# Patient Record
Sex: MALE | Race: WHITE | NOT HISPANIC OR LATINO | Employment: OTHER | URBAN - NONMETROPOLITAN AREA
[De-identification: names, ages, dates, MRNs, and addresses within clinical notes are randomized per-mention and may not be internally consistent; named-entity substitution may affect disease eponyms.]

---

## 2017-01-31 ENCOUNTER — TELEPHONE (OUTPATIENT)
Dept: FAMILY MEDICINE CLINIC | Facility: CLINIC | Age: 62
End: 2017-01-31

## 2017-01-31 DIAGNOSIS — E87.6 HYPOKALEMIA: ICD-10-CM

## 2017-01-31 DIAGNOSIS — I10 MALIGNANT HYPERTENSION: Primary | ICD-10-CM

## 2017-01-31 NOTE — TELEPHONE ENCOUNTER
----- Message from Dionne Meneses sent at 1/31/2017  1:02 PM EST -----  Contact: cherry Manriquez called asking to get a standard panel of labs put in that includes magneium and potassium as he has an appt Thursday and wants to come in have these labs drawn nikky.

## 2017-02-01 ENCOUNTER — LAB (OUTPATIENT)
Dept: FAMILY MEDICINE CLINIC | Facility: CLINIC | Age: 62
End: 2017-02-01

## 2017-02-01 DIAGNOSIS — E87.6 HYPOKALEMIA: ICD-10-CM

## 2017-02-01 DIAGNOSIS — I10 MALIGNANT HYPERTENSION: ICD-10-CM

## 2017-02-01 LAB
ANION GAP SERPL CALCULATED.3IONS-SCNC: 10 MMOL/L (ref 3–11)
BUN BLD-MCNC: 11 MG/DL (ref 9–23)
BUN/CREAT SERPL: 8.5 (ref 7–25)
CALCIUM SPEC-SCNC: 10.2 MG/DL (ref 8.7–10.4)
CHLORIDE SERPL-SCNC: 100 MMOL/L (ref 99–109)
CO2 SERPL-SCNC: 31 MMOL/L (ref 20–31)
CREAT BLD-MCNC: 1.3 MG/DL (ref 0.6–1.3)
GFR SERPL CREATININE-BSD FRML MDRD: 56 ML/MIN/1.73
GLUCOSE BLD-MCNC: 122 MG/DL (ref 70–100)
MAGNESIUM SERPL-MCNC: 2 MG/DL (ref 1.3–2.7)
POTASSIUM BLD-SCNC: 3.5 MMOL/L (ref 3.5–5.5)
SODIUM BLD-SCNC: 141 MMOL/L (ref 132–146)

## 2017-02-01 PROCEDURE — 80048 BASIC METABOLIC PNL TOTAL CA: CPT | Performed by: INTERNAL MEDICINE

## 2017-02-01 PROCEDURE — 83735 ASSAY OF MAGNESIUM: CPT | Performed by: INTERNAL MEDICINE

## 2017-02-02 ENCOUNTER — OFFICE VISIT (OUTPATIENT)
Dept: FAMILY MEDICINE CLINIC | Facility: CLINIC | Age: 62
End: 2017-02-02

## 2017-02-02 VITALS
HEART RATE: 66 BPM | HEIGHT: 76 IN | OXYGEN SATURATION: 100 % | DIASTOLIC BLOOD PRESSURE: 83 MMHG | BODY MASS INDEX: 38.36 KG/M2 | RESPIRATION RATE: 16 BRPM | WEIGHT: 315 LBS | SYSTOLIC BLOOD PRESSURE: 163 MMHG | TEMPERATURE: 98 F

## 2017-02-02 DIAGNOSIS — R53.1 WEAKNESS: ICD-10-CM

## 2017-02-02 DIAGNOSIS — M25.562 ARTHRALGIA OF LEFT KNEE: ICD-10-CM

## 2017-02-02 DIAGNOSIS — I10 MALIGNANT HYPERTENSION: ICD-10-CM

## 2017-02-02 DIAGNOSIS — E87.6 HYPOKALEMIA: Primary | ICD-10-CM

## 2017-02-02 PROCEDURE — 99214 OFFICE O/P EST MOD 30 MIN: CPT | Performed by: INTERNAL MEDICINE

## 2017-02-02 RX ORDER — CLOPIDOGREL BISULFATE 75 MG/1
75 TABLET ORAL DAILY
Qty: 90 TABLET | Refills: 3 | Status: SHIPPED | OUTPATIENT
Start: 2017-02-02 | End: 2017-02-16 | Stop reason: SDUPTHER

## 2017-02-02 RX ORDER — HYDRALAZINE HYDROCHLORIDE 100 MG/1
50 TABLET, FILM COATED ORAL 3 TIMES DAILY
Qty: 90 TABLET | Refills: 11 | Status: SHIPPED | OUTPATIENT
Start: 2017-02-02 | End: 2018-07-12

## 2017-02-02 NOTE — PROGRESS NOTES
"Subjective   Patient ID: Andre Agosto is a 62 y.o. male Pt is here for management of multiple medical problems.  History of Present Illness     Patient here for follow-up, patient has questions regarding medications.  Pt is here for management of multiple medical problems.    Whole left side of body seems swollen. Weak on left side of body x2-3 weeks.     BP starting to increase mildly. Seep on right side.   On Bipap.     Seen nephrologist. Going well with labs.    Brilenta causing soa. When not taking soa improves.       Pt only taking hydralazine qd.       The following portions of the patient's history were reviewed and updated as appropriate: allergies, current medications, past family history, past medical history, past social history, past surgical history and problem list.      Review of Systems   Constitutional: Positive for fatigue.   Neurological: Positive for weakness.   All other systems reviewed and are negative.      Objective     Visit Vitals   • /83 (BP Location: Left arm, Patient Position: Sitting, Cuff Size: Large Adult)   • Pulse 66   • Temp 98 °F (36.7 °C) (Oral)   • Resp 16   • Ht 76\" (193 cm)   • Wt (!) 317 lb (144 kg)   • SpO2 100%   • BMI 38.59 kg/m2     Physical Exam  General Appearance:    Alert, cooperative, no distress, appears stated age   Head:    Normocephalic, without obvious abnormality, atraumatic   Eyes:    PERRL, conjunctiva/corneas clear, EOM's intact           Ears:    Normal TM's and external ear canals, both ears   Nose:   Nares normal, septum midline, mucosa normal, no drainage    or sinus tenderness   Throat:   Lips, mucosa, and tongue normal; teeth and gums normal   Neck:   Supple, symmetrical, trachea midline, no adenopathy;        thyroid:  No enlargement/tenderness/nodules; no carotid    bruit or JVD   Back:     Symmetric, no curvature, ROM normal, no CVA tenderness   Lungs:     Clear to auscultation bilaterally, respirations unlabored   Chest wall:    No " tenderness or deformity   Heart:    Regular rate and rhythm, S1 and S2 normal, no murmur, rub   or gallop   Abdomen:     Soft, non-tender, bowel sounds active all four quadrants,     no masses, no organomegaly           Extremities:   Extremities normal, atraumatic, no cyanosis or edema   Pulses:   2+ and symmetric all extremities   Skin:   Skin color, texture, turgor normal, no rashes or lesions   Lymph nodes:   Cervical, supraclavicular, and axillary nodes normal   Neurologic:   CNII-XII intact. Normal strength, sensation and +0 reflexes       Throughout. Loss of vib sensation in both lower extreme.    Neg rhomberg eyes open and closed.        Assessment/Plan     Left sided sensation of edema and weakness.  No clear PE findings.  Consider cva as cause. therapy will be excalated if new cva found.     Increase hydralazine to tid. Decrease dase back to 50 and increase as needed.     benoit brilliant to Plavix due to SE.       Andre was seen today for hypertension, edema and shortness of breath.    Diagnoses and all orders for this visit:    Hypokalemia  -     Magnesium; Future    Malignant hypertension  -     Magnesium; Future    Arthralgia of left knee    Weakness  -     CK Isoenzymes; Future  -     Myoglobin, Serum; Future  -     MRI Brain Without Contrast; Future    Other orders  -     clopidogrel (PLAVIX) 75 MG tablet; Take 1 tablet by mouth Daily.  -     hydrALAZINE (APRESOLINE) 100 MG tablet; Take 0.5 tablets by mouth 3 (Three) Times a Day.      Return in about 2 weeks (around 2/16/2017).                   There are no Patient Instructions on file for this visit.

## 2017-02-02 NOTE — PROGRESS NOTES
Please let them know the kidney function is good this time and potassium is stable. No rtc on file.  Please set one up for then next 6-8 weeks.

## 2017-02-16 ENCOUNTER — OFFICE VISIT (OUTPATIENT)
Dept: FAMILY MEDICINE CLINIC | Facility: CLINIC | Age: 62
End: 2017-02-16

## 2017-02-16 VITALS
DIASTOLIC BLOOD PRESSURE: 81 MMHG | HEIGHT: 76 IN | WEIGHT: 309 LBS | SYSTOLIC BLOOD PRESSURE: 153 MMHG | RESPIRATION RATE: 16 BRPM | BODY MASS INDEX: 37.63 KG/M2 | TEMPERATURE: 98.2 F | HEART RATE: 74 BPM | OXYGEN SATURATION: 100 %

## 2017-02-16 DIAGNOSIS — I10 ESSENTIAL HYPERTENSION: Primary | ICD-10-CM

## 2017-02-16 PROCEDURE — 99214 OFFICE O/P EST MOD 30 MIN: CPT | Performed by: INTERNAL MEDICINE

## 2017-02-16 RX ORDER — NEBIVOLOL HYDROCHLORIDE 20 MG/1
20 TABLET ORAL DAILY
Qty: 90 TABLET | Refills: 3 | Status: SHIPPED | OUTPATIENT
Start: 2017-02-16 | End: 2018-08-14 | Stop reason: DRUGHIGH

## 2017-02-16 RX ORDER — CLOPIDOGREL BISULFATE 75 MG/1
75 TABLET ORAL DAILY
Qty: 90 TABLET | Refills: 3 | Status: SHIPPED | OUTPATIENT
Start: 2017-02-16 | End: 2018-03-19 | Stop reason: SDUPTHER

## 2017-02-16 NOTE — PROGRESS NOTES
"Subjective   Patient ID: Andre Agosto is a 62 y.o. male Pt is here for management of multiple medical problems.  History of Present Illness  Pt is here for management of multiple medical problems.    Pt walking better.     Toe pain getting better but still hurts.    MRI at [facility] and they never sent result. Told neg mri      Having a hard time with mid day hydralazine.      having pain in legs knees down and some pain in thigh.   Not got ck or myoglobin labs done.        The following portions of the patient's history were reviewed and updated as appropriate: allergies, current medications, past family history, past medical history, past social history, past surgical history and problem list.      Review of Systems   Constitutional: Positive for fatigue.   Musculoskeletal: Positive for gait problem and myalgias.       Objective     Visit Vitals   • /81 (BP Location: Left arm, Patient Position: Sitting, Cuff Size: Large Adult)   • Pulse 74   • Temp 98.2 °F (36.8 °C) (Oral)   • Resp 16   • Ht 76\" (193 cm)   • Wt (!) 309 lb (140 kg)   • SpO2 100%   • BMI 37.61 kg/m2     Physical Exam  General Appearance:    Alert, cooperative, no distress, appears stated age   Head:    Normocephalic, without obvious abnormality, atraumatic   Eyes:    PERRL, conjunctiva/corneas clear, EOM's intact           Ears:    Normal TM's and external ear canals, both ears   Nose:   Nares normal, septum midline, mucosa normal, no drainage    or sinus tenderness   Throat:   Lips, mucosa, and tongue normal; teeth and gums normal   Neck:   Supple, symmetrical, trachea midline, no adenopathy;        thyroid:  No enlargement/tenderness/nodules; no carotid    bruit or JVD   Back:     Symmetric, no curvature, ROM normal, no CVA tenderness   Lungs:     Clear to auscultation bilaterally, respirations unlabored   Chest wall:    No tenderness or deformity   Heart:    Regular rate and rhythm, S1 and S2 normal, no murmur, rub   or gallop "   Abdomen:     Soft, non-tender, bowel sounds active all four quadrants,     no masses, no organomegaly           Extremities:   Extremities normal, atraumatic, no cyanosis or edema   Pulses:   2+ and symmetric all extremities   Skin:   Skin color, texture, turgor normal, no rashes or lesions   Lymph nodes:   Cervical, supraclavicular, and axillary nodes normal   Neurologic:   CNII-XII intact. Normal strength, sensation and reflexes       throughout       Assessment/Plan     Increase hydralazine to tid  Trial of one week off statin.   If better then restart q0d.    Get mri of brain images as pt has had abnormal mri in past and was told this one is normal.     Pt will change brillinta to plavix due to soa.      Andre was seen today for hypertension, hypokalemia and pain.    Diagnoses and all orders for this visit:    Essential hypertension  -     Basic metabolic panel; Future    Other orders  -     BYSTOLIC 20 MG tablet; Take 1 tablet by mouth Daily.  -     clopidogrel (PLAVIX) 75 MG tablet; Take 1 tablet by mouth Daily.      No Follow-up on file.                   There are no Patient Instructions on file for this visit.

## 2017-03-01 RX ORDER — ESCITALOPRAM OXALATE 20 MG/1
TABLET ORAL
Qty: 90 TABLET | Refills: 2 | Status: SHIPPED | OUTPATIENT
Start: 2017-03-01 | End: 2017-12-09 | Stop reason: SDUPTHER

## 2017-03-10 ENCOUNTER — OFFICE VISIT (OUTPATIENT)
Dept: FAMILY MEDICINE CLINIC | Facility: CLINIC | Age: 62
End: 2017-03-10

## 2017-03-10 VITALS
TEMPERATURE: 98.1 F | HEART RATE: 58 BPM | BODY MASS INDEX: 38.36 KG/M2 | OXYGEN SATURATION: 98 % | DIASTOLIC BLOOD PRESSURE: 72 MMHG | RESPIRATION RATE: 16 BRPM | HEIGHT: 76 IN | SYSTOLIC BLOOD PRESSURE: 138 MMHG | WEIGHT: 315 LBS

## 2017-03-10 DIAGNOSIS — I10 MALIGNANT HYPERTENSION: Primary | ICD-10-CM

## 2017-03-10 DIAGNOSIS — E87.6 HYPOKALEMIA: ICD-10-CM

## 2017-03-10 DIAGNOSIS — Z99.89 OSA ON CPAP: ICD-10-CM

## 2017-03-10 DIAGNOSIS — G47.33 OSA ON CPAP: ICD-10-CM

## 2017-03-10 DIAGNOSIS — G40.909 SEIZURE DISORDER (HCC): ICD-10-CM

## 2017-03-10 LAB
BUN SERPL-MCNC: 11 MG/DL (ref 7–20)
BUN/CREAT SERPL: 8.5 (ref 6.3–21.9)
CALCIUM SERPL-MCNC: 9.4 MG/DL (ref 8.4–10.2)
CHLORIDE SERPL-SCNC: 105 MMOL/L (ref 98–107)
CK BB CFR SERPL ELPH: 0 %
CK MACRO1 CFR SERPL: 0 %
CK MACRO2 CFR SERPL: 0 %
CK MB CFR SERPL ELPH: 0 % (ref 0–3)
CK MM CFR SERPL ELPH: 100 % (ref 97–100)
CK SERPL-CCNC: 71 U/L (ref 24–204)
CO2 SERPL-SCNC: 27 MMOL/L (ref 26–30)
CREAT SERPL-MCNC: 1.3 MG/DL (ref 0.6–1.3)
GLUCOSE SERPL-MCNC: 133 MG/DL (ref 74–98)
MYOGLOBIN SERPL-MCNC: 63.9 NG/ML (ref 0–101)
POTASSIUM SERPL-SCNC: 3 MMOL/L (ref 3.5–5.1)
SODIUM SERPL-SCNC: 144 MMOL/L (ref 137–145)

## 2017-03-10 PROCEDURE — 99214 OFFICE O/P EST MOD 30 MIN: CPT | Performed by: INTERNAL MEDICINE

## 2017-03-10 NOTE — PROGRESS NOTES
"Subjective   Patient ID: Andre Agosto is a 62 y.o. male Pt is here for management of multiple medical problems.    Chief Complaint   Patient presents with   • Hypertension     follow-up       History of Present Illness    Feels well. Average 150/80.  Hydralazin bid. Hard time remebering tid.  Better on soa on brillenta.     Off cholest med. Leg pain is gone.       On Bipap.       The following portions of the patient's history were reviewed and updated as appropriate: allergies, current medications, past family history, past medical history, past social history, past surgical history and problem list.      Review of Systems   Constitutional: Positive for fatigue.   All other systems reviewed and are negative.      Objective     Visit Vitals   • /72   • Pulse 58   • Temp 98.1 °F (36.7 °C) (Oral)   • Resp 16   • Ht 76\" (193 cm)   • Wt (!) 318 lb (144 kg)   • SpO2 98%   • BMI 38.71 kg/m2     Physical Exam  General Appearance:    Alert, cooperative, no distress, appears stated age   Head:    Normocephalic, without obvious abnormality, atraumatic   Eyes:    PERRL, conjunctiva/corneas clear, EOM's intact           Ears:    Normal TM's and external ear canals, both ears   Nose:   Nares normal, septum midline, mucosa normal, no drainage    or sinus tenderness   Throat:   Lips, mucosa, and tongue normal; teeth and gums normal   Neck:   Supple, symmetrical, trachea midline, no adenopathy;        thyroid:  No enlargement/tenderness/nodules; no carotid    bruit or JVD   Back:     Symmetric, no curvature, ROM normal, no CVA tenderness   Lungs:     Clear to auscultation bilaterally, respirations unlabored   Chest wall:    No tenderness or deformity   Heart:    Regular rate and rhythm, S1 and S2 normal, no murmur, rub   or gallop   Abdomen:     Soft, non-tender, bowel sounds active all four quadrants,     no masses, no organomegaly           Extremities:   Extremities normal, atraumatic, no cyanosis or edema "   Pulses:   2+ and symmetric all extremities   Skin:   Skin color, texture, turgor normal, no rashes or lesions   Lymph nodes:   Cervical, supraclavicular, and axillary nodes normal   Neurologic:   CNII-XII intact. Normal strength, sensation and reflexes       throughout       Assessment/Plan   Labs discusseed.  Get on lovastatin.  Start back kcl.     BP still running high.        Andre was seen today for hypertension.    Diagnoses and all orders for this visit:    Malignant hypertension  -     Magnesium; Future  -     Comprehensive Metabolic Panel; Future  -     T4, Free; Future  -     TSH; Future  -     T3, Free; Future  -     Vitamin B12; Future  -     CBC & Differential; Future  -     Lipid Panel; Future    Seizure disorder  -     Magnesium; Future  -     Comprehensive Metabolic Panel; Future  -     T4, Free; Future  -     TSH; Future  -     T3, Free; Future  -     Vitamin B12; Future  -     CBC & Differential; Future  -     Lipid Panel; Future    Hypokalemia  -     Magnesium; Future  -     Comprehensive Metabolic Panel; Future  -     T4, Free; Future  -     TSH; Future  -     T3, Free; Future  -     Vitamin B12; Future  -     CBC & Differential; Future  -     Lipid Panel; Future    ALEX on CPAP  -     Magnesium; Future  -     Comprehensive Metabolic Panel; Future  -     T4, Free; Future  -     TSH; Future  -     T3, Free; Future  -     Vitamin B12; Future  -     CBC & Differential; Future  -     Lipid Panel; Future      Return in about 3 months (around 6/10/2017), or set up skin lesion in 2-3 weeks..                   There are no Patient Instructions on file for this visit.

## 2017-03-11 ENCOUNTER — RESULTS ENCOUNTER (OUTPATIENT)
Dept: FAMILY MEDICINE CLINIC | Facility: CLINIC | Age: 62
End: 2017-03-11

## 2017-03-11 DIAGNOSIS — G47.33 OSA ON CPAP: ICD-10-CM

## 2017-03-11 DIAGNOSIS — E87.6 HYPOKALEMIA: ICD-10-CM

## 2017-03-11 DIAGNOSIS — I10 MALIGNANT HYPERTENSION: ICD-10-CM

## 2017-03-11 DIAGNOSIS — Z99.89 OSA ON CPAP: ICD-10-CM

## 2017-03-11 DIAGNOSIS — G40.909 SEIZURE DISORDER (HCC): ICD-10-CM

## 2017-03-15 ENCOUNTER — RESULTS ENCOUNTER (OUTPATIENT)
Dept: FAMILY MEDICINE CLINIC | Facility: CLINIC | Age: 62
End: 2017-03-15

## 2017-03-15 DIAGNOSIS — I10 MALIGNANT HYPERTENSION: ICD-10-CM

## 2017-03-15 DIAGNOSIS — G40.909 SEIZURE DISORDER (HCC): ICD-10-CM

## 2017-03-15 DIAGNOSIS — G47.33 OSA ON CPAP: ICD-10-CM

## 2017-03-15 DIAGNOSIS — E87.6 HYPOKALEMIA: ICD-10-CM

## 2017-03-15 DIAGNOSIS — Z99.89 OSA ON CPAP: ICD-10-CM

## 2017-04-10 ENCOUNTER — PROCEDURE VISIT (OUTPATIENT)
Dept: FAMILY MEDICINE CLINIC | Facility: CLINIC | Age: 62
End: 2017-04-10

## 2017-04-10 VITALS
SYSTOLIC BLOOD PRESSURE: 164 MMHG | HEIGHT: 76 IN | WEIGHT: 311 LBS | HEART RATE: 54 BPM | DIASTOLIC BLOOD PRESSURE: 84 MMHG | RESPIRATION RATE: 16 BRPM | BODY MASS INDEX: 37.87 KG/M2 | OXYGEN SATURATION: 99 % | TEMPERATURE: 97.8 F

## 2017-04-10 DIAGNOSIS — L98.9 SKIN LESION OF RIGHT LEG: Primary | ICD-10-CM

## 2017-04-10 PROCEDURE — 11400 EXC TR-EXT B9+MARG 0.5 CM<: CPT | Performed by: INTERNAL MEDICINE

## 2017-04-10 NOTE — PROGRESS NOTES
Skin Lesion Removal.  Indication: uncertain behavior   Pt was consented. Area prepped.  Lidocaine with epi 1% given subepidermally.  Shave of entire lesion done.  0.5 cm.  Electrodesiccation used for hemostasis.  Specimen sent to path.  Verbal wound care instructions given.  Pt tolerated procedure well.  Location right posterior leg upper thigh.

## 2017-05-15 ENCOUNTER — TELEPHONE (OUTPATIENT)
Dept: FAMILY MEDICINE CLINIC | Facility: CLINIC | Age: 62
End: 2017-05-15

## 2017-05-15 DIAGNOSIS — E87.6 HYPOKALEMIA: Primary | ICD-10-CM

## 2017-06-21 ENCOUNTER — LAB (OUTPATIENT)
Dept: FAMILY MEDICINE CLINIC | Facility: CLINIC | Age: 62
End: 2017-06-21

## 2017-06-21 DIAGNOSIS — R53.1 WEAKNESS: ICD-10-CM

## 2017-06-21 DIAGNOSIS — I10 ESSENTIAL HYPERTENSION: ICD-10-CM

## 2017-06-21 LAB — MAGNESIUM SERPL-MCNC: 1.9 MG/DL (ref 1.6–2.3)

## 2017-06-22 LAB
ALBUMIN SERPL-MCNC: 4.3 G/DL (ref 3.5–5)
ALBUMIN/GLOB SERPL: 1.3 G/DL (ref 1–2)
ALP SERPL-CCNC: 77 U/L (ref 38–126)
ALT SERPL-CCNC: 25 U/L (ref 13–69)
AST SERPL-CCNC: 18 U/L (ref 15–46)
BASOPHILS # BLD AUTO: 0.09 10*3/MM3 (ref 0–0.2)
BASOPHILS NFR BLD AUTO: 1 % (ref 0–2.5)
BILIRUB SERPL-MCNC: 0.7 MG/DL (ref 0.2–1.3)
BUN SERPL-MCNC: 12 MG/DL (ref 7–20)
BUN/CREAT SERPL: 8.6 (ref 6.3–21.9)
CALCIUM SERPL-MCNC: 9.6 MG/DL (ref 8.4–10.2)
CHLORIDE SERPL-SCNC: 102 MMOL/L (ref 98–107)
CHOLEST SERPL-MCNC: 247 MG/DL (ref 0–199)
CO2 SERPL-SCNC: 27 MMOL/L (ref 26–30)
CREAT SERPL-MCNC: 1.4 MG/DL (ref 0.6–1.3)
EOSINOPHIL # BLD AUTO: 0.36 10*3/MM3 (ref 0–0.7)
EOSINOPHIL NFR BLD AUTO: 4.2 % (ref 0–7)
ERYTHROCYTE [DISTWIDTH] IN BLOOD BY AUTOMATED COUNT: 14.6 % (ref 11.5–14.5)
GLOBULIN SER CALC-MCNC: 3.4 GM/DL
GLUCOSE SERPL-MCNC: 132 MG/DL (ref 74–98)
HCT VFR BLD AUTO: 47.3 % (ref 42–52)
HDLC SERPL-MCNC: 38 MG/DL (ref 40–60)
HGB BLD-MCNC: 15.8 G/DL (ref 14–18)
IMM GRANULOCYTES # BLD: 0.02 10*3/MM3 (ref 0–0.06)
IMM GRANULOCYTES NFR BLD: 0.2 % (ref 0–0.6)
LDLC SERPL CALC-MCNC: ABNORMAL MG/DL
LYMPHOCYTES # BLD AUTO: 2.16 10*3/MM3 (ref 0.6–3.4)
LYMPHOCYTES NFR BLD AUTO: 25 % (ref 10–50)
MCH RBC QN AUTO: 29.7 PG (ref 27–31)
MCHC RBC AUTO-ENTMCNC: 33.4 G/DL (ref 30–37)
MCV RBC AUTO: 88.9 FL (ref 80–94)
MONOCYTES # BLD AUTO: 0.49 10*3/MM3 (ref 0–0.9)
MONOCYTES NFR BLD AUTO: 5.7 % (ref 0–12)
NEUTROPHILS # BLD AUTO: 5.51 10*3/MM3 (ref 2–6.9)
NEUTROPHILS NFR BLD AUTO: 63.9 % (ref 37–80)
NRBC BLD AUTO-RTO: 0 /100 WBC (ref 0–0)
PLATELET # BLD AUTO: 254 10*3/MM3 (ref 130–400)
POTASSIUM SERPL-SCNC: 3.2 MMOL/L (ref 3.5–5.1)
PROT SERPL-MCNC: 7.7 G/DL (ref 6.3–8.2)
RBC # BLD AUTO: 5.32 10*6/MM3 (ref 4.7–6.1)
SODIUM SERPL-SCNC: 144 MMOL/L (ref 137–145)
T3FREE SERPL-MCNC: 3 PG/ML (ref 2–4.4)
T4 FREE SERPL-MCNC: 0.94 NG/DL (ref 0.78–2.19)
TRIGL SERPL-MCNC: 410 MG/DL
TSH SERPL DL<=0.005 MIU/L-ACNC: 2.24 MIU/ML (ref 0.47–4.68)
VIT B12 SERPL-MCNC: 353 PG/ML (ref 239–931)
VLDLC SERPL CALC-MCNC: ABNORMAL MG/DL
WBC # BLD AUTO: 8.63 10*3/MM3 (ref 4.8–10.8)

## 2017-06-27 RX ORDER — POTASSIUM CHLORIDE 1500 MG/1
20 TABLET, FILM COATED, EXTENDED RELEASE ORAL 2 TIMES DAILY
Qty: 60 TABLET | Refills: 11 | Status: SHIPPED | OUTPATIENT
Start: 2017-06-27 | End: 2018-07-12 | Stop reason: SDUPTHER

## 2017-06-29 ENCOUNTER — OFFICE VISIT (OUTPATIENT)
Dept: FAMILY MEDICINE CLINIC | Facility: CLINIC | Age: 62
End: 2017-06-29

## 2017-06-29 VITALS
RESPIRATION RATE: 16 BRPM | DIASTOLIC BLOOD PRESSURE: 80 MMHG | WEIGHT: 314 LBS | SYSTOLIC BLOOD PRESSURE: 149 MMHG | OXYGEN SATURATION: 98 % | HEART RATE: 54 BPM | BODY MASS INDEX: 38.22 KG/M2 | TEMPERATURE: 98.2 F

## 2017-06-29 DIAGNOSIS — I10 ESSENTIAL HYPERTENSION: ICD-10-CM

## 2017-06-29 DIAGNOSIS — E87.6 HYPOKALEMIA: ICD-10-CM

## 2017-06-29 DIAGNOSIS — H65.04 RECURRENT ACUTE SEROUS OTITIS MEDIA OF RIGHT EAR: Primary | ICD-10-CM

## 2017-06-29 DIAGNOSIS — R79.9 ABNORMAL FINDING OF BLOOD CHEMISTRY: ICD-10-CM

## 2017-06-29 PROCEDURE — 99214 OFFICE O/P EST MOD 30 MIN: CPT | Performed by: INTERNAL MEDICINE

## 2017-06-29 RX ORDER — LEVOFLOXACIN 500 MG/1
500 TABLET, FILM COATED ORAL DAILY
Qty: 7 TABLET | Refills: 0 | Status: SHIPPED | OUTPATIENT
Start: 2017-06-29 | End: 2017-12-05

## 2017-06-29 NOTE — PROGRESS NOTES
Subjective   Patient ID: Andre Agosto is a 62 y.o. male Pt is here for management of multiple medical problems.    Chief Complaint   Patient presents with   • Ear Fullness     Patient complains that his right ear feels like it has fluid in it.   • Eye Problem       History of Present Illness    Ear pain x 1 week in right ear and eye puffines on right.  Had bad infection last year required tube and uk involvement.     Tolerating lovastaitn qod.       The following portions of the patient's history were reviewed and updated as appropriate: allergies, current medications, past family history, past medical history, past social history, past surgical history and problem list.      Review of Systems   Constitutional: Positive for fatigue.   HENT: Positive for congestion, ear pain and facial swelling.    Eyes: Positive for pain, discharge, redness, itching and visual disturbance.   All other systems reviewed and are negative.      Objective     /80  Pulse 54  Temp 98.2 °F (36.8 °C) (Oral)   Resp 16  Wt (!) 314 lb (142 kg)  SpO2 98%  BMI 38.22 kg/m2  Physical Exam  General Appearance:    Alert, cooperative, no distress, appears stated age   Head:    Normocephalic, without obvious abnormality, atraumatic   Eyes:    PERRL, conjunctiva/corneas clear, EOM's intact           Ears:    Normal TM's and external ear canals, both ears   Nose:   Nares normal, septum midline, mucosa normal, no drainage    or sinus tenderness   Throat:   Lips, mucosa, and tongue normal; teeth and gums normal   Neck:   Supple, symmetrical, trachea midline, no adenopathy;        thyroid:  No enlargement/tenderness/nodules; no carotid    bruit or JVD   Back:     Symmetric, no curvature, ROM normal, no CVA tenderness   Lungs:     Clear to auscultation bilaterally, respirations unlabored   Chest wall:    No tenderness or deformity   Heart:    Regular rate and rhythm, S1 and S2 normal, no murmur, rub   or gallop   Abdomen:     Soft,  non-tender, bowel sounds active all four quadrants,     no masses, no organomegaly           Extremities:   Extremities normal, atraumatic, no cyanosis or edema   Pulses:   2+ and symmetric all extremities   Skin:   Skin color, texture, turgor normal, no rashes or lesions   Lymph nodes:   Cervical, supraclavicular, and axillary nodes normal   Neurologic:   CNII-XII intact. Normal strength, sensation and reflexes       throughout       Assessment/Plan     Diet and exercise discussed.        Andre was seen today for ear fullness and eye problem.    Diagnoses and all orders for this visit:    Recurrent acute serous otitis media of right ear  -     levoFLOXacin (LEVAQUIN) 500 MG tablet; Take 1 tablet by mouth Daily.  -     Basic Metabolic Panel  -     Hemoglobin A1c    Essential hypertension  -     Basic Metabolic Panel  -     Hemoglobin A1c    Hypokalemia  -     Basic Metabolic Panel  -     Hemoglobin A1c    Abnormal finding of blood chemistry   -     Hemoglobin A1c      No Follow-up on file.                   There are no Patient Instructions on file for this visit.

## 2017-06-30 LAB
BUN SERPL-MCNC: 12 MG/DL (ref 7–20)
BUN/CREAT SERPL: 8.6 (ref 6.3–21.9)
CALCIUM SERPL-MCNC: 9.9 MG/DL (ref 8.4–10.2)
CHLORIDE SERPL-SCNC: 102 MMOL/L (ref 98–107)
CO2 SERPL-SCNC: 30 MMOL/L (ref 26–30)
CREAT SERPL-MCNC: 1.4 MG/DL (ref 0.6–1.3)
GLUCOSE SERPL-MCNC: 137 MG/DL (ref 74–98)
HBA1C MFR BLD: 6.4 %
POTASSIUM SERPL-SCNC: 3.7 MMOL/L (ref 3.5–5.1)
SODIUM SERPL-SCNC: 145 MMOL/L (ref 137–145)

## 2017-07-20 NOTE — TELEPHONE ENCOUNTER
Please clarify with pt dose and frequency. And if neurologist is managing it might be better for them to rf. If not let me know to [facility] med.

## 2017-07-21 RX ORDER — ZONISAMIDE 100 MG/1
100 CAPSULE ORAL 3 TIMES DAILY
Qty: 270 CAPSULE | Refills: 3 | Status: SHIPPED | OUTPATIENT
Start: 2017-07-21 | End: 2018-05-13 | Stop reason: SDUPTHER

## 2017-07-21 NOTE — TELEPHONE ENCOUNTER
Dr. Bear, I talked to Anrde, he states he was getting the Zonegran from his neurologist but his neurologist moved to [place]. Andre is now in search of a new neurologist but has not been able to get into see anyone yet. Andre states he has been taking Zonisamide 100 mg, 3 times a day, and he is asking if you can refill this for him until he finds a new doctor.

## 2017-10-24 ENCOUNTER — OFFICE VISIT (OUTPATIENT)
Dept: FAMILY MEDICINE CLINIC | Facility: CLINIC | Age: 62
End: 2017-10-24

## 2017-10-24 ENCOUNTER — TELEPHONE (OUTPATIENT)
Dept: FAMILY MEDICINE CLINIC | Facility: CLINIC | Age: 62
End: 2017-10-24

## 2017-10-24 VITALS
HEART RATE: 54 BPM | TEMPERATURE: 97.4 F | BODY MASS INDEX: 37.14 KG/M2 | HEIGHT: 76 IN | WEIGHT: 305 LBS | OXYGEN SATURATION: 96 % | SYSTOLIC BLOOD PRESSURE: 167 MMHG | RESPIRATION RATE: 16 BRPM | DIASTOLIC BLOOD PRESSURE: 95 MMHG

## 2017-10-24 DIAGNOSIS — I10 MALIGNANT HYPERTENSION: ICD-10-CM

## 2017-10-24 DIAGNOSIS — R53.83 FATIGUE, UNSPECIFIED TYPE: ICD-10-CM

## 2017-10-24 DIAGNOSIS — R35.89 POLYURIA: ICD-10-CM

## 2017-10-24 DIAGNOSIS — I10 MALIGNANT HYPERTENSION: Primary | ICD-10-CM

## 2017-10-24 DIAGNOSIS — E11.9 DIABETES MELLITUS WITHOUT COMPLICATION (HCC): ICD-10-CM

## 2017-10-24 DIAGNOSIS — R63.1 POLYDIPSIA: ICD-10-CM

## 2017-10-24 DIAGNOSIS — R73.9 ELEVATED BLOOD SUGAR LEVEL: ICD-10-CM

## 2017-10-24 PROCEDURE — 99214 OFFICE O/P EST MOD 30 MIN: CPT | Performed by: INTERNAL MEDICINE

## 2017-10-24 RX ORDER — GLIPIZIDE 5 MG/1
5 TABLET ORAL
Qty: 60 TABLET | Refills: 3 | Status: SHIPPED | OUTPATIENT
Start: 2017-10-24 | End: 2017-12-05

## 2017-10-24 RX ORDER — BLOOD-GLUCOSE METER
1 KIT MISCELLANEOUS AS NEEDED
Qty: 1 EACH | Refills: 1 | Status: SHIPPED | OUTPATIENT
Start: 2017-10-24 | End: 2017-10-24 | Stop reason: SDUPTHER

## 2017-10-24 RX ORDER — NIFEDIPINE 60 MG/1
120 TABLET, EXTENDED RELEASE ORAL DAILY
COMMUNITY
End: 2018-07-12 | Stop reason: SDUPTHER

## 2017-10-24 RX ORDER — LANCETS 28 GAUGE
1 EACH MISCELLANEOUS AS NEEDED
Qty: 120 EACH | Refills: 12 | Status: ON HOLD | OUTPATIENT
Start: 2017-10-24 | End: 2020-01-02

## 2017-10-24 RX ORDER — BLOOD-GLUCOSE METER
1 KIT MISCELLANEOUS AS NEEDED
Qty: 1 EACH | Refills: 1 | Status: SHIPPED | OUTPATIENT
Start: 2017-10-24 | End: 2017-10-25 | Stop reason: SDUPTHER

## 2017-10-24 RX ORDER — PEN NEEDLE, DIABETIC 31 GX5/16"
1 NEEDLE, DISPOSABLE MISCELLANEOUS 4 TIMES DAILY PRN
Qty: 120 EACH | Refills: 12 | Status: ON HOLD | OUTPATIENT
Start: 2017-10-24 | End: 2020-01-02

## 2017-10-24 RX ORDER — LANCETS 28 GAUGE
1 EACH MISCELLANEOUS AS NEEDED
Qty: 120 EACH | Refills: 12 | Status: SHIPPED | OUTPATIENT
Start: 2017-10-24 | End: 2017-10-24 | Stop reason: SDUPTHER

## 2017-10-24 RX ORDER — PEN NEEDLE, DIABETIC 31 GX5/16"
1 NEEDLE, DISPOSABLE MISCELLANEOUS 4 TIMES DAILY PRN
Qty: 120 EACH | Refills: 12 | Status: SHIPPED | OUTPATIENT
Start: 2017-10-24 | End: 2017-10-24 | Stop reason: SDUPTHER

## 2017-10-24 NOTE — PROGRESS NOTES
Subjective     Patient ID: Andre Agosto is a 62 y.o. male. Patient is here for management of multiple medical problems.     Chief Complaint   Patient presents with   • Hypertension     patient states he has been having an increase in thirst, patient states he is drinking 20 bottles of water a day x 2 weeks , blood pressure has been running 150 to 175 over 72 to 85    • Fatigue     patient states he stays tired all the time     Diabetes   He presents for his initial diabetic visit. He has type 2 diabetes mellitus. No MedicAlert identification noted. His disease course has been fluctuating. Pertinent negatives for hypoglycemia include no confusion, dizziness, headaches or hunger. Associated symptoms include fatigue, polydipsia, polyphagia, polyuria and weakness. Pertinent negatives for diabetes include no blurred vision, no chest pain, no foot paresthesias and no foot ulcerations. Pertinent negatives for hypoglycemia complications include no blackouts, no nocturnal hypoglycemia and no required assistance. Pertinent negatives for diabetic complications include no CVA, heart disease, impotence or nephropathy. Risk factors for coronary artery disease include dyslipidemia, male sex, obesity and hypertension. When asked about current treatments, none were reported. He has not had a previous visit with a dietitian. He participates in exercise intermittently. An ACE inhibitor/angiotensin II receptor blocker is being taken.      Thinks postassium is low.  Wt is down.       Drink a lot of water x 2-3 weeks and can't statisfy thirst. Increase polyuria.     BP running elevated.  bp in the 180's systolic.        The following portions of the patient's history were reviewed and updated as appropriate: allergies, current medications, past family history, past medical history, past social history, past surgical history and problem list.    Review of Systems   Constitutional: Positive for fatigue.   Eyes: Negative for blurred  vision.   Cardiovascular: Negative for chest pain.   Endocrine: Positive for polydipsia, polyphagia and polyuria.   Genitourinary: Negative for impotence.   Neurological: Positive for weakness. Negative for dizziness and headaches.   Psychiatric/Behavioral: Negative for confusion.       Current Outpatient Prescriptions:   •  aspirin 81 MG EC tablet, Take 1 tablet by mouth daily., Disp: 90 tablet, Rfl: 3  •  BYSTOLIC 20 MG tablet, Take 1 tablet by mouth Daily., Disp: 90 tablet, Rfl: 3  •  clopidogrel (PLAVIX) 75 MG tablet, Take 1 tablet by mouth Daily., Disp: 90 tablet, Rfl: 3  •  escitalopram (LEXAPRO) 20 MG tablet, TAKE 1 TABLET DAILY, Disp: 90 tablet, Rfl: 2  •  hydrALAZINE (APRESOLINE) 100 MG tablet, Take 0.5 tablets by mouth 3 (Three) Times a Day. (Patient taking differently: Take 50 mg by mouth 2 (Two) Times a Day.), Disp: 90 tablet, Rfl: 11  •  levoFLOXacin (LEVAQUIN) 500 MG tablet, Take 1 tablet by mouth Daily., Disp: 7 tablet, Rfl: 0  •  lovastatin (MEVACOR) 20 MG tablet, Take 0.5 tablets by mouth every night. (Patient taking differently: Take 20 mg by mouth Every Night.), Disp: 30 tablet, Rfl: 11  •  Magnesium Oxide 400 (240 MG) MG tablet, Take 1 tablet by mouth daily., Disp: 30 tablet, Rfl: 11  •  NIFEdipine XL (PROCARDIA XL) 60 MG 24 hr tablet, Take 120 mg by mouth Daily., Disp: , Rfl:   •  potassium chloride (K-TAB) 20 MEQ tablet controlled-release ER tablet, Take 1 tablet by mouth 2 (Two) Times a Day., Disp: 60 tablet, Rfl: 11  •  valsartan (DIOVAN) 320 MG tablet, Take 320 mg by mouth daily., Disp: , Rfl:   •  zonisamide (ZONEGRAN) 100 MG capsule, Take 1 capsule by mouth 3 (Three) Times a Day., Disp: 270 capsule, Rfl: 3  •  Alcohol Swabs (ALCOHOL PREP) pads, 1 pad 4 (Four) Times a Day As Needed (bs)., Disp: 120 each, Rfl: 12  •  glucose blood test strip, Use as instructed, Disp: 120 each, Rfl: 12  •  glucose monitor monitoring kit, 1 each As Needed (as directed)., Disp: 1 each, Rfl: 1  •  Lancets  "(FREESTYLE) lancets, 1 each by Other route As Needed (bs)., Disp: 120 each, Rfl: 12  •  NIFEdipine XL (ADALAT CC) 90 MG 24 hr tablet, Take 1 tablet by mouth Daily., Disp: 90 tablet, Rfl: 3    Objective      Blood pressure 167/95, pulse 54, temperature 97.4 °F (36.3 °C), temperature source Temporal Artery , resp. rate 16, height 76\" (193 cm), weight (!) 305 lb (138 kg), SpO2 96 %.    Physical Exam     General Appearance:    Alert, cooperative, no distress, appears stated age   Head:    Normocephalic, without obvious abnormality, atraumatic   Eyes:    PERRL, conjunctiva/corneas clear, EOM's intact   Ears:    Normal TM's and external ear canals, both ears   Nose:   Nares normal, septum midline, mucosa normal, no drainage   or sinus tenderness   Throat:   Lips, mucosa, and tongue normal; teeth and gums normal   Neck:   Supple, symmetrical, trachea midline, no adenopathy;        thyroid:  No enlargement/tenderness/nodules; no carotid    bruit or JVD   Back:     Symmetric, no curvature, ROM normal, no CVA tenderness   Lungs:     Clear to auscultation bilaterally, respirations unlabored   Chest wall:    No tenderness or deformity   Heart:    Regular rate and rhythm, S1 and S2 normal, no murmur,        rub or gallop   Abdomen:     Soft, non-tender, bowel sounds active all four quadrants,     no masses, no organomegaly   Extremities:   Extremities normal, atraumatic, no cyanosis or edema   Pulses:   2+ and symmetric all extremities   Skin:   Skin color, texture, turgor normal, no rashes or lesions   Lymph nodes:   Cervical, supraclavicular, and axillary nodes normal   Neurologic:   CNII-XII intact. Normal strength, sensation and reflexes       throughout      Results for orders placed or performed in visit on 06/29/17   Basic Metabolic Panel   Result Value Ref Range    Glucose 137 (H) 74 - 98 mg/dL    BUN 12 7 - 20 mg/dL    Creatinine 1.40 (H) 0.60 - 1.30 mg/dL    eGFR Non African Am 52 (L) >60 mL/min/1.73    eGFR African Am " 62 >60 mL/min/1.73    BUN/Creatinine Ratio 8.6 6.3 - 21.9    Sodium 145 137 - 145 mmol/L    Potassium 3.7 3.5 - 5.1 mmol/L    Chloride 102 98 - 107 mmol/L    Total CO2 30.0 26.0 - 30.0 mmol/L    Calcium 9.9 8.4 - 10.2 mg/dL   Hemoglobin A1c   Result Value Ref Range    Hemoglobin A1C 6.40 %         Assessment/Plan   Diet discussed in detail.      Andre was seen today for hypertension and fatigue.    Diagnoses and all orders for this visit:    Malignant hypertension  -     Hemoglobin A1c  -     Lipid Panel  -     MicroAlbumin, Urine, Random - Urine, Clean Catch  -     TSH  -     T4, Free  -     Comprehensive Metabolic Panel  -     CBC & Differential  -     Vitamin B12  -     glucose monitor monitoring kit; 1 each As Needed (as directed).  -     glucose blood test strip; Use as instructed  -     Alcohol Swabs (ALCOHOL PREP) pads; 1 pad 4 (Four) Times a Day As Needed (bs).  -     Lancets (FREESTYLE) lancets; 1 each by Other route As Needed (bs).    Fatigue, unspecified type  -     Hemoglobin A1c  -     Lipid Panel  -     MicroAlbumin, Urine, Random - Urine, Clean Catch  -     TSH  -     T4, Free  -     Comprehensive Metabolic Panel  -     CBC & Differential  -     Vitamin B12  -     glucose monitor monitoring kit; 1 each As Needed (as directed).  -     glucose blood test strip; Use as instructed  -     Alcohol Swabs (ALCOHOL PREP) pads; 1 pad 4 (Four) Times a Day As Needed (bs).  -     Lancets (FREESTYLE) lancets; 1 each by Other route As Needed (bs).    Polydipsia  -     Hemoglobin A1c  -     Lipid Panel  -     MicroAlbumin, Urine, Random - Urine, Clean Catch  -     TSH  -     T4, Free  -     Comprehensive Metabolic Panel  -     CBC & Differential  -     Vitamin B12  -     glucose monitor monitoring kit; 1 each As Needed (as directed).  -     glucose blood test strip; Use as instructed  -     Alcohol Swabs (ALCOHOL PREP) pads; 1 pad 4 (Four) Times a Day As Needed (bs).  -     Lancets (FREESTYLE) lancets; 1 each by  Other route As Needed (bs).    Polyuria  -     Hemoglobin A1c  -     Lipid Panel  -     MicroAlbumin, Urine, Random - Urine, Clean Catch  -     TSH  -     T4, Free  -     Comprehensive Metabolic Panel  -     CBC & Differential  -     Vitamin B12  -     glucose monitor monitoring kit; 1 each As Needed (as directed).  -     glucose blood test strip; Use as instructed  -     Alcohol Swabs (ALCOHOL PREP) pads; 1 pad 4 (Four) Times a Day As Needed (bs).  -     Lancets (FREESTYLE) lancets; 1 each by Other route As Needed (bs).    Elevated blood sugar level  -     Hemoglobin A1c  -     Lipid Panel  -     MicroAlbumin, Urine, Random - Urine, Clean Catch  -     TSH  -     T4, Free  -     Comprehensive Metabolic Panel  -     CBC & Differential  -     Vitamin B12  -     glucose monitor monitoring kit; 1 each As Needed (as directed).  -     glucose blood test strip; Use as instructed  -     Alcohol Swabs (ALCOHOL PREP) pads; 1 pad 4 (Four) Times a Day As Needed (bs).  -     Lancets (FREESTYLE) lancets; 1 each by Other route As Needed (bs).    Diabetes mellitus without complication  -     glucose monitor monitoring kit; 1 each As Needed (as directed).  -     glucose blood test strip; Use as instructed  -     Alcohol Swabs (ALCOHOL PREP) pads; 1 pad 4 (Four) Times a Day As Needed (bs).  -     Lancets (FREESTYLE) lancets; 1 each by Other route As Needed (bs).      Return in about 1 week (around 10/31/2017).          There are no Patient Instructions on file for this visit.     Keven Bear MD    Assessment/Plan         Andre was seen today for hypertension and fatigue.    Diagnoses and all orders for this visit:    Malignant hypertension  -     Hemoglobin A1c  -     Lipid Panel  -     MicroAlbumin, Urine, Random - Urine, Clean Catch  -     TSH  -     T4, Free  -     Comprehensive Metabolic Panel  -     CBC & Differential  -     Vitamin B12  -     glucose monitor monitoring kit; 1 each As Needed (as directed).  -     glucose  blood test strip; Use as instructed  -     Alcohol Swabs (ALCOHOL PREP) pads; 1 pad 4 (Four) Times a Day As Needed (bs).  -     Lancets (FREESTYLE) lancets; 1 each by Other route As Needed (bs).    Fatigue, unspecified type  -     Hemoglobin A1c  -     Lipid Panel  -     MicroAlbumin, Urine, Random - Urine, Clean Catch  -     TSH  -     T4, Free  -     Comprehensive Metabolic Panel  -     CBC & Differential  -     Vitamin B12  -     glucose monitor monitoring kit; 1 each As Needed (as directed).  -     glucose blood test strip; Use as instructed  -     Alcohol Swabs (ALCOHOL PREP) pads; 1 pad 4 (Four) Times a Day As Needed (bs).  -     Lancets (FREESTYLE) lancets; 1 each by Other route As Needed (bs).    Polydipsia  -     Hemoglobin A1c  -     Lipid Panel  -     MicroAlbumin, Urine, Random - Urine, Clean Catch  -     TSH  -     T4, Free  -     Comprehensive Metabolic Panel  -     CBC & Differential  -     Vitamin B12  -     glucose monitor monitoring kit; 1 each As Needed (as directed).  -     glucose blood test strip; Use as instructed  -     Alcohol Swabs (ALCOHOL PREP) pads; 1 pad 4 (Four) Times a Day As Needed (bs).  -     Lancets (FREESTYLE) lancets; 1 each by Other route As Needed (bs).    Polyuria  -     Hemoglobin A1c  -     Lipid Panel  -     MicroAlbumin, Urine, Random - Urine, Clean Catch  -     TSH  -     T4, Free  -     Comprehensive Metabolic Panel  -     CBC & Differential  -     Vitamin B12  -     glucose monitor monitoring kit; 1 each As Needed (as directed).  -     glucose blood test strip; Use as instructed  -     Alcohol Swabs (ALCOHOL PREP) pads; 1 pad 4 (Four) Times a Day As Needed (bs).  -     Lancets (FREESTYLE) lancets; 1 each by Other route As Needed (bs).    Elevated blood sugar level  -     Hemoglobin A1c  -     Lipid Panel  -     MicroAlbumin, Urine, Random - Urine, Clean Catch  -     TSH  -     T4, Free  -     Comprehensive Metabolic Panel  -     CBC & Differential  -     Vitamin B12  -      glucose monitor monitoring kit; 1 each As Needed (as directed).  -     glucose blood test strip; Use as instructed  -     Alcohol Swabs (ALCOHOL PREP) pads; 1 pad 4 (Four) Times a Day As Needed (bs).  -     Lancets (FREESTYLE) lancets; 1 each by Other route As Needed (bs).    Diabetes mellitus without complication  -     glucose monitor monitoring kit; 1 each As Needed (as directed).  -     glucose blood test strip; Use as instructed  -     Alcohol Swabs (ALCOHOL PREP) pads; 1 pad 4 (Four) Times a Day As Needed (bs).  -     Lancets (FREESTYLE) lancets; 1 each by Other route As Needed (bs).      Return in about 1 week (around 10/31/2017).                   There are no Patient Instructions on file for this visit.

## 2017-10-24 NOTE — TELEPHONE ENCOUNTER
Patient has glucose of 395.  Dr. Bear notified and he said to notify patient to follow healthy diet and to send in Glipizide 5 mg bid and have patient follow up in a couple days to see if blood sugar is coming down. Patient notified and medication sent to the pharmacy.

## 2017-10-25 ENCOUNTER — TELEPHONE (OUTPATIENT)
Dept: FAMILY MEDICINE CLINIC | Facility: CLINIC | Age: 62
End: 2017-10-25

## 2017-10-25 DIAGNOSIS — R35.89 POLYURIA: ICD-10-CM

## 2017-10-25 DIAGNOSIS — I10 MALIGNANT HYPERTENSION: ICD-10-CM

## 2017-10-25 DIAGNOSIS — E11.9 DIABETES MELLITUS WITHOUT COMPLICATION (HCC): ICD-10-CM

## 2017-10-25 DIAGNOSIS — R73.9 ELEVATED BLOOD SUGAR LEVEL: ICD-10-CM

## 2017-10-25 DIAGNOSIS — R63.1 POLYDIPSIA: ICD-10-CM

## 2017-10-25 DIAGNOSIS — R53.83 FATIGUE, UNSPECIFIED TYPE: ICD-10-CM

## 2017-10-25 LAB
ALBUMIN SERPL-MCNC: 4.3 G/DL (ref 3.5–5)
ALBUMIN/GLOB SERPL: 1.2 G/DL (ref 1–2)
ALP SERPL-CCNC: 106 U/L (ref 38–126)
ALT SERPL-CCNC: 46 U/L (ref 13–69)
AST SERPL-CCNC: 29 U/L (ref 15–46)
BASOPHILS # BLD AUTO: 0.07 10*3/MM3 (ref 0–0.2)
BASOPHILS NFR BLD AUTO: 0.8 % (ref 0–2.5)
BILIRUB SERPL-MCNC: 0.7 MG/DL (ref 0.2–1.3)
BUN SERPL-MCNC: 14 MG/DL (ref 7–20)
BUN/CREAT SERPL: 11.7 (ref 6.3–21.9)
CALCIUM SERPL-MCNC: 9.5 MG/DL (ref 8.4–10.2)
CHLORIDE SERPL-SCNC: 96 MMOL/L (ref 98–107)
CHOLEST SERPL-MCNC: 247 MG/DL (ref 0–199)
CO2 SERPL-SCNC: 29 MMOL/L (ref 26–30)
CREAT SERPL-MCNC: 1.2 MG/DL (ref 0.6–1.3)
EOSINOPHIL # BLD AUTO: 0.3 10*3/MM3 (ref 0–0.7)
EOSINOPHIL NFR BLD AUTO: 3.5 % (ref 0–7)
ERYTHROCYTE [DISTWIDTH] IN BLOOD BY AUTOMATED COUNT: 13.6 % (ref 11.5–14.5)
GFR SERPLBLD CREATININE-BSD FMLA CKD-EPI: 62 ML/MIN/1.73
GFR SERPLBLD CREATININE-BSD FMLA CKD-EPI: 75 ML/MIN/1.73
GLOBULIN SER CALC-MCNC: 3.5 GM/DL
GLUCOSE SERPL-MCNC: 395 MG/DL (ref 74–98)
HBA1C MFR BLD: 10.3 %
HCT VFR BLD AUTO: 45.1 % (ref 42–52)
HDLC SERPL-MCNC: 33 MG/DL (ref 40–60)
HGB BLD-MCNC: 15.2 G/DL (ref 14–18)
IMM GRANULOCYTES # BLD: 0.03 10*3/MM3 (ref 0–0.06)
IMM GRANULOCYTES NFR BLD: 0.4 % (ref 0–0.6)
LDLC SERPL CALC-MCNC: ABNORMAL MG/DL
LYMPHOCYTES # BLD AUTO: 2.27 10*3/MM3 (ref 0.6–3.4)
LYMPHOCYTES NFR BLD AUTO: 26.5 % (ref 10–50)
MCH RBC QN AUTO: 29.6 PG (ref 27–31)
MCHC RBC AUTO-ENTMCNC: 33.7 G/DL (ref 30–37)
MCV RBC AUTO: 87.9 FL (ref 80–94)
MICROALBUMIN UR-MCNC: 1195.8 UG/ML
MONOCYTES # BLD AUTO: 0.49 10*3/MM3 (ref 0–0.9)
MONOCYTES NFR BLD AUTO: 5.7 % (ref 0–12)
NEUTROPHILS # BLD AUTO: 5.39 10*3/MM3 (ref 2–6.9)
NEUTROPHILS NFR BLD AUTO: 63.1 % (ref 37–80)
NRBC BLD AUTO-RTO: 0 /100 WBC (ref 0–0)
PLATELET # BLD AUTO: 231 10*3/MM3 (ref 130–400)
POTASSIUM SERPL-SCNC: 3.8 MMOL/L (ref 3.5–5.1)
PROT SERPL-MCNC: 7.8 G/DL (ref 6.3–8.2)
RBC # BLD AUTO: 5.13 10*6/MM3 (ref 4.7–6.1)
SODIUM SERPL-SCNC: 139 MMOL/L (ref 137–145)
T4 FREE SERPL-MCNC: 1.21 NG/DL (ref 0.78–2.19)
TRIGL SERPL-MCNC: 416 MG/DL
TSH SERPL DL<=0.005 MIU/L-ACNC: 1.55 MIU/ML (ref 0.47–4.68)
VIT B12 SERPL-MCNC: 597 PG/ML (ref 239–931)
VLDLC SERPL CALC-MCNC: ABNORMAL MG/DL
WBC # BLD AUTO: 8.55 10*3/MM3 (ref 4.8–10.8)

## 2017-10-25 RX ORDER — BLOOD-GLUCOSE METER
1 KIT MISCELLANEOUS AS NEEDED
Qty: 1 EACH | Refills: 1 | Status: SHIPPED | OUTPATIENT
Start: 2017-10-25 | End: 2018-07-17 | Stop reason: SDUPTHER

## 2017-10-25 RX ORDER — BLOOD-GLUCOSE METER
1 KIT MISCELLANEOUS AS NEEDED
Qty: 1 EACH | Refills: 0 | Status: SHIPPED | OUTPATIENT
Start: 2017-10-25 | End: 2018-12-28 | Stop reason: SDUPTHER

## 2017-10-25 NOTE — TELEPHONE ENCOUNTER
Left message for [facility] to give what ever glucose kit insurance will pay for.

## 2017-11-03 ENCOUNTER — OFFICE VISIT (OUTPATIENT)
Dept: FAMILY MEDICINE CLINIC | Facility: CLINIC | Age: 62
End: 2017-11-03

## 2017-11-03 VITALS
BODY MASS INDEX: 37.26 KG/M2 | RESPIRATION RATE: 16 BRPM | OXYGEN SATURATION: 99 % | TEMPERATURE: 97.9 F | WEIGHT: 306 LBS | HEART RATE: 61 BPM | HEIGHT: 76 IN | SYSTOLIC BLOOD PRESSURE: 158 MMHG | DIASTOLIC BLOOD PRESSURE: 92 MMHG

## 2017-11-03 DIAGNOSIS — Z23 NEED FOR VACCINATION AGAINST STREPTOCOCCUS PNEUMONIAE USING PNEUMOCOCCAL CONJUGATE VACCINE 7: ICD-10-CM

## 2017-11-03 DIAGNOSIS — E11.9 DIABETES MELLITUS WITHOUT COMPLICATION (HCC): ICD-10-CM

## 2017-11-03 DIAGNOSIS — Z23 NEED FOR IMMUNIZATION AGAINST INFLUENZA: ICD-10-CM

## 2017-11-03 DIAGNOSIS — I10 MALIGNANT HYPERTENSION: ICD-10-CM

## 2017-11-03 DIAGNOSIS — Z00.00 ROUTINE GENERAL MEDICAL EXAMINATION AT A HEALTH CARE FACILITY: Primary | ICD-10-CM

## 2017-11-03 PROCEDURE — G0008 ADMIN INFLUENZA VIRUS VAC: HCPCS | Performed by: INTERNAL MEDICINE

## 2017-11-03 PROCEDURE — 90686 IIV4 VACC NO PRSV 0.5 ML IM: CPT | Performed by: INTERNAL MEDICINE

## 2017-11-03 PROCEDURE — 99396 PREV VISIT EST AGE 40-64: CPT | Performed by: INTERNAL MEDICINE

## 2017-11-03 PROCEDURE — G0009 ADMIN PNEUMOCOCCAL VACCINE: HCPCS | Performed by: INTERNAL MEDICINE

## 2017-11-03 PROCEDURE — 99214 OFFICE O/P EST MOD 30 MIN: CPT | Performed by: INTERNAL MEDICINE

## 2017-11-03 PROCEDURE — 90732 PPSV23 VACC 2 YRS+ SUBQ/IM: CPT | Performed by: INTERNAL MEDICINE

## 2017-11-03 PROCEDURE — G0438 PPPS, INITIAL VISIT: HCPCS | Performed by: INTERNAL MEDICINE

## 2017-11-03 NOTE — PROGRESS NOTES
QUICK REFERENCE INFORMATION:  The ABCs of the Annual Wellness Visit    Initial Medicare Wellness Visit    HEALTH RISK ASSESSMENT    1955    Recent Hospitalizations:  No hospitalization(s) within the last year..        Current Medical Providers:  Patient Care Team:  Keven Bear MD as PCP - General (Internal Medicine)        Smoking Status:  History   Smoking Status   • Former Smoker   • Years: 10.00   Smokeless Tobacco   • Never Used       Alcohol Consumption:  History   Alcohol Use No       Depression Screen:   No flowsheet data found.    Health Habits and Functional and Cognitive Screening:  No flowsheet data found.        Does the patient have evidence of cognitive impairment? No    Asiprin use counseling: Start ASA 81 mg daily       Recent Lab Results:    Visual Acuity:  No exam data present    Age-appropriate Screening Schedule:  Refer to the list below for future screening recommendations based on patient's age, sex and/or medical conditions. Orders for these recommended tests are listed in the plan section. The patient has been provided with a written plan.    Health Maintenance   Topic Date Due   • PNEUMOCOCCAL VACCINE (19-64 MEDIUM RISK) (1 of 1 - PPSV23) 01/06/1975   • ZOSTER VACCINE  06/10/2016   • DIABETIC EYE EXAM  10/24/2017   • HEMOGLOBIN A1C  04/24/2018   • LIPID PANEL  10/24/2018   • URINE MICROALBUMIN  10/24/2018   • DIABETIC FOOT EXAM  11/03/2018   • COLONOSCOPY  08/16/2020   • TDAP/TD VACCINES (2 - Td) 08/16/2025   • INFLUENZA VACCINE  Completed        Subjective   History of Present Illness    Andre Agosto is a 62 y.o. male who presents for an Annual Wellness Visit.    The following portions of the patient's history were reviewed and updated as appropriate: allergies, current medications, past family history, past medical history, past social history, past surgical history and problem list.    Outpatient Medications Prior to Visit   Medication Sig Dispense Refill   • Alcohol  Swabs (ALCOHOL PREP) pads 1 pad 4 (Four) Times a Day As Needed (bs). 120 each 12   • aspirin 81 MG EC tablet Take 1 tablet by mouth daily. 90 tablet 3   • BYSTOLIC 20 MG tablet Take 1 tablet by mouth Daily. 90 tablet 3   • clopidogrel (PLAVIX) 75 MG tablet Take 1 tablet by mouth Daily. 90 tablet 3   • escitalopram (LEXAPRO) 20 MG tablet TAKE 1 TABLET DAILY 90 tablet 2   • glipiZIDE (GLUCOTROL) 5 MG tablet Take 1 tablet by mouth 2 (Two) Times a Day Before Meals. 60 tablet 3   • glucose blood test strip Use one to two times daily to check blood sugar 100 each 12   • glucose monitor monitoring kit 1 each As Needed (as directed). Diagnosis Code E11.9 1 each 0   • glucose monitor monitoring kit 1 each As Needed (as directed). Diagnosis Code E11.9 1 each 1   • hydrALAZINE (APRESOLINE) 100 MG tablet Take 0.5 tablets by mouth 3 (Three) Times a Day. (Patient taking differently: Take 50 mg by mouth 2 (Two) Times a Day.) 90 tablet 11   • Lancets (FREESTYLE) lancets 1 each by Other route As Needed (bs). 120 each 12   • levoFLOXacin (LEVAQUIN) 500 MG tablet Take 1 tablet by mouth Daily. 7 tablet 0   • lovastatin (MEVACOR) 20 MG tablet Take 0.5 tablets by mouth every night. (Patient taking differently: Take 20 mg by mouth Every Night.) 30 tablet 11   • Magnesium Oxide 400 (240 MG) MG tablet Take 1 tablet by mouth daily. 30 tablet 11   • NIFEdipine XL (ADALAT CC) 90 MG 24 hr tablet Take 1 tablet by mouth Daily. 90 tablet 3   • NIFEdipine XL (PROCARDIA XL) 60 MG 24 hr tablet Take 120 mg by mouth Daily.     • potassium chloride (K-TAB) 20 MEQ tablet controlled-release ER tablet Take 1 tablet by mouth 2 (Two) Times a Day. 60 tablet 11   • valsartan (DIOVAN) 320 MG tablet Take 320 mg by mouth daily.     • zonisamide (ZONEGRAN) 100 MG capsule Take 1 capsule by mouth 3 (Three) Times a Day. 270 capsule 3     No facility-administered medications prior to visit.        Patient Active Problem List   Diagnosis   • Seizure disorder   • ALEX on  "CPAP   • CVA (cerebral vascular accident)   • Coronary stent occlusion   • Hypokalemia   • Malignant hypertension   • Diabetes mellitus without complication       Advance Care Planning:  has an advance directive - a copy has been provided and is in file    Identification of Risk Factors:  Risk factors include: weight , cardiovascular risk, increased fall risk and polypharmacy.    Review of Systems    Compared to one year ago, the patient feels his physical health is better.  Compared to one year ago, the patient feels his mental health is better.    Objective     Physical Exam    Vitals:    11/03/17 0859   BP: 158/92   BP Location: Left arm   Patient Position: Sitting   Cuff Size: Large Adult   Pulse: 61   Resp: 16   Temp: 97.9 °F (36.6 °C)   SpO2: 99%   Weight: (!) 306 lb (139 kg)   Height: 76\" (193 cm)       Body mass index is 37.25 kg/(m^2).  Discussed the patient's BMI with him. The BMI is in the acceptable range.    Assessment/Plan   Patient Self-Management and Personalized Health Advice  The patient has been provided with information about: diet, exercise, weight management and fall prevention and preventive services including:   · Advance directive, Diabetes screening, see lab orders, Fall Risk assessment done, Fall Risk plan of care done, Influenza vaccine, Pneumococcal vaccine , Zostavax vaccine (Herpes Zoster).    Visit Diagnoses:    ICD-10-CM ICD-9-CM   1. Routine general medical examination at a health care facility Z00.00 V70.0   2. Malignant hypertension I10 401.0   3. Diabetes mellitus without complication E11.9 250.00       Orders Placed This Encounter   Procedures   • Hemoglobin A1c   • Comprehensive Metabolic Panel       Outpatient Encounter Prescriptions as of 11/3/2017   Medication Sig Dispense Refill   • Alcohol Swabs (ALCOHOL PREP) pads 1 pad 4 (Four) Times a Day As Needed (bs). 120 each 12   • aspirin 81 MG EC tablet Take 1 tablet by mouth daily. 90 tablet 3   • BYSTOLIC 20 MG tablet Take 1 " tablet by mouth Daily. 90 tablet 3   • clopidogrel (PLAVIX) 75 MG tablet Take 1 tablet by mouth Daily. 90 tablet 3   • escitalopram (LEXAPRO) 20 MG tablet TAKE 1 TABLET DAILY 90 tablet 2   • glipiZIDE (GLUCOTROL) 5 MG tablet Take 1 tablet by mouth 2 (Two) Times a Day Before Meals. 60 tablet 3   • glucose blood test strip Use one to two times daily to check blood sugar 100 each 12   • glucose monitor monitoring kit 1 each As Needed (as directed). Diagnosis Code E11.9 1 each 0   • glucose monitor monitoring kit 1 each As Needed (as directed). Diagnosis Code E11.9 1 each 1   • hydrALAZINE (APRESOLINE) 100 MG tablet Take 0.5 tablets by mouth 3 (Three) Times a Day. (Patient taking differently: Take 50 mg by mouth 2 (Two) Times a Day.) 90 tablet 11   • Lancets (FREESTYLE) lancets 1 each by Other route As Needed (bs). 120 each 12   • levoFLOXacin (LEVAQUIN) 500 MG tablet Take 1 tablet by mouth Daily. 7 tablet 0   • lovastatin (MEVACOR) 20 MG tablet Take 0.5 tablets by mouth every night. (Patient taking differently: Take 20 mg by mouth Every Night.) 30 tablet 11   • Magnesium Oxide 400 (240 MG) MG tablet Take 1 tablet by mouth daily. 30 tablet 11   • NIFEdipine XL (ADALAT CC) 90 MG 24 hr tablet Take 1 tablet by mouth Daily. 90 tablet 3   • NIFEdipine XL (PROCARDIA XL) 60 MG 24 hr tablet Take 120 mg by mouth Daily.     • potassium chloride (K-TAB) 20 MEQ tablet controlled-release ER tablet Take 1 tablet by mouth 2 (Two) Times a Day. 60 tablet 11   • valsartan (DIOVAN) 320 MG tablet Take 320 mg by mouth daily.     • zonisamide (ZONEGRAN) 100 MG capsule Take 1 capsule by mouth 3 (Three) Times a Day. 270 capsule 3   • zoster vaccine live 26926 UNT/0.65ML reconstituted suspension Inject 19,400 Units under the skin 1 (One) Time for 1 dose. 1 each 0     Facility-Administered Encounter Medications as of 11/3/2017   Medication Dose Route Frequency Provider Last Rate Last Dose   • pneumococcal polysaccharide 23-valent (PNEUMOVAX-23)  vaccine 0.5 mL  0.5 mL Intramuscular Once Keven Bear MD           Reviewed use of high risk medication in the elderly: yes  Reviewed for potential of harmful drug interactions in the elderly: yes    Follow Up:  Return in about 4 weeks (around 12/1/2017).     An After Visit Summary and PPPS with all of these plans were given to the patient.

## 2017-11-03 NOTE — PROGRESS NOTES
Subjective     Patient ID: Andre Agosto is a 62 y.o. male. Patient is here for management of multiple medical problems.     Chief Complaint   Patient presents with   • Malignant Hypertension     follow-up, blood pressures at home around 150/80   • Diabetes Mellitus     follow-up on elevated blood sugars   • Fatigue     follow-up     Diabetes Mellitus   This is a chronic problem. The current episode started more than 1 year ago. The problem has been gradually improving. Associated symptoms include fatigue.   Fatigue   This is a chronic problem. The current episode started more than 1 year ago. The problem has been waxing and waning. Associated symptoms include fatigue.      Stopped tea soda and [employer]. Walking but no vigerous exercise.  The following portions of the patient's history were reviewed and updated as appropriate: allergies, current medications, past family history, past medical history, past social history, past surgical history and problem list.    Review of Systems   Constitutional: Positive for fatigue.       Current Outpatient Prescriptions:   •  Alcohol Swabs (ALCOHOL PREP) pads, 1 pad 4 (Four) Times a Day As Needed (bs)., Disp: 120 each, Rfl: 12  •  aspirin 81 MG EC tablet, Take 1 tablet by mouth daily., Disp: 90 tablet, Rfl: 3  •  BYSTOLIC 20 MG tablet, Take 1 tablet by mouth Daily., Disp: 90 tablet, Rfl: 3  •  clopidogrel (PLAVIX) 75 MG tablet, Take 1 tablet by mouth Daily., Disp: 90 tablet, Rfl: 3  •  escitalopram (LEXAPRO) 20 MG tablet, TAKE 1 TABLET DAILY, Disp: 90 tablet, Rfl: 2  •  glipiZIDE (GLUCOTROL) 5 MG tablet, Take 1 tablet by mouth 2 (Two) Times a Day Before Meals., Disp: 60 tablet, Rfl: 3  •  glucose blood test strip, Use one to two times daily to check blood sugar, Disp: 100 each, Rfl: 12  •  glucose monitor monitoring kit, 1 each As Needed (as directed). Diagnosis Code E11.9, Disp: 1 each, Rfl: 0  •  glucose monitor monitoring kit, 1 each As Needed (as directed).  "Diagnosis Code E11.9, Disp: 1 each, Rfl: 1  •  hydrALAZINE (APRESOLINE) 100 MG tablet, Take 0.5 tablets by mouth 3 (Three) Times a Day. (Patient taking differently: Take 50 mg by mouth 2 (Two) Times a Day.), Disp: 90 tablet, Rfl: 11  •  Lancets (FREESTYLE) lancets, 1 each by Other route As Needed (bs)., Disp: 120 each, Rfl: 12  •  levoFLOXacin (LEVAQUIN) 500 MG tablet, Take 1 tablet by mouth Daily., Disp: 7 tablet, Rfl: 0  •  lovastatin (MEVACOR) 20 MG tablet, Take 0.5 tablets by mouth every night. (Patient taking differently: Take 20 mg by mouth Every Night.), Disp: 30 tablet, Rfl: 11  •  Magnesium Oxide 400 (240 MG) MG tablet, Take 1 tablet by mouth daily., Disp: 30 tablet, Rfl: 11  •  NIFEdipine XL (ADALAT CC) 90 MG 24 hr tablet, Take 1 tablet by mouth Daily., Disp: 90 tablet, Rfl: 3  •  NIFEdipine XL (PROCARDIA XL) 60 MG 24 hr tablet, Take 120 mg by mouth Daily., Disp: , Rfl:   •  potassium chloride (K-TAB) 20 MEQ tablet controlled-release ER tablet, Take 1 tablet by mouth 2 (Two) Times a Day., Disp: 60 tablet, Rfl: 11  •  valsartan (DIOVAN) 320 MG tablet, Take 320 mg by mouth daily., Disp: , Rfl:   •  zonisamide (ZONEGRAN) 100 MG capsule, Take 1 capsule by mouth 3 (Three) Times a Day., Disp: 270 capsule, Rfl: 3    Objective      Blood pressure 158/92, pulse 61, temperature 97.9 °F (36.6 °C), resp. rate 16, height 76\" (193 cm), weight (!) 306 lb (139 kg), SpO2 99 %.    Physical Exam    Andre had a diabetic foot exam performed today.   During the foot exam he had a monofilament test performed.    Neurological Sensory Findings - Unaltered hot/cold right ankle/foot discrimination and unaltered hot/cold left ankle/foot discrimination. Unaltered sharp/dull right ankle/foot discrimination and unaltered sharp/dull left ankle/foot discrimination. Right ankle/foot altered proprioception and left ankle/foot altered proprioception.    Vascular Status -  His exam exhibits right foot vasculature normal. His exam exhibits " no right foot edema. His exam exhibits left foot vasculature normal. His exam exhibits no left foot edema.   Skin Integrity  -  His right foot skin is intact.     Andre 's left foot skin is intact. .       General Appearance:    Alert, cooperative, no distress, appears stated age   Head:    Normocephalic, without obvious abnormality, atraumatic   Eyes:    PERRL, conjunctiva/corneas clear, EOM's intact   Ears:    Normal TM's and external ear canals, both ears   Nose:   Nares normal, septum midline, mucosa normal, no drainage   or sinus tenderness   Throat:   Lips, mucosa, and tongue normal; teeth and gums normal   Neck:   Thick Supple, symmetrical, trachea midline, no adenopathy;        thyroid:  No enlargement/tenderness/nodules; no carotid    bruit or JVD   Back:     Symmetric, no curvature, ROM normal, no CVA tenderness   Lungs:     Clear to auscultation bilaterally, respirations unlabored   Chest wall:    No tenderness or deformity   Heart:    Regular rate and rhythm, S1 and S2 normal, no murmur,        rub or gallop   Abdomen:     Soft, non-tender, bowel sounds active all four quadrants,     no masses, no organomegaly   Extremities:   Extremities normal, atraumatic, no cyanosis or edema   Pulses:   2+ and symmetric all extremities   Skin:   Skin color, texture, turgor normal, no rashes or lesions   Lymph nodes:   Cervical, supraclavicular, and axillary nodes normal   Neurologic:   CNII-XII intact. Normal strength, sensation and reflexes       Throughout. Loss of vib to mid shin in feet.        Results for orders placed or performed in visit on 10/24/17   Hemoglobin A1c   Result Value Ref Range    Hemoglobin A1C 10.30 %   Lipid Panel   Result Value Ref Range    Total Cholesterol 247 (H) 0 - 199 mg/dL    Triglycerides 416 (H) <150 mg/dL    HDL Cholesterol 33 (L) 40 - 60 mg/dL    VLDL Cholesterol CANCELED mg/dL    LDL Cholesterol  CANCELED mg/dL   MicroAlbumin, Urine, Random - Urine, Clean Catch   Result Value  Ref Range    Microalbumin, Urine 1195.8 Not Estab. ug/mL   TSH   Result Value Ref Range    TSH 1.550 0.470 - 4.680 mIU/mL   T4, Free   Result Value Ref Range    Free T4 1.21 0.78 - 2.19 ng/dL   Comprehensive Metabolic Panel   Result Value Ref Range    Glucose 395 (C) 74 - 98 mg/dL    BUN 14 7 - 20 mg/dL    Creatinine 1.20 0.60 - 1.30 mg/dL    eGFR Non African Am 62 >60 mL/min/1.73    eGFR African Am 75 >60 mL/min/1.73    BUN/Creatinine Ratio 11.7 6.3 - 21.9    Sodium 139 137 - 145 mmol/L    Potassium 3.8 3.5 - 5.1 mmol/L    Chloride 96 (L) 98 - 107 mmol/L    Total CO2 29.0 26.0 - 30.0 mmol/L    Calcium 9.5 8.4 - 10.2 mg/dL    Total Protein 7.8 6.3 - 8.2 g/dL    Albumin 4.30 3.50 - 5.00 g/dL    Globulin 3.5 gm/dL    A/G Ratio 1.2 1.0 - 2.0 g/dL    Total Bilirubin 0.7 0.2 - 1.3 mg/dL    Alkaline Phosphatase 106 38 - 126 U/L    AST (SGOT) 29 15 - 46 U/L    ALT (SGPT) 46 13 - 69 U/L   Vitamin B12   Result Value Ref Range    Vitamin B-12 597 239 - 931 pg/mL   CBC & Differential   Result Value Ref Range    WBC 8.55 4.80 - 10.80 10*3/mm3    RBC 5.13 4.70 - 6.10 10*6/mm3    Hemoglobin 15.2 14.0 - 18.0 g/dL    Hematocrit 45.1 42.0 - 52.0 %    MCV 87.9 80.0 - 94.0 fL    MCH 29.6 27.0 - 31.0 pg    MCHC 33.7 30.0 - 37.0 g/dL    RDW 13.6 11.5 - 14.5 %    Platelets 231 130 - 400 10*3/mm3    Neutrophil Rel % 63.1 37.0 - 80.0 %    Lymphocyte Rel % 26.5 10.0 - 50.0 %    Monocyte Rel % 5.7 0.0 - 12.0 %    Eosinophil Rel % 3.5 0.0 - 7.0 %    Basophil Rel % 0.8 0.0 - 2.5 %    Neutrophils Absolute 5.39 2.00 - 6.90 10*3/mm3    Lymphocytes Absolute 2.27 0.60 - 3.40 10*3/mm3    Monocytes Absolute 0.49 0.00 - 0.90 10*3/mm3    Eosinophils Absolute 0.30 0.00 - 0.70 10*3/mm3    Basophils Absolute 0.07 0.00 - 0.20 10*3/mm3    Immature Granulocyte Rel % 0.4 0.0 - 0.6 %    Immature Grans Absolute 0.03 0.00 - 0.06 10*3/mm3    nRBC 0.0 0.0 - 0.0 /100 WBC         Assessment/Plan   bs this am 120's.  Low 200's in afternoon. Diet changes going  will.    bp getting better.        There are no diagnoses linked to this encounter.  No Follow-up on file.          There are no Patient Instructions on file for this visit.     Keven Bear MD    Assessment/Plan           There are no diagnoses linked to this encounter.  No Follow-up on file.                   There are no Patient Instructions on file for this visit.

## 2017-11-07 ENCOUNTER — TELEPHONE (OUTPATIENT)
Dept: FAMILY MEDICINE CLINIC | Facility: CLINIC | Age: 62
End: 2017-11-07

## 2017-11-08 NOTE — TELEPHONE ENCOUNTER
Dr. Bear, Andre Agosto called, he states his blood sugars are running 120 before breakfast and 170 after breakfast but dropping into the 50's between 2:00 and 4:00. Patient is also having issues with blurry vision, he states he needs to get his glasses changed but his eye doctor suggested that he wait until he gets his blood sugars under control. Andre would like to talk to you about this.  
End of Shift Note: Medical     Pain Management: Last Pain Score: No pain associated behaviors  Diet: Pureed diet with Karen thick liquids. Patient tolerating. Patient should NOT be able to have any straws ans have constant supervision.  Bowel Function: normal- colostomy   Activity Bon life  Number of times ambulated: Patient up in chair with meals  Significant Events: Ready for discharge. [facility] does not have a bon sling or ostomy appliances and is unable to take patient at this time.   RN spoke with Nicole from [facility] and they will not have the proper bon sling until Tuesday at the earliest.   LDAs: PICC    Discharge: Anticipated discharge date: 4/18  Disposition: [facility]  
I called pt and left message.    
c/o vaginal bleeding, pt states she is 6 weeks pregnant

## 2017-12-05 ENCOUNTER — OFFICE VISIT (OUTPATIENT)
Dept: FAMILY MEDICINE CLINIC | Facility: CLINIC | Age: 62
End: 2017-12-05

## 2017-12-05 VITALS
DIASTOLIC BLOOD PRESSURE: 70 MMHG | HEART RATE: 57 BPM | RESPIRATION RATE: 16 BRPM | SYSTOLIC BLOOD PRESSURE: 132 MMHG | BODY MASS INDEX: 36.77 KG/M2 | TEMPERATURE: 98.2 F | OXYGEN SATURATION: 99 % | HEIGHT: 76 IN | WEIGHT: 302 LBS

## 2017-12-05 DIAGNOSIS — R35.1 NOCTURIA: ICD-10-CM

## 2017-12-05 DIAGNOSIS — Z12.5 PROSTATE CANCER SCREENING: ICD-10-CM

## 2017-12-05 DIAGNOSIS — E11.9 DIABETES MELLITUS WITHOUT COMPLICATION (HCC): Primary | ICD-10-CM

## 2017-12-05 DIAGNOSIS — L29.0 PRURITUS ANI: ICD-10-CM

## 2017-12-05 DIAGNOSIS — I10 MALIGNANT HYPERTENSION: ICD-10-CM

## 2017-12-05 PROCEDURE — 99214 OFFICE O/P EST MOD 30 MIN: CPT | Performed by: INTERNAL MEDICINE

## 2017-12-05 RX ORDER — GLIPIZIDE 5 MG/1
2.5 TABLET ORAL
Qty: 60 TABLET | Refills: 3 | Status: SHIPPED | OUTPATIENT
Start: 2017-12-05 | End: 2018-03-05 | Stop reason: SDUPTHER

## 2017-12-05 NOTE — PROGRESS NOTES
Subjective     Patient ID: Ander Agosto is a 62 y.o. male. Patient is here for management of multiple medical problems.     Chief Complaint   Patient presents with   • Hypertension     4 week follow-up   • Diabetes Mellitus     4 week follow-up, patient states he stopped taking Glipizide due to dropping blood sugar too low, patient states he is really watching his diet, blood sugars running 126 in the morning before eating   • Rash     patient states he has a rash on his behind x 3 to 4 weeks     Hypertension   This is a chronic problem. The current episode started today. The problem is uncontrolled. Pertinent negatives include no anxiety, blurred vision, chest pain or headaches. Risk factors for coronary artery disease include diabetes mellitus, obesity and male gender. Past treatments include angiotensin blockers and calcium channel blockers.   Diabetes Mellitus   This is a chronic problem. The current episode started more than 1 year ago. The problem has been gradually improving. Associated symptoms include a rash. Pertinent negatives include no chest pain or headaches.   Rash        Low bs on new meds and quit the new med.  Quite [employer] sweat tea and potatoes. Walking more.  Diet going well.            The following portions of the patient's history were reviewed and updated as appropriate: allergies, current medications, past family history, past medical history, past social history, past surgical history and problem list.    Review of Systems   Eyes: Negative for blurred vision.   Cardiovascular: Negative for chest pain.   Skin: Positive for rash.   Neurological: Negative for headaches.       Current Outpatient Prescriptions:   •  Alcohol Swabs (ALCOHOL PREP) pads, 1 pad 4 (Four) Times a Day As Needed (bs)., Disp: 120 each, Rfl: 12  •  aspirin 81 MG EC tablet, Take 1 tablet by mouth daily., Disp: 90 tablet, Rfl: 3  •  BYSTOLIC 20 MG tablet, Take 1 tablet by mouth Daily., Disp: 90 tablet, Rfl: 3  •   "clopidogrel (PLAVIX) 75 MG tablet, Take 1 tablet by mouth Daily., Disp: 90 tablet, Rfl: 3  •  escitalopram (LEXAPRO) 20 MG tablet, TAKE 1 TABLET DAILY, Disp: 90 tablet, Rfl: 2  •  glucose blood test strip, Use one to two times daily to check blood sugar, Disp: 100 each, Rfl: 12  •  glucose monitor monitoring kit, 1 each As Needed (as directed). Diagnosis Code E11.9, Disp: 1 each, Rfl: 0  •  glucose monitor monitoring kit, 1 each As Needed (as directed). Diagnosis Code E11.9, Disp: 1 each, Rfl: 1  •  hydrALAZINE (APRESOLINE) 100 MG tablet, Take 0.5 tablets by mouth 3 (Three) Times a Day. (Patient taking differently: Take 50 mg by mouth 2 (Two) Times a Day.), Disp: 90 tablet, Rfl: 11  •  Lancets (FREESTYLE) lancets, 1 each by Other route As Needed (bs)., Disp: 120 each, Rfl: 12  •  lovastatin (MEVACOR) 20 MG tablet, Take 0.5 tablets by mouth every night. (Patient taking differently: Take 20 mg by mouth Every Night.), Disp: 30 tablet, Rfl: 11  •  Magnesium Oxide 400 (240 MG) MG tablet, Take 1 tablet by mouth daily., Disp: 30 tablet, Rfl: 11  •  NIFEdipine XL (ADALAT CC) 90 MG 24 hr tablet, Take 1 tablet by mouth Daily., Disp: 90 tablet, Rfl: 3  •  NIFEdipine XL (PROCARDIA XL) 60 MG 24 hr tablet, Take 120 mg by mouth Daily., Disp: , Rfl:   •  potassium chloride (K-TAB) 20 MEQ tablet controlled-release ER tablet, Take 1 tablet by mouth 2 (Two) Times a Day., Disp: 60 tablet, Rfl: 11  •  valsartan (DIOVAN) 320 MG tablet, Take 320 mg by mouth daily., Disp: , Rfl:   •  zonisamide (ZONEGRAN) 100 MG capsule, Take 1 capsule by mouth 3 (Three) Times a Day., Disp: 270 capsule, Rfl: 3  •  glipiZIDE (GLUCOTROL) 5 MG tablet, Take 0.5 tablets by mouth 2 (Two) Times a Day Before Meals., Disp: 60 tablet, Rfl: 3    Objective      Blood pressure 132/70, pulse 57, temperature 98.2 °F (36.8 °C), temperature source Oral, resp. rate 16, height 193 cm (76\"), weight (!) 137 kg (302 lb), SpO2 99 %.    Physical Exam     General Appearance:    " Alert, cooperative, no distress, appears stated age   Head:    Normocephalic, without obvious abnormality, atraumatic   Eyes:    PERRL, conjunctiva/corneas clear, EOM's intact   Ears:    Normal TM's and external ear canals, both ears   Nose:   Nares normal, septum midline, mucosa normal, no drainage   or sinus tenderness   Throat:   Lips, mucosa, and tongue normal; teeth and gums normal   Neck:   Supple, symmetrical, trachea midline, no adenopathy;        thyroid:  No enlargement/tenderness/nodules; no carotid    bruit or JVD   Back:     Symmetric, no curvature, ROM normal, no CVA tenderness   Lungs:     Clear to auscultation bilaterally, respirations unlabored   Chest wall:    No tenderness or deformity   Heart:    Regular rate and rhythm, S1 and S2 normal, no murmur,        rub or gallop   Abdomen:     Soft, non-tender, bowel sounds active all four quadrants,     no masses, no organomegaly   Extremities:   Extremities normal, atraumatic, no cyanosis or edema   Pulses:   2+ and symmetric all extremities   Skin:   Skin color, texture, turgor normal, no rashes or lesions   Lymph nodes:   Cervical, supraclavicular, and axillary nodes normal   Neurologic:   CNII-XII intact. Normal strength, sensation and reflexes       throughout    Recital: thrombosed Hemorid.  Tenia perirectal.    .   Results for orders placed or performed in visit on 10/24/17   Hemoglobin A1c   Result Value Ref Range    Hemoglobin A1C 10.30 %   Lipid Panel   Result Value Ref Range    Total Cholesterol 247 (H) 0 - 199 mg/dL    Triglycerides 416 (H) <150 mg/dL    HDL Cholesterol 33 (L) 40 - 60 mg/dL    VLDL Cholesterol CANCELED mg/dL    LDL Cholesterol  CANCELED mg/dL   MicroAlbumin, Urine, Random - Urine, Clean Catch   Result Value Ref Range    Microalbumin, Urine 1195.8 Not Estab. ug/mL   TSH   Result Value Ref Range    TSH 1.550 0.470 - 4.680 mIU/mL   T4, Free   Result Value Ref Range    Free T4 1.21 0.78 - 2.19 ng/dL   Comprehensive Metabolic  Panel   Result Value Ref Range    Glucose 395 (C) 74 - 98 mg/dL    BUN 14 7 - 20 mg/dL    Creatinine 1.20 0.60 - 1.30 mg/dL    eGFR Non African Am 62 >60 mL/min/1.73    eGFR African Am 75 >60 mL/min/1.73    BUN/Creatinine Ratio 11.7 6.3 - 21.9    Sodium 139 137 - 145 mmol/L    Potassium 3.8 3.5 - 5.1 mmol/L    Chloride 96 (L) 98 - 107 mmol/L    Total CO2 29.0 26.0 - 30.0 mmol/L    Calcium 9.5 8.4 - 10.2 mg/dL    Total Protein 7.8 6.3 - 8.2 g/dL    Albumin 4.30 3.50 - 5.00 g/dL    Globulin 3.5 gm/dL    A/G Ratio 1.2 1.0 - 2.0 g/dL    Total Bilirubin 0.7 0.2 - 1.3 mg/dL    Alkaline Phosphatase 106 38 - 126 U/L    AST (SGOT) 29 15 - 46 U/L    ALT (SGPT) 46 13 - 69 U/L   Vitamin B12   Result Value Ref Range    Vitamin B-12 597 239 - 931 pg/mL   CBC & Differential   Result Value Ref Range    WBC 8.55 4.80 - 10.80 10*3/mm3    RBC 5.13 4.70 - 6.10 10*6/mm3    Hemoglobin 15.2 14.0 - 18.0 g/dL    Hematocrit 45.1 42.0 - 52.0 %    MCV 87.9 80.0 - 94.0 fL    MCH 29.6 27.0 - 31.0 pg    MCHC 33.7 30.0 - 37.0 g/dL    RDW 13.6 11.5 - 14.5 %    Platelets 231 130 - 400 10*3/mm3    Neutrophil Rel % 63.1 37.0 - 80.0 %    Lymphocyte Rel % 26.5 10.0 - 50.0 %    Monocyte Rel % 5.7 0.0 - 12.0 %    Eosinophil Rel % 3.5 0.0 - 7.0 %    Basophil Rel % 0.8 0.0 - 2.5 %    Neutrophils Absolute 5.39 2.00 - 6.90 10*3/mm3    Lymphocytes Absolute 2.27 0.60 - 3.40 10*3/mm3    Monocytes Absolute 0.49 0.00 - 0.90 10*3/mm3    Eosinophils Absolute 0.30 0.00 - 0.70 10*3/mm3    Basophils Absolute 0.07 0.00 - 0.20 10*3/mm3    Immature Granulocyte Rel % 0.4 0.0 - 0.6 %    Immature Grans Absolute 0.03 0.00 - 0.06 10*3/mm3    nRBC 0.0 0.0 - 0.0 /100 WBC         Assessment/Plan   Diet changes going well pt wants to repeat labs.    bp better.    Resume 1/2 glipizide a day.    Andre was seen today for hypertension, diabetes mellitus and rash.    Diagnoses and all orders for this visit:    Diabetes mellitus without complication    Malignant  hypertension    Pruritus ani    Prostate cancer screening  -     PSA    Nocturia   -     PSA    Other orders  -     glipiZIDE (GLUCOTROL) 5 MG tablet; Take 0.5 tablets by mouth 2 (Two) Times a Day Before Meals.      Return in about 3 months (around 3/5/2018).          Patient Instructions   Zinc oxide- nystatin for pain in rear.         Keven Bear MD    Assessment/Plan

## 2017-12-11 RX ORDER — ESCITALOPRAM OXALATE 20 MG/1
TABLET ORAL
Qty: 90 TABLET | Refills: 2 | Status: SHIPPED | OUTPATIENT
Start: 2017-12-11 | End: 2018-08-13 | Stop reason: SDUPTHER

## 2018-03-05 ENCOUNTER — OFFICE VISIT (OUTPATIENT)
Dept: INTERNAL MEDICINE | Facility: CLINIC | Age: 63
End: 2018-03-05

## 2018-03-05 VITALS
HEART RATE: 54 BPM | OXYGEN SATURATION: 99 % | BODY MASS INDEX: 38.36 KG/M2 | DIASTOLIC BLOOD PRESSURE: 83 MMHG | RESPIRATION RATE: 16 BRPM | HEIGHT: 76 IN | SYSTOLIC BLOOD PRESSURE: 162 MMHG | WEIGHT: 315 LBS | TEMPERATURE: 98.8 F

## 2018-03-05 DIAGNOSIS — I10 MALIGNANT HYPERTENSION: Primary | ICD-10-CM

## 2018-03-05 DIAGNOSIS — Z98.84 H/O LAPAROSCOPIC ADJUSTABLE GASTRIC BANDING: ICD-10-CM

## 2018-03-05 DIAGNOSIS — E11.9 DIABETES MELLITUS WITHOUT COMPLICATION (HCC): ICD-10-CM

## 2018-03-05 DIAGNOSIS — Z12.5 ENCOUNTER FOR SCREENING FOR MALIGNANT NEOPLASM OF PROSTATE: ICD-10-CM

## 2018-03-05 DIAGNOSIS — Z99.89 BIPAP (BIPHASIC POSITIVE AIRWAY PRESSURE) DEPENDENCE: ICD-10-CM

## 2018-03-05 DIAGNOSIS — C61 PROSTATE CANCER (HCC): ICD-10-CM

## 2018-03-05 PROCEDURE — 99213 OFFICE O/P EST LOW 20 MIN: CPT | Performed by: INTERNAL MEDICINE

## 2018-03-05 RX ORDER — GLIPIZIDE 5 MG/1
5 TABLET ORAL
Qty: 180 TABLET | Refills: 3 | Status: SHIPPED | OUTPATIENT
Start: 2018-03-05 | End: 2018-10-17 | Stop reason: SDUPTHER

## 2018-03-05 NOTE — PROGRESS NOTES
Subjective     Patient ID: Andre Agosto is a 63 y.o. male. Patient is here for management of multiple medical problems.     Chief Complaint   Patient presents with   • Diabetes Mellitus     follow-up   • Hypertension     follow-up     Diabetes Mellitus   This is a chronic problem. The problem occurs constantly. The problem has been unchanged. Pertinent negatives include no abdominal pain, change in bowel habit or chills. Nothing aggravates the symptoms. The treatment provided moderate relief.   Hypertension   This is a chronic problem. The current episode started more than 1 year ago. The problem has been gradually improving since onset. The problem is uncontrolled. There are no associated agents to hypertension. Hypertensive end-organ damage includes CAD/MI. There is no history of angina or kidney disease.       Pt only taking 2 of the 3 bp meds.  Will start the 3 med back.    Sleeping less during the day. Wt is up.  The following portions of the patient's history were reviewed and updated as appropriate: allergies, current medications, past family history, past medical history, past social history, past surgical history and problem list.    Review of Systems   Constitutional: Negative for chills.   Gastrointestinal: Negative for abdominal pain and change in bowel habit.       Current Outpatient Prescriptions:   •  Alcohol Swabs (ALCOHOL PREP) pads, 1 pad 4 (Four) Times a Day As Needed (bs)., Disp: 120 each, Rfl: 12  •  aspirin 81 MG EC tablet, Take 1 tablet by mouth daily., Disp: 90 tablet, Rfl: 3  •  BYSTOLIC 20 MG tablet, Take 1 tablet by mouth Daily., Disp: 90 tablet, Rfl: 3  •  clopidogrel (PLAVIX) 75 MG tablet, Take 1 tablet by mouth Daily., Disp: 90 tablet, Rfl: 3  •  escitalopram (LEXAPRO) 20 MG tablet, TAKE 1 TABLET DAILY, Disp: 90 tablet, Rfl: 2  •  glipiZIDE (GLUCOTROL) 5 MG tablet, Take 1 tablet by mouth 2 (Two) Times a Day Before Meals., Disp: 180 tablet, Rfl: 3  •  glucose blood test strip,  "Use one to two times daily to check blood sugar, Disp: 100 each, Rfl: 12  •  glucose monitor monitoring kit, 1 each As Needed (as directed). Diagnosis Code E11.9, Disp: 1 each, Rfl: 0  •  glucose monitor monitoring kit, 1 each As Needed (as directed). Diagnosis Code E11.9, Disp: 1 each, Rfl: 1  •  hydrALAZINE (APRESOLINE) 100 MG tablet, Take 0.5 tablets by mouth 3 (Three) Times a Day. (Patient taking differently: Take 50 mg by mouth 2 (Two) Times a Day.), Disp: 90 tablet, Rfl: 11  •  Lancets (FREESTYLE) lancets, 1 each by Other route As Needed (bs)., Disp: 120 each, Rfl: 12  •  lovastatin (MEVACOR) 20 MG tablet, Take 0.5 tablets by mouth every night. (Patient taking differently: Take 20 mg by mouth Every Night.), Disp: 30 tablet, Rfl: 11  •  Magnesium Oxide 400 (240 MG) MG tablet, Take 1 tablet by mouth daily., Disp: 30 tablet, Rfl: 11  •  NIFEdipine XL (ADALAT CC) 90 MG 24 hr tablet, Take 1 tablet by mouth Daily., Disp: 90 tablet, Rfl: 3  •  NIFEdipine XL (PROCARDIA XL) 60 MG 24 hr tablet, Take 120 mg by mouth Daily., Disp: , Rfl:   •  potassium chloride (K-TAB) 20 MEQ tablet controlled-release ER tablet, Take 1 tablet by mouth 2 (Two) Times a Day., Disp: 60 tablet, Rfl: 11  •  valsartan (DIOVAN) 320 MG tablet, Take 320 mg by mouth daily., Disp: , Rfl:   •  zonisamide (ZONEGRAN) 100 MG capsule, Take 1 capsule by mouth 3 (Three) Times a Day., Disp: 270 capsule, Rfl: 3  •  Zoster Vac Recomb Adjuvanted 50 MCG reconstituted suspension, Inject 50 mcg into the shoulder, thigh, or buttocks 1 (One) Time for 1 dose., Disp: 1 each, Rfl: 0    Objective      Blood pressure 162/83, pulse 54, temperature 98.8 °F (37.1 °C), temperature source Oral, resp. rate 16, height 193 cm (76\"), weight (!) 143 kg (315 lb), SpO2 99 %.    Physical Exam     General Appearance:    Alert, cooperative, no distress, appears stated age   Head:    Normocephalic, without obvious abnormality, atraumatic   Eyes:    PERRL, conjunctiva/corneas clear, " EOM's intact   Ears:    Normal TM's and external ear canals, both ears   Nose:   Nares normal, septum midline, mucosa normal, no drainage   or sinus tenderness   Throat:   Lips, mucosa, and tongue normal; teeth and gums normal   Neck:   Supple, symmetrical, trachea midline, no adenopathy;        thyroid:  No enlargement/tenderness/nodules; no carotid    bruit or JVD   Back:     Symmetric, no curvature, ROM normal, no CVA tenderness   Lungs:     Clear to auscultation bilaterally, respirations unlabored   Chest wall:    No tenderness or deformity   Heart:    Regular rate and rhythm, S1 and S2 normal, no murmur,        rub or gallop   Abdomen:     Soft, non-tender, bowel sounds active all four quadrants,     no masses, no organomegaly   Extremities:   Extremities normal, atraumatic, no cyanosis or edema   Pulses:   2+ and symmetric all extremities   Skin:   Skin color, texture, turgor normal, no rashes or lesions   Lymph nodes:   Cervical, supraclavicular, and axillary nodes normal   Neurologic:   CNII-XII intact. Normal strength, sensation and reflexes       throughout      Results for orders placed or performed in visit on 10/24/17   Hemoglobin A1c   Result Value Ref Range    Hemoglobin A1C 10.30 %   Lipid Panel   Result Value Ref Range    Total Cholesterol 247 (H) 0 - 199 mg/dL    Triglycerides 416 (H) <150 mg/dL    HDL Cholesterol 33 (L) 40 - 60 mg/dL    VLDL Cholesterol CANCELED mg/dL    LDL Cholesterol  CANCELED mg/dL   MicroAlbumin, Urine, Random - Urine, Clean Catch   Result Value Ref Range    Microalbumin, Urine 1195.8 Not Estab. ug/mL   TSH   Result Value Ref Range    TSH 1.550 0.470 - 4.680 mIU/mL   T4, Free   Result Value Ref Range    Free T4 1.21 0.78 - 2.19 ng/dL   Comprehensive Metabolic Panel   Result Value Ref Range    Glucose 395 (C) 74 - 98 mg/dL    BUN 14 7 - 20 mg/dL    Creatinine 1.20 0.60 - 1.30 mg/dL    eGFR Non African Am 62 >60 mL/min/1.73    eGFR African Am 75 >60 mL/min/1.73     BUN/Creatinine Ratio 11.7 6.3 - 21.9    Sodium 139 137 - 145 mmol/L    Potassium 3.8 3.5 - 5.1 mmol/L    Chloride 96 (L) 98 - 107 mmol/L    Total CO2 29.0 26.0 - 30.0 mmol/L    Calcium 9.5 8.4 - 10.2 mg/dL    Total Protein 7.8 6.3 - 8.2 g/dL    Albumin 4.30 3.50 - 5.00 g/dL    Globulin 3.5 gm/dL    A/G Ratio 1.2 1.0 - 2.0 g/dL    Total Bilirubin 0.7 0.2 - 1.3 mg/dL    Alkaline Phosphatase 106 38 - 126 U/L    AST (SGOT) 29 15 - 46 U/L    ALT (SGPT) 46 13 - 69 U/L   Vitamin B12   Result Value Ref Range    Vitamin B-12 597 239 - 931 pg/mL   CBC & Differential   Result Value Ref Range    WBC 8.55 4.80 - 10.80 10*3/mm3    RBC 5.13 4.70 - 6.10 10*6/mm3    Hemoglobin 15.2 14.0 - 18.0 g/dL    Hematocrit 45.1 42.0 - 52.0 %    MCV 87.9 80.0 - 94.0 fL    MCH 29.6 27.0 - 31.0 pg    MCHC 33.7 30.0 - 37.0 g/dL    RDW 13.6 11.5 - 14.5 %    Platelets 231 130 - 400 10*3/mm3    Neutrophil Rel % 63.1 37.0 - 80.0 %    Lymphocyte Rel % 26.5 10.0 - 50.0 %    Monocyte Rel % 5.7 0.0 - 12.0 %    Eosinophil Rel % 3.5 0.0 - 7.0 %    Basophil Rel % 0.8 0.0 - 2.5 %    Neutrophils Absolute 5.39 2.00 - 6.90 10*3/mm3    Lymphocytes Absolute 2.27 0.60 - 3.40 10*3/mm3    Monocytes Absolute 0.49 0.00 - 0.90 10*3/mm3    Eosinophils Absolute 0.30 0.00 - 0.70 10*3/mm3    Basophils Absolute 0.07 0.00 - 0.20 10*3/mm3    Immature Granulocyte Rel % 0.4 0.0 - 0.6 %    Immature Grans Absolute 0.03 0.00 - 0.06 10*3/mm3    nRBC 0.0 0.0 - 0.0 /100 WBC         Assessment/Plan   Pt only taking 2 of the 3 dosed of hydralzine.  Will start the 3 med back.    Sleeping less during the day. Wt is up.      Andre was seen today for diabetes mellitus and hypertension.    Diagnoses and all orders for this visit:    Malignant hypertension  -     glipiZIDE (GLUCOTROL) 5 MG tablet; Take 1 tablet by mouth 2 (Two) Times a Day Before Meals.  -     Hemoglobin A1c  -     MicroAlbumin, Urine, Random - Urine, Clean Catch  -     TSH  -     T4, Free  -     Vitamin B12  -     Lipid  Panel  -     Comprehensive Metabolic Panel  -     CBC & Differential  -     Zoster Vac Recomb Adjuvanted 50 MCG reconstituted suspension; Inject 50 mcg into the shoulder, thigh, or buttocks 1 (One) Time for 1 dose.    Diabetes mellitus without complication  -     glipiZIDE (GLUCOTROL) 5 MG tablet; Take 1 tablet by mouth 2 (Two) Times a Day Before Meals.  -     Hemoglobin A1c  -     MicroAlbumin, Urine, Random - Urine, Clean Catch  -     TSH  -     T4, Free  -     Vitamin B12  -     Lipid Panel  -     Comprehensive Metabolic Panel  -     CBC & Differential  -     Zoster Vac Recomb Adjuvanted 50 MCG reconstituted suspension; Inject 50 mcg into the shoulder, thigh, or buttocks 1 (One) Time for 1 dose.    BiPAP (biphasic positive airway pressure) dependence  -     glipiZIDE (GLUCOTROL) 5 MG tablet; Take 1 tablet by mouth 2 (Two) Times a Day Before Meals.  -     Hemoglobin A1c  -     MicroAlbumin, Urine, Random - Urine, Clean Catch  -     TSH  -     T4, Free  -     Vitamin B12  -     Lipid Panel  -     Comprehensive Metabolic Panel  -     CBC & Differential  -     Zoster Vac Recomb Adjuvanted 50 MCG reconstituted suspension; Inject 50 mcg into the shoulder, thigh, or buttocks 1 (One) Time for 1 dose.    Prostate cancer  -     PSA Screen    Encounter for screening for malignant neoplasm of prostate   -     PSA Screen    H/O laparoscopic adjustable gastric banding  -     Ambulatory Referral to Bariatric Surgery      Return in about 4 weeks (around 4/2/2018).          There are no Patient Instructions on file for this visit.     Keven Bear MD    Assessment/Plan

## 2018-03-06 LAB
ALBUMIN SERPL-MCNC: 4.4 G/DL (ref 3.5–5)
ALBUMIN/GLOB SERPL: 1.2 G/DL (ref 1–2)
ALP SERPL-CCNC: 73 U/L (ref 38–126)
ALT SERPL-CCNC: 38 U/L (ref 13–69)
AST SERPL-CCNC: 21 U/L (ref 15–46)
BASOPHILS # BLD AUTO: 0.08 10*3/MM3 (ref 0–0.2)
BASOPHILS NFR BLD AUTO: 0.8 % (ref 0–2.5)
BILIRUB SERPL-MCNC: 0.7 MG/DL (ref 0.2–1.3)
BUN SERPL-MCNC: 10 MG/DL (ref 7–20)
BUN/CREAT SERPL: 8.3 (ref 6.3–21.9)
CALCIUM SERPL-MCNC: 9.6 MG/DL (ref 8.4–10.2)
CHLORIDE SERPL-SCNC: 104 MMOL/L (ref 98–107)
CHOLEST SERPL-MCNC: 236 MG/DL (ref 0–199)
CO2 SERPL-SCNC: 29 MMOL/L (ref 26–30)
CREAT SERPL-MCNC: 1.2 MG/DL (ref 0.6–1.3)
EOSINOPHIL # BLD AUTO: 0.36 10*3/MM3 (ref 0–0.7)
EOSINOPHIL NFR BLD AUTO: 3.8 % (ref 0–7)
ERYTHROCYTE [DISTWIDTH] IN BLOOD BY AUTOMATED COUNT: 14.8 % (ref 11.5–14.5)
GFR SERPLBLD CREATININE-BSD FMLA CKD-EPI: 61 ML/MIN/1.73
GFR SERPLBLD CREATININE-BSD FMLA CKD-EPI: 74 ML/MIN/1.73
GLOBULIN SER CALC-MCNC: 3.7 GM/DL
GLUCOSE SERPL-MCNC: 111 MG/DL (ref 74–98)
HBA1C MFR BLD: 6.3 %
HCT VFR BLD AUTO: 46.7 % (ref 42–52)
HDLC SERPL-MCNC: 36 MG/DL (ref 40–60)
HGB BLD-MCNC: 15.5 G/DL (ref 14–18)
IMM GRANULOCYTES # BLD: 0.03 10*3/MM3 (ref 0–0.06)
IMM GRANULOCYTES NFR BLD: 0.3 % (ref 0–0.6)
LDLC SERPL CALC-MCNC: 138 MG/DL (ref 0–99)
LYMPHOCYTES # BLD AUTO: 2.17 10*3/MM3 (ref 0.6–3.4)
LYMPHOCYTES NFR BLD AUTO: 22.8 % (ref 10–50)
MCH RBC QN AUTO: 29.6 PG (ref 27–31)
MCHC RBC AUTO-ENTMCNC: 33.2 G/DL (ref 30–37)
MCV RBC AUTO: 89.3 FL (ref 80–94)
MICROALBUMIN UR-MCNC: 824.1 UG/ML
MONOCYTES # BLD AUTO: 0.47 10*3/MM3 (ref 0–0.9)
MONOCYTES NFR BLD AUTO: 4.9 % (ref 0–12)
NEUTROPHILS # BLD AUTO: 6.39 10*3/MM3 (ref 2–6.9)
NEUTROPHILS NFR BLD AUTO: 67.4 % (ref 37–80)
NRBC BLD AUTO-RTO: 0 /100 WBC (ref 0–0)
PLATELET # BLD AUTO: 249 10*3/MM3 (ref 130–400)
POTASSIUM SERPL-SCNC: 3.4 MMOL/L (ref 3.5–5.1)
PROT SERPL-MCNC: 8.1 G/DL (ref 6.3–8.2)
PSA SERPL-MCNC: 2.5 NG/ML (ref 0.06–4)
RBC # BLD AUTO: 5.23 10*6/MM3 (ref 4.7–6.1)
SODIUM SERPL-SCNC: 149 MMOL/L (ref 137–145)
T4 FREE SERPL-MCNC: 1.01 NG/DL (ref 0.78–2.19)
TRIGL SERPL-MCNC: 308 MG/DL
TSH SERPL DL<=0.005 MIU/L-ACNC: 1.69 MIU/ML (ref 0.47–4.68)
VIT B12 SERPL-MCNC: 366 PG/ML (ref 239–931)
VLDLC SERPL CALC-MCNC: 61.6 MG/DL
WBC # BLD AUTO: 9.5 10*3/MM3 (ref 4.8–10.8)

## 2018-03-06 NOTE — PROGRESS NOTES
Please let them know the ha1c is down from 10 to 6.3.  Potassium was low 3.4.  Take one extrea potasium a day. He he needs some let med know.

## 2018-03-19 RX ORDER — CLOPIDOGREL BISULFATE 75 MG/1
TABLET ORAL
Qty: 90 TABLET | Refills: 3 | Status: SHIPPED | OUTPATIENT
Start: 2018-03-19 | End: 2018-10-12 | Stop reason: HOSPADM

## 2018-04-11 ENCOUNTER — OFFICE VISIT (OUTPATIENT)
Dept: INTERNAL MEDICINE | Facility: CLINIC | Age: 63
End: 2018-04-11

## 2018-04-11 VITALS
OXYGEN SATURATION: 98 % | WEIGHT: 315 LBS | HEIGHT: 76 IN | TEMPERATURE: 98.3 F | HEART RATE: 57 BPM | BODY MASS INDEX: 38.36 KG/M2 | DIASTOLIC BLOOD PRESSURE: 80 MMHG | SYSTOLIC BLOOD PRESSURE: 130 MMHG

## 2018-04-11 DIAGNOSIS — E53.8 VITAMIN B12 DEFICIENCY: ICD-10-CM

## 2018-04-11 DIAGNOSIS — G40.909 SEIZURE DISORDER (HCC): Primary | ICD-10-CM

## 2018-04-11 DIAGNOSIS — Z99.89 BIPAP (BIPHASIC POSITIVE AIRWAY PRESSURE) DEPENDENCE: ICD-10-CM

## 2018-04-11 DIAGNOSIS — E11.9 DIABETES MELLITUS WITHOUT COMPLICATION (HCC): ICD-10-CM

## 2018-04-11 DIAGNOSIS — R53.83 FATIGUE, UNSPECIFIED TYPE: ICD-10-CM

## 2018-04-11 PROCEDURE — 99214 OFFICE O/P EST MOD 30 MIN: CPT | Performed by: INTERNAL MEDICINE

## 2018-04-11 RX ORDER — CLOBAZAM 10 MG/1
TABLET ORAL
COMMUNITY
Start: 2018-03-22 | End: 2018-05-22

## 2018-04-11 RX ORDER — GLIPIZIDE 2.5 MG/1
TABLET, EXTENDED RELEASE ORAL
COMMUNITY
Start: 2018-03-01 | End: 2018-04-11 | Stop reason: DRUGHIGH

## 2018-04-11 NOTE — PROGRESS NOTES
Subjective     Patient ID: Andre Agosto is a 63 y.o. male. Patient is here for management of multiple medical problems.     Chief Complaint   Patient presents with   • Hypertension     1mo f/u     History of Present Illness     Pt was seening Dr Merchant neurology. Recent change in meds. Pt has not yet started this med.   Dr Merchant has moved to [place].  He needs a new neurologist.  Pt was     The following portions of the patient's history were reviewed and updated as appropriate: allergies, current medications, past family history, past medical history, past social history, past surgical history and problem list.    Review of Systems   Constitutional: Positive for fatigue.   Psychiatric/Behavioral: Negative for sleep disturbance.   All other systems reviewed and are negative.      Current Outpatient Prescriptions:   •  Alcohol Swabs (ALCOHOL PREP) pads, 1 pad 4 (Four) Times a Day As Needed (bs)., Disp: 120 each, Rfl: 12  •  aspirin 81 MG EC tablet, Take 1 tablet by mouth daily., Disp: 90 tablet, Rfl: 3  •  BYSTOLIC 20 MG tablet, Take 1 tablet by mouth Daily., Disp: 90 tablet, Rfl: 3  •  clopidogrel (PLAVIX) 75 MG tablet, TAKE 1 TABLET DAILY, Disp: 90 tablet, Rfl: 3  •  escitalopram (LEXAPRO) 20 MG tablet, TAKE 1 TABLET DAILY, Disp: 90 tablet, Rfl: 2  •  glipiZIDE (GLUCOTROL) 5 MG tablet, Take 1 tablet by mouth 2 (Two) Times a Day Before Meals., Disp: 180 tablet, Rfl: 3  •  glucose blood test strip, Use one to two times daily to check blood sugar, Disp: 100 each, Rfl: 12  •  glucose monitor monitoring kit, 1 each As Needed (as directed). Diagnosis Code E11.9, Disp: 1 each, Rfl: 0  •  glucose monitor monitoring kit, 1 each As Needed (as directed). Diagnosis Code E11.9, Disp: 1 each, Rfl: 1  •  hydrALAZINE (APRESOLINE) 100 MG tablet, Take 0.5 tablets by mouth 3 (Three) Times a Day. (Patient taking differently: Take 50 mg by mouth 2 (Two) Times a Day.), Disp: 90 tablet, Rfl: 11  •  Lancets (FREESTYLE) lancets, 1  "each by Other route As Needed (bs)., Disp: 120 each, Rfl: 12  •  lovastatin (MEVACOR) 20 MG tablet, Take 0.5 tablets by mouth every night. (Patient taking differently: Take 20 mg by mouth Every Night.), Disp: 30 tablet, Rfl: 11  •  Magnesium Oxide 400 (240 MG) MG tablet, Take 1 tablet by mouth daily., Disp: 30 tablet, Rfl: 11  •  NIFEdipine XL (PROCARDIA XL) 60 MG 24 hr tablet, Take 120 mg by mouth Daily., Disp: , Rfl:   •  ONFI 10 MG tablet, , Disp: , Rfl:   •  potassium chloride (K-TAB) 20 MEQ tablet controlled-release ER tablet, Take 1 tablet by mouth 2 (Two) Times a Day., Disp: 60 tablet, Rfl: 11  •  valsartan (DIOVAN) 320 MG tablet, Take 320 mg by mouth daily., Disp: , Rfl:   •  zonisamide (ZONEGRAN) 100 MG capsule, Take 1 capsule by mouth 3 (Three) Times a Day., Disp: 270 capsule, Rfl: 3    Objective      Blood pressure 130/80, pulse 57, temperature 98.3 °F (36.8 °C), height 193 cm (76\"), weight (!) 146 kg (321 lb 8 oz), SpO2 98 %.    Physical Exam     General Appearance:    Alert, cooperative, no distress, appears stated age   Head:    Normocephalic, without obvious abnormality, atraumatic   Eyes:    PERRL, conjunctiva/corneas clear, EOM's intact   Ears:    Normal TM's and external ear canals, both ears   Nose:   Nares normal, septum midline, mucosa normal, no drainage   or sinus tenderness   Throat:   Lips, mucosa, and tongue normal; teeth and gums normal   Neck:   Supple, symmetrical, trachea midline, no adenopathy;        thyroid:  No enlargement/tenderness/nodules; no carotid    bruit or JVD   Back:     Symmetric, no curvature, ROM normal, no CVA tenderness   Lungs:     Clear to auscultation bilaterally, respirations unlabored   Chest wall:    No tenderness or deformity   Heart:    Regular rate and rhythm, S1 and S2 normal, no murmur,        rub or gallop   Abdomen:     Soft, non-tender, bowel sounds active all four quadrants,     no masses, no organomegaly   Extremities:   Extremities normal, atraumatic, " no cyanosis or edema   Pulses:   2+ and symmetric all extremities   Skin:   Skin color, texture, turgor normal, no rashes or lesions   Lymph nodes:   Cervical, supraclavicular, and axillary nodes normal   Neurologic:   CNII-XII intact. Normal strength, sensation and reflexes       throughout      Results for orders placed or performed in visit on 03/05/18   Hemoglobin A1c   Result Value Ref Range    Hemoglobin A1C 6.30 %   MicroAlbumin, Urine, Random - Urine, Clean Catch   Result Value Ref Range    Microalbumin, Urine 824.1 Not Estab. ug/mL   TSH   Result Value Ref Range    TSH 1.690 0.470 - 4.680 mIU/mL   T4, Free   Result Value Ref Range    Free T4 1.01 0.78 - 2.19 ng/dL   Vitamin B12   Result Value Ref Range    Vitamin B-12 366 239 - 931 pg/mL   Lipid Panel   Result Value Ref Range    Total Cholesterol 236 (H) 0 - 199 mg/dL    Triglycerides 308 (H) <150 mg/dL    HDL Cholesterol 36 (L) 40 - 60 mg/dL    VLDL Cholesterol 61.6 mg/dL    LDL Cholesterol  138 (H) 0 - 99 mg/dL   Comprehensive Metabolic Panel   Result Value Ref Range    Glucose 111 (H) 74 - 98 mg/dL    BUN 10 7 - 20 mg/dL    Creatinine 1.20 0.60 - 1.30 mg/dL    eGFR Non African Am 61 >60 mL/min/1.73    eGFR African Am 74 >60 mL/min/1.73    BUN/Creatinine Ratio 8.3 6.3 - 21.9    Sodium 149 (H) 137 - 145 mmol/L    Potassium 3.4 (L) 3.5 - 5.1 mmol/L    Chloride 104 98 - 107 mmol/L    Total CO2 29.0 26.0 - 30.0 mmol/L    Calcium 9.6 8.4 - 10.2 mg/dL    Total Protein 8.1 6.3 - 8.2 g/dL    Albumin 4.40 3.50 - 5.00 g/dL    Globulin 3.7 gm/dL    A/G Ratio 1.2 1.0 - 2.0 g/dL    Total Bilirubin 0.7 0.2 - 1.3 mg/dL    Alkaline Phosphatase 73 38 - 126 U/L    AST (SGOT) 21 15 - 46 U/L    ALT (SGPT) 38 13 - 69 U/L   PSA Screen   Result Value Ref Range    PSA 2.500 0.060 - 4.000 ng/mL   CBC & Differential   Result Value Ref Range    WBC 9.50 4.80 - 10.80 10*3/mm3    RBC 5.23 4.70 - 6.10 10*6/mm3    Hemoglobin 15.5 14.0 - 18.0 g/dL    Hematocrit 46.7 42.0 - 52.0 %    MCV  89.3 80.0 - 94.0 fL    MCH 29.6 27.0 - 31.0 pg    MCHC 33.2 30.0 - 37.0 g/dL    RDW 14.8 (H) 11.5 - 14.5 %    Platelets 249 130 - 400 10*3/mm3    Neutrophil Rel % 67.4 37.0 - 80.0 %    Lymphocyte Rel % 22.8 10.0 - 50.0 %    Monocyte Rel % 4.9 0.0 - 12.0 %    Eosinophil Rel % 3.8 0.0 - 7.0 %    Basophil Rel % 0.8 0.0 - 2.5 %    Neutrophils Absolute 6.39 2.00 - 6.90 10*3/mm3    Lymphocytes Absolute 2.17 0.60 - 3.40 10*3/mm3    Monocytes Absolute 0.47 0.00 - 0.90 10*3/mm3    Eosinophils Absolute 0.36 0.00 - 0.70 10*3/mm3    Basophils Absolute 0.08 0.00 - 0.20 10*3/mm3    Immature Granulocyte Rel % 0.3 0.0 - 0.6 %    Immature Grans Absolute 0.03 0.00 - 0.06 10*3/mm3    nRBC 0.0 0.0 - 0.0 /100 WBC         Assessment/Plan    occ sz last sz 3 weeks ago. Pt had bipap on.  Started after meningitis/encephalitis.    Labs ok. Diet and exerize for high trig.        Andre was seen today for hypertension.    Diagnoses and all orders for this visit:    Seizure disorder  -     Ambulatory Referral to Neurology    Diabetes mellitus without complication  -     Lipid Panel  -     Comprehensive Metabolic Panel  -     CBC & Differential  -     Hemoglobin A1c  -     MicroAlbumin, Urine, Random - Urine, Clean Catch    BiPAP (biphasic positive airway pressure) dependence  -     Ambulatory Referral to Neurology    Vitamin B12 deficiency    Fatigue, unspecified type  -     TSH  -     T4, Free  -     Vitamin B12      Return in about 4 months (around 8/11/2018).          There are no Patient Instructions on file for this visit.     Keven Bear MD    Assessment/Plan

## 2018-05-14 RX ORDER — ZONISAMIDE 100 MG/1
CAPSULE ORAL
Qty: 270 CAPSULE | Refills: 3 | Status: SHIPPED | OUTPATIENT
Start: 2018-05-14 | End: 2018-10-11

## 2018-05-22 ENCOUNTER — OFFICE VISIT (OUTPATIENT)
Dept: BARIATRICS/WEIGHT MGMT | Facility: CLINIC | Age: 63
End: 2018-05-22

## 2018-05-22 VITALS
HEART RATE: 78 BPM | BODY MASS INDEX: 38.36 KG/M2 | HEIGHT: 76 IN | TEMPERATURE: 97.8 F | WEIGHT: 315 LBS | OXYGEN SATURATION: 99 % | RESPIRATION RATE: 18 BRPM | SYSTOLIC BLOOD PRESSURE: 150 MMHG | DIASTOLIC BLOOD PRESSURE: 100 MMHG

## 2018-05-22 DIAGNOSIS — R10.13 DYSPEPSIA: Primary | ICD-10-CM

## 2018-05-22 PROCEDURE — 99214 OFFICE O/P EST MOD 30 MIN: CPT | Performed by: PHYSICIAN ASSISTANT

## 2018-05-22 NOTE — PROGRESS NOTES
[facility]ariatric Surgery  [address]  [facility] [address]  [phone]        Patient Name:  Andre Agosto.  :  1955      Date of Visit: 2018      Reason for Visit:  AGB followup    HPI:  Anrde Agosto is a 63 y.o. male s/p LAGB Realize 2008 by [facility].    LOV 2015.  Aware that Dr. Velasquez is now in [place].  Prefers to stay w/ [facility].    Hx pouch enlargement 2015 w/ 4.5cc band volume.  Current band vol 1.3cc.  Feels that the band is no longer working for him and wishes to discuss possible revision.      Presurgery weight:  350 lbs.  Lowest weight achieved:  260 lbs.  Has maintained b/w 298 - 305 lbs, but needs to lose additional weight d/t his obesity related comorbidities.    He reports chronic episodic dysphagia/vomiting, depending on what he eats, or if he eats too fast.  Denies reflux/ nausea/abd.pain/port pain.     Past Medical History:   Diagnosis Date   • Dyspepsia    • Epilepsy    • Heart attack 06/15/2016   • Hyperlipidemia    • Hypertension    • Obesity    • Seizures    • Sleep apnea     BiPAP compliant   • Stroke      Past Surgical History:   Procedure Laterality Date   • CORONARY STENT PLACEMENT     • LAPAROSCOPIC GASTRIC BANDING      s/p LAGB Realize 2008 by [facility].   • UMBILICAL HERNIA REPAIR      incarcerated UHR w/ mesh by Dr. Velasquez       No Known Allergies    Outpatient Prescriptions Marked as Taking for the 18 encounter (Office Visit) with JANETH Hawk   Medication Sig Dispense Refill   • Alcohol Swabs (ALCOHOL PREP) pads 1 pad 4 (Four) Times a Day As Needed (bs). 120 each 12   • aspirin 81 MG EC tablet Take 1 tablet by mouth daily. 90 tablet 3   • BYSTOLIC 20 MG tablet Take 1 tablet by mouth Daily. 90 tablet 3   • clopidogrel (PLAVIX) 75 MG tablet TAKE 1 TABLET DAILY 90 tablet 3   • escitalopram (LEXAPRO) 20 MG tablet TAKE 1 TABLET DAILY 90 tablet 2   • glipiZIDE (GLUCOTROL) 5 MG tablet Take  "1 tablet by mouth 2 (Two) Times a Day Before Meals. 180 tablet 3   • glucose blood test strip Use one to two times daily to check blood sugar 100 each 12   • glucose monitor monitoring kit 1 each As Needed (as directed). Diagnosis Code E11.9 1 each 0   • glucose monitor monitoring kit 1 each As Needed (as directed). Diagnosis Code E11.9 1 each 1   • hydrALAZINE (APRESOLINE) 100 MG tablet Take 0.5 tablets by mouth 3 (Three) Times a Day. (Patient taking differently: Take 50 mg by mouth 2 (Two) Times a Day.) 90 tablet 11   • Lancets (FREESTYLE) lancets 1 each by Other route As Needed (bs). 120 each 12   • lovastatin (MEVACOR) 20 MG tablet Take 0.5 tablets by mouth every night. (Patient taking differently: Take 20 mg by mouth Every Night.) 30 tablet 11   • Magnesium Oxide 400 (240 MG) MG tablet Take 1 tablet by mouth daily. 30 tablet 11   • NIFEdipine XL (PROCARDIA XL) 60 MG 24 hr tablet Take 120 mg by mouth Daily.     • potassium chloride (K-TAB) 20 MEQ tablet controlled-release ER tablet Take 1 tablet by mouth 2 (Two) Times a Day. 60 tablet 11   • valsartan (DIOVAN) 320 MG tablet Take 320 mg by mouth daily.     • zonisamide (ZONEGRAN) 100 MG capsule TAKE 1 CAPSULE THREE TIMES A  capsule 3       Social History     Social History   • Marital status:      Spouse name: N/A   • Number of children: N/A   • Years of education: N/A     Occupational History   • Not on file.     Social History Main Topics   • Smoking status: Former Smoker     Years: 10.00   • Smokeless tobacco: Never Used   • Alcohol use No   • Drug use: No   • Sexual activity: Not on file     Other Topics Concern   • Not on file     Social History Narrative   • No narrative on file         /100 (BP Location: Left arm, Patient Position: Sitting, Cuff Size: Large Adult)   Pulse 78   Temp 97.8 °F (36.6 °C) (Temporal Artery )   Resp 18   Ht 193 cm (76\")   Wt (!) 144 kg (318 lb)   SpO2 99%   BMI 38.71 kg/m²     Physical Exam "   Constitutional: He appears well-developed and well-nourished.   Cardiovascular: Normal rate and regular rhythm.    Pulmonary/Chest: Effort normal.   Abdominal: Soft. There is no tenderness. No hernia.   LLQ port - site unremarkable   Musculoskeletal: Normal range of motion.   Neurological: He is alert.   Skin: Skin is warm and dry.   Psychiatric: He has a normal mood and affect.       Band Adjustment Info   Band Type:   Realize   Port Location:   LLQ   Procedure Position:   upright   Needle Type:   short    Local Anesthesia: with 1% lidocaine and bicarb       Amount Expected (cc):    1.3cc   Amount Added (cc):    Amount Removed (cc):     Total Amount (cc):           Able to jane water after ADJ? Yes   Other Notes:        Assessment: s/p LAGB Realize 6/2008 by [facility] w/ chronic dysphagia/vomiting.    Plan:  UGI ordered to further evaluate.  Further input pending results.          JANETH Hawk

## 2018-05-30 ENCOUNTER — HOSPITAL ENCOUNTER (OUTPATIENT)
Dept: GENERAL RADIOLOGY | Facility: HOSPITAL | Age: 63
Discharge: HOME | End: 2018-05-30
Admitting: PHYSICIAN ASSISTANT

## 2018-05-30 DIAGNOSIS — R10.13 DYSPEPSIA: ICD-10-CM

## 2018-05-30 PROCEDURE — 74241: CPT

## 2018-06-15 ENCOUNTER — OFFICE VISIT (OUTPATIENT)
Dept: BARIATRICS/WEIGHT MGMT | Facility: CLINIC | Age: 63
End: 2018-06-15

## 2018-06-15 VITALS
DIASTOLIC BLOOD PRESSURE: 79 MMHG | SYSTOLIC BLOOD PRESSURE: 138 MMHG | TEMPERATURE: 97.1 F | BODY MASS INDEX: 39.17 KG/M2 | OXYGEN SATURATION: 99 % | HEIGHT: 75 IN | WEIGHT: 315 LBS | HEART RATE: 60 BPM | RESPIRATION RATE: 18 BRPM

## 2018-06-15 DIAGNOSIS — Z46.51 FITTING AND ADJUSTMENT OF GASTRIC LAP BAND: ICD-10-CM

## 2018-06-15 DIAGNOSIS — R13.10 DYSPHAGIA, UNSPECIFIED TYPE: Primary | ICD-10-CM

## 2018-06-15 PROCEDURE — 43999 UNLISTED PROCEDURE STOMACH: CPT | Performed by: PHYSICIAN ASSISTANT

## 2018-06-15 PROCEDURE — 99213 OFFICE O/P EST LOW 20 MIN: CPT | Performed by: PHYSICIAN ASSISTANT

## 2018-06-15 NOTE — PROGRESS NOTES
[facility] Bariatric Surgery  [address]  [facility] [id number] [phone]        Patient Name:  Andre Agosto.  :  1955      Date of Visit: 6/15/2018      Reason for Visit:  AGB followup    HPI:  Andre Agosto is a 63 y.o. male s/p LAGB Realize 2008 by MASONO.    Presented last month after an extended absence - LOV prior 2015.  Aware that Dr. Velasquez is now in [place].  Prefers to stay w/ [place] [facility].  Having issues w/ his lapband and wishes to discuss band removal w/ possible revision.      Presurgery weight:  350 lbs.  Lowest weight achieved:  260 lbs.  Has maintained b/w 298 - 305 lbs, but needs to lose additional weight d/t his obesity related comorbidities.    c/o chronic episodic dysphagia/vomiting.  Expected band vol 1.3cc.  [facility]I 18 @[facility] reveals esophageal dysmotility and dilatation w/ pouch enlargement.  Presents today for unfill -sx persist.    Past Medical History:   Diagnosis Date   • Dyspepsia    • Epilepsy    • Heart attack 06/15/2016   • History of Helicobacter pylori infection     in , treated w/ PrevPak   • Hyperlipidemia    • Hypertension    • Obesity    • Seizures    • Sleep apnea     BiPAP compliant   • Stroke      Past Surgical History:   Procedure Laterality Date   • CORONARY STENT PLACEMENT     • LAPAROSCOPIC GASTRIC BANDING      s/p LAGB Realize 2008 by HOMERO.   • UMBILICAL HERNIA REPAIR      incarcerated UHR w/ mesh by Dr. Velasquez       No Known Allergies    Outpatient Prescriptions Marked as Taking for the 6/15/18 encounter (Office Visit) with JANETH Hawk   Medication Sig Dispense Refill   • Alcohol Swabs (ALCOHOL PREP) pads 1 pad 4 (Four) Times a Day As Needed (bs). 120 each 12   • aspirin 81 MG EC tablet Take 1 tablet by mouth daily. 90 tablet 3   • BYSTOLIC 20 MG tablet Take 1 tablet by mouth Daily. 90 tablet 3   • clopidogrel (PLAVIX) 75 MG tablet TAKE 1 TABLET DAILY 90 tablet 3   •  escitalopram (LEXAPRO) 20 MG tablet TAKE 1 TABLET DAILY 90 tablet 2   • glipiZIDE (GLUCOTROL) 5 MG tablet Take 1 tablet by mouth 2 (Two) Times a Day Before Meals. 180 tablet 3   • glucose blood test strip Use one to two times daily to check blood sugar 100 each 12   • glucose monitor monitoring kit 1 each As Needed (as directed). Diagnosis Code E11.9 1 each 0   • glucose monitor monitoring kit 1 each As Needed (as directed). Diagnosis Code E11.9 1 each 1   • hydrALAZINE (APRESOLINE) 100 MG tablet Take 0.5 tablets by mouth 3 (Three) Times a Day. (Patient taking differently: Take 50 mg by mouth 2 (Two) Times a Day.) 90 tablet 11   • Lancets (FREESTYLE) lancets 1 each by Other route As Needed (bs). 120 each 12   • lovastatin (MEVACOR) 20 MG tablet Take 0.5 tablets by mouth every night. (Patient taking differently: Take 20 mg by mouth Every Night.) 30 tablet 11   • Magnesium Oxide 400 (240 MG) MG tablet Take 1 tablet by mouth daily. 30 tablet 11   • NIFEdipine XL (PROCARDIA XL) 60 MG 24 hr tablet Take 120 mg by mouth Daily.     • potassium chloride (K-TAB) 20 MEQ tablet controlled-release ER tablet Take 1 tablet by mouth 2 (Two) Times a Day. 60 tablet 11   • valsartan (DIOVAN) 320 MG tablet Take 320 mg by mouth daily.     • zonisamide (ZONEGRAN) 100 MG capsule TAKE 1 CAPSULE THREE TIMES A  capsule 3       Social History     Social History   • Marital status:      Spouse name: N/A   • Number of children: N/A   • Years of education: N/A     Occupational History   • Not on file.     Social History Main Topics   • Smoking status: Former Smoker     Years: 10.00   • Smokeless tobacco: Never Used   • Alcohol use No   • Drug use: No   • Sexual activity: Not on file     Other Topics Concern   • Not on file     Social History Narrative   • No narrative on file         /79 (BP Location: Left arm, Patient Position: Sitting, Cuff Size: Large Adult)   Pulse 60   Temp 97.1 °F (36.2 °C) (Temporal Artery )   Resp  "18   Ht 190.5 cm (75\")   Wt (!) 143 kg (315 lb 8.2 oz)   SpO2 99%   BMI 39.44 kg/m²     Physical Exam   Constitutional: He appears well-developed and well-nourished.   Cardiovascular: Normal rate and regular rhythm.    Pulmonary/Chest: Effort normal.   Abdominal: Soft. There is no tenderness. No hernia.   LLQ port - site unremarkable   Musculoskeletal: Normal range of motion.   Neurological: He is alert.   Skin: Skin is warm and dry.   Psychiatric: He has a normal mood and affect.       Band Adjustment Info   Band Type:   Realize   Port Location:   LLQ   Procedure Position:   upright   Needle Type:   short    Local Anesthesia: with 1% lidocaine and bicarb       Amount Expected (cc):    1.3cc   Amount Added (cc):    Amount Removed (cc):   -1.5cc clear fluid   Total Amount (cc):    0.0       Assessment: s/p LAGB Realize 6/2008 by HOMERO w/ chronic dysphagia/vomiting, pursuing revision.    Plan:  Lapband unfill today w/out difficulty.  Advised warm liquids x 24 hrs followed by gradual diet progression.  Needs EGD prior to lapband removal.  Of note:  Patient is actually scheduled for a screening colonoscopy on Monday w/ Dr. Jacobs.  He will ask Dr. Jacobs to perform his EGD at the same time.  Patient will then send us the color images from his EGD for Dr. uGzman to review.  Further input to follow.  Call w/ any other issues/concerns.      JANETH Hawk  "

## 2018-06-29 ENCOUNTER — TELEPHONE (OUTPATIENT)
Dept: INTERNAL MEDICINE | Facility: CLINIC | Age: 63
End: 2018-06-29

## 2018-07-02 ENCOUNTER — PREP FOR SURGERY (OUTPATIENT)
Dept: OTHER | Facility: HOSPITAL | Age: 63
End: 2018-07-02

## 2018-07-02 DIAGNOSIS — R10.13 DYSPEPSIA: Primary | ICD-10-CM

## 2018-07-05 LAB
ALBUMIN SERPL-MCNC: 4.2 G/DL (ref 3.5–5)
ALBUMIN/GLOB SERPL: 1.2 G/DL (ref 1–2)
ALP SERPL-CCNC: 65 U/L (ref 38–126)
ALT SERPL-CCNC: 29 U/L (ref 13–69)
AST SERPL-CCNC: 22 U/L (ref 15–46)
BASOPHILS # BLD AUTO: 0.07 10*3/MM3 (ref 0–0.2)
BASOPHILS NFR BLD AUTO: 0.8 % (ref 0–2.5)
BILIRUB SERPL-MCNC: 0.5 MG/DL (ref 0.2–1.3)
BUN SERPL-MCNC: 20 MG/DL (ref 7–20)
BUN/CREAT SERPL: 12.5 (ref 6.3–21.9)
CALCIUM SERPL-MCNC: 9.6 MG/DL (ref 8.4–10.2)
CHLORIDE SERPL-SCNC: 96 MMOL/L (ref 98–107)
CHOLEST SERPL-MCNC: 272 MG/DL (ref 0–199)
CO2 SERPL-SCNC: 34 MMOL/L (ref 26–30)
CREAT SERPL-MCNC: 1.6 MG/DL (ref 0.6–1.3)
EOSINOPHIL # BLD AUTO: 0.33 10*3/MM3 (ref 0–0.7)
EOSINOPHIL NFR BLD AUTO: 3.7 % (ref 0–7)
ERYTHROCYTE [DISTWIDTH] IN BLOOD BY AUTOMATED COUNT: 14 % (ref 11.5–14.5)
GLOBULIN SER CALC-MCNC: 3.4 GM/DL
GLUCOSE SERPL-MCNC: 115 MG/DL (ref 74–98)
HBA1C MFR BLD: 6.4 %
HCT VFR BLD AUTO: 42.1 % (ref 42–52)
HDLC SERPL-MCNC: 33 MG/DL (ref 40–60)
HGB BLD-MCNC: 14.3 G/DL (ref 14–18)
IMM GRANULOCYTES # BLD: 0.02 10*3/MM3 (ref 0–0.06)
IMM GRANULOCYTES NFR BLD: 0.2 % (ref 0–0.6)
LDLC SERPL CALC-MCNC: ABNORMAL MG/DL
LYMPHOCYTES # BLD AUTO: 2.44 10*3/MM3 (ref 0.6–3.4)
LYMPHOCYTES NFR BLD AUTO: 27.2 % (ref 10–50)
MCH RBC QN AUTO: 29.9 PG (ref 27–31)
MCHC RBC AUTO-ENTMCNC: 34 G/DL (ref 30–37)
MCV RBC AUTO: 88.1 FL (ref 80–94)
MICROALBUMIN UR-MCNC: 603.3 UG/ML
MONOCYTES # BLD AUTO: 0.54 10*3/MM3 (ref 0–0.9)
MONOCYTES NFR BLD AUTO: 6 % (ref 0–12)
NEUTROPHILS # BLD AUTO: 5.58 10*3/MM3 (ref 2–6.9)
NEUTROPHILS NFR BLD AUTO: 62.1 % (ref 37–80)
NRBC BLD AUTO-RTO: 0 /100 WBC (ref 0–0)
PLATELET # BLD AUTO: 227 10*3/MM3 (ref 130–400)
POTASSIUM SERPL-SCNC: 3.1 MMOL/L (ref 3.5–5.1)
PROT SERPL-MCNC: 7.6 G/DL (ref 6.3–8.2)
RBC # BLD AUTO: 4.78 10*6/MM3 (ref 4.7–6.1)
SODIUM SERPL-SCNC: 142 MMOL/L (ref 137–145)
T4 FREE SERPL-MCNC: 0.8 NG/DL (ref 0.78–2.19)
TRIGL SERPL-MCNC: 416 MG/DL
TSH SERPL DL<=0.005 MIU/L-ACNC: 3.03 MIU/ML (ref 0.47–4.68)
VIT B12 SERPL-MCNC: 406 PG/ML (ref 239–931)
VLDLC SERPL CALC-MCNC: ABNORMAL MG/DL
WBC # BLD AUTO: 8.98 10*3/MM3 (ref 4.8–10.8)

## 2018-07-06 ENCOUNTER — TELEPHONE (OUTPATIENT)
Dept: INTERNAL MEDICINE | Facility: CLINIC | Age: 63
End: 2018-07-06

## 2018-07-12 ENCOUNTER — OFFICE VISIT (OUTPATIENT)
Dept: INTERNAL MEDICINE | Facility: CLINIC | Age: 63
End: 2018-07-12

## 2018-07-12 ENCOUNTER — APPOINTMENT (OUTPATIENT)
Dept: LAB | Facility: HOSPITAL | Age: 63
End: 2018-07-12
Attending: INTERNAL MEDICINE

## 2018-07-12 VITALS
TEMPERATURE: 98.2 F | SYSTOLIC BLOOD PRESSURE: 168 MMHG | BODY MASS INDEX: 38.87 KG/M2 | DIASTOLIC BLOOD PRESSURE: 92 MMHG | RESPIRATION RATE: 18 BRPM | HEART RATE: 85 BPM | WEIGHT: 311 LBS | OXYGEN SATURATION: 100 %

## 2018-07-12 DIAGNOSIS — E87.6 HYPOKALEMIA: ICD-10-CM

## 2018-07-12 DIAGNOSIS — E11.9 DIABETES MELLITUS WITHOUT COMPLICATION (HCC): ICD-10-CM

## 2018-07-12 DIAGNOSIS — I15.0 RENOVASCULAR HYPERTENSION: ICD-10-CM

## 2018-07-12 DIAGNOSIS — E87.6 GITELMAN SYNDROME: Primary | ICD-10-CM

## 2018-07-12 DIAGNOSIS — E83.42 GITELMAN SYNDROME: Primary | ICD-10-CM

## 2018-07-12 DIAGNOSIS — H49.21 SIXTH NERVE PALSY OF RIGHT EYE: ICD-10-CM

## 2018-07-12 DIAGNOSIS — H53.2 DOUBLE VISION: ICD-10-CM

## 2018-07-12 PROBLEM — N15.8 GITELMAN SYNDROME: Status: ACTIVE | Noted: 2018-07-12

## 2018-07-12 LAB
ALBUMIN SERPL-MCNC: 4.8 G/DL (ref 3.5–5)
ALBUMIN/GLOB SERPL: 1.2 G/DL (ref 1–2)
ALP SERPL-CCNC: 63 U/L (ref 38–126)
ALT SERPL W P-5'-P-CCNC: 33 U/L (ref 13–69)
ANION GAP SERPL CALCULATED.3IONS-SCNC: 16.5 MMOL/L (ref 10–20)
AST SERPL-CCNC: 28 U/L (ref 15–46)
BILIRUB SERPL-MCNC: 0.5 MG/DL (ref 0.2–1.3)
BUN BLD-MCNC: 19 MG/DL (ref 7–20)
BUN/CREAT SERPL: 11.9 (ref 6.3–21.9)
CALCIUM SPEC-SCNC: 10 MG/DL (ref 8.4–10.2)
CHLORIDE SERPL-SCNC: 104 MMOL/L (ref 98–107)
CO2 SERPL-SCNC: 27 MMOL/L (ref 26–30)
CREAT BLD-MCNC: 1.6 MG/DL (ref 0.6–1.3)
GFR SERPL CREATININE-BSD FRML MDRD: 44 ML/MIN/1.73
GLOBULIN UR ELPH-MCNC: 4.1 GM/DL
GLUCOSE BLD-MCNC: 91 MG/DL (ref 74–98)
HBA1C MFR BLD: 6.3 % (ref 3–6)
POTASSIUM BLD-SCNC: 4.5 MMOL/L (ref 3.5–5.1)
PROT SERPL-MCNC: 8.9 G/DL (ref 6.3–8.2)
PSA SERPL-MCNC: 2.78 NG/ML (ref 0.06–4)
SODIUM BLD-SCNC: 143 MMOL/L (ref 137–145)

## 2018-07-12 PROCEDURE — 80053 COMPREHEN METABOLIC PANEL: CPT | Performed by: INTERNAL MEDICINE

## 2018-07-12 PROCEDURE — 99214 OFFICE O/P EST MOD 30 MIN: CPT | Performed by: INTERNAL MEDICINE

## 2018-07-12 PROCEDURE — 84153 ASSAY OF PSA TOTAL: CPT | Performed by: INTERNAL MEDICINE

## 2018-07-12 PROCEDURE — 83036 HEMOGLOBIN GLYCOSYLATED A1C: CPT | Performed by: INTERNAL MEDICINE

## 2018-07-12 PROCEDURE — 36415 COLL VENOUS BLD VENIPUNCTURE: CPT | Performed by: INTERNAL MEDICINE

## 2018-07-12 RX ORDER — POTASSIUM CHLORIDE 1500 MG/1
20 TABLET, FILM COATED, EXTENDED RELEASE ORAL 3 TIMES DAILY
Qty: 90 TABLET | Refills: 11 | Status: SHIPPED | OUTPATIENT
Start: 2018-07-12 | End: 2018-07-19

## 2018-07-12 RX ORDER — NIFEDIPINE 60 MG/1
60 TABLET, EXTENDED RELEASE ORAL 2 TIMES DAILY
Qty: 60 TABLET | Refills: 5 | Status: SHIPPED | OUTPATIENT
Start: 2018-07-12 | End: 2018-08-14 | Stop reason: SDUPTHER

## 2018-07-12 RX ORDER — SPIRONOLACTONE 50 MG/1
50 TABLET, FILM COATED ORAL DAILY
Qty: 30 TABLET | Refills: 5 | Status: SHIPPED | OUTPATIENT
Start: 2018-07-12 | End: 2018-08-14 | Stop reason: SDUPTHER

## 2018-07-12 RX ORDER — VALSARTAN 320 MG/1
160 TABLET ORAL DAILY
Qty: 30 TABLET | Refills: 30 | Status: SHIPPED | OUTPATIENT
Start: 2018-07-12 | End: 2018-07-19

## 2018-07-12 NOTE — PROGRESS NOTES
Subjective     Patient ID: Andre Agosto is a 63 y.o. male. Patient is here for management of multiple medical problems.     Chief Complaint   Patient presents with   • Hypertension   • Blurred Vision     History of Present Illness     Potassium continued to drop after my lab. Went to [place] End[place] in [place] hospital.   [place]   CR 1.5.  Was 1.7.     Left hospital bp 140-80.  Just restarted procardia. This am.   On spironolactone.  Ct scan and renal neg.      aption given for sz. edarbi hctz 25 mg.     GI study pending. Pt was given colon prep.      etiology on hypok from hctz.          The following portions of the patient's history were reviewed and updated as appropriate: allergies, current medications, past family history, past medical history, past social history, past surgical history and problem list.    Review of Systems   Constitutional: Positive for fatigue.   Musculoskeletal: Positive for myalgias.   Psychiatric/Behavioral: Positive for sleep disturbance.   All other systems reviewed and are negative.      Current Outpatient Prescriptions:   •  Alcohol Swabs (ALCOHOL PREP) pads, 1 pad 4 (Four) Times a Day As Needed (bs)., Disp: 120 each, Rfl: 12  •  aspirin 81 MG EC tablet, Take 1 tablet by mouth daily., Disp: 90 tablet, Rfl: 3  •  BYSTOLIC 20 MG tablet, Take 1 tablet by mouth Daily., Disp: 90 tablet, Rfl: 3  •  clopidogrel (PLAVIX) 75 MG tablet, TAKE 1 TABLET DAILY, Disp: 90 tablet, Rfl: 3  •  escitalopram (LEXAPRO) 20 MG tablet, TAKE 1 TABLET DAILY, Disp: 90 tablet, Rfl: 2  •  glipiZIDE (GLUCOTROL) 5 MG tablet, Take 1 tablet by mouth 2 (Two) Times a Day Before Meals., Disp: 180 tablet, Rfl: 3  •  glucose blood test strip, Use one to two times daily to check blood sugar, Disp: 100 each, Rfl: 12  •  glucose monitor monitoring kit, 1 each As Needed (as directed). Diagnosis Code E11.9, Disp: 1 each, Rfl: 0  •  glucose monitor monitoring kit, 1 each As Needed (as directed). Diagnosis Code E11.9,  Disp: 1 each, Rfl: 1  •  Lancets (FREESTYLE) lancets, 1 each by Other route As Needed (bs)., Disp: 120 each, Rfl: 12  •  lovastatin (MEVACOR) 20 MG tablet, Take 0.5 tablets by mouth every night. (Patient taking differently: Take 20 mg by mouth Every Night.), Disp: 30 tablet, Rfl: 11  •  Magnesium Oxide 400 (240 MG) MG tablet, Take 1 tablet by mouth daily., Disp: 30 tablet, Rfl: 11  •  NIFEdipine XL (PROCARDIA XL) 60 MG 24 hr tablet, Take 1 tablet by mouth 2 (Two) Times a Day., Disp: 60 tablet, Rfl: 5  •  potassium chloride ER (K-TAB) 20 MEQ tablet controlled-release ER tablet, Take 1 tablet by mouth 3 (Three) Times a Day., Disp: 90 tablet, Rfl: 11  •  spironolactone (ALDACTONE) 50 MG tablet, Take 1 tablet by mouth Daily., Disp: 30 tablet, Rfl: 5  •  valsartan (DIOVAN) 320 MG tablet, Take 0.5 tablets by mouth Daily., Disp: 30 tablet, Rfl: 30  •  zonisamide (ZONEGRAN) 100 MG capsule, TAKE 1 CAPSULE THREE TIMES A DAY, Disp: 270 capsule, Rfl: 3    Objective      Blood pressure 168/92, pulse 85, temperature 98.2 °F (36.8 °C), temperature source Oral, resp. rate 18, weight (!) 141 kg (311 lb), SpO2 100 %.    Physical Exam     General Appearance:    Alert, cooperative, no distress, appears stated age   Head:    Normocephalic, without obvious abnormality, atraumatic   Eyes:    PERRL, conjunctiva/corneas clear, EOM's intact   Ears:    Normal TM's and external ear canals, both ears   Nose:   Nares normal, septum midline, mucosa normal, no drainage   or sinus tenderness   Throat:   Lips, mucosa, and tongue normal; teeth and gums normal   Neck:   Supple, symmetrical, trachea midline, no adenopathy;        thyroid:  No enlargement/tenderness/nodules; no carotid    bruit or JVD   Back:     Symmetric, no curvature, ROM normal, no CVA tenderness   Lungs:     Clear to auscultation bilaterally, respirations unlabored   Chest wall:    No tenderness or deformity   Heart:    Regular rate and rhythm, S1 and S2 normal, no murmur,         rub or gallop   Abdomen:     Soft, non-tender, bowel sounds active all four quadrants,     no masses, no organomegaly   Extremities:   Extremities normal, atraumatic, no cyanosis or edema   Pulses:   2+ and symmetric all extremities   Skin:   Skin color, texture, turgor normal, no rashes or lesions   Lymph nodes:   Cervical, supraclavicular, and axillary nodes normal   Neurologic:   CN 6 impaired in right eye intact. Normal strength, sensation and reflexes       throughout      Results for orders placed or performed in visit on 04/11/18   TSH   Result Value Ref Range    TSH 3.030 0.470 - 4.680 mIU/mL   T4, Free   Result Value Ref Range    Free T4 0.80 0.78 - 2.19 ng/dL   Vitamin B12   Result Value Ref Range    Vitamin B-12 406 239 - 931 pg/mL   Lipid Panel   Result Value Ref Range    Total Cholesterol 272 (H) 0 - 199 mg/dL    Triglycerides 416 (H) <150 mg/dL    HDL Cholesterol 33 (L) 40 - 60 mg/dL    VLDL Cholesterol CANCELED mg/dL    LDL Cholesterol  CANCELED mg/dL   Comprehensive Metabolic Panel   Result Value Ref Range    Glucose 115 (H) 74 - 98 mg/dL    BUN 20 7 - 20 mg/dL    Creatinine 1.60 (H) 0.60 - 1.30 mg/dL    eGFR Non African Am 44 (L) >60 mL/min/1.73    eGFR African Am 53 (L) >60 mL/min/1.73    BUN/Creatinine Ratio 12.5 6.3 - 21.9    Sodium 142 137 - 145 mmol/L    Potassium 3.1 (L) 3.5 - 5.1 mmol/L    Chloride 96 (L) 98 - 107 mmol/L    Total CO2 34.0 (H) 26.0 - 30.0 mmol/L    Calcium 9.6 8.4 - 10.2 mg/dL    Total Protein 7.6 6.3 - 8.2 g/dL    Albumin 4.20 3.50 - 5.00 g/dL    Globulin 3.4 gm/dL    A/G Ratio 1.2 1.0 - 2.0 g/dL    Total Bilirubin 0.5 0.2 - 1.3 mg/dL    Alkaline Phosphatase 65 38 - 126 U/L    AST (SGOT) 22 15 - 46 U/L    ALT (SGPT) 29 13 - 69 U/L   Hemoglobin A1c   Result Value Ref Range    Hemoglobin A1C 6.40 %   MicroAlbumin, Urine, Random - Urine, Clean Catch   Result Value Ref Range    Microalbumin, Urine 603.3 Not Estab. ug/mL   CBC & Differential   Result Value Ref Range    WBC 8.98  4.80 - 10.80 10*3/mm3    RBC 4.78 4.70 - 6.10 10*6/mm3    Hemoglobin 14.3 14.0 - 18.0 g/dL    Hematocrit 42.1 42.0 - 52.0 %    MCV 88.1 80.0 - 94.0 fL    MCH 29.9 27.0 - 31.0 pg    MCHC 34.0 30.0 - 37.0 g/dL    RDW 14.0 11.5 - 14.5 %    Platelets 227 130 - 400 10*3/mm3    Neutrophil Rel % 62.1 37.0 - 80.0 %    Lymphocyte Rel % 27.2 10.0 - 50.0 %    Monocyte Rel % 6.0 0.0 - 12.0 %    Eosinophil Rel % 3.7 0.0 - 7.0 %    Basophil Rel % 0.8 0.0 - 2.5 %    Neutrophils Absolute 5.58 2.00 - 6.90 10*3/mm3    Lymphocytes Absolute 2.44 0.60 - 3.40 10*3/mm3    Monocytes Absolute 0.54 0.00 - 0.90 10*3/mm3    Eosinophils Absolute 0.33 0.00 - 0.70 10*3/mm3    Basophils Absolute 0.07 0.00 - 0.20 10*3/mm3    Immature Granulocyte Rel % 0.2 0.0 - 0.6 %    Immature Grans Absolute 0.02 0.00 - 0.06 10*3/mm3    nRBC 0.0 0.0 - 0.0 /100 WBC         Assessment/Plan   Given the hx of profound hypokalemia on hctz with going hypokalemia and on arb 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    mri neg following double vision in [place]. Pt has apt with DR Darrin harris.  He has MRI on cd. I will defer to him.             Andre was seen today for hypertension and blurred vision.    Diagnoses and all orders for this visit:    Gitelman syndrome  -     spironolactone (ALDACTONE) 50 MG tablet; Take 1 tablet by mouth Daily.  -     potassium chloride ER (K-TAB) 20 MEQ tablet controlled-release ER tablet; Take 1 tablet by mouth 3 (Three) Times a Day.  -     valsartan (DIOVAN) 320 MG tablet; Take 0.5 tablets by mouth Daily.    Diabetes mellitus without complication (CMS/HCC)  -     valsartan (DIOVAN) 320 MG tablet; Take 0.5 tablets by mouth Daily.    Hypokalemia  -     spironolactone (ALDACTONE) 50 MG tablet; Take 1 tablet by mouth Daily.  -     potassium chloride ER (K-TAB) 20 MEQ tablet controlled-release ER tablet; Take 1 tablet by mouth 3 (Three) Times a Day.  -     valsartan (DIOVAN) 320 MG tablet; Take 0.5 tablets by mouth Daily.  -     Basic Metabolic  Panel    Double vision    Sixth nerve palsy of right eye    Renovascular hypertension  -     spironolactone (ALDACTONE) 50 MG tablet; Take 1 tablet by mouth Daily.  -     potassium chloride ER (K-TAB) 20 MEQ tablet controlled-release ER tablet; Take 1 tablet by mouth 3 (Three) Times a Day.  -     valsartan (DIOVAN) 320 MG tablet; Take 0.5 tablets by mouth Daily.  -     NIFEdipine XL (PROCARDIA XL) 60 MG 24 hr tablet; Take 1 tablet by mouth 2 (Two) Times a Day.      Return in about 1 week (around 7/19/2018).          There are no Patient Instructions on file for this visit.     Keven Bear MD    Assessment/Plan

## 2018-07-17 ENCOUNTER — OFFICE VISIT (OUTPATIENT)
Dept: NEUROLOGY | Facility: CLINIC | Age: 63
End: 2018-07-17

## 2018-07-17 VITALS
HEART RATE: 55 BPM | WEIGHT: 311 LBS | OXYGEN SATURATION: 99 % | HEIGHT: 75 IN | BODY MASS INDEX: 38.67 KG/M2 | DIASTOLIC BLOOD PRESSURE: 86 MMHG | SYSTOLIC BLOOD PRESSURE: 152 MMHG

## 2018-07-17 DIAGNOSIS — I69.30 SEQUELAE, POST-STROKE: ICD-10-CM

## 2018-07-17 DIAGNOSIS — G40.109 LOCALIZATION-RELATED SYMPTOMATIC EPILEPSY AND EPILEPTIC SYNDROMES WITH SIMPLE PARTIAL SEIZURES, NOT INTRACTABLE, WITHOUT STATUS EPILEPTICUS (HCC): ICD-10-CM

## 2018-07-17 DIAGNOSIS — G47.33 OBSTRUCTIVE SLEEP APNEA: Primary | ICD-10-CM

## 2018-07-17 DIAGNOSIS — H53.2 DIPLOPIA: ICD-10-CM

## 2018-07-17 DIAGNOSIS — H49.21 ABDUCENS NERVE PALSY, RIGHT: ICD-10-CM

## 2018-07-17 PROCEDURE — 99204 OFFICE O/P NEW MOD 45 MIN: CPT | Performed by: PSYCHIATRY & NEUROLOGY

## 2018-07-17 RX ORDER — DIVALPROEX SODIUM 250 MG/1
250 TABLET, DELAYED RELEASE ORAL 3 TIMES DAILY
Qty: 90 TABLET | Refills: 2 | Status: SHIPPED | OUTPATIENT
Start: 2018-07-17 | End: 2018-10-15 | Stop reason: SDUPTHER

## 2018-07-17 RX ORDER — PANTOPRAZOLE SODIUM 40 MG/1
40 TABLET, DELAYED RELEASE ORAL DAILY
COMMUNITY
End: 2018-09-12

## 2018-07-17 NOTE — PROGRESS NOTES
[facility] [place] CONSULTATION   History of Present Illness     Date: 7/17/2018    Patient Identification  Andre Agosto is a 63 y.o. male.    Patient information was obtained from patient and relative(s).  History/Exam limitations: none.    CONSULTATION requested by: Keven Bear MD      Chief Complaint   Seizures (NP, in office today for consult. Pt c/o seizures starting in 2006); Diplopia (Pt c/o double vision, ended up in [place] hospital last week, MRI was negative. Pt concerned this was possible stroke ); and Sleep Apnea      History of Present Illness   Patient is a delightful 63-year-old white male with recent past medical history of possible ischemic stroke and epilepsy who presents as a new patient to clinic.      Stroke:  Patient describes experiencing symptoms suggestive of stroke approximately 2 week prior vacationing with family at [facility].  Specifically, patient describes waking up at approximately 8:00 AM with diplopia.  Patient did not seek medical care at this time, continue with family to [address] [place] [address].  At around approximately 10:00 AM patient is family describes noticing his following symptoms: left-sided facial droop, slurred speech, and difficulty with balancing.  Patient and family traveled to outside hospital for emergent medical treatment.  Patient spent 5 days hospitalized during workup for possible ischemic stroke.  On discharge, patient had regained all her cognitive function, with the exception of persistent diplopia.  She denies experiencing any numbness, tingling, difficulty with balance, or any other neurological sequela following this event.    Epilepsy:  Patient describes initial onset of epilepsy in 2006, during which time he suffered what appeared to be 2 generalized tonic-clonic seizures.  Medially prior to these episodes, he described hospitalization secondary to bacterial meningitis with possible encephalitis.  He has described no additional generalized  "tonic-clonic seizures except for the 2 suffered 2006.  Since then, he has described right arm partial focal seizures, of which he suffers \"seemingly every other day.\"  Drives the seizure onset began me with a prodrome of numbness and tingling following the ulnar nerve distribution, initializing in the fingertip of the fourth and fifth phalanges on the right side, and gradually drilling with a numbness and pins and tingling sensation proximally.  He describes his seizures lasting several minutes, but rarely longer than 5, during which time his arm shakes small degree, and his speech is \"garbled.\"  After the seizures, he describes no confusion, but a great deal of exhaustion which persists sometimes for several hours.      Apnea:  Patient describes initial diagnosis of obstructive sleep apnea several years prior, at which time he began CPAP therapy.  Patient is uncertain as to whether she is currently undergoing CPAP or BiPAP therapy.  However, patient does describe current therapy is unsatisfactory in terms of alleviating daytime sleepiness.        PMH:   Past Medical History:   Diagnosis Date   • Anxiety and depression    • Diabetes mellitus (CMS/HCC)    • Dyspepsia    • Epilepsy (CMS/HCC)    • Heart attack 06/15/2016   • History of Helicobacter pylori infection     in 2008, treated w/ PrevPak   • Hyperlipidemia    • Hypertension    • Obesity    • Seizures (CMS/HCC)    • Sleep apnea     BiPAP compliant   • Stroke (CMS/HCC)        Past Surgical History:   Past Surgical History:   Procedure Laterality Date   • CORONARY STENT PLACEMENT     • LAPAROSCOPIC GASTRIC BANDING  2008    s/p LAGB Realize 6/2008 by HOMERO.   • UMBILICAL HERNIA REPAIR  2008    incarcerated UHR w/ mesh by Dr. Velasquez       Family Hisotry:   Family History   Problem Relation Age of Onset   • Stroke Mother    • Hypertension Mother    • COPD Father    • Hypertension Father        Social History:   Social History     Social History   • Marital status: "      Spouse name: N/A   • Number of children: N/A   • Years of education: N/A     Occupational History   • Not on file.     Social History Main Topics   • Smoking status: Former Smoker     Years: 10.00   • Smokeless tobacco: Never Used   • Alcohol use No   • Drug use: No   • Sexual activity: Not on file     Other Topics Concern   • Not on file     Social History Narrative   • No narrative on file       Medications:   Current Outpatient Prescriptions   Medication Sig Dispense Refill   • Alcohol Swabs (ALCOHOL PREP) pads 1 pad 4 (Four) Times a Day As Needed (bs). 120 each 12   • aspirin 81 MG EC tablet Take 1 tablet by mouth daily. 90 tablet 3   • BYSTOLIC 20 MG tablet Take 1 tablet by mouth Daily. 90 tablet 3   • clopidogrel (PLAVIX) 75 MG tablet TAKE 1 TABLET DAILY 90 tablet 3   • escitalopram (LEXAPRO) 20 MG tablet TAKE 1 TABLET DAILY 90 tablet 2   • glipiZIDE (GLUCOTROL) 5 MG tablet Take 1 tablet by mouth 2 (Two) Times a Day Before Meals. 180 tablet 3   • glucose blood test strip Use one to two times daily to check blood sugar 100 each 12   • glucose monitor monitoring kit 1 each As Needed (as directed). Diagnosis Code E11.9 1 each 0   • Lancets (FREESTYLE) lancets 1 each by Other route As Needed (bs). 120 each 12   • lovastatin (MEVACOR) 20 MG tablet Take 0.5 tablets by mouth every night. (Patient taking differently: Take 20 mg by mouth Every Night.) 30 tablet 11   • Magnesium Oxide 400 (240 MG) MG tablet Take 1 tablet by mouth daily. 30 tablet 11   • NIFEdipine XL (PROCARDIA XL) 60 MG 24 hr tablet Take 1 tablet by mouth 2 (Two) Times a Day. 60 tablet 5   • pantoprazole (PROTONIX) 40 MG EC tablet Take 40 mg by mouth Daily.     • potassium chloride ER (K-TAB) 20 MEQ tablet controlled-release ER tablet Take 1 tablet by mouth 3 (Three) Times a Day. 90 tablet 11   • spironolactone (ALDACTONE) 50 MG tablet Take 1 tablet by mouth Daily. 30 tablet 5   • valsartan (DIOVAN) 320 MG tablet Take 0.5 tablets by mouth  "Daily. 30 tablet 30   • zonisamide (ZONEGRAN) 100 MG capsule TAKE 1 CAPSULE THREE TIMES A  capsule 3   • divalproex (DEPAKOTE) 250 MG DR tablet Take 1 tablet by mouth 3 (Three) Times a Day. 90 tablet 2     No current facility-administered medications for this visit.        Allergy:   Allergies   Allergen Reactions   • Amlodipine Swelling       Review of Systems:  Review of Systems   Constitutional: Positive for fatigue. Negative for chills and fever.   HENT: Negative for congestion, ear pain, hearing loss, rhinorrhea and sore throat.    Eyes: Positive for visual disturbance. Negative for pain, discharge and redness.        Diplopia   Respiratory: Positive for apnea. Negative for cough, shortness of breath, wheezing and stridor.    Cardiovascular: Negative for chest pain, palpitations and leg swelling.   Gastrointestinal: Negative for abdominal pain, constipation, nausea and vomiting.   Endocrine: Positive for polydipsia and polyuria. Negative for cold intolerance, heat intolerance and polyphagia.   Genitourinary: Negative for dysuria, flank pain, frequency and urgency.   Musculoskeletal: Positive for gait problem. Negative for joint swelling, myalgias, neck pain and neck stiffness.   Skin: Negative for pallor, rash and wound.   Allergic/Immunologic: Negative for environmental allergies.   Neurological: Positive for dizziness and seizures. Negative for tremors, syncope, facial asymmetry, speech difficulty, weakness, light-headedness, numbness and headaches.   Hematological: Negative for adenopathy.   Psychiatric/Behavioral: Positive for sleep disturbance. Negative for confusion and hallucinations. The patient is not nervous/anxious.        Physical Exam     Vitals:    07/17/18 1405   BP: 152/86   Pulse: 55   SpO2: 99%   Weight: (!) 141 kg (311 lb)   Height: 190.5 cm (75\")     GENERAL: Patient is pleasant, cooperative, appears to be stated age.  Body habitus is endomorphic.  SKIN AND EXTREMITIES:  No skin " rashes or lesions are noted.  No cyanosis, clubbing or edema of the extremities.    HEAD:  Head is normocephalic and atraumatic.    NECK: Neck are non-tender without thyromegaly or adenopathy.  Carotic upstrokes are 1+/4.  No cranial or cervical bruits.  The neck is supple with a full range of motion.   ENT: palate elevate symmetrically, no evidence of high arch palate, tongue midline erythema in posterior pharynx, Mallampati Classification Class IV.  Neck diameter 19 inches.  CARDIOVASCULAR:  Regular rate and rhythm with normal S1 and S2 without rub or gallop.  RESPIRATORY:  Clear to auscultation without wheezes or crackle   ABDOMEN:  Soft and non-tender, positive bowel sound without hepatosplenomegaly  BACK:  Back is straight without midline defect.    PSYCH:  Higher cortical function/mental status:  The patient is alert.  She is oriented x3 to time, place and person.  Recent and the remote memory appear normal.  The patient has a good fund of knowledge.  There is no visual or auditory hallucination or suicidal or homicidal ideation.  SPEECH:There is no gross evidence of aphasia, dysarthria or agnosia.      CRANIAL NERVES:  Pupils are 4mm, equal round reactive to light, reacting briskly to 2mm without afferent pupillary defect.  Visual fields are intact to confrontation testing.  Fundoscopic examination reveals sharp disk margins with normal vasculature.  No papilledema, hemorrhages or exudates.  Extraocular movements are full and smooth with normal pursuits and saccades.  However, significant right eye abducens nerve palsy noted.  No beating nystagmus noted in left eye during inability to abduct right eye.  Left eye visual acuity 20/25, right eye visual acuity 20/30 significant diplopia noted with both eyes open.  Patient to have in left ear, secondary to childhood infection.  No nystagmus noted.  The face is symmetric. palate elevate symmetrically, Tongue midline, positive gag reflex. The remainder of the  cranial nerves are intact and symmetrical.    MOTOR: Strength is 5/5 throughout with normal tone and bulk with the following exceptions, 4/5 intrinsic muscles of the hands and feet.  No involuntary movements noted.    Deep Tendon Reflexes: are 1/4 and symmetrical in the upper extremities, 1/4 and symmetrical at the knees and 1/4 and symmetrical at the Achilles tendon.  Plantar responses were down-going bilaterally.    SENSATION: Decreased vibratory sensation over the left and right lower extremity up to ankle.  Decreased temperature sensation of the right lower extremity up to ankle.  Coordination:  The patient normally performs finger-nose-finger, heel-to-knee-to-shin and rapid alternating movements in symmetrical fashion.    COORDINATION AND GAIT:  The patient walks with a narrow-based gait.  Patient is able to heel-toe and tandem walk forward and backwards without difficulty.  Romberg and monopedal  Romberg are negative.    MUSCULOSKELETAL: Range of motion normal, no clubbing, cyanosis, or edema.  No joint swelling.            Studies: I have personally reviewed the following and discussed with the patient.  Results for orders placed or performed in visit on 04/11/18   TSH   Result Value Ref Range    TSH 3.030 0.470 - 4.680 mIU/mL   T4, Free   Result Value Ref Range    Free T4 0.80 0.78 - 2.19 ng/dL   Vitamin B12   Result Value Ref Range    Vitamin B-12 406 239 - 931 pg/mL   Lipid Panel   Result Value Ref Range    Total Cholesterol 272 (H) 0 - 199 mg/dL    Triglycerides 416 (H) <150 mg/dL    HDL Cholesterol 33 (L) 40 - 60 mg/dL    VLDL Cholesterol CANCELED mg/dL    LDL Cholesterol  CANCELED mg/dL   Comprehensive Metabolic Panel   Result Value Ref Range    Glucose 115 (H) 74 - 98 mg/dL    BUN 20 7 - 20 mg/dL    Creatinine 1.60 (H) 0.60 - 1.30 mg/dL    eGFR Non African Am 44 (L) >60 mL/min/1.73    eGFR African Am 53 (L) >60 mL/min/1.73    BUN/Creatinine Ratio 12.5 6.3 - 21.9    Sodium 142 137 - 145 mmol/L     Potassium 3.1 (L) 3.5 - 5.1 mmol/L    Chloride 96 (L) 98 - 107 mmol/L    Total CO2 34.0 (H) 26.0 - 30.0 mmol/L    Calcium 9.6 8.4 - 10.2 mg/dL    Total Protein 7.6 6.3 - 8.2 g/dL    Albumin 4.20 3.50 - 5.00 g/dL    Globulin 3.4 gm/dL    A/G Ratio 1.2 1.0 - 2.0 g/dL    Total Bilirubin 0.5 0.2 - 1.3 mg/dL    Alkaline Phosphatase 65 38 - 126 U/L    AST (SGOT) 22 15 - 46 U/L    ALT (SGPT) 29 13 - 69 U/L   Hemoglobin A1c   Result Value Ref Range    Hemoglobin A1C 6.40 %   MicroAlbumin, Urine, Random - Urine, Clean Catch   Result Value Ref Range    Microalbumin, Urine 603.3 Not Estab. ug/mL   CBC & Differential   Result Value Ref Range    WBC 8.98 4.80 - 10.80 10*3/mm3    RBC 4.78 4.70 - 6.10 10*6/mm3    Hemoglobin 14.3 14.0 - 18.0 g/dL    Hematocrit 42.1 42.0 - 52.0 %    MCV 88.1 80.0 - 94.0 fL    MCH 29.9 27.0 - 31.0 pg    MCHC 34.0 30.0 - 37.0 g/dL    RDW 14.0 11.5 - 14.5 %    Platelets 227 130 - 400 10*3/mm3    Neutrophil Rel % 62.1 37.0 - 80.0 %    Lymphocyte Rel % 27.2 10.0 - 50.0 %    Monocyte Rel % 6.0 0.0 - 12.0 %    Eosinophil Rel % 3.7 0.0 - 7.0 %    Basophil Rel % 0.8 0.0 - 2.5 %    Neutrophils Absolute 5.58 2.00 - 6.90 10*3/mm3    Lymphocytes Absolute 2.44 0.60 - 3.40 10*3/mm3    Monocytes Absolute 0.54 0.00 - 0.90 10*3/mm3    Eosinophils Absolute 0.33 0.00 - 0.70 10*3/mm3    Basophils Absolute 0.07 0.00 - 0.20 10*3/mm3    Immature Granulocyte Rel % 0.2 0.0 - 0.6 %    Immature Grans Absolute 0.02 0.00 - 0.06 10*3/mm3    nRBC 0.0 0.0 - 0.0 /100 WBC       Review of Imaging: I have personally reviewed the following images and discussed with the patient.  Review MRI of the brain that was performed in [place] show a chronic right pontine ischemic lesion    Records Reviewed: I have personally reviewed his previous medical record.    Andre was seen today for seizures, diplopia and sleep apnea.    Diagnoses and all orders for this visit:    Obstructive sleep apnea    Abducens nerve palsy,  right    Diplopia    Localization-related symptomatic epilepsy and epileptic syndromes with simple partial seizures, not intractable, without status epilepticus (CMS/HCC)    Sequelae, post-stroke    Other orders  -     divalproex (DEPAKOTE) 250 MG DR tablet; Take 1 tablet by mouth 3 (Three) Times a Day.        Treatments:  1.  Strokelike symptom-considering patient's presentation of diplopia upon wakening, left-sided facial droop, slurred speech, and inability to balance, it is suggested the patient experienced possible stroke.  This clinical suspicion is supported by postevent brain MRI, which suggests attenuation deficit and right pontine region, corresponding to right abducens nerve palsy witnessed on exam.  The most common cause of stroke is an embolus that occludes an artery in the brain. This usually arises from the carotid arteries or from the vertebral-basilar arteries. Another common cause is from a thrombus that has originated from the left atrium of the heart due to atrial fibrillation. Other reasons include excessive narrowing of large vessels resulting from an atherosclerotic plaque and increased blood viscosity caused by hypercoagulable state. Stroke is related to other medical conditions such as hypertension, heart disease (especially atrial fibrillation , migraine, hypercholesterolemia, and diabetes mellitus .  Risk factors for Stroke include  · Family history of stroke substantially increases risk.  · People 55 years or older are at higher risk.  · Males have a slightly higher risk of stroke than females but females are more likely to die from a stroke.  · High blood pressure  · Diabetes Mellitus  ·  Americans generally tend to have a high risk of dying from a stroke, chiefly due to high blood pressure and uncontrolled diabetes.  · Cigarette smoking  The mainstay of treatment following acute recovery from a stroke depends on the underlying cause. It is not always immediately possible to tell  the difference between a CVA and a TIA. A TIA can be considered as the last warning. The reason for the condition should be immediately examined by imaging of the brain.   · To reduce risk of recurrence, patients are recommended to undergo lifestyle changes including quitting smoking, losing weight, eating more fruits and vegetables and exercising regularly  · The use of anti-coagulant  medications, heparin  and warfarin, or anti-platelet medications such as aspirin may be warranted. The initial treatment is aspirin, second line is clopidogrel (PLAVIX), third line is ticlopidine. If stroke is recurrent after aspirin treatment, the combination of aspirin and dipyridamole is needed (Aggrenox).  2.  Obstructive sleep apnea-we are suspicious that patient is currently not adequately controlled on current obstructive sleep apnea treatment regimen.  We believe physiologic changes during past 2 years since diagnosis and initial treatment may mandate readjustment of therapy.  We recommend additional sleep study to determine present level obstructive sleep apnea, and subsequent titration to desired effect.  We're hopeful that better management of his obstructive sleep apnea may result in reduction of risk factors such as cerebrovascular accident and cardiovascular disease.  I have counseled patient extensively on Sleep Hygiene including regular sleep wake schedule and stimulus control therapy.  I have also discussed the importance of weight reduction because 10% reduction in body weight can reduce sleep apnea by 50 %. We have also discussed abstaining from smoking and drinking.  I have explained to patient that obstructive apnea episode is defined as the absence of airflow for at least 10 seconds.  Sleep apnea is usually accompanied by snoring, disturbed sleep, and daytime sleepiness. Patients with micrognathia, retrognathia, enlarged tonsils, tongue enlargement, and acromegaly are especially predisposed to obstructive sleep  apnea. Abnormalities or weakness in the muscles can also contribute to obstructive sleep apnea. Obesity can also contribute to sleep apnea.     Sleep apnea can lead to a number of complications, ranging from daytime sleepiness to possible increased risk of cardiovascular risks.   Daytime sleepiness is the most serious.  Daytime sleepiness can also increase the risk for accident-related injuries. Several studies have suggested that people with sleep apnea have two to three times as many car accidents, and five to seven times the risk for multiple accidents.   A number of cardiovascular diseases -- including high blood pressure, heart failure, stroke, and heart arrhythmias -- have an association with obstructive sleep apnea.   Up to a third of patients with heart failure also have sleep apnea. Both central and obstructive sleep apnea are linked with heart failure. Obstructive sleep apnea is also noted to be associated with type 2 diabetes according to Dr. Ahumada at [facility].  The best treatment for symptomatic obstructive sleep apnea is continuous positive airflow pressure (CPAP). Bilevel positive airway pressure (BPAP) systems may be particularly helpful for patients with coexisting lung disease and those with excessive levels of carbon dioxide.  Other treatment options including UPPP surgery, LAUP surgery, radiofrequency somnoplasty and dental appliances such Boling or Clearway.    3.  Epilepsy-believe the patient is currently control of epilepsy is not adequate, and readjustment of medication to achieve your epileptic events should be pursued.  Specifically, history of present illness consistent with partial focal seizure with corresponding prodrome and postictal state occurring seemingly every other day warrants treatment modification.  Suggest adding Depakote to treatment regimen and effort to better control symptoms.  Complex partial seizures are often preceded by an aura.  The seizure aura is a simple  partial seizure. The aura may manifest itself as a feeling of déjà vu, jamais vu, fear, euphora or depersonalization. The seizure aura might also occur as a visual disturbance, such as tunnel vision or macropsia or micropsia.  Once consciousness is impaired, the person may display automatisms such as lip smacking, chewing or swallowing. There may also be loss of memory amnesia of the seizure event.  The person may still be able to perform routine tasks such as walking, although such movements are not purposeful or planned. Witnesses may not recognize that anything is wrong.  Complex partial seizures might arise from any lobe of the brain. Complex partial seizures most commonly arise from the mesial temporal lobe, particularly the amygdala, hippocampus and neocortical regions. A common associated brain abnormality is mesial temporal sclerosis which can be evaluated by MRI of brain.  The abnormal electrical activity might spread to the rest of the brain and cause a secondary generalized tonic-clonic seizure.        This Document is signed by Rony Clifford MD, FAAN, FAASM July 17, 20188:34 PM

## 2018-07-19 ENCOUNTER — TELEPHONE (OUTPATIENT)
Dept: NEUROLOGY | Facility: CLINIC | Age: 63
End: 2018-07-19

## 2018-07-19 ENCOUNTER — OFFICE VISIT (OUTPATIENT)
Dept: INTERNAL MEDICINE | Facility: CLINIC | Age: 63
End: 2018-07-19

## 2018-07-19 ENCOUNTER — APPOINTMENT (OUTPATIENT)
Dept: LAB | Facility: HOSPITAL | Age: 63
End: 2018-07-19
Attending: INTERNAL MEDICINE

## 2018-07-19 VITALS
DIASTOLIC BLOOD PRESSURE: 93 MMHG | OXYGEN SATURATION: 99 % | HEART RATE: 56 BPM | TEMPERATURE: 98 F | SYSTOLIC BLOOD PRESSURE: 148 MMHG | WEIGHT: 309 LBS | BODY MASS INDEX: 37.63 KG/M2 | RESPIRATION RATE: 16 BRPM | HEIGHT: 76 IN

## 2018-07-19 DIAGNOSIS — I15.0 RENOVASCULAR HYPERTENSION: Primary | ICD-10-CM

## 2018-07-19 DIAGNOSIS — E87.5 HYPERKALEMIA: Primary | ICD-10-CM

## 2018-07-19 DIAGNOSIS — E87.6 HYPOKALEMIA: ICD-10-CM

## 2018-07-19 DIAGNOSIS — I10 MALIGNANT HYPERTENSION: ICD-10-CM

## 2018-07-19 DIAGNOSIS — G47.33 OSA ON CPAP: ICD-10-CM

## 2018-07-19 DIAGNOSIS — Z99.89 OSA ON CPAP: ICD-10-CM

## 2018-07-19 DIAGNOSIS — G47.33 OSA (OBSTRUCTIVE SLEEP APNEA): Primary | ICD-10-CM

## 2018-07-19 LAB
ANION GAP SERPL CALCULATED.3IONS-SCNC: 15.9 MMOL/L (ref 10–20)
BUN BLD-MCNC: 21 MG/DL (ref 7–20)
BUN/CREAT SERPL: 12.4 (ref 6.3–21.9)
CALCIUM SPEC-SCNC: 10 MG/DL (ref 8.4–10.2)
CHLORIDE SERPL-SCNC: 103 MMOL/L (ref 98–107)
CO2 SERPL-SCNC: 28 MMOL/L (ref 26–30)
CREAT BLD-MCNC: 1.7 MG/DL (ref 0.6–1.3)
GFR SERPL CREATININE-BSD FRML MDRD: 41 ML/MIN/1.73
GLUCOSE BLD-MCNC: 66 MG/DL (ref 74–98)
MAGNESIUM SERPL-MCNC: 2.1 MG/DL (ref 1.6–2.3)
POTASSIUM BLD-SCNC: 4.9 MMOL/L (ref 3.5–5.1)
SODIUM BLD-SCNC: 142 MMOL/L (ref 137–145)

## 2018-07-19 PROCEDURE — 83735 ASSAY OF MAGNESIUM: CPT | Performed by: INTERNAL MEDICINE

## 2018-07-19 PROCEDURE — 99214 OFFICE O/P EST MOD 30 MIN: CPT | Performed by: INTERNAL MEDICINE

## 2018-07-19 PROCEDURE — 36415 COLL VENOUS BLD VENIPUNCTURE: CPT | Performed by: INTERNAL MEDICINE

## 2018-07-19 PROCEDURE — 80048 BASIC METABOLIC PNL TOTAL CA: CPT | Performed by: INTERNAL MEDICINE

## 2018-07-19 RX ORDER — LOSARTAN POTASSIUM 100 MG/1
100 TABLET ORAL DAILY
Qty: 30 TABLET | Refills: 11 | Status: SHIPPED | OUTPATIENT
Start: 2018-07-19 | End: 2019-03-11 | Stop reason: SDUPTHER

## 2018-07-19 NOTE — TELEPHONE ENCOUNTER
Pt called concerning sleep study per the note one was to be ordered but there is not a referral .  Please have Dr. Clifford enter this referral.  If he feels it is necessary, patient would like to schedule ASAP.

## 2018-07-19 NOTE — PROGRESS NOTES
Subjective     Patient ID: Andre Agosto is a 63 y.o. male. Patient is here for management of multiple medical problems.     Chief Complaint   Patient presents with   • Hypokalemia     follow-up   • Blurred Vision     follow-up, patient states the blurred vision is not any better, the right side of his face is throbbing x 2 days   • Sleep Apnea     patient to have another sleep study done per Dr. Clifford   • Hypertension     patient states he has been having elevated blood pressures this past week     Sleep Apnea   Associated symptoms include fatigue.   Hypertension   Identifiable causes of hypertension include sleep apnea.        Pt seen Dr Clifford.   Needs a titration study.  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    Still with double vision.    Pt is off potasium supplement.      The following portions of the patient's history were reviewed and updated as appropriate: allergies, current medications, past family history, past medical history, past social history, past surgical history and problem list.    Review of Systems   Constitutional: Positive for fatigue.   Eyes: Positive for visual disturbance. Negative for pain, discharge and itching.   Psychiatric/Behavioral: Positive for sleep disturbance.   All other systems reviewed and are negative.      Current Outpatient Prescriptions:   •  Alcohol Swabs (ALCOHOL PREP) pads, 1 pad 4 (Four) Times a Day As Needed (bs)., Disp: 120 each, Rfl: 12  •  aspirin 81 MG EC tablet, Take 1 tablet by mouth daily., Disp: 90 tablet, Rfl: 3  •  BYSTOLIC 20 MG tablet, Take 1 tablet by mouth Daily., Disp: 90 tablet, Rfl: 3  •  clopidogrel (PLAVIX) 75 MG tablet, TAKE 1 TABLET DAILY, Disp: 90 tablet, Rfl: 3  •  divalproex (DEPAKOTE) 250 MG DR tablet, Take 1 tablet by mouth 3 (Three) Times a Day., Disp: 90 tablet, Rfl: 2  •  escitalopram (LEXAPRO) 20 MG tablet, TAKE 1 TABLET DAILY, Disp: 90 tablet, Rfl: 2  •  glipiZIDE (GLUCOTROL) 5 MG tablet, Take 1 tablet by mouth 2 (Two)  "Times a Day Before Meals., Disp: 180 tablet, Rfl: 3  •  glucose blood test strip, Use one to two times daily to check blood sugar, Disp: 100 each, Rfl: 12  •  glucose monitor monitoring kit, 1 each As Needed (as directed). Diagnosis Code E11.9, Disp: 1 each, Rfl: 0  •  Lancets (FREESTYLE) lancets, 1 each by Other route As Needed (bs)., Disp: 120 each, Rfl: 12  •  lovastatin (MEVACOR) 20 MG tablet, Take 0.5 tablets by mouth every night. (Patient taking differently: Take 20 mg by mouth Every Night.), Disp: 30 tablet, Rfl: 11  •  Magnesium Oxide 400 (240 MG) MG tablet, Take 1 tablet by mouth daily., Disp: 30 tablet, Rfl: 11  •  NIFEdipine XL (PROCARDIA XL) 60 MG 24 hr tablet, Take 1 tablet by mouth 2 (Two) Times a Day., Disp: 60 tablet, Rfl: 5  •  pantoprazole (PROTONIX) 40 MG EC tablet, Take 40 mg by mouth Daily., Disp: , Rfl:   •  potassium chloride ER (K-TAB) 20 MEQ tablet controlled-release ER tablet, Take 1 tablet by mouth 3 (Three) Times a Day., Disp: 90 tablet, Rfl: 11  •  spironolactone (ALDACTONE) 50 MG tablet, Take 1 tablet by mouth Daily., Disp: 30 tablet, Rfl: 5  •  zonisamide (ZONEGRAN) 100 MG capsule, TAKE 1 CAPSULE THREE TIMES A DAY, Disp: 270 capsule, Rfl: 3  •  losartan (COZAAR) 100 MG tablet, Take 1 tablet by mouth Daily., Disp: 30 tablet, Rfl: 11    Objective      Blood pressure 148/93, pulse 56, temperature 98 °F (36.7 °C), temperature source Oral, resp. rate 16, height 193 cm (76\"), weight (!) 140 kg (309 lb), SpO2 99 %.    Physical Exam     General Appearance:    Alert, cooperative, no distress, appears stated age   Head:    Normocephalic, without obvious abnormality, atraumatic   Eyes:    lr still impaired.     Ears:    Normal TM's and external ear canals, both ears   Nose:   Nares normal, septum midline, mucosa normal, no drainage   or sinus tenderness   Throat:   Lips, mucosa, and tongue normal; teeth and gums normal   Neck:   Supple, symmetrical, trachea midline, no adenopathy;        thyroid: "  No enlargement/tenderness/nodules; no carotid    bruit or JVD   Back:     Symmetric, no curvature, ROM normal, no CVA tenderness   Lungs:     Clear to auscultation bilaterally, respirations unlabored   Chest wall:    No tenderness or deformity   Heart:    Regular rate and rhythm, S1 and S2 normal, no murmur,        rub or gallop   Abdomen:     Soft, non-tender, bowel sounds active all four quadrants,     no masses, no organomegaly   Extremities:   Extremities normal, atraumatic, no cyanosis or edema   Pulses:   2+ and symmetric all extremities   Skin:   Skin color, texture, turgor normal, no rashes or lesions   Lymph nodes:   Cervical, supraclavicular, and axillary nodes normal   Neurologic:   CNII-XII intact. Normal strength, sensation and reflexes       throughout      Results for orders placed or performed in visit on 04/11/18   TSH   Result Value Ref Range    TSH 3.030 0.470 - 4.680 mIU/mL   T4, Free   Result Value Ref Range    Free T4 0.80 0.78 - 2.19 ng/dL   Vitamin B12   Result Value Ref Range    Vitamin B-12 406 239 - 931 pg/mL   Lipid Panel   Result Value Ref Range    Total Cholesterol 272 (H) 0 - 199 mg/dL    Triglycerides 416 (H) <150 mg/dL    HDL Cholesterol 33 (L) 40 - 60 mg/dL    VLDL Cholesterol CANCELED mg/dL    LDL Cholesterol  CANCELED mg/dL   Comprehensive Metabolic Panel   Result Value Ref Range    Glucose 115 (H) 74 - 98 mg/dL    BUN 20 7 - 20 mg/dL    Creatinine 1.60 (H) 0.60 - 1.30 mg/dL    eGFR Non African Am 44 (L) >60 mL/min/1.73    eGFR African Am 53 (L) >60 mL/min/1.73    BUN/Creatinine Ratio 12.5 6.3 - 21.9    Sodium 142 137 - 145 mmol/L    Potassium 3.1 (L) 3.5 - 5.1 mmol/L    Chloride 96 (L) 98 - 107 mmol/L    Total CO2 34.0 (H) 26.0 - 30.0 mmol/L    Calcium 9.6 8.4 - 10.2 mg/dL    Total Protein 7.6 6.3 - 8.2 g/dL    Albumin 4.20 3.50 - 5.00 g/dL    Globulin 3.4 gm/dL    A/G Ratio 1.2 1.0 - 2.0 g/dL    Total Bilirubin 0.5 0.2 - 1.3 mg/dL    Alkaline Phosphatase 65 38 - 126 U/L    AST  (SGOT) 22 15 - 46 U/L    ALT (SGPT) 29 13 - 69 U/L   Hemoglobin A1c   Result Value Ref Range    Hemoglobin A1C 6.40 %   MicroAlbumin, Urine, Random - Urine, Clean Catch   Result Value Ref Range    Microalbumin, Urine 603.3 Not Estab. ug/mL   CBC & Differential   Result Value Ref Range    WBC 8.98 4.80 - 10.80 10*3/mm3    RBC 4.78 4.70 - 6.10 10*6/mm3    Hemoglobin 14.3 14.0 - 18.0 g/dL    Hematocrit 42.1 42.0 - 52.0 %    MCV 88.1 80.0 - 94.0 fL    MCH 29.9 27.0 - 31.0 pg    MCHC 34.0 30.0 - 37.0 g/dL    RDW 14.0 11.5 - 14.5 %    Platelets 227 130 - 400 10*3/mm3    Neutrophil Rel % 62.1 37.0 - 80.0 %    Lymphocyte Rel % 27.2 10.0 - 50.0 %    Monocyte Rel % 6.0 0.0 - 12.0 %    Eosinophil Rel % 3.7 0.0 - 7.0 %    Basophil Rel % 0.8 0.0 - 2.5 %    Neutrophils Absolute 5.58 2.00 - 6.90 10*3/mm3    Lymphocytes Absolute 2.44 0.60 - 3.40 10*3/mm3    Monocytes Absolute 0.54 0.00 - 0.90 10*3/mm3    Eosinophils Absolute 0.33 0.00 - 0.70 10*3/mm3    Basophils Absolute 0.07 0.00 - 0.20 10*3/mm3    Immature Granulocyte Rel % 0.2 0.0 - 0.6 %    Immature Grans Absolute 0.02 0.00 - 0.06 10*3/mm3    nRBC 0.0 0.0 - 0.0 /100 WBC         Assessment/Plan       Andre was seen today for hypokalemia, blurred vision, sleep apnea and hypertension.    Diagnoses and all orders for this visit:    Renovascular hypertension  -     Ambulatory Referral to Nephrology  -     Cortisol, Urine, Free 24Hr - Urine, Clean Catch  -     Metanephrines, Urine, 24 Hour - Urine, Clean Catch  -     Catecholamine+VMA, 24-Hr Urine - Urine, Clean Catch  -     Duplex Renal Artery - Bilateral Complete CAR; Future  -     Basic Metabolic Panel    Malignant hypertension  -     Ambulatory Referral to Nephrology  -     Cortisol, Urine, Free 24Hr - Urine, Clean Catch  -     Metanephrines, Urine, 24 Hour - Urine, Clean Catch  -     Catecholamine+VMA, 24-Hr Urine - Urine, Clean Catch  -     Duplex Renal Artery - Bilateral Complete CAR; Future  -     Basic Metabolic Panel  -      Magnesium  -     Ambulatory Referral to Cardiology    ALEX on CPAP    Hypokalemia  -     Ambulatory Referral to Nephrology  -     Basic Metabolic Panel  -     Magnesium    Other orders  -     losartan (COZAAR) 100 MG tablet; Take 1 tablet by mouth Daily.      Return in about 1 week (around 7/26/2018).          There are no Patient Instructions on file for this visit.     Keven Bear MD    Assessment/Plan

## 2018-07-23 ENCOUNTER — APPOINTMENT (OUTPATIENT)
Dept: LAB | Facility: HOSPITAL | Age: 63
End: 2018-07-23
Attending: INTERNAL MEDICINE

## 2018-07-23 LAB
ANION GAP SERPL CALCULATED.3IONS-SCNC: 14.2 MMOL/L (ref 10–20)
BUN BLD-MCNC: 16 MG/DL (ref 7–20)
BUN/CREAT SERPL: 10 (ref 6.3–21.9)
CALCIUM SPEC-SCNC: 9.8 MG/DL (ref 8.4–10.2)
CHLORIDE SERPL-SCNC: 102 MMOL/L (ref 98–107)
CO2 SERPL-SCNC: 31 MMOL/L (ref 26–30)
CREAT BLD-MCNC: 1.6 MG/DL (ref 0.6–1.3)
GFR SERPL CREATININE-BSD FRML MDRD: 44 ML/MIN/1.73
GLUCOSE BLD-MCNC: 88 MG/DL (ref 74–98)
POTASSIUM BLD-SCNC: 4.2 MMOL/L (ref 3.5–5.1)
SODIUM BLD-SCNC: 143 MMOL/L (ref 137–145)

## 2018-07-23 PROCEDURE — 84585 ASSAY OF URINE VMA: CPT | Performed by: INTERNAL MEDICINE

## 2018-07-23 PROCEDURE — 81050 URINALYSIS VOLUME MEASURE: CPT | Performed by: INTERNAL MEDICINE

## 2018-07-23 PROCEDURE — 80048 BASIC METABOLIC PNL TOTAL CA: CPT | Performed by: INTERNAL MEDICINE

## 2018-07-23 PROCEDURE — 36415 COLL VENOUS BLD VENIPUNCTURE: CPT | Performed by: INTERNAL MEDICINE

## 2018-07-23 PROCEDURE — 82530 CORTISOL FREE: CPT | Performed by: INTERNAL MEDICINE

## 2018-07-23 PROCEDURE — 83835 ASSAY OF METANEPHRINES: CPT | Performed by: INTERNAL MEDICINE

## 2018-07-23 PROCEDURE — 82384 ASSAY THREE CATECHOLAMINES: CPT | Performed by: INTERNAL MEDICINE

## 2018-07-23 NOTE — TELEPHONE ENCOUNTER
PT STOPPED BY OFFICE TO CHECK ON SLEEP STUDY TO SEE IF AN ORDER HAD BEEN PUT INTO SYSTEM, SO HE COULD SCHEDULE THIS STUDY.

## 2018-07-27 ENCOUNTER — HOSPITAL ENCOUNTER (OUTPATIENT)
Dept: CARDIOLOGY | Facility: HOSPITAL | Age: 63
Discharge: HOME | End: 2018-07-27
Admitting: INTERNAL MEDICINE

## 2018-07-27 DIAGNOSIS — I15.0 RENOVASCULAR HYPERTENSION: ICD-10-CM

## 2018-07-27 DIAGNOSIS — I10 MALIGNANT HYPERTENSION: ICD-10-CM

## 2018-07-27 LAB
BH CV ECHO MEAS - DIST REN A EDV LEFT: 13.6 CM/SEC
BH CV ECHO MEAS - DIST REN A PSV LEFT: 45.6 CM/SEC
BH CV ECHO MEAS - DIST REN A RI LEFT: 0.7
BH CV ECHO MEAS - MID REN A EDV LEFT: 13.2 CM/SEC
BH CV ECHO MEAS - MID REN A PSV LEFT: 46.5 CM/SEC
BH CV ECHO MEAS - MID REN A RI LEFT: 0.72
BH CV ECHO MEAS - PROX REN A EDV LEFT: 17.1 CM/SEC
BH CV ECHO MEAS - PROX REN A PSV LEFT: 64.2 CM/SEC
BH CV ECHO MEAS - PROX REN A RI LEFT: 0.73
BH CV VAS BP RIGHT ARM: NORMAL MMHG
BH CV VAS RENAL AORTIC MID PSV: 117 CM/S
BH CV VAS RENAL HILUM LEFT PSV: 102 CM/S
BH CV VAS RENAL HILUM RIGHT PSV: 60 CM/S
BH CV XCLRA SUP ARC RI LEFT: 0.67
BH CV XLRA MEAS - KID L LEFT: 14.4 CM
BH CV XLRA MEAS - RENAL A ORG RI LEFT: 0.68
BH CV XLRA MEAS - SUP ARC PSV LEFT: 20.7 CM/SEC
BH CV XLRA MEAS - SUP REN AO PSV: 118.3 CM/SEC
BH CV XLRA MEAS - SUP SEG EDV LEFT: 13.6 CM/SEC
BH CV XLRA MEAS - SUP SEG PSV LEFT: 51.8 CM/SEC
BH CV XLRA MEAS - SUP SEG RI LEFT: 0.74
BH CV XLRA MEAS DIST REN A EDV RIGHT: 14.2 CM/SEC
BH CV XLRA MEAS DIST REN A PSV RIGHT: 40.1 CM/SEC
BH CV XLRA MEAS DIST REN A RI RIGHT: 0.65
BH CV XLRA MEAS INF ARC EDV LEFT: 12.2 CM/SEC
BH CV XLRA MEAS INF ARC EDV RIGHT: 7.9 CM/SEC
BH CV XLRA MEAS INF ARC PSV LEFT: 23.2 CM/SEC
BH CV XLRA MEAS INF ARC PSV RIGHT: 18 CM/SEC
BH CV XLRA MEAS INF ARC RI LEFT: 0.47
BH CV XLRA MEAS INF ARC RI RIGHT: 0.56
BH CV XLRA MEAS INF SEG EDV LEFT: 15.3 CM/SEC
BH CV XLRA MEAS INF SEG EDV RIGHT: 10.1 CM/SEC
BH CV XLRA MEAS INF SEG PSV LEFT: 36.1 CM/SEC
BH CV XLRA MEAS INF SEG PSV RIGHT: 29 CM/SEC
BH CV XLRA MEAS INF SEG RI LEFT: 0.58
BH CV XLRA MEAS INF SEG RI RIGHT: 0.65
BH CV XLRA MEAS KID L RIGHT: 14.2 CM
BH CV XLRA MEAS MID REN A EDV RIGHT: 15 CM/SEC
BH CV XLRA MEAS MID REN A PSV RIGHT: 31.8 CM/SEC
BH CV XLRA MEAS MID REN A RI RIGHT: 0.53
BH CV XLRA MEAS PROX REN A EDV RIGHT: 24.3 CM/SEC
BH CV XLRA MEAS PROX REN A PSV RIGHT: 98.7 CM/SEC
BH CV XLRA MEAS PROX REN A RI RIGHT: 0.75
BH CV XLRA MEAS RAR LEFT: 0.54
BH CV XLRA MEAS RAR RIGHT: 1.02
BH CV XLRA MEAS RENAL A ORG EDV LEFT: 9.7 CM/SEC
BH CV XLRA MEAS RENAL A ORG EDV RIGHT: 30.4 CM/SEC
BH CV XLRA MEAS RENAL A ORG PSV LEFT: 30.3 CM/SEC
BH CV XLRA MEAS RENAL A ORG PSV RIGHT: 120.9 CM/SEC
BH CV XLRA MEAS RENAL A ORG RI RIGHT: 0.75
BH CV XLRA MEAS SUP ARC EDV RIGHT: 3.6 CM/SEC
BH CV XLRA MEAS SUP ARC PSV RIGHT: 11.4 CM/SEC
BH CV XLRA MEAS SUP ARC RI RIGHT: 0.69
BH CV XLRA MEAS SUP SEG EDV RIGHT: 14.8 CM/SEC
BH CV XLRA MEAS SUP SEG PSV RIGHT: 42 CM/SEC
BH CV XLRA MEAS SUP SEG RI RIGHT: 0.65
BH CV XLRA SUP ARC EDV LEFT: 6.8 CM/SEC
CORTIS F 24H UR-MRATE: 27 UG/24 HR (ref 0–50)
CORTIS F UR-MCNC: 10 UG/L
LEFT RENAL UPPER PARENCHYMA MAX: 29.9 CM/S
LEFT RENAL UPPER PARENCHYMA MIN: 13.4 CM/S
LEFT RENAL UPPER PARENCHYMA RI: 0.55
RIGHT RENAL UPPER PARENCHYMA MAX: 29 CM/S
RIGHT RENAL UPPER PARENCHYMA MIN: 10 CM/S
RIGHT RENAL UPPER PARENCHYMA RI: 0.66

## 2018-07-27 PROCEDURE — 93975 VASCULAR STUDY: CPT

## 2018-07-28 LAB
DOPAMINE 24H UR-MRATE: 335 UG/24 HR (ref 0–510)
DOPAMINE UR-MCNC: 126 UG/L
EPINEPH 24H UR-MRATE: 3 UG/24 HR (ref 0–20)
EPINEPH UR-MCNC: 1 UG/L
NOREPINEPH 24H UR-MRATE: 40 UG/24 HR (ref 0–135)
NOREPINEPH UR-MCNC: 15 UG/L
VMA 24H UR-MRATE: 3.2 MG/24 HR (ref 0–7.5)
VMA UR-MCNC: 1.2 MG/L

## 2018-07-30 ENCOUNTER — OFFICE VISIT (OUTPATIENT)
Dept: INTERNAL MEDICINE | Facility: CLINIC | Age: 63
End: 2018-07-30

## 2018-07-30 VITALS
TEMPERATURE: 98.3 F | HEIGHT: 75 IN | BODY MASS INDEX: 38.92 KG/M2 | RESPIRATION RATE: 16 BRPM | DIASTOLIC BLOOD PRESSURE: 70 MMHG | OXYGEN SATURATION: 98 % | SYSTOLIC BLOOD PRESSURE: 140 MMHG | HEART RATE: 58 BPM | WEIGHT: 313 LBS

## 2018-07-30 DIAGNOSIS — Z99.89 BIPAP (BIPHASIC POSITIVE AIRWAY PRESSURE) DEPENDENCE: ICD-10-CM

## 2018-07-30 DIAGNOSIS — I10 MALIGNANT HYPERTENSION: ICD-10-CM

## 2018-07-30 DIAGNOSIS — I15.0 RENOVASCULAR HYPERTENSION: Primary | ICD-10-CM

## 2018-07-30 PROCEDURE — 99214 OFFICE O/P EST MOD 30 MIN: CPT | Performed by: INTERNAL MEDICINE

## 2018-07-30 NOTE — PROGRESS NOTES
Subjective     Patient ID: Andre Agosto is a 63 y.o. male. Patient is here for management of multiple medical problems.     Chief Complaint   Patient presents with   • Renovascular Hypertension     10 day  follow-up   • Hypertension     patient having elevated blood pressures   • CPAP     patient would like to have CPAP machine checked today, patient has been having issues.     Hypertension   This is a chronic problem. The problem is uncontrolled. Pertinent negatives include no anxiety, blurred vision, chest pain, headaches, palpitations or shortness of breath. Past treatments include angiotensin blockers. Current antihypertension treatment includes beta blockers, angiotensin blockers and calcium channel blockers. There are no compliance problems.  There is no history of angina, kidney disease or CAD/MI.        Pt back on spironolactone 2 pills a day.  bp Is elevated.  Procardia. 60 mg a day.  Eating a lot of tomatoes.            The following portions of the patient's history were reviewed and updated as appropriate: allergies, current medications, past family history, past medical history, past social history, past surgical history and problem list.    Review of Systems   Constitutional: Positive for fatigue.   Eyes: Negative for blurred vision.   Respiratory: Negative for shortness of breath and wheezing.    Cardiovascular: Negative for chest pain and palpitations.   Neurological: Negative for headaches.   Psychiatric/Behavioral: Negative for sleep disturbance.       Current Outpatient Prescriptions:   •  Alcohol Swabs (ALCOHOL PREP) pads, 1 pad 4 (Four) Times a Day As Needed (bs)., Disp: 120 each, Rfl: 12  •  aspirin 81 MG EC tablet, Take 1 tablet by mouth daily., Disp: 90 tablet, Rfl: 3  •  BYSTOLIC 20 MG tablet, Take 1 tablet by mouth Daily., Disp: 90 tablet, Rfl: 3  •  clopidogrel (PLAVIX) 75 MG tablet, TAKE 1 TABLET DAILY, Disp: 90 tablet, Rfl: 3  •  divalproex (DEPAKOTE) 250 MG DR tablet, Take 1  "tablet by mouth 3 (Three) Times a Day., Disp: 90 tablet, Rfl: 2  •  escitalopram (LEXAPRO) 20 MG tablet, TAKE 1 TABLET DAILY, Disp: 90 tablet, Rfl: 2  •  glipiZIDE (GLUCOTROL) 5 MG tablet, Take 1 tablet by mouth 2 (Two) Times a Day Before Meals., Disp: 180 tablet, Rfl: 3  •  glucose blood test strip, Use one to two times daily to check blood sugar, Disp: 100 each, Rfl: 12  •  glucose monitor monitoring kit, 1 each As Needed (as directed). Diagnosis Code E11.9, Disp: 1 each, Rfl: 0  •  Lancets (FREESTYLE) lancets, 1 each by Other route As Needed (bs)., Disp: 120 each, Rfl: 12  •  losartan (COZAAR) 100 MG tablet, Take 1 tablet by mouth Daily., Disp: 30 tablet, Rfl: 11  •  lovastatin (MEVACOR) 20 MG tablet, Take 0.5 tablets by mouth every night. (Patient taking differently: Take 20 mg by mouth Every Night.), Disp: 30 tablet, Rfl: 11  •  Magnesium Oxide 400 (240 MG) MG tablet, Take 1 tablet by mouth daily., Disp: 30 tablet, Rfl: 11  •  NIFEdipine XL (PROCARDIA XL) 60 MG 24 hr tablet, Take 1 tablet by mouth 2 (Two) Times a Day. (Patient taking differently: Take 60 mg by mouth Daily.), Disp: 60 tablet, Rfl: 5  •  pantoprazole (PROTONIX) 40 MG EC tablet, Take 40 mg by mouth Daily., Disp: , Rfl:   •  spironolactone (ALDACTONE) 50 MG tablet, Take 1 tablet by mouth Daily. (Patient taking differently: Take 50 mg by mouth 2 (Two) Times a Day.), Disp: 30 tablet, Rfl: 5  •  zonisamide (ZONEGRAN) 100 MG capsule, TAKE 1 CAPSULE THREE TIMES A DAY, Disp: 270 capsule, Rfl: 3    Objective      Blood pressure 140/70, pulse 58, temperature 98.3 °F (36.8 °C), temperature source Oral, resp. rate 16, height 190.5 cm (75\"), weight (!) 142 kg (313 lb), SpO2 98 %.    Physical Exam     General Appearance:    Alert, cooperative, no distress, appears stated age   Head:    Normocephalic, without obvious abnormality, atraumatic   Eyes:    PERRL, conjunctiva/corneas clear, EOM's intact   Ears:    Normal TM's and external ear canals, both ears "   Nose:   Nares normal, septum midline, mucosa normal, no drainage   or sinus tenderness   Throat:   Lips, mucosa, and tongue normal; teeth and gums normal   Neck:   Supple, symmetrical, trachea midline, no adenopathy;        thyroid:  No enlargement/tenderness/nodules; no carotid    bruit or JVD   Back:     Symmetric, no curvature, ROM normal, no CVA tenderness   Lungs:     Clear to auscultation bilaterally, respirations unlabored   Chest wall:    No tenderness or deformity   Heart:    Regular rate and rhythm, S1 and S2 normal, no murmur,        rub or gallop   Abdomen:     Soft, non-tender, bowel sounds active all four quadrants,     no masses, no organomegaly   Extremities:   Extremities normal, atraumatic, no cyanosis or edema   Pulses:   2+ and symmetric all extremities   Skin:   Skin color, texture, turgor normal, no rashes or lesions   Lymph nodes:   Cervical, supraclavicular, and axillary nodes normal   Neurologic:   CNII-XII intact. Normal strength, sensation and reflexes       throughout      Results for orders placed or performed during the hospital encounter of 07/27/18   Duplex Renal Artery - Bilateral Complete CAR   Result Value Ref Range    BH CV XLRA EARL - SUP YANA AO .3 cm/sec    RENAL A ORG PSV LEFT 30.3 cm/sec    RENAL A ORG PSV RIGHT 120.9 cm/sec    RENAL A ORG EDV LEFT 9.7 cm/sec    RENAL A ORG EDV RIGHT 30.4 cm/sec    RENAL A ORG RI LEFT 0.68     RENAL A ORG RI RIGHT 0.75     PROX YANA A PSV LEFT 64.2 cm/sec    PROX YANA A PSV RIGHT 98.7 cm/sec    PROX YANA A EDV LEFT 17.1 cm/sec    PROX YANA A EDV RIGHT 24.3 cm/sec    PROX YANA A RI LEFT 0.73     PROX YANA A RI RIGHT 0.75     MID YANA A PSV LEFT 46.5 cm/sec    MID YANA A PSV RIGHT 31.8 cm/sec    MID YANA A EDV LEFT 13.2 cm/sec    MID YANA A EDV RIGHT 15.0 cm/sec    MID YANA A RI LEFT 0.72     MID YANA A RI RIGHT 0.53     DIST YANA A PSV LEFT 45.6 cm/sec    DIST YANA A PSV RIGHT 40.1 cm/sec    DIST YANA A EDV LEFT 13.6 cm/sec    DIST YANA A EDV RIGHT  14.2 cm/sec    DIST YANA A RI LEFT 0.7     DIST YANA A RI RIGHT 0.65     SUP SEG PSV LEFT 51.8 cm/sec    SUP SEG PSV RIGHT 42.0 cm/sec    SUP SEG EDV LEFT 13.6 cm/sec    SUP SEG EDV RIGHT 14.8 cm/sec    BH CV XLRA EARL - SUP SEG RI LEFT 0.74     SUP SEG RI RIGHT 0.65     BH CV XLRA EARL - SUP ARC PSV LEFT 20.7 cm/sec    SUP ARC PSV RIGHT 11.4 cm/sec    SUP ARC EDV LEFT 6.8 cm/sec    SUP ARC EDV RIGHT 3.6 cm/sec    SUP ARC RI LEFT 0.67     SUP ARC RI RIGHT 0.69     INF SEG PSV LEFT 36.1 cm/sec    INF SEG PSV RIGHT 29.0 cm/sec    INF SEG EDV LEFT 15.3 cm/sec    INF SEG EDV RIGHT 10.1 cm/sec    INF SEG RI LEFT 0.58     INF SEG RI RIGHT 0.65     INF ARC PSV LEFT 23.2 cm/sec    INF ARC PSV RIGHT 18.0 cm/sec    INF ARC EDV LEFT 12.2 cm/sec    INF ARC EDV RIGHT 7.9 cm/sec    BH CV XLRA EARL INF ARC RI LEFT 0.47     INF ARC RI RIGHT 0.56     Kid L 14.4 cm    BH CV VAS BP RIGHT /96 mmHg    KID L RIGHT 14.2 cm    RAR RIGHT 1.02     Right renal upper parenchyma max 29 cm/s    Right renal upper parenchyma min 10 cm/s    Left renal upper parenchyma max 29.9 cm/s    Left renal upper parenchyma min 13.4 cm/s    Aortic Mid  cm/s    Hilum Right PSV 60 cm/s    Hilum Left  cm/s    RAR LEFT 0.54     Right renal upper parenchyma RI 0.66     Left renal upper parenchyma RI 0.55          Assessment/Plan   Pt on cpap. Brought cpap in.   Getting repeat [facility] eval next week.    Tolerating bp meds well.    Pt at the lowerest tolerated bp.         Andre was seen today for renovascular hypertension, hypertension and cpap.    Diagnoses and all orders for this visit:    Renovascular hypertension  -     Comprehensive Metabolic Panel  -     CBC & Differential    BiPAP (biphasic positive airway pressure) dependence  -     Comprehensive Metabolic Panel  -     CBC & Differential  -     Lipid Panel    Malignant hypertension  -     T4, Free  -     TSH  -     Comprehensive Metabolic Panel  -     CBC & Differential  -     Lipid  Panel      Return in about 3 months (around 10/30/2018).          There are no Patient Instructions on file for this visit.     Keven Bear MD    Assessment/Plan

## 2018-08-13 RX ORDER — ESCITALOPRAM OXALATE 20 MG/1
TABLET ORAL
Qty: 90 TABLET | Refills: 2 | Status: SHIPPED | OUTPATIENT
Start: 2018-08-13 | End: 2019-08-09 | Stop reason: SDUPTHER

## 2018-08-14 ENCOUNTER — HOSPITAL ENCOUNTER (OUTPATIENT)
Dept: SLEEP MEDICINE | Facility: HOSPITAL | Age: 63
Setting detail: THERAPIES SERIES
End: 2018-08-14
Attending: PSYCHIATRY & NEUROLOGY

## 2018-08-14 ENCOUNTER — OFFICE VISIT (OUTPATIENT)
Dept: INTERNAL MEDICINE | Facility: CLINIC | Age: 63
End: 2018-08-14

## 2018-08-14 ENCOUNTER — APPOINTMENT (OUTPATIENT)
Dept: LAB | Facility: HOSPITAL | Age: 63
End: 2018-08-14
Attending: INTERNAL MEDICINE

## 2018-08-14 VITALS
BODY MASS INDEX: 39.17 KG/M2 | DIASTOLIC BLOOD PRESSURE: 81 MMHG | WEIGHT: 315 LBS | HEART RATE: 56 BPM | RESPIRATION RATE: 16 BRPM | HEIGHT: 75 IN | OXYGEN SATURATION: 98 % | SYSTOLIC BLOOD PRESSURE: 157 MMHG | TEMPERATURE: 98.3 F

## 2018-08-14 DIAGNOSIS — G47.30 SLEEP APNEA, UNSPECIFIED TYPE: Primary | ICD-10-CM

## 2018-08-14 DIAGNOSIS — I15.0 RENOVASCULAR HYPERTENSION: ICD-10-CM

## 2018-08-14 DIAGNOSIS — G47.33 OSA (OBSTRUCTIVE SLEEP APNEA): ICD-10-CM

## 2018-08-14 DIAGNOSIS — E87.6 HYPOKALEMIA: ICD-10-CM

## 2018-08-14 PROBLEM — N15.8 GITELMAN SYNDROME: Status: RESOLVED | Noted: 2018-07-12 | Resolved: 2018-08-14

## 2018-08-14 PROBLEM — E83.42 GITELMAN SYNDROME: Status: RESOLVED | Noted: 2018-07-12 | Resolved: 2018-08-14

## 2018-08-14 LAB
ALBUMIN SERPL-MCNC: 4.5 G/DL (ref 3.5–5)
ALBUMIN/GLOB SERPL: 0.9 G/DL (ref 1–2)
ALP SERPL-CCNC: 54 U/L (ref 38–126)
ALT SERPL W P-5'-P-CCNC: 32 U/L (ref 13–69)
ANION GAP SERPL CALCULATED.3IONS-SCNC: 13.6 MMOL/L (ref 10–20)
AST SERPL-CCNC: 28 U/L (ref 15–46)
BASOPHILS # BLD AUTO: 0.1 10*3/MM3 (ref 0–0.2)
BASOPHILS NFR BLD AUTO: 0.9 % (ref 0–2.5)
BILIRUB SERPL-MCNC: 0.9 MG/DL (ref 0.2–1.3)
BUN BLD-MCNC: 24 MG/DL (ref 7–20)
BUN/CREAT SERPL: 15 (ref 6.3–21.9)
CALCIUM SPEC-SCNC: 9.7 MG/DL (ref 8.4–10.2)
CHLORIDE SERPL-SCNC: 103 MMOL/L (ref 98–107)
CHOLEST SERPL-MCNC: 178 MG/DL (ref 0–199)
CO2 SERPL-SCNC: 30 MMOL/L (ref 26–30)
CREAT BLD-MCNC: 1.6 MG/DL (ref 0.6–1.3)
DEPRECATED RDW RBC AUTO: 46.6 FL (ref 37–54)
EOSINOPHIL # BLD AUTO: 0.36 10*3/MM3 (ref 0–0.7)
EOSINOPHIL NFR BLD AUTO: 3.4 % (ref 0–7)
ERYTHROCYTE [DISTWIDTH] IN BLOOD BY AUTOMATED COUNT: 14.2 % (ref 11.5–14.5)
GFR SERPL CREATININE-BSD FRML MDRD: 44 ML/MIN/1.73
GLOBULIN UR ELPH-MCNC: 4.8 GM/DL
GLUCOSE BLD-MCNC: 75 MG/DL (ref 74–98)
HCT VFR BLD AUTO: 45.1 % (ref 42–52)
HDLC SERPL-MCNC: 31 MG/DL (ref 40–60)
HGB BLD-MCNC: 15.1 G/DL (ref 14–18)
IMM GRANULOCYTES # BLD: 0.05 10*3/MM3 (ref 0–0.06)
IMM GRANULOCYTES NFR BLD: 0.5 % (ref 0–0.6)
LDLC SERPL CALC-MCNC: 85 MG/DL (ref 0–99)
LDLC/HDLC SERPL: 2.75 {RATIO}
LYMPHOCYTES # BLD AUTO: 2.37 10*3/MM3 (ref 0.6–3.4)
LYMPHOCYTES NFR BLD AUTO: 22.5 % (ref 10–50)
MCH RBC QN AUTO: 30.2 PG (ref 27–31)
MCHC RBC AUTO-ENTMCNC: 33.5 G/DL (ref 30–37)
MCV RBC AUTO: 90.2 FL (ref 80–94)
MONOCYTES # BLD AUTO: 0.61 10*3/MM3 (ref 0–0.9)
MONOCYTES NFR BLD AUTO: 5.8 % (ref 0–12)
NEUTROPHILS # BLD AUTO: 7.04 10*3/MM3 (ref 2–6.9)
NEUTROPHILS NFR BLD AUTO: 66.9 % (ref 37–80)
NRBC BLD MANUAL-RTO: 0 /100 WBC (ref 0–0)
PLATELET # BLD AUTO: 230 10*3/MM3 (ref 130–400)
PMV BLD AUTO: 11.1 FL (ref 6–12)
POTASSIUM BLD-SCNC: 4.6 MMOL/L (ref 3.5–5.1)
PROT SERPL-MCNC: 9.3 G/DL (ref 6.3–8.2)
RBC # BLD AUTO: 5 10*6/MM3 (ref 4.7–6.1)
SODIUM BLD-SCNC: 142 MMOL/L (ref 137–145)
T4 FREE SERPL-MCNC: 0.83 NG/DL (ref 0.78–2.19)
TRIGL SERPL-MCNC: 308 MG/DL
TSH SERPL DL<=0.05 MIU/L-ACNC: 2 MIU/ML (ref 0.47–4.68)
VLDLC SERPL-MCNC: 61.6 MG/DL
WBC NRBC COR # BLD: 10.53 10*3/MM3 (ref 4.8–10.8)

## 2018-08-14 PROCEDURE — 80053 COMPREHEN METABOLIC PANEL: CPT | Performed by: INTERNAL MEDICINE

## 2018-08-14 PROCEDURE — 85025 COMPLETE CBC W/AUTO DIFF WBC: CPT | Performed by: INTERNAL MEDICINE

## 2018-08-14 PROCEDURE — 95811 POLYSOM 6/>YRS CPAP 4/> PARM: CPT | Performed by: PSYCHIATRY & NEUROLOGY

## 2018-08-14 PROCEDURE — 95811 POLYSOM 6/>YRS CPAP 4/> PARM: CPT

## 2018-08-14 PROCEDURE — 80061 LIPID PANEL: CPT | Performed by: INTERNAL MEDICINE

## 2018-08-14 PROCEDURE — 99214 OFFICE O/P EST MOD 30 MIN: CPT | Performed by: INTERNAL MEDICINE

## 2018-08-14 PROCEDURE — 84443 ASSAY THYROID STIM HORMONE: CPT | Performed by: INTERNAL MEDICINE

## 2018-08-14 PROCEDURE — 36415 COLL VENOUS BLD VENIPUNCTURE: CPT | Performed by: INTERNAL MEDICINE

## 2018-08-14 PROCEDURE — 84439 ASSAY OF FREE THYROXINE: CPT | Performed by: INTERNAL MEDICINE

## 2018-08-14 RX ORDER — SPIRONOLACTONE 50 MG/1
50 TABLET, FILM COATED ORAL 2 TIMES DAILY
Qty: 60 TABLET | Refills: 5 | Status: SHIPPED | OUTPATIENT
Start: 2018-08-14 | End: 2018-10-29 | Stop reason: SDUPTHER

## 2018-08-14 RX ORDER — NIFEDIPINE 60 MG/1
60 TABLET, FILM COATED, EXTENDED RELEASE ORAL DAILY
Qty: 90 TABLET | Refills: 3
Start: 2018-08-14 | End: 2018-10-12 | Stop reason: HOSPADM

## 2018-08-14 RX ORDER — NEBIVOLOL 5 MG/1
5 TABLET ORAL DAILY
Qty: 90 TABLET | Refills: 3 | Status: SHIPPED | OUTPATIENT
Start: 2018-08-14 | End: 2018-10-12 | Stop reason: HOSPADM

## 2018-08-14 RX ORDER — NEBIVOLOL 5 MG/1
5 TABLET ORAL DAILY
COMMUNITY
End: 2018-08-14 | Stop reason: SDUPTHER

## 2018-08-14 NOTE — PROGRESS NOTES
Subjective     Patient ID: Andre Agosto is a 63 y.o. male. Patient is here for management of multiple medical problems.     Chief Complaint   Patient presents with   • Renovascular Hypertension     2 week follow-up, patient needs refills on Spironolactone, he is unsure if he is to stay on same dosage.   • Seizures     patient states he had 2 seisure episodes yesterday   • Rash     patient has a rash on lower right arm x 1 week. patient states the rash burns and itches     History of Present Illness     Rash on arm x 1 week. No burn.   May have had posyn ivy exposure.    Last dose of spironolactone yesterday am.    Sz in right arm only occurred yesterday. Tingling.        The following portions of the patient's history were reviewed and updated as appropriate: allergies, current medications, past family history, past medical history, past social history, past surgical history and problem list.    Review of Systems   Constitutional: Positive for fatigue.   HENT: Negative for congestion.    Psychiatric/Behavioral: Positive for sleep disturbance.   All other systems reviewed and are negative.      Current Outpatient Prescriptions:   •  Alcohol Swabs (ALCOHOL PREP) pads, 1 pad 4 (Four) Times a Day As Needed (bs)., Disp: 120 each, Rfl: 12  •  clopidogrel (PLAVIX) 75 MG tablet, TAKE 1 TABLET DAILY, Disp: 90 tablet, Rfl: 3  •  divalproex (DEPAKOTE) 250 MG DR tablet, Take 1 tablet by mouth 3 (Three) Times a Day., Disp: 90 tablet, Rfl: 2  •  escitalopram (LEXAPRO) 20 MG tablet, TAKE 1 TABLET DAILY, Disp: 90 tablet, Rfl: 2  •  glipiZIDE (GLUCOTROL) 5 MG tablet, Take 1 tablet by mouth 2 (Two) Times a Day Before Meals., Disp: 180 tablet, Rfl: 3  •  glucose blood test strip, Use one to two times daily to check blood sugar, Disp: 100 each, Rfl: 12  •  glucose monitor monitoring kit, 1 each As Needed (as directed). Diagnosis Code E11.9, Disp: 1 each, Rfl: 0  •  Lancets (FREESTYLE) lancets, 1 each by Other route As Needed  "(bs)., Disp: 120 each, Rfl: 12  •  losartan (COZAAR) 100 MG tablet, Take 1 tablet by mouth Daily., Disp: 30 tablet, Rfl: 11  •  lovastatin (MEVACOR) 20 MG tablet, Take 0.5 tablets by mouth every night. (Patient taking differently: Take 20 mg by mouth Every Night. Patient taking 1 tablet daily.), Disp: 30 tablet, Rfl: 11  •  Magnesium Oxide 400 (240 MG) MG tablet, Take 1 tablet by mouth daily., Disp: 30 tablet, Rfl: 11  •  NIFEdipine XL (ADALAT CC) 60 MG 24 hr tablet, Take 1 tablet by mouth Daily., Disp: 90 tablet, Rfl: 3  •  pantoprazole (PROTONIX) 40 MG EC tablet, Take 40 mg by mouth Daily., Disp: , Rfl:   •  spironolactone (ALDACTONE) 50 MG tablet, Take 1 tablet by mouth 2 (Two) Times a Day., Disp: 60 tablet, Rfl: 5  •  zonisamide (ZONEGRAN) 100 MG capsule, TAKE 1 CAPSULE THREE TIMES A DAY, Disp: 270 capsule, Rfl: 3  •  nebivolol (BYSTOLIC) 5 MG tablet, Take 1 tablet by mouth Daily., Disp: 90 tablet, Rfl: 3    Objective      Blood pressure 157/81, pulse 56, temperature 98.3 °F (36.8 °C), temperature source Oral, resp. rate 16, height 190.5 cm (75\"), weight (!) 143 kg (316 lb), SpO2 98 %.    Physical Exam     General Appearance:    Alert, cooperative, no distress, appears stated age   Head:    Normocephalic, without obvious abnormality, atraumatic   Eyes:    PERRL, conjunctiva/corneas clear, EOM's intact   Ears:    Normal TM's and external ear canals, both ears   Nose:   Nares normal, septum midline, mucosa normal, no drainage   or sinus tenderness   Throat:   Lips, mucosa, and tongue normal; teeth and gums normal   Neck:   Supple, symmetrical, trachea midline, no adenopathy;        thyroid:  No enlargement/tenderness/nodules; no carotid    bruit or JVD   Back:     Symmetric, no curvature, ROM normal, no CVA tenderness   Lungs:     Clear to auscultation bilaterally, respirations unlabored   Chest wall:    No tenderness or deformity   Heart:    Regular rate and rhythm, S1 and S2 normal, no murmur,        rub or " gallop   Abdomen:     Soft, non-tender, bowel sounds active all four quadrants,     no masses, no organomegaly   Extremities:   Extremities normal, atraumatic, no cyanosis or edema   Pulses:   2+ and symmetric all extremities   Skin:   Skin color, texture, turgor normal, no rashes or lesions   Lymph nodes:   Cervical, supraclavicular, and axillary nodes normal   Neurologic:   CNII-XII intact. Normal strength, sensation and reflexes       throughout      Results for orders placed or performed during the hospital encounter of 07/27/18   Duplex Renal Artery - Bilateral Complete CAR   Result Value Ref Range    BH CV XLRA EARL - SUP YANA AO .3 cm/sec    RENAL A ORG PSV LEFT 30.3 cm/sec    RENAL A ORG PSV RIGHT 120.9 cm/sec    RENAL A ORG EDV LEFT 9.7 cm/sec    RENAL A ORG EDV RIGHT 30.4 cm/sec    RENAL A ORG RI LEFT 0.68     RENAL A ORG RI RIGHT 0.75     PROX YANA A PSV LEFT 64.2 cm/sec    PROX YANA A PSV RIGHT 98.7 cm/sec    PROX YANA A EDV LEFT 17.1 cm/sec    PROX YANA A EDV RIGHT 24.3 cm/sec    PROX YANA A RI LEFT 0.73     PROX YANA A RI RIGHT 0.75     MID YANA A PSV LEFT 46.5 cm/sec    MID YANA A PSV RIGHT 31.8 cm/sec    MID YANA A EDV LEFT 13.2 cm/sec    MID YANA A EDV RIGHT 15.0 cm/sec    MID YANA A RI LEFT 0.72     MID YANA A RI RIGHT 0.53     DIST YANA A PSV LEFT 45.6 cm/sec    DIST YANA A PSV RIGHT 40.1 cm/sec    DIST YANA A EDV LEFT 13.6 cm/sec    DIST YANA A EDV RIGHT 14.2 cm/sec    DIST YANA A RI LEFT 0.7     DIST YANA A RI RIGHT 0.65     SUP SEG PSV LEFT 51.8 cm/sec    SUP SEG PSV RIGHT 42.0 cm/sec    SUP SEG EDV LEFT 13.6 cm/sec    SUP SEG EDV RIGHT 14.8 cm/sec    BH CV XLRA EARL - SUP SEG RI LEFT 0.74     SUP SEG RI RIGHT 0.65     BH CV XLRA EARL - SUP ARC PSV LEFT 20.7 cm/sec    SUP ARC PSV RIGHT 11.4 cm/sec    SUP ARC EDV LEFT 6.8 cm/sec    SUP ARC EDV RIGHT 3.6 cm/sec    SUP ARC RI LEFT 0.67     SUP ARC RI RIGHT 0.69     INF SEG PSV LEFT 36.1 cm/sec    INF SEG PSV RIGHT 29.0 cm/sec    INF SEG EDV LEFT 15.3 cm/sec     INF SEG EDV RIGHT 10.1 cm/sec    INF SEG RI LEFT 0.58     INF SEG RI RIGHT 0.65     INF ARC PSV LEFT 23.2 cm/sec    INF ARC PSV RIGHT 18.0 cm/sec    INF ARC EDV LEFT 12.2 cm/sec    INF ARC EDV RIGHT 7.9 cm/sec    BH CV XLRA EARL INF ARC RI LEFT 0.47     INF ARC RI RIGHT 0.56     Kid L 14.4 cm    BH CV VAS BP RIGHT /96 mmHg    KID L RIGHT 14.2 cm    RAR RIGHT 1.02     Right renal upper parenchyma max 29 cm/s    Right renal upper parenchyma min 10 cm/s    Left renal upper parenchyma max 29.9 cm/s    Left renal upper parenchyma min 13.4 cm/s    Aortic Mid  cm/s    Hilum Right PSV 60 cm/s    Hilum Left  cm/s    RAR LEFT 0.54     Right renal upper parenchyma RI 0.66     Left renal upper parenchyma RI 0.55          Assessment/Plan   bp still elevated and pt out of spironolactone.    beverly study tonight.    Pt feeling better no edema. Energy better.  Hr better and pt feels better.  beverly study tonight.        Andre was seen today for renovascular hypertension, seizures and rash.    Diagnoses and all orders for this visit:    Sleep apnea, unspecified type    Hypokalemia  -     spironolactone (ALDACTONE) 50 MG tablet; Take 1 tablet by mouth 2 (Two) Times a Day.    Renovascular hypertension  -     spironolactone (ALDACTONE) 50 MG tablet; Take 1 tablet by mouth 2 (Two) Times a Day.  -     NIFEdipine XL (ADALAT CC) 60 MG 24 hr tablet; Take 1 tablet by mouth Daily.      Return in about 4 weeks (around 9/11/2018).          There are no Patient Instructions on file for this visit.     Keven Bear MD    Assessment/Plan

## 2018-08-16 DIAGNOSIS — N18.30 CHRONIC RENAL IMPAIRMENT, STAGE 3 (MODERATE) (HCC): Primary | ICD-10-CM

## 2018-09-11 ENCOUNTER — OFFICE VISIT (OUTPATIENT)
Dept: NEUROLOGY | Facility: CLINIC | Age: 63
End: 2018-09-11

## 2018-09-11 ENCOUNTER — APPOINTMENT (OUTPATIENT)
Dept: LAB | Facility: HOSPITAL | Age: 63
End: 2018-09-11
Attending: INTERNAL MEDICINE

## 2018-09-11 VITALS
OXYGEN SATURATION: 99 % | BODY MASS INDEX: 39.17 KG/M2 | HEIGHT: 75 IN | SYSTOLIC BLOOD PRESSURE: 134 MMHG | WEIGHT: 315 LBS | HEART RATE: 62 BPM | DIASTOLIC BLOOD PRESSURE: 80 MMHG

## 2018-09-11 DIAGNOSIS — G47.34 NOCTURNAL OXYGEN DESATURATION: ICD-10-CM

## 2018-09-11 DIAGNOSIS — G47.33 OBSTRUCTIVE SLEEP APNEA: ICD-10-CM

## 2018-09-11 DIAGNOSIS — G40.109 SEIZURE, TEMPORAL LOBE (HCC): Primary | ICD-10-CM

## 2018-09-11 DIAGNOSIS — G47.19 EXCESSIVE DAYTIME SLEEPINESS: ICD-10-CM

## 2018-09-11 LAB
ANION GAP SERPL CALCULATED.3IONS-SCNC: 15.4 MMOL/L (ref 10–20)
BUN BLD-MCNC: 20 MG/DL (ref 7–20)
BUN/CREAT SERPL: 12.5 (ref 6.3–21.9)
CALCIUM SPEC-SCNC: 9.3 MG/DL (ref 8.4–10.2)
CHLORIDE SERPL-SCNC: 102 MMOL/L (ref 98–107)
CO2 SERPL-SCNC: 29 MMOL/L (ref 26–30)
CREAT BLD-MCNC: 1.6 MG/DL (ref 0.6–1.3)
GFR SERPL CREATININE-BSD FRML MDRD: 44 ML/MIN/1.73
GLUCOSE BLD-MCNC: 117 MG/DL (ref 74–98)
POTASSIUM BLD-SCNC: 4.4 MMOL/L (ref 3.5–5.1)
SODIUM BLD-SCNC: 142 MMOL/L (ref 137–145)
VALPROATE SERPL-MCNC: 22 MCG/ML (ref 50–100)

## 2018-09-11 PROCEDURE — 99214 OFFICE O/P EST MOD 30 MIN: CPT | Performed by: PSYCHIATRY & NEUROLOGY

## 2018-09-11 PROCEDURE — 80164 ASSAY DIPROPYLACETIC ACD TOT: CPT | Performed by: PSYCHIATRY & NEUROLOGY

## 2018-09-11 PROCEDURE — 80048 BASIC METABOLIC PNL TOTAL CA: CPT | Performed by: INTERNAL MEDICINE

## 2018-09-11 PROCEDURE — 36415 COLL VENOUS BLD VENIPUNCTURE: CPT | Performed by: INTERNAL MEDICINE

## 2018-09-11 NOTE — PROGRESS NOTES
Encounter Date:09/12/2018    Location: Keven [facility] MD    Patient ID: Andre Agosto is a 63 y.o. male    1955  Subjective:      Chief Complaint/Reason for visit:    Chief Complaint   Patient presents with   • Hypertension       Problem List:  1. Coronary artery disease  1. Stress echo, Alexandra, 1/28/14:  Borderline abnormal, but low probability for significant focal obstructive CAD  2. NSTEMI 12/2015 (Reji) with PCI of OM branch and circ (3.5 x 20 Synchrony), EF 50%.    3. LHC (emergent re-look) 12/2015:  Patent stent to circ; LAD with 50-60% prox-mid stenosis        2.   Echo 12/2015:  EF 55-60%, no significant valve disease        3.   Cardiolite 3/3/16:  No evidence of ischemia  2. Hypertension  1. Renal artery duplex 1/2014:  No evidence of renal artery stenosis  2. Renal artery duplex 7/27/18:  No evidence of renal artery stenosis  3. Hyperlipidemia  4. Hx CVA 6/2016  1. Carotid duplex 12/28/15, <50% stenosis bilaterally  2. 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  ALEX, 2010        1.   Sleep eval 8/14/18:  Normal (?); wears bi-pap  6.  Seizures; partial focal seizure   -2005: meningitis and encephalitis  7.  Chronic hypokalemia; neph?      HPI:  Patient is a pleasant 63-year-old  male with the above-noted medical history who presents today to establish care for his known coronary disease, hypertension and hyperlipidemia.  He had his non-STEMI in December 2015 and received a stent to his circumflex with Dr. Mendoza.  His primary symptoms at that time were extreme diaphoresis as well as nausea and vomiting.  He had his last Cardiolite stress test in March 2016 which revealed no evidence of ischemia.  In June 2016 he had a CVA with some residual right sided vision deficits causing chronic blurry vision.  He also suffers from seizures since 2005 (partial focal seizures of the right arm) when he was diagnosed as having meningitis and encephalitis.  Due to  those diagnoses, he also notes he has a chronic spot on his brainstem.  He remains active throughout his daily life but does not follow any kind of routine exercise regimen.  He continues to take Plavix 75 mg daily.  Of concern, his blood pressure seems to be difficult to manage.  He states he typically stays in the 140-160 systolic range and feels best when it is in the 140s.  He had some recent medication adjustments to his medications after being admitted in [place], [place] for what they thought was a recurrent stroke.  Come to find out, he was having slurred speech and confusion due to issues with his blood pressure medication and bradycardia.  He would like to be on as few medications as possible.  He had his lipids checked last month indicated an LDL of 85 with an HDL of 31.  For this reason we would like to increase his lovastatin to 40 mg daily at bedtime for optimization of medical therapy to obtain a goal of an LDL less than 70 mg/dL.  He currently denies having any chest pain, shortness of breath, dyspnea on exertion, edema, palpitations, dizziness and syncope.  He does admit to intermittent fatigue and will occasionally take a nap in the late afternoons.  This is not limiting to his daily lifestyle.           Cardiac ROS:  Positive for fatigue and snoring/ ALEX (compliant).  Negative for chest pain, shortness of breath, dyspnea on exertion, orthopnea, PND, fatigue, edema, palpitations, dizziness, pre-syncope/ syncope    Cardiac Risk Factors: advanced age (older than 55 for men, 65 for women), dyslipidemia, hypertension, male gender, obesity (BMI >= 30 kg/m2) and sedentary lifestyle, former smoker  Social History     Social History   • Marital status:      Spouse name: N/A   • Number of children: N/A   • Years of education: N/A     Occupational History   • Not on file.     Social History Main Topics   • Smoking status: Former Smoker     Packs/day: 0.50     Years: 10.00     Start date: 1/1/1975      Quit date: 12/31/1985   • Smokeless tobacco: Never Used   • Alcohol use No   • Drug use: No   • Sexual activity: Not Currently     Other Topics Concern   • Not on file     Social History Narrative   • No narrative on file       family history includes COPD in his father; Hypertension in his father and mother; Stroke in his mother.     has a past medical history of Anxiety and depression; Coronary artery disease involving native coronary artery of native heart without angina pectoris (9/12/2018); Diabetes mellitus (CMS/[facility]); Dyspepsia; Epilepsy (CMS/[facility]); Heart attack (06/15/2016); History of Helicobacter pylori infection; HL (hearing loss); Hyperlipidemia; Hypertension; Memory loss (2005); Obesity; Seizures (CMS/[facility]); Sleep apnea; Stroke (CMS/[facility]); and Vision loss (7/2018).    No Known Allergies      Current Outpatient Prescriptions:   •  Alcohol Swabs (ALCOHOL PREP) pads, 1 pad 4 (Four) Times a Day As Needed (bs)., Disp: 120 each, Rfl: 12  •  clopidogrel (PLAVIX) 75 MG tablet, TAKE 1 TABLET DAILY, Disp: 90 tablet, Rfl: 3  •  divalproex (DEPAKOTE) 250 MG DR tablet, Take 1 tablet by mouth 3 (Three) Times a Day., Disp: 90 tablet, Rfl: 2  •  escitalopram (LEXAPRO) 20 MG tablet, TAKE 1 TABLET DAILY, Disp: 90 tablet, Rfl: 2  •  glipiZIDE (GLUCOTROL) 5 MG tablet, Take 1 tablet by mouth 2 (Two) Times a Day Before Meals., Disp: 180 tablet, Rfl: 3  •  glucose blood test strip, Use one to two times daily to check blood sugar, Disp: 100 each, Rfl: 12  •  glucose monitor monitoring kit, 1 each As Needed (as directed). Diagnosis Code E11.9, Disp: 1 each, Rfl: 0  •  Lancets (FREESTYLE) lancets, 1 each by Other route As Needed (bs)., Disp: 120 each, Rfl: 12  •  losartan (COZAAR) 100 MG tablet, Take 1 tablet by mouth Daily., Disp: 30 tablet, Rfl: 11  •  lovastatin (MEVACOR) 20 MG tablet, Take 0.5 tablets by mouth every night. (Patient taking differently: Take 20 mg by mouth Every Night. Patient taking 1 tablet daily.), Disp: 30  tablet, Rfl: 11  •  Magnesium Oxide 400 (240 MG) MG tablet, Take 1 tablet by mouth daily. (Patient taking differently: Take 1 tablet by mouth Daily As Needed.), Disp: 30 tablet, Rfl: 11  •  nebivolol (BYSTOLIC) 5 MG tablet, Take 1 tablet by mouth Daily., Disp: 90 tablet, Rfl: 3  •  NIFEdipine XL (ADALAT CC) 60 MG 24 hr tablet, Take 1 tablet by mouth Daily., Disp: 90 tablet, Rfl: 3  •  spironolactone (ALDACTONE) 50 MG tablet, Take 1 tablet by mouth 2 (Two) Times a Day., Disp: 60 tablet, Rfl: 5  •  zonisamide (ZONEGRAN) 100 MG capsule, TAKE 1 CAPSULE THREE TIMES A DAY, Disp: 270 capsule, Rfl: 3    Review of Systems   Constitution: Positive for malaise/fatigue.   HENT: Positive for hearing loss (left ear).    Eyes: Positive for blurred vision.   Cardiovascular: Negative.    Respiratory: Negative.    Endocrine: Negative.    Hematologic/Lymphatic: Negative.    Skin: Negative.    Musculoskeletal: Negative.    Gastrointestinal: Negative.  Negative for nausea and vomiting.   Genitourinary: Negative.  Negative for bladder incontinence.   Neurological: Negative.  Negative for dizziness, headaches, light-headedness and vertigo.   Psychiatric/Behavioral: Negative.    Allergic/Immunologic: Negative.        Vitals:    09/12/18 1125   BP: (!) 192/106   Pulse: 57          Objective:       Physical Exam   Constitutional: He is oriented to person, place, and time. He appears well-developed and well-nourished.   HENT:   Head: Normocephalic and atraumatic.   Deaf in left ear   Eyes: Pupils are equal, round, and reactive to light. Right eye exhibits no discharge. Left eye exhibits no discharge.   Chronic double vision right eye   Neck: Normal range of motion. Neck supple. No JVD present.   Cardiovascular: Normal rate, regular rhythm, normal heart sounds and intact distal pulses.  Exam reveals no gallop and no friction rub.    No murmur heard.  Pulmonary/Chest: Effort normal and breath sounds normal. He has no wheezes.   Abdominal: Soft.  Bowel sounds are normal. There is no tenderness.   Musculoskeletal: Normal range of motion. He exhibits no edema.   Neurological: He is alert and oriented to person, place, and time.   Skin: Skin is warm and dry.   Psychiatric: He has a normal mood and affect. His behavior is normal.       Data Review:     ECG 12 Lead  Date/Time: 9/12/2018 12:05 PM  Performed by: BOBBY JESUS  Authorized by: BOBBY JESUS   Comparison: compared with previous ECG   Similar to previous ECG  Rate: bradycardic  BPM: 57  Clinical impression: abnormal ECG  Comments: 1st degree AV block  Septal infarct; age undetermined  Possible inferior infarct, age undetermined  T wave abnormality, consider lateral ischemia            Lab Results   Component Value Date    CHOL 178 08/14/2018    CHLPL 272 (H) 07/03/2018    TRIG 308 (H) 08/14/2018    HDL 31 (L) 08/14/2018    LDL 85 08/14/2018     Lab Results   Component Value Date    GLUCOSE 117 (H) 09/11/2018    BUN 20 09/11/2018    CREATININE 1.60 (H) 09/11/2018    EGFRIFNONA 44 (L) 09/11/2018    EGFRIFAFRI 53 (L) 07/03/2018    BCR 12.5 09/11/2018    K 4.4 09/11/2018    CO2 29.0 09/11/2018    CALCIUM 9.3 09/11/2018    PROTENTOTREF 7.6 07/03/2018    ALBUMIN 4.50 08/14/2018    LABIL2 1.2 07/03/2018    AST 28 08/14/2018    ALT 32 08/14/2018         Assessment:      ICD-10-CM ICD-9-CM   1. Coronary artery disease involving native coronary artery of native heart without angina pectoris I25.10 414.01   2. Mixed hyperlipidemia E78.2 272.2   3. Renovascular hypertension I15.0 405.91       Plan:  Increase lovastatin to 40mg qhs; recheck in labs in 3 months  GXT Cardiolite  Encouraged routine exercise and dietary modifications  Continue all other medications as directed  F/up in 6 months or sooner if needed.        Scribed for Yoselin Johnson MD by YESY Cao. 9/12/2018  1:09 PM     I Yoselin Johnson MD personally performed the services described in this documentation as scribed by the above  individual in my presence, and it is both accurate and complete.    Yoselin Johnson MD, FACC

## 2018-09-12 ENCOUNTER — OFFICE VISIT (OUTPATIENT)
Dept: CARDIOLOGY | Facility: CLINIC | Age: 63
End: 2018-09-12

## 2018-09-12 VITALS
SYSTOLIC BLOOD PRESSURE: 192 MMHG | HEART RATE: 57 BPM | BODY MASS INDEX: 39.17 KG/M2 | WEIGHT: 315 LBS | HEIGHT: 75 IN | DIASTOLIC BLOOD PRESSURE: 106 MMHG

## 2018-09-12 DIAGNOSIS — E78.2 MIXED HYPERLIPIDEMIA: ICD-10-CM

## 2018-09-12 DIAGNOSIS — I25.10 CORONARY ARTERY DISEASE INVOLVING NATIVE CORONARY ARTERY OF NATIVE HEART WITHOUT ANGINA PECTORIS: Primary | ICD-10-CM

## 2018-09-12 DIAGNOSIS — I15.0 RENOVASCULAR HYPERTENSION: ICD-10-CM

## 2018-09-12 PROCEDURE — 93000 ELECTROCARDIOGRAM COMPLETE: CPT | Performed by: INTERNAL MEDICINE

## 2018-09-12 PROCEDURE — 99204 OFFICE O/P NEW MOD 45 MIN: CPT | Performed by: INTERNAL MEDICINE

## 2018-09-12 NOTE — PROGRESS NOTES
[facility] NEUROLOGY [place] PROGRESS NOTE  History of Present Illness     Date: 9/11/2018    Patient Identification  Andre Agosto is a 63 y.o. male.    Patient information was obtained from patient.  History/Exam limitations: none.    Original consultation requested by: Keven Mo MD      Chief Complaint   Sleep Apnea (Pt in office today to review results of sleep study. )      History of Present Illness   Patient is a pleasant 63-year-old referred to [facility] for eval show obstructive sleep apnea interval history since last visit patient underwent nocturnal polysomnography study and patient was found to have an apnea poppy index of 17.4 events per hour and desaturation was also noted patient subsequently placed on nasal CPAP initially of the failing nasal CPAP patient was placed on nasal BiPAP and the final pressure was 9/5 cm water pressure patient still continued to have hypoxemia and patient still complaining of feeling shortness of breath at 10/5 cm of water pressure.    Patient has no further seizure on Depakote of current regimen 250 mg 1 pill 3 times a day.  Patient tolerating medication well without any apparent side effects.      PMH:   Past Medical History:   Diagnosis Date   • Anxiety and depression    • Diabetes mellitus (CMS/[facility])    • Dyspepsia    • Epilepsy (CMS/[facility])    • Heart attack 06/15/2016   • History of Helicobacter pylori infection     in 2008, treated w/ PrevPak   • HL (hearing loss)     Left ear child luevano measles   • Hyperlipidemia    • Hypertension    • Memory loss 2005    Meneigitis   • Obesity    • Seizures (CMS/[facility])    • Sleep apnea     BiPAP compliant   • Stroke (CMS/[facility])    • Vision loss 7/2018    Double vision       Past Surgical History:   Past Surgical History:   Procedure Laterality Date   • CAROTID ENDARTERECTOMY     • CAROTID STENT  12/2025   • CORONARY STENT PLACEMENT     • LAPAROSCOPIC GASTRIC BANDING  2008    s/p LAGB Realize 6/2008  by HOMERO.   • UMBILICAL HERNIA REPAIR  2008    incarcerated UHR w/ mesh by Dr. Velasquez       Family Hisotry:   Family History   Problem Relation Age of Onset   • Stroke Mother    • Hypertension Mother    • COPD Father    • Hypertension Father        Social History:   Social History     Social History   • Marital status:      Spouse name: N/A   • Number of children: N/A   • Years of education: N/A     Occupational History   • Not on file.     Social History Main Topics   • Smoking status: Former Smoker     Packs/day: 0.50     Years: 10.00     Start date: 1/1/1975     Quit date: 12/31/1985   • Smokeless tobacco: Never Used   • Alcohol use No   • Drug use: No   • Sexual activity: Not Currently     Other Topics Concern   • Not on file     Social History Narrative   • No narrative on file       Medications:   Current Outpatient Prescriptions   Medication Sig Dispense Refill   • Alcohol Swabs (ALCOHOL PREP) pads 1 pad 4 (Four) Times a Day As Needed (bs). 120 each 12   • clopidogrel (PLAVIX) 75 MG tablet TAKE 1 TABLET DAILY 90 tablet 3   • divalproex (DEPAKOTE) 250 MG DR tablet Take 1 tablet by mouth 3 (Three) Times a Day. 90 tablet 2   • escitalopram (LEXAPRO) 20 MG tablet TAKE 1 TABLET DAILY 90 tablet 2   • glipiZIDE (GLUCOTROL) 5 MG tablet Take 1 tablet by mouth 2 (Two) Times a Day Before Meals. 180 tablet 3   • glucose blood test strip Use one to two times daily to check blood sugar 100 each 12   • glucose monitor monitoring kit 1 each As Needed (as directed). Diagnosis Code E11.9 1 each 0   • Lancets (FREESTYLE) lancets 1 each by Other route As Needed (bs). 120 each 12   • losartan (COZAAR) 100 MG tablet Take 1 tablet by mouth Daily. 30 tablet 11   • lovastatin (MEVACOR) 20 MG tablet Take 0.5 tablets by mouth every night. (Patient taking differently: Take 20 mg by mouth Every Night. Patient taking 1 tablet daily.) 30 tablet 11   • Magnesium Oxide 400 (240 MG) MG tablet Take 1 tablet by mouth daily. 30 tablet 11  "  • nebivolol (BYSTOLIC) 5 MG tablet Take 1 tablet by mouth Daily. 90 tablet 3   • NIFEdipine XL (ADALAT CC) 60 MG 24 hr tablet Take 1 tablet by mouth Daily. 90 tablet 3   • pantoprazole (PROTONIX) 40 MG EC tablet Take 40 mg by mouth Daily.     • spironolactone (ALDACTONE) 50 MG tablet Take 1 tablet by mouth 2 (Two) Times a Day. 60 tablet 5   • zonisamide (ZONEGRAN) 100 MG capsule TAKE 1 CAPSULE THREE TIMES A  capsule 3     No current facility-administered medications for this visit.        Allergy:   Allergies   Allergen Reactions   • Amlodipine Swelling       Review of Systems:  Review of Systems   Constitutional: Positive for fatigue. Negative for chills and fever.   HENT: Negative for congestion, ear pain, hearing loss, rhinorrhea and sore throat.    Eyes: Negative for pain, discharge and redness.   Respiratory: Positive for apnea. Negative for cough, shortness of breath, wheezing and stridor.    Cardiovascular: Negative for chest pain, palpitations and leg swelling.   Gastrointestinal: Negative for abdominal pain, constipation, nausea and vomiting.   Endocrine: Negative for cold intolerance, heat intolerance and polyphagia.   Genitourinary: Negative for dysuria, flank pain, frequency and urgency.   Musculoskeletal: Negative for joint swelling, myalgias, neck pain and neck stiffness.   Skin: Negative for pallor, rash and wound.   Allergic/Immunologic: Negative for environmental allergies.   Neurological: Negative for dizziness, tremors, seizures, syncope, facial asymmetry, speech difficulty, weakness, light-headedness, numbness and headaches.   Hematological: Negative for adenopathy.   Psychiatric/Behavioral: Negative for confusion and hallucinations. The patient is not nervous/anxious.        Physical Exam     Vitals:    09/11/18 1145   BP: 134/80   Pulse: 62   SpO2: 99%   Weight: (!) 143 kg (316 lb)   Height: 190.5 cm (75\")     GENERAL: Patient is pleasant, cooperative, appears to be stated age.  Body " habitus is endomorphic.  SKIN AND EXTREMITIES:  No skin rashes or lesions are noted.  No cyanosis, clubbing or edema of the extremities.    HEAD:  Head is normocephalic and atraumatic.    NECK: Neck are non-tender without thyromegaly or adenopathy.  Carotic upstrokes are 1+/4.  No cranial or cervical bruits.  The neck is supple with a full range of motion.   ENT: palate elevate symmetrically, no evidence of high arch palate, tongue midline erythema in posterior pharynx, Mallampati Classification Class III   CARDIOVASCULAR:  Regular rate and rhythm with normal S1 and S2 without rub or gallop.  RESPIRATORY:  Clear to auscultation without wheezes or crackle   ABDOMEN:  Soft and non-tender, positive bowel sound without hepatosplenomegaly  BACK:  Back is straight without midline defect.    PSYCH:  Higher cortical function/mental status:  The patient is alert.  She is oriented x3 to time, place and person.  Recent and the remote memory appear normal.  The patient has a good fund of knowledge.  There is no visual or auditory hallucination or suicidal or homicidal ideation.  SPEECH:There is no gross evidence of aphasia, dysarthria or agnosia.      CRANIAL NERVES:  Pupils are 4mm, equal round reactive to light, reacting briskly to 2mm without afferent pupillary defect.  Visual fields are intact to confrontation testing.  Fundoscopic examination reveals sharp disk margins with normal vasculature.  No papilledema, hemorrhages or exudates.  Extraocular movements are full and smooth with normal pursuits and saccades.  No nystagmus noted.  The face is symmetric. palate elevate symmetrically, Tongue midline, positive gag reflex. The remainder of the cranial nerves are intact and symmetrical.    MOTOR: Strength is 5/5 throughout with normal tone and bulk with the following exceptions, 4/5 intrinsic muscles of the hands and feet.  No involuntary movements noted.    Deep Tendon Reflexes: are 2/4 and symmetrical in the upper  extremities, 2/4 and symmetrical at the knees and 1/4 and symmetrical at the Achilles tendon.  Plantar responses were down-going bilaterally.    SENSATION:  Intact to pinprick, light touch, vibration and proprioception.  Coordination:  The patient normally performs finger-nose-finger, heel-to-knee-to-shin and rapid alternating movements in symmetrical fashion.    COORDINATION AND GAIT:  The patient walks with a narrow-based gait.  Patient is able to heel-toe and tandem walk forward and backwards without difficulty.  Romberg and monopedal  Romberg are negative.    MUSCULOSKELETAL: Range of motion normal, no clubbing, cyanosis, or edema.  No joint swelling.            Studies: I have personally reviewed the following and discussed with the patient.  Results for orders placed or performed in visit on 09/11/18   Valproic Acid Level, Total   Result Value Ref Range    Valproic Acid 22.0 (L) 50.0 - 100.0 mcg/mL       Records Reviewed: I have personally reviewed his previous medical record.    Andre was seen today for sleep apnea.    Diagnoses and all orders for this visit:    Seizure, temporal lobe (CMS/HCC)  -     Valproic Acid Level, Total; Future  -     Comprehensive Metabolic Panel  -     Valproic Acid Level, Total    Obstructive sleep apnea    Excessive daytime sleepiness    Nocturnal oxygen desaturation        Treatments:  1.  Will start patient on nasal BiPAP at 11/5 cm water pressure  2.  Will check a Depakote level since patient has been seizure-free  3.  Encourage patient to be continue compliance with anticonvulsants   Discussion:  I have counseled patient extensively on Sleep Hygiene including regular sleep wake schedule and stimulus control therapy.  I have also discussed the importance of weight reduction because 10% reduction in body weight can reduce sleep apnea by 50 %. We have also discussed abstaining from smoking and drinking.  I have explained to patient that obstructive apnea episode is defined as  the absence of airflow for at least 10 seconds.  Sleep apnea is usually accompanied by snoring, disturbed sleep, and daytime sleepiness. Patients with micrognathia, retrognathia, enlarged tonsils, tongue enlargement, and acromegaly are especially predisposed to obstructive sleep apnea. Abnormalities or weakness in the muscles can also contribute to obstructive sleep apnea. Obesity can also contribute to sleep apnea.     Sleep apnea can lead to a number of complications, ranging from daytime sleepiness to possible increased risk of cardiovascular risks.   Daytime sleepiness is the most serious.  Daytime sleepiness can also increase the risk for accident-related injuries. Several studies have suggested that people with sleep apnea have two to three times as many car accidents, and five to seven times the risk for multiple accidents.   A number of cardiovascular diseases -- including high blood pressure, heart failure, stroke, and heart arrhythmias -- have an association with obstructive sleep apnea.   Up to a third of patients with heart failure also have sleep apnea. Both central and obstructive sleep apnea are linked with heart failure. Obstructive sleep apnea is also noted to be associated with type 2 diabetes according to Dr. Ahumada at [facility].  The best treatment for symptomatic obstructive sleep apnea is continuous positive airflow pressure (CPAP). Bilevel positive airway pressure (BPAP) systems may be particularly helpful for patients with coexisting lung disease and those with excessive levels of carbon dioxide.  Other treatment options including UPPP surgery, LAUP surgery, radiofrequency somnoplasty and dental appliances such [place] or [place].    This Document is signed by Rony Clifford MD, FAAN, FAASM   September 11, 20189:19 PM

## 2018-09-13 ENCOUNTER — OFFICE VISIT (OUTPATIENT)
Dept: INTERNAL MEDICINE | Facility: CLINIC | Age: 63
End: 2018-09-13

## 2018-09-13 VITALS
WEIGHT: 315 LBS | HEIGHT: 75 IN | SYSTOLIC BLOOD PRESSURE: 175 MMHG | BODY MASS INDEX: 39.17 KG/M2 | OXYGEN SATURATION: 98 % | DIASTOLIC BLOOD PRESSURE: 92 MMHG | RESPIRATION RATE: 16 BRPM | HEART RATE: 66 BPM | TEMPERATURE: 98.2 F

## 2018-09-13 DIAGNOSIS — Z23 NEED FOR VACCINATION: Primary | ICD-10-CM

## 2018-09-13 DIAGNOSIS — I10 ESSENTIAL HYPERTENSION: ICD-10-CM

## 2018-09-13 DIAGNOSIS — E87.6 HYPOKALEMIA: ICD-10-CM

## 2018-09-13 PROCEDURE — 90662 IIV NO PRSV INCREASED AG IM: CPT | Performed by: INTERNAL MEDICINE

## 2018-09-13 PROCEDURE — 99214 OFFICE O/P EST MOD 30 MIN: CPT | Performed by: INTERNAL MEDICINE

## 2018-09-13 PROCEDURE — G0008 ADMIN INFLUENZA VIRUS VAC: HCPCS | Performed by: INTERNAL MEDICINE

## 2018-09-13 RX ORDER — HYDROCHLOROTHIAZIDE 12.5 MG/1
12.5 CAPSULE, GELATIN COATED ORAL DAILY PRN
Qty: 30 CAPSULE | Refills: 3 | Status: SHIPPED | OUTPATIENT
Start: 2018-09-13 | End: 2018-10-11

## 2018-09-13 NOTE — PROGRESS NOTES
Subjective     Patient ID: Andre Agosto is a 63 y.o. male. Patient is here for management of multiple medical problems.     Chief Complaint   Patient presents with   • Renovascular Hypertension     follow-up   • Medication refills     patient needs a new RX for Lovastatin, 40mg patient states Dr. Johnson told him to start increasing it to 40 m, patient needs RX sent to [facility]     History of Present Illness     Seen neurology Bipap adjusted.    Seen enmanuel.   Increase stating recently.    Noticed edema in right lower face.   Was at [facility].             The following portions of the patient's history were reviewed and updated as appropriate: allergies, current medications, past family history, past medical history, past social history, past surgical history and problem list.    Review of Systems   Constitutional: Positive for fatigue.   Respiratory: Negative for shortness of breath.    Psychiatric/Behavioral: Negative for sleep disturbance.   All other systems reviewed and are negative.      Current Outpatient Prescriptions:   •  Alcohol Swabs (ALCOHOL PREP) pads, 1 pad 4 (Four) Times a Day As Needed (bs)., Disp: 120 each, Rfl: 12  •  clopidogrel (PLAVIX) 75 MG tablet, TAKE 1 TABLET DAILY, Disp: 90 tablet, Rfl: 3  •  divalproex (DEPAKOTE) 250 MG DR tablet, Take 1 tablet by mouth 3 (Three) Times a Day., Disp: 90 tablet, Rfl: 2  •  escitalopram (LEXAPRO) 20 MG tablet, TAKE 1 TABLET DAILY, Disp: 90 tablet, Rfl: 2  •  glipiZIDE (GLUCOTROL) 5 MG tablet, Take 1 tablet by mouth 2 (Two) Times a Day Before Meals., Disp: 180 tablet, Rfl: 3  •  glucose blood test strip, Use one to two times daily to check blood sugar, Disp: 100 each, Rfl: 12  •  glucose monitor monitoring kit, 1 each As Needed (as directed). Diagnosis Code E11.9, Disp: 1 each, Rfl: 0  •  Lancets (FREESTYLE) lancets, 1 each by Other route As Needed (bs)., Disp: 120 each, Rfl: 12  •  losartan (COZAAR) 100 MG tablet, Take 1 tablet  "by mouth Daily., Disp: 30 tablet, Rfl: 11  •  lovastatin (MEVACOR) 20 MG tablet, Take 0.5 tablets by mouth every night. (Patient taking differently: Take 40 mg by mouth Every Night. Patient taking 2 tablets daily.), Disp: 30 tablet, Rfl: 11  •  Magnesium Oxide 400 (240 MG) MG tablet, Take 1 tablet by mouth daily. (Patient taking differently: Take 1 tablet by mouth Daily As Needed.), Disp: 30 tablet, Rfl: 11  •  nebivolol (BYSTOLIC) 5 MG tablet, Take 1 tablet by mouth Daily., Disp: 90 tablet, Rfl: 3  •  NIFEdipine XL (ADALAT CC) 60 MG 24 hr tablet, Take 1 tablet by mouth Daily., Disp: 90 tablet, Rfl: 3  •  spironolactone (ALDACTONE) 50 MG tablet, Take 1 tablet by mouth 2 (Two) Times a Day., Disp: 60 tablet, Rfl: 5  •  zonisamide (ZONEGRAN) 100 MG capsule, TAKE 1 CAPSULE THREE TIMES A DAY, Disp: 270 capsule, Rfl: 3  •  hydrochlorothiazide (MICROZIDE) 12.5 MG capsule, Take 1 capsule by mouth Daily As Needed (elevated bp.)., Disp: 30 capsule, Rfl: 3    Objective      Blood pressure 175/92, pulse 66, temperature 98.2 °F (36.8 °C), temperature source Oral, resp. rate 16, height 190.5 cm (75\"), weight (!) 147 kg (325 lb), SpO2 98 %.    Physical Exam     General Appearance:    Alert, cooperative, no distress, appears stated age   Head:    Normocephalic, without obvious abnormality, atraumatic   Eyes:    PERRL, conjunctiva/corneas clear, EOM's intact   Ears:    Normal TM's and external ear canals, both ears   Nose:   Nares normal, septum midline, mucosa normal, no drainage   or sinus tenderness   Throat:   Lips, mucosa, and tongue normal; teeth and gums normal   Neck:   Supple, symmetrical, trachea midline, no adenopathy;        thyroid:  No enlargement/tenderness/nodules; no carotid    bruit or JVD   Back:     Symmetric, no curvature, ROM normal, no CVA tenderness   Lungs:     Clear to auscultation bilaterally, respirations unlabored   Chest wall:    No tenderness or deformity   Heart:    Regular rate and rhythm, S1 and S2 " normal, no murmur,        rub or gallop   Abdomen:     Soft, non-tender, bowel sounds active all four quadrants,     no masses, no organomegaly   Extremities:   Extremities normal, atraumatic, no cyanosis or edema   Pulses:   2+ and symmetric all extremities   Skin:   Skin color, texture, turgor normal, no rashes or lesions   Lymph nodes:   Cervical, supraclavicular, and axillary nodes normal   Neurologic:   CNII-XII intact. Normal strength, sensation and reflexes       throughout      Results for orders placed or performed in visit on 09/11/18   Valproic Acid Level, Total   Result Value Ref Range    Valproic Acid 22.0 (L) 50.0 - 100.0 mcg/mL         Assessment/Plan   bp running well till past few day with eating out.  Will restart hctz and monitor K+ well.      Andre was seen today for renovascular hypertension and medication refills.    Diagnoses and all orders for this visit:    Need for vaccination  -     Flu Vaccine High Dose PF 65YR+ ([id number])    Essential hypertension  -     Basic Metabolic Panel    Hypokalemia  -     Basic Metabolic Panel    Other orders  -     hydrochlorothiazide (MICROZIDE) 12.5 MG capsule; Take 1 capsule by mouth Daily As Needed (elevated bp.).      Return in about 4 weeks (around 10/11/2018).          There are no Patient Instructions on file for this visit.     Keven Bear MD    Assessment/Plan

## 2018-09-26 ENCOUNTER — HOSPITAL ENCOUNTER (OUTPATIENT)
Dept: CARDIOLOGY | Facility: HOSPITAL | Age: 63
Discharge: HOME | End: 2018-09-26

## 2018-09-26 VITALS
HEIGHT: 75 IN | WEIGHT: 315 LBS | BODY MASS INDEX: 39.17 KG/M2 | SYSTOLIC BLOOD PRESSURE: 162 MMHG | HEART RATE: 55 BPM | DIASTOLIC BLOOD PRESSURE: 102 MMHG

## 2018-09-26 DIAGNOSIS — I25.10 CORONARY ARTERY DISEASE INVOLVING NATIVE CORONARY ARTERY OF NATIVE HEART WITHOUT ANGINA PECTORIS: ICD-10-CM

## 2018-09-26 LAB
BH CV STRESS BP STAGE 1: NORMAL
BH CV STRESS BP STAGE 2: NORMAL
BH CV STRESS DURATION MIN STAGE 1: 3
BH CV STRESS DURATION MIN STAGE 2: 3
BH CV STRESS DURATION MIN STAGE 3: 2
BH CV STRESS DURATION SEC STAGE 1: 0
BH CV STRESS DURATION SEC STAGE 2: 0
BH CV STRESS DURATION SEC STAGE 3: 30
BH CV STRESS GRADE STAGE 1: 10
BH CV STRESS GRADE STAGE 2: 12
BH CV STRESS GRADE STAGE 3: 14
BH CV STRESS HR STAGE 1: 92
BH CV STRESS HR STAGE 2: 114
BH CV STRESS HR STAGE 3: 139
BH CV STRESS METS STAGE 1: 5
BH CV STRESS METS STAGE 2: 7.5
BH CV STRESS METS STAGE 3: 10
BH CV STRESS O2 STAGE 1: 97
BH CV STRESS O2 STAGE 2: 98
BH CV STRESS PROTOCOL 1: NORMAL
BH CV STRESS RECOVERY BP: NORMAL MMHG
BH CV STRESS RECOVERY HR: 78 BPM
BH CV STRESS SPEED STAGE 1: 1.7
BH CV STRESS SPEED STAGE 2: 2.5
BH CV STRESS SPEED STAGE 3: 3.4
BH CV STRESS STAGE 1: 1
BH CV STRESS STAGE 2: 2
BH CV STRESS STAGE 3: 3
LV EF NUC BP: 40 %
MAXIMAL PREDICTED HEART RATE: 158 BPM
PERCENT MAX PREDICTED HR: 87.97 %
STRESS BASELINE BP: NORMAL MMHG
STRESS BASELINE HR: 55 BPM
STRESS O2 SAT REST: 97 %
STRESS PERCENT HR: 103 %
STRESS POST ESTIMATED WORKLOAD: 10.1 METS
STRESS POST EXERCISE DUR MIN: 8 MIN
STRESS POST EXERCISE DUR SEC: 30 SEC
STRESS POST O2 SAT PEAK: 98 %
STRESS POST PEAK BP: NORMAL MMHG
STRESS POST PEAK HR: 139 BPM
STRESS TARGET HR: 134 BPM

## 2018-09-26 PROCEDURE — 78452 HT MUSCLE IMAGE SPECT MULT: CPT | Performed by: INTERNAL MEDICINE

## 2018-09-26 PROCEDURE — 93018 CV STRESS TEST I&R ONLY: CPT | Performed by: INTERNAL MEDICINE

## 2018-09-26 PROCEDURE — A9500 TC99M SESTAMIBI: HCPCS | Performed by: INTERNAL MEDICINE

## 2018-09-26 PROCEDURE — 0 TECHNETIUM SESTAMIBI: Performed by: INTERNAL MEDICINE

## 2018-09-26 PROCEDURE — 78452 HT MUSCLE IMAGE SPECT MULT: CPT

## 2018-09-26 PROCEDURE — 93017 CV STRESS TEST TRACING ONLY: CPT

## 2018-09-26 RX ADMIN — TECHNETIUM TC 99M SESTAMIBI 1 DOSE: 1 INJECTION INTRAVENOUS at 07:55

## 2018-09-26 RX ADMIN — TECHNETIUM TC 99M SESTAMIBI 1 DOSE: 1 INJECTION INTRAVENOUS at 09:45

## 2018-09-27 ENCOUNTER — TELEPHONE (OUTPATIENT)
Dept: CARDIOLOGY | Facility: CLINIC | Age: 63
End: 2018-09-27

## 2018-09-27 NOTE — TELEPHONE ENCOUNTER
Discussed recent stress test with PT- I waiting for further instruction from [facility] regarding decreased EF- PT is aware and he knows that I will call him Monday with a plan

## 2018-10-01 ENCOUNTER — TELEPHONE (OUTPATIENT)
Dept: CARDIOLOGY | Facility: CLINIC | Age: 63
End: 2018-10-01

## 2018-10-01 DIAGNOSIS — R94.39 ABNORMAL STRESS TEST: Primary | ICD-10-CM

## 2018-10-01 NOTE — TELEPHONE ENCOUNTER
Spoke with pt regarding the need for LHC- he agrees to proceed and knows that scheduling will be calling to set up-

## 2018-10-02 PROBLEM — R94.39 ABNORMAL STRESS TEST: Status: ACTIVE | Noted: 2018-10-02

## 2018-10-04 ENCOUNTER — PREP FOR SURGERY (OUTPATIENT)
Dept: OTHER | Facility: HOSPITAL | Age: 63
End: 2018-10-04

## 2018-10-04 DIAGNOSIS — R94.39 ABNORMAL MYOCARDIAL PERFUSION STUDY: Primary | ICD-10-CM

## 2018-10-04 DIAGNOSIS — E78.2 MIXED HYPERLIPIDEMIA: ICD-10-CM

## 2018-10-04 DIAGNOSIS — R73.03 PRE-DIABETES: ICD-10-CM

## 2018-10-04 RX ORDER — ONDANSETRON 2 MG/ML
4 INJECTION INTRAMUSCULAR; INTRAVENOUS EVERY 6 HOURS PRN
Status: CANCELLED | OUTPATIENT
Start: 2018-10-04

## 2018-10-04 RX ORDER — NITROGLYCERIN 0.4 MG/1
0.4 TABLET SUBLINGUAL
Status: CANCELLED | OUTPATIENT
Start: 2018-10-04

## 2018-10-04 RX ORDER — ASPIRIN 325 MG
325 TABLET, DELAYED RELEASE (ENTERIC COATED) ORAL DAILY
Status: CANCELLED | OUTPATIENT
Start: 2018-10-05

## 2018-10-04 RX ORDER — ACETAMINOPHEN 325 MG/1
650 TABLET ORAL EVERY 4 HOURS PRN
Status: CANCELLED | OUTPATIENT
Start: 2018-10-04

## 2018-10-04 RX ORDER — SODIUM CHLORIDE 0.9 % (FLUSH) 0.9 %
1-10 SYRINGE (ML) INJECTION AS NEEDED
Status: CANCELLED | OUTPATIENT
Start: 2018-10-04

## 2018-10-04 RX ORDER — SODIUM CHLORIDE 0.9 % (FLUSH) 0.9 %
3 SYRINGE (ML) INJECTION EVERY 12 HOURS SCHEDULED
Status: CANCELLED | OUTPATIENT
Start: 2018-10-04

## 2018-10-04 RX ORDER — ASPIRIN 325 MG
325 TABLET ORAL ONCE
Status: CANCELLED | OUTPATIENT
Start: 2018-10-04 | End: 2018-10-04

## 2018-10-04 RX ORDER — SODIUM CHLORIDE 9 MG/ML
1-3 INJECTION, SOLUTION INTRAVENOUS CONTINUOUS
Status: CANCELLED | OUTPATIENT
Start: 2018-10-04

## 2018-10-11 ENCOUNTER — HOSPITAL ENCOUNTER (OUTPATIENT)
Dept: GENERAL RADIOLOGY | Facility: HOSPITAL | Age: 63
Discharge: HOME | End: 2018-10-11
Admitting: NURSE PRACTITIONER

## 2018-10-11 ENCOUNTER — APPOINTMENT (OUTPATIENT)
Dept: PREADMISSION TESTING | Facility: HOSPITAL | Age: 63
End: 2018-10-11

## 2018-10-11 DIAGNOSIS — R94.39 ABNORMAL MYOCARDIAL PERFUSION STUDY: ICD-10-CM

## 2018-10-11 DIAGNOSIS — R73.03 PRE-DIABETES: ICD-10-CM

## 2018-10-11 LAB
DEPRECATED RDW RBC AUTO: 49.5 FL (ref 37–54)
ERYTHROCYTE [DISTWIDTH] IN BLOOD BY AUTOMATED COUNT: 14.5 % (ref 11.3–14.5)
HBA1C MFR BLD: 6.4 % (ref 4.8–5.6)
HCT VFR BLD AUTO: 47.2 % (ref 38.9–50.9)
HGB BLD-MCNC: 15.8 G/DL (ref 13.1–17.5)
MCH RBC QN AUTO: 31.5 PG (ref 27–31)
MCHC RBC AUTO-ENTMCNC: 33.5 G/DL (ref 32–36)
MCV RBC AUTO: 94 FL (ref 80–99)
PLATELET # BLD AUTO: 232 10*3/MM3 (ref 150–450)
PMV BLD AUTO: 11.1 FL (ref 6–12)
RBC # BLD AUTO: 5.02 10*6/MM3 (ref 4.2–5.76)
WBC NRBC COR # BLD: 12.01 10*3/MM3 (ref 3.5–10.8)

## 2018-10-11 PROCEDURE — 36415 COLL VENOUS BLD VENIPUNCTURE: CPT

## 2018-10-11 PROCEDURE — 85027 COMPLETE CBC AUTOMATED: CPT | Performed by: NURSE PRACTITIONER

## 2018-10-11 PROCEDURE — 83036 HEMOGLOBIN GLYCOSYLATED A1C: CPT | Performed by: NURSE PRACTITIONER

## 2018-10-11 PROCEDURE — 71046 X-RAY EXAM CHEST 2 VIEWS: CPT

## 2018-10-11 NOTE — DISCHARGE INSTRUCTIONS

## 2018-10-12 ENCOUNTER — APPOINTMENT (OUTPATIENT)
Dept: CARDIOLOGY | Facility: HOSPITAL | Age: 63
End: 2018-10-12
Attending: INTERNAL MEDICINE

## 2018-10-12 ENCOUNTER — HOSPITAL ENCOUNTER (OUTPATIENT)
Facility: HOSPITAL | Age: 63
Setting detail: HOSPITAL OUTPATIENT SURGERY
Discharge: HOME | End: 2018-10-12
Attending: INTERNAL MEDICINE | Admitting: INTERNAL MEDICINE

## 2018-10-12 ENCOUNTER — OFFICE VISIT (OUTPATIENT)
Dept: INTERNAL MEDICINE | Facility: CLINIC | Age: 63
End: 2018-10-12

## 2018-10-12 VITALS
HEIGHT: 75 IN | SYSTOLIC BLOOD PRESSURE: 149 MMHG | BODY MASS INDEX: 39.17 KG/M2 | WEIGHT: 315 LBS | OXYGEN SATURATION: 99 % | DIASTOLIC BLOOD PRESSURE: 93 MMHG | HEART RATE: 58 BPM | TEMPERATURE: 98.2 F | RESPIRATION RATE: 16 BRPM

## 2018-10-12 VITALS
HEART RATE: 58 BPM | SYSTOLIC BLOOD PRESSURE: 150 MMHG | TEMPERATURE: 97.7 F | OXYGEN SATURATION: 96 % | BODY MASS INDEX: 38.36 KG/M2 | HEIGHT: 76 IN | WEIGHT: 315 LBS | RESPIRATION RATE: 16 BRPM | DIASTOLIC BLOOD PRESSURE: 102 MMHG

## 2018-10-12 DIAGNOSIS — R94.39 ABNORMAL STRESS TEST: ICD-10-CM

## 2018-10-12 DIAGNOSIS — I10 MALIGNANT HYPERTENSION: Primary | ICD-10-CM

## 2018-10-12 DIAGNOSIS — H66.90 ACUTE OTITIS MEDIA, UNSPECIFIED OTITIS MEDIA TYPE: ICD-10-CM

## 2018-10-12 DIAGNOSIS — R94.39 ABNORMAL MYOCARDIAL PERFUSION STUDY: ICD-10-CM

## 2018-10-12 DIAGNOSIS — I25.10 CORONARY ARTERY DISEASE INVOLVING NATIVE CORONARY ARTERY OF NATIVE HEART WITHOUT ANGINA PECTORIS: ICD-10-CM

## 2018-10-12 DIAGNOSIS — Z78.9 STATIN INTOLERANCE: ICD-10-CM

## 2018-10-12 LAB
ANION GAP SERPL CALCULATED.3IONS-SCNC: 8 MMOL/L (ref 3–11)
ARTICHOKE IGE QN: 104 MG/DL (ref 0–130)
BH CV ECHO MEAS - AI DEC SLOPE: 121.4 CM/SEC^2
BH CV ECHO MEAS - AI MAX PG: 47.2 MMHG
BH CV ECHO MEAS - AI MAX VEL: 343.4 CM/SEC
BH CV ECHO MEAS - AI P1/2T: 828.4 MSEC
BH CV ECHO MEAS - AO MAX PG (FULL): 12.9 MMHG
BH CV ECHO MEAS - AO MAX PG: 18 MMHG
BH CV ECHO MEAS - AO MEAN PG (FULL): 7.2 MMHG
BH CV ECHO MEAS - AO MEAN PG: 10 MMHG
BH CV ECHO MEAS - AO ROOT AREA (BSA CORRECTED): 1.7
BH CV ECHO MEAS - AO ROOT AREA: 17.6 CM^2
BH CV ECHO MEAS - AO ROOT DIAM: 4.7 CM
BH CV ECHO MEAS - AO V2 MAX: 210 CM/SEC
BH CV ECHO MEAS - AO V2 MEAN: 140.6 CM/SEC
BH CV ECHO MEAS - AO V2 VTI: 51.1 CM
BH CV ECHO MEAS - AVA(I,A): 2.6 CM^2
BH CV ECHO MEAS - AVA(I,D): 2.6 CM^2
BH CV ECHO MEAS - AVA(V,A): 2.4 CM^2
BH CV ECHO MEAS - AVA(V,D): 2.4 CM^2
BH CV ECHO MEAS - BSA(HAYCOCK): 2.9 M^2
BH CV ECHO MEAS - BSA: 2.7 M^2
BH CV ECHO MEAS - BZI_BMI: 39.9 KILOGRAMS/M^2
BH CV ECHO MEAS - BZI_METRIC_HEIGHT: 193 CM
BH CV ECHO MEAS - BZI_METRIC_WEIGHT: 148.8 KG
BH CV ECHO MEAS - EDV(CUBED): 179.2 ML
BH CV ECHO MEAS - EDV(MOD-SP2): 112 ML
BH CV ECHO MEAS - EDV(MOD-SP4): 193 ML
BH CV ECHO MEAS - EDV(TEICH): 156 ML
BH CV ECHO MEAS - EF(CUBED): 64.4 %
BH CV ECHO MEAS - EF(MOD-SP2): 53.6 %
BH CV ECHO MEAS - EF(MOD-SP4): 61.1 %
BH CV ECHO MEAS - EF(TEICH): 55.2 %
BH CV ECHO MEAS - ESV(CUBED): 63.8 ML
BH CV ECHO MEAS - ESV(MOD-SP2): 52 ML
BH CV ECHO MEAS - ESV(MOD-SP4): 75 ML
BH CV ECHO MEAS - ESV(TEICH): 69.9 ML
BH CV ECHO MEAS - FS: 29.1 %
BH CV ECHO MEAS - IVS/LVPW: 0.97
BH CV ECHO MEAS - IVSD: 1.4 CM
BH CV ECHO MEAS - LA DIMENSION: 4.5 CM
BH CV ECHO MEAS - LA/AO: 0.95
BH CV ECHO MEAS - LAD MAJOR: 7.3 CM
BH CV ECHO MEAS - LAT PEAK E' VEL: 3 CM/SEC
BH CV ECHO MEAS - LATERAL E/E' RATIO: 22.5
BH CV ECHO MEAS - LV DIASTOLIC VOL/BSA (35-75): 70.6 ML/M^2
BH CV ECHO MEAS - LV MASS(C)D: 399.7 GRAMS
BH CV ECHO MEAS - LV MASS(C)DI: 146.2 GRAMS/M^2
BH CV ECHO MEAS - LV MAX PG: 4.1 MMHG
BH CV ECHO MEAS - LV MEAN PG: 2.2 MMHG
BH CV ECHO MEAS - LV SYSTOLIC VOL/BSA (12-30): 27.4 ML/M^2
BH CV ECHO MEAS - LV V1 MAX: 101.3 CM/SEC
BH CV ECHO MEAS - LV V1 MEAN: 68.7 CM/SEC
BH CV ECHO MEAS - LV V1 VTI: 26.8 CM
BH CV ECHO MEAS - LVIDD: 5.6 CM
BH CV ECHO MEAS - LVIDS: 4 CM
BH CV ECHO MEAS - LVLD AP2: 8.3 CM
BH CV ECHO MEAS - LVLD AP4: 8.7 CM
BH CV ECHO MEAS - LVLS AP2: 6.6 CM
BH CV ECHO MEAS - LVLS AP4: 7.1 CM
BH CV ECHO MEAS - LVOT AREA (M): 4.9 CM^2
BH CV ECHO MEAS - LVOT AREA: 5 CM^2
BH CV ECHO MEAS - LVOT DIAM: 2.5 CM
BH CV ECHO MEAS - LVPWD: 1.5 CM
BH CV ECHO MEAS - MED PEAK E' VEL: 3.2 CM/SEC
BH CV ECHO MEAS - MEDIAL E/E' RATIO: 21.4
BH CV ECHO MEAS - MV A MAX VEL: 92.2 CM/SEC
BH CV ECHO MEAS - MV DEC TIME: 0.25 SEC
BH CV ECHO MEAS - MV E MAX VEL: 70.2 CM/SEC
BH CV ECHO MEAS - MV E/A: 0.76
BH CV ECHO MEAS - PA ACC SLOPE: 486.7 CM/SEC^2
BH CV ECHO MEAS - PA ACC TIME: 0.13 SEC
BH CV ECHO MEAS - PA PR(ACCEL): 18.4 MMHG
BH CV ECHO MEAS - PULM DIAS VEL: 35.5 CM/SEC
BH CV ECHO MEAS - PULM S/D: 1.4
BH CV ECHO MEAS - PULM SYS VEL: 49.1 CM/SEC
BH CV ECHO MEAS - RAP SYSTOLE: 3 MMHG
BH CV ECHO MEAS - RVDD: 3.4 CM
BH CV ECHO MEAS - RVSP: 24 MMHG
BH CV ECHO MEAS - SI(AO): 328.1 ML/M^2
BH CV ECHO MEAS - SI(CUBED): 42.2 ML/M^2
BH CV ECHO MEAS - SI(LVOT): 48.7 ML/M^2
BH CV ECHO MEAS - SI(MOD-SP2): 21.9 ML/M^2
BH CV ECHO MEAS - SI(MOD-SP4): 43.2 ML/M^2
BH CV ECHO MEAS - SI(TEICH): 31.5 ML/M^2
BH CV ECHO MEAS - SV(AO): 897.1 ML
BH CV ECHO MEAS - SV(CUBED): 115.3 ML
BH CV ECHO MEAS - SV(LVOT): 133 ML
BH CV ECHO MEAS - SV(MOD-SP2): 60 ML
BH CV ECHO MEAS - SV(MOD-SP4): 118 ML
BH CV ECHO MEAS - SV(TEICH): 86.2 ML
BH CV ECHO MEAS - TAPSE (>1.6): 2.4 CM2
BH CV ECHO MEAS - TR MAX PG: 21 MMHG
BH CV ECHO MEAS - TR MAX VEL: 228 CM/SEC
BH CV ECHO MEASUREMENTS AVERAGE E/E' RATIO: 22.65
BH CV VAS BP LEFT ARM: NORMAL MMHG
BH CV XLRA - RV BASE: 3.5 CM
BH CV XLRA - RV LENGTH: 7.2 CM
BH CV XLRA - RV MID: 3 CM
BH CV XLRA - TDI S': 15.1 CM/SEC
BUN BLD-MCNC: 22 MG/DL (ref 9–23)
BUN/CREAT SERPL: 14.1 (ref 7–25)
CALCIUM SPEC-SCNC: 9.4 MG/DL (ref 8.7–10.4)
CHLORIDE SERPL-SCNC: 101 MMOL/L (ref 99–109)
CHOLEST SERPL-MCNC: 169 MG/DL (ref 0–200)
CO2 SERPL-SCNC: 28 MMOL/L (ref 20–31)
CREAT BLD-MCNC: 1.56 MG/DL (ref 0.6–1.3)
GFR SERPL CREATININE-BSD FRML MDRD: 45 ML/MIN/1.73
GLUCOSE BLD-MCNC: 66 MG/DL (ref 70–100)
GLUCOSE BLDC GLUCOMTR-MCNC: 71 MG/DL (ref 70–130)
HDLC SERPL-MCNC: 38 MG/DL (ref 40–60)
LEFT ATRIUM VOLUME INDEX: 38.8 ML/M^2
LV EF 2D ECHO EST: 55 %
POTASSIUM BLD-SCNC: 4 MMOL/L (ref 3.5–5.5)
SODIUM BLD-SCNC: 137 MMOL/L (ref 132–146)
TRIGL SERPL-MCNC: 209 MG/DL (ref 0–150)

## 2018-10-12 PROCEDURE — 93306 TTE W/DOPPLER COMPLETE: CPT | Performed by: INTERNAL MEDICINE

## 2018-10-12 PROCEDURE — 25010000002 MIDAZOLAM PER 1 MG: Performed by: INTERNAL MEDICINE

## 2018-10-12 PROCEDURE — 25010000002 HEPARIN (PORCINE) PER 1000 UNITS: Performed by: INTERNAL MEDICINE

## 2018-10-12 PROCEDURE — 99214 OFFICE O/P EST MOD 30 MIN: CPT | Performed by: INTERNAL MEDICINE

## 2018-10-12 PROCEDURE — 25010000002 FENTANYL CITRATE (PF) 100 MCG/2ML SOLUTION: Performed by: INTERNAL MEDICINE

## 2018-10-12 PROCEDURE — 80048 BASIC METABOLIC PNL TOTAL CA: CPT | Performed by: INTERNAL MEDICINE

## 2018-10-12 PROCEDURE — 82962 GLUCOSE BLOOD TEST: CPT

## 2018-10-12 PROCEDURE — C1894 INTRO/SHEATH, NON-LASER: HCPCS | Performed by: INTERNAL MEDICINE

## 2018-10-12 PROCEDURE — 0 IOPAMIDOL PER 1 ML: Performed by: INTERNAL MEDICINE

## 2018-10-12 PROCEDURE — 80061 LIPID PANEL: CPT | Performed by: INTERNAL MEDICINE

## 2018-10-12 PROCEDURE — 93454 CORONARY ARTERY ANGIO S&I: CPT | Performed by: INTERNAL MEDICINE

## 2018-10-12 PROCEDURE — 36415 COLL VENOUS BLD VENIPUNCTURE: CPT

## 2018-10-12 PROCEDURE — 93306 TTE W/DOPPLER COMPLETE: CPT

## 2018-10-12 RX ORDER — SODIUM CHLORIDE 0.9 % (FLUSH) 0.9 %
1-10 SYRINGE (ML) INJECTION AS NEEDED
Status: DISCONTINUED | OUTPATIENT
Start: 2018-10-12 | End: 2018-10-12 | Stop reason: HOSPADM

## 2018-10-12 RX ORDER — HYDROCODONE BITARTRATE AND ACETAMINOPHEN 7.5; 325 MG/1; MG/1
1 TABLET ORAL EVERY 4 HOURS PRN
Status: DISCONTINUED | OUTPATIENT
Start: 2018-10-12 | End: 2018-10-12 | Stop reason: HOSPADM

## 2018-10-12 RX ORDER — LOVASTATIN 20 MG/1
20 TABLET ORAL NIGHTLY
Qty: 30 TABLET | Refills: 11 | Status: SHIPPED | OUTPATIENT
Start: 2018-10-12 | End: 2018-11-28 | Stop reason: DRUGHIGH

## 2018-10-12 RX ORDER — SODIUM CHLORIDE 0.9 % (FLUSH) 0.9 %
3 SYRINGE (ML) INJECTION EVERY 12 HOURS SCHEDULED
Status: DISCONTINUED | OUTPATIENT
Start: 2018-10-12 | End: 2018-10-12 | Stop reason: HOSPADM

## 2018-10-12 RX ORDER — SODIUM CHLORIDE 9 MG/ML
100 INJECTION, SOLUTION INTRAVENOUS CONTINUOUS
Status: ACTIVE | OUTPATIENT
Start: 2018-10-12 | End: 2018-10-12

## 2018-10-12 RX ORDER — FENTANYL CITRATE 50 UG/ML
INJECTION, SOLUTION INTRAMUSCULAR; INTRAVENOUS AS NEEDED
Status: DISCONTINUED | OUTPATIENT
Start: 2018-10-12 | End: 2018-10-12 | Stop reason: HOSPADM

## 2018-10-12 RX ORDER — ACETAMINOPHEN 325 MG/1
650 TABLET ORAL EVERY 4 HOURS PRN
Status: DISCONTINUED | OUTPATIENT
Start: 2018-10-12 | End: 2018-10-12 | Stop reason: HOSPADM

## 2018-10-12 RX ORDER — SODIUM CHLORIDE 9 MG/ML
1-3 INJECTION, SOLUTION INTRAVENOUS CONTINUOUS
Status: DISCONTINUED | OUTPATIENT
Start: 2018-10-12 | End: 2018-10-12 | Stop reason: HOSPADM

## 2018-10-12 RX ORDER — MORPHINE SULFATE 2 MG/ML
1 INJECTION, SOLUTION INTRAMUSCULAR; INTRAVENOUS EVERY 4 HOURS PRN
Status: DISCONTINUED | OUTPATIENT
Start: 2018-10-12 | End: 2018-10-12 | Stop reason: HOSPADM

## 2018-10-12 RX ORDER — LIDOCAINE HYDROCHLORIDE 10 MG/ML
INJECTION, SOLUTION EPIDURAL; INFILTRATION; INTRACAUDAL; PERINEURAL AS NEEDED
Status: DISCONTINUED | OUTPATIENT
Start: 2018-10-12 | End: 2018-10-12 | Stop reason: HOSPADM

## 2018-10-12 RX ORDER — ASPIRIN 325 MG
325 TABLET, DELAYED RELEASE (ENTERIC COATED) ORAL DAILY
Status: DISCONTINUED | OUTPATIENT
Start: 2018-10-13 | End: 2018-10-12 | Stop reason: HOSPADM

## 2018-10-12 RX ORDER — ALPRAZOLAM 0.25 MG/1
0.25 TABLET ORAL 3 TIMES DAILY PRN
Status: DISCONTINUED | OUTPATIENT
Start: 2018-10-12 | End: 2018-10-12 | Stop reason: HOSPADM

## 2018-10-12 RX ORDER — NALOXONE HCL 0.4 MG/ML
0.4 VIAL (ML) INJECTION
Status: DISCONTINUED | OUTPATIENT
Start: 2018-10-12 | End: 2018-10-12 | Stop reason: HOSPADM

## 2018-10-12 RX ORDER — ASPIRIN 325 MG
325 TABLET ORAL ONCE
Status: COMPLETED | OUTPATIENT
Start: 2018-10-12 | End: 2018-10-12

## 2018-10-12 RX ORDER — NITROGLYCERIN 0.4 MG/1
0.4 TABLET SUBLINGUAL
Status: DISCONTINUED | OUTPATIENT
Start: 2018-10-12 | End: 2018-10-12 | Stop reason: HOSPADM

## 2018-10-12 RX ORDER — BLOOD-GLUCOSE METER
1 KIT MISCELLANEOUS AS NEEDED
Qty: 1 EACH | Refills: 1 | Status: ON HOLD | OUTPATIENT
Start: 2018-10-12 | End: 2020-01-02

## 2018-10-12 RX ORDER — TERAZOSIN 5 MG/1
5 CAPSULE ORAL NIGHTLY
Qty: 30 CAPSULE | Refills: 11 | Status: SHIPPED | OUTPATIENT
Start: 2018-10-12 | End: 2018-10-29

## 2018-10-12 RX ORDER — ONDANSETRON 2 MG/ML
4 INJECTION INTRAMUSCULAR; INTRAVENOUS EVERY 6 HOURS PRN
Status: DISCONTINUED | OUTPATIENT
Start: 2018-10-12 | End: 2018-10-12 | Stop reason: HOSPADM

## 2018-10-12 RX ORDER — MIDAZOLAM HYDROCHLORIDE 1 MG/ML
INJECTION INTRAMUSCULAR; INTRAVENOUS AS NEEDED
Status: DISCONTINUED | OUTPATIENT
Start: 2018-10-12 | End: 2018-10-12 | Stop reason: HOSPADM

## 2018-10-12 RX ORDER — AMOXICILLIN AND CLAVULANATE POTASSIUM 875; 125 MG/1; MG/1
1 TABLET, FILM COATED ORAL EVERY 12 HOURS SCHEDULED
Qty: 20 TABLET | Refills: 0 | Status: SHIPPED | OUTPATIENT
Start: 2018-10-12 | End: 2018-10-29

## 2018-10-12 RX ADMIN — SODIUM CHLORIDE 3 ML/KG/HR: 9 INJECTION, SOLUTION INTRAVENOUS at 07:38

## 2018-10-12 RX ADMIN — ASPIRIN 325 MG ORAL TABLET 325 MG: 325 PILL ORAL at 07:38

## 2018-10-12 NOTE — INTERVAL H&P NOTE
H&P updated. The patient was examined and the following changes are noted:  ·Occasional PVCs and aberrancy  No ischemia.  LVEF 40%.    Cardiac catheterization therefore recommended.  Right radial access.  He would like to have slight band removed in the future but he could wait a year for that.    Yoselin Johnson M.D. [facility]

## 2018-10-12 NOTE — PROGRESS NOTES
Subjective     Patient ID: Andre Agosto is a 63 y.o. male. Patient is here for management of multiple medical problems.     Chief Complaint   Patient presents with   • Hypertension     follow-up     Hypertension   This is a chronic problem. The current episode started more than 1 year ago. The problem is uncontrolled. Associated symptoms include anxiety, blurred vision, chest pain and headaches. There are no associated agents to hypertension. Current antihypertension treatment includes beta blockers, ACE inhibitors, diuretics and calcium channel blockers.        Pt with poor diet control.     Ear pain in right ear.   On cpap.  Wbc elevated.            The following portions of the patient's history were reviewed and updated as appropriate: allergies, current medications, past family history, past medical history, past social history, past surgical history and problem list.    Review of Systems   Eyes: Positive for blurred vision.   Cardiovascular: Positive for chest pain.   Neurological: Positive for headaches.       Current Outpatient Prescriptions:   •  Alcohol Swabs (ALCOHOL PREP) pads, 1 pad 4 (Four) Times a Day As Needed (bs)., Disp: 120 each, Rfl: 12  •  aspirin 81 MG tablet, Take 1 tablet by mouth Daily., Disp: 30 tablet, Rfl: 11  •  divalproex (DEPAKOTE) 250 MG DR tablet, Take 1 tablet by mouth 3 (Three) Times a Day., Disp: 90 tablet, Rfl: 2  •  escitalopram (LEXAPRO) 20 MG tablet, TAKE 1 TABLET DAILY, Disp: 90 tablet, Rfl: 2  •  glipiZIDE (GLUCOTROL) 5 MG tablet, Take 1 tablet by mouth 2 (Two) Times a Day Before Meals., Disp: 180 tablet, Rfl: 3  •  glucose blood test strip, Use one to two times daily to check blood sugar, Disp: 100 each, Rfl: 12  •  glucose monitor monitoring kit, 1 each As Needed (as directed). Diagnosis Code E11.9, Disp: 1 each, Rfl: 0  •  Lancets (FREESTYLE) lancets, 1 each by Other route As Needed (bs)., Disp: 120 each, Rfl: 12  •  losartan (COZAAR) 100 MG tablet, Take 1 tablet by  "mouth Daily., Disp: 30 tablet, Rfl: 11  •  lovastatin (MEVACOR) 20 MG tablet, Take 1 tablet by mouth Every Night., Disp: 30 tablet, Rfl: 11  •  Magnesium Oxide 400 (240 MG) MG tablet, Take 1 tablet by mouth daily. (Patient taking differently: Take 1 tablet by mouth Daily As Needed.), Disp: 30 tablet, Rfl: 11  •  spironolactone (ALDACTONE) 50 MG tablet, Take 1 tablet by mouth 2 (Two) Times a Day., Disp: 60 tablet, Rfl: 5  •  terazosin (HYTRIN) 5 MG capsule, Take 1 capsule by mouth Every Night., Disp: 30 capsule, Rfl: 11  •  amoxicillin-clavulanate (AUGMENTIN) 875-125 MG per tablet, Take 1 tablet by mouth Every 12 (Twelve) Hours., Disp: 20 tablet, Rfl: 0  •  glucose monitor monitoring kit, 1 each As Needed (bs)., Disp: 1 each, Rfl: 1  No current facility-administered medications for this visit.     Objective      Blood pressure 149/93, pulse 58, temperature 98.2 °F (36.8 °C), temperature source Oral, resp. rate 16, height 190.5 cm (75\"), weight (!) 148 kg (327 lb), SpO2 99 %.    Physical Exam     General Appearance:    Alert, cooperative, no distress, appears stated age   Head:    Normocephalic, without obvious abnormality, atraumatic   Eyes:    PERRL, conjunctiva/corneas clear, EOM's intact   Ears:    Normal TM's and external ear canals, both ears   Nose:   Nares normal, septum midline, mucosa normal, no drainage   or sinus tenderness   Throat:   Lips, mucosa, and tongue normal; teeth and gums normal   Neck:   Supple, symmetrical, trachea midline, no adenopathy;        thyroid:  No enlargement/tenderness/nodules; no carotid    bruit or JVD   Back:     Symmetric, no curvature, ROM normal, no CVA tenderness   Lungs:     Clear to auscultation bilaterally, respirations unlabored   Chest wall:    No tenderness or deformity   Heart:    Regular rate and rhythm, S1 and S2 normal, no murmur,        rub or gallop   Abdomen:     Soft, non-tender, bowel sounds active all four quadrants,     no masses, no organomegaly "   Extremities:   Extremities normal, atraumatic, no cyanosis or edema   Pulses:   2+ and symmetric all extremities   Skin:   Skin color, texture, turgor normal, no rashes or lesions   Lymph nodes:   Cervical, supraclavicular, and axillary nodes normal   Neurologic:   CNII-XII intact. Normal strength, sensation and reflexes       throughout      Results for orders placed or performed during the hospital encounter of 10/12/18   Lipid Panel   Result Value Ref Range    Total Cholesterol 169 0 - 200 mg/dL    Triglycerides 209 (H) 0 - 150 mg/dL    HDL Cholesterol 38 (L) 40 - 60 mg/dL    LDL Cholesterol  104 0 - 130 mg/dL   Basic Metabolic Panel   Result Value Ref Range    Glucose 66 (L) 70 - 100 mg/dL    BUN 22 9 - 23 mg/dL    Creatinine 1.56 (H) 0.60 - 1.30 mg/dL    Sodium 137 132 - 146 mmol/L    Potassium 4.0 3.5 - 5.5 mmol/L    Chloride 101 99 - 109 mmol/L    CO2 28.0 20.0 - 31.0 mmol/L    Calcium 9.4 8.7 - 10.4 mg/dL    eGFR Non African Amer 45 (L) >60 mL/min/1.73    BUN/Creatinine Ratio 14.1 7.0 - 25.0    Anion Gap 8.0 3.0 - 11.0 mmol/L   POC Glucose Once   Result Value Ref Range    Glucose 71 70 - 130 mg/dL   Adult Transthoracic Echo Complete W/ Cont if Necessary Per Protocol   Result Value Ref Range    BSA 2.7 m^2     CV ECHO EARL - BZI_BMI 39.9 kilograms/m^2     CV ECHO EARL - BSA([facility]) 2.9 m^2     CV ECHO EARL - BZI_METRIC_WEIGHT 148.8 kg     CV ECHO EARL - BZI_METRIC_HEIGHT 193.0 cm     CV ECHO EARL - RVDD 3.4 cm    IVSd 1.4 cm    LVIDd 5.6 cm    LVIDs 4.0 cm    LVPWd 1.5 cm    IVS/LVPW 0.97     FS 29.1 %    EDV(Teich) 156.0 ml    ESV(Teich) 69.9 ml    EF(Teich) 55.2 %    EDV(cubed) 179.2 ml    ESV(cubed) 63.8 ml    EF(cubed) 64.4 %    LV mass(C)d 399.7 grams    LV mass(C)dI 146.2 grams/m^2    SV(Teich) 86.2 ml    SI(Teich) 31.5 ml/m^2    SV(cubed) 115.3 ml    SI(cubed) 42.2 ml/m^2    Ao root diam 4.7 cm    Ao root area 17.6 cm^2    LA dimension 4.5 cm    LA/Ao 0.95     LVOT diam 2.5 cm    LVOT area  5.0 cm^2    LVOT area(traced) 4.9 cm^2    LAd major 7.3 cm    LVLd ap4 8.7 cm    EDV(MOD-sp4) 193.0 ml    LVLs ap4 7.1 cm    ESV(MOD-sp4) 75.0 ml    EF(MOD-sp4) 61.1 %    LVLd ap2 8.3 cm    EDV(MOD-sp2) 112.0 ml    LVLs ap2 6.6 cm    ESV(MOD-sp2) 52.0 ml    EF(MOD-sp2) 53.6 %    SV(MOD-sp4) 118.0 ml    SI(MOD-sp4) 43.2 ml/m^2    SV(MOD-sp2) 60.0 ml    SI(MOD-sp2) 21.9 ml/m^2    Ao root area (BSA corrected) 1.7     LV Stone Vol (BSA corrected) 70.6 ml/m^2    LV Sys Vol (BSA corrected) 27.4 ml/m^2    LA Volume Index 38.8 ml/m^2    MV E max irvin 70.2 cm/sec    MV A max irvin 92.2 cm/sec    MV E/A 0.76     MV dec time 0.25 sec    Ao pk irvin 210.0 cm/sec    Ao max PG 18.0 mmHg    Ao max PG (full) 12.9 mmHg    Ao V2 mean 140.6 cm/sec    Ao mean PG 10.0 mmHg    Ao mean PG (full) 7.2 mmHg    Ao V2 VTI 51.1 cm    DAYA(I,A) 2.6 cm^2    DAYA(I,D) 2.6 cm^2    DAYA(V,A) 2.4 cm^2    DAYA(V,D) 2.4 cm^2    AI max irvin 343.4 cm/sec    AI max PG 47.2 mmHg    AI dec slope 121.4 cm/sec^2    AI P1/2t 828.4 msec    LV V1 max PG 4.1 mmHg    LV V1 mean PG 2.2 mmHg    LV V1 max 101.3 cm/sec    LV V1 mean 68.7 cm/sec    LV V1 VTI 26.8 cm    SV(Ao) 897.1 ml    SI(Ao) 328.1 ml/m^2    SV(LVOT) 133.0 ml    SI(LVOT) 48.7 ml/m^2    PA acc slope 486.7 cm/sec^2    PA acc time 0.13 sec    TR max irvin 228.0 cm/sec    BH CV ECHO EARL - TR MAX PG 21.0 mmHg    PA pr(Accel) 18.4 mmHg    Pulm Sys Irvin 49.1 cm/sec    Pulm Stone Irvin 35.5 cm/sec    Pulm S/D 1.4     Lat E/e'  22.5     Med E/e' 21.4     Lat Peak E' Irvin 3.0 cm/sec    Med Peak E' Irvin 3.2 cm/sec    Avg E/e' ratio 22.65     BH CV VAS BP LEFT /90 mmHg    TDI S' 15.10 cm/sec    RV Base 3.50 cm    RV Length 7.20 cm    RV Mid 3.00 cm    RAP systole 3 mmHg    RVSP(TR) 24 mmHg    TAPSE (>1.6) 2.40 cm2    Echo EF Estimated 55 %         Assessment/Plan   Pt not on new meds and getting started soon.      Ear pain.  Likely inner ear infection.    Andre was seen today for hypertension.    Diagnoses and all orders  for this visit:    Malignant hypertension  -     Comprehensive Metabolic Panel  -     Ambulatory Referral to Cardiac Rehab    Coronary artery disease involving native coronary artery of native heart without angina pectoris  -     Comprehensive Metabolic Panel  -     CBC & Differential  -     Ambulatory Referral to Cardiac Rehab    Acute otitis media, unspecified otitis media type    Other orders  -     amoxicillin-clavulanate (AUGMENTIN) 875-125 MG per tablet; Take 1 tablet by mouth Every 12 (Twelve) Hours.  -     glucose monitor monitoring kit; 1 each As Needed (bs).      Return in about 5 days (around 10/17/2018).          There are no Patient Instructions on file for this visit.     Keven Bear MD    Assessment/Plan

## 2018-10-12 NOTE — H&P (VIEW-ONLY)
Encounter Date:09/12/2018    Location: [facility]    Patient ID: Andre Agosto is a 63 y.o. male    1955  Subjective:      Chief Complaint/Reason for visit:    Chief Complaint   Patient presents with   • Hypertension       Problem List:  1. Coronary artery disease  1. Stress echo, Alexandra, 1/28/14:  Borderline abnormal, but low probability for significant focal obstructive CAD  2. NSTEMI 12/2015 (Reji) with PCI of OM branch and circ (3.5 x 20 Synchrony), EF 50%.    3. LHC (emergent re-look) 12/2015:  Patent stent to circ; LAD with 50-60% prox-mid stenosis        2.   Echo 12/2015:  EF 55-60%, no significant valve disease        3.   Cardiolite 3/3/16:  No evidence of ischemia  2. Hypertension  1. Renal artery duplex 1/2014:  No evidence of renal artery stenosis  2. Renal artery duplex 7/27/18:  No evidence of renal artery stenosis  3. Hyperlipidemia  4. Hx CVA 6/2016  1. Carotid duplex 12/28/15, <50% stenosis bilaterally  2. 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  ALEX, 2010        1.   Sleep eval 8/14/18:  Normal (?); wears bi-pap  6.  Seizures; partial focal seizure   -2005: meningitis and encephalitis  7.  Chronic hypokalemia; neph?      HPI:  Patient is a pleasant 63-year-old  male with the above-noted medical history who presents today to establish care for his known coronary disease, hypertension and hyperlipidemia.  He had his non-STEMI in December 2015 and received a stent to his circumflex with Dr. Mendoza.  His primary symptoms at that time were extreme diaphoresis as well as nausea and vomiting.  He had his last Cardiolite stress test in March 2016 which revealed no evidence of ischemia.  In June 2016 he had a CVA with some residual right sided vision deficits causing chronic blurry vision.  He also suffers from seizures since 2005 (partial focal seizures of the right arm) when he was diagnosed as having meningitis and encephalitis.  Due to  those diagnoses, he also notes he has a chronic spot on his brainstem.  He remains active throughout his daily life but does not follow any kind of routine exercise regimen.  He continues to take Plavix 75 mg daily.  Of concern, his blood pressure seems to be difficult to manage.  He states he typically stays in the 140-160 systolic range and feels best when it is in the 140s.  He had some recent medication adjustments to his medications after being admitted in [place], [place] for what they thought was a recurrent stroke.  Come to find out, he was having slurred speech and confusion due to issues with his blood pressure medication and bradycardia.  He would like to be on as few medications as possible.  He had his lipids checked last month indicated an LDL of 85 with an HDL of 31.  For this reason we would like to increase his lovastatin to 40 mg daily at bedtime for optimization of medical therapy to obtain a goal of an LDL less than 70 mg/dL.  He currently denies having any chest pain, shortness of breath, dyspnea on exertion, edema, palpitations, dizziness and syncope.  He does admit to intermittent fatigue and will occasionally take a nap in the late afternoons.  This is not limiting to his daily lifestyle.           Cardiac ROS:  Positive for fatigue and snoring/ ALEX (compliant).  Negative for chest pain, shortness of breath, dyspnea on exertion, orthopnea, PND, fatigue, edema, palpitations, dizziness, pre-syncope/ syncope    Cardiac Risk Factors: advanced age (older than 55 for men, 65 for women), dyslipidemia, hypertension, male gender, obesity (BMI >= 30 kg/m2) and sedentary lifestyle, former smoker  Social History     Social History   • Marital status:      Spouse name: N/A   • Number of children: N/A   • Years of education: N/A     Occupational History   • Not on file.     Social History Main Topics   • Smoking status: Former Smoker     Packs/day: 0.50     Years: 10.00     Start date: 1/1/1975      Quit date: 12/31/1985   • Smokeless tobacco: Never Used   • Alcohol use No   • Drug use: No   • Sexual activity: Not Currently     Other Topics Concern   • Not on file     Social History Narrative   • No narrative on file       family history includes COPD in his father; Hypertension in his father and mother; Stroke in his mother.     has a past medical history of Anxiety and depression; Coronary artery disease involving native coronary artery of native heart without angina pectoris (9/12/2018); Diabetes mellitus (CMS/[facility]); Dyspepsia; Epilepsy (CMS/[facility]); Heart attack (06/15/2016); History of Helicobacter pylori infection; HL (hearing loss); Hyperlipidemia; Hypertension; Memory loss (2005); Obesity; Seizures (CMS/[facility]); Sleep apnea; Stroke (CMS/[facility]); and Vision loss (7/2018).    No Known Allergies      Current Outpatient Prescriptions:   •  Alcohol Swabs (ALCOHOL PREP) pads, 1 pad 4 (Four) Times a Day As Needed (bs)., Disp: 120 each, Rfl: 12  •  clopidogrel (PLAVIX) 75 MG tablet, TAKE 1 TABLET DAILY, Disp: 90 tablet, Rfl: 3  •  divalproex (DEPAKOTE) 250 MG DR tablet, Take 1 tablet by mouth 3 (Three) Times a Day., Disp: 90 tablet, Rfl: 2  •  escitalopram (LEXAPRO) 20 MG tablet, TAKE 1 TABLET DAILY, Disp: 90 tablet, Rfl: 2  •  glipiZIDE (GLUCOTROL) 5 MG tablet, Take 1 tablet by mouth 2 (Two) Times a Day Before Meals., Disp: 180 tablet, Rfl: 3  •  glucose blood test strip, Use one to two times daily to check blood sugar, Disp: 100 each, Rfl: 12  •  glucose monitor monitoring kit, 1 each As Needed (as directed). Diagnosis Code E11.9, Disp: 1 each, Rfl: 0  •  Lancets (FREESTYLE) lancets, 1 each by Other route As Needed (bs)., Disp: 120 each, Rfl: 12  •  losartan (COZAAR) 100 MG tablet, Take 1 tablet by mouth Daily., Disp: 30 tablet, Rfl: 11  •  lovastatin (MEVACOR) 20 MG tablet, Take 0.5 tablets by mouth every night. (Patient taking differently: Take 20 mg by mouth Every Night. Patient taking 1 tablet daily.), Disp: 30  tablet, Rfl: 11  •  Magnesium Oxide 400 (240 MG) MG tablet, Take 1 tablet by mouth daily. (Patient taking differently: Take 1 tablet by mouth Daily As Needed.), Disp: 30 tablet, Rfl: 11  •  nebivolol (BYSTOLIC) 5 MG tablet, Take 1 tablet by mouth Daily., Disp: 90 tablet, Rfl: 3  •  NIFEdipine XL (ADALAT CC) 60 MG 24 hr tablet, Take 1 tablet by mouth Daily., Disp: 90 tablet, Rfl: 3  •  spironolactone (ALDACTONE) 50 MG tablet, Take 1 tablet by mouth 2 (Two) Times a Day., Disp: 60 tablet, Rfl: 5  •  zonisamide (ZONEGRAN) 100 MG capsule, TAKE 1 CAPSULE THREE TIMES A DAY, Disp: 270 capsule, Rfl: 3    Review of Systems   Constitution: Positive for malaise/fatigue.   HENT: Positive for hearing loss (left ear).    Eyes: Positive for blurred vision.   Cardiovascular: Negative.    Respiratory: Negative.    Endocrine: Negative.    Hematologic/Lymphatic: Negative.    Skin: Negative.    Musculoskeletal: Negative.    Gastrointestinal: Negative.  Negative for nausea and vomiting.   Genitourinary: Negative.  Negative for bladder incontinence.   Neurological: Negative.  Negative for dizziness, headaches, light-headedness and vertigo.   Psychiatric/Behavioral: Negative.    Allergic/Immunologic: Negative.        Vitals:    09/12/18 1125   BP: (!) 192/106   Pulse: 57          Objective:       Physical Exam   Constitutional: He is oriented to person, place, and time. He appears well-developed and well-nourished.   HENT:   Head: Normocephalic and atraumatic.   Deaf in left ear   Eyes: Pupils are equal, round, and reactive to light. Right eye exhibits no discharge. Left eye exhibits no discharge.   Chronic double vision right eye   Neck: Normal range of motion. Neck supple. No JVD present.   Cardiovascular: Normal rate, regular rhythm, normal heart sounds and intact distal pulses.  Exam reveals no gallop and no friction rub.    No murmur heard.  Pulmonary/Chest: Effort normal and breath sounds normal. He has no wheezes.   Abdominal: Soft.  Bowel sounds are normal. There is no tenderness.   Musculoskeletal: Normal range of motion. He exhibits no edema.   Neurological: He is alert and oriented to person, place, and time.   Skin: Skin is warm and dry.   Psychiatric: He has a normal mood and affect. His behavior is normal.       Data Review:     ECG 12 Lead  Date/Time: 9/12/2018 12:05 PM  Performed by: BOBBY JESUS  Authorized by: BOBBY JESUS   Comparison: compared with previous ECG   Similar to previous ECG  Rate: bradycardic  BPM: 57  Clinical impression: abnormal ECG  Comments: 1st degree AV block  Septal infarct; age undetermined  Possible inferior infarct, age undetermined  T wave abnormality, consider lateral ischemia            Lab Results   Component Value Date    CHOL 178 08/14/2018    CHLPL 272 (H) 07/03/2018    TRIG 308 (H) 08/14/2018    HDL 31 (L) 08/14/2018    LDL 85 08/14/2018     Lab Results   Component Value Date    GLUCOSE 117 (H) 09/11/2018    BUN 20 09/11/2018    CREATININE 1.60 (H) 09/11/2018    EGFRIFNONA 44 (L) 09/11/2018    EGFRIFAFRI 53 (L) 07/03/2018    BCR 12.5 09/11/2018    K 4.4 09/11/2018    CO2 29.0 09/11/2018    CALCIUM 9.3 09/11/2018    PROTENTOTREF 7.6 07/03/2018    ALBUMIN 4.50 08/14/2018    LABIL2 1.2 07/03/2018    AST 28 08/14/2018    ALT 32 08/14/2018         Assessment:      ICD-10-CM ICD-9-CM   1. Coronary artery disease involving native coronary artery of native heart without angina pectoris I25.10 414.01   2. Mixed hyperlipidemia E78.2 272.2   3. Renovascular hypertension I15.0 405.91       Plan:  Increase lovastatin to 40mg qhs; recheck in labs in 3 months  GXT Cardiolite  Encouraged routine exercise and dietary modifications  Continue all other medications as directed  F/up in 6 months or sooner if needed.        Scribed for Yoselin Johnson MD by YESY Cao. 9/12/2018  1:09 PM     I Yoselin Johnson MD personally performed the services described in this documentation as scribed by the above  individual in my presence, and it is both accurate and complete.    Yoselin Johnson MD, FACC

## 2018-10-15 RX ORDER — DIVALPROEX SODIUM 250 MG/1
TABLET, DELAYED RELEASE ORAL
Qty: 90 TABLET | Refills: 1 | Status: SHIPPED | OUTPATIENT
Start: 2018-10-15 | End: 2018-10-29 | Stop reason: SDUPTHER

## 2018-10-16 ENCOUNTER — APPOINTMENT (OUTPATIENT)
Dept: LAB | Facility: HOSPITAL | Age: 63
End: 2018-10-16
Attending: INTERNAL MEDICINE

## 2018-10-16 LAB
ALBUMIN SERPL-MCNC: 4.4 G/DL (ref 3.5–5)
ALBUMIN/GLOB SERPL: 1.1 G/DL (ref 1–2)
ALP SERPL-CCNC: 53 U/L (ref 38–126)
ALT SERPL W P-5'-P-CCNC: 40 U/L (ref 13–69)
ANION GAP SERPL CALCULATED.3IONS-SCNC: 12.4 MMOL/L (ref 10–20)
AST SERPL-CCNC: 33 U/L (ref 15–46)
BASOPHILS # BLD AUTO: 0.08 10*3/MM3 (ref 0–0.2)
BASOPHILS NFR BLD AUTO: 0.9 % (ref 0–2.5)
BILIRUB SERPL-MCNC: 0.5 MG/DL (ref 0.2–1.3)
BUN BLD-MCNC: 28 MG/DL (ref 7–20)
BUN/CREAT SERPL: 15.6 (ref 6.3–21.9)
CALCIUM SPEC-SCNC: 9.7 MG/DL (ref 8.4–10.2)
CHLORIDE SERPL-SCNC: 104 MMOL/L (ref 98–107)
CO2 SERPL-SCNC: 27 MMOL/L (ref 26–30)
CREAT BLD-MCNC: 1.8 MG/DL (ref 0.6–1.3)
DEPRECATED RDW RBC AUTO: 46.9 FL (ref 37–54)
EOSINOPHIL # BLD AUTO: 0.34 10*3/MM3 (ref 0–0.7)
EOSINOPHIL NFR BLD AUTO: 3.7 % (ref 0–7)
ERYTHROCYTE [DISTWIDTH] IN BLOOD BY AUTOMATED COUNT: 14 % (ref 11.5–14.5)
GFR SERPL CREATININE-BSD FRML MDRD: 38 ML/MIN/1.73
GLOBULIN UR ELPH-MCNC: 4.1 GM/DL
GLUCOSE BLD-MCNC: 78 MG/DL (ref 74–98)
HCT VFR BLD AUTO: 43.9 % (ref 42–52)
HGB BLD-MCNC: 14.7 G/DL (ref 14–18)
IMM GRANULOCYTES # BLD: 0.04 10*3/MM3 (ref 0–0.06)
IMM GRANULOCYTES NFR BLD: 0.4 % (ref 0–0.6)
LYMPHOCYTES # BLD AUTO: 2.23 10*3/MM3 (ref 0.6–3.4)
LYMPHOCYTES NFR BLD AUTO: 24.3 % (ref 10–50)
MCH RBC QN AUTO: 30.5 PG (ref 27–31)
MCHC RBC AUTO-ENTMCNC: 33.5 G/DL (ref 30–37)
MCV RBC AUTO: 91.1 FL (ref 80–94)
MONOCYTES # BLD AUTO: 0.56 10*3/MM3 (ref 0–0.9)
MONOCYTES NFR BLD AUTO: 6.1 % (ref 0–12)
NEUTROPHILS # BLD AUTO: 5.92 10*3/MM3 (ref 2–6.9)
NEUTROPHILS NFR BLD AUTO: 64.6 % (ref 37–80)
NRBC BLD MANUAL-RTO: 0 /100 WBC (ref 0–0)
PLATELET # BLD AUTO: 199 10*3/MM3 (ref 130–400)
PMV BLD AUTO: 10.9 FL (ref 6–12)
POTASSIUM BLD-SCNC: 4.4 MMOL/L (ref 3.5–5.1)
PROT SERPL-MCNC: 8.5 G/DL (ref 6.3–8.2)
RBC # BLD AUTO: 4.82 10*6/MM3 (ref 4.7–6.1)
SODIUM BLD-SCNC: 139 MMOL/L (ref 137–145)
WBC NRBC COR # BLD: 9.17 10*3/MM3 (ref 4.8–10.8)

## 2018-10-16 PROCEDURE — 36415 COLL VENOUS BLD VENIPUNCTURE: CPT | Performed by: INTERNAL MEDICINE

## 2018-10-16 PROCEDURE — 80053 COMPREHEN METABOLIC PANEL: CPT | Performed by: INTERNAL MEDICINE

## 2018-10-16 PROCEDURE — 85025 COMPLETE CBC W/AUTO DIFF WBC: CPT | Performed by: INTERNAL MEDICINE

## 2018-10-17 ENCOUNTER — OFFICE VISIT (OUTPATIENT)
Dept: INTERNAL MEDICINE | Facility: CLINIC | Age: 63
End: 2018-10-17

## 2018-10-17 VITALS
OXYGEN SATURATION: 98 % | SYSTOLIC BLOOD PRESSURE: 182 MMHG | RESPIRATION RATE: 16 BRPM | TEMPERATURE: 98.2 F | WEIGHT: 315 LBS | BODY MASS INDEX: 39.17 KG/M2 | DIASTOLIC BLOOD PRESSURE: 95 MMHG | HEART RATE: 64 BPM | HEIGHT: 75 IN

## 2018-10-17 DIAGNOSIS — Z99.89 BIPAP (BIPHASIC POSITIVE AIRWAY PRESSURE) DEPENDENCE: ICD-10-CM

## 2018-10-17 DIAGNOSIS — E11.9 DIABETES MELLITUS WITHOUT COMPLICATION (HCC): ICD-10-CM

## 2018-10-17 DIAGNOSIS — N17.9 AKI (ACUTE KIDNEY INJURY) (HCC): Primary | ICD-10-CM

## 2018-10-17 DIAGNOSIS — I10 MALIGNANT HYPERTENSION: ICD-10-CM

## 2018-10-17 DIAGNOSIS — R56.9 SEIZURE (HCC): ICD-10-CM

## 2018-10-17 DIAGNOSIS — E87.6 HYPOKALEMIA: ICD-10-CM

## 2018-10-17 PROCEDURE — 99214 OFFICE O/P EST MOD 30 MIN: CPT | Performed by: INTERNAL MEDICINE

## 2018-10-17 RX ORDER — TRIAMTERENE AND HYDROCHLOROTHIAZIDE 37.5; 25 MG/1; MG/1
0.5 TABLET ORAL DAILY
Qty: 30 TABLET | Refills: 11 | Status: SHIPPED | OUTPATIENT
Start: 2018-10-17 | End: 2018-11-06

## 2018-10-17 RX ORDER — GLIPIZIDE 5 MG/1
5 TABLET ORAL
Qty: 90 TABLET | Refills: 3
Start: 2018-10-17 | End: 2018-10-24

## 2018-10-17 NOTE — PROGRESS NOTES
Subjective     Patient ID: Andre Agosto is a 63 y.o. male. Patient is here for management of multiple medical problems.     Chief Complaint   Patient presents with   • Hypertension     5 day follow-up   • Labs Only     patient would like to have Depakote levels checked   • Otitis Media     patient has not been taking antibiotic   • Night Sweats     patient woke up sweating during the night, he ate a snack and felt better     History of Present Illness       BP med changes recently not going well.  BP sig elevated. CR elevated. Pt feels bad.  Pt on a 1800 barrie diet since last week.  BS running lower.     Pt has not yet heard from cardiac Rehab.  Has multivessel dz.    Finished last round of cardio rehab at [facility].      The following portions of the patient's history were reviewed and updated as appropriate: allergies, current medications, past family history, past medical history, past social history, past surgical history and problem list.    Review of Systems   Constitutional: Positive for fatigue.   Musculoskeletal: Negative for arthralgias, back pain and joint swelling.   Psychiatric/Behavioral: Positive for sleep disturbance.   All other systems reviewed and are negative.      Current Outpatient Prescriptions:   •  Alcohol Swabs (ALCOHOL PREP) pads, 1 pad 4 (Four) Times a Day As Needed (bs)., Disp: 120 each, Rfl: 12  •  amoxicillin-clavulanate (AUGMENTIN) 875-125 MG per tablet, Take 1 tablet by mouth Every 12 (Twelve) Hours., Disp: 20 tablet, Rfl: 0  •  aspirin 81 MG tablet, Take 1 tablet by mouth Daily., Disp: 30 tablet, Rfl: 11  •  divalproex (DEPAKOTE) 250 MG DR tablet, TAKE ONE TABLET BY MOUTH THREE TIMES A DAY, Disp: 90 tablet, Rfl: 1  •  escitalopram (LEXAPRO) 20 MG tablet, TAKE 1 TABLET DAILY, Disp: 90 tablet, Rfl: 2  •  glipiZIDE (GLUCOTROL) 5 MG tablet, Take 1 tablet by mouth 2 (Two) Times a Day Before Meals., Disp: 90 tablet, Rfl: 3  •  glucose blood test strip, Use one to two times daily to check  "blood sugar, Disp: 100 each, Rfl: 12  •  glucose monitor monitoring kit, 1 each As Needed (as directed). Diagnosis Code E11.9, Disp: 1 each, Rfl: 0  •  glucose monitor monitoring kit, 1 each As Needed (bs)., Disp: 1 each, Rfl: 1  •  Lancets (FREESTYLE) lancets, 1 each by Other route As Needed (bs)., Disp: 120 each, Rfl: 12  •  losartan (COZAAR) 100 MG tablet, Take 1 tablet by mouth Daily., Disp: 30 tablet, Rfl: 11  •  lovastatin (MEVACOR) 20 MG tablet, Take 1 tablet by mouth Every Night., Disp: 30 tablet, Rfl: 11  •  Magnesium Oxide 400 (240 MG) MG tablet, Take 1 tablet by mouth daily. (Patient taking differently: Take 1 tablet by mouth Daily As Needed.), Disp: 30 tablet, Rfl: 11  •  spironolactone (ALDACTONE) 50 MG tablet, Take 1 tablet by mouth 2 (Two) Times a Day., Disp: 60 tablet, Rfl: 5  •  terazosin (HYTRIN) 5 MG capsule, Take 1 capsule by mouth Every Night., Disp: 30 capsule, Rfl: 11  •  triamterene-hydrochlorothiazide (MAXZIDE-25) 37.5-25 MG per tablet, Take 0.5 tablets by mouth Daily., Disp: 30 tablet, Rfl: 11    Objective      Blood pressure (!) 182/95, pulse 64, temperature 98.2 °F (36.8 °C), temperature source Oral, resp. rate 16, height 190.5 cm (75\"), weight (!) 149 kg (328 lb), SpO2 98 %.    Physical Exam     General Appearance:    Alert, cooperative, no distress, appears stated age   Head:    Normocephalic, without obvious abnormality, atraumatic   Eyes:    PERRL, conjunctiva/corneas clear, EOM's intact   Ears:    Normal TM's and external ear canals, both ears   Nose:   Nares normal, septum midline, mucosa normal, no drainage   or sinus tenderness   Throat:   Lips, mucosa, and tongue normal; teeth and gums normal   Neck:   Supple, symmetrical, trachea midline, no adenopathy;        thyroid:  No enlargement/tenderness/nodules; no carotid    bruit or JVD   Back:     Symmetric, no curvature, ROM normal, no CVA tenderness   Lungs:     Clear to auscultation bilaterally, respirations unlabored   Chest " wall:    No tenderness or deformity   Heart:    Regular rate and rhythm, S1 and S2 normal, no murmur,        rub or gallop   Abdomen:     Soft, non-tender, bowel sounds active all four quadrants,     no masses, no organomegaly   Extremities:   Extremities normal, atraumatic, no cyanosis or edema   Pulses:   2+ and symmetric all extremities   Skin:   Skin color, texture, turgor normal, no rashes or lesions   Lymph nodes:   Cervical, supraclavicular, and axillary nodes normal   Neurologic:   CNII-XII intact. Normal strength, sensation and reflexes       throughout      Results for orders placed or performed in visit on 10/12/18   Comprehensive Metabolic Panel   Result Value Ref Range    Glucose 78 74 - 98 mg/dL    BUN 28 (H) 7 - 20 mg/dL    Creatinine 1.80 (H) 0.60 - 1.30 mg/dL    Sodium 139 137 - 145 mmol/L    Potassium 4.4 3.5 - 5.1 mmol/L    Chloride 104 98 - 107 mmol/L    CO2 27.0 26.0 - 30.0 mmol/L    Calcium 9.7 8.4 - 10.2 mg/dL    Total Protein 8.5 (H) 6.3 - 8.2 g/dL    Albumin 4.40 3.50 - 5.00 g/dL    ALT (SGPT) 40 13 - 69 U/L    AST (SGOT) 33 15 - 46 U/L    Alkaline Phosphatase 53 38 - 126 U/L    Total Bilirubin 0.5 0.2 - 1.3 mg/dL    eGFR Non African Amer 38 (L) >60 mL/min/1.73    Globulin 4.1 gm/dL    A/G Ratio 1.1 1.0 - 2.0 g/dL    BUN/Creatinine Ratio 15.6 6.3 - 21.9    Anion Gap 12.4 10.0 - 20.0 mmol/L   CBC Auto Differential   Result Value Ref Range    WBC 9.17 4.80 - 10.80 10*3/mm3    RBC 4.82 4.70 - 6.10 10*6/mm3    Hemoglobin 14.7 14.0 - 18.0 g/dL    Hematocrit 43.9 42.0 - 52.0 %    MCV 91.1 80.0 - 94.0 fL    MCH 30.5 27.0 - 31.0 pg    MCHC 33.5 30.0 - 37.0 g/dL    RDW 14.0 11.5 - 14.5 %    RDW-SD 46.9 37.0 - 54.0 fl    MPV 10.9 6.0 - 12.0 fL    Platelets 199 130 - 400 10*3/mm3    Neutrophil % 64.6 37.0 - 80.0 %    Lymphocyte % 24.3 10.0 - 50.0 %    Monocyte % 6.1 0.0 - 12.0 %    Eosinophil % 3.7 0.0 - 7.0 %    Basophil % 0.9 0.0 - 2.5 %    Immature Grans % 0.4 0.0 - 0.6 %    Neutrophils, Absolute  5.92 2.00 - 6.90 10*3/mm3    Lymphocytes, Absolute 2.23 0.60 - 3.40 10*3/mm3    Monocytes, Absolute 0.56 0.00 - 0.90 10*3/mm3    Eosinophils, Absolute 0.34 0.00 - 0.70 10*3/mm3    Basophils, Absolute 0.08 0.00 - 0.20 10*3/mm3    Immature Grans, Absolute 0.04 0.00 - 0.06 10*3/mm3    nRBC 0.0 0.0 - 0.0 /100 WBC         Assessment/Plan   Pt working on diet. No soda since diet started.  Eating out a lot. Pt is to decrease salt load. Stopped honey buns an ho ho until last week.      Will try to get back into cardiac rehab.    Pt trying to do medical management of cad.     Pt having more sz. Needs new levels.  Pt had med changes for Sz and is folowed by Dr Clifford.  Pt under more stress.  Pt working destress.    Increase po h2o    Start low dose triam/Hctz. Monitor for burak and hypokalemia.      Andre was seen today for hypertension, labs only, otitis media and night sweats.    Diagnoses and all orders for this visit:    BURAK (acute kidney injury) (CMS/HCC)  -     Basic Metabolic Panel    Malignant hypertension  -     glipiZIDE (GLUCOTROL) 5 MG tablet; Take 1 tablet by mouth 2 (Two) Times a Day Before Meals.    Diabetes mellitus without complication (CMS/HCC)  -     glipiZIDE (GLUCOTROL) 5 MG tablet; Take 1 tablet by mouth 2 (Two) Times a Day Before Meals.  -     Basic Metabolic Panel    BiPAP (biphasic positive airway pressure) dependence  -     glipiZIDE (GLUCOTROL) 5 MG tablet; Take 1 tablet by mouth 2 (Two) Times a Day Before Meals.    Hypokalemia  -     Basic Metabolic Panel    Seizure (CMS/HCC)  -     Valproic Acid Level, Total  -     Valproic Acid Level, Free    Other orders  -     triamterene-hydrochlorothiazide (MAXZIDE-25) 37.5-25 MG per tablet; Take 0.5 tablets by mouth Daily.      Return in about 6 days (around 10/23/2018).          There are no Patient Instructions on file for this visit.     Keven Bear MD    Assessment/Plan

## 2018-10-18 ENCOUNTER — TELEPHONE (OUTPATIENT)
Dept: INTERNAL MEDICINE | Facility: CLINIC | Age: 63
End: 2018-10-18

## 2018-10-18 ENCOUNTER — APPOINTMENT (OUTPATIENT)
Dept: LAB | Facility: HOSPITAL | Age: 63
End: 2018-10-18
Attending: INTERNAL MEDICINE

## 2018-10-18 DIAGNOSIS — N18.3 ACUTE RENAL FAILURE SUPERIMPOSED ON STAGE 3 CHRONIC KIDNEY DISEASE, UNSPECIFIED ACUTE RENAL FAILURE TYPE: Primary | ICD-10-CM

## 2018-10-18 DIAGNOSIS — N17.9 ACUTE RENAL FAILURE SUPERIMPOSED ON STAGE 3 CHRONIC KIDNEY DISEASE, UNSPECIFIED ACUTE RENAL FAILURE TYPE: Primary | ICD-10-CM

## 2018-10-18 LAB
ANION GAP SERPL CALCULATED.3IONS-SCNC: 13.6 MMOL/L (ref 10–20)
BUN BLD-MCNC: 29 MG/DL (ref 7–20)
BUN/CREAT SERPL: 14.5 (ref 6.3–21.9)
CALCIUM SPEC-SCNC: 10 MG/DL (ref 8.4–10.2)
CHLORIDE SERPL-SCNC: 102 MMOL/L (ref 98–107)
CO2 SERPL-SCNC: 27 MMOL/L (ref 26–30)
CREAT BLD-MCNC: 2 MG/DL (ref 0.6–1.3)
GFR SERPL CREATININE-BSD FRML MDRD: 34 ML/MIN/1.73
GLUCOSE BLD-MCNC: 118 MG/DL (ref 74–98)
POTASSIUM BLD-SCNC: 4.6 MMOL/L (ref 3.5–5.1)
SODIUM BLD-SCNC: 138 MMOL/L (ref 137–145)
VALPROATE SERPL-MCNC: 24.8 MCG/ML (ref 50–100)

## 2018-10-18 PROCEDURE — 80048 BASIC METABOLIC PNL TOTAL CA: CPT | Performed by: INTERNAL MEDICINE

## 2018-10-18 PROCEDURE — 80164 ASSAY DIPROPYLACETIC ACD TOT: CPT | Performed by: INTERNAL MEDICINE

## 2018-10-18 PROCEDURE — 80165 DIPROPYLACETIC ACID FREE: CPT | Performed by: INTERNAL MEDICINE

## 2018-10-18 PROCEDURE — 36415 COLL VENOUS BLD VENIPUNCTURE: CPT | Performed by: INTERNAL MEDICINE

## 2018-10-19 DIAGNOSIS — N17.9 AKI (ACUTE KIDNEY INJURY) (HCC): Primary | ICD-10-CM

## 2018-10-22 LAB — VALPROATE FREE SERPL-MCNC: 8.3 UG/ML (ref 6–22)

## 2018-10-23 ENCOUNTER — APPOINTMENT (OUTPATIENT)
Dept: CT IMAGING | Facility: HOSPITAL | Age: 63
End: 2018-10-23

## 2018-10-23 ENCOUNTER — HOSPITAL ENCOUNTER (EMERGENCY)
Facility: HOSPITAL | Age: 63
Discharge: HOME | End: 2018-10-23
Attending: EMERGENCY MEDICINE | Admitting: EMERGENCY MEDICINE

## 2018-10-23 ENCOUNTER — TELEPHONE (OUTPATIENT)
Dept: INTERNAL MEDICINE | Facility: CLINIC | Age: 63
End: 2018-10-23

## 2018-10-23 ENCOUNTER — APPOINTMENT (OUTPATIENT)
Dept: ULTRASOUND IMAGING | Facility: HOSPITAL | Age: 63
End: 2018-10-23

## 2018-10-23 ENCOUNTER — APPOINTMENT (OUTPATIENT)
Dept: GENERAL RADIOLOGY | Facility: HOSPITAL | Age: 63
End: 2018-10-23

## 2018-10-23 VITALS
HEART RATE: 71 BPM | TEMPERATURE: 97.8 F | BODY MASS INDEX: 38.36 KG/M2 | OXYGEN SATURATION: 97 % | HEIGHT: 76 IN | RESPIRATION RATE: 16 BRPM | DIASTOLIC BLOOD PRESSURE: 99 MMHG | WEIGHT: 315 LBS | SYSTOLIC BLOOD PRESSURE: 176 MMHG

## 2018-10-23 VITALS
DIASTOLIC BLOOD PRESSURE: 94 MMHG | SYSTOLIC BLOOD PRESSURE: 157 MMHG | HEART RATE: 54 BPM | OXYGEN SATURATION: 95 % | TEMPERATURE: 97.7 F | BODY MASS INDEX: 38.36 KG/M2 | RESPIRATION RATE: 18 BRPM | WEIGHT: 315 LBS | HEIGHT: 76 IN

## 2018-10-23 DIAGNOSIS — N28.9 RENAL INSUFFICIENCY: ICD-10-CM

## 2018-10-23 DIAGNOSIS — K80.20 GALLSTONES: ICD-10-CM

## 2018-10-23 DIAGNOSIS — R10.11 RUQ PAIN: Primary | ICD-10-CM

## 2018-10-23 DIAGNOSIS — K80.20 CHOLELITHIASIS WITHOUT CHOLECYSTITIS: Primary | ICD-10-CM

## 2018-10-23 DIAGNOSIS — R10.13 EPIGASTRIC PAIN: Primary | ICD-10-CM

## 2018-10-23 LAB
ALBUMIN SERPL-MCNC: 4.3 G/DL (ref 3.5–5)
ALBUMIN SERPL-MCNC: 4.41 G/DL (ref 3.2–4.8)
ALBUMIN/GLOB SERPL: 1 G/DL (ref 1–2)
ALBUMIN/GLOB SERPL: 1.3 G/DL (ref 1.5–2.5)
ALP SERPL-CCNC: 48 U/L (ref 38–126)
ALP SERPL-CCNC: 49 U/L (ref 25–100)
ALT SERPL W P-5'-P-CCNC: 27 U/L (ref 7–40)
ALT SERPL W P-5'-P-CCNC: 40 U/L (ref 13–69)
ANION GAP SERPL CALCULATED.3IONS-SCNC: 10 MMOL/L (ref 3–11)
ANION GAP SERPL CALCULATED.3IONS-SCNC: 12.5 MMOL/L (ref 10–20)
AST SERPL-CCNC: 23 U/L (ref 15–46)
AST SERPL-CCNC: 27 U/L (ref 0–33)
BACTERIA UR QL AUTO: ABNORMAL /HPF
BASOPHILS # BLD AUTO: 0.06 10*3/MM3 (ref 0–0.2)
BASOPHILS # BLD AUTO: 0.1 10*3/MM3 (ref 0–0.2)
BASOPHILS NFR BLD AUTO: 0.6 % (ref 0–1)
BASOPHILS NFR BLD AUTO: 1 % (ref 0–2.5)
BILIRUB SERPL-MCNC: 0.4 MG/DL (ref 0.2–1.3)
BILIRUB SERPL-MCNC: 0.5 MG/DL (ref 0.3–1.2)
BILIRUB UR QL STRIP: NEGATIVE
BUN BLD-MCNC: 38 MG/DL (ref 9–23)
BUN BLD-MCNC: 40 MG/DL (ref 7–20)
BUN/CREAT SERPL: 17.6 (ref 7–25)
BUN/CREAT SERPL: 18.2 (ref 6.3–21.9)
CALCIUM SPEC-SCNC: 9.5 MG/DL (ref 8.7–10.4)
CALCIUM SPEC-SCNC: 9.6 MG/DL (ref 8.4–10.2)
CHLORIDE SERPL-SCNC: 102 MMOL/L (ref 98–107)
CHLORIDE SERPL-SCNC: 99 MMOL/L (ref 99–109)
CLARITY UR: CLEAR
CO2 SERPL-SCNC: 25 MMOL/L (ref 26–30)
CO2 SERPL-SCNC: 26 MMOL/L (ref 20–31)
COLOR UR: YELLOW
CREAT BLD-MCNC: 2.16 MG/DL (ref 0.6–1.3)
CREAT BLD-MCNC: 2.2 MG/DL (ref 0.6–1.3)
DEPRECATED RDW RBC AUTO: 48.5 FL (ref 37–54)
DEPRECATED RDW RBC AUTO: 49.5 FL (ref 37–54)
EOSINOPHIL # BLD AUTO: 0.36 10*3/MM3 (ref 0–0.3)
EOSINOPHIL # BLD AUTO: 0.42 10*3/MM3 (ref 0–0.7)
EOSINOPHIL NFR BLD AUTO: 3.6 % (ref 0–3)
EOSINOPHIL NFR BLD AUTO: 4.2 % (ref 0–7)
ERYTHROCYTE [DISTWIDTH] IN BLOOD BY AUTOMATED COUNT: 14 % (ref 11.5–14.5)
ERYTHROCYTE [DISTWIDTH] IN BLOOD BY AUTOMATED COUNT: 14.5 % (ref 11.3–14.5)
GFR SERPL CREATININE-BSD FRML MDRD: 30 ML/MIN/1.73
GFR SERPL CREATININE-BSD FRML MDRD: 31 ML/MIN/1.73
GLOBULIN UR ELPH-MCNC: 3.5 GM/DL
GLOBULIN UR ELPH-MCNC: 4.1 GM/DL
GLUCOSE BLD-MCNC: 108 MG/DL (ref 74–98)
GLUCOSE BLD-MCNC: 131 MG/DL (ref 70–100)
GLUCOSE UR STRIP-MCNC: NEGATIVE MG/DL
HCT VFR BLD AUTO: 44.1 % (ref 42–52)
HCT VFR BLD AUTO: 45.3 % (ref 38.9–50.9)
HGB BLD-MCNC: 14.4 G/DL (ref 14–18)
HGB BLD-MCNC: 14.7 G/DL (ref 13.1–17.5)
HGB UR QL STRIP.AUTO: NEGATIVE
HOLD SPECIMEN: NORMAL
HOLD SPECIMEN: NORMAL
HYALINE CASTS UR QL AUTO: ABNORMAL /LPF
IMM GRANULOCYTES # BLD: 0.03 10*3/MM3 (ref 0–0.03)
IMM GRANULOCYTES # BLD: 0.03 10*3/MM3 (ref 0–0.06)
IMM GRANULOCYTES NFR BLD: 0.3 % (ref 0–0.6)
IMM GRANULOCYTES NFR BLD: 0.3 % (ref 0–0.6)
KETONES UR QL STRIP: NEGATIVE
LEUKOCYTE ESTERASE UR QL STRIP.AUTO: ABNORMAL
LIPASE SERPL-CCNC: 399 U/L (ref 23–300)
LIPASE SERPL-CCNC: 72 U/L (ref 6–51)
LYMPHOCYTES # BLD AUTO: 2.35 10*3/MM3 (ref 0.6–4.8)
LYMPHOCYTES # BLD AUTO: 3.05 10*3/MM3 (ref 0.6–3.4)
LYMPHOCYTES NFR BLD AUTO: 23.5 % (ref 24–44)
LYMPHOCYTES NFR BLD AUTO: 30.4 % (ref 10–50)
MCH RBC QN AUTO: 30.3 PG (ref 27–31)
MCH RBC QN AUTO: 30.7 PG (ref 27–31)
MCHC RBC AUTO-ENTMCNC: 32.5 G/DL (ref 32–36)
MCHC RBC AUTO-ENTMCNC: 32.7 G/DL (ref 30–37)
MCV RBC AUTO: 93.4 FL (ref 80–99)
MCV RBC AUTO: 94 FL (ref 80–94)
MONOCYTES # BLD AUTO: 0.52 10*3/MM3 (ref 0–1)
MONOCYTES # BLD AUTO: 0.71 10*3/MM3 (ref 0–0.9)
MONOCYTES NFR BLD AUTO: 5.2 % (ref 0–12)
MONOCYTES NFR BLD AUTO: 7.1 % (ref 0–12)
NEUTROPHILS # BLD AUTO: 5.73 10*3/MM3 (ref 2–6.9)
NEUTROPHILS # BLD AUTO: 6.72 10*3/MM3 (ref 1.5–8.3)
NEUTROPHILS NFR BLD AUTO: 57 % (ref 37–80)
NEUTROPHILS NFR BLD AUTO: 67.1 % (ref 41–71)
NITRITE UR QL STRIP: NEGATIVE
NRBC BLD MANUAL-RTO: 0 /100 WBC (ref 0–0)
NRBC BLD MANUAL-RTO: 0 /100 WBC (ref 0–0)
PH UR STRIP.AUTO: 5.5 [PH] (ref 5–8)
PLATELET # BLD AUTO: 208 10*3/MM3 (ref 130–400)
PLATELET # BLD AUTO: 227 10*3/MM3 (ref 150–450)
PMV BLD AUTO: 10.6 FL (ref 6–12)
PMV BLD AUTO: 11.6 FL (ref 6–12)
POTASSIUM BLD-SCNC: 4.5 MMOL/L (ref 3.5–5.1)
POTASSIUM BLD-SCNC: 5 MMOL/L (ref 3.5–5.5)
PROT SERPL-MCNC: 7.9 G/DL (ref 5.7–8.2)
PROT SERPL-MCNC: 8.4 G/DL (ref 6.3–8.2)
PROT UR QL STRIP: NEGATIVE
RBC # BLD AUTO: 4.69 10*6/MM3 (ref 4.7–6.1)
RBC # BLD AUTO: 4.85 10*6/MM3 (ref 4.2–5.76)
RBC # UR: ABNORMAL /HPF
REF LAB TEST METHOD: ABNORMAL
SODIUM BLD-SCNC: 135 MMOL/L (ref 132–146)
SODIUM BLD-SCNC: 135 MMOL/L (ref 137–145)
SP GR UR STRIP: 1.01 (ref 1–1.03)
SQUAMOUS #/AREA URNS HPF: ABNORMAL /HPF
TROPONIN I SERPL-MCNC: 0.01 NG/ML (ref 0–0.05)
TROPONIN I SERPL-MCNC: <0.012 NG/ML (ref 0–0.03)
UROBILINOGEN UR QL STRIP: ABNORMAL
WBC NRBC COR # BLD: 10.01 10*3/MM3 (ref 3.5–10.8)
WBC NRBC COR # BLD: 10.04 10*3/MM3 (ref 4.8–10.8)
WBC UR QL AUTO: ABNORMAL /HPF
WHOLE BLOOD HOLD SPECIMEN: NORMAL
WHOLE BLOOD HOLD SPECIMEN: NORMAL

## 2018-10-23 PROCEDURE — 80053 COMPREHEN METABOLIC PANEL: CPT | Performed by: EMERGENCY MEDICINE

## 2018-10-23 PROCEDURE — 96374 THER/PROPH/DIAG INJ IV PUSH: CPT

## 2018-10-23 PROCEDURE — 96375 TX/PRO/DX INJ NEW DRUG ADDON: CPT

## 2018-10-23 PROCEDURE — 76705 ECHO EXAM OF ABDOMEN: CPT

## 2018-10-23 PROCEDURE — 74176 CT ABD & PELVIS W/O CONTRAST: CPT

## 2018-10-23 PROCEDURE — 99283 EMERGENCY DEPT VISIT LOW MDM: CPT

## 2018-10-23 PROCEDURE — 83690 ASSAY OF LIPASE: CPT | Performed by: EMERGENCY MEDICINE

## 2018-10-23 PROCEDURE — 36415 COLL VENOUS BLD VENIPUNCTURE: CPT

## 2018-10-23 PROCEDURE — 81001 URINALYSIS AUTO W/SCOPE: CPT | Performed by: EMERGENCY MEDICINE

## 2018-10-23 PROCEDURE — 85025 COMPLETE CBC W/AUTO DIFF WBC: CPT | Performed by: EMERGENCY MEDICINE

## 2018-10-23 PROCEDURE — 71045 X-RAY EXAM CHEST 1 VIEW: CPT

## 2018-10-23 PROCEDURE — 25010000002 MORPHINE PER 10 MG: Performed by: EMERGENCY MEDICINE

## 2018-10-23 PROCEDURE — 84484 ASSAY OF TROPONIN QUANT: CPT | Performed by: EMERGENCY MEDICINE

## 2018-10-23 PROCEDURE — 99284 EMERGENCY DEPT VISIT MOD MDM: CPT

## 2018-10-23 PROCEDURE — 93005 ELECTROCARDIOGRAM TRACING: CPT | Performed by: EMERGENCY MEDICINE

## 2018-10-23 PROCEDURE — 25010000002 ONDANSETRON PER 1 MG: Performed by: EMERGENCY MEDICINE

## 2018-10-23 RX ORDER — SODIUM CHLORIDE 0.9 % (FLUSH) 0.9 %
10 SYRINGE (ML) INJECTION AS NEEDED
Status: DISCONTINUED | OUTPATIENT
Start: 2018-10-23 | End: 2018-10-23 | Stop reason: HOSPADM

## 2018-10-23 RX ORDER — OXYCODONE HYDROCHLORIDE AND ACETAMINOPHEN 5; 325 MG/1; MG/1
1 TABLET ORAL EVERY 6 HOURS PRN
Qty: 10 TABLET | Refills: 0 | Status: SHIPPED | OUTPATIENT
Start: 2018-10-23 | End: 2018-10-29

## 2018-10-23 RX ORDER — ONDANSETRON 2 MG/ML
4 INJECTION INTRAMUSCULAR; INTRAVENOUS ONCE
Status: COMPLETED | OUTPATIENT
Start: 2018-10-23 | End: 2018-10-23

## 2018-10-23 RX ORDER — MORPHINE SULFATE 4 MG/ML
4 INJECTION, SOLUTION INTRAMUSCULAR; INTRAVENOUS ONCE
Status: COMPLETED | OUTPATIENT
Start: 2018-10-23 | End: 2018-10-23

## 2018-10-23 RX ORDER — ONDANSETRON 4 MG/1
4 TABLET, ORALLY DISINTEGRATING ORAL EVERY 6 HOURS PRN
Qty: 10 TABLET | Refills: 0 | Status: SHIPPED | OUTPATIENT
Start: 2018-10-23 | End: 2018-10-29

## 2018-10-23 RX ADMIN — ONDANSETRON 4 MG: 2 INJECTION INTRAMUSCULAR; INTRAVENOUS at 03:42

## 2018-10-23 RX ADMIN — SODIUM CHLORIDE 1000 ML: 9 INJECTION, SOLUTION INTRAVENOUS at 17:56

## 2018-10-23 RX ADMIN — MORPHINE SULFATE 4 MG: 4 INJECTION, SOLUTION INTRAMUSCULAR; INTRAVENOUS at 03:43

## 2018-10-23 NOTE — ED PROVIDER NOTES
Subjective   63-year-old male presenting with abdominal pain.  He states that for the last few days he has had epigastric abdominal pain, feels like he is swelling, worse at night, improved throughout the day.  He states that he recently had a heart catheter and was told he had several blockages, had several medication changes and attributes this to his discomfort. He denies any nausea, vomiting, chest pain, shortness of breath, diarrhea, constipation, fevers, chills.  He does have a history of LAP-BAND surgery.              Review of Systems   Constitutional: Negative.    HENT: Negative.    Eyes: Negative.    Respiratory: Negative.    Cardiovascular: Negative.    Gastrointestinal: Positive for abdominal distention and abdominal pain. Negative for constipation, diarrhea, nausea and vomiting.   Genitourinary: Negative.    Musculoskeletal: Negative.    Skin: Negative.    Neurological: Negative.    Psychiatric/Behavioral: Negative.        Past Medical History:   Diagnosis Date   • 6th nerve palsy, right     right eye    • Anesthesia complication     ilius- after lap band surgery    • Anxiety and depression    • Coronary artery disease involving native coronary artery of native heart without angina pectoris 9/12/2018   • Diabetes mellitus (CMS/[facility])     doesnt check sugar    • Double vision    • Dyspepsia    • Epilepsy (CMS/[facility])    • Focal seizure (CMS/[facility])     of right arm    • Heart attack (CMS/HCC) 06/15/2016   • History of Helicobacter pylori infection     in 2008, treated w/ PrevPak   • HL (hearing loss)     Left ear child luevano measles   • Hyperlipidemia    • Hypertension    • Kidney stone     x2 imbedded   • Memory loss 2005    Meneigitis   • Meningitis     unsure of bacterial or viral    • Obesity    • Seizures (CMS/[facility])    • Sleep apnea     BiPAP compliant   • Sleep apnea treated with nocturnal BiPAP     compliant with  machine    • Stroke (CMS/[facility])    • Vision loss 7/2018    Double vision   • Wears glasses         No Known Allergies    Past Surgical History:   Procedure Laterality Date   • CARDIAC CATHETERIZATION N/A 10/12/2018    Procedure: Left Heart Cath;  Surgeon: Yoselin Johnson MD;  Location: [facility] CATH INVASIVE LOCATION;  Service: Cardiology   • COLONOSCOPY     • CORONARY STENT PLACEMENT      x1   • ENDOSCOPY     • LAPAROSCOPIC GASTRIC BANDING  2008    s/p LAGB Realize 6/2008 by MASONO.   • UMBILICAL HERNIA REPAIR  2008    incarcerated UHR w/ mesh by Dr. Velasquez   • WISDOM TOOTH EXTRACTION      all 4       Family History   Problem Relation Age of Onset   • Stroke Mother    • Hypertension Mother    • COPD Father    • Hypertension Father        Social History     Social History   • Marital status:      Social History Main Topics   • Smoking status: Former Smoker     Packs/day: 0.50     Years: 10.00     Types: Cigarettes     Start date: 1/1/1975     Quit date: 12/31/1985   • Smokeless tobacco: Never Used   • Alcohol use No   • Drug use: No   • Sexual activity: Defer     Other Topics Concern   • Not on file           Objective   Physical Exam   Constitutional: He is oriented to person, place, and time. He appears well-developed and well-nourished. No distress.   HENT:   Head: Normocephalic and atraumatic.   Right Ear: External ear normal.   Left Ear: External ear normal.   Nose: Nose normal.   Mouth/Throat: Oropharynx is clear and moist.   Eyes: Pupils are equal, round, and reactive to light. Conjunctivae and EOM are normal.   Neck: Normal range of motion. Neck supple.   Cardiovascular: Normal rate, regular rhythm, normal heart sounds and intact distal pulses.    Pulmonary/Chest: Effort normal and breath sounds normal. No respiratory distress.   Abdominal: Soft. Bowel sounds are normal. He exhibits no distension. There is no rebound and no guarding.   Mild epigastric tenderness   Musculoskeletal: Normal range of motion. He exhibits no edema, tenderness or deformity.   Neurological: He is alert and  oriented to person, place, and time.   Skin: Skin is warm and dry. No rash noted.   Psychiatric: He has a normal mood and affect. His behavior is normal.   Nursing note and vitals reviewed.      Procedures           ED Course                  MDM  Number of Diagnoses or Management Options  Epigastric pain:   Gallstones:   Diagnosis management comments: 63-year-old male with abdominal pain.  Well-developed, well-nourished obese man in no distress with normal vital signs and exam as above.  We'll check labs, EKG and CT scan.  We'll treat symptomatically.  Disposition pending workup.    DDX: Gastritis, pancreatitis, biliary disease, obstruction, ACS    EKG interpreted by me: Sinus bradycardia, first-degree AVB, right axis, no acute ST changes, nonspecific T-wave changes, this is an abnormal EKG, this appears similar to previous    Labwork notable for very mildly elevated lipase.  No other acute or significant abnormalities.  CT scan shows gallstones, no other acute abnormality.  He feels significantly better after treatment.  At this time I feel he is safe for discharge home.  Encouraged him to follow-up closely with his primary doctor and a surgeon.  He is comfortable with and understanding of the plan.       Amount and/or Complexity of Data Reviewed  Clinical lab tests: reviewed  Tests in the radiology section of CPT®: reviewed  Decide to obtain previous medical records or to obtain history from someone other than the patient: yes          Final diagnoses:   Epigastric pain   Gallstones            Keven Guthrie MD  10/23/18 [id number]

## 2018-10-23 NOTE — TELEPHONE ENCOUNTER
Patient called and states he went to emergency room early this morning at [facility] for gallbladder issues. He missed his appointment this morning due to being at hospital but wants to know if he can get worked in today as he states dr hopson wanted to see him today. He is aware he is only in this morning and would like a call asap from nurse.

## 2018-10-23 NOTE — DISCHARGE INSTRUCTIONS
Follow up with Dr. Bo, Nephrologist, within a week for reevaluation.    Follow up with Dr. Nicole, General Surgeon, within a week for reevaluation.    Immediately return to the Emergency Department if symptoms worsen or with any other acute complaints.    Please review the medications you are supposed to be taking according to prior physician recommendations. I have not changed your home medications during this visit. If your discharge instructions indicate that I have changed your home medications, this is not the case, and you should disregard. If you have any questions about the medication you should be taking at home, please call your physician.

## 2018-10-23 NOTE — TELEPHONE ENCOUNTER
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  I added him to the schedule at 1:00 pm.    He asked me to add to this note and ask you to review the labs from his ED visit.  He is worried about having to have gallbladder surgery.    Thank you.

## 2018-10-23 NOTE — ED PROVIDER NOTES
"Subjective   63-year-old male sent from his primary care physician for evaluation of \"abnormal labs.\"  The patient states that he has been experiencing intermittent right-sided abdominal pain now for several weeks.  He went to the emergency department at [facility] earlier today where he had a CT scan that revealed gallstones and an elevated creatinine compared to baseline.  He was ultimately discharged home and advised by his primary care physician to come to the emergency department given his dyspnea renal function compared to baseline.  Currently, the patient endorses mild right-sided abdominal pain.  The pain is worse with palpation.  He is tolerating by mouth.  No vomiting.  No diarrhea.        History provided by:  Patient  Illness   Quality:  Abnormal lab: elevated creatinine  Severity:  Moderate  Onset quality:  Gradual  Timing:  Constant  Progression:  Unchanged  Chronicity:  Chronic  Context:  Diuretics  Relieved by:  None tried  Worsened by:  Nothing  Ineffective treatments:  None tried  Associated symptoms: abdominal pain (right-sided)        Review of Systems   Gastrointestinal: Positive for abdominal distention and abdominal pain (right-sided).   Genitourinary: Positive for decreased urine volume.   All other systems reviewed and are negative.      Past Medical History:   Diagnosis Date   • 6th nerve palsy, right     right eye    • Anesthesia complication     ilius- after lap band surgery    • Anxiety and depression    • Coronary artery disease involving native coronary artery of native heart without angina pectoris 9/12/2018   • Diabetes mellitus (CMS/[facility])     doesnt check sugar    • Double vision    • Dyspepsia    • Epilepsy (CMS/HCC)    • Focal seizure (CMS/HCC)     of right arm    • Heart attack (CMS/HCC) 06/15/2016   • History of Helicobacter pylori infection     in 2008, treated w/ PrevPak   • HL (hearing loss)     Left ear child luevano measles   • Hyperlipidemia    • Hypertension    • " Kidney stone     x2 imbedded   • Memory loss 2005    Meneigitis   • Meningitis     unsure of bacterial or viral    • Obesity    • Seizures (CMS/HCC)    • Sleep apnea     BiPAP compliant   • Sleep apnea treated with nocturnal BiPAP     compliant with  machine    • Stroke (CMS/HCC)    • Vision loss 7/2018    Double vision   • Wears glasses        No Known Allergies    Past Surgical History:   Procedure Laterality Date   • CARDIAC CATHETERIZATION N/A 10/12/2018    Procedure: Left Heart Cath;  Surgeon: Yoselin Johnson MD;  Location: On license of [facility] CATH INVASIVE LOCATION;  Service: Cardiology   • COLONOSCOPY     • CORONARY STENT PLACEMENT      x1   • ENDOSCOPY     • LAPAROSCOPIC GASTRIC BANDING  2008    s/p LAGB Realize 6/2008 by MASONO.   • UMBILICAL HERNIA REPAIR  2008    incarcerated UHR w/ mesh by Dr. Velasquez   • WISDOM TOOTH EXTRACTION      all 4       Family History   Problem Relation Age of Onset   • Stroke Mother    • Hypertension Mother    • COPD Father    • Hypertension Father        Social History     Social History   • Marital status:      Social History Main Topics   • Smoking status: Former Smoker     Packs/day: 0.50     Years: 10.00     Types: Cigarettes     Start date: 1/1/1975     Quit date: 12/31/1985   • Smokeless tobacco: Never Used   • Alcohol use No   • Drug use: No   • Sexual activity: Defer     Other Topics Concern   • Not on file         Objective   Physical Exam   Constitutional: He is oriented to person, place, and time. He appears well-developed and well-nourished. No distress.   Well-appearing obese male in no acute distress   HENT:   Head: Normocephalic and atraumatic.   Mouth/Throat: Oropharynx is clear and moist.   Cardiovascular: Normal rate, regular rhythm and normal heart sounds.  Exam reveals no gallop and no friction rub.    No murmur heard.  Pulmonary/Chest: Effort normal and breath sounds normal. No respiratory distress. He has no wheezes. He has no rales.   Abdominal: Soft.  Bowel sounds are normal. He exhibits no distension and no mass. There is tenderness. There is no guarding.   Mild tenderness noted to right upper quadrant, no peritoneal signs, negative Pascual's sign   Genitourinary:   Genitourinary Comments: No CVA tenderness noted   Musculoskeletal: Normal range of motion.   Neurological: He is alert and oriented to person, place, and time.   Normal gait, no saddle anesthesia   Skin: Skin is warm and dry. No rash noted. He is not diaphoretic. No erythema. No pallor.   Psychiatric: He has a normal mood and affect. Judgment and thought content normal.   Nursing note and vitals reviewed.      Procedures         ED Course  ED Course as of Oct 23 1831   Tue Oct 23, 2018   1757 63-year-old male presents for evaluation of right-sided abdominal pain and worsening kidney function.  Of note, the patient was seen in the emergency department at [facility] in [place] earlier today for the same.  He had a CT scan that revealed gallstones and lab studies revealed a creatinine of 2.2 which is elevated compared to the patient's baseline of 1.8.  He was ultimately discharged home and advised by his primary care physician today to come to the emergency department for further evaluation and possible nephrology consult.  On arrival to the ED, patient nontoxic appearing.  Nonsurgical abdomen.  Negative Pascual's sign.  Formal gallbladder ultrasound revealed multiple gallstones and mild diffuse gallbladder wall thickening but no pericholecystic fluid and a negative sonographic Pascual's sign.  We will recheck labs and we will reassess following IV fluids and initial interventions.  [DD]   1818 Labs remarkable for minimally elevated lipase.  Creatinine is 2.1 which is actually improved from yesterday.  Upon reevaluation, patient asymptomatic at this time.  I do not feel that he warrants admission at this time but definitely feel that he needs prompt nephrology follow-up.  The patient was referred to  Dr. Bo of nephrology and will follow-up within the next week.  Additionally, he was referred to Dr. Nicole of general surgery for potential outpatient cholecystectomy.  I do not feel that he has cholecystitis at this time based on his clinical presentation.  He is currently asymptomatic.  Agreeable with plan and given appropriate return precautions.  [DD]   1830 Additionally, patient counseled regarding dietary modifications as I feel that this will help him in the interim until he sees Dr. Nicole for definitive surgical treatment of his biliary colic.  [DD]      ED Course User Index  [DD] Luis Grissom MD      Recent Results (from the past 24 hour(s))   Comprehensive Metabolic Panel    Collection Time: 10/23/18  3:12 AM   Result Value Ref Range    Glucose 108 (H) 74 - 98 mg/dL    BUN 40 (H) 7 - 20 mg/dL    Creatinine 2.20 (H) 0.60 - 1.30 mg/dL    Sodium 135 (L) 137 - 145 mmol/L    Potassium 4.5 3.5 - 5.1 mmol/L    Chloride 102 98 - 107 mmol/L    CO2 25.0 (L) 26.0 - 30.0 mmol/L    Calcium 9.6 8.4 - 10.2 mg/dL    Total Protein 8.4 (H) 6.3 - 8.2 g/dL    Albumin 4.30 3.50 - 5.00 g/dL    ALT (SGPT) 40 13 - 69 U/L    AST (SGOT) 23 15 - 46 U/L    Alkaline Phosphatase 48 38 - 126 U/L    Total Bilirubin 0.4 0.2 - 1.3 mg/dL    eGFR Non African Amer 30 (L) >60 mL/min/1.73    Globulin 4.1 gm/dL    A/G Ratio 1.0 1.0 - 2.0 g/dL    BUN/Creatinine Ratio 18.2 6.3 - 21.9    Anion Gap 12.5 10.0 - 20.0 mmol/L   Troponin I-Stat    Collection Time: 10/23/18  3:12 AM   Result Value Ref Range    Troponin I 0.010 0.000 - 0.050 ng/mL   Troponin    Collection Time: 10/23/18  3:12 AM   Result Value Ref Range    Troponin I <0.012 0.000 - 0.034 ng/mL   Light Blue Top    Collection Time: 10/23/18  3:12 AM   Result Value Ref Range    Extra Tube hold for add-on    Lavender Top    Collection Time: 10/23/18  3:12 AM   Result Value Ref Range    Extra Tube hold for add-on    Gold Top - SST    Collection Time: 10/23/18  3:12 AM   Result  Value Ref Range    Extra Tube Hold for add-ons.    Green Top (No Gel)    Collection Time: 10/23/18  3:12 AM   Result Value Ref Range    Extra Tube Hold for add-ons.    CBC Auto Differential    Collection Time: 10/23/18  3:12 AM   Result Value Ref Range    WBC 10.04 4.80 - 10.80 10*3/mm3    RBC 4.69 (L) 4.70 - 6.10 10*6/mm3    Hemoglobin 14.4 14.0 - 18.0 g/dL    Hematocrit 44.1 42.0 - 52.0 %    MCV 94.0 80.0 - 94.0 fL    MCH 30.7 27.0 - 31.0 pg    MCHC 32.7 30.0 - 37.0 g/dL    RDW 14.0 11.5 - 14.5 %    RDW-SD 48.5 37.0 - 54.0 fl    MPV 10.6 6.0 - 12.0 fL    Platelets 208 130 - 400 10*3/mm3    Neutrophil % 57.0 37.0 - 80.0 %    Lymphocyte % 30.4 10.0 - 50.0 %    Monocyte % 7.1 0.0 - 12.0 %    Eosinophil % 4.2 0.0 - 7.0 %    Basophil % 1.0 0.0 - 2.5 %    Immature Grans % 0.3 0.0 - 0.6 %    Neutrophils, Absolute 5.73 2.00 - 6.90 10*3/mm3    Lymphocytes, Absolute 3.05 0.60 - 3.40 10*3/mm3    Monocytes, Absolute 0.71 0.00 - 0.90 10*3/mm3    Eosinophils, Absolute 0.42 0.00 - 0.70 10*3/mm3    Basophils, Absolute 0.10 0.00 - 0.20 10*3/mm3    Immature Grans, Absolute 0.03 0.00 - 0.06 10*3/mm3    nRBC 0.0 0.0 - 0.0 /100 WBC   Lipase    Collection Time: 10/23/18  3:12 AM   Result Value Ref Range    Lipase 399 (H) 23 - 300 U/L   Comprehensive Metabolic Panel    Collection Time: 10/23/18  5:15 PM   Result Value Ref Range    Glucose 131 (H) 70 - 100 mg/dL    BUN 38 (H) 9 - 23 mg/dL    Creatinine 2.16 (H) 0.60 - 1.30 mg/dL    Sodium 135 132 - 146 mmol/L    Potassium 5.0 3.5 - 5.5 mmol/L    Chloride 99 99 - 109 mmol/L    CO2 26.0 20.0 - 31.0 mmol/L    Calcium 9.5 8.7 - 10.4 mg/dL    Total Protein 7.9 5.7 - 8.2 g/dL    Albumin 4.41 3.20 - 4.80 g/dL    ALT (SGPT) 27 7 - 40 U/L    AST (SGOT) 27 0 - 33 U/L    Alkaline Phosphatase 49 25 - 100 U/L    Total Bilirubin 0.5 0.3 - 1.2 mg/dL    eGFR Non African Amer 31 (L) >60 mL/min/1.73    Globulin 3.5 gm/dL    A/G Ratio 1.3 (L) 1.5 - 2.5 g/dL    BUN/Creatinine Ratio 17.6 7.0 - 25.0    Anion  Gap 10.0 3.0 - 11.0 mmol/L   Lipase    Collection Time: 10/23/18  5:15 PM   Result Value Ref Range    Lipase 72 (H) 6 - 51 U/L   CBC Auto Differential    Collection Time: 10/23/18  5:15 PM   Result Value Ref Range    WBC 10.01 3.50 - 10.80 10*3/mm3    RBC 4.85 4.20 - 5.76 10*6/mm3    Hemoglobin 14.7 13.1 - 17.5 g/dL    Hematocrit 45.3 38.9 - 50.9 %    MCV 93.4 80.0 - 99.0 fL    MCH 30.3 27.0 - 31.0 pg    MCHC 32.5 32.0 - 36.0 g/dL    RDW 14.5 11.3 - 14.5 %    RDW-SD 49.5 37.0 - 54.0 fl    MPV 11.6 6.0 - 12.0 fL    Platelets 227 150 - 450 10*3/mm3    Neutrophil % 67.1 41.0 - 71.0 %    Lymphocyte % 23.5 (L) 24.0 - 44.0 %    Monocyte % 5.2 0.0 - 12.0 %    Eosinophil % 3.6 (H) 0.0 - 3.0 %    Basophil % 0.6 0.0 - 1.0 %    Immature Grans % 0.3 0.0 - 0.6 %    Neutrophils, Absolute 6.72 1.50 - 8.30 10*3/mm3    Lymphocytes, Absolute 2.35 0.60 - 4.80 10*3/mm3    Monocytes, Absolute 0.52 0.00 - 1.00 10*3/mm3    Eosinophils, Absolute 0.36 (H) 0.00 - 0.30 10*3/mm3    Basophils, Absolute 0.06 0.00 - 0.20 10*3/mm3    Immature Grans, Absolute 0.03 0.00 - 0.03 10*3/mm3    nRBC 0.0 0.0 - 0.0 /100 WBC   Urinalysis With Microscopic If Indicated (No Culture) - Urine, Clean Catch    Collection Time: 10/23/18  5:56 PM   Result Value Ref Range    Color, UA Yellow Yellow, Straw    Appearance, UA Clear Clear    pH, UA 5.5 5.0 - 8.0    Specific Gravity, UA 1.015 1.001 - 1.030    Glucose, UA Negative Negative    Ketones, UA Negative Negative    Bilirubin, UA Negative Negative    Blood, UA Negative Negative    Protein, UA Negative Negative    Leuk Esterase, UA Trace (A) Negative    Nitrite, UA Negative Negative    Urobilinogen, UA 0.2 E.U./dL 0.2 - 1.0 E.U./dL   Urinalysis, Microscopic Only - Urine, Clean Catch    Collection Time: 10/23/18  5:56 PM   Result Value Ref Range    RBC, UA 3-6 (A) None Seen, 0-2 /HPF    WBC, UA 3-5 (A) None Seen, 0-2 /HPF    Bacteria, UA None Seen None Seen, Trace /HPF    Squamous Epithelial Cells, UA 3-6 (A) None  "Seen, 0-2 /HPF    Hyaline Casts, UA 0-6 0 - 6 /LPF    Methodology Automated Microscopy      Note: In addition to lab results from this visit, the labs listed above may include labs taken at another facility or during a different encounter within the last 24 hours. Please correlate lab times with ED admission and discharge times for further clarification of the services performed during this visit.    US Gallbladder    (Results Pending)     Vitals:    10/23/18 1354 10/23/18 1600 10/23/18 1758   BP: 161/83 154/79 176/99   BP Location: Left arm     Patient Position: Sitting Sitting    Pulse: 65 68 71   Resp: 16 16 16   Temp: 97.8 °F (36.6 °C)     TempSrc: Oral     SpO2: 94% 95% 97%   Weight: (!) 145 kg (319 lb)     Height: 193 cm (76\")       Medications   sodium chloride 0.9 % bolus 1,000 mL (1,000 mL Intravenous New Bag 10/23/18 1756)     ECG/EMG Results (last 24 hours)     ** No results found for the last 24 hours. **                    Recent Results (from the past 24 hour(s))   Comprehensive Metabolic Panel    Collection Time: 10/23/18  3:12 AM   Result Value Ref Range    Glucose 108 (H) 74 - 98 mg/dL    BUN 40 (H) 7 - 20 mg/dL    Creatinine 2.20 (H) 0.60 - 1.30 mg/dL    Sodium 135 (L) 137 - 145 mmol/L    Potassium 4.5 3.5 - 5.1 mmol/L    Chloride 102 98 - 107 mmol/L    CO2 25.0 (L) 26.0 - 30.0 mmol/L    Calcium 9.6 8.4 - 10.2 mg/dL    Total Protein 8.4 (H) 6.3 - 8.2 g/dL    Albumin 4.30 3.50 - 5.00 g/dL    ALT (SGPT) 40 13 - 69 U/L    AST (SGOT) 23 15 - 46 U/L    Alkaline Phosphatase 48 38 - 126 U/L    Total Bilirubin 0.4 0.2 - 1.3 mg/dL    eGFR Non African Amer 30 (L) >60 mL/min/1.73    Globulin 4.1 gm/dL    A/G Ratio 1.0 1.0 - 2.0 g/dL    BUN/Creatinine Ratio 18.2 6.3 - 21.9    Anion Gap 12.5 10.0 - 20.0 mmol/L   Troponin I-Stat    Collection Time: 10/23/18  3:12 AM   Result Value Ref Range    Troponin I 0.010 0.000 - 0.050 ng/mL   Troponin    Collection Time: 10/23/18  3:12 AM   Result Value Ref Range    " Troponin I <0.012 0.000 - 0.034 ng/mL   Light Blue Top    Collection Time: 10/23/18  3:12 AM   Result Value Ref Range    Extra Tube hold for add-on    Lavender Top    Collection Time: 10/23/18  3:12 AM   Result Value Ref Range    Extra Tube hold for add-on    Gold Top - SST    Collection Time: 10/23/18  3:12 AM   Result Value Ref Range    Extra Tube Hold for add-ons.    Green Top (No Gel)    Collection Time: 10/23/18  3:12 AM   Result Value Ref Range    Extra Tube Hold for add-ons.    CBC Auto Differential    Collection Time: 10/23/18  3:12 AM   Result Value Ref Range    WBC 10.04 4.80 - 10.80 10*3/mm3    RBC 4.69 (L) 4.70 - 6.10 10*6/mm3    Hemoglobin 14.4 14.0 - 18.0 g/dL    Hematocrit 44.1 42.0 - 52.0 %    MCV 94.0 80.0 - 94.0 fL    MCH 30.7 27.0 - 31.0 pg    MCHC 32.7 30.0 - 37.0 g/dL    RDW 14.0 11.5 - 14.5 %    RDW-SD 48.5 37.0 - 54.0 fl    MPV 10.6 6.0 - 12.0 fL    Platelets 208 130 - 400 10*3/mm3    Neutrophil % 57.0 37.0 - 80.0 %    Lymphocyte % 30.4 10.0 - 50.0 %    Monocyte % 7.1 0.0 - 12.0 %    Eosinophil % 4.2 0.0 - 7.0 %    Basophil % 1.0 0.0 - 2.5 %    Immature Grans % 0.3 0.0 - 0.6 %    Neutrophils, Absolute 5.73 2.00 - 6.90 10*3/mm3    Lymphocytes, Absolute 3.05 0.60 - 3.40 10*3/mm3    Monocytes, Absolute 0.71 0.00 - 0.90 10*3/mm3    Eosinophils, Absolute 0.42 0.00 - 0.70 10*3/mm3    Basophils, Absolute 0.10 0.00 - 0.20 10*3/mm3    Immature Grans, Absolute 0.03 0.00 - 0.06 10*3/mm3    nRBC 0.0 0.0 - 0.0 /100 WBC   Lipase    Collection Time: 10/23/18  3:12 AM   Result Value Ref Range    Lipase 399 (H) 23 - 300 U/L   Comprehensive Metabolic Panel    Collection Time: 10/23/18  5:15 PM   Result Value Ref Range    Glucose 131 (H) 70 - 100 mg/dL    BUN 38 (H) 9 - 23 mg/dL    Creatinine 2.16 (H) 0.60 - 1.30 mg/dL    Sodium 135 132 - 146 mmol/L    Potassium 5.0 3.5 - 5.5 mmol/L    Chloride 99 99 - 109 mmol/L    CO2 26.0 20.0 - 31.0 mmol/L    Calcium 9.5 8.7 - 10.4 mg/dL    Total Protein 7.9 5.7 - 8.2 g/dL     Albumin 4.41 3.20 - 4.80 g/dL    ALT (SGPT) 27 7 - 40 U/L    AST (SGOT) 27 0 - 33 U/L    Alkaline Phosphatase 49 25 - 100 U/L    Total Bilirubin 0.5 0.3 - 1.2 mg/dL    eGFR Non African Amer 31 (L) >60 mL/min/1.73    Globulin 3.5 gm/dL    A/G Ratio 1.3 (L) 1.5 - 2.5 g/dL    BUN/Creatinine Ratio 17.6 7.0 - 25.0    Anion Gap 10.0 3.0 - 11.0 mmol/L   Lipase    Collection Time: 10/23/18  5:15 PM   Result Value Ref Range    Lipase 72 (H) 6 - 51 U/L   CBC Auto Differential    Collection Time: 10/23/18  5:15 PM   Result Value Ref Range    WBC 10.01 3.50 - 10.80 10*3/mm3    RBC 4.85 4.20 - 5.76 10*6/mm3    Hemoglobin 14.7 13.1 - 17.5 g/dL    Hematocrit 45.3 38.9 - 50.9 %    MCV 93.4 80.0 - 99.0 fL    MCH 30.3 27.0 - 31.0 pg    MCHC 32.5 32.0 - 36.0 g/dL    RDW 14.5 11.3 - 14.5 %    RDW-SD 49.5 37.0 - 54.0 fl    MPV 11.6 6.0 - 12.0 fL    Platelets 227 150 - 450 10*3/mm3    Neutrophil % 67.1 41.0 - 71.0 %    Lymphocyte % 23.5 (L) 24.0 - 44.0 %    Monocyte % 5.2 0.0 - 12.0 %    Eosinophil % 3.6 (H) 0.0 - 3.0 %    Basophil % 0.6 0.0 - 1.0 %    Immature Grans % 0.3 0.0 - 0.6 %    Neutrophils, Absolute 6.72 1.50 - 8.30 10*3/mm3    Lymphocytes, Absolute 2.35 0.60 - 4.80 10*3/mm3    Monocytes, Absolute 0.52 0.00 - 1.00 10*3/mm3    Eosinophils, Absolute 0.36 (H) 0.00 - 0.30 10*3/mm3    Basophils, Absolute 0.06 0.00 - 0.20 10*3/mm3    Immature Grans, Absolute 0.03 0.00 - 0.03 10*3/mm3    nRBC 0.0 0.0 - 0.0 /100 WBC   Urinalysis With Microscopic If Indicated (No Culture) - Urine, Clean Catch    Collection Time: 10/23/18  5:56 PM   Result Value Ref Range    Color, UA Yellow Yellow, Straw    Appearance, UA Clear Clear    pH, UA 5.5 5.0 - 8.0    Specific Gravity, UA 1.015 1.001 - 1.030    Glucose, UA Negative Negative    Ketones, UA Negative Negative    Bilirubin, UA Negative Negative    Blood, UA Negative Negative    Protein, UA Negative Negative    Leuk Esterase, UA Trace (A) Negative    Nitrite, UA Negative Negative    Urobilinogen,  "UA 0.2 E.U./dL 0.2 - 1.0 E.U./dL   Urinalysis, Microscopic Only - Urine, Clean Catch    Collection Time: 10/23/18  5:56 PM   Result Value Ref Range    RBC, UA 3-6 (A) None Seen, 0-2 /HPF    WBC, UA 3-5 (A) None Seen, 0-2 /HPF    Bacteria, UA None Seen None Seen, Trace /HPF    Squamous Epithelial Cells, UA 3-6 (A) None Seen, 0-2 /HPF    Hyaline Casts, UA 0-6 0 - 6 /LPF    Methodology Automated Microscopy      Note: In addition to lab results from this visit, the labs listed above may include labs taken at another facility or during a different encounter within the last 24 hours. Please correlate lab times with ED admission and discharge times for further clarification of the services performed during this visit.    US Gallbladder    (Results Pending)     Vitals:    10/23/18 1354 10/23/18 1600 10/23/18 1758   BP: 161/83 154/79 176/99   BP Location: Left arm     Patient Position: Sitting Sitting    Pulse: 65 68 71   Resp: 16 16 16   Temp: 97.8 °F (36.6 °C)     TempSrc: Oral     SpO2: 94% 95% 97%   Weight: (!) 145 kg (319 lb)     Height: 193 cm (76\")       Medications   sodium chloride 0.9 % bolus 1,000 mL (1,000 mL Intravenous New Bag 10/23/18 1756)     ECG/EMG Results (last 24 hours)     ** No results found for the last 24 hours. **              [facility]    Final diagnoses:   Cholelithiasis without cholecystitis   Renal insufficiency       Documentation assistance provided by nubia Ibrahim.  Information recorded by the nubia was done at my direction and has been verified and validated by me.       Rupert Ibrahim  10/23/18 5770       Rupert Ibrahim  10/23/18 0466       Luis Grissom MD  10/23/18 1831    "

## 2018-10-24 ENCOUNTER — OFFICE VISIT (OUTPATIENT)
Dept: INTERNAL MEDICINE | Facility: CLINIC | Age: 63
End: 2018-10-24

## 2018-10-24 VITALS
HEIGHT: 75 IN | OXYGEN SATURATION: 97 % | HEART RATE: 69 BPM | RESPIRATION RATE: 16 BRPM | TEMPERATURE: 98.3 F | SYSTOLIC BLOOD PRESSURE: 161 MMHG | DIASTOLIC BLOOD PRESSURE: 101 MMHG | WEIGHT: 315 LBS | BODY MASS INDEX: 39.17 KG/M2

## 2018-10-24 DIAGNOSIS — E78.00 HYPERCHOLESTEREMIA: ICD-10-CM

## 2018-10-24 DIAGNOSIS — I15.0 RENOVASCULAR HYPERTENSION: Primary | ICD-10-CM

## 2018-10-24 DIAGNOSIS — N17.9 AKI (ACUTE KIDNEY INJURY) (HCC): ICD-10-CM

## 2018-10-24 DIAGNOSIS — E78.2 MIXED HYPERLIPIDEMIA: ICD-10-CM

## 2018-10-24 PROCEDURE — 99214 OFFICE O/P EST MOD 30 MIN: CPT | Performed by: INTERNAL MEDICINE

## 2018-10-24 RX ORDER — AMLODIPINE BESYLATE 5 MG/1
5 TABLET ORAL DAILY
Qty: 30 TABLET | Refills: 5 | Status: SHIPPED | OUTPATIENT
Start: 2018-10-24 | End: 2018-11-12 | Stop reason: SDUPTHER

## 2018-10-24 NOTE — PROGRESS NOTES
Subjective     Patient ID: Andre Agosto is a 63 y.o. male. Patient is here for management of multiple medical problems.     Chief Complaint   Patient presents with   • ER follow-up     patient states he is feeling a little better, patient states he went to a Cardiologist and Endocrinologist while in [place], patient was told he needs to have medications changed, patient has questions regarding medications     History of Present Illness       U/S GB.    Diet changes making pain worse.     UOP improved with iv fluid yesterday and off diuretics.    BURAK in er recently. Got IVF    In [facility]. Given recommendation to start metfromin, praulent and tulicity.            The following portions of the patient's history were reviewed and updated as appropriate: allergies, current medications, past family history, past medical history, past social history, past surgical history and problem list.    Review of Systems    Current Outpatient Prescriptions:   •  Alcohol Swabs (ALCOHOL PREP) pads, 1 pad 4 (Four) Times a Day As Needed (bs)., Disp: 120 each, Rfl: 12  •  aspirin 81 MG tablet, Take 1 tablet by mouth Daily., Disp: 30 tablet, Rfl: 11  •  divalproex (DEPAKOTE) 250 MG DR tablet, TAKE ONE TABLET BY MOUTH THREE TIMES A DAY, Disp: 90 tablet, Rfl: 1  •  escitalopram (LEXAPRO) 20 MG tablet, TAKE 1 TABLET DAILY, Disp: 90 tablet, Rfl: 2  •  glucose blood test strip, Use one to two times daily to check blood sugar, Disp: 100 each, Rfl: 12  •  glucose monitor monitoring kit, 1 each As Needed (as directed). Diagnosis Code E11.9, Disp: 1 each, Rfl: 0  •  glucose monitor monitoring kit, 1 each As Needed (bs)., Disp: 1 each, Rfl: 1  •  Lancets (FREESTYLE) lancets, 1 each by Other route As Needed (bs)., Disp: 120 each, Rfl: 12  •  losartan (COZAAR) 100 MG tablet, Take 1 tablet by mouth Daily., Disp: 30 tablet, Rfl: 11  •  lovastatin (MEVACOR) 20 MG tablet, Take 1 tablet by mouth Every Night., Disp: 30 tablet, Rfl: 11  •  Magnesium  "Oxide 400 (240 MG) MG tablet, Take 1 tablet by mouth daily. (Patient taking differently: Take 1 tablet by mouth Daily As Needed.), Disp: 30 tablet, Rfl: 11  •  ondansetron ODT (ZOFRAN-ODT) 4 MG disintegrating tablet, Take 1 tablet by mouth Every 6 (Six) Hours As Needed for Nausea or Vomiting., Disp: 10 tablet, Rfl: 0  •  oxyCODONE-acetaminophen (PERCOCET) 5-325 MG per tablet, Take 1 tablet by mouth Every 6 (Six) Hours As Needed for Moderate Pain  or Severe Pain ., Disp: 10 tablet, Rfl: 0  •  spironolactone (ALDACTONE) 50 MG tablet, Take 1 tablet by mouth 2 (Two) Times a Day., Disp: 60 tablet, Rfl: 5  •  terazosin (HYTRIN) 5 MG capsule, Take 1 capsule by mouth Every Night., Disp: 30 capsule, Rfl: 11  •  triamterene-hydrochlorothiazide (MAXZIDE-25) 37.5-25 MG per tablet, Take 0.5 tablets by mouth Daily., Disp: 30 tablet, Rfl: 11  •  Alirocumab (PRALUENT) 150 MG/ML solution pen-injector, Inject 150 mg under the skin into the appropriate area as directed Every 14 (Fourteen) Days., Disp: 6 pen, Rfl: 3  •  amLODIPine (NORVASC) 5 MG tablet, Take 1 tablet by mouth Daily., Disp: 30 tablet, Rfl: 5  •  amoxicillin-clavulanate (AUGMENTIN) 875-125 MG per tablet, Take 1 tablet by mouth Every 12 (Twelve) Hours., Disp: 20 tablet, Rfl: 0  •  Semaglutide (OZEMPIC) 0.25 or 0.5 MG/DOSE solution pen-injector, Inject 0.25 mg under the skin into the appropriate area as directed Every 7 (Seven) Days., Disp: 1.5 mL, Rfl: 3  No current facility-administered medications for this visit.     Objective      Blood pressure (!) 161/101, pulse 69, temperature 98.3 °F (36.8 °C), temperature source Oral, resp. rate 16, height 190.5 cm (75\"), weight (!) 147 kg (324 lb), SpO2 97 %.    Physical Exam     General Appearance:    Alert, cooperative, no distress, appears stated age   Head:    Normocephalic, without obvious abnormality, atraumatic   Eyes:    PERRL, conjunctiva/corneas clear, EOM's intact   Ears:    Normal TM's and external ear canals, both " ears   Nose:   Nares normal, septum midline, mucosa normal, no drainage   or sinus tenderness   Throat:   Lips, mucosa, and tongue normal; teeth and gums normal   Neck:   Supple, symmetrical, trachea midline, no adenopathy;        thyroid:  No enlargement/tenderness/nodules; no carotid    bruit or JVD   Back:     Symmetric, no curvature, ROM normal, no CVA tenderness   Lungs:     Clear to auscultation bilaterally, respirations unlabored   Chest wall:    No tenderness or deformity   Heart:    Regular rate and rhythm, S1 and S2 normal, no murmur,        rub or gallop   Abdomen:     Soft, non-tender, bowel sounds active all four quadrants,     no masses, no organomegaly   Extremities:   Extremities normal, atraumatic, no cyanosis or edema   Pulses:   2+ and symmetric all extremities   Skin:   Skin color, texture, turgor normal, no rashes or lesions   Lymph nodes:   Cervical, supraclavicular, and axillary nodes normal   Neurologic:   CNII-XII intact. Normal strength, sensation and reflexes       throughout      Results for orders placed or performed during the hospital encounter of 10/23/18   Comprehensive Metabolic Panel   Result Value Ref Range    Glucose 131 (H) 70 - 100 mg/dL    BUN 38 (H) 9 - 23 mg/dL    Creatinine 2.16 (H) 0.60 - 1.30 mg/dL    Sodium 135 132 - 146 mmol/L    Potassium 5.0 3.5 - 5.5 mmol/L    Chloride 99 99 - 109 mmol/L    CO2 26.0 20.0 - 31.0 mmol/L    Calcium 9.5 8.7 - 10.4 mg/dL    Total Protein 7.9 5.7 - 8.2 g/dL    Albumin 4.41 3.20 - 4.80 g/dL    ALT (SGPT) 27 7 - 40 U/L    AST (SGOT) 27 0 - 33 U/L    Alkaline Phosphatase 49 25 - 100 U/L    Total Bilirubin 0.5 0.3 - 1.2 mg/dL    eGFR Non African Amer 31 (L) >60 mL/min/1.73    Globulin 3.5 gm/dL    A/G Ratio 1.3 (L) 1.5 - 2.5 g/dL    BUN/Creatinine Ratio 17.6 7.0 - 25.0    Anion Gap 10.0 3.0 - 11.0 mmol/L   Lipase   Result Value Ref Range    Lipase 72 (H) 6 - 51 U/L   Urinalysis With Microscopic If Indicated (No Culture) - Urine, Clean Catch    Result Value Ref Range    Color, UA Yellow Yellow, Straw    Appearance, UA Clear Clear    pH, UA 5.5 5.0 - 8.0    Specific Gravity, UA 1.015 1.001 - 1.030    Glucose, UA Negative Negative    Ketones, UA Negative Negative    Bilirubin, UA Negative Negative    Blood, UA Negative Negative    Protein, UA Negative Negative    Leuk Esterase, UA Trace (A) Negative    Nitrite, UA Negative Negative    Urobilinogen, UA 0.2 E.U./dL 0.2 - 1.0 E.U./dL   CBC Auto Differential   Result Value Ref Range    WBC 10.01 3.50 - 10.80 10*3/mm3    RBC 4.85 4.20 - 5.76 10*6/mm3    Hemoglobin 14.7 13.1 - 17.5 g/dL    Hematocrit 45.3 38.9 - 50.9 %    MCV 93.4 80.0 - 99.0 fL    MCH 30.3 27.0 - 31.0 pg    MCHC 32.5 32.0 - 36.0 g/dL    RDW 14.5 11.3 - 14.5 %    RDW-SD 49.5 37.0 - 54.0 fl    MPV 11.6 6.0 - 12.0 fL    Platelets 227 150 - 450 10*3/mm3    Neutrophil % 67.1 41.0 - 71.0 %    Lymphocyte % 23.5 (L) 24.0 - 44.0 %    Monocyte % 5.2 0.0 - 12.0 %    Eosinophil % 3.6 (H) 0.0 - 3.0 %    Basophil % 0.6 0.0 - 1.0 %    Immature Grans % 0.3 0.0 - 0.6 %    Neutrophils, Absolute 6.72 1.50 - 8.30 10*3/mm3    Lymphocytes, Absolute 2.35 0.60 - 4.80 10*3/mm3    Monocytes, Absolute 0.52 0.00 - 1.00 10*3/mm3    Eosinophils, Absolute 0.36 (H) 0.00 - 0.30 10*3/mm3    Basophils, Absolute 0.06 0.00 - 0.20 10*3/mm3    Immature Grans, Absolute 0.03 0.00 - 0.03 10*3/mm3    nRBC 0.0 0.0 - 0.0 /100 WBC   Urinalysis, Microscopic Only - Urine, Clean Catch   Result Value Ref Range    RBC, UA 3-6 (A) None Seen, 0-2 /HPF    WBC, UA 3-5 (A) None Seen, 0-2 /HPF    Bacteria, UA None Seen None Seen, Trace /HPF    Squamous Epithelial Cells, UA 3-6 (A) None Seen, 0-2 /HPF    Hyaline Casts, UA 0-6 0 - 6 /LPF    Methodology Automated Microscopy          Assessment/Plan   Aggressive ldl management for multivessel cad.  D/c metform recommendation due to tasneem.  Start ozempic and purulent.       Andre was seen today for er follow-up.    Diagnoses and all orders for this  visit:    Renovascular hypertension  -     Discontinue: Alirocumab solution pen-injector 150 mg; Inject 150 mg under the skin into the appropriate area as directed Once per day on Mon Thu.  -     Basic Metabolic Panel    Hypercholesteremia    Mixed hyperlipidemia    BURAK (acute kidney injury) (CMS/HCC)  -     Basic Metabolic Panel    Other orders  -     Alirocumab (PRALUENT) 150 MG/ML solution pen-injector; Inject 150 mg under the skin into the appropriate area as directed Every 14 (Fourteen) Days.  -     Semaglutide (OZEMPIC) 0.25 or 0.5 MG/DOSE solution pen-injector; Inject 0.25 mg under the skin into the appropriate area as directed Every 7 (Seven) Days.  -     amLODIPine (NORVASC) 5 MG tablet; Take 1 tablet by mouth Daily.      Return in about 5 days (around 10/29/2018).          There are no Patient Instructions on file for this visit.     Keven Bear MD    Assessment/Plan

## 2018-10-29 ENCOUNTER — APPOINTMENT (OUTPATIENT)
Dept: ULTRASOUND IMAGING | Facility: HOSPITAL | Age: 63
End: 2018-10-29
Attending: INTERNAL MEDICINE

## 2018-10-29 ENCOUNTER — APPOINTMENT (OUTPATIENT)
Dept: LAB | Facility: HOSPITAL | Age: 63
End: 2018-10-29
Attending: INTERNAL MEDICINE

## 2018-10-29 ENCOUNTER — OFFICE VISIT (OUTPATIENT)
Dept: INTERNAL MEDICINE | Facility: CLINIC | Age: 63
End: 2018-10-29

## 2018-10-29 VITALS
BODY MASS INDEX: 39.17 KG/M2 | HEART RATE: 84 BPM | RESPIRATION RATE: 16 BRPM | WEIGHT: 315 LBS | DIASTOLIC BLOOD PRESSURE: 99 MMHG | TEMPERATURE: 98.3 F | HEIGHT: 75 IN | SYSTOLIC BLOOD PRESSURE: 165 MMHG | OXYGEN SATURATION: 99 %

## 2018-10-29 DIAGNOSIS — N18.30 ACUTE RENAL FAILURE WITH OTHER SPECIFIED PATHOLOGICAL KIDNEY LESION SUPERIMPOSED ON STAGE 3 CHRONIC KIDNEY DISEASE: ICD-10-CM

## 2018-10-29 DIAGNOSIS — N17.8 ACUTE RENAL FAILURE WITH OTHER SPECIFIED PATHOLOGICAL KIDNEY LESION SUPERIMPOSED ON STAGE 3 CHRONIC KIDNEY DISEASE: ICD-10-CM

## 2018-10-29 DIAGNOSIS — E78.2 MIXED HYPERLIPIDEMIA: Primary | ICD-10-CM

## 2018-10-29 DIAGNOSIS — I15.0 RENOVASCULAR HYPERTENSION: ICD-10-CM

## 2018-10-29 DIAGNOSIS — R56.9 SEIZURE (HCC): Primary | ICD-10-CM

## 2018-10-29 DIAGNOSIS — E87.6 HYPOKALEMIA: ICD-10-CM

## 2018-10-29 LAB
ANION GAP SERPL CALCULATED.3IONS-SCNC: 15.5 MMOL/L (ref 10–20)
BUN BLD-MCNC: 40 MG/DL (ref 7–20)
BUN/CREAT SERPL: 17.4 (ref 6.3–21.9)
CALCIUM SPEC-SCNC: 10.4 MG/DL (ref 8.4–10.2)
CHLORIDE SERPL-SCNC: 100 MMOL/L (ref 98–107)
CO2 SERPL-SCNC: 28 MMOL/L (ref 26–30)
CREAT BLD-MCNC: 2.3 MG/DL (ref 0.6–1.3)
GFR SERPL CREATININE-BSD FRML MDRD: 29 ML/MIN/1.73
GLUCOSE BLD-MCNC: 98 MG/DL (ref 74–98)
POTASSIUM BLD-SCNC: 5.5 MMOL/L (ref 3.5–5.1)
SODIUM BLD-SCNC: 138 MMOL/L (ref 137–145)

## 2018-10-29 PROCEDURE — 80048 BASIC METABOLIC PNL TOTAL CA: CPT | Performed by: INTERNAL MEDICINE

## 2018-10-29 PROCEDURE — 90471 IMMUNIZATION ADMIN: CPT | Performed by: INTERNAL MEDICINE

## 2018-10-29 PROCEDURE — 99214 OFFICE O/P EST MOD 30 MIN: CPT | Performed by: INTERNAL MEDICINE

## 2018-10-29 PROCEDURE — 36415 COLL VENOUS BLD VENIPUNCTURE: CPT | Performed by: INTERNAL MEDICINE

## 2018-10-29 PROCEDURE — 90632 HEPA VACCINE ADULT IM: CPT | Performed by: INTERNAL MEDICINE

## 2018-10-29 RX ORDER — DIVALPROEX SODIUM 250 MG/1
250 TABLET, DELAYED RELEASE ORAL 3 TIMES DAILY
Qty: 150 TABLET | Refills: 3 | Status: SHIPPED | OUTPATIENT
Start: 2018-10-29 | End: 2019-03-08 | Stop reason: SDUPTHER

## 2018-10-29 RX ORDER — HYDRALAZINE HYDROCHLORIDE 25 MG/1
25 TABLET, FILM COATED ORAL 3 TIMES DAILY
Qty: 5 TABLET | Refills: 11 | Status: SHIPPED | OUTPATIENT
Start: 2018-10-29 | End: 2018-10-29 | Stop reason: SDUPTHER

## 2018-10-29 RX ORDER — HYDRALAZINE HYDROCHLORIDE 25 MG/1
25 TABLET, FILM COATED ORAL 3 TIMES DAILY
Qty: 90 TABLET | Refills: 11 | Status: SHIPPED | OUTPATIENT
Start: 2018-10-29 | End: 2018-11-06

## 2018-10-29 RX ORDER — SPIRONOLACTONE 25 MG/1
25 TABLET ORAL 2 TIMES DAILY
Qty: 60 TABLET | Refills: 11 | Status: SHIPPED | OUTPATIENT
Start: 2018-10-29 | End: 2019-03-08

## 2018-10-29 NOTE — PROGRESS NOTES
Subjective     Patient ID: Andre Agosto is a 63 y.o. male. Patient is here for management of multiple medical problems.     Chief Complaint   Patient presents with   • Renovascular Hypertension     follow-up     History of Present Illness       Recent skin  Infection. LN underneath erythema. Topical abx helping.    Pt on Maxzide full pill due to elevated BP..      Pt bp remains elevated.  Pt off hytrin for pt request.    Tolerating ozempic. Not having low bs. Started Saturday 3 days ago.      The following portions of the patient's history were reviewed and updated as appropriate: allergies, current medications, past family history, past medical history, past social history, past surgical history and problem list.    Review of Systems   Constitutional: Positive for fatigue.   HENT: Negative for congestion, mouth sores, nosebleeds and postnasal drip.    Respiratory: Negative for shortness of breath and wheezing.    Cardiovascular: Negative for chest pain and palpitations.   Psychiatric/Behavioral: Positive for sleep disturbance.       Current Outpatient Prescriptions:   •  Alcohol Swabs (ALCOHOL PREP) pads, 1 pad 4 (Four) Times a Day As Needed (bs)., Disp: 120 each, Rfl: 12  •  amLODIPine (NORVASC) 5 MG tablet, Take 1 tablet by mouth Daily., Disp: 30 tablet, Rfl: 5  •  aspirin 81 MG tablet, Take 1 tablet by mouth Daily., Disp: 30 tablet, Rfl: 11  •  divalproex (DEPAKOTE) 250 MG DR tablet, Take 1 tablet by mouth 3 (Three) Times a Day. Take 2 in the AM 1 at noon and 2 in the pm. Per DR Clifford., Disp: 150 tablet, Rfl: 3  •  escitalopram (LEXAPRO) 20 MG tablet, TAKE 1 TABLET DAILY, Disp: 90 tablet, Rfl: 2  •  glucose blood test strip, Use one to two times daily to check blood sugar, Disp: 100 each, Rfl: 12  •  glucose monitor monitoring kit, 1 each As Needed (as directed). Diagnosis Code E11.9, Disp: 1 each, Rfl: 0  •  glucose monitor monitoring kit, 1 each As Needed (bs)., Disp: 1 each, Rfl: 1  •  Lancets (FREESTYLE)  "lancets, 1 each by Other route As Needed (bs)., Disp: 120 each, Rfl: 12  •  losartan (COZAAR) 100 MG tablet, Take 1 tablet by mouth Daily., Disp: 30 tablet, Rfl: 11  •  lovastatin (MEVACOR) 20 MG tablet, Take 1 tablet by mouth Every Night., Disp: 30 tablet, Rfl: 11  •  Magnesium Oxide 400 (240 MG) MG tablet, Take 1 tablet by mouth daily. (Patient taking differently: Take 1 tablet by mouth Daily As Needed.), Disp: 30 tablet, Rfl: 11  •  Semaglutide (OZEMPIC) 0.25 or 0.5 MG/DOSE solution pen-injector, Inject 0.25 mg under the skin into the appropriate area as directed Every 7 (Seven) Days., Disp: 1.5 mL, Rfl: 3  •  spironolactone (ALDACTONE) 25 MG tablet, Take 1 tablet by mouth 2 (Two) Times a Day., Disp: 60 tablet, Rfl: 11  •  triamterene-hydrochlorothiazide (MAXZIDE-25) 37.5-25 MG per tablet, Take 0.5 tablets by mouth Daily., Disp: 30 tablet, Rfl: 11  •  Alirocumab (PRALUENT) 150 MG/ML solution pen-injector, Inject 150 mg under the skin into the appropriate area as directed Every 14 (Fourteen) Days., Disp: 6 pen, Rfl: 3  •  hydrALAZINE (APRESOLINE) 25 MG tablet, Take 1 tablet by mouth 3 (Three) Times a Day., Disp: 5 tablet, Rfl: 11    Objective      Blood pressure 165/99, pulse 84, temperature 98.3 °F (36.8 °C), temperature source Oral, resp. rate 16, height 190.5 cm (75\"), weight (!) 147 kg (323 lb), SpO2 99 %.    Physical Exam     General Appearance:    Alert, cooperative, no distress, appears stated age   Head:    Normocephalic, without obvious abnormality, atraumatic   Eyes:    PERRL, conjunctiva/corneas clear, EOM's intact   Ears:    Normal TM's and external ear canals, both ears   Nose:   Nares normal, septum midline, mucosa normal, no drainage   or sinus tenderness   Throat:   Lips, mucosa, and tongue normal; teeth and gums normal   Neck:   Supple, symmetrical, trachea midline, no adenopathy;        thyroid:  No enlargement/tenderness/nodules; no carotid    bruit or JVD   Back:     Symmetric, no curvature, " ROM normal, no CVA tenderness   Lungs:     Clear to auscultation bilaterally, respirations unlabored   Chest wall:    No tenderness or deformity   Heart:    Regular rate and rhythm, S1 and S2 normal, no murmur,        rub or gallop   Abdomen:     Soft, non-tender, bowel sounds active all four quadrants,     no masses, no organomegaly   Extremities:   Extremities normal, atraumatic, no cyanosis or edema   Pulses:   2+ and symmetric all extremities   Skin:   Skin color, texture, turgor normal, no rashes or lesions   Lymph nodes:   Cervical, supraclavicular, and axillary nodes normal   Neurologic:   CNII-XII intact. Normal strength, sensation and reflexes       throughout      Results for orders placed or performed in visit on 10/24/18   Basic Metabolic Panel   Result Value Ref Range    Glucose 98 74 - 98 mg/dL    BUN 40 (H) 7 - 20 mg/dL    Creatinine 2.30 (H) 0.60 - 1.30 mg/dL    Sodium 138 137 - 145 mmol/L    Potassium 5.5 (H) 3.5 - 5.1 mmol/L    Chloride 100 98 - 107 mmol/L    CO2 28.0 26.0 - 30.0 mmol/L    Calcium 10.4 (H) 8.4 - 10.2 mg/dL    eGFR Non African Amer 29 (L) >60 mL/min/1.73    BUN/Creatinine Ratio 17.4 6.3 - 21.9    Anion Gap 15.5 10.0 - 20.0 mmol/L         Assessment/Plan       Andre was seen today for renovascular hypertension.    Diagnoses and all orders for this visit:    Mixed hyperlipidemia    Hypokalemia  -     spironolactone (ALDACTONE) 25 MG tablet; Take 1 tablet by mouth 2 (Two) Times a Day.    Renovascular hypertension  -     spironolactone (ALDACTONE) 25 MG tablet; Take 1 tablet by mouth 2 (Two) Times a Day.  -     Basic Metabolic Panel    Acute renal failure with other specified pathological kidney lesion superimposed on stage 3 chronic kidney disease (CMS/HCC)    Other orders  -     hydrALAZINE (APRESOLINE) 25 MG tablet; Take 1 tablet by mouth 3 (Three) Times a Day.  -     Hepatitis A Vaccine Adult IM      Return in about 1 week (around 11/5/2018).          There are no Patient  Instructions on file for this visit.     Keven Bear MD    Assessment/Plan

## 2018-10-31 ENCOUNTER — TELEPHONE (OUTPATIENT)
Dept: INTERNAL MEDICINE | Facility: CLINIC | Age: 63
End: 2018-10-31

## 2018-11-01 ENCOUNTER — TRANSCRIBE ORDERS (OUTPATIENT)
Dept: LAB | Facility: HOSPITAL | Age: 63
End: 2018-11-01

## 2018-11-01 ENCOUNTER — TRANSCRIBE ORDERS (OUTPATIENT)
Dept: ADMINISTRATIVE | Facility: HOSPITAL | Age: 63
End: 2018-11-01

## 2018-11-01 ENCOUNTER — APPOINTMENT (OUTPATIENT)
Dept: LAB | Facility: HOSPITAL | Age: 63
End: 2018-11-01
Attending: INTERNAL MEDICINE

## 2018-11-01 ENCOUNTER — LAB (OUTPATIENT)
Dept: LAB | Facility: HOSPITAL | Age: 63
End: 2018-11-01

## 2018-11-01 DIAGNOSIS — E87.6 HYPOKALEMIA: ICD-10-CM

## 2018-11-01 DIAGNOSIS — N18.30 CHRONIC KIDNEY DISEASE, STAGE III (MODERATE) (HCC): ICD-10-CM

## 2018-11-01 DIAGNOSIS — N18.30 CHRONIC KIDNEY DISEASE, STAGE III (MODERATE) (HCC): Primary | ICD-10-CM

## 2018-11-01 DIAGNOSIS — E87.6 HYPOKALEMIA: Primary | ICD-10-CM

## 2018-11-01 LAB
ALBUMIN SERPL-MCNC: 4.9 G/DL (ref 3.5–5)
ANION GAP SERPL CALCULATED.3IONS-SCNC: 14.5 MMOL/L (ref 10–20)
BACTERIA UR QL AUTO: ABNORMAL /HPF
BASOPHILS # BLD AUTO: 0.07 10*3/MM3 (ref 0–0.2)
BASOPHILS NFR BLD AUTO: 0.6 % (ref 0–2.5)
BILIRUB UR QL STRIP: NEGATIVE
BUN BLD-MCNC: 54 MG/DL (ref 7–20)
BUN/CREAT SERPL: 24.5 (ref 6.3–21.9)
CALCIUM SPEC-SCNC: 9.8 MG/DL (ref 8.4–10.2)
CHLORIDE SERPL-SCNC: 100 MMOL/L (ref 98–107)
CLARITY UR: CLEAR
CO2 SERPL-SCNC: 28 MMOL/L (ref 26–30)
COLOR UR: YELLOW
CREAT BLD-MCNC: 2.2 MG/DL (ref 0.6–1.3)
CREAT UR-MCNC: 134.1 MG/DL
DEPRECATED RDW RBC AUTO: 46.2 FL (ref 37–54)
EOSINOPHIL # BLD AUTO: 0.48 10*3/MM3 (ref 0–0.7)
EOSINOPHIL NFR BLD AUTO: 4.4 % (ref 0–7)
ERYTHROCYTE [DISTWIDTH] IN BLOOD BY AUTOMATED COUNT: 13.7 % (ref 11.5–14.5)
GFR SERPL CREATININE-BSD FRML MDRD: 30 ML/MIN/1.73
GLUCOSE BLD-MCNC: 88 MG/DL (ref 74–98)
GLUCOSE UR STRIP-MCNC: NEGATIVE MG/DL
HCT VFR BLD AUTO: 46.2 % (ref 42–52)
HGB BLD-MCNC: 15.6 G/DL (ref 14–18)
HGB UR QL STRIP.AUTO: ABNORMAL
HYALINE CASTS UR QL AUTO: ABNORMAL /LPF
IMM GRANULOCYTES # BLD: 0.03 10*3/MM3 (ref 0–0.06)
IMM GRANULOCYTES NFR BLD: 0.3 % (ref 0–0.6)
KETONES UR QL STRIP: NEGATIVE
LEUKOCYTE ESTERASE UR QL STRIP.AUTO: NEGATIVE
LYMPHOCYTES # BLD AUTO: 2.98 10*3/MM3 (ref 0.6–3.4)
LYMPHOCYTES NFR BLD AUTO: 27.1 % (ref 10–50)
MAGNESIUM SERPL-MCNC: 2.2 MG/DL (ref 1.6–2.3)
MCH RBC QN AUTO: 30.9 PG (ref 27–31)
MCHC RBC AUTO-ENTMCNC: 33.8 G/DL (ref 30–37)
MCV RBC AUTO: 91.5 FL (ref 80–94)
MONOCYTES # BLD AUTO: 0.66 10*3/MM3 (ref 0–0.9)
MONOCYTES NFR BLD AUTO: 6 % (ref 0–12)
NEUTROPHILS # BLD AUTO: 6.76 10*3/MM3 (ref 2–6.9)
NEUTROPHILS NFR BLD AUTO: 61.6 % (ref 37–80)
NITRITE UR QL STRIP: NEGATIVE
NRBC BLD MANUAL-RTO: 0 /100 WBC (ref 0–0)
PH UR STRIP.AUTO: 5.5 [PH] (ref 5–8)
PHOSPHATE SERPL-MCNC: 5.3 MG/DL (ref 2.5–4.5)
PLATELET # BLD AUTO: 239 10*3/MM3 (ref 130–400)
PMV BLD AUTO: 11 FL (ref 6–12)
POTASSIUM BLD-SCNC: 5.5 MMOL/L (ref 3.5–5.1)
PROT UR QL STRIP: ABNORMAL
PROT UR-MCNC: 22 MG/DL (ref 0–11.9)
RBC # BLD AUTO: 5.05 10*6/MM3 (ref 4.7–6.1)
RBC # UR: ABNORMAL /HPF
REF LAB TEST METHOD: ABNORMAL
SODIUM BLD-SCNC: 137 MMOL/L (ref 137–145)
SP GR UR STRIP: 1.02 (ref 1–1.03)
SQUAMOUS #/AREA URNS HPF: ABNORMAL /HPF
TRANS CELLS #/AREA URNS HPF: ABNORMAL /HPF
UROBILINOGEN UR QL STRIP: ABNORMAL
WBC NRBC COR # BLD: 10.98 10*3/MM3 (ref 4.8–10.8)
WBC UR QL AUTO: ABNORMAL /HPF

## 2018-11-01 PROCEDURE — 36415 COLL VENOUS BLD VENIPUNCTURE: CPT

## 2018-11-01 PROCEDURE — 82570 ASSAY OF URINE CREATININE: CPT

## 2018-11-01 PROCEDURE — 80069 RENAL FUNCTION PANEL: CPT

## 2018-11-01 PROCEDURE — 81001 URINALYSIS AUTO W/SCOPE: CPT

## 2018-11-01 PROCEDURE — 85025 COMPLETE CBC W/AUTO DIFF WBC: CPT

## 2018-11-01 PROCEDURE — 84156 ASSAY OF PROTEIN URINE: CPT

## 2018-11-01 PROCEDURE — 83735 ASSAY OF MAGNESIUM: CPT

## 2018-11-02 ENCOUNTER — TELEPHONE (OUTPATIENT)
Dept: INTERNAL MEDICINE | Facility: CLINIC | Age: 63
End: 2018-11-02

## 2018-11-02 NOTE — TELEPHONE ENCOUNTER
Dionne from prescription benefit department left a VM stating that pts Prior Auth for Praluent was denied as of 11/1/18. They are waiting to hear from our office to know if Dr. Bear wants to do an appeal or change the RX to something different. Please advise.

## 2018-11-06 ENCOUNTER — OFFICE VISIT (OUTPATIENT)
Dept: INTERNAL MEDICINE | Facility: CLINIC | Age: 63
End: 2018-11-06

## 2018-11-06 VITALS
HEART RATE: 65 BPM | SYSTOLIC BLOOD PRESSURE: 158 MMHG | HEIGHT: 76 IN | BODY MASS INDEX: 38.36 KG/M2 | DIASTOLIC BLOOD PRESSURE: 98 MMHG | TEMPERATURE: 98.2 F | WEIGHT: 315 LBS | OXYGEN SATURATION: 98 % | RESPIRATION RATE: 16 BRPM

## 2018-11-06 DIAGNOSIS — I10 MALIGNANT HYPERTENSION: ICD-10-CM

## 2018-11-06 DIAGNOSIS — N18.30 STAGE 3 CHRONIC KIDNEY DISEASE (HCC): Primary | ICD-10-CM

## 2018-11-06 DIAGNOSIS — B35.4 TINEA CORPORIS: ICD-10-CM

## 2018-11-06 LAB
BUN SERPL-MCNC: 28 MG/DL (ref 7–20)
BUN/CREAT SERPL: 17.5 (ref 6.3–21.9)
CALCIUM SERPL-MCNC: 10.1 MG/DL (ref 8.4–10.2)
CHLORIDE SERPL-SCNC: 101 MMOL/L (ref 98–107)
CO2 SERPL-SCNC: 29 MMOL/L (ref 26–30)
CREAT SERPL-MCNC: 1.6 MG/DL (ref 0.6–1.3)
GLUCOSE SERPL-MCNC: 91 MG/DL (ref 74–98)
POTASSIUM SERPL-SCNC: 4.8 MMOL/L (ref 3.5–5.1)
SODIUM SERPL-SCNC: 140 MMOL/L (ref 137–145)

## 2018-11-06 PROCEDURE — 99214 OFFICE O/P EST MOD 30 MIN: CPT | Performed by: INTERNAL MEDICINE

## 2018-11-06 RX ORDER — KETOCONAZOLE 20 MG/ML
SHAMPOO TOPICAL 2 TIMES WEEKLY
Qty: 12 ML | Refills: 0 | Status: SHIPPED | OUTPATIENT
Start: 2018-11-08 | End: 2020-05-11

## 2018-11-06 RX ORDER — HYDRALAZINE HYDROCHLORIDE 10 MG/1
10 TABLET, FILM COATED ORAL 3 TIMES DAILY
Qty: 90 TABLET | Refills: 3 | Status: SHIPPED | OUTPATIENT
Start: 2018-11-06 | End: 2018-11-16 | Stop reason: SDUPTHER

## 2018-11-06 NOTE — PROGRESS NOTES
Subjective     Patient ID: Andre Agosto is a 63 y.o. male. Patient is here for management of multiple medical problems.     Chief Complaint   Patient presents with   • Hyperlipidemia     follow-up   • Renovascular Hypertension     follow-up     History of Present Illness     Pt seen Dr Salazar.  Pt had seen local Nephrologist prior by a few days.  brought in Pharmacist.  Dr Salazar want to do adrenal gland work up.    [facility] wanting control of BP.        The following portions of the patient's history were reviewed and updated as appropriate: allergies, current medications, past family history, past medical history, past social history, past surgical history and problem list.    Review of Systems   Constitutional: Positive for fatigue.   Respiratory: Negative for shortness of breath.    Musculoskeletal: Negative for gait problem and joint swelling.   Psychiatric/Behavioral: Negative for decreased concentration and sleep disturbance. The patient is not nervous/anxious.    All other systems reviewed and are negative.      Current Outpatient Prescriptions:   •  Alcohol Swabs (ALCOHOL PREP) pads, 1 pad 4 (Four) Times a Day As Needed (bs)., Disp: 120 each, Rfl: 12  •  amLODIPine (NORVASC) 5 MG tablet, Take 1 tablet by mouth Daily. (Patient taking differently: Take 5 mg by mouth 2 (Two) Times a Day.), Disp: 30 tablet, Rfl: 5  •  aspirin 81 MG tablet, Take 1 tablet by mouth Daily., Disp: 30 tablet, Rfl: 11  •  divalproex (DEPAKOTE) 250 MG DR tablet, Take 1 tablet by mouth 3 (Three) Times a Day. Take 2 in the AM 1 at noon and 2 in the pm. Per DR Clifford., Disp: 150 tablet, Rfl: 3  •  escitalopram (LEXAPRO) 20 MG tablet, TAKE 1 TABLET DAILY, Disp: 90 tablet, Rfl: 2  •  glucose blood test strip, Use one to two times daily to check blood sugar, Disp: 100 each, Rfl: 12  •  glucose monitor monitoring kit, 1 each As Needed (as directed). Diagnosis Code E11.9, Disp: 1 each, Rfl: 0  •  glucose monitor monitoring kit, 1 each As Needed  "(bs)., Disp: 1 each, Rfl: 1  •  Lancets (FREESTYLE) lancets, 1 each by Other route As Needed (bs)., Disp: 120 each, Rfl: 12  •  losartan (COZAAR) 100 MG tablet, Take 1 tablet by mouth Daily., Disp: 30 tablet, Rfl: 11  •  lovastatin (MEVACOR) 20 MG tablet, Take 1 tablet by mouth Every Night., Disp: 30 tablet, Rfl: 11  •  Magnesium Oxide 400 (240 MG) MG tablet, Take 1 tablet by mouth daily. (Patient taking differently: Take 1 tablet by mouth Daily As Needed.), Disp: 30 tablet, Rfl: 11  •  Semaglutide (OZEMPIC) 0.25 or 0.5 MG/DOSE solution pen-injector, Inject 0.25 mg under the skin into the appropriate area as directed Every 7 (Seven) Days., Disp: 1.5 mL, Rfl: 3  •  spironolactone (ALDACTONE) 25 MG tablet, Take 1 tablet by mouth 2 (Two) Times a Day., Disp: 60 tablet, Rfl: 11  •  Alirocumab (PRALUENT) 75 MG/ML solution prefilled syringe, Inject 75 mg under the skin into the appropriate area as directed Every 14 (Fourteen) Days., Disp: 6 mL, Rfl: 3  •  hydrALAZINE (APRESOLINE) 10 MG tablet, Take 1 tablet by mouth 3 (Three) Times a Day., Disp: 90 tablet, Rfl: 3  •  [START ON 11/8/2018] ketoconazole (NIZORAL) 2 % shampoo, Apply  topically to the appropriate area as directed 2 (Two) Times a Week., Disp: 12 mL, Rfl: 0    Objective      Blood pressure 158/98, pulse 65, temperature 98.2 °F (36.8 °C), temperature source Oral, resp. rate 16, height 193 cm (76\"), weight (!) 144 kg (317 lb), SpO2 98 %.    Physical Exam     General Appearance:    Alert, cooperative, no distress, appears stated age   Head:    Normocephalic, without obvious abnormality, atraumatic   Eyes:    PERRL, conjunctiva/corneas clear, EOM's intact   Ears:    Normal TM's and external ear canals, both ears   Nose:   Nares normal, septum midline, mucosa normal, no drainage   or sinus tenderness   Throat:   Lips, mucosa, and tongue normal; teeth and gums normal   Neck:   Supple, symmetrical, trachea midline, no adenopathy;        thyroid:  No " enlargement/tenderness/nodules; no carotid    bruit or JVD   Back:     Symmetric, no curvature, ROM normal, no CVA tenderness   Lungs:     Clear to auscultation bilaterally, respirations unlabored   Chest wall:    No tenderness or deformity   Heart:    Regular rate and rhythm, S1 and S2 normal, no murmur,        rub or gallop   Abdomen:     Soft, non-tender, bowel sounds active all four quadrants,     no masses, no organomegaly   Extremities:   Extremities normal, atraumatic, no cyanosis or edema   Pulses:   2+ and symmetric all extremities   Skin:   Erythema right axilla.     Lymph nodes:   Cervical, supraclavicular, and axillary nodes normal   Neurologic:   CNII-XII intact. Normal strength, sensation and reflexes       throughout      Results for orders placed or performed in visit on 11/01/18   Magnesium   Result Value Ref Range    Magnesium 2.2 1.6 - 2.3 mg/dL   Creatinine, Urine, Random - Urine, Clean Catch   Result Value Ref Range    Creatinine, Urine 134.1 mg/dL   Protein, Urine, Random - Urine, Clean Catch   Result Value Ref Range    Total Protein, Urine 22.0 (H) 0.0 - 11.9 mg/dL   Renal Function Panel   Result Value Ref Range    Glucose 88 74 - 98 mg/dL    BUN 54 (H) 7 - 20 mg/dL    Creatinine 2.20 (H) 0.60 - 1.30 mg/dL    Sodium 137 137 - 145 mmol/L    Potassium 5.5 (H) 3.5 - 5.1 mmol/L    Chloride 100 98 - 107 mmol/L    CO2 28.0 26.0 - 30.0 mmol/L    Calcium 9.8 8.4 - 10.2 mg/dL    Albumin 4.90 3.50 - 5.00 g/dL    Phosphorus 5.3 (H) 2.5 - 4.5 mg/dL    Anion Gap 14.5 10.0 - 20.0 mmol/L    BUN/Creatinine Ratio 24.5 (H) 6.3 - 21.9    eGFR Non African Amer 30 (L) >60 mL/min/1.73   Urinalysis without microscopic (no culture) - Urine, Clean Catch   Result Value Ref Range    Color, UA Yellow Yellow, Straw    Appearance, UA Clear Clear    pH, UA 5.5 5.0 - 8.0    Specific Gravity, UA 1.020 1.005 - 1.030    Glucose, UA Negative Negative    Ketones, UA Negative Negative    Bilirubin, UA Negative Negative    Blood,  UA Trace (A) Negative    Protein, UA Trace (A) Negative    Leuk Esterase, UA Negative Negative    Nitrite, UA Negative Negative    Urobilinogen, UA 0.2 E.U./dL 0.2 - 1.0 E.U./dL   Urinalysis, Microscopic Only - Urine, Clean Catch   Result Value Ref Range    RBC, UA 3-5 (A) None Seen /HPF    WBC, UA 3-5 (A) None Seen /HPF    Bacteria, UA None Seen None Seen /HPF    Squamous Epithelial Cells, UA 3-6 (A) None Seen, 0-2 /HPF    Transitional Epithelial Cells, UA 0-2 0 - 2 /HPF    Hyaline Casts, UA 0-2 None Seen /LPF    Methodology Manual Light Microscopy    CBC Auto Differential   Result Value Ref Range    WBC 10.98 (H) 4.80 - 10.80 10*3/mm3    RBC 5.05 4.70 - 6.10 10*6/mm3    Hemoglobin 15.6 14.0 - 18.0 g/dL    Hematocrit 46.2 42.0 - 52.0 %    MCV 91.5 80.0 - 94.0 fL    MCH 30.9 27.0 - 31.0 pg    MCHC 33.8 30.0 - 37.0 g/dL    RDW 13.7 11.5 - 14.5 %    RDW-SD 46.2 37.0 - 54.0 fl    MPV 11.0 6.0 - 12.0 fL    Platelets 239 130 - 400 10*3/mm3    Neutrophil % 61.6 37.0 - 80.0 %    Lymphocyte % 27.1 10.0 - 50.0 %    Monocyte % 6.0 0.0 - 12.0 %    Eosinophil % 4.4 0.0 - 7.0 %    Basophil % 0.6 0.0 - 2.5 %    Immature Grans % 0.3 0.0 - 0.6 %    Neutrophils, Absolute 6.76 2.00 - 6.90 10*3/mm3    Lymphocytes, Absolute 2.98 0.60 - 3.40 10*3/mm3    Monocytes, Absolute 0.66 0.00 - 0.90 10*3/mm3    Eosinophils, Absolute 0.48 0.00 - 0.70 10*3/mm3    Basophils, Absolute 0.07 0.00 - 0.20 10*3/mm3    Immature Grans, Absolute 0.03 0.00 - 0.06 10*3/mm3    nRBC 0.0 0.0 - 0.0 /100 WBC         Assessment/Plan   Pt with wt loss going well.    Pt off hydralazine.      Lower dose of spironolactone.      Andre was seen today for hyperlipidemia and renovascular hypertension.    Diagnoses and all orders for this visit:    Stage 3 chronic kidney disease (CMS/HCC)  -     Basic Metabolic Panel    Tinea corporis  -     ketoconazole (NIZORAL) 2 % shampoo; Apply  topically to the appropriate area as directed 2 (Two) Times a Week.    Malignant  hypertension  -     hydrALAZINE (APRESOLINE) 10 MG tablet; Take 1 tablet by mouth 3 (Three) Times a Day.      Return in about 1 week (around 11/13/2018).          There are no Patient Instructions on file for this visit.     Keven Bear MD    Assessment/Plan

## 2018-11-08 ENCOUNTER — APPOINTMENT (OUTPATIENT)
Dept: CARDIAC REHAB | Facility: HOSPITAL | Age: 63
End: 2018-11-08
Attending: PSYCHIATRY & NEUROLOGY

## 2018-11-12 ENCOUNTER — TELEPHONE (OUTPATIENT)
Dept: INTERNAL MEDICINE | Facility: CLINIC | Age: 63
End: 2018-11-12

## 2018-11-12 RX ORDER — AMLODIPINE BESYLATE 5 MG/1
5 TABLET ORAL 2 TIMES DAILY
Qty: 180 TABLET | Refills: 3 | Status: SHIPPED | OUTPATIENT
Start: 2018-11-12 | End: 2019-03-11 | Stop reason: SDUPTHER

## 2018-11-12 NOTE — TELEPHONE ENCOUNTER
Pt left  11/12/18 at 2:03PM requesting a refill. I was unsure of what medication he was needing. Contacted pt to clarify.   Pt states that it is his amlodipine, he is taking 5mg BID. But the rx is for 1 daily, he needs this re-written and sent in with the directions as BID. Please advise?

## 2018-11-16 ENCOUNTER — OFFICE VISIT (OUTPATIENT)
Dept: INTERNAL MEDICINE | Facility: CLINIC | Age: 63
End: 2018-11-16

## 2018-11-16 VITALS
OXYGEN SATURATION: 99 % | WEIGHT: 315 LBS | TEMPERATURE: 98.5 F | SYSTOLIC BLOOD PRESSURE: 149 MMHG | BODY MASS INDEX: 38.36 KG/M2 | HEART RATE: 68 BPM | RESPIRATION RATE: 16 BRPM | DIASTOLIC BLOOD PRESSURE: 89 MMHG | HEIGHT: 76 IN

## 2018-11-16 DIAGNOSIS — G47.33 OSA ON CPAP: ICD-10-CM

## 2018-11-16 DIAGNOSIS — I10 MALIGNANT HYPERTENSION: ICD-10-CM

## 2018-11-16 DIAGNOSIS — I25.10 CORONARY ARTERY DISEASE INVOLVING NATIVE CORONARY ARTERY OF NATIVE HEART WITHOUT ANGINA PECTORIS: ICD-10-CM

## 2018-11-16 DIAGNOSIS — I15.0 RENOVASCULAR HYPERTENSION: Primary | ICD-10-CM

## 2018-11-16 DIAGNOSIS — Z99.89 OSA ON CPAP: ICD-10-CM

## 2018-11-16 LAB
BUN SERPL-MCNC: 23 MG/DL (ref 7–20)
BUN/CREAT SERPL: 16.4 (ref 6.3–21.9)
CALCIUM SERPL-MCNC: 9.7 MG/DL (ref 8.4–10.2)
CHLORIDE SERPL-SCNC: 102 MMOL/L (ref 98–107)
CO2 SERPL-SCNC: 27 MMOL/L (ref 26–30)
CREAT SERPL-MCNC: 1.4 MG/DL (ref 0.6–1.3)
GLUCOSE SERPL-MCNC: 100 MG/DL (ref 74–98)
POTASSIUM SERPL-SCNC: 3.9 MMOL/L (ref 3.5–5.1)
SODIUM SERPL-SCNC: 140 MMOL/L (ref 137–145)

## 2018-11-16 PROCEDURE — 99214 OFFICE O/P EST MOD 30 MIN: CPT | Performed by: INTERNAL MEDICINE

## 2018-11-16 RX ORDER — HYDRALAZINE HYDROCHLORIDE 25 MG/1
25 TABLET, FILM COATED ORAL 3 TIMES DAILY
Qty: 90 TABLET | Refills: 3 | Status: SHIPPED | OUTPATIENT
Start: 2018-11-16 | End: 2018-12-28 | Stop reason: SDUPTHER

## 2018-11-16 NOTE — PROGRESS NOTES
Subjective     Patient ID: Andre Agosto is a 63 y.o. male. Patient is here for management of multiple medical problems.     Chief Complaint   Patient presents with   • Stage 3 chronic kidney disease     follow-up   • Hyperlipidemia     follow-up     History of Present Illness     Pt not yet heard from Nephrology/Pharmacy.          The following portions of the patient's history were reviewed and updated as appropriate: allergies, current medications, past family history, past medical history, past social history, past surgical history and problem list.    Review of Systems   Constitutional: Negative for diaphoresis and fatigue.   HENT: Negative for congestion, dental problem, drooling and ear discharge.    Eyes: Negative for photophobia.   Cardiovascular: Negative for chest pain, palpitations and leg swelling.   All other systems reviewed and are negative.      Current Outpatient Medications:   •  Alcohol Swabs (ALCOHOL PREP) pads, 1 pad 4 (Four) Times a Day As Needed (bs)., Disp: 120 each, Rfl: 12  •  amLODIPine (NORVASC) 5 MG tablet, Take 1 tablet by mouth 2 (Two) Times a Day., Disp: 180 tablet, Rfl: 3  •  aspirin 81 MG tablet, Take 1 tablet by mouth Daily., Disp: 30 tablet, Rfl: 11  •  divalproex (DEPAKOTE) 250 MG DR tablet, Take 1 tablet by mouth 3 (Three) Times a Day. Take 2 in the AM 1 at noon and 2 in the pm. Per DR Clifford., Disp: 150 tablet, Rfl: 3  •  escitalopram (LEXAPRO) 20 MG tablet, TAKE 1 TABLET DAILY, Disp: 90 tablet, Rfl: 2  •  glucose blood test strip, Use one to two times daily to check blood sugar, Disp: 100 each, Rfl: 12  •  glucose monitor monitoring kit, 1 each As Needed (as directed). Diagnosis Code E11.9, Disp: 1 each, Rfl: 0  •  glucose monitor monitoring kit, 1 each As Needed (bs)., Disp: 1 each, Rfl: 1  •  hydrALAZINE (APRESOLINE) 25 MG tablet, Take 1 tablet by mouth 3 (Three) Times a Day., Disp: 90 tablet, Rfl: 3  •  ketoconazole (NIZORAL) 2 % shampoo, Apply  topically to the appropriate  "area as directed 2 (Two) Times a Week., Disp: 12 mL, Rfl: 0  •  Lancets (FREESTYLE) lancets, 1 each by Other route As Needed (bs)., Disp: 120 each, Rfl: 12  •  losartan (COZAAR) 100 MG tablet, Take 1 tablet by mouth Daily., Disp: 30 tablet, Rfl: 11  •  lovastatin (MEVACOR) 20 MG tablet, Take 1 tablet by mouth Every Night., Disp: 30 tablet, Rfl: 11  •  Magnesium Oxide 400 (240 MG) MG tablet, Take 1 tablet by mouth daily. (Patient taking differently: Take 1 tablet by mouth Daily As Needed.), Disp: 30 tablet, Rfl: 11  •  Semaglutide (OZEMPIC) 0.25 or 0.5 MG/DOSE solution pen-injector, Inject 0.25 mg under the skin into the appropriate area as directed Every 7 (Seven) Days., Disp: 1.5 mL, Rfl: 3  •  spironolactone (ALDACTONE) 25 MG tablet, Take 1 tablet by mouth 2 (Two) Times a Day., Disp: 60 tablet, Rfl: 11  •  Evolocumab 140 MG/ML solution auto-injector, Inject 1 mL under the skin into the appropriate area as directed Every 14 (Fourteen) Days., Disp: 6 pen, Rfl: 11    Objective      Blood pressure 149/89, pulse 68, temperature 98.5 °F (36.9 °C), temperature source Oral, resp. rate 16, height 193 cm (76\"), weight (!) 143 kg (315 lb), SpO2 99 %.    Physical Exam     General Appearance:    Alert, cooperative, no distress, appears stated age   Head:    Normocephalic, without obvious abnormality, atraumatic   Eyes:    PERRL, conjunctiva/corneas clear, EOM's intact   Ears:    Normal TM's and external ear canals, both ears   Nose:   Nares normal, septum midline, mucosa normal, no drainage   or sinus tenderness   Throat:   Lips, mucosa, and tongue normal; teeth and gums normal   Neck:   Supple, symmetrical, trachea midline, no adenopathy;        thyroid:  No enlargement/tenderness/nodules; no carotid    bruit or JVD   Back:     Symmetric, no curvature, ROM normal, no CVA tenderness   Lungs:     Clear to auscultation bilaterally, respirations unlabored   Chest wall:    No tenderness or deformity   Heart:    Regular rate and " rhythm, S1 and S2 normal, no murmur,        rub or gallop   Abdomen:     Soft, non-tender, bowel sounds active all four quadrants,     no masses, no organomegaly   Extremities:   Extremities normal, atraumatic, no cyanosis or edema   Pulses:   2+ and symmetric all extremities   Skin:   Skin color, texture, turgor normal, no rashes or lesions   Lymph nodes:   Cervical, supraclavicular, and axillary nodes normal   Neurologic:   CNII-XII intact. Normal strength, sensation and reflexes       throughout      Results for orders placed or performed in visit on 11/06/18   Basic Metabolic Panel   Result Value Ref Range    Glucose 100 (H) 74 - 98 mg/dL    BUN 23 (H) 7 - 20 mg/dL    Creatinine 1.40 (H) 0.60 - 1.30 mg/dL    eGFR Non African Am 51 (L) >60 mL/min/1.73    eGFR African Am 62 >60 mL/min/1.73    BUN/Creatinine Ratio 16.4 6.3 - 21.9    Sodium 140 137 - 145 mmol/L    Potassium 3.9 3.5 - 5.1 mmol/L    Chloride 102 98 - 107 mmol/L    Total CO2 27.0 26.0 - 30.0 mmol/L    Calcium 9.7 8.4 - 10.2 mg/dL         Assessment/Plan   Ins refusing to give purulent.  My request was denied. Cardiology will have to get approval and Ins won't listen to me.    Will benoit to repatha for now as I have samples.   Getting LDL under control for medical management is urgent.        Andre was seen today for stage 3 chronic kidney disease and hyperlipidemia.    Diagnoses and all orders for this visit:    Renovascular hypertension    Malignant hypertension  -     hydrALAZINE (APRESOLINE) 25 MG tablet; Take 1 tablet by mouth 3 (Three) Times a Day.    Coronary artery disease involving native coronary artery of native heart without angina pectoris    ALEX on CPAP    Other orders  -     Evolocumab 140 MG/ML solution auto-injector; Inject 1 mL under the skin into the appropriate area as directed Every 14 (Fourteen) Days.      Return in about 2 weeks (around 11/30/2018).          There are no Patient Instructions on file for this visit.     Keven  JOYCE Bear MD    Assessment/Plan

## 2018-11-21 ENCOUNTER — TELEPHONE (OUTPATIENT)
Dept: INTERNAL MEDICINE | Facility: CLINIC | Age: 63
End: 2018-11-21

## 2018-11-21 NOTE — TELEPHONE ENCOUNTER
[facility] left  11/21/18 at 11:56AM stating that insurance is requesting a PA on Evolocumab 140mg. Please advise

## 2018-11-21 NOTE — TELEPHONE ENCOUNTER
I know. Trying to fill out paper work. Don't worry they will jordyn it.  Have him get Dr Johnson to rx and do the PA.

## 2018-11-26 NOTE — TELEPHONE ENCOUNTER
Patient notified, he is scheduled to see Dr. Johnson on 11/28/18 and will discuss with the doctor then.

## 2018-11-28 ENCOUNTER — OFFICE VISIT (OUTPATIENT)
Dept: CARDIOLOGY | Facility: CLINIC | Age: 63
End: 2018-11-28

## 2018-11-28 VITALS
WEIGHT: 313 LBS | HEIGHT: 76 IN | BODY MASS INDEX: 38.11 KG/M2 | SYSTOLIC BLOOD PRESSURE: 142 MMHG | DIASTOLIC BLOOD PRESSURE: 72 MMHG | HEART RATE: 67 BPM

## 2018-11-28 DIAGNOSIS — I25.10 CORONARY ARTERY DISEASE INVOLVING NATIVE CORONARY ARTERY OF NATIVE HEART WITHOUT ANGINA PECTORIS: Primary | ICD-10-CM

## 2018-11-28 DIAGNOSIS — E78.2 MIXED HYPERLIPIDEMIA: ICD-10-CM

## 2018-11-28 DIAGNOSIS — I10 ESSENTIAL HYPERTENSION: ICD-10-CM

## 2018-11-28 PROCEDURE — 99214 OFFICE O/P EST MOD 30 MIN: CPT | Performed by: INTERNAL MEDICINE

## 2018-11-28 RX ORDER — LOVASTATIN 40 MG/1
40 TABLET ORAL
Qty: 90 TABLET | Refills: 2 | Status: SHIPPED | OUTPATIENT
Start: 2018-11-28 | End: 2019-03-11 | Stop reason: SDUPTHER

## 2018-11-28 NOTE — PROGRESS NOTES
Andre Agosto  1955  63 y.o.  [phone]      11/28/2018    Keven Bear MD    Chief Complaint   Patient presents with   • Coronary Artery Disease       Problem List:  1. Coronary artery disease:  a. Stress echo, Alexandra, 01/28/2014: Borderline abnormal, but low probability for significant focal obstructive CAD.  b. NSTEMI, 12/2015 (Reji): PCI of OM branch and circumflex (3.5 x 20 Synchrony). EF 50%.    c. LHC (emergent re-look), 12/2015: Patent stent to circumflex; LAD with 50-60% prox-mid stenosis.  d. Echo, 12/2015: EF 55-60%, no significant valve disease.  e. Cardiolite, 03/03/2016: No evidence of ischemia.  f. Nuclear stress, 09/26/2018: EF 40%. Exercise duration of 8:30, as expected. Stopped due to fatigue and SOA. THR of 134 achieved at 7:35. Hypertensive response to exercise. Occasional PVC's and aberrancy. No stress induced perfusion defect. Mild fixed mid and distal anteroapical hypoperfusion worse in the rest images.   g. LHC, 10/12/2018: Two-vessel CAD with multiple borderline lesions within the LAD and an occluded mid RCA.  h. Echo, 10/12/2018: EF 55%. Moderate dilation of the aortic root. Moderate dilation of the sinuses of Valsalva. Mild AS and AI. Aortic valve mean pressure gradient 10 mmHg.  2. Hypertension:  a. Renal artery duplex 01/28/2014: No evidence of renal artery stenosis.  b. Renal artery duplex 07/27/2018: No evidence of renal artery stenosis.  3. Hyperlipidemia.  4. CVA 6/2016:  a. Carotid duplex 12/28/15: <50% stenosis bilaterally.  b. 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  5. ALEX, 2010:  a. Sleep study, 08/14/2018: Normal (?); wears bi-pap.  6. Seizures; partial focal seizure:  a. 2005: meningitis and encephalitis.  7. Chronic hypokalemia; neph?     Allergies   Allergen Reactions   • Statins Myalgia       Current Medications:      Current Outpatient Medications:   •  Alcohol Swabs (ALCOHOL PREP) pads, 1 pad 4 (Four) Times a Day As Needed (bs)., Disp:  120 each, Rfl: 12  •  amLODIPine (NORVASC) 5 MG tablet, Take 1 tablet by mouth 2 (Two) Times a Day., Disp: 180 tablet, Rfl: 3  •  aspirin 81 MG tablet, Take 1 tablet by mouth Daily., Disp: 30 tablet, Rfl: 11  •  divalproex (DEPAKOTE) 250 MG DR tablet, Take 1 tablet by mouth 3 (Three) Times a Day. Take 2 in the AM 1 at noon and 2 in the pm. Per DR Clifford., Disp: 150 tablet, Rfl: 3  •  escitalopram (LEXAPRO) 20 MG tablet, TAKE 1 TABLET DAILY, Disp: 90 tablet, Rfl: 2  •  Evolocumab 140 MG/ML solution auto-injector, Inject 1 mL under the skin into the appropriate area as directed Every 14 (Fourteen) Days., Disp: 6 pen, Rfl: 11  •  glucose blood test strip, Use one to two times daily to check blood sugar, Disp: 100 each, Rfl: 12  •  glucose monitor monitoring kit, 1 each As Needed (as directed). Diagnosis Code E11.9, Disp: 1 each, Rfl: 0  •  glucose monitor monitoring kit, 1 each As Needed (bs)., Disp: 1 each, Rfl: 1  •  hydrALAZINE (APRESOLINE) 25 MG tablet, Take 1 tablet by mouth 3 (Three) Times a Day. (Patient taking differently: Take 10 mg by mouth 3 (Three) Times a Day.), Disp: 90 tablet, Rfl: 3  •  ketoconazole (NIZORAL) 2 % shampoo, Apply  topically to the appropriate area as directed 2 (Two) Times a Week., Disp: 12 mL, Rfl: 0  •  Lancets (FREESTYLE) lancets, 1 each by Other route As Needed (bs)., Disp: 120 each, Rfl: 12  •  losartan (COZAAR) 100 MG tablet, Take 1 tablet by mouth Daily., Disp: 30 tablet, Rfl: 11  •  lovastatin (MEVACOR) 20 MG tablet, Take 1 tablet by mouth Every Night., Disp: 30 tablet, Rfl: 11  •  Magnesium Oxide 400 (240 MG) MG tablet, Take 1 tablet by mouth daily. (Patient taking differently: Take 1 tablet by mouth Daily As Needed.), Disp: 30 tablet, Rfl: 11  •  Semaglutide (OZEMPIC) 0.25 or 0.5 MG/DOSE solution pen-injector, Inject 0.25 mg under the skin into the appropriate area as directed Every 7 (Seven) Days., Disp: 1.5 mL, Rfl: 3  •  spironolactone (ALDACTONE) 25 MG tablet, Take 1 tablet by  "mouth 2 (Two) Times a Day., Disp: 60 tablet, Rfl: 11    HPI    Adnre Agosto is a 63 y.o. male who presents today for 6 week hospital follow up s/p catheterization. He has a history of coronary artery disease, CVA, hypertension, and hyperlipidemia. Since last visit, he has been doing well overall from a cardiovascular standpoint. He monitors his BP at home, with readings typically between 145/70 and 160/80. He reports his BP plans on increasing his hydralazine to better manage this. He feels that spironolactone is more effective than hydralazine for him. Patient denies chest pain, palpitations, shortness of breath, edema, dizziness, and syncope. He admits he does not have a regular exercise regimen, but does monitor his caloric and sugar intake, and has lost 20 lbs overall. He does state he has been wanting to go to cardiac rehab, but has a fungus under his right arm which has been preventing from going back.    The following portions of the patient's history were reviewed and updated as appropriate: allergies, current medications and problem list.    Pertinent positives as listed in the HPI.  All other systems reviewed are negative.    Vitals:    11/28/18 1516   BP: 142/72   BP Location: Right arm   Patient Position: Sitting   Pulse: 67   Weight: (!) 142 kg (313 lb)   Height: 193 cm (76\")       Physical Exam:    General: Alert and oriented  Neck: Jugular venous pressure is within normal limits. Carotids have normal upstrokes without bruits.   Cardiovascular: Heart has a nondisplaced focal PMI. Regular rate and rhythm without murmur, gallop or rub.  Lungs: Clear without rales or wheezes. Equal expansion is noted.   Extremities: Show no edema.  Skin: warm and dry.  Neurologic: nonfocal    Diagnostic Data:  Lab Results   Component Value Date    GLUCOSE 88 11/01/2018    BUN 23 (H) 11/16/2018    CREATININE 1.40 (H) 11/16/2018    EGFRIFNONA 51 (L) 11/16/2018    EGFRIFAFRI 62 11/16/2018    BCR 16.4 11/16/2018    K 3.9 " 11/16/2018    CO2 27.0 11/16/2018    CALCIUM 9.7 11/16/2018    PROTENTOTREF 7.6 07/03/2018    ALBUMIN 4.90 11/01/2018    LABIL2 1.2 07/03/2018    AST 27 10/23/2018    ALT 27 10/23/2018     Lab Results   Component Value Date    CHOL 169 10/12/2018    CHLPL 272 (H) 07/03/2018    TRIG 209 (H) 10/12/2018    HDL 38 (L) 10/12/2018     10/12/2018     Lab Results   Component Value Date    WBC 10.98 (H) 11/01/2018    HGB 15.6 11/01/2018    HCT 46.2 11/01/2018    MCV 91.5 11/01/2018     11/01/2018     Lab Results   Component Value Date    TSH 2.000 08/14/2018       Procedures    Assessment:      ICD-10-CM ICD-9-CM   1. Coronary artery disease involving native coronary artery of native heart without angina pectoris I25.10 414.01   2. Renovascular hypertension I15.0 405.91   3. Mixed hyperlipidemia E78.2 272.2       Plan:    1. Begin regular aerobic exercise, 30 minutes 4-5 days a week.  2. Continue aspirin 81 mg for daily anticoagulation s/p stent placement.  3. Continue hydralazine, spironolactone, losartan, and amlodipine for management of hypertension.  4. Increase lovastatin 20 mg to 40 mg for hyperlipidemia. FLP in 2 months.  5. Continue all other current medications.  6. F/up in 4 months or sooner if needed.    Scribed for Yoselin Johnson MD by Holly Nicole. 11/28/2018  3:49 PM     I Yoselin Johnson MD personally performed the services described in this documentation as scribed by the above individual in my presence, and it is both accurate and complete.    Yoselin Johnson MD, FACC

## 2018-11-30 ENCOUNTER — TRANSCRIBE ORDERS (OUTPATIENT)
Dept: ADMINISTRATIVE | Facility: HOSPITAL | Age: 63
End: 2018-11-30

## 2018-11-30 ENCOUNTER — APPOINTMENT (OUTPATIENT)
Dept: LAB | Facility: HOSPITAL | Age: 63
End: 2018-11-30

## 2018-11-30 DIAGNOSIS — E87.6 HYPOKALEMIA: Primary | ICD-10-CM

## 2018-11-30 DIAGNOSIS — N18.9 CHRONIC KIDNEY DISEASE, UNSPECIFIED CKD STAGE: ICD-10-CM

## 2018-12-03 LAB
COLLECT DURATION TIME UR: 24 HRS
CREAT UR-MCNC: 74.8 MG/DL
CREATINE 24H UR-MRATE: 1.33 G/24 HR (ref 0.8–2)
POTASSIUM 24H UR-SRATE: 52.9 MMOL/24HRS (ref 25–125)
POTASSIUM UR-SCNC: 29.7 MMOL/L
PROT 24H UR-MRATE: 907.8 MG/24HOURS (ref 42–225)
PROT UR-MCNC: 51 MG/DL (ref 0–11.9)
SODIUM 24H UR-SRATE: 119 MMOL/24HRS (ref 40–220)
SODIUM UR-SCNC: 67 MMOL/L (ref 30–90)
SPECIMEN VOL 24H UR: 1780 ML

## 2018-12-03 PROCEDURE — 84133 ASSAY OF URINE POTASSIUM: CPT | Performed by: INTERNAL MEDICINE

## 2018-12-03 PROCEDURE — 84300 ASSAY OF URINE SODIUM: CPT | Performed by: INTERNAL MEDICINE

## 2018-12-03 PROCEDURE — 82570 ASSAY OF URINE CREATININE: CPT | Performed by: INTERNAL MEDICINE

## 2018-12-03 PROCEDURE — 84156 ASSAY OF PROTEIN URINE: CPT | Performed by: INTERNAL MEDICINE

## 2018-12-03 PROCEDURE — 81050 URINALYSIS VOLUME MEASURE: CPT | Performed by: INTERNAL MEDICINE

## 2018-12-05 ENCOUNTER — OFFICE VISIT (OUTPATIENT)
Dept: INTERNAL MEDICINE | Facility: CLINIC | Age: 63
End: 2018-12-05

## 2018-12-05 VITALS
BODY MASS INDEX: 38.67 KG/M2 | WEIGHT: 311 LBS | HEART RATE: 65 BPM | TEMPERATURE: 98.6 F | SYSTOLIC BLOOD PRESSURE: 160 MMHG | HEIGHT: 75 IN | DIASTOLIC BLOOD PRESSURE: 90 MMHG | OXYGEN SATURATION: 99 % | RESPIRATION RATE: 16 BRPM

## 2018-12-05 DIAGNOSIS — E87.6 HYPOKALEMIA: ICD-10-CM

## 2018-12-05 DIAGNOSIS — I10 ESSENTIAL HYPERTENSION: ICD-10-CM

## 2018-12-05 DIAGNOSIS — Z99.89 BIPAP (BIPHASIC POSITIVE AIRWAY PRESSURE) DEPENDENCE: ICD-10-CM

## 2018-12-05 DIAGNOSIS — E78.2 MIXED HYPERLIPIDEMIA: ICD-10-CM

## 2018-12-05 DIAGNOSIS — E11.9 DIABETES MELLITUS WITHOUT COMPLICATION (HCC): Primary | ICD-10-CM

## 2018-12-05 DIAGNOSIS — T82.897A CORONARY STENT OCCLUSION, INITIAL ENCOUNTER: ICD-10-CM

## 2018-12-05 LAB
BUN SERPL-MCNC: 18 MG/DL (ref 7–20)
BUN/CREAT SERPL: 12 (ref 6.3–21.9)
CALCIUM SERPL-MCNC: 9.7 MG/DL (ref 8.4–10.2)
CHLORIDE SERPL-SCNC: 100 MMOL/L (ref 98–107)
CO2 SERPL-SCNC: 31 MMOL/L (ref 26–30)
CREAT SERPL-MCNC: 1.5 MG/DL (ref 0.6–1.3)
GLUCOSE SERPL-MCNC: 113 MG/DL (ref 74–98)
POTASSIUM SERPL-SCNC: 4.1 MMOL/L (ref 3.5–5.1)
PSA SERPL-MCNC: 2.83 NG/ML (ref 0.06–4)
SODIUM SERPL-SCNC: 142 MMOL/L (ref 137–145)

## 2018-12-05 PROCEDURE — 99214 OFFICE O/P EST MOD 30 MIN: CPT | Performed by: INTERNAL MEDICINE

## 2018-12-05 NOTE — PROGRESS NOTES
Subjective     Patient ID: Andre Agosto is a 63 y.o. male. Patient is here for management of multiple medical problems.     Chief Complaint   Patient presents with   • Renovascular Hypertension     follow-up, patient unsure of the dosage for the Hydralazine and Spironolactone      History of Present Illness   \  Pt seen Dr Johnson.    Now on lovastatin.   No pain.     Still with sleep disturbance.    No cp. soa with active no change and some improved. Feeling well. Wt done.         The following portions of the patient's history were reviewed and updated as appropriate: allergies, current medications, past family history, past medical history, past social history, past surgical history and problem list.    Review of Systems   Constitutional: Positive for fatigue.   Respiratory: Negative for shortness of breath.    Cardiovascular: Negative for chest pain, palpitations and leg swelling.   Psychiatric/Behavioral: Positive for sleep disturbance. Negative for dysphoric mood and hallucinations.   All other systems reviewed and are negative.      Current Outpatient Medications:   •  Alcohol Swabs (ALCOHOL PREP) pads, 1 pad 4 (Four) Times a Day As Needed (bs)., Disp: 120 each, Rfl: 12  •  amLODIPine (NORVASC) 5 MG tablet, Take 1 tablet by mouth 2 (Two) Times a Day., Disp: 180 tablet, Rfl: 3  •  aspirin 81 MG tablet, Take 1 tablet by mouth Daily., Disp: 30 tablet, Rfl: 11  •  divalproex (DEPAKOTE) 250 MG DR tablet, Take 1 tablet by mouth 3 (Three) Times a Day. Take 2 in the AM 1 at noon and 2 in the pm. Per DR Clifford., Disp: 150 tablet, Rfl: 3  •  escitalopram (LEXAPRO) 20 MG tablet, TAKE 1 TABLET DAILY, Disp: 90 tablet, Rfl: 2  •  Evolocumab 140 MG/ML solution auto-injector, Inject 1 mL under the skin into the appropriate area as directed Every 14 (Fourteen) Days., Disp: 6 pen, Rfl: 11  •  glucose blood test strip, Use one to two times daily to check blood sugar, Disp: 100 each, Rfl: 12  •  glucose monitor monitoring  "kit, 1 each As Needed (as directed). Diagnosis Code E11.9, Disp: 1 each, Rfl: 0  •  glucose monitor monitoring kit, 1 each As Needed (bs)., Disp: 1 each, Rfl: 1  •  hydrALAZINE (APRESOLINE) 25 MG tablet, Take 1 tablet by mouth 3 (Three) Times a Day. (Patient taking differently: Take 10 mg by mouth 3 (Three) Times a Day.), Disp: 90 tablet, Rfl: 3  •  ketoconazole (NIZORAL) 2 % shampoo, Apply  topically to the appropriate area as directed 2 (Two) Times a Week., Disp: 12 mL, Rfl: 0  •  Lancets (FREESTYLE) lancets, 1 each by Other route As Needed (bs)., Disp: 120 each, Rfl: 12  •  losartan (COZAAR) 100 MG tablet, Take 1 tablet by mouth Daily., Disp: 30 tablet, Rfl: 11  •  lovastatin (MEVACOR) 40 MG tablet, Take 1 tablet by mouth Daily With Dinner., Disp: 90 tablet, Rfl: 2  •  Magnesium Oxide 400 (240 MG) MG tablet, Take 1 tablet by mouth daily. (Patient taking differently: Take 1 tablet by mouth Daily As Needed.), Disp: 30 tablet, Rfl: 11  •  Semaglutide (OZEMPIC) 0.25 or 0.5 MG/DOSE solution pen-injector, Inject 0.25 mg under the skin into the appropriate area as directed Every 7 (Seven) Days., Disp: 1.5 mL, Rfl: 3  •  spironolactone (ALDACTONE) 25 MG tablet, Take 1 tablet by mouth 2 (Two) Times a Day., Disp: 60 tablet, Rfl: 11    Objective      Blood pressure 160/90, pulse 65, temperature 98.6 °F (37 °C), temperature source Oral, resp. rate 16, height 190.5 cm (75\"), weight (!) 141 kg (311 lb), SpO2 99 %.    Physical Exam    Andre had a diabetic foot exam performed today.   During the foot exam he had a monofilament test performed.    Neurological Sensory Findings - Unaltered hot/cold right ankle/foot discrimination and unaltered hot/cold left ankle/foot discrimination. Unaltered sharp/dull right ankle/foot discrimination and unaltered sharp/dull left ankle/foot discrimination. No right ankle/foot altered proprioception and no left ankle/foot altered proprioception  Vascular Status -  His right foot exhibits normal " foot vasculature  and no edema. His left foot exhibits normal foot vasculature  and no edema.  Skin Integrity  -  His right foot skin is intact.His left foot skin is intact..       General Appearance:    Alert, cooperative, no distress, appears stated age   Head:    Normocephalic, without obvious abnormality, atraumatic   Eyes:    PERRL, conjunctiva/corneas clear, EOM's intact   Ears:    Normal TM's and external ear canals, both ears   Nose:   Nares normal, septum midline, mucosa normal, no drainage   or sinus tenderness   Throat:   Lips, mucosa, and tongue normal; teeth and gums normal   Neck:   Supple, symmetrical, trachea midline, no adenopathy;        thyroid:  No enlargement/tenderness/nodules; no carotid    bruit or JVD   Back:     Symmetric, no curvature, ROM normal, no CVA tenderness   Lungs:     Clear to auscultation bilaterally, respirations unlabored   Chest wall:    No tenderness or deformity   Heart:    Regular rate and rhythm, S1 and S2 normal, no murmur,        rub or gallop   Abdomen:     Soft, non-tender, bowel sounds active all four quadrants,     no masses, no organomegaly   Extremities:   Extremities normal, atraumatic, no cyanosis, +2 edema   Pulses:   2+ and symmetric all extremities   Skin:   Skin color, texture, turgor normal, no rashes or lesions   Lymph nodes:   Cervical, supraclavicular, and axillary nodes normal   Neurologic:   CNII-XII intact. Normal strength, sensation and reflexes       throughout      Results for orders placed or performed in visit on 11/30/18   Potassium, Urine, 24 Hour - Urine, Clean Catch   Result Value Ref Range    Potassium, 24H Ur 52.9 25.0 - 125.0 mmol/24hrs    Potassium, Urine 29.7 mmol/L    24H Urine Volume 1,780 mL    Time (Hours) 24 hrs   Protein, Urine, 24 Hour - Urine, Clean Catch   Result Value Ref Range    Protein, 24H Urine 907.8 (H) 42.0 - 225.0 mg/24hours    Total Protein, Urine 51.0 (H) 0.0 - 11.9 mg/dL    24H Urine Volume 1,780 mL    Time (Hours)  24 hrs   Sodium, Urine, 24 Hour - Urine, Clean Catch   Result Value Ref Range    Sodium, 24H Ur 119 40 - 220 mmol/24hrs    Sodium, Urine 67 30 - 90 mmol/L    24H Urine Volume 1,780 mL    Time (Hours) 24 hrs   Creatinine, Urine, 24 Hour - Urine, Clean Catch   Result Value Ref Range    Creatinine, 24H 1.33 0.80 - 2.00 g/24 hr    Creatinine, Urine 74.8 mg/dL    24H Urine Volume 1,780 mL    Time (Hours) 24 hrs         Assessment/Plan     Not yet started higher dose hydralazine 25 mg.    Wt loss going well.    Pt on spironolactone 50 mg x 3 days.      Pt on ozempic.    Wt down significantly.      Pt will need bipap adjusted with his wt loss and current issues.      Andre was seen today for renovascular hypertension.    Diagnoses and all orders for this visit:    Diabetes mellitus without complication (CMS/HCC)  -     Basic Metabolic Panel    Essential hypertension  -     Basic Metabolic Panel    Coronary stent occlusion, initial encounter  -     Basic Metabolic Panel    Mixed hyperlipidemia  -     Basic Metabolic Panel    BiPAP (biphasic positive airway pressure) dependence  -     Basic Metabolic Panel    Hypokalemia  -     Basic Metabolic Panel    Other orders  -     Evolocumab 140 MG/ML solution auto-injector; Inject 1 mL under the skin into the appropriate area as directed Every 14 (Fourteen) Days.      Return in about 2 weeks (around 12/19/2018).          Patient Instructions   Call [facility] and have them eval Bipap.         Keven Bear MD    Assessment/Plan

## 2018-12-12 ENCOUNTER — TELEPHONE (OUTPATIENT)
Dept: OTHER | Facility: OTHER | Age: 63
End: 2018-12-12

## 2018-12-13 ENCOUNTER — TELEPHONE (OUTPATIENT)
Dept: NEUROLOGY | Facility: CLINIC | Age: 63
End: 2018-12-13

## 2018-12-13 ENCOUNTER — OFFICE VISIT (OUTPATIENT)
Dept: INTERNAL MEDICINE | Facility: CLINIC | Age: 63
End: 2018-12-13

## 2018-12-13 ENCOUNTER — TELEPHONE (OUTPATIENT)
Dept: INTERNAL MEDICINE | Facility: CLINIC | Age: 63
End: 2018-12-13

## 2018-12-13 VITALS
DIASTOLIC BLOOD PRESSURE: 86 MMHG | HEIGHT: 76 IN | WEIGHT: 307 LBS | SYSTOLIC BLOOD PRESSURE: 140 MMHG | HEART RATE: 66 BPM | BODY MASS INDEX: 37.38 KG/M2 | OXYGEN SATURATION: 97 % | RESPIRATION RATE: 16 BRPM | TEMPERATURE: 97.9 F

## 2018-12-13 DIAGNOSIS — H61.22 CERUMEN DEBRIS ON TYMPANIC MEMBRANE OF LEFT EAR: ICD-10-CM

## 2018-12-13 DIAGNOSIS — R04.0 NASAL BLEEDING: Primary | ICD-10-CM

## 2018-12-13 DIAGNOSIS — J33.8 NASAL SINUS POLYP: ICD-10-CM

## 2018-12-13 DIAGNOSIS — H66.004 RECURRENT ACUTE SUPPURATIVE OTITIS MEDIA OF RIGHT EAR WITHOUT SPONTANEOUS RUPTURE OF TYMPANIC MEMBRANE: ICD-10-CM

## 2018-12-13 PROCEDURE — 99214 OFFICE O/P EST MOD 30 MIN: CPT | Performed by: INTERNAL MEDICINE

## 2018-12-13 NOTE — TELEPHONE ENCOUNTER
Pt would like to know if his machine pressure is too high. Pt states he has lost approx 20 lbs and his machine is waking him in the night, pressure feels too powerful. Pressure is currently 9/5    Please advise

## 2018-12-13 NOTE — TELEPHONE ENCOUNTER
Patient called stating that he was wanting to be seen by Dr. Bear today. Advised that there are no appointments today but he did have an opening tomorrow. Patient does not want to see a PA. Patient would like a call back from nurse to see if there is any way that the doctor could work him in this afternoon. Please advise. Confirmed call back number as [phone]

## 2018-12-13 NOTE — PROGRESS NOTES
Subjective     Patient ID: Andre Agosto is a 63 y.o. male. Patient is here for management of multiple medical problems.     Chief Complaint   Patient presents with   • Nose bleeds     patient continuing to have nose bleeds x 2 days, patient also having headaches     History of Present Illness     Increased nose bleeds.  Nasal congestion.  Mild sinus pressure and pain. No rhinitis.    No nasal dc of green or yellow just bloody nasal discharge.    Fullness in ears.    Tampon of nasal bleed.    Called ENT and ent nurse directed to Er.       The following portions of the patient's history were reviewed and updated as appropriate: allergies, current medications, past family history, past medical history, past social history, past surgical history and problem list.    Review of Systems   HENT: Positive for congestion, nosebleeds, rhinorrhea, sinus pressure and sinus pain. Negative for sneezing and sore throat.        Current Outpatient Medications:   •  Alcohol Swabs (ALCOHOL PREP) pads, 1 pad 4 (Four) Times a Day As Needed (bs)., Disp: 120 each, Rfl: 12  •  amLODIPine (NORVASC) 5 MG tablet, Take 1 tablet by mouth 2 (Two) Times a Day., Disp: 180 tablet, Rfl: 3  •  aspirin 81 MG tablet, Take 1 tablet by mouth Daily., Disp: 30 tablet, Rfl: 11  •  divalproex (DEPAKOTE) 250 MG DR tablet, Take 1 tablet by mouth 3 (Three) Times a Day. Take 2 in the AM 1 at noon and 2 in the pm. Per DR Clifford., Disp: 150 tablet, Rfl: 3  •  escitalopram (LEXAPRO) 20 MG tablet, TAKE 1 TABLET DAILY, Disp: 90 tablet, Rfl: 2  •  Evolocumab 140 MG/ML solution auto-injector, Inject 1 mL under the skin into the appropriate area as directed Every 14 (Fourteen) Days., Disp: 6 pen, Rfl: 11  •  glucose blood test strip, Use one to two times daily to check blood sugar, Disp: 100 each, Rfl: 12  •  glucose monitor monitoring kit, 1 each As Needed (as directed). Diagnosis Code E11.9, Disp: 1 each, Rfl: 0  •  glucose monitor monitoring kit, 1 each As Needed  "(bs)., Disp: 1 each, Rfl: 1  •  hydrALAZINE (APRESOLINE) 25 MG tablet, Take 1 tablet by mouth 3 (Three) Times a Day. (Patient taking differently: Take 10 mg by mouth 3 (Three) Times a Day.), Disp: 90 tablet, Rfl: 3  •  ketoconazole (NIZORAL) 2 % shampoo, Apply  topically to the appropriate area as directed 2 (Two) Times a Week., Disp: 12 mL, Rfl: 0  •  Lancets (FREESTYLE) lancets, 1 each by Other route As Needed (bs)., Disp: 120 each, Rfl: 12  •  losartan (COZAAR) 100 MG tablet, Take 1 tablet by mouth Daily., Disp: 30 tablet, Rfl: 11  •  lovastatin (MEVACOR) 40 MG tablet, Take 1 tablet by mouth Daily With Dinner., Disp: 90 tablet, Rfl: 2  •  Magnesium Oxide 400 (240 MG) MG tablet, Take 1 tablet by mouth daily. (Patient taking differently: Take 1 tablet by mouth Daily As Needed.), Disp: 30 tablet, Rfl: 11  •  Semaglutide (OZEMPIC) 0.25 or 0.5 MG/DOSE solution pen-injector, Inject 0.25 mg under the skin into the appropriate area as directed Every 7 (Seven) Days., Disp: 1.5 mL, Rfl: 3  •  spironolactone (ALDACTONE) 25 MG tablet, Take 1 tablet by mouth 2 (Two) Times a Day., Disp: 60 tablet, Rfl: 11    Objective      Blood pressure 140/86, pulse 66, temperature 97.9 °F (36.6 °C), temperature source Oral, resp. rate 16, height 193 cm (76\"), weight (!) 139 kg (307 lb), SpO2 97 %.    Physical Exam     General Appearance:    Alert, cooperative, no distress, appears stated age   Head:    Normocephalic, without obvious abnormality, atraumatic   Eyes:    PERRL, conjunctiva/corneas clear, EOM's intact   Ears:    abNormal TM right with  and external ear canals, both ears   Nose:   Nares normal, septum midline, mucosa normal, no drainage   or sinus tenderness   Throat:   Lips, mucosa, and tongue normal; teeth and gums normal   Neck:   Supple, symmetrical, trachea midline, no adenopathy;        thyroid:  No enlargement/tenderness/nodules; no carotid    bruit or JVD   Back:     Symmetric, no curvature, ROM normal, no CVA tenderness "   Lungs:     Clear to auscultation bilaterally, respirations unlabored   Chest wall:    No tenderness or deformity   Heart:    Regular rate and rhythm, S1 and S2 normal, no murmur,        rub or gallop   Abdomen:     Soft, non-tender, bowel sounds active all four quadrants,     no masses, no organomegaly   Extremities:   Extremities normal, atraumatic, no cyanosis or edema   Pulses:   2+ and symmetric all extremities   Skin:   Skin color, texture, turgor normal, no rashes or lesions   Lymph nodes:   Cervical, supraclavicular, and axillary nodes normal   Neurologic:   CNII-XII intact. Normal strength, sensation and reflexes       throughout      Results for orders placed or performed in visit on 12/05/18   Basic Metabolic Panel   Result Value Ref Range    Glucose 113 (H) 74 - 98 mg/dL    BUN 18 7 - 20 mg/dL    Creatinine 1.50 (H) 0.60 - 1.30 mg/dL    eGFR Non African Am 47 (L) >60 mL/min/1.73    eGFR African Am 57 (L) >60 mL/min/1.73    BUN/Creatinine Ratio 12.0 6.3 - 21.9    Sodium 142 137 - 145 mmol/L    Potassium 4.1 3.5 - 5.1 mmol/L    Chloride 100 98 - 107 mmol/L    Total CO2 31.0 (H) 26.0 - 30.0 mmol/L    Calcium 9.7 8.4 - 10.2 mg/dL         Assessment/Plan     Polyp in right nostril.     Puss behind right tm.         Andre was seen today for nose bleeds.    Diagnoses and all orders for this visit:    Nasal bleeding  -     Ambulatory Referral to ENT (Otolaryngology)    Recurrent acute suppurative otitis media of right ear without spontaneous rupture of tympanic membrane  -     Ambulatory Referral to ENT (Otolaryngology)    Nasal sinus polyp  -     Ambulatory Referral to ENT (Otolaryngology)    Cerumen debris on tympanic membrane of left ear      Return in about 3 months (around 3/13/2019).          There are no Patient Instructions on file for this visit.     Keven Bear MD    Assessment/Plan

## 2018-12-16 NOTE — TELEPHONE ENCOUNTER
congratulation to his weight loss after using his BIPAP.  Can decrease pressure to 7 cm H2O pressure

## 2018-12-28 ENCOUNTER — APPOINTMENT (OUTPATIENT)
Dept: LAB | Facility: HOSPITAL | Age: 63
End: 2018-12-28
Attending: INTERNAL MEDICINE

## 2018-12-28 ENCOUNTER — OFFICE VISIT (OUTPATIENT)
Dept: INTERNAL MEDICINE | Facility: CLINIC | Age: 63
End: 2018-12-28

## 2018-12-28 VITALS
OXYGEN SATURATION: 98 % | DIASTOLIC BLOOD PRESSURE: 88 MMHG | HEIGHT: 76 IN | TEMPERATURE: 97.1 F | HEART RATE: 73 BPM | BODY MASS INDEX: 38.11 KG/M2 | WEIGHT: 313 LBS | SYSTOLIC BLOOD PRESSURE: 151 MMHG

## 2018-12-28 DIAGNOSIS — I10 MALIGNANT HYPERTENSION: ICD-10-CM

## 2018-12-28 DIAGNOSIS — E87.6 HYPOKALEMIA: ICD-10-CM

## 2018-12-28 DIAGNOSIS — Z99.89 BIPAP (BIPHASIC POSITIVE AIRWAY PRESSURE) DEPENDENCE: Primary | ICD-10-CM

## 2018-12-28 DIAGNOSIS — N18.2 CHRONIC RENAL IMPAIRMENT, STAGE 2 (MILD): ICD-10-CM

## 2018-12-28 DIAGNOSIS — E11.9 DIABETES MELLITUS WITHOUT COMPLICATION (HCC): ICD-10-CM

## 2018-12-28 LAB
ANION GAP SERPL CALCULATED.3IONS-SCNC: 14.8 MMOL/L (ref 10–20)
BUN BLD-MCNC: 24 MG/DL (ref 7–20)
BUN/CREAT SERPL: 16 (ref 6.3–21.9)
CALCIUM SPEC-SCNC: 9.2 MG/DL (ref 8.4–10.2)
CHLORIDE SERPL-SCNC: 101 MMOL/L (ref 98–107)
CO2 SERPL-SCNC: 29 MMOL/L (ref 26–30)
CREAT BLD-MCNC: 1.5 MG/DL (ref 0.6–1.3)
GFR SERPL CREATININE-BSD FRML MDRD: 47 ML/MIN/1.73
GLUCOSE BLD-MCNC: 106 MG/DL (ref 74–98)
POTASSIUM BLD-SCNC: 4.8 MMOL/L (ref 3.5–5.1)
SODIUM BLD-SCNC: 140 MMOL/L (ref 137–145)

## 2018-12-28 PROCEDURE — 36415 COLL VENOUS BLD VENIPUNCTURE: CPT | Performed by: INTERNAL MEDICINE

## 2018-12-28 PROCEDURE — 99214 OFFICE O/P EST MOD 30 MIN: CPT | Performed by: INTERNAL MEDICINE

## 2018-12-28 PROCEDURE — 80048 BASIC METABOLIC PNL TOTAL CA: CPT | Performed by: INTERNAL MEDICINE

## 2018-12-28 RX ORDER — HYDRALAZINE HYDROCHLORIDE 50 MG/1
50 TABLET, FILM COATED ORAL 3 TIMES DAILY
Qty: 90 TABLET | Refills: 11 | Status: SHIPPED | OUTPATIENT
Start: 2018-12-28 | End: 2019-02-08 | Stop reason: SDUPTHER

## 2018-12-28 NOTE — PROGRESS NOTES
Subjective     Patient ID: Andre Agosto is a 63 y.o. male. Patient is here for management of multiple medical problems.     Chief Complaint   Patient presents with   • Follow-up     follow up on lab work             Hypertension   This is a chronic problem. The current episode started more than 1 month ago. The problem is uncontrolled. Pertinent negatives include no anxiety, blurred vision, chest pain or headaches. Risk factors for coronary artery disease include diabetes mellitus, male gender, obesity, dyslipidemia, stress and sedentary lifestyle. Current antihypertension treatment includes ACE inhibitors, central alpha agonists, diuretics and beta blockers. The current treatment provides moderate improvement. There are no compliance problems.         Seen MD in [place].     Still on on repatha.    No chest pain.  No soa;    Wt trending down. Back up 5 lbs over the holidays.      CAD stable.    Fungus on skin no worse.        Pt turned bipap down and now sleeping better.         The following portions of the patient's history were reviewed and updated as appropriate: allergies, current medications, past family history, past medical history, past social history, past surgical history and problem list.    Review of Systems   Constitutional: Positive for fatigue.   Eyes: Negative for blurred vision.   Cardiovascular: Negative for chest pain.   Neurological: Negative for headaches.       Current Outpatient Medications:   •  Alcohol Swabs (ALCOHOL PREP) pads, 1 pad 4 (Four) Times a Day As Needed (bs)., Disp: 120 each, Rfl: 12  •  amLODIPine (NORVASC) 5 MG tablet, Take 1 tablet by mouth 2 (Two) Times a Day., Disp: 180 tablet, Rfl: 3  •  aspirin 81 MG tablet, Take 1 tablet by mouth Daily., Disp: 30 tablet, Rfl: 11  •  divalproex (DEPAKOTE) 250 MG DR tablet, Take 1 tablet by mouth 3 (Three) Times a Day. Take 2 in the AM 1 at noon and 2 in the pm. Per DR Clifford., Disp: 150 tablet, Rfl: 3  •  escitalopram (LEXAPRO) 20 MG  "tablet, TAKE 1 TABLET DAILY, Disp: 90 tablet, Rfl: 2  •  Evolocumab 140 MG/ML solution auto-injector, Inject 1 mL under the skin into the appropriate area as directed Every 14 (Fourteen) Days., Disp: 6 pen, Rfl: 11  •  glucose blood test strip, Use one to two times daily to check blood sugar, Disp: 100 each, Rfl: 12  •  glucose monitor monitoring kit, 1 each As Needed (bs)., Disp: 1 each, Rfl: 1  •  hydrALAZINE (APRESOLINE) 50 MG tablet, Take 1 tablet by mouth 3 (Three) Times a Day., Disp: 90 tablet, Rfl: 11  •  ketoconazole (NIZORAL) 2 % shampoo, Apply  topically to the appropriate area as directed 2 (Two) Times a Week., Disp: 12 mL, Rfl: 0  •  Lancets (FREESTYLE) lancets, 1 each by Other route As Needed (bs)., Disp: 120 each, Rfl: 12  •  losartan (COZAAR) 100 MG tablet, Take 1 tablet by mouth Daily., Disp: 30 tablet, Rfl: 11  •  lovastatin (MEVACOR) 40 MG tablet, Take 1 tablet by mouth Daily With Dinner., Disp: 90 tablet, Rfl: 2  •  Magnesium Oxide 400 (240 MG) MG tablet, Take 1 tablet by mouth daily. (Patient taking differently: Take 1 tablet by mouth Daily As Needed.), Disp: 30 tablet, Rfl: 11  •  Semaglutide (OZEMPIC) 0.25 or 0.5 MG/DOSE solution pen-injector, Inject 0.25 mg under the skin into the appropriate area as directed Every 7 (Seven) Days., Disp: 1.5 mL, Rfl: 3  •  spironolactone (ALDACTONE) 25 MG tablet, Take 1 tablet by mouth 2 (Two) Times a Day., Disp: 60 tablet, Rfl: 11    Objective      Blood pressure 151/88, pulse 73, temperature 97.1 °F (36.2 °C), height 193 cm (75.98\"), weight (!) 142 kg (313 lb), SpO2 98 %.    Physical Exam     General Appearance:    Alert, cooperative, no distress, appears stated age   Head:    Normocephalic, without obvious abnormality, atraumatic   Eyes:    PERRL, conjunctiva/corneas clear, EOM's intact   Ears:    Normal TM's and external ear canals, both ears   Nose:   Nares normal, septum midline, mucosa normal, no drainage   or sinus tenderness   Throat:   Lips, mucosa, " and tongue normal; teeth and gums normal   Neck:   Supple, symmetrical, trachea midline, no adenopathy;        thyroid:  No enlargement/tenderness/nodules; no carotid    bruit or JVD   Back:     Symmetric, no curvature, ROM normal, no CVA tenderness   Lungs:     Clear to auscultation bilaterally, respirations unlabored   Chest wall:    No tenderness or deformity   Heart:    Regular rate and rhythm, S1 and S2 normal, no murmur,        rub or gallop   Abdomen:     Soft, non-tender, bowel sounds active all four quadrants,     no masses, no organomegaly   Extremities:   Extremities normal, atraumatic, no cyanosis or edema   Pulses:   2+ and symmetric all extremities   Skin:   Skin color, texture, turgor normal, no rashes or lesions   Lymph nodes:   Cervical, supraclavicular, and axillary nodes normal   Neurologic:   CNII-XII intact. Normal strength, sensation and reflexes       throughout      Results for orders placed or performed in visit on 12/05/18   Basic Metabolic Panel   Result Value Ref Range    Glucose 113 (H) 74 - 98 mg/dL    BUN 18 7 - 20 mg/dL    Creatinine 1.50 (H) 0.60 - 1.30 mg/dL    eGFR Non African Am 47 (L) >60 mL/min/1.73    eGFR African Am 57 (L) >60 mL/min/1.73    BUN/Creatinine Ratio 12.0 6.3 - 21.9    Sodium 142 137 - 145 mmol/L    Potassium 4.1 3.5 - 5.1 mmol/L    Chloride 100 98 - 107 mmol/L    Total CO2 31.0 (H) 26.0 - 30.0 mmol/L    Calcium 9.7 8.4 - 10.2 mg/dL         Assessment/Plan   Increase hydralazine.      Andre was seen today for follow-up.    Diagnoses and all orders for this visit:    BiPAP (biphasic positive airway pressure) dependence  -     Lipid Panel  -     CBC & Differential  -     Vitamin B12  -     Comprehensive Metabolic Panel  -     TSH  -     T4, Free  -     Hemoglobin A1c    Malignant hypertension  -     hydrALAZINE (APRESOLINE) 50 MG tablet; Take 1 tablet by mouth 3 (Three) Times a Day.  -     Lipid Panel  -     CBC & Differential  -     Vitamin B12  -      Comprehensive Metabolic Panel  -     TSH  -     T4, Free  -     Hemoglobin A1c    Diabetes mellitus without complication (CMS/HCC)  -     Lipid Panel  -     CBC & Differential  -     Vitamin B12  -     Comprehensive Metabolic Panel  -     TSH  -     T4, Free  -     Hemoglobin A1c    Hypokalemia  -     Lipid Panel  -     CBC & Differential  -     Vitamin B12  -     Comprehensive Metabolic Panel  -     TSH  -     T4, Free  -     Hemoglobin A1c    Chronic renal impairment, stage 2 (mild)  -     Lipid Panel  -     CBC & Differential  -     Vitamin B12  -     Comprehensive Metabolic Panel  -     TSH  -     T4, Free  -     Hemoglobin A1c      Return in about 6 weeks (around 2/8/2019).          There are no Patient Instructions on file for this visit.     Keven eBar MD    Assessment/Plan

## 2019-01-11 DIAGNOSIS — E78.00 HYPERCHOLESTEREMIA: Primary | ICD-10-CM

## 2019-02-04 ENCOUNTER — APPOINTMENT (OUTPATIENT)
Dept: LAB | Facility: HOSPITAL | Age: 64
End: 2019-02-04

## 2019-02-04 ENCOUNTER — TRANSCRIBE ORDERS (OUTPATIENT)
Dept: ADMINISTRATIVE | Facility: HOSPITAL | Age: 64
End: 2019-02-04

## 2019-02-04 DIAGNOSIS — I10 ESSENTIAL HYPERTENSION, MALIGNANT: Primary | ICD-10-CM

## 2019-02-04 LAB
ALBUMIN SERPL-MCNC: 4.3 G/DL (ref 3.5–5)
ANION GAP SERPL CALCULATED.3IONS-SCNC: 14.2 MMOL/L (ref 10–20)
BUN BLD-MCNC: 24 MG/DL (ref 7–20)
BUN/CREAT SERPL: 14.1 (ref 6.3–21.9)
CALCIUM SPEC-SCNC: 9.2 MG/DL (ref 8.4–10.2)
CHLORIDE SERPL-SCNC: 104 MMOL/L (ref 98–107)
CO2 SERPL-SCNC: 26 MMOL/L (ref 26–30)
CREAT BLD-MCNC: 1.7 MG/DL (ref 0.6–1.3)
GFR SERPL CREATININE-BSD FRML MDRD: 41 ML/MIN/1.73
GLUCOSE BLD-MCNC: 100 MG/DL (ref 74–98)
PHOSPHATE SERPL-MCNC: 4 MG/DL (ref 2.5–4.5)
POTASSIUM BLD-SCNC: 5.2 MMOL/L (ref 3.5–5.1)
SODIUM BLD-SCNC: 139 MMOL/L (ref 137–145)

## 2019-02-04 PROCEDURE — 80069 RENAL FUNCTION PANEL: CPT | Performed by: INTERNAL MEDICINE

## 2019-02-04 PROCEDURE — 36415 COLL VENOUS BLD VENIPUNCTURE: CPT | Performed by: INTERNAL MEDICINE

## 2019-02-08 ENCOUNTER — OFFICE VISIT (OUTPATIENT)
Dept: INTERNAL MEDICINE | Facility: CLINIC | Age: 64
End: 2019-02-08

## 2019-02-08 VITALS
WEIGHT: 314 LBS | HEIGHT: 76 IN | HEART RATE: 65 BPM | BODY MASS INDEX: 38.24 KG/M2 | TEMPERATURE: 98.1 F | DIASTOLIC BLOOD PRESSURE: 86 MMHG | RESPIRATION RATE: 16 BRPM | SYSTOLIC BLOOD PRESSURE: 154 MMHG | OXYGEN SATURATION: 100 %

## 2019-02-08 DIAGNOSIS — E11.9 DIABETES MELLITUS WITHOUT COMPLICATION (HCC): Primary | ICD-10-CM

## 2019-02-08 DIAGNOSIS — I10 MALIGNANT HYPERTENSION: ICD-10-CM

## 2019-02-08 DIAGNOSIS — E87.6 HYPOKALEMIA: ICD-10-CM

## 2019-02-08 PROCEDURE — 99214 OFFICE O/P EST MOD 30 MIN: CPT | Performed by: INTERNAL MEDICINE

## 2019-02-08 RX ORDER — HYDRALAZINE HYDROCHLORIDE 50 MG/1
50 TABLET, FILM COATED ORAL 3 TIMES DAILY
Qty: 90 TABLET | Refills: 11 | Status: SHIPPED | OUTPATIENT
Start: 2019-02-08 | End: 2019-03-08 | Stop reason: SDUPTHER

## 2019-02-08 NOTE — PROGRESS NOTES
Subjective     Patient ID: Andre Agosto is a 64 y.o. male. Patient is here for management of multiple medical problems.     Chief Complaint   Patient presents with   • Hypertension     follow-up     Hypertension   This is a chronic problem. The problem is unchanged. The problem is controlled. Pertinent negatives include no anxiety, blurred vision or chest pain. Current antihypertension treatment includes alpha 1 blockers, central alpha agonists, diuretics, calcium channel blockers and beta blockers. The current treatment provides moderate improvement. There are no compliance problems.  Hypertensive end-organ damage includes angina, kidney disease and CAD/MI. There is no history of chronic renal disease, coarctation of the aorta, hyperaldosteronism, hypercortisolism or hyperparathyroidism.      Breast tenderness.        The following portions of the patient's history were reviewed and updated as appropriate: allergies, current medications, past family history, past medical history, past social history, past surgical history and problem list.    Review of Systems   Eyes: Negative for blurred vision.   Cardiovascular: Negative for chest pain.       Current Outpatient Medications:   •  Alcohol Swabs (ALCOHOL PREP) pads, 1 pad 4 (Four) Times a Day As Needed (bs)., Disp: 120 each, Rfl: 12  •  amLODIPine (NORVASC) 5 MG tablet, Take 1 tablet by mouth 2 (Two) Times a Day., Disp: 180 tablet, Rfl: 3  •  aspirin 81 MG tablet, Take 1 tablet by mouth Daily., Disp: 30 tablet, Rfl: 11  •  divalproex (DEPAKOTE) 250 MG DR tablet, Take 1 tablet by mouth 3 (Three) Times a Day. Take 2 in the AM 1 at noon and 2 in the pm. Per DR Clifford., Disp: 150 tablet, Rfl: 3  •  escitalopram (LEXAPRO) 20 MG tablet, TAKE 1 TABLET DAILY, Disp: 90 tablet, Rfl: 2  •  Evolocumab 140 MG/ML solution auto-injector, Inject 1 mL under the skin into the appropriate area as directed Every 14 (Fourteen) Days., Disp: 6 pen, Rfl: 11  •  glucose blood test strip, Use  "one to two times daily to check blood sugar, Disp: 100 each, Rfl: 12  •  glucose monitor monitoring kit, 1 each As Needed (bs)., Disp: 1 each, Rfl: 1  •  hydrALAZINE (APRESOLINE) 50 MG tablet, Take 1 tablet by mouth 3 (Three) Times a Day., Disp: 90 tablet, Rfl: 11  •  ketoconazole (NIZORAL) 2 % shampoo, Apply  topically to the appropriate area as directed 2 (Two) Times a Week., Disp: 12 mL, Rfl: 0  •  Lancets (FREESTYLE) lancets, 1 each by Other route As Needed (bs)., Disp: 120 each, Rfl: 12  •  losartan (COZAAR) 100 MG tablet, Take 1 tablet by mouth Daily., Disp: 30 tablet, Rfl: 11  •  lovastatin (MEVACOR) 40 MG tablet, Take 1 tablet by mouth Daily With Dinner., Disp: 90 tablet, Rfl: 2  •  Magnesium Oxide 400 (240 MG) MG tablet, Take 1 tablet by mouth daily. (Patient taking differently: Take 1 tablet by mouth Daily As Needed.), Disp: 30 tablet, Rfl: 11  •  Semaglutide (OZEMPIC) 0.25 or 0.5 MG/DOSE solution pen-injector, Inject 0.25 mg under the skin into the appropriate area as directed Every 7 (Seven) Days., Disp: 1.5 mL, Rfl: 3  •  spironolactone (ALDACTONE) 25 MG tablet, Take 1 tablet by mouth 2 (Two) Times a Day., Disp: 60 tablet, Rfl: 11    Objective      Blood pressure 154/86, pulse 65, temperature 98.1 °F (36.7 °C), temperature source Oral, resp. rate 16, height 193 cm (76\"), weight (!) 142 kg (314 lb), SpO2 100 %.    Physical Exam     General Appearance:    Alert, cooperative, no distress, appears stated age   Head:    Normocephalic, without obvious abnormality, atraumatic   Eyes:    PERRL, conjunctiva/corneas clear, EOM's intact   Ears:    Normal TM's and external ear canals, both ears   Nose:   Nares normal, septum midline, mucosa normal, no drainage   or sinus tenderness   Throat:   Lips, mucosa, and tongue normal; teeth and gums normal   Neck:   Supple, symmetrical, trachea midline, no adenopathy;        thyroid:  No enlargement/tenderness/nodules; no carotid    bruit or JVD   Back:     Symmetric, no " curvature, ROM normal, no CVA tenderness   Lungs:     Clear to auscultation bilaterally, respirations unlabored   Chest wall:    No tenderness or deformity   Heart:    Regular rate and rhythm, S1 and S2 normal, no murmur,        rub or gallop   Abdomen:     Soft, non-tender, bowel sounds active all four quadrants,     no masses, no organomegaly   Extremities:   Extremities normal, atraumatic, no cyanosis or edema   Pulses:   2+ and symmetric all extremities   Skin:   Skin color, texture, turgor normal, no rashes or lesions   Lymph nodes:   Cervical, supraclavicular, and axillary nodes normal   Neurologic:   CNII-XII intact. Normal strength, sensation and reflexes       throughout      Results for orders placed or performed in visit on 02/04/19   Renal Function Panel   Result Value Ref Range    Glucose 100 (H) 74 - 98 mg/dL    BUN 24 (H) 7 - 20 mg/dL    Creatinine 1.70 (H) 0.60 - 1.30 mg/dL    Sodium 139 137 - 145 mmol/L    Potassium 5.2 (H) 3.5 - 5.1 mmol/L    Chloride 104 98 - 107 mmol/L    CO2 26.0 26.0 - 30.0 mmol/L    Calcium 9.2 8.4 - 10.2 mg/dL    Albumin 4.30 3.50 - 5.00 g/dL    Phosphorus 4.0 2.5 - 4.5 mg/dL    Anion Gap 14.2 10.0 - 20.0 mmol/L    BUN/Creatinine Ratio 14.1 6.3 - 21.9    eGFR Non African Amer 41 (L) >60 mL/min/1.73         Assessment/Plan   Breast tenderness. Likely from spironolactone.    Cri up slightly.  Pt will need to use a pill box to ensure dose usage of med on times.      Andre was seen today for hypertension.    Diagnoses and all orders for this visit:    Diabetes mellitus without complication (CMS/HCC)  -     Comprehensive Metabolic Panel  -     T4, Free  -     TSH  -     CBC & Differential  -     Vitamin B12  -     Hemoglobin A1c    Malignant hypertension  -     hydrALAZINE (APRESOLINE) 50 MG tablet; Take 1 tablet by mouth 3 (Three) Times a Day.  -     Comprehensive Metabolic Panel  -     T4, Free  -     TSH  -     CBC & Differential  -     Vitamin B12  -     Hemoglobin  A1c    Hypokalemia  -     Comprehensive Metabolic Panel  -     T4, Free  -     TSH  -     CBC & Differential  -     Vitamin B12  -     Hemoglobin A1c      Return in about 4 weeks (around 3/8/2019).          There are no Patient Instructions on file for this visit.     Keven Bear MD    Assessment/Plan

## 2019-02-16 DIAGNOSIS — E87.6 HYPOKALEMIA: ICD-10-CM

## 2019-02-16 DIAGNOSIS — I15.0 RENOVASCULAR HYPERTENSION: ICD-10-CM

## 2019-02-16 RX ORDER — SPIRONOLACTONE 50 MG/1
TABLET, FILM COATED ORAL
Qty: 60 TABLET | Refills: 4 | Status: SHIPPED | OUTPATIENT
Start: 2019-02-16 | End: 2019-03-08

## 2019-03-08 ENCOUNTER — APPOINTMENT (OUTPATIENT)
Dept: LAB | Facility: HOSPITAL | Age: 64
End: 2019-03-08

## 2019-03-08 ENCOUNTER — OFFICE VISIT (OUTPATIENT)
Dept: INTERNAL MEDICINE | Facility: CLINIC | Age: 64
End: 2019-03-08

## 2019-03-08 VITALS
SYSTOLIC BLOOD PRESSURE: 155 MMHG | TEMPERATURE: 97.9 F | HEIGHT: 75 IN | BODY MASS INDEX: 39.17 KG/M2 | RESPIRATION RATE: 16 BRPM | OXYGEN SATURATION: 98 % | DIASTOLIC BLOOD PRESSURE: 88 MMHG | WEIGHT: 315 LBS | HEART RATE: 65 BPM

## 2019-03-08 DIAGNOSIS — I10 MALIGNANT HYPERTENSION: ICD-10-CM

## 2019-03-08 DIAGNOSIS — E11.9 DIABETES MELLITUS WITHOUT COMPLICATION (HCC): ICD-10-CM

## 2019-03-08 DIAGNOSIS — I10 MALIGNANT HYPERTENSION: Primary | ICD-10-CM

## 2019-03-08 DIAGNOSIS — E78.2 MIXED HYPERLIPIDEMIA: ICD-10-CM

## 2019-03-08 LAB
ALBUMIN SERPL-MCNC: 4.5 G/DL (ref 3.5–5)
ALBUMIN/GLOB SERPL: 1.2 G/DL (ref 1–2)
ALP SERPL-CCNC: 51 U/L (ref 38–126)
ALT SERPL W P-5'-P-CCNC: 19 U/L (ref 13–69)
ANION GAP SERPL CALCULATED.3IONS-SCNC: 11.2 MMOL/L (ref 10–20)
AST SERPL-CCNC: 18 U/L (ref 15–46)
BASOPHILS # BLD AUTO: 0.07 10*3/MM3 (ref 0–0.2)
BASOPHILS NFR BLD AUTO: 0.7 % (ref 0–2.5)
BILIRUB SERPL-MCNC: 0.6 MG/DL (ref 0.2–1.3)
BUN BLD-MCNC: 16 MG/DL (ref 7–20)
BUN/CREAT SERPL: 11.4 (ref 6.3–21.9)
CALCIUM SPEC-SCNC: 9.8 MG/DL (ref 8.4–10.2)
CHLORIDE SERPL-SCNC: 102 MMOL/L (ref 98–107)
CHOLEST SERPL-MCNC: 137 MG/DL (ref 0–199)
CO2 SERPL-SCNC: 31 MMOL/L (ref 26–30)
CREAT BLD-MCNC: 1.4 MG/DL (ref 0.6–1.3)
DEPRECATED RDW RBC AUTO: 46.9 FL (ref 37–54)
EOSINOPHIL # BLD AUTO: 0.33 10*3/MM3 (ref 0–0.7)
EOSINOPHIL NFR BLD AUTO: 3.3 % (ref 0–7)
ERYTHROCYTE [DISTWIDTH] IN BLOOD BY AUTOMATED COUNT: 13.7 % (ref 11.5–14.5)
GFR SERPL CREATININE-BSD FRML MDRD: 51 ML/MIN/1.73
GLOBULIN UR ELPH-MCNC: 3.8 GM/DL
GLUCOSE BLD-MCNC: 99 MG/DL (ref 74–98)
HBA1C MFR BLD: 5.8 % (ref 3–6)
HCT VFR BLD AUTO: 47 % (ref 42–52)
HDLC SERPL-MCNC: 40 MG/DL (ref 40–60)
HGB BLD-MCNC: 15.2 G/DL (ref 14–18)
IMM GRANULOCYTES # BLD AUTO: 0.02 10*3/MM3 (ref 0–0.06)
IMM GRANULOCYTES NFR BLD AUTO: 0.2 % (ref 0–0.6)
LDLC SERPL CALC-MCNC: 61 MG/DL (ref 0–99)
LDLC/HDLC SERPL: 1.53 {RATIO}
LYMPHOCYTES # BLD AUTO: 2.26 10*3/MM3 (ref 0.6–3.4)
LYMPHOCYTES NFR BLD AUTO: 22.9 % (ref 10–50)
MCH RBC QN AUTO: 30 PG (ref 27–31)
MCHC RBC AUTO-ENTMCNC: 32.3 G/DL (ref 30–37)
MCV RBC AUTO: 92.7 FL (ref 80–94)
MONOCYTES # BLD AUTO: 0.58 10*3/MM3 (ref 0–0.9)
MONOCYTES NFR BLD AUTO: 5.9 % (ref 0–12)
NEUTROPHILS # BLD AUTO: 6.6 10*3/MM3 (ref 2–6.9)
NEUTROPHILS NFR BLD AUTO: 67 % (ref 37–80)
NRBC BLD AUTO-RTO: 0 /100 WBC (ref 0–0)
PLATELET # BLD AUTO: 207 10*3/MM3 (ref 130–400)
PMV BLD AUTO: 10.4 FL (ref 6–12)
POTASSIUM BLD-SCNC: 4.2 MMOL/L (ref 3.5–5.1)
PROT SERPL-MCNC: 8.3 G/DL (ref 6.3–8.2)
RBC # BLD AUTO: 5.07 10*6/MM3 (ref 4.7–6.1)
SODIUM BLD-SCNC: 140 MMOL/L (ref 137–145)
T4 FREE SERPL-MCNC: 0.95 NG/DL (ref 0.78–2.19)
TRIGL SERPL-MCNC: 179 MG/DL
TSH SERPL DL<=0.05 MIU/L-ACNC: 2.62 MIU/ML (ref 0.47–4.68)
VIT B12 BLD-MCNC: 681 PG/ML (ref 239–931)
VLDLC SERPL-MCNC: 35.8 MG/DL
WBC NRBC COR # BLD: 9.86 10*3/MM3 (ref 4.8–10.8)

## 2019-03-08 PROCEDURE — 84439 ASSAY OF FREE THYROXINE: CPT | Performed by: INTERNAL MEDICINE

## 2019-03-08 PROCEDURE — 84443 ASSAY THYROID STIM HORMONE: CPT | Performed by: INTERNAL MEDICINE

## 2019-03-08 PROCEDURE — 99214 OFFICE O/P EST MOD 30 MIN: CPT | Performed by: INTERNAL MEDICINE

## 2019-03-08 PROCEDURE — 80061 LIPID PANEL: CPT | Performed by: INTERNAL MEDICINE

## 2019-03-08 PROCEDURE — 36415 COLL VENOUS BLD VENIPUNCTURE: CPT | Performed by: INTERNAL MEDICINE

## 2019-03-08 PROCEDURE — 80053 COMPREHEN METABOLIC PANEL: CPT | Performed by: INTERNAL MEDICINE

## 2019-03-08 PROCEDURE — 82607 VITAMIN B-12: CPT | Performed by: INTERNAL MEDICINE

## 2019-03-08 PROCEDURE — 85025 COMPLETE CBC W/AUTO DIFF WBC: CPT | Performed by: INTERNAL MEDICINE

## 2019-03-08 PROCEDURE — 83036 HEMOGLOBIN GLYCOSYLATED A1C: CPT | Performed by: INTERNAL MEDICINE

## 2019-03-08 RX ORDER — DIVALPROEX SODIUM 250 MG/1
250 TABLET, DELAYED RELEASE ORAL 3 TIMES DAILY
Qty: 150 TABLET | Refills: 3 | Status: SHIPPED | OUTPATIENT
Start: 2019-03-08 | End: 2020-03-31 | Stop reason: SDUPTHER

## 2019-03-08 RX ORDER — CLONIDINE 0.1 MG/24H
1 PATCH, EXTENDED RELEASE TRANSDERMAL WEEKLY
Qty: 1 EACH | Refills: 11 | Status: SHIPPED | OUTPATIENT
Start: 2019-03-08 | End: 2019-03-08 | Stop reason: SDUPTHER

## 2019-03-08 RX ORDER — CLONIDINE 0.1 MG/24H
1 PATCH, EXTENDED RELEASE TRANSDERMAL WEEKLY
Qty: 3 EACH | Refills: 3 | Status: SHIPPED | OUTPATIENT
Start: 2019-03-08 | End: 2019-05-22

## 2019-03-08 RX ORDER — HYDRALAZINE HYDROCHLORIDE 50 MG/1
50 TABLET, FILM COATED ORAL 3 TIMES DAILY
Qty: 90 TABLET | Refills: 11 | Status: SHIPPED | OUTPATIENT
Start: 2019-03-08 | End: 2019-07-15 | Stop reason: SDUPTHER

## 2019-03-08 NOTE — PROGRESS NOTES
Subjective     Patient ID: Andre Agosto is a 64 y.o. male. Patient is here for management of multiple medical problems.     Chief Complaint   Patient presents with   • Diabetes     follow-up   • Hypertension     folow-up     Diabetes   He presents for his follow-up diabetic visit. He has type 2 diabetes mellitus. His disease course has been stable. Pertinent negatives for hypoglycemia include no confusion, dizziness, headaches or hunger. Pertinent negatives for diabetes include no blurred vision, no chest pain, no fatigue and no foot paresthesias. Pertinent negatives for hypoglycemia complications include no blackouts and no hospitalization. Symptoms are stable. Pertinent negatives for diabetic complications include no autonomic neuropathy, CVA or heart disease. Risk factors for coronary artery disease include diabetes mellitus, dyslipidemia, male sex, hypertension and sedentary lifestyle. He is compliant with treatment all of the time. His weight is stable. He is following a diabetic diet. He has had a previous visit with a dietitian. He participates in exercise daily. An ACE inhibitor/angiotensin II receptor blocker is being taken.   Hypertension   Pertinent negatives include no blurred vision, chest pain or headaches. There is no history of CVA.        Off sprinolactone x 1 month. Potassium stable.    Feels well.  Less breast pain.   Pt on purulent.      The following portions of the patient's history were reviewed and updated as appropriate: allergies, current medications, past family history, past medical history, past social history, past surgical history and problem list.    Review of Systems   Constitutional: Negative for fatigue.   Eyes: Negative for blurred vision.   Cardiovascular: Negative for chest pain.   Neurological: Negative for dizziness and headaches.   Psychiatric/Behavioral: Negative for confusion.       Current Outpatient Medications:   •  Alcohol Swabs (ALCOHOL PREP) pads, 1 pad 4 (Four)  Times a Day As Needed (bs)., Disp: 120 each, Rfl: 12  •  amLODIPine (NORVASC) 5 MG tablet, Take 1 tablet by mouth 2 (Two) Times a Day., Disp: 180 tablet, Rfl: 3  •  aspirin 81 MG tablet, Take 1 tablet by mouth Daily., Disp: 30 tablet, Rfl: 11  •  escitalopram (LEXAPRO) 20 MG tablet, TAKE 1 TABLET DAILY, Disp: 90 tablet, Rfl: 2  •  Evolocumab 140 MG/ML solution auto-injector, Inject 1 mL under the skin into the appropriate area as directed Every 14 (Fourteen) Days., Disp: 6 pen, Rfl: 11  •  glucose blood test strip, Use one to two times daily to check blood sugar, Disp: 100 each, Rfl: 12  •  glucose monitor monitoring kit, 1 each As Needed (bs)., Disp: 1 each, Rfl: 1  •  ketoconazole (NIZORAL) 2 % shampoo, Apply  topically to the appropriate area as directed 2 (Two) Times a Week., Disp: 12 mL, Rfl: 0  •  Lancets (FREESTYLE) lancets, 1 each by Other route As Needed (bs)., Disp: 120 each, Rfl: 12  •  losartan (COZAAR) 100 MG tablet, Take 1 tablet by mouth Daily., Disp: 30 tablet, Rfl: 11  •  lovastatin (MEVACOR) 40 MG tablet, Take 1 tablet by mouth Daily With Dinner., Disp: 90 tablet, Rfl: 2  •  Magnesium Oxide 400 (240 MG) MG tablet, Take 1 tablet by mouth daily. (Patient taking differently: Take 1 tablet by mouth Daily As Needed.), Disp: 30 tablet, Rfl: 11  •  Semaglutide (OZEMPIC) 0.25 or 0.5 MG/DOSE solution pen-injector, Inject 0.5 mg under the skin into the appropriate area as directed Every 7 (Seven) Days., Disp: 3 pen, Rfl: 3  •  cloNIDine (CATAPRES-TTS-1) 0.1 MG/24HR patch, Place 1 patch on the skin as directed by provider 1 (One) Time Per Week., Disp: 1 each, Rfl: 11  •  divalproex (DEPAKOTE) 250 MG DR tablet, Take 1 tablet by mouth 3 (Three) Times a Day. Take 2 in the AM 1 at noon and 2 in the pm. Per DR Clifford., Disp: 150 tablet, Rfl: 3  •  hydrALAZINE (APRESOLINE) 50 MG tablet, Take 1 tablet by mouth 3 (Three) Times a Day., Disp: 90 tablet, Rfl: 11    Objective      Blood pressure 155/88, pulse 65,  "temperature 97.9 °F (36.6 °C), temperature source Oral, resp. rate 16, height 190.5 cm (75\"), weight (!) 144 kg (316 lb 6.4 oz), SpO2 98 %.    Physical Exam     General Appearance:    Alert, cooperative, no distress, appears stated age   Head:    Normocephalic, without obvious abnormality, atraumatic   Eyes:    PERRL, conjunctiva/corneas clear, EOM's intact   Ears:    Normal TM's and external ear canals, both ears   Nose:   Nares normal, septum midline, mucosa normal, no drainage   or sinus tenderness   Throat:   Lips, mucosa, and tongue normal; teeth and gums normal   Neck:   Supple, symmetrical, trachea midline, no adenopathy;        thyroid:  No enlargement/tenderness/nodules; no carotid    bruit or JVD   Back:     Symmetric, no curvature, ROM normal, no CVA tenderness   Lungs:     Clear to auscultation bilaterally, respirations unlabored   Chest wall:    No tenderness or deformity   Heart:    Regular rate and rhythm, S1 and S2 normal, no murmur,        rub or gallop   Abdomen:     Soft, non-tender, bowel sounds active all four quadrants,     no masses, no organomegaly   Extremities:   Extremities normal, atraumatic, no cyanosis or edema   Pulses:   2+ and symmetric all extremities   Skin:   Skin color, texture, turgor normal, no rashes or lesions   Lymph nodes:   Cervical, supraclavicular, and axillary nodes normal   Neurologic:   CNII-XII intact. Normal strength, sensation and reflexes       throughout      Results for orders placed or performed in visit on 02/08/19   Comprehensive Metabolic Panel   Result Value Ref Range    Glucose 99 (H) 74 - 98 mg/dL    BUN 16 7 - 20 mg/dL    Creatinine 1.40 (H) 0.60 - 1.30 mg/dL    Sodium 140 137 - 145 mmol/L    Potassium 4.2 3.5 - 5.1 mmol/L    Chloride 102 98 - 107 mmol/L    CO2 31.0 (H) 26.0 - 30.0 mmol/L    Calcium 9.8 8.4 - 10.2 mg/dL    Total Protein 8.3 (H) 6.3 - 8.2 g/dL    Albumin 4.50 3.50 - 5.00 g/dL    ALT (SGPT) 19 13 - 69 U/L    AST (SGOT) 18 15 - 46 U/L    " Alkaline Phosphatase 51 38 - 126 U/L    Total Bilirubin 0.6 0.2 - 1.3 mg/dL    eGFR Non African Amer 51 (L) >60 mL/min/1.73    Globulin 3.8 gm/dL    A/G Ratio 1.2 1.0 - 2.0 g/dL    BUN/Creatinine Ratio 11.4 6.3 - 21.9    Anion Gap 11.2 10.0 - 20.0 mmol/L   T4, Free   Result Value Ref Range    Free T4 0.95 0.78 - 2.19 ng/dL   TSH   Result Value Ref Range    TSH 2.620 0.470 - 4.680 mIU/mL   Vitamin B12   Result Value Ref Range    Vitamin B-12 681 239 - 931 pg/mL   CBC Auto Differential   Result Value Ref Range    WBC 9.86 4.80 - 10.80 10*3/mm3    RBC 5.07 4.70 - 6.10 10*6/mm3    Hemoglobin 15.2 14.0 - 18.0 g/dL    Hematocrit 47.0 42.0 - 52.0 %    MCV 92.7 80.0 - 94.0 fL    MCH 30.0 27.0 - 31.0 pg    MCHC 32.3 30.0 - 37.0 g/dL    RDW 13.7 11.5 - 14.5 %    RDW-SD 46.9 37.0 - 54.0 fl    MPV 10.4 6.0 - 12.0 fL    Platelets 207 130 - 400 10*3/mm3    Neutrophil % 67.0 37.0 - 80.0 %    Lymphocyte % 22.9 10.0 - 50.0 %    Monocyte % 5.9 0.0 - 12.0 %    Eosinophil % 3.3 0.0 - 7.0 %    Basophil % 0.7 0.0 - 2.5 %    Immature Grans % 0.2 0.0 - 0.6 %    Neutrophils, Absolute 6.60 2.00 - 6.90 10*3/mm3    Lymphocytes, Absolute 2.26 0.60 - 3.40 10*3/mm3    Monocytes, Absolute 0.58 0.00 - 0.90 10*3/mm3    Eosinophils, Absolute 0.33 0.00 - 0.70 10*3/mm3    Basophils, Absolute 0.07 0.00 - 0.20 10*3/mm3    Immature Grans, Absolute 0.02 0.00 - 0.06 10*3/mm3    nRBC 0.0 0.0 - 0.0 /100 WBC         Assessment/Plan       Andre was seen today for diabetes and hypertension.    Diagnoses and all orders for this visit:    Malignant hypertension  -     cloNIDine (CATAPRES-TTS-1) 0.1 MG/24HR patch; Place 1 patch on the skin as directed by provider 1 (One) Time Per Week.    Diabetes mellitus without complication (CMS/HCC)  -     Discontinue: Semaglutide (OZEMPIC) 0.25 or 0.5 MG/DOSE solution pen-injector; Inject 0.5 mg under the skin into the appropriate area as directed Every 7 (Seven) Days.  -     Semaglutide (OZEMPIC) 0.25 or 0.5 MG/DOSE  solution pen-injector; Inject 0.5 mg under the skin into the appropriate area as directed Every 7 (Seven) Days.    Mixed hyperlipidemia    praulent has brought him to goal.        Return in about 6 weeks (around 4/19/2019).          There are no Patient Instructions on file for this visit.     Keven Bear MD    Assessment/Plan

## 2019-03-11 RX ORDER — LOSARTAN POTASSIUM 100 MG/1
100 TABLET ORAL DAILY
Qty: 90 TABLET | Refills: 3 | Status: SHIPPED | OUTPATIENT
Start: 2019-03-11 | End: 2020-03-31 | Stop reason: SDUPTHER

## 2019-03-11 RX ORDER — AMLODIPINE BESYLATE 5 MG/1
5 TABLET ORAL 2 TIMES DAILY
Qty: 180 TABLET | Refills: 3 | Status: SHIPPED | OUTPATIENT
Start: 2019-03-11 | End: 2020-07-20 | Stop reason: HOSPADM

## 2019-03-11 RX ORDER — LOVASTATIN 40 MG/1
40 TABLET ORAL
Qty: 90 TABLET | Refills: 3 | Status: SHIPPED | OUTPATIENT
Start: 2019-03-11 | End: 2019-10-21 | Stop reason: SDUPTHER

## 2019-04-09 ENCOUNTER — APPOINTMENT (OUTPATIENT)
Dept: LAB | Facility: HOSPITAL | Age: 64
End: 2019-04-09

## 2019-04-09 LAB
ALBUMIN SERPL-MCNC: 4.2 G/DL (ref 3.5–5)
ALBUMIN/GLOB SERPL: 1.1 G/DL (ref 1–2)
ALP SERPL-CCNC: 46 U/L (ref 38–126)
ALT SERPL W P-5'-P-CCNC: 36 U/L (ref 13–69)
ANION GAP SERPL CALCULATED.3IONS-SCNC: 15.8 MMOL/L (ref 10–20)
AST SERPL-CCNC: 26 U/L (ref 15–46)
BASOPHILS # BLD AUTO: 0.09 10*3/MM3 (ref 0–0.2)
BASOPHILS NFR BLD AUTO: 0.9 % (ref 0–1.5)
BILIRUB SERPL-MCNC: 0.4 MG/DL (ref 0.2–1.3)
BUN BLD-MCNC: 27 MG/DL (ref 7–20)
BUN/CREAT SERPL: 19.3 (ref 6.3–21.9)
CALCIUM SPEC-SCNC: 9.5 MG/DL (ref 8.4–10.2)
CHLORIDE SERPL-SCNC: 102 MMOL/L (ref 98–107)
CHOLEST SERPL-MCNC: 135 MG/DL (ref 0–199)
CO2 SERPL-SCNC: 28 MMOL/L (ref 26–30)
CREAT BLD-MCNC: 1.4 MG/DL (ref 0.6–1.3)
DEPRECATED RDW RBC AUTO: 46.8 FL (ref 37–54)
EOSINOPHIL # BLD AUTO: 0.44 10*3/MM3 (ref 0–0.4)
EOSINOPHIL NFR BLD AUTO: 4.5 % (ref 0.3–6.2)
ERYTHROCYTE [DISTWIDTH] IN BLOOD BY AUTOMATED COUNT: 13.7 % (ref 12.3–15.4)
GFR SERPL CREATININE-BSD FRML MDRD: 51 ML/MIN/1.73
GLOBULIN UR ELPH-MCNC: 3.8 GM/DL
GLUCOSE BLD-MCNC: 98 MG/DL (ref 74–98)
HBA1C MFR BLD: 5.7 % (ref 3–6)
HCT VFR BLD AUTO: 45.3 % (ref 37.5–51)
HDLC SERPL-MCNC: 39 MG/DL (ref 40–60)
HGB BLD-MCNC: 14.8 G/DL (ref 13–17.7)
IMM GRANULOCYTES # BLD AUTO: 0.03 10*3/MM3 (ref 0–0.05)
IMM GRANULOCYTES NFR BLD AUTO: 0.3 % (ref 0–0.5)
LDLC SERPL CALC-MCNC: 65 MG/DL (ref 0–99)
LDLC/HDLC SERPL: 1.67 {RATIO}
LYMPHOCYTES # BLD AUTO: 3.01 10*3/MM3 (ref 0.7–3.1)
LYMPHOCYTES NFR BLD AUTO: 30.9 % (ref 19.6–45.3)
MCH RBC QN AUTO: 30.2 PG (ref 26.6–33)
MCHC RBC AUTO-ENTMCNC: 32.7 G/DL (ref 31.5–35.7)
MCV RBC AUTO: 92.4 FL (ref 79–97)
MONOCYTES # BLD AUTO: 0.7 10*3/MM3 (ref 0.1–0.9)
MONOCYTES NFR BLD AUTO: 7.2 % (ref 5–12)
NEUTROPHILS # BLD AUTO: 5.47 10*3/MM3 (ref 1.4–7)
NEUTROPHILS NFR BLD AUTO: 56.2 % (ref 42.7–76)
NRBC BLD AUTO-RTO: 0 /100 WBC (ref 0–0)
PLATELET # BLD AUTO: 197 10*3/MM3 (ref 140–450)
PMV BLD AUTO: 11.6 FL (ref 6–12)
POTASSIUM BLD-SCNC: 4.8 MMOL/L (ref 3.5–5.1)
PROT SERPL-MCNC: 8 G/DL (ref 6.3–8.2)
RBC # BLD AUTO: 4.9 10*6/MM3 (ref 4.14–5.8)
SODIUM BLD-SCNC: 141 MMOL/L (ref 137–145)
T4 FREE SERPL-MCNC: 1.05 NG/DL (ref 0.78–2.19)
TRIGL SERPL-MCNC: 154 MG/DL
TSH SERPL DL<=0.05 MIU/L-ACNC: 3.41 MIU/ML (ref 0.47–4.68)
VALPROATE SERPL-MCNC: 104 MCG/ML (ref 50–100)
VIT B12 BLD-MCNC: 740 PG/ML (ref 239–931)
VLDLC SERPL-MCNC: 30.8 MG/DL
WBC NRBC COR # BLD: 9.74 10*3/MM3 (ref 3.4–10.8)

## 2019-04-09 PROCEDURE — 84443 ASSAY THYROID STIM HORMONE: CPT | Performed by: INTERNAL MEDICINE

## 2019-04-09 PROCEDURE — 80164 ASSAY DIPROPYLACETIC ACD TOT: CPT | Performed by: INTERNAL MEDICINE

## 2019-04-09 PROCEDURE — 80053 COMPREHEN METABOLIC PANEL: CPT | Performed by: INTERNAL MEDICINE

## 2019-04-09 PROCEDURE — 82607 VITAMIN B-12: CPT | Performed by: INTERNAL MEDICINE

## 2019-04-09 PROCEDURE — 83036 HEMOGLOBIN GLYCOSYLATED A1C: CPT | Performed by: INTERNAL MEDICINE

## 2019-04-09 PROCEDURE — 85025 COMPLETE CBC W/AUTO DIFF WBC: CPT | Performed by: INTERNAL MEDICINE

## 2019-04-09 PROCEDURE — 84439 ASSAY OF FREE THYROXINE: CPT | Performed by: INTERNAL MEDICINE

## 2019-04-09 PROCEDURE — 36415 COLL VENOUS BLD VENIPUNCTURE: CPT | Performed by: INTERNAL MEDICINE

## 2019-04-09 PROCEDURE — 80061 LIPID PANEL: CPT | Performed by: INTERNAL MEDICINE

## 2019-04-10 ENCOUNTER — TELEPHONE (OUTPATIENT)
Dept: INTERNAL MEDICINE | Facility: CLINIC | Age: 64
End: 2019-04-10

## 2019-05-15 ENCOUNTER — OFFICE VISIT (OUTPATIENT)
Dept: NEUROLOGY | Facility: CLINIC | Age: 64
End: 2019-05-15

## 2019-05-15 VITALS
BODY MASS INDEX: 39.55 KG/M2 | HEIGHT: 75 IN | HEART RATE: 68 BPM | SYSTOLIC BLOOD PRESSURE: 158 MMHG | DIASTOLIC BLOOD PRESSURE: 88 MMHG | OXYGEN SATURATION: 98 %

## 2019-05-15 DIAGNOSIS — H49.23 CN VI PALSY, BILATERAL: ICD-10-CM

## 2019-05-15 DIAGNOSIS — G47.34 NOCTURNAL OXYGEN DESATURATION: ICD-10-CM

## 2019-05-15 DIAGNOSIS — I69.30 SEQUELAE, POST-STROKE: Primary | ICD-10-CM

## 2019-05-15 DIAGNOSIS — H53.2 DIPLOPIA: ICD-10-CM

## 2019-05-15 DIAGNOSIS — G47.33 OBSTRUCTIVE SLEEP APNEA: ICD-10-CM

## 2019-05-15 DIAGNOSIS — G47.19 EXCESSIVE DAYTIME SLEEPINESS: ICD-10-CM

## 2019-05-15 PROCEDURE — 99214 OFFICE O/P EST MOD 30 MIN: CPT | Performed by: PSYCHIATRY & NEUROLOGY

## 2019-05-15 NOTE — PROGRESS NOTES
"[facility] NEUROLOGY [place] CONSULTATION   History of Present Illness     Date: 5/15/2019    Patient Identification  Andre Agosto is a 64 y.o. male.    Patient information was obtained from patient.  History/Exam limitations: none.    CONSULTATION requested by: Keven Bear MD    Chief Complaint   Sleep Apnea (Pt in office today for follow up ); Seizures (Pt c/o having sz \"about every 2 days\"); and Decreased Visual Acuity (Pt c/o vision shifting to the Left x3 days ago, lasted 35-40 seconds )      History of Present Illness   Patient is a 64 year-old male presenting to [facility] for follow-up for seizure, ALEX, and visual complaints. Patient has a 14 year history of simple partial seizure that is well controlled on Depakote. His seizures began following a bout of bacterial meningitis. He has approximately 2-3 seizures per month. He says that when he has a seizure, he can feel tingling pain in the medial aspect of his right hand, that migrates up his arm to his neck. These episodes last approximately 2-3 minutes. He has no loss of consciousness, and these do not interfere with daily living. The patient is pleased with his seizure control, as they do not bother him much.     ALEX: The patient has a 4-5 year Hx of ALEX. Today, his BiPAP usage report showed that he uses the machine 100% of the time and uses it 96.7% of the time for at least 4 hours a night. The patient recently decreased his pressures on his machine due to inability to tolerate high pressures. His EPAP and IPAP were 4.5 and 9.0cm H2O respectively. We had a discussion about how the machines work and that an EPAP of at least 5.0cm H2O would be a more ideal setting. The patient was having an AHI of 12.9 on these settings, therefore his ALEX is not adequately treated.     Visual disturbance: Over the past two years, the patient has been following with [facility] for diplopia on lateral gaze. This has drastically improved over the " "past 2 years. The patient did have one episode 4 days ago in which everything in his visual field \"shifted to the left\" for approximately 30-60 seconds and then resolved. He has not had another episode like this. This frightened the patient. He has no associated headache, nausea, vomiting, lightheadedness, or dizziness with this fleeting episode.     PMH:   Past Medical History:   Diagnosis Date   • 6th nerve palsy, right     right eye    • Anesthesia complication     ilius- after lap band surgery    • Anxiety and depression    • Coronary artery disease involving native coronary artery of native heart without angina pectoris 9/12/2018   • Diabetes mellitus (CMS/[facility])     doesnt check sugar    • Double vision    • Dyspepsia    • Epilepsy (CMS/HCC)    • Focal seizure (CMS/HCC)     of right arm    • Heart attack (CMS/HCC) 06/15/2016   • History of Helicobacter pylori infection     in 2008, treated w/ PrevPak   • HL (hearing loss)     Left ear child luevano measles   • Hyperlipidemia    • Hypertension    • Kidney stone     x2 imbedded   • Memory loss 2005    Meneigitis   • Meningitis     unsure of bacterial or viral    • Obesity    • Seizures (CMS/HCC)    • Sleep apnea     BiPAP compliant   • Sleep apnea treated with nocturnal BiPAP     compliant with  machine    • Stroke (CMS/[facility])    • Vision loss 7/2018    Double vision   • Wears glasses        Past Surgical History:   Past Surgical History:   Procedure Laterality Date   • CARDIAC CATHETERIZATION N/A 10/12/2018    Procedure: Left Heart Cath;  Surgeon: Yoselin Johnson MD;  Location: [facility] CATH INVASIVE LOCATION;  Service: Cardiology   • COLONOSCOPY     • CORONARY STENT PLACEMENT      x1   • ENDOSCOPY     • LAPAROSCOPIC GASTRIC BANDING  2008    s/p LAGB Realize 6/2008 by HOMERO.   • UMBILICAL HERNIA REPAIR  2008    incarcerated UHR w/ mesh by Dr. Velasquez   • WISDOM TOOTH EXTRACTION      all 4       Family Hisotry:   Family History   Problem Relation Age of Onset   • " Stroke Mother    • Hypertension Mother    • COPD Father    • Hypertension Father        Social History:   Social History     Socioeconomic History   • Marital status:      Spouse name: Not on file   • Number of children: Not on file   • Years of education: Not on file   • Highest education level: Not on file   Tobacco Use   • Smoking status: Former Smoker     Packs/day: 0.50     Years: 10.00     Pack years: 5.00     Types: Cigarettes     Start date: 1975     Last attempt to quit: 1985     Years since quittin.3   • Smokeless tobacco: Never Used   Substance and Sexual Activity   • Alcohol use: No   • Drug use: No   • Sexual activity: Defer       Medications:   Current Outpatient Medications   Medication Sig Dispense Refill   • Alcohol Swabs (ALCOHOL PREP) pads 1 pad 4 (Four) Times a Day As Needed (bs). 120 each 12   • amLODIPine (NORVASC) 5 MG tablet Take 1 tablet by mouth 2 (Two) Times a Day. 180 tablet 3   • aspirin 81 MG tablet Take 1 tablet by mouth Daily. 30 tablet 11   • cloNIDine (CATAPRES-TTS-1) 0.1 MG/24HR patch Place 1 patch on the skin as directed by provider 1 (One) Time Per Week. 3 each 3   • divalproex (DEPAKOTE) 250 MG DR tablet Take 1 tablet by mouth 3 (Three) Times a Day. Take 2 in the AM 1 at noon and 2 in the pm. Per DR Clifford. 150 tablet 3   • escitalopram (LEXAPRO) 20 MG tablet TAKE 1 TABLET DAILY 90 tablet 2   • Evolocumab 140 MG/ML solution auto-injector Inject 1 mL under the skin into the appropriate area as directed Every 14 (Fourteen) Days. 6 pen 11   • glucose blood test strip Use one to two times daily to check blood sugar 100 each 12   • glucose monitor monitoring kit 1 each As Needed (bs). 1 each 1   • hydrALAZINE (APRESOLINE) 50 MG tablet Take 1 tablet by mouth 3 (Three) Times a Day. 90 tablet 11   • ketoconazole (NIZORAL) 2 % shampoo Apply  topically to the appropriate area as directed 2 (Two) Times a Week. 12 mL 0   • Lancets (FREESTYLE) lancets 1 each by Other route  As Needed (bs). 120 each 12   • losartan (COZAAR) 100 MG tablet Take 1 tablet by mouth Daily. 90 tablet 3   • lovastatin (MEVACOR) 40 MG tablet Take 1 tablet by mouth Daily With Dinner. 90 tablet 3   • Magnesium Oxide 400 (240 MG) MG tablet Take 1 tablet by mouth daily. (Patient taking differently: Take 1 tablet by mouth Daily As Needed.) 30 tablet 11   • Semaglutide (OZEMPIC) 0.25 or 0.5 MG/DOSE solution pen-injector Inject 0.5 mg under the skin into the appropriate area as directed Every 7 (Seven) Days. 3 pen 3     No current facility-administered medications for this visit.        Allergy:   Allergies   Allergen Reactions   • Statins Myalgia       Review of Systems:  Review of Systems   Constitutional: Positive for fatigue. Negative for chills and fever.   HENT: Negative for congestion, ear pain, hearing loss, rhinorrhea and sore throat.    Eyes: Positive for visual disturbance. Negative for pain, discharge and redness.   Respiratory: Positive for apnea. Negative for cough, shortness of breath, wheezing and stridor.    Cardiovascular: Negative for chest pain, palpitations and leg swelling.   Gastrointestinal: Negative for abdominal pain, constipation, nausea and vomiting.   Endocrine: Negative for cold intolerance, heat intolerance and polyphagia.   Genitourinary: Negative for dysuria, flank pain, frequency and urgency.   Musculoskeletal: Negative for joint swelling, myalgias, neck pain and neck stiffness.   Skin: Negative for pallor, rash and wound.   Allergic/Immunologic: Negative for environmental allergies.   Neurological: Negative for dizziness, tremors, seizures, syncope, facial asymmetry, speech difficulty, weakness, light-headedness, numbness and headaches.   Hematological: Negative for adenopathy.   Psychiatric/Behavioral: Positive for sleep disturbance. Negative for confusion and hallucinations. The patient is not nervous/anxious.        Physical Exam     Vitals:    05/15/19 1426   BP: 158/88   Pulse:  "68   SpO2: 98%   Height: 190.5 cm (75\")     GENERAL: Patient is pleasant, cooperative, appears to be stated age.  Body habitus is endomorphic.  SKIN AND EXTREMITIES:  No skin rashes or lesions are noted.  No cyanosis, clubbing or edema of the extremities.    HEAD:  Head is normocephalic and atraumatic.    NECK: Nontender without thyromegaly or adenopathy.  Carotid upstrokes are 1+/4.  No cranial or cervical bruits.  The neck is supple with a full range of motion.   ENT: Palate elevates symmetrically.  No evidence of high arch palate.  Tongue midline.  No erythema in posterior pharynx.  Mallampati Classification Class III   CARDIOVASCULAR:  Regular rate and rhythm with normal S1 and S2 without rub or gallop.  RESPIRATORY:  Clear to auscultation without wheezes or crackle   ABDOMEN:  Soft and nontender, positive bowel sound without hepatosplenomegaly  BACK:  Back is straight without midline defect.    PSYCH:  Higher cortical function/mental status:  The patient is alert.  The patient is oriented x3 to time, place and person.  Recent and the remote memory appear normal.  The patient has a good fund of knowledge.  There is no visual or auditory hallucination or suicidal or homicidal ideation.  SPEECH:There is no gross evidence of aphasia, dysarthria or agnosia.      CRANIAL NERVES:  Pupils are 4mm, equal round reactive to light, reacting briskly to 2mm without afferent pupillary defect.  Visual fields are intact to confrontation testing.  Funduscopic examination reveals sharp disc margins with normal vasculature.  No papilledema, hemorrhages or exudates.  Extraocular movements reveal mild bilateral cranial nerve 6 palsy.  No nystagmus noted.  The face is symmetric. Palate elevates symmetrically, Tongue midline, positive gag reflex. The remainder of the cranial nerves are intact and symmetrical.    MOTOR: Strength is 5/5 throughout with normal tone and bulk with the following exceptions, 4/5 intrinsic muscles of the " hands and feet.  No involuntary movements noted.    Deep Tendon Reflexes: are 2/4 and symmetrical in the upper extremities, 2/4 and symmetrical at the knees and 1/4 and symmetrical at the Achilles tendon.  Plantar responses were down-going bilaterally.    SENSATION:  Intact to pinprick, light touch, vibration and proprioception.  Coordination:  The patient normally performs finger-nose-finger, heel-to-knee-to-shin and rapid alternating movements in symmetrical fashion.    COORDINATION AND GAIT:  The patient walks with a narrow-based gait.  Patient is able to heel-toe and tandem walk forward and backwards without difficulty.  Romberg and monopedal  Romberg are negative.    MUSCULOSKELETAL: Range of motion normal, no clubbing, cyanosis, or edema.  No joint swelling.            Records Reviewed: I have personally reviewed his previous medical record.    Andre was seen today for sleep apnea, seizures and decreased visual acuity.    Diagnoses and all orders for this visit:    Sequelae, post-stroke  -     MRI Brain Without Contrast; Future    Obstructive sleep apnea    Excessive daytime sleepiness    Nocturnal oxygen desaturation    Diplopia    CN VI palsy, bilateral      Discussion:  In summary, 64 year-old male presenting to [facility] for follow-up for seizure, ALEX, and visual complaints. Patient has a 14 year history of simple partial seizure that is well controlled on Depakote. His seizures began following a bout of bacterial meningitis. He has approximately 2-3 seizures per month. He says that when he has a seizure, he can feel tingling pain in the medial aspect of his right hand, that migrates up his arm to his neck. These episodes last approximately 2-3 minutes. He has no loss of consciousness, and these do not interfere with daily living. The patient is pleased with his seizure control, as they do not bother him much.     ALEX: The patient has a 4-5 year Hx of ALEX. Today, his BiPAP usage report  "showed that he uses the machine 100% of the time and uses it 96.7% of the time for at least 4 hours a night. The patient recently decreased his pressures on his machine due to inability to tolerate high pressures. His EPAP and IPAP were 4.5 and 9.0cm H2O respectively. We had a discussion about how the machines work and that an EPAP of at least 5.0cm H2O would be a more ideal setting. The patient was having an AHI of 12.9 on these settings, therefore his ALEX is not adequately treated.     Visual disturbance: Over the past two years, the patient has been following with  ophthalmology for diplopia on lateral gaze. This has drastically improved over the past 2 years. The patient did have one episode 4 days ago in which everything in his visual field \"shifted to the left\" for approximately 30-60 seconds and then resolved. He has not had another episode like this. This frightened the patient. He has no associated headache, nausea, vomiting, lightheadedness, or dizziness with this fleeting episode.       This Document is signed by Rony Clifford MD, FAAN, FAASM May 15, [id number] PM      "

## 2019-05-22 ENCOUNTER — OFFICE VISIT (OUTPATIENT)
Dept: INTERNAL MEDICINE | Facility: CLINIC | Age: 64
End: 2019-05-22

## 2019-05-22 VITALS
DIASTOLIC BLOOD PRESSURE: 93 MMHG | WEIGHT: 313 LBS | OXYGEN SATURATION: 97 % | SYSTOLIC BLOOD PRESSURE: 150 MMHG | TEMPERATURE: 98 F | HEIGHT: 75 IN | HEART RATE: 73 BPM | BODY MASS INDEX: 38.92 KG/M2 | RESPIRATION RATE: 16 BRPM

## 2019-05-22 DIAGNOSIS — E03.8 SUBCLINICAL HYPOTHYROIDISM: ICD-10-CM

## 2019-05-22 DIAGNOSIS — I10 ESSENTIAL HYPERTENSION: Primary | ICD-10-CM

## 2019-05-22 DIAGNOSIS — I10 MALIGNANT HYPERTENSION: ICD-10-CM

## 2019-05-22 DIAGNOSIS — I25.10 CORONARY ARTERY DISEASE INVOLVING NATIVE CORONARY ARTERY OF NATIVE HEART WITHOUT ANGINA PECTORIS: ICD-10-CM

## 2019-05-22 DIAGNOSIS — E11.9 DIABETES MELLITUS WITHOUT COMPLICATION (HCC): ICD-10-CM

## 2019-05-22 PROCEDURE — 99214 OFFICE O/P EST MOD 30 MIN: CPT | Performed by: INTERNAL MEDICINE

## 2019-05-22 RX ORDER — LEVOTHYROXINE SODIUM 0.05 MG/1
50 TABLET ORAL DAILY
Qty: 30 TABLET | Refills: 11 | Status: SHIPPED | OUTPATIENT
Start: 2019-05-22 | End: 2019-09-11

## 2019-05-22 NOTE — PROGRESS NOTES
Subjective     Patient ID: Andre Agosto is a 64 y.o. male. Patient is here for management of multiple medical problems.     Chief Complaint   Patient presents with   • Malignant Hypertension     follow-up     History of Present Illness       Clonidine patch caused brake out on skin,  Had to stop.  Seen cardiology in [facility]. Echo normal.  Recent vission abnormality seen Dr Clifford and going for mri soon.  BP still elevated but down some.     Hard to lose wt.    The following portions of the patient's history were reviewed and updated as appropriate: allergies, current medications, past family history, past medical history, past social history, past surgical history and problem list.    Review of Systems   Constitutional: Positive for fatigue. Negative for unexpected weight change.   HENT: Negative for congestion and dental problem.    Cardiovascular: Negative for chest pain and leg swelling.   Skin: Positive for rash. Negative for color change, pallor and wound.   Psychiatric/Behavioral: Positive for sleep disturbance.   All other systems reviewed and are negative.      Current Outpatient Medications:   •  Alcohol Swabs (ALCOHOL PREP) pads, 1 pad 4 (Four) Times a Day As Needed (bs)., Disp: 120 each, Rfl: 12  •  amLODIPine (NORVASC) 5 MG tablet, Take 1 tablet by mouth 2 (Two) Times a Day., Disp: 180 tablet, Rfl: 3  •  aspirin 81 MG tablet, Take 1 tablet by mouth Daily., Disp: 30 tablet, Rfl: 11  •  divalproex (DEPAKOTE) 250 MG DR tablet, Take 1 tablet by mouth 3 (Three) Times a Day. Take 2 in the AM 1 at noon and 2 in the pm. Per DR Clifford., Disp: 150 tablet, Rfl: 3  •  escitalopram (LEXAPRO) 20 MG tablet, TAKE 1 TABLET DAILY, Disp: 90 tablet, Rfl: 2  •  glucose blood test strip, Use one to two times daily to check blood sugar, Disp: 100 each, Rfl: 12  •  glucose monitor monitoring kit, 1 each As Needed (bs)., Disp: 1 each, Rfl: 1  •  hydrALAZINE (APRESOLINE) 50 MG tablet, Take 1 tablet by mouth 3 (Three) Times a Day.,  "Disp: 90 tablet, Rfl: 11  •  ketoconazole (NIZORAL) 2 % shampoo, Apply  topically to the appropriate area as directed 2 (Two) Times a Week., Disp: 12 mL, Rfl: 0  •  Lancets (FREESTYLE) lancets, 1 each by Other route As Needed (bs)., Disp: 120 each, Rfl: 12  •  losartan (COZAAR) 100 MG tablet, Take 1 tablet by mouth Daily., Disp: 90 tablet, Rfl: 3  •  lovastatin (MEVACOR) 40 MG tablet, Take 1 tablet by mouth Daily With Dinner., Disp: 90 tablet, Rfl: 3  •  Magnesium Oxide 400 (240 MG) MG tablet, Take 1 tablet by mouth daily. (Patient taking differently: Take 1 tablet by mouth Daily As Needed.), Disp: 30 tablet, Rfl: 11  •  Semaglutide (OZEMPIC) 0.25 or 0.5 MG/DOSE solution pen-injector, Inject 0.5 mg under the skin into the appropriate area as directed Every 7 (Seven) Days., Disp: 3 pen, Rfl: 3  •  levothyroxine (SYNTHROID) 50 MCG tablet, Take 1 tablet by mouth Daily., Disp: 30 tablet, Rfl: 11    Objective      Blood pressure 150/93, pulse 73, temperature 98 °F (36.7 °C), temperature source Oral, resp. rate 16, height 190.5 cm (75\"), weight (!) 142 kg (313 lb), SpO2 97 %.    Physical Exam     General Appearance:    Alert, cooperative, no distress, appears stated age   Head:    Normocephalic, without obvious abnormality, atraumatic   Eyes:    PERRL, conjunctiva/corneas clear, EOM's intact   Ears:    Normal TM's and external ear canals, both ears   Nose:   Nares normal, septum midline, mucosa normal, no drainage   or sinus tenderness   Throat:   Lips, mucosa, and tongue normal; teeth and gums normal   Neck:   Supple, symmetrical, trachea midline, no adenopathy;        thyroid:  No enlargement/tenderness/nodules; no carotid    bruit or JVD   Back:     Symmetric, no curvature, ROM normal, no CVA tenderness   Lungs:     Clear to auscultation bilaterally, respirations unlabored   Chest wall:    No tenderness or deformity   Heart:    Regular rate and rhythm, S1 and S2 normal, no murmur,        rub or gallop   Abdomen:     " Soft, non-tender, bowel sounds active all four quadrants,     no masses, no organomegaly   Extremities:   Extremities normal, atraumatic, no cyanosis or edema   Pulses:   2+ and symmetric all extremities   Skin:   Skin color, texture, turgor normal, no rashes or lesions   Lymph nodes:   Cervical, supraclavicular, and axillary nodes normal   Neurologic:   CNII-XII intact. Normal strength, sensation and reflexes       throughout      Results for orders placed or performed in visit on 02/08/19   Comprehensive Metabolic Panel   Result Value Ref Range    Glucose 99 (H) 74 - 98 mg/dL    BUN 16 7 - 20 mg/dL    Creatinine 1.40 (H) 0.60 - 1.30 mg/dL    Sodium 140 137 - 145 mmol/L    Potassium 4.2 3.5 - 5.1 mmol/L    Chloride 102 98 - 107 mmol/L    CO2 31.0 (H) 26.0 - 30.0 mmol/L    Calcium 9.8 8.4 - 10.2 mg/dL    Total Protein 8.3 (H) 6.3 - 8.2 g/dL    Albumin 4.50 3.50 - 5.00 g/dL    ALT (SGPT) 19 13 - 69 U/L    AST (SGOT) 18 15 - 46 U/L    Alkaline Phosphatase 51 38 - 126 U/L    Total Bilirubin 0.6 0.2 - 1.3 mg/dL    eGFR Non African Amer 51 (L) >60 mL/min/1.73    Globulin 3.8 gm/dL    A/G Ratio 1.2 1.0 - 2.0 g/dL    BUN/Creatinine Ratio 11.4 6.3 - 21.9    Anion Gap 11.2 10.0 - 20.0 mmol/L   T4, Free   Result Value Ref Range    Free T4 0.95 0.78 - 2.19 ng/dL   TSH   Result Value Ref Range    TSH 2.620 0.470 - 4.680 mIU/mL   Vitamin B12   Result Value Ref Range    Vitamin B-12 681 239 - 931 pg/mL   Hemoglobin A1c   Result Value Ref Range    Hemoglobin A1C 5.8 3 - 6 %   CBC Auto Differential   Result Value Ref Range    WBC 9.86 4.80 - 10.80 10*3/mm3    RBC 5.07 4.70 - 6.10 10*6/mm3    Hemoglobin 15.2 14.0 - 18.0 g/dL    Hematocrit 47.0 42.0 - 52.0 %    MCV 92.7 80.0 - 94.0 fL    MCH 30.0 27.0 - 31.0 pg    MCHC 32.3 30.0 - 37.0 g/dL    RDW 13.7 11.5 - 14.5 %    RDW-SD 46.9 37.0 - 54.0 fl    MPV 10.4 6.0 - 12.0 fL    Platelets 207 130 - 400 10*3/mm3    Neutrophil % 67.0 37.0 - 80.0 %    Lymphocyte % 22.9 10.0 - 50.0 %     Monocyte % 5.9 0.0 - 12.0 %    Eosinophil % 3.3 0.0 - 7.0 %    Basophil % 0.7 0.0 - 2.5 %    Immature Grans % 0.2 0.0 - 0.6 %    Neutrophils, Absolute 6.60 2.00 - 6.90 10*3/mm3    Lymphocytes, Absolute 2.26 0.60 - 3.40 10*3/mm3    Monocytes, Absolute 0.58 0.00 - 0.90 10*3/mm3    Eosinophils, Absolute 0.33 0.00 - 0.70 10*3/mm3    Basophils, Absolute 0.07 0.00 - 0.20 10*3/mm3    Immature Grans, Absolute 0.02 0.00 - 0.06 10*3/mm3    nRBC 0.0 0.0 - 0.0 /100 WBC         Assessment/Plan       Andre was seen today for malignant hypertension.    Diagnoses and all orders for this visit:    Essential hypertension  -     Comprehensive Metabolic Panel    Malignant hypertension  -     Comprehensive Metabolic Panel  -     Ambulatory Referral to Cardiac Rehab    Coronary artery disease involving native coronary artery of native heart without angina pectoris  -     Comprehensive Metabolic Panel  -     Ambulatory Referral to Cardiac Rehab    Diabetes mellitus without complication (CMS/HCC)  -     Comprehensive Metabolic Panel    Subclinical hypothyroidism  -     TSH  -     T4, Free  -     Comprehensive Metabolic Panel    Other orders  -     levothyroxine (SYNTHROID) 50 MCG tablet; Take 1 tablet by mouth Daily.      Return in about 6 weeks (around 7/3/2019).          There are no Patient Instructions on file for this visit.     Keven Bear MD    Assessment/Plan

## 2019-06-21 ENCOUNTER — OFFICE VISIT (OUTPATIENT)
Dept: INTERNAL MEDICINE | Facility: CLINIC | Age: 64
End: 2019-06-21

## 2019-06-21 VITALS
HEIGHT: 76 IN | WEIGHT: 309.12 LBS | SYSTOLIC BLOOD PRESSURE: 168 MMHG | DIASTOLIC BLOOD PRESSURE: 92 MMHG | RESPIRATION RATE: 16 BRPM | OXYGEN SATURATION: 97 % | HEART RATE: 70 BPM | BODY MASS INDEX: 37.64 KG/M2 | TEMPERATURE: 98.7 F

## 2019-06-21 DIAGNOSIS — I10 ESSENTIAL HYPERTENSION: ICD-10-CM

## 2019-06-21 DIAGNOSIS — R90.82 WHITE MATTER ABNORMALITY ON MRI OF BRAIN: Primary | ICD-10-CM

## 2019-06-21 PROCEDURE — 99214 OFFICE O/P EST MOD 30 MIN: CPT | Performed by: INTERNAL MEDICINE

## 2019-06-21 NOTE — PROGRESS NOTES
Subjective     Patient ID: Andre Agosto is a 64 y.o. male. Patient is here for management of multiple medical problems.     Chief Complaint   Patient presents with   • Hypertension     follow-up   • MRI     patient here to discuss results from MRI      History of Present Illness     Pt with recent mri.  Worsening white matter lesions.  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  worsening neruo symptoms with worseing MRI findings.  Doesn't have f/u with Dr Clifford till July.          The following portions of the patient's history were reviewed and updated as appropriate: allergies, current medications, past family history, past medical history, past social history, past surgical history and problem list.    Review of Systems   Constitutional: Positive for fatigue.   Eyes: Positive for visual disturbance.   Neurological: Positive for weakness and headaches. Negative for dizziness.   Psychiatric/Behavioral: Negative for agitation, behavioral problems, confusion, decreased concentration and hallucinations.   All other systems reviewed and are negative.      Current Outpatient Medications:   •  Alcohol Swabs (ALCOHOL PREP) pads, 1 pad 4 (Four) Times a Day As Needed (bs)., Disp: 120 each, Rfl: 12  •  amLODIPine (NORVASC) 5 MG tablet, Take 1 tablet by mouth 2 (Two) Times a Day., Disp: 180 tablet, Rfl: 3  •  aspirin 81 MG tablet, Take 1 tablet by mouth Daily., Disp: 30 tablet, Rfl: 11  •  divalproex (DEPAKOTE) 250 MG DR tablet, Take 1 tablet by mouth 3 (Three) Times a Day. Take 2 in the AM 1 at noon and 2 in the pm. Per DR Clifford., Disp: 150 tablet, Rfl: 3  •  escitalopram (LEXAPRO) 20 MG tablet, TAKE 1 TABLET DAILY, Disp: 90 tablet, Rfl: 2  •  glucose blood test strip, Use one to two times daily to check blood sugar, Disp: 100 each, Rfl: 12  •  glucose monitor monitoring kit, 1 each As Needed (bs)., Disp: 1 each, Rfl: 1  •  hydrALAZINE (APRESOLINE) 50 MG tablet, Take 1 tablet by mouth 3 (Three) Times a Day., Disp: 90  "tablet, Rfl: 11  •  ketoconazole (NIZORAL) 2 % shampoo, Apply  topically to the appropriate area as directed 2 (Two) Times a Week., Disp: 12 mL, Rfl: 0  •  Lancets (FREESTYLE) lancets, 1 each by Other route As Needed (bs)., Disp: 120 each, Rfl: 12  •  levothyroxine (SYNTHROID) 50 MCG tablet, Take 1 tablet by mouth Daily., Disp: 30 tablet, Rfl: 11  •  losartan (COZAAR) 100 MG tablet, Take 1 tablet by mouth Daily., Disp: 90 tablet, Rfl: 3  •  lovastatin (MEVACOR) 40 MG tablet, Take 1 tablet by mouth Daily With Dinner., Disp: 90 tablet, Rfl: 3  •  Magnesium Oxide 400 (240 MG) MG tablet, Take 1 tablet by mouth daily. (Patient taking differently: Take 1 tablet by mouth Daily As Needed.), Disp: 30 tablet, Rfl: 11  •  Semaglutide (OZEMPIC) 0.25 or 0.5 MG/DOSE solution pen-injector, Inject 0.5 mg under the skin into the appropriate area as directed Every 7 (Seven) Days., Disp: 3 pen, Rfl: 3    Objective      Blood pressure 168/92, pulse 70, temperature 98.7 °F (37.1 °C), temperature source Oral, resp. rate 16, height 193 cm (76\"), weight (!) 140 kg (309 lb 1.9 oz), SpO2 97 %.    Physical Exam     General Appearance:    Alert, cooperative, no distress, appears stated age   Head:    Normocephalic, without obvious abnormality, atraumatic   Eyes:    dicojugate gaze. PERRL, conjunctiva/corneas clear, EOM's intact   Ears:    Normal TM's and external ear canals, both ears   Nose:   Nares normal, septum midline, mucosa normal, no drainage   or sinus tenderness   Throat:   Lips, mucosa, and tongue normal; teeth and gums normal   Neck:   Supple, symmetrical, trachea midline, no adenopathy;        thyroid:  No enlargement/tenderness/nodules; no carotid    bruit or JVD   Back:     Symmetric, no curvature, ROM normal, no CVA tenderness   Lungs:     Clear to auscultation bilaterally, respirations unlabored   Chest wall:    No tenderness or deformity   Heart:    Regular rate and rhythm, S1 and S2 normal, no murmur,        rub or gallop "   Abdomen:     Soft, non-tender, bowel sounds active all four quadrants,     no masses, no organomegaly   Extremities:   Extremities normal, atraumatic, no cyanosis or edema   Pulses:   2+ and symmetric all extremities   Skin:   Skin color, texture, turgor normal, no rashes or lesions   Lymph nodes:   Cervical, supraclavicular, and axillary nodes normal   Neurologic:   CNII-XII intact. Normal strength, sensation and reflexes       Throughout. occ slurred speech.        Results for orders placed or performed in visit on 02/08/19   Comprehensive Metabolic Panel   Result Value Ref Range    Glucose 99 (H) 74 - 98 mg/dL    BUN 16 7 - 20 mg/dL    Creatinine 1.40 (H) 0.60 - 1.30 mg/dL    Sodium 140 137 - 145 mmol/L    Potassium 4.2 3.5 - 5.1 mmol/L    Chloride 102 98 - 107 mmol/L    CO2 31.0 (H) 26.0 - 30.0 mmol/L    Calcium 9.8 8.4 - 10.2 mg/dL    Total Protein 8.3 (H) 6.3 - 8.2 g/dL    Albumin 4.50 3.50 - 5.00 g/dL    ALT (SGPT) 19 13 - 69 U/L    AST (SGOT) 18 15 - 46 U/L    Alkaline Phosphatase 51 38 - 126 U/L    Total Bilirubin 0.6 0.2 - 1.3 mg/dL    eGFR Non African Amer 51 (L) >60 mL/min/1.73    Globulin 3.8 gm/dL    A/G Ratio 1.2 1.0 - 2.0 g/dL    BUN/Creatinine Ratio 11.4 6.3 - 21.9    Anion Gap 11.2 10.0 - 20.0 mmol/L   T4, Free   Result Value Ref Range    Free T4 0.95 0.78 - 2.19 ng/dL   TSH   Result Value Ref Range    TSH 2.620 0.470 - 4.680 mIU/mL   Vitamin B12   Result Value Ref Range    Vitamin B-12 681 239 - 931 pg/mL   Hemoglobin A1c   Result Value Ref Range    Hemoglobin A1C 5.8 3 - 6 %   CBC Auto Differential   Result Value Ref Range    WBC 9.86 4.80 - 10.80 10*3/mm3    RBC 5.07 4.70 - 6.10 10*6/mm3    Hemoglobin 15.2 14.0 - 18.0 g/dL    Hematocrit 47.0 42.0 - 52.0 %    MCV 92.7 80.0 - 94.0 fL    MCH 30.0 27.0 - 31.0 pg    MCHC 32.3 30.0 - 37.0 g/dL    RDW 13.7 11.5 - 14.5 %    RDW-SD 46.9 37.0 - 54.0 fl    MPV 10.4 6.0 - 12.0 fL    Platelets 207 130 - 400 10*3/mm3    Neutrophil % 67.0 37.0 - 80.0 %     Lymphocyte % 22.9 10.0 - 50.0 %    Monocyte % 5.9 0.0 - 12.0 %    Eosinophil % 3.3 0.0 - 7.0 %    Basophil % 0.7 0.0 - 2.5 %    Immature Grans % 0.2 0.0 - 0.6 %    Neutrophils, Absolute 6.60 2.00 - 6.90 10*3/mm3    Lymphocytes, Absolute 2.26 0.60 - 3.40 10*3/mm3    Monocytes, Absolute 0.58 0.00 - 0.90 10*3/mm3    Eosinophils, Absolute 0.33 0.00 - 0.70 10*3/mm3    Basophils, Absolute 0.07 0.00 - 0.20 10*3/mm3    Immature Grans, Absolute 0.02 0.00 - 0.06 10*3/mm3    nRBC 0.0 0.0 - 0.0 /100 WBC         Assessment/Plan     New white matter lesions. Will try to get in with DR Clifford sooner as pt with neurosymptoms. I will eval of other ID issues endemic to the area that may contribute.    Andre was seen today for hypertension and mri.    Diagnoses and all orders for this visit:    White matter abnormality on MRI of brain  -     [place] Spotted Fever, IgM  -     [place] Spotted Fever, IgG  -     Lyme Disease, PCR  -     Ehrlichia Antibody Panel  -     Sedimentation Rate  -     Rheumatoid Factor  -     Cyclic Citrul Peptide Antibody, IgG / IgA  -     C-reactive Protein  -     Uric Acid  -     RPR  -     VDRL, CSF - Cerebrospinal Fluid, Lumbar Puncture  -     Ambulatory Referral to Neurology    Essential hypertension      Return in about 6 weeks (around 8/2/2019).          There are no Patient Instructions on file for this visit.     Keven Bear MD    Assessment/Plan

## 2019-06-24 ENCOUNTER — APPOINTMENT (OUTPATIENT)
Dept: LAB | Facility: HOSPITAL | Age: 64
End: 2019-06-24

## 2019-06-24 LAB
ALBUMIN SERPL-MCNC: 4.3 G/DL (ref 3.5–5)
ALBUMIN/GLOB SERPL: 1.2 G/DL (ref 1–2)
ALP SERPL-CCNC: 57 U/L (ref 38–126)
ALT SERPL W P-5'-P-CCNC: 28 U/L (ref 13–69)
ANION GAP SERPL CALCULATED.3IONS-SCNC: 15.7 MMOL/L (ref 10–20)
AST SERPL-CCNC: 19 U/L (ref 15–46)
BILIRUB SERPL-MCNC: 0.8 MG/DL (ref 0.2–1.3)
BUN BLD-MCNC: 15 MG/DL (ref 7–20)
BUN/CREAT SERPL: 12.5 (ref 6.3–21.9)
CALCIUM SPEC-SCNC: 9.3 MG/DL (ref 8.4–10.2)
CHLORIDE SERPL-SCNC: 99 MMOL/L (ref 98–107)
CO2 SERPL-SCNC: 30 MMOL/L (ref 26–30)
CREAT BLD-MCNC: 1.2 MG/DL (ref 0.6–1.3)
CRP SERPL-MCNC: 1.9 MG/DL (ref 0–1)
ERYTHROCYTE [SEDIMENTATION RATE] IN BLOOD: 9 MM/HR (ref 0–15)
GFR SERPL CREATININE-BSD FRML MDRD: 61 ML/MIN/1.73
GLOBULIN UR ELPH-MCNC: 3.5 GM/DL
GLUCOSE BLD-MCNC: 94 MG/DL (ref 74–98)
POTASSIUM BLD-SCNC: 3.7 MMOL/L (ref 3.5–5.1)
PROT SERPL-MCNC: 7.8 G/DL (ref 6.3–8.2)
RHEUMATOID FACT SERPL-ACNC: NEGATIVE [IU]/ML
RPR SER QL: NORMAL
SODIUM BLD-SCNC: 141 MMOL/L (ref 137–145)
T4 FREE SERPL-MCNC: 1.24 NG/DL (ref 0.78–2.19)
TSH SERPL DL<=0.05 MIU/L-ACNC: 1.99 MIU/ML (ref 0.47–4.68)
URATE SERPL-MCNC: 7.6 MG/DL (ref 2.5–8.5)

## 2019-06-24 PROCEDURE — 86140 C-REACTIVE PROTEIN: CPT | Performed by: INTERNAL MEDICINE

## 2019-06-24 PROCEDURE — 86666 EHRLICHIA ANTIBODY: CPT | Performed by: INTERNAL MEDICINE

## 2019-06-24 PROCEDURE — 84550 ASSAY OF BLOOD/URIC ACID: CPT | Performed by: INTERNAL MEDICINE

## 2019-06-24 PROCEDURE — 86200 CCP ANTIBODY: CPT | Performed by: INTERNAL MEDICINE

## 2019-06-24 PROCEDURE — 36415 COLL VENOUS BLD VENIPUNCTURE: CPT | Performed by: INTERNAL MEDICINE

## 2019-06-24 PROCEDURE — 87476 LYME DIS DNA AMP PROBE: CPT | Performed by: INTERNAL MEDICINE

## 2019-06-24 PROCEDURE — 86430 RHEUMATOID FACTOR TEST QUAL: CPT | Performed by: INTERNAL MEDICINE

## 2019-06-24 PROCEDURE — 80053 COMPREHEN METABOLIC PANEL: CPT | Performed by: INTERNAL MEDICINE

## 2019-06-24 PROCEDURE — 85651 RBC SED RATE NONAUTOMATED: CPT | Performed by: INTERNAL MEDICINE

## 2019-06-24 PROCEDURE — 86592 SYPHILIS TEST NON-TREP QUAL: CPT | Performed by: INTERNAL MEDICINE

## 2019-06-24 PROCEDURE — 86757 RICKETTSIA ANTIBODY: CPT | Performed by: INTERNAL MEDICINE

## 2019-06-24 PROCEDURE — 84439 ASSAY OF FREE THYROXINE: CPT | Performed by: INTERNAL MEDICINE

## 2019-06-24 PROCEDURE — 84443 ASSAY THYROID STIM HORMONE: CPT | Performed by: INTERNAL MEDICINE

## 2019-06-25 ENCOUNTER — OFFICE VISIT (OUTPATIENT)
Dept: NEUROLOGY | Facility: CLINIC | Age: 64
End: 2019-06-25

## 2019-06-25 VITALS
BODY MASS INDEX: 37.73 KG/M2 | HEART RATE: 67 BPM | SYSTOLIC BLOOD PRESSURE: 130 MMHG | OXYGEN SATURATION: 95 % | WEIGHT: 310 LBS | TEMPERATURE: 98.5 F | DIASTOLIC BLOOD PRESSURE: 80 MMHG

## 2019-06-25 DIAGNOSIS — I69.30 SEQUELAE, POST-STROKE: Primary | ICD-10-CM

## 2019-06-25 DIAGNOSIS — G47.34 NOCTURNAL OXYGEN DESATURATION: ICD-10-CM

## 2019-06-25 DIAGNOSIS — G47.19 EXCESSIVE DAYTIME SLEEPINESS: ICD-10-CM

## 2019-06-25 DIAGNOSIS — G47.33 OBSTRUCTIVE SLEEP APNEA: ICD-10-CM

## 2019-06-25 LAB — CCP IGA+IGG SERPL IA-ACNC: 14 UNITS (ref 0–19)

## 2019-06-25 PROCEDURE — 99214 OFFICE O/P EST MOD 30 MIN: CPT | Performed by: PSYCHIATRY & NEUROLOGY

## 2019-06-25 NOTE — PROGRESS NOTES
[facility] NEUROLOGY [place] CONSULTATION   History of Present Illness     Date: 6/28/2019    Patient Identification  Andre Agosto is a 64 y.o. male.    Patient information was obtained from patient.  History/Exam limitations: none.    CONSULTATION requested by: Keven Bear MD    Chief Complaint   Follow-up (Pt states he's here for a follow up on MRI results and Sleep Study. )      History of Present Illness   Patient is a pleasant 64-year-old referred to [facility] neurolog[facility] [place] for evaluation for obstructive sleep apnea.  Patient underwent nocturnal polysomnography study in August 14, 2018 and patient was found to have an apnea hypotony index of 17.4 events per hour associated with desaturation.  Patient failed nasal CPAP patient was subsequently placed on nasal BiPAP at 10/5 cm water pressure.      PMH:   Past Medical History:   Diagnosis Date   • 6th nerve palsy, right     right eye    • Anesthesia complication     ilius- after lap band surgery    • Anxiety and depression    • Coronary artery disease involving native coronary artery of native heart without angina pectoris 9/12/2018   • Diabetes mellitus (CMS/[facility])     doesnt check sugar    • Double vision    • Dyspepsia    • Epilepsy (CMS/HCC)    • Focal seizure (CMS/HCC)     of right arm    • Heart attack (CMS/HCC) 06/15/2016   • History of Helicobacter pylori infection     in 2008, treated w/ PrevPak   • HL (hearing loss)     Left ear child luevano measles   • Hyperlipidemia    • Hypertension    • Kidney stone     x2 imbedded   • Memory loss 2005    Meneigitis   • Meningitis     unsure of bacterial or viral    • Obesity    • Seizures (CMS/HCC)    • Sleep apnea     BiPAP compliant   • Sleep apnea treated with nocturnal BiPAP     compliant with  machine    • Stroke (CMS/HCC)    • Vision loss 7/2018    Double vision   • Wears glasses        Past Surgical History:   Past Surgical History:   Procedure Laterality Date   • CARDIAC CATHETERIZATION N/A  10/12/2018    Procedure: Left Heart Cath;  Surgeon: Yoselin Johnson MD;  Location:  [facility];  Service: Cardiology   • COLONOSCOPY     • CORONARY STENT PLACEMENT      x1   • ENDOSCOPY     • LAPAROSCOPIC GASTRIC BANDING      s/p LAGB Realize 2008 by [facility]ERO.   • UMBILICAL HERNIA REPAIR      incarcerated UHR w/ mesh by Dr. Velasquez   • WISDOM TOOTH EXTRACTION      all 4       Family Hisotry:   Family History   Problem Relation Age of Onset   • Stroke Mother    • Hypertension Mother    • COPD Father    • Hypertension Father        Social History:   Social History     Socioeconomic History   • Marital status:      Spouse name: Not on file   • Number of children: Not on file   • Years of education: Not on file   • Highest education level: Not on file   Tobacco Use   • Smoking status: Former Smoker     Packs/day: 0.50     Years: 10.00     Pack years: 5.00     Types: Cigarettes     Start date: 1975     Last attempt to quit: 1985     Years since quittin.5   • Smokeless tobacco: Never Used   Substance and Sexual Activity   • Alcohol use: No   • Drug use: No   • Sexual activity: Defer       Medications:   Current Outpatient Medications   Medication Sig Dispense Refill   • Alcohol Swabs (ALCOHOL PREP) pads 1 pad 4 (Four) Times a Day As Needed (bs). 120 each 12   • amLODIPine (NORVASC) 5 MG tablet Take 1 tablet by mouth 2 (Two) Times a Day. 180 tablet 3   • aspirin 81 MG tablet Take 1 tablet by mouth Daily. 30 tablet 11   • divalproex (DEPAKOTE) 250 MG DR tablet Take 1 tablet by mouth 3 (Three) Times a Day. Take 2 in the AM 1 at noon and 2 in the pm. Per DR Clifford. 150 tablet 3   • escitalopram (LEXAPRO) 20 MG tablet TAKE 1 TABLET DAILY 90 tablet 2   • glucose blood test strip Use one to two times daily to check blood sugar 100 each 12   • glucose monitor monitoring kit 1 each As Needed (bs). 1 each 1   • hydrALAZINE (APRESOLINE) 50 MG tablet Take 1 tablet by mouth 3 (Three)  Times a Day. 90 tablet 11   • ketoconazole (NIZORAL) 2 % shampoo Apply  topically to the appropriate area as directed 2 (Two) Times a Week. 12 mL 0   • Lancets (FREESTYLE) lancets 1 each by Other route As Needed (bs). 120 each 12   • levothyroxine (SYNTHROID) 50 MCG tablet Take 1 tablet by mouth Daily. 30 tablet 11   • losartan (COZAAR) 100 MG tablet Take 1 tablet by mouth Daily. 90 tablet 3   • lovastatin (MEVACOR) 40 MG tablet Take 1 tablet by mouth Daily With Dinner. 90 tablet 3   • Magnesium Oxide 400 (240 MG) MG tablet Take 1 tablet by mouth daily. (Patient taking differently: Take 1 tablet by mouth Daily As Needed.) 30 tablet 11   • Semaglutide (OZEMPIC) 0.25 or 0.5 MG/DOSE solution pen-injector Inject 0.5 mg under the skin into the appropriate area as directed Every 7 (Seven) Days. 3 pen 3     No current facility-administered medications for this visit.        Allergy:   Allergies   Allergen Reactions   • Statins Myalgia       Review of Systems:  Review of Systems   Constitutional: Positive for fatigue. Negative for chills and fever.   HENT: Negative for congestion, ear pain, hearing loss, rhinorrhea and sore throat.    Eyes: Negative for pain, discharge and redness.   Respiratory: Positive for apnea. Negative for cough, shortness of breath, wheezing and stridor.    Cardiovascular: Negative for chest pain, palpitations and leg swelling.   Gastrointestinal: Negative for abdominal pain, constipation, nausea and vomiting.   Endocrine: Negative for cold intolerance, heat intolerance and polyphagia.   Genitourinary: Negative for dysuria, flank pain, frequency and urgency.   Musculoskeletal: Negative for joint swelling, myalgias, neck pain and neck stiffness.   Skin: Negative for pallor, rash and wound.   Allergic/Immunologic: Negative for environmental allergies.   Neurological: Negative for dizziness, tremors, seizures, syncope, facial asymmetry, speech difficulty, weakness, light-headedness, numbness and  headaches.   Hematological: Negative for adenopathy.   Psychiatric/Behavioral: Positive for sleep disturbance. Negative for confusion and hallucinations. The patient is not nervous/anxious.        Physical Exam     Vitals:    06/25/19 1450   BP: 130/80   Pulse: 67   Temp: 98.5 °F (36.9 °C)   SpO2: 95%   Weight: (!) 141 kg (310 lb)     GENERAL: Patient is pleasant, cooperative, appears to be stated age.  Body habitus is endomorphic.  SKIN AND EXTREMITIES:  No skin rashes or lesions are noted.  No cyanosis, clubbing or edema of the extremities.    HEAD:  Head is normocephalic and atraumatic.    NECK: Nontender without thyromegaly or adenopathy.  Carotid upstrokes are 1+/4.  No cranial or cervical bruits.  The neck is supple with a full range of motion.   ENT: Palate elevates symmetrically.  No evidence of high arch palate.  Tongue midline.  No erythema in posterior pharynx.  Mallampati Classification Class III   CARDIOVASCULAR:  Regular rate and rhythm with normal S1 and S2 without rub or gallop.  RESPIRATORY:  Clear to auscultation without wheezes or crackle   ABDOMEN:  Soft and nontender, positive bowel sound without hepatosplenomegaly  BACK:  Back is straight without midline defect.    PSYCH:  Higher cortical function/mental status:  The patient is alert.  The patient is oriented x3 to time, place and person.  Recent and the remote memory appear normal.  The patient has a good fund of knowledge.  There is no visual or auditory hallucination or suicidal or homicidal ideation.  SPEECH:There is no gross evidence of aphasia, dysarthria or agnosia.      CRANIAL NERVES:  Pupils are 4mm, equal round reactive to light, reacting briskly to 2mm without afferent pupillary defect.  Visual fields are intact to confrontation testing.  Funduscopic examination reveals sharp disc margins with normal vasculature.  No papilledema, hemorrhages or exudates.  Extraocular movements are full and smooth with normal pursuits and saccades.   No nystagmus noted.  The face is symmetric. Palate elevates symmetrically, Tongue midline, positive gag reflex. The remainder of the cranial nerves are intact and symmetrical.    MOTOR: Strength is 5/5 throughout with normal tone and bulk with the following exceptions, 4/5 intrinsic muscles of the hands and feet.  No involuntary movements noted.    Deep Tendon Reflexes: are 2/4 and symmetrical in the upper extremities, 2/4 and symmetrical at the knees and 1/4 and symmetrical at the Achilles tendon.  Plantar responses were down-going bilaterally.    SENSATION:  Intact to pinprick, light touch, vibration and proprioception.  Coordination:  The patient normally performs finger-nose-finger, heel-to-knee-to-shin and rapid alternating movements in symmetrical fashion.    COORDINATION AND GAIT:  The patient walks with a narrow-based gait.  Patient is able to heel-toe and tandem walk forward and backwards without difficulty.  Romberg and monopedal  Romberg are negative.    MUSCULOSKELETAL: Range of motion normal, no clubbing, cyanosis, or edema.  No joint swelling.            Records Reviewed: I have personally reviewed his previous medical record.    Andre was seen today for follow-up.    Diagnoses and all orders for this visit:    Sequelae, post-stroke  -     MRI Angiogram Head Without Contrast; Future    Obstructive sleep apnea    Excessive daytime sleepiness    Nocturnal oxygen desaturation      Discussion:  In summary,  64-year-old referred to [facility] neurology [place] for evaluation for obstructive sleep apnea.  Patient underwent nocturnal polysomnography study in August 14, 2018 and patient was found to have an apnea hypotony index of 17.4 events per hour associated with desaturation.  Patient failed nasal CPAP patient was subsequently placed on nasal BiPAP at 10/5 cm water pressure.    I have counseled patient extensively on Sleep Hygiene including regular sleep wake schedule and stimulus control therapy.   I have also discussed the importance of weight reduction because 10% reduction in body weight can reduce sleep apnea by 50 %. We have also discussed abstaining from smoking and drinking.  I have explained to patient that obstructive apnea episode is defined as the absence of airflow for at least 10 seconds.  Sleep apnea is usually accompanied by snoring, disturbed sleep, and daytime sleepiness. Patients with micrognathia, retrognathia, enlarged tonsils, tongue enlargement, and acromegaly are especially predisposed to obstructive sleep apnea. Abnormalities or weakness in the muscles can also contribute to obstructive sleep apnea. Obesity can also contribute to sleep apnea.     Sleep apnea can lead to a number of complications, ranging from daytime sleepiness to possible increased risk of cardiovascular risks.   Daytime sleepiness is the most serious.  Daytime sleepiness can also increase the risk for accident-related injuries. Several studies have suggested that people with sleep apnea have two to three times as many car accidents, and five to seven times the risk for multiple accidents.   A number of cardiovascular diseases -- including high blood pressure, heart failure, stroke, and heart arrhythmias -- have an association with obstructive sleep apnea.   Up to a third of patients with heart failure also have sleep apnea. Both central and obstructive sleep apnea are linked with heart failure. Obstructive sleep apnea is also noted to be associated with type 2 diabetes according to Dr. Ahumada at [facility].  The best treatment for symptomatic obstructive sleep apnea is continuous positive airflow pressure (CPAP). Bilevel positive airway pressure (BPAP) systems may be particularly helpful for patients with coexisting lung disease and those with excessive levels of carbon dioxide.  Other treatment options including UPPP surgery, LAUP surgery, radiofrequency somnoplasty and dental appliances such [place] or  [place].    This Document is signed by Rony Clifford MD, FAAN, FAASM June 28, 201912:28 AM

## 2019-06-26 LAB — R RICKETTSI IGG SER QL IA: NEGATIVE

## 2019-06-27 LAB
A PHAGOCYTOPH IGM TITR SER IF: NEGATIVE {TITER}
B BURGDOR DNA SPEC QL NAA+PROBE: NEGATIVE
CONV HGE IGG TITER: NEGATIVE
E CHAFFEENSIS IGG TITR SER IF: NEGATIVE {TITER}
E. CHAFFEENSIS (HME) IGM TITER: NEGATIVE
R RICKETTSI IGM TITR SER: 0.16 INDEX (ref 0–0.89)

## 2019-07-08 ENCOUNTER — HOSPITAL ENCOUNTER (EMERGENCY)
Facility: HOSPITAL | Age: 64
Discharge: HOME | End: 2019-07-08
Attending: EMERGENCY MEDICINE | Admitting: EMERGENCY MEDICINE

## 2019-07-08 ENCOUNTER — APPOINTMENT (OUTPATIENT)
Dept: CT IMAGING | Facility: HOSPITAL | Age: 64
End: 2019-07-08

## 2019-07-08 VITALS
TEMPERATURE: 98.2 F | WEIGHT: 311.6 LBS | OXYGEN SATURATION: 97 % | SYSTOLIC BLOOD PRESSURE: 160 MMHG | DIASTOLIC BLOOD PRESSURE: 109 MMHG | HEIGHT: 76 IN | BODY MASS INDEX: 37.94 KG/M2 | HEART RATE: 61 BPM | RESPIRATION RATE: 17 BRPM

## 2019-07-08 DIAGNOSIS — I10 HYPERTENSION, UNSPECIFIED TYPE: ICD-10-CM

## 2019-07-08 DIAGNOSIS — H49.21 6TH NERVE PALSY, RIGHT: ICD-10-CM

## 2019-07-08 DIAGNOSIS — R42 DIZZINESS: Primary | ICD-10-CM

## 2019-07-08 DIAGNOSIS — M10.9 ACUTE GOUTY ARTHRITIS: ICD-10-CM

## 2019-07-08 DIAGNOSIS — H53.9 VISUAL CHANGES: ICD-10-CM

## 2019-07-08 LAB
ALBUMIN SERPL-MCNC: 4.1 G/DL (ref 3.5–5)
ALBUMIN/GLOB SERPL: 1.1 G/DL (ref 1–2)
ALP SERPL-CCNC: 61 U/L (ref 38–126)
ALT SERPL W P-5'-P-CCNC: 24 U/L (ref 13–69)
ANION GAP SERPL CALCULATED.3IONS-SCNC: 11.5 MMOL/L (ref 10–20)
AST SERPL-CCNC: 26 U/L (ref 15–46)
BACTERIA UR QL AUTO: ABNORMAL /HPF
BASOPHILS # BLD AUTO: 0.07 10*3/MM3 (ref 0–0.2)
BASOPHILS NFR BLD AUTO: 0.8 % (ref 0–1.5)
BILIRUB SERPL-MCNC: 0.5 MG/DL (ref 0.2–1.3)
BILIRUB UR QL STRIP: NEGATIVE
BUN BLD-MCNC: 15 MG/DL (ref 7–20)
BUN/CREAT SERPL: 12.5 (ref 6.3–21.9)
CALCIUM SPEC-SCNC: 9 MG/DL (ref 8.4–10.2)
CHLORIDE SERPL-SCNC: 101 MMOL/L (ref 98–107)
CLARITY UR: CLEAR
CO2 SERPL-SCNC: 32 MMOL/L (ref 26–30)
COLOR UR: YELLOW
CREAT BLD-MCNC: 1.2 MG/DL (ref 0.6–1.3)
DEPRECATED RDW RBC AUTO: 44.5 FL (ref 37–54)
EOSINOPHIL # BLD AUTO: 0.32 10*3/MM3 (ref 0–0.4)
EOSINOPHIL NFR BLD AUTO: 3.8 % (ref 0.3–6.2)
ERYTHROCYTE [DISTWIDTH] IN BLOOD BY AUTOMATED COUNT: 13.6 % (ref 12.3–15.4)
GFR SERPL CREATININE-BSD FRML MDRD: 61 ML/MIN/1.73
GLOBULIN UR ELPH-MCNC: 3.9 GM/DL
GLUCOSE BLD-MCNC: 148 MG/DL (ref 74–98)
GLUCOSE BLDC GLUCOMTR-MCNC: 142 MG/DL (ref 70–130)
GLUCOSE UR STRIP-MCNC: NEGATIVE MG/DL
HCT VFR BLD AUTO: 47.8 % (ref 37.5–51)
HGB BLD-MCNC: 15.9 G/DL (ref 13–17.7)
HGB UR QL STRIP.AUTO: NEGATIVE
HOLD SPECIMEN: NORMAL
HYALINE CASTS UR QL AUTO: ABNORMAL /LPF
IMM GRANULOCYTES # BLD AUTO: 0.03 10*3/MM3 (ref 0–0.05)
IMM GRANULOCYTES NFR BLD AUTO: 0.4 % (ref 0–0.5)
KETONES UR QL STRIP: NEGATIVE
LEUKOCYTE ESTERASE UR QL STRIP.AUTO: NEGATIVE
LYMPHOCYTES # BLD AUTO: 2.37 10*3/MM3 (ref 0.7–3.1)
LYMPHOCYTES NFR BLD AUTO: 28 % (ref 19.6–45.3)
MAGNESIUM SERPL-MCNC: 1.8 MG/DL (ref 1.6–2.3)
MCH RBC QN AUTO: 29.7 PG (ref 26.6–33)
MCHC RBC AUTO-ENTMCNC: 33.3 G/DL (ref 31.5–35.7)
MCV RBC AUTO: 89.2 FL (ref 79–97)
MONOCYTES # BLD AUTO: 0.64 10*3/MM3 (ref 0.1–0.9)
MONOCYTES NFR BLD AUTO: 7.6 % (ref 5–12)
NEUTROPHILS # BLD AUTO: 5.02 10*3/MM3 (ref 1.7–7)
NEUTROPHILS NFR BLD AUTO: 59.4 % (ref 42.7–76)
NITRITE UR QL STRIP: NEGATIVE
NRBC BLD AUTO-RTO: 0 /100 WBC (ref 0–0.2)
PH UR STRIP.AUTO: 7.5 [PH] (ref 5–8)
PLATELET # BLD AUTO: 214 10*3/MM3 (ref 140–450)
PMV BLD AUTO: 10.6 FL (ref 6–12)
POTASSIUM BLD-SCNC: 3.5 MMOL/L (ref 3.5–5.1)
PROT SERPL-MCNC: 8 G/DL (ref 6.3–8.2)
PROT UR QL STRIP: ABNORMAL
RBC # BLD AUTO: 5.36 10*6/MM3 (ref 4.14–5.8)
RBC # UR: ABNORMAL /HPF
REF LAB TEST METHOD: ABNORMAL
SODIUM BLD-SCNC: 141 MMOL/L (ref 137–145)
SP GR UR STRIP: 1.01 (ref 1–1.03)
SQUAMOUS #/AREA URNS HPF: ABNORMAL /HPF
TROPONIN I SERPL-MCNC: <0.012 NG/ML (ref 0–0.03)
URATE SERPL-MCNC: 7 MG/DL (ref 2.5–8.5)
UROBILINOGEN UR QL STRIP: ABNORMAL
VALPROATE SERPL-MCNC: 44.6 MCG/ML (ref 50–100)
WBC NRBC COR # BLD: 8.45 10*3/MM3 (ref 3.4–10.8)
WBC UR QL AUTO: ABNORMAL /HPF
WHOLE BLOOD HOLD SPECIMEN: NORMAL
WHOLE BLOOD HOLD SPECIMEN: NORMAL

## 2019-07-08 PROCEDURE — 84550 ASSAY OF BLOOD/URIC ACID: CPT | Performed by: PHYSICIAN ASSISTANT

## 2019-07-08 PROCEDURE — 85025 COMPLETE CBC W/AUTO DIFF WBC: CPT | Performed by: EMERGENCY MEDICINE

## 2019-07-08 PROCEDURE — 81001 URINALYSIS AUTO W/SCOPE: CPT | Performed by: EMERGENCY MEDICINE

## 2019-07-08 PROCEDURE — 84484 ASSAY OF TROPONIN QUANT: CPT | Performed by: EMERGENCY MEDICINE

## 2019-07-08 PROCEDURE — 70450 CT HEAD/BRAIN W/O DYE: CPT

## 2019-07-08 PROCEDURE — 80053 COMPREHEN METABOLIC PANEL: CPT | Performed by: EMERGENCY MEDICINE

## 2019-07-08 PROCEDURE — 93005 ELECTROCARDIOGRAM TRACING: CPT | Performed by: EMERGENCY MEDICINE

## 2019-07-08 PROCEDURE — 82962 GLUCOSE BLOOD TEST: CPT

## 2019-07-08 PROCEDURE — 83735 ASSAY OF MAGNESIUM: CPT | Performed by: EMERGENCY MEDICINE

## 2019-07-08 PROCEDURE — 80164 ASSAY DIPROPYLACETIC ACD TOT: CPT | Performed by: PHYSICIAN ASSISTANT

## 2019-07-08 PROCEDURE — 99284 EMERGENCY DEPT VISIT MOD MDM: CPT

## 2019-07-08 RX ORDER — SODIUM CHLORIDE 0.9 % (FLUSH) 0.9 %
10 SYRINGE (ML) INJECTION AS NEEDED
Status: DISCONTINUED | OUTPATIENT
Start: 2019-07-08 | End: 2019-07-08 | Stop reason: HOSPADM

## 2019-07-08 RX ORDER — HYDRALAZINE HYDROCHLORIDE 25 MG/1
50 TABLET, FILM COATED ORAL ONCE
Status: COMPLETED | OUTPATIENT
Start: 2019-07-08 | End: 2019-07-08

## 2019-07-08 RX ORDER — ACETAMINOPHEN 500 MG
1000 TABLET ORAL EVERY 6 HOURS PRN
Status: DISCONTINUED | OUTPATIENT
Start: 2019-07-08 | End: 2019-07-08 | Stop reason: HOSPADM

## 2019-07-08 RX ORDER — HYDRALAZINE HYDROCHLORIDE 25 MG/1
50 TABLET, FILM COATED ORAL EVERY 8 HOURS SCHEDULED
Status: DISCONTINUED | OUTPATIENT
Start: 2019-07-08 | End: 2019-07-08

## 2019-07-08 RX ORDER — INDOMETHACIN 50 MG/1
50 CAPSULE ORAL
Qty: 15 CAPSULE | Refills: 0 | Status: SHIPPED | OUTPATIENT
Start: 2019-07-08 | End: 2019-07-13

## 2019-07-08 RX ADMIN — ACETAMINOPHEN 1000 MG: 500 TABLET, FILM COATED ORAL at 12:46

## 2019-07-08 RX ADMIN — HYDRALAZINE HYDROCHLORIDE 50 MG: 25 TABLET ORAL at 12:48

## 2019-07-08 NOTE — ED PROVIDER NOTES
Subjective   64-year-old male patient presents the emergency room with complaints of feeling dizzy in his car, headache, changes in the right eye which he states he has changes but these are different.  He has what he thinks are TIAs because he has had changes in his vision over the last several weeks.  He is scheduled to have an MRI angiography of the head without contrast per Dr. Clifford.  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  He has diabetes but does not check his sugar.  He has a chronic double vision.  He has a history with sleep apnea, he has had vision loss since has double vision in his right eye.  He has focal seizures in his right arm.  He takes Depakote.  A history of epilepsy, dyspepsia, coronary artery disease, anxiety and depression.  He also complains of some swelling in his right MCP of his index finger.  His family would like his uric acid level checked.  He has no symptoms at this time, denies dizziness.  He is vision is normal at this time.  He is convinced this is a TIA at this time.  Dr. Clifford is out of the country.  He is scheduled to follow-up.  He denies any upper respiratory symptoms such as ear pain, ear fullness, sore throat, sinus pain or congestion.  He denies fevers or chills.  He denies nausea vomiting diarrhea.  He has been eating and drinking normally.  He denies having difficulty ambulating.  Blood pressures a little elevated he states this is not too abnormal for him.  He does not take any blood thinners at this time.            Review of Systems   Constitutional: Positive for activity change. Negative for appetite change, chills, diaphoresis, fatigue and fever.   HENT: Negative for congestion, ear pain, facial swelling, sinus pressure, sinus pain, sore throat and trouble swallowing.    Eyes: Positive for visual disturbance (chronic, but possible difference today. Not well defined).   Respiratory: Negative for cough, chest tightness,  shortness of breath and wheezing.    Cardiovascular: Negative for chest pain, palpitations and leg swelling.   Gastrointestinal: Negative for abdominal pain, diarrhea, nausea and vomiting.   Genitourinary: Negative for decreased urine volume, difficulty urinating, frequency and urgency.   Musculoskeletal: Negative for arthralgias, gait problem and myalgias.   Skin: Negative for color change, rash and wound.   Neurological: Positive for dizziness, light-headedness and headaches. Negative for seizures, syncope, speech difficulty and weakness.   Hematological: Does not bruise/bleed easily.       Past Medical History:   Diagnosis Date   • 6th nerve palsy, right     right eye    • Anesthesia complication     ilius- after lap band surgery    • Anxiety and depression    • Coronary artery disease involving native coronary artery of native heart without angina pectoris 9/12/2018   • Diabetes mellitus (CMS/[facility])     doesnt check sugar    • Double vision    • Dyspepsia    • Epilepsy (CMS/[facility])    • Focal seizure (CMS/[facility])     of right arm    • Heart attack (CMS/HCC) 06/15/2016   • History of Helicobacter pylori infection     in 2008, treated w/ PrevPak   • HL (hearing loss)     Left ear child luevano measles   • Hyperlipidemia    • Hypertension    • Kidney stone     x2 imbedded   • Memory loss 2005    Meneigitis   • Meningitis     unsure of bacterial or viral    • Obesity    • Seizures (CMS/[facility])    • Sleep apnea     BiPAP compliant   • Sleep apnea treated with nocturnal BiPAP     compliant with  machine    • Stroke (CMS/[facility])    • Vision loss 7/2018    Double vision   • Wears glasses        Allergies   Allergen Reactions   • Statins Myalgia       Past Surgical History:   Procedure Laterality Date   • CARDIAC CATHETERIZATION N/A 10/12/2018    Procedure: Left Heart Cath;  Surgeon: Yoselin Johnson MD;  Location: [facility] INVASIVE LOCATION;  Service: Cardiology   • COLONOSCOPY     • CORONARY STENT PLACEMENT      x1   •  ENDOSCOPY     • LAPAROSCOPIC GASTRIC BANDING      s/p LAGB Realize 2008 by [facility]ERO.   • UMBILICAL HERNIA REPAIR  2008    incarcerated UHR w/ mesh by Dr. Velasquez   • WISDOM TOOTH EXTRACTION      all 4       Family History   Problem Relation Age of Onset   • Stroke Mother    • Hypertension Mother    • COPD Father    • Hypertension Father        Social History     Socioeconomic History   • Marital status:      Spouse name: Not on file   • Number of children: Not on file   • Years of education: Not on file   • Highest education level: Not on file   Tobacco Use   • Smoking status: Former Smoker     Packs/day: 0.50     Years: 10.00     Pack years: 5.00     Types: Cigarettes     Start date: 1975     Last attempt to quit: 1985     Years since quittin.5   • Smokeless tobacco: Never Used   Substance and Sexual Activity   • Alcohol use: No   • Drug use: No   • Sexual activity: Defer           Objective   Physical Exam   Constitutional: He is oriented to person, place, and time. He appears well-developed and well-nourished. He is cooperative.  Non-toxic appearance. He does not have a sickly appearance. He does not appear ill. No distress.   HENT:   Head: Normocephalic and atraumatic.   Right Ear: External ear normal.   Left Ear: External ear normal.   Nose: Nose normal.   Mouth/Throat: Uvula is midline, oropharynx is clear and moist and mucous membranes are normal.   Eyes: EOM and lids are normal. Pupils are equal, round, and reactive to light. Right eye exhibits normal extraocular motion. Left eye exhibits normal extraocular motion. Right pupil is round and reactive. Left pupil is round and reactive.   Neck: Normal range of motion. Neck supple. No spinous process tenderness present. Normal range of motion present.   Cardiovascular: Normal rate, regular rhythm and intact distal pulses.   Pulmonary/Chest: Effort normal and breath sounds normal. No respiratory distress.   Abdominal: Soft.   Musculoskeletal:  Normal range of motion. He exhibits no edema or deformity.        Right hand: He exhibits tenderness, bony tenderness and swelling.        Hands:  Neurological: He is alert and oriented to person, place, and time. No sensory deficit. He exhibits normal muscle tone. Coordination normal.   No focal neuro deficits.   Skin: Skin is warm and dry. Capillary refill takes less than 2 seconds. No rash noted. No pallor.   Psychiatric: He has a normal mood and affect. His behavior is normal. Judgment and thought content normal.   Nursing note and vitals reviewed.      Procedures           ED Course  ED Course as of Jul 08 1304   Mon Jul 08, 2019   1204 EKG interpreted by me.  Sinus rhythm.  Rate of 63.  Nonspecific Q wave.  Nonspecific ST segment changes.  Nonspecific depression.  Abnormal EKG.  [CG]      ED Course User Index  [CG] Gabriel Elam,       Lab Results (last 24 hours)     Procedure Component Value Units Date/Time    POC Glucose Once [[id number]]  (Abnormal) Collected:  07/08/19 1146    Specimen:  Blood Updated:  07/08/19 1200     Glucose 142 mg/dL      Comment: Serial Number: [id number]:  [id number]       Valproic Acid Level, Total [[id number]]  (Abnormal) Collected:  07/08/19 1149    Specimen:  Blood Updated:  07/08/19 1229     Valproic Acid 44.6 mcg/mL     CBC & Differential [[id number]] Collected:  07/08/19 1149    Specimen:  Blood Updated:  07/08/19 1155    Narrative:       The following orders were created for panel order CBC & Differential.  Procedure                               Abnormality         Status                     ---------                               -----------         ------                     CBC Auto Differential[[id number]]        Normal              Final result                 Please view results for these tests on the individual orders.    Comprehensive Metabolic Panel [[id number]]  (Abnormal) Collected:  07/08/19 1149    Specimen:  Blood Updated:  07/08/19 1220     Glucose 148  mg/dL      BUN 15 mg/dL      Creatinine 1.20 mg/dL      Sodium 141 mmol/L      Potassium 3.5 mmol/L      Chloride 101 mmol/L      CO2 32.0 mmol/L      Calcium 9.0 mg/dL      Total Protein 8.0 g/dL      Albumin 4.10 g/dL      ALT (SGPT) 24 U/L      AST (SGOT) 26 U/L      Alkaline Phosphatase 61 U/L      Total Bilirubin 0.5 mg/dL      eGFR Non African Amer 61 mL/min/1.73      Globulin 3.9 gm/dL      A/G Ratio 1.1 g/dL      BUN/Creatinine Ratio 12.5     Anion Gap 11.5 mmol/L     Narrative:       GFR Normal >60  Chronic Kidney Disease <60  Kidney Failure <15    Troponin [[id number]]  (Normal) Collected:  07/08/19 1149    Specimen:  Blood Updated:  07/08/19 1220     Troponin I <0.012 ng/mL     Narrative:       Normal Patient Upper Reference Limit (URL) (99th Percentile)=0.03 ng/mL   Non-AMI Illness Reference Limit=0.03-0.11 ng/mL   AMI Confirmation=0.12 ng/mL and above    Magnesium [[id number]]  (Normal) Collected:  07/08/19 1149    Specimen:  Blood Updated:  07/08/19 1220     Magnesium 1.8 mg/dL     CBC Auto Differential [[id number]]  (Normal) Collected:  07/08/19 1149    Specimen:  Blood Updated:  07/08/19 1155     WBC 8.45 10*3/mm3      RBC 5.36 10*6/mm3      Hemoglobin 15.9 g/dL      Hematocrit 47.8 %      MCV 89.2 fL      MCH 29.7 pg      MCHC 33.3 g/dL      RDW 13.6 %      RDW-SD 44.5 fl      MPV 10.6 fL      Platelets 214 10*3/mm3      Neutrophil % 59.4 %      Lymphocyte % 28.0 %      Monocyte % 7.6 %      Eosinophil % 3.8 %      Basophil % 0.8 %      Immature Grans % 0.4 %      Neutrophils, Absolute 5.02 10*3/mm3      Lymphocytes, Absolute 2.37 10*3/mm3      Monocytes, Absolute 0.64 10*3/mm3      Eosinophils, Absolute 0.32 10*3/mm3      Basophils, Absolute 0.07 10*3/mm3      Immature Grans, Absolute 0.03 10*3/mm3      nRBC 0.0 /100 WBC     Uric Acid [[id number]]  (Normal) Collected:  07/08/19 1149    Specimen:  Blood Updated:  07/08/19 1228     Uric Acid 7.0 mg/dL     Urinalysis With Microscopic If Indicated (No  "Culture) - Urine, Clean Catch [[id number]]  (Abnormal) Collected:  07/08/19 1158    Specimen:  Urine, Clean Catch Updated:  07/08/19 1209     Color, UA Yellow     Appearance, UA Clear     pH, UA 7.5     Specific Gravity, UA 1.014     Glucose, UA Negative     Ketones, UA Negative     Bilirubin, UA Negative     Blood, UA Negative     Protein,  mg/dL (2+)     Leuk Esterase, UA Negative     Nitrite, UA Negative     Urobilinogen, UA 1.0 E.U./dL    Urinalysis, Microscopic Only - Urine, Clean Catch [[id number]]  (Abnormal) Collected:  07/08/19 1158    Specimen:  Urine, Clean Catch Updated:  07/08/19 1217     RBC, UA None Seen /HPF      WBC, UA 0-2 /HPF      Bacteria, UA Trace /HPF      Squamous Epithelial Cells, UA 0-2 /HPF      Hyaline Casts, UA None Seen /LPF      Methodology Manual Light Microscopy        7/8/2019 12:17 PM - Interface, Rad Results [place] [facility]     Narrative     PROCEDURE: CT HEAD WO CONTRAST-     HISTORY: dizziness, headache, \"right eye stroke\"     COMPARISON:  None .     TECHNIQUE: Multiple axial CT images were performed from the foramen  magnum to the vertex without enhancement.      FINDINGS: There is generalized cerebral  Loss. There are patchy hypodensities in the periventricular white matter  of both cerebral hemispheres consistent with chronic small vessel  ischemic change There is no CT evidence of hemorrhage. There is no mass,  mass effect or midline shift.  There is no hydrocephalus. A retention  cyst is seen in the left maxillary sinus. Bone windows reveal no acute  osseous abnormalities.      Impression     No acute intracranial process.           1095.89 mGy.cm        This study was performed with techniques to keep radiation doses as low  as reasonably achievable (ALARA). Individualized dose reduction  techniques using automated exposure control or adjustment of mA and/or  kV according to the patient size were employed.      This report was finalized on 7/8/2019 12:15 PM by Leelee" RYLEY Ramos.     12:44 PM-patient does have elevated blood pressure today.  He states is normally a little high.  I will give him his noon dose of hydralazine.  I talked discussed with him that his Depakote level was low.  He states he forgot to take his Depakote last night.  He did take his dose this morning.  Family asks if he should have it today I advised him he can take his evening dose as normal tonight.  And rest follow-up with Dr. Clifford he get his MRI angio w/o contrast scheduled soon.  Strict return precautions were advised to the patient and his family.    His labs were really pretty good today.  Uric acid is normal. He has not history of gout.   I will suggest Indomethacin for his painful joint.   Temperature normal on recheck    I have reviewed all labs, and all imaging that has been ordered for this patient.  I have discussed the results of this testing with the patient.  Together the patient, their family and I discussed the plan for treatment and disposition.      Red flags, indicating immediate need to return to the emergency room, were discussed with the patient and/or the patient's family and they verbalized understanding.  The patient and/or the family were encouraged to return to the emergency room for any worsening or concerning symptoms.          MDM  Number of Diagnoses or Management Options  6th nerve palsy, right: established and improving  Acute gouty arthritis: new and requires workup  Dizziness: new and requires workup  Visual changes: new and requires workup     Amount and/or Complexity of Data Reviewed  Clinical lab tests: reviewed and ordered  Tests in the radiology section of CPT®: reviewed and ordered  Discuss the patient with other providers: yes (Dr. Gabriel Elam)    Risk of Complications, Morbidity, and/or Mortality  Presenting problems: moderate  Diagnostic procedures: moderate  Management options: moderate    Patient Progress  Patient progress: stable        Final diagnoses:    Dizziness   Visual changes   6th nerve palsy, right   Acute gouty arthritis   Hypertension, unspecified type            Lor Ang PA-C  07/08/19 0998

## 2019-07-09 DIAGNOSIS — N18.30 CHRONIC RENAL IMPAIRMENT, STAGE 3 (MODERATE) (HCC): ICD-10-CM

## 2019-07-09 DIAGNOSIS — Z99.89 BIPAP (BIPHASIC POSITIVE AIRWAY PRESSURE) DEPENDENCE: ICD-10-CM

## 2019-07-09 DIAGNOSIS — M19.90 ARTHRITIS: Primary | ICD-10-CM

## 2019-07-09 DIAGNOSIS — E11.9 DIABETES MELLITUS WITHOUT COMPLICATION (HCC): ICD-10-CM

## 2019-07-09 DIAGNOSIS — I10 ESSENTIAL HYPERTENSION: ICD-10-CM

## 2019-07-09 DIAGNOSIS — I10 MALIGNANT HYPERTENSION: ICD-10-CM

## 2019-07-09 RX ORDER — GLIPIZIDE 5 MG/1
TABLET ORAL
Qty: 180 TABLET | Refills: 3 | Status: SHIPPED | OUTPATIENT
Start: 2019-07-09 | End: 2019-09-03 | Stop reason: SDUPTHER

## 2019-07-11 ENCOUNTER — TELEPHONE (OUTPATIENT)
Dept: NEUROLOGY | Facility: CLINIC | Age: 64
End: 2019-07-11

## 2019-07-11 DIAGNOSIS — H53.2 DIPLOPIA: Primary | ICD-10-CM

## 2019-07-12 ENCOUNTER — OFFICE VISIT (OUTPATIENT)
Dept: INTERNAL MEDICINE | Facility: CLINIC | Age: 64
End: 2019-07-12

## 2019-07-12 VITALS
DIASTOLIC BLOOD PRESSURE: 83 MMHG | TEMPERATURE: 97.9 F | SYSTOLIC BLOOD PRESSURE: 148 MMHG | HEART RATE: 66 BPM | BODY MASS INDEX: 37.51 KG/M2 | RESPIRATION RATE: 16 BRPM | HEIGHT: 76 IN | OXYGEN SATURATION: 98 % | WEIGHT: 308 LBS

## 2019-07-12 DIAGNOSIS — I25.10 CORONARY ARTERY DISEASE INVOLVING NATIVE CORONARY ARTERY OF NATIVE HEART WITHOUT ANGINA PECTORIS: ICD-10-CM

## 2019-07-12 DIAGNOSIS — G45.9 TIA (TRANSIENT ISCHEMIC ATTACK): ICD-10-CM

## 2019-07-12 DIAGNOSIS — E11.9 DIABETES MELLITUS WITHOUT COMPLICATION (HCC): ICD-10-CM

## 2019-07-12 DIAGNOSIS — Z86.73 HISTORY OF ISCHEMIC RIGHT MCA STROKE: Primary | ICD-10-CM

## 2019-07-12 PROCEDURE — 99214 OFFICE O/P EST MOD 30 MIN: CPT | Performed by: INTERNAL MEDICINE

## 2019-07-12 NOTE — PROGRESS NOTES
Subjective     Patient ID: Andre Agosto is a 64 y.o. male. Patient is here for management of multiple medical problems.     Chief Complaint   Patient presents with   • Hypertension     follow-up, patient here for follow-up on MRI results   • Diabetes     patient states he is running out of Ozempic, taking 0.5 every 7 days   • ER follow-up     patient was in the ER on 7/8/19 for possible TIA      Hypertension   This is a chronic problem. The current episode started more than 1 month ago. The problem is unchanged. The problem is controlled. Pertinent negatives include no anxiety, blurred vision, chest pain or headaches. There are no associated agents to hypertension. Current antihypertension treatment includes ACE inhibitors.   Diabetes   Pertinent negatives for hypoglycemia include no headaches. Pertinent negatives for diabetes include no blurred vision, no chest pain and no fatigue.        Symptoms of abnormal eye movement.    intercranial stenosis on mri.           The following portions of the patient's history were reviewed and updated as appropriate: allergies, current medications, past family history, past medical history, past social history, past surgical history and problem list.    Review of Systems   Constitutional: Negative for fatigue and fever.   Eyes: Negative for blurred vision.   Cardiovascular: Negative for chest pain.   Neurological: Negative for headaches.   All other systems reviewed and are negative.      Current Outpatient Medications:   •  Alcohol Swabs (ALCOHOL PREP) pads, 1 pad 4 (Four) Times a Day As Needed (bs)., Disp: 120 each, Rfl: 12  •  amLODIPine (NORVASC) 5 MG tablet, Take 1 tablet by mouth 2 (Two) Times a Day., Disp: 180 tablet, Rfl: 3  •  aspirin 81 MG tablet, Take 1 tablet by mouth Daily., Disp: 30 tablet, Rfl: 11  •  divalproex (DEPAKOTE) 250 MG DR tablet, Take 1 tablet by mouth 3 (Three) Times a Day. Take 2 in the AM 1 at noon and 2 in the pm. Per DR Clifford., Disp: 150 tablet,  "Rfl: 3  •  escitalopram (LEXAPRO) 20 MG tablet, TAKE 1 TABLET DAILY, Disp: 90 tablet, Rfl: 2  •  glipiZIDE (GLUCOTROL) 5 MG tablet, TAKE 1 TABLET TWICE A DAY BEFORE MEALS, Disp: 180 tablet, Rfl: 3  •  glucose blood test strip, Use one to two times daily to check blood sugar, Disp: 100 each, Rfl: 12  •  glucose monitor monitoring kit, 1 each As Needed (bs)., Disp: 1 each, Rfl: 1  •  hydrALAZINE (APRESOLINE) 50 MG tablet, Take 1 tablet by mouth 3 (Three) Times a Day., Disp: 90 tablet, Rfl: 11  •  indomethacin (INDOCIN) 50 MG capsule, Take 1 capsule by mouth 3 (Three) Times a Day With Meals for 5 days., Disp: 15 capsule, Rfl: 0  •  ketoconazole (NIZORAL) 2 % shampoo, Apply  topically to the appropriate area as directed 2 (Two) Times a Week., Disp: 12 mL, Rfl: 0  •  Lancets (FREESTYLE) lancets, 1 each by Other route As Needed (bs)., Disp: 120 each, Rfl: 12  •  levothyroxine (SYNTHROID) 50 MCG tablet, Take 1 tablet by mouth Daily., Disp: 30 tablet, Rfl: 11  •  losartan (COZAAR) 100 MG tablet, Take 1 tablet by mouth Daily., Disp: 90 tablet, Rfl: 3  •  lovastatin (MEVACOR) 40 MG tablet, Take 1 tablet by mouth Daily With Dinner., Disp: 90 tablet, Rfl: 3  •  Magnesium Oxide 400 (240 MG) MG tablet, Take 1 tablet by mouth daily. (Patient taking differently: Take 1 tablet by mouth Daily As Needed.), Disp: 30 tablet, Rfl: 11  •  Evolocumab 140 MG/ML solution auto-injector, Inject 1 mL under the skin into the appropriate area as directed Every 14 (Fourteen) Days., Disp: 3 pen, Rfl: 3  •  Semaglutide (OZEMPIC) 1 MG/DOSE solution pen-injector, Inject 0.5 mg under the skin into the appropriate area as directed 1 (One) Time Per Week., Disp: 3 pen, Rfl: 3    Objective      Blood pressure 148/83, pulse 66, temperature 97.9 °F (36.6 °C), temperature source Oral, resp. rate 16, height 193 cm (76\"), weight (!) 140 kg (308 lb), SpO2 98 %.    Physical Exam     General Appearance:    Alert, cooperative, no distress, appears stated age "   Head:    Normocephalic, without obvious abnormality, atraumatic   Eyes:    PERRL, conjunctiva/corneas clear, EOM's intact   Ears:    Normal TM's and external ear canals, both ears   Nose:   Nares normal, septum midline, mucosa normal, no drainage   or sinus tenderness   Throat:   Lips, mucosa, and tongue normal; teeth and gums normal   Neck:   Supple, symmetrical, trachea midline, no adenopathy;        thyroid:  No enlargement/tenderness/nodules; no carotid    bruit or JVD   Back:     Symmetric, no curvature, ROM normal, no CVA tenderness   Lungs:     Clear to auscultation bilaterally, respirations unlabored   Chest wall:    No tenderness or deformity   Heart:    Regular rate and rhythm, S1 and S2 normal, no murmur,        rub or gallop   Abdomen:     Soft, non-tender, bowel sounds active all four quadrants,     no masses, no organomegaly   Extremities:   Extremities normal, atraumatic, no cyanosis or edema   Pulses:   2+ and symmetric all extremities   Skin:   Skin color, texture, turgor normal, no rashes or lesions   Lymph nodes:   Cervical, supraclavicular, and axillary nodes normal   Neurologic:   CNII-XII intact. Normal strength, sensation and reflexes       throughout      Results for orders placed or performed during the hospital encounter of 07/08/19   Valproic Acid Level, Total   Result Value Ref Range    Valproic Acid 44.6 (L) 50.0 - 100.0 mcg/mL   Comprehensive Metabolic Panel   Result Value Ref Range    Glucose 148 (H) 74 - 98 mg/dL    BUN 15 7 - 20 mg/dL    Creatinine 1.20 0.60 - 1.30 mg/dL    Sodium 141 137 - 145 mmol/L    Potassium 3.5 3.5 - 5.1 mmol/L    Chloride 101 98 - 107 mmol/L    CO2 32.0 (H) 26.0 - 30.0 mmol/L    Calcium 9.0 8.4 - 10.2 mg/dL    Total Protein 8.0 6.3 - 8.2 g/dL    Albumin 4.10 3.50 - 5.00 g/dL    ALT (SGPT) 24 13 - 69 U/L    AST (SGOT) 26 15 - 46 U/L    Alkaline Phosphatase 61 38 - 126 U/L    Total Bilirubin 0.5 0.2 - 1.3 mg/dL    eGFR Non African Amer 61 >60 mL/min/1.73     Globulin 3.9 gm/dL    A/G Ratio 1.1 1.0 - 2.0 g/dL    BUN/Creatinine Ratio 12.5 6.3 - 21.9    Anion Gap 11.5 10.0 - 20.0 mmol/L   Troponin   Result Value Ref Range    Troponin I <0.012 0.000 - 0.034 ng/mL   Magnesium   Result Value Ref Range    Magnesium 1.8 1.6 - 2.3 mg/dL   Urinalysis With Microscopic If Indicated (No Culture) - Urine, Clean Catch   Result Value Ref Range    Color, UA Yellow Yellow, Straw    Appearance, UA Clear Clear    pH, UA 7.5 5.0 - 8.0    Specific Gravity, UA 1.014 1.005 - 1.030    Glucose, UA Negative Negative    Ketones, UA Negative Negative    Bilirubin, UA Negative Negative    Blood, UA Negative Negative    Protein,  mg/dL (2+) (A) Negative    Leuk Esterase, UA Negative Negative    Nitrite, UA Negative Negative    Urobilinogen, UA 1.0 E.U./dL 0.2 - 1.0 E.U./dL   CBC Auto Differential   Result Value Ref Range    WBC 8.45 3.40 - 10.80 10*3/mm3    RBC 5.36 4.14 - 5.80 10*6/mm3    Hemoglobin 15.9 13.0 - 17.7 g/dL    Hematocrit 47.8 37.5 - 51.0 %    MCV 89.2 79.0 - 97.0 fL    MCH 29.7 26.6 - 33.0 pg    MCHC 33.3 31.5 - 35.7 g/dL    RDW 13.6 12.3 - 15.4 %    RDW-SD 44.5 37.0 - 54.0 fl    MPV 10.6 6.0 - 12.0 fL    Platelets 214 140 - 450 10*3/mm3    Neutrophil % 59.4 42.7 - 76.0 %    Lymphocyte % 28.0 19.6 - 45.3 %    Monocyte % 7.6 5.0 - 12.0 %    Eosinophil % 3.8 0.3 - 6.2 %    Basophil % 0.8 0.0 - 1.5 %    Immature Grans % 0.4 0.0 - 0.5 %    Neutrophils, Absolute 5.02 1.70 - 7.00 10*3/mm3    Lymphocytes, Absolute 2.37 0.70 - 3.10 10*3/mm3    Monocytes, Absolute 0.64 0.10 - 0.90 10*3/mm3    Eosinophils, Absolute 0.32 0.00 - 0.40 10*3/mm3    Basophils, Absolute 0.07 0.00 - 0.20 10*3/mm3    Immature Grans, Absolute 0.03 0.00 - 0.05 10*3/mm3    nRBC 0.0 0.0 - 0.2 /100 WBC   Uric Acid   Result Value Ref Range    Uric Acid 7.0 2.5 - 8.5 mg/dL   Urinalysis, Microscopic Only - Urine, Clean Catch   Result Value Ref Range    RBC, UA None Seen None Seen /HPF    WBC, UA 0-2 (A) None Seen /HPF     Bacteria, UA Trace (A) None Seen /HPF    Squamous Epithelial Cells, UA 0-2 None Seen, 0-2 /HPF    Hyaline Casts, UA None Seen None Seen /LPF    Methodology Manual Light Microscopy    POC Glucose Once   Result Value Ref Range    Glucose 142 (H) 70 - 130 mg/dL   Light Blue Top   Result Value Ref Range    Extra Tube hold for add-on    Green Top (Gel)   Result Value Ref Range    Extra Tube Hold for add-ons.    Lavender Top   Result Value Ref Range    Extra Tube hold for add-on    Gold Top - SST   Result Value Ref Range    Extra Tube Hold for add-ons.    Green Top (No Gel)   Result Value Ref Range    Extra Tube Hold for add-ons.          Assessment/Plan     Pt with TIA symptoms.   Will increase asa 81 mg qd to bid.  mpv mild on higher side.  10.9.    Goal ldl under 70.   Intolerant to plavix.  Problem on brilliant.       Andre was seen today for hypertension, diabetes and er follow-up.    Diagnoses and all orders for this visit:    History of ischemic right MCA stroke  -     Evolocumab 140 MG/ML solution auto-injector; Inject 1 mL under the skin into the appropriate area as directed Every 14 (Fourteen) Days.  -     Lipid Panel  -     CBC & Differential  -     Vitamin B12  -     Comprehensive Metabolic Panel  -     TSH  -     T4, Free  -     Hemoglobin A1c  -     MicroAlbumin, Urine, Random - Urine, Clean Catch  -     Sedimentation Rate  -     C-reactive Protein    Diabetes mellitus without complication (CMS/HCC)  -     Semaglutide (OZEMPIC) 1 MG/DOSE solution pen-injector; Inject 0.5 mg under the skin into the appropriate area as directed 1 (One) Time Per Week.  -     Lipid Panel  -     CBC & Differential  -     Vitamin B12  -     Comprehensive Metabolic Panel  -     TSH  -     T4, Free  -     Hemoglobin A1c  -     MicroAlbumin, Urine, Random - Urine, Clean Catch  -     Sedimentation Rate  -     C-reactive Protein    Coronary artery disease involving native coronary artery of native heart without angina pectoris  -      Evolocumab 140 MG/ML solution auto-injector; Inject 1 mL under the skin into the appropriate area as directed Every 14 (Fourteen) Days.  -     Lipid Panel  -     CBC & Differential  -     Vitamin B12  -     Comprehensive Metabolic Panel  -     TSH  -     T4, Free  -     Hemoglobin A1c  -     MicroAlbumin, Urine, Random - Urine, Clean Catch  -     Sedimentation Rate  -     C-reactive Protein    TIA (transient ischemic attack)  -     Evolocumab 140 MG/ML solution auto-injector; Inject 1 mL under the skin into the appropriate area as directed Every 14 (Fourteen) Days.  -     Lipid Panel  -     CBC & Differential  -     Vitamin B12  -     Comprehensive Metabolic Panel  -     TSH  -     T4, Free  -     Hemoglobin A1c  -     MicroAlbumin, Urine, Random - Urine, Clean Catch  -     Sedimentation Rate  -     C-reactive Protein    Other orders  -     Cancel: Semaglutide (OZEMPIC) 0.25 or 0.5 MG/DOSE solution pen-injector; Inject 0.5 mg under the skin into the appropriate area as directed Every 7 (Seven) Days.  -     Discontinue: Evolocumab 140 MG/ML solution auto-injector; Inject 1 mL under the skin into the appropriate area as directed Every 14 (Fourteen) Days.      Return in about 3 months (around 10/12/2019).          There are no Patient Instructions on file for this visit.     Keven Bear MD    Assessment/Plan

## 2019-07-15 DIAGNOSIS — I10 MALIGNANT HYPERTENSION: ICD-10-CM

## 2019-07-15 RX ORDER — HYDRALAZINE HYDROCHLORIDE 50 MG/1
50 TABLET, FILM COATED ORAL 3 TIMES DAILY
Qty: 90 TABLET | Refills: 11 | Status: SHIPPED | OUTPATIENT
Start: 2019-07-15 | End: 2020-05-08 | Stop reason: SDUPTHER

## 2019-07-16 ENCOUNTER — OFFICE VISIT (OUTPATIENT)
Dept: NEUROLOGY | Facility: CLINIC | Age: 64
End: 2019-07-16

## 2019-07-16 VITALS
WEIGHT: 314 LBS | HEART RATE: 69 BPM | DIASTOLIC BLOOD PRESSURE: 80 MMHG | TEMPERATURE: 98.4 F | OXYGEN SATURATION: 92 % | BODY MASS INDEX: 38.22 KG/M2 | SYSTOLIC BLOOD PRESSURE: 140 MMHG

## 2019-07-16 DIAGNOSIS — H53.2 DIPLOPIA: Primary | ICD-10-CM

## 2019-07-16 DIAGNOSIS — R56.9 GENERALIZED CONVULSIVE SEIZURES (HCC): ICD-10-CM

## 2019-07-16 DIAGNOSIS — I69.30 SEQUELAE, POST-STROKE: ICD-10-CM

## 2019-07-16 PROCEDURE — 99214 OFFICE O/P EST MOD 30 MIN: CPT | Performed by: PSYCHIATRY & NEUROLOGY

## 2019-07-17 ENCOUNTER — TELEPHONE (OUTPATIENT)
Dept: NEUROLOGY | Facility: CLINIC | Age: 64
End: 2019-07-17

## 2019-07-17 DIAGNOSIS — E11.9 DIABETES MELLITUS WITHOUT COMPLICATION (HCC): ICD-10-CM

## 2019-07-17 NOTE — PROGRESS NOTES
[facility] NEUROLOGY [place] CONSULTATION   History of Present Illness     Date: 7/17/2019    Patient Identification  Andre Agosto is a 64 y.o. male.    Patient information was obtained from patient.  History/Exam limitations: none.    CONSULTATION requested by: Keven Bear MD      Chief Complaint   Follow-up (Pt states he's here for MRA results. ) and Eye Problem (Pt states hes having vision episoides where his eyes feel like they are spinning, Mouth dropping, Slurred speach . Pt states these episodies happen and last about 20seconds. )      History of Present Illness   Patient is a pleasant 64-year-old .  patient reports to have diplopia on right lateral gaze over the last 2 years has been followed by  ophthalmology department.  And 4 days ago patient was noted to have worsening of diplopia and on further questioning patient apparently was noted to have hyperglycemia and was noncompliance with Depakote.  His Depakote was subtherapeutic.  We have counseled patient extensively the importance of compliance with medication to avoid any recurrence of seizure patient expressed understanding.    We have review MRI of his brain and MR angiogram with patient and his family and I have answered all of their questions to the satisfaction.    MRI of the brain show an old demyelinating lesion in the right basal pontine region  MR angiogram showed no clinical significant stenosis      PMH:   Past Medical History:   Diagnosis Date   • 6th nerve palsy, right     right eye    • Anesthesia complication     ilius- after lap band surgery    • Anxiety and depression    • Coronary artery disease involving native coronary artery of native heart without angina pectoris 9/12/2018   • Diabetes mellitus (CMS/HCC)     doesnt check sugar    • Double vision    • Dyspepsia    • Epilepsy (CMS/HCC)    • Focal seizure (CMS/HCC)     of right arm    • Heart attack (CMS/HCC) 06/15/2016   • History of Helicobacter pylori infection     in  , treated w/ PrevPak   • HL (hearing loss)     Left ear child luevano measles   • Hyperlipidemia    • Hypertension    • Kidney stone     x2 imbedded   • Memory loss     Meneigitis   • Meningitis     unsure of bacterial or viral    • Obesity    • Seizures (CMS/HCC)    • Sleep apnea     BiPAP compliant   • Sleep apnea treated with nocturnal BiPAP     compliant with  machine    • Stroke (CMS/HCC)    • Vision loss 2018    Double vision   • Wears glasses        Past Surgical History:   Past Surgical History:   Procedure Laterality Date   • CARDIAC CATHETERIZATION N/A 10/12/2018    Procedure: Left Heart Cath;  Surgeon: Yoselin Johnson MD;  Location:  [facility];  Service: Cardiology   • COLONOSCOPY     • CORONARY STENT PLACEMENT      x1   • ENDOSCOPY     • LAPAROSCOPIC GASTRIC BANDING      s/p LAGB Realize 2008 by HOMERO.   • UMBILICAL HERNIA REPAIR      incarcerated UHR w/ mesh by Dr. Velasquez   • WISDOM TOOTH EXTRACTION      all 4       Family Hisotry:   Family History   Problem Relation Age of Onset   • Stroke Mother    • Hypertension Mother    • COPD Father    • Hypertension Father        Social History:   Social History     Socioeconomic History   • Marital status:      Spouse name: Not on file   • Number of children: Not on file   • Years of education: Not on file   • Highest education level: Not on file   Tobacco Use   • Smoking status: Former Smoker     Packs/day: 0.50     Years: 10.00     Pack years: 5.00     Types: Cigarettes     Start date: 1975     Last attempt to quit: 1985     Years since quittin.5   • Smokeless tobacco: Never Used   Substance and Sexual Activity   • Alcohol use: No   • Drug use: No   • Sexual activity: Defer       Medications:   Current Outpatient Medications   Medication Sig Dispense Refill   • Alcohol Swabs (ALCOHOL PREP) pads 1 pad 4 (Four) Times a Day As Needed (bs). 120 each 12   • amLODIPine (NORVASC) 5 MG tablet Take 1  tablet by mouth 2 (Two) Times a Day. 180 tablet 3   • aspirin 81 MG tablet Take 1 tablet by mouth Daily. 30 tablet 11   • divalproex (DEPAKOTE) 250 MG DR tablet Take 1 tablet by mouth 3 (Three) Times a Day. Take 2 in the AM 1 at noon and 2 in the pm. Per DR Clifford. 150 tablet 3   • escitalopram (LEXAPRO) 20 MG tablet TAKE 1 TABLET DAILY 90 tablet 2   • Evolocumab 140 MG/ML solution auto-injector Inject 1 mL under the skin into the appropriate area as directed Every 14 (Fourteen) Days. 3 pen 3   • glipiZIDE (GLUCOTROL) 5 MG tablet TAKE 1 TABLET TWICE A DAY BEFORE MEALS 180 tablet 3   • glucose blood test strip Use one to two times daily to check blood sugar 100 each 12   • glucose monitor monitoring kit 1 each As Needed (bs). 1 each 1   • hydrALAZINE (APRESOLINE) 50 MG tablet Take 1 tablet by mouth 3 (Three) Times a Day. 90 tablet 11   • ketoconazole (NIZORAL) 2 % shampoo Apply  topically to the appropriate area as directed 2 (Two) Times a Week. 12 mL 0   • Lancets (FREESTYLE) lancets 1 each by Other route As Needed (bs). 120 each 12   • levothyroxine (SYNTHROID) 50 MCG tablet Take 1 tablet by mouth Daily. 30 tablet 11   • losartan (COZAAR) 100 MG tablet Take 1 tablet by mouth Daily. 90 tablet 3   • lovastatin (MEVACOR) 40 MG tablet Take 1 tablet by mouth Daily With Dinner. 90 tablet 3   • Magnesium Oxide 400 (240 MG) MG tablet Take 1 tablet by mouth daily. (Patient taking differently: Take 1 tablet by mouth Daily As Needed.) 30 tablet 11   • Semaglutide (OZEMPIC) 1 MG/DOSE solution pen-injector Inject 0.5 mg under the skin into the appropriate area as directed 1 (One) Time Per Week. 3 pen 3     No current facility-administered medications for this visit.        Allergy:   Allergies   Allergen Reactions   • Statins Myalgia       Review of Systems:  Review of Systems   Constitutional: Negative for chills and fever.   HENT: Negative for congestion, ear pain, hearing loss, rhinorrhea and sore throat.    Eyes: Positive for  visual disturbance. Negative for pain, discharge and redness.   Respiratory: Negative for cough, shortness of breath, wheezing and stridor.    Cardiovascular: Negative for chest pain, palpitations and leg swelling.   Gastrointestinal: Negative for abdominal pain, constipation, nausea and vomiting.   Endocrine: Negative for cold intolerance, heat intolerance and polyphagia.   Genitourinary: Negative for dysuria, flank pain, frequency and urgency.   Musculoskeletal: Positive for gait problem. Negative for joint swelling, myalgias, neck pain and neck stiffness.   Skin: Negative for pallor, rash and wound.   Allergic/Immunologic: Negative for environmental allergies.   Neurological: Positive for weakness. Negative for dizziness, tremors, seizures, syncope, facial asymmetry, speech difficulty, light-headedness, numbness and headaches.   Hematological: Negative for adenopathy.   Psychiatric/Behavioral: Negative for confusion and hallucinations. The patient is not nervous/anxious.        Physical Exam     Vitals:    07/16/19 1554   BP: 140/80   Pulse: 69   Temp: 98.4 °F (36.9 °C)   SpO2: 92%   Weight: (!) 142 kg (314 lb)     GENERAL: Patient is pleasant, cooperative, appears to be stated age.  Body habitus is endomorphic.  SKIN AND EXTREMITIES:  No skin rashes or lesions are noted.  No cyanosis, clubbing or edema of the extremities.    HEAD:  Head is normocephalic and atraumatic.    NECK: Nontender without thyromegaly or adenopathy.  Carotid upstrokes are 1+/4.  No cranial or cervical bruits.  The neck is supple with a full range of motion.   ENT: Palate elevates symmetrically.  No evidence of high arch palate.  Tongue midline.  No erythema in posterior pharynx.  Mallampati Classification Class III   CARDIOVASCULAR:  Regular rate and rhythm with normal S1 and S2 without rub or gallop.  RESPIRATORY:  Clear to auscultation without wheezes or crackle   ABDOMEN:  Soft and nontender, positive bowel sound without  hepatosplenomegaly  BACK:  Back is straight without midline defect.    PSYCH:  Higher cortical function/mental status:  The patient is alert.  The patient is oriented x3 to time, place and person.  Recent and the remote memory appear normal.  The patient has a good fund of knowledge.  There is no visual or auditory hallucination or suicidal or homicidal ideation.  SPEECH:There is no gross evidence of aphasia, dysarthria or agnosia.      CRANIAL NERVES:  Pupils are 4mm, equal round reactive to light, reacting briskly to 2mm without afferent pupillary defect.  Visual fields are intact to confrontation testing.  Funduscopic examination reveals sharp disc margins with normal vasculature.  No papilledema, hemorrhages or exudates.  Extraocular movements are full and smooth with normal pursuits and saccades.  No nystagmus noted.  The face is symmetric. Palate elevates symmetrically, Tongue midline, positive gag reflex. The remainder of the cranial nerves are intact and symmetrical.    MOTOR: Strength is 5/5 throughout with normal tone and bulk with the following exceptions, 4/5 intrinsic muscles of the hands and feet.  No involuntary movements noted.    Deep Tendon Reflexes: are 2/4 and symmetrical in the upper extremities, 2/4 and symmetrical at the knees and 1/4 and symmetrical at the Achilles tendon.  Plantar responses were down-going bilaterally.    SENSATION:  Intact to pinprick, light touch, vibration and proprioception.  Coordination:  The patient normally performs finger-nose-finger, heel-to-knee-to-shin and rapid alternating movements in symmetrical fashion.    COORDINATION AND GAIT:  The patient walks with a narrow-based gait.  Patient is able to heel-toe and tandem walk forward and backwards without difficulty.  Romberg and monopedal  Romberg are negative.    MUSCULOSKELETAL: Range of motion normal, no clubbing, cyanosis, or edema.  No joint swelling.            Studies: I have personally reviewed the following  and discussed with the patient.  Results for orders placed or performed during the hospital encounter of 07/08/19   Valproic Acid Level, Total   Result Value Ref Range    Valproic Acid 44.6 (L) 50.0 - 100.0 mcg/mL   Comprehensive Metabolic Panel   Result Value Ref Range    Glucose 148 (H) 74 - 98 mg/dL    BUN 15 7 - 20 mg/dL    Creatinine 1.20 0.60 - 1.30 mg/dL    Sodium 141 137 - 145 mmol/L    Potassium 3.5 3.5 - 5.1 mmol/L    Chloride 101 98 - 107 mmol/L    CO2 32.0 (H) 26.0 - 30.0 mmol/L    Calcium 9.0 8.4 - 10.2 mg/dL    Total Protein 8.0 6.3 - 8.2 g/dL    Albumin 4.10 3.50 - 5.00 g/dL    ALT (SGPT) 24 13 - 69 U/L    AST (SGOT) 26 15 - 46 U/L    Alkaline Phosphatase 61 38 - 126 U/L    Total Bilirubin 0.5 0.2 - 1.3 mg/dL    eGFR Non African Amer 61 >60 mL/min/1.73    Globulin 3.9 gm/dL    A/G Ratio 1.1 1.0 - 2.0 g/dL    BUN/Creatinine Ratio 12.5 6.3 - 21.9    Anion Gap 11.5 10.0 - 20.0 mmol/L   Troponin   Result Value Ref Range    Troponin I <0.012 0.000 - 0.034 ng/mL   Magnesium   Result Value Ref Range    Magnesium 1.8 1.6 - 2.3 mg/dL   Urinalysis With Microscopic If Indicated (No Culture) - Urine, Clean Catch   Result Value Ref Range    Color, UA Yellow Yellow, Straw    Appearance, UA Clear Clear    pH, UA 7.5 5.0 - 8.0    Specific Gravity, UA 1.014 1.005 - 1.030    Glucose, UA Negative Negative    Ketones, UA Negative Negative    Bilirubin, UA Negative Negative    Blood, UA Negative Negative    Protein,  mg/dL (2+) (A) Negative    Leuk Esterase, UA Negative Negative    Nitrite, UA Negative Negative    Urobilinogen, UA 1.0 E.U./dL 0.2 - 1.0 E.U./dL   CBC Auto Differential   Result Value Ref Range    WBC 8.45 3.40 - 10.80 10*3/mm3    RBC 5.36 4.14 - 5.80 10*6/mm3    Hemoglobin 15.9 13.0 - 17.7 g/dL    Hematocrit 47.8 37.5 - 51.0 %    MCV 89.2 79.0 - 97.0 fL    MCH 29.7 26.6 - 33.0 pg    MCHC 33.3 31.5 - 35.7 g/dL    RDW 13.6 12.3 - 15.4 %    RDW-SD 44.5 37.0 - 54.0 fl    MPV 10.6 6.0 - 12.0 fL     Platelets 214 140 - 450 10*3/mm3    Neutrophil % 59.4 42.7 - 76.0 %    Lymphocyte % 28.0 19.6 - 45.3 %    Monocyte % 7.6 5.0 - 12.0 %    Eosinophil % 3.8 0.3 - 6.2 %    Basophil % 0.8 0.0 - 1.5 %    Immature Grans % 0.4 0.0 - 0.5 %    Neutrophils, Absolute 5.02 1.70 - 7.00 10*3/mm3    Lymphocytes, Absolute 2.37 0.70 - 3.10 10*3/mm3    Monocytes, Absolute 0.64 0.10 - 0.90 10*3/mm3    Eosinophils, Absolute 0.32 0.00 - 0.40 10*3/mm3    Basophils, Absolute 0.07 0.00 - 0.20 10*3/mm3    Immature Grans, Absolute 0.03 0.00 - 0.05 10*3/mm3    nRBC 0.0 0.0 - 0.2 /100 WBC   Uric Acid   Result Value Ref Range    Uric Acid 7.0 2.5 - 8.5 mg/dL   Urinalysis, Microscopic Only - Urine, Clean Catch   Result Value Ref Range    RBC, UA None Seen None Seen /HPF    WBC, UA 0-2 (A) None Seen /HPF    Bacteria, UA Trace (A) None Seen /HPF    Squamous Epithelial Cells, UA 0-2 None Seen, 0-2 /HPF    Hyaline Casts, UA None Seen None Seen /LPF    Methodology Manual Light Microscopy    POC Glucose Once   Result Value Ref Range    Glucose 142 (H) 70 - 130 mg/dL   Light Blue Top   Result Value Ref Range    Extra Tube hold for add-on    Green Top (Gel)   Result Value Ref Range    Extra Tube Hold for add-ons.    Lavender Top   Result Value Ref Range    Extra Tube hold for add-on    Gold Top - SST   Result Value Ref Range    Extra Tube Hold for add-ons.    Green Top (No Gel)   Result Value Ref Range    Extra Tube Hold for add-ons.        Review of Imaging: I have personally reviewed the following images and discussed with the patient.  MRI of the brain show an old demyelinating lesion in the right basal pontine region  MR angiogram showed no clinical significant stenosis    Records Reviewed: I have personally reviewed his previous medical record.    Andre was seen today for follow-up and eye problem.    Diagnoses and all orders for this visit:    Diplopia    Generalized convulsive seizures (CMS/HCC)    Sequelae, post-stroke      Discussion:  In  summary,  64-year-old .  patient reports to have diplopia on right lateral gaze over the last 2 years has been followed by  ophthalmology department.  And 4 days ago patient was noted to have worsening of diplopia and on further questioning patient apparently was noted to have hyperglycemia and was noncompliance with Depakote.  His Depakote was subtherapeutic.  We have counseled patient extensively the importance of compliance with medication to avoid any recurrence of seizure patient expressed understanding.    We have review MRI of his brain and MR angiogram with patient and his family and I have answered all of their questions to the satisfaction.    MRI of the brain show an old demyelinating lesion in the right basal pontine region  MR angiogram showed no clinical significant stenosis      This Document is signed by Rony Clifford MD, FAAN, FAASM July 17, 201911:13 PM

## 2019-07-18 DIAGNOSIS — E11.9 DIABETES MELLITUS WITHOUT COMPLICATION (HCC): ICD-10-CM

## 2019-07-22 RX ORDER — NEBIVOLOL HYDROCHLORIDE 5 MG/1
TABLET ORAL
Qty: 90 TABLET | Refills: 3 | OUTPATIENT
Start: 2019-07-22

## 2019-07-29 ENCOUNTER — PRIOR AUTHORIZATION (OUTPATIENT)
Dept: INTERNAL MEDICINE | Facility: CLINIC | Age: 64
End: 2019-07-29

## 2019-07-29 NOTE — TELEPHONE ENCOUNTER
We have seen patient in the office and have shown him the result of MRI and MRA.  MRA was unremarkable

## 2019-08-09 ENCOUNTER — APPOINTMENT (OUTPATIENT)
Dept: LAB | Facility: HOSPITAL | Age: 64
End: 2019-08-09

## 2019-08-09 ENCOUNTER — OFFICE VISIT (OUTPATIENT)
Dept: INTERNAL MEDICINE | Facility: CLINIC | Age: 64
End: 2019-08-09

## 2019-08-09 VITALS
DIASTOLIC BLOOD PRESSURE: 78 MMHG | RESPIRATION RATE: 16 BRPM | BODY MASS INDEX: 37.63 KG/M2 | WEIGHT: 309 LBS | HEART RATE: 69 BPM | HEIGHT: 76 IN | SYSTOLIC BLOOD PRESSURE: 150 MMHG | TEMPERATURE: 98.2 F | OXYGEN SATURATION: 98 %

## 2019-08-09 DIAGNOSIS — I25.10 CORONARY ARTERY DISEASE INVOLVING NATIVE CORONARY ARTERY OF NATIVE HEART WITHOUT ANGINA PECTORIS: ICD-10-CM

## 2019-08-09 DIAGNOSIS — Z86.73 HISTORY OF ISCHEMIC RIGHT MCA STROKE: ICD-10-CM

## 2019-08-09 DIAGNOSIS — G45.9 TIA (TRANSIENT ISCHEMIC ATTACK): ICD-10-CM

## 2019-08-09 LAB
ALBUMIN SERPL-MCNC: 4.3 G/DL (ref 3.5–5.2)
ALBUMIN UR-MCNC: 60.2 MG/DL
ALBUMIN/GLOB SERPL: 1.2 G/DL
ALP SERPL-CCNC: 54 U/L (ref 39–117)
ALT SERPL W P-5'-P-CCNC: 19 U/L (ref 1–41)
ANION GAP SERPL CALCULATED.3IONS-SCNC: 14.2 MMOL/L (ref 5–15)
AST SERPL-CCNC: 20 U/L (ref 1–40)
BASOPHILS # BLD AUTO: 0.06 10*3/MM3 (ref 0–0.2)
BASOPHILS NFR BLD AUTO: 0.6 % (ref 0–1.5)
BILIRUB SERPL-MCNC: 0.6 MG/DL (ref 0.2–1.2)
BUN BLD-MCNC: 14 MG/DL (ref 8–23)
BUN/CREAT SERPL: 10.1 (ref 7–25)
CALCIUM SPEC-SCNC: 9.5 MG/DL (ref 8.6–10.5)
CHLORIDE SERPL-SCNC: 99 MMOL/L (ref 98–107)
CHOLEST SERPL-MCNC: 152 MG/DL (ref 0–200)
CO2 SERPL-SCNC: 29.8 MMOL/L (ref 22–29)
CREAT BLD-MCNC: 1.38 MG/DL (ref 0.76–1.27)
CRP SERPL-MCNC: 1.24 MG/DL (ref 0–0.5)
DEPRECATED RDW RBC AUTO: 48.2 FL (ref 37–54)
EOSINOPHIL # BLD AUTO: 0.3 10*3/MM3 (ref 0–0.4)
EOSINOPHIL NFR BLD AUTO: 3.2 % (ref 0.3–6.2)
ERYTHROCYTE [DISTWIDTH] IN BLOOD BY AUTOMATED COUNT: 14.2 % (ref 12.3–15.4)
ERYTHROCYTE [SEDIMENTATION RATE] IN BLOOD: 5 MM/HR (ref 0–20)
GFR SERPL CREATININE-BSD FRML MDRD: 52 ML/MIN/1.73
GLOBULIN UR ELPH-MCNC: 3.6 GM/DL
GLUCOSE BLD-MCNC: 98 MG/DL (ref 65–99)
HBA1C MFR BLD: 5.7 % (ref 4.8–5.6)
HCT VFR BLD AUTO: 50.8 % (ref 37.5–51)
HDLC SERPL-MCNC: 37 MG/DL (ref 40–60)
HGB BLD-MCNC: 16.2 G/DL (ref 13–17.7)
IMM GRANULOCYTES # BLD AUTO: 0.04 10*3/MM3 (ref 0–0.05)
IMM GRANULOCYTES NFR BLD AUTO: 0.4 % (ref 0–0.5)
LDLC SERPL CALC-MCNC: 69 MG/DL (ref 0–100)
LDLC/HDLC SERPL: 1.86 {RATIO}
LYMPHOCYTES # BLD AUTO: 2.51 10*3/MM3 (ref 0.7–3.1)
LYMPHOCYTES NFR BLD AUTO: 26.7 % (ref 19.6–45.3)
MCH RBC QN AUTO: 29.7 PG (ref 26.6–33)
MCHC RBC AUTO-ENTMCNC: 31.9 G/DL (ref 31.5–35.7)
MCV RBC AUTO: 93 FL (ref 79–97)
MONOCYTES # BLD AUTO: 0.58 10*3/MM3 (ref 0.1–0.9)
MONOCYTES NFR BLD AUTO: 6.2 % (ref 5–12)
NEUTROPHILS # BLD AUTO: 5.91 10*3/MM3 (ref 1.7–7)
NEUTROPHILS NFR BLD AUTO: 62.9 % (ref 42.7–76)
NRBC BLD AUTO-RTO: 0 /100 WBC (ref 0–0.2)
PLATELET # BLD AUTO: 232 10*3/MM3 (ref 140–450)
PMV BLD AUTO: 11.4 FL (ref 6–12)
POTASSIUM BLD-SCNC: 3.8 MMOL/L (ref 3.5–5.2)
PROT SERPL-MCNC: 7.9 G/DL (ref 6–8.5)
RBC # BLD AUTO: 5.46 10*6/MM3 (ref 4.14–5.8)
SODIUM BLD-SCNC: 143 MMOL/L (ref 136–145)
T4 FREE SERPL-MCNC: 1.11 NG/DL (ref 0.93–1.7)
TRIGL SERPL-MCNC: 231 MG/DL (ref 0–150)
TSH SERPL DL<=0.05 MIU/L-ACNC: 1.71 MIU/ML (ref 0.27–4.2)
URATE SERPL-MCNC: 7.6 MG/DL (ref 3.4–7)
VIT B12 BLD-MCNC: 635 PG/ML (ref 211–946)
VLDLC SERPL-MCNC: 46.2 MG/DL (ref 5–40)
WBC NRBC COR # BLD: 9.4 10*3/MM3 (ref 3.4–10.8)

## 2019-08-09 PROCEDURE — 84443 ASSAY THYROID STIM HORMONE: CPT | Performed by: INTERNAL MEDICINE

## 2019-08-09 PROCEDURE — 85652 RBC SED RATE AUTOMATED: CPT | Performed by: INTERNAL MEDICINE

## 2019-08-09 PROCEDURE — 84439 ASSAY OF FREE THYROXINE: CPT | Performed by: INTERNAL MEDICINE

## 2019-08-09 PROCEDURE — 85025 COMPLETE CBC W/AUTO DIFF WBC: CPT | Performed by: INTERNAL MEDICINE

## 2019-08-09 PROCEDURE — 36415 COLL VENOUS BLD VENIPUNCTURE: CPT | Performed by: INTERNAL MEDICINE

## 2019-08-09 PROCEDURE — 80061 LIPID PANEL: CPT | Performed by: INTERNAL MEDICINE

## 2019-08-09 PROCEDURE — 80053 COMPREHEN METABOLIC PANEL: CPT | Performed by: INTERNAL MEDICINE

## 2019-08-09 PROCEDURE — 86140 C-REACTIVE PROTEIN: CPT | Performed by: INTERNAL MEDICINE

## 2019-08-09 PROCEDURE — 82607 VITAMIN B-12: CPT | Performed by: INTERNAL MEDICINE

## 2019-08-09 PROCEDURE — 83036 HEMOGLOBIN GLYCOSYLATED A1C: CPT | Performed by: INTERNAL MEDICINE

## 2019-08-09 PROCEDURE — 84550 ASSAY OF BLOOD/URIC ACID: CPT | Performed by: INTERNAL MEDICINE

## 2019-08-09 PROCEDURE — 82043 UR ALBUMIN QUANTITATIVE: CPT | Performed by: INTERNAL MEDICINE

## 2019-08-09 PROCEDURE — 99214 OFFICE O/P EST MOD 30 MIN: CPT | Performed by: INTERNAL MEDICINE

## 2019-08-09 RX ORDER — ESCITALOPRAM OXALATE 20 MG/1
20 TABLET ORAL DAILY
Qty: 90 TABLET | Refills: 3 | Status: SHIPPED | OUTPATIENT
Start: 2019-08-09 | End: 2020-03-31 | Stop reason: SDUPTHER

## 2019-08-09 NOTE — PROGRESS NOTES
Subjective     Patient ID: Andre Agosto is a 64 y.o. male. Patient is here for management of multiple medical problems.     Chief Complaint   Patient presents with   • Hypertension     follow-up   • Hyperlipidemia     follow-up   • Diabetes     patient states he is running out of medication Ozempic needs larger pen dosage       Hypertension   This is a chronic problem. The current episode started more than 1 year ago. The problem has been gradually worsening since onset. The problem is uncontrolled. Pertinent negatives include no anxiety, blurred vision, chest pain or headaches. There are no associated agents to hypertension. Past treatments include calcium channel blockers, angiotensin blockers and diuretics. Current antihypertension treatment includes calcium channel blockers, beta blockers and ACE inhibitors. The current treatment provides moderate improvement. There are no compliance problems.  Hypertensive end-organ damage includes kidney disease, CAD/MI, CVA and left ventricular hypertrophy. There is no history of angina. Identifiable causes of hypertension include chronic renal disease. There is no history of hyperaldosteronism.   Hyperlipidemia   This is a chronic problem. Exacerbating diseases include chronic renal disease. Pertinent negatives include no chest pain. Current antihyperlipidemic treatment includes statins. The current treatment provides moderate improvement of lipids.   Diabetes   He presents for his follow-up diabetic visit. He has type 2 diabetes mellitus. His disease course has been stable. Pertinent negatives for hypoglycemia include no headaches or nervousness/anxiousness. Associated symptoms include fatigue. Pertinent negatives for diabetes include no blurred vision and no chest pain. Symptoms are improving. Diabetic complications include a CVA. Current diabetic treatment includes oral agent (monotherapy). He is compliant with treatment all of the time.    on ozempic 0.5mg    bp at  home 150/80        The following portions of the patient's history were reviewed and updated as appropriate: allergies, current medications, past family history, past medical history, past social history, past surgical history and problem list.    Review of Systems   Constitutional: Positive for fatigue.   Eyes: Positive for visual disturbance. Negative for blurred vision.   Cardiovascular: Negative for chest pain.   Neurological: Negative for headaches.   Psychiatric/Behavioral: Negative for self-injury and sleep disturbance. The patient is not nervous/anxious and is not hyperactive.    All other systems reviewed and are negative.      Current Outpatient Medications:   •  Alcohol Swabs (ALCOHOL PREP) pads, 1 pad 4 (Four) Times a Day As Needed (bs)., Disp: 120 each, Rfl: 12  •  amLODIPine (NORVASC) 5 MG tablet, Take 1 tablet by mouth 2 (Two) Times a Day., Disp: 180 tablet, Rfl: 3  •  aspirin 81 MG tablet, Take 1 tablet by mouth Daily., Disp: 30 tablet, Rfl: 11  •  divalproex (DEPAKOTE) 250 MG DR tablet, Take 1 tablet by mouth 3 (Three) Times a Day. Take 2 in the AM 1 at noon and 2 in the pm. Per DR Clifford., Disp: 150 tablet, Rfl: 3  •  escitalopram (LEXAPRO) 20 MG tablet, Take 1 tablet by mouth Daily., Disp: 90 tablet, Rfl: 3  •  Evolocumab 140 MG/ML solution auto-injector, Inject 1 mL under the skin into the appropriate area as directed Every 14 (Fourteen) Days., Disp: 3 pen, Rfl: 3  •  glipiZIDE (GLUCOTROL) 5 MG tablet, TAKE 1 TABLET TWICE A DAY BEFORE MEALS, Disp: 180 tablet, Rfl: 3  •  glucose blood test strip, Use one to two times daily to check blood sugar, Disp: 100 each, Rfl: 12  •  glucose monitor monitoring kit, 1 each As Needed (bs)., Disp: 1 each, Rfl: 1  •  hydrALAZINE (APRESOLINE) 50 MG tablet, Take 1 tablet by mouth 3 (Three) Times a Day., Disp: 90 tablet, Rfl: 11  •  ketoconazole (NIZORAL) 2 % shampoo, Apply  topically to the appropriate area as directed 2 (Two) Times a Week., Disp: 12 mL, Rfl: 0  •   "Lancets (FREESTYLE) lancets, 1 each by Other route As Needed (bs)., Disp: 120 each, Rfl: 12  •  levothyroxine (SYNTHROID) 50 MCG tablet, Take 1 tablet by mouth Daily., Disp: 30 tablet, Rfl: 11  •  losartan (COZAAR) 100 MG tablet, Take 1 tablet by mouth Daily., Disp: 90 tablet, Rfl: 3  •  lovastatin (MEVACOR) 40 MG tablet, Take 1 tablet by mouth Daily With Dinner., Disp: 90 tablet, Rfl: 3  •  Magnesium Oxide 400 (240 MG) MG tablet, Take 1 tablet by mouth daily. (Patient taking differently: Take 1 tablet by mouth Daily As Needed.), Disp: 30 tablet, Rfl: 11  •  Semaglutide (OZEMPIC) 0.25 or 0.5 MG/DOSE solution pen-injector, Inject 0.5 mg under the skin into the appropriate area as directed 1 (One) Time Per Week., Disp: 4 pen, Rfl: 3    Objective      Blood pressure 150/78, pulse 69, temperature 98.2 °F (36.8 °C), temperature source Oral, resp. rate 16, height 193 cm (76\"), weight (!) 140 kg (309 lb), SpO2 98 %.    Physical Exam     General Appearance:    Alert, cooperative, no distress, appears stated age   Head:    Normocephalic, without obvious abnormality, atraumatic   Eyes:    PERRL, conjunctiva/corneas clear, EOM's intact   Ears:    Normal TM's and external ear canals, both ears   Nose:   Nares normal, septum midline, mucosa normal, no drainage   or sinus tenderness   Throat:   Lips, mucosa, and tongue normal; teeth and gums normal   Neck:   Supple, symmetrical, trachea midline, no adenopathy;        thyroid:  No enlargement/tenderness/nodules; no carotid    bruit or JVD   Back:     Symmetric, no curvature, ROM normal, no CVA tenderness   Lungs:     Clear to auscultation bilaterally, respirations unlabored   Chest wall:    No tenderness or deformity   Heart:    Regular rate and rhythm, S1 and S2 normal, no murmur,        rub or gallop   Abdomen:     Soft, non-tender, bowel sounds active all four quadrants,     no masses, no organomegaly   Extremities:   Extremities normal, atraumatic, no cyanosis or edema "   Pulses:   2+ and symmetric all extremities   Skin:   Skin color, texture, turgor normal, no rashes or lesions   Lymph nodes:   Cervical, supraclavicular, and axillary nodes normal   Neurologic:   CNII-XII intact. Normal strength, sensation and reflexes       throughout      Results for orders placed or performed during the hospital encounter of 07/08/19   Valproic Acid Level, Total   Result Value Ref Range    Valproic Acid 44.6 (L) 50.0 - 100.0 mcg/mL   Comprehensive Metabolic Panel   Result Value Ref Range    Glucose 148 (H) 74 - 98 mg/dL    BUN 15 7 - 20 mg/dL    Creatinine 1.20 0.60 - 1.30 mg/dL    Sodium 141 137 - 145 mmol/L    Potassium 3.5 3.5 - 5.1 mmol/L    Chloride 101 98 - 107 mmol/L    CO2 32.0 (H) 26.0 - 30.0 mmol/L    Calcium 9.0 8.4 - 10.2 mg/dL    Total Protein 8.0 6.3 - 8.2 g/dL    Albumin 4.10 3.50 - 5.00 g/dL    ALT (SGPT) 24 13 - 69 U/L    AST (SGOT) 26 15 - 46 U/L    Alkaline Phosphatase 61 38 - 126 U/L    Total Bilirubin 0.5 0.2 - 1.3 mg/dL    eGFR Non African Amer 61 >60 mL/min/1.73    Globulin 3.9 gm/dL    A/G Ratio 1.1 1.0 - 2.0 g/dL    BUN/Creatinine Ratio 12.5 6.3 - 21.9    Anion Gap 11.5 10.0 - 20.0 mmol/L   Troponin   Result Value Ref Range    Troponin I <0.012 0.000 - 0.034 ng/mL   Magnesium   Result Value Ref Range    Magnesium 1.8 1.6 - 2.3 mg/dL   Urinalysis With Microscopic If Indicated (No Culture) - Urine, Clean Catch   Result Value Ref Range    Color, UA Yellow Yellow, Straw    Appearance, UA Clear Clear    pH, UA 7.5 5.0 - 8.0    Specific Gravity, UA 1.014 1.005 - 1.030    Glucose, UA Negative Negative    Ketones, UA Negative Negative    Bilirubin, UA Negative Negative    Blood, UA Negative Negative    Protein,  mg/dL (2+) (A) Negative    Leuk Esterase, UA Negative Negative    Nitrite, UA Negative Negative    Urobilinogen, UA 1.0 E.U./dL 0.2 - 1.0 E.U./dL   CBC Auto Differential   Result Value Ref Range    WBC 8.45 3.40 - 10.80 10*3/mm3    RBC 5.36 4.14 - 5.80 10*6/mm3     Hemoglobin 15.9 13.0 - 17.7 g/dL    Hematocrit 47.8 37.5 - 51.0 %    MCV 89.2 79.0 - 97.0 fL    MCH 29.7 26.6 - 33.0 pg    MCHC 33.3 31.5 - 35.7 g/dL    RDW 13.6 12.3 - 15.4 %    RDW-SD 44.5 37.0 - 54.0 fl    MPV 10.6 6.0 - 12.0 fL    Platelets 214 140 - 450 10*3/mm3    Neutrophil % 59.4 42.7 - 76.0 %    Lymphocyte % 28.0 19.6 - 45.3 %    Monocyte % 7.6 5.0 - 12.0 %    Eosinophil % 3.8 0.3 - 6.2 %    Basophil % 0.8 0.0 - 1.5 %    Immature Grans % 0.4 0.0 - 0.5 %    Neutrophils, Absolute 5.02 1.70 - 7.00 10*3/mm3    Lymphocytes, Absolute 2.37 0.70 - 3.10 10*3/mm3    Monocytes, Absolute 0.64 0.10 - 0.90 10*3/mm3    Eosinophils, Absolute 0.32 0.00 - 0.40 10*3/mm3    Basophils, Absolute 0.07 0.00 - 0.20 10*3/mm3    Immature Grans, Absolute 0.03 0.00 - 0.05 10*3/mm3    nRBC 0.0 0.0 - 0.2 /100 WBC   Uric Acid   Result Value Ref Range    Uric Acid 7.0 2.5 - 8.5 mg/dL   Urinalysis, Microscopic Only - Urine, Clean Catch   Result Value Ref Range    RBC, UA None Seen None Seen /HPF    WBC, UA 0-2 (A) None Seen /HPF    Bacteria, UA Trace (A) None Seen /HPF    Squamous Epithelial Cells, UA 0-2 None Seen, 0-2 /HPF    Hyaline Casts, UA None Seen None Seen /LPF    Methodology Manual Light Microscopy    POC Glucose Once   Result Value Ref Range    Glucose 142 (H) 70 - 130 mg/dL   Light Blue Top   Result Value Ref Range    Extra Tube hold for add-on    Green Top (Gel)   Result Value Ref Range    Extra Tube Hold for add-ons.    Lavender Top   Result Value Ref Range    Extra Tube hold for add-on    Gold Top - SST   Result Value Ref Range    Extra Tube Hold for add-ons.    Green Top (No Gel)   Result Value Ref Range    Extra Tube Hold for add-ons.          Assessment/Plan   Pt will get labs done now.        Andre was seen today for hypertension, hyperlipidemia and diabetes.    Diagnoses and all orders for this visit:    History of ischemic right MCA stroke  -     Evolocumab 140 MG/ML solution auto-injector; Inject 1 mL under the skin  into the appropriate area as directed Every 14 (Fourteen) Days.    Coronary artery disease involving native coronary artery of native heart without angina pectoris  -     Evolocumab 140 MG/ML solution auto-injector; Inject 1 mL under the skin into the appropriate area as directed Every 14 (Fourteen) Days.    TIA (transient ischemic attack)  -     Evolocumab 140 MG/ML solution auto-injector; Inject 1 mL under the skin into the appropriate area as directed Every 14 (Fourteen) Days.    Other orders  -     escitalopram (LEXAPRO) 20 MG tablet; Take 1 tablet by mouth Daily.      Return in about 3 months (around 11/9/2019).          There are no Patient Instructions on file for this visit.     Keven Bear MD    Assessment/Plan

## 2019-08-12 ENCOUNTER — TELEPHONE (OUTPATIENT)
Dept: INTERNAL MEDICINE | Facility: CLINIC | Age: 64
End: 2019-08-12

## 2019-08-12 NOTE — TELEPHONE ENCOUNTER
[facility] called to inform you of the following:    REPATHA 140 MG SURE CLICK   QTY OF 2 FOR 28 DAY SUPPLY  USES EVERY TWO WEEKS  [id number]    The patient has  [facility] but [facility] no longer stock this. It will need to be sent to [facility]

## 2019-08-13 NOTE — TELEPHONE ENCOUNTER
I called patient yesterday but I had to leave a message on his phone asking him to call our office back to clarify where he wants his medications sent. I called patient back again today, but no one answered, I have called his wife and explained to her about the medications, she stated she would talk to Andre and have him call us back.

## 2019-08-13 NOTE — TELEPHONE ENCOUNTER
Letitia called to check on this message. She called [facility] to see if they received the request for them and there was not one. Can you resend these prescriptions to [facility] please?

## 2019-08-14 DIAGNOSIS — Z86.73 HISTORY OF ISCHEMIC RIGHT MCA STROKE: ICD-10-CM

## 2019-08-14 DIAGNOSIS — G45.9 TIA (TRANSIENT ISCHEMIC ATTACK): ICD-10-CM

## 2019-08-14 DIAGNOSIS — I25.10 CORONARY ARTERY DISEASE INVOLVING NATIVE CORONARY ARTERY OF NATIVE HEART WITHOUT ANGINA PECTORIS: ICD-10-CM

## 2019-08-14 NOTE — TELEPHONE ENCOUNTER
I spoke with patient, he is okay with the changes in the pharmacies, RXs have been resent per patient request.

## 2019-08-23 ENCOUNTER — TELEPHONE (OUTPATIENT)
Dept: NEUROLOGY | Facility: CLINIC | Age: 64
End: 2019-08-23

## 2019-08-23 NOTE — TELEPHONE ENCOUNTER
PT NEEDS A ORDER FOR CPAP SUPPLIES.  HE THINKS HE USES [facility] BUT NOT SURE.  IF THIS IS NOT HIS COMPANY CAN NEW ORDERS BE SENT TO [facility].

## 2019-09-03 DIAGNOSIS — Z99.89 BIPAP (BIPHASIC POSITIVE AIRWAY PRESSURE) DEPENDENCE: ICD-10-CM

## 2019-09-03 DIAGNOSIS — I10 MALIGNANT HYPERTENSION: ICD-10-CM

## 2019-09-03 DIAGNOSIS — E11.9 DIABETES MELLITUS WITHOUT COMPLICATION (HCC): ICD-10-CM

## 2019-09-03 RX ORDER — GLIPIZIDE 5 MG/1
5 TABLET ORAL
Qty: 180 TABLET | Refills: 3 | Status: SHIPPED | OUTPATIENT
Start: 2019-09-03 | End: 2019-09-11

## 2019-09-11 ENCOUNTER — OFFICE VISIT (OUTPATIENT)
Dept: INTERNAL MEDICINE | Facility: CLINIC | Age: 64
End: 2019-09-11

## 2019-09-11 VITALS
SYSTOLIC BLOOD PRESSURE: 149 MMHG | TEMPERATURE: 98.6 F | WEIGHT: 315 LBS | HEIGHT: 76 IN | RESPIRATION RATE: 16 BRPM | DIASTOLIC BLOOD PRESSURE: 88 MMHG | BODY MASS INDEX: 38.36 KG/M2 | HEART RATE: 73 BPM | OXYGEN SATURATION: 99 %

## 2019-09-11 DIAGNOSIS — E03.9 ACQUIRED HYPOTHYROIDISM: ICD-10-CM

## 2019-09-11 DIAGNOSIS — N18.2 CRI (CHRONIC RENAL INSUFFICIENCY), STAGE 2 (MILD): ICD-10-CM

## 2019-09-11 DIAGNOSIS — E78.2 MIXED HYPERLIPIDEMIA: Primary | ICD-10-CM

## 2019-09-11 DIAGNOSIS — I10 ESSENTIAL HYPERTENSION: ICD-10-CM

## 2019-09-11 PROCEDURE — 90674 CCIIV4 VAC NO PRSV 0.5 ML IM: CPT | Performed by: INTERNAL MEDICINE

## 2019-09-11 PROCEDURE — 99214 OFFICE O/P EST MOD 30 MIN: CPT | Performed by: INTERNAL MEDICINE

## 2019-09-11 PROCEDURE — G0008 ADMIN INFLUENZA VIRUS VAC: HCPCS | Performed by: INTERNAL MEDICINE

## 2019-09-11 RX ORDER — LEVOTHYROXINE SODIUM 0.07 MG/1
75 TABLET ORAL DAILY
Qty: 30 TABLET | Refills: 11 | Status: SHIPPED | OUTPATIENT
Start: 2019-09-11 | End: 2020-04-30 | Stop reason: SDUPTHER

## 2019-09-11 RX ORDER — NEBIVOLOL 5 MG/1
5 TABLET ORAL DAILY
Qty: 90 TABLET | Refills: 3 | Status: SHIPPED | OUTPATIENT
Start: 2019-09-11 | End: 2019-09-13 | Stop reason: SDUPTHER

## 2019-09-11 NOTE — PROGRESS NOTES
Subjective     Patient ID: Andre Agosto is a 64 y.o. male. Patient is here for management of multiple medical problems.     Chief Complaint   Patient presents with   • Hypertension     paitent having elevated blood pressures, some shortness of breath   • Lab orders     patient would like to get some new blood work done   • Medication questions     patient has questions regarding Bystolic 5 mg     History of Present Illness   Pt not feeling well. Not sure if depression or something else.  Pain in left upper chest.   Pt under stress with building a house.  Sleeps all the time.  On Cpap.   Was doing well on last visit and now starting to doing worse.    Bystolic x 2 weeks. And feeling bad x weeks.            The following portions of the patient's history were reviewed and updated as appropriate: allergies, current medications, past family history, past medical history, past social history, past surgical history and problem list.    Review of Systems   Constitutional: Positive for fatigue. Negative for fever.   Cardiovascular: Positive for chest pain. Negative for palpitations and leg swelling.   All other systems reviewed and are negative.      Current Outpatient Medications:   •  Alcohol Swabs (ALCOHOL PREP) pads, 1 pad 4 (Four) Times a Day As Needed (bs)., Disp: 120 each, Rfl: 12  •  amLODIPine (NORVASC) 5 MG tablet, Take 1 tablet by mouth 2 (Two) Times a Day., Disp: 180 tablet, Rfl: 3  •  aspirin 81 MG tablet, Take 1 tablet by mouth Daily., Disp: 30 tablet, Rfl: 11  •  divalproex (DEPAKOTE) 250 MG DR tablet, Take 1 tablet by mouth 3 (Three) Times a Day. Take 2 in the AM 1 at noon and 2 in the pm. Per DR Clifford., Disp: 150 tablet, Rfl: 3  •  escitalopram (LEXAPRO) 20 MG tablet, Take 1 tablet by mouth Daily., Disp: 90 tablet, Rfl: 3  •  Evolocumab 140 MG/ML solution auto-injector, Inject 1 mL under the skin into the appropriate area as directed Every 14 (Fourteen) Days., Disp: 3 pen, Rfl: 3  •  glucose blood test  "strip, Use one to two times daily to check blood sugar, Disp: 100 each, Rfl: 12  •  glucose monitor monitoring kit, 1 each As Needed (bs)., Disp: 1 each, Rfl: 1  •  hydrALAZINE (APRESOLINE) 50 MG tablet, Take 1 tablet by mouth 3 (Three) Times a Day., Disp: 90 tablet, Rfl: 11  •  ketoconazole (NIZORAL) 2 % shampoo, Apply  topically to the appropriate area as directed 2 (Two) Times a Week., Disp: 12 mL, Rfl: 0  •  Lancets (FREESTYLE) lancets, 1 each by Other route As Needed (bs)., Disp: 120 each, Rfl: 12  •  losartan (COZAAR) 100 MG tablet, Take 1 tablet by mouth Daily., Disp: 90 tablet, Rfl: 3  •  lovastatin (MEVACOR) 40 MG tablet, Take 1 tablet by mouth Daily With Dinner., Disp: 90 tablet, Rfl: 3  •  Semaglutide (OZEMPIC) 0.25 or 0.5 MG/DOSE solution pen-injector, Inject 0.5 mg under the skin into the appropriate area as directed 1 (One) Time Per Week., Disp: 4 pen, Rfl: 3  •  levothyroxine (SYNTHROID) 75 MCG tablet, Take 1 tablet by mouth Daily., Disp: 30 tablet, Rfl: 11  •  nebivolol (BYSTOLIC) 5 MG tablet, Take 1 tablet by mouth Daily., Disp: 90 tablet, Rfl: 3    Objective      Blood pressure 149/88, pulse 73, temperature 98.6 °F (37 °C), temperature source Oral, resp. rate 16, height 193 cm (76\"), weight (!) 143 kg (315 lb 1.9 oz), SpO2 99 %.    Physical Exam     General Appearance:    Alert, cooperative, no distress, appears stated age   Head:    Normocephalic, without obvious abnormality, atraumatic   Eyes:    PERRL, conjunctiva/corneas clear, EOM's intact   Ears:    Normal TM's and external ear canals, both ears   Nose:   Nares normal, septum midline, mucosa normal, no drainage   or sinus tenderness   Throat:   Lips, mucosa, and tongue normal; teeth and gums normal   Neck:   Supple, symmetrical, trachea midline, no adenopathy;        thyroid:  No enlargement/tenderness/nodules; no carotid    bruit or JVD   Back:     Symmetric, no curvature, ROM normal, no CVA tenderness   Lungs:     Clear to auscultation " bilaterally, respirations unlabored   Chest wall:    No tenderness or deformity   Heart:    Regular rate and rhythm, S1 and S2 normal, no murmur,        rub or gallop   Abdomen:     Soft, non-tender, bowel sounds active all four quadrants,     no masses, no organomegaly   Extremities:   Extremities normal, atraumatic, no cyanosis or edema   Pulses:   2+ and symmetric all extremities   Skin:   Skin color, texture, turgor normal, no rashes or lesions   Lymph nodes:   Cervical, supraclavicular, and axillary nodes normal   Neurologic:   CNII-XII intact. Normal strength, sensation and reflexes       throughout      Results for orders placed or performed in visit on 07/12/19   Lipid Panel   Result Value Ref Range    Total Cholesterol 152 0 - 200 mg/dL    Triglycerides 231 (H) 0 - 150 mg/dL    HDL Cholesterol 37 (L) 40 - 60 mg/dL    LDL Cholesterol  69 0 - 100 mg/dL    VLDL Cholesterol 46.2 (H) 5 - 40 mg/dL    LDL/HDL Ratio 1.86    Vitamin B12   Result Value Ref Range    Vitamin B-12 635 211 - 946 pg/mL   Comprehensive Metabolic Panel   Result Value Ref Range    Glucose 98 65 - 99 mg/dL    BUN 14 8 - 23 mg/dL    Creatinine 1.38 (H) 0.76 - 1.27 mg/dL    Sodium 143 136 - 145 mmol/L    Potassium 3.8 3.5 - 5.2 mmol/L    Chloride 99 98 - 107 mmol/L    CO2 29.8 (H) 22.0 - 29.0 mmol/L    Calcium 9.5 8.6 - 10.5 mg/dL    Total Protein 7.9 6.0 - 8.5 g/dL    Albumin 4.30 3.50 - 5.20 g/dL    ALT (SGPT) 19 1 - 41 U/L    AST (SGOT) 20 1 - 40 U/L    Alkaline Phosphatase 54 39 - 117 U/L    Total Bilirubin 0.6 0.2 - 1.2 mg/dL    eGFR Non African Amer 52 (L) >60 mL/min/1.73    Globulin 3.6 gm/dL    A/G Ratio 1.2 g/dL    BUN/Creatinine Ratio 10.1 7.0 - 25.0    Anion Gap 14.2 5.0 - 15.0 mmol/L   TSH   Result Value Ref Range    TSH 1.710 0.270 - 4.200 mIU/mL   T4, Free   Result Value Ref Range    Free T4 1.11 0.93 - 1.70 ng/dL   Hemoglobin A1c   Result Value Ref Range    Hemoglobin A1C 5.70 (H) 4.80 - 5.60 %   MicroAlbumin, Urine, Random -  Urine, Clean Catch   Result Value Ref Range    Microalbumin, Urine 60.2 mg/dL   C-reactive Protein   Result Value Ref Range    C-Reactive Protein 1.24 (H) 0.00 - 0.50 mg/dL   CBC Auto Differential   Result Value Ref Range    WBC 9.40 3.40 - 10.80 10*3/mm3    RBC 5.46 4.14 - 5.80 10*6/mm3    Hemoglobin 16.2 13.0 - 17.7 g/dL    Hematocrit 50.8 37.5 - 51.0 %    MCV 93.0 79.0 - 97.0 fL    MCH 29.7 26.6 - 33.0 pg    MCHC 31.9 31.5 - 35.7 g/dL    RDW 14.2 12.3 - 15.4 %    RDW-SD 48.2 37.0 - 54.0 fl    MPV 11.4 6.0 - 12.0 fL    Platelets 232 140 - 450 10*3/mm3    Neutrophil % 62.9 42.7 - 76.0 %    Lymphocyte % 26.7 19.6 - 45.3 %    Monocyte % 6.2 5.0 - 12.0 %    Eosinophil % 3.2 0.3 - 6.2 %    Basophil % 0.6 0.0 - 1.5 %    Immature Grans % 0.4 0.0 - 0.5 %    Neutrophils, Absolute 5.91 1.70 - 7.00 10*3/mm3    Lymphocytes, Absolute 2.51 0.70 - 3.10 10*3/mm3    Monocytes, Absolute 0.58 0.10 - 0.90 10*3/mm3    Eosinophils, Absolute 0.30 0.00 - 0.40 10*3/mm3    Basophils, Absolute 0.06 0.00 - 0.20 10*3/mm3    Immature Grans, Absolute 0.04 0.00 - 0.05 10*3/mm3    nRBC 0.0 0.0 - 0.2 /100 WBC         Assessment/Plan       Andre was seen today for hypertension, lab orders and medication questions.    Diagnoses and all orders for this visit:    Mixed hyperlipidemia  -     TSH  -     T4, Free  -     Comprehensive Metabolic Panel  -     Vitamin B12  -     CBC & Differential  -     Lipid Panel    Essential hypertension  -     nebivolol (BYSTOLIC) 5 MG tablet; Take 1 tablet by mouth Daily.  -     Basic Metabolic Panel  -     TSH  -     T4, Free  -     Comprehensive Metabolic Panel  -     Vitamin B12  -     CBC & Differential  -     Lipid Panel    CRI (chronic renal insufficiency), stage 2 (mild)  -     TSH  -     T4, Free  -     Comprehensive Metabolic Panel  -     Vitamin B12  -     CBC & Differential  -     Lipid Panel    Acquired hypothyroidism  -     levothyroxine (SYNTHROID) 75 MCG tablet; Take 1 tablet by mouth Daily.    Other  orders  -     Flucelvax Quad=>4Years (PFS)      Return in about 2 weeks (around 9/25/2019).          There are no Patient Instructions on file for this visit.     Keven Bear MD    Assessment/Plan

## 2019-09-13 DIAGNOSIS — I10 ESSENTIAL HYPERTENSION: ICD-10-CM

## 2019-09-13 RX ORDER — NEBIVOLOL 5 MG/1
5 TABLET ORAL DAILY
Qty: 90 TABLET | Refills: 3 | Status: SHIPPED | OUTPATIENT
Start: 2019-09-13 | End: 2020-03-12

## 2019-10-10 ENCOUNTER — APPOINTMENT (OUTPATIENT)
Dept: LAB | Facility: HOSPITAL | Age: 64
End: 2019-10-10

## 2019-10-10 LAB
ALBUMIN SERPL-MCNC: 4.3 G/DL (ref 3.5–5.2)
ALBUMIN/GLOB SERPL: 1.5 G/DL
ALP SERPL-CCNC: 51 U/L (ref 39–117)
ALT SERPL W P-5'-P-CCNC: 21 U/L (ref 1–41)
ANION GAP SERPL CALCULATED.3IONS-SCNC: 9.9 MMOL/L (ref 5–15)
AST SERPL-CCNC: 19 U/L (ref 1–40)
BASOPHILS # BLD AUTO: 0.06 10*3/MM3 (ref 0–0.2)
BASOPHILS NFR BLD AUTO: 0.7 % (ref 0–1.5)
BILIRUB SERPL-MCNC: 0.5 MG/DL (ref 0.2–1.2)
BUN BLD-MCNC: 13 MG/DL (ref 8–23)
BUN/CREAT SERPL: 10.7 (ref 7–25)
CALCIUM SPEC-SCNC: 9.1 MG/DL (ref 8.6–10.5)
CHLORIDE SERPL-SCNC: 98 MMOL/L (ref 98–107)
CHOLEST SERPL-MCNC: 126 MG/DL (ref 0–200)
CO2 SERPL-SCNC: 33.1 MMOL/L (ref 22–29)
CREAT BLD-MCNC: 1.22 MG/DL (ref 0.76–1.27)
DEPRECATED RDW RBC AUTO: 44.7 FL (ref 37–54)
EOSINOPHIL # BLD AUTO: 0.26 10*3/MM3 (ref 0–0.4)
EOSINOPHIL NFR BLD AUTO: 3.2 % (ref 0.3–6.2)
ERYTHROCYTE [DISTWIDTH] IN BLOOD BY AUTOMATED COUNT: 14 % (ref 12.3–15.4)
GFR SERPL CREATININE-BSD FRML MDRD: 60 ML/MIN/1.73
GLOBULIN UR ELPH-MCNC: 2.9 GM/DL
GLUCOSE BLD-MCNC: 113 MG/DL (ref 65–99)
HCT VFR BLD AUTO: 47.7 % (ref 37.5–51)
HDLC SERPL-MCNC: 41 MG/DL (ref 40–60)
HGB BLD-MCNC: 16 G/DL (ref 13–17.7)
IMM GRANULOCYTES # BLD AUTO: 0.04 10*3/MM3 (ref 0–0.05)
IMM GRANULOCYTES NFR BLD AUTO: 0.5 % (ref 0–0.5)
LDLC SERPL CALC-MCNC: 39 MG/DL (ref 0–100)
LDLC/HDLC SERPL: 0.95 {RATIO}
LYMPHOCYTES # BLD AUTO: 2.43 10*3/MM3 (ref 0.7–3.1)
LYMPHOCYTES NFR BLD AUTO: 30.1 % (ref 19.6–45.3)
MCH RBC QN AUTO: 29.5 PG (ref 26.6–33)
MCHC RBC AUTO-ENTMCNC: 33.5 G/DL (ref 31.5–35.7)
MCV RBC AUTO: 87.8 FL (ref 79–97)
MONOCYTES # BLD AUTO: 0.56 10*3/MM3 (ref 0.1–0.9)
MONOCYTES NFR BLD AUTO: 6.9 % (ref 5–12)
NEUTROPHILS # BLD AUTO: 4.72 10*3/MM3 (ref 1.7–7)
NEUTROPHILS NFR BLD AUTO: 58.6 % (ref 42.7–76)
NRBC BLD AUTO-RTO: 0 /100 WBC (ref 0–0.2)
PLATELET # BLD AUTO: 224 10*3/MM3 (ref 140–450)
PMV BLD AUTO: 11.4 FL (ref 6–12)
POTASSIUM BLD-SCNC: 3.6 MMOL/L (ref 3.5–5.2)
PROT SERPL-MCNC: 7.2 G/DL (ref 6–8.5)
RBC # BLD AUTO: 5.43 10*6/MM3 (ref 4.14–5.8)
SODIUM BLD-SCNC: 141 MMOL/L (ref 136–145)
T4 FREE SERPL-MCNC: 1.11 NG/DL (ref 0.93–1.7)
TRIGL SERPL-MCNC: 230 MG/DL (ref 0–150)
TSH SERPL DL<=0.05 MIU/L-ACNC: 2.7 UIU/ML (ref 0.27–4.2)
VIT B12 BLD-MCNC: 709 PG/ML (ref 211–946)
VLDLC SERPL-MCNC: 46 MG/DL (ref 5–40)
WBC NRBC COR # BLD: 8.07 10*3/MM3 (ref 3.4–10.8)

## 2019-10-10 PROCEDURE — 84439 ASSAY OF FREE THYROXINE: CPT | Performed by: INTERNAL MEDICINE

## 2019-10-10 PROCEDURE — 82607 VITAMIN B-12: CPT | Performed by: INTERNAL MEDICINE

## 2019-10-10 PROCEDURE — 85025 COMPLETE CBC W/AUTO DIFF WBC: CPT | Performed by: INTERNAL MEDICINE

## 2019-10-10 PROCEDURE — 36415 COLL VENOUS BLD VENIPUNCTURE: CPT | Performed by: INTERNAL MEDICINE

## 2019-10-10 PROCEDURE — 80053 COMPREHEN METABOLIC PANEL: CPT | Performed by: INTERNAL MEDICINE

## 2019-10-10 PROCEDURE — 84443 ASSAY THYROID STIM HORMONE: CPT | Performed by: INTERNAL MEDICINE

## 2019-10-10 PROCEDURE — 80061 LIPID PANEL: CPT | Performed by: INTERNAL MEDICINE

## 2019-10-11 ENCOUNTER — OFFICE VISIT (OUTPATIENT)
Dept: INTERNAL MEDICINE | Facility: CLINIC | Age: 64
End: 2019-10-11

## 2019-10-11 VITALS
TEMPERATURE: 98.7 F | DIASTOLIC BLOOD PRESSURE: 87 MMHG | OXYGEN SATURATION: 95 % | WEIGHT: 315 LBS | SYSTOLIC BLOOD PRESSURE: 161 MMHG | HEIGHT: 76 IN | BODY MASS INDEX: 38.36 KG/M2 | HEART RATE: 65 BPM | RESPIRATION RATE: 16 BRPM

## 2019-10-11 DIAGNOSIS — E03.9 ACQUIRED HYPOTHYROIDISM: ICD-10-CM

## 2019-10-11 DIAGNOSIS — E78.2 MIXED HYPERLIPIDEMIA: ICD-10-CM

## 2019-10-11 DIAGNOSIS — Z12.5 ENCOUNTER FOR SCREENING FOR MALIGNANT NEOPLASM OF PROSTATE: ICD-10-CM

## 2019-10-11 DIAGNOSIS — I10 ESSENTIAL HYPERTENSION: Primary | ICD-10-CM

## 2019-10-11 PROCEDURE — 99213 OFFICE O/P EST LOW 20 MIN: CPT | Performed by: INTERNAL MEDICINE

## 2019-10-11 NOTE — PROGRESS NOTES
Subjective     Patient ID: Andre Agosto is a 64 y.o. male. Patient is here for management of multiple medical problems.     Chief Complaint   Patient presents with   • Hyperlipidemia     follow-up   • Hypertension     patient states he has stopped taking the Losartan     Hyperlipidemia   This is a chronic problem. The problem is controlled. Recent lipid tests were reviewed and are normal. Exacerbating diseases include chronic renal disease and diabetes. There are no known factors aggravating his hyperlipidemia. The current treatment provides moderate improvement of lipids. There are no compliance problems.    Hypertension   This is a chronic problem. The problem is unchanged. The problem is controlled. Associated symptoms include anxiety. There are no associated agents to hypertension. Risk factors for coronary artery disease include dyslipidemia, male gender, obesity and sedentary lifestyle. Current antihypertension treatment includes ACE inhibitors. The current treatment provides moderate improvement. Identifiable causes of hypertension include chronic renal disease.        The following portions of the patient's history were reviewed and updated as appropriate: allergies, current medications, past family history, past medical history, past social history, past surgical history and problem list.    Review of Systems   Constitutional: Positive for fatigue.   HENT: Positive for congestion.    Gastrointestinal: Negative for abdominal distention, abdominal pain and anal bleeding.   Psychiatric/Behavioral: Negative for self-injury and sleep disturbance. The patient is not nervous/anxious.    All other systems reviewed and are negative.      Current Outpatient Medications:   •  Alcohol Swabs (ALCOHOL PREP) pads, 1 pad 4 (Four) Times a Day As Needed (bs)., Disp: 120 each, Rfl: 12  •  amLODIPine (NORVASC) 5 MG tablet, Take 1 tablet by mouth 2 (Two) Times a Day., Disp: 180 tablet, Rfl: 3  •  aspirin 81 MG tablet, Take 1  "tablet by mouth Daily., Disp: 30 tablet, Rfl: 11  •  divalproex (DEPAKOTE) 250 MG DR tablet, Take 1 tablet by mouth 3 (Three) Times a Day. Take 2 in the AM 1 at noon and 2 in the pm. Per DR Clifford., Disp: 150 tablet, Rfl: 3  •  escitalopram (LEXAPRO) 20 MG tablet, Take 1 tablet by mouth Daily., Disp: 90 tablet, Rfl: 3  •  Evolocumab 140 MG/ML solution auto-injector, Inject 1 mL under the skin into the appropriate area as directed Every 14 (Fourteen) Days., Disp: 3 pen, Rfl: 3  •  glucose blood test strip, Use one to two times daily to check blood sugar, Disp: 100 each, Rfl: 12  •  glucose monitor monitoring kit, 1 each As Needed (bs)., Disp: 1 each, Rfl: 1  •  hydrALAZINE (APRESOLINE) 50 MG tablet, Take 1 tablet by mouth 3 (Three) Times a Day., Disp: 90 tablet, Rfl: 11  •  ketoconazole (NIZORAL) 2 % shampoo, Apply  topically to the appropriate area as directed 2 (Two) Times a Week., Disp: 12 mL, Rfl: 0  •  Lancets (FREESTYLE) lancets, 1 each by Other route As Needed (bs)., Disp: 120 each, Rfl: 12  •  levothyroxine (SYNTHROID) 75 MCG tablet, Take 1 tablet by mouth Daily., Disp: 30 tablet, Rfl: 11  •  losartan (COZAAR) 100 MG tablet, Take 1 tablet by mouth Daily., Disp: 90 tablet, Rfl: 3  •  lovastatin (MEVACOR) 40 MG tablet, Take 1 tablet by mouth Daily With Dinner., Disp: 90 tablet, Rfl: 3  •  nebivolol (BYSTOLIC) 5 MG tablet, Take 1 tablet by mouth Daily., Disp: 90 tablet, Rfl: 3  •  Semaglutide (OZEMPIC) 0.25 or 0.5 MG/DOSE solution pen-injector, Inject 0.5 mg under the skin into the appropriate area as directed 1 (One) Time Per Week., Disp: 4 pen, Rfl: 3    Objective      Blood pressure 161/87, pulse 65, temperature 98.7 °F (37.1 °C), temperature source Oral, resp. rate 16, height 193 cm (76\"), weight (!) 144 kg (316 lb 12.8 oz), SpO2 95 %.    Physical Exam     General Appearance:    Alert, cooperative, no distress, appears stated age   Head:    Normocephalic, without obvious abnormality, atraumatic   Eyes:    " PERRL, conjunctiva/corneas clear, EOM's intact   Ears:    Normal TM's and external ear canals, both ears   Nose:   Nares normal, septum midline, mucosa normal, no drainage   or sinus tenderness   Throat:   Lips, mucosa, and tongue normal; teeth and gums normal   Neck:   Supple, symmetrical, trachea midline, no adenopathy;        thyroid:  No enlargement/tenderness/nodules; no carotid    bruit or JVD   Back:     Symmetric, no curvature, ROM normal, no CVA tenderness   Lungs:     Clear to auscultation bilaterally, respirations unlabored   Chest wall:    No tenderness or deformity   Heart:    Regular rate and rhythm, S1 and S2 normal, no murmur,        rub or gallop   Abdomen:     Soft, non-tender, bowel sounds active all four quadrants,     no masses, no organomegaly   Extremities:   Extremities normal, atraumatic, no cyanosis or edema   Pulses:   2+ and symmetric all extremities   Skin:   Skin color, texture, turgor normal, no rashes or lesions   Lymph nodes:   Cervical, supraclavicular, and axillary nodes normal   Neurologic:   CNII-XII intact. Normal strength, sensation and reflexes       throughout      Results for orders placed or performed in visit on 09/11/19   TSH   Result Value Ref Range    TSH 2.700 0.270 - 4.200 uIU/mL   T4, Free   Result Value Ref Range    Free T4 1.11 0.93 - 1.70 ng/dL   Comprehensive Metabolic Panel   Result Value Ref Range    Glucose 113 (H) 65 - 99 mg/dL    BUN 13 8 - 23 mg/dL    Creatinine 1.22 0.76 - 1.27 mg/dL    Sodium 141 136 - 145 mmol/L    Potassium 3.6 3.5 - 5.2 mmol/L    Chloride 98 98 - 107 mmol/L    CO2 33.1 (H) 22.0 - 29.0 mmol/L    Calcium 9.1 8.6 - 10.5 mg/dL    Total Protein 7.2 6.0 - 8.5 g/dL    Albumin 4.30 3.50 - 5.20 g/dL    ALT (SGPT) 21 1 - 41 U/L    AST (SGOT) 19 1 - 40 U/L    Alkaline Phosphatase 51 39 - 117 U/L    Total Bilirubin 0.5 0.2 - 1.2 mg/dL    eGFR Non African Amer 60 (L) >60 mL/min/1.73    Globulin 2.9 gm/dL    A/G Ratio 1.5 g/dL    BUN/Creatinine  Ratio 10.7 7.0 - 25.0    Anion Gap 9.9 5.0 - 15.0 mmol/L   Vitamin B12   Result Value Ref Range    Vitamin B-12 709 211 - 946 pg/mL   Lipid Panel   Result Value Ref Range    Total Cholesterol 126 0 - 200 mg/dL    Triglycerides 230 (H) 0 - 150 mg/dL    HDL Cholesterol 41 40 - 60 mg/dL    LDL Cholesterol  39 0 - 100 mg/dL    VLDL Cholesterol 46 (H) 5 - 40 mg/dL    LDL/HDL Ratio 0.95    CBC Auto Differential   Result Value Ref Range    WBC 8.07 3.40 - 10.80 10*3/mm3    RBC 5.43 4.14 - 5.80 10*6/mm3    Hemoglobin 16.0 13.0 - 17.7 g/dL    Hematocrit 47.7 37.5 - 51.0 %    MCV 87.8 79.0 - 97.0 fL    MCH 29.5 26.6 - 33.0 pg    MCHC 33.5 31.5 - 35.7 g/dL    RDW 14.0 12.3 - 15.4 %    RDW-SD 44.7 37.0 - 54.0 fl    MPV 11.4 6.0 - 12.0 fL    Platelets 224 140 - 450 10*3/mm3    Neutrophil % 58.6 42.7 - 76.0 %    Lymphocyte % 30.1 19.6 - 45.3 %    Monocyte % 6.9 5.0 - 12.0 %    Eosinophil % 3.2 0.3 - 6.2 %    Basophil % 0.7 0.0 - 1.5 %    Immature Grans % 0.5 0.0 - 0.5 %    Neutrophils, Absolute 4.72 1.70 - 7.00 10*3/mm3    Lymphocytes, Absolute 2.43 0.70 - 3.10 10*3/mm3    Monocytes, Absolute 0.56 0.10 - 0.90 10*3/mm3    Eosinophils, Absolute 0.26 0.00 - 0.40 10*3/mm3    Basophils, Absolute 0.06 0.00 - 0.20 10*3/mm3    Immature Grans, Absolute 0.04 0.00 - 0.05 10*3/mm3    nRBC 0.0 0.0 - 0.2 /100 WBC         Assessment/Plan     Off Levothroid for a few weeks prior to labs.  Other labs look great.        Andre was seen today for hyperlipidemia and hypertension.    Diagnoses and all orders for this visit:    Essential hypertension  -     Lipid Panel  -     CBC & Differential  -     Vitamin B12  -     Comprehensive Metabolic Panel  -     TSH  -     T4, Free  -     PSA Screen    Mixed hyperlipidemia  -     Lipid Panel  -     CBC & Differential  -     Vitamin B12  -     Comprehensive Metabolic Panel  -     TSH  -     T4, Free  -     PSA Screen    Acquired hypothyroidism  -     Lipid Panel  -     CBC & Differential  -     Vitamin  B12  -     Comprehensive Metabolic Panel  -     TSH  -     T4, Free  -     PSA Screen    Encounter for screening for malignant neoplasm of prostate   -     PSA Screen      Return in about 3 months (around 1/11/2020).          There are no Patient Instructions on file for this visit.     Keven Bear MD    Assessment/Plan

## 2019-10-21 RX ORDER — LOVASTATIN 40 MG/1
40 TABLET ORAL
Qty: 90 TABLET | Refills: 3 | Status: SHIPPED | OUTPATIENT
Start: 2019-10-21 | End: 2019-10-30 | Stop reason: SDUPTHER

## 2019-10-30 RX ORDER — LOVASTATIN 40 MG/1
40 TABLET ORAL
Qty: 90 TABLET | Refills: 3 | Status: SHIPPED | OUTPATIENT
Start: 2019-10-30 | End: 2020-03-31 | Stop reason: SDUPTHER

## 2019-12-05 DIAGNOSIS — G47.33 OSA ON CPAP: Primary | ICD-10-CM

## 2019-12-05 DIAGNOSIS — Z99.89 OSA ON CPAP: Primary | ICD-10-CM

## 2019-12-13 ENCOUNTER — OFFICE VISIT (OUTPATIENT)
Dept: INTERNAL MEDICINE | Facility: CLINIC | Age: 64
End: 2019-12-13

## 2019-12-13 VITALS
RESPIRATION RATE: 16 BRPM | OXYGEN SATURATION: 98 % | WEIGHT: 315 LBS | HEART RATE: 70 BPM | BODY MASS INDEX: 38.36 KG/M2 | SYSTOLIC BLOOD PRESSURE: 164 MMHG | HEIGHT: 76 IN | DIASTOLIC BLOOD PRESSURE: 97 MMHG | TEMPERATURE: 98.9 F

## 2019-12-13 DIAGNOSIS — E03.9 ACQUIRED HYPOTHYROIDISM: ICD-10-CM

## 2019-12-13 DIAGNOSIS — G47.33 OSA (OBSTRUCTIVE SLEEP APNEA): Primary | ICD-10-CM

## 2019-12-13 DIAGNOSIS — B35.4 TINEA CORPORIS: ICD-10-CM

## 2019-12-13 DIAGNOSIS — I10 ESSENTIAL HYPERTENSION: ICD-10-CM

## 2019-12-13 DIAGNOSIS — B35.6 TINEA CRURIS: ICD-10-CM

## 2019-12-13 PROCEDURE — 99214 OFFICE O/P EST MOD 30 MIN: CPT | Performed by: INTERNAL MEDICINE

## 2019-12-13 RX ORDER — TRIAMCINOLONE ACETONIDE 1 MG/G
1 CREAM TOPICAL 2 TIMES DAILY
COMMUNITY
End: 2019-12-13 | Stop reason: SDUPTHER

## 2019-12-13 RX ORDER — TRIAMCINOLONE ACETONIDE 1 MG/G
1 CREAM TOPICAL 2 TIMES DAILY
Qty: 80 G | Refills: 2 | Status: SHIPPED | OUTPATIENT
Start: 2019-12-13 | End: 2020-05-11

## 2019-12-13 NOTE — PROGRESS NOTES
Subjective     Patient ID: Andre Agosto is a 64 y.o. male. Patient is here for management of multiple medical problems.     Chief Complaint   Patient presents with   • Hypertension     follow-up, patient states he got good reports from the cardiologist and his double vision has gotten better.   • lab order questions     patient states 2 sets of lab orders put in for October, patient on one set done asking if he needs to have the second set done.     History of Present Illness     Groin with itching. And erythema in groin.      recent stress test. Ef 45%  Done in [facility].    Stopped honey buns.   Trigs up.     Still on cpap.          The following portions of the patient's history were reviewed and updated as appropriate: allergies, current medications, past family history, past medical history, past social history, past surgical history and problem list.    Review of Systems   Constitutional: Positive for fatigue.   Eyes: Negative for photophobia.   Respiratory: Negative for choking and chest tightness.    Genitourinary: Negative for penile pain.   Skin: Positive for color change and rash. Negative for wound.   Psychiatric/Behavioral: Negative for self-injury and sleep disturbance. The patient is not nervous/anxious and is not hyperactive.    All other systems reviewed and are negative.      Current Outpatient Medications:   •  Alcohol Swabs (ALCOHOL PREP) pads, 1 pad 4 (Four) Times a Day As Needed (bs)., Disp: 120 each, Rfl: 12  •  amLODIPine (NORVASC) 5 MG tablet, Take 1 tablet by mouth 2 (Two) Times a Day., Disp: 180 tablet, Rfl: 3  •  divalproex (DEPAKOTE) 250 MG DR tablet, Take 1 tablet by mouth 3 (Three) Times a Day. Take 2 in the AM 1 at noon and 2 in the pm. Per DR Clifford., Disp: 150 tablet, Rfl: 3  •  escitalopram (LEXAPRO) 20 MG tablet, Take 1 tablet by mouth Daily., Disp: 90 tablet, Rfl: 3  •  Evolocumab 140 MG/ML solution auto-injector, Inject 1 mL under the skin into the appropriate area as directed  "Every 14 (Fourteen) Days., Disp: 3 pen, Rfl: 3  •  glucose blood test strip, Use one to two times daily to check blood sugar, Disp: 100 each, Rfl: 12  •  glucose monitor monitoring kit, 1 each As Needed (bs)., Disp: 1 each, Rfl: 1  •  hydrALAZINE (APRESOLINE) 50 MG tablet, Take 1 tablet by mouth 3 (Three) Times a Day., Disp: 90 tablet, Rfl: 11  •  ketoconazole (NIZORAL) 2 % shampoo, Apply  topically to the appropriate area as directed 2 (Two) Times a Week., Disp: 12 mL, Rfl: 0  •  Lancets (FREESTYLE) lancets, 1 each by Other route As Needed (bs)., Disp: 120 each, Rfl: 12  •  levothyroxine (SYNTHROID) 75 MCG tablet, Take 1 tablet by mouth Daily., Disp: 30 tablet, Rfl: 11  •  losartan (COZAAR) 100 MG tablet, Take 1 tablet by mouth Daily., Disp: 90 tablet, Rfl: 3  •  lovastatin (MEVACOR) 40 MG tablet, Take 1 tablet by mouth Daily With Dinner., Disp: 90 tablet, Rfl: 3  •  nebivolol (BYSTOLIC) 5 MG tablet, Take 1 tablet by mouth Daily., Disp: 90 tablet, Rfl: 3  •  Semaglutide (OZEMPIC) 0.25 or 0.5 MG/DOSE solution pen-injector, Inject 0.5 mg under the skin into the appropriate area as directed 1 (One) Time Per Week., Disp: 4 pen, Rfl: 3  •  triamcinolone (KENALOG) 0.1 % cream, Apply 1 application topically to the appropriate area as directed 2 (Two) Times a Day., Disp: 80 g, Rfl: 2  •  aspirin 81 MG tablet, Take 1 tablet by mouth Daily., Disp: 30 tablet, Rfl: 11    Objective      Blood pressure 164/97, pulse 70, temperature 98.9 °F (37.2 °C), temperature source Oral, resp. rate 16, height 193 cm (76\"), weight (!) 144 kg (317 lb 6.4 oz), SpO2 98 %.    Physical Exam     General Appearance:    Alert, cooperative, no distress, appears stated age   Head:    Normocephalic, without obvious abnormality, atraumatic   Eyes:    PERRL, conjunctiva/corneas clear, EOM's intact   Ears:    Normal TM's and external ear canals, both ears   Nose:   Nares normal, septum midline, mucosa normal, no drainage   or sinus tenderness   Throat:   " Lips, mucosa, and tongue normal; teeth and gums normal   Neck:   Supple, symmetrical, trachea midline, no adenopathy;        thyroid:  No enlargement/tenderness/nodules; no carotid    bruit or JVD   Back:     Symmetric, no curvature, ROM normal, no CVA tenderness   Lungs:     Clear to auscultation bilaterally, respirations unlabored   Chest wall:    No tenderness or deformity   Heart:    Regular rate and rhythm, S1 and S2 normal, no murmur,        rub or gallop   Abdomen:     Soft, non-tender, bowel sounds active all four quadrants,     no masses, no organomegaly   Extremities:   Extremities normal, atraumatic, no cyanosis or edema   Pulses:   2+ and symmetric all extremities   Skin:   Skin color, texture, turgor normal, no rashes or lesions   Lymph nodes:   Cervical, supraclavicular, and axillary nodes normal   Neurologic:   CNII-XII intact. Normal strength, sensation and reflexes       throughout      Results for orders placed or performed in visit on 09/11/19   TSH   Result Value Ref Range    TSH 2.700 0.270 - 4.200 uIU/mL   T4, Free   Result Value Ref Range    Free T4 1.11 0.93 - 1.70 ng/dL   Comprehensive Metabolic Panel   Result Value Ref Range    Glucose 113 (H) 65 - 99 mg/dL    BUN 13 8 - 23 mg/dL    Creatinine 1.22 0.76 - 1.27 mg/dL    Sodium 141 136 - 145 mmol/L    Potassium 3.6 3.5 - 5.2 mmol/L    Chloride 98 98 - 107 mmol/L    CO2 33.1 (H) 22.0 - 29.0 mmol/L    Calcium 9.1 8.6 - 10.5 mg/dL    Total Protein 7.2 6.0 - 8.5 g/dL    Albumin 4.30 3.50 - 5.20 g/dL    ALT (SGPT) 21 1 - 41 U/L    AST (SGOT) 19 1 - 40 U/L    Alkaline Phosphatase 51 39 - 117 U/L    Total Bilirubin 0.5 0.2 - 1.2 mg/dL    eGFR Non African Amer 60 (L) >60 mL/min/1.73    Globulin 2.9 gm/dL    A/G Ratio 1.5 g/dL    BUN/Creatinine Ratio 10.7 7.0 - 25.0    Anion Gap 9.9 5.0 - 15.0 mmol/L   Vitamin B12   Result Value Ref Range    Vitamin B-12 709 211 - 946 pg/mL   Lipid Panel   Result Value Ref Range    Total Cholesterol 126 0 - 200 mg/dL     Triglycerides 230 (H) 0 - 150 mg/dL    HDL Cholesterol 41 40 - 60 mg/dL    LDL Cholesterol  39 0 - 100 mg/dL    VLDL Cholesterol 46 (H) 5 - 40 mg/dL    LDL/HDL Ratio 0.95    CBC Auto Differential   Result Value Ref Range    WBC 8.07 3.40 - 10.80 10*3/mm3    RBC 5.43 4.14 - 5.80 10*6/mm3    Hemoglobin 16.0 13.0 - 17.7 g/dL    Hematocrit 47.7 37.5 - 51.0 %    MCV 87.8 79.0 - 97.0 fL    MCH 29.5 26.6 - 33.0 pg    MCHC 33.5 31.5 - 35.7 g/dL    RDW 14.0 12.3 - 15.4 %    RDW-SD 44.7 37.0 - 54.0 fl    MPV 11.4 6.0 - 12.0 fL    Platelets 224 140 - 450 10*3/mm3    Neutrophil % 58.6 42.7 - 76.0 %    Lymphocyte % 30.1 19.6 - 45.3 %    Monocyte % 6.9 5.0 - 12.0 %    Eosinophil % 3.2 0.3 - 6.2 %    Basophil % 0.7 0.0 - 1.5 %    Immature Grans % 0.5 0.0 - 0.5 %    Neutrophils, Absolute 4.72 1.70 - 7.00 10*3/mm3    Lymphocytes, Absolute 2.43 0.70 - 3.10 10*3/mm3    Monocytes, Absolute 0.56 0.10 - 0.90 10*3/mm3    Eosinophils, Absolute 0.26 0.00 - 0.40 10*3/mm3    Basophils, Absolute 0.06 0.00 - 0.20 10*3/mm3    Immature Grans, Absolute 0.04 0.00 - 0.05 10*3/mm3    nRBC 0.0 0.0 - 0.2 /100 WBC         Assessment/Plan     Labs need done.      Andre was seen today for hypertension and lab order questions.    Diagnoses and all orders for this visit:    ALEX (obstructive sleep apnea)  -     Ambulatory Referral to Neurology    Tinea cruris  Start znic oxide.    Tinea corporis  Start zinc oxide.  Essential hypertension    Acquired hypothyroidism  Get labs.    Other orders  -     triamcinolone (KENALOG) 0.1 % cream; Apply 1 application topically to the appropriate area as directed 2 (Two) Times a Day.      Return in about 3 months (around 3/13/2020).          There are no Patient Instructions on file for this visit.     Keven Bear MD    Assessment/Plan

## 2020-01-01 ENCOUNTER — APPOINTMENT (OUTPATIENT)
Dept: GENERAL RADIOLOGY | Facility: HOSPITAL | Age: 65
End: 2020-01-01

## 2020-01-01 ENCOUNTER — HOSPITAL ENCOUNTER (INPATIENT)
Facility: HOSPITAL | Age: 65
LOS: 1 days | Discharge: HOME | End: 2020-01-04
Attending: EMERGENCY MEDICINE | Admitting: HOSPITALIST

## 2020-01-01 DIAGNOSIS — J18.9 PNEUMONIA OF RIGHT LOWER LOBE DUE TO INFECTIOUS ORGANISM: Primary | ICD-10-CM

## 2020-01-01 DIAGNOSIS — R09.02 HYPOXIA: ICD-10-CM

## 2020-01-01 PROBLEM — G47.33 OSA (OBSTRUCTIVE SLEEP APNEA): Status: ACTIVE | Noted: 2020-01-01

## 2020-01-01 PROBLEM — Z86.73 HISTORY OF CVA (CEREBROVASCULAR ACCIDENT): Status: ACTIVE | Noted: 2020-01-01

## 2020-01-01 LAB
ALBUMIN SERPL-MCNC: 4.2 G/DL (ref 3.5–5.2)
ALBUMIN/GLOB SERPL: 0.9 G/DL
ALP SERPL-CCNC: 58 U/L (ref 39–117)
ALT SERPL W P-5'-P-CCNC: 17 U/L (ref 1–41)
ANION GAP SERPL CALCULATED.3IONS-SCNC: 14 MMOL/L (ref 5–15)
AST SERPL-CCNC: 20 U/L (ref 1–40)
BASOPHILS # BLD AUTO: 0.06 10*3/MM3 (ref 0–0.2)
BASOPHILS NFR BLD AUTO: 0.8 % (ref 0–1.5)
BILIRUB SERPL-MCNC: 0.5 MG/DL (ref 0.2–1.2)
BUN BLD-MCNC: 12 MG/DL (ref 8–23)
BUN/CREAT SERPL: 10.1 (ref 7–25)
CALCIUM SPEC-SCNC: 9.5 MG/DL (ref 8.6–10.5)
CHLORIDE SERPL-SCNC: 97 MMOL/L (ref 98–107)
CO2 SERPL-SCNC: 28 MMOL/L (ref 22–29)
CREAT BLD-MCNC: 1.19 MG/DL (ref 0.76–1.27)
D-LACTATE SERPL-SCNC: 1.8 MMOL/L (ref 0.5–2)
DEPRECATED RDW RBC AUTO: 47 FL (ref 37–54)
EOSINOPHIL # BLD AUTO: 0.26 10*3/MM3 (ref 0–0.4)
EOSINOPHIL NFR BLD AUTO: 3.6 % (ref 0.3–6.2)
ERYTHROCYTE [DISTWIDTH] IN BLOOD BY AUTOMATED COUNT: 14.2 % (ref 12.3–15.4)
FLUAV AG NPH QL: NEGATIVE
FLUBV AG NPH QL IA: NEGATIVE
GFR SERPL CREATININE-BSD FRML MDRD: 62 ML/MIN/1.73
GLOBULIN UR ELPH-MCNC: 4.8 GM/DL
GLUCOSE BLD-MCNC: 97 MG/DL (ref 65–99)
HCT VFR BLD AUTO: 49.4 % (ref 37.5–51)
HGB BLD-MCNC: 16.2 G/DL (ref 13–17.7)
IMM GRANULOCYTES # BLD AUTO: 0.02 10*3/MM3 (ref 0–0.05)
IMM GRANULOCYTES NFR BLD AUTO: 0.3 % (ref 0–0.5)
LYMPHOCYTES # BLD AUTO: 1.55 10*3/MM3 (ref 0.7–3.1)
LYMPHOCYTES NFR BLD AUTO: 21.3 % (ref 19.6–45.3)
MCH RBC QN AUTO: 29.7 PG (ref 26.6–33)
MCHC RBC AUTO-ENTMCNC: 32.8 G/DL (ref 31.5–35.7)
MCV RBC AUTO: 90.6 FL (ref 79–97)
MONOCYTES # BLD AUTO: 0.72 10*3/MM3 (ref 0.1–0.9)
MONOCYTES NFR BLD AUTO: 9.9 % (ref 5–12)
NEUTROPHILS # BLD AUTO: 4.68 10*3/MM3 (ref 1.7–7)
NEUTROPHILS NFR BLD AUTO: 64.1 % (ref 42.7–76)
NRBC BLD AUTO-RTO: 0 /100 WBC (ref 0–0.2)
NT-PROBNP SERPL-MCNC: 383.1 PG/ML (ref 5–900)
PLATELET # BLD AUTO: 189 10*3/MM3 (ref 140–450)
PMV BLD AUTO: 11 FL (ref 6–12)
POTASSIUM BLD-SCNC: 3.4 MMOL/L (ref 3.5–5.2)
PROT SERPL-MCNC: 9 G/DL (ref 6–8.5)
RBC # BLD AUTO: 5.45 10*6/MM3 (ref 4.14–5.8)
SODIUM BLD-SCNC: 139 MMOL/L (ref 136–145)
TROPONIN T SERPL-MCNC: <0.01 NG/ML (ref 0–0.03)
WBC NRBC COR # BLD: 7.29 10*3/MM3 (ref 3.4–10.8)

## 2020-01-01 PROCEDURE — 25010000002 CEFTRIAXONE PER 250 MG: Performed by: EMERGENCY MEDICINE

## 2020-01-01 PROCEDURE — 84484 ASSAY OF TROPONIN QUANT: CPT | Performed by: EMERGENCY MEDICINE

## 2020-01-01 PROCEDURE — 87804 INFLUENZA ASSAY W/OPTIC: CPT | Performed by: EMERGENCY MEDICINE

## 2020-01-01 PROCEDURE — 87076 CULTURE ANAEROBE IDENT EACH: CPT | Performed by: EMERGENCY MEDICINE

## 2020-01-01 PROCEDURE — 80053 COMPREHEN METABOLIC PANEL: CPT | Performed by: EMERGENCY MEDICINE

## 2020-01-01 PROCEDURE — 94799 UNLISTED PULMONARY SVC/PX: CPT

## 2020-01-01 PROCEDURE — G0378 HOSPITAL OBSERVATION PER HR: HCPCS

## 2020-01-01 PROCEDURE — 83880 ASSAY OF NATRIURETIC PEPTIDE: CPT | Performed by: EMERGENCY MEDICINE

## 2020-01-01 PROCEDURE — 87040 BLOOD CULTURE FOR BACTERIA: CPT | Performed by: EMERGENCY MEDICINE

## 2020-01-01 PROCEDURE — 87185 SC STD ENZYME DETCJ PER NZM: CPT | Performed by: EMERGENCY MEDICINE

## 2020-01-01 PROCEDURE — 85025 COMPLETE CBC W/AUTO DIFF WBC: CPT | Performed by: EMERGENCY MEDICINE

## 2020-01-01 PROCEDURE — 71045 X-RAY EXAM CHEST 1 VIEW: CPT

## 2020-01-01 PROCEDURE — 93005 ELECTROCARDIOGRAM TRACING: CPT | Performed by: EMERGENCY MEDICINE

## 2020-01-01 PROCEDURE — 99284 EMERGENCY DEPT VISIT MOD MDM: CPT

## 2020-01-01 PROCEDURE — 83605 ASSAY OF LACTIC ACID: CPT | Performed by: EMERGENCY MEDICINE

## 2020-01-01 RX ORDER — POTASSIUM CHLORIDE 1.5 G/1.77G
40 POWDER, FOR SOLUTION ORAL AS NEEDED
Status: DISCONTINUED | OUTPATIENT
Start: 2020-01-01 | End: 2020-01-04 | Stop reason: HOSPADM

## 2020-01-01 RX ORDER — NEBIVOLOL 5 MG/1
5 TABLET ORAL DAILY
Status: DISCONTINUED | OUTPATIENT
Start: 2020-01-02 | End: 2020-01-04 | Stop reason: HOSPADM

## 2020-01-01 RX ORDER — ATORVASTATIN CALCIUM 10 MG/1
10 TABLET, FILM COATED ORAL NIGHTLY
Status: DISCONTINUED | OUTPATIENT
Start: 2020-01-01 | End: 2020-01-04 | Stop reason: HOSPADM

## 2020-01-01 RX ORDER — SODIUM CHLORIDE 0.9 % (FLUSH) 0.9 %
10 SYRINGE (ML) INJECTION AS NEEDED
Status: DISCONTINUED | OUTPATIENT
Start: 2020-01-01 | End: 2020-01-04 | Stop reason: HOSPADM

## 2020-01-01 RX ORDER — NICOTINE POLACRILEX 4 MG
15 LOZENGE BUCCAL
Status: DISCONTINUED | OUTPATIENT
Start: 2020-01-01 | End: 2020-01-04 | Stop reason: HOSPADM

## 2020-01-01 RX ORDER — HYDRALAZINE HYDROCHLORIDE 50 MG/1
50 TABLET, FILM COATED ORAL NIGHTLY
Status: DISCONTINUED | OUTPATIENT
Start: 2020-01-02 | End: 2020-01-02

## 2020-01-01 RX ORDER — HYDRALAZINE HYDROCHLORIDE 50 MG/1
100 TABLET, FILM COATED ORAL EVERY MORNING
Status: DISCONTINUED | OUTPATIENT
Start: 2020-01-02 | End: 2020-01-04 | Stop reason: HOSPADM

## 2020-01-01 RX ORDER — POTASSIUM CHLORIDE 7.45 MG/ML
10 INJECTION INTRAVENOUS
Status: DISCONTINUED | OUTPATIENT
Start: 2020-01-01 | End: 2020-01-04 | Stop reason: HOSPADM

## 2020-01-01 RX ORDER — METHYLPREDNISOLONE SODIUM SUCCINATE 40 MG/ML
40 INJECTION, POWDER, LYOPHILIZED, FOR SOLUTION INTRAMUSCULAR; INTRAVENOUS ONCE
Status: COMPLETED | OUTPATIENT
Start: 2020-01-02 | End: 2020-01-02

## 2020-01-01 RX ORDER — ACETAMINOPHEN 650 MG/1
650 SUPPOSITORY RECTAL EVERY 4 HOURS PRN
Status: DISCONTINUED | OUTPATIENT
Start: 2020-01-01 | End: 2020-01-04 | Stop reason: HOSPADM

## 2020-01-01 RX ORDER — LEVOTHYROXINE SODIUM 0.07 MG/1
75 TABLET ORAL DAILY
Status: DISCONTINUED | OUTPATIENT
Start: 2020-01-02 | End: 2020-01-04 | Stop reason: HOSPADM

## 2020-01-01 RX ORDER — ACETAMINOPHEN 325 MG/1
650 TABLET ORAL ONCE
Status: COMPLETED | OUTPATIENT
Start: 2020-01-02 | End: 2020-01-02

## 2020-01-01 RX ORDER — ACETAMINOPHEN 160 MG/5ML
650 SOLUTION ORAL EVERY 4 HOURS PRN
Status: DISCONTINUED | OUTPATIENT
Start: 2020-01-01 | End: 2020-01-04 | Stop reason: HOSPADM

## 2020-01-01 RX ORDER — DIVALPROEX SODIUM 500 MG/1
500 TABLET, DELAYED RELEASE ORAL EVERY 12 HOURS SCHEDULED
Status: DISCONTINUED | OUTPATIENT
Start: 2020-01-02 | End: 2020-01-02

## 2020-01-01 RX ORDER — DIVALPROEX SODIUM 250 MG/1
250 TABLET, DELAYED RELEASE ORAL DAILY
Status: DISCONTINUED | OUTPATIENT
Start: 2020-01-02 | End: 2020-01-04 | Stop reason: HOSPADM

## 2020-01-01 RX ORDER — TRIAMCINOLONE ACETONIDE 1 MG/G
1 CREAM TOPICAL 2 TIMES DAILY
Status: DISCONTINUED | OUTPATIENT
Start: 2020-01-02 | End: 2020-01-04 | Stop reason: HOSPADM

## 2020-01-01 RX ORDER — DEXTROSE MONOHYDRATE 25 G/50ML
25 INJECTION, SOLUTION INTRAVENOUS
Status: DISCONTINUED | OUTPATIENT
Start: 2020-01-01 | End: 2020-01-04 | Stop reason: HOSPADM

## 2020-01-01 RX ORDER — ASPIRIN 81 MG/1
81 TABLET ORAL DAILY
Status: DISCONTINUED | OUTPATIENT
Start: 2020-01-02 | End: 2020-01-04 | Stop reason: HOSPADM

## 2020-01-01 RX ORDER — IPRATROPIUM BROMIDE AND ALBUTEROL SULFATE 2.5; .5 MG/3ML; MG/3ML
3 SOLUTION RESPIRATORY (INHALATION) ONCE
Status: COMPLETED | OUTPATIENT
Start: 2020-01-01 | End: 2020-01-01

## 2020-01-01 RX ORDER — LOSARTAN POTASSIUM 50 MG/1
100 TABLET ORAL DAILY
Status: DISCONTINUED | OUTPATIENT
Start: 2020-01-02 | End: 2020-01-03

## 2020-01-01 RX ORDER — AMLODIPINE BESYLATE 10 MG/1
5 TABLET ORAL 2 TIMES DAILY
Status: DISCONTINUED | OUTPATIENT
Start: 2020-01-02 | End: 2020-01-04 | Stop reason: HOSPADM

## 2020-01-01 RX ORDER — HYDRALAZINE HYDROCHLORIDE 50 MG/1
50 TABLET, FILM COATED ORAL 3 TIMES DAILY
Status: DISCONTINUED | OUTPATIENT
Start: 2020-01-02 | End: 2020-01-01

## 2020-01-01 RX ORDER — POTASSIUM CHLORIDE 750 MG/1
40 CAPSULE, EXTENDED RELEASE ORAL AS NEEDED
Status: DISCONTINUED | OUTPATIENT
Start: 2020-01-01 | End: 2020-01-04 | Stop reason: HOSPADM

## 2020-01-01 RX ORDER — ACETAMINOPHEN 325 MG/1
650 TABLET ORAL EVERY 4 HOURS PRN
Status: DISCONTINUED | OUTPATIENT
Start: 2020-01-01 | End: 2020-01-04 | Stop reason: HOSPADM

## 2020-01-01 RX ORDER — ESCITALOPRAM OXALATE 10 MG/1
20 TABLET ORAL DAILY
Status: DISCONTINUED | OUTPATIENT
Start: 2020-01-02 | End: 2020-01-04 | Stop reason: HOSPADM

## 2020-01-01 RX ORDER — DIVALPROEX SODIUM 250 MG/1
250 TABLET, DELAYED RELEASE ORAL 3 TIMES DAILY
Status: DISCONTINUED | OUTPATIENT
Start: 2020-01-02 | End: 2020-01-01

## 2020-01-01 RX ORDER — HEPARIN SODIUM 5000 [USP'U]/ML
5000 INJECTION, SOLUTION INTRAVENOUS; SUBCUTANEOUS EVERY 12 HOURS SCHEDULED
Status: DISCONTINUED | OUTPATIENT
Start: 2020-01-01 | End: 2020-01-04 | Stop reason: HOSPADM

## 2020-01-01 RX ORDER — IPRATROPIUM BROMIDE AND ALBUTEROL SULFATE 2.5; .5 MG/3ML; MG/3ML
3 SOLUTION RESPIRATORY (INHALATION)
Status: DISCONTINUED | OUTPATIENT
Start: 2020-01-01 | End: 2020-01-03

## 2020-01-01 RX ORDER — SODIUM CHLORIDE 0.9 % (FLUSH) 0.9 %
10 SYRINGE (ML) INJECTION EVERY 12 HOURS SCHEDULED
Status: DISCONTINUED | OUTPATIENT
Start: 2020-01-01 | End: 2020-01-04 | Stop reason: HOSPADM

## 2020-01-01 RX ADMIN — CEFTRIAXONE 1 G: 1 INJECTION, POWDER, FOR SOLUTION INTRAMUSCULAR; INTRAVENOUS at 18:29

## 2020-01-01 RX ADMIN — IPRATROPIUM BROMIDE AND ALBUTEROL SULFATE 3 ML: 2.5; .5 SOLUTION RESPIRATORY (INHALATION) at 21:27

## 2020-01-01 NOTE — ED PROVIDER NOTES
EMERGENCY DEPARTMENT ENCOUNTER      Pt Name: Andre Agosto  MRN: [id number]  YOB: 1955  Date of evaluation: 1/1/2020  Provider: Tonio Bennett DO    CHIEF COMPLAINT       Chief Complaint   Patient presents with   • Pneumonia         HISTORY OF PRESENT ILLNESS  (Location/Symptom, Timing/Onset, Context/Setting, Quality, Duration, Modifying Factors, Severity.)   Andre Agosto is a 65 y.o. male who presents to the emergency department with c/o pneumonia and influenza A. The patient has had shortness of breath, fever, and a cough for the past 3 days so he presented to an urgent care in [place], [place] today. After some testing and images he was diagnosed with pneumonia and influenza A and advised to present to the ED for further evaluation. The patient reports that he received his pneumonia shot 2 years ago and received his flu shot in the later part of 2019. He denies nausea, vomiting, and diarrhea. The patient has not had any recent travelling or sick contacts. He is allergic to statin medication. The patient has a past medical history of myocardial infarction, stroke, hypertension, 6th nerve palsy, meningitis, CAD, focal seizure, sleep apnea, diabetes mellitus, and hyperlipidemia. He has a surgical history including coronary stent placement, laparoscopic gastric banding, umbilical hernia repair, and cardiac catheterization. There are no other acute complaints at this time.    Nursing notes were reviewed.    REVIEW OF SYSTEMS    (2-9 systems for level 4, 10 or more for level 5)   ROS:  General:  + fevers, no chills, no weakness  Cardiovascular:  No chest pain, no palpitations  Respiratory: + shortness of breath, + cough, no wheezing  Gastrointestinal:  No pain, no nausea, no vomiting, no diarrhea  Musculoskeletal:  No muscle pain, no joint pain  Skin:  No rash, no easy bruising  Neurologic:  No speech problems, no headache, no extremity numbness, no extremity tingling, no extremity  weakness  Psychiatric:  No anxiety  Genitourinary:  No dysuria, no hematuria    Except as noted above the remainder of the review of systems was reviewed and negative.       PAST MEDICAL HISTORY     Past Medical History:   Diagnosis Date   • 6th nerve palsy, right     right eye    • Anesthesia complication     ilius- after lap band surgery    • Anxiety and depression    • Coronary artery disease involving native coronary artery of native heart without angina pectoris 9/12/2018   • Diabetes mellitus (CMS/[facility])     doesnt check sugar    • Double vision    • Dyspepsia    • Epilepsy (CMS/[facility])    • Focal seizure (CMS/HCC)     of right arm    • Heart attack (CMS/HCC) 06/15/2016   • History of Helicobacter pylori infection     in 2008, treated w/ PrevPak   • HL (hearing loss)     Left ear child luevano measles   • Hyperlipidemia    • Hypertension    • Kidney stone     x2 imbedded   • Memory loss 2005    Meneigitis   • Meningitis     unsure of bacterial or viral    • Obesity    • Seizures (CMS/[facility])    • Sleep apnea     BiPAP compliant   • Sleep apnea treated with nocturnal BiPAP     compliant with  machine    • Stroke (CMS/[facility])    • Vision loss 7/2018    Double vision   • Wears glasses          SURGICAL HISTORY       Past Surgical History:   Procedure Laterality Date   • CARDIAC CATHETERIZATION N/A 10/12/2018    Procedure: Left Heart Cath;  Surgeon: Yoselin Johnson MD;  Location: [facility] CATH INVASIVE LOCATION;  Service: Cardiology   • COLONOSCOPY     • CORONARY STENT PLACEMENT      x1   • ENDOSCOPY     • LAPAROSCOPIC GASTRIC BANDING  2008    s/p LAGB Realize 6/2008 by HOMERO.   • UMBILICAL HERNIA REPAIR  2008    incarcerated UHR w/ mesh by Dr. Velasquez   • WISDOM TOOTH EXTRACTION      all 4         CURRENT MEDICATIONS     No current facility-administered medications for this encounter.     Current Outpatient Medications:   •  amLODIPine (NORVASC) 5 MG tablet, Take 1 tablet by mouth 2 (Two) Times a Day., Disp: 180 tablet,  Rfl: 3  •  aspirin 81 MG tablet, Take 81 mg by mouth 2 (Two) Times a Day., Disp: 30 tablet, Rfl: 11  •  cefdinir (OMNICEF) 300 MG capsule, Take 1 capsule by mouth 2 (Two) Times a Day., Disp: 14 capsule, Rfl: 0  •  divalproex (DEPAKOTE) 250 MG DR tablet, Take 1 tablet by mouth 3 (Three) Times a Day. Take 2 in the AM 1 at noon and 2 in the pm. Per DR Clifford. (Patient taking differently: Take 250 mg by mouth 5 (Five) Times a Day. Take 3 in the AM  2 in the pm. Per DR Clifford.), Disp: 150 tablet, Rfl: 3  •  doxycycline (DORYX) 100 MG enteric coated tablet, Take 1 tablet by mouth 2 (Two) Times a Day., Disp: 14 tablet, Rfl: 0  •  escitalopram (LEXAPRO) 20 MG tablet, Take 1 tablet by mouth Daily., Disp: 90 tablet, Rfl: 3  •  Evolocumab 140 MG/ML solution auto-injector, Inject 1 mL under the skin into the appropriate area as directed Every 14 (Fourteen) Days., Disp: 3 pen, Rfl: 3  •  hydrALAZINE (APRESOLINE) 50 MG tablet, Take 1 tablet by mouth 3 (Three) Times a Day. (Patient taking differently: Take 50 mg by mouth 3 (Three) Times a Day. Takes 2 in the morning  Takes 1 at night), Disp: 90 tablet, Rfl: 11  •  ipratropium-albuterol (DUO-NEB) 0.5-2.5 mg/3 ml nebulizer, Take 3 mL by nebulization Every 4 (Four) Hours As Needed for Wheezing., Disp: 120 vial, Rfl: 5  •  ketoconazole (NIZORAL) 2 % shampoo, Apply  topically to the appropriate area as directed 2 (Two) Times a Week., Disp: 12 mL, Rfl: 0  •  levothyroxine (SYNTHROID) 75 MCG tablet, Take 1 tablet by mouth Daily., Disp: 30 tablet, Rfl: 11  •  losartan (COZAAR) 100 MG tablet, Take 1 tablet by mouth Daily., Disp: 90 tablet, Rfl: 3  •  lovastatin (MEVACOR) 40 MG tablet, Take 1 tablet by mouth Daily With Dinner., Disp: 90 tablet, Rfl: 3  •  nebivolol (BYSTOLIC) 5 MG tablet, Take 1 tablet by mouth Daily., Disp: 90 tablet, Rfl: 3  •  saccharomyces boulardii (FLORASTOR) 250 MG capsule, Take 1 capsule by mouth 2 (Two) Times a Day., Disp: 30 capsule, Rfl: 0  •  Semaglutide (OZEMPIC)  0.25 or 0.5 MG/DOSE solution pen-injector, Inject 0.5 mg under the skin into the appropriate area as directed 1 (One) Time Per Week. (Patient taking differently: Inject 0.5 mg under the skin into the appropriate area as directed 1 (One) Time Per Week. On Sundays), Disp: 4 pen, Rfl: 3  •  triamcinolone (KENALOG) 0.1 % cream, Apply 1 application topically to the appropriate area as directed 2 (Two) Times a Day., Disp: 80 g, Rfl: 2    ALLERGIES     Statins    FAMILY HISTORY       Family History   Problem Relation Age of Onset   • Stroke Mother    • Hypertension Mother    • COPD Father    • Hypertension Father           SOCIAL HISTORY       Social History     Socioeconomic History   • Marital status:      Spouse name: Not on file   • Number of children: Not on file   • Years of education: Not on file   • Highest education level: Not on file   Tobacco Use   • Smoking status: Former Smoker     Packs/day: 0.50     Years: 10.00     Pack years: 5.00     Types: Cigarettes     Start date: 1975     Last attempt to quit: 1985     Years since quittin.0   • Smokeless tobacco: Never Used   Substance and Sexual Activity   • Alcohol use: No   • Drug use: No   • Sexual activity: Defer         PHYSICAL EXAM    (up to 7 for level 4, 8 or more for level 5)     Vitals:    20 0506 20 0736 20 0848 20 1129   BP: 139/80 155/94  148/88   BP Location: Left arm Left arm  Left arm   Patient Position: Lying Lying  Lying   Pulse: 57 67 69 65   Resp: 20 18 16    Temp: 97.1 °F (36.2 °C) 97 °F (36.1 °C)     TempSrc: Oral Oral     SpO2: 94% 93% 99%    Weight:       Height:           Physical Exam  General :Patient is awake, alert, oriented, in no acute distress, nontoxic appearing  HEENT: Pupils are equally round and reactive to light, EOMI, conjunctivae clear, sclerae white, there is no injection no icterus.  Oral mucosa is moist, no exudate. Uvula is midline  Neck: Neck is supple, full range of motion,  trachea midline. No meningeal signs.  Cardiac: Heart regular rate, rhythm, no murmurs, rubs, or gallops  Lungs: Lungs are clear to auscultation, there is no wheezing or rales. There is no use of accessory muscles. Scattered rhonchi in the bilateral lower lungs  Chest wall: There is no tenderness to palpation over the chest wall or over ribs  Abdomen: Abdomen is soft, nontender, nondistended. There is no firm or pulsatile masses, no rebound rigidity or guarding.   Musculoskeletal: 5 out of 5 strength in all 4 extremities.  No focal muscle deficits are appreciated. No peripheral edema.  Neuro: Motor intact, sensory intact, level of consciousness is normal  Dermatology: Skin is warm and dry  Psych: Mentation is grossly normal, cognition is grossly normal. Affect is appropriate.      DIAGNOSTIC RESULTS     EKG: All EKG's are interpreted by the Emergency Department Physician who either signs or Co-signs this chart in the absence of a cardiologist.    ECG 12 Lead   Final Result   Test Reason : cough, PNA   Blood Pressure : **/** mmHG   Vent. Rate : 078 BPM     Atrial Rate : 078 BPM      P-R Int : 188 ms          QRS Dur : 102 ms       QT Int : 380 ms       P-R-T Axes : 005 034 153 degrees      QTc Int : 433 ms      Normal sinus rhythm   T inversion I, avl, flattening II, V5, V6, avF   Q waves ant/sept leads, III   Abnormal ECG   No previous ECGs available   Confirmed by LEEANNA RICHARD MD ([id number]) on 1/1/2020 6:18:33 PM      Referred By:  edmd           Confirmed By:LEEANNA RICHARD MD          RADIOLOGY:   Non-plain film images such as CT, Ultrasound and MRI are read by the radiologist. Plain radiographic images are visualized and preliminarily interpreted by the emergency physician with the below findings:    X-Ray interpreted/read/independently visualized by me: findings of right lower lobe infiltrate, no pneumothorax, no pleural effusions noted.    [] Radiologist's Report Reviewed:  XR Chest 1 View   Final Result   1.  Cardiomegaly and mild pulmonary vascular congestion.   2. Mild nonspecific patchy disease in the right lung base.        DICTATED:   01/01/2020   EDITED/ls :   01/01/2020        This report was finalized on 1/1/2020 11:20 PM by Dr. Edmundo Hou MD.                ED BEDSIDE ULTRASOUND:   Performed by ED Physician - none    LABS:    I have reviewed and interpreted all of the currently available lab results from this visit (if applicable):  Results for orders placed or performed during the hospital encounter of 01/01/20   Influenza Antigen, Rapid - Swab, Nasopharynx   Result Value Ref Range    Influenza A Ag, EIA Negative Negative    Influenza B Ag, EIA Negative Negative   Blood Culture - Blood, Arm, Left   Result Value Ref Range    Gram Stain Anaerobic Bottle Gram positive bacilli    Blood Culture - Blood, Arm, Right   Result Value Ref Range    Blood Culture No growth at 5 days    Respiratory Culture - Sputum, Cough   Result Value Ref Range    Respiratory Culture Moderate growth (3+) Normal Respiratory Guerline     Gram Stain Rare (1+) WBCs per low power field     Gram Stain Rare (1+) Epithelial cells per low power field     Gram Stain       Few (2+) Mixed bacterial morphotypes seen on Gram Stain   Legionella Antigen, Urine - Urine, Urine, Clean Catch   Result Value Ref Range    LEGIONELLA ANTIGEN, URINE Negative Negative   S. Pneumo Ag Urine or CSF - Urine, Urine, Clean Catch   Result Value Ref Range    Strep Pneumo Ag Negative Negative   Respiratory Panel, PCR - Swab, Nasopharynx   Result Value Ref Range    ADENOVIRUS, PCR Not Detected Not Detected    Coronavirus 229E Not Detected Not Detected    Coronavirus HKU1 Not Detected Not Detected    Coronavirus NL63 Not Detected Not Detected    Coronavirus OC43 Not Detected Not Detected    Human Metapneumovirus Detected (A) Not Detected    Human Rhinovirus/Enterovirus Not Detected Not Detected    Influenza B PCR Not Detected Not Detected    Parainfluenza Virus 1 Not Detected  Not Detected    Parainfluenza Virus 2 Not Detected Not Detected    Parainfluenza Virus 3 Not Detected Not Detected    Parainfluenza Virus 4 Not Detected Not Detected    Bordetella pertussis pcr Not Detected Not Detected    Influenza A H1 2009 PCR Not Detected Not Detected    Chlamydophila pneumoniae PCR Not Detected Not Detected    Mycoplasma pneumo by PCR Not Detected Not Detected    Influenza A PCR Not Detected Not Detected    Influenza A H3 Not Detected Not Detected    Influenza A H1 Not Detected Not Detected    RSV, PCR Not Detected Not Detected    Bordetella parapertussis PCR Not Detected Not Detected   Comprehensive Metabolic Panel   Result Value Ref Range    Glucose 97 65 - 99 mg/dL    BUN 12 8 - 23 mg/dL    Creatinine 1.19 0.76 - 1.27 mg/dL    Sodium 139 136 - 145 mmol/L    Potassium 3.4 (L) 3.5 - 5.2 mmol/L    Chloride 97 (L) 98 - 107 mmol/L    CO2 28.0 22.0 - 29.0 mmol/L    Calcium 9.5 8.6 - 10.5 mg/dL    Total Protein 9.0 (H) 6.0 - 8.5 g/dL    Albumin 4.20 3.50 - 5.20 g/dL    ALT (SGPT) 17 1 - 41 U/L    AST (SGOT) 20 1 - 40 U/L    Alkaline Phosphatase 58 39 - 117 U/L    Total Bilirubin 0.5 0.2 - 1.2 mg/dL    eGFR Non African Amer 62 >60 mL/min/1.73    Globulin 4.8 gm/dL    A/G Ratio 0.9 g/dL    BUN/Creatinine Ratio 10.1 7.0 - 25.0    Anion Gap 14.0 5.0 - 15.0 mmol/L   Lactic Acid, Plasma   Result Value Ref Range    Lactate 1.8 0.5 - 2.0 mmol/L   Troponin   Result Value Ref Range    Troponin T <0.010 0.000 - 0.030 ng/mL   CBC Auto Differential   Result Value Ref Range    WBC 7.29 3.40 - 10.80 10*3/mm3    RBC 5.45 4.14 - 5.80 10*6/mm3    Hemoglobin 16.2 13.0 - 17.7 g/dL    Hematocrit 49.4 37.5 - 51.0 %    MCV 90.6 79.0 - 97.0 fL    MCH 29.7 26.6 - 33.0 pg    MCHC 32.8 31.5 - 35.7 g/dL    RDW 14.2 12.3 - 15.4 %    RDW-SD 47.0 37.0 - 54.0 fl    MPV 11.0 6.0 - 12.0 fL    Platelets 189 140 - 450 10*3/mm3    Neutrophil % 64.1 42.7 - 76.0 %    Lymphocyte % 21.3 19.6 - 45.3 %    Monocyte % 9.9 5.0 - 12.0 %     Eosinophil % 3.6 0.3 - 6.2 %    Basophil % 0.8 0.0 - 1.5 %    Immature Grans % 0.3 0.0 - 0.5 %    Neutrophils, Absolute 4.68 1.70 - 7.00 10*3/mm3    Lymphocytes, Absolute 1.55 0.70 - 3.10 10*3/mm3    Monocytes, Absolute 0.72 0.10 - 0.90 10*3/mm3    Eosinophils, Absolute 0.26 0.00 - 0.40 10*3/mm3    Basophils, Absolute 0.06 0.00 - 0.20 10*3/mm3    Immature Grans, Absolute 0.02 0.00 - 0.05 10*3/mm3    nRBC 0.0 0.0 - 0.2 /100 WBC   BNP   Result Value Ref Range    proBNP 383.1 5.0 - 900.0 pg/mL   CBC Auto Differential   Result Value Ref Range    WBC 6.05 3.40 - 10.80 10*3/mm3    RBC 5.24 4.14 - 5.80 10*6/mm3    Hemoglobin 15.9 13.0 - 17.7 g/dL    Hematocrit 49.6 37.5 - 51.0 %    MCV 94.7 79.0 - 97.0 fL    MCH 30.3 26.6 - 33.0 pg    MCHC 32.1 31.5 - 35.7 g/dL    RDW 14.3 12.3 - 15.4 %    RDW-SD 50.0 37.0 - 54.0 fl    MPV 10.8 6.0 - 12.0 fL    Platelets 174 140 - 450 10*3/mm3    Neutrophil % 77.4 (H) 42.7 - 76.0 %    Lymphocyte % 19.5 (L) 19.6 - 45.3 %    Monocyte % 2.1 (L) 5.0 - 12.0 %    Eosinophil % 0.2 (L) 0.3 - 6.2 %    Basophil % 0.5 0.0 - 1.5 %    Immature Grans % 0.3 0.0 - 0.5 %    Neutrophils, Absolute 4.68 1.70 - 7.00 10*3/mm3    Lymphocytes, Absolute 1.18 0.70 - 3.10 10*3/mm3    Monocytes, Absolute 0.13 0.10 - 0.90 10*3/mm3    Eosinophils, Absolute 0.01 0.00 - 0.40 10*3/mm3    Basophils, Absolute 0.03 0.00 - 0.20 10*3/mm3    Immature Grans, Absolute 0.02 0.00 - 0.05 10*3/mm3    nRBC 0.0 0.0 - 0.2 /100 WBC   Basic Metabolic Panel   Result Value Ref Range    Glucose 168 (H) 65 - 99 mg/dL    BUN 14 8 - 23 mg/dL    Creatinine 1.06 0.76 - 1.27 mg/dL    Sodium 138 136 - 145 mmol/L    Potassium 3.6 3.5 - 5.2 mmol/L    Chloride 99 98 - 107 mmol/L    CO2 25.0 22.0 - 29.0 mmol/L    Calcium 8.9 8.6 - 10.5 mg/dL    eGFR Non African Amer 71 >60 mL/min/1.73    BUN/Creatinine Ratio 13.2 7.0 - 25.0    Anion Gap 14.0 5.0 - 15.0 mmol/L   Hemoglobin A1c   Result Value Ref Range    Hemoglobin A1C 6.50 (H) 4.80 - 5.60 %   Potassium    Result Value Ref Range    Potassium 4.0 3.5 - 5.2 mmol/L   Basic Metabolic Panel   Result Value Ref Range    Glucose 114 (H) 65 - 99 mg/dL    BUN 18 8 - 23 mg/dL    Creatinine 0.98 0.76 - 1.27 mg/dL    Sodium 140 136 - 145 mmol/L    Potassium 3.5 3.5 - 5.2 mmol/L    Chloride 101 98 - 107 mmol/L    CO2 26.0 22.0 - 29.0 mmol/L    Calcium 8.7 8.6 - 10.5 mg/dL    eGFR Non African Amer 77 >60 mL/min/1.73    BUN/Creatinine Ratio 18.4 7.0 - 25.0    Anion Gap 13.0 5.0 - 15.0 mmol/L   POC Glucose Once   Result Value Ref Range    Glucose 155 (H) 70 - 130 mg/dL   POC Glucose Once   Result Value Ref Range    Glucose 202 (H) 70 - 130 mg/dL   POC Glucose Once   Result Value Ref Range    Glucose 122 70 - 130 mg/dL   POC Glucose Once   Result Value Ref Range    Glucose 180 (H) 70 - 130 mg/dL   POC Glucose Once   Result Value Ref Range    Glucose 122 70 - 130 mg/dL   POC Glucose Once   Result Value Ref Range    Glucose 102 70 - 130 mg/dL   POC Glucose Once   Result Value Ref Range    Glucose 101 70 - 130 mg/dL   POC Glucose Once   Result Value Ref Range    Glucose 130 70 - 130 mg/dL   POC Glucose Once   Result Value Ref Range    Glucose 110 70 - 130 mg/dL   POC Glucose Once   Result Value Ref Range    Glucose 108 70 - 130 mg/dL        All other labs were within normal range or not returned as of this dictation.      EMERGENCY DEPARTMENT COURSE and DIFFERENTIAL DIAGNOSIS/MDM:   Vitals:    Vitals:    01/04/20 0506 01/04/20 0736 01/04/20 0848 01/04/20 1129   BP: 139/80 155/94  148/88   BP Location: Left arm Left arm  Left arm   Patient Position: Lying Lying  Lying   Pulse: 57 67 69 65   Resp: 20 18 16    Temp: 97.1 °F (36.2 °C) 97 °F (36.1 °C)     TempSrc: Oral Oral     SpO2: 94% 93% 99%    Weight:       Height:           ED Course as of Jan 09 0932   Wed Jan 01, 2020   2109 Dr. Bennett has paged the hospitalist.    [BS]   Tue Jan 07, 2020   1708 I have a paper record of gram positive blood culture on 1/1, left arm. Patient had been  d/c from the hospital 1/4. I am unable to reach patient. This notification is not in the results for the ED ANEL. I spoke with Dr. Winter as this patient was under the hospitalist service, she will notify Dr. WHITEHEAD.     [WT]      ED Course User Index  [BS] Solis Pringle  [WT] Elisha Riggins, CARLY   !    Patient with cough congestion, intermittent fever/chills for last few days.  Was diagnosed with influenza, pneumonia from outside clinic sent here for further evaluation.  Does not appear acutely ill or toxic, no meningeal signs on examination, no hypoxia, is not requiring supplemental oxygenation.  We did obtain IV, labs, imaging with results reviewed as above.  Chest x-ray does reveal right lower lobe pneumonia.  On reevaluation at rest patient's oxygen saturation is 89 to 92%.  Feels he is progressively worsening.  Given the hypoxia with underlying pneumonia we did start the patient on 2 L nasal cannula, will plan for admission.        MEDICATIONS ADMINISTERED IN ED:  Medications   cefTRIAXone (ROCEPHIN) 1 g/100 mL 0.9% NS (MBP) (0 g Intravenous Stopped 1/1/20 2107)   ipratropium-albuterol (DUO-NEB) nebulizer solution 3 mL (3 mL Nebulization Given 1/1/20 2127)   acetaminophen (TYLENOL) tablet 650 mg (650 mg Oral Given 1/2/20 0009)   methylPREDNISolone sodium succinate (SOLU-Medrol) injection 40 mg (40 mg Intravenous Given 1/2/20 0006)       PROCEDURES:  Procedures    CRITICAL CARE TIME    Total Critical Care time was 0 minutes, excluding separately reportable procedures.   There was a high probability of clinically significant/life threatening deterioration in the patient's condition which required my urgent intervention.      FINAL IMPRESSION      1. Pneumonia of right lower lobe due to infectious organism (CMS/[facility])    2. Hypoxia          DISPOSITION/PLAN     ED Disposition     ED Disposition Condition Comment    Decision to Admit  Level of Care: Telemetry [5]   Diagnosis: Pneumonia of right lower lobe due to  infectious organism (CMS/[facility]) [[id number]]   Admitting Physician: FRANTZ ESTEBAN [[id number]]   Attending Physician: FRANTZ ESTEBAN [[id number]]            PATIENT REFERRED TO:  Keven Bear MD  [facility]. [address] [place] [address] [id number] [phone]      Patient to call for appnt to be seen in 1-2 wks, or sooner if needed      DISCHARGE MEDICATIONS:     Medication List      START taking these medications    cefdinir 300 MG capsule  Commonly known as:  OMNICEF  Take 1 capsule by mouth 2 (Two) Times a Day.     doxycycline 100 MG enteric coated tablet  Commonly known as:  DORYX  Take 1 tablet by mouth 2 (Two) Times a Day.     saccharomyces boulardii 250 MG capsule  Commonly known as:  FLORASTOR  Take 1 capsule by mouth 2 (Two) Times a Day.        CHANGE how you take these medications    divalproex 250 MG DR tablet  Commonly known as:  DEPAKOTE  Take 1 tablet by mouth 3 (Three) Times a Day. Take 2 in the AM 1 at noon   and 2 in the pm. Per DR Clifford.  What changed:    when to take this  additional instructions     hydrALAZINE 50 MG tablet  Commonly known as:  APRESOLINE  Take 1 tablet by mouth 3 (Three) Times a Day.  What changed:  additional instructions     Semaglutide(0.25 or 0.5MG/DOS) 2 MG/1.5ML solution pen-injector  Commonly known as:  OZEMPIC (0.25 OR 0.5 MG/DOSE)  Inject 0.5 mg under the skin into the appropriate area as directed 1 (One)   Time Per Week.  What changed:  additional instructions        CONTINUE taking these medications    amLODIPine 5 MG tablet  Commonly known as:  NORVASC  Take 1 tablet by mouth 2 (Two) Times a Day.     aspirin 81 MG tablet     escitalopram 20 MG tablet  Commonly known as:  LEXAPRO  Take 1 tablet by mouth Daily.     Evolocumab 140 MG/ML solution auto-injector  Inject 1 mL under the skin into the appropriate area as directed Every 14   (Fourteen) Days.     ketoconazole 2 % shampoo  Commonly known as:  NIZORAL  Apply  topically to the appropriate area as directed 2 (Two) Times a  Week.     levothyroxine 75 MCG tablet  Commonly known as:  SYNTHROID  Take 1 tablet by mouth Daily.     losartan 100 MG tablet  Commonly known as:  COZAAR  Take 1 tablet by mouth Daily.     lovastatin 40 MG tablet  Commonly known as:  MEVACOR  Take 1 tablet by mouth Daily With Dinner.     nebivolol 5 MG tablet  Commonly known as:  BYSTOLIC  Take 1 tablet by mouth Daily.     triamcinolone 0.1 % cream  Commonly known as:  KENALOG  Apply 1 application topically to the appropriate area as directed 2 (Two)   Times a Day.            Documentation assistance provided by Solis Pringle acting as scribe for Dr. Tonio Bennett.     The scribe's documentation has been prepared under my direction and personally reviewed by me in its entirety.  I confirm that the note above accurately reflects all work, treatment, procedures, and medical decision making performed by me.      Comment: Please note this report has been produced using speech recognition software.      Tonio Bennett DO  Attending Emergency Physician                 Solis Pringle  01/01/20 3289       Tonio Bennett DO  01/01/20 0529       Tonio Bennett DO  01/09/20 [id number]

## 2020-01-02 LAB
ANION GAP SERPL CALCULATED.3IONS-SCNC: 14 MMOL/L (ref 5–15)
B PARAPERT DNA SPEC QL NAA+PROBE: NOT DETECTED
B PERT DNA SPEC QL NAA+PROBE: NOT DETECTED
BASOPHILS # BLD AUTO: 0.03 10*3/MM3 (ref 0–0.2)
BASOPHILS NFR BLD AUTO: 0.5 % (ref 0–1.5)
BUN BLD-MCNC: 14 MG/DL (ref 8–23)
BUN/CREAT SERPL: 13.2 (ref 7–25)
C PNEUM DNA NPH QL NAA+NON-PROBE: NOT DETECTED
CALCIUM SPEC-SCNC: 8.9 MG/DL (ref 8.6–10.5)
CHLORIDE SERPL-SCNC: 99 MMOL/L (ref 98–107)
CO2 SERPL-SCNC: 25 MMOL/L (ref 22–29)
CREAT BLD-MCNC: 1.06 MG/DL (ref 0.76–1.27)
DEPRECATED RDW RBC AUTO: 50 FL (ref 37–54)
EOSINOPHIL # BLD AUTO: 0.01 10*3/MM3 (ref 0–0.4)
EOSINOPHIL NFR BLD AUTO: 0.2 % (ref 0.3–6.2)
ERYTHROCYTE [DISTWIDTH] IN BLOOD BY AUTOMATED COUNT: 14.3 % (ref 12.3–15.4)
FLUAV H1 2009 PAND RNA NPH QL NAA+PROBE: NOT DETECTED
FLUAV H1 HA GENE NPH QL NAA+PROBE: NOT DETECTED
FLUAV H3 RNA NPH QL NAA+PROBE: NOT DETECTED
FLUAV SUBTYP SPEC NAA+PROBE: NOT DETECTED
FLUBV RNA ISLT QL NAA+PROBE: NOT DETECTED
GFR SERPL CREATININE-BSD FRML MDRD: 71 ML/MIN/1.73
GLUCOSE BLD-MCNC: 168 MG/DL (ref 65–99)
GLUCOSE BLDC GLUCOMTR-MCNC: 122 MG/DL (ref 70–130)
GLUCOSE BLDC GLUCOMTR-MCNC: 155 MG/DL (ref 70–130)
GLUCOSE BLDC GLUCOMTR-MCNC: 180 MG/DL (ref 70–130)
GLUCOSE BLDC GLUCOMTR-MCNC: 202 MG/DL (ref 70–130)
HADV DNA SPEC NAA+PROBE: NOT DETECTED
HBA1C MFR BLD: 6.5 % (ref 4.8–5.6)
HCOV 229E RNA SPEC QL NAA+PROBE: NOT DETECTED
HCOV HKU1 RNA SPEC QL NAA+PROBE: NOT DETECTED
HCOV NL63 RNA SPEC QL NAA+PROBE: NOT DETECTED
HCOV OC43 RNA SPEC QL NAA+PROBE: NOT DETECTED
HCT VFR BLD AUTO: 49.6 % (ref 37.5–51)
HGB BLD-MCNC: 15.9 G/DL (ref 13–17.7)
HMPV RNA NPH QL NAA+NON-PROBE: DETECTED
HPIV1 RNA SPEC QL NAA+PROBE: NOT DETECTED
HPIV2 RNA SPEC QL NAA+PROBE: NOT DETECTED
HPIV3 RNA NPH QL NAA+PROBE: NOT DETECTED
HPIV4 P GENE NPH QL NAA+PROBE: NOT DETECTED
IMM GRANULOCYTES # BLD AUTO: 0.02 10*3/MM3 (ref 0–0.05)
IMM GRANULOCYTES NFR BLD AUTO: 0.3 % (ref 0–0.5)
L PNEUMO1 AG UR QL IA: NEGATIVE
LYMPHOCYTES # BLD AUTO: 1.18 10*3/MM3 (ref 0.7–3.1)
LYMPHOCYTES NFR BLD AUTO: 19.5 % (ref 19.6–45.3)
M PNEUMO IGG SER IA-ACNC: NOT DETECTED
MCH RBC QN AUTO: 30.3 PG (ref 26.6–33)
MCHC RBC AUTO-ENTMCNC: 32.1 G/DL (ref 31.5–35.7)
MCV RBC AUTO: 94.7 FL (ref 79–97)
MONOCYTES # BLD AUTO: 0.13 10*3/MM3 (ref 0.1–0.9)
MONOCYTES NFR BLD AUTO: 2.1 % (ref 5–12)
NEUTROPHILS # BLD AUTO: 4.68 10*3/MM3 (ref 1.7–7)
NEUTROPHILS NFR BLD AUTO: 77.4 % (ref 42.7–76)
NRBC BLD AUTO-RTO: 0 /100 WBC (ref 0–0.2)
PLATELET # BLD AUTO: 174 10*3/MM3 (ref 140–450)
PMV BLD AUTO: 10.8 FL (ref 6–12)
POTASSIUM BLD-SCNC: 3.6 MMOL/L (ref 3.5–5.2)
POTASSIUM BLD-SCNC: 4 MMOL/L (ref 3.5–5.2)
RBC # BLD AUTO: 5.24 10*6/MM3 (ref 4.14–5.8)
RHINOVIRUS RNA SPEC NAA+PROBE: NOT DETECTED
RSV RNA NPH QL NAA+NON-PROBE: NOT DETECTED
S PNEUM AG SPEC QL LA: NEGATIVE
SODIUM BLD-SCNC: 138 MMOL/L (ref 136–145)
WBC NRBC COR # BLD: 6.05 10*3/MM3 (ref 3.4–10.8)

## 2020-01-02 PROCEDURE — 80048 BASIC METABOLIC PNL TOTAL CA: CPT | Performed by: NURSE PRACTITIONER

## 2020-01-02 PROCEDURE — 87205 SMEAR GRAM STAIN: CPT | Performed by: NURSE PRACTITIONER

## 2020-01-02 PROCEDURE — 25010000002 CEFTRIAXONE PER 250 MG: Performed by: NURSE PRACTITIONER

## 2020-01-02 PROCEDURE — 82962 GLUCOSE BLOOD TEST: CPT

## 2020-01-02 PROCEDURE — 87899 AGENT NOS ASSAY W/OPTIC: CPT | Performed by: NURSE PRACTITIONER

## 2020-01-02 PROCEDURE — 94799 UNLISTED PULMONARY SVC/PX: CPT

## 2020-01-02 PROCEDURE — 0100U HC BIOFIRE FILMARRAY RESP PANEL 2: CPT | Performed by: NURSE PRACTITIONER

## 2020-01-02 PROCEDURE — 83036 HEMOGLOBIN GLYCOSYLATED A1C: CPT | Performed by: NURSE PRACTITIONER

## 2020-01-02 PROCEDURE — 85025 COMPLETE CBC W/AUTO DIFF WBC: CPT | Performed by: NURSE PRACTITIONER

## 2020-01-02 PROCEDURE — 25010000002 HEPARIN (PORCINE) PER 1000 UNITS: Performed by: NURSE PRACTITIONER

## 2020-01-02 PROCEDURE — 25010000002 METHYLPREDNISOLONE PER 40 MG: Performed by: INTERNAL MEDICINE

## 2020-01-02 PROCEDURE — G0378 HOSPITAL OBSERVATION PER HR: HCPCS

## 2020-01-02 PROCEDURE — 84132 ASSAY OF SERUM POTASSIUM: CPT | Performed by: INTERNAL MEDICINE

## 2020-01-02 PROCEDURE — 87070 CULTURE OTHR SPECIMN AEROBIC: CPT | Performed by: NURSE PRACTITIONER

## 2020-01-02 PROCEDURE — 63710000001 INSULIN LISPRO (HUMAN) PER 5 UNITS: Performed by: NURSE PRACTITIONER

## 2020-01-02 RX ORDER — DIVALPROEX SODIUM 500 MG/1
500 TABLET, DELAYED RELEASE ORAL EVERY 12 HOURS SCHEDULED
Status: DISCONTINUED | OUTPATIENT
Start: 2020-01-02 | End: 2020-01-04 | Stop reason: HOSPADM

## 2020-01-02 RX ORDER — HYDRALAZINE HYDROCHLORIDE 50 MG/1
50 TABLET, FILM COATED ORAL NIGHTLY
Status: DISCONTINUED | OUTPATIENT
Start: 2020-01-02 | End: 2020-01-04 | Stop reason: HOSPADM

## 2020-01-02 RX ADMIN — HYDRALAZINE HYDROCHLORIDE 100 MG: 50 TABLET, FILM COATED ORAL at 09:00

## 2020-01-02 RX ADMIN — SODIUM CHLORIDE, PRESERVATIVE FREE 10 ML: 5 INJECTION INTRAVENOUS at 21:37

## 2020-01-02 RX ADMIN — ATORVASTATIN CALCIUM 10 MG: 10 TABLET, FILM COATED ORAL at 21:36

## 2020-01-02 RX ADMIN — HEPARIN SODIUM 5000 UNITS: 5000 INJECTION INTRAVENOUS; SUBCUTANEOUS at 21:36

## 2020-01-02 RX ADMIN — INSULIN LISPRO 3 UNITS: 100 INJECTION, SOLUTION INTRAVENOUS; SUBCUTANEOUS at 12:51

## 2020-01-02 RX ADMIN — LOSARTAN POTASSIUM 100 MG: 50 TABLET, FILM COATED ORAL at 00:06

## 2020-01-02 RX ADMIN — INSULIN LISPRO 2 UNITS: 100 INJECTION, SOLUTION INTRAVENOUS; SUBCUTANEOUS at 21:34

## 2020-01-02 RX ADMIN — DOXYCYCLINE 100 MG: 100 INJECTION, POWDER, LYOPHILIZED, FOR SOLUTION INTRAVENOUS at 21:28

## 2020-01-02 RX ADMIN — TRIAMCINOLONE ACETONIDE 1 APPLICATION: 1 CREAM TOPICAL at 09:00

## 2020-01-02 RX ADMIN — INSULIN LISPRO 2 UNITS: 100 INJECTION, SOLUTION INTRAVENOUS; SUBCUTANEOUS at 08:59

## 2020-01-02 RX ADMIN — POTASSIUM CHLORIDE 40 MEQ: 750 CAPSULE, EXTENDED RELEASE ORAL at 00:08

## 2020-01-02 RX ADMIN — HYDRALAZINE HYDROCHLORIDE 50 MG: 50 TABLET, FILM COATED ORAL at 21:36

## 2020-01-02 RX ADMIN — DOXYCYCLINE 100 MG: 100 INJECTION, POWDER, LYOPHILIZED, FOR SOLUTION INTRAVENOUS at 08:57

## 2020-01-02 RX ADMIN — ESCITALOPRAM OXALATE 20 MG: 10 TABLET ORAL at 08:59

## 2020-01-02 RX ADMIN — HEPARIN SODIUM 5000 UNITS: 5000 INJECTION INTRAVENOUS; SUBCUTANEOUS at 00:07

## 2020-01-02 RX ADMIN — HYDRALAZINE HYDROCHLORIDE 50 MG: 50 TABLET, FILM COATED ORAL at 00:08

## 2020-01-02 RX ADMIN — POTASSIUM CHLORIDE 40 MEQ: 750 CAPSULE, EXTENDED RELEASE ORAL at 05:12

## 2020-01-02 RX ADMIN — IPRATROPIUM BROMIDE AND ALBUTEROL SULFATE 3 ML: 2.5; .5 SOLUTION RESPIRATORY (INHALATION) at 10:31

## 2020-01-02 RX ADMIN — LEVOTHYROXINE SODIUM 75 MCG: 75 TABLET ORAL at 05:12

## 2020-01-02 RX ADMIN — ATORVASTATIN CALCIUM 10 MG: 10 TABLET, FILM COATED ORAL at 00:07

## 2020-01-02 RX ADMIN — ASPIRIN 81 MG: 81 TABLET, COATED ORAL at 08:58

## 2020-01-02 RX ADMIN — DIVALPROEX SODIUM 250 MG: 250 TABLET, DELAYED RELEASE ORAL at 11:16

## 2020-01-02 RX ADMIN — IPRATROPIUM BROMIDE AND ALBUTEROL SULFATE 3 ML: 2.5; .5 SOLUTION RESPIRATORY (INHALATION) at 16:30

## 2020-01-02 RX ADMIN — NEBIVOLOL HYDROCHLORIDE 5 MG: 5 TABLET ORAL at 09:00

## 2020-01-02 RX ADMIN — IPRATROPIUM BROMIDE AND ALBUTEROL SULFATE 3 ML: 2.5; .5 SOLUTION RESPIRATORY (INHALATION) at 19:49

## 2020-01-02 RX ADMIN — AMLODIPINE BESYLATE 5 MG: 10 TABLET ORAL at 21:36

## 2020-01-02 RX ADMIN — IPRATROPIUM BROMIDE AND ALBUTEROL SULFATE 3 ML: 2.5; .5 SOLUTION RESPIRATORY (INHALATION) at 02:33

## 2020-01-02 RX ADMIN — AMLODIPINE BESYLATE 5 MG: 10 TABLET ORAL at 00:07

## 2020-01-02 RX ADMIN — HEPARIN SODIUM 5000 UNITS: 5000 INJECTION INTRAVENOUS; SUBCUTANEOUS at 08:58

## 2020-01-02 RX ADMIN — AMLODIPINE BESYLATE 5 MG: 10 TABLET ORAL at 11:14

## 2020-01-02 RX ADMIN — METHYLPREDNISOLONE SODIUM SUCCINATE 40 MG: 40 INJECTION, POWDER, FOR SOLUTION INTRAMUSCULAR; INTRAVENOUS at 00:06

## 2020-01-02 RX ADMIN — IPRATROPIUM BROMIDE AND ALBUTEROL SULFATE 3 ML: 2.5; .5 SOLUTION RESPIRATORY (INHALATION) at 07:00

## 2020-01-02 RX ADMIN — DIVALPROEX SODIUM 500 MG: 250 TABLET, DELAYED RELEASE ORAL at 08:58

## 2020-01-02 RX ADMIN — CEFTRIAXONE 1 G: 1 INJECTION, POWDER, FOR SOLUTION INTRAMUSCULAR; INTRAVENOUS at 12:20

## 2020-01-02 RX ADMIN — DIVALPROEX SODIUM 500 MG: 250 TABLET, DELAYED RELEASE ORAL at 00:08

## 2020-01-02 RX ADMIN — DIVALPROEX SODIUM 500 MG: 250 TABLET, DELAYED RELEASE ORAL at 21:36

## 2020-01-02 RX ADMIN — ACETAMINOPHEN 650 MG: 325 TABLET ORAL at 00:09

## 2020-01-02 RX ADMIN — DOXYCYCLINE 100 MG: 100 INJECTION, POWDER, LYOPHILIZED, FOR SOLUTION INTRAVENOUS at 00:06

## 2020-01-02 NOTE — PLAN OF CARE
"  Problem: Patient Care Overview  Goal: Plan of Care Review  Outcome: Ongoing (interventions implemented as appropriate)  Flowsheets (Taken 1/2/2020 7864)  Plan of Care Reviewed With: patient  Note:   Pt VSS. Denies SOB. States he is feeling \"better\" today. IV ABX. In Contact & Droplet precautions. Exp. Wheezing in RUL & RLL this evening. Afebril. BLE edema.      Problem: Pneumonia (Adult)  Goal: Signs and Symptoms of Listed Potential Problems Will be Absent, Minimized or Managed (Pneumonia)  Outcome: Ongoing (interventions implemented as appropriate)     "

## 2020-01-02 NOTE — PROGRESS NOTES
Discharge Planning Assessment  [facility]     Patient Name: Andre Agosto  MRN: [id number]  Today's Date: 1/2/2020    Admit Date: 1/1/2020    Discharge Needs Assessment     Row Name 01/02/20 1329       Living Environment    Lives With  spouse;child(staci), adult    Name(s) of Who Lives With Patient  Maritza Barba    Current Living Arrangements  home/apartment/condo    Primary Care Provided by  self    Provides Primary Care For  no one    Family Caregiver if Needed  spouse    Family Caregiver Names  Spouse, Maritza Agosto    Quality of Family Relationships  supportive;involved;helpful    Able to Return to Prior Arrangements  yes    Living Arrangement Comments  Lives with spouse in house with no steps       Resource/Environmental Concerns    Resource/Environmental Concerns  none    Transportation Concerns  car, none       Transition Planning    Patient/Family Anticipates Transition to  home with family    Patient/Family Anticipated Services at Transition  none    Transportation Anticipated  family or friend will provide       Discharge Needs Assessment    Readmission Within the Last 30 Days  no previous admission in last 30 days    Concerns to be Addressed  denies needs/concerns at this time    Equipment Currently Used at Home  bipap/cpap;glucometer;other (see comments) BP cuff    Anticipated Changes Related to Illness  none    Equipment Needed After Discharge  none        Discharge Plan     Row Name 01/02/20 0473       Plan    Plan  Plan is home    Patient/Family in Agreement with Plan  yes    Plan Comments  Met with patient in the room for initial discharge planning. Lives in [place] with spouse in house with no steps.  He uses a BIPAP and has a glucometer and BP cuff.  Independent.  PCP is Keevn Bear.  Plan is home.  Following for discharge needs.     Final Discharge Disposition Code  01 - home or self-care        Destination      Coordination has not been started for this encounter.      Durable Medical  Equipment      Coordination has not been started for this encounter.      Dialysis/Infusion      Coordination has not been started for this encounter.      Home Medical Care      Coordination has not been started for this encounter.      Therapy      Coordination has not been started for this encounter.      Community Resources      Coordination has not been started for this encounter.          Demographic Summary     Row Name 01/02/20 1328       General Information    Admission Type  observation    Arrived From  emergency department    Referral Source  admission list    Reason for Consult  discharge planning    Preferred Language  English        Functional Status     Row Name 01/02/20 1328       Functional Status    Usual Activity Tolerance  excellent    Current Activity Tolerance  excellent       Functional Status, IADL    Medications  independent    Meal Preparation  independent    Housekeeping  independent    Laundry  independent    Shopping  independent        Psychosocial    No documentation.       Abuse/Neglect    No documentation.       Legal    No documentation.       Substance Abuse    No documentation.       Patient Forms    No documentation.           Angela Villa RN

## 2020-01-02 NOTE — PROGRESS NOTES
[facility] Medicine Services  PROGRESS NOTE    Patient Name: Andre Agosto  : 1955  MRN: [id number]    Date of Admission: 2020  Primary Care Physician: Keven eBar MD    Subjective   Subjective     CC:  Follow-up respiratory viral illness/pneumonia    HPI:  Feeling much better, denies any shortness of air while at rest, no other complaints.  Patient has been afebrile here.    Review of Systems  All systems reviewed and are negative except those mentioned in the above HPI.    Objective   Objective     Vital Signs:   Temp:  [97.7 °F (36.5 °C)-99.6 °F (37.6 °C)] 97.7 °F (36.5 °C)  Heart Rate:  [] 89  Resp:  [18-22] 18  BP: (139-217)/() 148/89        Physical Exam:  General Assessment: No acute cardiopulmonary distress. Well developed and well nourished.    HEENT: NCAT, PERRL, MM moist    Neck: Supple, no carotid bruit bilat    CVS: RRR, S1S2 normal, no murmurs    Resp: Good air movement bilat, mild rales bilat, resp non-labored    Abd: soft, NT, ND, normal BS, no guarding or peritoneal signs    Ext: No edema, both calves are symmetric and NTTP    Neuro: Nonfocal    Skin: W/D/I. No rash.    Psych: Affect is appropriate      Results Reviewed:    Results from last 7 days   Lab Units 20  0537 20  1747   WBC 10*3/mm3 6.05 7.29   HEMOGLOBIN g/dL 15.9 16.2   HEMATOCRIT % 49.6 49.4   PLATELETS 10*3/mm3 174 189     Results from last 7 days   Lab Units 20  0951 20  0537 20  1747   SODIUM mmol/L  --  138 139   POTASSIUM mmol/L 4.0 3.6 3.4*   CHLORIDE mmol/L  --  99 97*   CO2 mmol/L  --  25.0 28.0   BUN mg/dL  --  14 12   CREATININE mg/dL  --  1.06 1.19   GLUCOSE mg/dL  --  168* 97   CALCIUM mg/dL  --  8.9 9.5   ALT (SGPT) U/L  --   --  17   AST (SGOT) U/L  --   --  20   TROPONIN T ng/mL  --   --  <0.010   PROBNP pg/mL  --   --  383.1     Estimated Creatinine Clearance: 109 mL/min (by C-G formula based on SCr of 1.06 mg/dL).    Microbiology  Results Abnormal     Procedure Component Value - Date/Time    S. Pneumo Ag Urine or CSF - Urine, Urine, Clean Catch [[id number]]  (Normal) Collected:  01/02/20 0654    Lab Status:  Final result Specimen:  Urine, Clean Catch Updated:  01/02/20 0918     Strep Pneumo Ag Negative    Legionella Antigen, Urine - Urine, Urine, Clean Catch [[id number]]  (Normal) Collected:  01/02/20 0654    Lab Status:  Final result Specimen:  Urine, Clean Catch Updated:  01/02/20 0918     LEGIONELLA ANTIGEN, URINE Negative    Respiratory Panel, PCR - Swab, Nasopharynx [[id number]]  (Abnormal) Collected:  01/02/20 0040    Lab Status:  Final result Specimen:  Swab from Nasopharynx Updated:  01/02/20 0809     ADENOVIRUS, PCR Not Detected     Coronavirus 229E Not Detected     Coronavirus HKU1 Not Detected     Coronavirus NL63 Not Detected     Coronavirus OC43 Not Detected     Human Metapneumovirus Detected     Human Rhinovirus/Enterovirus Not Detected     Influenza B PCR Not Detected     Parainfluenza Virus 1 Not Detected     Parainfluenza Virus 2 Not Detected     Parainfluenza Virus 3 Not Detected     Parainfluenza Virus 4 Not Detected     Bordetella pertussis pcr Not Detected     Influenza A H1 2009 PCR Not Detected     Chlamydophila pneumoniae PCR Not Detected     Mycoplasma pneumo by PCR Not Detected     Influenza A PCR Not Detected     Influenza A H3 Not Detected     Influenza A H1 Not Detected     RSV, PCR Not Detected     Bordetella parapertussis PCR Not Detected    Influenza Antigen, Rapid - Swab, Nasopharynx [[id number]]  (Normal) Collected:  01/01/20 1747    Lab Status:  Final result Specimen:  Swab from Nasopharynx Updated:  01/01/20 1830     Influenza A Ag, EIA Negative     Influenza B Ag, EIA Negative          Imaging Results (Last 24 Hours)     Procedure Component Value Units Date/Time    XR Chest 1 View [[id number]] Collected:  01/01/20 1750     Updated:  01/01/20 2320    Narrative:       EXAMINATION: XR CHEST 1 VW - 01/01/2020      INDICATION: Cough. Pneumonia.     COMPARISON: 10/11/2018     FINDINGS: Heart is enlarged. The vasculature is cephalized. There is  minimal if any interstitial edema. Small amount of patchy disease is  noted in the right lung base. No focal lung disease is seen elsewhere.  No effusion or pneumothorax is seen.       Impression:       1. Cardiomegaly and mild pulmonary vascular congestion.  2. Mild nonspecific patchy disease in the right lung base.      DICTATED:   01/01/2020  EDITED/ls :   01/01/2020      This report was finalized on 1/1/2020 11:20 PM by Dr. Edmundo Hou MD.             Results for orders placed during the hospital encounter of 10/12/18   Adult Transthoracic Echo Complete W/ Cont if Necessary Per Protocol    Narrative · Left ventricular systolic function is normal. Estimated EF = 55%.  · Moderate dilation of the aortic root is present. Moderate dilation of   the sinuses of Valsalva is present.  · Mild aortic valve stenosis is present.  · Mild aortic valve regurgitation is present.  · Aortic valve mean pressure gradient is 10 mmHg.          I have reviewed the medications:  Scheduled Meds:  amLODIPine 5 mg Oral BID   aspirin 81 mg Oral Daily   atorvastatin 10 mg Oral Nightly   cefTRIAXone 1 g Intravenous Q24H   And      doxycycline 100 mg Intravenous Q12H   divalproex 250 mg Oral Daily   divalproex 500 mg Oral Q12H   escitalopram 20 mg Oral Daily   heparin (porcine) 5,000 Units Subcutaneous Q12H   hydrALAZINE 100 mg Oral QAM   hydrALAZINE 50 mg Oral Nightly   insulin lispro 0-7 Units Subcutaneous 4x Daily With Meals & Nightly   ipratropium-albuterol 3 mL Nebulization Q4H - RT   levothyroxine 75 mcg Oral Daily   losartan 100 mg Oral Daily   nebivolol 5 mg Oral Daily   sodium chloride 10 mL Intravenous Q12H   triamcinolone 1 application Topical BID     Continuous Infusions:   PRN Meds:.•  acetaminophen **OR** acetaminophen **OR** acetaminophen  •  dextrose  •  dextrose  •  glucagon (human recombinant)  •   potassium chloride **OR** potassium chloride **OR** potassium chloride  •  [COMPLETED] Insert peripheral IV **AND** sodium chloride  •  sodium chloride      Assessment/Plan   Assessment & Plan     Active Hospital Problems    Diagnosis  POA   • **Pneumonia of right lower lobe due to infectious organism (CMS/HCC) [J18.1]  Yes   • History of CVA (cerebrovascular accident) [Z86.73]  Not Applicable   • ALEX (obstructive sleep apnea) [G47.33]  Yes   • Coronary artery disease involving native coronary artery of native heart without angina pectoris [I25.10]  Yes   • Epilepsy (CMS/HCC) [G40.909]  Yes   • Hyperlipidemia [E78.5]  Yes   • Hypertension [I10]  Yes   • Diabetes mellitus without complication (CMS/HCC) [E11.9]  Yes   • Hypokalemia [E87.6]  Yes      Resolved Hospital Problems   No resolved problems to display.        Brief Hospital Course to date:  Andre Agosto is a 64 y.o. male 64 year old male presents to the ED with worsening shortness of breath, productive cough, myalgias, chills and fever that has been ongoing since Sunday.  Patient was evaluated at [facility] in [place] today and was diagnosed with influenza A and bilateral lower lobe pneumonia.       1) RLL CAP, likely viral, however bacterial superimposed is not ruled out  -CXR as mentioned above  -patient reports script for levaquin but has not filled  -check respiratory panel + Human metapneumovirus  -influenza negative here  -Cont rocephin and doxycycline   -blood cultures neg so far  -sputum culture pending  -Cont oxygen PRN to keep o2 sat >90%  -check urine strep pneumoniae antigens in progress     2) Hypokalemia  -mild, resolved after replace per protocol     3) epilepsy  -stable on depakote     4) Diabetes mellitus type 2  -well controlled, A1C 6.5%  -Cont low dose si   -fingersticks achs     5) hypertensive urgency  -'s on admission, improved and adequately controlled now after restarted home meds     6) ALEX  -bipap at HS     7)  hypothyroidism  -continue synthroid      DVT Prophylaxis: hep sc    Disposition:Poss home tomorrow if cont to improve    CODE STATUS:   Code Status and Medical Interventions:   Ordered at: 01/01/20 8312     Level Of Support Discussed With:    Patient     Code Status:    CPR     Medical Interventions (Level of Support Prior to Arrest):    Full         Electronically signed by Adria Coffman MD, 01/02/20, 12:57 PM.

## 2020-01-02 NOTE — H&P
[facility] Medicine Services  HISTORY AND PHYSICAL    Patient Name: Andre Agosto  : 1955  MRN: [id number]  Primary Care Physician: Keven Bear MD    Subjective   Subjective     Chief Complaint:  Shortness of air, cough    HPI:  Andre Agosto is a 64 y.o. male with a past medical history significant for 6th nerve palsy, anxiety, depression, CVA, epilepsy, meningitis, essential hypertension, hyperlipidemia, CAD and ALEX presents to the ED with complaints of shortness of air and cough. Patient reports his symptoms began on .  He presented to [facility] in [place] in which a rapid influenza was positive for flu A.  Additionally patient had a one view CXR obtained that showed bilateral lower lobe opacifications.  Patient was prescribed levaquin but reports he decided to come to the ED for further evaluation.  Patient also complaints of fever, chills and productive cough with green sputum.  He denies any chest pain, diarrhea, abdominal pain, nausea or vomiting.  Tonight repeat CXR reveals RLL nonspecific patchy disease.  Patient was noted to have an oxygen saturation tonight while ambulating of 88-89%.  Patient will be admitted to [facility] under the care of the Hospitalist for further evaluation and treatment.     Review of Systems   Constitutional: Positive for activity change, chills and fever. Negative for appetite change, diaphoresis, fatigue and unexpected weight change.   HENT: Negative.    Eyes: Negative for photophobia and visual disturbance.   Respiratory: Positive for cough and shortness of breath.    Cardiovascular: Negative for chest pain, palpitations and leg swelling.   Gastrointestinal: Negative for abdominal distention, abdominal pain, constipation, diarrhea, nausea and vomiting.   Genitourinary: Negative for difficulty urinating, dysuria, flank pain, frequency and hematuria.   Musculoskeletal: Negative.    Skin: Negative.    Neurological: Negative.    Psychiatric/Behavioral:  Negative.         Otherwise 10-system ROS reviewed and is negative except as mentioned in the HPI.    Personal History     Past Medical History:   Diagnosis Date   • 6th nerve palsy, right     right eye    • Anesthesia complication     ilius- after lap band surgery    • Anxiety and depression    • Coronary artery disease involving native coronary artery of native heart without angina pectoris 9/12/2018   • Diabetes mellitus (CMS/HCC)     doesnt check sugar    • Double vision    • Dyspepsia    • Epilepsy (CMS/HCC)    • Focal seizure (CMS/HCC)     of right arm    • Heart attack (CMS/HCC) 06/15/2016   • History of Helicobacter pylori infection     in 2008, treated w/ PrevPak   • HL (hearing loss)     Left ear child luevano measles   • Hyperlipidemia    • Hypertension    • Kidney stone     x2 imbedded   • Memory loss 2005    Meneigitis   • Meningitis     unsure of bacterial or viral    • Obesity    • Seizures (CMS/HCC)    • Sleep apnea     BiPAP compliant   • Sleep apnea treated with nocturnal BiPAP     compliant with  machine    • Stroke (CMS/[facility])    • Vision loss 7/2018    Double vision   • Wears glasses        Past Surgical History:   Procedure Laterality Date   • CARDIAC CATHETERIZATION N/A 10/12/2018    Procedure: Left Heart Cath;  Surgeon: Yoselin Johnson MD;  Location: [facility] CATH INVASIVE LOCATION;  Service: Cardiology   • COLONOSCOPY     • CORONARY STENT PLACEMENT      x1   • ENDOSCOPY     • LAPAROSCOPIC GASTRIC BANDING  2008    s/p LAGB Realize 6/2008 by HOMERO.   • UMBILICAL HERNIA REPAIR  2008    incarcerated UHR w/ mesh by Dr. Velasquez   • WISDOM TOOTH EXTRACTION      all 4       Family History: family history includes COPD in his father; Hypertension in his father and mother; Stroke in his mother.     Social History:  reports that he quit smoking about 34 years ago. His smoking use included cigarettes. He started smoking about 45 years ago. He has a 5.00 pack-year smoking history. He has never used  smokeless tobacco. He reports that he does not drink alcohol or use drugs.    Medications:    (Not in a hospital admission)    Allergies   Allergen Reactions   • Statins Myalgia       Objective   Objective     Vital Signs:   Temp:  [99.6 °F (37.6 °C)] 99.6 °F (37.6 °C)  Heart Rate:  [] 81  Resp:  [18-22] 20  BP: (168-217)/() 168/88        Physical Exam   Constitutional: He is oriented to person, place, and time. He appears well-developed and well-nourished. No distress.   Alert and oriented x4   HENT:   Head: Normocephalic and atraumatic.   Eyes: Pupils are equal, round, and reactive to light. Conjunctivae and EOM are normal. Right eye exhibits no discharge. Left eye exhibits no discharge. No scleral icterus.   Neck: Normal range of motion. Neck supple. No JVD present. No tracheal deviation present. No thyromegaly present.   Cardiovascular: Normal rate, regular rhythm, normal heart sounds and intact distal pulses. Exam reveals no gallop and no friction rub.   No murmur heard.  Pulmonary/Chest: Effort normal. No stridor. No respiratory distress. He has no wheezes. He has no rales. He exhibits no tenderness.   Rhonchi to RLL    Abdominal: Soft. Bowel sounds are normal. He exhibits no distension and no mass. There is no tenderness. There is no rebound and no guarding. No hernia.   Musculoskeletal: Normal range of motion. He exhibits no edema, tenderness or deformity.   Lymphadenopathy:     He has no cervical adenopathy.   Neurological: He is alert and oriented to person, place, and time.   Skin: Skin is warm and dry. No rash noted. He is not diaphoretic. No erythema. No pallor.   Psychiatric: He has a normal mood and affect. His behavior is normal. Judgment and thought content normal.   Nursing note and vitals reviewed.       Results Reviewed:  I have personally reviewed current lab, radiology, and data and agree.    Results from last 7 days   Lab Units 01/01/20  1747   WBC 10*3/mm3 7.29   HEMOGLOBIN g/dL  16.2   HEMATOCRIT % 49.4   PLATELETS 10*3/mm3 189     Results from last 7 days   Lab Units 01/01/20  1747   SODIUM mmol/L 139   POTASSIUM mmol/L 3.4*   CHLORIDE mmol/L 97*   CO2 mmol/L 28.0   BUN mg/dL 12   CREATININE mg/dL 1.19   GLUCOSE mg/dL 97   CALCIUM mg/dL 9.5   ALT (SGPT) U/L 17   AST (SGOT) U/L 20     No results found for: BNP  No results found for: PHART, PCO2  Imaging Results (Last 24 Hours)     Procedure Component Value Units Date/Time    XR Chest 1 View [[id number]] Collected:  01/01/20 1750     Updated:  01/01/20 1919    Narrative:       EXAMINATION: XR CHEST 1 VW - 01/01/2020     INDICATION: Cough. Pneumonia.     COMPARISON: 10/11/2018     FINDINGS: Heart is enlarged. The vasculature is cephalized. There is  minimal if any interstitial edema. Small amount of patchy disease is  noted in the right lung base. No focal lung disease is seen elsewhere.  No effusion or pneumothorax is seen.       Impression:       1. Cardiomegaly and mild pulmonary vascular congestion.  2. Mild nonspecific patchy disease in the right lung base.      DICTATED:   01/01/2020  EDITED/ls :   01/01/2020            Results for orders placed during the hospital encounter of 10/12/18   Adult Transthoracic Echo Complete W/ Cont if Necessary Per Protocol    Narrative · Left ventricular systolic function is normal. Estimated EF = 55%.  · Moderate dilation of the aortic root is present. Moderate dilation of   the sinuses of Valsalva is present.  · Mild aortic valve stenosis is present.  · Mild aortic valve regurgitation is present.  · Aortic valve mean pressure gradient is 10 mmHg.          Assessment/Plan   Assessment / Plan     Active Hospital Problems    Diagnosis   • **Pneumonia of right lower lobe due to infectious organism (CMS/HCC)   • History of CVA (cerebrovascular accident)   • ALEX (obstructive sleep apnea)   • Coronary artery disease involving native coronary artery of native heart without angina pectoris   • Epilepsy (CMS/HCC)    • Hyperlipidemia   • Hypertension   • Diabetes mellitus without complication (CMS/[facility])   • Hypokalemia         Assessment & Plan:  64 year old male presents to the ED with worsening shortness of breath, productive cough, myalgias, chills and fever that has been ongoing since Sunday.  Patient was evaluated at [facility] in [place] today and was diagnosed with influenza A and bilateral lower lobe pneumonia.      1) Pneumonia or RLL, CAP  -CXR as mentioned above  -patient reports script for levaquin but has not filled  -check respiratory panel  -influenza negative tonight  -start rocephin and doxycycline   -blood cultures pending  -sputum culture  -continuous pulse ox   -keep o2 >90%  -check urine antigens    2) Hypokalemia  -replace per protocol  -check mag  -repeat labs in am    3) epilepsy  -on depakote    4) Diabetes mellitus type 2  -check hgb a1c  -start ldssi   -fingersticks achs    5) hypertensive urgency  -SBP while at bedside in 200's  -will restart home meds with dose tonight  -q1 BP checks, if no improvement consider IV drip  -reports baseline SBP in 160's    6) ALEX  -bipap at HS    7) hypothyroidism  -continue synthroid     DVT prophylaxis:scds, heparin     CODE STATUS:  Full code   There are no questions and answers to display.       Admission Status:  I believe this patient meets OBSERVATION status, however if further evaluation or treatment plans warrant, status may change.  Based upon current information, I predict patient's care encounter to be less than or equal to 2 midnights.      YESY Reynoso   01/01/20   9:55 PM        Attending   Admission Attestation       I have seen and examined the patient, performing an independent face-to-face diagnostic evaluation with plan of care reviewed and developed with the advanced practice clinician (APC).      Brief Summary Statement:   Andre Agosto is a 64 y.o. male with past medical history significant for CVA, epilepsy, history of meningitis, hypertension,  hyperlipidemia, coronary artery disease, obstructive sleep apnea for which he wears BiPAP, depression, 6th cranial nerve palsy who presents to the emergency department complaining of cough and shortness of breath.    Patient reports that his symptoms began on Sunday afternoon.  He presented to an urgent treatment center in [place] where reportedly he had tested positive for flu A.  He was given a prescription for Levaquin and told that his chest x-ray showed bilateral lower lobe pneumonia.    Patient states he has yet to fill his prescription and presented to the ER tonight because he began feeling worse with fever, chills, productive cough with green sputum.  Patient was noted to have oxygen desaturation with any form of exertion to 88 to 89%.  Chest x-ray shows right lower lobe patchy infiltrate consistent with pneumonia.    Remainder of detailed HPI is as noted by APC and has been reviewed and/or edited by me for completeness.    Attending Physical Exam:  Constitutional: Awake, alert, appears acutely ill  Eyes: PERRLA, sclerae anicteric, no conjunctival injection  HENT: NCAT, mucous membranes dry  Neck: Supple, no thyromegaly, no lymphadenopathy, trachea midline  Respiratory: Expiratory wheezing, mild rhonchi throughout lower lobe labored respirations with exertion  Cardiovascular: RRR, no murmurs, rubs, or gallops, palpable pedal pulses bilaterally  Gastrointestinal: Positive bowel sounds, soft, nontender, nondistended  Musculoskeletal: No bilateral ankle edema, no clubbing or cyanosis to extremities  Psychiatric: Appropriate affect, cooperative  Neurologic: Oriented x 3, strength symmetric in all extremities, Cranial Nerves grossly intact to confrontation, speech clear  Skin: No rashes      Brief Assessment/Plan :  See detailed assessment and plan developed with APC which I have reviewed and/or edited for completeness.    Electronically signed by Brandin Beasley DO, 01/01/20, 11:09 PM.

## 2020-01-02 NOTE — PLAN OF CARE
Problem: Patient Care Overview  Goal: Plan of Care Review  Outcome: Ongoing (interventions implemented as appropriate)  Flowsheets (Taken 1/2/2020 3958)  Plan of Care Reviewed With: patient  Outcome Summary: Patient is very pleasant, alert and oriented, stable vital signs, brought his own BiPAP, room air, shortness of breath during activity, ambulatory, IV abx, IV steroids. Will continue to monitor.

## 2020-01-03 LAB
GLUCOSE BLDC GLUCOMTR-MCNC: 101 MG/DL (ref 70–130)
GLUCOSE BLDC GLUCOMTR-MCNC: 102 MG/DL (ref 70–130)
GLUCOSE BLDC GLUCOMTR-MCNC: 122 MG/DL (ref 70–130)
GLUCOSE BLDC GLUCOMTR-MCNC: 130 MG/DL (ref 70–130)

## 2020-01-03 PROCEDURE — 94799 UNLISTED PULMONARY SVC/PX: CPT

## 2020-01-03 PROCEDURE — 25010000002 HEPARIN (PORCINE) PER 1000 UNITS: Performed by: NURSE PRACTITIONER

## 2020-01-03 PROCEDURE — 82962 GLUCOSE BLOOD TEST: CPT

## 2020-01-03 PROCEDURE — 25010000002 CEFTRIAXONE PER 250 MG: Performed by: NURSE PRACTITIONER

## 2020-01-03 PROCEDURE — 99232 SBSQ HOSP IP/OBS MODERATE 35: CPT | Performed by: HOSPITALIST

## 2020-01-03 RX ORDER — IPRATROPIUM BROMIDE AND ALBUTEROL SULFATE 2.5; .5 MG/3ML; MG/3ML
3 SOLUTION RESPIRATORY (INHALATION)
Status: DISCONTINUED | OUTPATIENT
Start: 2020-01-03 | End: 2020-01-04 | Stop reason: HOSPADM

## 2020-01-03 RX ORDER — ACETYLCYSTEINE 200 MG/ML
3 SOLUTION ORAL; RESPIRATORY (INHALATION)
Status: DISCONTINUED | OUTPATIENT
Start: 2020-01-03 | End: 2020-01-04 | Stop reason: HOSPADM

## 2020-01-03 RX ADMIN — ACETYLCYSTEINE 3 ML: 200 SOLUTION ORAL; RESPIRATORY (INHALATION) at 19:40

## 2020-01-03 RX ADMIN — SODIUM CHLORIDE, PRESERVATIVE FREE 10 ML: 5 INJECTION INTRAVENOUS at 20:43

## 2020-01-03 RX ADMIN — IPRATROPIUM BROMIDE AND ALBUTEROL SULFATE 3 ML: 2.5; .5 SOLUTION RESPIRATORY (INHALATION) at 07:25

## 2020-01-03 RX ADMIN — HYDRALAZINE HYDROCHLORIDE 100 MG: 50 TABLET, FILM COATED ORAL at 09:06

## 2020-01-03 RX ADMIN — CEFTRIAXONE 1 G: 1 INJECTION, POWDER, FOR SOLUTION INTRAMUSCULAR; INTRAVENOUS at 09:04

## 2020-01-03 RX ADMIN — DOXYCYCLINE 100 MG: 100 INJECTION, POWDER, LYOPHILIZED, FOR SOLUTION INTRAVENOUS at 20:42

## 2020-01-03 RX ADMIN — HYDRALAZINE HYDROCHLORIDE 50 MG: 50 TABLET, FILM COATED ORAL at 20:42

## 2020-01-03 RX ADMIN — LEVOTHYROXINE SODIUM 75 MCG: 75 TABLET ORAL at 05:53

## 2020-01-03 RX ADMIN — HEPARIN SODIUM 5000 UNITS: 5000 INJECTION INTRAVENOUS; SUBCUTANEOUS at 20:41

## 2020-01-03 RX ADMIN — HEPARIN SODIUM 5000 UNITS: 5000 INJECTION INTRAVENOUS; SUBCUTANEOUS at 09:04

## 2020-01-03 RX ADMIN — AMLODIPINE BESYLATE 5 MG: 10 TABLET ORAL at 20:41

## 2020-01-03 RX ADMIN — AMLODIPINE BESYLATE 5 MG: 10 TABLET ORAL at 09:05

## 2020-01-03 RX ADMIN — INSULIN LISPRO 0 UNITS: 100 INJECTION, SOLUTION INTRAVENOUS; SUBCUTANEOUS at 09:05

## 2020-01-03 RX ADMIN — ESCITALOPRAM OXALATE 20 MG: 10 TABLET ORAL at 09:05

## 2020-01-03 RX ADMIN — TRIAMCINOLONE ACETONIDE 1 APPLICATION: 1 CREAM TOPICAL at 20:42

## 2020-01-03 RX ADMIN — DOXYCYCLINE 100 MG: 100 INJECTION, POWDER, LYOPHILIZED, FOR SOLUTION INTRAVENOUS at 12:41

## 2020-01-03 RX ADMIN — DIVALPROEX SODIUM 500 MG: 250 TABLET, DELAYED RELEASE ORAL at 20:42

## 2020-01-03 RX ADMIN — NEBIVOLOL HYDROCHLORIDE 5 MG: 5 TABLET ORAL at 09:07

## 2020-01-03 RX ADMIN — IPRATROPIUM BROMIDE AND ALBUTEROL SULFATE 3 ML: 2.5; .5 SOLUTION RESPIRATORY (INHALATION) at 11:54

## 2020-01-03 RX ADMIN — ATORVASTATIN CALCIUM 10 MG: 10 TABLET, FILM COATED ORAL at 20:42

## 2020-01-03 RX ADMIN — ASPIRIN 81 MG: 81 TABLET, COATED ORAL at 09:03

## 2020-01-03 RX ADMIN — IPRATROPIUM BROMIDE AND ALBUTEROL SULFATE 3 ML: 2.5; .5 SOLUTION RESPIRATORY (INHALATION) at 19:40

## 2020-01-03 RX ADMIN — DIVALPROEX SODIUM 250 MG: 250 TABLET, DELAYED RELEASE ORAL at 12:41

## 2020-01-03 RX ADMIN — LOSARTAN POTASSIUM 100 MG: 50 TABLET, FILM COATED ORAL at 09:04

## 2020-01-03 RX ADMIN — DIVALPROEX SODIUM 500 MG: 250 TABLET, DELAYED RELEASE ORAL at 09:08

## 2020-01-03 NOTE — PROGRESS NOTES
Continued Stay Note  [facility]     Patient Name: Andre Agosto  MRN: [id number]  Today's Date: 1/3/2020    Admit Date: 1/1/2020    Discharge Plan     Row Name 01/03/20 1230       Plan    Plan  Plan is home    Patient/Family in Agreement with Plan  yes    Plan Comments  Patient reports feeling worse today.  CM continues to follow for discharge needs.     Final Discharge Disposition Code  01 - home or self-care        Discharge Codes    No documentation.             Angela Villa RN     .

## 2020-01-03 NOTE — PLAN OF CARE
Problem: Patient Care Overview  Goal: Plan of Care Review  Outcome: Ongoing (interventions implemented as appropriate)  Flowsheets  Taken 1/3/2020 0226  Progress: improving  Outcome Summary: Patient has had a decent shift, sleeping for most of the evening. VSS, and patient has been alert and oriented times four. No complaints of pain tonight. Nursing staff will continue to monitor and assess the patient.  Taken 1/2/2020 2000  Plan of Care Reviewed With: patient  Goal: Individualization and Mutuality  Outcome: Ongoing (interventions implemented as appropriate)  Flowsheets (Taken 1/3/2020 0226)  Patient Specific Goals (Include Timeframe): Monitor and assess for pain q2hrs and prn     Problem: Pneumonia (Adult)  Goal: Signs and Symptoms of Listed Potential Problems Will be Absent, Minimized or Managed (Pneumonia)  Outcome: Ongoing (interventions implemented as appropriate)  Flowsheets  Taken 1/2/2020 0448 by Misty Guillen, RN  Problems Assessed (Pneumonia): all  Taken 1/3/2020 0226 by Fitz Delgado, RN  Problems Present (Pneumonia): respiratory compromise;infection progression

## 2020-01-03 NOTE — PROGRESS NOTES
[facility] Medicine Services  PROGRESS NOTE    Patient Name: Andre Agosto  : 1955  MRN: [id number]    Date of Admission: 2020  Primary Care Physician: Keven Bear MD    Subjective   Subjective     CC:  Follow-up respiratory viral illness/pneumonia    HPI:  No acute events overnight, however had some difficulties with breathing this morning.  Patient reported that he feels better, however he appears to be more toxic today.  Despite feeling slightly worse than yesterday, patient would like to go home, however his wife has some concerns and would like him to stay 1 more day to see if he improves a little bit more.  He is afebrile, but still coughing up some thick yellowish/greenish sputum.  No chest pain.  No other complaints.  Still requiring oxygen to maintain adequate saturation.    Review of Systems  All systems reviewed and are negative except those mentioned in the above HPI.    Objective   Objective     Vital Signs:   Temp:  [97.8 °F (36.6 °C)-98.7 °F (37.1 °C)] 97.8 °F (36.6 °C)  Heart Rate:  [68-82] 73  Resp:  [16-20] 20  BP: (132-175)/(84-97) 155/90        Physical Exam:   General Assessment: No acute cardiopulmonary distress. Well developed and well nourished.  Slightly more toxic appearing than yesterday.    HEENT: NCAT, PERRL, MM moist    Neck: Supple, no carotid bruit bilat    CVS: RRR, S1S2 normal, no murmurs    Resp: Good air movement bilat, mild rales bilat, no wheezing, resp non-labored    Abd: soft, NT, ND, normal BS, no guarding or peritoneal signs    Ext: No edema, both calves are symmetric and NTTP    Neuro: Nonfocal    Skin: W/D/I. No rash.    Psych: Affect is appropriate      Results Reviewed:    Results from last 7 days   Lab Units 20  0537 20  1747   WBC 10*3/mm3 6.05 7.29   HEMOGLOBIN g/dL 15.9 16.2   HEMATOCRIT % 49.6 49.4   PLATELETS 10*3/mm3 174 189     Results from last 7 days   Lab Units 20  0951 2037  01/01/20  1747   SODIUM mmol/L  --  138 139   POTASSIUM mmol/L 4.0 3.6 3.4*   CHLORIDE mmol/L  --  99 97*   CO2 mmol/L  --  25.0 28.0   BUN mg/dL  --  14 12   CREATININE mg/dL  --  1.06 1.19   GLUCOSE mg/dL  --  168* 97   CALCIUM mg/dL  --  8.9 9.5   ALT (SGPT) U/L  --   --  17   AST (SGOT) U/L  --   --  20   TROPONIN T ng/mL  --   --  <0.010   PROBNP pg/mL  --   --  383.1     Estimated Creatinine Clearance: 109 mL/min (by C-G formula based on SCr of 1.06 mg/dL).    Microbiology Results Abnormal     Procedure Component Value - Date/Time    Respiratory Culture - Sputum, Cough [[id number]] Collected:  01/02/20 2347    Lab Status:  Preliminary result Specimen:  Sputum from Cough Updated:  01/03/20 0659     Gram Stain Rare (1+) WBCs per low power field      Rare (1+) Epithelial cells per low power field      Few (2+) Mixed bacterial morphotypes seen on Gram Stain    Blood Culture - Blood, Arm, Left [[id number]] Collected:  01/01/20 1745    Lab Status:  Preliminary result Specimen:  Blood from Arm, Left Updated:  01/02/20 1945     Blood Culture No growth at 24 hours    Blood Culture - Blood, Arm, Right [[id number]] Collected:  01/01/20 1730    Lab Status:  Preliminary result Specimen:  Blood from Arm, Right Updated:  01/02/20 1945     Blood Culture No growth at 24 hours    S. Pneumo Ag Urine or CSF - Urine, Urine, Clean Catch [[id number]]  (Normal) Collected:  01/02/20 0654    Lab Status:  Final result Specimen:  Urine, Clean Catch Updated:  01/02/20 0918     Strep Pneumo Ag Negative    Legionella Antigen, Urine - Urine, Urine, Clean Catch [[id number]]  (Normal) Collected:  01/02/20 0654    Lab Status:  Final result Specimen:  Urine, Clean Catch Updated:  01/02/20 0918     LEGIONELLA ANTIGEN, URINE Negative    Respiratory Panel, PCR - Swab, Nasopharynx [[id number]]  (Abnormal) Collected:  01/02/20 0040    Lab Status:  Final result Specimen:  Swab from Nasopharynx Updated:  01/02/20 0809     ADENOVIRUS, PCR Not Detected      Coronavirus 229E Not Detected     Coronavirus HKU1 Not Detected     Coronavirus NL63 Not Detected     Coronavirus OC43 Not Detected     Human Metapneumovirus Detected     Human Rhinovirus/Enterovirus Not Detected     Influenza B PCR Not Detected     Parainfluenza Virus 1 Not Detected     Parainfluenza Virus 2 Not Detected     Parainfluenza Virus 3 Not Detected     Parainfluenza Virus 4 Not Detected     Bordetella pertussis pcr Not Detected     Influenza A H1 2009 PCR Not Detected     Chlamydophila pneumoniae PCR Not Detected     Mycoplasma pneumo by PCR Not Detected     Influenza A PCR Not Detected     Influenza A H3 Not Detected     Influenza A H1 Not Detected     RSV, PCR Not Detected     Bordetella parapertussis PCR Not Detected    Influenza Antigen, Rapid - Swab, Nasopharynx [[id number]]  (Normal) Collected:  01/01/20 1747    Lab Status:  Final result Specimen:  Swab from Nasopharynx Updated:  01/01/20 1830     Influenza A Ag, EIA Negative     Influenza B Ag, EIA Negative          Imaging Results (Last 24 Hours)     ** No results found for the last 24 hours. **          Results for orders placed during the hospital encounter of 10/12/18   Adult Transthoracic Echo Complete W/ Cont if Necessary Per Protocol    Narrative · Left ventricular systolic function is normal. Estimated EF = 55%.  · Moderate dilation of the aortic root is present. Moderate dilation of   the sinuses of Valsalva is present.  · Mild aortic valve stenosis is present.  · Mild aortic valve regurgitation is present.  · Aortic valve mean pressure gradient is 10 mmHg.          I have reviewed the medications:  Scheduled Meds:    amLODIPine 5 mg Oral BID   aspirin 81 mg Oral Daily   atorvastatin 10 mg Oral Nightly   cefTRIAXone 1 g Intravenous Q24H   And      doxycycline 100 mg Intravenous Q12H   divalproex 250 mg Oral Daily   divalproex 500 mg Oral Q12H   escitalopram 20 mg Oral Daily   heparin (porcine) 5,000 Units Subcutaneous Q12H   hydrALAZINE  100 mg Oral QAM   hydrALAZINE 50 mg Oral Nightly   insulin lispro 0-7 Units Subcutaneous 4x Daily With Meals & Nightly   ipratropium-albuterol 3 mL Nebulization Q4H - RT   levothyroxine 75 mcg Oral Daily   [START ON 1/4/2020] losartan 125 mg Oral Daily   nebivolol 5 mg Oral Daily   sodium chloride 10 mL Intravenous Q12H   triamcinolone 1 application Topical BID     Continuous Infusions:   PRN Meds:.•  acetaminophen **OR** acetaminophen **OR** acetaminophen  •  dextrose  •  dextrose  •  glucagon (human recombinant)  •  potassium chloride **OR** potassium chloride **OR** potassium chloride  •  [COMPLETED] Insert peripheral IV **AND** sodium chloride  •  sodium chloride      Assessment/Plan   Assessment & Plan     Active Hospital Problems    Diagnosis  POA   • **Pneumonia of right lower lobe due to infectious organism (CMS/[facility]) [J18.1]  Yes   • History of CVA (cerebrovascular accident) [Z86.73]  Not Applicable   • ALEX (obstructive sleep apnea) [G47.33]  Yes   • Coronary artery disease involving native coronary artery of native heart without angina pectoris [I25.10]  Yes   • Epilepsy (CMS/[facility]) [G40.909]  Yes   • Hyperlipidemia [E78.5]  Yes   • Hypertension [I10]  Yes   • Diabetes mellitus without complication (CMS/[facility]) [E11.9]  Yes   • Hypokalemia [E87.6]  Yes      Resolved Hospital Problems   No resolved problems to display.        Brief Hospital Course to date:  Andre Agosto is a 64 y.o. male 64 year old male presents to the ED with worsening shortness of breath, productive cough, myalgias, chills and fever that has been ongoing since Sunday.  Patient was evaluated at [facility] in [place] today and was diagnosed with influenza A and bilateral lower lobe pneumonia.       1) RLL CAP, likely viral, however bacterial superimposed is not ruled out  -CXR as mentioned above  -patient reports script for levaquin but has not filled  -check respiratory panel + Human metapneumovirus  -influenza negative here  -Cont rocephin and  doxycycline   -blood cultures neg so far  -sputum culture showed mixed angie  -Cont oxygen PRN to keep o2 sat >90%  -check urine strep pneumoniae antigens neg     2) Hypokalemia  -mild, resolved after replace per protocol     3) epilepsy  -stable on depakote     4) Diabetes mellitus type 2  -well controlled, A1C 6.5%  -Cont low dose si   -fingersticks achs     5) hypertensive urgency  -'s on admission, improved and adequately controlled now after restarted home meds     6) ALEX  -bipap at HS     7) hypothyroidism  -continue synthroid    ---------------------------------  -a little more toxic appearing than yesterday and still requiring supplemental O2  -cont with IV abx as above and supportive care  -will add mucolytic  -BP not well controlled, poss related to acute resp issues, will increased home losartan and monitor. BMP in am to watch renal function.    DVT Prophylaxis: hep sc    Disposition:Poss home tomorrow if cont to improve    CODE STATUS:   Code Status and Medical Interventions:   Ordered at: 01/01/20 1983     Level Of Support Discussed With:    Patient     Code Status:    CPR     Medical Interventions (Level of Support Prior to Arrest):    Full         Electronically signed by Adria Coffman MD, 01/03/20, 2:08 PM.

## 2020-01-04 VITALS
TEMPERATURE: 97 F | OXYGEN SATURATION: 99 % | BODY MASS INDEX: 37.51 KG/M2 | WEIGHT: 308 LBS | HEART RATE: 65 BPM | SYSTOLIC BLOOD PRESSURE: 148 MMHG | HEIGHT: 76 IN | DIASTOLIC BLOOD PRESSURE: 88 MMHG | RESPIRATION RATE: 16 BRPM

## 2020-01-04 LAB
ANION GAP SERPL CALCULATED.3IONS-SCNC: 13 MMOL/L (ref 5–15)
BUN BLD-MCNC: 18 MG/DL (ref 8–23)
BUN/CREAT SERPL: 18.4 (ref 7–25)
CALCIUM SPEC-SCNC: 8.7 MG/DL (ref 8.6–10.5)
CHLORIDE SERPL-SCNC: 101 MMOL/L (ref 98–107)
CO2 SERPL-SCNC: 26 MMOL/L (ref 22–29)
CREAT BLD-MCNC: 0.98 MG/DL (ref 0.76–1.27)
GFR SERPL CREATININE-BSD FRML MDRD: 77 ML/MIN/1.73
GLUCOSE BLD-MCNC: 114 MG/DL (ref 65–99)
GLUCOSE BLDC GLUCOMTR-MCNC: 108 MG/DL (ref 70–130)
GLUCOSE BLDC GLUCOMTR-MCNC: 110 MG/DL (ref 70–130)
POTASSIUM BLD-SCNC: 3.5 MMOL/L (ref 3.5–5.2)
SODIUM BLD-SCNC: 140 MMOL/L (ref 136–145)

## 2020-01-04 PROCEDURE — 94799 UNLISTED PULMONARY SVC/PX: CPT

## 2020-01-04 PROCEDURE — 25010000002 HEPARIN (PORCINE) PER 1000 UNITS: Performed by: NURSE PRACTITIONER

## 2020-01-04 PROCEDURE — 80048 BASIC METABOLIC PNL TOTAL CA: CPT | Performed by: HOSPITALIST

## 2020-01-04 PROCEDURE — 82962 GLUCOSE BLOOD TEST: CPT

## 2020-01-04 PROCEDURE — 25010000002 CEFTRIAXONE PER 250 MG: Performed by: NURSE PRACTITIONER

## 2020-01-04 PROCEDURE — 99239 HOSP IP/OBS DSCHRG MGMT >30: CPT | Performed by: HOSPITALIST

## 2020-01-04 RX ORDER — DOXYCYCLINE HYCLATE 100 MG/1
100 TABLET, DELAYED RELEASE ORAL 2 TIMES DAILY
Qty: 14 TABLET | Refills: 0 | Status: SHIPPED | OUTPATIENT
Start: 2020-01-04 | End: 2020-01-17

## 2020-01-04 RX ORDER — SACCHAROMYCES BOULARDII 250 MG
250 CAPSULE ORAL 2 TIMES DAILY
Qty: 30 CAPSULE | Refills: 0 | Status: SHIPPED | OUTPATIENT
Start: 2020-01-04 | End: 2020-03-31 | Stop reason: SDUPTHER

## 2020-01-04 RX ORDER — CEFDINIR 300 MG/1
300 CAPSULE ORAL 2 TIMES DAILY
Qty: 14 CAPSULE | Refills: 0 | Status: SHIPPED | OUTPATIENT
Start: 2020-01-04 | End: 2020-01-17

## 2020-01-04 RX ADMIN — POTASSIUM CHLORIDE 40 MEQ: 750 CAPSULE, EXTENDED RELEASE ORAL at 11:01

## 2020-01-04 RX ADMIN — DIVALPROEX SODIUM 500 MG: 250 TABLET, DELAYED RELEASE ORAL at 08:53

## 2020-01-04 RX ADMIN — ACETYLCYSTEINE 3 ML: 200 SOLUTION ORAL; RESPIRATORY (INHALATION) at 00:06

## 2020-01-04 RX ADMIN — SODIUM CHLORIDE, PRESERVATIVE FREE 10 ML: 5 INJECTION INTRAVENOUS at 08:54

## 2020-01-04 RX ADMIN — AMLODIPINE BESYLATE 5 MG: 10 TABLET ORAL at 08:53

## 2020-01-04 RX ADMIN — ASPIRIN 81 MG: 81 TABLET, COATED ORAL at 08:52

## 2020-01-04 RX ADMIN — DIVALPROEX SODIUM 250 MG: 250 TABLET, DELAYED RELEASE ORAL at 11:02

## 2020-01-04 RX ADMIN — DOXYCYCLINE 100 MG: 100 INJECTION, POWDER, LYOPHILIZED, FOR SOLUTION INTRAVENOUS at 08:54

## 2020-01-04 RX ADMIN — IPRATROPIUM BROMIDE AND ALBUTEROL SULFATE 3 ML: 2.5; .5 SOLUTION RESPIRATORY (INHALATION) at 00:06

## 2020-01-04 RX ADMIN — ACETYLCYSTEINE 3 ML: 200 SOLUTION ORAL; RESPIRATORY (INHALATION) at 08:48

## 2020-01-04 RX ADMIN — HEPARIN SODIUM 5000 UNITS: 5000 INJECTION INTRAVENOUS; SUBCUTANEOUS at 09:07

## 2020-01-04 RX ADMIN — ESCITALOPRAM OXALATE 20 MG: 10 TABLET ORAL at 11:01

## 2020-01-04 RX ADMIN — NEBIVOLOL HYDROCHLORIDE 5 MG: 5 TABLET ORAL at 08:53

## 2020-01-04 RX ADMIN — TRIAMCINOLONE ACETONIDE 1 APPLICATION: 1 CREAM TOPICAL at 08:55

## 2020-01-04 RX ADMIN — CEFTRIAXONE 1 G: 1 INJECTION, POWDER, FOR SOLUTION INTRAMUSCULAR; INTRAVENOUS at 08:54

## 2020-01-04 RX ADMIN — LEVOTHYROXINE SODIUM 75 MCG: 75 TABLET ORAL at 05:28

## 2020-01-04 RX ADMIN — LOSARTAN POTASSIUM 125 MG: 25 TABLET, FILM COATED ORAL at 09:07

## 2020-01-04 RX ADMIN — IPRATROPIUM BROMIDE AND ALBUTEROL SULFATE 3 ML: 2.5; .5 SOLUTION RESPIRATORY (INHALATION) at 08:48

## 2020-01-04 RX ADMIN — HYDRALAZINE HYDROCHLORIDE 100 MG: 50 TABLET, FILM COATED ORAL at 08:54

## 2020-01-04 NOTE — DISCHARGE SUMMARY
[facility] Medicine Services  DISCHARGE SUMMARY    Patient Name: Andre Agosto  : 1955  MRN: [id number]    Date of Admission: 2020  4:39 PM  Date of Discharge:  2020  Primary Care Physician: Keven Bear MD    Consults     No orders found from 12/3/2019 to 2020.          Hospital Course     Presenting Problem:   Pneumonia of right lower lobe due to infectious organism (CMS/HCC) [J18.1]  Pneumonia of right lower lobe due to infectious organism (CMS/HCC) [J18.1]    Active Hospital Problems    Diagnosis  POA   • **Pneumonia of right lower lobe due to infectious organism (CMS/HCC) [J18.1]  Yes   • History of CVA (cerebrovascular accident) [Z86.73]  Not Applicable   • ALEX (obstructive sleep apnea) [G47.33]  Yes   • Coronary artery disease involving native coronary artery of native heart without angina pectoris [I25.10]  Yes   • Epilepsy (CMS/HCC) [G40.909]  Yes   • Hyperlipidemia [E78.5]  Yes   • Hypertension [I10]  Yes   • Diabetes mellitus without complication (CMS/HCC) [E11.9]  Yes      Resolved Hospital Problems    Diagnosis Date Resolved POA   • Hypokalemia [E87.6] 2020 Yes          Hospital Course:  Andre Agosto is a 64 y.o. male 64 year old male presents to the ED with worsening shortness of breath, productive cough, myalgias, chills and fever, evaluated at [facility] in [place] on 2020 and was diagnosed with influenza A and bilateral lower lobe pneumonia. He was prescribed Levaquin, but did not fill prescription yet. He presented to our facility due to worsening symptoms. CXR confirmed RLL CAP. Respiratory viral panel + Human metapneumovirus, but negative for Influenza. He was treated with rocephin and doxycycline. Blood culture was negative. Sputum culture + mixed angie. Urine strep pneumoniae antigens was negative. Patient responded well to antibiotics and supportive measures. He was discharged on Doxyclycline and Omnicef to complete out a 10 days course of  antibiotics. He was advised to F/U with his PCP in 1-2 weeks or sooner PRN.    Discharge Follow Up Recommendations for labs/diagnostics:       Day of Discharge     HPI:   No acute events O/N. Feeling much better this morning and wants to go home. Denies any SOA/chest pain. No fever or chills. Wife at bedside.    Review of Systems    Otherwise ROS is negative except as mentioned in the HPI.    Vital Signs:   Temp:  [97 °F (36.1 °C)-98.4 °F (36.9 °C)] 97 °F (36.1 °C)  Heart Rate:  [57-78] 69  Resp:  [16-20] 16  BP: (139-159)/(80-95) 155/94     Physical Exam:    General Assessment: No acute cardiopulmonary distress. Well developed and well nourished.  Slightly more toxic appearing than yesterday.     HEENT: NCAT, PERRL, MM moist     Neck: Supple, no carotid bruit bilat     CVS: RRR, S1S2 normal, no murmurs     Resp: CTAB, breathing non-labored.     Abd: soft, NT, ND, normal BS, no guarding or peritoneal signs     Ext: No edema, both calves are symmetric and NTTP     Neuro: Nonfocal     Skin: W/D/I. No rash.     Psych: Affect is appropriate      Pertinent  and/or Most Recent Results     Results from last 7 days   Lab Units 01/04/20  0551 01/02/20  0951 01/02/20  0537 01/01/20  1747   WBC 10*3/mm3  --   --  6.05 7.29   HEMOGLOBIN g/dL  --   --  15.9 16.2   HEMATOCRIT %  --   --  49.6 49.4   PLATELETS 10*3/mm3  --   --  174 189   SODIUM mmol/L 140  --  138 139   POTASSIUM mmol/L 3.5 4.0 3.6 3.4*   CHLORIDE mmol/L 101  --  99 97*   CO2 mmol/L 26.0  --  25.0 28.0   BUN mg/dL 18  --  14 12   CREATININE mg/dL 0.98  --  1.06 1.19   GLUCOSE mg/dL 114*  --  168* 97   CALCIUM mg/dL 8.7  --  8.9 9.5     Results from last 7 days   Lab Units 01/01/20  1747   BILIRUBIN mg/dL 0.5   ALK PHOS U/L 58   ALT (SGPT) U/L 17   AST (SGOT) U/L 20           Invalid input(s): TG, LDLCALC, LDLREALC  Results from last 7 days   Lab Units 01/02/20  0537 01/01/20  1747   HEMOGLOBIN A1C % 6.50*  --    PROBNP pg/mL  --  383.1   TROPONIN T ng/mL  --   <0.010   LACTATE mmol/L  --  1.8       Brief Urine Lab Results  (Last result in the past 365 days)      Color   Clarity   Blood   Leuk Est   Nitrite   Protein   CREAT   Urine HCG        07/08/19 1158 Yellow Clear Negative Negative Negative 100 mg/dL (2+)               Microbiology Results Abnormal     Procedure Component Value - Date/Time    Blood Culture - Blood, Arm, Left [[id number]] Collected:  01/01/20 1745    Lab Status:  Preliminary result Specimen:  Blood from Arm, Left Updated:  01/03/20 1945     Blood Culture No growth at 2 days    Blood Culture - Blood, Arm, Right [[id number]] Collected:  01/01/20 1730    Lab Status:  Preliminary result Specimen:  Blood from Arm, Right Updated:  01/03/20 1945     Blood Culture No growth at 2 days    Respiratory Culture - Sputum, Cough [[id number]] Collected:  01/02/20 2347    Lab Status:  Preliminary result Specimen:  Sputum from Cough Updated:  01/03/20 0659     Gram Stain Rare (1+) WBCs per low power field      Rare (1+) Epithelial cells per low power field      Few (2+) Mixed bacterial morphotypes seen on Gram Stain    S. Pneumo Ag Urine or CSF - Urine, Urine, Clean Catch [[id number]]  (Normal) Collected:  01/02/20 0654    Lab Status:  Final result Specimen:  Urine, Clean Catch Updated:  01/02/20 0918     Strep Pneumo Ag Negative    Legionella Antigen, Urine - Urine, Urine, Clean Catch [[id number]]  (Normal) Collected:  01/02/20 0654    Lab Status:  Final result Specimen:  Urine, Clean Catch Updated:  01/02/20 0918     LEGIONELLA ANTIGEN, URINE Negative    Respiratory Panel, PCR - Swab, Nasopharynx [[id number]]  (Abnormal) Collected:  01/02/20 0040    Lab Status:  Final result Specimen:  Swab from Nasopharynx Updated:  01/02/20 0809     ADENOVIRUS, PCR Not Detected     Coronavirus 229E Not Detected     Coronavirus HKU1 Not Detected     Coronavirus NL63 Not Detected     Coronavirus OC43 Not Detected     Human Metapneumovirus Detected     Human Rhinovirus/Enterovirus Not  Detected     Influenza B PCR Not Detected     Parainfluenza Virus 1 Not Detected     Parainfluenza Virus 2 Not Detected     Parainfluenza Virus 3 Not Detected     Parainfluenza Virus 4 Not Detected     Bordetella pertussis pcr Not Detected     Influenza A H1 2009 PCR Not Detected     Chlamydophila pneumoniae PCR Not Detected     Mycoplasma pneumo by PCR Not Detected     Influenza A PCR Not Detected     Influenza A H3 Not Detected     Influenza A H1 Not Detected     RSV, PCR Not Detected     Bordetella parapertussis PCR Not Detected    Influenza Antigen, Rapid - Swab, Nasopharynx [[id number]]  (Normal) Collected:  01/01/20 1747    Lab Status:  Final result Specimen:  Swab from Nasopharynx Updated:  01/01/20 1830     Influenza A Ag, EIA Negative     Influenza B Ag, EIA Negative          Imaging Results (All)     Procedure Component Value Units Date/Time    XR Chest 1 View [[id number]] Collected:  01/01/20 1750     Updated:  01/01/20 2323    Narrative:       EXAMINATION: XR CHEST 1 VW - 01/01/2020     INDICATION: Cough. Pneumonia.     COMPARISON: 10/11/2018     FINDINGS: Heart is enlarged. The vasculature is cephalized. There is  minimal if any interstitial edema. Small amount of patchy disease is  noted in the right lung base. No focal lung disease is seen elsewhere.  No effusion or pneumothorax is seen.       Impression:       1. Cardiomegaly and mild pulmonary vascular congestion.  2. Mild nonspecific patchy disease in the right lung base.      DICTATED:   01/01/2020  EDITED/ls :   01/01/2020      This report was finalized on 1/1/2020 11:20 PM by Dr. Edmundo Hou MD.             Results for orders placed during the hospital encounter of 07/27/18   Duplex Renal Artery - Bilateral Complete CAR    Narrative EXAMINATION: DUPLEX RENAL ARTERY BILATERAL COMPLETE CAR-      INDICATION: Hypertension malignant.     TECHNIQUE: Defects interrogation was performed of the kidneys in both  the sagittal and transverse planes.         COMPARISON: None.     FINDINGS: The right kidney measures in length from pole to pole 14.2 cm.  The left kidney measures in length from pole to pole 14.4 cm. The  resistive indices are within normal limits in both kidneys on the right  0.60 and on the left 0.68. The renal veins are patent bilaterally. The  peak systolic velocity of the abdominal aorta is 117 cm/s. The right  renal artery to aorta ratio is 1.0 and left renal artery to aorta ratio  is 0.54.       Impression No evidence of abnormal parenchymal blood flow pattern.  There is no evidence of renal artery stenosis.     D:  07/27/2018  E:  07/27/2018     This report was finalized on 7/27/2018 2:59 PM by Dr. Kristen Dave MD.          Results for orders placed during the hospital encounter of 07/27/18   Duplex Renal Artery - Bilateral Complete CAR    Narrative EXAMINATION: DUPLEX RENAL ARTERY BILATERAL COMPLETE CAR-      INDICATION: Hypertension malignant.     TECHNIQUE: Defects interrogation was performed of the kidneys in both  the sagittal and transverse planes.        COMPARISON: None.     FINDINGS: The right kidney measures in length from pole to pole 14.2 cm.  The left kidney measures in length from pole to pole 14.4 cm. The  resistive indices are within normal limits in both kidneys on the right  0.60 and on the left 0.68. The renal veins are patent bilaterally. The  peak systolic velocity of the abdominal aorta is 117 cm/s. The right  renal artery to aorta ratio is 1.0 and left renal artery to aorta ratio  is 0.54.       Impression No evidence of abnormal parenchymal blood flow pattern.  There is no evidence of renal artery stenosis.     D:  07/27/2018  E:  07/27/2018     This report was finalized on 7/27/2018 2:59 PM by Dr. Kristen Dave MD.          Results for orders placed during the hospital encounter of 10/12/18   Adult Transthoracic Echo Complete W/ Cont if Necessary Per Protocol    Narrative · Left ventricular systolic function  is normal. Estimated EF = 55%.  · Moderate dilation of the aortic root is present. Moderate dilation of   the sinuses of Valsalva is present.  · Mild aortic valve stenosis is present.  · Mild aortic valve regurgitation is present.  · Aortic valve mean pressure gradient is 10 mmHg.           Order Current Status    Blood Culture - Blood, Arm, Left Preliminary result    Blood Culture - Blood, Arm, Right Preliminary result    Respiratory Culture - Sputum, Cough Preliminary result        Discharge Details        Discharge Medications      New Medications      Instructions Start Date   cefdinir 300 MG capsule  Commonly known as:  OMNICEF   300 mg, Oral, 2 Times Daily      doxycycline 100 MG enteric coated tablet  Commonly known as:  DORYX   100 mg, Oral, 2 Times Daily      saccharomyces boulardii 250 MG capsule  Commonly known as:  FLORASTOR   250 mg, Oral, 2 Times Daily         Changes to Medications      Instructions Start Date   divalproex 250 MG DR tablet  Commonly known as:  DEPAKOTE  What changed:    · when to take this  · additional instructions   250 mg, Oral, 3 Times Daily, Take 2 in the AM 1 at noon and 2 in the pm. Per DR Clifford.      hydrALAZINE 50 MG tablet  Commonly known as:  APRESOLINE  What changed:  additional instructions   50 mg, Oral, 3 Times Daily      Semaglutide(0.25 or 0.5MG/DOS) 2 MG/1.5ML solution pen-injector  Commonly known as:  OZEMPIC (0.25 OR 0.5 MG/DOSE)  What changed:  additional instructions   0.5 mg, Subcutaneous, Weekly         Continue These Medications      Instructions Start Date   amLODIPine 5 MG tablet  Commonly known as:  NORVASC   5 mg, Oral, 2 Times Daily      aspirin 81 MG tablet   81 mg, Oral, 2 Times Daily      escitalopram 20 MG tablet  Commonly known as:  LEXAPRO   20 mg, Oral, Daily      Evolocumab 140 MG/ML solution auto-injector   140 mg, Subcutaneous, Every 14 Days      ketoconazole 2 % shampoo  Commonly known as:  NIZORAL   Topical, 2 Times Weekly      levothyroxine 75  MCG tablet  Commonly known as:  SYNTHROID   75 mcg, Oral, Daily      losartan 100 MG tablet  Commonly known as:  COZAAR   100 mg, Oral, Daily      lovastatin 40 MG tablet  Commonly known as:  MEVACOR   40 mg, Oral, Daily With Dinner      nebivolol 5 MG tablet  Commonly known as:  BYSTOLIC   5 mg, Oral, Daily      triamcinolone 0.1 % cream  Commonly known as:  KENALOG   1 application, Topical, 2 Times Daily             Allergies   Allergen Reactions   • Statins Myalgia         Discharge Disposition:  Home or Self Care    Diet:  Hospital:  Diet Order   Procedures   • Diet Regular; Cardiac, Consistent Carbohydrate       Activity:  as tolerated    Restrictions or Other Recommendations:  None       CODE STATUS:    Code Status and Medical Interventions:   Ordered at: 01/01/20 2223     Level Of Support Discussed With:    Patient     Code Status:    CPR     Medical Interventions (Level of Support Prior to Arrest):    Full       Future Appointments   Date Time Provider Department Center   1/15/2020  2:00 PM Scott Rubio MD [facility] None       Additional Instructions for the Follow-ups that You Need to Schedule     Discharge Follow-up with PCP   As directed       Currently Documented PCP:    Keven Bear MD    PCP Phone Number:    [phone]     Follow Up Details:  Patient to call for appnt to be seen in 1-2 wks, or sooner if needed               Time Spent on Discharge:  40 minutes    Electronically signed by Adria Coffman MD, 01/04/20, 11:08 AM.

## 2020-01-04 NOTE — PLAN OF CARE
Problem: Patient Care Overview  Goal: Plan of Care Review  Flowsheets  Taken 1/3/2020 0226 by Fitz Delgado, RN  Progress: improving  Taken 1/3/2020 2214 by Becca Reyna, RN  Plan of Care Reviewed With: patient  Taken 1/4/2020 9989 by Becca Reyna, RN  Outcome Summary: pt rested well throughout night, no c/o pain, no c/o n/v/d, vss, hopeful to go home today, will continue to monitor.

## 2020-01-05 ENCOUNTER — READMISSION MANAGEMENT (OUTPATIENT)
Dept: CALL CENTER | Facility: HOSPITAL | Age: 65
End: 2020-01-05

## 2020-01-05 LAB
BACTERIA SPEC RESP CULT: NORMAL
GRAM STN SPEC: NORMAL

## 2020-01-05 NOTE — OUTREACH NOTE
Prep Survey      Responses   Facility patient discharged from?  [place]   Is patient eligible?  Yes   Discharge diagnosis  Pneumonia of right lower lobe due to infectious organism   Does the patient have one of the following disease processes/diagnoses(primary or secondary)?  COPD/Pneumonia   Does the patient have Home health ordered?  No   Is there a DME ordered?  No   Prep survey completed?  Yes          Jocy Salomon RN

## 2020-01-06 ENCOUNTER — TELEPHONE (OUTPATIENT)
Dept: INTERNAL MEDICINE | Facility: CLINIC | Age: 65
End: 2020-01-06

## 2020-01-06 ENCOUNTER — TRANSITIONAL CARE MANAGEMENT TELEPHONE ENCOUNTER (OUTPATIENT)
Dept: INTERNAL MEDICINE | Facility: CLINIC | Age: 65
End: 2020-01-06

## 2020-01-06 LAB — BACTERIA SPEC AEROBE CULT: NORMAL

## 2020-01-07 DIAGNOSIS — J18.9 COMMUNITY ACQUIRED PNEUMONIA, UNSPECIFIED LATERALITY: Primary | ICD-10-CM

## 2020-01-07 RX ORDER — IPRATROPIUM BROMIDE AND ALBUTEROL SULFATE 2.5; .5 MG/3ML; MG/3ML
3 SOLUTION RESPIRATORY (INHALATION) EVERY 4 HOURS PRN
Qty: 120 VIAL | Refills: 5 | Status: SHIPPED | OUTPATIENT
Start: 2020-01-07 | End: 2020-05-11

## 2020-01-07 NOTE — OUTREACH NOTE
"OF NOTE to PCP: Pt is requesting sooner TCM hosp fwp than sched appt on 01/13/20. Pt is concerned about being sent home with no nebulizers also. Pt states breathing is better, but still very wheezy. He is taking Doxycycline and Omnicef and also using Spirometer. Pt states his is a very complicated medical case, and feels Dr Bear would want to see him this week. Pt states he did have p/c with a nurse in Dr Bear office yesterday but was told \"Monday appt is soon enough\". I do not see sooner availabilty that I may access but please have Dr Bear review.Thankyou. Otherwise, pt does state appetite is ok, he is staying hydrated. Confirms receipt and understanding of d/c orders and medications.   "

## 2020-01-08 ENCOUNTER — READMISSION MANAGEMENT (OUTPATIENT)
Dept: CALL CENTER | Facility: HOSPITAL | Age: 65
End: 2020-01-08

## 2020-01-08 NOTE — OUTREACH NOTE
COPD/PN Week 1 Survey      Responses   Facility patient discharged from?  [facility]   Does the patient have one of the following disease processes/diagnoses(primary or secondary)?  COPD/Pneumonia   Is there a successful TCM telephone encounter documented?  Yes   Was the primary reason for admission:  Pneumonia   Discharge diagnosis  Pneumonia of right lower lobe due to infectious organism          Luis Hameed RN

## 2020-01-09 LAB
BACTERIA SPEC AEROBE CULT: ABNORMAL
GRAM STN SPEC: ABNORMAL
ISOLATED FROM: ABNORMAL

## 2020-01-13 ENCOUNTER — READMISSION MANAGEMENT (OUTPATIENT)
Dept: CALL CENTER | Facility: HOSPITAL | Age: 65
End: 2020-01-13

## 2020-01-13 NOTE — OUTREACH NOTE
COPD/PN Week 2 Survey      Responses   Facility patient discharged from?  [facility]   Does the patient have one of the following disease processes/diagnoses(primary or secondary)?  COPD/Pneumonia   Was the primary reason for admission:  Pneumonia   Week 2 attempt successful?  Yes   Call start time  1044   Call end time  1045   Discharge diagnosis  Pneumonia of right lower lobe due to infectious organism   Meds reviewed with patient/caregiver?  Yes   Is the patient having any side effects they believe may be caused by any medication additions or changes?  No   Does the patient have all medications ordered at discharge?  Yes   Is the patient taking all medications as directed (includes completed medication regime)?  Yes   Does the patient have a primary care provider?   Yes   Does the patient have an appointment with their PCP or pulmonologist within 7 days of discharge?  Yes   Has the patient kept scheduled appointments due by today?  Yes   Comments  friday   Psychosocial issues?  No   Did the patient receive a copy of their discharge instructions?  Yes   Nursing interventions  Reviewed instructions with patient   What is the patient's perception of their health status since discharge?  Improving   Nursing Interventions  Nurse provided patient education   Are the patient's immunizations up to date?   Yes   Nursing interventions  Educated on importance of maintaining up to date immunizations as advised by provider   Is the patient/caregiver able to teach back the hierarchy of who to call/visit for symptoms/problems? PCP, Specialist, Home health nurse, Urgent Care, ED, 911  Yes   Is the patient/caregiver able to teach back signs and symptoms of worsening condition:  Fever/chills, Shortness of breath, Chest pain   Week 2 call completed?  Yes   Wrap up additional comments  pt stated he is doing well. no concerns complaints today          Jo Dan RN

## 2020-01-16 ENCOUNTER — APPOINTMENT (OUTPATIENT)
Dept: LAB | Facility: HOSPITAL | Age: 65
End: 2020-01-16

## 2020-01-16 DIAGNOSIS — G45.9 TIA (TRANSIENT ISCHEMIC ATTACK): ICD-10-CM

## 2020-01-16 DIAGNOSIS — I25.10 CORONARY ARTERY DISEASE INVOLVING NATIVE CORONARY ARTERY OF NATIVE HEART WITHOUT ANGINA PECTORIS: ICD-10-CM

## 2020-01-16 DIAGNOSIS — Z86.73 HISTORY OF ISCHEMIC RIGHT MCA STROKE: ICD-10-CM

## 2020-01-16 LAB
ALBUMIN SERPL-MCNC: 4 G/DL (ref 3.5–5.2)
ALBUMIN/GLOB SERPL: 1 G/DL
ALP SERPL-CCNC: 51 U/L (ref 39–117)
ALT SERPL W P-5'-P-CCNC: 20 U/L (ref 1–41)
ANION GAP SERPL CALCULATED.3IONS-SCNC: 13.5 MMOL/L (ref 5–15)
AST SERPL-CCNC: 14 U/L (ref 1–40)
BASOPHILS # BLD AUTO: 0.07 10*3/MM3 (ref 0–0.2)
BASOPHILS NFR BLD AUTO: 0.8 % (ref 0–1.5)
BILIRUB SERPL-MCNC: 0.6 MG/DL (ref 0.2–1.2)
BUN BLD-MCNC: 12 MG/DL (ref 8–23)
BUN/CREAT SERPL: 9.8 (ref 7–25)
CALCIUM SPEC-SCNC: 9.2 MG/DL (ref 8.6–10.5)
CHLORIDE SERPL-SCNC: 96 MMOL/L (ref 98–107)
CHOLEST SERPL-MCNC: 103 MG/DL (ref 0–200)
CO2 SERPL-SCNC: 30.5 MMOL/L (ref 22–29)
CREAT BLD-MCNC: 1.22 MG/DL (ref 0.76–1.27)
CRP SERPL-MCNC: 0.81 MG/DL (ref 0–0.5)
DEPRECATED RDW RBC AUTO: 44.1 FL (ref 37–54)
EOSINOPHIL # BLD AUTO: 0.19 10*3/MM3 (ref 0–0.4)
EOSINOPHIL NFR BLD AUTO: 2.1 % (ref 0.3–6.2)
ERYTHROCYTE [DISTWIDTH] IN BLOOD BY AUTOMATED COUNT: 14.2 % (ref 12.3–15.4)
GFR SERPL CREATININE-BSD FRML MDRD: 60 ML/MIN/1.73
GLOBULIN UR ELPH-MCNC: 3.9 GM/DL
GLUCOSE BLD-MCNC: 114 MG/DL (ref 65–99)
HCT VFR BLD AUTO: 45.3 % (ref 37.5–51)
HDLC SERPL-MCNC: 39 MG/DL (ref 40–60)
HGB BLD-MCNC: 15.5 G/DL (ref 13–17.7)
IMM GRANULOCYTES # BLD AUTO: 0.03 10*3/MM3 (ref 0–0.05)
IMM GRANULOCYTES NFR BLD AUTO: 0.3 % (ref 0–0.5)
LDLC SERPL CALC-MCNC: 19 MG/DL (ref 0–100)
LDLC/HDLC SERPL: 0.5 {RATIO}
LYMPHOCYTES # BLD AUTO: 2.54 10*3/MM3 (ref 0.7–3.1)
LYMPHOCYTES NFR BLD AUTO: 27.8 % (ref 19.6–45.3)
MCH RBC QN AUTO: 29.4 PG (ref 26.6–33)
MCHC RBC AUTO-ENTMCNC: 34.2 G/DL (ref 31.5–35.7)
MCV RBC AUTO: 86 FL (ref 79–97)
MONOCYTES # BLD AUTO: 0.49 10*3/MM3 (ref 0.1–0.9)
MONOCYTES NFR BLD AUTO: 5.4 % (ref 5–12)
NEUTROPHILS # BLD AUTO: 5.82 10*3/MM3 (ref 1.7–7)
NEUTROPHILS NFR BLD AUTO: 63.6 % (ref 42.7–76)
NRBC BLD AUTO-RTO: 0 /100 WBC (ref 0–0.2)
PLATELET # BLD AUTO: 283 10*3/MM3 (ref 140–450)
PMV BLD AUTO: 11.1 FL (ref 6–12)
POTASSIUM BLD-SCNC: 3.8 MMOL/L (ref 3.5–5.2)
PROT SERPL-MCNC: 7.9 G/DL (ref 6–8.5)
PSA SERPL-MCNC: 3.67 NG/ML (ref 0–4)
RBC # BLD AUTO: 5.27 10*6/MM3 (ref 4.14–5.8)
SODIUM BLD-SCNC: 140 MMOL/L (ref 136–145)
T4 FREE SERPL-MCNC: 1.16 NG/DL (ref 0.93–1.7)
TRIGL SERPL-MCNC: 223 MG/DL (ref 0–150)
TSH SERPL DL<=0.05 MIU/L-ACNC: 1.57 UIU/ML (ref 0.27–4.2)
VIT B12 BLD-MCNC: 796 PG/ML (ref 211–946)
VLDLC SERPL-MCNC: 44.6 MG/DL (ref 5–40)
WBC NRBC COR # BLD: 9.14 10*3/MM3 (ref 3.4–10.8)

## 2020-01-16 PROCEDURE — G0103 PSA SCREENING: HCPCS | Performed by: INTERNAL MEDICINE

## 2020-01-16 PROCEDURE — 82607 VITAMIN B-12: CPT | Performed by: INTERNAL MEDICINE

## 2020-01-16 PROCEDURE — 80053 COMPREHEN METABOLIC PANEL: CPT | Performed by: INTERNAL MEDICINE

## 2020-01-16 PROCEDURE — 86140 C-REACTIVE PROTEIN: CPT | Performed by: INTERNAL MEDICINE

## 2020-01-16 PROCEDURE — 80061 LIPID PANEL: CPT | Performed by: INTERNAL MEDICINE

## 2020-01-16 PROCEDURE — 36415 COLL VENOUS BLD VENIPUNCTURE: CPT | Performed by: INTERNAL MEDICINE

## 2020-01-16 PROCEDURE — 84443 ASSAY THYROID STIM HORMONE: CPT | Performed by: INTERNAL MEDICINE

## 2020-01-16 PROCEDURE — 85025 COMPLETE CBC W/AUTO DIFF WBC: CPT | Performed by: INTERNAL MEDICINE

## 2020-01-16 PROCEDURE — 84439 ASSAY OF FREE THYROXINE: CPT | Performed by: INTERNAL MEDICINE

## 2020-01-17 ENCOUNTER — OFFICE VISIT (OUTPATIENT)
Dept: INTERNAL MEDICINE | Facility: CLINIC | Age: 65
End: 2020-01-17

## 2020-01-17 VITALS
DIASTOLIC BLOOD PRESSURE: 91 MMHG | HEART RATE: 66 BPM | TEMPERATURE: 98.4 F | HEIGHT: 76 IN | RESPIRATION RATE: 16 BRPM | SYSTOLIC BLOOD PRESSURE: 157 MMHG | OXYGEN SATURATION: 99 % | WEIGHT: 312 LBS | BODY MASS INDEX: 37.99 KG/M2

## 2020-01-17 DIAGNOSIS — Z09 HOSPITAL DISCHARGE FOLLOW-UP: Primary | ICD-10-CM

## 2020-01-17 DIAGNOSIS — E78.1 HYPERTRIGLYCERIDEMIA: ICD-10-CM

## 2020-01-17 DIAGNOSIS — R56.9 SEIZURE (HCC): ICD-10-CM

## 2020-01-17 DIAGNOSIS — Z12.5 ENCOUNTER FOR SCREENING FOR MALIGNANT NEOPLASM OF PROSTATE: ICD-10-CM

## 2020-01-17 DIAGNOSIS — I10 ESSENTIAL HYPERTENSION: ICD-10-CM

## 2020-01-17 PROCEDURE — 99495 TRANSJ CARE MGMT MOD F2F 14D: CPT | Performed by: INTERNAL MEDICINE

## 2020-01-17 NOTE — PROGRESS NOTES
Subjective     Patient ID: Andre Agosto is a 65 y.o. male. Patient is here for management of multiple medical problems.     Chief Complaint   Patient presents with   • Transition of care     patient here for hospital follow-up, patient admitted on 01/01/2020 for Pneumonia   • Neurology referra;     patient needs refills to Dr. Clifford for Neurology not sleep apnea     History of Present Illness       Recent hospitalization for CAP.    Viral pneumonia.  Pt recovering well.            The following portions of the patient's history were reviewed and updated as appropriate: allergies, current medications, past family history, past medical history, past social history, past surgical history and problem list.    Review of Systems   Constitutional: Positive for fatigue. Negative for chills and diaphoresis.   Respiratory: Negative for cough, choking, chest tightness and shortness of breath.    Musculoskeletal: Negative for back pain, gait problem and joint swelling.   Psychiatric/Behavioral: Negative for hallucinations and self-injury. The patient is not nervous/anxious and is not hyperactive.    All other systems reviewed and are negative.      Current Outpatient Medications:   •  amLODIPine (NORVASC) 5 MG tablet, Take 1 tablet by mouth 2 (Two) Times a Day., Disp: 180 tablet, Rfl: 3  •  aspirin 81 MG tablet, Take 81 mg by mouth 2 (Two) Times a Day., Disp: 30 tablet, Rfl: 11  •  divalproex (DEPAKOTE) 250 MG DR tablet, Take 1 tablet by mouth 3 (Three) Times a Day. Take 2 in the AM 1 at noon and 2 in the pm. Per DR Clifford. (Patient taking differently: Take 250 mg by mouth 5 (Five) Times a Day. Take 3 in the AM  2 in the pm. Per DR Clifford.), Disp: 150 tablet, Rfl: 3  •  escitalopram (LEXAPRO) 20 MG tablet, Take 1 tablet by mouth Daily., Disp: 90 tablet, Rfl: 3  •  Evolocumab 140 MG/ML solution auto-injector, INJECT 1ML UNDER THE SKIN AS DIRECTED EVERY 14 DAYS, Disp: 2 pen, Rfl: 2  •  hydrALAZINE (APRESOLINE) 50 MG tablet, Take 1  "tablet by mouth 3 (Three) Times a Day. (Patient taking differently: Take 50 mg by mouth 3 (Three) Times a Day. Takes 2 in the morning  Takes 1 at night), Disp: 90 tablet, Rfl: 11  •  ipratropium-albuterol (DUO-NEB) 0.5-2.5 mg/3 ml nebulizer, Take 3 mL by nebulization Every 4 (Four) Hours As Needed for Wheezing., Disp: 120 vial, Rfl: 5  •  ketoconazole (NIZORAL) 2 % shampoo, Apply  topically to the appropriate area as directed 2 (Two) Times a Week., Disp: 12 mL, Rfl: 0  •  levothyroxine (SYNTHROID) 75 MCG tablet, Take 1 tablet by mouth Daily., Disp: 30 tablet, Rfl: 11  •  losartan (COZAAR) 100 MG tablet, Take 1 tablet by mouth Daily., Disp: 90 tablet, Rfl: 3  •  lovastatin (MEVACOR) 40 MG tablet, Take 1 tablet by mouth Daily With Dinner., Disp: 90 tablet, Rfl: 3  •  nebivolol (BYSTOLIC) 5 MG tablet, Take 1 tablet by mouth Daily., Disp: 90 tablet, Rfl: 3  •  saccharomyces boulardii (FLORASTOR) 250 MG capsule, Take 1 capsule by mouth 2 (Two) Times a Day., Disp: 30 capsule, Rfl: 0  •  Semaglutide (OZEMPIC) 0.25 or 0.5 MG/DOSE solution pen-injector, Inject 0.5 mg under the skin into the appropriate area as directed 1 (One) Time Per Week. (Patient taking differently: Inject 0.5 mg under the skin into the appropriate area as directed 1 (One) Time Per Week. On Sundays), Disp: 4 pen, Rfl: 3  •  triamcinolone (KENALOG) 0.1 % cream, Apply 1 application topically to the appropriate area as directed 2 (Two) Times a Day., Disp: 80 g, Rfl: 2    Objective      Blood pressure 157/91, pulse 66, temperature 98.4 °F (36.9 °C), temperature source Oral, resp. rate 16, height 193 cm (76\"), weight (!) 142 kg (312 lb), SpO2 99 %.    Physical Exam     General Appearance:    Alert, cooperative, no distress, appears stated age   Head:    Normocephalic, without obvious abnormality, atraumatic   Eyes:    PERRL, conjunctiva/corneas clear, EOM's intact   Ears:    Normal TM's and external ear canals, both ears   Nose:   Nares normal, septum " midline, mucosa normal, no drainage   or sinus tenderness   Throat:   Lips, mucosa, and tongue normal; teeth and gums normal   Neck:   Supple, symmetrical, trachea midline, no adenopathy;        thyroid:  No enlargement/tenderness/nodules; no carotid    bruit or JVD   Back:     Symmetric, no curvature, ROM normal, no CVA tenderness   Lungs:     Clear to auscultation bilaterally, respirations unlabored   Chest wall:    No tenderness or deformity   Heart:    Regular rate and rhythm, S1 and S2 normal, no murmur,        rub or gallop   Abdomen:     Soft, non-tender, bowel sounds active all four quadrants,     no masses, no organomegaly   Extremities:   Extremities normal, atraumatic, no cyanosis or edema   Pulses:   2+ and symmetric all extremities   Skin:   Skin color, texture, turgor normal, no rashes or lesions   Lymph nodes:   Cervical, supraclavicular, and axillary nodes normal   Neurologic:   CNII-XII intact. Normal strength, sensation and reflexes       throughout      Results for orders placed or performed during the hospital encounter of 01/01/20   Influenza Antigen, Rapid - Swab, Nasopharynx   Result Value Ref Range    Influenza A Ag, EIA Negative Negative    Influenza B Ag, EIA Negative Negative   Blood Culture - Blood, Arm, Left   Result Value Ref Range    Blood Culture Cutibacterium acnes (A)     Isolated from Anaerobic Bottle     Gram Stain Anaerobic Bottle Gram positive bacilli    Blood Culture - Blood, Arm, Right   Result Value Ref Range    Blood Culture No growth at 5 days    Respiratory Culture - Sputum, Cough   Result Value Ref Range    Respiratory Culture Moderate growth (3+) Normal Respiratory Guerline     Gram Stain Rare (1+) WBCs per low power field     Gram Stain Rare (1+) Epithelial cells per low power field     Gram Stain       Few (2+) Mixed bacterial morphotypes seen on Gram Stain   Legionella Antigen, Urine - Urine, Urine, Clean Catch   Result Value Ref Range    LEGIONELLA ANTIGEN, URINE  Negative Negative   S. Pneumo Ag Urine or CSF - Urine, Urine, Clean Catch   Result Value Ref Range    Strep Pneumo Ag Negative Negative   Respiratory Panel, PCR - Swab, Nasopharynx   Result Value Ref Range    ADENOVIRUS, PCR Not Detected Not Detected    Coronavirus 229E Not Detected Not Detected    Coronavirus HKU1 Not Detected Not Detected    Coronavirus NL63 Not Detected Not Detected    Coronavirus OC43 Not Detected Not Detected    Human Metapneumovirus Detected (A) Not Detected    Human Rhinovirus/Enterovirus Not Detected Not Detected    Influenza B PCR Not Detected Not Detected    Parainfluenza Virus 1 Not Detected Not Detected    Parainfluenza Virus 2 Not Detected Not Detected    Parainfluenza Virus 3 Not Detected Not Detected    Parainfluenza Virus 4 Not Detected Not Detected    Bordetella pertussis pcr Not Detected Not Detected    Influenza A H1 2009 PCR Not Detected Not Detected    Chlamydophila pneumoniae PCR Not Detected Not Detected    Mycoplasma pneumo by PCR Not Detected Not Detected    Influenza A PCR Not Detected Not Detected    Influenza A H3 Not Detected Not Detected    Influenza A H1 Not Detected Not Detected    RSV, PCR Not Detected Not Detected    Bordetella parapertussis PCR Not Detected Not Detected   Comprehensive Metabolic Panel   Result Value Ref Range    Glucose 97 65 - 99 mg/dL    BUN 12 8 - 23 mg/dL    Creatinine 1.19 0.76 - 1.27 mg/dL    Sodium 139 136 - 145 mmol/L    Potassium 3.4 (L) 3.5 - 5.2 mmol/L    Chloride 97 (L) 98 - 107 mmol/L    CO2 28.0 22.0 - 29.0 mmol/L    Calcium 9.5 8.6 - 10.5 mg/dL    Total Protein 9.0 (H) 6.0 - 8.5 g/dL    Albumin 4.20 3.50 - 5.20 g/dL    ALT (SGPT) 17 1 - 41 U/L    AST (SGOT) 20 1 - 40 U/L    Alkaline Phosphatase 58 39 - 117 U/L    Total Bilirubin 0.5 0.2 - 1.2 mg/dL    eGFR Non African Amer 62 >60 mL/min/1.73    Globulin 4.8 gm/dL    A/G Ratio 0.9 g/dL    BUN/Creatinine Ratio 10.1 7.0 - 25.0    Anion Gap 14.0 5.0 - 15.0 mmol/L   Lactic Acid, Plasma    Result Value Ref Range    Lactate 1.8 0.5 - 2.0 mmol/L   Troponin   Result Value Ref Range    Troponin T <0.010 0.000 - 0.030 ng/mL   CBC Auto Differential   Result Value Ref Range    WBC 7.29 3.40 - 10.80 10*3/mm3    RBC 5.45 4.14 - 5.80 10*6/mm3    Hemoglobin 16.2 13.0 - 17.7 g/dL    Hematocrit 49.4 37.5 - 51.0 %    MCV 90.6 79.0 - 97.0 fL    MCH 29.7 26.6 - 33.0 pg    MCHC 32.8 31.5 - 35.7 g/dL    RDW 14.2 12.3 - 15.4 %    RDW-SD 47.0 37.0 - 54.0 fl    MPV 11.0 6.0 - 12.0 fL    Platelets 189 140 - 450 10*3/mm3    Neutrophil % 64.1 42.7 - 76.0 %    Lymphocyte % 21.3 19.6 - 45.3 %    Monocyte % 9.9 5.0 - 12.0 %    Eosinophil % 3.6 0.3 - 6.2 %    Basophil % 0.8 0.0 - 1.5 %    Immature Grans % 0.3 0.0 - 0.5 %    Neutrophils, Absolute 4.68 1.70 - 7.00 10*3/mm3    Lymphocytes, Absolute 1.55 0.70 - 3.10 10*3/mm3    Monocytes, Absolute 0.72 0.10 - 0.90 10*3/mm3    Eosinophils, Absolute 0.26 0.00 - 0.40 10*3/mm3    Basophils, Absolute 0.06 0.00 - 0.20 10*3/mm3    Immature Grans, Absolute 0.02 0.00 - 0.05 10*3/mm3    nRBC 0.0 0.0 - 0.2 /100 WBC   BNP   Result Value Ref Range    proBNP 383.1 5.0 - 900.0 pg/mL   CBC Auto Differential   Result Value Ref Range    WBC 6.05 3.40 - 10.80 10*3/mm3    RBC 5.24 4.14 - 5.80 10*6/mm3    Hemoglobin 15.9 13.0 - 17.7 g/dL    Hematocrit 49.6 37.5 - 51.0 %    MCV 94.7 79.0 - 97.0 fL    MCH 30.3 26.6 - 33.0 pg    MCHC 32.1 31.5 - 35.7 g/dL    RDW 14.3 12.3 - 15.4 %    RDW-SD 50.0 37.0 - 54.0 fl    MPV 10.8 6.0 - 12.0 fL    Platelets 174 140 - 450 10*3/mm3    Neutrophil % 77.4 (H) 42.7 - 76.0 %    Lymphocyte % 19.5 (L) 19.6 - 45.3 %    Monocyte % 2.1 (L) 5.0 - 12.0 %    Eosinophil % 0.2 (L) 0.3 - 6.2 %    Basophil % 0.5 0.0 - 1.5 %    Immature Grans % 0.3 0.0 - 0.5 %    Neutrophils, Absolute 4.68 1.70 - 7.00 10*3/mm3    Lymphocytes, Absolute 1.18 0.70 - 3.10 10*3/mm3    Monocytes, Absolute 0.13 0.10 - 0.90 10*3/mm3    Eosinophils, Absolute 0.01 0.00 - 0.40 10*3/mm3    Basophils, Absolute  0.03 0.00 - 0.20 10*3/mm3    Immature Grans, Absolute 0.02 0.00 - 0.05 10*3/mm3    nRBC 0.0 0.0 - 0.2 /100 WBC   Basic Metabolic Panel   Result Value Ref Range    Glucose 168 (H) 65 - 99 mg/dL    BUN 14 8 - 23 mg/dL    Creatinine 1.06 0.76 - 1.27 mg/dL    Sodium 138 136 - 145 mmol/L    Potassium 3.6 3.5 - 5.2 mmol/L    Chloride 99 98 - 107 mmol/L    CO2 25.0 22.0 - 29.0 mmol/L    Calcium 8.9 8.6 - 10.5 mg/dL    eGFR Non African Amer 71 >60 mL/min/1.73    BUN/Creatinine Ratio 13.2 7.0 - 25.0    Anion Gap 14.0 5.0 - 15.0 mmol/L   Hemoglobin A1c   Result Value Ref Range    Hemoglobin A1C 6.50 (H) 4.80 - 5.60 %   Potassium   Result Value Ref Range    Potassium 4.0 3.5 - 5.2 mmol/L   Basic Metabolic Panel   Result Value Ref Range    Glucose 114 (H) 65 - 99 mg/dL    BUN 18 8 - 23 mg/dL    Creatinine 0.98 0.76 - 1.27 mg/dL    Sodium 140 136 - 145 mmol/L    Potassium 3.5 3.5 - 5.2 mmol/L    Chloride 101 98 - 107 mmol/L    CO2 26.0 22.0 - 29.0 mmol/L    Calcium 8.7 8.6 - 10.5 mg/dL    eGFR Non African Amer 77 >60 mL/min/1.73    BUN/Creatinine Ratio 18.4 7.0 - 25.0    Anion Gap 13.0 5.0 - 15.0 mmol/L   POC Glucose Once   Result Value Ref Range    Glucose 155 (H) 70 - 130 mg/dL   POC Glucose Once   Result Value Ref Range    Glucose 202 (H) 70 - 130 mg/dL   POC Glucose Once   Result Value Ref Range    Glucose 122 70 - 130 mg/dL   POC Glucose Once   Result Value Ref Range    Glucose 180 (H) 70 - 130 mg/dL   POC Glucose Once   Result Value Ref Range    Glucose 122 70 - 130 mg/dL   POC Glucose Once   Result Value Ref Range    Glucose 102 70 - 130 mg/dL   POC Glucose Once   Result Value Ref Range    Glucose 101 70 - 130 mg/dL   POC Glucose Once   Result Value Ref Range    Glucose 130 70 - 130 mg/dL   POC Glucose Once   Result Value Ref Range    Glucose 110 70 - 130 mg/dL   POC Glucose Once   Result Value Ref Range    Glucose 108 70 - 130 mg/dL         Assessment/Plan     Recovering well.  Needs help swati Powell was seen  today for transition of care and neurology referra;.    Diagnoses and all orders for this visit:    Hospital discharge follow-up  -     CBC & Differential  -     Vitamin B12  -     Comprehensive Metabolic Panel  -     TSH  -     T4, Free  -     PSA Screen  -     Lipid Panel    Seizure (CMS/HCC)  -     Ambulatory Referral to Neurology  -     CBC & Differential  -     Vitamin B12  -     Comprehensive Metabolic Panel  -     TSH  -     T4, Free  -     PSA Screen  -     Lipid Panel    Essential hypertension  -     CBC & Differential  -     Vitamin B12  -     Comprehensive Metabolic Panel  -     TSH  -     T4, Free  -     PSA Screen  -     Lipid Panel    Hypertriglyceridemia  -     CBC & Differential  -     Vitamin B12  -     Comprehensive Metabolic Panel  -     TSH  -     T4, Free  -     PSA Screen  -     Lipid Panel    Encounter for screening for malignant neoplasm of prostate   -     PSA Screen      Return in about 3 months (around 4/17/2020).          There are no Patient Instructions on file for this visit.     Keevn Bear MD    Assessment/Plan

## 2020-01-22 ENCOUNTER — READMISSION MANAGEMENT (OUTPATIENT)
Dept: CALL CENTER | Facility: HOSPITAL | Age: 65
End: 2020-01-22

## 2020-01-22 NOTE — OUTREACH NOTE
COPD/PN Week 3 Survey      Responses   Facility patient discharged from?  [facility]   Does the patient have one of the following disease processes/diagnoses(primary or secondary)?  COPD/Pneumonia   Was the primary reason for admission:  Pneumonia   Week 3 attempt successful?  Yes   Call start time  1017   Rescheduled  Rescheduled-pt requested [Lost connection while speaking to wife. ]   Call end time  1017          Becca Rockwell RN

## 2020-01-23 ENCOUNTER — READMISSION MANAGEMENT (OUTPATIENT)
Dept: CALL CENTER | Facility: HOSPITAL | Age: 65
End: 2020-01-23

## 2020-01-23 NOTE — OUTREACH NOTE
COPD/PN Week 3 Survey      Responses   Facility patient discharged from?  [facility]   Does the patient have one of the following disease processes/diagnoses(primary or secondary)?  COPD/Pneumonia   Was the primary reason for admission:  Pneumonia   Week 3 attempt successful?  Yes   Call start time  1258   Call end time  1300   Discharge diagnosis  Pneumonia of right lower lobe due to infectious organism   Meds reviewed with patient/caregiver?  Yes   Is the patient having any side effects they believe may be caused by any medication additions or changes?  No   Does the patient have all medications ordered at discharge?  Yes   Is the patient taking all medications as directed (includes completed medication regime)?  Yes   Does the patient have a primary care provider?   Yes   Does the patient have an appointment with their PCP or pulmonologist within 7 days of discharge?  Yes   Has the patient kept scheduled appointments due by today?  Yes   Psychosocial issues?  No   Did the patient receive a copy of their discharge instructions?  Yes   Nursing interventions  Reviewed instructions with patient   What is the patient's perception of their health status since discharge?  Returned to baseline/stable   Nursing Interventions  Nurse provided patient education   Are the patient's immunizations up to date?   Yes   Nursing interventions  Educated on importance of maintaining up to date immunizations as advised by provider   Is the patient/caregiver able to teach back the hierarchy of who to call/visit for symptoms/problems? PCP, Specialist, Home health nurse, Urgent Care, ED, 911  Yes   Is the patient/caregiver able to teach back signs and symptoms of worsening condition:  Fever/chills, Shortness of breath, Chest pain   Is the patient/caregiver able to teach back importance of completing antibiotic course of treatment?  Yes   Week 3 call completed?  Yes   Revoked  No further contact(revokes)-requires comment    Graduated/Revoked comments  State he is doing well, everything is back to normal and he will call if he needs assistnance. Graduated.           Delfina Ramirez RN

## 2020-03-10 ENCOUNTER — TELEPHONE (OUTPATIENT)
Dept: INTERNAL MEDICINE | Facility: CLINIC | Age: 65
End: 2020-03-10

## 2020-03-10 NOTE — TELEPHONE ENCOUNTER
If not stable on Discharge go to [facility] er.  If he is on the stroke service they will not be able to manage his medical problems.\    He can come in at 8 am if out

## 2020-03-10 NOTE — TELEPHONE ENCOUNTER
Dr. Bear, I talked to Becca, she states Andre was admitted to  on Sunday afternoon after he developed symptoms of a stroke. Becca states while there he was taken off of all blood pressure medications, this morning his blood pressure was extremely high, he is having issues with his kidneys and he is still having some of the symptoms that he had when she took him to the hospital on Sunday. Becca's concern is that she was told Andre would be discharged from the hospital today. Becca states if they do discharge him she is going to try to talk to the previous doctor that treated him for his previous stroke at [facility], Becca stated she wanted to let you know Andre's condition and that she may contact you to get your opinion.

## 2020-03-11 ENCOUNTER — TELEPHONE (OUTPATIENT)
Dept: INTERNAL MEDICINE | Facility: CLINIC | Age: 65
End: 2020-03-11

## 2020-03-11 ENCOUNTER — READMISSION MANAGEMENT (OUTPATIENT)
Dept: CALL CENTER | Facility: HOSPITAL | Age: 65
End: 2020-03-11

## 2020-03-11 ENCOUNTER — TRANSITIONAL CARE MANAGEMENT TELEPHONE ENCOUNTER (OUTPATIENT)
Dept: CALL CENTER | Facility: HOSPITAL | Age: 65
End: 2020-03-11

## 2020-03-11 NOTE — OUTREACH NOTE
Call Center TCM Note      Responses   [facility] patient discharged from?  Non-   Does the patient have one of the following disease processes/diagnoses(primary or secondary)?  Non- Discharge   TCM attempt successful?  Yes   Call start time  1235   Call end time  1243   Discharge diagnosis  possible eye stroke   Is patient permission given to speak with other caregiver?  Yes   List who call center can speak with  Paula, spouse   Does the patient have all medications ordered at discharge?  Yes   Is the patient taking all medications as directed (includes completed medication regime)?  Yes   Medication comments  Patient will bring medication list from  discharge with him to [facility] in the am. Patient denies any questions regarding meds today,  he will address meds at [facility] tomorrow.    Does the patient have a primary care provider?   Yes   Does the patient have an appointment with their PCP within 7 days of discharge?  Yes   Comments regarding PCP  Keven Bear MD PCP,  Hospital follow up appt 3/12/20 8am   Has the patient kept scheduled appointments due by today?  N/A   Has home health visited the patient within 72 hours of discharge?  N/A   Psychosocial issues?  No   Did the patient receive a copy of their discharge instructions?  Yes   What is the patient's perception of their health status since discharge?  Improving [Patient reports that the double vision has resolved. ]   Is the patient/caregiver able to teach back signs and symptoms related to disease process for when to call PCP?  Yes   Is the patient/caregiver able to teach back signs and symptoms related to disease process for when to call 911?  Yes   Is the patient/caregiver able to teach back the hierarchy of who to call/visit for symptoms/problems? PCP, Specialist, Home health nurse, Urgent Care, ED, 911  Yes   Additional teach back comments  Reviewed stroke S/S with patient,  Facial numbness/drooping, arm/leg weakness,numbness, speech  difficulty, vision changes, increased confusion. Patient will seek emergency care with symptoms.    TCM call completed?  Yes          Becca Rockwell RN    3/11/2020, 12:44

## 2020-03-11 NOTE — OUTREACH NOTE
Prep Survey      Responses   [facility] patient discharged from?  [facility]   Is LACE score < 7 ?  Non-BH Discharge   Eligibility  [facility]   Date of Discharge  03/10/20   Discharge Disposition  Home or Self Care   Discharge diagnosis  possible eye stroke   Does the patient have one of the following disease processes/diagnoses(primary or secondary)?  Non-BH Discharge   Prep survey completed?  Yes          Nabila Archibald RN

## 2020-03-12 ENCOUNTER — OFFICE VISIT (OUTPATIENT)
Dept: INTERNAL MEDICINE | Facility: CLINIC | Age: 65
End: 2020-03-12

## 2020-03-12 VITALS
RESPIRATION RATE: 16 BRPM | WEIGHT: 309 LBS | SYSTOLIC BLOOD PRESSURE: 145 MMHG | HEIGHT: 76 IN | OXYGEN SATURATION: 98 % | BODY MASS INDEX: 37.63 KG/M2 | HEART RATE: 67 BPM | TEMPERATURE: 98.3 F | DIASTOLIC BLOOD PRESSURE: 91 MMHG

## 2020-03-12 DIAGNOSIS — I10 MALIGNANT HYPERTENSION: Primary | ICD-10-CM

## 2020-03-12 DIAGNOSIS — Z99.89 BIPAP (BIPHASIC POSITIVE AIRWAY PRESSURE) DEPENDENCE: ICD-10-CM

## 2020-03-12 DIAGNOSIS — E11.9 DIABETES MELLITUS WITHOUT COMPLICATION (HCC): ICD-10-CM

## 2020-03-12 PROCEDURE — 99214 OFFICE O/P EST MOD 30 MIN: CPT | Performed by: INTERNAL MEDICINE

## 2020-03-12 RX ORDER — CARVEDILOL 6.25 MG/1
6.25 TABLET ORAL 2 TIMES DAILY WITH MEALS
Qty: 60 TABLET | Refills: 11 | Status: SHIPPED | OUTPATIENT
Start: 2020-03-12 | End: 2020-04-30

## 2020-03-12 NOTE — PROGRESS NOTES
Subjective     Patient ID: Andre Agosto is a 65 y.o. male. Patient is here for management of multiple medical problems.     Chief Complaint   Patient presents with   • Hospital follow-up     patient here for hospital follow-up, went to the ER for vision issues, balance issues, and nausea, patient, discharged on 3/10/2020     History of Present Illness     dizziness and cold sweats. Went to ER .  BP very elevated. labetalol iv caused      Central Sleep apnea on cpap at .    Pt will follow up with Dr Clifford       Pt going back to [facility] for cardiac F/U.          The following portions of the patient's history were reviewed and updated as appropriate: allergies, current medications, past family history, past medical history, past social history, past surgical history and problem list.    Review of Systems   Constitutional: Positive for fatigue.   Respiratory: Negative for cough, choking and chest tightness.    Psychiatric/Behavioral: Negative for sleep disturbance. The patient is not nervous/anxious and is not hyperactive.    All other systems reviewed and are negative.      Current Outpatient Medications:   •  amLODIPine (NORVASC) 5 MG tablet, Take 1 tablet by mouth 2 (Two) Times a Day., Disp: 180 tablet, Rfl: 3  •  aspirin 81 MG tablet, Take 81 mg by mouth 2 (Two) Times a Day., Disp: 30 tablet, Rfl: 11  •  divalproex (DEPAKOTE) 250 MG DR tablet, Take 1 tablet by mouth 3 (Three) Times a Day. Take 2 in the AM 1 at noon and 2 in the pm. Per DR Clifford. (Patient taking differently: Take 250 mg by mouth 5 (Five) Times a Day. Take 3 in the AM  2 in the pm. Per DR Clifford.), Disp: 150 tablet, Rfl: 3  •  escitalopram (LEXAPRO) 20 MG tablet, Take 1 tablet by mouth Daily., Disp: 90 tablet, Rfl: 3  •  Evolocumab 140 MG/ML solution auto-injector, INJECT 1ML UNDER THE SKIN AS DIRECTED EVERY 14 DAYS, Disp: 2 pen, Rfl: 2  •  hydrALAZINE (APRESOLINE) 50 MG tablet, Take 1 tablet by mouth 3 (Three) Times a Day. (Patient taking  "differently: Take 50 mg by mouth 3 (Three) Times a Day. Takes 2 in the morning  Takes 1 at night), Disp: 90 tablet, Rfl: 11  •  ipratropium-albuterol (DUO-NEB) 0.5-2.5 mg/3 ml nebulizer, Take 3 mL by nebulization Every 4 (Four) Hours As Needed for Wheezing., Disp: 120 vial, Rfl: 5  •  ketoconazole (NIZORAL) 2 % shampoo, Apply  topically to the appropriate area as directed 2 (Two) Times a Week., Disp: 12 mL, Rfl: 0  •  levothyroxine (SYNTHROID) 75 MCG tablet, Take 1 tablet by mouth Daily., Disp: 30 tablet, Rfl: 11  •  losartan (COZAAR) 100 MG tablet, Take 1 tablet by mouth Daily., Disp: 90 tablet, Rfl: 3  •  lovastatin (MEVACOR) 40 MG tablet, Take 1 tablet by mouth Daily With Dinner., Disp: 90 tablet, Rfl: 3  •  saccharomyces boulardii (FLORASTOR) 250 MG capsule, Take 1 capsule by mouth 2 (Two) Times a Day., Disp: 30 capsule, Rfl: 0  •  Semaglutide (OZEMPIC) 0.25 or 0.5 MG/DOSE solution pen-injector, Inject 0.5 mg under the skin into the appropriate area as directed 1 (One) Time Per Week. (Patient taking differently: Inject 0.5 mg under the skin into the appropriate area as directed 1 (One) Time Per Week. On Sundays), Disp: 4 pen, Rfl: 3  •  triamcinolone (KENALOG) 0.1 % cream, Apply 1 application topically to the appropriate area as directed 2 (Two) Times a Day., Disp: 80 g, Rfl: 2  •  carvedilol (COREG) 6.25 MG tablet, Take 1 tablet by mouth 2 (Two) Times a Day With Meals., Disp: 60 tablet, Rfl: 11    Objective      Blood pressure 145/91, pulse 67, temperature 98.3 °F (36.8 °C), temperature source Oral, resp. rate 16, height 193 cm (76\"), weight (!) 140 kg (309 lb), SpO2 98 %.    Physical Exam     General Appearance:    Alert, cooperative, no distress, appears stated age   Head:    Normocephalic, without obvious abnormality, atraumatic   Eyes:    PERRL, conjunctiva/corneas clear, EOM's intact   Ears:    Normal TM's and external ear canals, both ears   Nose:   Nares normal, septum midline, mucosa normal, no drainage "   or sinus tenderness   Throat:   Lips, mucosa, and tongue normal; teeth and gums normal   Neck:   Supple, symmetrical, trachea midline, no adenopathy;        thyroid:  No enlargement/tenderness/nodules; no carotid    bruit or JVD   Back:     Symmetric, no curvature, ROM normal, no CVA tenderness   Lungs:     Clear to auscultation bilaterally, respirations unlabored   Chest wall:    No tenderness or deformity   Heart:    Regular rate and rhythm, S1 and S2 normal, no murmur,        rub or gallop   Abdomen:     Soft, non-tender, bowel sounds active all four quadrants,     no masses, no organomegaly   Extremities:   Extremities normal, atraumatic, no cyanosis or edema   Pulses:   2+ and symmetric all extremities   Skin:   Skin color, texture, turgor normal, no rashes or lesions   Lymph nodes:   Cervical, supraclavicular, and axillary nodes normal   Neurologic:   CNII-XII intact. Normal strength, sensation and reflexes       throughout      Results for orders placed or performed during the hospital encounter of 01/01/20   Influenza Antigen, Rapid - Swab, Nasopharynx   Result Value Ref Range    Influenza A Ag, EIA Negative Negative    Influenza B Ag, EIA Negative Negative   Blood Culture - Blood, Arm, Left   Result Value Ref Range    Blood Culture Cutibacterium acnes (A)     Isolated from Anaerobic Bottle     Gram Stain Anaerobic Bottle Gram positive bacilli    Blood Culture - Blood, Arm, Right   Result Value Ref Range    Blood Culture No growth at 5 days    Respiratory Culture - Sputum, Cough   Result Value Ref Range    Respiratory Culture Moderate growth (3+) Normal Respiratory Guerline     Gram Stain Rare (1+) WBCs per low power field     Gram Stain Rare (1+) Epithelial cells per low power field     Gram Stain       Few (2+) Mixed bacterial morphotypes seen on Gram Stain   Legionella Antigen, Urine - Urine, Urine, Clean Catch   Result Value Ref Range    LEGIONELLA ANTIGEN, URINE Negative Negative   S. Pneumo Ag Urine or  CSF - Urine, Urine, Clean Catch   Result Value Ref Range    Strep Pneumo Ag Negative Negative   Respiratory Panel, PCR - Swab, Nasopharynx   Result Value Ref Range    ADENOVIRUS, PCR Not Detected Not Detected    Coronavirus 229E Not Detected Not Detected    Coronavirus HKU1 Not Detected Not Detected    Coronavirus NL63 Not Detected Not Detected    Coronavirus OC43 Not Detected Not Detected    Human Metapneumovirus Detected (A) Not Detected    Human Rhinovirus/Enterovirus Not Detected Not Detected    Influenza B PCR Not Detected Not Detected    Parainfluenza Virus 1 Not Detected Not Detected    Parainfluenza Virus 2 Not Detected Not Detected    Parainfluenza Virus 3 Not Detected Not Detected    Parainfluenza Virus 4 Not Detected Not Detected    Bordetella pertussis pcr Not Detected Not Detected    Influenza A H1 2009 PCR Not Detected Not Detected    Chlamydophila pneumoniae PCR Not Detected Not Detected    Mycoplasma pneumo by PCR Not Detected Not Detected    Influenza A PCR Not Detected Not Detected    Influenza A H3 Not Detected Not Detected    Influenza A H1 Not Detected Not Detected    RSV, PCR Not Detected Not Detected    Bordetella parapertussis PCR Not Detected Not Detected   Comprehensive Metabolic Panel   Result Value Ref Range    Glucose 97 65 - 99 mg/dL    BUN 12 8 - 23 mg/dL    Creatinine 1.19 0.76 - 1.27 mg/dL    Sodium 139 136 - 145 mmol/L    Potassium 3.4 (L) 3.5 - 5.2 mmol/L    Chloride 97 (L) 98 - 107 mmol/L    CO2 28.0 22.0 - 29.0 mmol/L    Calcium 9.5 8.6 - 10.5 mg/dL    Total Protein 9.0 (H) 6.0 - 8.5 g/dL    Albumin 4.20 3.50 - 5.20 g/dL    ALT (SGPT) 17 1 - 41 U/L    AST (SGOT) 20 1 - 40 U/L    Alkaline Phosphatase 58 39 - 117 U/L    Total Bilirubin 0.5 0.2 - 1.2 mg/dL    eGFR Non African Amer 62 >60 mL/min/1.73    Globulin 4.8 gm/dL    A/G Ratio 0.9 g/dL    BUN/Creatinine Ratio 10.1 7.0 - 25.0    Anion Gap 14.0 5.0 - 15.0 mmol/L   Lactic Acid, Plasma   Result Value Ref Range    Lactate 1.8 0.5  - 2.0 mmol/L   Troponin   Result Value Ref Range    Troponin T <0.010 0.000 - 0.030 ng/mL   CBC Auto Differential   Result Value Ref Range    WBC 7.29 3.40 - 10.80 10*3/mm3    RBC 5.45 4.14 - 5.80 10*6/mm3    Hemoglobin 16.2 13.0 - 17.7 g/dL    Hematocrit 49.4 37.5 - 51.0 %    MCV 90.6 79.0 - 97.0 fL    MCH 29.7 26.6 - 33.0 pg    MCHC 32.8 31.5 - 35.7 g/dL    RDW 14.2 12.3 - 15.4 %    RDW-SD 47.0 37.0 - 54.0 fl    MPV 11.0 6.0 - 12.0 fL    Platelets 189 140 - 450 10*3/mm3    Neutrophil % 64.1 42.7 - 76.0 %    Lymphocyte % 21.3 19.6 - 45.3 %    Monocyte % 9.9 5.0 - 12.0 %    Eosinophil % 3.6 0.3 - 6.2 %    Basophil % 0.8 0.0 - 1.5 %    Immature Grans % 0.3 0.0 - 0.5 %    Neutrophils, Absolute 4.68 1.70 - 7.00 10*3/mm3    Lymphocytes, Absolute 1.55 0.70 - 3.10 10*3/mm3    Monocytes, Absolute 0.72 0.10 - 0.90 10*3/mm3    Eosinophils, Absolute 0.26 0.00 - 0.40 10*3/mm3    Basophils, Absolute 0.06 0.00 - 0.20 10*3/mm3    Immature Grans, Absolute 0.02 0.00 - 0.05 10*3/mm3    nRBC 0.0 0.0 - 0.2 /100 WBC   BNP   Result Value Ref Range    proBNP 383.1 5.0 - 900.0 pg/mL   CBC Auto Differential   Result Value Ref Range    WBC 6.05 3.40 - 10.80 10*3/mm3    RBC 5.24 4.14 - 5.80 10*6/mm3    Hemoglobin 15.9 13.0 - 17.7 g/dL    Hematocrit 49.6 37.5 - 51.0 %    MCV 94.7 79.0 - 97.0 fL    MCH 30.3 26.6 - 33.0 pg    MCHC 32.1 31.5 - 35.7 g/dL    RDW 14.3 12.3 - 15.4 %    RDW-SD 50.0 37.0 - 54.0 fl    MPV 10.8 6.0 - 12.0 fL    Platelets 174 140 - 450 10*3/mm3    Neutrophil % 77.4 (H) 42.7 - 76.0 %    Lymphocyte % 19.5 (L) 19.6 - 45.3 %    Monocyte % 2.1 (L) 5.0 - 12.0 %    Eosinophil % 0.2 (L) 0.3 - 6.2 %    Basophil % 0.5 0.0 - 1.5 %    Immature Grans % 0.3 0.0 - 0.5 %    Neutrophils, Absolute 4.68 1.70 - 7.00 10*3/mm3    Lymphocytes, Absolute 1.18 0.70 - 3.10 10*3/mm3    Monocytes, Absolute 0.13 0.10 - 0.90 10*3/mm3    Eosinophils, Absolute 0.01 0.00 - 0.40 10*3/mm3    Basophils, Absolute 0.03 0.00 - 0.20 10*3/mm3    Immature Grans,  Absolute 0.02 0.00 - 0.05 10*3/mm3    nRBC 0.0 0.0 - 0.2 /100 WBC   Basic Metabolic Panel   Result Value Ref Range    Glucose 168 (H) 65 - 99 mg/dL    BUN 14 8 - 23 mg/dL    Creatinine 1.06 0.76 - 1.27 mg/dL    Sodium 138 136 - 145 mmol/L    Potassium 3.6 3.5 - 5.2 mmol/L    Chloride 99 98 - 107 mmol/L    CO2 25.0 22.0 - 29.0 mmol/L    Calcium 8.9 8.6 - 10.5 mg/dL    eGFR Non African Amer 71 >60 mL/min/1.73    BUN/Creatinine Ratio 13.2 7.0 - 25.0    Anion Gap 14.0 5.0 - 15.0 mmol/L   Hemoglobin A1c   Result Value Ref Range    Hemoglobin A1C 6.50 (H) 4.80 - 5.60 %   Potassium   Result Value Ref Range    Potassium 4.0 3.5 - 5.2 mmol/L   Basic Metabolic Panel   Result Value Ref Range    Glucose 114 (H) 65 - 99 mg/dL    BUN 18 8 - 23 mg/dL    Creatinine 0.98 0.76 - 1.27 mg/dL    Sodium 140 136 - 145 mmol/L    Potassium 3.5 3.5 - 5.2 mmol/L    Chloride 101 98 - 107 mmol/L    CO2 26.0 22.0 - 29.0 mmol/L    Calcium 8.7 8.6 - 10.5 mg/dL    eGFR Non African Amer 77 >60 mL/min/1.73    BUN/Creatinine Ratio 18.4 7.0 - 25.0    Anion Gap 13.0 5.0 - 15.0 mmol/L   POC Glucose Once   Result Value Ref Range    Glucose 155 (H) 70 - 130 mg/dL   POC Glucose Once   Result Value Ref Range    Glucose 202 (H) 70 - 130 mg/dL   POC Glucose Once   Result Value Ref Range    Glucose 122 70 - 130 mg/dL   POC Glucose Once   Result Value Ref Range    Glucose 180 (H) 70 - 130 mg/dL   POC Glucose Once   Result Value Ref Range    Glucose 122 70 - 130 mg/dL   POC Glucose Once   Result Value Ref Range    Glucose 102 70 - 130 mg/dL   POC Glucose Once   Result Value Ref Range    Glucose 101 70 - 130 mg/dL   POC Glucose Once   Result Value Ref Range    Glucose 130 70 - 130 mg/dL   POC Glucose Once   Result Value Ref Range    Glucose 110 70 - 130 mg/dL   POC Glucose Once   Result Value Ref Range    Glucose 108 70 - 130 mg/dL         Assessment/Plan     Dr Ramirez Neurology let pt know this is an exacerbation of old stroke.      Pt with hypoglycemic event by  hx. On Beta blockade likely blunting feelings of hypoglycemia making the issue worse.  Pt taking in gallon of grape juice in 2 days.   Drinking 3 cokes a day.  Beta blockers necessary givn the issues. Pt will need to tighten up the diet.    Change beta blocker coreg and bystolic         Andre was seen today for hospital follow-up.    Diagnoses and all orders for this visit:    Malignant hypertension  -     Vitamin B12  -     Comprehensive Metabolic Panel  -     TSH  -     T4, Free  -     CBC & Differential  -     Hemoglobin A1c    BiPAP (biphasic positive airway pressure) dependence  -     Vitamin B12  -     Comprehensive Metabolic Panel  -     TSH  -     T4, Free  -     CBC & Differential  -     Hemoglobin A1c    Diabetes mellitus without complication (CMS/HCC)  -     Vitamin B12  -     Comprehensive Metabolic Panel  -     TSH  -     T4, Free  -     CBC & Differential  -     Hemoglobin A1c    Other orders  -     carvedilol (COREG) 6.25 MG tablet; Take 1 tablet by mouth 2 (Two) Times a Day With Meals.      Return in about 2 weeks (around 3/26/2020).          There are no Patient Instructions on file for this visit.     Keven Bear MD    Assessment/Plan

## 2020-03-31 ENCOUNTER — TELEPHONE (OUTPATIENT)
Dept: INTERNAL MEDICINE | Facility: CLINIC | Age: 65
End: 2020-03-31

## 2020-04-01 DIAGNOSIS — G45.9 TIA (TRANSIENT ISCHEMIC ATTACK): ICD-10-CM

## 2020-04-01 DIAGNOSIS — Z86.73 HISTORY OF ISCHEMIC RIGHT MCA STROKE: ICD-10-CM

## 2020-04-01 DIAGNOSIS — I25.10 CORONARY ARTERY DISEASE INVOLVING NATIVE CORONARY ARTERY OF NATIVE HEART WITHOUT ANGINA PECTORIS: ICD-10-CM

## 2020-04-01 RX ORDER — LOSARTAN POTASSIUM 100 MG/1
100 TABLET ORAL DAILY
Qty: 90 TABLET | Refills: 0 | Status: SHIPPED | OUTPATIENT
Start: 2020-04-01 | End: 2020-08-13 | Stop reason: HOSPADM

## 2020-04-01 RX ORDER — DIVALPROEX SODIUM 250 MG/1
250 TABLET, DELAYED RELEASE ORAL 3 TIMES DAILY
Qty: 150 TABLET | Refills: 5 | Status: SHIPPED | OUTPATIENT
Start: 2020-04-01 | End: 2020-09-10 | Stop reason: SDUPTHER

## 2020-04-01 RX ORDER — ESCITALOPRAM OXALATE 20 MG/1
20 TABLET ORAL DAILY
Qty: 90 TABLET | Refills: 1 | Status: SHIPPED | OUTPATIENT
Start: 2020-04-01 | End: 2020-09-28

## 2020-04-01 RX ORDER — DIVALPROEX SODIUM 250 MG/1
250 TABLET, DELAYED RELEASE ORAL 3 TIMES DAILY
Qty: 150 TABLET | Refills: 1 | Status: SHIPPED | OUTPATIENT
Start: 2020-04-01 | End: 2020-04-01 | Stop reason: SDUPTHER

## 2020-04-01 RX ORDER — SACCHAROMYCES BOULARDII 250 MG
250 CAPSULE ORAL 2 TIMES DAILY
Qty: 90 CAPSULE | Refills: 1 | Status: SHIPPED | OUTPATIENT
Start: 2020-04-01 | End: 2020-05-11

## 2020-04-01 RX ORDER — LOSARTAN POTASSIUM 100 MG/1
100 TABLET ORAL DAILY
Qty: 90 TABLET | Refills: 1 | Status: SHIPPED | OUTPATIENT
Start: 2020-04-01 | End: 2020-04-01 | Stop reason: SDUPTHER

## 2020-04-01 RX ORDER — DIVALPROEX SODIUM 250 MG/1
250 TABLET, DELAYED RELEASE ORAL 3 TIMES DAILY
Qty: 150 TABLET | Refills: 5 | Status: SHIPPED | OUTPATIENT
Start: 2020-04-01 | End: 2020-04-01 | Stop reason: SDUPTHER

## 2020-04-01 RX ORDER — LOVASTATIN 40 MG/1
40 TABLET ORAL
Qty: 90 TABLET | Refills: 1 | Status: SHIPPED | OUTPATIENT
Start: 2020-04-01 | End: 2020-11-16 | Stop reason: SDUPTHER

## 2020-04-15 ENCOUNTER — APPOINTMENT (OUTPATIENT)
Dept: LAB | Facility: HOSPITAL | Age: 65
End: 2020-04-15

## 2020-04-15 LAB
ALBUMIN SERPL-MCNC: 3.9 G/DL (ref 3.5–5.2)
ALBUMIN/GLOB SERPL: 1 G/DL
ALP SERPL-CCNC: 58 U/L (ref 39–117)
ALT SERPL W P-5'-P-CCNC: 16 U/L (ref 1–41)
ANION GAP SERPL CALCULATED.3IONS-SCNC: 15 MMOL/L (ref 5–15)
AST SERPL-CCNC: 20 U/L (ref 1–40)
BASOPHILS # BLD AUTO: 0.06 10*3/MM3 (ref 0–0.2)
BASOPHILS NFR BLD AUTO: 0.8 % (ref 0–1.5)
BILIRUB SERPL-MCNC: 0.7 MG/DL (ref 0.2–1.2)
BUN BLD-MCNC: 11 MG/DL (ref 8–23)
BUN/CREAT SERPL: 9.9 (ref 7–25)
CALCIUM SPEC-SCNC: 9.1 MG/DL (ref 8.6–10.5)
CHLORIDE SERPL-SCNC: 99 MMOL/L (ref 98–107)
CO2 SERPL-SCNC: 27 MMOL/L (ref 22–29)
CREAT BLD-MCNC: 1.11 MG/DL (ref 0.76–1.27)
DEPRECATED RDW RBC AUTO: 44.7 FL (ref 37–54)
EOSINOPHIL # BLD AUTO: 0.2 10*3/MM3 (ref 0–0.4)
EOSINOPHIL NFR BLD AUTO: 2.6 % (ref 0.3–6.2)
ERYTHROCYTE [DISTWIDTH] IN BLOOD BY AUTOMATED COUNT: 14.1 % (ref 12.3–15.4)
GFR SERPL CREATININE-BSD FRML MDRD: 67 ML/MIN/1.73
GLOBULIN UR ELPH-MCNC: 4.1 GM/DL
GLUCOSE BLD-MCNC: 119 MG/DL (ref 65–99)
HBA1C MFR BLD: 6.2 % (ref 4.8–5.6)
HCT VFR BLD AUTO: 46.7 % (ref 37.5–51)
HGB BLD-MCNC: 16.2 G/DL (ref 13–17.7)
IMM GRANULOCYTES # BLD AUTO: 0.04 10*3/MM3 (ref 0–0.05)
IMM GRANULOCYTES NFR BLD AUTO: 0.5 % (ref 0–0.5)
LYMPHOCYTES # BLD AUTO: 2.06 10*3/MM3 (ref 0.7–3.1)
LYMPHOCYTES NFR BLD AUTO: 27.1 % (ref 19.6–45.3)
MCH RBC QN AUTO: 30.7 PG (ref 26.6–33)
MCHC RBC AUTO-ENTMCNC: 34.7 G/DL (ref 31.5–35.7)
MCV RBC AUTO: 88.6 FL (ref 79–97)
MONOCYTES # BLD AUTO: 0.49 10*3/MM3 (ref 0.1–0.9)
MONOCYTES NFR BLD AUTO: 6.5 % (ref 5–12)
NEUTROPHILS # BLD AUTO: 4.74 10*3/MM3 (ref 1.7–7)
NEUTROPHILS NFR BLD AUTO: 62.5 % (ref 42.7–76)
NRBC BLD AUTO-RTO: 0 /100 WBC (ref 0–0.2)
PLATELET # BLD AUTO: 239 10*3/MM3 (ref 140–450)
PMV BLD AUTO: 11.3 FL (ref 6–12)
POTASSIUM BLD-SCNC: 3.6 MMOL/L (ref 3.5–5.2)
PROT SERPL-MCNC: 8 G/DL (ref 6–8.5)
RBC # BLD AUTO: 5.27 10*6/MM3 (ref 4.14–5.8)
SODIUM BLD-SCNC: 141 MMOL/L (ref 136–145)
T4 FREE SERPL-MCNC: 1.09 NG/DL (ref 0.93–1.7)
TSH SERPL DL<=0.05 MIU/L-ACNC: 2.57 UIU/ML (ref 0.27–4.2)
VIT B12 BLD-MCNC: 590 PG/ML (ref 211–946)
WBC NRBC COR # BLD: 7.59 10*3/MM3 (ref 3.4–10.8)

## 2020-04-15 PROCEDURE — 36415 COLL VENOUS BLD VENIPUNCTURE: CPT | Performed by: INTERNAL MEDICINE

## 2020-04-15 PROCEDURE — 83036 HEMOGLOBIN GLYCOSYLATED A1C: CPT | Performed by: INTERNAL MEDICINE

## 2020-04-15 PROCEDURE — 82607 VITAMIN B-12: CPT | Performed by: INTERNAL MEDICINE

## 2020-04-15 PROCEDURE — 80053 COMPREHEN METABOLIC PANEL: CPT | Performed by: INTERNAL MEDICINE

## 2020-04-15 PROCEDURE — 85025 COMPLETE CBC W/AUTO DIFF WBC: CPT | Performed by: INTERNAL MEDICINE

## 2020-04-15 PROCEDURE — 84439 ASSAY OF FREE THYROXINE: CPT | Performed by: INTERNAL MEDICINE

## 2020-04-15 PROCEDURE — 84443 ASSAY THYROID STIM HORMONE: CPT | Performed by: INTERNAL MEDICINE

## 2020-04-28 RX ORDER — NEBIVOLOL 5 MG/1
TABLET ORAL
COMMUNITY
End: 2020-04-30

## 2020-04-28 RX ORDER — POTASSIUM CHLORIDE 20 MEQ/1
TABLET, EXTENDED RELEASE ORAL
COMMUNITY
End: 2020-05-11

## 2020-04-28 RX ORDER — FLUTICASONE PROPIONATE 50 MCG
SPRAY, SUSPENSION (ML) NASAL
COMMUNITY
End: 2020-05-11

## 2020-04-30 ENCOUNTER — TELEMEDICINE (OUTPATIENT)
Dept: INTERNAL MEDICINE | Facility: CLINIC | Age: 65
End: 2020-04-30

## 2020-04-30 DIAGNOSIS — I10 ESSENTIAL HYPERTENSION: Primary | ICD-10-CM

## 2020-04-30 DIAGNOSIS — E03.9 ACQUIRED HYPOTHYROIDISM: ICD-10-CM

## 2020-04-30 PROCEDURE — 99214 OFFICE O/P EST MOD 30 MIN: CPT | Performed by: INTERNAL MEDICINE

## 2020-04-30 RX ORDER — HYDROCHLOROTHIAZIDE 12.5 MG/1
12.5 TABLET ORAL DAILY
Qty: 90 TABLET | Refills: 3 | Status: SHIPPED | OUTPATIENT
Start: 2020-04-30 | End: 2020-05-11

## 2020-04-30 RX ORDER — CARVEDILOL 12.5 MG/1
12.5 TABLET ORAL 2 TIMES DAILY WITH MEALS
Qty: 180 TABLET | Refills: 3 | Status: SHIPPED | OUTPATIENT
Start: 2020-04-30 | End: 2020-08-13 | Stop reason: HOSPADM

## 2020-04-30 RX ORDER — CARVEDILOL 12.5 MG/1
12.5 TABLET ORAL 2 TIMES DAILY WITH MEALS
Qty: 180 TABLET | Refills: 3 | Status: SHIPPED | OUTPATIENT
Start: 2020-04-30 | End: 2020-04-30 | Stop reason: SDUPTHER

## 2020-04-30 RX ORDER — LEVOTHYROXINE SODIUM 88 UG/1
88 TABLET ORAL DAILY
Qty: 90 TABLET | Refills: 3 | Status: SHIPPED | OUTPATIENT
Start: 2020-04-30 | End: 2020-04-30 | Stop reason: SDUPTHER

## 2020-04-30 RX ORDER — LEVOTHYROXINE SODIUM 88 UG/1
88 TABLET ORAL DAILY
Qty: 90 TABLET | Refills: 3 | Status: SHIPPED | OUTPATIENT
Start: 2020-04-30 | End: 2020-12-18 | Stop reason: SDUPTHER

## 2020-05-01 DIAGNOSIS — I10 ESSENTIAL HYPERTENSION: Primary | ICD-10-CM

## 2020-05-01 RX ORDER — POTASSIUM CHLORIDE 1500 MG/1
20 TABLET, FILM COATED, EXTENDED RELEASE ORAL 2 TIMES DAILY
Qty: 60 TABLET | Refills: 11 | Status: SHIPPED | OUTPATIENT
Start: 2020-05-01 | End: 2020-07-20 | Stop reason: HOSPADM

## 2020-05-08 DIAGNOSIS — I10 MALIGNANT HYPERTENSION: ICD-10-CM

## 2020-05-08 RX ORDER — HYDRALAZINE HYDROCHLORIDE 100 MG/1
100 TABLET, FILM COATED ORAL 3 TIMES DAILY
Qty: 90 TABLET | Refills: 11 | Status: ON HOLD | OUTPATIENT
Start: 2020-05-08 | End: 2020-07-20 | Stop reason: SDUPTHER

## 2020-05-11 ENCOUNTER — APPOINTMENT (OUTPATIENT)
Dept: LAB | Facility: HOSPITAL | Age: 65
End: 2020-05-11

## 2020-05-11 ENCOUNTER — OFFICE VISIT (OUTPATIENT)
Dept: INTERNAL MEDICINE | Facility: CLINIC | Age: 65
End: 2020-05-11

## 2020-05-11 VITALS
OXYGEN SATURATION: 94 % | TEMPERATURE: 97.8 F | DIASTOLIC BLOOD PRESSURE: 91 MMHG | HEIGHT: 76 IN | HEART RATE: 67 BPM | SYSTOLIC BLOOD PRESSURE: 152 MMHG | WEIGHT: 313 LBS | BODY MASS INDEX: 38.11 KG/M2

## 2020-05-11 DIAGNOSIS — R60.9 EDEMA, UNSPECIFIED TYPE: ICD-10-CM

## 2020-05-11 DIAGNOSIS — I10 ESSENTIAL HYPERTENSION: Primary | ICD-10-CM

## 2020-05-11 DIAGNOSIS — E87.6 HYPOKALEMIA: ICD-10-CM

## 2020-05-11 LAB
ALBUMIN SERPL-MCNC: 4 G/DL (ref 3.5–5.2)
ALBUMIN/GLOB SERPL: 1.1 G/DL
ALP SERPL-CCNC: 52 U/L (ref 39–117)
ALT SERPL W P-5'-P-CCNC: 14 U/L (ref 1–41)
ANION GAP SERPL CALCULATED.3IONS-SCNC: 12.1 MMOL/L (ref 5–15)
AST SERPL-CCNC: 16 U/L (ref 1–40)
BILIRUB SERPL-MCNC: 0.6 MG/DL (ref 0.2–1.2)
BUN BLD-MCNC: 18 MG/DL (ref 8–23)
BUN/CREAT SERPL: 14.4 (ref 7–25)
CALCIUM SPEC-SCNC: 9.2 MG/DL (ref 8.6–10.5)
CHLORIDE SERPL-SCNC: 96 MMOL/L (ref 98–107)
CO2 SERPL-SCNC: 30.9 MMOL/L (ref 22–29)
CREAT BLD-MCNC: 1.25 MG/DL (ref 0.76–1.27)
GFR SERPL CREATININE-BSD FRML MDRD: 58 ML/MIN/1.73
GLOBULIN UR ELPH-MCNC: 3.5 GM/DL
GLUCOSE BLD-MCNC: 124 MG/DL (ref 65–99)
POTASSIUM BLD-SCNC: 3.7 MMOL/L (ref 3.5–5.2)
PROT SERPL-MCNC: 7.5 G/DL (ref 6–8.5)
SODIUM BLD-SCNC: 139 MMOL/L (ref 136–145)

## 2020-05-11 PROCEDURE — 80053 COMPREHEN METABOLIC PANEL: CPT | Performed by: INTERNAL MEDICINE

## 2020-05-11 PROCEDURE — 36415 COLL VENOUS BLD VENIPUNCTURE: CPT | Performed by: INTERNAL MEDICINE

## 2020-05-11 PROCEDURE — 99214 OFFICE O/P EST MOD 30 MIN: CPT | Performed by: INTERNAL MEDICINE

## 2020-05-11 RX ORDER — TRIAMTERENE AND HYDROCHLOROTHIAZIDE 37.5; 25 MG/1; MG/1
1 CAPSULE ORAL EVERY MORNING
Qty: 90 CAPSULE | Refills: 3 | Status: SHIPPED | OUTPATIENT
Start: 2020-05-11 | End: 2020-08-13 | Stop reason: HOSPADM

## 2020-05-11 NOTE — PROGRESS NOTES
Subjective     Patient ID: Andre Agosto is a 65 y.o. male. Patient is here for management of multiple medical problems.     Chief Complaint   Patient presents with   • Hypertension     History of Present Illness       Wt stable. BP still runnign high. Eating at home.   No soda.  OJ some times.    No frozen dinner.   Having pottato chips.          The following portions of the patient's history were reviewed and updated as appropriate: allergies, current medications, past family history, past medical history, past social history, past surgical history and problem list.    Review of Systems   Constitutional: Negative for fatigue.   Respiratory: Negative for cough, choking, chest tightness and shortness of breath.    Genitourinary: Negative for scrotal swelling and testicular pain.   Psychiatric/Behavioral: Negative for self-injury.   All other systems reviewed and are negative.      Current Outpatient Medications:   •  amLODIPine (NORVASC) 5 MG tablet, Take 1 tablet by mouth 2 (Two) Times a Day., Disp: 180 tablet, Rfl: 3  •  carvedilol (Coreg) 12.5 MG tablet, Take 1 tablet by mouth 2 (Two) Times a Day With Meals., Disp: 180 tablet, Rfl: 3  •  divalproex (DEPAKOTE) 250 MG DR tablet, Take 1 tablet by mouth 3 (Three) Times a Day. Take 2 in the AM 1 at noon and 2 in the pm. Per DR Clifford., Disp: 150 tablet, Rfl: 5  •  escitalopram (LEXAPRO) 20 MG tablet, Take 1 tablet by mouth Daily., Disp: 90 tablet, Rfl: 1  •  hydrALAZINE (APRESOLINE) 100 MG tablet, Take 1 tablet by mouth 3 (Three) Times a Day., Disp: 90 tablet, Rfl: 11  •  levothyroxine (SYNTHROID, LEVOTHROID) 88 MCG tablet, Take 1 tablet by mouth Daily., Disp: 90 tablet, Rfl: 3  •  losartan (Cozaar) 100 MG tablet, Take 1 tablet by mouth Daily., Disp: 90 tablet, Rfl: 0  •  lovastatin (MEVACOR) 40 MG tablet, Take 1 tablet by mouth Daily With Dinner., Disp: 90 tablet, Rfl: 1  •  potassium chloride (K-TAB) 20 MEQ tablet controlled-release ER tablet, Take 1 tablet by mouth  "2 (Two) Times a Day., Disp: 60 tablet, Rfl: 11  •  Semaglutide,0.25 or 0.5MG/DOS, (Ozempic, 0.25 or 0.5 MG/DOSE,) 2 MG/1.5ML solution pen-injector, Inject 0.5 mg under the skin into the appropriate area as directed 1 (One) Time Per Week., Disp: 4 pen, Rfl: 3  •  aspirin 81 MG tablet, Take 81 mg by mouth 2 (Two) Times a Day., Disp: 30 tablet, Rfl: 11  •  Evolocumab 140 MG/ML solution auto-injector, INJECT 1ML UNDER THE SKIN AS DIRECTED EVERY 14 DAYS (Patient taking differently: Every 21 (Twenty-One) Days.), Disp: 2 pen, Rfl: 2  •  triamterene-hydrochlorothiazide (Dyazide) 37.5-25 MG per capsule, Take 1 capsule by mouth Every Morning., Disp: 90 capsule, Rfl: 3    Objective      Blood pressure 152/91, pulse 67, temperature 97.8 °F (36.6 °C), temperature source Temporal, height 193 cm (76\"), weight (!) 142 kg (313 lb), SpO2 94 %.    Physical Exam     General Appearance:    Alert, cooperative, no distress, appears stated age   Head:    Normocephalic, without obvious abnormality, atraumatic   Eyes:    PERRL, conjunctiva/corneas clear, EOM's intact   Ears:    Normal TM's and external ear canals, both ears   Nose:   Nares normal, septum midline, mucosa normal, no drainage   or sinus tenderness   Throat:   Lips, mucosa, and tongue normal; teeth and gums normal   Neck:   Supple, symmetrical, trachea midline, no adenopathy;        thyroid:  No enlargement/tenderness/nodules; no carotid    bruit or JVD   Back:     Symmetric, no curvature, ROM normal, no CVA tenderness   Lungs:     Clear to auscultation bilaterally, respirations unlabored   Chest wall:    No tenderness or deformity   Heart:    Regular rate and rhythm, S1 and S2 normal, no murmur,        rub or gallop   Abdomen:     Soft, non-tender, bowel sounds active all four quadrants,     no masses, no organomegaly   Extremities:   Extremities normal, atraumatic, no cyanosis or edema   Pulses:   2+ and symmetric all extremities   Skin:   Skin color, texture, turgor normal, " no rashes or lesions   Lymph nodes:   Cervical, supraclavicular, and axillary nodes normal   Neurologic:   CNII-XII intact. Normal strength, sensation and reflexes       throughout      Results for orders placed or performed in visit on 03/12/20   Vitamin B12   Result Value Ref Range    Vitamin B-12 590 211 - 946 pg/mL   Comprehensive Metabolic Panel   Result Value Ref Range    Glucose 119 (H) 65 - 99 mg/dL    BUN 11 8 - 23 mg/dL    Creatinine 1.11 0.76 - 1.27 mg/dL    Sodium 141 136 - 145 mmol/L    Potassium 3.6 3.5 - 5.2 mmol/L    Chloride 99 98 - 107 mmol/L    CO2 27.0 22.0 - 29.0 mmol/L    Calcium 9.1 8.6 - 10.5 mg/dL    Total Protein 8.0 6.0 - 8.5 g/dL    Albumin 3.90 3.50 - 5.20 g/dL    ALT (SGPT) 16 1 - 41 U/L    AST (SGOT) 20 1 - 40 U/L    Alkaline Phosphatase 58 39 - 117 U/L    Total Bilirubin 0.7 0.2 - 1.2 mg/dL    eGFR Non African Amer 67 >60 mL/min/1.73    Globulin 4.1 gm/dL    A/G Ratio 1.0 g/dL    BUN/Creatinine Ratio 9.9 7.0 - 25.0    Anion Gap 15.0 5.0 - 15.0 mmol/L   TSH   Result Value Ref Range    TSH 2.570 0.270 - 4.200 uIU/mL   T4, Free   Result Value Ref Range    Free T4 1.09 0.93 - 1.70 ng/dL   Hemoglobin A1c   Result Value Ref Range    Hemoglobin A1C 6.20 (H) 4.80 - 5.60 %   CBC Auto Differential   Result Value Ref Range    WBC 7.59 3.40 - 10.80 10*3/mm3    RBC 5.27 4.14 - 5.80 10*6/mm3    Hemoglobin 16.2 13.0 - 17.7 g/dL    Hematocrit 46.7 37.5 - 51.0 %    MCV 88.6 79.0 - 97.0 fL    MCH 30.7 26.6 - 33.0 pg    MCHC 34.7 31.5 - 35.7 g/dL    RDW 14.1 12.3 - 15.4 %    RDW-SD 44.7 37.0 - 54.0 fl    MPV 11.3 6.0 - 12.0 fL    Platelets 239 140 - 450 10*3/mm3    Neutrophil % 62.5 42.7 - 76.0 %    Lymphocyte % 27.1 19.6 - 45.3 %    Monocyte % 6.5 5.0 - 12.0 %    Eosinophil % 2.6 0.3 - 6.2 %    Basophil % 0.8 0.0 - 1.5 %    Immature Grans % 0.5 0.0 - 0.5 %    Neutrophils, Absolute 4.74 1.70 - 7.00 10*3/mm3    Lymphocytes, Absolute 2.06 0.70 - 3.10 10*3/mm3    Monocytes, Absolute 0.49 0.10 - 0.90  10*3/mm3    Eosinophils, Absolute 0.20 0.00 - 0.40 10*3/mm3    Basophils, Absolute 0.06 0.00 - 0.20 10*3/mm3    Immature Grans, Absolute 0.04 0.00 - 0.05 10*3/mm3    nRBC 0.0 0.0 - 0.2 /100 WBC         Assessment/Plan      Started on kcl and hctz. Labs at 9 am not done.  Still soa with walking up stairs with mask on.   WT is up.  Pt with edema.  Sodium was german.      Pt will change hctz to triamteren hctz /25.        Andre was seen today for hypertension.    Diagnoses and all orders for this visit:    Essential hypertension  -     Basic Metabolic Panel    Hypokalemia  -     Basic Metabolic Panel    Edema, unspecified type  -     Basic Metabolic Panel    Other orders  -     Discontinue: triamterene-hydrochlorothiazide (DYAZIDE) 50-25 MG per capsule; Take 1 capsule by mouth Every Morning.  -     triamterene-hydrochlorothiazide (Dyazide) 37.5-25 MG per capsule; Take 1 capsule by mouth Every Morning.      Return in about 1 week (around 5/18/2020).          There are no Patient Instructions on file for this visit.     Keven Bear MD    Assessment/Plan

## 2020-05-12 DIAGNOSIS — E03.9 ACQUIRED HYPOTHYROIDISM: Primary | ICD-10-CM

## 2020-05-18 ENCOUNTER — OFFICE VISIT (OUTPATIENT)
Dept: INTERNAL MEDICINE | Facility: CLINIC | Age: 65
End: 2020-05-18

## 2020-05-18 VITALS
HEART RATE: 69 BPM | WEIGHT: 313 LBS | BODY MASS INDEX: 38.11 KG/M2 | SYSTOLIC BLOOD PRESSURE: 158 MMHG | RESPIRATION RATE: 16 BRPM | HEIGHT: 76 IN | TEMPERATURE: 98.2 F | DIASTOLIC BLOOD PRESSURE: 95 MMHG | OXYGEN SATURATION: 98 %

## 2020-05-18 DIAGNOSIS — I10 MALIGNANT HYPERTENSION: Primary | ICD-10-CM

## 2020-05-18 PROCEDURE — 99214 OFFICE O/P EST MOD 30 MIN: CPT | Performed by: INTERNAL MEDICINE

## 2020-05-18 NOTE — PROGRESS NOTES
Subjective     Patient ID: Andre Agosto is a 65 y.o. male. Patient is here for management of multiple medical problems.     Chief Complaint   Patient presents with   • Hypertension     follow-up     History of Present Illness     Htn.  Pt started maxide 4 days ago.       Tired a lot.  Less edema        The following portions of the patient's history were reviewed and updated as appropriate: allergies, current medications, past family history, past medical history, past social history, past surgical history and problem list.    Review of Systems   Constitutional: Positive for fatigue.   Psychiatric/Behavioral: Negative for self-injury and sleep disturbance. The patient is not nervous/anxious and is not hyperactive.    All other systems reviewed and are negative.      Current Outpatient Medications:   •  amLODIPine (NORVASC) 5 MG tablet, Take 1 tablet by mouth 2 (Two) Times a Day., Disp: 180 tablet, Rfl: 3  •  aspirin 81 MG tablet, Take 81 mg by mouth 2 (Two) Times a Day., Disp: 30 tablet, Rfl: 11  •  carvedilol (Coreg) 12.5 MG tablet, Take 1 tablet by mouth 2 (Two) Times a Day With Meals., Disp: 180 tablet, Rfl: 3  •  divalproex (DEPAKOTE) 250 MG DR tablet, Take 1 tablet by mouth 3 (Three) Times a Day. Take 2 in the AM 1 at noon and 2 in the pm. Per DR Clifford., Disp: 150 tablet, Rfl: 5  •  escitalopram (LEXAPRO) 20 MG tablet, Take 1 tablet by mouth Daily., Disp: 90 tablet, Rfl: 1  •  Evolocumab 140 MG/ML solution auto-injector, INJECT 1ML UNDER THE SKIN AS DIRECTED EVERY 14 DAYS (Patient taking differently: Every 21 (Twenty-One) Days.), Disp: 2 pen, Rfl: 2  •  hydrALAZINE (APRESOLINE) 100 MG tablet, Take 1 tablet by mouth 3 (Three) Times a Day., Disp: 90 tablet, Rfl: 11  •  levothyroxine (SYNTHROID, LEVOTHROID) 88 MCG tablet, Take 1 tablet by mouth Daily., Disp: 90 tablet, Rfl: 3  •  losartan (Cozaar) 100 MG tablet, Take 1 tablet by mouth Daily., Disp: 90 tablet, Rfl: 0  •  lovastatin (MEVACOR) 40 MG tablet, Take 1  "tablet by mouth Daily With Dinner., Disp: 90 tablet, Rfl: 1  •  potassium chloride (K-TAB) 20 MEQ tablet controlled-release ER tablet, Take 1 tablet by mouth 2 (Two) Times a Day., Disp: 60 tablet, Rfl: 11  •  Semaglutide,0.25 or 0.5MG/DOS, (Ozempic, 0.25 or 0.5 MG/DOSE,) 2 MG/1.5ML solution pen-injector, Inject 0.5 mg under the skin into the appropriate area as directed 1 (One) Time Per Week., Disp: 4 pen, Rfl: 3  •  triamterene-hydrochlorothiazide (Dyazide) 37.5-25 MG per capsule, Take 1 capsule by mouth Every Morning., Disp: 90 capsule, Rfl: 3    Objective      Blood pressure 158/95, pulse 69, temperature 98.2 °F (36.8 °C), temperature source Temporal, resp. rate 16, height 193 cm (76\"), weight (!) 142 kg (313 lb), SpO2 98 %.    Physical Exam     General Appearance:    Alert, cooperative, no distress, appears stated age   Head:    Normocephalic, without obvious abnormality, atraumatic   Eyes:    PERRL, conjunctiva/corneas clear, EOM's intact   Ears:    Normal TM's and external ear canals, both ears   Nose:   Nares normal, septum midline, mucosa normal, no drainage   or sinus tenderness   Throat:   Lips, mucosa, and tongue normal; teeth and gums normal   Neck:   Supple, symmetrical, trachea midline, no adenopathy;        thyroid:  No enlargement/tenderness/nodules; no carotid    bruit or JVD   Back:     Symmetric, no curvature, ROM normal, no CVA tenderness   Lungs:     Clear to auscultation bilaterally, respirations unlabored   Chest wall:    No tenderness or deformity   Heart:    Regular rate and rhythm, S1 and S2 normal, no murmur,        rub or gallop   Abdomen:     Soft, non-tender, bowel sounds active all four quadrants,     no masses, no organomegaly   Extremities:   Extremities normal, atraumatic, no cyanosis or edema   Pulses:   2+ and symmetric all extremities   Skin:   Skin color, texture, turgor normal, no rashes or lesions   Lymph nodes:   Cervical, supraclavicular, and axillary nodes normal "   Neurologic:   CNII-XII intact. Normal strength, sensation and reflexes       throughout      Results for orders placed or performed in visit on 03/12/20   Vitamin B12   Result Value Ref Range    Vitamin B-12 590 211 - 946 pg/mL   Comprehensive Metabolic Panel   Result Value Ref Range    Glucose 119 (H) 65 - 99 mg/dL    BUN 11 8 - 23 mg/dL    Creatinine 1.11 0.76 - 1.27 mg/dL    Sodium 141 136 - 145 mmol/L    Potassium 3.6 3.5 - 5.2 mmol/L    Chloride 99 98 - 107 mmol/L    CO2 27.0 22.0 - 29.0 mmol/L    Calcium 9.1 8.6 - 10.5 mg/dL    Total Protein 8.0 6.0 - 8.5 g/dL    Albumin 3.90 3.50 - 5.20 g/dL    ALT (SGPT) 16 1 - 41 U/L    AST (SGOT) 20 1 - 40 U/L    Alkaline Phosphatase 58 39 - 117 U/L    Total Bilirubin 0.7 0.2 - 1.2 mg/dL    eGFR Non African Amer 67 >60 mL/min/1.73    Globulin 4.1 gm/dL    A/G Ratio 1.0 g/dL    BUN/Creatinine Ratio 9.9 7.0 - 25.0    Anion Gap 15.0 5.0 - 15.0 mmol/L   TSH   Result Value Ref Range    TSH 2.570 0.270 - 4.200 uIU/mL   T4, Free   Result Value Ref Range    Free T4 1.09 0.93 - 1.70 ng/dL   Hemoglobin A1c   Result Value Ref Range    Hemoglobin A1C 6.20 (H) 4.80 - 5.60 %   CBC Auto Differential   Result Value Ref Range    WBC 7.59 3.40 - 10.80 10*3/mm3    RBC 5.27 4.14 - 5.80 10*6/mm3    Hemoglobin 16.2 13.0 - 17.7 g/dL    Hematocrit 46.7 37.5 - 51.0 %    MCV 88.6 79.0 - 97.0 fL    MCH 30.7 26.6 - 33.0 pg    MCHC 34.7 31.5 - 35.7 g/dL    RDW 14.1 12.3 - 15.4 %    RDW-SD 44.7 37.0 - 54.0 fl    MPV 11.3 6.0 - 12.0 fL    Platelets 239 140 - 450 10*3/mm3    Neutrophil % 62.5 42.7 - 76.0 %    Lymphocyte % 27.1 19.6 - 45.3 %    Monocyte % 6.5 5.0 - 12.0 %    Eosinophil % 2.6 0.3 - 6.2 %    Basophil % 0.8 0.0 - 1.5 %    Immature Grans % 0.5 0.0 - 0.5 %    Neutrophils, Absolute 4.74 1.70 - 7.00 10*3/mm3    Lymphocytes, Absolute 2.06 0.70 - 3.10 10*3/mm3    Monocytes, Absolute 0.49 0.10 - 0.90 10*3/mm3    Eosinophils, Absolute 0.20 0.00 - 0.40 10*3/mm3    Basophils, Absolute 0.06 0.00 -  0.20 10*3/mm3    Immature Grans, Absolute 0.04 0.00 - 0.05 10*3/mm3    nRBC 0.0 0.0 - 0.2 /100 WBC         Assessment/Plan     Pt feels the lates bp med.  Feeling no HA.  Mental cloudiness at times.        Andre was seen today for hypertension.    Diagnoses and all orders for this visit:    Malignant hypertension      Return in about 2 weeks (around 6/1/2020), or if symptoms worsen or fail to improve.          There are no Patient Instructions on file for this visit.     Keven Bear MD    Assessment/Plan

## 2020-05-27 ENCOUNTER — TELEPHONE (OUTPATIENT)
Dept: INTERNAL MEDICINE | Facility: CLINIC | Age: 65
End: 2020-05-27

## 2020-05-27 NOTE — TELEPHONE ENCOUNTER
PT CALLED STATED THAT HE NEEDED TO SPEAK WITH SOMEONE IN THE OFFICE THAT DOES CODING FOR BILLING.  DONNA STEVENS    PLEASE ADVISE.  CALL BACK:[phone]

## 2020-05-28 NOTE — TELEPHONE ENCOUNTER
Spoke with patient about DOS 09/13/18 & 10/29/18. Patient states that he spoke with insurance and they would pay for his flu vaccine as well as Hep A.    Called billing to see if it was even possible to resubmit. Spoke with Rosy who advised that she needed a reference number and she would send to the coders for resubmission.    Called patient insurance, REF # [id number], and they stated that they would pay for the flu vaccination on DOS 09/13/18 however they would not pay for the Hep A on DOS 10/29/18.    Called billing, spoke with Becky, and gave her the reference number. Becky stated that she would put the claim to review to see what's happens but stated that she did not think we would be able to resubmit because it is so old but put into review anyway.    Pt notified via vm.

## 2020-06-01 ENCOUNTER — LAB (OUTPATIENT)
Dept: LAB | Facility: HOSPITAL | Age: 65
End: 2020-06-01

## 2020-06-01 ENCOUNTER — OFFICE VISIT (OUTPATIENT)
Dept: INTERNAL MEDICINE | Facility: CLINIC | Age: 65
End: 2020-06-01

## 2020-06-01 VITALS
OXYGEN SATURATION: 97 % | BODY MASS INDEX: 40.4 KG/M2 | RESPIRATION RATE: 16 BRPM | SYSTOLIC BLOOD PRESSURE: 155 MMHG | DIASTOLIC BLOOD PRESSURE: 85 MMHG | HEART RATE: 66 BPM | HEIGHT: 74 IN | TEMPERATURE: 97.9 F | WEIGHT: 314.8 LBS

## 2020-06-01 DIAGNOSIS — I10 MALIGNANT HYPERTENSION: Primary | ICD-10-CM

## 2020-06-01 DIAGNOSIS — E87.6 HYPOKALEMIA: Primary | ICD-10-CM

## 2020-06-01 DIAGNOSIS — N18.30 CHRONIC KIDNEY DISEASE, STAGE III (MODERATE) (HCC): ICD-10-CM

## 2020-06-01 LAB
ANION GAP SERPL CALCULATED.3IONS-SCNC: 14.1 MMOL/L (ref 5–15)
BASOPHILS # BLD AUTO: 0.05 10*3/MM3 (ref 0–0.2)
BASOPHILS NFR BLD AUTO: 0.7 % (ref 0–1.5)
BUN BLD-MCNC: 20 MG/DL (ref 8–23)
BUN/CREAT SERPL: 13 (ref 7–25)
CALCIUM SPEC-SCNC: 9.1 MG/DL (ref 8.6–10.5)
CHLORIDE SERPL-SCNC: 96 MMOL/L (ref 98–107)
CO2 SERPL-SCNC: 29.9 MMOL/L (ref 22–29)
CREAT BLD-MCNC: 1.54 MG/DL (ref 0.76–1.27)
DEPRECATED RDW RBC AUTO: 45.3 FL (ref 37–54)
EOSINOPHIL # BLD AUTO: 0.2 10*3/MM3 (ref 0–0.4)
EOSINOPHIL NFR BLD AUTO: 2.7 % (ref 0.3–6.2)
ERYTHROCYTE [DISTWIDTH] IN BLOOD BY AUTOMATED COUNT: 14 % (ref 12.3–15.4)
GFR SERPL CREATININE-BSD FRML MDRD: 46 ML/MIN/1.73
GLUCOSE BLD-MCNC: 129 MG/DL (ref 65–99)
HCT VFR BLD AUTO: 45.7 % (ref 37.5–51)
HGB BLD-MCNC: 15.8 G/DL (ref 13–17.7)
IMM GRANULOCYTES # BLD AUTO: 0.02 10*3/MM3 (ref 0–0.05)
IMM GRANULOCYTES NFR BLD AUTO: 0.3 % (ref 0–0.5)
LYMPHOCYTES # BLD AUTO: 2.33 10*3/MM3 (ref 0.7–3.1)
LYMPHOCYTES NFR BLD AUTO: 31.3 % (ref 19.6–45.3)
MCH RBC QN AUTO: 30.9 PG (ref 26.6–33)
MCHC RBC AUTO-ENTMCNC: 34.6 G/DL (ref 31.5–35.7)
MCV RBC AUTO: 89.3 FL (ref 79–97)
MONOCYTES # BLD AUTO: 0.46 10*3/MM3 (ref 0.1–0.9)
MONOCYTES NFR BLD AUTO: 6.2 % (ref 5–12)
NEUTROPHILS # BLD AUTO: 4.39 10*3/MM3 (ref 1.7–7)
NEUTROPHILS NFR BLD AUTO: 58.8 % (ref 42.7–76)
NRBC BLD AUTO-RTO: 0 /100 WBC (ref 0–0.2)
PLATELET # BLD AUTO: 201 10*3/MM3 (ref 140–450)
PMV BLD AUTO: 10.9 FL (ref 6–12)
POTASSIUM BLD-SCNC: 3.4 MMOL/L (ref 3.5–5.2)
RBC # BLD AUTO: 5.12 10*6/MM3 (ref 4.14–5.8)
SODIUM BLD-SCNC: 140 MMOL/L (ref 136–145)
WBC NRBC COR # BLD: 7.45 10*3/MM3 (ref 3.4–10.8)

## 2020-06-01 PROCEDURE — 80048 BASIC METABOLIC PNL TOTAL CA: CPT

## 2020-06-01 PROCEDURE — 99214 OFFICE O/P EST MOD 30 MIN: CPT | Performed by: INTERNAL MEDICINE

## 2020-06-01 PROCEDURE — 85025 COMPLETE CBC W/AUTO DIFF WBC: CPT

## 2020-06-01 NOTE — PROGRESS NOTES
Subjective     Patient ID: Andre Agosto is a 65 y.o. male. Patient is here for management of multiple medical problems.     Chief Complaint   Patient presents with   • Malignant Hypertension     follow-up, patient states he stopped taking Losartan and Amlodipine x 3 days due to making him feel alyssa   • Foot issues     patient states the toes on his right foot hurts to bend them,  2nd  toe hurts worse.      History of Present Illness       Swelling in latteral toes in right foot.  ttp in medial shin on right leg.    bp to low in afternoon and feels bad. Stooped. Losartan and norvasc and feels better.    Decreased hydralazine to tid.        The following portions of the patient's history were reviewed and updated as appropriate: allergies, current medications, past family history, past medical history, past social history, past surgical history and problem list.    Review of Systems   Constitutional: Positive for fatigue.       Current Outpatient Medications:   •  amLODIPine (NORVASC) 5 MG tablet, Take 1 tablet by mouth 2 (Two) Times a Day., Disp: 180 tablet, Rfl: 3  •  aspirin 81 MG tablet, Take 81 mg by mouth 2 (Two) Times a Day., Disp: 30 tablet, Rfl: 11  •  carvedilol (Coreg) 12.5 MG tablet, Take 1 tablet by mouth 2 (Two) Times a Day With Meals., Disp: 180 tablet, Rfl: 3  •  divalproex (DEPAKOTE) 250 MG DR tablet, Take 1 tablet by mouth 3 (Three) Times a Day. Take 2 in the AM 1 at noon and 2 in the pm. Per DR Clifford., Disp: 150 tablet, Rfl: 5  •  escitalopram (LEXAPRO) 20 MG tablet, Take 1 tablet by mouth Daily., Disp: 90 tablet, Rfl: 1  •  Evolocumab 140 MG/ML solution auto-injector, INJECT 1ML UNDER THE SKIN AS DIRECTED EVERY 14 DAYS (Patient taking differently: Every 21 (Twenty-One) Days.), Disp: 2 pen, Rfl: 2  •  hydrALAZINE (APRESOLINE) 100 MG tablet, Take 1 tablet by mouth 3 (Three) Times a Day., Disp: 90 tablet, Rfl: 11  •  levothyroxine (SYNTHROID, LEVOTHROID) 88 MCG tablet, Take 1 tablet by mouth Daily.,  "Disp: 90 tablet, Rfl: 3  •  losartan (Cozaar) 100 MG tablet, Take 1 tablet by mouth Daily., Disp: 90 tablet, Rfl: 0  •  lovastatin (MEVACOR) 40 MG tablet, Take 1 tablet by mouth Daily With Dinner., Disp: 90 tablet, Rfl: 1  •  potassium chloride (K-TAB) 20 MEQ tablet controlled-release ER tablet, Take 1 tablet by mouth 2 (Two) Times a Day., Disp: 60 tablet, Rfl: 11  •  Semaglutide,0.25 or 0.5MG/DOS, (Ozempic, 0.25 or 0.5 MG/DOSE,) 2 MG/1.5ML solution pen-injector, Inject 0.5 mg under the skin into the appropriate area as directed 1 (One) Time Per Week., Disp: 4 pen, Rfl: 3  •  triamterene-hydrochlorothiazide (Dyazide) 37.5-25 MG per capsule, Take 1 capsule by mouth Every Morning., Disp: 90 capsule, Rfl: 3    Objective      Blood pressure 155/85, pulse 66, temperature 97.9 °F (36.6 °C), temperature source Temporal, resp. rate 16, height 188 cm (74\"), weight (!) 143 kg (314 lb 12.8 oz), SpO2 97 %.    Physical Exam     General Appearance:    Alert, cooperative, no distress, appears stated age   Head:    Normocephalic, without obvious abnormality, atraumatic   Eyes:    PERRL, conjunctiva/corneas clear, EOM's intact   Ears:    Normal TM's and external ear canals, both ears   Nose:   Nares normal, septum midline, mucosa normal, no drainage   or sinus tenderness   Throat:   Lips, mucosa, and tongue normal; teeth and gums normal   Neck:   Supple, symmetrical, trachea midline, no adenopathy;        thyroid:  No enlargement/tenderness/nodules; no carotid    bruit or JVD   Back:     Symmetric, no curvature, ROM normal, no CVA tenderness   Lungs:     Clear to auscultation bilaterally, respirations unlabored   Chest wall:    No tenderness or deformity   Heart:    Regular rate and rhythm, S1 and S2 normal, no murmur,        rub or gallop   Abdomen:     Soft, non-tender, bowel sounds active all four quadrants,     no masses, no organomegaly   Extremities:   Extremities normal, atraumatic, no cyanosis or edema   Pulses:   2+ and " symmetric all extremities   Skin:   Skin color, texture, turgor normal, no rashes or lesions   Lymph nodes:   Cervical, supraclavicular, and axillary nodes normal   Neurologic:   CNII-XII intact. Normal strength, sensation and reflexes       throughout      Results for orders placed or performed in visit on 06/01/20   Basic Metabolic Panel   Result Value Ref Range    Glucose 129 (H) 65 - 99 mg/dL    BUN 20 8 - 23 mg/dL    Creatinine 1.54 (H) 0.76 - 1.27 mg/dL    Sodium 140 136 - 145 mmol/L    Potassium 3.4 (L) 3.5 - 5.2 mmol/L    Chloride 96 (L) 98 - 107 mmol/L    CO2 29.9 (H) 22.0 - 29.0 mmol/L    Calcium 9.1 8.6 - 10.5 mg/dL    eGFR Non African Amer 46 (L) >60 mL/min/1.73    BUN/Creatinine Ratio 13.0 7.0 - 25.0    Anion Gap 14.1 5.0 - 15.0 mmol/L   CBC Auto Differential   Result Value Ref Range    WBC 7.45 3.40 - 10.80 10*3/mm3    RBC 5.12 4.14 - 5.80 10*6/mm3    Hemoglobin 15.8 13.0 - 17.7 g/dL    Hematocrit 45.7 37.5 - 51.0 %    MCV 89.3 79.0 - 97.0 fL    MCH 30.9 26.6 - 33.0 pg    MCHC 34.6 31.5 - 35.7 g/dL    RDW 14.0 12.3 - 15.4 %    RDW-SD 45.3 37.0 - 54.0 fl    MPV 10.9 6.0 - 12.0 fL    Platelets 201 140 - 450 10*3/mm3    Neutrophil % 58.8 42.7 - 76.0 %    Lymphocyte % 31.3 19.6 - 45.3 %    Monocyte % 6.2 5.0 - 12.0 %    Eosinophil % 2.7 0.3 - 6.2 %    Basophil % 0.7 0.0 - 1.5 %    Immature Grans % 0.3 0.0 - 0.5 %    Neutrophils, Absolute 4.39 1.70 - 7.00 10*3/mm3    Lymphocytes, Absolute 2.33 0.70 - 3.10 10*3/mm3    Monocytes, Absolute 0.46 0.10 - 0.90 10*3/mm3    Eosinophils, Absolute 0.20 0.00 - 0.40 10*3/mm3    Basophils, Absolute 0.05 0.00 - 0.20 10*3/mm3    Immature Grans, Absolute 0.02 0.00 - 0.05 10*3/mm3    nRBC 0.0 0.0 - 0.2 /100 WBC         Assessment/Plan   increase hydralazine back to tid. Stay off losartan and Norvasc.      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 Consider gout. Check labs.        Andre was seen today for malignant hypertension and foot issues.    Diagnoses and all orders for this  visit:    Malignant hypertension  -     Uric Acid  -     Basic Metabolic Panel      Return in about 3 weeks (around 6/22/2020).          There are no Patient Instructions on file for this visit.     Keven Bear MD    Assessment/Plan

## 2020-06-23 ENCOUNTER — TELEPHONE (OUTPATIENT)
Dept: INTERNAL MEDICINE | Facility: CLINIC | Age: 65
End: 2020-06-23

## 2020-06-23 NOTE — TELEPHONE ENCOUNTER
PT CALLED CAN CANCELLED HIS 6/24 APPT.  PT WOULD LIKE TO BE WORKED IN SOONER THAN WHAT WAS COMING UP ON DR MUNROE SCHEDULE.    PLEASE ADVISE PATIENT OF NEXT AVAILABLE APPT -[phone]

## 2020-07-13 ENCOUNTER — EPISODE CHANGES (OUTPATIENT)
Dept: CASE MANAGEMENT | Facility: OTHER | Age: 65
End: 2020-07-13

## 2020-07-13 ENCOUNTER — APPOINTMENT (OUTPATIENT)
Dept: CT IMAGING | Facility: HOSPITAL | Age: 65
End: 2020-07-13

## 2020-07-13 ENCOUNTER — HOSPITAL ENCOUNTER (INPATIENT)
Facility: HOSPITAL | Age: 65
LOS: 4 days | Discharge: HOME | End: 2020-07-20
Attending: EMERGENCY MEDICINE | Admitting: INTERNAL MEDICINE

## 2020-07-13 ENCOUNTER — APPOINTMENT (OUTPATIENT)
Dept: GENERAL RADIOLOGY | Facility: HOSPITAL | Age: 65
End: 2020-07-13

## 2020-07-13 ENCOUNTER — TELEPHONE (OUTPATIENT)
Dept: INTERNAL MEDICINE | Facility: CLINIC | Age: 65
End: 2020-07-13

## 2020-07-13 DIAGNOSIS — Z86.79 HISTORY OF CORONARY ARTERY DISEASE: ICD-10-CM

## 2020-07-13 DIAGNOSIS — R06.02 SOB (SHORTNESS OF BREATH) ON EXERTION: Primary | ICD-10-CM

## 2020-07-13 DIAGNOSIS — E78.00 HYPERCHOLESTEREMIA: ICD-10-CM

## 2020-07-13 DIAGNOSIS — R53.83 FATIGUE, UNSPECIFIED TYPE: ICD-10-CM

## 2020-07-13 DIAGNOSIS — I16.0 HYPERTENSIVE URGENCY: ICD-10-CM

## 2020-07-13 DIAGNOSIS — I10 MALIGNANT HYPERTENSION: ICD-10-CM

## 2020-07-13 DIAGNOSIS — R06.02 SHORT OF BREATH ON EXERTION: Primary | ICD-10-CM

## 2020-07-13 LAB
ALBUMIN SERPL-MCNC: 4 G/DL (ref 3.5–5.2)
ALBUMIN/GLOB SERPL: 1.1 G/DL
ALP SERPL-CCNC: 48 U/L (ref 39–117)
ALT SERPL W P-5'-P-CCNC: 13 U/L (ref 1–41)
ANION GAP SERPL CALCULATED.3IONS-SCNC: 12 MMOL/L (ref 5–15)
AST SERPL-CCNC: 14 U/L (ref 1–40)
BACTERIA UR QL AUTO: ABNORMAL /HPF
BASOPHILS # BLD AUTO: 0.06 10*3/MM3 (ref 0–0.2)
BASOPHILS NFR BLD AUTO: 0.7 % (ref 0–1.5)
BILIRUB SERPL-MCNC: 0.5 MG/DL (ref 0–1.2)
BILIRUB UR QL STRIP: NEGATIVE
BUN SERPL-MCNC: 18 MG/DL (ref 8–23)
BUN/CREAT SERPL: 14.2 (ref 7–25)
CALCIUM SPEC-SCNC: 9.3 MG/DL (ref 8.6–10.5)
CHLORIDE SERPL-SCNC: 96 MMOL/L (ref 98–107)
CLARITY UR: CLEAR
CO2 SERPL-SCNC: 28 MMOL/L (ref 22–29)
COLOR UR: YELLOW
CREAT SERPL-MCNC: 1.27 MG/DL (ref 0.76–1.27)
DEPRECATED RDW RBC AUTO: 46.1 FL (ref 37–54)
EOSINOPHIL # BLD AUTO: 0.22 10*3/MM3 (ref 0–0.4)
EOSINOPHIL NFR BLD AUTO: 2.4 % (ref 0.3–6.2)
ERYTHROCYTE [DISTWIDTH] IN BLOOD BY AUTOMATED COUNT: 13.7 % (ref 12.3–15.4)
GFR SERPL CREATININE-BSD FRML MDRD: 57 ML/MIN/1.73
GLOBULIN UR ELPH-MCNC: 3.6 GM/DL
GLUCOSE SERPL-MCNC: 128 MG/DL (ref 65–99)
GLUCOSE UR STRIP-MCNC: NEGATIVE MG/DL
HCT VFR BLD AUTO: 47.5 % (ref 37.5–51)
HGB BLD-MCNC: 15.9 G/DL (ref 13–17.7)
HGB UR QL STRIP.AUTO: NEGATIVE
HOLD SPECIMEN: NORMAL
HOLD SPECIMEN: NORMAL
HYALINE CASTS UR QL AUTO: ABNORMAL /LPF
IMM GRANULOCYTES # BLD AUTO: 0.03 10*3/MM3 (ref 0–0.05)
IMM GRANULOCYTES NFR BLD AUTO: 0.3 % (ref 0–0.5)
KETONES UR QL STRIP: NEGATIVE
LEUKOCYTE ESTERASE UR QL STRIP.AUTO: ABNORMAL
LYMPHOCYTES # BLD AUTO: 2.47 10*3/MM3 (ref 0.7–3.1)
LYMPHOCYTES NFR BLD AUTO: 27.2 % (ref 19.6–45.3)
MCH RBC QN AUTO: 30.5 PG (ref 26.6–33)
MCHC RBC AUTO-ENTMCNC: 33.5 G/DL (ref 31.5–35.7)
MCV RBC AUTO: 91.2 FL (ref 79–97)
MONOCYTES # BLD AUTO: 0.62 10*3/MM3 (ref 0.1–0.9)
MONOCYTES NFR BLD AUTO: 6.8 % (ref 5–12)
NEUTROPHILS NFR BLD AUTO: 5.68 10*3/MM3 (ref 1.7–7)
NEUTROPHILS NFR BLD AUTO: 62.6 % (ref 42.7–76)
NITRITE UR QL STRIP: NEGATIVE
NRBC BLD AUTO-RTO: 0 /100 WBC (ref 0–0.2)
NT-PROBNP SERPL-MCNC: 698.5 PG/ML (ref 0–900)
PH UR STRIP.AUTO: 8.5 [PH] (ref 5–8)
PLATELET # BLD AUTO: 218 10*3/MM3 (ref 140–450)
PMV BLD AUTO: 11.1 FL (ref 6–12)
POTASSIUM SERPL-SCNC: 3.4 MMOL/L (ref 3.5–5.2)
PROT SERPL-MCNC: 7.6 G/DL (ref 6–8.5)
PROT UR QL STRIP: ABNORMAL
RBC # BLD AUTO: 5.21 10*6/MM3 (ref 4.14–5.8)
RBC # UR: ABNORMAL /HPF
REF LAB TEST METHOD: ABNORMAL
SODIUM SERPL-SCNC: 136 MMOL/L (ref 136–145)
SP GR UR STRIP: 1.01 (ref 1–1.03)
SQUAMOUS #/AREA URNS HPF: ABNORMAL /HPF
TROPONIN T SERPL-MCNC: <0.01 NG/ML (ref 0–0.03)
TROPONIN T SERPL-MCNC: <0.01 NG/ML (ref 0–0.03)
UROBILINOGEN UR QL STRIP: ABNORMAL
WBC # BLD AUTO: 9.08 10*3/MM3 (ref 3.4–10.8)
WBC UR QL AUTO: ABNORMAL /HPF
WHOLE BLOOD HOLD SPECIMEN: NORMAL
WHOLE BLOOD HOLD SPECIMEN: NORMAL

## 2020-07-13 PROCEDURE — 71250 CT THORAX DX C-: CPT

## 2020-07-13 PROCEDURE — G0378 HOSPITAL OBSERVATION PER HR: HCPCS

## 2020-07-13 PROCEDURE — 93005 ELECTROCARDIOGRAM TRACING: CPT | Performed by: EMERGENCY MEDICINE

## 2020-07-13 PROCEDURE — 99222 1ST HOSP IP/OBS MODERATE 55: CPT | Performed by: INTERNAL MEDICINE

## 2020-07-13 PROCEDURE — 99285 EMERGENCY DEPT VISIT HI MDM: CPT

## 2020-07-13 PROCEDURE — 72131 CT LUMBAR SPINE W/O DYE: CPT

## 2020-07-13 PROCEDURE — 84484 ASSAY OF TROPONIN QUANT: CPT | Performed by: EMERGENCY MEDICINE

## 2020-07-13 PROCEDURE — 81001 URINALYSIS AUTO W/SCOPE: CPT | Performed by: PHYSICIAN ASSISTANT

## 2020-07-13 PROCEDURE — 71045 X-RAY EXAM CHEST 1 VIEW: CPT

## 2020-07-13 PROCEDURE — 83880 ASSAY OF NATRIURETIC PEPTIDE: CPT | Performed by: EMERGENCY MEDICINE

## 2020-07-13 PROCEDURE — 85025 COMPLETE CBC W/AUTO DIFF WBC: CPT

## 2020-07-13 PROCEDURE — 80053 COMPREHEN METABOLIC PANEL: CPT | Performed by: EMERGENCY MEDICINE

## 2020-07-13 PROCEDURE — 93005 ELECTROCARDIOGRAM TRACING: CPT

## 2020-07-13 PROCEDURE — 87086 URINE CULTURE/COLONY COUNT: CPT | Performed by: PHYSICIAN ASSISTANT

## 2020-07-13 PROCEDURE — 25010000002 HYDRALAZINE PER 20 MG: Performed by: PHYSICIAN ASSISTANT

## 2020-07-13 RX ORDER — HYDRALAZINE HYDROCHLORIDE 20 MG/ML
20 INJECTION INTRAMUSCULAR; INTRAVENOUS ONCE
Status: COMPLETED | OUTPATIENT
Start: 2020-07-13 | End: 2020-07-13

## 2020-07-13 RX ORDER — POTASSIUM CHLORIDE 1.5 G/1.77G
20 POWDER, FOR SOLUTION ORAL 2 TIMES DAILY
Status: DISCONTINUED | OUTPATIENT
Start: 2020-07-13 | End: 2020-07-13

## 2020-07-13 RX ORDER — DIVALPROEX SODIUM 250 MG/1
250 TABLET, DELAYED RELEASE ORAL
Status: DISCONTINUED | OUTPATIENT
Start: 2020-07-14 | End: 2020-07-20 | Stop reason: HOSPADM

## 2020-07-13 RX ORDER — KETOROLAC TROMETHAMINE 15 MG/ML
15 INJECTION, SOLUTION INTRAMUSCULAR; INTRAVENOUS ONCE
Status: COMPLETED | OUTPATIENT
Start: 2020-07-14 | End: 2020-07-13

## 2020-07-13 RX ORDER — ASPIRIN 81 MG/1
81 TABLET ORAL DAILY
Status: DISCONTINUED | OUTPATIENT
Start: 2020-07-13 | End: 2020-07-20 | Stop reason: HOSPADM

## 2020-07-13 RX ORDER — POTASSIUM CHLORIDE 750 MG/1
20 CAPSULE, EXTENDED RELEASE ORAL 2 TIMES DAILY WITH MEALS
Status: DISCONTINUED | OUTPATIENT
Start: 2020-07-14 | End: 2020-07-13

## 2020-07-13 RX ORDER — DIVALPROEX SODIUM 500 MG/1
500 TABLET, DELAYED RELEASE ORAL EVERY 12 HOURS SCHEDULED
Status: DISCONTINUED | OUTPATIENT
Start: 2020-07-13 | End: 2020-07-20 | Stop reason: HOSPADM

## 2020-07-13 RX ORDER — HYDRALAZINE HYDROCHLORIDE 50 MG/1
100 TABLET, FILM COATED ORAL 3 TIMES DAILY
Status: DISCONTINUED | OUTPATIENT
Start: 2020-07-13 | End: 2020-07-20 | Stop reason: HOSPADM

## 2020-07-13 RX ORDER — SODIUM CHLORIDE 0.9 % (FLUSH) 0.9 %
10 SYRINGE (ML) INJECTION AS NEEDED
Status: DISCONTINUED | OUTPATIENT
Start: 2020-07-13 | End: 2020-07-20 | Stop reason: HOSPADM

## 2020-07-13 RX ORDER — NITROGLYCERIN 20 MG/100ML
5-200 INJECTION INTRAVENOUS
Status: DISCONTINUED | OUTPATIENT
Start: 2020-07-13 | End: 2020-07-20 | Stop reason: HOSPADM

## 2020-07-13 RX ORDER — DIVALPROEX SODIUM 250 MG/1
250 TABLET, DELAYED RELEASE ORAL 3 TIMES DAILY
Status: DISCONTINUED | OUTPATIENT
Start: 2020-07-13 | End: 2020-07-13

## 2020-07-13 RX ORDER — TERAZOSIN 5 MG/1
5 CAPSULE ORAL EVERY 12 HOURS SCHEDULED
Status: DISCONTINUED | OUTPATIENT
Start: 2020-07-13 | End: 2020-07-14

## 2020-07-13 RX ORDER — LEVOTHYROXINE SODIUM 88 UG/1
88 TABLET ORAL
Status: DISCONTINUED | OUTPATIENT
Start: 2020-07-14 | End: 2020-07-20 | Stop reason: HOSPADM

## 2020-07-13 RX ORDER — SODIUM CHLORIDE 0.9 % (FLUSH) 0.9 %
10 SYRINGE (ML) INJECTION EVERY 12 HOURS SCHEDULED
Status: DISCONTINUED | OUTPATIENT
Start: 2020-07-13 | End: 2020-07-20 | Stop reason: HOSPADM

## 2020-07-13 RX ORDER — ACETAMINOPHEN 325 MG/1
650 TABLET ORAL EVERY 4 HOURS PRN
Status: DISCONTINUED | OUTPATIENT
Start: 2020-07-13 | End: 2020-07-20 | Stop reason: HOSPADM

## 2020-07-13 RX ORDER — ACETAMINOPHEN 500 MG
1000 TABLET ORAL ONCE
Status: COMPLETED | OUTPATIENT
Start: 2020-07-13 | End: 2020-07-13

## 2020-07-13 RX ORDER — ZOLPIDEM TARTRATE 5 MG/1
5 TABLET ORAL NIGHTLY PRN
Status: DISCONTINUED | OUTPATIENT
Start: 2020-07-13 | End: 2020-07-20 | Stop reason: HOSPADM

## 2020-07-13 RX ORDER — ESCITALOPRAM OXALATE 20 MG/1
20 TABLET ORAL DAILY
Status: DISCONTINUED | OUTPATIENT
Start: 2020-07-14 | End: 2020-07-20 | Stop reason: HOSPADM

## 2020-07-13 RX ORDER — POTASSIUM CHLORIDE 750 MG/1
20 CAPSULE, EXTENDED RELEASE ORAL 2 TIMES DAILY WITH MEALS
Status: DISCONTINUED | OUTPATIENT
Start: 2020-07-13 | End: 2020-07-20 | Stop reason: HOSPADM

## 2020-07-13 RX ORDER — HYDROCODONE BITARTRATE AND ACETAMINOPHEN 5; 325 MG/1; MG/1
1 TABLET ORAL EVERY 4 HOURS PRN
Status: DISCONTINUED | OUTPATIENT
Start: 2020-07-13 | End: 2020-07-20 | Stop reason: HOSPADM

## 2020-07-13 RX ADMIN — NITROGLYCERIN 5 MCG/MIN: 20 INJECTION INTRAVENOUS at 17:02

## 2020-07-13 RX ADMIN — ASPIRIN 81 MG: 81 TABLET, COATED ORAL at 19:57

## 2020-07-13 RX ADMIN — HYDRALAZINE HYDROCHLORIDE 20 MG: 20 INJECTION INTRAMUSCULAR; INTRAVENOUS at 15:32

## 2020-07-13 RX ADMIN — POTASSIUM CHLORIDE 20 MEQ: 10 CAPSULE, COATED, EXTENDED RELEASE ORAL at 23:41

## 2020-07-13 RX ADMIN — TERAZOSIN HYDROCHLORIDE 5 MG: 5 CAPSULE ORAL at 22:56

## 2020-07-13 RX ADMIN — HYDROCODONE BITARTRATE AND ACETAMINOPHEN 1 TABLET: 5; 325 TABLET ORAL at 21:26

## 2020-07-13 RX ADMIN — ACETAMINOPHEN 1000 MG: 500 TABLET, FILM COATED ORAL at 17:42

## 2020-07-13 RX ADMIN — DIVALPROEX SODIUM 500 MG: 500 TABLET, DELAYED RELEASE ORAL at 22:56

## 2020-07-13 RX ADMIN — HYDRALAZINE HYDROCHLORIDE 100 MG: 50 TABLET, FILM COATED ORAL at 19:56

## 2020-07-13 RX ADMIN — KETOROLAC TROMETHAMINE 15 MG: 15 INJECTION, SOLUTION INTRAMUSCULAR; INTRAVENOUS at 23:22

## 2020-07-13 NOTE — TELEPHONE ENCOUNTER
Patient called today with elevated blood pressure of 186/103 and shortness of breath. Dr. Bear spoke with patient on the phone, labs were ordered.

## 2020-07-14 ENCOUNTER — EPISODE CHANGES (OUTPATIENT)
Dept: CASE MANAGEMENT | Facility: OTHER | Age: 65
End: 2020-07-14

## 2020-07-14 LAB
ANION GAP SERPL CALCULATED.3IONS-SCNC: 12 MMOL/L (ref 5–15)
BACTERIA SPEC AEROBE CULT: NO GROWTH
BUN SERPL-MCNC: 28 MG/DL (ref 8–23)
BUN/CREAT SERPL: 13.7 (ref 7–25)
CALCIUM SPEC-SCNC: 9.1 MG/DL (ref 8.6–10.5)
CHLORIDE SERPL-SCNC: 96 MMOL/L (ref 98–107)
CO2 SERPL-SCNC: 29 MMOL/L (ref 22–29)
CREAT SERPL-MCNC: 2.05 MG/DL (ref 0.76–1.27)
GFR SERPL CREATININE-BSD FRML MDRD: 33 ML/MIN/1.73
GLUCOSE SERPL-MCNC: 140 MG/DL (ref 65–99)
POTASSIUM SERPL-SCNC: 3.7 MMOL/L (ref 3.5–5.2)
SODIUM SERPL-SCNC: 137 MMOL/L (ref 136–145)

## 2020-07-14 PROCEDURE — 80048 BASIC METABOLIC PNL TOTAL CA: CPT | Performed by: INTERNAL MEDICINE

## 2020-07-14 PROCEDURE — 25010000002 KETOROLAC TROMETHAMINE PER 15 MG: Performed by: PHYSICIAN ASSISTANT

## 2020-07-14 PROCEDURE — 25010000002 ENOXAPARIN PER 10 MG: Performed by: INTERNAL MEDICINE

## 2020-07-14 PROCEDURE — 99232 SBSQ HOSP IP/OBS MODERATE 35: CPT | Performed by: INTERNAL MEDICINE

## 2020-07-14 PROCEDURE — G0378 HOSPITAL OBSERVATION PER HR: HCPCS

## 2020-07-14 RX ORDER — TERAZOSIN 5 MG/1
10 CAPSULE ORAL EVERY 12 HOURS SCHEDULED
Status: DISCONTINUED | OUTPATIENT
Start: 2020-07-14 | End: 2020-07-20 | Stop reason: HOSPADM

## 2020-07-14 RX ORDER — TERAZOSIN 5 MG/1
5 CAPSULE ORAL ONCE
Status: COMPLETED | OUTPATIENT
Start: 2020-07-14 | End: 2020-07-14

## 2020-07-14 RX ADMIN — TERAZOSIN HYDROCHLORIDE 5 MG: 5 CAPSULE ORAL at 17:45

## 2020-07-14 RX ADMIN — TERAZOSIN HYDROCHLORIDE 5 MG: 5 CAPSULE ORAL at 09:05

## 2020-07-14 RX ADMIN — ASPIRIN 81 MG: 81 TABLET, COATED ORAL at 09:05

## 2020-07-14 RX ADMIN — HYDRALAZINE HYDROCHLORIDE 100 MG: 50 TABLET, FILM COATED ORAL at 16:59

## 2020-07-14 RX ADMIN — ENOXAPARIN SODIUM 40 MG: 40 INJECTION SUBCUTANEOUS at 05:27

## 2020-07-14 RX ADMIN — SODIUM CHLORIDE, PRESERVATIVE FREE 10 ML: 5 INJECTION INTRAVENOUS at 09:06

## 2020-07-14 RX ADMIN — DIVALPROEX SODIUM 500 MG: 500 TABLET, DELAYED RELEASE ORAL at 09:05

## 2020-07-14 RX ADMIN — ESCITALOPRAM OXALATE 20 MG: 20 TABLET ORAL at 09:05

## 2020-07-14 RX ADMIN — POTASSIUM CHLORIDE 20 MEQ: 10 CAPSULE, COATED, EXTENDED RELEASE ORAL at 17:45

## 2020-07-14 RX ADMIN — LEVOTHYROXINE SODIUM 88 MCG: 88 TABLET ORAL at 05:27

## 2020-07-14 RX ADMIN — TERAZOSIN HYDROCHLORIDE 10 MG: 5 CAPSULE ORAL at 20:51

## 2020-07-14 RX ADMIN — DIVALPROEX SODIUM 500 MG: 500 TABLET, DELAYED RELEASE ORAL at 20:51

## 2020-07-14 RX ADMIN — HYDRALAZINE HYDROCHLORIDE 100 MG: 50 TABLET, FILM COATED ORAL at 20:52

## 2020-07-14 RX ADMIN — HYDRALAZINE HYDROCHLORIDE 100 MG: 50 TABLET, FILM COATED ORAL at 09:05

## 2020-07-14 RX ADMIN — DIVALPROEX SODIUM 250 MG: 250 TABLET, DELAYED RELEASE ORAL at 12:40

## 2020-07-14 RX ADMIN — ACETAMINOPHEN 650 MG: 325 TABLET, FILM COATED ORAL at 16:35

## 2020-07-14 RX ADMIN — POTASSIUM CHLORIDE 20 MEQ: 10 CAPSULE, COATED, EXTENDED RELEASE ORAL at 08:59

## 2020-07-15 LAB
ANION GAP SERPL CALCULATED.3IONS-SCNC: 12 MMOL/L (ref 5–15)
BUN SERPL-MCNC: 34 MG/DL (ref 8–23)
BUN/CREAT SERPL: 21.5 (ref 7–25)
CALCIUM SPEC-SCNC: 8.9 MG/DL (ref 8.6–10.5)
CHLORIDE SERPL-SCNC: 95 MMOL/L (ref 98–107)
CO2 SERPL-SCNC: 28 MMOL/L (ref 22–29)
CREAT SERPL-MCNC: 1.58 MG/DL (ref 0.76–1.27)
GFR SERPL CREATININE-BSD FRML MDRD: 44 ML/MIN/1.73
GLUCOSE SERPL-MCNC: 125 MG/DL (ref 65–99)
POTASSIUM SERPL-SCNC: 3.2 MMOL/L (ref 3.5–5.2)
SODIUM SERPL-SCNC: 135 MMOL/L (ref 136–145)

## 2020-07-15 PROCEDURE — G0378 HOSPITAL OBSERVATION PER HR: HCPCS

## 2020-07-15 PROCEDURE — 99232 SBSQ HOSP IP/OBS MODERATE 35: CPT | Performed by: INTERNAL MEDICINE

## 2020-07-15 PROCEDURE — 25010000002 ENOXAPARIN PER 10 MG: Performed by: INTERNAL MEDICINE

## 2020-07-15 PROCEDURE — 80048 BASIC METABOLIC PNL TOTAL CA: CPT | Performed by: INTERNAL MEDICINE

## 2020-07-15 RX ORDER — AMLODIPINE BESYLATE 5 MG/1
5 TABLET ORAL
Status: DISCONTINUED | OUTPATIENT
Start: 2020-07-15 | End: 2020-07-16

## 2020-07-15 RX ADMIN — ACETAMINOPHEN 650 MG: 325 TABLET, FILM COATED ORAL at 12:32

## 2020-07-15 RX ADMIN — LEVOTHYROXINE SODIUM 88 MCG: 88 TABLET ORAL at 05:45

## 2020-07-15 RX ADMIN — DIVALPROEX SODIUM 500 MG: 500 TABLET, DELAYED RELEASE ORAL at 08:07

## 2020-07-15 RX ADMIN — ESCITALOPRAM OXALATE 20 MG: 20 TABLET ORAL at 08:07

## 2020-07-15 RX ADMIN — ENOXAPARIN SODIUM 40 MG: 40 INJECTION SUBCUTANEOUS at 05:45

## 2020-07-15 RX ADMIN — TERAZOSIN HYDROCHLORIDE 10 MG: 5 CAPSULE ORAL at 08:08

## 2020-07-15 RX ADMIN — HYDRALAZINE HYDROCHLORIDE 100 MG: 50 TABLET, FILM COATED ORAL at 16:10

## 2020-07-15 RX ADMIN — HYDRALAZINE HYDROCHLORIDE 100 MG: 50 TABLET, FILM COATED ORAL at 08:07

## 2020-07-15 RX ADMIN — DIVALPROEX SODIUM 500 MG: 500 TABLET, DELAYED RELEASE ORAL at 21:13

## 2020-07-15 RX ADMIN — POTASSIUM CHLORIDE 20 MEQ: 10 CAPSULE, COATED, EXTENDED RELEASE ORAL at 08:07

## 2020-07-15 RX ADMIN — POTASSIUM CHLORIDE 20 MEQ: 10 CAPSULE, COATED, EXTENDED RELEASE ORAL at 18:02

## 2020-07-15 RX ADMIN — HYDRALAZINE HYDROCHLORIDE 100 MG: 50 TABLET, FILM COATED ORAL at 21:13

## 2020-07-15 RX ADMIN — TERAZOSIN HYDROCHLORIDE 10 MG: 5 CAPSULE ORAL at 21:13

## 2020-07-15 RX ADMIN — NITROGLYCERIN 14 MCG/MIN: 20 INJECTION INTRAVENOUS at 19:23

## 2020-07-15 RX ADMIN — SODIUM CHLORIDE, PRESERVATIVE FREE 10 ML: 5 INJECTION INTRAVENOUS at 08:06

## 2020-07-15 RX ADMIN — ACETAMINOPHEN 650 MG: 325 TABLET, FILM COATED ORAL at 06:46

## 2020-07-15 RX ADMIN — AMLODIPINE BESYLATE 5 MG: 5 TABLET ORAL at 08:07

## 2020-07-15 RX ADMIN — DIVALPROEX SODIUM 250 MG: 250 TABLET, DELAYED RELEASE ORAL at 12:21

## 2020-07-15 RX ADMIN — SODIUM CHLORIDE, PRESERVATIVE FREE 10 ML: 5 INJECTION INTRAVENOUS at 21:14

## 2020-07-15 RX ADMIN — ASPIRIN 81 MG: 81 TABLET, COATED ORAL at 08:07

## 2020-07-16 PROBLEM — R06.02 SOB (SHORTNESS OF BREATH) ON EXERTION: Status: ACTIVE | Noted: 2020-07-16

## 2020-07-16 LAB
ANION GAP SERPL CALCULATED.3IONS-SCNC: 14 MMOL/L (ref 5–15)
BUN SERPL-MCNC: 37 MG/DL (ref 8–23)
BUN/CREAT SERPL: 23 (ref 7–25)
CALCIUM SPEC-SCNC: 8.8 MG/DL (ref 8.6–10.5)
CHLORIDE SERPL-SCNC: 98 MMOL/L (ref 98–107)
CO2 SERPL-SCNC: 25 MMOL/L (ref 22–29)
CREAT SERPL-MCNC: 1.61 MG/DL (ref 0.76–1.27)
GFR SERPL CREATININE-BSD FRML MDRD: 43 ML/MIN/1.73
GLUCOSE SERPL-MCNC: 118 MG/DL (ref 65–99)
POTASSIUM SERPL-SCNC: 3.5 MMOL/L (ref 3.5–5.2)
SODIUM SERPL-SCNC: 137 MMOL/L (ref 136–145)

## 2020-07-16 PROCEDURE — 25010000002 ENOXAPARIN PER 10 MG: Performed by: INTERNAL MEDICINE

## 2020-07-16 PROCEDURE — 80048 BASIC METABOLIC PNL TOTAL CA: CPT | Performed by: INTERNAL MEDICINE

## 2020-07-16 PROCEDURE — 99232 SBSQ HOSP IP/OBS MODERATE 35: CPT | Performed by: INTERNAL MEDICINE

## 2020-07-16 RX ORDER — CLONIDINE HYDROCHLORIDE 0.1 MG/1
0.1 TABLET ORAL
Status: DISCONTINUED | OUTPATIENT
Start: 2020-07-16 | End: 2020-07-20 | Stop reason: HOSPADM

## 2020-07-16 RX ORDER — CARVEDILOL 6.25 MG/1
6.25 TABLET ORAL 2 TIMES DAILY WITH MEALS
Status: DISCONTINUED | OUTPATIENT
Start: 2020-07-16 | End: 2020-07-18

## 2020-07-16 RX ORDER — SODIUM CHLORIDE 450 MG/100ML
100 INJECTION, SOLUTION INTRAVENOUS CONTINUOUS
Status: DISCONTINUED | OUTPATIENT
Start: 2020-07-16 | End: 2020-07-18

## 2020-07-16 RX ORDER — AMLODIPINE BESYLATE 10 MG/1
10 TABLET ORAL
Status: DISCONTINUED | OUTPATIENT
Start: 2020-07-17 | End: 2020-07-20 | Stop reason: HOSPADM

## 2020-07-16 RX ADMIN — TERAZOSIN HYDROCHLORIDE 10 MG: 5 CAPSULE ORAL at 08:30

## 2020-07-16 RX ADMIN — SODIUM CHLORIDE 100 ML/HR: 4.5 INJECTION, SOLUTION INTRAVENOUS at 10:21

## 2020-07-16 RX ADMIN — ACETAMINOPHEN 650 MG: 325 TABLET, FILM COATED ORAL at 16:28

## 2020-07-16 RX ADMIN — POTASSIUM CHLORIDE 20 MEQ: 10 CAPSULE, COATED, EXTENDED RELEASE ORAL at 18:21

## 2020-07-16 RX ADMIN — HYDRALAZINE HYDROCHLORIDE 100 MG: 50 TABLET, FILM COATED ORAL at 08:28

## 2020-07-16 RX ADMIN — DIVALPROEX SODIUM 500 MG: 500 TABLET, DELAYED RELEASE ORAL at 08:28

## 2020-07-16 RX ADMIN — ENOXAPARIN SODIUM 40 MG: 40 INJECTION SUBCUTANEOUS at 05:39

## 2020-07-16 RX ADMIN — AMLODIPINE BESYLATE 5 MG: 5 TABLET ORAL at 08:29

## 2020-07-16 RX ADMIN — HYDRALAZINE HYDROCHLORIDE 100 MG: 50 TABLET, FILM COATED ORAL at 16:09

## 2020-07-16 RX ADMIN — DIVALPROEX SODIUM 250 MG: 250 TABLET, DELAYED RELEASE ORAL at 13:03

## 2020-07-16 RX ADMIN — TERAZOSIN HYDROCHLORIDE 10 MG: 5 CAPSULE ORAL at 20:34

## 2020-07-16 RX ADMIN — ACETAMINOPHEN 650 MG: 325 TABLET, FILM COATED ORAL at 10:28

## 2020-07-16 RX ADMIN — CARVEDILOL 6.25 MG: 6.25 TABLET, FILM COATED ORAL at 18:21

## 2020-07-16 RX ADMIN — ASPIRIN 81 MG: 81 TABLET, COATED ORAL at 08:31

## 2020-07-16 RX ADMIN — POTASSIUM CHLORIDE 20 MEQ: 10 CAPSULE, COATED, EXTENDED RELEASE ORAL at 08:29

## 2020-07-16 RX ADMIN — LEVOTHYROXINE SODIUM 88 MCG: 88 TABLET ORAL at 05:39

## 2020-07-16 RX ADMIN — CLONIDINE HYDROCHLORIDE 0.1 MG: 0.1 TABLET ORAL at 16:28

## 2020-07-16 RX ADMIN — HYDRALAZINE HYDROCHLORIDE 100 MG: 50 TABLET, FILM COATED ORAL at 21:27

## 2020-07-16 RX ADMIN — ESCITALOPRAM OXALATE 20 MG: 20 TABLET ORAL at 08:31

## 2020-07-16 RX ADMIN — DIVALPROEX SODIUM 500 MG: 500 TABLET, DELAYED RELEASE ORAL at 20:33

## 2020-07-16 RX ADMIN — CARVEDILOL 6.25 MG: 6.25 TABLET, FILM COATED ORAL at 10:21

## 2020-07-16 RX ADMIN — SODIUM CHLORIDE 100 ML/HR: 4.5 INJECTION, SOLUTION INTRAVENOUS at 21:28

## 2020-07-17 LAB
ANION GAP SERPL CALCULATED.3IONS-SCNC: 10 MMOL/L (ref 5–15)
BUN SERPL-MCNC: 26 MG/DL (ref 8–23)
BUN/CREAT SERPL: 17.8 (ref 7–25)
CALCIUM SPEC-SCNC: 9.1 MG/DL (ref 8.6–10.5)
CHLORIDE SERPL-SCNC: 102 MMOL/L (ref 98–107)
CO2 SERPL-SCNC: 29 MMOL/L (ref 22–29)
CREAT SERPL-MCNC: 1.46 MG/DL (ref 0.76–1.27)
GFR SERPL CREATININE-BSD FRML MDRD: 49 ML/MIN/1.73
GLUCOSE SERPL-MCNC: 118 MG/DL (ref 65–99)
POTASSIUM SERPL-SCNC: 3.6 MMOL/L (ref 3.5–5.2)
SODIUM SERPL-SCNC: 141 MMOL/L (ref 136–145)

## 2020-07-17 PROCEDURE — 80048 BASIC METABOLIC PNL TOTAL CA: CPT | Performed by: INTERNAL MEDICINE

## 2020-07-17 PROCEDURE — 25010000002 ENOXAPARIN PER 10 MG: Performed by: INTERNAL MEDICINE

## 2020-07-17 PROCEDURE — 99232 SBSQ HOSP IP/OBS MODERATE 35: CPT | Performed by: INTERNAL MEDICINE

## 2020-07-17 PROCEDURE — 25010000002 FUROSEMIDE PER 20 MG: Performed by: INTERNAL MEDICINE

## 2020-07-17 PROCEDURE — 93005 ELECTROCARDIOGRAM TRACING: CPT | Performed by: INTERNAL MEDICINE

## 2020-07-17 PROCEDURE — 93010 ELECTROCARDIOGRAM REPORT: CPT | Performed by: INTERNAL MEDICINE

## 2020-07-17 RX ORDER — METHYLDOPA 250 MG/1
250 TABLET, FILM COATED ORAL EVERY 12 HOURS SCHEDULED
Status: DISCONTINUED | OUTPATIENT
Start: 2020-07-17 | End: 2020-07-20

## 2020-07-17 RX ORDER — FUROSEMIDE 10 MG/ML
40 INJECTION INTRAMUSCULAR; INTRAVENOUS ONCE
Status: COMPLETED | OUTPATIENT
Start: 2020-07-17 | End: 2020-07-17

## 2020-07-17 RX ADMIN — CLONIDINE HYDROCHLORIDE 0.1 MG: 0.1 TABLET ORAL at 09:09

## 2020-07-17 RX ADMIN — CARVEDILOL 6.25 MG: 6.25 TABLET, FILM COATED ORAL at 07:35

## 2020-07-17 RX ADMIN — ENOXAPARIN SODIUM 40 MG: 40 INJECTION SUBCUTANEOUS at 05:51

## 2020-07-17 RX ADMIN — POTASSIUM CHLORIDE 20 MEQ: 10 CAPSULE, COATED, EXTENDED RELEASE ORAL at 07:35

## 2020-07-17 RX ADMIN — CLONIDINE HYDROCHLORIDE 0.1 MG: 0.1 TABLET ORAL at 21:32

## 2020-07-17 RX ADMIN — ACETAMINOPHEN 650 MG: 325 TABLET, FILM COATED ORAL at 18:13

## 2020-07-17 RX ADMIN — LEVOTHYROXINE SODIUM 88 MCG: 88 TABLET ORAL at 05:51

## 2020-07-17 RX ADMIN — METHYLDOPA 250 MG: 250 TABLET, FILM COATED ORAL at 20:24

## 2020-07-17 RX ADMIN — DIVALPROEX SODIUM 500 MG: 500 TABLET, DELAYED RELEASE ORAL at 07:36

## 2020-07-17 RX ADMIN — HYDRALAZINE HYDROCHLORIDE 100 MG: 50 TABLET, FILM COATED ORAL at 20:24

## 2020-07-17 RX ADMIN — CLONIDINE HYDROCHLORIDE 0.1 MG: 0.1 TABLET ORAL at 22:46

## 2020-07-17 RX ADMIN — METHYLDOPA 250 MG: 250 TABLET, FILM COATED ORAL at 07:51

## 2020-07-17 RX ADMIN — AMLODIPINE BESYLATE 10 MG: 10 TABLET ORAL at 07:34

## 2020-07-17 RX ADMIN — TERAZOSIN HYDROCHLORIDE 10 MG: 5 CAPSULE ORAL at 20:23

## 2020-07-17 RX ADMIN — ASPIRIN 81 MG: 81 TABLET, COATED ORAL at 07:35

## 2020-07-17 RX ADMIN — SODIUM CHLORIDE 100 ML/HR: 4.5 INJECTION, SOLUTION INTRAVENOUS at 22:46

## 2020-07-17 RX ADMIN — HYDRALAZINE HYDROCHLORIDE 100 MG: 50 TABLET, FILM COATED ORAL at 15:30

## 2020-07-17 RX ADMIN — ACETAMINOPHEN 650 MG: 325 TABLET, FILM COATED ORAL at 09:09

## 2020-07-17 RX ADMIN — FUROSEMIDE 40 MG: 10 INJECTION, SOLUTION INTRAMUSCULAR; INTRAVENOUS at 19:43

## 2020-07-17 RX ADMIN — HYDRALAZINE HYDROCHLORIDE 100 MG: 50 TABLET, FILM COATED ORAL at 07:36

## 2020-07-17 RX ADMIN — CARVEDILOL 6.25 MG: 6.25 TABLET, FILM COATED ORAL at 17:04

## 2020-07-17 RX ADMIN — ESCITALOPRAM OXALATE 20 MG: 20 TABLET ORAL at 07:37

## 2020-07-17 RX ADMIN — DIVALPROEX SODIUM 500 MG: 500 TABLET, DELAYED RELEASE ORAL at 20:23

## 2020-07-17 RX ADMIN — POTASSIUM CHLORIDE 20 MEQ: 10 CAPSULE, COATED, EXTENDED RELEASE ORAL at 17:04

## 2020-07-17 RX ADMIN — SODIUM CHLORIDE 100 ML/HR: 4.5 INJECTION, SOLUTION INTRAVENOUS at 06:00

## 2020-07-17 RX ADMIN — DIVALPROEX SODIUM 250 MG: 250 TABLET, DELAYED RELEASE ORAL at 11:27

## 2020-07-17 RX ADMIN — TERAZOSIN HYDROCHLORIDE 10 MG: 5 CAPSULE ORAL at 07:36

## 2020-07-18 LAB
ANION GAP SERPL CALCULATED.3IONS-SCNC: 11 MMOL/L (ref 5–15)
BUN SERPL-MCNC: 22 MG/DL (ref 8–23)
BUN/CREAT SERPL: 16.3 (ref 7–25)
CALCIUM SPEC-SCNC: 8.6 MG/DL (ref 8.6–10.5)
CHLORIDE SERPL-SCNC: 101 MMOL/L (ref 98–107)
CO2 SERPL-SCNC: 27 MMOL/L (ref 22–29)
CREAT SERPL-MCNC: 1.35 MG/DL (ref 0.76–1.27)
GFR SERPL CREATININE-BSD FRML MDRD: 53 ML/MIN/1.73
GLUCOSE SERPL-MCNC: 106 MG/DL (ref 65–99)
POTASSIUM SERPL-SCNC: 3.3 MMOL/L (ref 3.5–5.2)
POTASSIUM SERPL-SCNC: 3.4 MMOL/L (ref 3.5–5.2)
SODIUM SERPL-SCNC: 139 MMOL/L (ref 136–145)

## 2020-07-18 PROCEDURE — 84132 ASSAY OF SERUM POTASSIUM: CPT | Performed by: NURSE PRACTITIONER

## 2020-07-18 PROCEDURE — 80048 BASIC METABOLIC PNL TOTAL CA: CPT | Performed by: INTERNAL MEDICINE

## 2020-07-18 PROCEDURE — 99232 SBSQ HOSP IP/OBS MODERATE 35: CPT | Performed by: INTERNAL MEDICINE

## 2020-07-18 PROCEDURE — 25010000002 ENOXAPARIN PER 10 MG: Performed by: INTERNAL MEDICINE

## 2020-07-18 RX ORDER — CARVEDILOL 12.5 MG/1
12.5 TABLET ORAL 2 TIMES DAILY WITH MEALS
Status: DISCONTINUED | OUTPATIENT
Start: 2020-07-18 | End: 2020-07-20 | Stop reason: HOSPADM

## 2020-07-18 RX ADMIN — AMLODIPINE BESYLATE 10 MG: 10 TABLET ORAL at 07:42

## 2020-07-18 RX ADMIN — LEVOTHYROXINE SODIUM 88 MCG: 88 TABLET ORAL at 05:41

## 2020-07-18 RX ADMIN — SODIUM CHLORIDE 100 ML/HR: 4.5 INJECTION, SOLUTION INTRAVENOUS at 08:09

## 2020-07-18 RX ADMIN — METHYLDOPA 250 MG: 250 TABLET, FILM COATED ORAL at 20:43

## 2020-07-18 RX ADMIN — ESCITALOPRAM OXALATE 20 MG: 20 TABLET ORAL at 07:42

## 2020-07-18 RX ADMIN — POTASSIUM CHLORIDE 20 MEQ: 10 CAPSULE, COATED, EXTENDED RELEASE ORAL at 07:41

## 2020-07-18 RX ADMIN — ACETAMINOPHEN 650 MG: 325 TABLET, FILM COATED ORAL at 17:09

## 2020-07-18 RX ADMIN — CLONIDINE HYDROCHLORIDE 0.1 MG: 0.1 TABLET ORAL at 16:09

## 2020-07-18 RX ADMIN — CARVEDILOL 12.5 MG: 12.5 TABLET, FILM COATED ORAL at 10:10

## 2020-07-18 RX ADMIN — DIVALPROEX SODIUM 250 MG: 250 TABLET, DELAYED RELEASE ORAL at 11:33

## 2020-07-18 RX ADMIN — SODIUM CHLORIDE, PRESERVATIVE FREE 10 ML: 5 INJECTION INTRAVENOUS at 20:45

## 2020-07-18 RX ADMIN — DIVALPROEX SODIUM 500 MG: 500 TABLET, DELAYED RELEASE ORAL at 20:44

## 2020-07-18 RX ADMIN — TERAZOSIN HYDROCHLORIDE 10 MG: 5 CAPSULE ORAL at 20:43

## 2020-07-18 RX ADMIN — TERAZOSIN HYDROCHLORIDE 10 MG: 5 CAPSULE ORAL at 07:43

## 2020-07-18 RX ADMIN — HYDRALAZINE HYDROCHLORIDE 100 MG: 50 TABLET, FILM COATED ORAL at 16:08

## 2020-07-18 RX ADMIN — METHYLDOPA 250 MG: 250 TABLET, FILM COATED ORAL at 07:41

## 2020-07-18 RX ADMIN — POTASSIUM CHLORIDE 20 MEQ: 10 CAPSULE, COATED, EXTENDED RELEASE ORAL at 17:09

## 2020-07-18 RX ADMIN — LOSARTAN POTASSIUM: 50 TABLET ORAL at 10:09

## 2020-07-18 RX ADMIN — HYDRALAZINE HYDROCHLORIDE 100 MG: 50 TABLET, FILM COATED ORAL at 20:44

## 2020-07-18 RX ADMIN — HYDRALAZINE HYDROCHLORIDE 100 MG: 50 TABLET, FILM COATED ORAL at 07:42

## 2020-07-18 RX ADMIN — DIVALPROEX SODIUM 500 MG: 500 TABLET, DELAYED RELEASE ORAL at 07:42

## 2020-07-18 RX ADMIN — ASPIRIN 81 MG: 81 TABLET, COATED ORAL at 07:43

## 2020-07-18 RX ADMIN — ENOXAPARIN SODIUM 40 MG: 40 INJECTION SUBCUTANEOUS at 05:41

## 2020-07-18 RX ADMIN — ACETAMINOPHEN 650 MG: 325 TABLET, FILM COATED ORAL at 08:09

## 2020-07-19 LAB
ALBUMIN SERPL-MCNC: 4.3 G/DL (ref 3.5–5.2)
ANION GAP SERPL CALCULATED.3IONS-SCNC: 12 MMOL/L (ref 5–15)
BUN SERPL-MCNC: 22 MG/DL (ref 8–23)
BUN/CREAT SERPL: 18.2 (ref 7–25)
CALCIUM SPEC-SCNC: 9.5 MG/DL (ref 8.6–10.5)
CHLORIDE SERPL-SCNC: 101 MMOL/L (ref 98–107)
CO2 SERPL-SCNC: 26 MMOL/L (ref 22–29)
CREAT SERPL-MCNC: 1.21 MG/DL (ref 0.76–1.27)
GFR SERPL CREATININE-BSD FRML MDRD: 60 ML/MIN/1.73
GLUCOSE SERPL-MCNC: 95 MG/DL (ref 65–99)
MAGNESIUM SERPL-MCNC: 2.2 MG/DL (ref 1.6–2.4)
PHOSPHATE SERPL-MCNC: 4.3 MG/DL (ref 2.5–4.5)
POTASSIUM SERPL-SCNC: 3.9 MMOL/L (ref 3.5–5.2)
SODIUM SERPL-SCNC: 139 MMOL/L (ref 136–145)

## 2020-07-19 PROCEDURE — 83735 ASSAY OF MAGNESIUM: CPT | Performed by: INTERNAL MEDICINE

## 2020-07-19 PROCEDURE — 99232 SBSQ HOSP IP/OBS MODERATE 35: CPT | Performed by: INTERNAL MEDICINE

## 2020-07-19 PROCEDURE — 25010000002 ENOXAPARIN PER 10 MG: Performed by: INTERNAL MEDICINE

## 2020-07-19 PROCEDURE — 80069 RENAL FUNCTION PANEL: CPT | Performed by: INTERNAL MEDICINE

## 2020-07-19 RX ADMIN — ESCITALOPRAM OXALATE 20 MG: 20 TABLET ORAL at 09:59

## 2020-07-19 RX ADMIN — POTASSIUM CHLORIDE 20 MEQ: 10 CAPSULE, COATED, EXTENDED RELEASE ORAL at 09:57

## 2020-07-19 RX ADMIN — METHYLDOPA 250 MG: 250 TABLET, FILM COATED ORAL at 09:59

## 2020-07-19 RX ADMIN — DIVALPROEX SODIUM 500 MG: 500 TABLET, DELAYED RELEASE ORAL at 20:51

## 2020-07-19 RX ADMIN — TERAZOSIN HYDROCHLORIDE 10 MG: 5 CAPSULE ORAL at 10:11

## 2020-07-19 RX ADMIN — CARVEDILOL 12.5 MG: 12.5 TABLET, FILM COATED ORAL at 09:58

## 2020-07-19 RX ADMIN — DIVALPROEX SODIUM 250 MG: 250 TABLET, DELAYED RELEASE ORAL at 12:25

## 2020-07-19 RX ADMIN — CARVEDILOL 12.5 MG: 12.5 TABLET, FILM COATED ORAL at 17:48

## 2020-07-19 RX ADMIN — HYDRALAZINE HYDROCHLORIDE 100 MG: 50 TABLET, FILM COATED ORAL at 20:50

## 2020-07-19 RX ADMIN — POTASSIUM CHLORIDE 20 MEQ: 10 CAPSULE, COATED, EXTENDED RELEASE ORAL at 17:53

## 2020-07-19 RX ADMIN — AMLODIPINE BESYLATE 10 MG: 10 TABLET ORAL at 09:57

## 2020-07-19 RX ADMIN — DIVALPROEX SODIUM 500 MG: 500 TABLET, DELAYED RELEASE ORAL at 10:44

## 2020-07-19 RX ADMIN — LOSARTAN POTASSIUM: 50 TABLET, FILM COATED ORAL at 09:58

## 2020-07-19 RX ADMIN — ACETAMINOPHEN 650 MG: 325 TABLET, FILM COATED ORAL at 13:29

## 2020-07-19 RX ADMIN — SODIUM CHLORIDE, PRESERVATIVE FREE 10 ML: 5 INJECTION INTRAVENOUS at 20:50

## 2020-07-19 RX ADMIN — TERAZOSIN HYDROCHLORIDE 10 MG: 5 CAPSULE ORAL at 20:50

## 2020-07-19 RX ADMIN — METHYLDOPA 250 MG: 250 TABLET, FILM COATED ORAL at 20:50

## 2020-07-19 RX ADMIN — HYDRALAZINE HYDROCHLORIDE 100 MG: 50 TABLET, FILM COATED ORAL at 17:49

## 2020-07-19 RX ADMIN — ENOXAPARIN SODIUM 40 MG: 40 INJECTION SUBCUTANEOUS at 06:38

## 2020-07-19 RX ADMIN — ASPIRIN 81 MG: 81 TABLET, COATED ORAL at 09:57

## 2020-07-19 RX ADMIN — LEVOTHYROXINE SODIUM 88 MCG: 88 TABLET ORAL at 06:39

## 2020-07-19 RX ADMIN — HYDRALAZINE HYDROCHLORIDE 100 MG: 50 TABLET, FILM COATED ORAL at 10:44

## 2020-07-20 ENCOUNTER — READMISSION MANAGEMENT (OUTPATIENT)
Dept: CALL CENTER | Facility: HOSPITAL | Age: 65
End: 2020-07-20

## 2020-07-20 VITALS
DIASTOLIC BLOOD PRESSURE: 94 MMHG | BODY MASS INDEX: 37.94 KG/M2 | HEART RATE: 64 BPM | HEIGHT: 76 IN | SYSTOLIC BLOOD PRESSURE: 157 MMHG | OXYGEN SATURATION: 96 % | TEMPERATURE: 97.9 F | WEIGHT: 311.6 LBS | RESPIRATION RATE: 16 BRPM

## 2020-07-20 LAB
ALBUMIN SERPL-MCNC: 3.7 G/DL (ref 3.5–5.2)
ANION GAP SERPL CALCULATED.3IONS-SCNC: 12 MMOL/L (ref 5–15)
BUN SERPL-MCNC: 26 MG/DL (ref 8–23)
BUN/CREAT SERPL: 17.8 (ref 7–25)
CALCIUM SPEC-SCNC: 9.1 MG/DL (ref 8.6–10.5)
CHLORIDE SERPL-SCNC: 100 MMOL/L (ref 98–107)
CO2 SERPL-SCNC: 25 MMOL/L (ref 22–29)
CREAT SERPL-MCNC: 1.46 MG/DL (ref 0.76–1.27)
GFR SERPL CREATININE-BSD FRML MDRD: 49 ML/MIN/1.73
GLUCOSE SERPL-MCNC: 109 MG/DL (ref 65–99)
PHOSPHATE SERPL-MCNC: 3.6 MG/DL (ref 2.5–4.5)
POTASSIUM SERPL-SCNC: 3.3 MMOL/L (ref 3.5–5.2)
SODIUM SERPL-SCNC: 137 MMOL/L (ref 136–145)

## 2020-07-20 PROCEDURE — 99238 HOSP IP/OBS DSCHRG MGMT 30/<: CPT | Performed by: INTERNAL MEDICINE

## 2020-07-20 PROCEDURE — 80069 RENAL FUNCTION PANEL: CPT | Performed by: NURSE PRACTITIONER

## 2020-07-20 PROCEDURE — 25010000002 ENOXAPARIN PER 10 MG: Performed by: INTERNAL MEDICINE

## 2020-07-20 RX ORDER — AMLODIPINE BESYLATE 10 MG/1
10 TABLET ORAL
Qty: 30 TABLET | Refills: 11 | Status: SHIPPED | OUTPATIENT
Start: 2020-07-21 | End: 2020-07-21

## 2020-07-20 RX ORDER — METHYLDOPA 250 MG/1
250 TABLET, FILM COATED ORAL ONCE
Status: COMPLETED | OUTPATIENT
Start: 2020-07-20 | End: 2020-07-20

## 2020-07-20 RX ORDER — METHYLDOPA 500 MG/1
500 TABLET, FILM COATED ORAL EVERY 12 HOURS SCHEDULED
Qty: 60 TABLET | Refills: 11 | Status: SHIPPED | OUTPATIENT
Start: 2020-07-20 | End: 2020-07-21

## 2020-07-20 RX ORDER — HYDRALAZINE HYDROCHLORIDE 100 MG/1
100 TABLET, FILM COATED ORAL 2 TIMES DAILY
Qty: 90 TABLET | Refills: 11 | Status: SHIPPED | OUTPATIENT
Start: 2020-07-20 | End: 2020-07-21 | Stop reason: SDUPTHER

## 2020-07-20 RX ORDER — TERAZOSIN 10 MG/1
10 CAPSULE ORAL EVERY 12 HOURS SCHEDULED
Qty: 60 CAPSULE | Refills: 11 | Status: SHIPPED | OUTPATIENT
Start: 2020-07-20 | End: 2020-07-21

## 2020-07-20 RX ORDER — METHYLDOPA 250 MG/1
500 TABLET, FILM COATED ORAL EVERY 12 HOURS SCHEDULED
Status: DISCONTINUED | OUTPATIENT
Start: 2020-07-20 | End: 2020-07-20 | Stop reason: HOSPADM

## 2020-07-20 RX ADMIN — CARVEDILOL 12.5 MG: 12.5 TABLET, FILM COATED ORAL at 08:17

## 2020-07-20 RX ADMIN — ESCITALOPRAM OXALATE 20 MG: 20 TABLET ORAL at 08:19

## 2020-07-20 RX ADMIN — METHYLDOPA 250 MG: 250 TABLET, FILM COATED ORAL at 09:03

## 2020-07-20 RX ADMIN — ENOXAPARIN SODIUM 40 MG: 40 INJECTION SUBCUTANEOUS at 05:17

## 2020-07-20 RX ADMIN — DIVALPROEX SODIUM 500 MG: 500 TABLET, DELAYED RELEASE ORAL at 08:18

## 2020-07-20 RX ADMIN — LOSARTAN POTASSIUM: 50 TABLET, FILM COATED ORAL at 08:20

## 2020-07-20 RX ADMIN — ASPIRIN 81 MG: 81 TABLET, COATED ORAL at 08:20

## 2020-07-20 RX ADMIN — POTASSIUM CHLORIDE 20 MEQ: 10 CAPSULE, COATED, EXTENDED RELEASE ORAL at 08:20

## 2020-07-20 RX ADMIN — TERAZOSIN HYDROCHLORIDE 10 MG: 5 CAPSULE ORAL at 08:19

## 2020-07-20 RX ADMIN — METHYLDOPA 250 MG: 250 TABLET, FILM COATED ORAL at 08:18

## 2020-07-20 RX ADMIN — LEVOTHYROXINE SODIUM 88 MCG: 88 TABLET ORAL at 05:17

## 2020-07-20 RX ADMIN — AMLODIPINE BESYLATE 10 MG: 10 TABLET ORAL at 08:18

## 2020-07-20 RX ADMIN — HYDRALAZINE HYDROCHLORIDE 100 MG: 50 TABLET, FILM COATED ORAL at 08:20

## 2020-07-20 NOTE — OUTREACH NOTE
Prep Survey      Responses   [facility] facility patient discharged from?  [facility]   Is LACE score < 7 ?  No   Eligibility  [facility]   Date of Admission  07/13/20   Date of Discharge  07/20/20   Discharge Disposition  Home or Self Care   Discharge diagnosis  Accelerated HTN, CAD   COVID-19 Test Status  Not tested   Does the patient have one of the following disease processes/diagnoses(primary or secondary)?  Other   Does the patient have Home health ordered?  No   Is there a DME ordered?  No   Prep survey completed?  Yes          Nidia Alston RN

## 2020-07-21 ENCOUNTER — TRANSITIONAL CARE MANAGEMENT TELEPHONE ENCOUNTER (OUTPATIENT)
Dept: CALL CENTER | Facility: HOSPITAL | Age: 65
End: 2020-07-21

## 2020-07-21 RX ORDER — HYDRALAZINE HYDROCHLORIDE 100 MG/1
100 TABLET, FILM COATED ORAL 2 TIMES DAILY
Qty: 90 TABLET | Refills: 11 | Status: SHIPPED | OUTPATIENT
Start: 2020-07-21 | End: 2020-08-13 | Stop reason: HOSPADM

## 2020-07-21 RX ORDER — METHYLDOPA 500 MG/1
500 TABLET, FILM COATED ORAL EVERY 12 HOURS SCHEDULED
Qty: 60 TABLET | Refills: 11 | Status: SHIPPED | OUTPATIENT
Start: 2020-07-21 | End: 2020-10-06 | Stop reason: SDUPTHER

## 2020-07-21 RX ORDER — TERAZOSIN 10 MG/1
10 CAPSULE ORAL EVERY 12 HOURS SCHEDULED
Qty: 60 CAPSULE | Refills: 11 | Status: SHIPPED | OUTPATIENT
Start: 2020-07-21 | End: 2020-12-18 | Stop reason: SDUPTHER

## 2020-07-21 RX ORDER — AMLODIPINE BESYLATE 10 MG/1
10 TABLET ORAL
Qty: 30 TABLET | Refills: 11 | Status: SHIPPED | OUTPATIENT
Start: 2020-07-21 | End: 2020-08-13 | Stop reason: HOSPADM

## 2020-07-21 NOTE — OUTREACH NOTE
Call Center TCM Note      Responses   [facility] patient discharged from?  [facility]   COVID-19 Test Status  Not tested   Does the patient have one of the following disease processes/diagnoses(primary or secondary)?  Other   TCM attempt successful?  Yes   Call start time  1647   Call end time  1650   Discharge diagnosis  Accelerated HTN, CAD   Meds reviewed with patient/caregiver?  Yes   Is the patient having any side effects they believe may be caused by any medication additions or changes?  No   Does the patient have all medications ordered at discharge?  Yes   Is the patient taking all medications as directed (includes completed medication regime)?  Yes   Does the patient have a primary care provider?   Yes   Does the patient have an appointment with their PCP within 7 days of discharge?  Yes   Comments regarding PCP  Keven Bear MD PCP,  Hospital follow up appt has followup on 7/22/2020   Has the patient kept scheduled appointments due by today?  N/A   Has home health visited the patient within 72 hours of discharge?  N/A   Psychosocial issues?  No   Did the patient receive a copy of their discharge instructions?  Yes   Nursing interventions  Reviewed instructions with patient   What is the patient's perception of their health status since discharge?  Improving   Is the patient/caregiver able to teach back signs and symptoms related to disease process for when to call PCP?  Yes   Is the patient/caregiver able to teach back signs and symptoms related to disease process for when to call 911?  Yes   Is the patient/caregiver able to teach back the hierarchy of who to call/visit for symptoms/problems? PCP, Specialist, Home health nurse, Urgent Care, ED, 911  Yes   TCM call completed?  Yes          Jak Bah RN    7/21/2020, 16:50

## 2020-07-22 ENCOUNTER — LAB (OUTPATIENT)
Dept: LAB | Facility: HOSPITAL | Age: 65
End: 2020-07-22

## 2020-07-22 ENCOUNTER — OFFICE VISIT (OUTPATIENT)
Dept: INTERNAL MEDICINE | Facility: CLINIC | Age: 65
End: 2020-07-22

## 2020-07-22 VITALS
OXYGEN SATURATION: 98 % | WEIGHT: 314 LBS | BODY MASS INDEX: 38.24 KG/M2 | RESPIRATION RATE: 16 BRPM | HEIGHT: 76 IN | DIASTOLIC BLOOD PRESSURE: 79 MMHG | SYSTOLIC BLOOD PRESSURE: 138 MMHG | HEART RATE: 65 BPM | TEMPERATURE: 97.5 F

## 2020-07-22 DIAGNOSIS — G72.0 STATIN MYOPATHY: ICD-10-CM

## 2020-07-22 DIAGNOSIS — I10 MALIGNANT HYPERTENSION: Primary | ICD-10-CM

## 2020-07-22 DIAGNOSIS — T46.6X5A STATIN MYOPATHY: ICD-10-CM

## 2020-07-22 LAB
ALBUMIN SERPL-MCNC: 3.9 G/DL (ref 3.5–5.2)
ALBUMIN/GLOB SERPL: 1.2 G/DL
ALP SERPL-CCNC: 39 U/L (ref 39–117)
ALT SERPL W P-5'-P-CCNC: 25 U/L (ref 1–41)
ANION GAP SERPL CALCULATED.3IONS-SCNC: 13 MMOL/L (ref 5–15)
AST SERPL-CCNC: 23 U/L (ref 1–40)
BASOPHILS # BLD AUTO: 0.07 10*3/MM3 (ref 0–0.2)
BASOPHILS NFR BLD AUTO: 1 % (ref 0–1.5)
BILIRUB SERPL-MCNC: 0.4 MG/DL (ref 0–1.2)
BUN SERPL-MCNC: 25 MG/DL (ref 8–23)
BUN/CREAT SERPL: 18.1 (ref 7–25)
CALCIUM SPEC-SCNC: 9.5 MG/DL (ref 8.6–10.5)
CHLORIDE SERPL-SCNC: 99 MMOL/L (ref 98–107)
CHOLEST SERPL-MCNC: 127 MG/DL (ref 0–200)
CO2 SERPL-SCNC: 26 MMOL/L (ref 22–29)
CREAT SERPL-MCNC: 1.38 MG/DL (ref 0.76–1.27)
DEPRECATED RDW RBC AUTO: 43.8 FL (ref 37–54)
EOSINOPHIL # BLD AUTO: 0.22 10*3/MM3 (ref 0–0.4)
EOSINOPHIL NFR BLD AUTO: 3.2 % (ref 0.3–6.2)
ERYTHROCYTE [DISTWIDTH] IN BLOOD BY AUTOMATED COUNT: 13.9 % (ref 12.3–15.4)
GFR SERPL CREATININE-BSD FRML MDRD: 52 ML/MIN/1.73
GLOBULIN UR ELPH-MCNC: 3.3 GM/DL
GLUCOSE SERPL-MCNC: 118 MG/DL (ref 65–99)
HCT VFR BLD AUTO: 43.4 % (ref 37.5–51)
HDLC SERPL-MCNC: 36 MG/DL (ref 40–60)
HGB BLD-MCNC: 15.2 G/DL (ref 13–17.7)
IMM GRANULOCYTES # BLD AUTO: 0.03 10*3/MM3 (ref 0–0.05)
IMM GRANULOCYTES NFR BLD AUTO: 0.4 % (ref 0–0.5)
LDLC SERPL CALC-MCNC: 45 MG/DL (ref 0–100)
LDLC/HDLC SERPL: 1.25 {RATIO}
LYMPHOCYTES # BLD AUTO: 2.19 10*3/MM3 (ref 0.7–3.1)
LYMPHOCYTES NFR BLD AUTO: 31.5 % (ref 19.6–45.3)
MCH RBC QN AUTO: 30.5 PG (ref 26.6–33)
MCHC RBC AUTO-ENTMCNC: 35 G/DL (ref 31.5–35.7)
MCV RBC AUTO: 87.1 FL (ref 79–97)
MONOCYTES # BLD AUTO: 0.55 10*3/MM3 (ref 0.1–0.9)
MONOCYTES NFR BLD AUTO: 7.9 % (ref 5–12)
NEUTROPHILS NFR BLD AUTO: 3.9 10*3/MM3 (ref 1.7–7)
NEUTROPHILS NFR BLD AUTO: 56 % (ref 42.7–76)
NRBC BLD AUTO-RTO: 0 /100 WBC (ref 0–0.2)
PLATELET # BLD AUTO: 190 10*3/MM3 (ref 140–450)
PMV BLD AUTO: 11.4 FL (ref 6–12)
POTASSIUM SERPL-SCNC: 3.6 MMOL/L (ref 3.5–5.2)
PROT SERPL-MCNC: 7.2 G/DL (ref 6–8.5)
RBC # BLD AUTO: 4.98 10*6/MM3 (ref 4.14–5.8)
SODIUM SERPL-SCNC: 138 MMOL/L (ref 136–145)
T4 FREE SERPL-MCNC: 1.52 NG/DL (ref 0.93–1.7)
TRIGL SERPL-MCNC: 230 MG/DL (ref 0–150)
TSH SERPL DL<=0.05 MIU/L-ACNC: 1.16 UIU/ML (ref 0.27–4.2)
URATE SERPL-MCNC: 10 MG/DL (ref 3.4–7)
VIT B12 BLD-MCNC: 537 PG/ML (ref 211–946)
VLDLC SERPL-MCNC: 46 MG/DL (ref 5–40)
WBC # BLD AUTO: 6.96 10*3/MM3 (ref 3.4–10.8)

## 2020-07-22 PROCEDURE — 36415 COLL VENOUS BLD VENIPUNCTURE: CPT | Performed by: INTERNAL MEDICINE

## 2020-07-22 PROCEDURE — 99214 OFFICE O/P EST MOD 30 MIN: CPT | Performed by: INTERNAL MEDICINE

## 2020-07-22 PROCEDURE — 82607 VITAMIN B-12: CPT | Performed by: INTERNAL MEDICINE

## 2020-07-22 PROCEDURE — 84550 ASSAY OF BLOOD/URIC ACID: CPT | Performed by: INTERNAL MEDICINE

## 2020-07-22 PROCEDURE — 85025 COMPLETE CBC W/AUTO DIFF WBC: CPT | Performed by: INTERNAL MEDICINE

## 2020-07-22 PROCEDURE — 84443 ASSAY THYROID STIM HORMONE: CPT | Performed by: INTERNAL MEDICINE

## 2020-07-22 PROCEDURE — 80053 COMPREHEN METABOLIC PANEL: CPT | Performed by: INTERNAL MEDICINE

## 2020-07-22 PROCEDURE — 84439 ASSAY OF FREE THYROXINE: CPT | Performed by: INTERNAL MEDICINE

## 2020-07-22 PROCEDURE — 80061 LIPID PANEL: CPT | Performed by: INTERNAL MEDICINE

## 2020-07-22 NOTE — PROGRESS NOTES
Subjective     Patient ID: Andre Agosto is a 65 y.o. male. Patient is here for management of multiple medical problems.     Chief Complaint   Patient presents with   • Transition of Care     patient here for hospital follow-up, admitted on 7/13/202 for Shortness of Breath, discharged on 7/20/2020     History of Present Illness     The following portions of the patient's history were reviewed and updated as appropriate: allergies, current medications, past family history, past medical history, past social history, past surgical history and problem list.    Review of Systems    Current Outpatient Medications:   •  amLODIPine (NORVASC) 10 MG tablet, Take 1 tablet by mouth Daily., Disp: 30 tablet, Rfl: 11  •  aspirin 81 MG tablet, Take 81 mg by mouth 2 (Two) Times a Day., Disp: 30 tablet, Rfl: 11  •  carvedilol (Coreg) 12.5 MG tablet, Take 1 tablet by mouth 2 (Two) Times a Day With Meals., Disp: 180 tablet, Rfl: 3  •  divalproex (DEPAKOTE) 250 MG DR tablet, Take 1 tablet by mouth 3 (Three) Times a Day. Take 2 in the AM 1 at noon and 2 in the pm. Per DR Clifford., Disp: 150 tablet, Rfl: 5  •  escitalopram (LEXAPRO) 20 MG tablet, Take 1 tablet by mouth Daily., Disp: 90 tablet, Rfl: 1  •  Evolocumab 140 MG/ML solution auto-injector, INJECT 1ML UNDER THE SKIN AS DIRECTED EVERY 14 DAYS (Patient taking differently: Every 21 (Twenty-One) Days.), Disp: 2 pen, Rfl: 2  •  hydrALAZINE (APRESOLINE) 100 MG tablet, Take 1 tablet by mouth 2 (Two) Times a Day., Disp: 90 tablet, Rfl: 11  •  levothyroxine (SYNTHROID, LEVOTHROID) 88 MCG tablet, Take 1 tablet by mouth Daily., Disp: 90 tablet, Rfl: 3  •  losartan (Cozaar) 100 MG tablet, Take 1 tablet by mouth Daily., Disp: 90 tablet, Rfl: 0  •  lovastatin (MEVACOR) 40 MG tablet, Take 1 tablet by mouth Daily With Dinner., Disp: 90 tablet, Rfl: 1  •  methyldopa (ALDOMET) 500 MG tablet, Take 1 tablet by mouth Every 12 (Twelve) Hours., Disp: 60 tablet, Rfl: 11  •  Semaglutide,0.25 or 0.5MG/DOS,  "(Ozempic, 0.25 or 0.5 MG/DOSE,) 2 MG/1.5ML solution pen-injector, Inject 0.5 mg under the skin into the appropriate area as directed 1 (One) Time Per Week., Disp: 4 pen, Rfl: 3  •  terazosin (HYTRIN) 10 MG capsule, Take 1 capsule by mouth Every 12 (Twelve) Hours., Disp: 60 capsule, Rfl: 11  •  triamterene-hydrochlorothiazide (Dyazide) 37.5-25 MG per capsule, Take 1 capsule by mouth Every Morning., Disp: 90 capsule, Rfl: 3    Objective      Blood pressure 138/79, pulse 65, temperature 97.5 °F (36.4 °C), temperature source Temporal, resp. rate 16, height 193 cm (76\"), weight (!) 142 kg (314 lb), SpO2 98 %.    Physical Exam     General Appearance:    Alert, cooperative, no distress, appears stated age   Head:    Normocephalic, without obvious abnormality, atraumatic   Eyes:    PERRL, conjunctiva/corneas clear, EOM's intact   Ears:    Normal TM's and external ear canals, both ears   Nose:   Nares normal, septum midline, mucosa normal, no drainage   or sinus tenderness   Throat:   Lips, mucosa, and tongue normal; teeth and gums normal   Neck:   Supple, symmetrical, trachea midline, no adenopathy;        thyroid:  No enlargement/tenderness/nodules; no carotid    bruit or JVD   Back:     Symmetric, no curvature, ROM normal, no CVA tenderness   Lungs:     Clear to auscultation bilaterally, respirations unlabored   Chest wall:    No tenderness or deformity   Heart:    Regular rate and rhythm, S1 and S2 normal, no murmur,        rub or gallop   Abdomen:     Soft, non-tender, bowel sounds active all four quadrants,     no masses, no organomegaly   Extremities:   Extremities normal, atraumatic, no cyanosis or edema   Pulses:   2+ and symmetric all extremities   Skin:   Skin color, texture, turgor normal, no rashes or lesions   Lymph nodes:   Cervical, supraclavicular, and axillary nodes normal   Neurologic:   CNII-XII intact. Normal strength, sensation and reflexes       throughout      Results for orders placed or performed " during the hospital encounter of 07/13/20   Urine Culture - Urine, Urine, Clean Catch   Result Value Ref Range    Urine Culture No growth    Comprehensive Metabolic Panel   Result Value Ref Range    Glucose 128 (H) 65 - 99 mg/dL    BUN 18 8 - 23 mg/dL    Creatinine 1.27 0.76 - 1.27 mg/dL    Sodium 136 136 - 145 mmol/L    Potassium 3.4 (L) 3.5 - 5.2 mmol/L    Chloride 96 (L) 98 - 107 mmol/L    CO2 28.0 22.0 - 29.0 mmol/L    Calcium 9.3 8.6 - 10.5 mg/dL    Total Protein 7.6 6.0 - 8.5 g/dL    Albumin 4.00 3.50 - 5.20 g/dL    ALT (SGPT) 13 1 - 41 U/L    AST (SGOT) 14 1 - 40 U/L    Alkaline Phosphatase 48 39 - 117 U/L    Total Bilirubin 0.5 0.0 - 1.2 mg/dL    eGFR Non African Amer 57 (L) >60 mL/min/1.73    Globulin 3.6 gm/dL    A/G Ratio 1.1 g/dL    BUN/Creatinine Ratio 14.2 7.0 - 25.0    Anion Gap 12.0 5.0 - 15.0 mmol/L   BNP   Result Value Ref Range    proBNP 698.5 0.0 - 900.0 pg/mL   Troponin   Result Value Ref Range    Troponin T <0.010 0.000 - 0.030 ng/mL   CBC Auto Differential   Result Value Ref Range    WBC 9.08 3.40 - 10.80 10*3/mm3    RBC 5.21 4.14 - 5.80 10*6/mm3    Hemoglobin 15.9 13.0 - 17.7 g/dL    Hematocrit 47.5 37.5 - 51.0 %    MCV 91.2 79.0 - 97.0 fL    MCH 30.5 26.6 - 33.0 pg    MCHC 33.5 31.5 - 35.7 g/dL    RDW 13.7 12.3 - 15.4 %    RDW-SD 46.1 37.0 - 54.0 fl    MPV 11.1 6.0 - 12.0 fL    Platelets 218 140 - 450 10*3/mm3    Neutrophil % 62.6 42.7 - 76.0 %    Lymphocyte % 27.2 19.6 - 45.3 %    Monocyte % 6.8 5.0 - 12.0 %    Eosinophil % 2.4 0.3 - 6.2 %    Basophil % 0.7 0.0 - 1.5 %    Immature Grans % 0.3 0.0 - 0.5 %    Neutrophils, Absolute 5.68 1.70 - 7.00 10*3/mm3    Lymphocytes, Absolute 2.47 0.70 - 3.10 10*3/mm3    Monocytes, Absolute 0.62 0.10 - 0.90 10*3/mm3    Eosinophils, Absolute 0.22 0.00 - 0.40 10*3/mm3    Basophils, Absolute 0.06 0.00 - 0.20 10*3/mm3    Immature Grans, Absolute 0.03 0.00 - 0.05 10*3/mm3    nRBC 0.0 0.0 - 0.2 /100 WBC   Urinalysis With Culture If Indicated - Urine, Clean  Catch   Result Value Ref Range    Color, UA Yellow Yellow, Straw    Appearance, UA Clear Clear    pH, UA 8.5 (H) 5.0 - 8.0    Specific Gravity, UA 1.010 1.001 - 1.030    Glucose, UA Negative Negative    Ketones, UA Negative Negative    Bilirubin, UA Negative Negative    Blood, UA Negative Negative    Protein,  mg/dL (2+) (A) Negative    Leuk Esterase, UA Trace (A) Negative    Nitrite, UA Negative Negative    Urobilinogen, UA 0.2 E.U./dL 0.2 - 1.0 E.U./dL   Troponin   Result Value Ref Range    Troponin T <0.010 0.000 - 0.030 ng/mL   Urinalysis, Microscopic Only - Urine, Clean Catch   Result Value Ref Range    RBC, UA 0-2 None Seen, 0-2 /HPF    WBC, UA 6-12 (A) None Seen, 0-2 /HPF    Bacteria, UA None Seen None Seen, Trace /HPF    Squamous Epithelial Cells, UA 0-2 None Seen, 0-2 /HPF    Hyaline Casts, UA 0-6 0 - 6 /LPF    Methodology Automated Microscopy    Basic Metabolic Panel   Result Value Ref Range    Glucose 140 (H) 65 - 99 mg/dL    BUN 28 (H) 8 - 23 mg/dL    Creatinine 2.05 (H) 0.76 - 1.27 mg/dL    Sodium 137 136 - 145 mmol/L    Potassium 3.7 3.5 - 5.2 mmol/L    Chloride 96 (L) 98 - 107 mmol/L    CO2 29.0 22.0 - 29.0 mmol/L    Calcium 9.1 8.6 - 10.5 mg/dL    eGFR Non African Amer 33 (L) >60 mL/min/1.73    BUN/Creatinine Ratio 13.7 7.0 - 25.0    Anion Gap 12.0 5.0 - 15.0 mmol/L   Basic Metabolic Panel   Result Value Ref Range    Glucose 125 (H) 65 - 99 mg/dL    BUN 34 (H) 8 - 23 mg/dL    Creatinine 1.58 (H) 0.76 - 1.27 mg/dL    Sodium 135 (L) 136 - 145 mmol/L    Potassium 3.2 (L) 3.5 - 5.2 mmol/L    Chloride 95 (L) 98 - 107 mmol/L    CO2 28.0 22.0 - 29.0 mmol/L    Calcium 8.9 8.6 - 10.5 mg/dL    eGFR Non African Amer 44 (L) >60 mL/min/1.73    BUN/Creatinine Ratio 21.5 7.0 - 25.0    Anion Gap 12.0 5.0 - 15.0 mmol/L   Basic Metabolic Panel   Result Value Ref Range    Glucose 118 (H) 65 - 99 mg/dL    BUN 37 (H) 8 - 23 mg/dL    Creatinine 1.61 (H) 0.76 - 1.27 mg/dL    Sodium 137 136 - 145 mmol/L    Potassium  3.5 3.5 - 5.2 mmol/L    Chloride 98 98 - 107 mmol/L    CO2 25.0 22.0 - 29.0 mmol/L    Calcium 8.8 8.6 - 10.5 mg/dL    eGFR Non African Amer 43 (L) >60 mL/min/1.73    BUN/Creatinine Ratio 23.0 7.0 - 25.0    Anion Gap 14.0 5.0 - 15.0 mmol/L   Basic Metabolic Panel   Result Value Ref Range    Glucose 118 (H) 65 - 99 mg/dL    BUN 26 (H) 8 - 23 mg/dL    Creatinine 1.46 (H) 0.76 - 1.27 mg/dL    Sodium 141 136 - 145 mmol/L    Potassium 3.6 3.5 - 5.2 mmol/L    Chloride 102 98 - 107 mmol/L    CO2 29.0 22.0 - 29.0 mmol/L    Calcium 9.1 8.6 - 10.5 mg/dL    eGFR Non African Amer 49 (L) >60 mL/min/1.73    BUN/Creatinine Ratio 17.8 7.0 - 25.0    Anion Gap 10.0 5.0 - 15.0 mmol/L   Basic Metabolic Panel   Result Value Ref Range    Glucose 106 (H) 65 - 99 mg/dL    BUN 22 8 - 23 mg/dL    Creatinine 1.35 (H) 0.76 - 1.27 mg/dL    Sodium 139 136 - 145 mmol/L    Potassium 3.4 (L) 3.5 - 5.2 mmol/L    Chloride 101 98 - 107 mmol/L    CO2 27.0 22.0 - 29.0 mmol/L    Calcium 8.6 8.6 - 10.5 mg/dL    eGFR Non African Amer 53 (L) >60 mL/min/1.73    BUN/Creatinine Ratio 16.3 7.0 - 25.0    Anion Gap 11.0 5.0 - 15.0 mmol/L   Potassium   Result Value Ref Range    Potassium 3.3 (L) 3.5 - 5.2 mmol/L   Renal Function Panel   Result Value Ref Range    Glucose 95 65 - 99 mg/dL    BUN 22 8 - 23 mg/dL    Creatinine 1.21 0.76 - 1.27 mg/dL    Sodium 139 136 - 145 mmol/L    Potassium 3.9 3.5 - 5.2 mmol/L    Chloride 101 98 - 107 mmol/L    CO2 26.0 22.0 - 29.0 mmol/L    Calcium 9.5 8.6 - 10.5 mg/dL    Albumin 4.30 3.50 - 5.20 g/dL    Phosphorus 4.3 2.5 - 4.5 mg/dL    Anion Gap 12.0 5.0 - 15.0 mmol/L    BUN/Creatinine Ratio 18.2 7.0 - 25.0    eGFR Non African Amer 60 (L) >60 mL/min/1.73   Magnesium   Result Value Ref Range    Magnesium 2.2 1.6 - 2.4 mg/dL   Renal Function Panel   Result Value Ref Range    Glucose 109 (H) 65 - 99 mg/dL    BUN 26 (H) 8 - 23 mg/dL    Creatinine 1.46 (H) 0.76 - 1.27 mg/dL    Sodium 137 136 - 145 mmol/L    Potassium 3.3 (L) 3.5 - 5.2  mmol/L    Chloride 100 98 - 107 mmol/L    CO2 25.0 22.0 - 29.0 mmol/L    Calcium 9.1 8.6 - 10.5 mg/dL    Albumin 3.70 3.50 - 5.20 g/dL    Phosphorus 3.6 2.5 - 4.5 mg/dL    Anion Gap 12.0 5.0 - 15.0 mmol/L    BUN/Creatinine Ratio 17.8 7.0 - 25.0    eGFR Non African Amer 49 (L) >60 mL/min/1.73   Light Blue Top   Result Value Ref Range    Extra Tube hold for add-on    Green Top (Gel)   Result Value Ref Range    Extra Tube Hold for add-ons.    Lavender Top   Result Value Ref Range    Extra Tube hold for add-on    Gold Top - SST   Result Value Ref Range    Extra Tube Hold for add-ons.          Assessment/Plan   Labs just done. Had renal insuff.     Pt will see Dr castillo on 7/29/20      bp mid day elevatd.  On bid hydralazine, methyldopa.  Consider tid if bp continues to spike.    myalgia and cri. Concern for statin myopathy. Check labs.    Andre was seen today for transition of care.    Diagnoses and all orders for this visit:    Malignant hypertension  -     CK  -     Myoglobin, Serum  -     Basic Metabolic Panel  -     Lipid Panel    Statin myopathy  -     CK  -     Myoglobin, Serum  -     Basic Metabolic Panel  -     Lipid Panel      Return in about 3 months (around 10/22/2020).          There are no Patient Instructions on file for this visit.     Keven Bear MD    Assessment/Plan

## 2020-07-28 ENCOUNTER — READMISSION MANAGEMENT (OUTPATIENT)
Dept: CALL CENTER | Facility: HOSPITAL | Age: 65
End: 2020-07-28

## 2020-07-28 NOTE — OUTREACH NOTE
"Medical Week 2 Survey      Responses   [facility] patient discharged from?  [place]   Does the patient have one of the following disease processes/diagnoses(primary or secondary)?  Other   Week 2 attempt successful?  Yes   Call start time  1542   Call end time  1544   Medication alerts for this patient  no medication changes done by pcp   Meds reviewed with patient/caregiver?  Yes   Is the patient taking all medications as directed (includes completed medication regime)?  Yes   Comments regarding appointments  seen pcp and heart doctor tomorrw   Has the patient kept scheduled appointments due by today?  Yes   Has home health visited the patient within 72 hours of discharge?  N/A   Pulse Ox monitoring  None   What is the patient's perception of their health status since discharge?  Improving   Week 2 Call Completed?  Yes   Wrap up additional comments  call was brief patient stated \" i am good\" having no problems          Ellie Darden RN  "

## 2020-07-29 ENCOUNTER — OFFICE VISIT (OUTPATIENT)
Dept: CARDIOLOGY | Facility: CLINIC | Age: 65
End: 2020-07-29

## 2020-07-29 VITALS
OXYGEN SATURATION: 96 % | SYSTOLIC BLOOD PRESSURE: 148 MMHG | TEMPERATURE: 97.7 F | WEIGHT: 315 LBS | DIASTOLIC BLOOD PRESSURE: 90 MMHG | HEART RATE: 68 BPM | HEIGHT: 76 IN | BODY MASS INDEX: 38.36 KG/M2

## 2020-07-29 DIAGNOSIS — I10 ESSENTIAL HYPERTENSION: ICD-10-CM

## 2020-07-29 DIAGNOSIS — E78.2 MIXED HYPERLIPIDEMIA: ICD-10-CM

## 2020-07-29 DIAGNOSIS — I25.10 CORONARY ARTERY DISEASE INVOLVING NATIVE CORONARY ARTERY OF NATIVE HEART WITHOUT ANGINA PECTORIS: Primary | ICD-10-CM

## 2020-07-29 PROCEDURE — 99213 OFFICE O/P EST LOW 20 MIN: CPT | Performed by: INTERNAL MEDICINE

## 2020-07-29 NOTE — PROGRESS NOTES
[facility] Cardiology    Patient ID: Andre Agosto is a 65 y.o. male.  : 1955   Contact: [phone]    Encounter date: 2020    PCP: Keven Bear MD      Chief complaint:   Chief Complaint   Patient presents with   • Coronary Artery Disease     f/u       Problem List:  1. Coronary artery disease:  a. Stress echo, [facility], 2014: Borderline abnormal, but low probability for significant focal obstructive CAD.  b. NSTEMI, 2015 ([facility]): PCI of OM branch and circumflex (3.5 x 20 Synchrony). EF 50%.    c. LHC (emergent re-look), 2015: Patent stent to circumflex; LAD with 50-60% prox-mid stenosis.  d. Echo, 2015: EF 55-60%, no significant valve disease.  e. Cardiolite, 2016: No evidence of ischemia.  f. Nuclear stress, 2018: EF 40%. Exercise duration of 8:30, as expected. Stopped due to fatigue and SOA. THR of 134 achieved at 7:35. Hypertensive response to exercise. Occasional PVC's and aberrancy. No stress induced perfusion defect. Mild fixed mid and distal anteroapical hypoperfusion worse in the rest images.   g. LHC, 10/12/2018: Two-vessel CAD with multiple borderline lesions within the LAD and an occluded mid RCA.  h. Echo, 10/12/2018: EF 55%. Moderate dilation of the aortic root. Moderate dilation of the sinuses of Valsalva. Mild AS and AI. Aortic valve mean pressure gradient 10 mmHg.  2. Hypertension:  a. Renal artery duplex 2014: No evidence of renal artery stenosis.  b. Renal artery duplex 2018: No evidence of renal artery stenosis.  3. Hyperlipidemia.  4. CVA 2016:  a. Carotid duplex 12/28/15: <50% stenosis bilaterally.  b. Spot on brainstem since having meningitis/ encephalitis .  5. ALEX, 2010:  a. Sleep study, 2018: Normal (?); wears bi-pap.  6. Seizures; partial focal seizure:  a. : meningitis and encephalitis.    Allergies   Allergen Reactions   • Statins Myalgia       Current Medications:    Current  Outpatient Medications:   •  amLODIPine (NORVASC) 10 MG tablet, Take 1 tablet by mouth Daily., Disp: 30 tablet, Rfl: 11  •  aspirin 81 MG tablet, Take 81 mg by mouth 2 (Two) Times a Day., Disp: 30 tablet, Rfl: 11  •  carvedilol (Coreg) 12.5 MG tablet, Take 1 tablet by mouth 2 (Two) Times a Day With Meals., Disp: 180 tablet, Rfl: 3  •  divalproex (DEPAKOTE) 250 MG DR tablet, Take 1 tablet by mouth 3 (Three) Times a Day. Take 2 in the AM 1 at noon and 2 in the pm. Per DR Clifford., Disp: 150 tablet, Rfl: 5  •  escitalopram (LEXAPRO) 20 MG tablet, Take 1 tablet by mouth Daily., Disp: 90 tablet, Rfl: 1  •  Evolocumab 140 MG/ML solution auto-injector, INJECT 1ML UNDER THE SKIN AS DIRECTED EVERY 14 DAYS (Patient taking differently: Every 21 (Twenty-One) Days.), Disp: 2 pen, Rfl: 2  •  hydrALAZINE (APRESOLINE) 100 MG tablet, Take 1 tablet by mouth 2 (Two) Times a Day., Disp: 90 tablet, Rfl: 11  •  levothyroxine (SYNTHROID, LEVOTHROID) 88 MCG tablet, Take 1 tablet by mouth Daily., Disp: 90 tablet, Rfl: 3  •  losartan (Cozaar) 100 MG tablet, Take 1 tablet by mouth Daily., Disp: 90 tablet, Rfl: 0  •  lovastatin (MEVACOR) 40 MG tablet, Take 1 tablet by mouth Daily With Dinner., Disp: 90 tablet, Rfl: 1  •  methyldopa (ALDOMET) 500 MG tablet, Take 1 tablet by mouth Every 12 (Twelve) Hours., Disp: 60 tablet, Rfl: 11  •  Semaglutide,0.25 or 0.5MG/DOS, (Ozempic, 0.25 or 0.5 MG/DOSE,) 2 MG/1.5ML solution pen-injector, Inject 0.5 mg under the skin into the appropriate area as directed 1 (One) Time Per Week., Disp: 4 pen, Rfl: 3  •  terazosin (HYTRIN) 10 MG capsule, Take 1 capsule by mouth Every 12 (Twelve) Hours., Disp: 60 capsule, Rfl: 11  •  triamterene-hydrochlorothiazide (Dyazide) 37.5-25 MG per capsule, Take 1 capsule by mouth Every Morning., Disp: 90 capsule, Rfl: 3    HPI    Andre Agosto is a 65 y.o. male who presents today for a follow up of coronary artery disease and cardiac risk factors. Since last visit, he has been doing  "well overall from a cardiovascular standpoint. The patient states that his systolic blood pressure ranges from 112 to 170 and averages 140. He is able to determine when his blood pressure lowers. He notes that he occasionally experiences dizziness, which he is experiencing today in the office. Patient otherwise denies chest pain, shortness of breath, edema, palpitations, or syncope at this time.    The following portions of the patient's history were reviewed and updated as appropriate: allergies, current medications and problem list.    Pertinent positives as listed in the HPI.  All other systems reviewed are negative.         Vitals:    07/29/20 1558   BP: 148/90   BP Location: Right arm   Patient Position: Sitting   Pulse: 68   Temp: 97.7 °F (36.5 °C)   SpO2: 96%   Weight: (!) 144 kg (317 lb)   Height: 193 cm (76\")       Physical Exam:  General: Alert and oriented.  Neck: Jugular venous pressure is within normal limits. Carotids have normal upstrokes without bruits.   Cardiovascular: Heart has a nondisplaced focal PMI. Regular rate and rhythm. No gallop or rub. 1/6 JORGE RUSB.  Lungs: Clear, no rales or wheezes. Equal expansion is noted.   Extremities: Show no edema.  Skin: Warm and dry.  Neurologic: Nonfocal.     Diagnostic Data (reviewed with patient):  Lab Results   Component Value Date    GLUCOSE 118 (H) 07/22/2020    BUN 25 (H) 07/22/2020    CREATININE 1.38 (H) 07/22/2020    EGFRIFNONA 52 (L) 07/22/2020    BCR 18.1 07/22/2020     07/22/2020    K 3.6 07/22/2020    CL 99 07/22/2020    CO2 26.0 07/22/2020    CALCIUM 9.5 07/22/2020    ALBUMIN 3.90 07/22/2020    ALKPHOS 39 07/22/2020    AST 23 07/22/2020    ALT 25 07/22/2020     Lab Results   Component Value Date    CHOL 127 07/22/2020    TRIG 230 (H) 07/22/2020    HDL 36 (L) 07/22/2020    LDL 45 07/22/2020      Lab Results   Component Value Date    WBC 6.96 07/22/2020    RBC 4.98 07/22/2020    HGB 15.2 07/22/2020    HCT 43.4 07/22/2020    MCV 87.1 07/22/2020 "     07/22/2020      Lab Results   Component Value Date    TSH 1.160 07/22/2020      Lab Results   Component Value Date    PROBNP 698.5 07/13/2020    TROPONINT <0.010 07/13/2020     Lab Results   Component Value Date    MG 2.2 07/19/2020         Procedures      Assessment:    ICD-10-CM ICD-9-CM   1. Coronary artery disease involving native coronary artery of native heart without angina pectoris I25.10 414.01   2. Mixed hyperlipidemia E78.2 272.2   3. Essential hypertension I10 401.9         Plan:  1. Patient informed that he will gradually get used to his lower blood pressures.  That it is not unusual to feel somewhat dizzy on standing, a little confused or with difficulty concentrating at times and mild visual changes.  This should all resolve within 3 months.  Goal BP less than 140/90.  2. Continue aspirin for CAD.  3. Continue amlodipine, losartan, terazosin, carvedilol, and hydralazine for hypertension.  4. Continue Dyazide for fluid retention.  5. Continue lovastatin and Repatha for hyperlipidemia.  6. Continue all other current medications.  7. F/up in 4 months, sooner if needed.      Scribed for Yoselin Johnson MD by Marisol Goff. 7/29/2020  16:21     I Yoselin Johnson MD personally performed the services described in this documentation as scribed by the above individual in my presence, and it is both accurate and complete.    Yoselin Johnson MD, [facility]

## 2020-08-01 ENCOUNTER — APPOINTMENT (OUTPATIENT)
Dept: GENERAL RADIOLOGY | Facility: HOSPITAL | Age: 65
End: 2020-08-01

## 2020-08-01 ENCOUNTER — HOSPITAL ENCOUNTER (EMERGENCY)
Facility: HOSPITAL | Age: 65
Discharge: HOME | End: 2020-08-02
Attending: EMERGENCY MEDICINE | Admitting: EMERGENCY MEDICINE

## 2020-08-01 DIAGNOSIS — R06.00 DYSPNEA AND RESPIRATORY ABNORMALITIES: Primary | ICD-10-CM

## 2020-08-01 DIAGNOSIS — I10 HYPERTENSION, UNSPECIFIED TYPE: ICD-10-CM

## 2020-08-01 DIAGNOSIS — N28.9 RENAL INSUFFICIENCY: ICD-10-CM

## 2020-08-01 DIAGNOSIS — R06.89 DYSPNEA AND RESPIRATORY ABNORMALITIES: Primary | ICD-10-CM

## 2020-08-01 DIAGNOSIS — R91.1 PULMONARY NODULE: ICD-10-CM

## 2020-08-01 PROCEDURE — 93005 ELECTROCARDIOGRAM TRACING: CPT | Performed by: EMERGENCY MEDICINE

## 2020-08-01 PROCEDURE — 71045 X-RAY EXAM CHEST 1 VIEW: CPT

## 2020-08-01 PROCEDURE — 93005 ELECTROCARDIOGRAM TRACING: CPT

## 2020-08-01 PROCEDURE — 99285 EMERGENCY DEPT VISIT HI MDM: CPT

## 2020-08-01 RX ORDER — SODIUM CHLORIDE 0.9 % (FLUSH) 0.9 %
10 SYRINGE (ML) INJECTION AS NEEDED
Status: DISCONTINUED | OUTPATIENT
Start: 2020-08-01 | End: 2020-08-02 | Stop reason: HOSPADM

## 2020-08-02 ENCOUNTER — APPOINTMENT (OUTPATIENT)
Dept: CT IMAGING | Facility: HOSPITAL | Age: 65
End: 2020-08-02

## 2020-08-02 VITALS
BODY MASS INDEX: 37.63 KG/M2 | OXYGEN SATURATION: 93 % | HEART RATE: 65 BPM | TEMPERATURE: 99.1 F | WEIGHT: 309 LBS | HEIGHT: 76 IN | RESPIRATION RATE: 20 BRPM | DIASTOLIC BLOOD PRESSURE: 76 MMHG | SYSTOLIC BLOOD PRESSURE: 147 MMHG

## 2020-08-02 LAB
ALBUMIN SERPL-MCNC: 4.1 G/DL (ref 3.5–5.2)
ALBUMIN/GLOB SERPL: 1.1 G/DL
ALP SERPL-CCNC: 50 U/L (ref 39–117)
ALT SERPL W P-5'-P-CCNC: 27 U/L (ref 1–41)
ANION GAP SERPL CALCULATED.3IONS-SCNC: 14 MMOL/L (ref 5–15)
AST SERPL-CCNC: 24 U/L (ref 1–40)
BASOPHILS # BLD AUTO: 0.05 10*3/MM3 (ref 0–0.2)
BASOPHILS NFR BLD AUTO: 0.6 % (ref 0–1.5)
BILIRUB SERPL-MCNC: 0.4 MG/DL (ref 0–1.2)
BUN SERPL-MCNC: 21 MG/DL (ref 8–23)
BUN/CREAT SERPL: 11.7 (ref 7–25)
CALCIUM SPEC-SCNC: 9.2 MG/DL (ref 8.6–10.5)
CHLORIDE SERPL-SCNC: 97 MMOL/L (ref 98–107)
CO2 SERPL-SCNC: 28 MMOL/L (ref 22–29)
CREAT SERPL-MCNC: 1.8 MG/DL (ref 0.76–1.27)
DEPRECATED RDW RBC AUTO: 46.9 FL (ref 37–54)
EOSINOPHIL # BLD AUTO: 0.3 10*3/MM3 (ref 0–0.4)
EOSINOPHIL NFR BLD AUTO: 3.5 % (ref 0.3–6.2)
ERYTHROCYTE [DISTWIDTH] IN BLOOD BY AUTOMATED COUNT: 14 % (ref 12.3–15.4)
GFR SERPL CREATININE-BSD FRML MDRD: 38 ML/MIN/1.73
GLOBULIN UR ELPH-MCNC: 3.6 GM/DL
GLUCOSE SERPL-MCNC: 189 MG/DL (ref 65–99)
HCT VFR BLD AUTO: 45.1 % (ref 37.5–51)
HGB BLD-MCNC: 14.7 G/DL (ref 13–17.7)
HOLD SPECIMEN: NORMAL
HOLD SPECIMEN: NORMAL
IMM GRANULOCYTES # BLD AUTO: 0.03 10*3/MM3 (ref 0–0.05)
IMM GRANULOCYTES NFR BLD AUTO: 0.3 % (ref 0–0.5)
LYMPHOCYTES # BLD AUTO: 1.45 10*3/MM3 (ref 0.7–3.1)
LYMPHOCYTES NFR BLD AUTO: 16.9 % (ref 19.6–45.3)
MCH RBC QN AUTO: 29.6 PG (ref 26.6–33)
MCHC RBC AUTO-ENTMCNC: 32.6 G/DL (ref 31.5–35.7)
MCV RBC AUTO: 90.9 FL (ref 79–97)
MONOCYTES # BLD AUTO: 0.69 10*3/MM3 (ref 0.1–0.9)
MONOCYTES NFR BLD AUTO: 8 % (ref 5–12)
NEUTROPHILS NFR BLD AUTO: 6.08 10*3/MM3 (ref 1.7–7)
NEUTROPHILS NFR BLD AUTO: 70.7 % (ref 42.7–76)
NRBC BLD AUTO-RTO: 0 /100 WBC (ref 0–0.2)
NT-PROBNP SERPL-MCNC: 454.5 PG/ML (ref 0–900)
PLATELET # BLD AUTO: 196 10*3/MM3 (ref 140–450)
PMV BLD AUTO: 10.9 FL (ref 6–12)
POTASSIUM SERPL-SCNC: 4 MMOL/L (ref 3.5–5.2)
PROT SERPL-MCNC: 7.7 G/DL (ref 6–8.5)
RBC # BLD AUTO: 4.96 10*6/MM3 (ref 4.14–5.8)
SODIUM SERPL-SCNC: 139 MMOL/L (ref 136–145)
TROPONIN T SERPL-MCNC: <0.01 NG/ML (ref 0–0.03)
TROPONIN T SERPL-MCNC: <0.01 NG/ML (ref 0–0.03)
WBC # BLD AUTO: 8.6 10*3/MM3 (ref 3.4–10.8)
WHOLE BLOOD HOLD SPECIMEN: NORMAL
WHOLE BLOOD HOLD SPECIMEN: NORMAL

## 2020-08-02 PROCEDURE — 71250 CT THORAX DX C-: CPT

## 2020-08-02 PROCEDURE — 84484 ASSAY OF TROPONIN QUANT: CPT | Performed by: EMERGENCY MEDICINE

## 2020-08-02 PROCEDURE — 85025 COMPLETE CBC W/AUTO DIFF WBC: CPT | Performed by: EMERGENCY MEDICINE

## 2020-08-02 PROCEDURE — 83880 ASSAY OF NATRIURETIC PEPTIDE: CPT | Performed by: EMERGENCY MEDICINE

## 2020-08-02 PROCEDURE — 80053 COMPREHEN METABOLIC PANEL: CPT | Performed by: EMERGENCY MEDICINE

## 2020-08-02 NOTE — DISCHARGE INSTRUCTIONS
Continue taking your blood pressure medications previously prescribed and adjusted with your cardiologist.  Call them in the morning, Monday, for further work-up and evaluation.  If you have any worsening symptoms, any chest pain, any other concerns in the meantime, return back to the emergency department for reevaluation.

## 2020-08-02 NOTE — ED PROVIDER NOTES
EMERGENCY DEPARTMENT ENCOUNTER      Pt Name: Andre Agosto  MRN: [id number]  YOB: 1955  Date of evaluation: 8/1/2020  Provider: Tonio Bennett DO    CHIEF COMPLAINT       Chief Complaint   Patient presents with   • Shortness of Breath         HISTORY OF PRESENT ILLNESS  (Location/Symptom, Timing/Onset, Context/Setting, Quality, Duration, Modifying Factors, Severity.)   Andre Agosto is a 65 y.o. male who presents to the emergency department via EMS from [facility] for evaluation of shortness of breath, hypertension onset this evening.  The patient notes that he got home, got out of his car, felt some chills and shivering, went to sit down in his recliner he started feeling some upper chest tightness, shortness of breath, states his blood pressure was significantly elevated, patient notes at that point he called 911.  On arrival patient notes he still felt short of breath, his blood pressure was starting to improve.  On arrival during my examination he notes his symptoms continue to improve lasting approximately 10 to 20 minutes, currently denies any active chest pain or pressure, no shortness of breath, states he feels a blood pressure has improved.  He follows with cardiologist, Dr. Johnson, is compliant with his medications, states he has had issues with very labile blood pressures in the past with similar presenting symptoms.  He denies any recent illness, the last few days he denies any recent travel, no known sick exposures, no cough or congestion, no chest pains or difficulty breathing.  Patient currently has no other acute systemic complaints at this time.  No peripheral edema.      Nursing notes were reviewed.    REVIEW OF SYSTEMS    (2-9 systems for level 4, 10 or more for level 5)   ROS:  General:  No fevers, no chills, no weakness  Cardiovascular:  + chest pain, no palpitations  Respiratory:  + shortness of breath, no cough, no wheezing  Gastrointestinal:  No pain, no  nausea, no vomiting, no diarrhea  Musculoskeletal:  No muscle pain, no joint pain  Skin:  No rash, no easy bruising  Neurologic:  No speech problems, no headache, no extremity numbness, no extremity tingling, no extremity weakness  Psychiatric:  No anxiety  Genitourinary:  No dysuria, no hematuria    Except as noted above the remainder of the review of systems was reviewed and negative.       PAST MEDICAL HISTORY     Past Medical History:   Diagnosis Date   • 6th nerve palsy, right     right eye    • Anesthesia complication     ilius- after lap band surgery    • Anxiety and depression    • Coronary artery disease involving native coronary artery of native heart without angina pectoris 9/12/2018   • Diabetes mellitus (CMS/[facility])     doesnt check sugar, type 2    • Double vision    • Dyspepsia    • Epilepsy (CMS/[facility])    • Focal seizure (CMS/[facility])     of right arm    • Heart attack (CMS/[facility]) 06/15/2016   • History of Helicobacter pylori infection     in 2008, treated w/ PrevPak   • HL (hearing loss)     Left ear child luevano measles   • Hyperlipidemia    • Hypertension    • Kidney stone     x2 imbedded   • Memory loss 2005    Meneigitis   • Meningitis     unsure of bacterial or viral    • Obesity    • Seizures (CMS/[facility])    • Sleep apnea     BiPAP compliant   • Sleep apnea treated with nocturnal BiPAP     compliant with  machine    • Stroke (CMS/[facility])     2017/2018   • Vision loss 7/2018    Double vision   • Wears glasses          SURGICAL HISTORY       Past Surgical History:   Procedure Laterality Date   • CARDIAC CATHETERIZATION N/A 10/12/2018    Procedure: Left Heart Cath;  Surgeon: Yoselin Johnson MD;  Location: [facility] INVASIVE LOCATION;  Service: Cardiology   • COLONOSCOPY     • CORONARY STENT PLACEMENT      x1   • ENDOSCOPY     • LAPAROSCOPIC GASTRIC BANDING  2008    s/p LAGB Realize 6/2008 by MASONO.   • UMBILICAL HERNIA REPAIR  2008    incarcerated UHR w/ mesh by Dr. Velasquez   • WISDOM TOOTH EXTRACTION       all 4         CURRENT MEDICATIONS       Current Facility-Administered Medications:   •  sodium chloride 0.9 % flush 10 mL, 10 mL, Intravenous, PRN, Tonio Bennett,     Current Outpatient Medications:   •  amLODIPine (NORVASC) 10 MG tablet, Take 1 tablet by mouth Daily., Disp: 30 tablet, Rfl: 11  •  aspirin 81 MG tablet, Take 81 mg by mouth 2 (Two) Times a Day., Disp: 30 tablet, Rfl: 11  •  carvedilol (Coreg) 12.5 MG tablet, Take 1 tablet by mouth 2 (Two) Times a Day With Meals., Disp: 180 tablet, Rfl: 3  •  divalproex (DEPAKOTE) 250 MG DR tablet, Take 1 tablet by mouth 3 (Three) Times a Day. Take 2 in the AM 1 at noon and 2 in the pm. Per DR Clifford., Disp: 150 tablet, Rfl: 5  •  escitalopram (LEXAPRO) 20 MG tablet, Take 1 tablet by mouth Daily., Disp: 90 tablet, Rfl: 1  •  Evolocumab 140 MG/ML solution auto-injector, INJECT 1ML UNDER THE SKIN AS DIRECTED EVERY 14 DAYS (Patient taking differently: Every 21 (Twenty-One) Days.), Disp: 2 pen, Rfl: 2  •  hydrALAZINE (APRESOLINE) 100 MG tablet, Take 1 tablet by mouth 2 (Two) Times a Day., Disp: 90 tablet, Rfl: 11  •  levothyroxine (SYNTHROID, LEVOTHROID) 88 MCG tablet, Take 1 tablet by mouth Daily., Disp: 90 tablet, Rfl: 3  •  losartan (Cozaar) 100 MG tablet, Take 1 tablet by mouth Daily., Disp: 90 tablet, Rfl: 0  •  lovastatin (MEVACOR) 40 MG tablet, Take 1 tablet by mouth Daily With Dinner., Disp: 90 tablet, Rfl: 1  •  methyldopa (ALDOMET) 500 MG tablet, Take 1 tablet by mouth Every 12 (Twelve) Hours., Disp: 60 tablet, Rfl: 11  •  Semaglutide,0.25 or 0.5MG/DOS, (Ozempic, 0.25 or 0.5 MG/DOSE,) 2 MG/1.5ML solution pen-injector, Inject 0.5 mg under the skin into the appropriate area as directed 1 (One) Time Per Week., Disp: 4 pen, Rfl: 3  •  terazosin (HYTRIN) 10 MG capsule, Take 1 capsule by mouth Every 12 (Twelve) Hours., Disp: 60 capsule, Rfl: 11  •  triamterene-hydrochlorothiazide (Dyazide) 37.5-25 MG per capsule, Take 1 capsule by mouth Every Morning., Disp: 90  capsule, Rfl: 3    ALLERGIES     Statins    FAMILY HISTORY       Family History   Problem Relation Age of Onset   • Stroke Mother    • Hypertension Mother    • COPD Father    • Hypertension Father           SOCIAL HISTORY       Social History     Socioeconomic History   • Marital status:      Spouse name: Not on file   • Number of children: Not on file   • Years of education: Not on file   • Highest education level: Not on file   Tobacco Use   • Smoking status: Former Smoker     Packs/day: 0.50     Years: 10.00     Pack years: 5.00     Types: Cigarettes     Start date: 1975     Last attempt to quit: 1985     Years since quittin.6   • Smokeless tobacco: Never Used   Substance and Sexual Activity   • Alcohol use: No   • Drug use: No   • Sexual activity: Defer         PHYSICAL EXAM    (up to 7 for level 4, 8 or more for level 5)     Vitals:    20 0615 20 0630 20 0645 20 0700   BP: 138/72 138/75 138/74 142/75   Pulse: 64 64 64 69   Resp:       Temp:       TempSrc:       SpO2: 93% 93% 93% 92%   Weight:       Height:           Physical Exam  General :Patient is awake, alert, oriented, in no acute distress, nontoxic appearing  HEENT: Pupils are equally round and reactive to light, EOMI, conjunctivae clear, sclerae white, there is no injection no icterus.  Oral mucosa is moist, no exudate. Uvula is midline  Neck: Neck is supple, full range of motion, trachea midline  Cardiac: Heart regular rate, rhythm, no murmurs, rubs, or gallops  Lungs: Lungs are clear to auscultation, there is no wheezing, rhonchi, or rales. There is no use of accessory muscles.  Chest wall: There is no tenderness to palpation over the chest wall or over ribs  Abdomen: Abdomen is soft, nontender, nondistended. There is no firm or pulsatile masses, no rebound rigidity or guarding.   Musculoskeletal: 5 out of 5 strength in all 4 extremities.  No focal muscle deficits are appreciated, no peripheral  edema  Neuro: Motor intact, sensory intact, level of consciousness is normal, GCS 15  Dermatology: Skin is warm and dry  Psych: Mentation is grossly normal, cognition is grossly normal. Affect is appropriate.      DIAGNOSTIC RESULTS     EKG: All EKG's are interpreted by the Emergency Department Physician who either signs or Co-signs this chart in the absence of a cardiologist.    ECG 12 Lead   Final Result   Test Reason : SOA Protocol   Blood Pressure : **/** mmHG   Vent. Rate : 085 BPM     Atrial Rate : 085 BPM      P-R Int : 198 ms          QRS Dur : 106 ms       QT Int : 370 ms       P-R-T Axes : 004 065 043 degrees      QTc Int : 440 ms      Normal sinus rhythm   Possible Left atrial enlargement   Anteroseptal infarct (cited on or before 13-JUL-2020)   Abnormal ECG   When compared with ECG of 17-JUL-2020 18:21,   premature ventricular complexes are no longer present   Serial changes of Anteroseptal infarct present   Confirmed by LEEANNA RICHARD MD ([id number]) on 8/1/2020 11:32:12 PM      Referred By:  sushila           Confirmed By:LEEANNA RICHARD MD          RADIOLOGY:   Non-plain film images such as CT, Ultrasound and MRI are read by the radiologist. Plain radiographic images are visualized and preliminarily interpreted by the emergency physician with the below findings:      [] Radiologist's Report Reviewed:  CT Chest Without Contrast   Final Result      1. Trace to small right-sided pleural effusion. Probable pleural thickening/atelectasis in the left lower lobe. No new evidence of pneumonia.   2. Noncalcified 8 mm nodule in the right middle lobe.   3. Uncomplicated cholelithiasis.      Management recommendation: Current published guidelines recommend initial follow-up CT in 6-12 months, and again in 18-24 months for uncomplicated pulmonary nodules measuring 6 to 8 mm in high-risk patients (Fleischner Society guidelines, 2017).      Signer Name: Clemente Todd MD    Signed: 8/2/2020 12:44 AM    Workstation Name:  [facility]     [facility]      XR Chest 1 View   Final Result      1. Cardiomegaly and chronic appearing lung changes. No definite superimposed active disease.      Signer Name: Clemente Todd MD    Signed: 8/2/2020 12:17 AM    Workstation Name: [email or web address]     [facility]            ED BEDSIDE ULTRASOUND:   Performed by ED Physician - none    LABS:    I have reviewed and interpreted all of the currently available lab results from this visit (if applicable):  Results for orders placed or performed during the hospital encounter of 08/01/20   Comprehensive Metabolic Panel   Result Value Ref Range    Glucose 189 (H) 65 - 99 mg/dL    BUN 21 8 - 23 mg/dL    Creatinine 1.80 (H) 0.76 - 1.27 mg/dL    Sodium 139 136 - 145 mmol/L    Potassium 4.0 3.5 - 5.2 mmol/L    Chloride 97 (L) 98 - 107 mmol/L    CO2 28.0 22.0 - 29.0 mmol/L    Calcium 9.2 8.6 - 10.5 mg/dL    Total Protein 7.7 6.0 - 8.5 g/dL    Albumin 4.10 3.50 - 5.20 g/dL    ALT (SGPT) 27 1 - 41 U/L    AST (SGOT) 24 1 - 40 U/L    Alkaline Phosphatase 50 39 - 117 U/L    Total Bilirubin 0.4 0.0 - 1.2 mg/dL    eGFR Non African Amer 38 (L) >60 mL/min/1.73    Globulin 3.6 gm/dL    A/G Ratio 1.1 g/dL    BUN/Creatinine Ratio 11.7 7.0 - 25.0    Anion Gap 14.0 5.0 - 15.0 mmol/L   BNP   Result Value Ref Range    proBNP 454.5 0.0 - 900.0 pg/mL   Troponin   Result Value Ref Range    Troponin T <0.010 0.000 - 0.030 ng/mL   CBC Auto Differential   Result Value Ref Range    WBC 8.60 3.40 - 10.80 10*3/mm3    RBC 4.96 4.14 - 5.80 10*6/mm3    Hemoglobin 14.7 13.0 - 17.7 g/dL    Hematocrit 45.1 37.5 - 51.0 %    MCV 90.9 79.0 - 97.0 fL    MCH 29.6 26.6 - 33.0 pg    MCHC 32.6 31.5 - 35.7 g/dL    RDW 14.0 12.3 - 15.4 %    RDW-SD 46.9 37.0 - 54.0 fl    MPV 10.9 6.0 - 12.0 fL    Platelets 196 140 - 450 10*3/mm3    Neutrophil % 70.7 42.7 - 76.0 %    Lymphocyte % 16.9 (L) 19.6 - 45.3 %    Monocyte % 8.0 5.0 - 12.0 %    Eosinophil % 3.5 0.3 - 6.2  %    Basophil % 0.6 0.0 - 1.5 %    Immature Grans % 0.3 0.0 - 0.5 %    Neutrophils, Absolute 6.08 1.70 - 7.00 10*3/mm3    Lymphocytes, Absolute 1.45 0.70 - 3.10 10*3/mm3    Monocytes, Absolute 0.69 0.10 - 0.90 10*3/mm3    Eosinophils, Absolute 0.30 0.00 - 0.40 10*3/mm3    Basophils, Absolute 0.05 0.00 - 0.20 10*3/mm3    Immature Grans, Absolute 0.03 0.00 - 0.05 10*3/mm3    nRBC 0.0 0.0 - 0.2 /100 WBC   Troponin   Result Value Ref Range    Troponin T <0.010 0.000 - 0.030 ng/mL   Light Blue Top   Result Value Ref Range    Extra Tube hold for add-on    Green Top (Gel)   Result Value Ref Range    Extra Tube Hold for add-ons.    Lavender Top   Result Value Ref Range    Extra Tube hold for add-on    Gold Top - SST   Result Value Ref Range    Extra Tube Hold for add-ons.         All other labs were within normal range or not returned as of this dictation.      EMERGENCY DEPARTMENT COURSE and DIFFERENTIAL DIAGNOSIS/MDM:   Vitals:    Vitals:    08/02/20 0615 08/02/20 0630 08/02/20 0645 08/02/20 0700   BP: 138/72 138/75 138/74 142/75   Pulse: 64 64 64 69   Resp:       Temp:       TempSrc:       SpO2: 93% 93% 93% 92%   Weight:       Height:                Patient had brief episode of shortness of breath, hypertension which resolved without any significant intervention in route with EMS.  The patient does not appear acutely ill or toxic, his vital signs are stable, is not requiring supplemental oxygenation.  We discussed obtaining IV, labs, imaging and EKG for further work-up.  Results reviewed as above.  Compared to his baseline at 1.80, no other significant abnormalities in the patient's blood work.  Repeat troponin obtained which is also negative.  Patient's vital signs remained stable, no acute arrhythmia on his cardiac monitor.  Reevaluation patient is feeling much better, we discussed the close need for follow-up with his specialist and cardiologist Dr. Johnson, which he has been following closely with his blood  pressure medication adjustments recently.  We discussed observation cardiac monitor in the emergency department or admission/observation for similar.  Patient would prefer to stay in the emergency department for a few hours for further monitoring.  On reevaluation patient is resting comfortably, vital signs are stable, no arrhythmia.  At this point the patient is requesting discharge home, will continue with his blood pressure medication, plan for him to call his cardiologist in the morning establish a follow-up appointment, possible Holter monitor placement.  If he has any chest pain, any worsening symptoms or further concerns in the meantime he will return back to the emergency department for reevaluation.  Patient was updated on the unchanged 8 mm pulmonary nodule which they were unaware from prior evaluations.  We will continue to follow with repeat CT scans every 6 to 12 months.  I had a discussion with the patient/family regarding diagnosis, diagnostic results, treatment plan, and medications.  The patient/family indicated understanding of these instructions.  I spent adequate time at the bedside proceeding discharge necessary to personally discuss the aftercare instructions, giving patient education, providing explanations of the results of our evaluations/findings, and my decision making to assure that the patient/family understand the plan of care.  Time was allotted to answer questions at that time and throughout the ED course.  Emphasis was placed on timely follow-up after discharge.  I also discussed the potential for the development of an acute emergent condition requiring further evaluation, admission, or even surgical intervention. I discussed that we found nothing during the visit today indicating the need for further workup, admission, or the presence of an unstable medical condition.  I encouraged the patient to return to the emergency department immediately for ANY concerns, worsening, new  complaints, or if symptoms persist and unable to seek follow-up in a timely fashion.  The patient/family expressed understanding and agreement with this plan.  The patient will follow-up with their PCP in 1-2 days for reevaluation.       MEDICATIONS ADMINISTERED IN ED:  Medications   sodium chloride 0.9 % flush 10 mL (has no administration in time range)       PROCEDURES:  Procedures    CRITICAL CARE TIME    Total Critical Care time was 0 minutes, excluding separately reportable procedures.   There was a high probability of clinically significant/life threatening deterioration in the patient's condition which required my urgent intervention.      FINAL IMPRESSION      1. Dyspnea and respiratory abnormalities    2. Pulmonary nodule    3. Hypertension, unspecified type    4. Renal insufficiency          DISPOSITION/PLAN     ED Disposition     ED Disposition Condition Comment    Discharge Stable           PATIENT REFERRED TO:  Yoselin Johnson MD  [address] [facility] [address]  [place] [address]  [phone]    Schedule an appointment as soon as possible for a visit   Cardiologist    Keven Bear MD  [address] [facility] [address]  [place] [id number] [phone]    In 2 days      [facility] Emergency Department  [address] [phone]    If symptoms worsen      DISCHARGE MEDICATIONS:     Medication List      CHANGE how you take these medications    Evolocumab 140 MG/ML solution auto-injector  INJECT 1ML UNDER THE SKIN AS DIRECTED EVERY 14 DAYS  What changed:  See the new instructions.        CONTINUE taking these medications    amLODIPine 10 MG tablet  Commonly known as:  NORVASC  Take 1 tablet by mouth Daily.     aspirin 81 MG tablet     carvedilol 12.5 MG tablet  Commonly known as:  Coreg  Take 1 tablet by mouth 2 (Two) Times a Day With Meals.     divalproex 250 MG DR tablet  Commonly known as:  DEPAKOTE  Take 1 tablet by mouth 3 (Three)  Times a Day. Take 2 in the AM 1 at noon   and 2 in the pm. Per DR Clifford.     escitalopram 20 MG tablet  Commonly known as:  LEXAPRO  Take 1 tablet by mouth Daily.     hydrALAZINE 100 MG tablet  Commonly known as:  APRESOLINE  Take 1 tablet by mouth 2 (Two) Times a Day.     levothyroxine 88 MCG tablet  Commonly known as:  SYNTHROID, LEVOTHROID  Take 1 tablet by mouth Daily.     losartan 100 MG tablet  Commonly known as:  Cozaar  Take 1 tablet by mouth Daily.     lovastatin 40 MG tablet  Commonly known as:  MEVACOR  Take 1 tablet by mouth Daily With Dinner.     methyldopa 500 MG tablet  Commonly known as:  ALDOMET  Take 1 tablet by mouth Every 12 (Twelve) Hours.     Semaglutide(0.25 or 0.5MG/DOS) 2 MG/1.5ML solution pen-injector  Commonly known as:  Ozempic (0.25 or 0.5 MG/DOSE)  Inject 0.5 mg under the skin into the appropriate area as directed 1 (One)   Time Per Week.     terazosin 10 MG capsule  Commonly known as:  HYTRIN  Take 1 capsule by mouth Every 12 (Twelve) Hours.     triamterene-hydrochlorothiazide 37.5-25 MG per capsule  Commonly known as:  Dyazide  Take 1 capsule by mouth Every Morning.              Comment: Please note this report has been produced using speech recognition software.      Tonio Bennett DO  Attending Emergency Physician               Tonio Bennett DO  08/02/20 0711

## 2020-08-03 ENCOUNTER — EPISODE CHANGES (OUTPATIENT)
Dept: CASE MANAGEMENT | Facility: OTHER | Age: 65
End: 2020-08-03

## 2020-08-03 ENCOUNTER — TELEPHONE (OUTPATIENT)
Dept: INTERNAL MEDICINE | Facility: CLINIC | Age: 65
End: 2020-08-03

## 2020-08-03 PROBLEM — Z20.822 SUSPECTED COVID-19 VIRUS INFECTION: Status: ACTIVE | Noted: 2020-08-03

## 2020-08-03 PROCEDURE — U0004 COV-19 TEST NON-CDC HGH THRU: HCPCS | Performed by: PERSONAL EMERGENCY RESPONSE ATTENDANT

## 2020-08-03 PROCEDURE — 99284 EMERGENCY DEPT VISIT MOD MDM: CPT

## 2020-08-03 PROCEDURE — U0002 COVID-19 LAB TEST NON-CDC: HCPCS | Performed by: PERSONAL EMERGENCY RESPONSE ATTENDANT

## 2020-08-03 NOTE — TELEPHONE ENCOUNTER
Pt said he went to ER on Saturday for a fever and shortness of breath.  He said he is running a temperature of 101 and it has spiked to 102.5.  Pt said he has been unable to get rid of the temperature and occasionally still has some shortness of breath.  Pt requesting call back to discuss further.

## 2020-08-03 NOTE — TELEPHONE ENCOUNTER
Contacted patient's daughter and advised her to take patient to [facility] for COVID testing; patient had CT and X-ray at ER on 08/01/2020 but daughter states symptoms have worsened.     I advised patient to follow CDC recommendations and have everyone self-quarantine until negative COVID test and to take patient to ER if patient's fever gets about 103, oxygen drops below 90%, or if patient develops chest pain again.

## 2020-08-03 NOTE — TELEPHONE ENCOUNTER
PATIENT'S DAUGHTER, DARELL,  CALLED AND STATED HER DAD IS STILL EXPERIENCING SHORTNESS OF BREATH AND RUNNING A FEVER, CHILLS.  DARELL WANTS TO KNOW WHAT THEIR NEXT STEPS ARE SINCE HE DOES HAVE OTHER HEALTH CONDITIONS.    DARELL'S CALLBACK: [phone]  CAN ALSO CALL BACK PATIENT'S WIFE, RYLIE -[phone].

## 2020-08-04 ENCOUNTER — APPOINTMENT (OUTPATIENT)
Dept: CARDIOLOGY | Facility: HOSPITAL | Age: 65
End: 2020-08-04

## 2020-08-04 ENCOUNTER — HOSPITAL ENCOUNTER (INPATIENT)
Facility: HOSPITAL | Age: 65
LOS: 7 days | Discharge: HOME | End: 2020-08-13
Attending: EMERGENCY MEDICINE | Admitting: INTERNAL MEDICINE

## 2020-08-04 ENCOUNTER — READMISSION MANAGEMENT (OUTPATIENT)
Dept: CALL CENTER | Facility: HOSPITAL | Age: 65
End: 2020-08-04

## 2020-08-04 ENCOUNTER — APPOINTMENT (OUTPATIENT)
Dept: CT IMAGING | Facility: HOSPITAL | Age: 65
End: 2020-08-04

## 2020-08-04 ENCOUNTER — EPISODE CHANGES (OUTPATIENT)
Dept: CASE MANAGEMENT | Facility: OTHER | Age: 65
End: 2020-08-04

## 2020-08-04 DIAGNOSIS — Z86.69 HISTORY OF SEIZURE DISORDER: ICD-10-CM

## 2020-08-04 DIAGNOSIS — R50.9 FEVER AND CHILLS: Primary | ICD-10-CM

## 2020-08-04 DIAGNOSIS — M79.606 ACUTE PAIN OF LOWER EXTREMITY, UNSPECIFIED LATERALITY: ICD-10-CM

## 2020-08-04 DIAGNOSIS — R06.02 SHORTNESS OF BREATH: ICD-10-CM

## 2020-08-04 DIAGNOSIS — Z86.73 HISTORY OF CVA (CEREBROVASCULAR ACCIDENT): ICD-10-CM

## 2020-08-04 DIAGNOSIS — G40.909 SEIZURE DISORDER (HCC): ICD-10-CM

## 2020-08-04 DIAGNOSIS — J18.9 PNEUMONIA OF RIGHT LOWER LOBE DUE TO INFECTIOUS ORGANISM: ICD-10-CM

## 2020-08-04 DIAGNOSIS — G47.33 OSA (OBSTRUCTIVE SLEEP APNEA): ICD-10-CM

## 2020-08-04 DIAGNOSIS — Z86.79 HISTORY OF HYPERTENSION: ICD-10-CM

## 2020-08-04 DIAGNOSIS — Z86.79 HISTORY OF CORONARY ARTERY DISEASE: ICD-10-CM

## 2020-08-04 DIAGNOSIS — N18.9 ACUTE RENAL FAILURE SUPERIMPOSED ON CHRONIC KIDNEY DISEASE, UNSPECIFIED CKD STAGE, UNSPECIFIED ACUTE RENAL FAILURE TYPE (HCC): ICD-10-CM

## 2020-08-04 DIAGNOSIS — Z03.818 ENCNTR FOR OBS FOR SUSP EXPSR TO OTH BIOLG AGENTS RULED OUT: ICD-10-CM

## 2020-08-04 DIAGNOSIS — N17.9 ACUTE RENAL FAILURE SUPERIMPOSED ON CHRONIC KIDNEY DISEASE, UNSPECIFIED CKD STAGE, UNSPECIFIED ACUTE RENAL FAILURE TYPE (HCC): ICD-10-CM

## 2020-08-04 PROBLEM — R06.00 DYSPNEA: Status: ACTIVE | Noted: 2020-08-04

## 2020-08-04 LAB
ALBUMIN SERPL-MCNC: 3.6 G/DL (ref 3.5–5.2)
ALBUMIN/GLOB SERPL: 1.1 G/DL
ALP SERPL-CCNC: 43 U/L (ref 39–117)
ALT SERPL W P-5'-P-CCNC: 28 U/L (ref 1–41)
ANION GAP SERPL CALCULATED.3IONS-SCNC: 10 MMOL/L (ref 5–15)
AST SERPL-CCNC: 27 U/L (ref 1–40)
B PARAPERT DNA SPEC QL NAA+PROBE: NOT DETECTED
B PARAPERT DNA SPEC QL NAA+PROBE: NOT DETECTED
B PERT DNA SPEC QL NAA+PROBE: NOT DETECTED
B PERT DNA SPEC QL NAA+PROBE: NOT DETECTED
BACTERIA UR QL AUTO: ABNORMAL /HPF
BASOPHILS # BLD AUTO: 0.04 10*3/MM3 (ref 0–0.2)
BASOPHILS NFR BLD AUTO: 0.7 % (ref 0–1.5)
BH CV ECHO MEAS - AO MAX PG (FULL): 19.1 MMHG
BH CV ECHO MEAS - AO MAX PG: 23 MMHG
BH CV ECHO MEAS - AO MEAN PG (FULL): 9.5 MMHG
BH CV ECHO MEAS - AO MEAN PG: 11.5 MMHG
BH CV ECHO MEAS - AO ROOT AREA (BSA CORRECTED): 1.8
BH CV ECHO MEAS - AO ROOT AREA: 18.9 CM^2
BH CV ECHO MEAS - AO ROOT DIAM: 4.9 CM
BH CV ECHO MEAS - AO V2 MAX: 240.3 CM/SEC
BH CV ECHO MEAS - AO V2 MEAN: 156.5 CM/SEC
BH CV ECHO MEAS - AO V2 VTI: 50.8 CM
BH CV ECHO MEAS - AVA(I,A): 1.5 CM^2
BH CV ECHO MEAS - AVA(I,D): 1.5 CM^2
BH CV ECHO MEAS - AVA(V,A): 1.3 CM^2
BH CV ECHO MEAS - AVA(V,D): 1.3 CM^2
BH CV ECHO MEAS - BSA(HAYCOCK): 2.8 M^2
BH CV ECHO MEAS - BSA: 2.7 M^2
BH CV ECHO MEAS - BZI_BMI: 37.6 KILOGRAMS/M^2
BH CV ECHO MEAS - BZI_METRIC_HEIGHT: 193 CM
BH CV ECHO MEAS - BZI_METRIC_WEIGHT: 140.2 KG
BH CV ECHO MEAS - EDV(CUBED): 216 ML
BH CV ECHO MEAS - EDV(MOD-SP2): 189 ML
BH CV ECHO MEAS - EDV(MOD-SP4): 185 ML
BH CV ECHO MEAS - EDV(TEICH): 180 ML
BH CV ECHO MEAS - EF(CUBED): 65.7 %
BH CV ECHO MEAS - EF(MOD-BP): 51.2 %
BH CV ECHO MEAS - EF(MOD-SP2): 54.1 %
BH CV ECHO MEAS - EF(MOD-SP4): 51 %
BH CV ECHO MEAS - EF(TEICH): 56.3 %
BH CV ECHO MEAS - ESV(CUBED): 74.1 ML
BH CV ECHO MEAS - ESV(MOD-SP2): 86.7 ML
BH CV ECHO MEAS - ESV(MOD-SP4): 90.6 ML
BH CV ECHO MEAS - ESV(TEICH): 78.6 ML
BH CV ECHO MEAS - FS: 30 %
BH CV ECHO MEAS - IVS/LVPW: 1
BH CV ECHO MEAS - IVSD: 1.3 CM
BH CV ECHO MEAS - LA DIMENSION: 3.6 CM
BH CV ECHO MEAS - LA/AO: 0.73
BH CV ECHO MEAS - LV DIASTOLIC VOL/BSA (35-75): 69.4 ML/M^2
BH CV ECHO MEAS - LV MASS(C)D: 350.1 GRAMS
BH CV ECHO MEAS - LV MASS(C)DI: 131.4 GRAMS/M^2
BH CV ECHO MEAS - LV MAX PG: 3.9 MMHG
BH CV ECHO MEAS - LV MEAN PG: 2 MMHG
BH CV ECHO MEAS - LV SYSTOLIC VOL/BSA (12-30): 34 ML/M^2
BH CV ECHO MEAS - LV V1 MAX: 98.6 CM/SEC
BH CV ECHO MEAS - LV V1 MEAN: 55.8 CM/SEC
BH CV ECHO MEAS - LV V1 VTI: 23.6 CM
BH CV ECHO MEAS - LVIDD: 6 CM
BH CV ECHO MEAS - LVIDS: 4.2 CM
BH CV ECHO MEAS - LVLD AP2: 9.1 CM
BH CV ECHO MEAS - LVLD AP4: 8.7 CM
BH CV ECHO MEAS - LVLS AP2: 7.5 CM
BH CV ECHO MEAS - LVLS AP4: 8.4 CM
BH CV ECHO MEAS - LVOT AREA (M): 3.1 CM^2
BH CV ECHO MEAS - LVOT AREA: 3.1 CM^2
BH CV ECHO MEAS - LVOT DIAM: 2 CM
BH CV ECHO MEAS - LVPWD: 1.3 CM
BH CV ECHO MEAS - MV A MAX VEL: 105 CM/SEC
BH CV ECHO MEAS - MV DEC SLOPE: 231 CM/SEC^2
BH CV ECHO MEAS - MV DEC TIME: 0.3 SEC
BH CV ECHO MEAS - MV E MAX VEL: 68.1 CM/SEC
BH CV ECHO MEAS - MV E/A: 0.65
BH CV ECHO MEAS - PA ACC TIME: 0.16 SEC
BH CV ECHO MEAS - PA PR(ACCEL): 5.2 MMHG
BH CV ECHO MEAS - SI(AO): 359.6 ML/M^2
BH CV ECHO MEAS - SI(CUBED): 53.2 ML/M^2
BH CV ECHO MEAS - SI(LVOT): 27.8 ML/M^2
BH CV ECHO MEAS - SI(MOD-SP2): 38.4 ML/M^2
BH CV ECHO MEAS - SI(MOD-SP4): 35.4 ML/M^2
BH CV ECHO MEAS - SI(TEICH): 38.1 ML/M^2
BH CV ECHO MEAS - SV(AO): 958.4 ML
BH CV ECHO MEAS - SV(CUBED): 141.9 ML
BH CV ECHO MEAS - SV(LVOT): 74.1 ML
BH CV ECHO MEAS - SV(MOD-SP2): 102.3 ML
BH CV ECHO MEAS - SV(MOD-SP4): 94.4 ML
BH CV ECHO MEAS - SV(TEICH): 101.4 ML
BH CV VAS BP LEFT ARM: NORMAL MMHG
BILIRUB SERPL-MCNC: 0.8 MG/DL (ref 0–1.2)
BILIRUB UR QL STRIP: NEGATIVE
BUN SERPL-MCNC: 26 MG/DL (ref 8–23)
BUN/CREAT SERPL: 11.8 (ref 7–25)
C PNEUM DNA NPH QL NAA+NON-PROBE: NOT DETECTED
C PNEUM DNA NPH QL NAA+NON-PROBE: NOT DETECTED
CALCIUM SPEC-SCNC: 8.8 MG/DL (ref 8.6–10.5)
CHLORIDE SERPL-SCNC: 95 MMOL/L (ref 98–107)
CLARITY UR: CLEAR
CO2 SERPL-SCNC: 28 MMOL/L (ref 22–29)
COLOR UR: YELLOW
CREAT SERPL-MCNC: 2.21 MG/DL (ref 0.76–1.27)
CRP SERPL-MCNC: 12.6 MG/DL (ref 0–0.5)
D-LACTATE SERPL-SCNC: 1.1 MMOL/L (ref 0.5–2)
DEPRECATED RDW RBC AUTO: 48.1 FL (ref 37–54)
EOSINOPHIL # BLD AUTO: 0.28 10*3/MM3 (ref 0–0.4)
EOSINOPHIL NFR BLD AUTO: 4.7 % (ref 0.3–6.2)
ERYTHROCYTE [DISTWIDTH] IN BLOOD BY AUTOMATED COUNT: 14.3 % (ref 12.3–15.4)
FLUAV H1 2009 PAND RNA NPH QL NAA+PROBE: NOT DETECTED
FLUAV H1 2009 PAND RNA NPH QL NAA+PROBE: NOT DETECTED
FLUAV H1 HA GENE NPH QL NAA+PROBE: NOT DETECTED
FLUAV H1 HA GENE NPH QL NAA+PROBE: NOT DETECTED
FLUAV H3 RNA NPH QL NAA+PROBE: NOT DETECTED
FLUAV H3 RNA NPH QL NAA+PROBE: NOT DETECTED
FLUAV SUBTYP SPEC NAA+PROBE: NOT DETECTED
FLUAV SUBTYP SPEC NAA+PROBE: NOT DETECTED
FLUBV RNA ISLT QL NAA+PROBE: NOT DETECTED
FLUBV RNA ISLT QL NAA+PROBE: NOT DETECTED
GFR SERPL CREATININE-BSD FRML MDRD: 30 ML/MIN/1.73
GLOBULIN UR ELPH-MCNC: 3.4 GM/DL
GLUCOSE BLDC GLUCOMTR-MCNC: 108 MG/DL (ref 70–130)
GLUCOSE BLDC GLUCOMTR-MCNC: 113 MG/DL (ref 70–130)
GLUCOSE BLDC GLUCOMTR-MCNC: 141 MG/DL (ref 70–130)
GLUCOSE SERPL-MCNC: 122 MG/DL (ref 65–99)
GLUCOSE UR STRIP-MCNC: NEGATIVE MG/DL
HADV DNA SPEC NAA+PROBE: NOT DETECTED
HADV DNA SPEC NAA+PROBE: NOT DETECTED
HCOV 229E RNA SPEC QL NAA+PROBE: NOT DETECTED
HCOV 229E RNA SPEC QL NAA+PROBE: NOT DETECTED
HCOV HKU1 RNA SPEC QL NAA+PROBE: NOT DETECTED
HCOV HKU1 RNA SPEC QL NAA+PROBE: NOT DETECTED
HCOV NL63 RNA SPEC QL NAA+PROBE: NOT DETECTED
HCOV NL63 RNA SPEC QL NAA+PROBE: NOT DETECTED
HCOV OC43 RNA SPEC QL NAA+PROBE: NOT DETECTED
HCOV OC43 RNA SPEC QL NAA+PROBE: NOT DETECTED
HCT VFR BLD AUTO: 41.8 % (ref 37.5–51)
HGB BLD-MCNC: 13.9 G/DL (ref 13–17.7)
HGB UR QL STRIP.AUTO: NEGATIVE
HMPV RNA NPH QL NAA+NON-PROBE: NOT DETECTED
HMPV RNA NPH QL NAA+NON-PROBE: NOT DETECTED
HPIV1 RNA SPEC QL NAA+PROBE: NOT DETECTED
HPIV1 RNA SPEC QL NAA+PROBE: NOT DETECTED
HPIV2 RNA SPEC QL NAA+PROBE: NOT DETECTED
HPIV2 RNA SPEC QL NAA+PROBE: NOT DETECTED
HPIV3 RNA NPH QL NAA+PROBE: NOT DETECTED
HPIV3 RNA NPH QL NAA+PROBE: NOT DETECTED
HPIV4 P GENE NPH QL NAA+PROBE: NOT DETECTED
HPIV4 P GENE NPH QL NAA+PROBE: NOT DETECTED
HYALINE CASTS UR QL AUTO: ABNORMAL /LPF
IMM GRANULOCYTES # BLD AUTO: 0.03 10*3/MM3 (ref 0–0.05)
IMM GRANULOCYTES NFR BLD AUTO: 0.5 % (ref 0–0.5)
KETONES UR QL STRIP: ABNORMAL
LDH SERPL-CCNC: 196 U/L (ref 135–225)
LEUKOCYTE ESTERASE UR QL STRIP.AUTO: ABNORMAL
LV EF 2D ECHO EST: 55 %
LYMPHOCYTES # BLD AUTO: 1.06 10*3/MM3 (ref 0.7–3.1)
LYMPHOCYTES NFR BLD AUTO: 17.8 % (ref 19.6–45.3)
M PNEUMO IGG SER IA-ACNC: NOT DETECTED
M PNEUMO IGG SER IA-ACNC: NOT DETECTED
MAXIMAL PREDICTED HEART RATE: 156 BPM
MCH RBC QN AUTO: 30.4 PG (ref 26.6–33)
MCHC RBC AUTO-ENTMCNC: 33.3 G/DL (ref 31.5–35.7)
MCV RBC AUTO: 91.5 FL (ref 79–97)
MONOCYTES # BLD AUTO: 0.43 10*3/MM3 (ref 0.1–0.9)
MONOCYTES NFR BLD AUTO: 7.2 % (ref 5–12)
NEUTROPHILS NFR BLD AUTO: 4.12 10*3/MM3 (ref 1.7–7)
NEUTROPHILS NFR BLD AUTO: 69.1 % (ref 42.7–76)
NITRITE UR QL STRIP: NEGATIVE
NRBC BLD AUTO-RTO: 0 /100 WBC (ref 0–0.2)
NT-PROBNP SERPL-MCNC: 1026 PG/ML (ref 0–900)
PH UR STRIP.AUTO: <=5 [PH] (ref 5–8)
PLATELET # BLD AUTO: 160 10*3/MM3 (ref 140–450)
PMV BLD AUTO: 10.7 FL (ref 6–12)
POTASSIUM SERPL-SCNC: 3.7 MMOL/L (ref 3.5–5.2)
PROCALCITONIN SERPL-MCNC: 0.64 NG/ML (ref 0–0.25)
PROT SERPL-MCNC: 7 G/DL (ref 6–8.5)
PROT UR QL STRIP: ABNORMAL
RBC # BLD AUTO: 4.57 10*6/MM3 (ref 4.14–5.8)
RBC # UR: ABNORMAL /HPF
REF LAB TEST METHOD: ABNORMAL
RHINOVIRUS RNA SPEC NAA+PROBE: NOT DETECTED
RHINOVIRUS RNA SPEC NAA+PROBE: NOT DETECTED
RSV RNA NPH QL NAA+NON-PROBE: NOT DETECTED
RSV RNA NPH QL NAA+NON-PROBE: NOT DETECTED
SARS-COV-2 RNA NPH QL NAA+NON-PROBE: NOT DETECTED
SODIUM SERPL-SCNC: 133 MMOL/L (ref 136–145)
SP GR UR STRIP: 1.02 (ref 1–1.03)
SQUAMOUS #/AREA URNS HPF: ABNORMAL /HPF
STRESS TARGET HR: 133 BPM
TROPONIN T SERPL-MCNC: <0.01 NG/ML (ref 0–0.03)
UROBILINOGEN UR QL STRIP: ABNORMAL
WBC # BLD AUTO: 5.96 10*3/MM3 (ref 3.4–10.8)
WBC UR QL AUTO: ABNORMAL /HPF

## 2020-08-04 PROCEDURE — 83880 ASSAY OF NATRIURETIC PEPTIDE: CPT | Performed by: EMERGENCY MEDICINE

## 2020-08-04 PROCEDURE — 25010000002 AZITHROMYCIN PER 500 MG: Performed by: PHYSICIAN ASSISTANT

## 2020-08-04 PROCEDURE — 80053 COMPREHEN METABOLIC PANEL: CPT | Performed by: PHYSICIAN ASSISTANT

## 2020-08-04 PROCEDURE — 71250 CT THORAX DX C-: CPT

## 2020-08-04 PROCEDURE — 83605 ASSAY OF LACTIC ACID: CPT | Performed by: PHYSICIAN ASSISTANT

## 2020-08-04 PROCEDURE — 99222 1ST HOSP IP/OBS MODERATE 55: CPT | Performed by: INTERNAL MEDICINE

## 2020-08-04 PROCEDURE — 84145 PROCALCITONIN (PCT): CPT | Performed by: PHYSICIAN ASSISTANT

## 2020-08-04 PROCEDURE — 93308 TTE F-UP OR LMTD: CPT | Performed by: INTERNAL MEDICINE

## 2020-08-04 PROCEDURE — G0378 HOSPITAL OBSERVATION PER HR: HCPCS

## 2020-08-04 PROCEDURE — 86140 C-REACTIVE PROTEIN: CPT | Performed by: PHYSICIAN ASSISTANT

## 2020-08-04 PROCEDURE — 93321 DOPPLER ECHO F-UP/LMTD STD: CPT

## 2020-08-04 PROCEDURE — 99220 PR INITIAL OBSERVATION CARE/DAY 70 MINUTES: CPT | Performed by: HOSPITALIST

## 2020-08-04 PROCEDURE — 0202U NFCT DS 22 TRGT SARS-COV-2: CPT | Performed by: HOSPITALIST

## 2020-08-04 PROCEDURE — 81001 URINALYSIS AUTO W/SCOPE: CPT | Performed by: PHYSICIAN ASSISTANT

## 2020-08-04 PROCEDURE — 84484 ASSAY OF TROPONIN QUANT: CPT | Performed by: EMERGENCY MEDICINE

## 2020-08-04 PROCEDURE — 83615 LACTATE (LD) (LDH) ENZYME: CPT | Performed by: PHYSICIAN ASSISTANT

## 2020-08-04 PROCEDURE — 93321 DOPPLER ECHO F-UP/LMTD STD: CPT | Performed by: INTERNAL MEDICINE

## 2020-08-04 PROCEDURE — 93308 TTE F-UP OR LMTD: CPT

## 2020-08-04 PROCEDURE — 93325 DOPPLER ECHO COLOR FLOW MAPG: CPT

## 2020-08-04 PROCEDURE — 25010000002 CEFTRIAXONE PER 250 MG: Performed by: PHYSICIAN ASSISTANT

## 2020-08-04 PROCEDURE — 87040 BLOOD CULTURE FOR BACTERIA: CPT | Performed by: PHYSICIAN ASSISTANT

## 2020-08-04 PROCEDURE — 0100U HC BIOFIRE FILMARRAY RESP PANEL 2: CPT | Performed by: PHYSICIAN ASSISTANT

## 2020-08-04 PROCEDURE — 25010000002 HEPARIN (PORCINE) PER 1000 UNITS: Performed by: PHYSICIAN ASSISTANT

## 2020-08-04 PROCEDURE — 93325 DOPPLER ECHO COLOR FLOW MAPG: CPT | Performed by: INTERNAL MEDICINE

## 2020-08-04 PROCEDURE — 85025 COMPLETE CBC W/AUTO DIFF WBC: CPT | Performed by: PHYSICIAN ASSISTANT

## 2020-08-04 PROCEDURE — 93005 ELECTROCARDIOGRAM TRACING: CPT | Performed by: PHYSICIAN ASSISTANT

## 2020-08-04 PROCEDURE — 82962 GLUCOSE BLOOD TEST: CPT

## 2020-08-04 RX ORDER — DEXTROSE MONOHYDRATE 25 G/50ML
25 INJECTION, SOLUTION INTRAVENOUS
Status: DISCONTINUED | OUTPATIENT
Start: 2020-08-04 | End: 2020-08-13 | Stop reason: HOSPADM

## 2020-08-04 RX ORDER — METHYLDOPA 250 MG/1
500 TABLET, FILM COATED ORAL EVERY 12 HOURS SCHEDULED
Status: DISCONTINUED | OUTPATIENT
Start: 2020-08-04 | End: 2020-08-13 | Stop reason: HOSPADM

## 2020-08-04 RX ORDER — LEVOTHYROXINE SODIUM 88 UG/1
88 TABLET ORAL
Status: DISCONTINUED | OUTPATIENT
Start: 2020-08-04 | End: 2020-08-13 | Stop reason: HOSPADM

## 2020-08-04 RX ORDER — AMLODIPINE BESYLATE 10 MG/1
10 TABLET ORAL
Status: DISCONTINUED | OUTPATIENT
Start: 2020-08-04 | End: 2020-08-10

## 2020-08-04 RX ORDER — ACETAMINOPHEN 325 MG/1
650 TABLET ORAL EVERY 4 HOURS PRN
Status: DISCONTINUED | OUTPATIENT
Start: 2020-08-04 | End: 2020-08-13 | Stop reason: HOSPADM

## 2020-08-04 RX ORDER — SODIUM CHLORIDE 0.9 % (FLUSH) 0.9 %
10 SYRINGE (ML) INJECTION AS NEEDED
Status: DISCONTINUED | OUTPATIENT
Start: 2020-08-04 | End: 2020-08-13 | Stop reason: HOSPADM

## 2020-08-04 RX ORDER — NICOTINE POLACRILEX 4 MG
15 LOZENGE BUCCAL
Status: DISCONTINUED | OUTPATIENT
Start: 2020-08-04 | End: 2020-08-13 | Stop reason: HOSPADM

## 2020-08-04 RX ORDER — TERAZOSIN 5 MG/1
10 CAPSULE ORAL EVERY 12 HOURS SCHEDULED
Status: DISCONTINUED | OUTPATIENT
Start: 2020-08-04 | End: 2020-08-13 | Stop reason: HOSPADM

## 2020-08-04 RX ORDER — ESCITALOPRAM OXALATE 20 MG/1
20 TABLET ORAL DAILY
Status: DISCONTINUED | OUTPATIENT
Start: 2020-08-04 | End: 2020-08-13 | Stop reason: HOSPADM

## 2020-08-04 RX ORDER — CLONIDINE 0.1 MG/24H
1 PATCH, EXTENDED RELEASE TRANSDERMAL WEEKLY
Status: ON HOLD | COMMUNITY
End: 2020-08-05

## 2020-08-04 RX ORDER — DIVALPROEX SODIUM 500 MG/1
500 TABLET, DELAYED RELEASE ORAL
Status: DISCONTINUED | OUTPATIENT
Start: 2020-08-04 | End: 2020-08-13 | Stop reason: HOSPADM

## 2020-08-04 RX ORDER — DIVALPROEX SODIUM 250 MG/1
250 TABLET, DELAYED RELEASE ORAL DAILY
Status: DISCONTINUED | OUTPATIENT
Start: 2020-08-04 | End: 2020-08-13 | Stop reason: HOSPADM

## 2020-08-04 RX ORDER — GLIPIZIDE 5 MG/1
5 TABLET ORAL
Status: ON HOLD | COMMUNITY
End: 2020-08-05

## 2020-08-04 RX ORDER — ACETAMINOPHEN 325 MG/1
650 TABLET ORAL ONCE
Status: COMPLETED | OUTPATIENT
Start: 2020-08-04 | End: 2020-08-04

## 2020-08-04 RX ORDER — DIVALPROEX SODIUM 250 MG/1
250 TABLET, DELAYED RELEASE ORAL 3 TIMES DAILY
Status: DISCONTINUED | OUTPATIENT
Start: 2020-08-04 | End: 2020-08-04

## 2020-08-04 RX ORDER — CARVEDILOL 12.5 MG/1
12.5 TABLET ORAL 2 TIMES DAILY WITH MEALS
Status: DISCONTINUED | OUTPATIENT
Start: 2020-08-04 | End: 2020-08-07

## 2020-08-04 RX ORDER — L.ACID,PARA/B.BIFIDUM/S.THERM 8B CELL
1 CAPSULE ORAL DAILY
Status: DISCONTINUED | OUTPATIENT
Start: 2020-08-04 | End: 2020-08-13 | Stop reason: HOSPADM

## 2020-08-04 RX ORDER — DIVALPROEX SODIUM 500 MG/1
500 TABLET, DELAYED RELEASE ORAL NIGHTLY
Status: DISCONTINUED | OUTPATIENT
Start: 2020-08-04 | End: 2020-08-13 | Stop reason: HOSPADM

## 2020-08-04 RX ORDER — CHOLECALCIFEROL (VITAMIN D3) 125 MCG
5 CAPSULE ORAL NIGHTLY PRN
Status: DISCONTINUED | OUTPATIENT
Start: 2020-08-04 | End: 2020-08-13 | Stop reason: HOSPADM

## 2020-08-04 RX ORDER — ASPIRIN 81 MG/1
81 TABLET ORAL DAILY
Status: DISCONTINUED | OUTPATIENT
Start: 2020-08-04 | End: 2020-08-10

## 2020-08-04 RX ORDER — HEPARIN SODIUM 5000 [USP'U]/ML
5000 INJECTION, SOLUTION INTRAVENOUS; SUBCUTANEOUS EVERY 12 HOURS SCHEDULED
Status: DISCONTINUED | OUTPATIENT
Start: 2020-08-04 | End: 2020-08-09

## 2020-08-04 RX ORDER — SPIRONOLACTONE 50 MG/1
50 TABLET, FILM COATED ORAL DAILY
Status: ON HOLD | COMMUNITY
End: 2020-08-05

## 2020-08-04 RX ORDER — ATORVASTATIN CALCIUM 10 MG/1
10 TABLET, FILM COATED ORAL DAILY
Status: DISCONTINUED | OUTPATIENT
Start: 2020-08-04 | End: 2020-08-06

## 2020-08-04 RX ORDER — SODIUM CHLORIDE 0.9 % (FLUSH) 0.9 %
10 SYRINGE (ML) INJECTION EVERY 12 HOURS SCHEDULED
Status: DISCONTINUED | OUTPATIENT
Start: 2020-08-04 | End: 2020-08-13 | Stop reason: HOSPADM

## 2020-08-04 RX ADMIN — Medication 1 CAPSULE: at 11:29

## 2020-08-04 RX ADMIN — SODIUM CHLORIDE, PRESERVATIVE FREE 10 ML: 5 INJECTION INTRAVENOUS at 11:45

## 2020-08-04 RX ADMIN — HEPARIN SODIUM 5000 UNITS: 5000 INJECTION INTRAVENOUS; SUBCUTANEOUS at 22:30

## 2020-08-04 RX ADMIN — ESCITALOPRAM OXALATE 20 MG: 20 TABLET ORAL at 11:43

## 2020-08-04 RX ADMIN — METHYLDOPA 500 MG: 250 TABLET, FILM COATED ORAL at 11:31

## 2020-08-04 RX ADMIN — ACETAMINOPHEN 650 MG: 325 TABLET, FILM COATED ORAL at 22:35

## 2020-08-04 RX ADMIN — ACETAMINOPHEN 650 MG: 325 TABLET, FILM COATED ORAL at 03:29

## 2020-08-04 RX ADMIN — HEPARIN SODIUM 5000 UNITS: 5000 INJECTION INTRAVENOUS; SUBCUTANEOUS at 11:28

## 2020-08-04 RX ADMIN — ACETAMINOPHEN 650 MG: 325 TABLET, FILM COATED ORAL at 17:26

## 2020-08-04 RX ADMIN — TERAZOSIN HYDROCHLORIDE 10 MG: 5 CAPSULE ORAL at 11:28

## 2020-08-04 RX ADMIN — SODIUM CHLORIDE 1000 ML: 9 INJECTION, SOLUTION INTRAVENOUS at 06:17

## 2020-08-04 RX ADMIN — LEVOTHYROXINE SODIUM 88 MCG: 88 TABLET ORAL at 11:29

## 2020-08-04 RX ADMIN — DIVALPROEX SODIUM 500 MG: 500 TABLET, DELAYED RELEASE ORAL at 22:29

## 2020-08-04 RX ADMIN — AZITHROMYCIN 500 MG: 500 INJECTION, POWDER, LYOPHILIZED, FOR SOLUTION INTRAVENOUS at 08:00

## 2020-08-04 RX ADMIN — CARVEDILOL 12.5 MG: 12.5 TABLET, FILM COATED ORAL at 17:25

## 2020-08-04 RX ADMIN — CEFTRIAXONE SODIUM 1 G: 1 INJECTION, POWDER, FOR SOLUTION INTRAMUSCULAR; INTRAVENOUS at 06:43

## 2020-08-04 RX ADMIN — ATORVASTATIN CALCIUM 10 MG: 10 TABLET, FILM COATED ORAL at 11:42

## 2020-08-04 RX ADMIN — HYDRALAZINE HYDROCHLORIDE 75 MG: 50 TABLET, FILM COATED ORAL at 22:30

## 2020-08-04 RX ADMIN — HYDRALAZINE HYDROCHLORIDE 75 MG: 50 TABLET, FILM COATED ORAL at 11:30

## 2020-08-04 RX ADMIN — AMLODIPINE BESYLATE 10 MG: 10 TABLET ORAL at 11:29

## 2020-08-04 RX ADMIN — DIVALPROEX SODIUM 500 MG: 500 TABLET, DELAYED RELEASE ORAL at 11:44

## 2020-08-04 RX ADMIN — DIVALPROEX SODIUM 250 MG: 500 TABLET, DELAYED RELEASE ORAL at 18:29

## 2020-08-04 RX ADMIN — CARVEDILOL 12.5 MG: 12.5 TABLET, FILM COATED ORAL at 11:41

## 2020-08-04 RX ADMIN — METHYLDOPA 500 MG: 250 TABLET, FILM COATED ORAL at 22:26

## 2020-08-04 RX ADMIN — ASPIRIN 81 MG: 81 TABLET, COATED ORAL at 11:30

## 2020-08-04 RX ADMIN — SODIUM CHLORIDE, PRESERVATIVE FREE 10 ML: 5 INJECTION INTRAVENOUS at 14:57

## 2020-08-04 RX ADMIN — TERAZOSIN HYDROCHLORIDE 10 MG: 5 CAPSULE ORAL at 22:30

## 2020-08-04 RX ADMIN — SODIUM CHLORIDE, PRESERVATIVE FREE 10 ML: 5 INJECTION INTRAVENOUS at 22:30

## 2020-08-04 NOTE — OUTREACH NOTE
Medical Week 3 Survey      Responses   [facility] patient discharged from?  [facility]   COVID-19 Test Status  Not tested   Does the patient have one of the following disease processes/diagnoses(primary or secondary)?  Other   Week 3 attempt successful?  No   Revoke  Readmitted          Luiza Mercedes RN

## 2020-08-05 LAB
ANION GAP SERPL CALCULATED.3IONS-SCNC: 15 MMOL/L (ref 5–15)
BUN SERPL-MCNC: 28 MG/DL (ref 8–23)
BUN/CREAT SERPL: 17.7 (ref 7–25)
CALCIUM SPEC-SCNC: 8.8 MG/DL (ref 8.6–10.5)
CHLORIDE SERPL-SCNC: 97 MMOL/L (ref 98–107)
CO2 SERPL-SCNC: 20 MMOL/L (ref 22–29)
CREAT SERPL-MCNC: 1.58 MG/DL (ref 0.76–1.27)
CRP SERPL-MCNC: 14.51 MG/DL (ref 0–0.5)
DEPRECATED RDW RBC AUTO: 47.8 FL (ref 37–54)
ERYTHROCYTE [DISTWIDTH] IN BLOOD BY AUTOMATED COUNT: 14.4 % (ref 12.3–15.4)
ERYTHROCYTE [SEDIMENTATION RATE] IN BLOOD: 19 MM/HR (ref 0–20)
GFR SERPL CREATININE-BSD FRML MDRD: 44 ML/MIN/1.73
GLUCOSE BLDC GLUCOMTR-MCNC: 113 MG/DL (ref 70–130)
GLUCOSE BLDC GLUCOMTR-MCNC: 127 MG/DL (ref 70–130)
GLUCOSE BLDC GLUCOMTR-MCNC: 135 MG/DL (ref 70–130)
GLUCOSE BLDC GLUCOMTR-MCNC: 139 MG/DL (ref 70–130)
GLUCOSE SERPL-MCNC: 113 MG/DL (ref 65–99)
HCT VFR BLD AUTO: 39.2 % (ref 37.5–51)
HGB BLD-MCNC: 13.1 G/DL (ref 13–17.7)
MCH RBC QN AUTO: 30.3 PG (ref 26.6–33)
MCHC RBC AUTO-ENTMCNC: 33.4 G/DL (ref 31.5–35.7)
MCV RBC AUTO: 90.5 FL (ref 79–97)
PLATELET # BLD AUTO: 138 10*3/MM3 (ref 140–450)
PMV BLD AUTO: 11.1 FL (ref 6–12)
POTASSIUM SERPL-SCNC: 4.1 MMOL/L (ref 3.5–5.2)
PROCALCITONIN SERPL-MCNC: 0.57 NG/ML (ref 0–0.25)
RBC # BLD AUTO: 4.33 10*6/MM3 (ref 4.14–5.8)
SODIUM SERPL-SCNC: 132 MMOL/L (ref 136–145)
WBC # BLD AUTO: 7.32 10*3/MM3 (ref 3.4–10.8)

## 2020-08-05 PROCEDURE — 85652 RBC SED RATE AUTOMATED: CPT | Performed by: HOSPITALIST

## 2020-08-05 PROCEDURE — 84145 PROCALCITONIN (PCT): CPT | Performed by: HOSPITALIST

## 2020-08-05 PROCEDURE — 25010000002 AZITHROMYCIN PER 500 MG: Performed by: PHYSICIAN ASSISTANT

## 2020-08-05 PROCEDURE — 86140 C-REACTIVE PROTEIN: CPT | Performed by: HOSPITALIST

## 2020-08-05 PROCEDURE — 94640 AIRWAY INHALATION TREATMENT: CPT

## 2020-08-05 PROCEDURE — 94799 UNLISTED PULMONARY SVC/PX: CPT

## 2020-08-05 PROCEDURE — 99225 PR SBSQ OBSERVATION CARE/DAY 25 MINUTES: CPT | Performed by: FAMILY MEDICINE

## 2020-08-05 PROCEDURE — 82962 GLUCOSE BLOOD TEST: CPT

## 2020-08-05 PROCEDURE — 25010000002 HEPARIN (PORCINE) PER 1000 UNITS: Performed by: PHYSICIAN ASSISTANT

## 2020-08-05 PROCEDURE — G0378 HOSPITAL OBSERVATION PER HR: HCPCS

## 2020-08-05 PROCEDURE — 80048 BASIC METABOLIC PNL TOTAL CA: CPT | Performed by: HOSPITALIST

## 2020-08-05 PROCEDURE — 99232 SBSQ HOSP IP/OBS MODERATE 35: CPT | Performed by: INTERNAL MEDICINE

## 2020-08-05 PROCEDURE — 85027 COMPLETE CBC AUTOMATED: CPT | Performed by: HOSPITALIST

## 2020-08-05 PROCEDURE — 25010000002 CEFTRIAXONE PER 250 MG: Performed by: PHYSICIAN ASSISTANT

## 2020-08-05 RX ORDER — IPRATROPIUM BROMIDE AND ALBUTEROL SULFATE 2.5; .5 MG/3ML; MG/3ML
3 SOLUTION RESPIRATORY (INHALATION) EVERY 4 HOURS PRN
Status: DISCONTINUED | OUTPATIENT
Start: 2020-08-05 | End: 2020-08-13 | Stop reason: HOSPADM

## 2020-08-05 RX ORDER — ALBUTEROL SULFATE 2.5 MG/3ML
2.5 SOLUTION RESPIRATORY (INHALATION) EVERY 6 HOURS PRN
Status: DISCONTINUED | OUTPATIENT
Start: 2020-08-05 | End: 2020-08-13 | Stop reason: HOSPADM

## 2020-08-05 RX ORDER — HYDRALAZINE HYDROCHLORIDE 50 MG/1
100 TABLET, FILM COATED ORAL EVERY 8 HOURS SCHEDULED
Status: DISCONTINUED | OUTPATIENT
Start: 2020-08-05 | End: 2020-08-07

## 2020-08-05 RX ADMIN — DIVALPROEX SODIUM 250 MG: 500 TABLET, DELAYED RELEASE ORAL at 12:04

## 2020-08-05 RX ADMIN — SODIUM CHLORIDE, PRESERVATIVE FREE 10 ML: 5 INJECTION INTRAVENOUS at 08:29

## 2020-08-05 RX ADMIN — HEPARIN SODIUM 5000 UNITS: 5000 INJECTION INTRAVENOUS; SUBCUTANEOUS at 08:27

## 2020-08-05 RX ADMIN — ASPIRIN 81 MG: 81 TABLET, COATED ORAL at 08:27

## 2020-08-05 RX ADMIN — ACETAMINOPHEN 650 MG: 325 TABLET, FILM COATED ORAL at 07:31

## 2020-08-05 RX ADMIN — ESCITALOPRAM OXALATE 20 MG: 20 TABLET ORAL at 08:28

## 2020-08-05 RX ADMIN — METHYLDOPA 500 MG: 250 TABLET, FILM COATED ORAL at 21:12

## 2020-08-05 RX ADMIN — METHYLDOPA 500 MG: 250 TABLET, FILM COATED ORAL at 08:26

## 2020-08-05 RX ADMIN — LEVOTHYROXINE SODIUM 88 MCG: 88 TABLET ORAL at 06:25

## 2020-08-05 RX ADMIN — ACETAMINOPHEN 650 MG: 325 TABLET, FILM COATED ORAL at 12:43

## 2020-08-05 RX ADMIN — AZITHROMYCIN 500 MG: 500 INJECTION, POWDER, LYOPHILIZED, FOR SOLUTION INTRAVENOUS at 08:27

## 2020-08-05 RX ADMIN — Medication 1 CAPSULE: at 08:27

## 2020-08-05 RX ADMIN — TERAZOSIN HYDROCHLORIDE 10 MG: 5 CAPSULE ORAL at 21:12

## 2020-08-05 RX ADMIN — DIVALPROEX SODIUM 500 MG: 500 TABLET, DELAYED RELEASE ORAL at 06:25

## 2020-08-05 RX ADMIN — SODIUM CHLORIDE, PRESERVATIVE FREE 10 ML: 5 INJECTION INTRAVENOUS at 21:12

## 2020-08-05 RX ADMIN — DIVALPROEX SODIUM 500 MG: 500 TABLET, DELAYED RELEASE ORAL at 21:13

## 2020-08-05 RX ADMIN — IPRATROPIUM BROMIDE AND ALBUTEROL SULFATE 3 ML: 2.5; .5 SOLUTION RESPIRATORY (INHALATION) at 07:51

## 2020-08-05 RX ADMIN — ATORVASTATIN CALCIUM 10 MG: 10 TABLET, FILM COATED ORAL at 08:28

## 2020-08-05 RX ADMIN — CEFTRIAXONE SODIUM 1 G: 1 INJECTION, POWDER, FOR SOLUTION INTRAMUSCULAR; INTRAVENOUS at 06:25

## 2020-08-05 RX ADMIN — CARVEDILOL 12.5 MG: 12.5 TABLET, FILM COATED ORAL at 08:37

## 2020-08-05 RX ADMIN — HYDRALAZINE HYDROCHLORIDE 100 MG: 50 TABLET, FILM COATED ORAL at 21:12

## 2020-08-05 RX ADMIN — TERAZOSIN HYDROCHLORIDE 10 MG: 5 CAPSULE ORAL at 08:28

## 2020-08-05 RX ADMIN — ACETAMINOPHEN 650 MG: 325 TABLET, FILM COATED ORAL at 21:25

## 2020-08-05 RX ADMIN — HEPARIN SODIUM 5000 UNITS: 5000 INJECTION INTRAVENOUS; SUBCUTANEOUS at 21:13

## 2020-08-05 RX ADMIN — ACETAMINOPHEN 650 MG: 325 TABLET, FILM COATED ORAL at 16:52

## 2020-08-05 RX ADMIN — AMLODIPINE BESYLATE 10 MG: 10 TABLET ORAL at 08:28

## 2020-08-06 ENCOUNTER — APPOINTMENT (OUTPATIENT)
Dept: CT IMAGING | Facility: HOSPITAL | Age: 65
End: 2020-08-06

## 2020-08-06 PROBLEM — R50.9 FEVER AND CHILLS: Status: ACTIVE | Noted: 2020-08-06

## 2020-08-06 LAB
ALBUMIN SERPL-MCNC: 3.4 G/DL (ref 3.5–5.2)
ALBUMIN/GLOB SERPL: 0.9 G/DL
ALP SERPL-CCNC: 44 U/L (ref 39–117)
ALT SERPL W P-5'-P-CCNC: 28 U/L (ref 1–41)
ANION GAP SERPL CALCULATED.3IONS-SCNC: 15 MMOL/L (ref 5–15)
AST SERPL-CCNC: 27 U/L (ref 1–40)
BASOPHILS # BLD AUTO: 0.02 10*3/MM3 (ref 0–0.2)
BASOPHILS NFR BLD AUTO: 0.3 % (ref 0–1.5)
BILIRUB SERPL-MCNC: 0.6 MG/DL (ref 0–1.2)
BUN SERPL-MCNC: 39 MG/DL (ref 8–23)
BUN/CREAT SERPL: 20.9 (ref 7–25)
CALCIUM SPEC-SCNC: 8.4 MG/DL (ref 8.6–10.5)
CHLORIDE SERPL-SCNC: 95 MMOL/L (ref 98–107)
CO2 SERPL-SCNC: 24 MMOL/L (ref 22–29)
CREAT SERPL-MCNC: 1.87 MG/DL (ref 0.76–1.27)
CRP SERPL-MCNC: 20.3 MG/DL (ref 0–0.5)
DEPRECATED RDW RBC AUTO: 47.8 FL (ref 37–54)
EOSINOPHIL # BLD AUTO: 0.22 10*3/MM3 (ref 0–0.4)
EOSINOPHIL NFR BLD AUTO: 3.7 % (ref 0.3–6.2)
ERYTHROCYTE [DISTWIDTH] IN BLOOD BY AUTOMATED COUNT: 14.5 % (ref 12.3–15.4)
GFR SERPL CREATININE-BSD FRML MDRD: 37 ML/MIN/1.73
GLOBULIN UR ELPH-MCNC: 3.6 GM/DL
GLUCOSE BLDC GLUCOMTR-MCNC: 106 MG/DL (ref 70–130)
GLUCOSE BLDC GLUCOMTR-MCNC: 116 MG/DL (ref 70–130)
GLUCOSE BLDC GLUCOMTR-MCNC: 118 MG/DL (ref 70–130)
GLUCOSE SERPL-MCNC: 100 MG/DL (ref 65–99)
HCT VFR BLD AUTO: 39.5 % (ref 37.5–51)
HGB BLD-MCNC: 13.2 G/DL (ref 13–17.7)
IMM GRANULOCYTES # BLD AUTO: 0.02 10*3/MM3 (ref 0–0.05)
IMM GRANULOCYTES NFR BLD AUTO: 0.3 % (ref 0–0.5)
LYMPHOCYTES # BLD AUTO: 1 10*3/MM3 (ref 0.7–3.1)
LYMPHOCYTES NFR BLD AUTO: 16.8 % (ref 19.6–45.3)
MCH RBC QN AUTO: 30.1 PG (ref 26.6–33)
MCHC RBC AUTO-ENTMCNC: 33.4 G/DL (ref 31.5–35.7)
MCV RBC AUTO: 90 FL (ref 79–97)
MONOCYTES # BLD AUTO: 0.38 10*3/MM3 (ref 0.1–0.9)
MONOCYTES NFR BLD AUTO: 6.4 % (ref 5–12)
NEUTROPHILS NFR BLD AUTO: 4.3 10*3/MM3 (ref 1.7–7)
NEUTROPHILS NFR BLD AUTO: 72.5 % (ref 42.7–76)
NRBC BLD AUTO-RTO: 0 /100 WBC (ref 0–0.2)
PLATELET # BLD AUTO: 150 10*3/MM3 (ref 140–450)
PMV BLD AUTO: 11.3 FL (ref 6–12)
POTASSIUM SERPL-SCNC: 3.6 MMOL/L (ref 3.5–5.2)
PROCALCITONIN SERPL-MCNC: 1.12 NG/ML (ref 0–0.25)
PROT SERPL-MCNC: 7 G/DL (ref 6–8.5)
RBC # BLD AUTO: 4.39 10*6/MM3 (ref 4.14–5.8)
SODIUM SERPL-SCNC: 134 MMOL/L (ref 136–145)
WBC # BLD AUTO: 5.94 10*3/MM3 (ref 3.4–10.8)

## 2020-08-06 PROCEDURE — 94799 UNLISTED PULMONARY SVC/PX: CPT

## 2020-08-06 PROCEDURE — 85025 COMPLETE CBC W/AUTO DIFF WBC: CPT | Performed by: FAMILY MEDICINE

## 2020-08-06 PROCEDURE — 84145 PROCALCITONIN (PCT): CPT | Performed by: FAMILY MEDICINE

## 2020-08-06 PROCEDURE — 25010000002 HEPARIN (PORCINE) PER 1000 UNITS: Performed by: PHYSICIAN ASSISTANT

## 2020-08-06 PROCEDURE — 82962 GLUCOSE BLOOD TEST: CPT

## 2020-08-06 PROCEDURE — 86140 C-REACTIVE PROTEIN: CPT | Performed by: FAMILY MEDICINE

## 2020-08-06 PROCEDURE — 74176 CT ABD & PELVIS W/O CONTRAST: CPT

## 2020-08-06 PROCEDURE — 25010000002 CEFTRIAXONE PER 250 MG: Performed by: PHYSICIAN ASSISTANT

## 2020-08-06 PROCEDURE — 25010000002 PIPERACILLIN SOD-TAZOBACTAM PER 1 G: Performed by: INTERNAL MEDICINE

## 2020-08-06 PROCEDURE — 80053 COMPREHEN METABOLIC PANEL: CPT | Performed by: FAMILY MEDICINE

## 2020-08-06 PROCEDURE — 99232 SBSQ HOSP IP/OBS MODERATE 35: CPT | Performed by: FAMILY MEDICINE

## 2020-08-06 PROCEDURE — 25010000002 AZITHROMYCIN PER 500 MG: Performed by: PHYSICIAN ASSISTANT

## 2020-08-06 PROCEDURE — 99232 SBSQ HOSP IP/OBS MODERATE 35: CPT | Performed by: INTERNAL MEDICINE

## 2020-08-06 RX ORDER — LOVASTATIN 40 MG/1
40 TABLET ORAL NIGHTLY
Status: DISCONTINUED | OUTPATIENT
Start: 2020-08-07 | End: 2020-08-13 | Stop reason: HOSPADM

## 2020-08-06 RX ORDER — SODIUM CHLORIDE 9 MG/ML
75 INJECTION, SOLUTION INTRAVENOUS CONTINUOUS
Status: ACTIVE | OUTPATIENT
Start: 2020-08-06 | End: 2020-08-06

## 2020-08-06 RX ADMIN — ASPIRIN 81 MG: 81 TABLET, COATED ORAL at 08:20

## 2020-08-06 RX ADMIN — METHYLDOPA 500 MG: 250 TABLET, FILM COATED ORAL at 08:20

## 2020-08-06 RX ADMIN — HYDRALAZINE HYDROCHLORIDE 100 MG: 50 TABLET, FILM COATED ORAL at 04:47

## 2020-08-06 RX ADMIN — AZITHROMYCIN 500 MG: 500 INJECTION, POWDER, LYOPHILIZED, FOR SOLUTION INTRAVENOUS at 08:21

## 2020-08-06 RX ADMIN — AMLODIPINE BESYLATE 10 MG: 10 TABLET ORAL at 08:20

## 2020-08-06 RX ADMIN — HEPARIN SODIUM 5000 UNITS: 5000 INJECTION INTRAVENOUS; SUBCUTANEOUS at 20:02

## 2020-08-06 RX ADMIN — ALBUTEROL SULFATE 2.5 MG: 2.5 SOLUTION RESPIRATORY (INHALATION) at 11:49

## 2020-08-06 RX ADMIN — CARVEDILOL 12.5 MG: 12.5 TABLET, FILM COATED ORAL at 08:20

## 2020-08-06 RX ADMIN — METHYLDOPA 500 MG: 250 TABLET, FILM COATED ORAL at 20:06

## 2020-08-06 RX ADMIN — LEVOTHYROXINE SODIUM 88 MCG: 88 TABLET ORAL at 04:47

## 2020-08-06 RX ADMIN — ATORVASTATIN CALCIUM 10 MG: 10 TABLET, FILM COATED ORAL at 08:20

## 2020-08-06 RX ADMIN — TAZOBACTAM SODIUM AND PIPERACILLIN SODIUM 3.38 G: 375; 3 INJECTION, SOLUTION INTRAVENOUS at 20:40

## 2020-08-06 RX ADMIN — DIVALPROEX SODIUM 250 MG: 500 TABLET, DELAYED RELEASE ORAL at 11:18

## 2020-08-06 RX ADMIN — DIVALPROEX SODIUM 500 MG: 500 TABLET, DELAYED RELEASE ORAL at 20:06

## 2020-08-06 RX ADMIN — ACETAMINOPHEN 650 MG: 325 TABLET, FILM COATED ORAL at 04:48

## 2020-08-06 RX ADMIN — TERAZOSIN HYDROCHLORIDE 10 MG: 5 CAPSULE ORAL at 20:06

## 2020-08-06 RX ADMIN — HEPARIN SODIUM 5000 UNITS: 5000 INJECTION INTRAVENOUS; SUBCUTANEOUS at 08:21

## 2020-08-06 RX ADMIN — ACETAMINOPHEN 650 MG: 325 TABLET, FILM COATED ORAL at 20:06

## 2020-08-06 RX ADMIN — CEFTRIAXONE SODIUM 1 G: 1 INJECTION, POWDER, FOR SOLUTION INTRAMUSCULAR; INTRAVENOUS at 04:46

## 2020-08-06 RX ADMIN — Medication 1 CAPSULE: at 08:20

## 2020-08-06 RX ADMIN — SODIUM CHLORIDE, PRESERVATIVE FREE 10 ML: 5 INJECTION INTRAVENOUS at 20:07

## 2020-08-06 RX ADMIN — DIVALPROEX SODIUM 500 MG: 500 TABLET, DELAYED RELEASE ORAL at 04:48

## 2020-08-06 RX ADMIN — TERAZOSIN HYDROCHLORIDE 10 MG: 5 CAPSULE ORAL at 08:20

## 2020-08-06 RX ADMIN — ACETAMINOPHEN 650 MG: 325 TABLET, FILM COATED ORAL at 11:18

## 2020-08-06 RX ADMIN — SODIUM CHLORIDE 75 ML/HR: 9 INJECTION, SOLUTION INTRAVENOUS at 11:20

## 2020-08-06 RX ADMIN — ESCITALOPRAM OXALATE 20 MG: 20 TABLET ORAL at 08:20

## 2020-08-06 NOTE — DISCHARGE SUMMARY
Physician Discharge Summary     Patient ID:  Andre Agosto  [id number]  65 y.o.  1955    Admit date: 7/13/2020    Discharge date and time: 7/20/2020 10:35 AM     Admitting Physician: Yoselin Johnson MD     Primary Physician: Keven Bear MD    Discharge Physician: Yoselin Johnson MD    Admission Diagnoses: Accelerated hypertension [I10]  SOB (shortness of breath) on exertion [R06.02]    Discharge Diagnoses:   Patient Active Problem List    Diagnosis   • Fever [R50.9]   • Dyspnea [R06.00]   • Suspected COVID-19 virus infection [Z20.828]   • SOB (shortness of breath) on exertion [R06.02]   • Pneumonia of right lower lobe due to infectious organism [J18.9]   • History of CVA (cerebrovascular accident) [Z86.73]   • ALEX (obstructive sleep apnea) [G47.33]   • TIA (transient ischemic attack) [G45.9]   • Abnormal stress test [R94.39]   • Coronary artery disease involving native coronary artery of native heart without angina pectoris [I25.10]   • Hypertension [I10]   • Hyperlipidemia [E78.5]   • Epilepsy (CMS/[facility]) [G40.909]   • Obesity [E66.9]   • Dyspepsia [R10.13]   • BiPAP (biphasic positive airway pressure) dependence [Z99.89]   • Diabetes mellitus without complication (CMS/[facility]) [E11.9]   • Seizure disorder (CMS/[facility]) [G40.909]   • CVA (cerebral vascular accident) (CMS/[facility]) [I63.9]       Cardiology Procedures this admission:  None      Hospital Course:     Admitted with accelerated HTN and dyspnea.  Following control of BP dyspnea improved.  Given toradol for HA.  Creatinine raul to 2.  Losartan held.  Following multiple medication adjustments the BP was mostly 140-160 systolic.  Patient was felt ready for discharge to home.      Discharge Exam:    Vitals:    07/20/20 1000   BP:    Pulse: 64   Resp:    Temp:    SpO2:       General: Alert and oriented.   Cardiovascular: Heart has a nondisplaced focal PMI. Regular rate and rhythm without murmur, gallop or rub.  Lungs: Clear without rales or wheezes.  Equal expansion is noted.   Abdomen: Soft, nontender.  Extremities: Show no edema.   Skin: warm and dry.    Disposition: Patient will be discharged home    Patient discharge medications:      Your medication list      START taking these medications      Instructions Last Dose Given Next Dose Due   methyldopa 500 MG tablet  Commonly known as:  ALDOMET      Take 1 tablet by mouth Every 12 (Twelve) Hours.       terazosin 10 MG capsule  Commonly known as:  HYTRIN      Take 1 capsule by mouth Every 12 (Twelve) Hours.          CHANGE how you take these medications      Instructions Last Dose Given Next Dose Due   amLODIPine 10 MG tablet  Commonly known as:  NORVASC  Start taking on:  July 21, 2020  What changed:    · medication strength  · how much to take  · when to take this      Take 1 tablet by mouth Daily.       Evolocumab 140 MG/ML solution auto-injector  What changed:  See the new instructions.      INJECT 1ML UNDER THE SKIN AS DIRECTED EVERY 14 DAYS       hydrALAZINE 100 MG tablet  Commonly known as:  APRESOLINE  What changed:  when to take this      Take 1 tablet by mouth 2 (Two) Times a Day.          CONTINUE taking these medications      Instructions Last Dose Given Next Dose Due   aspirin 81 MG tablet      Take 81 mg by mouth 2 (Two) Times a Day.       carvedilol 12.5 MG tablet  Commonly known as:  Coreg      Take 1 tablet by mouth 2 (Two) Times a Day With Meals.       divalproex 250 MG DR tablet  Commonly known as:  DEPAKOTE      Take 1 tablet by mouth 3 (Three) Times a Day. Take 2 in the AM 1 at noon and 2 in the pm. Per DR Clifford.       escitalopram 20 MG tablet  Commonly known as:  LEXAPRO      Take 1 tablet by mouth Daily.       levothyroxine 88 MCG tablet  Commonly known as:  SYNTHROID, LEVOTHROID      Take 1 tablet by mouth Daily.       losartan 100 MG tablet  Commonly known as:  Cozaar      Take 1 tablet by mouth Daily.       lovastatin 40 MG tablet  Commonly known as:  MEVACOR      Take 1 tablet by mouth  Daily With Dinner.       Semaglutide(0.25 or 0.5MG/DOS) 2 MG/1.5ML solution pen-injector  Commonly known as:  Ozempic (0.25 or 0.5 MG/DOSE)      Inject 0.5 mg under the skin into the appropriate area as directed 1 (One) Time Per Week.       triamterene-hydrochlorothiazide 37.5-25 MG per capsule  Commonly known as:  Dyazide      Take 1 capsule by mouth Every Morning.          STOP taking these medications    potassium chloride ER 20 MEQ tablet controlled-release ER tablet  Commonly known as:  K-TAB              Where to Get Your Medications      These medications were sent to [facility] [place] [address]    Hours:  7:00 AM-5:30 PM M-F, 8:00 AM-4:30 PM Sat-Sun Phone:  [phone]   · amLODIPine 10 MG tablet  · hydrALAZINE 100 MG tablet  · methyldopa 500 MG tablet  · terazosin 10 MG capsule         Referenced discharge instructions provided by nursing for diet and activity.    Follow Up: 1 week w BMP    Signed:  Yoselin Johnson MD  8/6/2020  09:50

## 2020-08-07 LAB
ALBUMIN SERPL-MCNC: 3.3 G/DL (ref 3.5–5.2)
ALBUMIN/GLOB SERPL: 1 G/DL
ALP SERPL-CCNC: 45 U/L (ref 39–117)
ALT SERPL W P-5'-P-CCNC: 27 U/L (ref 1–41)
ANION GAP SERPL CALCULATED.3IONS-SCNC: 15 MMOL/L (ref 5–15)
AST SERPL-CCNC: 30 U/L (ref 1–40)
BASOPHILS # BLD AUTO: 0.03 10*3/MM3 (ref 0–0.2)
BASOPHILS NFR BLD AUTO: 0.5 % (ref 0–1.5)
BILIRUB SERPL-MCNC: 0.4 MG/DL (ref 0–1.2)
BUN SERPL-MCNC: 52 MG/DL (ref 8–23)
BUN/CREAT SERPL: 21 (ref 7–25)
CALCIUM SPEC-SCNC: 8.2 MG/DL (ref 8.6–10.5)
CHLORIDE SERPL-SCNC: 94 MMOL/L (ref 98–107)
CO2 SERPL-SCNC: 22 MMOL/L (ref 22–29)
CREAT SERPL-MCNC: 2.48 MG/DL (ref 0.76–1.27)
CRP SERPL-MCNC: 20.98 MG/DL (ref 0–0.5)
DEPRECATED RDW RBC AUTO: 49.3 FL (ref 37–54)
EOSINOPHIL # BLD AUTO: 0.45 10*3/MM3 (ref 0–0.4)
EOSINOPHIL NFR BLD AUTO: 8.1 % (ref 0.3–6.2)
ERYTHROCYTE [DISTWIDTH] IN BLOOD BY AUTOMATED COUNT: 14.7 % (ref 12.3–15.4)
GFR SERPL CREATININE-BSD FRML MDRD: 26 ML/MIN/1.73
GLOBULIN UR ELPH-MCNC: 3.3 GM/DL
GLUCOSE BLDC GLUCOMTR-MCNC: 102 MG/DL (ref 70–130)
GLUCOSE BLDC GLUCOMTR-MCNC: 105 MG/DL (ref 70–130)
GLUCOSE SERPL-MCNC: 99 MG/DL (ref 65–99)
HCT VFR BLD AUTO: 37.9 % (ref 37.5–51)
HGB BLD-MCNC: 12.5 G/DL (ref 13–17.7)
IMM GRANULOCYTES # BLD AUTO: 0.03 10*3/MM3 (ref 0–0.05)
IMM GRANULOCYTES NFR BLD AUTO: 0.5 % (ref 0–0.5)
LYMPHOCYTES # BLD AUTO: 0.98 10*3/MM3 (ref 0.7–3.1)
LYMPHOCYTES NFR BLD AUTO: 17.7 % (ref 19.6–45.3)
MCH RBC QN AUTO: 30 PG (ref 26.6–33)
MCHC RBC AUTO-ENTMCNC: 33 G/DL (ref 31.5–35.7)
MCV RBC AUTO: 91.1 FL (ref 79–97)
MONOCYTES # BLD AUTO: 0.33 10*3/MM3 (ref 0.1–0.9)
MONOCYTES NFR BLD AUTO: 5.9 % (ref 5–12)
MRSA DNA SPEC QL NAA+PROBE: NEGATIVE
NEUTROPHILS NFR BLD AUTO: 3.73 10*3/MM3 (ref 1.7–7)
NEUTROPHILS NFR BLD AUTO: 67.3 % (ref 42.7–76)
NRBC BLD AUTO-RTO: 0 /100 WBC (ref 0–0.2)
PLATELET # BLD AUTO: 149 10*3/MM3 (ref 140–450)
PMV BLD AUTO: 11.6 FL (ref 6–12)
POTASSIUM SERPL-SCNC: 3.4 MMOL/L (ref 3.5–5.2)
POTASSIUM SERPL-SCNC: 3.4 MMOL/L (ref 3.5–5.2)
PROT SERPL-MCNC: 6.6 G/DL (ref 6–8.5)
RBC # BLD AUTO: 4.16 10*6/MM3 (ref 4.14–5.8)
SODIUM SERPL-SCNC: 131 MMOL/L (ref 136–145)
WBC # BLD AUTO: 5.55 10*3/MM3 (ref 3.4–10.8)

## 2020-08-07 PROCEDURE — 84132 ASSAY OF SERUM POTASSIUM: CPT | Performed by: NURSE PRACTITIONER

## 2020-08-07 PROCEDURE — 25010000002 HEPARIN (PORCINE) PER 1000 UNITS: Performed by: PHYSICIAN ASSISTANT

## 2020-08-07 PROCEDURE — 99232 SBSQ HOSP IP/OBS MODERATE 35: CPT | Performed by: FAMILY MEDICINE

## 2020-08-07 PROCEDURE — 82962 GLUCOSE BLOOD TEST: CPT

## 2020-08-07 PROCEDURE — 87641 MR-STAPH DNA AMP PROBE: CPT | Performed by: INTERNAL MEDICINE

## 2020-08-07 PROCEDURE — 80053 COMPREHEN METABOLIC PANEL: CPT | Performed by: FAMILY MEDICINE

## 2020-08-07 PROCEDURE — 86140 C-REACTIVE PROTEIN: CPT | Performed by: INTERNAL MEDICINE

## 2020-08-07 PROCEDURE — 25010000002 PIPERACILLIN SOD-TAZOBACTAM PER 1 G

## 2020-08-07 PROCEDURE — 85025 COMPLETE CBC W/AUTO DIFF WBC: CPT | Performed by: FAMILY MEDICINE

## 2020-08-07 PROCEDURE — 99232 SBSQ HOSP IP/OBS MODERATE 35: CPT | Performed by: INTERNAL MEDICINE

## 2020-08-07 RX ORDER — CARVEDILOL 6.25 MG/1
6.25 TABLET ORAL 2 TIMES DAILY WITH MEALS
Status: DISCONTINUED | OUTPATIENT
Start: 2020-08-07 | End: 2020-08-13 | Stop reason: HOSPADM

## 2020-08-07 RX ORDER — POTASSIUM CHLORIDE 750 MG/1
20 CAPSULE, EXTENDED RELEASE ORAL ONCE
Status: COMPLETED | OUTPATIENT
Start: 2020-08-07 | End: 2020-08-07

## 2020-08-07 RX ORDER — HYDRALAZINE HYDROCHLORIDE 50 MG/1
100 TABLET, FILM COATED ORAL EVERY 12 HOURS SCHEDULED
Status: DISCONTINUED | OUTPATIENT
Start: 2020-08-07 | End: 2020-08-10

## 2020-08-07 RX ADMIN — TAZOBACTAM SODIUM AND PIPERACILLIN SODIUM 3.38 G: 375; 3 INJECTION, SOLUTION INTRAVENOUS at 04:13

## 2020-08-07 RX ADMIN — HYDRALAZINE HYDROCHLORIDE 100 MG: 50 TABLET, FILM COATED ORAL at 05:25

## 2020-08-07 RX ADMIN — HEPARIN SODIUM 5000 UNITS: 5000 INJECTION INTRAVENOUS; SUBCUTANEOUS at 08:24

## 2020-08-07 RX ADMIN — DIVALPROEX SODIUM 250 MG: 500 TABLET, DELAYED RELEASE ORAL at 12:40

## 2020-08-07 RX ADMIN — Medication 1 CAPSULE: at 08:20

## 2020-08-07 RX ADMIN — TERAZOSIN HYDROCHLORIDE 10 MG: 5 CAPSULE ORAL at 20:17

## 2020-08-07 RX ADMIN — ASPIRIN 81 MG: 81 TABLET, COATED ORAL at 08:21

## 2020-08-07 RX ADMIN — TERAZOSIN HYDROCHLORIDE 10 MG: 5 CAPSULE ORAL at 08:20

## 2020-08-07 RX ADMIN — AMLODIPINE BESYLATE 10 MG: 10 TABLET ORAL at 10:54

## 2020-08-07 RX ADMIN — POTASSIUM CHLORIDE 20 MEQ: 10 CAPSULE, COATED, EXTENDED RELEASE ORAL at 05:44

## 2020-08-07 RX ADMIN — DIVALPROEX SODIUM 500 MG: 500 TABLET, DELAYED RELEASE ORAL at 20:16

## 2020-08-07 RX ADMIN — ACETAMINOPHEN 650 MG: 325 TABLET, FILM COATED ORAL at 20:18

## 2020-08-07 RX ADMIN — TAZOBACTAM SODIUM AND PIPERACILLIN SODIUM 3.38 G: 375; 3 INJECTION, SOLUTION INTRAVENOUS at 12:40

## 2020-08-07 RX ADMIN — HYDRALAZINE HYDROCHLORIDE 100 MG: 50 TABLET, FILM COATED ORAL at 20:18

## 2020-08-07 RX ADMIN — METHYLDOPA 500 MG: 250 TABLET, FILM COATED ORAL at 08:22

## 2020-08-07 RX ADMIN — DIVALPROEX SODIUM 500 MG: 500 TABLET, DELAYED RELEASE ORAL at 05:29

## 2020-08-07 RX ADMIN — SODIUM CHLORIDE, PRESERVATIVE FREE 10 ML: 5 INJECTION INTRAVENOUS at 20:20

## 2020-08-07 RX ADMIN — CARVEDILOL 12.5 MG: 12.5 TABLET, FILM COATED ORAL at 08:21

## 2020-08-07 RX ADMIN — ACETAMINOPHEN 650 MG: 325 TABLET, FILM COATED ORAL at 12:50

## 2020-08-07 RX ADMIN — TAZOBACTAM SODIUM AND PIPERACILLIN SODIUM 3.38 G: 375; 3 INJECTION, SOLUTION INTRAVENOUS at 20:14

## 2020-08-07 RX ADMIN — LEVOTHYROXINE SODIUM 88 MCG: 88 TABLET ORAL at 05:23

## 2020-08-07 RX ADMIN — ACETAMINOPHEN 650 MG: 325 TABLET, FILM COATED ORAL at 02:12

## 2020-08-07 RX ADMIN — ESCITALOPRAM OXALATE 20 MG: 20 TABLET ORAL at 08:20

## 2020-08-07 RX ADMIN — LOVASTATIN 40 MG: 40 TABLET ORAL at 20:29

## 2020-08-07 RX ADMIN — HEPARIN SODIUM 5000 UNITS: 5000 INJECTION INTRAVENOUS; SUBCUTANEOUS at 20:19

## 2020-08-07 RX ADMIN — METHYLDOPA 500 MG: 250 TABLET, FILM COATED ORAL at 20:16

## 2020-08-08 LAB
ANION GAP SERPL CALCULATED.3IONS-SCNC: 13 MMOL/L (ref 5–15)
BASOPHILS # BLD AUTO: 0.03 10*3/MM3 (ref 0–0.2)
BASOPHILS NFR BLD AUTO: 0.6 % (ref 0–1.5)
BUN SERPL-MCNC: 62 MG/DL (ref 8–23)
BUN/CREAT SERPL: 26.3 (ref 7–25)
CALCIUM SPEC-SCNC: 8.5 MG/DL (ref 8.6–10.5)
CHLORIDE SERPL-SCNC: 95 MMOL/L (ref 98–107)
CO2 SERPL-SCNC: 22 MMOL/L (ref 22–29)
CREAT SERPL-MCNC: 2.36 MG/DL (ref 0.76–1.27)
CREAT UR-MCNC: 185.7 MG/DL
DEPRECATED RDW RBC AUTO: 50.1 FL (ref 37–54)
EOSINOPHIL # BLD AUTO: 0.37 10*3/MM3 (ref 0–0.4)
EOSINOPHIL NFR BLD AUTO: 7.8 % (ref 0.3–6.2)
ERYTHROCYTE [DISTWIDTH] IN BLOOD BY AUTOMATED COUNT: 14.8 % (ref 12.3–15.4)
GFR SERPL CREATININE-BSD FRML MDRD: 28 ML/MIN/1.73
GLUCOSE BLDC GLUCOMTR-MCNC: 169 MG/DL (ref 70–130)
GLUCOSE SERPL-MCNC: 100 MG/DL (ref 65–99)
HCT VFR BLD AUTO: 39.2 % (ref 37.5–51)
HGB BLD-MCNC: 12.8 G/DL (ref 13–17.7)
IMM GRANULOCYTES # BLD AUTO: 0.02 10*3/MM3 (ref 0–0.05)
IMM GRANULOCYTES NFR BLD AUTO: 0.4 % (ref 0–0.5)
LYMPHOCYTES # BLD AUTO: 0.89 10*3/MM3 (ref 0.7–3.1)
LYMPHOCYTES NFR BLD AUTO: 18.8 % (ref 19.6–45.3)
MCH RBC QN AUTO: 30 PG (ref 26.6–33)
MCHC RBC AUTO-ENTMCNC: 32.7 G/DL (ref 31.5–35.7)
MCV RBC AUTO: 92 FL (ref 79–97)
MONOCYTES # BLD AUTO: 0.32 10*3/MM3 (ref 0.1–0.9)
MONOCYTES NFR BLD AUTO: 6.8 % (ref 5–12)
NEUTROPHILS NFR BLD AUTO: 3.1 10*3/MM3 (ref 1.7–7)
NEUTROPHILS NFR BLD AUTO: 65.6 % (ref 42.7–76)
NRBC BLD AUTO-RTO: 0 /100 WBC (ref 0–0.2)
PLATELET # BLD AUTO: 179 10*3/MM3 (ref 140–450)
PMV BLD AUTO: 11.5 FL (ref 6–12)
POTASSIUM SERPL-SCNC: 3.4 MMOL/L (ref 3.5–5.2)
PROT UR-MCNC: 34.6 MG/DL
RBC # BLD AUTO: 4.26 10*6/MM3 (ref 4.14–5.8)
SODIUM SERPL-SCNC: 130 MMOL/L (ref 136–145)
SODIUM UR-SCNC: <20 MMOL/L
WBC # BLD AUTO: 4.73 10*3/MM3 (ref 3.4–10.8)

## 2020-08-08 PROCEDURE — 25010000002 HEPARIN (PORCINE) PER 1000 UNITS: Performed by: PHYSICIAN ASSISTANT

## 2020-08-08 PROCEDURE — 84300 ASSAY OF URINE SODIUM: CPT | Performed by: INTERNAL MEDICINE

## 2020-08-08 PROCEDURE — 97162 PT EVAL MOD COMPLEX 30 MIN: CPT | Performed by: PHYSICAL THERAPIST

## 2020-08-08 PROCEDURE — 80048 BASIC METABOLIC PNL TOTAL CA: CPT | Performed by: FAMILY MEDICINE

## 2020-08-08 PROCEDURE — 99233 SBSQ HOSP IP/OBS HIGH 50: CPT | Performed by: INTERNAL MEDICINE

## 2020-08-08 PROCEDURE — 82570 ASSAY OF URINE CREATININE: CPT | Performed by: INTERNAL MEDICINE

## 2020-08-08 PROCEDURE — 84156 ASSAY OF PROTEIN URINE: CPT | Performed by: INTERNAL MEDICINE

## 2020-08-08 PROCEDURE — 85025 COMPLETE CBC W/AUTO DIFF WBC: CPT | Performed by: FAMILY MEDICINE

## 2020-08-08 PROCEDURE — 63710000001 INSULIN LISPRO (HUMAN) PER 5 UNITS: Performed by: PHYSICIAN ASSISTANT

## 2020-08-08 PROCEDURE — 25010000002 PIPERACILLIN SOD-TAZOBACTAM PER 1 G

## 2020-08-08 PROCEDURE — 99231 SBSQ HOSP IP/OBS SF/LOW 25: CPT | Performed by: NURSE PRACTITIONER

## 2020-08-08 PROCEDURE — 82962 GLUCOSE BLOOD TEST: CPT

## 2020-08-08 RX ORDER — SODIUM CHLORIDE 9 MG/ML
50 INJECTION, SOLUTION INTRAVENOUS CONTINUOUS
Status: DISCONTINUED | OUTPATIENT
Start: 2020-08-08 | End: 2020-08-09

## 2020-08-08 RX ADMIN — DIVALPROEX SODIUM 250 MG: 500 TABLET, DELAYED RELEASE ORAL at 13:10

## 2020-08-08 RX ADMIN — DIVALPROEX SODIUM 500 MG: 500 TABLET, DELAYED RELEASE ORAL at 06:00

## 2020-08-08 RX ADMIN — HYDRALAZINE HYDROCHLORIDE 100 MG: 50 TABLET, FILM COATED ORAL at 20:01

## 2020-08-08 RX ADMIN — ASPIRIN 81 MG: 81 TABLET, COATED ORAL at 08:56

## 2020-08-08 RX ADMIN — CARVEDILOL 6.25 MG: 6.25 TABLET, FILM COATED ORAL at 08:55

## 2020-08-08 RX ADMIN — HEPARIN SODIUM 5000 UNITS: 5000 INJECTION INTRAVENOUS; SUBCUTANEOUS at 20:03

## 2020-08-08 RX ADMIN — TAZOBACTAM SODIUM AND PIPERACILLIN SODIUM 3.38 G: 375; 3 INJECTION, SOLUTION INTRAVENOUS at 13:09

## 2020-08-08 RX ADMIN — SODIUM CHLORIDE, PRESERVATIVE FREE 10 ML: 5 INJECTION INTRAVENOUS at 20:03

## 2020-08-08 RX ADMIN — INSULIN LISPRO 2 UNITS: 100 INJECTION, SOLUTION INTRAVENOUS; SUBCUTANEOUS at 08:54

## 2020-08-08 RX ADMIN — TAZOBACTAM SODIUM AND PIPERACILLIN SODIUM 3.38 G: 375; 3 INJECTION, SOLUTION INTRAVENOUS at 03:35

## 2020-08-08 RX ADMIN — CARVEDILOL 6.25 MG: 6.25 TABLET, FILM COATED ORAL at 17:38

## 2020-08-08 RX ADMIN — ESCITALOPRAM OXALATE 20 MG: 20 TABLET ORAL at 08:55

## 2020-08-08 RX ADMIN — SODIUM CHLORIDE, PRESERVATIVE FREE 10 ML: 5 INJECTION INTRAVENOUS at 08:56

## 2020-08-08 RX ADMIN — SODIUM CHLORIDE 50 ML/HR: 9 INJECTION, SOLUTION INTRAVENOUS at 19:16

## 2020-08-08 RX ADMIN — LOVASTATIN 40 MG: 40 TABLET ORAL at 20:02

## 2020-08-08 RX ADMIN — METHYLDOPA 500 MG: 250 TABLET, FILM COATED ORAL at 08:56

## 2020-08-08 RX ADMIN — HYDRALAZINE HYDROCHLORIDE 100 MG: 50 TABLET, FILM COATED ORAL at 08:55

## 2020-08-08 RX ADMIN — TERAZOSIN HYDROCHLORIDE 10 MG: 5 CAPSULE ORAL at 20:01

## 2020-08-08 RX ADMIN — HEPARIN SODIUM 5000 UNITS: 5000 INJECTION INTRAVENOUS; SUBCUTANEOUS at 08:55

## 2020-08-08 RX ADMIN — AMLODIPINE BESYLATE 10 MG: 10 TABLET ORAL at 08:55

## 2020-08-08 RX ADMIN — TAZOBACTAM SODIUM AND PIPERACILLIN SODIUM 3.38 G: 375; 3 INJECTION, SOLUTION INTRAVENOUS at 19:58

## 2020-08-08 RX ADMIN — TERAZOSIN HYDROCHLORIDE 10 MG: 5 CAPSULE ORAL at 08:55

## 2020-08-08 RX ADMIN — Medication 1 CAPSULE: at 08:55

## 2020-08-08 RX ADMIN — DIVALPROEX SODIUM 500 MG: 500 TABLET, DELAYED RELEASE ORAL at 20:00

## 2020-08-08 RX ADMIN — LEVOTHYROXINE SODIUM 88 MCG: 88 TABLET ORAL at 05:59

## 2020-08-08 RX ADMIN — METHYLDOPA 500 MG: 250 TABLET, FILM COATED ORAL at 20:00

## 2020-08-09 LAB
25(OH)D3 SERPL-MCNC: 22.1 NG/ML (ref 30–100)
ALBUMIN SERPL-MCNC: 2.8 G/DL (ref 3.5–5.2)
ANION GAP SERPL CALCULATED.3IONS-SCNC: 14 MMOL/L (ref 5–15)
BACTERIA SPEC AEROBE CULT: NORMAL
BACTERIA SPEC AEROBE CULT: NORMAL
BUN SERPL-MCNC: 62 MG/DL (ref 8–23)
BUN/CREAT SERPL: 23.1 (ref 7–25)
CALCIUM SPEC-SCNC: 8.3 MG/DL (ref 8.6–10.5)
CALCIUM SPEC-SCNC: 8.4 MG/DL (ref 8.6–10.5)
CHLORIDE SERPL-SCNC: 96 MMOL/L (ref 98–107)
CO2 SERPL-SCNC: 22 MMOL/L (ref 22–29)
CREAT SERPL-MCNC: 2.68 MG/DL (ref 0.76–1.27)
DEPRECATED RDW RBC AUTO: 49.4 FL (ref 37–54)
ERYTHROCYTE [DISTWIDTH] IN BLOOD BY AUTOMATED COUNT: 14.7 % (ref 12.3–15.4)
GFR SERPL CREATININE-BSD FRML MDRD: 24 ML/MIN/1.73
GLUCOSE BLDC GLUCOMTR-MCNC: 107 MG/DL (ref 70–130)
GLUCOSE BLDC GLUCOMTR-MCNC: 115 MG/DL (ref 70–130)
GLUCOSE BLDC GLUCOMTR-MCNC: 117 MG/DL (ref 70–130)
GLUCOSE BLDC GLUCOMTR-MCNC: 121 MG/DL (ref 70–130)
GLUCOSE BLDC GLUCOMTR-MCNC: 135 MG/DL (ref 70–130)
GLUCOSE BLDC GLUCOMTR-MCNC: 154 MG/DL (ref 70–130)
GLUCOSE BLDC GLUCOMTR-MCNC: 157 MG/DL (ref 70–130)
GLUCOSE BLDC GLUCOMTR-MCNC: 172 MG/DL (ref 70–130)
GLUCOSE SERPL-MCNC: 129 MG/DL (ref 65–99)
HCT VFR BLD AUTO: 36.3 % (ref 37.5–51)
HGB BLD-MCNC: 11.9 G/DL (ref 13–17.7)
IRON 24H UR-MRATE: 55 MCG/DL (ref 59–158)
IRON SATN MFR SERPL: 23 % (ref 20–50)
MAGNESIUM SERPL-MCNC: 2.6 MG/DL (ref 1.6–2.4)
MCH RBC QN AUTO: 29.8 PG (ref 26.6–33)
MCHC RBC AUTO-ENTMCNC: 32.8 G/DL (ref 31.5–35.7)
MCV RBC AUTO: 90.8 FL (ref 79–97)
PHOSPHATE SERPL-MCNC: 4.8 MG/DL (ref 2.5–4.5)
PLATELET # BLD AUTO: 204 10*3/MM3 (ref 140–450)
PMV BLD AUTO: 11.4 FL (ref 6–12)
POTASSIUM SERPL-SCNC: 3 MMOL/L (ref 3.5–5.2)
PTH-INTACT SERPL-MCNC: 30 PG/ML (ref 15–65)
RBC # BLD AUTO: 4 10*6/MM3 (ref 4.14–5.8)
SODIUM SERPL-SCNC: 132 MMOL/L (ref 136–145)
TIBC SERPL-MCNC: 240 MCG/DL (ref 298–536)
TRANSFERRIN SERPL-MCNC: 161 MG/DL (ref 200–360)
WBC # BLD AUTO: 5.97 10*3/MM3 (ref 3.4–10.8)

## 2020-08-09 PROCEDURE — 83735 ASSAY OF MAGNESIUM: CPT | Performed by: INTERNAL MEDICINE

## 2020-08-09 PROCEDURE — 25010000002 PIPERACILLIN SOD-TAZOBACTAM PER 1 G

## 2020-08-09 PROCEDURE — 82962 GLUCOSE BLOOD TEST: CPT

## 2020-08-09 PROCEDURE — 84466 ASSAY OF TRANSFERRIN: CPT | Performed by: INTERNAL MEDICINE

## 2020-08-09 PROCEDURE — 83970 ASSAY OF PARATHORMONE: CPT | Performed by: INTERNAL MEDICINE

## 2020-08-09 PROCEDURE — 83540 ASSAY OF IRON: CPT | Performed by: INTERNAL MEDICINE

## 2020-08-09 PROCEDURE — 25010000002 CYANOCOBALAMIN PER 1000 MCG: Performed by: INTERNAL MEDICINE

## 2020-08-09 PROCEDURE — 63710000001 INSULIN LISPRO (HUMAN) PER 5 UNITS: Performed by: PHYSICIAN ASSISTANT

## 2020-08-09 PROCEDURE — 85027 COMPLETE CBC AUTOMATED: CPT | Performed by: INTERNAL MEDICINE

## 2020-08-09 PROCEDURE — 82310 ASSAY OF CALCIUM: CPT | Performed by: INTERNAL MEDICINE

## 2020-08-09 PROCEDURE — 25010000002 HEPARIN (PORCINE) PER 1000 UNITS: Performed by: PHYSICIAN ASSISTANT

## 2020-08-09 PROCEDURE — 99233 SBSQ HOSP IP/OBS HIGH 50: CPT | Performed by: INTERNAL MEDICINE

## 2020-08-09 PROCEDURE — 82306 VITAMIN D 25 HYDROXY: CPT | Performed by: INTERNAL MEDICINE

## 2020-08-09 PROCEDURE — 80069 RENAL FUNCTION PANEL: CPT | Performed by: INTERNAL MEDICINE

## 2020-08-09 RX ORDER — POTASSIUM CHLORIDE 750 MG/1
30 CAPSULE, EXTENDED RELEASE ORAL ONCE
Status: COMPLETED | OUTPATIENT
Start: 2020-08-09 | End: 2020-08-09

## 2020-08-09 RX ORDER — HYDROCODONE BITARTRATE AND ACETAMINOPHEN 5; 325 MG/1; MG/1
1 TABLET ORAL ONCE
Status: COMPLETED | OUTPATIENT
Start: 2020-08-09 | End: 2020-08-09

## 2020-08-09 RX ORDER — CYANOCOBALAMIN 1000 UG/ML
1000 INJECTION, SOLUTION INTRAMUSCULAR; SUBCUTANEOUS
Status: COMPLETED | OUTPATIENT
Start: 2020-08-09 | End: 2020-08-09

## 2020-08-09 RX ORDER — HEPARIN SODIUM 5000 [USP'U]/ML
5000 INJECTION, SOLUTION INTRAVENOUS; SUBCUTANEOUS EVERY 12 HOURS SCHEDULED
Status: DISCONTINUED | OUTPATIENT
Start: 2020-08-11 | End: 2020-08-13 | Stop reason: HOSPADM

## 2020-08-09 RX ORDER — HYDROCODONE BITARTRATE AND ACETAMINOPHEN 5; 325 MG/1; MG/1
1 TABLET ORAL EVERY 6 HOURS PRN
Status: DISCONTINUED | OUTPATIENT
Start: 2020-08-09 | End: 2020-08-13 | Stop reason: HOSPADM

## 2020-08-09 RX ORDER — FERROUS SULFATE 325(65) MG
325 TABLET ORAL EVERY OTHER DAY
Status: DISCONTINUED | OUTPATIENT
Start: 2020-08-09 | End: 2020-08-13 | Stop reason: HOSPADM

## 2020-08-09 RX ADMIN — SODIUM CHLORIDE, PRESERVATIVE FREE 10 ML: 5 INJECTION INTRAVENOUS at 20:19

## 2020-08-09 RX ADMIN — CYANOCOBALAMIN 1000 MCG: 1000 INJECTION, SOLUTION INTRAMUSCULAR; SUBCUTANEOUS at 16:14

## 2020-08-09 RX ADMIN — LEVOTHYROXINE SODIUM 88 MCG: 88 TABLET ORAL at 05:58

## 2020-08-09 RX ADMIN — INSULIN LISPRO 2 UNITS: 100 INJECTION, SOLUTION INTRAVENOUS; SUBCUTANEOUS at 12:48

## 2020-08-09 RX ADMIN — TAZOBACTAM SODIUM AND PIPERACILLIN SODIUM 3.38 G: 375; 3 INJECTION, SOLUTION INTRAVENOUS at 04:11

## 2020-08-09 RX ADMIN — METHYLDOPA 500 MG: 250 TABLET, FILM COATED ORAL at 20:18

## 2020-08-09 RX ADMIN — CARVEDILOL 6.25 MG: 6.25 TABLET, FILM COATED ORAL at 09:50

## 2020-08-09 RX ADMIN — DIVALPROEX SODIUM 500 MG: 500 TABLET, DELAYED RELEASE ORAL at 05:59

## 2020-08-09 RX ADMIN — DIVALPROEX SODIUM 250 MG: 500 TABLET, DELAYED RELEASE ORAL at 12:48

## 2020-08-09 RX ADMIN — ESCITALOPRAM OXALATE 20 MG: 20 TABLET ORAL at 09:50

## 2020-08-09 RX ADMIN — ACETAMINOPHEN 650 MG: 325 TABLET, FILM COATED ORAL at 09:50

## 2020-08-09 RX ADMIN — POTASSIUM CHLORIDE 30 MEQ: 10 CAPSULE, COATED, EXTENDED RELEASE ORAL at 09:49

## 2020-08-09 RX ADMIN — TAZOBACTAM SODIUM AND PIPERACILLIN SODIUM 3.38 G: 375; 3 INJECTION, SOLUTION INTRAVENOUS at 12:47

## 2020-08-09 RX ADMIN — FERROUS SULFATE TAB 325 MG (65 MG ELEMENTAL FE) 325 MG: 325 (65 FE) TAB at 16:15

## 2020-08-09 RX ADMIN — TERAZOSIN HYDROCHLORIDE 10 MG: 5 CAPSULE ORAL at 20:18

## 2020-08-09 RX ADMIN — DIVALPROEX SODIUM 500 MG: 500 TABLET, DELAYED RELEASE ORAL at 20:18

## 2020-08-09 RX ADMIN — HYDRALAZINE HYDROCHLORIDE 100 MG: 50 TABLET, FILM COATED ORAL at 09:49

## 2020-08-09 RX ADMIN — ASPIRIN 81 MG: 81 TABLET, COATED ORAL at 09:50

## 2020-08-09 RX ADMIN — HYDROCODONE BITARTRATE AND ACETAMINOPHEN 1 TABLET: 5; 325 TABLET ORAL at 12:48

## 2020-08-09 RX ADMIN — HYDRALAZINE HYDROCHLORIDE 100 MG: 50 TABLET, FILM COATED ORAL at 20:18

## 2020-08-09 RX ADMIN — HEPARIN SODIUM 5000 UNITS: 5000 INJECTION INTRAVENOUS; SUBCUTANEOUS at 09:50

## 2020-08-09 RX ADMIN — TERAZOSIN HYDROCHLORIDE 10 MG: 5 CAPSULE ORAL at 09:49

## 2020-08-09 RX ADMIN — METHYLDOPA 500 MG: 250 TABLET, FILM COATED ORAL at 09:49

## 2020-08-09 RX ADMIN — AMLODIPINE BESYLATE 10 MG: 10 TABLET ORAL at 09:49

## 2020-08-09 RX ADMIN — CARVEDILOL 6.25 MG: 6.25 TABLET, FILM COATED ORAL at 17:22

## 2020-08-09 RX ADMIN — TAZOBACTAM SODIUM AND PIPERACILLIN SODIUM 3.38 G: 375; 3 INJECTION, SOLUTION INTRAVENOUS at 20:17

## 2020-08-09 RX ADMIN — Medication 1 CAPSULE: at 09:50

## 2020-08-10 LAB
ALBUMIN SERPL-MCNC: 3.1 G/DL (ref 3.5–5.2)
ANION GAP SERPL CALCULATED.3IONS-SCNC: 15 MMOL/L (ref 5–15)
APTT PPP: 38.3 SECONDS (ref 50–95)
BUN SERPL-MCNC: 64 MG/DL (ref 8–23)
BUN/CREAT SERPL: 25.1 (ref 7–25)
CALCIUM SPEC-SCNC: 8.4 MG/DL (ref 8.6–10.5)
CHLORIDE SERPL-SCNC: 97 MMOL/L (ref 98–107)
CO2 SERPL-SCNC: 21 MMOL/L (ref 22–29)
CREAT SERPL-MCNC: 2.55 MG/DL (ref 0.76–1.27)
GFR SERPL CREATININE-BSD FRML MDRD: 26 ML/MIN/1.73
GLUCOSE BLDC GLUCOMTR-MCNC: 120 MG/DL (ref 70–130)
GLUCOSE BLDC GLUCOMTR-MCNC: 123 MG/DL (ref 70–130)
GLUCOSE BLDC GLUCOMTR-MCNC: 98 MG/DL (ref 70–130)
GLUCOSE SERPL-MCNC: 122 MG/DL (ref 65–99)
INR PPP: 1.26 (ref 0.85–1.16)
PHOSPHATE SERPL-MCNC: 4.6 MG/DL (ref 2.5–4.5)
PLATELET # BLD AUTO: 235 10*3/MM3 (ref 140–450)
POTASSIUM SERPL-SCNC: 3 MMOL/L (ref 3.5–5.2)
PROTHROMBIN TIME: 15.5 SECONDS (ref 11.5–14)
SODIUM SERPL-SCNC: 133 MMOL/L (ref 136–145)

## 2020-08-10 PROCEDURE — 85049 AUTOMATED PLATELET COUNT: CPT | Performed by: RADIOLOGY

## 2020-08-10 PROCEDURE — 97116 GAIT TRAINING THERAPY: CPT

## 2020-08-10 PROCEDURE — 99232 SBSQ HOSP IP/OBS MODERATE 35: CPT | Performed by: INTERNAL MEDICINE

## 2020-08-10 PROCEDURE — 85730 THROMBOPLASTIN TIME PARTIAL: CPT | Performed by: RADIOLOGY

## 2020-08-10 PROCEDURE — 82962 GLUCOSE BLOOD TEST: CPT

## 2020-08-10 PROCEDURE — 99233 SBSQ HOSP IP/OBS HIGH 50: CPT | Performed by: INTERNAL MEDICINE

## 2020-08-10 PROCEDURE — 97110 THERAPEUTIC EXERCISES: CPT

## 2020-08-10 PROCEDURE — 80069 RENAL FUNCTION PANEL: CPT | Performed by: INTERNAL MEDICINE

## 2020-08-10 PROCEDURE — 25010000002 PIPERACILLIN SOD-TAZOBACTAM PER 1 G

## 2020-08-10 PROCEDURE — 25010000002 FUROSEMIDE PER 20 MG: Performed by: INTERNAL MEDICINE

## 2020-08-10 PROCEDURE — 85610 PROTHROMBIN TIME: CPT | Performed by: RADIOLOGY

## 2020-08-10 RX ORDER — FUROSEMIDE 10 MG/ML
60 INJECTION INTRAMUSCULAR; INTRAVENOUS ONCE
Status: COMPLETED | OUTPATIENT
Start: 2020-08-10 | End: 2020-08-10

## 2020-08-10 RX ORDER — AMLODIPINE BESYLATE 5 MG/1
5 TABLET ORAL
Status: DISCONTINUED | OUTPATIENT
Start: 2020-08-11 | End: 2020-08-13 | Stop reason: HOSPADM

## 2020-08-10 RX ORDER — COLCHICINE 0.6 MG/1
0.6 TABLET ORAL EVERY 12 HOURS SCHEDULED
Status: DISCONTINUED | OUTPATIENT
Start: 2020-08-10 | End: 2020-08-12

## 2020-08-10 RX ORDER — HYDRALAZINE HYDROCHLORIDE 50 MG/1
100 TABLET, FILM COATED ORAL EVERY 8 HOURS SCHEDULED
Status: DISCONTINUED | OUTPATIENT
Start: 2020-08-10 | End: 2020-08-13 | Stop reason: HOSPADM

## 2020-08-10 RX ADMIN — TAZOBACTAM SODIUM AND PIPERACILLIN SODIUM 3.38 G: 375; 3 INJECTION, SOLUTION INTRAVENOUS at 20:24

## 2020-08-10 RX ADMIN — TERAZOSIN HYDROCHLORIDE 10 MG: 5 CAPSULE ORAL at 22:16

## 2020-08-10 RX ADMIN — METHYLDOPA 500 MG: 250 TABLET, FILM COATED ORAL at 22:15

## 2020-08-10 RX ADMIN — ACETAMINOPHEN 650 MG: 325 TABLET, FILM COATED ORAL at 04:06

## 2020-08-10 RX ADMIN — ESCITALOPRAM OXALATE 20 MG: 20 TABLET ORAL at 08:31

## 2020-08-10 RX ADMIN — FUROSEMIDE 60 MG: 10 INJECTION, SOLUTION INTRAMUSCULAR; INTRAVENOUS at 12:57

## 2020-08-10 RX ADMIN — DIVALPROEX SODIUM 500 MG: 500 TABLET, DELAYED RELEASE ORAL at 06:07

## 2020-08-10 RX ADMIN — DIVALPROEX SODIUM 250 MG: 500 TABLET, DELAYED RELEASE ORAL at 12:51

## 2020-08-10 RX ADMIN — TAZOBACTAM SODIUM AND PIPERACILLIN SODIUM 3.38 G: 375; 3 INJECTION, SOLUTION INTRAVENOUS at 03:48

## 2020-08-10 RX ADMIN — DIVALPROEX SODIUM 500 MG: 500 TABLET, DELAYED RELEASE ORAL at 22:16

## 2020-08-10 RX ADMIN — HYDROCODONE BITARTRATE AND ACETAMINOPHEN 1 TABLET: 5; 325 TABLET ORAL at 06:20

## 2020-08-10 RX ADMIN — HYDRALAZINE HYDROCHLORIDE 100 MG: 50 TABLET, FILM COATED ORAL at 22:15

## 2020-08-10 RX ADMIN — AMLODIPINE BESYLATE 10 MG: 10 TABLET ORAL at 08:30

## 2020-08-10 RX ADMIN — COLCHICINE 0.6 MG: 0.6 TABLET, FILM COATED ORAL at 22:16

## 2020-08-10 RX ADMIN — COLCHICINE 0.6 MG: 0.6 TABLET, FILM COATED ORAL at 12:50

## 2020-08-10 RX ADMIN — LEVOTHYROXINE SODIUM 88 MCG: 88 TABLET ORAL at 06:07

## 2020-08-10 RX ADMIN — Medication 1 CAPSULE: at 08:30

## 2020-08-10 RX ADMIN — TAZOBACTAM SODIUM AND PIPERACILLIN SODIUM 3.38 G: 375; 3 INJECTION, SOLUTION INTRAVENOUS at 12:50

## 2020-08-10 RX ADMIN — HYDROCODONE BITARTRATE AND ACETAMINOPHEN 1 TABLET: 5; 325 TABLET ORAL at 17:49

## 2020-08-10 RX ADMIN — SODIUM CHLORIDE, PRESERVATIVE FREE 10 ML: 5 INJECTION INTRAVENOUS at 08:33

## 2020-08-10 RX ADMIN — TERAZOSIN HYDROCHLORIDE 10 MG: 5 CAPSULE ORAL at 08:30

## 2020-08-10 RX ADMIN — CARVEDILOL 6.25 MG: 6.25 TABLET, FILM COATED ORAL at 17:49

## 2020-08-10 RX ADMIN — HYDRALAZINE HYDROCHLORIDE 100 MG: 50 TABLET, FILM COATED ORAL at 08:30

## 2020-08-10 RX ADMIN — METHYLDOPA 500 MG: 250 TABLET, FILM COATED ORAL at 08:39

## 2020-08-10 RX ADMIN — CARVEDILOL 6.25 MG: 6.25 TABLET, FILM COATED ORAL at 08:31

## 2020-08-11 LAB
ALBUMIN SERPL-MCNC: 3.2 G/DL (ref 3.5–5.2)
ANION GAP SERPL CALCULATED.3IONS-SCNC: 12 MMOL/L (ref 5–15)
BUN SERPL-MCNC: 60 MG/DL (ref 8–23)
BUN/CREAT SERPL: 23.3 (ref 7–25)
CALCIUM SPEC-SCNC: 8.5 MG/DL (ref 8.6–10.5)
CHLORIDE SERPL-SCNC: 99 MMOL/L (ref 98–107)
CO2 SERPL-SCNC: 25 MMOL/L (ref 22–29)
CREAT SERPL-MCNC: 2.57 MG/DL (ref 0.76–1.27)
GFR SERPL CREATININE-BSD FRML MDRD: 25 ML/MIN/1.73
GLUCOSE BLDC GLUCOMTR-MCNC: 105 MG/DL (ref 70–130)
GLUCOSE BLDC GLUCOMTR-MCNC: 105 MG/DL (ref 70–130)
GLUCOSE BLDC GLUCOMTR-MCNC: 119 MG/DL (ref 70–130)
GLUCOSE BLDC GLUCOMTR-MCNC: 147 MG/DL (ref 70–130)
GLUCOSE BLDC GLUCOMTR-MCNC: 180 MG/DL (ref 70–130)
GLUCOSE SERPL-MCNC: 107 MG/DL (ref 65–99)
PHOSPHATE SERPL-MCNC: 4.8 MG/DL (ref 2.5–4.5)
POTASSIUM SERPL-SCNC: 3.1 MMOL/L (ref 3.5–5.2)
SODIUM SERPL-SCNC: 136 MMOL/L (ref 136–145)

## 2020-08-11 PROCEDURE — 99232 SBSQ HOSP IP/OBS MODERATE 35: CPT | Performed by: INTERNAL MEDICINE

## 2020-08-11 PROCEDURE — 82088 ASSAY OF ALDOSTERONE: CPT | Performed by: INTERNAL MEDICINE

## 2020-08-11 PROCEDURE — 25010000002 FUROSEMIDE PER 20 MG: Performed by: INTERNAL MEDICINE

## 2020-08-11 PROCEDURE — 99233 SBSQ HOSP IP/OBS HIGH 50: CPT | Performed by: INTERNAL MEDICINE

## 2020-08-11 PROCEDURE — 25010000002 PIPERACILLIN SOD-TAZOBACTAM PER 1 G: Performed by: INTERNAL MEDICINE

## 2020-08-11 PROCEDURE — 80069 RENAL FUNCTION PANEL: CPT | Performed by: INTERNAL MEDICINE

## 2020-08-11 PROCEDURE — 25010000002 PIPERACILLIN SOD-TAZOBACTAM PER 1 G

## 2020-08-11 PROCEDURE — 84244 ASSAY OF RENIN: CPT | Performed by: INTERNAL MEDICINE

## 2020-08-11 PROCEDURE — 82962 GLUCOSE BLOOD TEST: CPT

## 2020-08-11 PROCEDURE — 25010000002 HEPARIN (PORCINE) PER 1000 UNITS: Performed by: INTERNAL MEDICINE

## 2020-08-11 RX ORDER — FUROSEMIDE 10 MG/ML
80 INJECTION INTRAMUSCULAR; INTRAVENOUS ONCE
Status: COMPLETED | OUTPATIENT
Start: 2020-08-11 | End: 2020-08-11

## 2020-08-11 RX ADMIN — HEPARIN SODIUM 5000 UNITS: 5000 INJECTION INTRAVENOUS; SUBCUTANEOUS at 21:20

## 2020-08-11 RX ADMIN — DIVALPROEX SODIUM 500 MG: 500 TABLET, DELAYED RELEASE ORAL at 21:20

## 2020-08-11 RX ADMIN — METHYLDOPA 500 MG: 250 TABLET, FILM COATED ORAL at 21:19

## 2020-08-11 RX ADMIN — TERAZOSIN HYDROCHLORIDE 10 MG: 5 CAPSULE ORAL at 08:53

## 2020-08-11 RX ADMIN — HYDROCODONE BITARTRATE AND ACETAMINOPHEN 1 TABLET: 5; 325 TABLET ORAL at 05:03

## 2020-08-11 RX ADMIN — METHYLDOPA 500 MG: 250 TABLET, FILM COATED ORAL at 08:53

## 2020-08-11 RX ADMIN — SODIUM CHLORIDE, PRESERVATIVE FREE 10 ML: 5 INJECTION INTRAVENOUS at 21:21

## 2020-08-11 RX ADMIN — TAZOBACTAM SODIUM AND PIPERACILLIN SODIUM 3.38 G: 375; 3 INJECTION, SOLUTION INTRAVENOUS at 20:16

## 2020-08-11 RX ADMIN — Medication 1 CAPSULE: at 08:53

## 2020-08-11 RX ADMIN — HYDRALAZINE HYDROCHLORIDE 100 MG: 50 TABLET, FILM COATED ORAL at 21:20

## 2020-08-11 RX ADMIN — ESCITALOPRAM OXALATE 20 MG: 20 TABLET ORAL at 08:54

## 2020-08-11 RX ADMIN — TERAZOSIN HYDROCHLORIDE 10 MG: 5 CAPSULE ORAL at 21:20

## 2020-08-11 RX ADMIN — FERROUS SULFATE TAB 325 MG (65 MG ELEMENTAL FE) 325 MG: 325 (65 FE) TAB at 08:54

## 2020-08-11 RX ADMIN — LEVOTHYROXINE SODIUM 88 MCG: 88 TABLET ORAL at 05:02

## 2020-08-11 RX ADMIN — HYDRALAZINE HYDROCHLORIDE 100 MG: 50 TABLET, FILM COATED ORAL at 05:03

## 2020-08-11 RX ADMIN — COLCHICINE 0.6 MG: 0.6 TABLET, FILM COATED ORAL at 08:54

## 2020-08-11 RX ADMIN — CARVEDILOL 6.25 MG: 6.25 TABLET, FILM COATED ORAL at 08:53

## 2020-08-11 RX ADMIN — FUROSEMIDE 80 MG: 10 INJECTION, SOLUTION INTRAMUSCULAR; INTRAVENOUS at 08:52

## 2020-08-11 RX ADMIN — TAZOBACTAM SODIUM AND PIPERACILLIN SODIUM 3.38 G: 375; 3 INJECTION, SOLUTION INTRAVENOUS at 04:18

## 2020-08-11 RX ADMIN — TAZOBACTAM SODIUM AND PIPERACILLIN SODIUM 3.38 G: 375; 3 INJECTION, SOLUTION INTRAVENOUS at 12:22

## 2020-08-11 RX ADMIN — HYDROCODONE BITARTRATE AND ACETAMINOPHEN 1 TABLET: 5; 325 TABLET ORAL at 23:36

## 2020-08-11 RX ADMIN — FUROSEMIDE 80 MG: 10 INJECTION, SOLUTION INTRAMUSCULAR; INTRAVENOUS at 18:20

## 2020-08-11 RX ADMIN — COLCHICINE 0.6 MG: 0.6 TABLET, FILM COATED ORAL at 21:20

## 2020-08-11 RX ADMIN — SODIUM CHLORIDE, PRESERVATIVE FREE 10 ML: 5 INJECTION INTRAVENOUS at 08:54

## 2020-08-11 RX ADMIN — DIVALPROEX SODIUM 250 MG: 500 TABLET, DELAYED RELEASE ORAL at 12:22

## 2020-08-11 RX ADMIN — HEPARIN SODIUM 5000 UNITS: 5000 INJECTION INTRAVENOUS; SUBCUTANEOUS at 08:54

## 2020-08-11 RX ADMIN — CARVEDILOL 6.25 MG: 6.25 TABLET, FILM COATED ORAL at 18:20

## 2020-08-11 RX ADMIN — DIVALPROEX SODIUM 500 MG: 500 TABLET, DELAYED RELEASE ORAL at 05:03

## 2020-08-11 RX ADMIN — AMLODIPINE BESYLATE 5 MG: 5 TABLET ORAL at 08:53

## 2020-08-12 ENCOUNTER — APPOINTMENT (OUTPATIENT)
Dept: CARDIOLOGY | Facility: HOSPITAL | Age: 65
End: 2020-08-12

## 2020-08-12 LAB
ALBUMIN SERPL-MCNC: 3.1 G/DL (ref 3.5–5.2)
ANION GAP SERPL CALCULATED.3IONS-SCNC: 13 MMOL/L (ref 5–15)
BH CV ECHO MEAS - BSA(HAYCOCK): 2.8 M^2
BH CV ECHO MEAS - BSA: 2.7 M^2
BH CV ECHO MEAS - BZI_BMI: 38.5 KILOGRAMS/M^2
BH CV ECHO MEAS - BZI_METRIC_HEIGHT: 193 CM
BH CV ECHO MEAS - BZI_METRIC_WEIGHT: 143.3 KG
BH CV LOWER VASCULAR LEFT COMMON FEMORAL AUGMENT: NORMAL
BH CV LOWER VASCULAR LEFT COMMON FEMORAL COMPRESS: NORMAL
BH CV LOWER VASCULAR LEFT COMMON FEMORAL PHASIC: NORMAL
BH CV LOWER VASCULAR LEFT COMMON FEMORAL SPONT: NORMAL
BH CV LOWER VASCULAR RIGHT COMMON FEMORAL AUGMENT: NORMAL
BH CV LOWER VASCULAR RIGHT COMMON FEMORAL COMPRESS: NORMAL
BH CV LOWER VASCULAR RIGHT COMMON FEMORAL PHASIC: NORMAL
BH CV LOWER VASCULAR RIGHT COMMON FEMORAL SPONT: NORMAL
BH CV LOWER VASCULAR RIGHT DISTAL FEMORAL AUGMENT: NORMAL
BH CV LOWER VASCULAR RIGHT DISTAL FEMORAL COMPRESS: NORMAL
BH CV LOWER VASCULAR RIGHT DISTAL FEMORAL PHASIC: NORMAL
BH CV LOWER VASCULAR RIGHT DISTAL FEMORAL SPONT: NORMAL
BH CV LOWER VASCULAR RIGHT GASTRONEMIUS COMPRESS: NORMAL
BH CV LOWER VASCULAR RIGHT GREATER SAPH AK COMPRESS: NORMAL
BH CV LOWER VASCULAR RIGHT GREATER SAPH BK COMPRESS: NORMAL
BH CV LOWER VASCULAR RIGHT LESSER SAPH COMPRESS: NORMAL
BH CV LOWER VASCULAR RIGHT MID FEMORAL AUGMENT: NORMAL
BH CV LOWER VASCULAR RIGHT MID FEMORAL COMPRESS: NORMAL
BH CV LOWER VASCULAR RIGHT MID FEMORAL PHASIC: NORMAL
BH CV LOWER VASCULAR RIGHT MID FEMORAL SPONT: NORMAL
BH CV LOWER VASCULAR RIGHT PERONEAL COMPRESS: NORMAL
BH CV LOWER VASCULAR RIGHT POPLITEAL AUGMENT: NORMAL
BH CV LOWER VASCULAR RIGHT POPLITEAL COMPRESS: NORMAL
BH CV LOWER VASCULAR RIGHT POPLITEAL PHASIC: NORMAL
BH CV LOWER VASCULAR RIGHT POPLITEAL SPONT: NORMAL
BH CV LOWER VASCULAR RIGHT POSTERIOR TIBIAL COMPRESS: NORMAL
BH CV LOWER VASCULAR RIGHT PROFUNDA FEMORAL AUGMENT: NORMAL
BH CV LOWER VASCULAR RIGHT PROFUNDA FEMORAL COMPRESS: NORMAL
BH CV LOWER VASCULAR RIGHT PROFUNDA FEMORAL PHASIC: NORMAL
BH CV LOWER VASCULAR RIGHT PROFUNDA FEMORAL SPONT: NORMAL
BH CV LOWER VASCULAR RIGHT PROXIMAL FEMORAL AUGMENT: NORMAL
BH CV LOWER VASCULAR RIGHT PROXIMAL FEMORAL COMPRESS: NORMAL
BH CV LOWER VASCULAR RIGHT PROXIMAL FEMORAL PHASIC: NORMAL
BH CV LOWER VASCULAR RIGHT PROXIMAL FEMORAL SPONT: NORMAL
BH CV LOWER VASCULAR RIGHT SAPHENOFEMORAL JUNCTION AUGMENT: NORMAL
BH CV LOWER VASCULAR RIGHT SAPHENOFEMORAL JUNCTION COMPRESS: NORMAL
BH CV LOWER VASCULAR RIGHT SAPHENOFEMORAL JUNCTION PHASIC: NORMAL
BH CV LOWER VASCULAR RIGHT SAPHENOFEMORAL JUNCTION SPONT: NORMAL
BH CV LOWER VASCULAR RIGHT SOLEAL COMPRESS: NORMAL
BUN SERPL-MCNC: 59 MG/DL (ref 8–23)
BUN/CREAT SERPL: 23.6 (ref 7–25)
CALCIUM SPEC-SCNC: 8.6 MG/DL (ref 8.6–10.5)
CHLORIDE SERPL-SCNC: 98 MMOL/L (ref 98–107)
CO2 SERPL-SCNC: 24 MMOL/L (ref 22–29)
CREAT SERPL-MCNC: 2.5 MG/DL (ref 0.76–1.27)
DEPRECATED RDW RBC AUTO: 50.8 FL (ref 37–54)
ERYTHROCYTE [DISTWIDTH] IN BLOOD BY AUTOMATED COUNT: 14.8 % (ref 12.3–15.4)
GFR SERPL CREATININE-BSD FRML MDRD: 26 ML/MIN/1.73
GLUCOSE BLDC GLUCOMTR-MCNC: 113 MG/DL (ref 70–130)
GLUCOSE BLDC GLUCOMTR-MCNC: 124 MG/DL (ref 70–130)
GLUCOSE BLDC GLUCOMTR-MCNC: 145 MG/DL (ref 70–130)
GLUCOSE BLDC GLUCOMTR-MCNC: 159 MG/DL (ref 70–130)
GLUCOSE SERPL-MCNC: 106 MG/DL (ref 65–99)
HCT VFR BLD AUTO: 34.8 % (ref 37.5–51)
HGB BLD-MCNC: 11.3 G/DL (ref 13–17.7)
MCH RBC QN AUTO: 30.1 PG (ref 26.6–33)
MCHC RBC AUTO-ENTMCNC: 32.5 G/DL (ref 31.5–35.7)
MCV RBC AUTO: 92.6 FL (ref 79–97)
PHOSPHATE SERPL-MCNC: 4.6 MG/DL (ref 2.5–4.5)
PLATELET # BLD AUTO: 284 10*3/MM3 (ref 140–450)
PMV BLD AUTO: 10.9 FL (ref 6–12)
POTASSIUM SERPL-SCNC: 3.4 MMOL/L (ref 3.5–5.2)
RBC # BLD AUTO: 3.76 10*6/MM3 (ref 4.14–5.8)
SODIUM SERPL-SCNC: 135 MMOL/L (ref 136–145)
WBC # BLD AUTO: 6 10*3/MM3 (ref 3.4–10.8)

## 2020-08-12 PROCEDURE — 99232 SBSQ HOSP IP/OBS MODERATE 35: CPT | Performed by: INTERNAL MEDICINE

## 2020-08-12 PROCEDURE — 93971 EXTREMITY STUDY: CPT

## 2020-08-12 PROCEDURE — 93971 EXTREMITY STUDY: CPT | Performed by: INTERNAL MEDICINE

## 2020-08-12 PROCEDURE — 80069 RENAL FUNCTION PANEL: CPT | Performed by: INTERNAL MEDICINE

## 2020-08-12 PROCEDURE — 97530 THERAPEUTIC ACTIVITIES: CPT

## 2020-08-12 PROCEDURE — 85027 COMPLETE CBC AUTOMATED: CPT | Performed by: INTERNAL MEDICINE

## 2020-08-12 PROCEDURE — 25010000002 HEPARIN (PORCINE) PER 1000 UNITS: Performed by: INTERNAL MEDICINE

## 2020-08-12 PROCEDURE — 97110 THERAPEUTIC EXERCISES: CPT

## 2020-08-12 PROCEDURE — 25010000002 FUROSEMIDE PER 20 MG: Performed by: INTERNAL MEDICINE

## 2020-08-12 PROCEDURE — 82962 GLUCOSE BLOOD TEST: CPT

## 2020-08-12 RX ORDER — FUROSEMIDE 10 MG/ML
100 INJECTION INTRAMUSCULAR; INTRAVENOUS ONCE
Status: COMPLETED | OUTPATIENT
Start: 2020-08-12 | End: 2020-08-12

## 2020-08-12 RX ADMIN — Medication 1 CAPSULE: at 08:35

## 2020-08-12 RX ADMIN — DIVALPROEX SODIUM 500 MG: 500 TABLET, DELAYED RELEASE ORAL at 06:11

## 2020-08-12 RX ADMIN — AMLODIPINE BESYLATE 5 MG: 5 TABLET ORAL at 08:35

## 2020-08-12 RX ADMIN — TERAZOSIN HYDROCHLORIDE 10 MG: 5 CAPSULE ORAL at 21:06

## 2020-08-12 RX ADMIN — HEPARIN SODIUM 5000 UNITS: 5000 INJECTION INTRAVENOUS; SUBCUTANEOUS at 08:35

## 2020-08-12 RX ADMIN — SODIUM CHLORIDE, PRESERVATIVE FREE 10 ML: 5 INJECTION INTRAVENOUS at 21:06

## 2020-08-12 RX ADMIN — FUROSEMIDE 100 MG: 10 INJECTION, SOLUTION INTRAMUSCULAR; INTRAVENOUS at 12:54

## 2020-08-12 RX ADMIN — DIVALPROEX SODIUM 250 MG: 500 TABLET, DELAYED RELEASE ORAL at 12:54

## 2020-08-12 RX ADMIN — LEVOTHYROXINE SODIUM 88 MCG: 88 TABLET ORAL at 06:11

## 2020-08-12 RX ADMIN — ESCITALOPRAM OXALATE 20 MG: 20 TABLET ORAL at 08:35

## 2020-08-12 RX ADMIN — HYDRALAZINE HYDROCHLORIDE 100 MG: 50 TABLET, FILM COATED ORAL at 06:11

## 2020-08-12 RX ADMIN — HYDRALAZINE HYDROCHLORIDE 100 MG: 50 TABLET, FILM COATED ORAL at 14:58

## 2020-08-12 RX ADMIN — SODIUM CHLORIDE, PRESERVATIVE FREE 10 ML: 5 INJECTION INTRAVENOUS at 08:35

## 2020-08-12 RX ADMIN — METHYLDOPA 500 MG: 250 TABLET, FILM COATED ORAL at 08:35

## 2020-08-12 RX ADMIN — METHYLDOPA 500 MG: 250 TABLET, FILM COATED ORAL at 22:42

## 2020-08-12 RX ADMIN — HYDRALAZINE HYDROCHLORIDE 100 MG: 50 TABLET, FILM COATED ORAL at 21:06

## 2020-08-12 RX ADMIN — LOVASTATIN 40 MG: 40 TABLET ORAL at 22:37

## 2020-08-12 RX ADMIN — HEPARIN SODIUM 5000 UNITS: 5000 INJECTION INTRAVENOUS; SUBCUTANEOUS at 21:06

## 2020-08-12 RX ADMIN — DIVALPROEX SODIUM 500 MG: 500 TABLET, DELAYED RELEASE ORAL at 22:42

## 2020-08-12 RX ADMIN — CARVEDILOL 6.25 MG: 6.25 TABLET, FILM COATED ORAL at 08:35

## 2020-08-12 RX ADMIN — CARVEDILOL 6.25 MG: 6.25 TABLET, FILM COATED ORAL at 17:54

## 2020-08-12 RX ADMIN — TERAZOSIN HYDROCHLORIDE 10 MG: 5 CAPSULE ORAL at 08:34

## 2020-08-13 ENCOUNTER — TELEPHONE (OUTPATIENT)
Dept: INTERNAL MEDICINE | Facility: CLINIC | Age: 65
End: 2020-08-13

## 2020-08-13 ENCOUNTER — READMISSION MANAGEMENT (OUTPATIENT)
Dept: CALL CENTER | Facility: HOSPITAL | Age: 65
End: 2020-08-13

## 2020-08-13 VITALS
RESPIRATION RATE: 16 BRPM | HEIGHT: 76 IN | TEMPERATURE: 97.9 F | BODY MASS INDEX: 38.36 KG/M2 | OXYGEN SATURATION: 94 % | HEART RATE: 60 BPM | DIASTOLIC BLOOD PRESSURE: 77 MMHG | WEIGHT: 315 LBS | SYSTOLIC BLOOD PRESSURE: 143 MMHG

## 2020-08-13 DIAGNOSIS — I25.10 CORONARY ARTERY DISEASE INVOLVING NATIVE CORONARY ARTERY OF NATIVE HEART WITHOUT ANGINA PECTORIS: ICD-10-CM

## 2020-08-13 DIAGNOSIS — Z86.73 HISTORY OF ISCHEMIC RIGHT MCA STROKE: ICD-10-CM

## 2020-08-13 DIAGNOSIS — G45.9 TIA (TRANSIENT ISCHEMIC ATTACK): ICD-10-CM

## 2020-08-13 LAB
ANION GAP SERPL CALCULATED.3IONS-SCNC: 12 MMOL/L (ref 5–15)
BUN SERPL-MCNC: 47 MG/DL (ref 8–23)
BUN/CREAT SERPL: 26 (ref 7–25)
CALCIUM SPEC-SCNC: 8.8 MG/DL (ref 8.6–10.5)
CHLORIDE SERPL-SCNC: 100 MMOL/L (ref 98–107)
CO2 SERPL-SCNC: 26 MMOL/L (ref 22–29)
CREAT SERPL-MCNC: 1.81 MG/DL (ref 0.76–1.27)
GFR SERPL CREATININE-BSD FRML MDRD: 38 ML/MIN/1.73
GLUCOSE BLDC GLUCOMTR-MCNC: 108 MG/DL (ref 70–130)
GLUCOSE SERPL-MCNC: 134 MG/DL (ref 65–99)
POTASSIUM SERPL-SCNC: 3.1 MMOL/L (ref 3.5–5.2)
SODIUM SERPL-SCNC: 138 MMOL/L (ref 136–145)

## 2020-08-13 PROCEDURE — 99232 SBSQ HOSP IP/OBS MODERATE 35: CPT | Performed by: INTERNAL MEDICINE

## 2020-08-13 PROCEDURE — 99239 HOSP IP/OBS DSCHRG MGMT >30: CPT | Performed by: INTERNAL MEDICINE

## 2020-08-13 PROCEDURE — 25010000002 HEPARIN (PORCINE) PER 1000 UNITS: Performed by: INTERNAL MEDICINE

## 2020-08-13 PROCEDURE — 80048 BASIC METABOLIC PNL TOTAL CA: CPT | Performed by: INTERNAL MEDICINE

## 2020-08-13 PROCEDURE — 82962 GLUCOSE BLOOD TEST: CPT

## 2020-08-13 RX ORDER — HYDRALAZINE HYDROCHLORIDE 100 MG/1
100 TABLET, FILM COATED ORAL EVERY 8 HOURS SCHEDULED
Qty: 90 TABLET | Refills: 0 | Status: SHIPPED | OUTPATIENT
Start: 2020-08-13 | End: 2020-08-27 | Stop reason: SDUPTHER

## 2020-08-13 RX ORDER — FERROUS SULFATE 325(65) MG
325 TABLET ORAL EVERY OTHER DAY
Qty: 30 TABLET | Refills: 0 | Status: SHIPPED | OUTPATIENT
Start: 2020-08-15 | End: 2020-10-26

## 2020-08-13 RX ORDER — POTASSIUM CHLORIDE 750 MG/1
40 CAPSULE, EXTENDED RELEASE ORAL ONCE
Status: COMPLETED | OUTPATIENT
Start: 2020-08-13 | End: 2020-08-13

## 2020-08-13 RX ORDER — FUROSEMIDE 40 MG/1
40 TABLET ORAL DAILY PRN
Qty: 15 TABLET | Refills: 0 | Status: SHIPPED | OUTPATIENT
Start: 2020-08-13 | End: 2020-09-15 | Stop reason: SDUPTHER

## 2020-08-13 RX ORDER — CARVEDILOL 6.25 MG/1
6.25 TABLET ORAL 2 TIMES DAILY WITH MEALS
Qty: 60 TABLET | Refills: 0 | Status: SHIPPED | OUTPATIENT
Start: 2020-08-13 | End: 2020-12-18 | Stop reason: SDUPTHER

## 2020-08-13 RX ORDER — AMLODIPINE BESYLATE 5 MG/1
5 TABLET ORAL
Qty: 30 TABLET | Refills: 0 | Status: SHIPPED | OUTPATIENT
Start: 2020-08-14 | End: 2020-09-15 | Stop reason: SDUPTHER

## 2020-08-13 RX ORDER — EVOLOCUMAB 140 MG/ML
INJECTION, SOLUTION SUBCUTANEOUS
Qty: 1 PEN | Refills: 11 | Status: SHIPPED | OUTPATIENT
Start: 2020-08-13 | End: 2020-12-18 | Stop reason: SDUPTHER

## 2020-08-13 RX ORDER — HYDROCODONE BITARTRATE AND ACETAMINOPHEN 5; 325 MG/1; MG/1
1 TABLET ORAL EVERY 6 HOURS PRN
Qty: 12 TABLET | Refills: 0 | Status: SHIPPED | OUTPATIENT
Start: 2020-08-13 | End: 2020-08-19

## 2020-08-13 RX ADMIN — POTASSIUM CHLORIDE 40 MEQ: 10 CAPSULE, COATED, EXTENDED RELEASE ORAL at 09:59

## 2020-08-13 RX ADMIN — HYDROCODONE BITARTRATE AND ACETAMINOPHEN 1 TABLET: 5; 325 TABLET ORAL at 00:46

## 2020-08-13 RX ADMIN — CARVEDILOL 6.25 MG: 6.25 TABLET, FILM COATED ORAL at 09:00

## 2020-08-13 RX ADMIN — Medication 1 CAPSULE: at 09:00

## 2020-08-13 RX ADMIN — LEVOTHYROXINE SODIUM 88 MCG: 88 TABLET ORAL at 05:27

## 2020-08-13 RX ADMIN — SODIUM CHLORIDE, PRESERVATIVE FREE 10 ML: 5 INJECTION INTRAVENOUS at 09:02

## 2020-08-13 RX ADMIN — TERAZOSIN HYDROCHLORIDE 10 MG: 5 CAPSULE ORAL at 08:59

## 2020-08-13 RX ADMIN — FERROUS SULFATE TAB 325 MG (65 MG ELEMENTAL FE) 325 MG: 325 (65 FE) TAB at 08:59

## 2020-08-13 RX ADMIN — DIVALPROEX SODIUM 500 MG: 500 TABLET, DELAYED RELEASE ORAL at 05:27

## 2020-08-13 RX ADMIN — HYDRALAZINE HYDROCHLORIDE 100 MG: 50 TABLET, FILM COATED ORAL at 05:27

## 2020-08-13 RX ADMIN — ESCITALOPRAM OXALATE 20 MG: 20 TABLET ORAL at 08:59

## 2020-08-13 RX ADMIN — METHYLDOPA 500 MG: 250 TABLET, FILM COATED ORAL at 08:59

## 2020-08-13 RX ADMIN — AMLODIPINE BESYLATE 5 MG: 5 TABLET ORAL at 09:00

## 2020-08-13 RX ADMIN — HEPARIN SODIUM 5000 UNITS: 5000 INJECTION INTRAVENOUS; SUBCUTANEOUS at 08:59

## 2020-08-13 NOTE — OUTREACH NOTE
Prep Survey      Responses   [facility] facility patient discharged from?  [place]   Is LACE score < 7 ?  No   Eligibility  [facility]   Date of Admission  08/04/20   Date of Discharge  08/13/20   Discharge Disposition  Home or Self Care   Discharge diagnosis  Acute nonoliguric kidney injury on CKD stage III   COVID-19 Test Status  Negative   Does the patient have one of the following disease processes/diagnoses(primary or secondary)?  Other   Does the patient have Home health ordered?  No   Is there a DME ordered?  Yes   What DME was ordered?  [facility]   Prep survey completed?  Yes          Becca Stacy RN

## 2020-08-13 NOTE — TELEPHONE ENCOUNTER
Dr. Bear, not sure if okay to refill this medication at this time, Andre has been in the hospital and a lot of medications have been removed from his list.

## 2020-08-13 NOTE — TELEPHONE ENCOUNTER
PATIENT JUST GOT OUT OF THE HOSPITAL WHERE HE WAS IN THERE FOR A WEEK OR MORE.  HE NEEDS TO SCHEDULE A FOLLOWUP WITH DR. ELLIS.     PLEASE CONTACT PATIENT ASAP TO SCHEDULE.    CALLBACK: [phone]

## 2020-08-14 ENCOUNTER — TRANSITIONAL CARE MANAGEMENT TELEPHONE ENCOUNTER (OUTPATIENT)
Dept: CALL CENTER | Facility: HOSPITAL | Age: 65
End: 2020-08-14

## 2020-08-14 NOTE — OUTREACH NOTE
Call Center TCM Note      Responses   [facility] patient discharged from?  [place]   COVID-19 Test Status  Negative   Does the patient have one of the following disease processes/diagnoses(primary or secondary)?  Other   TCM attempt successful?  Yes   Call start time  0953   Call end time  1002   Discharge diagnosis  Acute nonoliguric kidney injury on CKD stage III   Is patient permission given to speak with other caregiver?  Yes   List who call center can speak with  Lor Angel-daughter    Person spoke with today (if not patient) and relationship  Patient and daughter Lor (on verbal release)   Does the patient have a primary care provider?   Yes   Does the patient have an appointment with their PCP within 7 days of discharge?  Yes   Comments regarding PCP  8/17/20 at 2:15 pm   Has the patient kept scheduled appointments due by today?  N/A   What DME was ordered?  [facility]   Has all DME been delivered?  Yes   Pulse Ox monitoring  Intermittent   Pulse Ox device source  Patient   O2 Sat comments  93-95% on RA   Psychosocial issues?  No   Did the patient receive a copy of their discharge instructions?  Yes   Nursing interventions  Reviewed instructions with patient   What is the patient's perception of their health status since discharge?  Same [Lor reports patient has the same amount of swelling in his legs and feet and same amount of pain as when he was in the hospital]   Is the patient/caregiver able to teach back signs and symptoms related to disease process for when to call PCP?  Yes   Is the patient/caregiver able to teach back signs and symptoms related to disease process for when to call 911?  Yes   Is the patient/caregiver able to teach back the hierarchy of who to call/visit for symptoms/problems? PCP, Specialist, Home health nurse, Urgent Care, ED, 911  Yes   If the patient is a current smoker, are they able to teach back resources for cessation?  --  "[Nonsmoker]   TCM call completed?  Yes   Wrap up additional comments  Family was thankful for the \"help\" today          Gabrielle Rajan RN    8/14/2020, 10:03      "

## 2020-08-14 NOTE — OUTREACH NOTE
Case Management Call Center Follow-up      Responses   [facility] Call Center Tracking Reason?  No Home Health   Has the Call Center Case Management Follow-up issue been resolved?  Yes   Follow-up Comments  Social work sent a message to the  that had the patient yesterday to check on an order for home health and to set up home health care for the patient.          GERARDO Hill    8/14/2020, 10:23

## 2020-08-15 ENCOUNTER — APPOINTMENT (OUTPATIENT)
Dept: GENERAL RADIOLOGY | Facility: HOSPITAL | Age: 65
End: 2020-08-15

## 2020-08-15 ENCOUNTER — HOSPITAL ENCOUNTER (EMERGENCY)
Facility: HOSPITAL | Age: 65
Discharge: HOME | End: 2020-08-15
Attending: EMERGENCY MEDICINE | Admitting: EMERGENCY MEDICINE

## 2020-08-15 VITALS
OXYGEN SATURATION: 93 % | TEMPERATURE: 98.2 F | WEIGHT: 314 LBS | RESPIRATION RATE: 20 BRPM | DIASTOLIC BLOOD PRESSURE: 83 MMHG | HEIGHT: 76 IN | SYSTOLIC BLOOD PRESSURE: 154 MMHG | BODY MASS INDEX: 38.24 KG/M2 | HEART RATE: 64 BPM

## 2020-08-15 DIAGNOSIS — I50.9 ACUTE ON CHRONIC CONGESTIVE HEART FAILURE, UNSPECIFIED HEART FAILURE TYPE (HCC): Primary | ICD-10-CM

## 2020-08-15 LAB
ALBUMIN SERPL-MCNC: 3.7 G/DL (ref 3.5–5.2)
ALBUMIN/GLOB SERPL: 0.9 G/DL
ALP SERPL-CCNC: 43 U/L (ref 39–117)
ALT SERPL W P-5'-P-CCNC: 14 U/L (ref 1–41)
ANION GAP SERPL CALCULATED.3IONS-SCNC: 11 MMOL/L (ref 5–15)
AST SERPL-CCNC: 15 U/L (ref 1–40)
BASOPHILS # BLD AUTO: 0.06 10*3/MM3 (ref 0–0.2)
BASOPHILS NFR BLD AUTO: 0.8 % (ref 0–1.5)
BILIRUB SERPL-MCNC: 0.5 MG/DL (ref 0–1.2)
BUN SERPL-MCNC: 20 MG/DL (ref 8–23)
BUN/CREAT SERPL: 13.8 (ref 7–25)
CALCIUM SPEC-SCNC: 9.2 MG/DL (ref 8.6–10.5)
CHLORIDE SERPL-SCNC: 101 MMOL/L (ref 98–107)
CO2 SERPL-SCNC: 29 MMOL/L (ref 22–29)
CREAT SERPL-MCNC: 1.45 MG/DL (ref 0.76–1.27)
DEPRECATED RDW RBC AUTO: 49.4 FL (ref 37–54)
EOSINOPHIL # BLD AUTO: 0.26 10*3/MM3 (ref 0–0.4)
EOSINOPHIL NFR BLD AUTO: 3.6 % (ref 0.3–6.2)
ERYTHROCYTE [DISTWIDTH] IN BLOOD BY AUTOMATED COUNT: 14.5 % (ref 12.3–15.4)
GFR SERPL CREATININE-BSD FRML MDRD: 49 ML/MIN/1.73
GLOBULIN UR ELPH-MCNC: 4.1 GM/DL
GLUCOSE SERPL-MCNC: 107 MG/DL (ref 65–99)
HCT VFR BLD AUTO: 36.3 % (ref 37.5–51)
HGB BLD-MCNC: 11.9 G/DL (ref 13–17.7)
HOLD SPECIMEN: NORMAL
HOLD SPECIMEN: NORMAL
IMM GRANULOCYTES # BLD AUTO: 0.08 10*3/MM3 (ref 0–0.05)
IMM GRANULOCYTES NFR BLD AUTO: 1.1 % (ref 0–0.5)
LYMPHOCYTES # BLD AUTO: 1.53 10*3/MM3 (ref 0.7–3.1)
LYMPHOCYTES NFR BLD AUTO: 21.4 % (ref 19.6–45.3)
MCH RBC QN AUTO: 30.5 PG (ref 26.6–33)
MCHC RBC AUTO-ENTMCNC: 32.8 G/DL (ref 31.5–35.7)
MCV RBC AUTO: 93.1 FL (ref 79–97)
MONOCYTES # BLD AUTO: 0.88 10*3/MM3 (ref 0.1–0.9)
MONOCYTES NFR BLD AUTO: 12.3 % (ref 5–12)
NEUTROPHILS NFR BLD AUTO: 4.34 10*3/MM3 (ref 1.7–7)
NEUTROPHILS NFR BLD AUTO: 60.8 % (ref 42.7–76)
NRBC BLD AUTO-RTO: 0 /100 WBC (ref 0–0.2)
NT-PROBNP SERPL-MCNC: 4503 PG/ML (ref 0–900)
PLATELET # BLD AUTO: 321 10*3/MM3 (ref 140–450)
PMV BLD AUTO: 10.1 FL (ref 6–12)
POTASSIUM SERPL-SCNC: 4.1 MMOL/L (ref 3.5–5.2)
PROT SERPL-MCNC: 7.8 G/DL (ref 6–8.5)
RBC # BLD AUTO: 3.9 10*6/MM3 (ref 4.14–5.8)
SODIUM SERPL-SCNC: 141 MMOL/L (ref 136–145)
TROPONIN T SERPL-MCNC: <0.01 NG/ML (ref 0–0.03)
WBC # BLD AUTO: 7.15 10*3/MM3 (ref 3.4–10.8)
WHOLE BLOOD HOLD SPECIMEN: NORMAL
WHOLE BLOOD HOLD SPECIMEN: NORMAL

## 2020-08-15 PROCEDURE — 80053 COMPREHEN METABOLIC PANEL: CPT | Performed by: EMERGENCY MEDICINE

## 2020-08-15 PROCEDURE — 85025 COMPLETE CBC W/AUTO DIFF WBC: CPT | Performed by: EMERGENCY MEDICINE

## 2020-08-15 PROCEDURE — 96374 THER/PROPH/DIAG INJ IV PUSH: CPT

## 2020-08-15 PROCEDURE — 99284 EMERGENCY DEPT VISIT MOD MDM: CPT

## 2020-08-15 PROCEDURE — 93005 ELECTROCARDIOGRAM TRACING: CPT | Performed by: EMERGENCY MEDICINE

## 2020-08-15 PROCEDURE — 71045 X-RAY EXAM CHEST 1 VIEW: CPT

## 2020-08-15 PROCEDURE — 83880 ASSAY OF NATRIURETIC PEPTIDE: CPT | Performed by: EMERGENCY MEDICINE

## 2020-08-15 PROCEDURE — 84484 ASSAY OF TROPONIN QUANT: CPT | Performed by: EMERGENCY MEDICINE

## 2020-08-15 PROCEDURE — 25010000002 FUROSEMIDE PER 20 MG: Performed by: EMERGENCY MEDICINE

## 2020-08-15 RX ORDER — FUROSEMIDE 10 MG/ML
60 INJECTION INTRAMUSCULAR; INTRAVENOUS ONCE
Status: COMPLETED | OUTPATIENT
Start: 2020-08-15 | End: 2020-08-15

## 2020-08-15 RX ORDER — SODIUM CHLORIDE 0.9 % (FLUSH) 0.9 %
10 SYRINGE (ML) INJECTION AS NEEDED
Status: DISCONTINUED | OUTPATIENT
Start: 2020-08-15 | End: 2020-08-15 | Stop reason: HOSPADM

## 2020-08-15 RX ADMIN — FUROSEMIDE 60 MG: 10 INJECTION, SOLUTION INTRAMUSCULAR; INTRAVENOUS at 18:56

## 2020-08-15 NOTE — ED PROVIDER NOTES
EMERGENCY DEPARTMENT ENCOUNTER    Room Number:  16/16  Date of encounter:  8/16/2020  PCP: Keven Bear MD  Historian: Patient and wife      HPI:  Chief Complaint: Dyspnea    Context: Andre Agosto is a 65 y.o. male who presents to the ED c/o dyspnea which is been gradually increasing over the last 2 days.  Patient had actually been hospitalized from 8/4/2020 through 8/13/2020.  He was treated for a left lower lobe pneumonia.  His COVID was negative.  He has been taking his medications including Lasix as prescribed.  He was felt to have worsening renal function and therefore he reports his Lasix was backed down somewhat.  He now notes that his weight is up about 10 pounds over his baseline even though his dietary intake is gone down significantly.  He feels this is water weight.  He feels it in his legs and his lungs.  His dyspnea is worst in the semi-shields's and supine position.  He reports no significant increase in dyspnea with ambulation.  He has not noticed unilateral leg swelling.  He denies chest pain and hemoptysis.  He has had no fevers or chills.  No cough.  No COVID exposures, myalgias, fevers, loss of taste or smell.      PAST MEDICAL HISTORY  Active Ambulatory Problems     Diagnosis Date Noted   • Seizure disorder (CMS/[facility]) 07/20/2016   • CVA (cerebral vascular accident) (CMS/[facility]) 07/20/2016   • Diabetes mellitus without complication (CMS/[facility]) 10/24/2017   • BiPAP (biphasic positive airway pressure) dependence 03/05/2018   • Hypertension    • Hyperlipidemia    • Epilepsy (CMS/[facility])    • Obesity    • Dyspepsia    • Coronary artery disease involving native coronary artery of native heart without angina pectoris 09/12/2018   • Abnormal stress test 10/02/2018   • TIA (transient ischemic attack) 07/12/2019   • Pneumonia of right lower lobe due to infectious organism 01/01/2020   • History of CVA (cerebrovascular accident) 01/01/2020   • ALEX (obstructive sleep apnea) 01/01/2020   • SOB (shortness of  breath) on exertion 07/16/2020   • Suspected COVID-19 virus infection 08/03/2020   • Fever 08/04/2020   • Dyspnea 08/04/2020   • Fever and chills 08/06/2020     Resolved Ambulatory Problems     Diagnosis Date Noted   • Benign hypertension 06/10/2016   • ALEX on CPAP 07/20/2016   • Hypokalemia 07/20/2016   • Elevated plasma metanephrines 09/29/2016   • Sleep apnea    • Gitelman syndrome 07/12/2018     Past Medical History:   Diagnosis Date   • 6th nerve palsy, right    • Anesthesia complication    • Anxiety and depression    • Diabetes mellitus (CMS/HCC)    • Double vision    • Focal seizure (CMS/HCC)    • Heart attack (CMS/HCC) 06/15/2016   • History of Helicobacter pylori infection    • HL (hearing loss)    • Kidney stone    • Memory loss 2005   • Meningitis    • Seizures (CMS/HCC)    • Sleep apnea treated with nocturnal BiPAP    • Stroke (CMS/HCC)    • Vision loss 7/2018   • Wears glasses          PAST SURGICAL HISTORY  Past Surgical History:   Procedure Laterality Date   • CARDIAC CATHETERIZATION N/A 10/12/2018    Procedure: Left Heart Cath;  Surgeon: Yoselin Johnson MD;  Location: [facility] CATH INVASIVE LOCATION;  Service: Cardiology   • COLONOSCOPY     • CORONARY STENT PLACEMENT      x1   • ENDOSCOPY     • LAPAROSCOPIC GASTRIC BANDING  2008    s/p LAGB Realize 6/2008 by HOMERO.   • UMBILICAL HERNIA REPAIR  2008    incarcerated UHR w/ mesh by Dr. Velasquez   • WISDOM TOOTH EXTRACTION      all 4         FAMILY HISTORY  Family History   Problem Relation Age of Onset   • Stroke Mother    • Hypertension Mother    • COPD Father    • Hypertension Father          SOCIAL HISTORY  Social History     Socioeconomic History   • Marital status:      Spouse name: Not on file   • Number of children: Not on file   • Years of education: Not on file   • Highest education level: Not on file   Tobacco Use   • Smoking status: Former Smoker     Packs/day: 0.50     Years: 10.00     Pack years: 5.00     Types: Cigarettes      Start date: 1975     Last attempt to quit: 1985     Years since quittin.6   • Smokeless tobacco: Never Used   Substance and Sexual Activity   • Alcohol use: No   • Drug use: No   • Sexual activity: Defer         ALLERGIES  Statins        REVIEW OF SYSTEMS  Review of Systems     All systems reviewed and negative except for those discussed in HPI.       PHYSICAL EXAM    I have reviewed the triage vital signs and nursing notes.    ED Triage Vitals   Temp Heart Rate Resp BP SpO2   08/15/20 1507 08/15/20 1514 08/15/20 1514 08/15/20 1514 08/15/20 1514   98.2 °F (36.8 °C) 63 20 137/84 94 %      Temp src Heart Rate Source Patient Position BP Location FiO2 (%)   08/15/20 1507 -- -- -- --   Infrared           Physical Exam  GENERAL: Very obese.  Well developed.  Appears in no acute distress.  HENT: Nares patent  EYES: No scleral icterus  CV: Regular rhythm, regular rate  RESPIRATORY: Normal effort.  No audible wheezes, rales or rhonchi.  Distant breath sounds.  ABDOMEN: Soft, nontender.  Protuberant with adipose.  MUSCULOSKELETAL: No deformities.   NEURO: Alert, moves all extremities, follows commands  SKIN: Warm, dry, no rash visualized.  No lower extremity abnormalities.        LAB RESULTS  Recent Results (from the past 24 hour(s))   Comprehensive Metabolic Panel    Collection Time: 08/15/20  5:18 PM   Result Value Ref Range    Glucose 107 (H) 65 - 99 mg/dL    BUN 20 8 - 23 mg/dL    Creatinine 1.45 (H) 0.76 - 1.27 mg/dL    Sodium 141 136 - 145 mmol/L    Potassium 4.1 3.5 - 5.2 mmol/L    Chloride 101 98 - 107 mmol/L    CO2 29.0 22.0 - 29.0 mmol/L    Calcium 9.2 8.6 - 10.5 mg/dL    Total Protein 7.8 6.0 - 8.5 g/dL    Albumin 3.70 3.50 - 5.20 g/dL    ALT (SGPT) 14 1 - 41 U/L    AST (SGOT) 15 1 - 40 U/L    Alkaline Phosphatase 43 39 - 117 U/L    Total Bilirubin 0.5 0.0 - 1.2 mg/dL    eGFR Non African Amer 49 (L) >60 mL/min/1.73    Globulin 4.1 gm/dL    A/G Ratio 0.9 g/dL    BUN/Creatinine Ratio 13.8 7.0 - 25.0     Anion Gap 11.0 5.0 - 15.0 mmol/L   BNP    Collection Time: 08/15/20  5:18 PM   Result Value Ref Range    proBNP 4,503.0 (H) 0.0 - 900.0 pg/mL   Troponin    Collection Time: 08/15/20  5:18 PM   Result Value Ref Range    Troponin T <0.010 0.000 - 0.030 ng/mL   Light Blue Top    Collection Time: 08/15/20  5:18 PM   Result Value Ref Range    Extra Tube hold for add-on    Green Top (Gel)    Collection Time: 08/15/20  5:18 PM   Result Value Ref Range    Extra Tube Hold for add-ons.    Lavender Top    Collection Time: 08/15/20  5:18 PM   Result Value Ref Range    Extra Tube hold for add-on    Gold Top - SST    Collection Time: 08/15/20  5:18 PM   Result Value Ref Range    Extra Tube Hold for add-ons.    CBC Auto Differential    Collection Time: 08/15/20  5:18 PM   Result Value Ref Range    WBC 7.15 3.40 - 10.80 10*3/mm3    RBC 3.90 (L) 4.14 - 5.80 10*6/mm3    Hemoglobin 11.9 (L) 13.0 - 17.7 g/dL    Hematocrit 36.3 (L) 37.5 - 51.0 %    MCV 93.1 79.0 - 97.0 fL    MCH 30.5 26.6 - 33.0 pg    MCHC 32.8 31.5 - 35.7 g/dL    RDW 14.5 12.3 - 15.4 %    RDW-SD 49.4 37.0 - 54.0 fl    MPV 10.1 6.0 - 12.0 fL    Platelets 321 140 - 450 10*3/mm3    Neutrophil % 60.8 42.7 - 76.0 %    Lymphocyte % 21.4 19.6 - 45.3 %    Monocyte % 12.3 (H) 5.0 - 12.0 %    Eosinophil % 3.6 0.3 - 6.2 %    Basophil % 0.8 0.0 - 1.5 %    Immature Grans % 1.1 (H) 0.0 - 0.5 %    Neutrophils, Absolute 4.34 1.70 - 7.00 10*3/mm3    Lymphocytes, Absolute 1.53 0.70 - 3.10 10*3/mm3    Monocytes, Absolute 0.88 0.10 - 0.90 10*3/mm3    Eosinophils, Absolute 0.26 0.00 - 0.40 10*3/mm3    Basophils, Absolute 0.06 0.00 - 0.20 10*3/mm3    Immature Grans, Absolute 0.08 (H) 0.00 - 0.05 10*3/mm3    nRBC 0.0 0.0 - 0.2 /100 WBC       Ordered the above labs and independently reviewed the results.        RADIOLOGY  Xr Chest 1 View    Result Date: 8/15/2020  EXAMINATION: XR CHEST SINGLE VIEW - 08/15/2020  INDICATION: Shortness of air.  COMPARISON: 08/01/2020  FINDINGS: Heart is  enlarged. Vasculature cephalized. There is a mild diffuse interstitial disease pattern resembling interstitial edema. There is probably trace pleural effusion as well.      Appearance of mild CHF.  DICTATED:   08/15/2020 EDITED/ls :   08/15/2020    This report was finalized on 8/15/2020 9:34 PM by Dr. Edmundo Hou MD.        I ordered and reviewed the above noted radiographic studies.    I viewed images of chest x-ray which showed vascular congestion per my independent interpretation.  See radiologist's dictation for official interpretation.        PROCEDURES    Procedures      MEDICATIONS GIVEN IN ER    Medications   furosemide (LASIX) injection 60 mg (60 mg Intravenous Given 8/15/20 1856)         PROGRESS, DATA ANALYSIS, CONSULTS, AND MEDICAL DECISION MAKING    All labs have been independently reviewed by me.  All radiology studies have been reviewed by me and the radiologist dictating the report.   EKG's have been independently viewed and interpreted by me.      Differential diagnoses: Acute CHF versus acute coronary syndrome versus worsening pneumonia, etc.      ED Course as of Aug 16 1152   Sat Aug 15, 2020   2015 We have administered Lasix 60 mg IV.  The patient has diuresed 600 mL's and is feeling as if you may produce more within the next half hour.  We will observe him a little longer.  He currently has no dyspnea.  He feels far better than upon arrival.  He is very comfortable with the plan for outpatient care with increase of his Lasix and close follow-up with cardiology.  His wife is comfortable as well.    [MS]   2037 Patient is produced another 400 mL's of urine.  He is feeling well and ready for discharge.    [MS]      ED Course User Index  [MS] Diogo Ugalde MD             AS OF 11:52 VITALS:    BP - 154/83  HR - 64  TEMP - 98.2 °F (36.8 °C) (Infrared)  O2 SATS - 93%        DIAGNOSIS  Final diagnoses:   Acute on chronic congestive heart failure, unspecified heart failure type (CMS/HCC)          DISPOSITION  DISCHARGE    Patient discharged in stable condition.    Reviewed implications of results, diagnosis, meds, responsibility to follow up, warning signs and symptoms of possible worsening, potential complications and reasons to return to ER.    Patient/Family voiced understanding of above instructions.    Discussed plan for discharge, as there is no emergent indication for admission.  Pt/family is agreeable and understands need for follow up and possible repeat testing.  Pt/family is aware that discharge does not mean that nothing is wrong but that it indicates no emergency is currently present that requires admission and they must continue care with follow-up as given below or with a physician of their choice.     FOLLOW-UP  Yoselin Johnson MD  [address]  [place] [address]  [phone]      NEXT AVAILABLE APPOINTMENT - RECHECK OF CONDITION    Keven Bear MD  [address]  [address]  [facility] [id number] [phone]      NEXT AVAILABLE APPOINTMENT - RECHECK OF CONDITION    [facility]  [address] [place] [id number] [phone]    IF YOU HAVE ANY CONCERN OF WORSENING CONDITION         Medication List      No changes were made to your prescriptions during this visit.                  Diogo Ugalde MD  08/16/20 1693

## 2020-08-16 NOTE — DISCHARGE INSTRUCTIONS
Increase your dose of Lasix, taking 40 mg in the morning and 20 mg in the afternoon.  Start this tomorrow morning.  Continue with this until instructed otherwise by either your cardiologist or primary care provider.    I recommend a recheck at least with a potassium check within the next week.    Please weigh yourself every day and bring weight chart to follow-up appointments.    Other than noted above with Lasix change, Please review the medications you are supposed to be taking according to prior physician recommendations. I have not changed your home medications during this visit. If your discharge instructions indicate that I have changed your home medications, this is not the case, and you should disregard. If you have any questions about the medication you should be taking at home, please call your physician.

## 2020-08-17 ENCOUNTER — OFFICE VISIT (OUTPATIENT)
Dept: INTERNAL MEDICINE | Facility: CLINIC | Age: 65
End: 2020-08-17

## 2020-08-17 ENCOUNTER — LAB (OUTPATIENT)
Dept: LAB | Facility: HOSPITAL | Age: 65
End: 2020-08-17

## 2020-08-17 ENCOUNTER — NURSE TRIAGE (OUTPATIENT)
Dept: CALL CENTER | Facility: HOSPITAL | Age: 65
End: 2020-08-17

## 2020-08-17 VITALS
DIASTOLIC BLOOD PRESSURE: 77 MMHG | WEIGHT: 311 LBS | BODY MASS INDEX: 37.87 KG/M2 | OXYGEN SATURATION: 93 % | HEIGHT: 76 IN | SYSTOLIC BLOOD PRESSURE: 146 MMHG

## 2020-08-17 DIAGNOSIS — N18.9 CHRONIC KIDNEY DISEASE, UNSPECIFIED CKD STAGE: ICD-10-CM

## 2020-08-17 DIAGNOSIS — G72.0 STATIN MYOPATHY: ICD-10-CM

## 2020-08-17 DIAGNOSIS — T46.6X5A STATIN MYOPATHY: ICD-10-CM

## 2020-08-17 DIAGNOSIS — I10 ESSENTIAL HYPERTENSION: Primary | ICD-10-CM

## 2020-08-17 DIAGNOSIS — I50.31 ACUTE DIASTOLIC CHF (CONGESTIVE HEART FAILURE) (HCC): ICD-10-CM

## 2020-08-17 PROBLEM — R60.0 LOCALIZED EDEMA: Status: ACTIVE | Noted: 2020-08-17

## 2020-08-17 LAB
ANION GAP SERPL CALCULATED.3IONS-SCNC: 12.4 MMOL/L (ref 5–15)
BUN SERPL-MCNC: 18 MG/DL (ref 8–23)
BUN/CREAT SERPL: 12.2 (ref 7–25)
CALCIUM SPEC-SCNC: 9.5 MG/DL (ref 8.6–10.5)
CHLORIDE SERPL-SCNC: 98 MMOL/L (ref 98–107)
CK SERPL-CCNC: 36 U/L (ref 20–200)
CO2 SERPL-SCNC: 26.6 MMOL/L (ref 22–29)
CREAT SERPL-MCNC: 1.48 MG/DL (ref 0.76–1.27)
GFR SERPL CREATININE-BSD FRML MDRD: 48 ML/MIN/1.73
GLUCOSE SERPL-MCNC: 85 MG/DL (ref 65–99)
POTASSIUM SERPL-SCNC: 3.9 MMOL/L (ref 3.5–5.2)
SODIUM SERPL-SCNC: 137 MMOL/L (ref 136–145)

## 2020-08-17 PROCEDURE — 36415 COLL VENOUS BLD VENIPUNCTURE: CPT | Performed by: INTERNAL MEDICINE

## 2020-08-17 PROCEDURE — 83874 ASSAY OF MYOGLOBIN: CPT | Performed by: INTERNAL MEDICINE

## 2020-08-17 PROCEDURE — 82550 ASSAY OF CK (CPK): CPT | Performed by: INTERNAL MEDICINE

## 2020-08-17 PROCEDURE — 99214 OFFICE O/P EST MOD 30 MIN: CPT | Performed by: INTERNAL MEDICINE

## 2020-08-17 PROCEDURE — 80048 BASIC METABOLIC PNL TOTAL CA: CPT | Performed by: INTERNAL MEDICINE

## 2020-08-17 NOTE — TELEPHONE ENCOUNTER
"Caller states patient's appt was cancelled this morning with PCP due to fever in past couple of days. States feels that patient really needs follow up evaluation. Warm transfer to Dr Bear's office, [place].    Reason for Disposition  • Requesting regular office appointment    Additional Information  • Negative: [1] Caller is not with the adult (patient) AND [2] reporting urgent symptoms  • Negative: Lab result questions  • Negative: Medication questions  • Negative: Caller can't be reached by phone  • Negative: Caller has already spoken to PCP or another triager  • Negative: RN needs further essential information from caller in order to complete triage    Answer Assessment - Initial Assessment Questions  1. REASON FOR CALL or QUESTION: \"What is your reason for calling today?\" or \"How can I best help you?\" or \"What question do you have that I can help answer?\"      Caller asking who patient should see for follow up appt since was turned away at PCP appt this morning due to fever in past few days.    Protocols used: INFORMATION ONLY CALL - NO TRIAGE-ADULT-      "

## 2020-08-17 NOTE — PROGRESS NOTES
.You have chosen to receive care through a telehealth visit.  Do you consent to use a video/audio connection for your medical care today? YES  Those participating in this video visit are listed below:  Patient - Andre Agosto  Daughter - Lois Agosto  Doctor - Keven Chahal

## 2020-08-17 NOTE — PROGRESS NOTES
Subjective     Patient ID: Andre Agosto is a 65 y.o. male. Patient is here for management of multiple medical problems.   F/u hospitalization.    Pt denied access today   Chief Complaint   Patient presents with   • Shortness of Breath     patient having shortness of breath, patient states he is suppose to be taking an extra Lasix 20 in the evening    • Edema     patient having increased swelling and pain in legs and feet.     History of Present Illness   Recent acute on chronic renal failure.  Hosptialized. Cr 2.68 now 1.45. Since hospitalization.  Pt was in er recently. Iv Lasix and repeat er visit with lasix 60mg daily.      Has fever today    Had a fever in the last 14 days.    Feels bad    Still with soa.      Tested multiple times for covid.    o2 sat 90's low 90's.      The following portions of the patient's history were reviewed and updated as appropriate: allergies, current medications, past family history, past medical history, past social history, past surgical history and problem list.    Review of Systems   Constitutional: Negative for fatigue.   HENT: Negative for congestion, dental problem and ear discharge.    Respiratory: Negative for cough and shortness of breath.    Cardiovascular: Negative for palpitations and leg swelling.   Musculoskeletal: Negative for back pain and gait problem.   Psychiatric/Behavioral: Negative for self-injury and sleep disturbance. The patient is not nervous/anxious.    All other systems reviewed and are negative.      Current Outpatient Medications:   •  amLODIPine (NORVASC) 5 MG tablet, Take 1 tablet by mouth Daily., Disp: 30 tablet, Rfl: 0  •  aspirin 81 MG tablet, Take 81 mg by mouth 2 (Two) Times a Day., Disp: 30 tablet, Rfl: 11  •  carvedilol (COREG) 6.25 MG tablet, Take 1 tablet by mouth 2 (Two) Times a Day With Meals., Disp: 60 tablet, Rfl: 0  •  divalproex (DEPAKOTE) 250 MG DR tablet, Take 1 tablet by mouth 3 (Three) Times a Day. Take 2 in the AM 1 at noon and 2  "in the pm. Per DR Clifford., Disp: 150 tablet, Rfl: 5  •  escitalopram (LEXAPRO) 20 MG tablet, Take 1 tablet by mouth Daily., Disp: 90 tablet, Rfl: 1  •  ferrous sulfate 325 (65 FE) MG tablet, Take 1 tablet by mouth Every Other Day., Disp: 30 tablet, Rfl: 0  •  furosemide (Lasix) 40 MG tablet, Take 1 tablet by mouth Daily As Needed (Weight gain of 3lbs in 1 day or 5lbs in 3 days)., Disp: 15 tablet, Rfl: 0  •  hydrALAZINE (APRESOLINE) 100 MG tablet, Take 1 tablet by mouth Every 8 (Eight) Hours., Disp: 90 tablet, Rfl: 0  •  HYDROcodone-acetaminophen (NORCO) 5-325 MG per tablet, Take 1 tablet by mouth Every 6 (Six) Hours As Needed for Moderate Pain  or Severe Pain  for up to 6 days., Disp: 12 tablet, Rfl: 0  •  levothyroxine (SYNTHROID, LEVOTHROID) 88 MCG tablet, Take 1 tablet by mouth Daily., Disp: 90 tablet, Rfl: 3  •  lovastatin (MEVACOR) 40 MG tablet, Take 1 tablet by mouth Daily With Dinner., Disp: 90 tablet, Rfl: 1  •  methyldopa (ALDOMET) 500 MG tablet, Take 1 tablet by mouth Every 12 (Twelve) Hours., Disp: 60 tablet, Rfl: 11  •  REPATHA SURECLICK 140 MG/ML solution auto-injector, INJECT 1ML UNDER THE SKIN AS DIRECTED EVERY 14 DAYS, Disp: 1 pen, Rfl: 11  •  Semaglutide,0.25 or 0.5MG/DOS, (Ozempic, 0.25 or 0.5 MG/DOSE,) 2 MG/1.5ML solution pen-injector, Inject 0.5 mg under the skin into the appropriate area as directed 1 (One) Time Per Week., Disp: 4 pen, Rfl: 3  •  terazosin (HYTRIN) 10 MG capsule, Take 1 capsule by mouth Every 12 (Twelve) Hours., Disp: 60 capsule, Rfl: 11    Objective      Blood pressure 146/77, height 193 cm (76\"), weight (!) 141 kg (311 lb), SpO2 93 %.    Physical Exam     General Appearance:    Alert, cooperative, no distress, appears stated age   Head:    Normocephalic, without obvious abnormality, atraumatic   Eyes:    PERRL, conjunctiva/corneas clear, EOM's intact   Ears:    Normal TM's and external ear canals, both ears   Nose:   Nares normal, septum midline, mucosa normal, no drainage   or " sinus tenderness   Throat:   Lips, mucosa, and tongue normal; teeth and gums normal   Neck:   Supple, symmetrical, trachea midline, no adenopathy;        thyroid:  No enlargement/tenderness/nodules; no carotid    bruit or JVD   Back:     Symmetric, no curvature, ROM normal, no CVA tenderness   Lungs:     Clear to auscultation bilaterally, respirations unlabored   Chest wall:    No tenderness or deformity   Heart:    Regular rate and rhythm, S1 and S2 normal, no murmur,        rub or gallop   Abdomen:     Soft, non-tender, bowel sounds active all four quadrants,     no masses, no organomegaly   Extremities:   Extremities normal, atraumatic, no cyanosis or edema   Pulses:   2+ and symmetric all extremities   Skin:   Skin color, texture, turgor normal, no rashes or lesions   Lymph nodes:   Cervical, supraclavicular, and axillary nodes normal   Neurologic:   CNII-XII intact. Normal strength, sensation and reflexes       throughout      Results for orders placed or performed during the hospital encounter of 08/15/20   Comprehensive Metabolic Panel   Result Value Ref Range    Glucose 107 (H) 65 - 99 mg/dL    BUN 20 8 - 23 mg/dL    Creatinine 1.45 (H) 0.76 - 1.27 mg/dL    Sodium 141 136 - 145 mmol/L    Potassium 4.1 3.5 - 5.2 mmol/L    Chloride 101 98 - 107 mmol/L    CO2 29.0 22.0 - 29.0 mmol/L    Calcium 9.2 8.6 - 10.5 mg/dL    Total Protein 7.8 6.0 - 8.5 g/dL    Albumin 3.70 3.50 - 5.20 g/dL    ALT (SGPT) 14 1 - 41 U/L    AST (SGOT) 15 1 - 40 U/L    Alkaline Phosphatase 43 39 - 117 U/L    Total Bilirubin 0.5 0.0 - 1.2 mg/dL    eGFR Non African Amer 49 (L) >60 mL/min/1.73    Globulin 4.1 gm/dL    A/G Ratio 0.9 g/dL    BUN/Creatinine Ratio 13.8 7.0 - 25.0    Anion Gap 11.0 5.0 - 15.0 mmol/L   BNP   Result Value Ref Range    proBNP 4,503.0 (H) 0.0 - 900.0 pg/mL   Troponin   Result Value Ref Range    Troponin T <0.010 0.000 - 0.030 ng/mL   CBC Auto Differential   Result Value Ref Range    WBC 7.15 3.40 - 10.80 10*3/mm3     RBC 3.90 (L) 4.14 - 5.80 10*6/mm3    Hemoglobin 11.9 (L) 13.0 - 17.7 g/dL    Hematocrit 36.3 (L) 37.5 - 51.0 %    MCV 93.1 79.0 - 97.0 fL    MCH 30.5 26.6 - 33.0 pg    MCHC 32.8 31.5 - 35.7 g/dL    RDW 14.5 12.3 - 15.4 %    RDW-SD 49.4 37.0 - 54.0 fl    MPV 10.1 6.0 - 12.0 fL    Platelets 321 140 - 450 10*3/mm3    Neutrophil % 60.8 42.7 - 76.0 %    Lymphocyte % 21.4 19.6 - 45.3 %    Monocyte % 12.3 (H) 5.0 - 12.0 %    Eosinophil % 3.6 0.3 - 6.2 %    Basophil % 0.8 0.0 - 1.5 %    Immature Grans % 1.1 (H) 0.0 - 0.5 %    Neutrophils, Absolute 4.34 1.70 - 7.00 10*3/mm3    Lymphocytes, Absolute 1.53 0.70 - 3.10 10*3/mm3    Monocytes, Absolute 0.88 0.10 - 0.90 10*3/mm3    Eosinophils, Absolute 0.26 0.00 - 0.40 10*3/mm3    Basophils, Absolute 0.06 0.00 - 0.20 10*3/mm3    Immature Grans, Absolute 0.08 (H) 0.00 - 0.05 10*3/mm3    nRBC 0.0 0.0 - 0.2 /100 WBC   Light Blue Top   Result Value Ref Range    Extra Tube hold for add-on    Green Top (Gel)   Result Value Ref Range    Extra Tube Hold for add-ons.    Lavender Top   Result Value Ref Range    Extra Tube hold for add-on    Gold Top - SST   Result Value Ref Range    Extra Tube Hold for add-ons.          Assessment/Plan       Andre was seen today for shortness of breath and edema.    Diagnoses and all orders for this visit:    Essential hypertension  -     CK  -     Myoglobin, Serum  -     Basic Metabolic Panel    Chronic kidney disease, unspecified CKD stage  -     CK  -     Myoglobin, Serum  -     Basic Metabolic Panel    Acute diastolic CHF (congestive heart failure) (CMS/HCC)  -     CK  -     Myoglobin, Serum  -     Basic Metabolic Panel    Statin myopathy  -     CK  -     Myoglobin, Serum  -     Basic Metabolic Panel      Return in about 1 day (around 8/18/2020).          There are no Patient Instructions on file for this visit.     Keven Bear MD    Assessment/Plan

## 2020-08-18 ENCOUNTER — TELEPHONE (OUTPATIENT)
Dept: INTERNAL MEDICINE | Facility: CLINIC | Age: 65
End: 2020-08-18

## 2020-08-18 LAB
ALDOST SERPL-MCNC: 2.5 NG/DL (ref 0–30)
MYOGLOBIN SERPL-MCNC: 57.6 NG/ML (ref 28–72)

## 2020-08-18 NOTE — TELEPHONE ENCOUNTER
Patient does not want to do a phone visit, he wants to know why he can't come into the office and they tested him 3 times in the hospital.  I explained [facility] policy, but he was still insistent on coming in.  Please advise.  Phone number verified.

## 2020-08-20 ENCOUNTER — LAB (OUTPATIENT)
Dept: LAB | Facility: HOSPITAL | Age: 65
End: 2020-08-20

## 2020-08-20 ENCOUNTER — OFFICE VISIT (OUTPATIENT)
Dept: INTERNAL MEDICINE | Facility: CLINIC | Age: 65
End: 2020-08-20

## 2020-08-20 VITALS — DIASTOLIC BLOOD PRESSURE: 90 MMHG | SYSTOLIC BLOOD PRESSURE: 140 MMHG

## 2020-08-20 DIAGNOSIS — M25.561 ACUTE PAIN OF BOTH KNEES: ICD-10-CM

## 2020-08-20 DIAGNOSIS — I10 ESSENTIAL HYPERTENSION: Primary | ICD-10-CM

## 2020-08-20 DIAGNOSIS — M25.562 ACUTE PAIN OF BOTH KNEES: ICD-10-CM

## 2020-08-20 DIAGNOSIS — E78.2 MIXED HYPERLIPIDEMIA: ICD-10-CM

## 2020-08-20 DIAGNOSIS — R60.0 BILATERAL LEG EDEMA: ICD-10-CM

## 2020-08-20 DIAGNOSIS — E11.9 DIABETES MELLITUS WITHOUT COMPLICATION (HCC): ICD-10-CM

## 2020-08-20 LAB
ANION GAP SERPL CALCULATED.3IONS-SCNC: 15.2 MMOL/L (ref 5–15)
BUN SERPL-MCNC: 17 MG/DL (ref 8–23)
BUN/CREAT SERPL: 11 (ref 7–25)
CALCIUM SPEC-SCNC: 9.2 MG/DL (ref 8.6–10.5)
CHLORIDE SERPL-SCNC: 99 MMOL/L (ref 98–107)
CO2 SERPL-SCNC: 21.8 MMOL/L (ref 22–29)
CREAT SERPL-MCNC: 1.55 MG/DL (ref 0.76–1.27)
GFR SERPL CREATININE-BSD FRML MDRD: 45 ML/MIN/1.73
GLUCOSE SERPL-MCNC: 176 MG/DL (ref 65–99)
POTASSIUM SERPL-SCNC: 4.2 MMOL/L (ref 3.5–5.2)
SODIUM SERPL-SCNC: 136 MMOL/L (ref 136–145)
T4 FREE SERPL-MCNC: 1.74 NG/DL (ref 0.93–1.7)
TSH SERPL DL<=0.05 MIU/L-ACNC: 1.53 UIU/ML (ref 0.27–4.2)
URATE SERPL-MCNC: 9.8 MG/DL (ref 3.4–7)

## 2020-08-20 PROCEDURE — 84550 ASSAY OF BLOOD/URIC ACID: CPT | Performed by: INTERNAL MEDICINE

## 2020-08-20 PROCEDURE — 84443 ASSAY THYROID STIM HORMONE: CPT | Performed by: INTERNAL MEDICINE

## 2020-08-20 PROCEDURE — 84439 ASSAY OF FREE THYROXINE: CPT | Performed by: INTERNAL MEDICINE

## 2020-08-20 PROCEDURE — 80048 BASIC METABOLIC PNL TOTAL CA: CPT | Performed by: INTERNAL MEDICINE

## 2020-08-20 PROCEDURE — 36415 COLL VENOUS BLD VENIPUNCTURE: CPT | Performed by: INTERNAL MEDICINE

## 2020-08-20 PROCEDURE — 99214 OFFICE O/P EST MOD 30 MIN: CPT | Performed by: INTERNAL MEDICINE

## 2020-08-20 NOTE — PROGRESS NOTES
Subjective     Patient ID: Andre Agosto is a 65 y.o. male. Patient is here for management of multiple medical problems.     Chief Complaint   Patient presents with   • Hypertension     follow-up  blood pressure yesterday morning - 110/60 to evening - 170/90, and this morning it was 140/90   • Leg Pain     patient continuing to have leg pain, lots of swelling still    • Fatigue     feels very tired, having some shortness of breath   • Rectal Bleeding     patient noticed some blood in stool yesterday morning     History of Present Illness     Hypertension.  BP fluctuating.      CRI  Stable on last labs.     Edema and leg pain. Knees hurt.  Still diuresing a lot.  Leg edema much improved.        extremely week.    Off lasix x 2 day.       The following portions of the patient's history were reviewed and updated as appropriate: allergies, current medications, past family history, past medical history, past social history, past surgical history and problem list.    Review of Systems   Constitutional: Positive for fatigue.   Respiratory: Positive for shortness of breath. Negative for cough, wheezing and stridor.    Cardiovascular: Positive for leg swelling. Negative for chest pain and palpitations.   Musculoskeletal: Negative for arthralgias, gait problem, myalgias and neck pain.   Neurological: Positive for weakness.   Psychiatric/Behavioral: Positive for sleep disturbance.   All other systems reviewed and are negative.      Current Outpatient Medications:   •  amLODIPine (NORVASC) 5 MG tablet, Take 1 tablet by mouth Daily., Disp: 30 tablet, Rfl: 0  •  aspirin 81 MG tablet, Take 81 mg by mouth 2 (Two) Times a Day., Disp: 30 tablet, Rfl: 11  •  carvedilol (COREG) 6.25 MG tablet, Take 1 tablet by mouth 2 (Two) Times a Day With Meals., Disp: 60 tablet, Rfl: 0  •  divalproex (DEPAKOTE) 250 MG DR tablet, Take 1 tablet by mouth 3 (Three) Times a Day. Take 2 in the AM 1 at noon and 2 in the pm. Per DR Clifford., Disp: 150 tablet,  Rfl: 5  •  escitalopram (LEXAPRO) 20 MG tablet, Take 1 tablet by mouth Daily., Disp: 90 tablet, Rfl: 1  •  ferrous sulfate 325 (65 FE) MG tablet, Take 1 tablet by mouth Every Other Day., Disp: 30 tablet, Rfl: 0  •  furosemide (Lasix) 40 MG tablet, Take 1 tablet by mouth Daily As Needed (Weight gain of 3lbs in 1 day or 5lbs in 3 days)., Disp: 15 tablet, Rfl: 0  •  hydrALAZINE (APRESOLINE) 100 MG tablet, Take 1 tablet by mouth Every 8 (Eight) Hours., Disp: 90 tablet, Rfl: 0  •  levothyroxine (SYNTHROID, LEVOTHROID) 88 MCG tablet, Take 1 tablet by mouth Daily., Disp: 90 tablet, Rfl: 3  •  lovastatin (MEVACOR) 40 MG tablet, Take 1 tablet by mouth Daily With Dinner., Disp: 90 tablet, Rfl: 1  •  methyldopa (ALDOMET) 500 MG tablet, Take 1 tablet by mouth Every 12 (Twelve) Hours., Disp: 60 tablet, Rfl: 11  •  REPATHA SURECLICK 140 MG/ML solution auto-injector, INJECT 1ML UNDER THE SKIN AS DIRECTED EVERY 14 DAYS, Disp: 1 pen, Rfl: 11  •  Semaglutide,0.25 or 0.5MG/DOS, (Ozempic, 0.25 or 0.5 MG/DOSE,) 2 MG/1.5ML solution pen-injector, Inject 0.5 mg under the skin into the appropriate area as directed 1 (One) Time Per Week., Disp: 4 pen, Rfl: 3  •  terazosin (HYTRIN) 10 MG capsule, Take 1 capsule by mouth Every 12 (Twelve) Hours., Disp: 60 capsule, Rfl: 11    Objective      Blood pressure 140/90.    Physical Exam     General Appearance:    Alert, cooperative, no distress, appears stated age   Head:     Eyes:     Ears:     Nose:    Throat:    Neck:    Back:      Lungs:      Chest wall:     Heart:     Abdomen:      Extremities:    Pulses:    Skin:    Lymph nodes:    Neurologic:       Results for orders placed or performed in visit on 08/17/20   CK   Result Value Ref Range    Creatine Kinase 36 20 - 200 U/L   Myoglobin, Serum   Result Value Ref Range    Myoglobin 57.6 28.0 - 72.0 ng/mL   Basic Metabolic Panel   Result Value Ref Range    Glucose 85 65 - 99 mg/dL    BUN 18 8 - 23 mg/dL    Creatinine 1.48 (H) 0.76 - 1.27 mg/dL     Sodium 137 136 - 145 mmol/L    Potassium 3.9 3.5 - 5.2 mmol/L    Chloride 98 98 - 107 mmol/L    CO2 26.6 22.0 - 29.0 mmol/L    Calcium 9.5 8.6 - 10.5 mg/dL    eGFR Non African Amer 48 (L) >60 mL/min/1.73    BUN/Creatinine Ratio 12.2 7.0 - 25.0    Anion Gap 12.4 5.0 - 15.0 mmol/L         Assessment/Plan       nAdre was seen today for hypertension, leg pain, fatigue and rectal bleeding.    Diagnoses and all orders for this visit:    Essential hypertension  -     Basic Metabolic Panel  -     Uric Acid  -     TSH  -     T4, Free    Mixed hyperlipidemia  -     Basic Metabolic Panel  -     Uric Acid  -     TSH  -     T4, Free    Diabetes mellitus without complication (CMS/HCC)  -     Basic Metabolic Panel  -     Uric Acid  -     TSH  -     T4, Free    Acute pain of both knees  -     Basic Metabolic Panel  -     Uric Acid  -     TSH  -     T4, Free    Bilateral leg edema  -     Basic Metabolic Panel  -     Uric Acid  -     TSH  -     T4, Free    consider gout as cause to knee pain.    No follow-ups on file.        telephone visit 12 min.     F/u in am.        There are no Patient Instructions on file for this visit.     Keven Bear MD    Assessment/Plan     .You have chosen to receive care through a telephone visit. Do you consent to use a telephone visit for your medical care today? YES  Those participating in this Telephone visit are listed below:  Patient - Andre Agosto  Doctor - Keven Bear  [facility]

## 2020-08-21 ENCOUNTER — OFFICE VISIT (OUTPATIENT)
Dept: INTERNAL MEDICINE | Facility: CLINIC | Age: 65
End: 2020-08-21

## 2020-08-21 ENCOUNTER — READMISSION MANAGEMENT (OUTPATIENT)
Dept: CALL CENTER | Facility: HOSPITAL | Age: 65
End: 2020-08-21

## 2020-08-21 VITALS
DIASTOLIC BLOOD PRESSURE: 77 MMHG | WEIGHT: 295 LBS | HEART RATE: 73 BPM | SYSTOLIC BLOOD PRESSURE: 147 MMHG | BODY MASS INDEX: 35.91 KG/M2

## 2020-08-21 DIAGNOSIS — N18.3 ACUTE RENAL FAILURE SUPERIMPOSED ON STAGE 3 CHRONIC KIDNEY DISEASE, UNSPECIFIED ACUTE RENAL FAILURE TYPE: ICD-10-CM

## 2020-08-21 DIAGNOSIS — N17.9 ACUTE RENAL FAILURE SUPERIMPOSED ON STAGE 3 CHRONIC KIDNEY DISEASE, UNSPECIFIED ACUTE RENAL FAILURE TYPE: ICD-10-CM

## 2020-08-21 DIAGNOSIS — R60.0 LEG EDEMA: Primary | ICD-10-CM

## 2020-08-21 PROCEDURE — 99213 OFFICE O/P EST LOW 20 MIN: CPT | Performed by: INTERNAL MEDICINE

## 2020-08-21 NOTE — OUTREACH NOTE
Medical Week 2 Survey      Responses   [facility] patient discharged from?  [place]   COVID-19 Test Status  Negative   Does the patient have one of the following disease processes/diagnoses(primary or secondary)?  Other   Week 2 attempt successful?  Yes   Call start time  1556   Discharge diagnosis  Acute nonoliguric kidney injury on CKD stage III   Call end time  1603   Meds reviewed with patient/caregiver?  Yes   Has the patient kept scheduled appointments due by today?  No   Comments  Spoke with Dr Fonseca per phone today. He went for his scheduled f/u office appt 08/17/2020 and they would not let him in the door because he had a fever in the last 14 days, during hospital stay.   What DME was ordered?  [facility]  walker   Pulse Ox monitoring  Intermittent   What is the patient's perception of their health status since discharge?  Improving   Additional teach back comments  Swelling has gone down in feet legs. Has lost 25 lbs fluid. C/o cramping in legs spoke to Dr Fonseca per phone today.   Week 2 Call Completed?  Yes          Veena Bertrand RN

## 2020-08-21 NOTE — PROGRESS NOTES
Subjective     Patient ID: Andre Agosto is a 65 y.o. male. Patient is here for management of multiple medical problems.     Chief Complaint   Patient presents with   • Hypertension     patient states he is feeling some better, still very weak, knees hurt, no fever today.     History of Present Illness       [employer] walking around.     Lasix 40 mg one a day.    extremely weak.  Legs higher than heart at night.      Wt down 25 lbs. Likely water wt.      The following portions of the patient's history were reviewed and updated as appropriate: allergies, current medications, past family history, past medical history, past social history, past surgical history and problem list.    Review of Systems   Constitutional: Positive for fatigue.   HENT: Negative for congestion, drooling and ear discharge.    Neurological: Positive for weakness.   Psychiatric/Behavioral: Negative for behavioral problems, decreased concentration, dysphoric mood and hallucinations. The patient is not nervous/anxious.    All other systems reviewed and are negative.      Current Outpatient Medications:   •  amLODIPine (NORVASC) 5 MG tablet, Take 1 tablet by mouth Daily., Disp: 30 tablet, Rfl: 0  •  aspirin 81 MG tablet, Take 81 mg by mouth 2 (Two) Times a Day., Disp: 30 tablet, Rfl: 11  •  carvedilol (COREG) 6.25 MG tablet, Take 1 tablet by mouth 2 (Two) Times a Day With Meals., Disp: 60 tablet, Rfl: 0  •  divalproex (DEPAKOTE) 250 MG DR tablet, Take 1 tablet by mouth 3 (Three) Times a Day. Take 2 in the AM 1 at noon and 2 in the pm. Per DR Clifford., Disp: 150 tablet, Rfl: 5  •  escitalopram (LEXAPRO) 20 MG tablet, Take 1 tablet by mouth Daily., Disp: 90 tablet, Rfl: 1  •  ferrous sulfate 325 (65 FE) MG tablet, Take 1 tablet by mouth Every Other Day., Disp: 30 tablet, Rfl: 0  •  furosemide (Lasix) 40 MG tablet, Take 1 tablet by mouth Daily As Needed (Weight gain of 3lbs in 1 day or 5lbs in 3 days)., Disp: 15 tablet, Rfl: 0  •  hydrALAZINE (APRESOLINE)  100 MG tablet, Take 1 tablet by mouth Every 8 (Eight) Hours., Disp: 90 tablet, Rfl: 0  •  levothyroxine (SYNTHROID, LEVOTHROID) 88 MCG tablet, Take 1 tablet by mouth Daily., Disp: 90 tablet, Rfl: 3  •  lovastatin (MEVACOR) 40 MG tablet, Take 1 tablet by mouth Daily With Dinner., Disp: 90 tablet, Rfl: 1  •  methyldopa (ALDOMET) 500 MG tablet, Take 1 tablet by mouth Every 12 (Twelve) Hours., Disp: 60 tablet, Rfl: 11  •  REPATHA SURECLICK 140 MG/ML solution auto-injector, INJECT 1ML UNDER THE SKIN AS DIRECTED EVERY 14 DAYS, Disp: 1 pen, Rfl: 11  •  Semaglutide,0.25 or 0.5MG/DOS, (Ozempic, 0.25 or 0.5 MG/DOSE,) 2 MG/1.5ML solution pen-injector, Inject 0.5 mg under the skin into the appropriate area as directed 1 (One) Time Per Week., Disp: 4 pen, Rfl: 3  •  terazosin (HYTRIN) 10 MG capsule, Take 1 capsule by mouth Every 12 (Twelve) Hours., Disp: 60 capsule, Rfl: 11    Objective      Blood pressure 147/77, pulse 73, weight 134 kg (295 lb).    Physical Exam     General Appearance:    Alert, cooperative, no distress, appears stated age   Head:     Eyes:     Ears:     Nose:    Throat:    Neck:    Back:      Lungs:      Chest wall:     Heart:     Abdomen:      Extremities:    Pulses:    Skin:    Lymph nodes:    Neurologic:       Results for orders placed or performed in visit on 08/20/20   Basic Metabolic Panel   Result Value Ref Range    Glucose 176 (H) 65 - 99 mg/dL    BUN 17 8 - 23 mg/dL    Creatinine 1.55 (H) 0.76 - 1.27 mg/dL    Sodium 136 136 - 145 mmol/L    Potassium 4.2 3.5 - 5.2 mmol/L    Chloride 99 98 - 107 mmol/L    CO2 21.8 (L) 22.0 - 29.0 mmol/L    Calcium 9.2 8.6 - 10.5 mg/dL    eGFR Non African Amer 45 (L) >60 mL/min/1.73    BUN/Creatinine Ratio 11.0 7.0 - 25.0    Anion Gap 15.2 (H) 5.0 - 15.0 mmol/L   Uric Acid   Result Value Ref Range    Uric Acid 9.8 (H) 3.4 - 7.0 mg/dL   TSH   Result Value Ref Range    TSH 1.530 0.270 - 4.200 uIU/mL   T4, Free   Result Value Ref Range    Free T4 1.74 (H) 0.93 - 1.70 ng/dL          Assessment/Plan   Decrease lasix to qod.    compression stockings.      Andre was seen today for hypertension.    Diagnoses and all orders for this visit:    Leg edema  -     Basic Metabolic Panel    Acute renal failure superimposed on stage 3 chronic kidney disease, unspecified acute renal failure type (CMS/HCC)  -     Basic Metabolic Panel      Return in about 1 week (around 8/28/2020).          There are no Patient Instructions on file for this visit.     Keven Bear MD    Assessment/Plan     ..You have chosen to receive care through a telephone visit. Do you consent to use a telephone visit for your medical care today? YES  Those listed below participated in this Telephone visit:  Patient - Andre Agosto  Wife - Becca Agosto  Doctor - Keven Bear  [facility] [place] [facility]

## 2020-08-25 RX ORDER — ALLOPURINOL 100 MG/1
100 TABLET ORAL DAILY
Qty: 90 TABLET | Refills: 3 | Status: SHIPPED | OUTPATIENT
Start: 2020-08-25 | End: 2020-12-18 | Stop reason: SDUPTHER

## 2020-08-25 NOTE — PROGRESS NOTES
Please let them know the uric acid is still to high. Starting allopurinol one a day.    Please try to avoid foods that make this worse.

## 2020-08-27 ENCOUNTER — READMISSION MANAGEMENT (OUTPATIENT)
Dept: CALL CENTER | Facility: HOSPITAL | Age: 65
End: 2020-08-27

## 2020-08-27 ENCOUNTER — OFFICE VISIT (OUTPATIENT)
Dept: INTERNAL MEDICINE | Facility: CLINIC | Age: 65
End: 2020-08-27

## 2020-08-27 VITALS
HEART RATE: 70 BPM | BODY MASS INDEX: 36.99 KG/M2 | DIASTOLIC BLOOD PRESSURE: 67 MMHG | OXYGEN SATURATION: 98 % | HEIGHT: 76 IN | TEMPERATURE: 97.8 F | WEIGHT: 303.8 LBS | SYSTOLIC BLOOD PRESSURE: 129 MMHG | RESPIRATION RATE: 16 BRPM

## 2020-08-27 DIAGNOSIS — I13.0 HYPERTENSIVE HEART AND CHRONIC KIDNEY DISEASE WITH HEART FAILURE AND STAGE 1 THROUGH STAGE 4 CHRONIC KIDNEY DISEASE, OR UNSPECIFIED CHRONIC KIDNEY DISEASE (HCC): ICD-10-CM

## 2020-08-27 DIAGNOSIS — N18.30 CHRONIC RENAL FAILURE, STAGE 3 (MODERATE) (HCC): ICD-10-CM

## 2020-08-27 DIAGNOSIS — R53.1 WEAKNESS: ICD-10-CM

## 2020-08-27 DIAGNOSIS — R53.83 FATIGUE, UNSPECIFIED TYPE: Primary | ICD-10-CM

## 2020-08-27 DIAGNOSIS — I48.19 PERSISTENT ATRIAL FIBRILLATION (HCC): ICD-10-CM

## 2020-08-27 DIAGNOSIS — Z99.89 BIPAP (BIPHASIC POSITIVE AIRWAY PRESSURE) DEPENDENCE: ICD-10-CM

## 2020-08-27 PROCEDURE — 93000 ELECTROCARDIOGRAM COMPLETE: CPT | Performed by: INTERNAL MEDICINE

## 2020-08-27 PROCEDURE — 99214 OFFICE O/P EST MOD 30 MIN: CPT | Performed by: INTERNAL MEDICINE

## 2020-08-27 RX ORDER — HYDRALAZINE HYDROCHLORIDE 100 MG/1
50 TABLET, FILM COATED ORAL EVERY 8 HOURS SCHEDULED
Qty: 90 TABLET | Refills: 0 | Status: SHIPPED | OUTPATIENT
Start: 2020-08-27 | End: 2020-09-17

## 2020-08-27 NOTE — OUTREACH NOTE
Medical Week 3 Survey      Responses   [facility] [place] patient discharged from?  [place]   COVID-19 Test Status  Negative   Does the patient have one of the following disease processes/diagnoses(primary or secondary)?  Other   Week 3 attempt successful?  Yes   Call start time  1538   Rescheduled  Rescheduled-pt requested   Call end time  1538   Discharge diagnosis  Acute nonoliguric kidney injury on CKD stage III          Modesta Mayfield RN

## 2020-08-27 NOTE — PROGRESS NOTES
Subjective     Patient ID: Andre Agosto is a 65 y.o. male. Patient is here for management of multiple medical problems.     Chief Complaint   Patient presents with   • Hypertension     follow-up, patient states he feels extremely weak, legs, knees, and arms hurt, wife states patient's balance is off,     • Referral to cardiac rehab     patient asking for referrals to cardiac rehab and physical therapy     History of Present Illness   Weak  And tired and gait is off. Falling easily.    Last lovastatin 2 weeks ago still not better.  Off ozempic and repatha x 2 weeks.      levothyroid last filled not known.    Wt down and diet going well.      The following portions of the patient's history were reviewed and updated as appropriate: allergies, current medications, past family history, past medical history, past social history, past surgical history and problem list.    Review of Systems   Constitutional: Negative for diaphoresis and fatigue.   HENT: Negative for congestion, dental problem and facial swelling.    Musculoskeletal: Positive for arthralgias. Negative for back pain and gait problem.   Psychiatric/Behavioral: Positive for agitation, behavioral problems and confusion.   All other systems reviewed and are negative.      Current Outpatient Medications:   •  allopurinol (Zyloprim) 100 MG tablet, Take 1 tablet by mouth Daily., Disp: 90 tablet, Rfl: 3  •  amLODIPine (NORVASC) 5 MG tablet, Take 1 tablet by mouth Daily., Disp: 30 tablet, Rfl: 0  •  aspirin 81 MG tablet, Take 81 mg by mouth 2 (Two) Times a Day., Disp: 30 tablet, Rfl: 11  •  carvedilol (COREG) 6.25 MG tablet, Take 1 tablet by mouth 2 (Two) Times a Day With Meals., Disp: 60 tablet, Rfl: 0  •  divalproex (DEPAKOTE) 250 MG DR tablet, Take 1 tablet by mouth 3 (Three) Times a Day. Take 2 in the AM 1 at noon and 2 in the pm. Per DR Clifford., Disp: 150 tablet, Rfl: 5  •  escitalopram (LEXAPRO) 20 MG tablet, Take 1 tablet by mouth Daily., Disp: 90 tablet, Rfl:  "1  •  ferrous sulfate 325 (65 FE) MG tablet, Take 1 tablet by mouth Every Other Day., Disp: 30 tablet, Rfl: 0  •  furosemide (Lasix) 40 MG tablet, Take 1 tablet by mouth Daily As Needed (Weight gain of 3lbs in 1 day or 5lbs in 3 days)., Disp: 15 tablet, Rfl: 0  •  hydrALAZINE (APRESOLINE) 100 MG tablet, Take 0.5 tablets by mouth Every 8 (Eight) Hours., Disp: 90 tablet, Rfl: 0  •  levothyroxine (SYNTHROID, LEVOTHROID) 88 MCG tablet, Take 1 tablet by mouth Daily., Disp: 90 tablet, Rfl: 3  •  lovastatin (MEVACOR) 40 MG tablet, Take 1 tablet by mouth Daily With Dinner., Disp: 90 tablet, Rfl: 1  •  methyldopa (ALDOMET) 500 MG tablet, Take 1 tablet by mouth Every 12 (Twelve) Hours., Disp: 60 tablet, Rfl: 11  •  REPATHA SURECLICK 140 MG/ML solution auto-injector, INJECT 1ML UNDER THE SKIN AS DIRECTED EVERY 14 DAYS, Disp: 1 pen, Rfl: 11  •  Semaglutide,0.25 or 0.5MG/DOS, (Ozempic, 0.25 or 0.5 MG/DOSE,) 2 MG/1.5ML solution pen-injector, Inject 0.5 mg under the skin into the appropriate area as directed 1 (One) Time Per Week., Disp: 4 pen, Rfl: 3  •  terazosin (HYTRIN) 10 MG capsule, Take 1 capsule by mouth Every 12 (Twelve) Hours., Disp: 60 capsule, Rfl: 11  •  apixaban (ELIQUIS) 5 MG tablet tablet, Take 1 tablet by mouth Every 12 (Twelve) Hours., Disp: 60 tablet, Rfl: 5    Objective      Blood pressure 129/67, pulse 70, temperature 97.8 °F (36.6 °C), temperature source Temporal, resp. rate 16, height 193 cm (76\"), weight (!) 138 kg (303 lb 12.8 oz), SpO2 98 %.    Physical Exam     General Appearance:    Alert, cooperative, no distress, appears stated age   Head:    Normocephalic, without obvious abnormality, atraumatic   Eyes:    PERRL, conjunctiva/corneas clear, EOM's intact   Ears:    Normal TM's and external ear canals, both ears   Nose:   Nares normal, septum midline, mucosa normal, no drainage   or sinus tenderness   Throat:   Lips, mucosa, and tongue normal; teeth and gums normal   Neck:   Supple, symmetrical, trachea " midline, no adenopathy;        thyroid:  No enlargement/tenderness/nodules; no carotid    bruit or JVD   Back:     Symmetric, no curvature, ROM normal, no CVA tenderness   Lungs:     Clear to auscultation bilaterally, respirations unlabored   Chest wall:    No tenderness or deformity   Heart:    Regular rate and rhythm, S1 and S2 normal, no murmur,        rub or gallop   Abdomen:     Soft, non-tender, bowel sounds active all four quadrants,     no masses, no organomegaly   Extremities:   Extremities normal, atraumatic, no cyanosis or edema   Pulses:   2+ and symmetric all extremities   Skin:   Skin color, texture, turgor normal, no rashes or lesions   Lymph nodes:   Cervical, supraclavicular, and axillary nodes normal   Neurologic:   CNII-XII intact. Normal strength, sensation and reflexes       throughout      Results for orders placed or performed in visit on 08/20/20   Basic Metabolic Panel   Result Value Ref Range    Glucose 176 (H) 65 - 99 mg/dL    BUN 17 8 - 23 mg/dL    Creatinine 1.55 (H) 0.76 - 1.27 mg/dL    Sodium 136 136 - 145 mmol/L    Potassium 4.2 3.5 - 5.2 mmol/L    Chloride 99 98 - 107 mmol/L    CO2 21.8 (L) 22.0 - 29.0 mmol/L    Calcium 9.2 8.6 - 10.5 mg/dL    eGFR Non African Amer 45 (L) >60 mL/min/1.73    BUN/Creatinine Ratio 11.0 7.0 - 25.0    Anion Gap 15.2 (H) 5.0 - 15.0 mmol/L   Uric Acid   Result Value Ref Range    Uric Acid 9.8 (H) 3.4 - 7.0 mg/dL   TSH   Result Value Ref Range    TSH 1.530 0.270 - 4.200 uIU/mL   T4, Free   Result Value Ref Range    Free T4 1.74 (H) 0.93 - 1.70 ng/dL         Assessment/Plan   Decrease hydralazine to 50 mg tid from 100 tid.  bp usually high.       ECG 12 Lead  Date/Time: 8/27/2020 4:47 PM  Performed by: Keven Bear MD  Authorized by: Keven Bear MD   Rhythm: atrial fibrillation  Rate: normal  QRS axis: normal    Clinical impression: abnormal EKG        ekg compared with 8/4/20.  New afib.      Discussed with DR Johnson.      Andre was seen  today for hypertension and referral to cardiac rehab.    Diagnoses and all orders for this visit:    Fatigue, unspecified type  -     BNP  -     CBC & Differential  -     Cardiac Rehab Evaluation; Future  -     TSH  -     T4, Free  -     Ambulatory Referral to Physical Therapy Evaluate and treat; Heat; Stretching, ROM, Strengthening  -     Troponin  -     [place] Spotted Fever, IgM  -     [place] Spotted Fever, IgG  -     Lyme Disease, PCR  -     Ehrlichia Antibody Panel  -     Cyclic Citrul Peptide Antibody, IgG / IgA  -     Rheumatoid Factor  -     Sedimentation Rate    Weakness  -     BNP  -     CBC & Differential  -     Cardiac Rehab Evaluation; Future  -     TSH  -     T4, Free  -     Ambulatory Referral to Physical Therapy Evaluate and treat; Heat; Stretching, ROM, Strengthening  -     Troponin  -     [place] Spotted Fever, IgM  -     [place] Spotted Fever, IgG  -     Lyme Disease, PCR  -     Ehrlichia Antibody Panel  -     Cyclic Citrul Peptide Antibody, IgG / IgA  -     Rheumatoid Factor  -     Sedimentation Rate  -     ECG 12 Lead    BiPAP (biphasic positive airway pressure) dependence    Chronic renal failure, stage 3 (moderate) (CMS/HCC)  -     BNP  -     CBC & Differential  -     Cardiac Rehab Evaluation; Future  -     TSH  -     T4, Free  -     Ambulatory Referral to Physical Therapy Evaluate and treat; Heat; Stretching, ROM, Strengthening  -     Troponin  -     [place] Spotted Fever, IgM  -     [place] Spotted Fever, IgG  -     Lyme Disease, PCR  -     Ehrlichia Antibody Panel  -     Cyclic Citrul Peptide Antibody, IgG / IgA  -     Rheumatoid Factor  -     Sedimentation Rate    Hypertensive heart and chronic kidney disease with heart failure and stage 1 through stage 4 chronic kidney disease, or unspecified chronic kidney disease (CMS/HCC)   -     BNP    Persistent atrial fibrillation (CMS/HCC)  -     Ambulatory Referral to Cardiology    Other orders  -     apixaban (ELIQUIS) 5  MG tablet tablet; Take 1 tablet by mouth Every 12 (Twelve) Hours.  -     hydrALAZINE (APRESOLINE) 100 MG tablet; Take 0.5 tablets by mouth Every 8 (Eight) Hours.      Return in about 4 days (around 8/31/2020).          There are no Patient Instructions on file for this visit.     Keven Bear MD    Assessment/Plan

## 2020-08-28 ENCOUNTER — LAB (OUTPATIENT)
Dept: LAB | Facility: HOSPITAL | Age: 65
End: 2020-08-28

## 2020-08-28 ENCOUNTER — READMISSION MANAGEMENT (OUTPATIENT)
Dept: CALL CENTER | Facility: HOSPITAL | Age: 65
End: 2020-08-28

## 2020-08-28 ENCOUNTER — TELEPHONE (OUTPATIENT)
Dept: CARDIOLOGY | Facility: CLINIC | Age: 65
End: 2020-08-28

## 2020-08-28 DIAGNOSIS — I48.0 PAROXYSMAL ATRIAL FIBRILLATION (HCC): Primary | ICD-10-CM

## 2020-08-28 LAB
ANION GAP SERPL CALCULATED.3IONS-SCNC: 10.1 MMOL/L (ref 5–15)
BASOPHILS # BLD AUTO: 0.09 10*3/MM3 (ref 0–0.2)
BASOPHILS NFR BLD AUTO: 1.3 % (ref 0–1.5)
BUN SERPL-MCNC: 20 MG/DL (ref 8–23)
BUN/CREAT SERPL: 11.6 (ref 7–25)
CALCIUM SPEC-SCNC: 9.4 MG/DL (ref 8.6–10.5)
CHLORIDE SERPL-SCNC: 100 MMOL/L (ref 98–107)
CO2 SERPL-SCNC: 27.9 MMOL/L (ref 22–29)
CREAT SERPL-MCNC: 1.72 MG/DL (ref 0.76–1.27)
DEPRECATED RDW RBC AUTO: 47.4 FL (ref 37–54)
EOSINOPHIL # BLD AUTO: 0.18 10*3/MM3 (ref 0–0.4)
EOSINOPHIL NFR BLD AUTO: 2.6 % (ref 0.3–6.2)
ERYTHROCYTE [DISTWIDTH] IN BLOOD BY AUTOMATED COUNT: 14.3 % (ref 12.3–15.4)
GFR SERPL CREATININE-BSD FRML MDRD: 40 ML/MIN/1.73
GLUCOSE SERPL-MCNC: 116 MG/DL (ref 65–99)
HCT VFR BLD AUTO: 39.4 % (ref 37.5–51)
HGB BLD-MCNC: 13.3 G/DL (ref 13–17.7)
IMM GRANULOCYTES # BLD AUTO: 0.01 10*3/MM3 (ref 0–0.05)
IMM GRANULOCYTES NFR BLD AUTO: 0.1 % (ref 0–0.5)
LYMPHOCYTES # BLD AUTO: 2.45 10*3/MM3 (ref 0.7–3.1)
LYMPHOCYTES NFR BLD AUTO: 35 % (ref 19.6–45.3)
MCH RBC QN AUTO: 30.6 PG (ref 26.6–33)
MCHC RBC AUTO-ENTMCNC: 33.8 G/DL (ref 31.5–35.7)
MCV RBC AUTO: 90.8 FL (ref 79–97)
MONOCYTES # BLD AUTO: 0.55 10*3/MM3 (ref 0.1–0.9)
MONOCYTES NFR BLD AUTO: 7.8 % (ref 5–12)
NEUTROPHILS NFR BLD AUTO: 3.73 10*3/MM3 (ref 1.7–7)
NEUTROPHILS NFR BLD AUTO: 53.2 % (ref 42.7–76)
NRBC BLD AUTO-RTO: 0 /100 WBC (ref 0–0.2)
NT-PROBNP SERPL-MCNC: 3543 PG/ML (ref 0–900)
PLATELET # BLD AUTO: 232 10*3/MM3 (ref 140–450)
PMV BLD AUTO: 11.4 FL (ref 6–12)
POTASSIUM SERPL-SCNC: 4.4 MMOL/L (ref 3.5–5.2)
RBC # BLD AUTO: 4.34 10*6/MM3 (ref 4.14–5.8)
SODIUM SERPL-SCNC: 138 MMOL/L (ref 136–145)
T4 FREE SERPL-MCNC: 1.34 NG/DL (ref 0.93–1.7)
TROPONIN T SERPL-MCNC: <0.01 NG/ML (ref 0–0.03)
TSH SERPL DL<=0.05 MIU/L-ACNC: 1 UIU/ML (ref 0.27–4.2)
WBC # BLD AUTO: 7.01 10*3/MM3 (ref 3.4–10.8)

## 2020-08-28 PROCEDURE — 86757 RICKETTSIA ANTIBODY: CPT | Performed by: INTERNAL MEDICINE

## 2020-08-28 PROCEDURE — 36415 COLL VENOUS BLD VENIPUNCTURE: CPT | Performed by: INTERNAL MEDICINE

## 2020-08-28 PROCEDURE — 85025 COMPLETE CBC W/AUTO DIFF WBC: CPT | Performed by: INTERNAL MEDICINE

## 2020-08-28 PROCEDURE — 86666 EHRLICHIA ANTIBODY: CPT | Performed by: INTERNAL MEDICINE

## 2020-08-28 PROCEDURE — 84443 ASSAY THYROID STIM HORMONE: CPT | Performed by: INTERNAL MEDICINE

## 2020-08-28 PROCEDURE — 86431 RHEUMATOID FACTOR QUANT: CPT | Performed by: INTERNAL MEDICINE

## 2020-08-28 PROCEDURE — 80048 BASIC METABOLIC PNL TOTAL CA: CPT | Performed by: INTERNAL MEDICINE

## 2020-08-28 PROCEDURE — 83880 ASSAY OF NATRIURETIC PEPTIDE: CPT | Performed by: INTERNAL MEDICINE

## 2020-08-28 PROCEDURE — 87476 LYME DIS DNA AMP PROBE: CPT | Performed by: INTERNAL MEDICINE

## 2020-08-28 PROCEDURE — 85652 RBC SED RATE AUTOMATED: CPT | Performed by: INTERNAL MEDICINE

## 2020-08-28 PROCEDURE — 84484 ASSAY OF TROPONIN QUANT: CPT | Performed by: INTERNAL MEDICINE

## 2020-08-28 PROCEDURE — 86200 CCP ANTIBODY: CPT | Performed by: INTERNAL MEDICINE

## 2020-08-28 PROCEDURE — 84439 ASSAY OF FREE THYROXINE: CPT | Performed by: INTERNAL MEDICINE

## 2020-08-28 NOTE — PROGRESS NOTES
Please let them know the  Renal function a bit worse.  Potassium stable. Will discuss more on rtc.

## 2020-08-28 NOTE — TELEPHONE ENCOUNTER
PT in afib at PCP office yesterday- Dr. Johnson spoke with Dr. Bear yesterday- pt was started on Eliquis 5 mg BID- will need SELWYN/ECV - Discussed with patient and he agrees to proceed- order placed-

## 2020-08-28 NOTE — OUTREACH NOTE
Medical Week 3 Survey      Responses   [facility] patient discharged from?  [place]   COVID-19 Test Status  Negative   Does the patient have one of the following disease processes/diagnoses(primary or secondary)?  Other   Week 3 attempt successful?  Yes   Call start time  1227   Call end time  1229   Medication alerts for this patient  medication were added for his af   Meds reviewed with patient/caregiver?  Yes   Is the patient taking all medications as directed (includes completed medication regime)?  Yes   Comments regarding appointments  has  kept his appointments   Has the patient kept scheduled appointments due by today?  Yes   Pulse Ox monitoring  None   What is the patient's perception of their health status since discharge?  New symptoms unrelated to diagnosis [problems with AF]   Week 3 Call Completed?  Yes   Wrap up additional comments  Not feeling well , having problems with AF          Ellie Darden RN

## 2020-08-29 LAB
CHROMATIN AB SERPL-ACNC: <10 IU/ML (ref 0–14)
ERYTHROCYTE [SEDIMENTATION RATE] IN BLOOD: 28 MM/HR (ref 0–20)

## 2020-08-30 LAB — CCP IGA+IGG SERPL IA-ACNC: 11 UNITS (ref 0–19)

## 2020-08-31 ENCOUNTER — OFFICE VISIT (OUTPATIENT)
Dept: INTERNAL MEDICINE | Facility: CLINIC | Age: 65
End: 2020-08-31

## 2020-08-31 VITALS
DIASTOLIC BLOOD PRESSURE: 60 MMHG | BODY MASS INDEX: 36.14 KG/M2 | RESPIRATION RATE: 16 BRPM | HEART RATE: 79 BPM | HEIGHT: 76 IN | TEMPERATURE: 97.3 F | OXYGEN SATURATION: 98 % | SYSTOLIC BLOOD PRESSURE: 98 MMHG | WEIGHT: 296.8 LBS

## 2020-08-31 DIAGNOSIS — I48.91 ATRIAL FIBRILLATION, UNSPECIFIED TYPE (HCC): ICD-10-CM

## 2020-08-31 DIAGNOSIS — N18.2 TYPE 2 DIABETES MELLITUS WITH STAGE 2 CHRONIC KIDNEY DISEASE, WITHOUT LONG-TERM CURRENT USE OF INSULIN (HCC): ICD-10-CM

## 2020-08-31 DIAGNOSIS — N17.9 ACUTE RENAL FAILURE SUPERIMPOSED ON STAGE 2 CHRONIC KIDNEY DISEASE, UNSPECIFIED ACUTE RENAL FAILURE TYPE (HCC): Primary | ICD-10-CM

## 2020-08-31 DIAGNOSIS — E11.22 TYPE 2 DIABETES MELLITUS WITH STAGE 2 CHRONIC KIDNEY DISEASE, WITHOUT LONG-TERM CURRENT USE OF INSULIN (HCC): ICD-10-CM

## 2020-08-31 DIAGNOSIS — I50.21 ACUTE SYSTOLIC CHF (CONGESTIVE HEART FAILURE) (HCC): ICD-10-CM

## 2020-08-31 DIAGNOSIS — N18.2 ACUTE RENAL FAILURE SUPERIMPOSED ON STAGE 2 CHRONIC KIDNEY DISEASE, UNSPECIFIED ACUTE RENAL FAILURE TYPE (HCC): Primary | ICD-10-CM

## 2020-08-31 LAB
A PHAGOCYTOPH IGM TITR SER IF: NEGATIVE {TITER}
CONV HGE IGG TITER: NEGATIVE
E CHAFFEENSIS IGG TITR SER IF: NEGATIVE {TITER}
E. CHAFFEENSIS (HME) IGM TITER: NEGATIVE

## 2020-08-31 PROCEDURE — 99214 OFFICE O/P EST MOD 30 MIN: CPT | Performed by: INTERNAL MEDICINE

## 2020-08-31 NOTE — PROGRESS NOTES
Subjective     Patient ID: Andre Agosto is a 65 y.o. male. Patient is here for management of multiple medical problems.     Chief Complaint   Patient presents with   • Fatigue     4 day follow-up     History of Present Illness     Fatigue    CRI.       Pt has cardiology in [facility].  He will go this week.  If they give him the ok he will .    Was on decreased hydralazine and BP elevated again.  Felt better with increased BP.         The following portions of the patient's history were reviewed and updated as appropriate: allergies, current medications, past family history, past medical history, past social history, past surgical history and problem list.    Review of Systems   Constitutional: Negative for fatigue.   Respiratory: Negative for cough and shortness of breath.    Musculoskeletal: Negative for gait problem and joint swelling.   Psychiatric/Behavioral: Negative for self-injury and sleep disturbance. The patient is not nervous/anxious.    All other systems reviewed and are negative.      Current Outpatient Medications:   •  allopurinol (Zyloprim) 100 MG tablet, Take 1 tablet by mouth Daily., Disp: 90 tablet, Rfl: 3  •  amLODIPine (NORVASC) 5 MG tablet, Take 1 tablet by mouth Daily., Disp: 30 tablet, Rfl: 0  •  apixaban (ELIQUIS) 5 MG tablet tablet, Take 1 tablet by mouth Every 12 (Twelve) Hours., Disp: 60 tablet, Rfl: 5  •  aspirin 81 MG tablet, Take 81 mg by mouth 2 (Two) Times a Day., Disp: 30 tablet, Rfl: 11  •  carvedilol (COREG) 6.25 MG tablet, Take 1 tablet by mouth 2 (Two) Times a Day With Meals., Disp: 60 tablet, Rfl: 0  •  divalproex (DEPAKOTE) 250 MG DR tablet, Take 1 tablet by mouth 3 (Three) Times a Day. Take 2 in the AM 1 at noon and 2 in the pm. Per DR Clifford., Disp: 150 tablet, Rfl: 5  •  escitalopram (LEXAPRO) 20 MG tablet, Take 1 tablet by mouth Daily., Disp: 90 tablet, Rfl: 1  •  ferrous sulfate 325 (65 FE) MG tablet, Take 1 tablet by mouth Every Other Day., Disp: 30 tablet, Rfl: 0  •   "furosemide (Lasix) 40 MG tablet, Take 1 tablet by mouth Daily As Needed (Weight gain of 3lbs in 1 day or 5lbs in 3 days)., Disp: 15 tablet, Rfl: 0  •  hydrALAZINE (APRESOLINE) 100 MG tablet, Take 0.5 tablets by mouth Every 8 (Eight) Hours. (Patient taking differently: Take 100 mg by mouth 3 (Three) Times a Day.), Disp: 90 tablet, Rfl: 0  •  levothyroxine (SYNTHROID, LEVOTHROID) 88 MCG tablet, Take 1 tablet by mouth Daily., Disp: 90 tablet, Rfl: 3  •  lovastatin (MEVACOR) 40 MG tablet, Take 1 tablet by mouth Daily With Dinner., Disp: 90 tablet, Rfl: 1  •  methyldopa (ALDOMET) 500 MG tablet, Take 1 tablet by mouth Every 12 (Twelve) Hours., Disp: 60 tablet, Rfl: 11  •  REPATHA SURECLICK 140 MG/ML solution auto-injector, INJECT 1ML UNDER THE SKIN AS DIRECTED EVERY 14 DAYS, Disp: 1 pen, Rfl: 11  •  Semaglutide,0.25 or 0.5MG/DOS, (Ozempic, 0.25 or 0.5 MG/DOSE,) 2 MG/1.5ML solution pen-injector, Inject 0.5 mg under the skin into the appropriate area as directed 1 (One) Time Per Week., Disp: 4 pen, Rfl: 3  •  terazosin (HYTRIN) 10 MG capsule, Take 1 capsule by mouth Every 12 (Twelve) Hours., Disp: 60 capsule, Rfl: 11    Objective      Blood pressure 117/67, pulse 79, temperature 97.3 °F (36.3 °C), temperature source Temporal, resp. rate 16, height 193 cm (76\"), weight 135 kg (296 lb 12.8 oz), SpO2 98 %.    Physical Exam     General Appearance:    Alert, cooperative, no distress, appears stated age   Head:    Normocephalic, without obvious abnormality, atraumatic   Eyes:    PERRL, conjunctiva/corneas clear, EOM's intact   Ears:    Normal TM's and external ear canals, both ears   Nose:   Nares normal, septum midline, mucosa normal, no drainage   or sinus tenderness   Throat:   Lips, mucosa, and tongue normal; teeth and gums normal   Neck:   Supple, symmetrical, trachea midline, no adenopathy;        thyroid:  No enlargement/tenderness/nodules; no carotid    bruit or JVD   Back:     Symmetric, no curvature, ROM normal, no CVA " tenderness   Lungs:     Clear to auscultation bilaterally, respirations unlabored   Chest wall:    No tenderness or deformity   Heart:    Regular rate and irregular rhythm,  S2 normal, no murmur,        rub or gallop   Abdomen:     Soft, non-tender, bowel sounds active all four quadrants,     no masses, no organomegaly   Extremities:   Extremities normal, atraumatic, no cyanosis or edema   Pulses:   2+ and symmetric all extremities   Skin:   Skin color, texture, turgor normal, no rashes or lesions   Lymph nodes:   Cervical, supraclavicular, and axillary nodes normal   Neurologic:   CNII-XII intact. Normal strength, sensation and reflexes       throughout      Results for orders placed or performed in visit on 08/27/20   BNP   Result Value Ref Range    proBNP 3,543.0 (H) 0.0 - 900.0 pg/mL   Cyclic Citrul Peptide Antibody, IgG / IgA   Result Value Ref Range    CCP Antibodies IgG/IgA 11 0 - 19 units   Rheumatoid Factor   Result Value Ref Range    Rheumatoid Factor Quantitative <10.0 0.0 - 14.0 IU/mL   Sedimentation Rate   Result Value Ref Range    Sed Rate 28 (H) 0 - 20 mm/hr   CBC Auto Differential   Result Value Ref Range    WBC 7.01 3.40 - 10.80 10*3/mm3    RBC 4.34 4.14 - 5.80 10*6/mm3    Hemoglobin 13.3 13.0 - 17.7 g/dL    Hematocrit 39.4 37.5 - 51.0 %    MCV 90.8 79.0 - 97.0 fL    MCH 30.6 26.6 - 33.0 pg    MCHC 33.8 31.5 - 35.7 g/dL    RDW 14.3 12.3 - 15.4 %    RDW-SD 47.4 37.0 - 54.0 fl    MPV 11.4 6.0 - 12.0 fL    Platelets 232 140 - 450 10*3/mm3    Neutrophil % 53.2 42.7 - 76.0 %    Lymphocyte % 35.0 19.6 - 45.3 %    Monocyte % 7.8 5.0 - 12.0 %    Eosinophil % 2.6 0.3 - 6.2 %    Basophil % 1.3 0.0 - 1.5 %    Immature Grans % 0.1 0.0 - 0.5 %    Neutrophils, Absolute 3.73 1.70 - 7.00 10*3/mm3    Lymphocytes, Absolute 2.45 0.70 - 3.10 10*3/mm3    Monocytes, Absolute 0.55 0.10 - 0.90 10*3/mm3    Eosinophils, Absolute 0.18 0.00 - 0.40 10*3/mm3    Basophils, Absolute 0.09 0.00 - 0.20 10*3/mm3    Immature Grans,  Absolute 0.01 0.00 - 0.05 10*3/mm3    nRBC 0.0 0.0 - 0.2 /100 WBC   Basic Metabolic Panel   Result Value Ref Range    Glucose 116 (H) 65 - 99 mg/dL    BUN 20 8 - 23 mg/dL    Creatinine 1.72 (H) 0.76 - 1.27 mg/dL    Sodium 138 136 - 145 mmol/L    Potassium 4.4 3.5 - 5.2 mmol/L    Chloride 100 98 - 107 mmol/L    CO2 27.9 22.0 - 29.0 mmol/L    Calcium 9.4 8.6 - 10.5 mg/dL    eGFR Non African Amer 40 (L) >60 mL/min/1.73    BUN/Creatinine Ratio 11.6 7.0 - 25.0    Anion Gap 10.1 5.0 - 15.0 mmol/L   T4, Free   Result Value Ref Range    Free T4 1.34 0.93 - 1.70 ng/dL   TSH   Result Value Ref Range    TSH 1.000 0.270 - 4.200 uIU/mL   Troponin   Result Value Ref Range    Troponin T <0.010 0.000 - 0.030 ng/mL         Assessment/Plan     Decrease hydralazine back to original plan.  50mg tid      Andre was seen today for fatigue.    Diagnoses and all orders for this visit:    Acute renal failure superimposed on stage 2 chronic kidney disease, unspecified acute renal failure type (CMS/[facility])  -     US Renal Bilateral; Future  -     BNP  -     Basic Metabolic Panel  -     Lipid Panel  -     Hemoglobin A1c    Acute systolic CHF (congestive heart failure) (CMS/[facility])  -     BNP  -     Basic Metabolic Panel  -     Lipid Panel  -     Hemoglobin A1c    Atrial fibrillation, unspecified type (CMS/[facility])  -     BNP  -     Basic Metabolic Panel  -     Lipid Panel  -     Hemoglobin A1c    Type 2 diabetes mellitus with stage 2 chronic kidney disease, without long-term current use of insulin (CMS/[facility])  -     BNP  -     Basic Metabolic Panel  -     Lipid Panel  -     Hemoglobin A1c      Return in about 1 week (around 9/7/2020).          There are no Patient Instructions on file for this visit.     Keven Bear MD    Assessment/Plan

## 2020-09-01 ENCOUNTER — APPOINTMENT (OUTPATIENT)
Dept: PREADMISSION TESTING | Facility: HOSPITAL | Age: 65
End: 2020-09-01

## 2020-09-01 ENCOUNTER — PREP FOR SURGERY (OUTPATIENT)
Dept: OTHER | Facility: HOSPITAL | Age: 65
End: 2020-09-01

## 2020-09-01 ENCOUNTER — LAB (OUTPATIENT)
Dept: LAB | Facility: HOSPITAL | Age: 65
End: 2020-09-01

## 2020-09-01 DIAGNOSIS — I48.91 ATRIAL FIBRILLATION, UNSPECIFIED TYPE (HCC): Primary | ICD-10-CM

## 2020-09-01 DIAGNOSIS — I48.91 ATRIAL FIBRILLATION, UNSPECIFIED TYPE (HCC): ICD-10-CM

## 2020-09-01 LAB
B BURGDOR DNA SPEC QL NAA+PROBE: NEGATIVE
HBA1C MFR BLD: 6.1 % (ref 4.8–5.6)
NT-PROBNP SERPL-MCNC: 2110 PG/ML (ref 0–900)
R RICKETTSI IGM TITR SER: 0.25 INDEX (ref 0–0.89)

## 2020-09-01 PROCEDURE — 83036 HEMOGLOBIN GLYCOSYLATED A1C: CPT | Performed by: INTERNAL MEDICINE

## 2020-09-01 PROCEDURE — U0004 COV-19 TEST NON-CDC HGH THRU: HCPCS

## 2020-09-01 PROCEDURE — 36415 COLL VENOUS BLD VENIPUNCTURE: CPT

## 2020-09-01 PROCEDURE — 83880 ASSAY OF NATRIURETIC PEPTIDE: CPT | Performed by: INTERNAL MEDICINE

## 2020-09-01 PROCEDURE — 80061 LIPID PANEL: CPT | Performed by: INTERNAL MEDICINE

## 2020-09-01 PROCEDURE — C9803 HOPD COVID-19 SPEC COLLECT: HCPCS

## 2020-09-01 PROCEDURE — 80048 BASIC METABOLIC PNL TOTAL CA: CPT | Performed by: INTERNAL MEDICINE

## 2020-09-01 PROCEDURE — U0002 COVID-19 LAB TEST NON-CDC: HCPCS

## 2020-09-01 RX ORDER — SODIUM CHLORIDE 0.9 % (FLUSH) 0.9 %
10 SYRINGE (ML) INJECTION AS NEEDED
Status: CANCELLED | OUTPATIENT
Start: 2020-09-01

## 2020-09-01 RX ORDER — SODIUM CHLORIDE 0.9 % (FLUSH) 0.9 %
3 SYRINGE (ML) INJECTION EVERY 12 HOURS SCHEDULED
Status: CANCELLED | OUTPATIENT
Start: 2020-09-01

## 2020-09-01 RX ORDER — ACETAMINOPHEN 325 MG/1
650 TABLET ORAL EVERY 4 HOURS PRN
Status: CANCELLED | OUTPATIENT
Start: 2020-09-01

## 2020-09-02 ENCOUNTER — TELEPHONE (OUTPATIENT)
Dept: INTERNAL MEDICINE | Facility: CLINIC | Age: 65
End: 2020-09-02

## 2020-09-02 LAB
ANION GAP SERPL CALCULATED.3IONS-SCNC: 14.8 MMOL/L (ref 5–15)
BUN SERPL-MCNC: 20 MG/DL (ref 8–23)
BUN/CREAT SERPL: 12.2 (ref 7–25)
CALCIUM SPEC-SCNC: 9.8 MG/DL (ref 8.6–10.5)
CHLORIDE SERPL-SCNC: 101 MMOL/L (ref 98–107)
CHOLEST SERPL-MCNC: 109 MG/DL (ref 0–200)
CO2 SERPL-SCNC: 25.2 MMOL/L (ref 22–29)
CREAT SERPL-MCNC: 1.64 MG/DL (ref 0.76–1.27)
GFR SERPL CREATININE-BSD FRML MDRD: 43 ML/MIN/1.73
GLUCOSE SERPL-MCNC: 88 MG/DL (ref 65–99)
HDLC SERPL-MCNC: 38 MG/DL (ref 40–60)
LDLC SERPL CALC-MCNC: 23 MG/DL (ref 0–100)
LDLC/HDLC SERPL: 0.62 {RATIO}
POTASSIUM SERPL-SCNC: 4.4 MMOL/L (ref 3.5–5.2)
REF LAB TEST METHOD: NORMAL
SARS-COV-2 RNA RESP QL NAA+PROBE: NOT DETECTED
SODIUM SERPL-SCNC: 141 MMOL/L (ref 136–145)
TRIGL SERPL-MCNC: 238 MG/DL (ref 0–150)
VLDLC SERPL-MCNC: 47.6 MG/DL (ref 5–40)

## 2020-09-02 NOTE — TELEPHONE ENCOUNTER
PT STATES THAT HE HAD LABS DONE YESTERDAY AND NEEDS THE RESULTS UPLOADED TO MY CHART.    PT STATES THAT HE HAS AN APPT TOMORROW AND NEEDS FOR ANOTHER DR.       PLEASE ADVISE  [phone]

## 2020-09-04 ENCOUNTER — READMISSION MANAGEMENT (OUTPATIENT)
Dept: CALL CENTER | Facility: HOSPITAL | Age: 65
End: 2020-09-04

## 2020-09-04 ENCOUNTER — HOSPITAL ENCOUNTER (OUTPATIENT)
Dept: CARDIOLOGY | Facility: HOSPITAL | Age: 65
Discharge: HOME | End: 2020-09-04
Attending: INTERNAL MEDICINE | Admitting: INTERNAL MEDICINE

## 2020-09-04 VITALS
WEIGHT: 298.72 LBS | HEIGHT: 76 IN | RESPIRATION RATE: 18 BRPM | OXYGEN SATURATION: 90 % | DIASTOLIC BLOOD PRESSURE: 94 MMHG | BODY MASS INDEX: 36.38 KG/M2 | SYSTOLIC BLOOD PRESSURE: 146 MMHG | TEMPERATURE: 98.3 F | HEART RATE: 67 BPM

## 2020-09-04 DIAGNOSIS — I48.91 ATRIAL FIBRILLATION, UNSPECIFIED TYPE (HCC): ICD-10-CM

## 2020-09-04 DIAGNOSIS — I48.0 PAROXYSMAL ATRIAL FIBRILLATION (HCC): ICD-10-CM

## 2020-09-04 LAB
BH CV ECHO MEAS - AO MAX PG (FULL): 7.5 MMHG
BH CV ECHO MEAS - AO MAX PG: 9 MMHG
BH CV ECHO MEAS - AO MEAN PG (FULL): 4.4 MMHG
BH CV ECHO MEAS - AO MEAN PG: 5.2 MMHG
BH CV ECHO MEAS - AO V2 MAX: 152.5 CM/SEC
BH CV ECHO MEAS - AO V2 MEAN: 104.5 CM/SEC
BH CV ECHO MEAS - AO V2 VTI: 29.8 CM
BH CV ECHO MEAS - AVA(I,A): 2 CM^2
BH CV ECHO MEAS - AVA(I,D): 2 CM^2
BH CV ECHO MEAS - AVA(V,A): 1.7 CM^2
BH CV ECHO MEAS - AVA(V,D): 1.7 CM^2
BH CV ECHO MEAS - BSA(HAYCOCK): 2.7 M^2
BH CV ECHO MEAS - BSA: 2.6 M^2
BH CV ECHO MEAS - BZI_BMI: 36 KILOGRAMS/M^2
BH CV ECHO MEAS - BZI_METRIC_HEIGHT: 193 CM
BH CV ECHO MEAS - BZI_METRIC_WEIGHT: 134.3 KG
BH CV ECHO MEAS - LV MAX PG: 1.5 MMHG
BH CV ECHO MEAS - LV MEAN PG: 0.82 MMHG
BH CV ECHO MEAS - LV V1 MAX: 60.7 CM/SEC
BH CV ECHO MEAS - LV V1 MEAN: 42.6 CM/SEC
BH CV ECHO MEAS - LV V1 VTI: 14.3 CM
BH CV ECHO MEAS - LVOT AREA (M): 4.2 CM^2
BH CV ECHO MEAS - LVOT AREA: 4.2 CM^2
BH CV ECHO MEAS - LVOT DIAM: 2.3 CM
BH CV ECHO MEAS - SI(LVOT): 22.8 ML/M^2
BH CV ECHO MEAS - SV(LVOT): 59.7 ML
BH CV VAS BP LEFT ARM: NORMAL MMHG
LV EF 2D ECHO EST: 45 %

## 2020-09-04 PROCEDURE — 93325 DOPPLER ECHO COLOR FLOW MAPG: CPT

## 2020-09-04 PROCEDURE — 92960 CARDIOVERSION ELECTRIC EXT: CPT | Performed by: INTERNAL MEDICINE

## 2020-09-04 PROCEDURE — 93005 ELECTROCARDIOGRAM TRACING: CPT | Performed by: INTERNAL MEDICINE

## 2020-09-04 PROCEDURE — 99152 MOD SED SAME PHYS/QHP 5/>YRS: CPT | Performed by: INTERNAL MEDICINE

## 2020-09-04 PROCEDURE — 25010000002 MIDAZOLAM PER 1 MG: Performed by: INTERNAL MEDICINE

## 2020-09-04 PROCEDURE — 93010 ELECTROCARDIOGRAM REPORT: CPT | Performed by: INTERNAL MEDICINE

## 2020-09-04 PROCEDURE — 93321 DOPPLER ECHO F-UP/LMTD STD: CPT | Performed by: INTERNAL MEDICINE

## 2020-09-04 PROCEDURE — 99152 MOD SED SAME PHYS/QHP 5/>YRS: CPT

## 2020-09-04 PROCEDURE — 93325 DOPPLER ECHO COLOR FLOW MAPG: CPT | Performed by: INTERNAL MEDICINE

## 2020-09-04 PROCEDURE — 36415 COLL VENOUS BLD VENIPUNCTURE: CPT

## 2020-09-04 PROCEDURE — 93321 DOPPLER ECHO F-UP/LMTD STD: CPT

## 2020-09-04 PROCEDURE — 92960 CARDIOVERSION ELECTRIC EXT: CPT

## 2020-09-04 PROCEDURE — 25010000002 FENTANYL CITRATE (PF) 100 MCG/2ML SOLUTION: Performed by: INTERNAL MEDICINE

## 2020-09-04 PROCEDURE — 99153 MOD SED SAME PHYS/QHP EA: CPT

## 2020-09-04 PROCEDURE — 93312 ECHO TRANSESOPHAGEAL: CPT | Performed by: INTERNAL MEDICINE

## 2020-09-04 PROCEDURE — 93312 ECHO TRANSESOPHAGEAL: CPT

## 2020-09-04 RX ORDER — SODIUM CHLORIDE 0.9 % (FLUSH) 0.9 %
10 SYRINGE (ML) INJECTION AS NEEDED
Status: DISCONTINUED | OUTPATIENT
Start: 2020-09-04 | End: 2020-09-04 | Stop reason: HOSPADM

## 2020-09-04 RX ORDER — NALOXONE HYDROCHLORIDE 0.4 MG/ML
INJECTION, SOLUTION INTRAMUSCULAR; INTRAVENOUS; SUBCUTANEOUS
Status: DISCONTINUED
Start: 2020-09-04 | End: 2020-09-04 | Stop reason: WASHOUT

## 2020-09-04 RX ORDER — FENTANYL CITRATE 50 UG/ML
INJECTION, SOLUTION INTRAMUSCULAR; INTRAVENOUS
Status: DISCONTINUED
Start: 2020-09-04 | End: 2020-09-04 | Stop reason: HOSPADM

## 2020-09-04 RX ORDER — SODIUM CHLORIDE 0.9 % (FLUSH) 0.9 %
3 SYRINGE (ML) INJECTION EVERY 12 HOURS SCHEDULED
Status: DISCONTINUED | OUTPATIENT
Start: 2020-09-04 | End: 2020-09-04 | Stop reason: HOSPADM

## 2020-09-04 RX ORDER — FLUMAZENIL 0.1 MG/ML
INJECTION INTRAVENOUS
Status: DISCONTINUED
Start: 2020-09-04 | End: 2020-09-04 | Stop reason: WASHOUT

## 2020-09-04 RX ORDER — ACETAMINOPHEN 325 MG/1
650 TABLET ORAL EVERY 4 HOURS PRN
Status: DISCONTINUED | OUTPATIENT
Start: 2020-09-04 | End: 2020-09-04 | Stop reason: HOSPADM

## 2020-09-04 RX ORDER — FENTANYL CITRATE 50 UG/ML
INJECTION, SOLUTION INTRAMUSCULAR; INTRAVENOUS
Status: COMPLETED | OUTPATIENT
Start: 2020-09-04 | End: 2020-09-04

## 2020-09-04 RX ORDER — PROPAFENONE HYDROCHLORIDE 150 MG/1
150 TABLET, COATED ORAL EVERY 12 HOURS SCHEDULED
Qty: 60 TABLET | Refills: 11 | Status: SHIPPED | OUTPATIENT
Start: 2020-09-04 | End: 2020-12-18 | Stop reason: SDUPTHER

## 2020-09-04 RX ORDER — MIDAZOLAM HYDROCHLORIDE 1 MG/ML
INJECTION INTRAMUSCULAR; INTRAVENOUS
Status: DISCONTINUED
Start: 2020-09-04 | End: 2020-09-04 | Stop reason: HOSPADM

## 2020-09-04 RX ORDER — MIDAZOLAM HYDROCHLORIDE 1 MG/ML
INJECTION INTRAMUSCULAR; INTRAVENOUS
Status: COMPLETED | OUTPATIENT
Start: 2020-09-04 | End: 2020-09-04

## 2020-09-04 RX ADMIN — METHOHEXITAL SODIUM 20 MG: 500 INJECTION, POWDER, LYOPHILIZED, FOR SOLUTION INTRAMUSCULAR; INTRAVENOUS; RECTAL at 12:17

## 2020-09-04 RX ADMIN — FENTANYL CITRATE 50 MCG: 50 INJECTION, SOLUTION INTRAMUSCULAR; INTRAVENOUS at 12:13

## 2020-09-04 RX ADMIN — METHOHEXITAL SODIUM 20 MG: 500 INJECTION, POWDER, LYOPHILIZED, FOR SOLUTION INTRAMUSCULAR; INTRAVENOUS; RECTAL at 12:36

## 2020-09-04 RX ADMIN — MIDAZOLAM 2 MG: 1 INJECTION INTRAMUSCULAR; INTRAVENOUS at 12:13

## 2020-09-04 RX ADMIN — MIDAZOLAM 2 MG: 1 INJECTION INTRAMUSCULAR; INTRAVENOUS at 12:28

## 2020-09-04 RX ADMIN — METHOHEXITAL SODIUM 20 MG: 500 INJECTION, POWDER, LYOPHILIZED, FOR SOLUTION INTRAMUSCULAR; INTRAVENOUS; RECTAL at 12:27

## 2020-09-04 RX ADMIN — FENTANYL CITRATE 50 MCG: 50 INJECTION, SOLUTION INTRAMUSCULAR; INTRAVENOUS at 12:36

## 2020-09-04 NOTE — OUTREACH NOTE
Medical Week 4 Survey      Responses   [facility] [place] patient discharged from?  [place]   COVID-19 Test Status  Negative   Does the patient have one of the following disease processes/diagnoses(primary or secondary)?  Other   Week 4 attempt successful?  No          Angela Peralta RN

## 2020-09-04 NOTE — H&P
[facility] Consult Note      Referring Provider: Yoselin Johnson,*  Primary Provider:  Keven Bear MD  Reason for Consultation: afib    Problem list:    1. Coronary artery disease:  a. Stress echoAlexandra, 01/28/2014: Borderline abnormal, but low probability for significant focal obstructive CAD.  b. NSTEMI, 12/2015 (Reji): PCI of OM branch and circumflex (3.5 x 20 Synchrony). EF 50%.    c. LHC (emergent re-look), 12/2015: Patent stent to circumflex; LAD with 50-60% prox-mid stenosis.  d. Echo, 12/2015: EF 55-60%, no significant valve disease.  e. Cardiolite, 03/03/2016: No evidence of ischemia.  f. Nuclear stress, 09/26/2018: EF 40%. Exercise duration of 8:30, as expected. Stopped due to fatigue and SOA. THR of 134 achieved at 7:35. Hypertensive response to exercise. Occasional PVC's and aberrancy. No stress induced perfusion defect. Mild fixed mid and distal anteroapical hypoperfusion worse in the rest images.   g. LHC, 10/12/2018: Two-vessel CAD with multiple borderline lesions within the LAD and an occluded mid RCA.  h. Echo, 10/12/2018: EF 55%. Moderate dilation of the aortic root. Moderate dilation of the sinuses of Valsalva. Mild AS and AI. Aortic valve mean pressure gradient 10 mmHg.  2. Paroxsymal atrial fibrillation  a. Incidentally noted on EKG 8/28/2020   b. CHADSVASC = 3, started on Eliquis 5 mg twice daily, 8/28/2020  3. Hypertension:  a. Renal artery duplex 01/28/2014: No evidence of renal artery stenosis.  b. Renal artery duplex 07/27/2018: No evidence of renal artery stenosis.  4. Hyperlipidemia.  5. CVA 6/2016:  a. Carotid duplex 12/28/15: <50% stenosis bilaterally.  b. 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  6. ALEX, 2010:  a. Sleep study, 08/14/2018: Normal (?); wears bi-pap.  7. Seizures; partial focal seizure:  a. 2005: meningitis and encephalitis.    Allergies:  Statins    Home/Current Medications:      Current Facility-Administered Medications:   •   acetaminophen (TYLENOL) tablet 650 mg, 650 mg, Oral, Q4H PRN, Rina Rodrigesice B, APRN  •  fentaNYL citrate (PF) (SUBLIMAZE) 100 MCG/2ML injection  - ADS Override Pull, , , ,   •  flumazenil (ROMAZICON) 0.5 MG/5ML injection  - ADS Override Pull, , , ,   •  methohexital (BREVITAL) 50 MG/5ML injection solution prefilled syringe  - ADS Override Pull, , , ,   •  midazolam (VERSED) 2 MG/2ML injection  - ADS Override Pull, , , ,   •  naloxone (NARCAN) 0.4 MG/ML injection  - ADS Override Pull, , , ,   •  sodium chloride 0.9 % flush 10 mL, 10 mL, Intravenous, PRN, Cheryl Rodriges, APRN  •  sodium chloride 0.9 % flush 3 mL, 3 mL, Intravenous, Q12H, Cheryl Rodriges, APRN      History of present illness:  Mr. Agosto is a 66 y/o male with the above noted pmhx who presents today for SELWYN/ECV.  He was noted to be in atrial fibrillation at his primary care's office on 2020.  He was started on Eliquis 5 mg twice daily and set up for SELWYN/ECV.  He is symptomatic with fatigue, lower extremity edema and weight gain.  He is on carvedilol 6.25 mg twice daily for rate control and hypertension.  Of note he was unable to get his methyldopa use for hypertension refilled.  He was started on furosemide and has had a great deal of urinary output and weight loss.  No PND, orthopnea, chest pain, shortness of breath, lightheadedness or dizziness.    Social History:  Social History     Socioeconomic History   • Marital status:      Spouse name: Not on file   • Number of children: Not on file   • Years of education: Not on file   • Highest education level: Not on file   Tobacco Use   • Smoking status: Former Smoker     Packs/day: 0.50     Years: 10.00     Pack years: 5.00     Types: Cigarettes     Start date: 1975     Last attempt to quit: 1985     Years since quittin.7   • Smokeless tobacco: Never Used   Substance and Sexual Activity   • Alcohol use: No   • Drug use: No   • Sexual activity: Defer        Family History:  Family History   Problem Relation Age of Onset   • Stroke Mother    • Hypertension Mother    • COPD Father    • Hypertension Father        Review of Systems  Pertinent positives are listed in the HPI.  All other systems reviewed are negative.     Objective     Vital Sign Min/Max for last 24 hours  No data recorded   BP  Min: 134/94  Max: 155/99   Pulse  Min: 71  Max: 71   No data recorded   SpO2  Min: 96 %  Max: 96 %   No data recorded   No data recorded         Physical Exam:  GENERAL: This is a well-developed, well-nourished, male who is in no acute distress. Alert and oriented x3. Normal mood and affect.   SKIN: Pink and warm without rash or abnormality noted.   HEENT: Head is normocephalic and atraumatic. Pupils are equal and reactive to light bilaterally. Mucous membranes are pink and moist.   NECK: Supple without lymphadenopathy or thyromegaly. There is no jugular venous distention at 30°.  LUNGS: Clear to auscultation bilaterally without wheezing, rhonchi, or rales noted.   CARDIOVASCULAR: Irregularly irregular rhythm, regular to tachycardic rate a normal S1 and S2. There is no murmur, gallop, rub, or click appreciated. The PMI is nondisplaced. Carotid upstrokes are 2+ and symmetrical without bruits.   ABDOMEN: Soft and nondistended with positive bowel sounds x4. The patient denies tenderness of palpitation.   MUSCULOSKELETAL: There are no obvious bony abnormalities. Normal range of tenderness to palpation.   NEUROLOGICAL: Nonfocal.   PERIPHERAL VASCULAR: Femoral pulses are 2+ and symmetrical without bruits. Posterior tibial and dorsalis pedis pulses are 2+ and symmetrical. There is trace peripheral edema.      EKG: N/A  Tele: A. fib with mild RVR at times    Labs:      Lab Results   Component Value Date    WBC 7.01 08/28/2020    HGB 13.3 08/28/2020    HCT 39.4 08/28/2020    MCV 90.8 08/28/2020     08/28/2020     Lab Results   Component Value Date    GLUCOSE 88 09/01/2020    BUN  20 09/01/2020    CREATININE 1.64 (H) 09/01/2020    EGFRIFNONA 43 (L) 09/01/2020    EGFRIFAFRI 57 (L) 12/05/2018    BCR 12.2 09/01/2020    K 4.4 09/01/2020    CO2 25.2 09/01/2020    CALCIUM 9.8 09/01/2020    PROTENTOTREF 7.6 07/03/2018    ALBUMIN 3.70 08/15/2020    LABIL2 1.2 07/03/2018    AST 15 08/15/2020    ALT 14 08/15/2020     Lab Results   Component Value Date    CKTOTAL 36 08/17/2020    CKMB 0 03/09/2017    TROPONINI <0.012 07/08/2019    TROPONINT <0.010 08/28/2020     Lab Results   Component Value Date    INR 1.26 (H) 08/10/2020    PROTIME 15.5 (H) 08/10/2020     Lab Results   Component Value Date    CHOL 109 09/01/2020    CHLPL 272 (H) 07/03/2018    TRIG 238 (H) 09/01/2020    HDL 38 (L) 09/01/2020    LDL 23 09/01/2020        Ejection Fraction:      Results Review:  I reviewed the patients new clinical results.      Assessment:  1.  Persistent atrial fibrillation  -Noted on EKG on 8/28/2020  -CHADSVASC = 3, started Eliquis 5 mg twice daily on 8/28  -Patient presents today for SELWYN/ECV. The risks, benefits, and alternatives of the procedure have been reviewed and the patient wishes to proceed.     2.  Hypertension  -Patient unable to get methyldopa refilled.  - On terazosin 10 mg twice daily, Norvasc 5 mg daily and Coreg 6.25 mg twice daily    Plan:    -We will need a different antihypertensive since he cannot refill methyldopa  -Continue on Eliquis 5 mg twice daily  -Continue Coreg for rate control  -May need antiarrhythmic to maintain normal sinus rhythm in the near future.      Electronically signed by YESY Malhotra, 09/04/20, 11:45 AM.    I Yoselin Johnson MD personally performed the services described in this documentation as scribed by the above individual in my presence, and it is both accurate and complete.    Yoselin Johnson MD, FACC

## 2020-09-04 NOTE — PROGRESS NOTES
The patient underwent external cardioversion without difficulty.  He converted to sinus rhythm with 1 defibrillation.  He was placed on Rythmol 150 mg every 12 hours.  He will obtain an EKG on Tuesday.    Methyldopa has become difficult to obtain.  As a result if he runs out of methyldopa he will increase his hydralazine to 100 mg either every 8 hours or 100 mg twice a day with 50 mg in the afternoon.    Follow-up with me in 1 month.    Yoselin Johnson MD, FACC

## 2020-09-08 ENCOUNTER — TRANSCRIBE ORDERS (OUTPATIENT)
Dept: LAB | Facility: HOSPITAL | Age: 65
End: 2020-09-08

## 2020-09-08 ENCOUNTER — OFFICE VISIT (OUTPATIENT)
Dept: INTERNAL MEDICINE | Facility: CLINIC | Age: 65
End: 2020-09-08

## 2020-09-08 ENCOUNTER — LAB (OUTPATIENT)
Dept: LAB | Facility: HOSPITAL | Age: 65
End: 2020-09-08

## 2020-09-08 VITALS
SYSTOLIC BLOOD PRESSURE: 118 MMHG | TEMPERATURE: 97.8 F | RESPIRATION RATE: 18 BRPM | WEIGHT: 296 LBS | OXYGEN SATURATION: 98 % | HEIGHT: 76 IN | BODY MASS INDEX: 36.04 KG/M2 | HEART RATE: 72 BPM | DIASTOLIC BLOOD PRESSURE: 68 MMHG

## 2020-09-08 DIAGNOSIS — I11.0 HYPERTENSIVE HEART DISEASE WITH HEART FAILURE (HCC): ICD-10-CM

## 2020-09-08 DIAGNOSIS — I10 ESSENTIAL HYPERTENSION: Primary | ICD-10-CM

## 2020-09-08 DIAGNOSIS — Z01.818 PRE-OP TESTING: Primary | ICD-10-CM

## 2020-09-08 DIAGNOSIS — Z01.818 PRE-OP TESTING: ICD-10-CM

## 2020-09-08 DIAGNOSIS — M19.90 ARTHRITIS: ICD-10-CM

## 2020-09-08 PROCEDURE — 99214 OFFICE O/P EST MOD 30 MIN: CPT | Performed by: INTERNAL MEDICINE

## 2020-09-08 PROCEDURE — U0004 COV-19 TEST NON-CDC HGH THRU: HCPCS

## 2020-09-08 PROCEDURE — C9803 HOPD COVID-19 SPEC COLLECT: HCPCS

## 2020-09-08 NOTE — PROGRESS NOTES
Subjective     Patient ID: Andre Agosto is a 65 y.o. male. Patient is here for management of multiple medical problems.     Chief Complaint   Patient presents with   • Follow-up     still very weak.     History of Present Illness     ekg today due.    Recent cardioversion.    bp 140/90    Wt trending down.      Water weight waxing and waning.       The following portions of the patient's history were reviewed and updated as appropriate: allergies, current medications, past family history, past medical history, past social history, past surgical history and problem list.    Review of Systems   Constitutional: Negative for fatigue.   HENT: Negative for dental problem.    Musculoskeletal: Positive for arthralgias and gait problem.   Psychiatric/Behavioral: Negative for sleep disturbance.       Current Outpatient Medications:   •  allopurinol (Zyloprim) 100 MG tablet, Take 1 tablet by mouth Daily., Disp: 90 tablet, Rfl: 3  •  amLODIPine (NORVASC) 5 MG tablet, Take 1 tablet by mouth Daily., Disp: 30 tablet, Rfl: 0  •  apixaban (ELIQUIS) 5 MG tablet tablet, Take 1 tablet by mouth Every 12 (Twelve) Hours., Disp: 60 tablet, Rfl: 5  •  aspirin 81 MG tablet, Take 81 mg by mouth 2 (Two) Times a Day., Disp: 30 tablet, Rfl: 11  •  carvedilol (COREG) 6.25 MG tablet, Take 1 tablet by mouth 2 (Two) Times a Day With Meals., Disp: 60 tablet, Rfl: 0  •  divalproex (DEPAKOTE) 250 MG DR tablet, Take 1 tablet by mouth 3 (Three) Times a Day. Take 2 in the AM 1 at noon and 2 in the pm. Per DR Clifford., Disp: 150 tablet, Rfl: 5  •  escitalopram (LEXAPRO) 20 MG tablet, Take 1 tablet by mouth Daily., Disp: 90 tablet, Rfl: 1  •  ferrous sulfate 325 (65 FE) MG tablet, Take 1 tablet by mouth Every Other Day., Disp: 30 tablet, Rfl: 0  •  furosemide (Lasix) 40 MG tablet, Take 1 tablet by mouth Daily As Needed (Weight gain of 3lbs in 1 day or 5lbs in 3 days)., Disp: 15 tablet, Rfl: 0  •  hydrALAZINE (APRESOLINE) 100 MG tablet, Take 0.5 tablets by  "mouth Every 8 (Eight) Hours. (Patient taking differently: Take 100 mg by mouth 3 (Three) Times a Day.), Disp: 90 tablet, Rfl: 0  •  levothyroxine (SYNTHROID, LEVOTHROID) 88 MCG tablet, Take 1 tablet by mouth Daily., Disp: 90 tablet, Rfl: 3  •  lovastatin (MEVACOR) 40 MG tablet, Take 1 tablet by mouth Daily With Dinner., Disp: 90 tablet, Rfl: 1  •  methyldopa (ALDOMET) 500 MG tablet, Take 1 tablet by mouth Every 12 (Twelve) Hours., Disp: 60 tablet, Rfl: 11  •  propafenone (RYTHMOL) 150 MG tablet, Take 1 tablet by mouth Every 12 (Twelve) Hours., Disp: 60 tablet, Rfl: 11  •  REPATHA SURECLICK 140 MG/ML solution auto-injector, INJECT 1ML UNDER THE SKIN AS DIRECTED EVERY 14 DAYS, Disp: 1 pen, Rfl: 11  •  Semaglutide,0.25 or 0.5MG/DOS, (Ozempic, 0.25 or 0.5 MG/DOSE,) 2 MG/1.5ML solution pen-injector, Inject 0.5 mg under the skin into the appropriate area as directed 1 (One) Time Per Week., Disp: 4 pen, Rfl: 3  •  terazosin (HYTRIN) 10 MG capsule, Take 1 capsule by mouth Every 12 (Twelve) Hours., Disp: 60 capsule, Rfl: 11    Objective      Blood pressure 118/68, pulse 72, temperature 97.8 °F (36.6 °C), resp. rate 18, height 193 cm (76\"), weight 134 kg (296 lb), SpO2 98 %.    Physical Exam     General Appearance:    Alert, cooperative, no distress, appears stated age   Head:    Normocephalic, without obvious abnormality, atraumatic   Eyes:    PERRL, conjunctiva/corneas clear, EOM's intact   Ears:    Normal TM's and external ear canals, both ears   Nose:   Nares normal, septum midline, mucosa normal, no drainage   or sinus tenderness   Throat:   Lips, mucosa, and tongue normal; teeth and gums normal   Neck:   Supple, symmetrical, trachea midline, no adenopathy;        thyroid:  No enlargement/tenderness/nodules; no carotid    bruit or JVD   Back:     Symmetric, no curvature, ROM normal, no CVA tenderness   Lungs:     Clear to auscultation bilaterally, respirations unlabored   Chest wall:    No tenderness or deformity "   Heart:    Regular rate and rhythm, S1 and S2 normal, no murmur,        rub or gallop   Abdomen:     Soft, non-tender, bowel sounds active all four quadrants,     no masses, no organomegaly   Extremities:   Extremities normal, atraumatic, no cyanosis or edema   Pulses:   2+ and symmetric all extremities   Skin:   Skin color, texture, turgor normal, no rashes or lesions   Lymph nodes:   Cervical, supraclavicular, and axillary nodes normal   Neurologic:   CNII-XII intact. Normal strength, sensation and reflexes       throughout      Results for orders placed or performed during the hospital encounter of 09/04/20   Adult Transesophageal Echo (SELWYN) W/ Cont if Necessary Per Protocol   Result Value Ref Range    BSA 2.6 m^2    LVOT diam 2.3 cm    LVOT area 4.2 cm^2    LVOT area(traced) 4.2 cm^2    Ao pk kenrick 152.5 cm/sec    Ao max PG 9.0 mmHg    Ao max PG (full) 7.5 mmHg    Ao V2 mean 104.5 cm/sec    Ao mean PG 5.2 mmHg    Ao mean PG (full) 4.4 mmHg    Ao V2 VTI 29.8 cm    DAYA(I,A) 2.0 cm^2    DAYA(I,D) 2.0 cm^2    DAYA(V,A) 1.7 cm^2    DAYA(V,D) 1.7 cm^2    LV V1 max PG 1.5 mmHg    LV V1 mean PG 0.82 mmHg    LV V1 max 60.7 cm/sec    LV V1 mean 42.6 cm/sec    LV V1 VTI 14.3 cm    SV(LVOT) 59.7 ml    SI(LVOT) 22.8 ml/m^2     CV ECHO EARL - BZI_BMI 36.0 kilograms/m^2     CV ECHO EARL - BSA(HAYCOCK) 2.7 m^2     CV ECHO EARL - BZI_METRIC_WEIGHT 134.3 kg     CV ECHO EARL - BZI_METRIC_HEIGHT 193.0 cm     CV VAS BP LEFT /94 mmHg    Echo EF Estimated 45 %         Assessment/Plan             Andre was seen today for follow-up.    Diagnoses and all orders for this visit:    Essential hypertension  -     Uric Acid  -     Basic Metabolic Panel  -     CBC & Differential  -     [place] Spotted Fever, IgM  -     [facility] [place] Spotted Fever, IgG  -     BNP    Arthritis  -     Uric Acid  -     Basic Metabolic Panel  -     CBC & Differential  -     [place] Spotted Fever, IgM  -     [facility] Spotted Fever, IgG  -      BNP    Hypertensive heart disease with heart failure (CMS/HCC)   -     BNP      Return in about 1 week (around 9/15/2020).          There are no Patient Instructions on file for this visit.     Keven Bear MD    Assessment/Plan

## 2020-09-09 LAB — SARS-COV-2 RNA NOSE QL NAA+PROBE: NOT DETECTED

## 2020-09-10 ENCOUNTER — HOSPITAL ENCOUNTER (OUTPATIENT)
Dept: ULTRASOUND IMAGING | Facility: HOSPITAL | Age: 65
Discharge: HOME | End: 2020-09-10
Admitting: INTERNAL MEDICINE

## 2020-09-10 ENCOUNTER — TELEPHONE (OUTPATIENT)
Dept: INTERNAL MEDICINE | Facility: CLINIC | Age: 65
End: 2020-09-10

## 2020-09-10 DIAGNOSIS — N17.9 ACUTE RENAL FAILURE SUPERIMPOSED ON STAGE 2 CHRONIC KIDNEY DISEASE, UNSPECIFIED ACUTE RENAL FAILURE TYPE (HCC): ICD-10-CM

## 2020-09-10 DIAGNOSIS — N18.2 ACUTE RENAL FAILURE SUPERIMPOSED ON STAGE 2 CHRONIC KIDNEY DISEASE, UNSPECIFIED ACUTE RENAL FAILURE TYPE (HCC): ICD-10-CM

## 2020-09-10 PROCEDURE — 76775 US EXAM ABDO BACK WALL LIM: CPT

## 2020-09-10 RX ORDER — DIVALPROEX SODIUM 250 MG/1
250 TABLET, DELAYED RELEASE ORAL 3 TIMES DAILY
Qty: 150 TABLET | Refills: 5 | Status: SHIPPED | OUTPATIENT
Start: 2020-09-10 | End: 2020-12-18 | Stop reason: SDUPTHER

## 2020-09-10 NOTE — TELEPHONE ENCOUNTER
Patient's daughter called and stated that the patient will be out of his medication tonight.  Patient is requesting a refill on divalproex (DEPAKOTE) 250 MG DR tablet.    Pharmacy: [facility] in [place]  PH: [phone]  FX:[phone]    Patient has already sent a request on [employer].

## 2020-09-11 ENCOUNTER — APPOINTMENT (OUTPATIENT)
Dept: PULMONOLOGY | Facility: HOSPITAL | Age: 65
End: 2020-09-11

## 2020-09-15 RX ORDER — AMLODIPINE BESYLATE 5 MG/1
5 TABLET ORAL
Qty: 30 TABLET | Refills: 0 | Status: SHIPPED | OUTPATIENT
Start: 2020-09-15 | End: 2020-10-09

## 2020-09-15 RX ORDER — FUROSEMIDE 40 MG/1
40 TABLET ORAL DAILY PRN
Qty: 15 TABLET | Refills: 0 | Status: SHIPPED | OUTPATIENT
Start: 2020-09-15 | End: 2020-09-28

## 2020-09-15 NOTE — TELEPHONE ENCOUNTER
Caller: JULIA     Relationship: DAUGHTER    Best call back number: [phone]    Medication needed:   Requested Prescriptions     Pending Prescriptions Disp Refills   • furosemide (Lasix) 40 MG tablet 15 tablet 0     Sig: Take 1 tablet by mouth Daily As Needed (Weight gain of 3lbs in 1 day or 5lbs in 3 days).   • amLODIPine (NORVASC) 5 MG tablet 30 tablet 0     Sig: Take 1 tablet by mouth Daily.       When do you need the refill by: ASAP    What details did the patient provide when requesting the medication: PATIENTS DAUGHTER STATES HE WAS PRESCRIBED THESE MEDICATIONS BY DR.DANIEL DOMINGO IN THE HOSPITAL AND IS REQUESTING DR ELLIS TO REFILL THEM  PATIENT IS COMPLETELY OUT  Does the patient have less than a 3 day supply:  [x] Yes  [] No    What is the patient's preferred pharmacy: [facility] [address] [place] [address] [facility] [place]. - [phone] [facility] [phone] FX

## 2020-09-17 ENCOUNTER — OFFICE VISIT (OUTPATIENT)
Dept: INTERNAL MEDICINE | Facility: CLINIC | Age: 65
End: 2020-09-17

## 2020-09-17 VITALS
WEIGHT: 297.8 LBS | TEMPERATURE: 97.7 F | OXYGEN SATURATION: 98 % | BODY MASS INDEX: 36.26 KG/M2 | SYSTOLIC BLOOD PRESSURE: 126 MMHG | DIASTOLIC BLOOD PRESSURE: 77 MMHG | HEIGHT: 76 IN | HEART RATE: 67 BPM | RESPIRATION RATE: 16 BRPM

## 2020-09-17 DIAGNOSIS — I10 ESSENTIAL HYPERTENSION: Primary | ICD-10-CM

## 2020-09-17 DIAGNOSIS — Z00.00 ROUTINE GENERAL MEDICAL EXAMINATION AT A HEALTH CARE FACILITY: ICD-10-CM

## 2020-09-17 DIAGNOSIS — N20.0 RENAL STONES: ICD-10-CM

## 2020-09-17 PROCEDURE — 99213 OFFICE O/P EST LOW 20 MIN: CPT | Performed by: INTERNAL MEDICINE

## 2020-09-17 PROCEDURE — 99396 PREV VISIT EST AGE 40-64: CPT | Performed by: INTERNAL MEDICINE

## 2020-09-17 PROCEDURE — G0439 PPPS, SUBSEQ VISIT: HCPCS | Performed by: INTERNAL MEDICINE

## 2020-09-17 RX ORDER — HYDRALAZINE HYDROCHLORIDE 25 MG/1
25 TABLET, FILM COATED ORAL 3 TIMES DAILY
Qty: 90 TABLET | Refills: 3 | Status: SHIPPED | OUTPATIENT
Start: 2020-09-17 | End: 2020-10-06 | Stop reason: DRUGHIGH

## 2020-09-17 NOTE — PROGRESS NOTES
Subjective     Patient ID: Andre Agosto is a 65 y.o. male. Patient is here for management of multiple medical problems.     Chief Complaint   Patient presents with   • Hypertension     one week follow-up on blood pressure issues, patient states his blood pressure is low in the mornings but increases by afternoon to 174/84, 180/85 , 160/79      History of Present Illness   Pt taking hydralzine 50mg bid. High by evnening. bp to low due hydrazine 50mg.     Balance some better.    Us with renal stones.        The following portions of the patient's history were reviewed and updated as appropriate: allergies, current medications, past family history, past medical history, past social history, past surgical history and problem list.    Review of Systems   Constitutional: Positive for fatigue.   Skin: Negative for pallor, rash and wound.   Neurological: Positive for dizziness and weakness.   Psychiatric/Behavioral: Negative for self-injury and sleep disturbance.       Current Outpatient Medications:   •  allopurinol (Zyloprim) 100 MG tablet, Take 1 tablet by mouth Daily., Disp: 90 tablet, Rfl: 3  •  amLODIPine (NORVASC) 5 MG tablet, Take 1 tablet by mouth Daily., Disp: 30 tablet, Rfl: 0  •  apixaban (ELIQUIS) 5 MG tablet tablet, Take 1 tablet by mouth Every 12 (Twelve) Hours., Disp: 60 tablet, Rfl: 5  •  aspirin 81 MG tablet, Take 81 mg by mouth 2 (Two) Times a Day., Disp: 30 tablet, Rfl: 11  •  carvedilol (COREG) 6.25 MG tablet, Take 1 tablet by mouth 2 (Two) Times a Day With Meals., Disp: 60 tablet, Rfl: 0  •  divalproex (DEPAKOTE) 250 MG DR tablet, Take 1 tablet by mouth 3 (Three) Times a Day. Take 2 in the AM 1 at noon and 2 in the pm. Per DR Clifford., Disp: 150 tablet, Rfl: 5  •  escitalopram (LEXAPRO) 20 MG tablet, Take 1 tablet by mouth Daily., Disp: 90 tablet, Rfl: 1  •  ferrous sulfate 325 (65 FE) MG tablet, Take 1 tablet by mouth Every Other Day., Disp: 30 tablet, Rfl: 0  •  furosemide (Lasix) 40 MG tablet, Take 1  "tablet by mouth Daily As Needed (Weight gain of 3lbs in 1 day or 5lbs in 3 days)., Disp: 15 tablet, Rfl: 0  •  levothyroxine (SYNTHROID, LEVOTHROID) 88 MCG tablet, Take 1 tablet by mouth Daily., Disp: 90 tablet, Rfl: 3  •  lovastatin (MEVACOR) 40 MG tablet, Take 1 tablet by mouth Daily With Dinner., Disp: 90 tablet, Rfl: 1  •  methyldopa (ALDOMET) 500 MG tablet, Take 1 tablet by mouth Every 12 (Twelve) Hours., Disp: 60 tablet, Rfl: 11  •  propafenone (RYTHMOL) 150 MG tablet, Take 1 tablet by mouth Every 12 (Twelve) Hours., Disp: 60 tablet, Rfl: 11  •  REPATHA SURECLICK 140 MG/ML solution auto-injector, INJECT 1ML UNDER THE SKIN AS DIRECTED EVERY 14 DAYS, Disp: 1 pen, Rfl: 11  •  Semaglutide,0.25 or 0.5MG/DOS, (Ozempic, 0.25 or 0.5 MG/DOSE,) 2 MG/1.5ML solution pen-injector, Inject 0.5 mg under the skin into the appropriate area as directed 1 (One) Time Per Week., Disp: 4 pen, Rfl: 3  •  terazosin (HYTRIN) 10 MG capsule, Take 1 capsule by mouth Every 12 (Twelve) Hours., Disp: 60 capsule, Rfl: 11  •  hydrALAZINE (APRESOLINE) 25 MG tablet, Take 1 tablet by mouth 3 (Three) Times a Day., Disp: 90 tablet, Rfl: 3    Objective      Blood pressure 126/77, pulse 67, temperature 97.7 °F (36.5 °C), temperature source Temporal, resp. rate 16, height 193 cm (76\"), weight 135 kg (297 lb 12.8 oz), SpO2 98 %.    Physical Exam     General Appearance:    Alert, cooperative, no distress, appears stated age   Head:    Normocephalic, without obvious abnormality, atraumatic   Eyes:    PERRL, conjunctiva/corneas clear, EOM's intact   Ears:    Normal TM's and external ear canals, both ears   Nose:   Nares normal, septum midline, mucosa normal, no drainage   or sinus tenderness   Throat:   Lips, mucosa, and tongue normal; teeth and gums normal   Neck:   Supple, symmetrical, trachea midline, no adenopathy;        thyroid:  No enlargement/tenderness/nodules; no carotid    bruit or JVD   Back:     Symmetric, no curvature, ROM normal, no CVA " tenderness   Lungs:     Clear to auscultation bilaterally, respirations unlabored   Chest wall:    No tenderness or deformity   Heart:    Regular rate and rhythm, S1 and S2 normal, no murmur,        rub or gallop   Abdomen:     Soft, non-tender, bowel sounds active all four quadrants,     no masses, no organomegaly   Extremities:   Extremities normal, atraumatic, no cyanosis or edema   Pulses:   2+ and symmetric all extremities   Skin:   Skin color, texture, turgor normal, no rashes or lesions   Lymph nodes:   Cervical, supraclavicular, and axillary nodes normal   Neurologic:   CNII-XII intact. Normal strength, sensation and reflexes       throughout      Results for orders placed or performed in visit on 09/08/20   COVID-19,[facility], NP SWAB IN [facility] VIRAL TRANSPORT MEDIA 24-30 HR TAT - Swab, Nasopharynx    Specimen: Nasopharynx; Swab   Result Value Ref Range    SARS-CoV-2 ILAN Not Detected NEG         Assessment/Plan   nsr today.    Decrease hydralazine and get more compliant on tid dosing.       Andre was seen today for hypertension.    Diagnoses and all orders for this visit:    Essential hypertension  -     hydrALAZINE (APRESOLINE) 25 MG tablet; Take 1 tablet by mouth 3 (Three) Times a Day.    Renal stones      Return in about 2 weeks (around 10/1/2020).          There are no Patient Instructions on file for this visit.     Keven Bear MD    Assessment/Plan

## 2020-09-18 LAB — R RICKETTSI IGG SER QL IA: NORMAL

## 2020-09-19 RX ORDER — DIVALPROEX SODIUM 250 MG/1
TABLET, DELAYED RELEASE ORAL
Qty: 150 TABLET | Refills: 11 | OUTPATIENT
Start: 2020-09-19

## 2020-09-28 DIAGNOSIS — R31.9 HEMATURIA, UNSPECIFIED TYPE: Primary | ICD-10-CM

## 2020-09-28 RX ORDER — FUROSEMIDE 40 MG/1
TABLET ORAL
Qty: 15 TABLET | Refills: 0 | Status: SHIPPED | OUTPATIENT
Start: 2020-09-28 | End: 2020-10-12

## 2020-09-28 RX ORDER — ESCITALOPRAM OXALATE 20 MG/1
TABLET ORAL
Qty: 90 TABLET | Refills: 3 | Status: SHIPPED | OUTPATIENT
Start: 2020-09-28 | End: 2020-10-05

## 2020-09-29 ENCOUNTER — LAB (OUTPATIENT)
Dept: LAB | Facility: HOSPITAL | Age: 65
End: 2020-09-29

## 2020-09-29 ENCOUNTER — TELEPHONE (OUTPATIENT)
Dept: INTERNAL MEDICINE | Facility: CLINIC | Age: 65
End: 2020-09-29

## 2020-09-29 LAB
ANION GAP SERPL CALCULATED.3IONS-SCNC: 11.1 MMOL/L (ref 5–15)
BACTERIA UR QL AUTO: ABNORMAL /HPF
BASOPHILS # BLD AUTO: 0.05 10*3/MM3 (ref 0–0.2)
BASOPHILS NFR BLD AUTO: 0.7 % (ref 0–1.5)
BILIRUB UR QL STRIP: NEGATIVE
BUN SERPL-MCNC: 18 MG/DL (ref 8–23)
BUN/CREAT SERPL: 12.2 (ref 7–25)
CALCIUM SPEC-SCNC: 9.7 MG/DL (ref 8.6–10.5)
CHLORIDE SERPL-SCNC: 100 MMOL/L (ref 98–107)
CLARITY UR: CLEAR
CO2 SERPL-SCNC: 30.9 MMOL/L (ref 22–29)
COLOR UR: YELLOW
CREAT SERPL-MCNC: 1.48 MG/DL (ref 0.76–1.27)
DEPRECATED RDW RBC AUTO: 46.7 FL (ref 37–54)
EOSINOPHIL # BLD AUTO: 0.27 10*3/MM3 (ref 0–0.4)
EOSINOPHIL NFR BLD AUTO: 3.6 % (ref 0.3–6.2)
ERYTHROCYTE [DISTWIDTH] IN BLOOD BY AUTOMATED COUNT: 14.3 % (ref 12.3–15.4)
GFR SERPL CREATININE-BSD FRML MDRD: 48 ML/MIN/1.73
GLUCOSE SERPL-MCNC: 105 MG/DL (ref 65–99)
GLUCOSE UR STRIP-MCNC: NEGATIVE MG/DL
HCT VFR BLD AUTO: 41.9 % (ref 37.5–51)
HGB BLD-MCNC: 14.3 G/DL (ref 13–17.7)
HGB UR QL STRIP.AUTO: ABNORMAL
HYALINE CASTS UR QL AUTO: ABNORMAL /LPF
IMM GRANULOCYTES # BLD AUTO: 0.03 10*3/MM3 (ref 0–0.05)
IMM GRANULOCYTES NFR BLD AUTO: 0.4 % (ref 0–0.5)
KETONES UR QL STRIP: ABNORMAL
LEUKOCYTE ESTERASE UR QL STRIP.AUTO: ABNORMAL
LYMPHOCYTES # BLD AUTO: 2.16 10*3/MM3 (ref 0.7–3.1)
LYMPHOCYTES NFR BLD AUTO: 28.9 % (ref 19.6–45.3)
MCH RBC QN AUTO: 30.4 PG (ref 26.6–33)
MCHC RBC AUTO-ENTMCNC: 34.1 G/DL (ref 31.5–35.7)
MCV RBC AUTO: 89.1 FL (ref 79–97)
MONOCYTES # BLD AUTO: 0.46 10*3/MM3 (ref 0.1–0.9)
MONOCYTES NFR BLD AUTO: 6.2 % (ref 5–12)
NEUTROPHILS NFR BLD AUTO: 4.5 10*3/MM3 (ref 1.7–7)
NEUTROPHILS NFR BLD AUTO: 60.2 % (ref 42.7–76)
NITRITE UR QL STRIP: NEGATIVE
NRBC BLD AUTO-RTO: 0 /100 WBC (ref 0–0.2)
NT-PROBNP SERPL-MCNC: 537.1 PG/ML (ref 0–900)
PH UR STRIP.AUTO: 7 [PH] (ref 5–8)
PLATELET # BLD AUTO: 213 10*3/MM3 (ref 140–450)
PMV BLD AUTO: 11.2 FL (ref 6–12)
POTASSIUM SERPL-SCNC: 4 MMOL/L (ref 3.5–5.2)
PROT UR QL STRIP: ABNORMAL
RBC # BLD AUTO: 4.7 10*6/MM3 (ref 4.14–5.8)
RBC # UR: ABNORMAL /HPF
REF LAB TEST METHOD: ABNORMAL
SODIUM SERPL-SCNC: 142 MMOL/L (ref 136–145)
SP GR UR STRIP: 1.02 (ref 1–1.03)
SQUAMOUS #/AREA URNS HPF: ABNORMAL /HPF
URATE SERPL-MCNC: 6.4 MG/DL (ref 3.4–7)
UROBILINOGEN UR QL STRIP: ABNORMAL
WBC # BLD AUTO: 7.47 10*3/MM3 (ref 3.4–10.8)
WBC UR QL AUTO: ABNORMAL /HPF

## 2020-09-29 PROCEDURE — 87086 URINE CULTURE/COLONY COUNT: CPT | Performed by: INTERNAL MEDICINE

## 2020-09-29 PROCEDURE — 83880 ASSAY OF NATRIURETIC PEPTIDE: CPT | Performed by: INTERNAL MEDICINE

## 2020-09-29 PROCEDURE — 80048 BASIC METABOLIC PNL TOTAL CA: CPT | Performed by: INTERNAL MEDICINE

## 2020-09-29 PROCEDURE — 84550 ASSAY OF BLOOD/URIC ACID: CPT | Performed by: INTERNAL MEDICINE

## 2020-09-29 PROCEDURE — 81001 URINALYSIS AUTO W/SCOPE: CPT | Performed by: INTERNAL MEDICINE

## 2020-09-29 PROCEDURE — 85025 COMPLETE CBC W/AUTO DIFF WBC: CPT | Performed by: INTERNAL MEDICINE

## 2020-09-29 PROCEDURE — 86757 RICKETTSIA ANTIBODY: CPT | Performed by: INTERNAL MEDICINE

## 2020-09-29 PROCEDURE — 36415 COLL VENOUS BLD VENIPUNCTURE: CPT | Performed by: INTERNAL MEDICINE

## 2020-09-29 NOTE — TELEPHONE ENCOUNTER
PATIENT IS REQUESTING AN APPT WITH DR. ELLIS TOMORROW. PATIENT STATES HE IS HAVING KIDNEY PROBLEMS AND IS HAVING LABS DONE TODAY PER ORDER OF THE OFFICE.     CALLBACK NUMBER IS   [phone]

## 2020-09-29 NOTE — TELEPHONE ENCOUNTER
Called patient and informed no openings were available until 10/08/2020 at 3:00Pm, but if something opened up sooner we will call him back.

## 2020-09-30 LAB
BACTERIA SPEC AEROBE CULT: NO GROWTH
R RICKETTSI IGG SER QL IA: NEGATIVE

## 2020-10-01 ENCOUNTER — OFFICE VISIT (OUTPATIENT)
Dept: INTERNAL MEDICINE | Facility: CLINIC | Age: 65
End: 2020-10-01

## 2020-10-01 VITALS
SYSTOLIC BLOOD PRESSURE: 157 MMHG | HEIGHT: 76 IN | WEIGHT: 300 LBS | OXYGEN SATURATION: 98 % | TEMPERATURE: 97.8 F | RESPIRATION RATE: 16 BRPM | BODY MASS INDEX: 36.53 KG/M2 | HEART RATE: 61 BPM | DIASTOLIC BLOOD PRESSURE: 88 MMHG

## 2020-10-01 DIAGNOSIS — R31.0 GROSS HEMATURIA: Primary | ICD-10-CM

## 2020-10-01 LAB
BILIRUB BLD-MCNC: ABNORMAL MG/DL
CLARITY, POC: ABNORMAL
COLOR UR: ABNORMAL
GLUCOSE UR STRIP-MCNC: NEGATIVE MG/DL
KETONES UR QL: ABNORMAL
LEUKOCYTE EST, POC: NEGATIVE
NITRITE UR-MCNC: NEGATIVE MG/ML
PH UR: 6.5 [PH] (ref 5–8)
PROT UR STRIP-MCNC: ABNORMAL MG/DL
R RICKETTSI IGM SER-ACNC: 0.15 INDEX (ref 0–0.89)
RBC # UR STRIP: ABNORMAL /UL
SP GR UR: 1.01 (ref 1–1.03)
UROBILINOGEN UR QL: NORMAL

## 2020-10-01 PROCEDURE — 81003 URINALYSIS AUTO W/O SCOPE: CPT | Performed by: INTERNAL MEDICINE

## 2020-10-01 PROCEDURE — 99214 OFFICE O/P EST MOD 30 MIN: CPT | Performed by: INTERNAL MEDICINE

## 2020-10-01 RX ORDER — AMOXICILLIN AND CLAVULANATE POTASSIUM 875; 125 MG/1; MG/1
1 TABLET, FILM COATED ORAL EVERY 12 HOURS SCHEDULED
Qty: 20 TABLET | Refills: 0 | Status: SHIPPED | OUTPATIENT
Start: 2020-10-01 | End: 2020-10-26

## 2020-10-01 NOTE — PROGRESS NOTES
Subjective     Patient ID: Andre Agosto is a 65 y.o. male. Patient is here for management of multiple medical problems.     Chief Complaint   Patient presents with   • Urinary Tract Infection     History of Present Illness     No dysuria.  staning in the under ware.         The following portions of the patient's history were reviewed and updated as appropriate: allergies, current medications, past family history, past medical history, past social history, past surgical history and problem list.    Review of Systems   Constitutional: Negative for fatigue and fever.   HENT: Negative for congestion.    Respiratory: Negative for shortness of breath.    Musculoskeletal: Negative for gait problem and joint swelling.   Psychiatric/Behavioral: Negative for self-injury and sleep disturbance. The patient is not nervous/anxious.        Current Outpatient Medications:   •  allopurinol (Zyloprim) 100 MG tablet, Take 1 tablet by mouth Daily., Disp: 90 tablet, Rfl: 3  •  amLODIPine (NORVASC) 5 MG tablet, Take 1 tablet by mouth Daily., Disp: 30 tablet, Rfl: 0  •  amoxicillin-clavulanate (Augmentin) 875-125 MG per tablet, Take 1 tablet by mouth Every 12 (Twelve) Hours., Disp: 20 tablet, Rfl: 0  •  apixaban (ELIQUIS) 5 MG tablet tablet, Take 1 tablet by mouth Every 12 (Twelve) Hours., Disp: 60 tablet, Rfl: 5  •  aspirin 81 MG tablet, Take 81 mg by mouth 2 (Two) Times a Day., Disp: 30 tablet, Rfl: 11  •  carvedilol (COREG) 6.25 MG tablet, Take 1 tablet by mouth 2 (Two) Times a Day With Meals., Disp: 60 tablet, Rfl: 0  •  divalproex (DEPAKOTE) 250 MG DR tablet, Take 1 tablet by mouth 3 (Three) Times a Day. Take 2 in the AM 1 at noon and 2 in the pm. Per DR Clifford., Disp: 150 tablet, Rfl: 5  •  escitalopram (LEXAPRO) 20 MG tablet, TAKE 1 TABLET DAILY, Disp: 90 tablet, Rfl: 3  •  ferrous sulfate 325 (65 FE) MG tablet, Take 1 tablet by mouth Every Other Day., Disp: 30 tablet, Rfl: 0  •  furosemide (LASIX) 40 MG tablet, TAKE ONE TABLET BY  "MOUTH DAILY AS NEEDED (WEIGHT GAIN OF 3LBS IN ONE DAY OR 5 LBS IN 3 DAYS), Disp: 15 tablet, Rfl: 0  •  hydrALAZINE (APRESOLINE) 25 MG tablet, Take 1 tablet by mouth 3 (Three) Times a Day., Disp: 90 tablet, Rfl: 3  •  levothyroxine (SYNTHROID, LEVOTHROID) 88 MCG tablet, Take 1 tablet by mouth Daily., Disp: 90 tablet, Rfl: 3  •  lovastatin (MEVACOR) 40 MG tablet, Take 1 tablet by mouth Daily With Dinner., Disp: 90 tablet, Rfl: 1  •  methyldopa (ALDOMET) 500 MG tablet, Take 1 tablet by mouth Every 12 (Twelve) Hours., Disp: 60 tablet, Rfl: 11  •  propafenone (RYTHMOL) 150 MG tablet, Take 1 tablet by mouth Every 12 (Twelve) Hours., Disp: 60 tablet, Rfl: 11  •  REPATHA SURECLICK 140 MG/ML solution auto-injector, INJECT 1ML UNDER THE SKIN AS DIRECTED EVERY 14 DAYS, Disp: 1 pen, Rfl: 11  •  Semaglutide,0.25 or 0.5MG/DOS, (Ozempic, 0.25 or 0.5 MG/DOSE,) 2 MG/1.5ML solution pen-injector, Inject 0.5 mg under the skin into the appropriate area as directed 1 (One) Time Per Week., Disp: 4 pen, Rfl: 3  •  terazosin (HYTRIN) 10 MG capsule, Take 1 capsule by mouth Every 12 (Twelve) Hours., Disp: 60 capsule, Rfl: 11    Objective      Blood pressure 157/88, pulse 61, temperature 97.8 °F (36.6 °C), temperature source Temporal, resp. rate 16, height 193 cm (76\"), weight 136 kg (300 lb), SpO2 98 %.    Physical Exam     General Appearance:    Alert, cooperative, no distress, appears stated age   Head:    Normocephalic, without obvious abnormality, atraumatic   Eyes:    PERRL, conjunctiva/corneas clear, EOM's intact   Ears:    Normal TM's and external ear canals, both ears   Nose:   Nares normal, septum midline, mucosa normal, no drainage   or sinus tenderness   Throat:   Lips, mucosa, and tongue normal; teeth and gums normal   Neck:   Supple, symmetrical, trachea midline, no adenopathy;        thyroid:  No enlargement/tenderness/nodules; no carotid    bruit or JVD   Back:     Symmetric, no curvature, ROM normal, no CVA tenderness   Lungs: "     Clear to auscultation bilaterally, respirations unlabored   Chest wall:    No tenderness or deformity   Heart:    Regular rate and rhythm, S1 and S2 normal, no murmur,        rub or gallop   Abdomen:     Soft, tender ttp. , bowel sounds active all four quadrants,     no masses, no organomegaly   Extremities:   Extremities normal, atraumatic, no cyanosis or edema   Pulses:   2+ and symmetric all extremities   Skin:   Skin color, texture, turgor normal, no rashes or lesions   Lymph nodes:   Cervical, supraclavicular, and axillary nodes normal   Neurologic:   CNII-XII intact. Normal strength, sensation and reflexes       throughout      Results for orders placed or performed in visit on 09/28/20   Urine Culture - Urine, Urine, Clean Catch    Specimen: Urine, Clean Catch   Result Value Ref Range    Urine Culture No growth    Basic metabolic panel    Specimen: Blood   Result Value Ref Range    Glucose 105 (H) 65 - 99 mg/dL    BUN 18 8 - 23 mg/dL    Creatinine 1.48 (H) 0.76 - 1.27 mg/dL    Sodium 142 136 - 145 mmol/L    Potassium 4.0 3.5 - 5.2 mmol/L    Chloride 100 98 - 107 mmol/L    CO2 30.9 (H) 22.0 - 29.0 mmol/L    Calcium 9.7 8.6 - 10.5 mg/dL    eGFR Non African Amer 48 (L) >60 mL/min/1.73    BUN/Creatinine Ratio 12.2 7.0 - 25.0    Anion Gap 11.1 5.0 - 15.0 mmol/L   Urinalysis without microscopic (no culture) - Urine, Clean Catch    Specimen: Urine, Clean Catch   Result Value Ref Range    Color, UA Yellow Yellow, Straw    Appearance, UA Clear Clear    pH, UA 7.0 5.0 - 8.0    Specific Gravity, UA 1.018 1.005 - 1.030    Glucose, UA Negative Negative    Ketones, UA Trace (A) Negative    Bilirubin, UA Negative Negative    Blood, UA Trace (A) Negative    Protein,  mg/dL (2+) (A) Negative    Leuk Esterase, UA Trace (A) Negative    Nitrite, UA Negative Negative    Urobilinogen, UA 1.0 E.U./dL 0.2 - 1.0 E.U./dL   Urinalysis, Microscopic Only - Urine, Clean Catch    Specimen: Urine, Clean Catch   Result Value Ref  Range    RBC, UA 13-20 (A) None Seen, 0-2 /HPF    WBC, UA 6-12 (A) None Seen, 0-2 /HPF    Bacteria, UA None Seen None Seen /HPF    Squamous Epithelial Cells, UA 0-2 None Seen, 0-2 /HPF    Hyaline Casts, UA 3-6 None Seen /LPF    Methodology Automated Microscopy    CBC Auto Differential    Specimen: Blood   Result Value Ref Range    WBC 7.47 3.40 - 10.80 10*3/mm3    RBC 4.70 4.14 - 5.80 10*6/mm3    Hemoglobin 14.3 13.0 - 17.7 g/dL    Hematocrit 41.9 37.5 - 51.0 %    MCV 89.1 79.0 - 97.0 fL    MCH 30.4 26.6 - 33.0 pg    MCHC 34.1 31.5 - 35.7 g/dL    RDW 14.3 12.3 - 15.4 %    RDW-SD 46.7 37.0 - 54.0 fl    MPV 11.2 6.0 - 12.0 fL    Platelets 213 140 - 450 10*3/mm3    Neutrophil % 60.2 42.7 - 76.0 %    Lymphocyte % 28.9 19.6 - 45.3 %    Monocyte % 6.2 5.0 - 12.0 %    Eosinophil % 3.6 0.3 - 6.2 %    Basophil % 0.7 0.0 - 1.5 %    Immature Grans % 0.4 0.0 - 0.5 %    Neutrophils, Absolute 4.50 1.70 - 7.00 10*3/mm3    Lymphocytes, Absolute 2.16 0.70 - 3.10 10*3/mm3    Monocytes, Absolute 0.46 0.10 - 0.90 10*3/mm3    Eosinophils, Absolute 0.27 0.00 - 0.40 10*3/mm3    Basophils, Absolute 0.05 0.00 - 0.20 10*3/mm3    Immature Grans, Absolute 0.03 0.00 - 0.05 10*3/mm3    nRBC 0.0 0.0 - 0.2 /100 WBC         Assessment/Plan   Will try abx while waiting to get in with urology. Today ua blood only. LE yesterdsay    Hematuria  Multifactorial if neg work up consider med induced.        Andre was seen today for urinary tract infection.    Diagnoses and all orders for this visit:    Gross hematuria  -     Ambulatory Referral to Urology    augmentin rx     Return in about 1 week (around 10/8/2020).          There are no Patient Instructions on file for this visit.     Keven Bear MD    Assessment/Plan

## 2020-10-03 DIAGNOSIS — I10 MALIGNANT HYPERTENSION: ICD-10-CM

## 2020-10-05 DIAGNOSIS — K92.1 MELENA: Primary | ICD-10-CM

## 2020-10-06 DIAGNOSIS — I10 MALIGNANT HYPERTENSION: ICD-10-CM

## 2020-10-06 RX ORDER — HYDRALAZINE HYDROCHLORIDE 100 MG/1
TABLET, FILM COATED ORAL
Qty: 75 TABLET | Refills: 2 | Status: CANCELLED | OUTPATIENT
Start: 2020-10-06

## 2020-10-06 RX ORDER — METHYLDOPA 500 MG/1
500 TABLET, FILM COATED ORAL EVERY 12 HOURS SCHEDULED
Qty: 180 TABLET | Refills: 2 | Status: SHIPPED | OUTPATIENT
Start: 2020-10-06 | End: 2020-12-18 | Stop reason: SDUPTHER

## 2020-10-06 RX ORDER — SEMAGLUTIDE 1.34 MG/ML
1 INJECTION, SOLUTION SUBCUTANEOUS WEEKLY
Qty: 4 PEN | Refills: 3 | Status: SHIPPED | OUTPATIENT
Start: 2020-10-06 | End: 2020-11-13 | Stop reason: SDUPTHER

## 2020-10-06 RX ORDER — HYDRALAZINE HYDROCHLORIDE 50 MG/1
50 TABLET, FILM COATED ORAL 3 TIMES DAILY
Qty: 90 TABLET | Refills: 11 | Status: SHIPPED | OUTPATIENT
Start: 2020-10-06 | End: 2020-10-09 | Stop reason: SDUPTHER

## 2020-10-06 RX ORDER — HYDRALAZINE HYDROCHLORIDE 50 MG/1
TABLET, FILM COATED ORAL
Qty: 90 TABLET | Refills: 11 | Status: SHIPPED | OUTPATIENT
Start: 2020-10-06 | End: 2020-10-06 | Stop reason: SDUPTHER

## 2020-10-08 ENCOUNTER — OFFICE VISIT (OUTPATIENT)
Dept: UROLOGY | Facility: CLINIC | Age: 65
End: 2020-10-08

## 2020-10-08 VITALS
DIASTOLIC BLOOD PRESSURE: 86 MMHG | TEMPERATURE: 98 F | HEIGHT: 76 IN | BODY MASS INDEX: 36.53 KG/M2 | OXYGEN SATURATION: 98 % | RESPIRATION RATE: 16 BRPM | HEART RATE: 74 BPM | SYSTOLIC BLOOD PRESSURE: 145 MMHG | WEIGHT: 300 LBS

## 2020-10-08 DIAGNOSIS — R31.29 OTHER MICROSCOPIC HEMATURIA: Primary | ICD-10-CM

## 2020-10-08 DIAGNOSIS — Z87.442 PERSONAL HISTORY OF KIDNEY STONES: ICD-10-CM

## 2020-10-08 DIAGNOSIS — R31.0 GROSS HEMATURIA: ICD-10-CM

## 2020-10-08 LAB
BILIRUB BLD-MCNC: NEGATIVE MG/DL
CLARITY, POC: CLEAR
COLOR UR: YELLOW
GLUCOSE UR STRIP-MCNC: NEGATIVE MG/DL
KETONES UR QL: ABNORMAL
LEUKOCYTE EST, POC: NEGATIVE
NITRITE UR-MCNC: NEGATIVE MG/ML
PH UR: 6 [PH] (ref 5–8)
PROT UR STRIP-MCNC: ABNORMAL MG/DL
RBC # UR STRIP: ABNORMAL /UL
SP GR UR: 1.02 (ref 1–1.03)
UROBILINOGEN UR QL: NORMAL

## 2020-10-08 PROCEDURE — 81003 URINALYSIS AUTO W/O SCOPE: CPT | Performed by: UROLOGY

## 2020-10-08 PROCEDURE — 99204 OFFICE O/P NEW MOD 45 MIN: CPT | Performed by: UROLOGY

## 2020-10-08 NOTE — PROGRESS NOTES
Chief Complaint  Hematuria/Stones        TOBY Agosto is a 65 y.o. male who is referred for evaluation of gross hematuria and kidney stones.  He has a long history of stones and has previously been treated by me with ESWL but not in the last 5 years.  He states I will also perform cystoscopy without significant pathology findings.  Most recently has had episodes of gross hematuria although he is currently taking Eliquis and has been on aspirin as well.  This is apparently for his history of atrial fibrillation but he is also had a myocardial infarction and CVA.    I reviewed her recent CT scan as well as the renal ultrasound and this reveals numerous stones in the kidneys but none are obstructing.  They are poorly visualized on KUB due in large part because of his large size.  He has a past history of a gastric band for morbid obesity.  His other complaint is abdominal pain that he is describes as mild and diffuse across the lower abdomen.  This is unrelated to activity digestion or bowel movements.    Vitals:    10/08/20 1523   BP: 145/86   Pulse: 74   Resp: 16   Temp: 98 °F (36.7 °C)   SpO2: 98%       Past Medical History  Past Medical History:   Diagnosis Date   • 6th nerve palsy, right     right eye    • Anesthesia complication     ilius- after lap band surgery    • Anxiety and depression    • Coronary artery disease involving native coronary artery of native heart without angina pectoris 9/12/2018   • Diabetes mellitus (CMS/[facility])     doesnt check sugar, type 2    • Double vision    • Dyspepsia    • Epilepsy (CMS/HCC)    • Focal seizure (CMS/HCC)     of right arm    • Heart attack (CMS/HCC) 06/15/2016   • History of Helicobacter pylori infection     in 2008, treated w/ PrevPak   • HL (hearing loss)     Left ear child luevano measles   • Hyperlipidemia    • Hypertension    • Kidney stone     x2 imbedded   • Memory loss 2005    Meneigitis   • Meningitis     unsure of bacterial or viral    • Obesity    • Seizures  (CMS/[facility])    • Sleep apnea     BiPAP compliant   • Sleep apnea treated with nocturnal BiPAP     compliant with  machine    • Stroke (CMS/[facility])     2017/2018   • Vision loss 7/2018    Double vision   • Wears glasses        Past Surgical History  Past Surgical History:   Procedure Laterality Date   • CARDIAC CATHETERIZATION N/A 10/12/2018    Procedure: Left Heart Cath;  Surgeon: Yoselin Johnson MD;  Location: [facility] INVASIVE LOCATION;  Service: Cardiology   • COLONOSCOPY     • CORONARY STENT PLACEMENT      x1   • ENDOSCOPY     • LAPAROSCOPIC GASTRIC BANDING  2008    s/p LAGB Realize 6/2008 by HOMERO.   • UMBILICAL HERNIA REPAIR  2008    incarcerated UHR w/ mesh by Dr. Velasquez   • WISDOM TOOTH EXTRACTION      all 4       Medications  has a current medication list which includes the following prescription(s): allopurinol, amlodipine, amoxicillin-clavulanate, apixaban, aspirin, carvedilol, divalproex, ferrous sulfate, furosemide, hydralazine, levothyroxine, lovastatin, methyldopa, propafenone, repatha sureclick, ozempic (0.25 or 0.5 mg/dose), and terazosin.      Allergies  Allergies   Allergen Reactions   • Amlodipine Swelling   • Statins Myalgia       Social History  Social History     Socioeconomic History   • Marital status:      Spouse name: Not on file   • Number of children: Not on file   • Years of education: Not on file   • Highest education level: Not on file   Tobacco Use   • Smoking status: Never Smoker   • Smokeless tobacco: Never Used   Substance and Sexual Activity   • Alcohol use: No   • Drug use: No   • Sexual activity: Defer       Family History  He has no family history of bladder or kidney cancer  He has no family history of kidney stones      AUA Symptom Score:      Review of Systems  Review of Systems   Constitutional: Negative for activity change, appetite change, chills, fatigue, fever, unexpected weight gain and unexpected weight loss.   Respiratory: Negative for apnea, cough, chest  tightness, shortness of breath, wheezing and stridor.    Cardiovascular: Negative for chest pain, palpitations and leg swelling.   Gastrointestinal: Negative for abdominal distention, abdominal pain, anal bleeding, blood in stool, constipation, diarrhea, nausea, rectal pain, vomiting, GERD and indigestion.   Genitourinary: Negative for decreased libido, decreased urine volume, difficulty urinating, discharge, dysuria, flank pain, frequency, genital sores, hematuria, nocturia, penile pain, erectile dysfunction, penile swelling, scrotal swelling, testicular pain, urgency and urinary incontinence.   Musculoskeletal: Negative for back pain and joint swelling.   Neurological: Negative for tremors, seizures, speech difficulty, weakness and numbness.   Psychiatric/Behavioral: Negative for agitation, decreased concentration, sleep disturbance, depressed mood and stress. The patient is not nervous/anxious.      Positive for eyesight problems loss of hearing blood in the urine bloody stools easy bleeding negative in other categories.  Physical Exam  Physical Exam  Vitals signs reviewed.   Constitutional:       Appearance: He is well-developed.   HENT:      Head: Normocephalic and atraumatic.   Neck:      Musculoskeletal: Normal range of motion.   Pulmonary:      Effort: Pulmonary effort is normal. No respiratory distress.   Abdominal:      General: There is no distension.      Palpations: Abdomen is soft. There is no mass.      Tenderness: There is no abdominal tenderness.      Hernia: No hernia is present.   Genitourinary:     Penis: Normal.       Scrotum/Testes: Normal.      Prostate: Normal.      Rectum: Normal.   Musculoskeletal: Normal range of motion.   Lymphadenopathy:      Cervical: No cervical adenopathy.   Skin:     General: Skin is warm and dry.   Neurological:      Mental Status: He is alert and oriented to person, place, and time.   Psychiatric:         Behavior: Behavior normal.         Labs Recent and today in  the office:  Results for orders placed or performed in visit on 10/08/20   POC Urinalysis Dipstick, Automated    Specimen: Urine   Result Value Ref Range    Color Yellow Yellow, Straw, Dark Yellow, Misty    Clarity, UA Clear Clear    Specific Gravity  1.020 1.005 - 1.030    pH, Urine 6.0 5.0 - 8.0    Leukocytes Negative Negative    Nitrite, UA Negative Negative    Protein, POC 2+ (A) Negative mg/dL    Glucose, UA Negative Negative, 1000 mg/dL (3+) mg/dL    Ketones, UA Trace (A) Negative    Urobilinogen, UA Normal Normal    Bilirubin Negative Negative    Blood, UA 1+ (A) Negative         Assessment & Plan  Nephrolithiasis/gross hematuria: Patient has numerous stones but they are nonobstructing and should be unrelated to his abdominal discomfort.  I feel he is bleeding from the stones in light of his Eliquis and aspirin ingestion but he is informed there is also a slight chance he could have a bladder tumor.  The bladder seems normal on the CT scan and of course he had a normal cystoscopy a few years ago.  Currently he is voiding without difficulty and recently had a normal PSA.  Digital rectal exam today revealed a normal prostate.    The patient has 6stones in the left kidney including some that are too large to pass although they are all nonobstructing.  Recommend a percutaneous procedure if they become symptomatic.  The stones on the right side should be asymptomatic in light of their nonobstructing nature but if they follow in the ureter could be treated with laser lithotripsy.    Abdominal pain: I feel this is unrelated to the urinary tract

## 2020-10-09 ENCOUNTER — LAB (OUTPATIENT)
Dept: LAB | Facility: HOSPITAL | Age: 65
End: 2020-10-09

## 2020-10-09 ENCOUNTER — OFFICE VISIT (OUTPATIENT)
Dept: INTERNAL MEDICINE | Facility: CLINIC | Age: 65
End: 2020-10-09

## 2020-10-09 VITALS
BODY MASS INDEX: 36.63 KG/M2 | RESPIRATION RATE: 16 BRPM | DIASTOLIC BLOOD PRESSURE: 92 MMHG | WEIGHT: 300.8 LBS | SYSTOLIC BLOOD PRESSURE: 157 MMHG | TEMPERATURE: 97.9 F | OXYGEN SATURATION: 98 % | HEIGHT: 76 IN | HEART RATE: 67 BPM

## 2020-10-09 DIAGNOSIS — I10 MALIGNANT HYPERTENSION: ICD-10-CM

## 2020-10-09 LAB
ANION GAP SERPL CALCULATED.3IONS-SCNC: 17.2 MMOL/L (ref 5–15)
BASOPHILS # BLD AUTO: 0.06 10*3/MM3 (ref 0–0.2)
BASOPHILS NFR BLD AUTO: 0.6 % (ref 0–1.5)
BUN SERPL-MCNC: 16 MG/DL (ref 8–23)
BUN/CREAT SERPL: 11 (ref 7–25)
CALCIUM SPEC-SCNC: 9.3 MG/DL (ref 8.6–10.5)
CHLORIDE SERPL-SCNC: 99 MMOL/L (ref 98–107)
CO2 SERPL-SCNC: 24.8 MMOL/L (ref 22–29)
CREAT SERPL-MCNC: 1.45 MG/DL (ref 0.76–1.27)
DEPRECATED RDW RBC AUTO: 49.2 FL (ref 37–54)
EOSINOPHIL # BLD AUTO: 0.23 10*3/MM3 (ref 0–0.4)
EOSINOPHIL NFR BLD AUTO: 2.5 % (ref 0.3–6.2)
ERYTHROCYTE [DISTWIDTH] IN BLOOD BY AUTOMATED COUNT: 14.5 % (ref 12.3–15.4)
GFR SERPL CREATININE-BSD FRML MDRD: 49 ML/MIN/1.73
GLUCOSE SERPL-MCNC: 211 MG/DL (ref 65–99)
HCT VFR BLD AUTO: 42.2 % (ref 37.5–51)
HGB BLD-MCNC: 14.5 G/DL (ref 13–17.7)
IMM GRANULOCYTES # BLD AUTO: 0.02 10*3/MM3 (ref 0–0.05)
IMM GRANULOCYTES NFR BLD AUTO: 0.2 % (ref 0–0.5)
INR PPP: 1.13 (ref 0.9–1.1)
LYMPHOCYTES # BLD AUTO: 2.94 10*3/MM3 (ref 0.7–3.1)
LYMPHOCYTES NFR BLD AUTO: 31.6 % (ref 19.6–45.3)
MCH RBC QN AUTO: 31.9 PG (ref 26.6–33)
MCHC RBC AUTO-ENTMCNC: 34.4 G/DL (ref 31.5–35.7)
MCV RBC AUTO: 92.7 FL (ref 79–97)
MONOCYTES # BLD AUTO: 0.47 10*3/MM3 (ref 0.1–0.9)
MONOCYTES NFR BLD AUTO: 5.1 % (ref 5–12)
NEUTROPHILS NFR BLD AUTO: 5.58 10*3/MM3 (ref 1.7–7)
NEUTROPHILS NFR BLD AUTO: 60 % (ref 42.7–76)
NRBC BLD AUTO-RTO: 0 /100 WBC (ref 0–0.2)
PLATELET # BLD AUTO: 215 10*3/MM3 (ref 140–450)
PMV BLD AUTO: 10.8 FL (ref 6–12)
POTASSIUM SERPL-SCNC: 3.6 MMOL/L (ref 3.5–5.2)
PROTHROMBIN TIME: 15 SECONDS (ref 12–15.1)
RBC # BLD AUTO: 4.55 10*6/MM3 (ref 4.14–5.8)
SODIUM SERPL-SCNC: 141 MMOL/L (ref 136–145)
WBC # BLD AUTO: 9.3 10*3/MM3 (ref 3.4–10.8)

## 2020-10-09 PROCEDURE — 99214 OFFICE O/P EST MOD 30 MIN: CPT | Performed by: INTERNAL MEDICINE

## 2020-10-09 PROCEDURE — 80048 BASIC METABOLIC PNL TOTAL CA: CPT

## 2020-10-09 PROCEDURE — 85025 COMPLETE CBC W/AUTO DIFF WBC: CPT | Performed by: INTERNAL MEDICINE

## 2020-10-09 PROCEDURE — 85610 PROTHROMBIN TIME: CPT

## 2020-10-09 PROCEDURE — 36415 COLL VENOUS BLD VENIPUNCTURE: CPT

## 2020-10-09 RX ORDER — NIFEDIPINE 90 MG/1
90 TABLET, FILM COATED, EXTENDED RELEASE ORAL DAILY
Qty: 90 TABLET | Refills: 3 | Status: SHIPPED | OUTPATIENT
Start: 2020-10-09 | End: 2020-12-18 | Stop reason: SDUPTHER

## 2020-10-09 RX ORDER — HYDRALAZINE HYDROCHLORIDE 50 MG/1
100 TABLET, FILM COATED ORAL 3 TIMES DAILY
Qty: 90 TABLET | Refills: 11 | Status: SHIPPED | OUTPATIENT
Start: 2020-10-09 | End: 2020-10-26 | Stop reason: DRUGHIGH

## 2020-10-09 RX ORDER — MINOXIDIL 10 MG/1
5 TABLET ORAL DAILY
Qty: 30 TABLET | Refills: 5 | Status: SHIPPED | OUTPATIENT
Start: 2020-10-09 | End: 2020-10-26

## 2020-10-09 NOTE — PROGRESS NOTES
Subjective     Patient ID: Andre Agosto is a 65 y.o. male. Patient is here for management of multiple medical problems.     Chief Complaint   Patient presents with   • kidney issues     patient here to lab results   • Hypertension     blood pressure increasing     History of Present Illness   Pt with Afib.     Can't get methyl dopa.         The following portions of the patient's history were reviewed and updated as appropriate: allergies, current medications, past family history, past medical history, past social history, past surgical history and problem list.    Review of Systems   Constitutional: Negative for diaphoresis and fatigue.   Eyes: Negative for photophobia, pain and redness.   Genitourinary: Negative for discharge, penile pain, penile swelling, scrotal swelling and testicular pain.   Hematological: Negative for adenopathy. Does not bruise/bleed easily.   Psychiatric/Behavioral: Negative for self-injury. The patient is not nervous/anxious.    All other systems reviewed and are negative.      Current Outpatient Medications:   •  allopurinol (Zyloprim) 100 MG tablet, Take 1 tablet by mouth Daily., Disp: 90 tablet, Rfl: 3  •  amoxicillin-clavulanate (Augmentin) 875-125 MG per tablet, Take 1 tablet by mouth Every 12 (Twelve) Hours., Disp: 20 tablet, Rfl: 0  •  apixaban (ELIQUIS) 5 MG tablet tablet, Take 1 tablet by mouth Every 12 (Twelve) Hours., Disp: 60 tablet, Rfl: 5  •  aspirin 81 MG tablet, Take 81 mg by mouth 2 (Two) Times a Day., Disp: 30 tablet, Rfl: 11  •  carvedilol (COREG) 6.25 MG tablet, Take 1 tablet by mouth 2 (Two) Times a Day With Meals., Disp: 60 tablet, Rfl: 0  •  divalproex (DEPAKOTE) 250 MG DR tablet, Take 1 tablet by mouth 3 (Three) Times a Day. Take 2 in the AM 1 at noon and 2 in the pm. Per DR Clifford., Disp: 150 tablet, Rfl: 5  •  ferrous sulfate 325 (65 FE) MG tablet, Take 1 tablet by mouth Every Other Day., Disp: 30 tablet, Rfl: 0  •  furosemide (LASIX) 40 MG tablet, TAKE ONE TABLET  "BY MOUTH DAILY AS NEEDED (WEIGHT GAIN OF 3LBS IN ONE DAY OR 5 LBS IN 3 DAYS), Disp: 15 tablet, Rfl: 0  •  hydrALAZINE (APRESOLINE) 50 MG tablet, Take 2 tablets by mouth 3 (Three) Times a Day., Disp: 90 tablet, Rfl: 11  •  levothyroxine (SYNTHROID, LEVOTHROID) 88 MCG tablet, Take 1 tablet by mouth Daily., Disp: 90 tablet, Rfl: 3  •  lovastatin (MEVACOR) 40 MG tablet, Take 1 tablet by mouth Daily With Dinner., Disp: 90 tablet, Rfl: 1  •  methyldopa (ALDOMET) 500 MG tablet, Take 1 tablet by mouth Every 12 (Twelve) Hours., Disp: 180 tablet, Rfl: 2  •  propafenone (RYTHMOL) 150 MG tablet, Take 1 tablet by mouth Every 12 (Twelve) Hours., Disp: 60 tablet, Rfl: 11  •  REPATHA SURECLICK 140 MG/ML solution auto-injector, INJECT 1ML UNDER THE SKIN AS DIRECTED EVERY 14 DAYS, Disp: 1 pen, Rfl: 11  •  Semaglutide,0.25 or 0.5MG/DOS, (Ozempic, 0.25 or 0.5 MG/DOSE,) 2 MG/1.5ML solution pen-injector, Inject 1 mg under the skin into the appropriate area as directed 1 (One) Time Per Week., Disp: 4 pen, Rfl: 3  •  terazosin (HYTRIN) 10 MG capsule, Take 1 capsule by mouth Every 12 (Twelve) Hours., Disp: 60 capsule, Rfl: 11  •  minoxidil (LONITEN) 10 MG tablet, Take 0.5 tablets by mouth Daily., Disp: 30 tablet, Rfl: 5  •  NIFEdipine XL (ADALAT CC) 90 MG 24 hr tablet, Take 1 tablet by mouth Daily., Disp: 90 tablet, Rfl: 3    Objective      Blood pressure 157/92, pulse 67, temperature 97.9 °F (36.6 °C), temperature source Temporal, resp. rate 16, height 193 cm (76\"), weight (!) 136 kg (300 lb 12.8 oz), SpO2 98 %.    Physical Exam     General Appearance:    Alert, cooperative, no distress, appears stated age   Head:    Normocephalic, without obvious abnormality, atraumatic   Eyes:    PERRL, conjunctiva/corneas clear, EOM's intact   Ears:    Normal TM's and external ear canals, both ears   Nose:   Nares normal, septum midline, mucosa normal, no drainage   or sinus tenderness   Throat:   Lips, mucosa, and tongue normal; teeth and gums normal "   Neck:   Supple, symmetrical, trachea midline, no adenopathy;        thyroid:  No enlargement/tenderness/nodules; no carotid    bruit or JVD   Back:     Symmetric, no curvature, ROM normal, no CVA tenderness   Lungs:     Clear to auscultation bilaterally, respirations unlabored   Chest wall:    No tenderness or deformity   Heart:    Regular rate and rhythm, S1 and S2 normal, no murmur,        rub or gallop   Abdomen:     Soft, non-tender, bowel sounds active all four quadrants,     no masses, no organomegaly   Extremities:   Extremities normal, atraumatic, no cyanosis or edema   Pulses:   2+ and symmetric all extremities   Skin:   Skin color, texture, turgor normal, no rashes or lesions   Lymph nodes:   Cervical, supraclavicular, and axillary nodes normal   Neurologic:   CNII-XII intact. Normal strength, sensation and reflexes       throughout      Results for orders placed or performed in visit on 10/08/20   POC Urinalysis Dipstick, Automated    Specimen: Urine   Result Value Ref Range    Color Yellow Yellow, Straw, Dark Yellow, Misty    Clarity, UA Clear Clear    Specific Gravity  1.020 1.005 - 1.030    pH, Urine 6.0 5.0 - 8.0    Leukocytes Negative Negative    Nitrite, UA Negative Negative    Protein, POC 2+ (A) Negative mg/dL    Glucose, UA Negative Negative, 1000 mg/dL (3+) mg/dL    Ketones, UA Trace (A) Negative    Urobilinogen, UA Normal Normal    Bilirubin Negative Negative    Blood, UA 1+ (A) Negative         Assessment/Plan       Andre was seen today for kidney issues and hypertension.    Diagnoses and all orders for this visit:    Malignant hypertension  -     hydrALAZINE (APRESOLINE) 50 MG tablet; Take 2 tablets by mouth 3 (Three) Times a Day.    Other orders  -     NIFEdipine XL (ADALAT CC) 90 MG 24 hr tablet; Take 1 tablet by mouth Daily.  -     minoxidil (LONITEN) 10 MG tablet; Take 0.5 tablets by mouth Daily.      Return in about 2 weeks (around 10/23/2020).          There are no Patient  Instructions on file for this visit.     Keven Bear MD    Assessment/Plan

## 2020-10-12 RX ORDER — FUROSEMIDE 40 MG/1
TABLET ORAL
Qty: 15 TABLET | Refills: 0 | Status: SHIPPED | OUTPATIENT
Start: 2020-10-12 | End: 2020-10-23

## 2020-10-13 RX ORDER — AMLODIPINE BESYLATE 5 MG/1
TABLET ORAL
Qty: 30 TABLET | Refills: 0 | OUTPATIENT
Start: 2020-10-13

## 2020-10-14 PROCEDURE — 82274 ASSAY TEST FOR BLOOD FECAL: CPT | Performed by: INTERNAL MEDICINE

## 2020-10-15 LAB — HEMOCCULT STL QL IA: POSITIVE

## 2020-10-19 RX ORDER — AMLODIPINE BESYLATE 5 MG/1
TABLET ORAL
Qty: 30 TABLET | Refills: 0 | OUTPATIENT
Start: 2020-10-19

## 2020-10-23 RX ORDER — FUROSEMIDE 40 MG/1
TABLET ORAL
Qty: 90 TABLET | Refills: 3 | Status: SHIPPED | OUTPATIENT
Start: 2020-10-23 | End: 2020-11-13 | Stop reason: SDUPTHER

## 2020-10-26 ENCOUNTER — OFFICE VISIT (OUTPATIENT)
Dept: INTERNAL MEDICINE | Facility: CLINIC | Age: 65
End: 2020-10-26

## 2020-10-26 VITALS
HEART RATE: 72 BPM | OXYGEN SATURATION: 98 % | HEIGHT: 76 IN | BODY MASS INDEX: 37.92 KG/M2 | WEIGHT: 311.4 LBS | DIASTOLIC BLOOD PRESSURE: 74 MMHG | SYSTOLIC BLOOD PRESSURE: 133 MMHG | RESPIRATION RATE: 16 BRPM | TEMPERATURE: 97.8 F

## 2020-10-26 DIAGNOSIS — N20.0 NEPHROLITHIASIS: ICD-10-CM

## 2020-10-26 DIAGNOSIS — I10 HYPERTENSION, UNSPECIFIED TYPE: Primary | ICD-10-CM

## 2020-10-26 PROCEDURE — 90686 IIV4 VACC NO PRSV 0.5 ML IM: CPT | Performed by: INTERNAL MEDICINE

## 2020-10-26 PROCEDURE — G0008 ADMIN INFLUENZA VIRUS VAC: HCPCS | Performed by: INTERNAL MEDICINE

## 2020-10-26 PROCEDURE — 99214 OFFICE O/P EST MOD 30 MIN: CPT | Performed by: INTERNAL MEDICINE

## 2020-10-26 RX ORDER — HYDRALAZINE HYDROCHLORIDE 100 MG/1
100 TABLET, FILM COATED ORAL 2 TIMES DAILY
COMMUNITY
End: 2020-12-18 | Stop reason: SDUPTHER

## 2020-10-26 NOTE — PROGRESS NOTES
Subjective     Patient ID: Andre Agosto is a 65 y.o. male. Patient is here for management of multiple medical problems.     Chief Complaint   Patient presents with   • Hypertension     follow-up     History of Present Illness         Hypertension    Going to see Cardiology on Wed. BP spike.  Back on methyldopa. bp dropped in one dose.  Has enough to get though Jan. No yet on minoxidil    Pt states meds has been discontinued.          The following portions of the patient's history were reviewed and updated as appropriate: allergies, current medications, past family history, past medical history, past social history, past surgical history and problem list.    Review of Systems   Constitutional: Negative for fatigue.   HENT: Negative for mouth sores, nosebleeds and postnasal drip.    Cardiovascular: Negative for chest pain and leg swelling.   Musculoskeletal: Negative for arthralgias and back pain.   Psychiatric/Behavioral: Negative for self-injury and sleep disturbance. The patient is not nervous/anxious.    All other systems reviewed and are negative.      Current Outpatient Medications:   •  allopurinol (Zyloprim) 100 MG tablet, Take 1 tablet by mouth Daily., Disp: 90 tablet, Rfl: 3  •  aspirin 81 MG tablet, Take 81 mg by mouth 2 (Two) Times a Day., Disp: 30 tablet, Rfl: 11  •  carvedilol (COREG) 6.25 MG tablet, Take 1 tablet by mouth 2 (Two) Times a Day With Meals., Disp: 60 tablet, Rfl: 0  •  divalproex (DEPAKOTE) 250 MG DR tablet, Take 1 tablet by mouth 3 (Three) Times a Day. Take 2 in the AM 1 at noon and 2 in the pm. Per DR Clifford., Disp: 150 tablet, Rfl: 5  •  furosemide (LASIX) 40 MG tablet, TAKE ONE TABLET BY MOUTH DAILY AS NEEDED FOR WEIGHT GAIN OF 3LBS IN ONE DAY OR 5LBS IN 3 DAYS, Disp: 90 tablet, Rfl: 3  •  hydrALAZINE (APRESOLINE) 100 MG tablet, Take 100 mg by mouth 2 (Two) Times a Day., Disp: , Rfl:   •  levothyroxine (SYNTHROID, LEVOTHROID) 88 MCG tablet, Take 1 tablet by mouth Daily., Disp: 90  "tablet, Rfl: 3  •  lovastatin (MEVACOR) 40 MG tablet, Take 1 tablet by mouth Daily With Dinner., Disp: 90 tablet, Rfl: 1  •  methyldopa (ALDOMET) 500 MG tablet, Take 1 tablet by mouth Every 12 (Twelve) Hours., Disp: 180 tablet, Rfl: 2  •  propafenone (RYTHMOL) 150 MG tablet, Take 1 tablet by mouth Every 12 (Twelve) Hours., Disp: 60 tablet, Rfl: 11  •  REPATHA SURECLICK 140 MG/ML solution auto-injector, INJECT 1ML UNDER THE SKIN AS DIRECTED EVERY 14 DAYS, Disp: 1 pen, Rfl: 11  •  Semaglutide,0.25 or 0.5MG/DOS, (Ozempic, 0.25 or 0.5 MG/DOSE,) 2 MG/1.5ML solution pen-injector, Inject 1 mg under the skin into the appropriate area as directed 1 (One) Time Per Week., Disp: 4 pen, Rfl: 3  •  terazosin (HYTRIN) 10 MG capsule, Take 1 capsule by mouth Every 12 (Twelve) Hours., Disp: 60 capsule, Rfl: 11  •  NIFEdipine XL (ADALAT CC) 90 MG 24 hr tablet, Take 1 tablet by mouth Daily., Disp: 90 tablet, Rfl: 3    Objective      Blood pressure 133/74, pulse 72, temperature 97.8 °F (36.6 °C), temperature source Temporal, resp. rate 16, height 193 cm (76\"), weight (!) 141 kg (311 lb 6.4 oz), SpO2 98 %.    Physical Exam     General Appearance:    Alert, cooperative, no distress, appears stated age   Head:    Normocephalic, without obvious abnormality, atraumatic   Eyes:    PERRL, conjunctiva/corneas clear, EOM's intact   Ears:    Normal TM's and external ear canals, both ears   Nose:   Nares normal, septum midline, mucosa normal, no drainage   or sinus tenderness   Throat:   Lips, mucosa, and tongue normal; teeth and gums normal   Neck:   Supple, symmetrical, trachea midline, no adenopathy;        thyroid:  No enlargement/tenderness/nodules; no carotid    bruit or JVD   Back:     Symmetric, no curvature, ROM normal, no CVA tenderness   Lungs:     Clear to auscultation bilaterally, respirations unlabored   Chest wall:    No tenderness or deformity   Heart:    Regular rate and rhythm, S1 and S2 normal, no murmur,        rub or gallop "   Abdomen:     Soft, non-tender, bowel sounds active all four quadrants,     no masses, no organomegaly   Extremities:   Extremities normal, atraumatic, no cyanosis or edema   Pulses:   2+ and symmetric all extremities   Skin:   Skin color, texture, turgor normal, no rashes or lesions   Lymph nodes:   Cervical, supraclavicular, and axillary nodes normal   Neurologic:   CNII-XII intact. Normal strength, sensation and reflexes       throughout      Results for orders placed or performed in visit on 10/08/20   POC Urinalysis Dipstick, Automated    Specimen: Urine   Result Value Ref Range    Color Yellow Yellow, Straw, Dark Yellow, Misty    Clarity, UA Clear Clear    Specific Gravity  1.020 1.005 - 1.030    pH, Urine 6.0 5.0 - 8.0    Leukocytes Negative Negative    Nitrite, UA Negative Negative    Protein, POC 2+ (A) Negative mg/dL    Glucose, UA Negative Negative, 1000 mg/dL (3+) mg/dL    Ketones, UA Trace (A) Negative    Urobilinogen, UA Normal Normal    Bilirubin Negative Negative    Blood, UA 1+ (A) Negative         Assessment/Plan   Pt better on methyldopa.  Off Norvasc and back on hydralazine 100 mg bid.    Methyldopa shortage till march 2020.  Seems to be a problem still.   ingredient missing   May never be able to get enough.  Consider change to Guanfacin.      Using water and lemon juice with some improvement.        Off elaquis and feels better  Currently in NSR.    Pt needs high dose flu    Diagnoses and all orders for this visit:    1. Hypertension, unspecified type (Primary)    2. Nephrolithiasis    Other orders  -     Cancel: Fluad Quad 65+ yrs ([id number])  -     FluLaval Quad >6 Months ([id number])      Return in about 1 week (around 11/2/2020).          There are no Patient Instructions on file for this visit.     Keven Bear MD    Assessment/Plan

## 2020-10-28 ENCOUNTER — OFFICE VISIT (OUTPATIENT)
Dept: CARDIOLOGY | Facility: CLINIC | Age: 65
End: 2020-10-28

## 2020-10-28 VITALS
HEART RATE: 69 BPM | HEIGHT: 76 IN | SYSTOLIC BLOOD PRESSURE: 128 MMHG | TEMPERATURE: 96.8 F | WEIGHT: 308 LBS | DIASTOLIC BLOOD PRESSURE: 80 MMHG | BODY MASS INDEX: 37.51 KG/M2

## 2020-10-28 DIAGNOSIS — I25.10 CORONARY ARTERY DISEASE INVOLVING NATIVE CORONARY ARTERY OF NATIVE HEART WITHOUT ANGINA PECTORIS: Primary | ICD-10-CM

## 2020-10-28 DIAGNOSIS — I10 HYPERTENSION, UNSPECIFIED TYPE: ICD-10-CM

## 2020-10-28 DIAGNOSIS — E78.2 MIXED HYPERLIPIDEMIA: ICD-10-CM

## 2020-10-28 PROCEDURE — 99214 OFFICE O/P EST MOD 30 MIN: CPT | Performed by: INTERNAL MEDICINE

## 2020-10-28 NOTE — PROGRESS NOTES
[facility]    Patient ID: Andre Agosto is a 65 y.o. male.  : 1955   Contact: [phone]    Encounter date: 10/28/2020    PCP: Keven Bear MD      Chief complaint:   Chief Complaint   Patient presents with   • Coronary Artery Disease       Problem List:  1. Coronary artery disease:  a. Stress echo, [facility], 2014: Borderline abnormal, but low probability for significant focal obstructive CAD.  b. NSTEMI, 2015 ([facility]): PCI of OM branch and circumflex (3.5 x 20 Synchrony). EF 50%.    c. LHC (emergent re-look), 2015: Patent stent to circumflex; LAD with 50-60% prox-mid stenosis.  d. Echo, 2015: EF 55-60%, no significant valve disease.  e. Cardiolite, 2016: No evidence of ischemia.  f. Nuclear stress, 2018: EF 40%. Exercise duration of 8:30, as expected. Stopped due to fatigue and SOA. THR of 134 achieved at 7:35. Hypertensive response to exercise. Occasional PVC's and aberrancy. No stress induced perfusion defect. Mild fixed mid and distal anteroapical hypoperfusion worse in the rest images.   g. LHC, 10/12/2018: Two-vessel CAD with multiple borderline lesions within the LAD and an occluded mid RCA.  h. Echo, 10/12/2018: EF 55%. Moderate dilation of the aortic root. Moderate dilation of the sinuses of Valsalva. Mild AS and AI. Aortic valve mean pressure gradient 10 mmHg.  i. Echocardiogram, 2020: EF: 55%, Left ventricular wall thickness is consistent with mild concentric hypertrophy. The left ventricular cavity is mildly dilated. Mild aortic valve regurgitation and mild aortic valve stenosis. Aortic valve mean pressure gradient is 11.5 mmHg.  j. Duplex scan, 2020: No evidence of superficial venous thrombosis in the right lower extremity.  k. SELWYN,  2020: EF: 45%, Left ventricular systolic function is mildly decreased. Atrial fibrillation is noted throughout the study. Trace MR, mild TR.   l. US Renal Bilateral, 09/10/2020:  Findings consistent with renal stones with no evidence of hydronephrosis.   2. Hypertension:  a. Renal artery duplex 01/28/2014: No evidence of renal artery stenosis.  b. Renal artery duplex 07/27/2018: No evidence of renal artery stenosis.  3. Hyperlipidemia.  4. CVA 6/2016:  a. Carotid duplex 12/28/15: <50% stenosis bilaterally.  b. 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  5. ALEX, 2010:  a. Sleep study, 08/14/2018: Normal (?); wears bi-pap.  6. Seizures; partial focal seizure:  a. 2005: meningitis and encephalitis.    Allergies   Allergen Reactions   • Amlodipine Swelling   • Statins Myalgia       Current Medications:    Current Outpatient Medications:   •  allopurinol (Zyloprim) 100 MG tablet, Take 1 tablet by mouth Daily., Disp: 90 tablet, Rfl: 3  •  aspirin 81 MG tablet, Take 81 mg by mouth 2 (Two) Times a Day., Disp: 30 tablet, Rfl: 11  •  carvedilol (COREG) 6.25 MG tablet, Take 1 tablet by mouth 2 (Two) Times a Day With Meals., Disp: 60 tablet, Rfl: 0  •  divalproex (DEPAKOTE) 250 MG DR tablet, Take 1 tablet by mouth 3 (Three) Times a Day. Take 2 in the AM 1 at noon and 2 in the pm. Per DR Clifford., Disp: 150 tablet, Rfl: 5  •  furosemide (LASIX) 40 MG tablet, TAKE ONE TABLET BY MOUTH DAILY AS NEEDED FOR WEIGHT GAIN OF 3LBS IN ONE DAY OR 5LBS IN 3 DAYS, Disp: 90 tablet, Rfl: 3  •  hydrALAZINE (APRESOLINE) 100 MG tablet, Take 100 mg by mouth 2 (Two) Times a Day., Disp: , Rfl:   •  levothyroxine (SYNTHROID, LEVOTHROID) 88 MCG tablet, Take 1 tablet by mouth Daily., Disp: 90 tablet, Rfl: 3  •  lovastatin (MEVACOR) 40 MG tablet, Take 1 tablet by mouth Daily With Dinner., Disp: 90 tablet, Rfl: 1  •  methyldopa (ALDOMET) 500 MG tablet, Take 1 tablet by mouth Every 12 (Twelve) Hours., Disp: 180 tablet, Rfl: 2  •  propafenone (RYTHMOL) 150 MG tablet, Take 1 tablet by mouth Every 12 (Twelve) Hours., Disp: 60 tablet, Rfl: 11  •  REPATHA SURECLICK 140 MG/ML solution auto-injector, INJECT 1ML UNDER THE  "SKIN AS DIRECTED EVERY 14 DAYS, Disp: 1 pen, Rfl: 11  •  Semaglutide,0.25 or 0.5MG/DOS, (Ozempic, 0.25 or 0.5 MG/DOSE,) 2 MG/1.5ML solution pen-injector, Inject 1 mg under the skin into the appropriate area as directed 1 (One) Time Per Week., Disp: 4 pen, Rfl: 3  •  terazosin (HYTRIN) 10 MG capsule, Take 1 capsule by mouth Every 12 (Twelve) Hours., Disp: 60 capsule, Rfl: 11  •  NIFEdipine XL (ADALAT CC) 90 MG 24 hr tablet, Take 1 tablet by mouth Daily., Disp: 90 tablet, Rfl: 3    HPI    nAdre Agosto is a 65 y.o. male who presents today for a follow up of coronary artery disease and cardiac risk factors. Since last visit, he has been monitoring his blood pressure at home and is using a wrist cuff. He claims that his levels start around 120-125/80 in the mornings and rise throughout the day and reach 170 systolic. He notes that methyldopa has helped lower his BP levels.  He has had to work very hard to procure enough methyldopa for the next 3 months.  He has been experiencing fatigue in the mornings and notes that this occurs when his BP levels are lower. He recently had a colonoscopy and had 3 polyps removed. He notes that his weight fluctuates regularly due to fluid retention. He has been staying active walking and playing golf but is not following a routine program. Patient otherwise denies chest pain, shortness of breath, PND, edema, palpitations, syncope, or presyncope at this time.         The following portions of the patient's history were reviewed and updated as appropriate: allergies, current medications and problem list.    Pertinent positives as listed in the HPI.  All other systems reviewed are negative.         Vitals:    10/28/20 1612   BP: 128/80   BP Location: Left arm   Patient Position: Sitting   Pulse: 69   Temp: 96.8 °F (36 °C)   Weight: (!) 140 kg (308 lb)   Height: 193 cm (76\")       Physical Exam:  General: Alert and oriented.  Neck: Jugular venous pressure is within normal limits. Carotids " have normal upstrokes without bruits.   Cardiovascular: Heart has a nondisplaced focal PMI. Regular rate and rhythm. No murmur, gallop or rub.  Lungs: Clear, no rales or wheezes. Equal expansion is noted.   Extremities: Show no edema.  Skin: Warm and dry.  Neurologic: Nonfocal.     Diagnostic Data (reviewed with patient):  Lab Results   Component Value Date    GLUCOSE 211 (H) 10/09/2020    BUN 16 10/09/2020    CREATININE 1.45 (H) 10/09/2020    EGFRIFNONA 49 (L) 10/09/2020    BCR 11.0 10/09/2020     10/09/2020    K 3.6 10/09/2020    CL 99 10/09/2020    CO2 24.8 10/09/2020    CALCIUM 9.3 10/09/2020    ALBUMIN 3.70 08/15/2020    ALKPHOS 43 08/15/2020    AST 15 08/15/2020    ALT 14 08/15/2020     Lab Results   Component Value Date    CHOL 109 09/01/2020    TRIG 238 (H) 09/01/2020    HDL 38 (L) 09/01/2020    LDL 23 09/01/2020      Lab Results   Component Value Date    WBC 9.30 10/09/2020    RBC 4.55 10/09/2020    HGB 14.5 10/09/2020    HCT 42.2 10/09/2020    MCV 92.7 10/09/2020     10/09/2020      Lab Results   Component Value Date    TSH 1.000 08/28/2020        Procedures      Assessment:    ICD-10-CM ICD-9-CM   1. Coronary artery disease involving native coronary artery of native heart without angina pectoris  I25.10 414.01   2. Mixed hyperlipidemia  E78.2 272.2   3. Hypertension, unspecified type  I10 401.9         Plan:  1. DC Eliquis and begin taking Xarelto 15mg due to knee pain.   2. Decrease methyldopa from 500mg to 250mg to preserve his methyldopa supply.  If his blood pressure increases we may need to consider increasing his hydralazine to 100 mg every 8 hours..   3. Begin taking an additional 50mg hydralazine at 4-5pm for better control of BP.  4. Continue taking aspirin for CAD.  5. Continue taking lovastatin 40mg and Repatha for hyperlipidemia.   6. Continue taking Lasix for fluid retention.   7. Continue taking carvedilol 6.25mg and terazosin 10mg for hypertension.   8. Continue all other  current medications.  9. F/up in 1 months, sooner if needed.    Scribed for Yoselin Johnson MD by Arnel Akers. 10/28/2020  16:25 EDT       I Yoselin Johnson MD personally performed the services described in this documentation as scribed by the above individual in my presence, and it is both accurate and complete.    Yoselin Johnson MD, FACC

## 2020-11-09 ENCOUNTER — LAB (OUTPATIENT)
Dept: LAB | Facility: HOSPITAL | Age: 65
End: 2020-11-09

## 2020-11-09 ENCOUNTER — TELEPHONE (OUTPATIENT)
Dept: INTERNAL MEDICINE | Facility: CLINIC | Age: 65
End: 2020-11-09

## 2020-11-09 ENCOUNTER — OFFICE VISIT (OUTPATIENT)
Dept: INTERNAL MEDICINE | Facility: CLINIC | Age: 65
End: 2020-11-09

## 2020-11-09 VITALS
WEIGHT: 298.8 LBS | OXYGEN SATURATION: 96 % | SYSTOLIC BLOOD PRESSURE: 113 MMHG | BODY MASS INDEX: 36.38 KG/M2 | TEMPERATURE: 97.3 F | DIASTOLIC BLOOD PRESSURE: 65 MMHG | HEIGHT: 76 IN | RESPIRATION RATE: 16 BRPM | HEART RATE: 69 BPM

## 2020-11-09 DIAGNOSIS — M35.3 PMR (POLYMYALGIA RHEUMATICA) (HCC): ICD-10-CM

## 2020-11-09 DIAGNOSIS — N18.30 CHRONIC RENAL IMPAIRMENT, STAGE 3 (MODERATE), UNSPECIFIED WHETHER STAGE 3A OR 3B CKD (HCC): Primary | ICD-10-CM

## 2020-11-09 DIAGNOSIS — I50.31 ACUTE DIASTOLIC CHF (CONGESTIVE HEART FAILURE) (HCC): Primary | ICD-10-CM

## 2020-11-09 DIAGNOSIS — N18.30 CHRONIC RENAL IMPAIRMENT, STAGE 3 (MODERATE), UNSPECIFIED WHETHER STAGE 3A OR 3B CKD (HCC): ICD-10-CM

## 2020-11-09 LAB
ANION GAP SERPL CALCULATED.3IONS-SCNC: 11 MMOL/L (ref 5–15)
BUN SERPL-MCNC: 30 MG/DL (ref 8–23)
BUN/CREAT SERPL: 16.5 (ref 7–25)
CALCIUM SPEC-SCNC: 9.7 MG/DL (ref 8.6–10.5)
CHLORIDE SERPL-SCNC: 97 MMOL/L (ref 98–107)
CHOLEST SERPL-MCNC: 115 MG/DL (ref 0–200)
CK SERPL-CCNC: 39 U/L (ref 20–200)
CO2 SERPL-SCNC: 30 MMOL/L (ref 22–29)
CREAT SERPL-MCNC: 1.82 MG/DL (ref 0.76–1.27)
GFR SERPL CREATININE-BSD FRML MDRD: 38 ML/MIN/1.73
GLUCOSE SERPL-MCNC: 116 MG/DL (ref 65–99)
HDLC SERPL-MCNC: 35 MG/DL (ref 40–60)
LDLC SERPL CALC-MCNC: 55 MG/DL (ref 0–100)
LDLC/HDLC SERPL: 1.49 {RATIO}
MYOGLOBIN SERPL-MCNC: 73 NG/ML (ref 28–72)
POTASSIUM SERPL-SCNC: 3.6 MMOL/L (ref 3.5–5.2)
SODIUM SERPL-SCNC: 138 MMOL/L (ref 136–145)
TRIGL SERPL-MCNC: 140 MG/DL (ref 0–150)
VLDLC SERPL-MCNC: 25 MG/DL (ref 5–40)

## 2020-11-09 PROCEDURE — 36415 COLL VENOUS BLD VENIPUNCTURE: CPT | Performed by: INTERNAL MEDICINE

## 2020-11-09 PROCEDURE — 82550 ASSAY OF CK (CPK): CPT | Performed by: INTERNAL MEDICINE

## 2020-11-09 PROCEDURE — 99214 OFFICE O/P EST MOD 30 MIN: CPT | Performed by: INTERNAL MEDICINE

## 2020-11-09 PROCEDURE — 80048 BASIC METABOLIC PNL TOTAL CA: CPT | Performed by: INTERNAL MEDICINE

## 2020-11-09 PROCEDURE — 80061 LIPID PANEL: CPT | Performed by: INTERNAL MEDICINE

## 2020-11-09 PROCEDURE — 83874 ASSAY OF MYOGLOBIN: CPT | Performed by: INTERNAL MEDICINE

## 2020-11-09 RX ORDER — HYDROCODONE BITARTRATE AND ACETAMINOPHEN 5; 325 MG/1; MG/1
TABLET ORAL
COMMUNITY
Start: 2020-11-05 | End: 2021-02-19

## 2020-11-09 RX ORDER — PREDNISONE 20 MG/1
20 TABLET ORAL DAILY
Qty: 7 TABLET | Refills: 0 | Status: SHIPPED | OUTPATIENT
Start: 2020-11-09 | End: 2020-11-18 | Stop reason: SDUPTHER

## 2020-11-09 NOTE — PROGRESS NOTES
Subjective     Patient ID: Andre Agosto is a 65 y.o. male. Patient is here for management of multiple medical problems.     Chief Complaint   Patient presents with   • Edema     patient having swelling and increased pain in the right knee, and right leg x 6 days,      History of Present Illness     Edema prior to flight.  Taking lasix 40 or 80. And also got 80 iv while  In hospital.     Last bnp 1044. Thursday. And cr 1.37.    Leg edema some better and still hurts.    On methyldopa 500 bid.  Hydralazine dose 100 mg bid.        Blood on blood thinner.  Stopped one week prior to leg pain.   Off due to gi bleeding.    [employer].  Ate a lot of asparagus,  Has country ham.          The following portions of the patient's history were reviewed and updated as appropriate: allergies, current medications, past family history, past medical history, past social history, past surgical history and problem list.    Review of Systems   Constitutional: Negative for diaphoresis and fatigue.   Respiratory: Negative for cough and shortness of breath.    Cardiovascular: Positive for leg swelling.   Musculoskeletal: Negative for gait problem and joint swelling.   Psychiatric/Behavioral: Negative for self-injury and sleep disturbance. The patient is not nervous/anxious.        Current Outpatient Medications:   •  allopurinol (Zyloprim) 100 MG tablet, Take 1 tablet by mouth Daily., Disp: 90 tablet, Rfl: 3  •  aspirin 81 MG tablet, Take 81 mg by mouth 2 (Two) Times a Day., Disp: 30 tablet, Rfl: 11  •  carvedilol (COREG) 6.25 MG tablet, Take 1 tablet by mouth 2 (Two) Times a Day With Meals., Disp: 60 tablet, Rfl: 0  •  divalproex (DEPAKOTE) 250 MG DR tablet, Take 1 tablet by mouth 3 (Three) Times a Day. Take 2 in the AM 1 at noon and 2 in the pm. Per DR Clifford., Disp: 150 tablet, Rfl: 5  •  furosemide (LASIX) 40 MG tablet, TAKE ONE TABLET BY MOUTH DAILY AS NEEDED FOR WEIGHT GAIN OF 3LBS IN ONE DAY OR 5LBS IN 3 DAYS, Disp: 90 tablet,  "Rfl: 3  •  hydrALAZINE (APRESOLINE) 100 MG tablet, Take 100 mg by mouth 2 (Two) Times a Day., Disp: , Rfl:   •  HYDROcodone-acetaminophen (NORCO) 5-325 MG per tablet, TK 1 T PO Q 6 H PRN, Disp: , Rfl:   •  levothyroxine (SYNTHROID, LEVOTHROID) 88 MCG tablet, Take 1 tablet by mouth Daily., Disp: 90 tablet, Rfl: 3  •  lovastatin (MEVACOR) 40 MG tablet, Take 1 tablet by mouth Daily With Dinner., Disp: 90 tablet, Rfl: 1  •  methyldopa (ALDOMET) 500 MG tablet, Take 1 tablet by mouth Every 12 (Twelve) Hours., Disp: 180 tablet, Rfl: 2  •  NIFEdipine XL (ADALAT CC) 90 MG 24 hr tablet, Take 1 tablet by mouth Daily., Disp: 90 tablet, Rfl: 3  •  propafenone (RYTHMOL) 150 MG tablet, Take 1 tablet by mouth Every 12 (Twelve) Hours., Disp: 60 tablet, Rfl: 11  •  REPATHA SURECLICK 140 MG/ML solution auto-injector, INJECT 1ML UNDER THE SKIN AS DIRECTED EVERY 14 DAYS, Disp: 1 pen, Rfl: 11  •  Semaglutide,0.25 or 0.5MG/DOS, (Ozempic, 0.25 or 0.5 MG/DOSE,) 2 MG/1.5ML solution pen-injector, Inject 1 mg under the skin into the appropriate area as directed 1 (One) Time Per Week., Disp: 4 pen, Rfl: 3  •  terazosin (HYTRIN) 10 MG capsule, Take 1 capsule by mouth Every 12 (Twelve) Hours., Disp: 60 capsule, Rfl: 11  •  predniSONE (DELTASONE) 20 MG tablet, Take 1 tablet by mouth Daily., Disp: 7 tablet, Rfl: 0    Objective      Blood pressure 113/65, pulse 69, temperature 97.3 °F (36.3 °C), temperature source Temporal, resp. rate 16, height 193 cm (76\"), weight 136 kg (298 lb 12.8 oz), SpO2 96 %.    Physical Exam     General Appearance:    Alert, cooperative, no distress, appears stated age   Head:    Normocephalic, without obvious abnormality, atraumatic   Eyes:    PERRL, conjunctiva/corneas clear, EOM's intact   Ears:    Normal TM's and external ear canals, both ears   Nose:   Nares normal, septum midline, mucosa normal, no drainage   or sinus tenderness   Throat:   Lips, mucosa, and tongue normal; teeth and gums normal   Neck:   Supple, " symmetrical, trachea midline, no adenopathy;        thyroid:  No enlargement/tenderness/nodules; no carotid    bruit or JVD   Back:     Symmetric, no curvature, ROM normal, no CVA tenderness   Lungs:     Clear to auscultation bilaterally, respirations unlabored   Chest wall:    No tenderness or deformity   Heart:    Regular rate and rhythm, S1 and S2 normal, no murmur,        rub or gallop   Abdomen:     Soft, non-tender, bowel sounds active all four quadrants,     no masses, no organomegaly   Extremities:   Extremities normal, atraumatic, no cyanosis or edema   Pulses:   2+ and symmetric all extremities   Skin:   Skin color, texture, turgor normal, no rashes or lesions   Lymph nodes:   Cervical, supraclavicular, and axillary nodes normal   Neurologic:   CNII-XII intact.extreme weakness in poximal muslce of legs on getting up.    Normal strength, sensation and reflexes       throughout      Results for orders placed or performed in visit on 10/08/20   POC Urinalysis Dipstick, Automated    Specimen: Urine   Result Value Ref Range    Color Yellow Yellow, Straw, Dark Yellow, Misty    Clarity, UA Clear Clear    Specific Gravity  1.020 1.005 - 1.030    pH, Urine 6.0 5.0 - 8.0    Leukocytes Negative Negative    Nitrite, UA Negative Negative    Protein, POC 2+ (A) Negative mg/dL    Glucose, UA Negative Negative, 1000 mg/dL (3+) mg/dL    Ketones, UA Trace (A) Negative    Urobilinogen, UA Normal Normal    Bilirubin Negative Negative    Blood, UA 1+ (A) Negative         Assessment/Plan   Pt will decrease methyldopa   tria of pred 20 of rmuscle weakness. This is going to worsen chf.  Will try to limit the se.     Diagnoses and all orders for this visit:    1. Acute diastolic CHF (congestive heart failure) (CMS/[facility]) (Primary)  -     Uric acid  -     Basic metabolic panel  -     TSH  -     T4, Free  -     BNP    2. Chronic renal impairment, stage 3 (moderate), unspecified whether stage 3a or 3b CKD  -     Uric acid  -     Basic  metabolic panel  -     TSH  -     T4, Free  -     BNP    3. PMR (polymyalgia rheumatica) (CMS/HCC)  -     CK  -     Myoglobin, Serum    Other orders  -     predniSONE (DELTASONE) 20 MG tablet; Take 1 tablet by mouth Daily.  Dispense: 7 tablet; Refill: 0      Return in about 1 week (around 11/16/2020).          There are no Patient Instructions on file for this visit.     Keven Bear MD    Assessment/Plan

## 2020-11-09 NOTE — TELEPHONE ENCOUNTER
PATIENT STATES HIS LEGS ARE SWELLING AND HE FEELS LIKE HE MIGHT NEED SOME TEST DONE BEFORE HIS VISIT TODAY    PLEASE CALL  [phone]

## 2020-11-12 ENCOUNTER — HOSPITAL ENCOUNTER (OUTPATIENT)
Dept: ULTRASOUND IMAGING | Facility: HOSPITAL | Age: 65
Discharge: HOME | End: 2020-11-12

## 2020-11-12 ENCOUNTER — TELEPHONE (OUTPATIENT)
Dept: INTERNAL MEDICINE | Facility: CLINIC | Age: 65
End: 2020-11-12

## 2020-11-12 ENCOUNTER — LAB (OUTPATIENT)
Dept: LAB | Facility: HOSPITAL | Age: 65
End: 2020-11-12

## 2020-11-12 DIAGNOSIS — R60.0 BILATERAL LEG EDEMA: Primary | ICD-10-CM

## 2020-11-12 DIAGNOSIS — R60.0 BILATERAL LEG EDEMA: ICD-10-CM

## 2020-11-12 LAB — NT-PROBNP SERPL-MCNC: 1204 PG/ML (ref 0–900)

## 2020-11-12 PROCEDURE — 80048 BASIC METABOLIC PNL TOTAL CA: CPT | Performed by: INTERNAL MEDICINE

## 2020-11-12 PROCEDURE — 36415 COLL VENOUS BLD VENIPUNCTURE: CPT | Performed by: INTERNAL MEDICINE

## 2020-11-12 PROCEDURE — 93970 EXTREMITY STUDY: CPT

## 2020-11-12 PROCEDURE — 82550 ASSAY OF CK (CPK): CPT | Performed by: INTERNAL MEDICINE

## 2020-11-12 PROCEDURE — 83880 ASSAY OF NATRIURETIC PEPTIDE: CPT | Performed by: INTERNAL MEDICINE

## 2020-11-12 PROCEDURE — 84550 ASSAY OF BLOOD/URIC ACID: CPT | Performed by: INTERNAL MEDICINE

## 2020-11-12 PROCEDURE — 84439 ASSAY OF FREE THYROXINE: CPT | Performed by: INTERNAL MEDICINE

## 2020-11-12 PROCEDURE — 83874 ASSAY OF MYOGLOBIN: CPT | Performed by: INTERNAL MEDICINE

## 2020-11-12 PROCEDURE — 84443 ASSAY THYROID STIM HORMONE: CPT | Performed by: INTERNAL MEDICINE

## 2020-11-13 ENCOUNTER — TELEPHONE (OUTPATIENT)
Dept: INTERNAL MEDICINE | Facility: CLINIC | Age: 65
End: 2020-11-13

## 2020-11-13 DIAGNOSIS — Z46.51 ENCOUNTER FOR ADJUSTMENT OF GASTRIC LAP BAND: Primary | ICD-10-CM

## 2020-11-13 LAB
ANION GAP SERPL CALCULATED.3IONS-SCNC: 11.5 MMOL/L (ref 5–15)
BUN SERPL-MCNC: 26 MG/DL (ref 8–23)
BUN/CREAT SERPL: 16.5 (ref 7–25)
CALCIUM SPEC-SCNC: 9.3 MG/DL (ref 8.6–10.5)
CHLORIDE SERPL-SCNC: 97 MMOL/L (ref 98–107)
CK SERPL-CCNC: 35 U/L (ref 20–200)
CO2 SERPL-SCNC: 29.5 MMOL/L (ref 22–29)
CREAT SERPL-MCNC: 1.58 MG/DL (ref 0.76–1.27)
GFR SERPL CREATININE-BSD FRML MDRD: 44 ML/MIN/1.73
GLUCOSE SERPL-MCNC: 204 MG/DL (ref 65–99)
MYOGLOBIN SERPL-MCNC: 42.8 NG/ML (ref 28–72)
POTASSIUM SERPL-SCNC: 4.3 MMOL/L (ref 3.5–5.2)
SODIUM SERPL-SCNC: 138 MMOL/L (ref 136–145)
T4 FREE SERPL-MCNC: 1.36 NG/DL (ref 0.93–1.7)
TSH SERPL DL<=0.05 MIU/L-ACNC: 0.94 UIU/ML (ref 0.27–4.2)
URATE SERPL-MCNC: 6.9 MG/DL (ref 3.4–7)

## 2020-11-13 RX ORDER — FUROSEMIDE 40 MG/1
40 TABLET ORAL 2 TIMES DAILY
Qty: 180 TABLET | Refills: 3 | Status: SHIPPED | OUTPATIENT
Start: 2020-11-13 | End: 2020-12-18 | Stop reason: SDUPTHER

## 2020-11-13 RX ORDER — SEMAGLUTIDE 1.34 MG/ML
1 INJECTION, SOLUTION SUBCUTANEOUS WEEKLY
Qty: 4 PEN | Refills: 3 | Status: SHIPPED | OUTPATIENT
Start: 2020-11-13 | End: 2020-11-16

## 2020-11-13 NOTE — TELEPHONE ENCOUNTER
Lulu from the Hospital lab called with Critical Labs on patient, Shes states Pro BNP was 1,204.0.  Patient had these drawn yesterday around 5:00PM.

## 2020-11-16 ENCOUNTER — OFFICE VISIT (OUTPATIENT)
Dept: INTERNAL MEDICINE | Facility: CLINIC | Age: 65
End: 2020-11-16

## 2020-11-16 ENCOUNTER — LAB (OUTPATIENT)
Dept: LAB | Facility: HOSPITAL | Age: 65
End: 2020-11-16

## 2020-11-16 VITALS
WEIGHT: 307 LBS | BODY MASS INDEX: 37.38 KG/M2 | RESPIRATION RATE: 16 BRPM | DIASTOLIC BLOOD PRESSURE: 84 MMHG | HEIGHT: 76 IN | HEART RATE: 69 BPM | TEMPERATURE: 97.7 F | OXYGEN SATURATION: 97 % | SYSTOLIC BLOOD PRESSURE: 138 MMHG

## 2020-11-16 DIAGNOSIS — N18.30 CHRONIC RENAL IMPAIRMENT, STAGE 3 (MODERATE), UNSPECIFIED WHETHER STAGE 3A OR 3B CKD (HCC): Primary | ICD-10-CM

## 2020-11-16 DIAGNOSIS — I13.0 HYPERTENSIVE HEART AND CHRONIC KIDNEY DISEASE WITH HEART FAILURE AND STAGE 1 THROUGH STAGE 4 CHRONIC KIDNEY DISEASE, OR UNSPECIFIED CHRONIC KIDNEY DISEASE (HCC): ICD-10-CM

## 2020-11-16 LAB
ANION GAP SERPL CALCULATED.3IONS-SCNC: 12.6 MMOL/L (ref 5–15)
BUN SERPL-MCNC: 32 MG/DL (ref 8–23)
BUN/CREAT SERPL: 20.1 (ref 7–25)
CALCIUM SPEC-SCNC: 9.8 MG/DL (ref 8.6–10.5)
CHLORIDE SERPL-SCNC: 95 MMOL/L (ref 98–107)
CO2 SERPL-SCNC: 29.4 MMOL/L (ref 22–29)
CREAT SERPL-MCNC: 1.59 MG/DL (ref 0.76–1.27)
GFR SERPL CREATININE-BSD FRML MDRD: 44 ML/MIN/1.73
GLUCOSE SERPL-MCNC: 71 MG/DL (ref 65–99)
NT-PROBNP SERPL-MCNC: 1001 PG/ML (ref 0–900)
POTASSIUM SERPL-SCNC: 4 MMOL/L (ref 3.5–5.2)
SODIUM SERPL-SCNC: 137 MMOL/L (ref 136–145)

## 2020-11-16 PROCEDURE — 80048 BASIC METABOLIC PNL TOTAL CA: CPT | Performed by: INTERNAL MEDICINE

## 2020-11-16 PROCEDURE — 99214 OFFICE O/P EST MOD 30 MIN: CPT | Performed by: INTERNAL MEDICINE

## 2020-11-16 PROCEDURE — 36415 COLL VENOUS BLD VENIPUNCTURE: CPT | Performed by: INTERNAL MEDICINE

## 2020-11-16 PROCEDURE — 83880 ASSAY OF NATRIURETIC PEPTIDE: CPT | Performed by: INTERNAL MEDICINE

## 2020-11-16 RX ORDER — LOVASTATIN 40 MG/1
40 TABLET ORAL
Qty: 90 TABLET | Refills: 1 | Status: SHIPPED | OUTPATIENT
Start: 2020-11-16 | End: 2020-12-18 | Stop reason: SDUPTHER

## 2020-11-16 RX ORDER — ESCITALOPRAM OXALATE 20 MG/1
20 TABLET ORAL DAILY
Qty: 90 TABLET | Refills: 1 | Status: SHIPPED | OUTPATIENT
Start: 2020-11-16 | End: 2020-12-18 | Stop reason: SDUPTHER

## 2020-11-16 RX ORDER — ESCITALOPRAM OXALATE 20 MG/1
20 TABLET ORAL DAILY
COMMUNITY
End: 2020-11-16 | Stop reason: SDUPTHER

## 2020-11-16 RX ORDER — METHYLDOPA 500 MG/1
500 TABLET, FILM COATED ORAL 2 TIMES DAILY
Qty: 180 TABLET | Refills: 1 | Status: SHIPPED | OUTPATIENT
Start: 2020-11-16 | End: 2020-11-16 | Stop reason: SDUPTHER

## 2020-11-16 RX ORDER — METHYLDOPA 500 MG/1
500 TABLET, FILM COATED ORAL 2 TIMES DAILY
Qty: 180 TABLET | Refills: 1 | Status: SHIPPED | OUTPATIENT
Start: 2020-11-16 | End: 2020-12-02

## 2020-11-16 NOTE — PROGRESS NOTES
Subjective     Patient ID: Andre Agosto is a 65 y.o. male. Patient is here for management of multiple medical problems.     Chief Complaint   Patient presents with   • Congestive Heart Failure     History of Present Illness     Less edema and less tightness in legs.  Last time out eating Sat.   Eating less salt.  Eating and hungary all the time.  Hard to loss wt      Weakness got better on steroids.  Legs quit hurting. Off steroids.        The following portions of the patient's history were reviewed and updated as appropriate: allergies, current medications, past family history, past medical history, past social history, past surgical history and problem list.    Review of Systems   Constitutional: Positive for fatigue.   Musculoskeletal: Negative for gait problem, joint swelling and myalgias.   Neurological: Negative for weakness.   All other systems reviewed and are negative.      Current Outpatient Medications:   •  allopurinol (Zyloprim) 100 MG tablet, Take 1 tablet by mouth Daily., Disp: 90 tablet, Rfl: 3  •  aspirin 81 MG tablet, Take 81 mg by mouth 2 (Two) Times a Day., Disp: 30 tablet, Rfl: 11  •  carvedilol (COREG) 6.25 MG tablet, Take 1 tablet by mouth 2 (Two) Times a Day With Meals., Disp: 60 tablet, Rfl: 0  •  divalproex (DEPAKOTE) 250 MG DR tablet, Take 1 tablet by mouth 3 (Three) Times a Day. Take 2 in the AM 1 at noon and 2 in the pm. Per DR Clifford., Disp: 150 tablet, Rfl: 5  •  escitalopram (LEXAPRO) 20 MG tablet, Take 1 tablet by mouth Daily., Disp: 90 tablet, Rfl: 1  •  furosemide (LASIX) 40 MG tablet, Take 1 tablet by mouth 2 (Two) Times a Day., Disp: 180 tablet, Rfl: 3  •  hydrALAZINE (APRESOLINE) 100 MG tablet, Take 100 mg by mouth 2 (Two) Times a Day., Disp: , Rfl:   •  HYDROcodone-acetaminophen (NORCO) 5-325 MG per tablet, TK 1 T PO Q 6 H PRN, Disp: , Rfl:   •  levothyroxine (SYNTHROID, LEVOTHROID) 88 MCG tablet, Take 1 tablet by mouth Daily., Disp: 90 tablet, Rfl: 3  •  methyldopa (ALDOMET)  "500 MG tablet, Take 1 tablet by mouth Every 12 (Twelve) Hours., Disp: 180 tablet, Rfl: 2  •  NIFEdipine XL (ADALAT CC) 90 MG 24 hr tablet, Take 1 tablet by mouth Daily., Disp: 90 tablet, Rfl: 3  •  predniSONE (DELTASONE) 20 MG tablet, Take 1 tablet by mouth Daily., Disp: 7 tablet, Rfl: 0  •  propafenone (RYTHMOL) 150 MG tablet, Take 1 tablet by mouth Every 12 (Twelve) Hours., Disp: 60 tablet, Rfl: 11  •  REPATHA SURECLICK 140 MG/ML solution auto-injector, INJECT 1ML UNDER THE SKIN AS DIRECTED EVERY 14 DAYS, Disp: 1 pen, Rfl: 11  •  Semaglutide,0.25 or 0.5MG/DOS, (Ozempic, 0.25 or 0.5 MG/DOSE,) 2 MG/1.5ML solution pen-injector, Inject 1 mg under the skin into the appropriate area as directed 1 (One) Time Per Week., Disp: 4 pen, Rfl: 3  •  terazosin (HYTRIN) 10 MG capsule, Take 1 capsule by mouth Every 12 (Twelve) Hours., Disp: 60 capsule, Rfl: 11  •  lovastatin (MEVACOR) 40 MG tablet, Take 1 tablet by mouth Daily With Dinner., Disp: 90 tablet, Rfl: 1  •  methyldopa (ALDOMET) 500 MG tablet, Take 1 tablet by mouth 2 (two) times a day., Disp: 180 tablet, Rfl: 1  •  Semaglutide, 1 MG/DOSE, (OZEMPIC) 2 MG/1.5ML solution pen-injector, Inject 1 mg under the skin into the appropriate area as directed 1 (One) Time Per Week., Disp: 1 pen, Rfl: 11    Objective      Blood pressure 138/84, pulse 69, temperature 97.7 °F (36.5 °C), resp. rate 16, height 193 cm (76\"), weight (!) 139 kg (307 lb), SpO2 97 %.    Physical Exam     General Appearance:    Alert, cooperative, no distress, appears stated age   Head:    Normocephalic, without obvious abnormality, atraumatic   Eyes:    PERRL, conjunctiva/corneas clear, EOM's intact   Ears:    Normal TM's and external ear canals, both ears   Nose:   Nares normal, septum midline, mucosa normal, no drainage   or sinus tenderness   Throat:   Lips, mucosa, and tongue normal; teeth and gums normal   Neck:   Supple, symmetrical, trachea midline, no adenopathy;        thyroid:  No " enlargement/tenderness/nodules; no carotid    bruit or JVD   Back:     Symmetric, no curvature, ROM normal, no CVA tenderness   Lungs:     Clear to auscultation bilaterally, respirations unlabored   Chest wall:    No tenderness or deformity   Heart:    Regular rate and rhythm, S1 and S2 normal, no murmur,        rub or gallop   Abdomen:     Soft, non-tender, bowel sounds active all four quadrants,     no masses, no organomegaly   Extremities:   Extremities normal, atraumatic, no cyanosis,+2 edema   Pulses:   2+ and symmetric all extremities   Skin:   Skin color, texture, turgor normal, no rashes or lesions   Lymph nodes:   Cervical, supraclavicular, and axillary nodes normal   Neurologic:   CNII-XII intact. Normal strength, sensation and reflexes       throughout      Results for orders placed or performed in visit on 11/09/20   Uric acid    Specimen: Blood   Result Value Ref Range    Uric Acid 6.9 3.4 - 7.0 mg/dL   Basic metabolic panel    Specimen: Blood   Result Value Ref Range    Glucose 204 (H) 65 - 99 mg/dL    BUN 26 (H) 8 - 23 mg/dL    Creatinine 1.58 (H) 0.76 - 1.27 mg/dL    Sodium 138 136 - 145 mmol/L    Potassium 4.3 3.5 - 5.2 mmol/L    Chloride 97 (L) 98 - 107 mmol/L    CO2 29.5 (H) 22.0 - 29.0 mmol/L    Calcium 9.3 8.6 - 10.5 mg/dL    eGFR Non African Amer 44 (L) >60 mL/min/1.73    BUN/Creatinine Ratio 16.5 7.0 - 25.0    Anion Gap 11.5 5.0 - 15.0 mmol/L   TSH    Specimen: Blood   Result Value Ref Range    TSH 0.942 0.270 - 4.200 uIU/mL   T4, Free    Specimen: Blood   Result Value Ref Range    Free T4 1.36 0.93 - 1.70 ng/dL   BNP    Specimen: Blood   Result Value Ref Range    proBNP 1,204.0 (H) 0.0 - 900.0 pg/mL   CK    Specimen: Blood   Result Value Ref Range    Creatine Kinase 35 20 - 200 U/L   Myoglobin, Serum    Specimen: Blood   Result Value Ref Range    Myoglobin 42.8 28.0 - 72.0 ng/mL         Assessment/Plan   Pt on lasix 60mg.         Diagnoses and all orders for this visit:    1. Chronic renal  impairment, stage 3 (moderate), unspecified whether stage 3a or 3b CKD (Primary)  -     Basic metabolic panel  -     BNP    2. Hypertensive heart and chronic kidney disease with heart failure and stage 1 through stage 4 chronic kidney disease, or unspecified chronic kidney disease (CMS/HCC)   -     BNP    Other orders  -     lovastatin (MEVACOR) 40 MG tablet; Take 1 tablet by mouth Daily With Dinner.  Dispense: 90 tablet; Refill: 1  -     escitalopram (LEXAPRO) 20 MG tablet; Take 1 tablet by mouth Daily.  Dispense: 90 tablet; Refill: 1  -     methyldopa (ALDOMET) 500 MG tablet; Take 1 tablet by mouth 2 (two) times a day.  Dispense: 180 tablet; Refill: 1  -     Semaglutide, 1 MG/DOSE, (OZEMPIC) 2 MG/1.5ML solution pen-injector; Inject 1 mg under the skin into the appropriate area as directed 1 (One) Time Per Week.  Dispense: 1 pen; Refill: 11      Return in about 1 year (around 11/16/2021).          There are no Patient Instructions on file for this visit.     Keven Bear MD    Assessment/Plan

## 2020-11-17 ENCOUNTER — TELEPHONE (OUTPATIENT)
Dept: INTERNAL MEDICINE | Facility: CLINIC | Age: 65
End: 2020-11-17

## 2020-11-17 NOTE — TELEPHONE ENCOUNTER
PT DAUGHTER CALLED TO REQUEST RX FOR STEROIDS PT HAVING SAME SYMPTOMS AGAIN, LEGS ARE IN A LOT OF PAIN AND THE STEROIDS HAD REALLY HELPED.    PLEASE ADVISE.  CALL BACK:[phone]      [facility] [address] - [place], [place] - [address] AT [facility]

## 2020-11-18 DIAGNOSIS — N18.30 CHRONIC RENAL IMPAIRMENT, STAGE 3 (MODERATE), UNSPECIFIED WHETHER STAGE 3A OR 3B CKD (HCC): Primary | ICD-10-CM

## 2020-11-18 DIAGNOSIS — I50.21 ACUTE SYSTOLIC CHF (CONGESTIVE HEART FAILURE) (HCC): ICD-10-CM

## 2020-11-18 RX ORDER — PREDNISONE 20 MG/1
20 TABLET ORAL DAILY
Qty: 30 TABLET | Refills: 1 | Status: SHIPPED | OUTPATIENT
Start: 2020-11-18 | End: 2020-12-18 | Stop reason: SDUPTHER

## 2020-11-18 NOTE — PROGRESS NOTES
Please let them know the renal functiona nd BNP are stable.  Repeat labs on Monday. Stay on lax 60mg for now.

## 2020-11-23 ENCOUNTER — OFFICE VISIT (OUTPATIENT)
Dept: INTERNAL MEDICINE | Facility: CLINIC | Age: 65
End: 2020-11-23

## 2020-11-23 VITALS
DIASTOLIC BLOOD PRESSURE: 78 MMHG | HEIGHT: 76 IN | OXYGEN SATURATION: 98 % | TEMPERATURE: 97.5 F | HEART RATE: 84 BPM | RESPIRATION RATE: 16 BRPM | SYSTOLIC BLOOD PRESSURE: 126 MMHG | WEIGHT: 307 LBS | BODY MASS INDEX: 37.38 KG/M2

## 2020-11-23 DIAGNOSIS — M35.3 PMR (POLYMYALGIA RHEUMATICA) (HCC): Primary | ICD-10-CM

## 2020-11-23 DIAGNOSIS — N18.2 CRI (CHRONIC RENAL INSUFFICIENCY), STAGE 2 (MILD): ICD-10-CM

## 2020-11-23 DIAGNOSIS — I10 ESSENTIAL HYPERTENSION: ICD-10-CM

## 2020-11-23 PROCEDURE — 99214 OFFICE O/P EST MOD 30 MIN: CPT | Performed by: INTERNAL MEDICINE

## 2020-11-23 NOTE — PROGRESS NOTES
Subjective     Patient ID: Andre Agosto is a 65 y.o. male. Patient is here for management of multiple medical problems.     Chief Complaint   Patient presents with   • Chronic Renal Failure     History of Present Illness   Pt was on pred 20 mg and now 10 mg with improvement with the leg pain.    Pt missed the 10mg pill and weakness in legs cam back rapidly,          The following portions of the patient's history were reviewed and updated as appropriate: allergies, current medications, past family history, past medical history, past social history, past surgical history and problem list.    Review of Systems   Constitutional: Negative for diaphoresis and fatigue.   Cardiovascular: Negative for chest pain and leg swelling.   Musculoskeletal: Negative for arthralgias, back pain and gait problem.   Psychiatric/Behavioral: Negative for self-injury and sleep disturbance. The patient is not nervous/anxious and is not hyperactive.    All other systems reviewed and are negative.      Current Outpatient Medications:   •  allopurinol (Zyloprim) 100 MG tablet, Take 1 tablet by mouth Daily., Disp: 90 tablet, Rfl: 3  •  aspirin 81 MG tablet, Take 81 mg by mouth 2 (Two) Times a Day., Disp: 30 tablet, Rfl: 11  •  carvedilol (COREG) 6.25 MG tablet, Take 1 tablet by mouth 2 (Two) Times a Day With Meals., Disp: 60 tablet, Rfl: 0  •  divalproex (DEPAKOTE) 250 MG DR tablet, Take 1 tablet by mouth 3 (Three) Times a Day. Take 2 in the AM 1 at noon and 2 in the pm. Per DR Clifford., Disp: 150 tablet, Rfl: 5  •  escitalopram (LEXAPRO) 20 MG tablet, Take 1 tablet by mouth Daily., Disp: 90 tablet, Rfl: 1  •  furosemide (LASIX) 40 MG tablet, Take 1 tablet by mouth 2 (Two) Times a Day. (Patient taking differently: Take 40 mg by mouth 2 (Two) Times a Day. Take 1.5 tablets by mouth daily.), Disp: 180 tablet, Rfl: 3  •  hydrALAZINE (APRESOLINE) 100 MG tablet, Take 100 mg by mouth 2 (Two) Times a Day., Disp: , Rfl:   •  HYDROcodone-acetaminophen  "(NORCO) 5-325 MG per tablet, TK 1 T PO Q 6 H PRN, Disp: , Rfl:   •  levothyroxine (SYNTHROID, LEVOTHROID) 88 MCG tablet, Take 1 tablet by mouth Daily., Disp: 90 tablet, Rfl: 3  •  lovastatin (MEVACOR) 40 MG tablet, Take 1 tablet by mouth Daily With Dinner., Disp: 90 tablet, Rfl: 1  •  methyldopa (ALDOMET) 500 MG tablet, Take 1 tablet by mouth Every 12 (Twelve) Hours., Disp: 180 tablet, Rfl: 2  •  methyldopa (ALDOMET) 500 MG tablet, Take 1 tablet by mouth 2 (two) times a day., Disp: 180 tablet, Rfl: 1  •  NIFEdipine XL (ADALAT CC) 90 MG 24 hr tablet, Take 1 tablet by mouth Daily., Disp: 90 tablet, Rfl: 3  •  predniSONE (DELTASONE) 20 MG tablet, Take 1 tablet by mouth Daily. (Patient taking differently: Take 10 mg by mouth Daily.), Disp: 30 tablet, Rfl: 1  •  propafenone (RYTHMOL) 150 MG tablet, Take 1 tablet by mouth Every 12 (Twelve) Hours., Disp: 60 tablet, Rfl: 11  •  REPATHA SURECLICK 140 MG/ML solution auto-injector, INJECT 1ML UNDER THE SKIN AS DIRECTED EVERY 14 DAYS, Disp: 1 pen, Rfl: 11  •  Semaglutide, 1 MG/DOSE, (OZEMPIC) 2 MG/1.5ML solution pen-injector, Inject 1 mg under the skin into the appropriate area as directed 1 (One) Time Per Week., Disp: 3 pen, Rfl: 3  •  terazosin (HYTRIN) 10 MG capsule, Take 1 capsule by mouth Every 12 (Twelve) Hours., Disp: 60 capsule, Rfl: 11    Objective      Blood pressure 126/78, pulse 84, temperature 97.5 °F (36.4 °C), resp. rate 16, height 193 cm (76\"), weight (!) 139 kg (307 lb), SpO2 98 %.    Physical Exam     General Appearance:    Alert, cooperative, no distress, appears stated age   Head:    Normocephalic, without obvious abnormality, atraumatic   Eyes:    PERRL, conjunctiva/corneas clear, EOM's intact   Ears:    Normal TM's and external ear canals, both ears   Nose:   Nares normal, septum midline, mucosa normal, no drainage   or sinus tenderness   Throat:   Lips, mucosa, and tongue normal; teeth and gums normal   Neck:   Supple, symmetrical, trachea midline, no " adenopathy;        thyroid:  No enlargement/tenderness/nodules; no carotid    bruit or JVD   Back:     Symmetric, no curvature, ROM normal, no CVA tenderness   Lungs:     Clear to auscultation bilaterally, respirations unlabored   Chest wall:    No tenderness or deformity   Heart:    Regular rate and rhythm, S1 and S2 normal, no murmur,        rub or gallop   Abdomen:     Soft, non-tender, bowel sounds active all four quadrants,     no masses, no organomegaly   Extremities:   Extremities normal, atraumatic, no cyanosis, +2 edema   Pulses:   2+ and symmetric all extremities   Skin:   Skin color, texture, turgor normal, no rashes or lesions   Lymph nodes:   Cervical, supraclavicular, and axillary nodes normal   Neurologic:   CNII-XII intact. Normal strength, sensation and reflexes       throughout      Results for orders placed or performed in visit on 11/16/20   Basic metabolic panel    Specimen: Blood   Result Value Ref Range    Glucose 71 65 - 99 mg/dL    BUN 32 (H) 8 - 23 mg/dL    Creatinine 1.59 (H) 0.76 - 1.27 mg/dL    Sodium 137 136 - 145 mmol/L    Potassium 4.0 3.5 - 5.2 mmol/L    Chloride 95 (L) 98 - 107 mmol/L    CO2 29.4 (H) 22.0 - 29.0 mmol/L    Calcium 9.8 8.6 - 10.5 mg/dL    eGFR Non African Amer 44 (L) >60 mL/min/1.73    BUN/Creatinine Ratio 20.1 7.0 - 25.0    Anion Gap 12.6 5.0 - 15.0 mmol/L   BNP    Specimen: Blood   Result Value Ref Range    proBNP 1,001.0 (H) 0.0 - 900.0 pg/mL         Assessment/Plan     Cri stable  chf  Stable  htn stable    Diagnoses and all orders for this visit:    1. PMR (polymyalgia rheumatica) (CMS/HCC) (Primary)    2. Essential hypertension    3. CRI (chronic renal insufficiency), stage 2 (mild)      Return in about 2 weeks (around 12/7/2020).          There are no Patient Instructions on file for this visit.     Keven Bear MD    Assessment/Plan

## 2020-12-02 ENCOUNTER — OFFICE VISIT (OUTPATIENT)
Dept: BARIATRICS/WEIGHT MGMT | Facility: CLINIC | Age: 65
End: 2020-12-02

## 2020-12-02 ENCOUNTER — OFFICE VISIT (OUTPATIENT)
Dept: CARDIOLOGY | Facility: CLINIC | Age: 65
End: 2020-12-02

## 2020-12-02 VITALS
HEART RATE: 65 BPM | BODY MASS INDEX: 37.41 KG/M2 | DIASTOLIC BLOOD PRESSURE: 78 MMHG | SYSTOLIC BLOOD PRESSURE: 148 MMHG | HEIGHT: 76 IN | WEIGHT: 307.2 LBS | OXYGEN SATURATION: 96 % | TEMPERATURE: 97 F

## 2020-12-02 VITALS
HEART RATE: 87 BPM | DIASTOLIC BLOOD PRESSURE: 90 MMHG | WEIGHT: 305 LBS | HEIGHT: 76 IN | SYSTOLIC BLOOD PRESSURE: 160 MMHG | BODY MASS INDEX: 37.14 KG/M2 | TEMPERATURE: 98.4 F | RESPIRATION RATE: 18 BRPM | OXYGEN SATURATION: 98 %

## 2020-12-02 DIAGNOSIS — I48.19 ATRIAL FIBRILLATION, PERSISTENT (HCC): ICD-10-CM

## 2020-12-02 DIAGNOSIS — I25.10 CORONARY ARTERY DISEASE INVOLVING NATIVE CORONARY ARTERY OF NATIVE HEART WITHOUT ANGINA PECTORIS: ICD-10-CM

## 2020-12-02 DIAGNOSIS — E66.01 MORBID OBESITY (HCC): ICD-10-CM

## 2020-12-02 DIAGNOSIS — E78.2 MIXED HYPERLIPIDEMIA: ICD-10-CM

## 2020-12-02 DIAGNOSIS — R10.13 DYSPEPSIA: Primary | ICD-10-CM

## 2020-12-02 DIAGNOSIS — I10 HYPERTENSION, UNSPECIFIED TYPE: Primary | ICD-10-CM

## 2020-12-02 PROCEDURE — 99214 OFFICE O/P EST MOD 30 MIN: CPT | Performed by: PHYSICIAN ASSISTANT

## 2020-12-02 PROCEDURE — 99214 OFFICE O/P EST MOD 30 MIN: CPT | Performed by: INTERNAL MEDICINE

## 2020-12-02 NOTE — PROGRESS NOTES
[facility]  [address]  [address]  [facility] [address] [phone]        Patient Name:  Andre Agosto.  :  1955      Date of Visit: 2020      Reason for Visit:  AGB followup      HPI:  Andre Agosto is a 65 y.o. male s/p LAGB Realize 2008 by MASONO.    LOV 2018.  Band unfilled (1.5cc clear fluid) for chronic episodic dysphagia/vomiting.     Returns today for reevaluation.  Says desperately needing to lose weight.  Struggling w/ uncontrolled HTN and diabetes.  Also having terrible issues w/ PMR/chronic leg pains, currently on steroids, ongoing eval w/  Dr. Baer.      Denies current issues w/ dysphagia/reflux/N/V/AP.  No port pain.  No recent fevers/chills.        UGI 18 @[facility] revealed esophageal dysmotility and dilatation w/ pouch enlargement.      CT ab/pel 20 @[facility] noted lapband in place, multiple gallstones, nonobstructing (B) kidney stones.        Presurgery weight:  350 lbs.  Lowest weight achieved:  260 lbs.  Current weight:  305 lbs.       Past Medical History:   Diagnosis Date   • 6th nerve palsy, right     right eye    • Anxiety and depression    • Atrial fibrillation, persistent (CMS/HCC) 2020    on Xarelto   • Coronary artery disease involving native coronary artery of native heart without angina pectoris 2018    follows w/ Dr. Johnson, on ASA 81mg   • Diabetes mellitus (CMS/HCC)     doesnt check sugar, type 2    • Double vision    • Dyspepsia    • Epilepsy (CMS/HCC)    • Focal seizure (CMS/HCC)     of right arm    • Heart attack (CMS/HCC)     NSTEMI , s/p stenting   • History of Helicobacter pylori infection     in , treated w/ PrevPak   • HL (hearing loss)     (L) ear - child luevano measles   • Hyperlipidemia    • Hypertension    • Kidney stone    • Memory loss     d/t meningitis   • Meningitis     unsure of bacterial or viral    • Obesity    • Seizures (CMS/HCC)    • Sleep apnea treated with nocturnal  BiPAP     compliant with  machine    • Stroke (CMS/HCC)     2017/2018   • Wears glasses      Past Surgical History:   Procedure Laterality Date   • CARDIAC CATHETERIZATION N/A 10/12/2018    Procedure: Left Heart Cath;  Surgeon: Yoselin Johnson MD;  [facility];  Service: Cardiology   • COLONOSCOPY  10/2020    w/ Dr. Jacobs, kelsi colon polyps   • CORONARY STENT PLACEMENT  2015   • LAPAROSCOPIC GASTRIC BANDING  2008    s/p LAGB Realize 6/2008 by MASONO.   • UMBILICAL HERNIA REPAIR  2008    incarcerated UHR w/ mesh by Dr. Velasquez       Allergies   Allergen Reactions   • Amlodipine Swelling   • Statins Myalgia       Outpatient Medications Marked as Taking for the 12/2/20 encounter (Office Visit) with Leanne Emerson PA   Medication Sig Dispense Refill   • allopurinol (Zyloprim) 100 MG tablet Take 1 tablet by mouth Daily. (Patient taking differently: Take 100 mg by mouth As Needed.) 90 tablet 3   • aspirin 81 MG tablet Take 81 mg by mouth 2 (Two) Times a Day. 30 tablet 11   • carvedilol (COREG) 6.25 MG tablet Take 1 tablet by mouth 2 (Two) Times a Day With Meals. 60 tablet 0   • divalproex (DEPAKOTE) 250 MG DR tablet Take 1 tablet by mouth 3 (Three) Times a Day. Take 2 in the AM 1 at noon and 2 in the pm. Per DR Clifford. 150 tablet 5   • escitalopram (LEXAPRO) 20 MG tablet Take 1 tablet by mouth Daily. 90 tablet 1   • furosemide (LASIX) 40 MG tablet Take 1 tablet by mouth 2 (Two) Times a Day. (Patient taking differently: Take 40 mg by mouth Every Other Day. Take 1.5 tablets by mouth every other day) 180 tablet 3   • hydrALAZINE (APRESOLINE) 100 MG tablet Take 100 mg by mouth 2 (Two) Times a Day.     • HYDROcodone-acetaminophen (NORCO) 5-325 MG per tablet TK 1 T PO Q 6 H PRN     • levothyroxine (SYNTHROID, LEVOTHROID) 88 MCG tablet Take 1 tablet by mouth Daily. 90 tablet 3   • lovastatin (MEVACOR) 40 MG tablet Take 1 tablet by mouth Daily With Dinner. 90 tablet 1   • methyldopa (ALDOMET) 500  "MG tablet Take 1 tablet by mouth Every 12 (Twelve) Hours. 180 tablet 2   • NIFEdipine XL (ADALAT CC) 90 MG 24 hr tablet Take 1 tablet by mouth Daily. 90 tablet 3   • predniSONE (DELTASONE) 20 MG tablet Take 1 tablet by mouth Daily. (Patient taking differently: Take 10 mg by mouth Daily.) 30 tablet 1   • propafenone (RYTHMOL) 150 MG tablet Take 1 tablet by mouth Every 12 (Twelve) Hours. 60 tablet 11   • REPATHA SURECLICK 140 MG/ML solution auto-injector INJECT 1ML UNDER THE SKIN AS DIRECTED EVERY 14 DAYS 1 pen 11   • rivaroxaban (XARELTO) 10 MG tablet Take 10 mg by mouth Daily.     • Semaglutide, 1 MG/DOSE, (OZEMPIC) 2 MG/1.5ML solution pen-injector Inject 1 mg under the skin into the appropriate area as directed 1 (One) Time Per Week. 3 pen 3   • terazosin (HYTRIN) 10 MG capsule Take 1 capsule by mouth Every 12 (Twelve) Hours. 60 capsule 11       Social History     Socioeconomic History   • Marital status:      Spouse name: Not on file   • Number of children: Not on file   • Years of education: Not on file   • Highest education level: Not on file   Tobacco Use   • Smoking status: Former Smoker     Packs/day: 0.50     Years: 10.00     Pack years: 5.00     Types: Cigarettes     Start date: 1975     Quit date: 1985     Years since quittin.9   • Smokeless tobacco: Never Used   Substance and Sexual Activity   • Alcohol use: No   • Drug use: No   • Sexual activity: Defer   Social History Narrative    .  Lives in [place].  Retired.           /90 (BP Location: Left arm, Patient Position: Sitting, Cuff Size: Adult)   Pulse 87   Temp 98.4 °F (36.9 °C) (Temporal)   Resp 18   Ht 193 cm (76\")   Wt (!) 138 kg (305 lb)   SpO2 98%   BMI 37.13 kg/m²     Physical Exam   Constitutional: He appears well-developed.   wearing a mask   Cardiovascular: Normal rate and regular rhythm.   Pulmonary/Chest: Effort normal.   Abdominal: Soft. There is no abdominal tenderness. No hernia.   LLQ port - site " unremarkable   Musculoskeletal: Normal range of motion.   Neurological: He is alert.   Skin: Skin is warm and dry.       Band Adjustment Info   Band Type:   Realize   Port Location:   LLQ   Procedure Position:   upright   Needle Type:   short    Local Anesthesia: with 1% lidocaine and bicarb       Amount Expected (cc):    0.0   Amount Added (cc):    Amount Removed (cc):      Total Amount (cc):    0.0       Assessment: s/p LAGB Realize 6/2008 by JSO contemplating revision.    ICD-10-CM ICD-9-CM   1. Dyspepsia  R10.13 536.8   2. Morbid obesity (CMS/[facility])  E66.01 278.01     Patient's Body mass index is 37.13 kg/m². BMI is above normal parameters. Recommendations include: lapband evaluation, revision consideration.      Plan:  UGI to further evaluate.  Additional input to follow.       JANETH Hawk

## 2020-12-02 NOTE — PROGRESS NOTES
[facility] Cardiology    Patient ID: Andre Agosto is a 65 y.o. male.  : 1955   Contact: [phone]    Encounter date: 2020    PCP: Keven Bear MD      Chief complaint:   Chief Complaint   Patient presents with   • Coronary Artery Disease   • Hypertension   • leg aching     Problem List:  1. Coronary artery disease:  a. Stress echo, Alexandra, 2014: Borderline abnormal, but low probability for significant focal obstructive CAD.  b. NSTEMI, 2015 (Reji): PCI of OM branch and circumflex (3.5 x 20 Synchrony). EF 50%.    c. LHC (emergent re-look), 2015: Patent stent to circumflex; LAD with 50-60% prox-mid stenosis.  d. Echo, 2015: EF 55-60%, no significant valve disease.  e. Cardiolite, 2016: No evidence of ischemia.  f. Nuclear stress, 2018: EF 40%. Exercise duration of 8:30, as expected. Stopped due to fatigue and SOA. THR of 134 achieved at 7:35. Hypertensive response to exercise. Occasional PVC's and aberrancy. No stress induced perfusion defect. Mild fixed mid and distal anteroapical hypoperfusion worse in the rest images.   g. LHC, 10/12/2018: Two-vessel CAD with multiple borderline lesions within the LAD and an occluded mid RCA.  h. Echo, 10/12/2018: EF 55%. Moderate dilation of the aortic root. Moderate dilation of the sinuses of Valsalva. Mild AS and AI. Aortic valve mean pressure gradient 10 mmHg.  i. Echocardiogram, 2020: EF: 55%, Left ventricular wall thickness is consistent with mild concentric hypertrophy. The left ventricular cavity is mildly dilated. Mild AR and mild aortic valve stenosis. Aortic valve mean pressure gradient is 11.5 mmHg.  1. Paroxsymal atrial fibrillation  a. Incidentally noted on EKG 2020   b. CHADSVASC = 3, started on Eliquis 5 mg twice daily, 2020  c. S/p SELWYN/ECV, 2020.   d. SELWYN,  2020: EF: 45%, Left ventricular systolic function is mildly decreased. Atrial fibrillation is noted  throughout the study. Trace MR, mild TR.   2. Hypertension:  a. Renal artery duplex 01/28/2014: No evidence of renal artery stenosis.  b. Renal artery duplex 07/27/2018: No evidence of renal artery stenosis.  3. Hyperlipidemia.  4. Lower extremity edema  a. Duplex scan, 08/12/2020: No evidence of superficial venous thrombosis in the right lower extremity.  5. Kidney stones  a. US Renal Bilateral, 09/10/2020: Findings consistent with renal stones with no evidence of hydronephrosis.   6. CVA 6/2016:  a. Carotid duplex 12/28/15: <50% stenosis bilaterally.  b. 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  7. ALEX, 2010:  a. Sleep study, 08/14/2018: Normal (?); wears bi-pap.  8. Seizures; partial focal seizure:  a. 2005: meningitis and encephalitis.    Allergies   Allergen Reactions   • Amlodipine Swelling   • Statins Myalgia       Current Medications:    Current Outpatient Medications:   •  allopurinol (Zyloprim) 100 MG tablet, Take 1 tablet by mouth Daily. (Patient taking differently: Take 100 mg by mouth As Needed.), Disp: 90 tablet, Rfl: 3  •  aspirin 81 MG tablet, Take 81 mg by mouth 2 (Two) Times a Day., Disp: 30 tablet, Rfl: 11  •  carvedilol (COREG) 6.25 MG tablet, Take 1 tablet by mouth 2 (Two) Times a Day With Meals., Disp: 60 tablet, Rfl: 0  •  divalproex (DEPAKOTE) 250 MG DR tablet, Take 1 tablet by mouth 3 (Three) Times a Day. Take 2 in the AM 1 at noon and 2 in the pm. Per DR Clifford., Disp: 150 tablet, Rfl: 5  •  escitalopram (LEXAPRO) 20 MG tablet, Take 1 tablet by mouth Daily., Disp: 90 tablet, Rfl: 1  •  furosemide (LASIX) 40 MG tablet, Take 1 tablet by mouth 2 (Two) Times a Day. (Patient taking differently: Take 40 mg by mouth Every Other Day. Take 1.5 tablets by mouth every other day), Disp: 180 tablet, Rfl: 3  •  hydrALAZINE (APRESOLINE) 100 MG tablet, Take 100 mg by mouth 2 (Two) Times a Day., Disp: , Rfl:   •  HYDROcodone-acetaminophen (NORCO) 5-325 MG per tablet, TK 1 T PO Q 6 H PRN,  Disp: , Rfl:   •  levothyroxine (SYNTHROID, LEVOTHROID) 88 MCG tablet, Take 1 tablet by mouth Daily., Disp: 90 tablet, Rfl: 3  •  lovastatin (MEVACOR) 40 MG tablet, Take 1 tablet by mouth Daily With Dinner., Disp: 90 tablet, Rfl: 1  •  methyldopa (ALDOMET) 500 MG tablet, Take 1 tablet by mouth Every 12 (Twelve) Hours., Disp: 180 tablet, Rfl: 2  •  NIFEdipine XL (ADALAT CC) 90 MG 24 hr tablet, Take 1 tablet by mouth Daily., Disp: 90 tablet, Rfl: 3  •  predniSONE (DELTASONE) 20 MG tablet, Take 1 tablet by mouth Daily. (Patient taking differently: Take 10 mg by mouth Daily.), Disp: 30 tablet, Rfl: 1  •  propafenone (RYTHMOL) 150 MG tablet, Take 1 tablet by mouth Every 12 (Twelve) Hours., Disp: 60 tablet, Rfl: 11  •  REPATHA SURECLICK 140 MG/ML solution auto-injector, INJECT 1ML UNDER THE SKIN AS DIRECTED EVERY 14 DAYS, Disp: 1 pen, Rfl: 11  •  Semaglutide, 1 MG/DOSE, (OZEMPIC) 2 MG/1.5ML solution pen-injector, Inject 1 mg under the skin into the appropriate area as directed 1 (One) Time Per Week., Disp: 3 pen, Rfl: 3  •  terazosin (HYTRIN) 10 MG capsule, Take 1 capsule by mouth Every 12 (Twelve) Hours., Disp: 60 capsule, Rfl: 11    HPI    Andre Agosto is a 65 y.o. male who presents today for a one month follow up hypertension. Since last visit, patient has done well overall. His BP remains elevated in the evening but he has only been taking the hydralazine bid. He has been taking 500 mg of methyldopa instead of 250 mg. His BP in the evenings have been 190's/100-110's. He has constant substernal chest discomfort. It is mostly constant. Improves with exertion. Not associated with SOB, diaphoresis. He has scant amount of hematuria. Appears to be maintaining NSR. He continues to gain about 6 lbs a day which resolves with Lasix 60 mg daily. No PND, orthopnea. No other complaints.        The following portions of the patient's history were reviewed and updated as appropriate: allergies, current medications and problem  "list.    Pertinent positives as listed in the HPI.  All other systems reviewed are negative.         Vitals:    12/02/20 0943   BP: 148/78   BP Location: Right arm   Patient Position: Sitting   Pulse: 65   Temp: 97 °F (36.1 °C)   SpO2: 96%   Weight: (!) 139 kg (307 lb 3.2 oz)   Height: 193 cm (76\")       Physical Exam:  General: Alert and oriented.  Neck: Jugular venous pressure is within normal limits. Carotids have normal upstrokes without bruits.   Cardiovascular: Heart has a nondisplaced focal PMI. Regular rate and rhythm. No murmur, gallop or rub.  Lungs: Clear, no rales or wheezes. Equal expansion is noted.   Extremities: Show no edema.  Skin: Warm and dry.  Neurologic: Nonfocal.     Diagnostic Data (reviewed with patient):  Lab Results   Component Value Date    GLUCOSE 71 11/16/2020    BUN 32 (H) 11/16/2020    CREATININE 1.59 (H) 11/16/2020    EGFRIFNONA 44 (L) 11/16/2020    BCR 20.1 11/16/2020     11/16/2020    K 4.0 11/16/2020    CL 95 (L) 11/16/2020    CO2 29.4 (H) 11/16/2020    CALCIUM 9.8 11/16/2020    ALBUMIN 3.70 08/15/2020    ALKPHOS 43 08/15/2020    AST 15 08/15/2020    ALT 14 08/15/2020     Lab Results   Component Value Date    CHOL 115 11/09/2020    TRIG 140 11/09/2020    HDL 35 (L) 11/09/2020    LDL 55 11/09/2020      Lab Results   Component Value Date    WBC 9.30 10/09/2020    RBC 4.55 10/09/2020    HGB 14.5 10/09/2020    HCT 42.2 10/09/2020    MCV 92.7 10/09/2020     10/09/2020      Lab Results   Component Value Date    TSH 0.942 11/12/2020        Procedures      Assessment:    ICD-10-CM ICD-9-CM   1. Hypertension, unspecified type  I10 401.9   2. Coronary artery disease involving native coronary artery of native heart without angina pectoris  I25.10 414.01   3. Mixed hyperlipidemia  E78.2 272.2   4. Atrial fibrillation, persistent (CMS/HCC)  I48.19 427.31         Plan:  1. Continue methyldopa 500 mg bid, Terazosin 10 mg bid, nifedipine 90 mg, coreg 6.25 mg bid for HTN  2. Continue " hydralazine 100 mg bid, start taking 50 mg in the afternoon. Call us in one week if BP is still running high.   3. Reassured patient that he does not need ischemic workup based on chest discomfort that improves with exertion.   4. Continue ASA, statin for CAD  5. Reviewed FLP from 11/2020- acceptable. Continue statin for HLD.   6. Continue Xarelto and Coreg for Afib. If he has recurrent symptomatic Afib- would consider starting AAD.   7. Continue all other current medications.  8. F/up in 4 months, sooner if needed.      Electronically signed by YESY Malhotra, 12/02/20, 10:12 AM EST.

## 2020-12-07 ENCOUNTER — LAB (OUTPATIENT)
Dept: LAB | Facility: HOSPITAL | Age: 65
End: 2020-12-07

## 2020-12-07 ENCOUNTER — OFFICE VISIT (OUTPATIENT)
Dept: INTERNAL MEDICINE | Facility: CLINIC | Age: 65
End: 2020-12-07

## 2020-12-07 VITALS
BODY MASS INDEX: 38.24 KG/M2 | DIASTOLIC BLOOD PRESSURE: 96 MMHG | HEIGHT: 76 IN | SYSTOLIC BLOOD PRESSURE: 168 MMHG | WEIGHT: 314 LBS | TEMPERATURE: 97.5 F | OXYGEN SATURATION: 98 % | HEART RATE: 82 BPM | RESPIRATION RATE: 17 BRPM

## 2020-12-07 DIAGNOSIS — N18.2 CHRONIC RENAL IMPAIRMENT, STAGE 2 (MILD): ICD-10-CM

## 2020-12-07 DIAGNOSIS — I10 ESSENTIAL HYPERTENSION: Primary | ICD-10-CM

## 2020-12-07 DIAGNOSIS — I11.0 HYPERTENSIVE HEART DISEASE WITH HEART FAILURE (HCC): ICD-10-CM

## 2020-12-07 DIAGNOSIS — M35.3 PMR (POLYMYALGIA RHEUMATICA) (HCC): ICD-10-CM

## 2020-12-07 LAB
ANION GAP SERPL CALCULATED.3IONS-SCNC: 13.7 MMOL/L (ref 5–15)
BUN SERPL-MCNC: 17 MG/DL (ref 8–23)
BUN/CREAT SERPL: 12.3 (ref 7–25)
CALCIUM SPEC-SCNC: 9 MG/DL (ref 8.6–10.5)
CHLORIDE SERPL-SCNC: 102 MMOL/L (ref 98–107)
CO2 SERPL-SCNC: 26.3 MMOL/L (ref 22–29)
CREAT SERPL-MCNC: 1.38 MG/DL (ref 0.76–1.27)
GFR SERPL CREATININE-BSD FRML MDRD: 52 ML/MIN/1.73
GLUCOSE SERPL-MCNC: 184 MG/DL (ref 65–99)
NT-PROBNP SERPL-MCNC: 1037 PG/ML (ref 0–900)
POTASSIUM SERPL-SCNC: 3.9 MMOL/L (ref 3.5–5.2)
SODIUM SERPL-SCNC: 142 MMOL/L (ref 136–145)

## 2020-12-07 PROCEDURE — 36415 COLL VENOUS BLD VENIPUNCTURE: CPT | Performed by: INTERNAL MEDICINE

## 2020-12-07 PROCEDURE — 83880 ASSAY OF NATRIURETIC PEPTIDE: CPT | Performed by: INTERNAL MEDICINE

## 2020-12-07 PROCEDURE — 80048 BASIC METABOLIC PNL TOTAL CA: CPT | Performed by: INTERNAL MEDICINE

## 2020-12-07 PROCEDURE — 99214 OFFICE O/P EST MOD 30 MIN: CPT | Performed by: INTERNAL MEDICINE

## 2020-12-07 NOTE — PROGRESS NOTES
Subjective     Patient ID: Andre Agosto is a 65 y.o. male. Patient is here for management of multiple medical problems.     Chief Complaint   Patient presents with   • Hypertension   • Chronic Kidney Disease     History of Present Illness     HTN  ckd  Feels well.                   The following portions of the patient's history were reviewed and updated as appropriate: allergies, current medications, past family history, past medical history, past social history, past surgical history and problem list.    Review of Systems   Constitutional: Negative for diaphoresis and fatigue.   Respiratory: Negative for shortness of breath and stridor.    Psychiatric/Behavioral: Negative for hallucinations, self-injury and sleep disturbance. The patient is not nervous/anxious and is not hyperactive.    All other systems reviewed and are negative.      Current Outpatient Medications:   •  allopurinol (Zyloprim) 100 MG tablet, Take 1 tablet by mouth Daily. (Patient taking differently: Take 100 mg by mouth As Needed.), Disp: 90 tablet, Rfl: 3  •  aspirin 81 MG tablet, Take 81 mg by mouth 2 (Two) Times a Day., Disp: 30 tablet, Rfl: 11  •  carvedilol (COREG) 6.25 MG tablet, Take 1 tablet by mouth 2 (Two) Times a Day With Meals., Disp: 60 tablet, Rfl: 0  •  divalproex (DEPAKOTE) 250 MG DR tablet, Take 1 tablet by mouth 3 (Three) Times a Day. Take 2 in the AM 1 at noon and 2 in the pm. Per DR Clifford., Disp: 150 tablet, Rfl: 5  •  escitalopram (LEXAPRO) 20 MG tablet, Take 1 tablet by mouth Daily., Disp: 90 tablet, Rfl: 1  •  furosemide (LASIX) 40 MG tablet, Take 1 tablet by mouth 2 (Two) Times a Day. (Patient taking differently: Take 40 mg by mouth Every Other Day. Take 1.5 tablets by mouth every other day), Disp: 180 tablet, Rfl: 3  •  hydrALAZINE (APRESOLINE) 100 MG tablet, Take 100 mg by mouth 2 (Two) Times a Day., Disp: , Rfl:   •  HYDROcodone-acetaminophen (NORCO) 5-325 MG per tablet, TK 1 T PO Q 6 H PRN, Disp: , Rfl:   •  Iron,  "Ferrous Sulfate, 325 (65 Fe) MG tablet, ferrous sulfate 325 mg (65 mg iron) tablet, Disp: , Rfl:   •  levothyroxine (SYNTHROID, LEVOTHROID) 88 MCG tablet, Take 1 tablet by mouth Daily., Disp: 90 tablet, Rfl: 3  •  lovastatin (MEVACOR) 40 MG tablet, Take 1 tablet by mouth Daily With Dinner., Disp: 90 tablet, Rfl: 1  •  methyldopa (ALDOMET) 500 MG tablet, Take 1 tablet by mouth Every 12 (Twelve) Hours., Disp: 180 tablet, Rfl: 2  •  NIFEdipine XL (ADALAT CC) 90 MG 24 hr tablet, Take 1 tablet by mouth Daily., Disp: 90 tablet, Rfl: 3  •  predniSONE (DELTASONE) 20 MG tablet, Take 1 tablet by mouth Daily. (Patient taking differently: Take 10 mg by mouth Daily.), Disp: 30 tablet, Rfl: 1  •  propafenone (RYTHMOL) 150 MG tablet, Take 1 tablet by mouth Every 12 (Twelve) Hours., Disp: 60 tablet, Rfl: 11  •  REPATHA SURECLICK 140 MG/ML solution auto-injector, INJECT 1ML UNDER THE SKIN AS DIRECTED EVERY 14 DAYS, Disp: 1 pen, Rfl: 11  •  rivaroxaban (XARELTO) 10 MG tablet, Take 10 mg by mouth Daily., Disp: , Rfl:   •  Semaglutide, 1 MG/DOSE, (OZEMPIC) 2 MG/1.5ML solution pen-injector, Inject 1 mg under the skin into the appropriate area as directed 1 (One) Time Per Week., Disp: 3 pen, Rfl: 3  •  terazosin (HYTRIN) 10 MG capsule, Take 1 capsule by mouth Every 12 (Twelve) Hours., Disp: 60 capsule, Rfl: 11    Objective      Blood pressure 168/96, pulse 82, temperature 97.5 °F (36.4 °C), resp. rate 17, height 193 cm (76\"), weight (!) 142 kg (314 lb), SpO2 98 %.    Physical Exam     General Appearance:    Alert, cooperative, no distress, appears stated age   Head:    Normocephalic, without obvious abnormality, atraumatic   Eyes:    PERRL, conjunctiva/corneas clear, EOM's intact   Ears:    Normal TM's and external ear canals, both ears   Nose:   Nares normal, septum midline, mucosa normal, no drainage   or sinus tenderness   Throat:   Lips, mucosa, and tongue normal; teeth and gums normal   Neck:   Supple, symmetrical, trachea midline, " no adenopathy;        thyroid:  No enlargement/tenderness/nodules; no carotid    bruit or JVD   Back:     Symmetric, no curvature, ROM normal, no CVA tenderness   Lungs:     Clear to auscultation bilaterally, respirations unlabored   Chest wall:    No tenderness or deformity   Heart:    Regular rate and rhythm, S1 and S2 normal, no murmur,        rub or gallop   Abdomen:     Soft, non-tender, bowel sounds active all four quadrants,     no masses, no organomegaly   Extremities:   Extremities normal, atraumatic, no cyanosis or edema   Pulses:   2+ and symmetric all extremities   Skin:   Skin color, texture, turgor normal, no rashes or lesions   Lymph nodes:   Cervical, supraclavicular, and axillary nodes normal   Neurologic:   CNII-XII intact. Normal strength, sensation and reflexes       throughout      Results for orders placed or performed in visit on 11/18/20   BNP    Specimen: Blood   Result Value Ref Range    proBNP 1,037.0 (H) 0.0 - 900.0 pg/mL   Basic metabolic panel    Specimen: Blood   Result Value Ref Range    Glucose 184 (H) 65 - 99 mg/dL    BUN 17 8 - 23 mg/dL    Creatinine 1.38 (H) 0.76 - 1.27 mg/dL    Sodium 142 136 - 145 mmol/L    Potassium 3.9 3.5 - 5.2 mmol/L    Chloride 102 98 - 107 mmol/L    CO2 26.3 22.0 - 29.0 mmol/L    Calcium 9.0 8.6 - 10.5 mg/dL    eGFR Non African Amer 52 (L) >60 mL/min/1.73    BUN/Creatinine Ratio 12.3 7.0 - 25.0    Anion Gap 13.7 5.0 - 15.0 mmol/L         Assessment/Plan   Not doing well with diet control.   Wt up.     Increase water retension.  No diuretic x 3 days.  Will restart diuretic x 2 days and then change to qod.    Steroids at 10 mg  A day and pain is stable              Diagnoses and all orders for this visit:    1. Essential hypertension (Primary)  -     Basic metabolic panel  -     BNP    2. Chronic renal impairment, stage 2 (mild)  -     Basic metabolic panel  -     BNP    3. PMR (polymyalgia rheumatica) (CMS/HCC)  -     Basic metabolic panel  -     BNP    4.  Hypertensive heart disease with heart failure (CMS/HCC)   -     BNP      Return in about 2 weeks (around 12/21/2020).          There are no Patient Instructions on file for this visit.     Keven Bear MD    Assessment/Plan

## 2020-12-15 ENCOUNTER — PATIENT MESSAGE (OUTPATIENT)
Dept: CARDIOLOGY | Facility: CLINIC | Age: 65
End: 2020-12-15

## 2020-12-15 ENCOUNTER — LAB (OUTPATIENT)
Dept: LAB | Facility: HOSPITAL | Age: 65
End: 2020-12-15

## 2020-12-15 ENCOUNTER — TRANSCRIBE ORDERS (OUTPATIENT)
Dept: LAB | Facility: HOSPITAL | Age: 65
End: 2020-12-15

## 2020-12-15 DIAGNOSIS — Z01.818 PRE-OP TESTING: Primary | ICD-10-CM

## 2020-12-15 DIAGNOSIS — Z01.818 PRE-OP TESTING: ICD-10-CM

## 2020-12-15 PROCEDURE — U0004 COV-19 TEST NON-CDC HGH THRU: HCPCS

## 2020-12-15 PROCEDURE — C9803 HOPD COVID-19 SPEC COLLECT: HCPCS

## 2020-12-16 LAB — SARS-COV-2 RNA RESP QL NAA+PROBE: NOT DETECTED

## 2020-12-17 ENCOUNTER — HOSPITAL ENCOUNTER (OUTPATIENT)
Dept: GENERAL RADIOLOGY | Facility: HOSPITAL | Age: 65
Discharge: HOME | End: 2020-12-17

## 2020-12-17 ENCOUNTER — LAB (OUTPATIENT)
Dept: LAB | Facility: HOSPITAL | Age: 65
End: 2020-12-17

## 2020-12-17 DIAGNOSIS — R10.13 DYSPEPSIA: ICD-10-CM

## 2020-12-17 LAB
ANION GAP SERPL CALCULATED.3IONS-SCNC: 13 MMOL/L (ref 5–15)
BUN SERPL-MCNC: 19 MG/DL (ref 8–23)
BUN/CREAT SERPL: 13.8 (ref 7–25)
CALCIUM SPEC-SCNC: 9.2 MG/DL (ref 8.6–10.5)
CHLORIDE SERPL-SCNC: 101 MMOL/L (ref 98–107)
CO2 SERPL-SCNC: 25 MMOL/L (ref 22–29)
CREAT SERPL-MCNC: 1.38 MG/DL (ref 0.76–1.27)
GFR SERPL CREATININE-BSD FRML MDRD: 52 ML/MIN/1.73
GLUCOSE SERPL-MCNC: 124 MG/DL (ref 65–99)
NT-PROBNP SERPL-MCNC: 739.7 PG/ML (ref 0–900)
POTASSIUM SERPL-SCNC: 3.7 MMOL/L (ref 3.5–5.2)
SODIUM SERPL-SCNC: 139 MMOL/L (ref 136–145)

## 2020-12-17 PROCEDURE — 83880 ASSAY OF NATRIURETIC PEPTIDE: CPT | Performed by: INTERNAL MEDICINE

## 2020-12-17 PROCEDURE — 80048 BASIC METABOLIC PNL TOTAL CA: CPT | Performed by: INTERNAL MEDICINE

## 2020-12-17 PROCEDURE — 74240 X-RAY XM UPR GI TRC 1CNTRST: CPT

## 2020-12-17 PROCEDURE — 36415 COLL VENOUS BLD VENIPUNCTURE: CPT | Performed by: INTERNAL MEDICINE

## 2020-12-18 ENCOUNTER — OFFICE VISIT (OUTPATIENT)
Dept: INTERNAL MEDICINE | Facility: CLINIC | Age: 65
End: 2020-12-18

## 2020-12-18 VITALS
HEART RATE: 76 BPM | OXYGEN SATURATION: 97 % | SYSTOLIC BLOOD PRESSURE: 138 MMHG | TEMPERATURE: 97.8 F | BODY MASS INDEX: 37.99 KG/M2 | WEIGHT: 312 LBS | HEIGHT: 76 IN | RESPIRATION RATE: 16 BRPM | DIASTOLIC BLOOD PRESSURE: 90 MMHG

## 2020-12-18 DIAGNOSIS — I50.21 ACUTE SYSTOLIC CHF (CONGESTIVE HEART FAILURE) (HCC): Primary | ICD-10-CM

## 2020-12-18 DIAGNOSIS — Z86.73 HISTORY OF ISCHEMIC RIGHT MCA STROKE: ICD-10-CM

## 2020-12-18 DIAGNOSIS — I10 ESSENTIAL HYPERTENSION: ICD-10-CM

## 2020-12-18 DIAGNOSIS — N18.2 CRI (CHRONIC RENAL INSUFFICIENCY), STAGE 2 (MILD): ICD-10-CM

## 2020-12-18 DIAGNOSIS — I25.10 CORONARY ARTERY DISEASE INVOLVING NATIVE CORONARY ARTERY OF NATIVE HEART WITHOUT ANGINA PECTORIS: ICD-10-CM

## 2020-12-18 DIAGNOSIS — G45.9 TIA (TRANSIENT ISCHEMIC ATTACK): ICD-10-CM

## 2020-12-18 DIAGNOSIS — E03.9 ACQUIRED HYPOTHYROIDISM: ICD-10-CM

## 2020-12-18 DIAGNOSIS — R79.9 ABNORMAL FINDING OF BLOOD CHEMISTRY, UNSPECIFIED: ICD-10-CM

## 2020-12-18 PROCEDURE — 99214 OFFICE O/P EST MOD 30 MIN: CPT | Performed by: INTERNAL MEDICINE

## 2020-12-18 RX ORDER — LOVASTATIN 40 MG/1
40 TABLET ORAL
Qty: 90 TABLET | Refills: 3 | Status: SHIPPED | OUTPATIENT
Start: 2020-12-18 | End: 2021-07-23 | Stop reason: SDUPTHER

## 2020-12-18 RX ORDER — ESCITALOPRAM OXALATE 20 MG/1
20 TABLET ORAL DAILY
Qty: 90 TABLET | Refills: 3 | Status: SHIPPED | OUTPATIENT
Start: 2020-12-18 | End: 2021-04-13 | Stop reason: SDUPTHER

## 2020-12-18 RX ORDER — HYDRALAZINE HYDROCHLORIDE 100 MG/1
100 TABLET, FILM COATED ORAL 3 TIMES DAILY
Qty: 270 TABLET | Refills: 3 | Status: SHIPPED | OUTPATIENT
Start: 2020-12-18 | End: 2021-03-17

## 2020-12-18 RX ORDER — DIVALPROEX SODIUM 250 MG/1
250 TABLET, DELAYED RELEASE ORAL 3 TIMES DAILY
Qty: 270 TABLET | Refills: 3 | Status: SHIPPED | OUTPATIENT
Start: 2020-12-18 | End: 2021-04-13 | Stop reason: SDUPTHER

## 2020-12-18 RX ORDER — LEVOTHYROXINE SODIUM 88 UG/1
88 TABLET ORAL DAILY
Qty: 90 TABLET | Refills: 3 | Status: SHIPPED | OUTPATIENT
Start: 2020-12-18 | End: 2021-04-13 | Stop reason: SDUPTHER

## 2020-12-18 RX ORDER — EVOLOCUMAB 140 MG/ML
140 INJECTION, SOLUTION SUBCUTANEOUS
Qty: 6 PEN | Refills: 3 | Status: SHIPPED | OUTPATIENT
Start: 2020-12-18 | End: 2022-01-11

## 2020-12-18 RX ORDER — METHYLDOPA 500 MG/1
500 TABLET, FILM COATED ORAL EVERY 12 HOURS SCHEDULED
Qty: 180 TABLET | Refills: 3 | Status: SHIPPED | OUTPATIENT
Start: 2020-12-18 | End: 2021-03-17

## 2020-12-18 RX ORDER — PREDNISONE 20 MG/1
20 TABLET ORAL DAILY
Qty: 90 TABLET | Refills: 3 | Status: SHIPPED | OUTPATIENT
Start: 2020-12-18 | End: 2021-03-17

## 2020-12-18 RX ORDER — MINOXIDIL 10 MG/1
TABLET ORAL
COMMUNITY
Start: 2020-12-07 | End: 2021-02-19

## 2020-12-18 RX ORDER — PROPAFENONE HYDROCHLORIDE 150 MG/1
150 TABLET, COATED ORAL EVERY 12 HOURS SCHEDULED
Qty: 90 TABLET | Refills: 3 | Status: SHIPPED | OUTPATIENT
Start: 2020-12-18 | End: 2021-08-02

## 2020-12-18 RX ORDER — NIFEDIPINE 90 MG/1
90 TABLET, FILM COATED, EXTENDED RELEASE ORAL DAILY
Qty: 90 TABLET | Refills: 3 | Status: SHIPPED | OUTPATIENT
Start: 2020-12-18 | End: 2021-08-02

## 2020-12-18 RX ORDER — TERAZOSIN 10 MG/1
10 CAPSULE ORAL EVERY 12 HOURS SCHEDULED
Qty: 90 CAPSULE | Refills: 3 | Status: SHIPPED | OUTPATIENT
Start: 2020-12-18 | End: 2021-08-02

## 2020-12-18 RX ORDER — CARVEDILOL 6.25 MG/1
6.25 TABLET ORAL 2 TIMES DAILY WITH MEALS
Qty: 180 TABLET | Refills: 3 | Status: SHIPPED | OUTPATIENT
Start: 2020-12-18 | End: 2021-02-16

## 2020-12-18 RX ORDER — ALLOPURINOL 100 MG/1
100 TABLET ORAL DAILY
Qty: 90 TABLET | Refills: 3 | Status: SHIPPED | OUTPATIENT
Start: 2020-12-18 | End: 2021-08-02

## 2020-12-18 RX ORDER — FUROSEMIDE 40 MG/1
40 TABLET ORAL 2 TIMES DAILY
Qty: 180 TABLET | Refills: 3 | Status: SHIPPED | OUTPATIENT
Start: 2020-12-18 | End: 2021-04-13 | Stop reason: SDUPTHER

## 2020-12-18 NOTE — PROGRESS NOTES
Subjective     Patient ID: Andre Agosto is a 65 y.o. male. Patient is here for management of multiple medical problems.     Chief Complaint   Patient presents with   • Hypertension     History of Present Illness     carpale tunnel.  Losing  and dropping things.  durration 2 months.    Chf. Stable   Tolerating meds well          The following portions of the patient's history were reviewed and updated as appropriate: allergies, current medications, past family history, past medical history, past social history, past surgical history and problem list.    Review of Systems   Constitutional: Negative for diaphoresis and fatigue.   HENT: Negative for drooling and ear discharge.    Gastrointestinal: Negative for abdominal pain, anal bleeding and blood in stool.   Musculoskeletal: Positive for myalgias. Negative for back pain, gait problem and joint swelling.   Psychiatric/Behavioral: Negative for self-injury and sleep disturbance. The patient is not nervous/anxious.    All other systems reviewed and are negative.      Current Outpatient Medications:   •  allopurinol (Zyloprim) 100 MG tablet, Take 1 tablet by mouth Daily., Disp: 90 tablet, Rfl: 3  •  aspirin 81 MG tablet, Take 81 mg by mouth 2 (Two) Times a Day., Disp: 30 tablet, Rfl: 11  •  carvedilol (COREG) 6.25 MG tablet, Take 1 tablet by mouth 2 (Two) Times a Day With Meals., Disp: 180 tablet, Rfl: 3  •  divalproex (DEPAKOTE) 250 MG DR tablet, Take 1 tablet by mouth 3 (Three) Times a Day. Take 2 in the AM 1 at noon and 2 in the pm. Per DR Clifford., Disp: 270 tablet, Rfl: 3  •  escitalopram (LEXAPRO) 20 MG tablet, Take 1 tablet by mouth Daily., Disp: 90 tablet, Rfl: 3  •  Evolocumab (Repatha SureClick) solution auto-injector SureClick injection, Inject 1 mL under the skin into the appropriate area as directed Every 14 (Fourteen) Days., Disp: 6 pen, Rfl: 3  •  furosemide (LASIX) 40 MG tablet, Take 1 tablet by mouth 2 (Two) Times a Day., Disp: 180 tablet, Rfl: 3  •   "hydrALAZINE (APRESOLINE) 100 MG tablet, Take 1 tablet by mouth 3 (Three) Times a Day., Disp: 270 tablet, Rfl: 3  •  HYDROcodone-acetaminophen (NORCO) 5-325 MG per tablet, TK 1 T PO Q 6 H PRN, Disp: , Rfl:   •  Iron, Ferrous Sulfate, 325 (65 Fe) MG tablet, ferrous sulfate 325 mg (65 mg iron) tablet, Disp: , Rfl:   •  levothyroxine (SYNTHROID, LEVOTHROID) 88 MCG tablet, Take 1 tablet by mouth Daily., Disp: 90 tablet, Rfl: 3  •  lovastatin (MEVACOR) 40 MG tablet, Take 1 tablet by mouth Daily With Dinner., Disp: 90 tablet, Rfl: 3  •  methyldopa (ALDOMET) 500 MG tablet, Take 1 tablet by mouth Every 12 (Twelve) Hours., Disp: 180 tablet, Rfl: 3  •  minoxidil (LONITEN) 10 MG tablet, , Disp: , Rfl:   •  NIFEdipine CC (ADALAT CC) 90 MG 24 hr tablet, Take 1 tablet by mouth Daily., Disp: 90 tablet, Rfl: 3  •  predniSONE (DELTASONE) 20 MG tablet, Take 1 tablet by mouth Daily., Disp: 90 tablet, Rfl: 3  •  propafenone (RYTHMOL) 150 MG tablet, Take 1 tablet by mouth Every 12 (Twelve) Hours., Disp: 90 tablet, Rfl: 3  •  rivaroxaban (XARELTO) 15 MG tablet, Take 1 tablet by mouth Daily., Disp: 90 tablet, Rfl: 3  •  Semaglutide, 1 MG/DOSE, (OZEMPIC) 2 MG/1.5ML solution pen-injector, Inject 1 mg under the skin into the appropriate area as directed 1 (One) Time Per Week., Disp: 3 pen, Rfl: 3  •  terazosin (HYTRIN) 10 MG capsule, Take 1 capsule by mouth Every 12 (Twelve) Hours., Disp: 90 capsule, Rfl: 3    Objective      Blood pressure 138/90, pulse 76, temperature 97.8 °F (36.6 °C), resp. rate 16, height 193 cm (76\"), weight (!) 142 kg (312 lb), SpO2 97 %.    Physical Exam     General Appearance:    Alert, cooperative, no distress, appears stated age   Head:    Normocephalic, without obvious abnormality, atraumatic   Eyes:    PERRL, conjunctiva/corneas clear, EOM's intact   Ears:    Normal TM's and external ear canals, both ears   Nose:   Nares normal, septum midline, mucosa normal, no drainage   or sinus tenderness   Throat:   Lips, " mucosa, and tongue normal; teeth and gums normal   Neck:   Supple, symmetrical, trachea midline, no adenopathy;        thyroid:  No enlargement/tenderness/nodules; no carotid    bruit or JVD   Back:     Symmetric, no curvature, ROM normal, no CVA tenderness   Lungs:     Clear to auscultation bilaterally, respirations unlabored   Chest wall:    No tenderness or deformity   Heart:    Regular rate and rhythm, S1 and S2 normal, no murmur,        rub or gallop   Abdomen:     Soft, non-tender, bowel sounds active all four quadrants,     no masses, no organomegaly   Extremities:   Extremities normal, atraumatic, no cyanosis or edema   Pulses:   2+ and symmetric all extremities   Skin:   Skin color, texture, turgor normal, no rashes or lesions   Lymph nodes:   Cervical, supraclavicular, and axillary nodes normal   Neurologic:   CNII-XII intact. Normal strength, sensation and reflexes       throughout      Results for orders placed or performed in visit on 12/15/20   COVID-19 PCR, [facility], NP SWAB IN [place] VIRAL TRANSPORT MEDIA 24-30 HR TAT - Swab, Nasopharynx    Specimen: Nasopharynx; Swab   Result Value Ref Range    SARS-CoV-2 ILAN Not Detected Not Detected         Assessment/Plan       Pt labs better.    PMR stable on 10 mg.          Diagnoses and all orders for this visit:    1. Acute systolic CHF (congestive heart failure) (CMS/HCC) (Primary)  -     Ambulatory Referral to Cardiology  -     TSH  -     T4, Free  -     Comprehensive Metabolic Panel  -     Vitamin B12  -     CBC & Differential  -     Lipid Panel  -     Hemoglobin A1c  -     MicroAlbumin, Urine, Random - Urine, Clean Catch  -     BNP    2. History of ischemic right MCA stroke  -     Evolocumab (Repatha SureClick) solution auto-injector SureClick injection; Inject 1 mL under the skin into the appropriate area as directed Every 14 (Fourteen) Days.  Dispense: 6 pen; Refill: 3  -     Ambulatory Referral to Cardiology  -     TSH  -     T4, Free  -      Comprehensive Metabolic Panel  -     Vitamin B12  -     CBC & Differential  -     Lipid Panel  -     Hemoglobin A1c  -     MicroAlbumin, Urine, Random - Urine, Clean Catch  -     BNP    3. Coronary artery disease involving native coronary artery of native heart without angina pectoris  -     Evolocumab (Repatha SureClick) solution auto-injector SureClick injection; Inject 1 mL under the skin into the appropriate area as directed Every 14 (Fourteen) Days.  Dispense: 6 pen; Refill: 3    4. TIA (transient ischemic attack)  -     Evolocumab (Repatha SureClick) solution auto-injector SureClick injection; Inject 1 mL under the skin into the appropriate area as directed Every 14 (Fourteen) Days.  Dispense: 6 pen; Refill: 3    5. Acquired hypothyroidism  -     levothyroxine (SYNTHROID, LEVOTHROID) 88 MCG tablet; Take 1 tablet by mouth Daily.  Dispense: 90 tablet; Refill: 3  -     TSH  -     T4, Free  -     Comprehensive Metabolic Panel  -     Vitamin B12  -     CBC & Differential  -     Lipid Panel  -     Hemoglobin A1c  -     MicroAlbumin, Urine, Random - Urine, Clean Catch  -     BNP    6. Essential hypertension  -     levothyroxine (SYNTHROID, LEVOTHROID) 88 MCG tablet; Take 1 tablet by mouth Daily.  Dispense: 90 tablet; Refill: 3  -     Ambulatory Referral to Cardiology  -     TSH  -     T4, Free  -     Comprehensive Metabolic Panel  -     Vitamin B12  -     CBC & Differential  -     Lipid Panel  -     Hemoglobin A1c  -     MicroAlbumin, Urine, Random - Urine, Clean Catch  -     BNP    7. CRI (chronic renal insufficiency), stage 2 (mild)  -     Ambulatory Referral to Cardiology  -     TSH  -     T4, Free  -     Comprehensive Metabolic Panel  -     Vitamin B12  -     CBC & Differential  -     Lipid Panel  -     Hemoglobin A1c  -     MicroAlbumin, Urine, Random - Urine, Clean Catch  -     BNP    8. Abnormal finding of blood chemistry, unspecified   -     Hemoglobin A1c    Other orders  -     terazosin (HYTRIN) 10 MG  capsule; Take 1 capsule by mouth Every 12 (Twelve) Hours.  Dispense: 90 capsule; Refill: 3  -     Semaglutide, 1 MG/DOSE, (OZEMPIC) 2 MG/1.5ML solution pen-injector; Inject 1 mg under the skin into the appropriate area as directed 1 (One) Time Per Week.  Dispense: 3 pen; Refill: 3  -     rivaroxaban (XARELTO) 15 MG tablet; Take 1 tablet by mouth Daily.  Dispense: 90 tablet; Refill: 3  -     propafenone (RYTHMOL) 150 MG tablet; Take 1 tablet by mouth Every 12 (Twelve) Hours.  Dispense: 90 tablet; Refill: 3  -     predniSONE (DELTASONE) 20 MG tablet; Take 1 tablet by mouth Daily.  Dispense: 90 tablet; Refill: 3  -     NIFEdipine CC (ADALAT CC) 90 MG 24 hr tablet; Take 1 tablet by mouth Daily.  Dispense: 90 tablet; Refill: 3  -     methyldopa (ALDOMET) 500 MG tablet; Take 1 tablet by mouth Every 12 (Twelve) Hours.  Dispense: 180 tablet; Refill: 3  -     lovastatin (MEVACOR) 40 MG tablet; Take 1 tablet by mouth Daily With Dinner.  Dispense: 90 tablet; Refill: 3  -     hydrALAZINE (APRESOLINE) 100 MG tablet; Take 1 tablet by mouth 3 (Three) Times a Day.  Dispense: 270 tablet; Refill: 3  -     furosemide (LASIX) 40 MG tablet; Take 1 tablet by mouth 2 (Two) Times a Day.  Dispense: 180 tablet; Refill: 3  -     escitalopram (LEXAPRO) 20 MG tablet; Take 1 tablet by mouth Daily.  Dispense: 90 tablet; Refill: 3  -     divalproex (DEPAKOTE) 250 MG DR tablet; Take 1 tablet by mouth 3 (Three) Times a Day. Take 2 in the AM 1 at noon and 2 in the pm. Per DR Clifford.  Dispense: 270 tablet; Refill: 3  -     carvedilol (COREG) 6.25 MG tablet; Take 1 tablet by mouth 2 (Two) Times a Day With Meals.  Dispense: 180 tablet; Refill: 3  -     allopurinol (Zyloprim) 100 MG tablet; Take 1 tablet by mouth Daily.  Dispense: 90 tablet; Refill: 3      Return in about 4 weeks (around 1/15/2021).          There are no Patient Instructions on file for this visit.     Keven Bear MD    Assessment/Plan

## 2020-12-30 ENCOUNTER — TELEPHONE (OUTPATIENT)
Dept: INTERNAL MEDICINE | Facility: CLINIC | Age: 65
End: 2020-12-30

## 2021-01-06 ENCOUNTER — OFFICE VISIT (OUTPATIENT)
Dept: BARIATRICS/WEIGHT MGMT | Facility: CLINIC | Age: 66
End: 2021-01-06

## 2021-01-06 VITALS
SYSTOLIC BLOOD PRESSURE: 170 MMHG | OXYGEN SATURATION: 98 % | DIASTOLIC BLOOD PRESSURE: 90 MMHG | HEART RATE: 77 BPM | BODY MASS INDEX: 38.36 KG/M2 | RESPIRATION RATE: 18 BRPM | HEIGHT: 76 IN | TEMPERATURE: 97.3 F | WEIGHT: 315 LBS

## 2021-01-06 DIAGNOSIS — R10.13 DYSPEPSIA: Primary | ICD-10-CM

## 2021-01-06 PROCEDURE — 99214 OFFICE O/P EST MOD 30 MIN: CPT | Performed by: PHYSICIAN ASSISTANT

## 2021-01-06 RX ORDER — LOSARTAN POTASSIUM 100 MG/1
100 TABLET ORAL DAILY
COMMUNITY
Start: 2020-12-24 | End: 2021-02-19

## 2021-01-06 NOTE — PROGRESS NOTES
[facility] Bariatric Surgery  [address]  [address]  [facility] [address] [phone]        Patient Name:  Andre Agosto.  :  1955      Date of Visit: 2021      Reason for Visit:  AGB followup      HPI:  Andre Agosto is a 66 y.o. male s/p LAGB Realize 2008 by JSO.    LOV 2018.  Band unfilled (1.5cc clear fluid) for chronic episodic dysphagia/vomiting.     Returns recently for reevaluation.  Says desperately needing to lose weight.  Struggling w/ uncontrolled HTN and diabetes.  Also having terrible issues w/ PMR/chronic leg pains, currently on steroids, ongoing eval w/  Dr. Bear.      Denies current issues w/ dysphagia/reflux/N/V/AP.  No port pain.  No recent fevers/chills.      UGI 20 @[facility] reveals mild esophageal dysmotility w/ esophageal dilatation.      CT ab/pel 20 @[facility] noted lapband in place, multiple gallstones, nonobstructing (B) kidney stones.    ----    Presurgery weight:  350 lbs.  Lowest weight achieved:  260 lbs.  Current weight:  315 lbs.       Past Medical History:   Diagnosis Date   • 6th nerve palsy, right     right eye    • Anxiety and depression    • Atrial fibrillation, persistent (CMS/HCC) 2020    on Xarelto   • Coronary artery disease involving native coronary artery of native heart without angina pectoris 2018    follows w/ Dr. Johnson, on ASA 81mg   • Diabetes mellitus (CMS/HCC)     doesnt check sugar, type 2    • Double vision    • Dyspepsia    • Epilepsy (CMS/HCC)    • Focal seizure (CMS/HCC)     of right arm    • Heart attack (CMS/HCC)     NSTEMI , s/p stenting   • History of Helicobacter pylori infection     in , treated w/ PrevPak   • HL (hearing loss)     (L) ear - child luevano measles   • Hyperlipidemia    • Hypertension    • Kidney stone    • Memory loss     d/t meningitis   • Meningitis     unsure of bacterial or viral    • Obesity    • Seizures (CMS/HCC)    • Sleep apnea treated with nocturnal  BiPAP     compliant with  machine    • Stroke (CMS/HCC)     2017/2018   • Wears glasses      Past Surgical History:   Procedure Laterality Date   • CARDIAC CATHETERIZATION N/A 10/12/2018    Procedure: Left Heart Cath;  Surgeon: Yoselin Johnson MD;  Location: [facility] CATH INVASIVE LOCATION;  Service: Cardiology   • COLONOSCOPY  10/2020    w/ Dr. Jacobs, kelsi colon polyps   • CORONARY STENT PLACEMENT  2015   • LAPAROSCOPIC GASTRIC BANDING  2008    s/p LAGB Realize 6/2008 by [facility].   • UMBILICAL HERNIA REPAIR  2008    incarcerated UHR w/ mesh by Dr. Velasquez       Allergies   Allergen Reactions   • Amlodipine Swelling   • Statins Myalgia       Outpatient Medications Marked as Taking for the 1/6/21 encounter (Office Visit) with Leanne Emerson PA   Medication Sig Dispense Refill   • allopurinol (Zyloprim) 100 MG tablet Take 1 tablet by mouth Daily. 90 tablet 3   • aspirin 81 MG tablet Take 81 mg by mouth 2 (Two) Times a Day. 30 tablet 11   • carvedilol (COREG) 6.25 MG tablet Take 1 tablet by mouth 2 (Two) Times a Day With Meals. 180 tablet 3   • divalproex (DEPAKOTE) 250 MG DR tablet Take 1 tablet by mouth 3 (Three) Times a Day. Take 2 in the AM 1 at noon and 2 in the pm. Per DR Clifford. 270 tablet 3   • escitalopram (LEXAPRO) 20 MG tablet Take 1 tablet by mouth Daily. 90 tablet 3   • Evolocumab (Repatha SureClick) solution auto-injector SureClick injection Inject 1 mL under the skin into the appropriate area as directed Every 14 (Fourteen) Days. 6 pen 3   • furosemide (LASIX) 40 MG tablet Take 1 tablet by mouth 2 (Two) Times a Day. 180 tablet 3   • hydrALAZINE (APRESOLINE) 100 MG tablet Take 1 tablet by mouth 3 (Three) Times a Day. 270 tablet 3   • HYDROcodone-acetaminophen (NORCO) 5-325 MG per tablet TK 1 T PO Q 6 H PRN     • Iron, Ferrous Sulfate, 325 (65 Fe) MG tablet ferrous sulfate 325 mg (65 mg iron) tablet     • levothyroxine (SYNTHROID, LEVOTHROID) 88 MCG tablet Take 1 tablet by mouth Daily. 90 tablet 3   •  "lovastatin (MEVACOR) 40 MG tablet Take 1 tablet by mouth Daily With Dinner. 90 tablet 3   • methyldopa (ALDOMET) 500 MG tablet Take 1 tablet by mouth Every 12 (Twelve) Hours. 180 tablet 3   • minoxidil (LONITEN) 10 MG tablet      • NIFEdipine CC (ADALAT CC) 90 MG 24 hr tablet Take 1 tablet by mouth Daily. 90 tablet 3   • predniSONE (DELTASONE) 20 MG tablet Take 1 tablet by mouth Daily. 90 tablet 3   • propafenone (RYTHMOL) 150 MG tablet Take 1 tablet by mouth Every 12 (Twelve) Hours. 90 tablet 3   • rivaroxaban (XARELTO) 15 MG tablet Take 1 tablet by mouth Daily. 90 tablet 3   • Semaglutide, 1 MG/DOSE, (OZEMPIC) 2 MG/1.5ML solution pen-injector Inject 1 mg under the skin into the appropriate area as directed 1 (One) Time Per Week. 3 pen 3       Social History     Socioeconomic History   • Marital status:      Spouse name: Not on file   • Number of children: Not on file   • Years of education: Not on file   • Highest education level: Not on file   Tobacco Use   • Smoking status: Former Smoker     Packs/day: 0.50     Years: 10.00     Pack years: 5.00     Types: Cigarettes     Start date: 1975     Quit date: 1985     Years since quittin.0   • Smokeless tobacco: Never Used   Substance and Sexual Activity   • Alcohol use: No   • Drug use: No   • Sexual activity: Defer   Social History Narrative    .  Lives in [place].  Retired.           /90 (BP Location: Left arm, Patient Position: Sitting, Cuff Size: Adult)   Pulse 77   Temp 97.3 °F (36.3 °C) (Temporal)   Resp 18   Ht 193 cm (76\")   Wt (!) 143 kg (315 lb)   SpO2 98%   BMI 38.34 kg/m²     Physical Exam   Constitutional: He appears well-developed.   wearing a mask   Cardiovascular: Normal rate and regular rhythm.   Pulmonary/Chest: Effort normal.   Abdominal: Soft. There is no abdominal tenderness. No hernia.   LLQ port - site unremarkable   Musculoskeletal: Normal range of motion.   Neurological: He is alert.   Skin: Skin is warm " and dry.       Band Adjustment Info   Band Type:   Realize   Port Location:   LLQ   Procedure Position:   upright   Needle Type:   short    Local Anesthesia: with 1% lidocaine and bicarb       Amount Expected (cc):    0.0   Amount Added (cc):    Amount Removed (cc):      Total Amount (cc):    0.0       Assessment: s/p LAGB Realize 6/2008 by [facility] contemplating revision.    ICD-10-CM ICD-9-CM   1. Dyspepsia  R10.13 536.8        Patient's Body mass index is 38.34 kg/m². BMI is above normal parameters. Recommendations include: lapband evaluation, revision consideration.      Plan:  UGI reviewed.  EGD advised for further eval.  Risks/benefits discussed.  Additional input to follow.     JANETH Hawk

## 2021-01-12 ENCOUNTER — TELEPHONE (OUTPATIENT)
Dept: INTERNAL MEDICINE | Facility: CLINIC | Age: 66
End: 2021-01-12

## 2021-01-12 NOTE — TELEPHONE ENCOUNTER
"Caller: Andre Agosto    Relationship to patient: Self    Best call back number: [phone]    Type of visit: OFFICE VISIT    Requested date: THIS WEEK 01/12/21 - 01/15/2021    If rescheduling, when is the original appointment: 01/18/2021    Additional notes: PATIENT IS SCHEDULED FOR 01/18/21 HOWEVER PATIENT HAS AN PROCEDURE THAT DAY AND NEEDS TO SPEAK TO DR. ELLIS PRIOR. PATIENT STATED \"THEY\" ALWAYS SQUEEZE HIM IN, WHEN NEEDED. PLEASE ADVISE AND CALL PATIENT [phone]  "

## 2021-01-13 ENCOUNTER — OFFICE VISIT (OUTPATIENT)
Dept: INTERNAL MEDICINE | Facility: CLINIC | Age: 66
End: 2021-01-13

## 2021-01-13 ENCOUNTER — LAB (OUTPATIENT)
Dept: LAB | Facility: HOSPITAL | Age: 66
End: 2021-01-13

## 2021-01-13 VITALS
OXYGEN SATURATION: 97 % | DIASTOLIC BLOOD PRESSURE: 86 MMHG | BODY MASS INDEX: 38.36 KG/M2 | SYSTOLIC BLOOD PRESSURE: 138 MMHG | TEMPERATURE: 98.6 F | HEIGHT: 76 IN | RESPIRATION RATE: 16 BRPM | WEIGHT: 315 LBS | HEART RATE: 83 BPM

## 2021-01-13 DIAGNOSIS — R31.9 HEMATURIA, UNSPECIFIED TYPE: Primary | ICD-10-CM

## 2021-01-13 DIAGNOSIS — M35.3 PMR (POLYMYALGIA RHEUMATICA) (HCC): ICD-10-CM

## 2021-01-13 LAB
ALBUMIN SERPL-MCNC: 4.3 G/DL (ref 3.5–5.2)
ALBUMIN UR-MCNC: 23.7 MG/DL
ALBUMIN/GLOB SERPL: 1.4 G/DL
ALP SERPL-CCNC: 42 U/L (ref 39–117)
ALT SERPL W P-5'-P-CCNC: 14 U/L (ref 1–41)
ANION GAP SERPL CALCULATED.3IONS-SCNC: 12.4 MMOL/L (ref 5–15)
AST SERPL-CCNC: 14 U/L (ref 1–40)
BACTERIA UR QL AUTO: ABNORMAL /HPF
BASOPHILS # BLD AUTO: 0.07 10*3/MM3 (ref 0–0.2)
BASOPHILS NFR BLD AUTO: 0.7 % (ref 0–1.5)
BILIRUB SERPL-MCNC: 0.4 MG/DL (ref 0–1.2)
BILIRUB UR QL STRIP: NEGATIVE
BUN SERPL-MCNC: 18 MG/DL (ref 8–23)
BUN/CREAT SERPL: 11.8 (ref 7–25)
CALCIUM SPEC-SCNC: 8.9 MG/DL (ref 8.6–10.5)
CHLORIDE SERPL-SCNC: 96 MMOL/L (ref 98–107)
CHOLEST SERPL-MCNC: 148 MG/DL (ref 0–200)
CLARITY UR: CLEAR
CO2 SERPL-SCNC: 27.6 MMOL/L (ref 22–29)
COLOR UR: ABNORMAL
CREAT SERPL-MCNC: 1.52 MG/DL (ref 0.76–1.27)
DEPRECATED RDW RBC AUTO: 46 FL (ref 37–54)
EOSINOPHIL # BLD AUTO: 0.07 10*3/MM3 (ref 0–0.4)
EOSINOPHIL NFR BLD AUTO: 0.7 % (ref 0.3–6.2)
ERYTHROCYTE [DISTWIDTH] IN BLOOD BY AUTOMATED COUNT: 13.8 % (ref 12.3–15.4)
GFR SERPL CREATININE-BSD FRML MDRD: 46 ML/MIN/1.73
GLOBULIN UR ELPH-MCNC: 3 GM/DL
GLUCOSE SERPL-MCNC: 187 MG/DL (ref 65–99)
GLUCOSE UR STRIP-MCNC: NEGATIVE MG/DL
HCT VFR BLD AUTO: 43.4 % (ref 37.5–51)
HDLC SERPL-MCNC: 46 MG/DL (ref 40–60)
HGB BLD-MCNC: 14.4 G/DL (ref 13–17.7)
HGB UR QL STRIP.AUTO: ABNORMAL
HYALINE CASTS UR QL AUTO: ABNORMAL /LPF
IMM GRANULOCYTES # BLD AUTO: 0.06 10*3/MM3 (ref 0–0.05)
IMM GRANULOCYTES NFR BLD AUTO: 0.6 % (ref 0–0.5)
KETONES UR QL STRIP: ABNORMAL
LDLC SERPL CALC-MCNC: 49 MG/DL (ref 0–100)
LDLC/HDLC SERPL: 0.69 {RATIO}
LEUKOCYTE ESTERASE UR QL STRIP.AUTO: NEGATIVE
LYMPHOCYTES # BLD AUTO: 2.44 10*3/MM3 (ref 0.7–3.1)
LYMPHOCYTES NFR BLD AUTO: 23 % (ref 19.6–45.3)
MCH RBC QN AUTO: 29.8 PG (ref 26.6–33)
MCHC RBC AUTO-ENTMCNC: 33.2 G/DL (ref 31.5–35.7)
MCV RBC AUTO: 89.9 FL (ref 79–97)
MONOCYTES # BLD AUTO: 0.54 10*3/MM3 (ref 0.1–0.9)
MONOCYTES NFR BLD AUTO: 5.1 % (ref 5–12)
NEUTROPHILS NFR BLD AUTO: 69.9 % (ref 42.7–76)
NEUTROPHILS NFR BLD AUTO: 7.41 10*3/MM3 (ref 1.7–7)
NITRITE UR QL STRIP: NEGATIVE
NRBC BLD AUTO-RTO: 0 /100 WBC (ref 0–0.2)
NT-PROBNP SERPL-MCNC: 685.4 PG/ML (ref 0–900)
PH UR STRIP.AUTO: 7.5 [PH] (ref 5–8)
PLATELET # BLD AUTO: 208 10*3/MM3 (ref 140–450)
PMV BLD AUTO: 11.3 FL (ref 6–12)
POTASSIUM SERPL-SCNC: 4.2 MMOL/L (ref 3.5–5.2)
PROT SERPL-MCNC: 7.3 G/DL (ref 6–8.5)
PROT UR QL STRIP: ABNORMAL
RBC # BLD AUTO: 4.83 10*6/MM3 (ref 4.14–5.8)
RBC # UR: ABNORMAL /HPF
REF LAB TEST METHOD: ABNORMAL
SODIUM SERPL-SCNC: 136 MMOL/L (ref 136–145)
SP GR UR STRIP: 1.02 (ref 1–1.03)
SQUAMOUS #/AREA URNS HPF: ABNORMAL /HPF
T4 FREE SERPL-MCNC: 1.19 NG/DL (ref 0.93–1.7)
TRIGL SERPL-MCNC: 352 MG/DL (ref 0–150)
TSH SERPL DL<=0.05 MIU/L-ACNC: 1.69 UIU/ML (ref 0.27–4.2)
UROBILINOGEN UR QL STRIP: ABNORMAL
VLDLC SERPL-MCNC: 53 MG/DL (ref 5–40)
WBC # BLD AUTO: 10.59 10*3/MM3 (ref 3.4–10.8)
WBC UR QL AUTO: ABNORMAL /HPF

## 2021-01-13 PROCEDURE — 83036 HEMOGLOBIN GLYCOSYLATED A1C: CPT | Performed by: INTERNAL MEDICINE

## 2021-01-13 PROCEDURE — 84439 ASSAY OF FREE THYROXINE: CPT | Performed by: INTERNAL MEDICINE

## 2021-01-13 PROCEDURE — 83880 ASSAY OF NATRIURETIC PEPTIDE: CPT | Performed by: INTERNAL MEDICINE

## 2021-01-13 PROCEDURE — 85025 COMPLETE CBC W/AUTO DIFF WBC: CPT | Performed by: INTERNAL MEDICINE

## 2021-01-13 PROCEDURE — 82607 VITAMIN B-12: CPT | Performed by: INTERNAL MEDICINE

## 2021-01-13 PROCEDURE — 36415 COLL VENOUS BLD VENIPUNCTURE: CPT | Performed by: INTERNAL MEDICINE

## 2021-01-13 PROCEDURE — 82043 UR ALBUMIN QUANTITATIVE: CPT | Performed by: INTERNAL MEDICINE

## 2021-01-13 PROCEDURE — 84443 ASSAY THYROID STIM HORMONE: CPT | Performed by: INTERNAL MEDICINE

## 2021-01-13 PROCEDURE — 99214 OFFICE O/P EST MOD 30 MIN: CPT | Performed by: INTERNAL MEDICINE

## 2021-01-13 PROCEDURE — 80061 LIPID PANEL: CPT | Performed by: INTERNAL MEDICINE

## 2021-01-13 PROCEDURE — 80053 COMPREHEN METABOLIC PANEL: CPT | Performed by: INTERNAL MEDICINE

## 2021-01-13 PROCEDURE — 81001 URINALYSIS AUTO W/SCOPE: CPT | Performed by: INTERNAL MEDICINE

## 2021-01-13 RX ORDER — PREDNISONE 2.5 MG
2.5 TABLET ORAL DAILY
Qty: 30 TABLET | Refills: 5 | Status: SHIPPED | OUTPATIENT
Start: 2021-01-13 | End: 2021-03-17

## 2021-01-13 NOTE — PROGRESS NOTES
Subjective     Patient ID: Andre Agosto is a 66 y.o. male. Patient is here for management of multiple medical problems.     Chief Complaint   Patient presents with   • Congestive Heart Failure     History of Present Illness     chf stable       Blood in bm or urine pt not sure.     Cardiology rf requested.          The following portions of the patient's history were reviewed and updated as appropriate: allergies, current medications, past family history, past medical history, past social history, past surgical history and problem list.    Review of Systems   Constitutional: Negative for fatigue.   Psychiatric/Behavioral: Negative for self-injury and sleep disturbance. The patient is not nervous/anxious and is not hyperactive.    All other systems reviewed and are negative.      Current Outpatient Medications:   •  allopurinol (Zyloprim) 100 MG tablet, Take 1 tablet by mouth Daily., Disp: 90 tablet, Rfl: 3  •  aspirin 81 MG tablet, Take 81 mg by mouth 2 (Two) Times a Day., Disp: 30 tablet, Rfl: 11  •  carvedilol (COREG) 6.25 MG tablet, Take 1 tablet by mouth 2 (Two) Times a Day With Meals., Disp: 180 tablet, Rfl: 3  •  divalproex (DEPAKOTE) 250 MG DR tablet, Take 1 tablet by mouth 3 (Three) Times a Day. Take 2 in the AM 1 at noon and 2 in the pm. Per DR Clifford., Disp: 270 tablet, Rfl: 3  •  escitalopram (LEXAPRO) 20 MG tablet, Take 1 tablet by mouth Daily., Disp: 90 tablet, Rfl: 3  •  Evolocumab (Repatha SureClick) solution auto-injector SureClick injection, Inject 1 mL under the skin into the appropriate area as directed Every 14 (Fourteen) Days., Disp: 6 pen, Rfl: 3  •  furosemide (LASIX) 40 MG tablet, Take 1 tablet by mouth 2 (Two) Times a Day., Disp: 180 tablet, Rfl: 3  •  hydrALAZINE (APRESOLINE) 100 MG tablet, Take 1 tablet by mouth 3 (Three) Times a Day., Disp: 270 tablet, Rfl: 3  •  HYDROcodone-acetaminophen (NORCO) 5-325 MG per tablet, TK 1 T PO Q 6 H PRN, Disp: , Rfl:   •  Iron, Ferrous Sulfate, 325 (65 Fe)  "MG tablet, ferrous sulfate 325 mg (65 mg iron) tablet, Disp: , Rfl:   •  levothyroxine (SYNTHROID, LEVOTHROID) 88 MCG tablet, Take 1 tablet by mouth Daily., Disp: 90 tablet, Rfl: 3  •  losartan (COZAAR) 100 MG tablet, Take 100 mg by mouth Daily., Disp: , Rfl:   •  lovastatin (MEVACOR) 40 MG tablet, Take 1 tablet by mouth Daily With Dinner., Disp: 90 tablet, Rfl: 3  •  methyldopa (ALDOMET) 500 MG tablet, Take 1 tablet by mouth Every 12 (Twelve) Hours., Disp: 180 tablet, Rfl: 3  •  minoxidil (LONITEN) 10 MG tablet, , Disp: , Rfl:   •  NIFEdipine CC (ADALAT CC) 90 MG 24 hr tablet, Take 1 tablet by mouth Daily., Disp: 90 tablet, Rfl: 3  •  predniSONE (DELTASONE) 20 MG tablet, Take 1 tablet by mouth Daily., Disp: 90 tablet, Rfl: 3  •  propafenone (RYTHMOL) 150 MG tablet, Take 1 tablet by mouth Every 12 (Twelve) Hours., Disp: 90 tablet, Rfl: 3  •  rivaroxaban (XARELTO) 15 MG tablet, Take 1 tablet by mouth Daily., Disp: 90 tablet, Rfl: 3  •  Semaglutide, 1 MG/DOSE, (OZEMPIC) 2 MG/1.5ML solution pen-injector, Inject 1 mg under the skin into the appropriate area as directed 1 (One) Time Per Week., Disp: 3 pen, Rfl: 3  •  terazosin (HYTRIN) 10 MG capsule, Take 1 capsule by mouth Every 12 (Twelve) Hours., Disp: 90 capsule, Rfl: 3  •  predniSONE (DELTASONE) 2.5 MG tablet, Take 1 tablet by mouth Daily., Disp: 30 tablet, Rfl: 5    Objective      Blood pressure 138/86, pulse 83, temperature 98.6 °F (37 °C), resp. rate 16, height 193 cm (76\"), weight (!) 145 kg (319 lb), SpO2 97 %.    Physical Exam     General Appearance:    Alert, cooperative, no distress, appears stated age   Head:    Normocephalic, without obvious abnormality, atraumatic   Eyes:    PERRL, conjunctiva/corneas clear, EOM's intact   Ears:    Normal TM's and external ear canals, both ears   Nose:   Nares normal, septum midline, mucosa normal, no drainage   or sinus tenderness   Throat:   Lips, mucosa, and tongue normal; teeth and gums normal   Neck:   Supple, " symmetrical, trachea midline, no adenopathy;        thyroid:  No enlargement/tenderness/nodules; no carotid    bruit or JVD   Back:     Symmetric, no curvature, ROM normal, no CVA tenderness   Lungs:     Clear to auscultation bilaterally, respirations unlabored   Chest wall:    No tenderness or deformity   Heart:    Regular rate and rhythm, S1 and S2 normal, no murmur,        rub or gallop   Abdomen:     Soft, non-tender, bowel sounds active all four quadrants,     no masses, no organomegaly   Extremities:   Extremities normal, atraumatic, no cyanosis or edema   Pulses:   2+ and symmetric all extremities   Skin:   Skin color, texture, turgor normal, no rashes or lesions   Lymph nodes:   Cervical, supraclavicular, and axillary nodes normal   Neurologic:   CNII-XII intact. Normal strength, sensation and reflexes       throughout      Results for orders placed or performed in visit on 12/15/20   COVID-19 PCR, [facility], NP SWAB IN [place] VIRAL TRANSPORT MEDIA 24-30 HR TAT - Swab, Nasopharynx    Specimen: Nasopharynx; Swab   Result Value Ref Range    SARS-CoV-2 ILAN Not Detected Not Detected         Assessment/Plan     pmr not well controlled.      Diagnoses and all orders for this visit:    1. Hematuria, unspecified type (Primary)  -     Urinalysis With Microscopic - Urine, Clean Catch  -     Occult Blood, Fecal By Immunoassay - Stool, Per Rectum    2. PMR (polymyalgia rheumatica) (CMS/HCC)  -     predniSONE (DELTASONE) 2.5 MG tablet; Take 1 tablet by mouth Daily.  Dispense: 30 tablet; Refill: 5      Return in about 4 weeks (around 2/10/2021).          There are no Patient Instructions on file for this visit.     Keven Bear MD    Assessment/Plan

## 2021-01-14 ENCOUNTER — CLINICAL SUPPORT (OUTPATIENT)
Dept: INTERNAL MEDICINE | Facility: CLINIC | Age: 66
End: 2021-01-14

## 2021-01-14 DIAGNOSIS — I15.0 RENOVASCULAR HYPERTENSION: ICD-10-CM

## 2021-01-14 DIAGNOSIS — R31.0 GROSS HEMATURIA: ICD-10-CM

## 2021-01-14 DIAGNOSIS — I25.10 CORONARY ARTERY DISEASE WITHOUT ANGINA PECTORIS, UNSPECIFIED VESSEL OR LESION TYPE, UNSPECIFIED WHETHER NATIVE OR TRANSPLANTED HEART: Primary | ICD-10-CM

## 2021-01-14 DIAGNOSIS — R31.0 GROSS HEMATURIA: Primary | ICD-10-CM

## 2021-01-14 LAB
HBA1C MFR BLD: 6.6 % (ref 4.8–5.6)
VIT B12 BLD-MCNC: 575 PG/ML (ref 211–946)

## 2021-01-14 NOTE — PROGRESS NOTES
Please let them know the urin shows blood. I would like for him to see urology.  I have put in order for Dr De La Cruz  Please have him do another urin for cult.    Consider a CT if pt starting to have pain.

## 2021-01-15 ENCOUNTER — APPOINTMENT (OUTPATIENT)
Dept: PREADMISSION TESTING | Facility: HOSPITAL | Age: 66
End: 2021-01-15

## 2021-01-15 PROCEDURE — U0004 COV-19 TEST NON-CDC HGH THRU: HCPCS

## 2021-01-15 PROCEDURE — C9803 HOPD COVID-19 SPEC COLLECT: HCPCS

## 2021-01-16 LAB
BACTERIA UR CULT: NO GROWTH
BACTERIA UR CULT: NORMAL
SARS-COV-2 RNA RESP QL NAA+PROBE: NOT DETECTED

## 2021-01-18 ENCOUNTER — OUTSIDE FACILITY SERVICE (OUTPATIENT)
Dept: BARIATRICS/WEIGHT MGMT | Facility: CLINIC | Age: 66
End: 2021-01-18

## 2021-01-18 ENCOUNTER — LAB REQUISITION (OUTPATIENT)
Dept: LAB | Facility: HOSPITAL | Age: 66
End: 2021-01-18

## 2021-01-18 DIAGNOSIS — R31.0 GROSS HEMATURIA: Primary | ICD-10-CM

## 2021-01-18 DIAGNOSIS — K30 FUNCTIONAL DYSPEPSIA: ICD-10-CM

## 2021-01-18 PROCEDURE — 88305 TISSUE EXAM BY PATHOLOGIST: CPT | Performed by: SURGERY

## 2021-01-18 PROCEDURE — 43239 EGD BIOPSY SINGLE/MULTIPLE: CPT | Performed by: SURGERY

## 2021-01-19 ENCOUNTER — HOSPITAL ENCOUNTER (OUTPATIENT)
Dept: CT IMAGING | Facility: HOSPITAL | Age: 66
Discharge: HOME | End: 2021-01-19
Admitting: INTERNAL MEDICINE

## 2021-01-19 DIAGNOSIS — R31.0 GROSS HEMATURIA: ICD-10-CM

## 2021-01-19 LAB
CYTO UR: NORMAL
LAB AP CASE REPORT: NORMAL
LAB AP CLINICAL INFORMATION: NORMAL
PATH REPORT.FINAL DX SPEC: NORMAL
PATH REPORT.GROSS SPEC: NORMAL

## 2021-01-19 PROCEDURE — 74176 CT ABD & PELVIS W/O CONTRAST: CPT

## 2021-01-20 ENCOUNTER — TELEPHONE (OUTPATIENT)
Dept: BARIATRICS/WEIGHT MGMT | Facility: CLINIC | Age: 66
End: 2021-01-20

## 2021-01-20 NOTE — TELEPHONE ENCOUNTER
----- Message from Vinod Guzman MD sent at 1/18/2021  8:00 AM EST -----    OK for AGBR and concomitant lap humberto if desired, main OR, outpatient.  Check and see if he will need bridge anticoagulation.  No need to stop ASA.  Give him 100 mg Solucortef IV in preop as well.  Check him for h. Pylori if the path comes back negative.  After AGBR if he wants LSG (and can be off steroids for 1 month prior and 2 months post op), then order a GES - he had retained food in his stomach.  Maybe find out if he was on clears yesterday.  Thanks!

## 2021-01-22 RX ORDER — AMOXICILLIN AND CLAVULANATE POTASSIUM 875; 125 MG/1; MG/1
1 TABLET, FILM COATED ORAL EVERY 12 HOURS SCHEDULED
Qty: 28 TABLET | Refills: 0 | Status: SHIPPED | OUTPATIENT
Start: 2021-01-22 | End: 2021-02-16

## 2021-01-22 RX ORDER — METRONIDAZOLE 250 MG/1
250 TABLET ORAL 3 TIMES DAILY
Qty: 42 TABLET | Refills: 0 | Status: SHIPPED | OUTPATIENT
Start: 2021-01-22 | End: 2021-02-16

## 2021-01-22 RX ORDER — NICOTINE POLACRILEX 4 MG/1
20 GUM, CHEWING ORAL 2 TIMES DAILY
Qty: 28 EACH | Refills: 0 | Status: SHIPPED | OUTPATIENT
Start: 2021-01-22 | End: 2021-02-16

## 2021-01-22 RX ORDER — DOXYCYCLINE 100 MG/1
100 CAPSULE ORAL EVERY 12 HOURS SCHEDULED
Qty: 28 CAPSULE | Refills: 0 | Status: SHIPPED | OUTPATIENT
Start: 2021-01-22 | End: 2021-02-16

## 2021-02-10 ENCOUNTER — TELEPHONE (OUTPATIENT)
Dept: INTERNAL MEDICINE | Facility: CLINIC | Age: 66
End: 2021-02-10

## 2021-02-10 NOTE — TELEPHONE ENCOUNTER
Called patient and left a detailed message about rescheduling office visit that was scheduled, informed patient to call back to get appointment rescheduled.

## 2021-02-12 ENCOUNTER — OFFICE VISIT (OUTPATIENT)
Dept: INTERNAL MEDICINE | Facility: CLINIC | Age: 66
End: 2021-02-12

## 2021-02-12 DIAGNOSIS — I13.0 HYPERTENSIVE HEART AND CHRONIC KIDNEY DISEASE WITH HEART FAILURE AND STAGE 1 THROUGH STAGE 4 CHRONIC KIDNEY DISEASE, OR UNSPECIFIED CHRONIC KIDNEY DISEASE (HCC): ICD-10-CM

## 2021-02-12 DIAGNOSIS — N18.2 CHRONIC RENAL IMPAIRMENT, STAGE 2 (MILD): ICD-10-CM

## 2021-02-12 DIAGNOSIS — I15.0 RENOVASCULAR HYPERTENSION: Primary | ICD-10-CM

## 2021-02-12 PROCEDURE — 99442 PR PHYS/QHP TELEPHONE EVALUATION 11-20 MIN: CPT | Performed by: INTERNAL MEDICINE

## 2021-02-12 NOTE — PROGRESS NOTES
Subjective     Patient ID: Andre Agosto is a 66 y.o. male. Patient is here for management of multiple medical problems.     htn chf       You have chosen to receive care through a telephone visit. Do you consent to use a telephone visit for your medical care today? Yes    History of Present Illness     Pt on methyl dopa and having break thru htn    Increase soa.    Pt off lasix daily and on qod. Feeling better till recently with soa and orthopnea.       195/95        The following portions of the patient's history were reviewed and updated as appropriate: allergies, current medications, past family history, past medical history, past social history, past surgical history and problem list.    Review of Systems   Constitutional: Positive for fatigue.   Respiratory: Positive for shortness of breath.    Psychiatric/Behavioral: Positive for sleep disturbance.   All other systems reviewed and are negative.      Current Outpatient Medications:   •  allopurinol (Zyloprim) 100 MG tablet, Take 1 tablet by mouth Daily., Disp: 90 tablet, Rfl: 3  •  divalproex (DEPAKOTE) 250 MG DR tablet, Take 1 tablet by mouth 3 (Three) Times a Day. Take 2 in the AM 1 at noon and 2 in the pm. Per DR Clifford., Disp: 270 tablet, Rfl: 3  •  escitalopram (LEXAPRO) 20 MG tablet, Take 1 tablet by mouth Daily., Disp: 90 tablet, Rfl: 3  •  Evolocumab (Repatha SureClick) solution auto-injector SureClick injection, Inject 1 mL under the skin into the appropriate area as directed Every 14 (Fourteen) Days., Disp: 6 pen, Rfl: 3  •  furosemide (LASIX) 40 MG tablet, Take 1 tablet by mouth 2 (Two) Times a Day., Disp: 180 tablet, Rfl: 3  •  hydrALAZINE (APRESOLINE) 100 MG tablet, Take 1 tablet by mouth 3 (Three) Times a Day., Disp: 270 tablet, Rfl: 3  •  levothyroxine (SYNTHROID, LEVOTHROID) 88 MCG tablet, Take 1 tablet by mouth Daily., Disp: 90 tablet, Rfl: 3  •  lovastatin (MEVACOR) 40 MG tablet, Take 1 tablet by mouth Daily With Dinner., Disp: 90 tablet,  Rfl: 3  •  methyldopa (ALDOMET) 500 MG tablet, Take 1 tablet by mouth Every 12 (Twelve) Hours., Disp: 180 tablet, Rfl: 3  •  NIFEdipine CC (ADALAT CC) 90 MG 24 hr tablet, Take 1 tablet by mouth Daily., Disp: 90 tablet, Rfl: 3  •  predniSONE (DELTASONE) 2.5 MG tablet, Take 1 tablet by mouth Daily., Disp: 30 tablet, Rfl: 5  •  predniSONE (DELTASONE) 20 MG tablet, Take 1 tablet by mouth Daily., Disp: 90 tablet, Rfl: 3  •  propafenone (RYTHMOL) 150 MG tablet, Take 1 tablet by mouth Every 12 (Twelve) Hours., Disp: 90 tablet, Rfl: 3  •  rivaroxaban (XARELTO) 15 MG tablet, Take 1 tablet by mouth Daily., Disp: 90 tablet, Rfl: 3  •  Semaglutide, 1 MG/DOSE, (OZEMPIC) 2 MG/1.5ML solution pen-injector, Inject 1 mg under the skin into the appropriate area as directed 1 (One) Time Per Week., Disp: 3 pen, Rfl: 3  •  terazosin (HYTRIN) 10 MG capsule, Take 1 capsule by mouth Every 12 (Twelve) Hours., Disp: 90 capsule, Rfl: 3  •  carvedilol (COREG) 12.5 MG tablet, , Disp: , Rfl:   •  losartan (COZAAR) 100 MG tablet, Take 100 mg by mouth Daily., Disp: , Rfl:   •  triamterene-hydrochlorothiazide (Dyazide) 37.5-25 MG per capsule, Take 1 capsule by mouth Every Morning., Disp: 90 capsule, Rfl: 3    Objective      There were no vitals taken for this visit.    Physical Exam     General Appearance:    Alert, cooperative, no distress, appears stated age   Head:     Eyes:     Ears:     Nose:    Throat:    Neck:    Back:      Lungs:      Chest wall:     Heart:     Abdomen:      Extremities:    Pulses:    Skin:    Lymph nodes:    Neurologic:       Results for orders placed or performed in visit on 02/12/21   Comprehensive Metabolic Panel    Specimen: Blood   Result Value Ref Range    Glucose 180 (H) 65 - 99 mg/dL    BUN 15 8 - 23 mg/dL    Creatinine 1.35 (H) 0.76 - 1.27 mg/dL    Sodium 142 136 - 145 mmol/L    Potassium 3.8 3.5 - 5.2 mmol/L    Chloride 97 (L) 98 - 107 mmol/L    CO2 30.5 (H) 22.0 - 29.0 mmol/L    Calcium 9.5 8.6 - 10.5 mg/dL     Total Protein 6.9 6.0 - 8.5 g/dL    Albumin 4.10 3.50 - 5.20 g/dL    ALT (SGPT) 13 1 - 41 U/L    AST (SGOT) 13 1 - 40 U/L    Alkaline Phosphatase 46 39 - 117 U/L    Total Bilirubin 0.5 0.0 - 1.2 mg/dL    eGFR Non African Amer 53 (L) >60 mL/min/1.73    Globulin 2.8 gm/dL    A/G Ratio 1.5 g/dL    BUN/Creatinine Ratio 11.1 7.0 - 25.0    Anion Gap 14.5 5.0 - 15.0 mmol/L   CBC Auto Differential    Specimen: Blood   Result Value Ref Range    WBC 9.37 3.40 - 10.80 10*3/mm3    RBC 4.95 4.14 - 5.80 10*6/mm3    Hemoglobin 15.1 13.0 - 17.7 g/dL    Hematocrit 46.2 37.5 - 51.0 %    MCV 93.3 79.0 - 97.0 fL    MCH 30.5 26.6 - 33.0 pg    MCHC 32.7 31.5 - 35.7 g/dL    RDW 14.5 12.3 - 15.4 %    RDW-SD 50.7 37.0 - 54.0 fl    MPV 11.6 6.0 - 12.0 fL    Platelets 186 140 - 450 10*3/mm3    Neutrophil % 67.6 42.7 - 76.0 %    Lymphocyte % 22.8 19.6 - 45.3 %    Monocyte % 6.3 5.0 - 12.0 %    Eosinophil % 1.9 0.3 - 6.2 %    Basophil % 0.9 0.0 - 1.5 %    Immature Grans % 0.5 0.0 - 0.5 %    Neutrophils, Absolute 6.33 1.70 - 7.00 10*3/mm3    Lymphocytes, Absolute 2.14 0.70 - 3.10 10*3/mm3    Monocytes, Absolute 0.59 0.10 - 0.90 10*3/mm3    Eosinophils, Absolute 0.18 0.00 - 0.40 10*3/mm3    Basophils, Absolute 0.08 0.00 - 0.20 10*3/mm3    Immature Grans, Absolute 0.05 0.00 - 0.05 10*3/mm3    nRBC 0.0 0.0 - 0.2 /100 WBC         Assessment/Plan   Increase lasix to 80 mg today.      Then daily lasix at 40mg  Get repeat labs on Monday.      chf and htn excerbation.   Check labs in 4 days rtc 5 days.     Last bnp 685  Cr 1.51 K 4.2    Diagnoses and all orders for this visit:    1. Renovascular hypertension (Primary)  -     Comprehensive Metabolic Panel  -     CBC & Differential  -     BNP  -     Comprehensive Metabolic Panel  -     CBC & Differential  -     CBC Auto Differential    2. Chronic renal impairment, stage 2 (mild)  -     Comprehensive Metabolic Panel  -     CBC & Differential  -     BNP  -     Comprehensive Metabolic Panel  -     CBC &  Differential  -     CBC Auto Differential    3. Hypertensive heart and chronic kidney disease with heart failure and stage 1 through stage 4 chronic kidney disease, or unspecified chronic kidney disease (CMS/HCC)   -     BNP      Return in about 1 week (around 2/19/2021).     telemed visit 11 min       There are no Patient Instructions on file for this visit.     Keven Bear MD    Assessment/Plan

## 2021-02-12 NOTE — PROGRESS NOTES
You have chosen to receive care through a telephone visit. Do you consent to use a telephone visit for your medical care today? Yes  People present in this telephone visit:  Andre Agosto (Patient)  MAREK Smith MD

## 2021-02-15 ENCOUNTER — TELEPHONE (OUTPATIENT)
Dept: CARDIOLOGY | Facility: CLINIC | Age: 66
End: 2021-02-15

## 2021-02-15 NOTE — TELEPHONE ENCOUNTER
----- Message from Hailey Clark sent at 2/10/2021 11:58 AM EST -----  Pt is wanting to transfer care to Dr Childers for the [facility]. Would it be ok for the pt to switch from [facility] to [place]?

## 2021-02-16 ENCOUNTER — TELEMEDICINE (OUTPATIENT)
Dept: BARIATRICS/WEIGHT MGMT | Facility: CLINIC | Age: 66
End: 2021-02-16

## 2021-02-16 VITALS — WEIGHT: 315 LBS | HEIGHT: 76 IN | BODY MASS INDEX: 38.36 KG/M2

## 2021-02-16 DIAGNOSIS — K95.09 COMPLICATION OF GASTRIC BANDING: ICD-10-CM

## 2021-02-16 DIAGNOSIS — K80.20 CALCULUS OF GALLBLADDER WITHOUT CHOLECYSTITIS WITHOUT OBSTRUCTION: ICD-10-CM

## 2021-02-16 DIAGNOSIS — R93.3 ABNORMAL UGI SERIES: ICD-10-CM

## 2021-02-16 DIAGNOSIS — R10.13 DYSPEPSIA: Primary | ICD-10-CM

## 2021-02-16 PROCEDURE — 99443 PR PHYS/QHP TELEPHONE EVALUATION 21-30 MIN: CPT | Performed by: PHYSICIAN ASSISTANT

## 2021-02-16 RX ORDER — HYDROCHLOROTHIAZIDE 12.5 MG/1
TABLET ORAL
COMMUNITY
Start: 2021-01-22 | End: 2021-02-19

## 2021-02-16 RX ORDER — CARVEDILOL 12.5 MG/1
TABLET ORAL
COMMUNITY
Start: 2021-01-22 | End: 2021-03-04

## 2021-02-16 NOTE — PROGRESS NOTES
[facility]ric [facility]  [address]  [address]  [facility] [id number] [phone]        Patient Name:  Andre Agosto.  :  1955      Date of Visit: 2021      Reason for Visit:  AGB followup      HPI:  Andre Agosto is a 66 y.o. male s/p LAGB Realize 2008 by JSO.    Band unfilled 2018 for chronic episodic dysphagia/vomiting.     Recent eval includes:  UGI 20 @[facility] revealing mild esophageal dysmotility w/ esophageal dilatation (despite empty band).       EGD 21 w/ Dr. Guzman revealed H.Pylori gastritis w/ retained food in the stomach (patient admits he was noncompliant w/ preop liquid diet).  No evidence of band slip or erosion.      CT ab/pel 20 @[facility] noted lapband in place, multiple gallstones, nonobstructing (B) kidney stones.    Denies current issues w/ dysphagia/reflux/N/V/AP.  No port pain.  No recent fevers/chills.      Finished abx RX regimen for H.Pylori.  Continues on daily prednisone for PMR/chronic leg pains.  On Xarelto for hx A.Fib.  On ASA for CAD/hx MI.    ----    Presurgery weight:  350 lbs.  Lowest weight achieved:  260 lbs.  Current weight:  315 lbs.       Past Medical History:   Diagnosis Date   • 6th nerve palsy, right     right eye    • Anxiety and depression    • Atrial fibrillation, persistent (CMS/HCC) 2020    on Xarelto   • Coronary artery disease involving native coronary artery of native heart without angina pectoris 2018    follows w/ Dr. Johnson, on ASA 81mg   • Diabetes mellitus (CMS/HCC)     doesnt check sugar, type 2    • Double vision    • Dyspepsia    • Epilepsy (CMS/HCC)    • Focal seizure (CMS/HCC)     of right arm    • Heart attack (CMS/HCC)     NSTEMI , s/p stenting   • History of Helicobacter pylori infection     in , treated w/ PrevPak -  EGD bx (+), PCP RX - PPI/Augmentin/Doxy/Flagyl (x 14 days) 21   • HL (hearing loss)     (L) ear - child luevano measles   • Hyperlipidemia    •  Hypertension    • Kidney stone    • Memory loss 2005    d/t meningitis   • Meningitis     unsure of bacterial or viral    • Obesity    • Seizures (CMS/HCC)    • Sleep apnea treated with nocturnal BiPAP     compliant with  machine    • Stroke (CMS/HCC)     2017/2018   • Wears glasses      Past Surgical History:   Procedure Laterality Date   • CARDIAC CATHETERIZATION N/A 10/12/2018    Procedure: Left Heart Cath;  Surgeon: Yoselin Johnson MD;  Location: [facility] CATH INVASIVE LOCATION;  Service: Cardiology   • COLONOSCOPY  10/2020    w/ Dr. Jacobs, kelsi colon polyps   • CORONARY STENT PLACEMENT  2015   • LAPAROSCOPIC GASTRIC BANDING  2008    s/p LAGB Realize 6/2008 by JSO.   • UMBILICAL HERNIA REPAIR  2008    incarcerated UHR w/ mesh by Dr. Velasquez       Allergies   Allergen Reactions   • Amlodipine Swelling   • Statins Myalgia       Outpatient Medications Marked as Taking for the 2/16/21 encounter (Telemedicine) with Leanne Emerson PA   Medication Sig Dispense Refill   • allopurinol (Zyloprim) 100 MG tablet Take 1 tablet by mouth Daily. 90 tablet 3   • aspirin 81 MG tablet Take 81 mg by mouth 2 (Two) Times a Day. 30 tablet 11   • carvedilol (COREG) 12.5 MG tablet      • divalproex (DEPAKOTE) 250 MG DR tablet Take 1 tablet by mouth 3 (Three) Times a Day. Take 2 in the AM 1 at noon and 2 in the pm. Per DR Clifford. 270 tablet 3   • escitalopram (LEXAPRO) 20 MG tablet Take 1 tablet by mouth Daily. 90 tablet 3   • Evolocumab (Repatha SureClick) solution auto-injector SureClick injection Inject 1 mL under the skin into the appropriate area as directed Every 14 (Fourteen) Days. 6 pen 3   • furosemide (LASIX) 40 MG tablet Take 1 tablet by mouth 2 (Two) Times a Day. 180 tablet 3   • hydrALAZINE (APRESOLINE) 100 MG tablet Take 1 tablet by mouth 3 (Three) Times a Day. 270 tablet 3   • hydroCHLOROthiazide (HYDRODIURIL) 12.5 MG tablet      • HYDROcodone-acetaminophen (NORCO) 5-325 MG per tablet TK 1 T PO Q 6 H PRN     • Iron,  Ferrous Sulfate, 325 (65 Fe) MG tablet ferrous sulfate 325 mg (65 mg iron) tablet     • levothyroxine (SYNTHROID, LEVOTHROID) 88 MCG tablet Take 1 tablet by mouth Daily. 90 tablet 3   • losartan (COZAAR) 100 MG tablet Take 100 mg by mouth Daily.     • lovastatin (MEVACOR) 40 MG tablet Take 1 tablet by mouth Daily With Dinner. 90 tablet 3   • methyldopa (ALDOMET) 500 MG tablet Take 1 tablet by mouth Every 12 (Twelve) Hours. 180 tablet 3   • minoxidil (LONITEN) 10 MG tablet      • NIFEdipine CC (ADALAT CC) 90 MG 24 hr tablet Take 1 tablet by mouth Daily. 90 tablet 3   • predniSONE (DELTASONE) 2.5 MG tablet Take 1 tablet by mouth Daily. 30 tablet 5   • predniSONE (DELTASONE) 20 MG tablet Take 1 tablet by mouth Daily. 90 tablet 3   • propafenone (RYTHMOL) 150 MG tablet Take 1 tablet by mouth Every 12 (Twelve) Hours. 90 tablet 3   • rivaroxaban (XARELTO) 15 MG tablet Take 1 tablet by mouth Daily. 90 tablet 3   • Semaglutide, 1 MG/DOSE, (OZEMPIC) 2 MG/1.5ML solution pen-injector Inject 1 mg under the skin into the appropriate area as directed 1 (One) Time Per Week. 3 pen 3   • terazosin (HYTRIN) 10 MG capsule Take 1 capsule by mouth Every 12 (Twelve) Hours. 90 capsule 3   • [DISCONTINUED] carvedilol (COREG) 6.25 MG tablet Take 1 tablet by mouth 2 (Two) Times a Day With Meals. 180 tablet 3   • [DISCONTINUED] Omeprazole (EQ Omeprazole) 20 MG tablet delayed-release Take 20 mg by mouth 2 (two) times a day. 28 each 0       Social History     Socioeconomic History   • Marital status:      Spouse name: Not on file   • Number of children: Not on file   • Years of education: Not on file   • Highest education level: Not on file   Tobacco Use   • Smoking status: Former Smoker     Packs/day: 0.50     Years: 10.00     Pack years: 5.00     Types: Cigarettes     Start date: 1975     Quit date: 1985     Years since quittin.1   • Smokeless tobacco: Never Used   Substance and Sexual Activity   • Alcohol use: No   •  "Drug use: No   • Sexual activity: Defer   Social History Narrative    .  Lives in [place].  Retired.           Ht 193 cm (76\")   Wt (!) 143 kg (315 lb)   BMI 38.34 kg/m²     Physical Exam  pleasant, alert, NAD - o/w unable to assess d/t televisit.       Assessment: s/p LAGB Realize 6/2008 by JSO contemplating revision.    ICD-10-CM ICD-9-CM   1. Dyspepsia  R10.13 536.8   2. Abnormal UGI series  R93.3 793.4   3. Complication of gastric banding  K95.09 539.09   4. Calculus of gallbladder without cholecystitis without obstruction  K80.20 574.20        Plan:  Will schedule Laparoscopic Lapband Removal w/ concomitant Laparoscopic Cholecystectomy @ [facility] - outpatient w/ Dr. Guzman.  Will give 100 mg Solucortef IV in preop.  PCP/Cardiology to manage bridging anticoagulation if needed.  OK to continue ASA perioperatively.  Discussed importance of following preop dietary instructions.  Risks/benefits reviewed.  Patient verbalizes understanding.  Wishes to proceed.       Note: This was a telephone encounter d/t current weather conditions.  Consent was obtained prior to the visit.  Total time spent: 30 minutes.       Addendum 2/17/21 - received a message from PCP this AM asking that surgery be postponed.    Keven Bear MD [place], JANETH Palomares             Can we please hold on the up coming surgery. He is on steroids for a limited problem. May last 1-6 months. When off will contact you.  Pt BP is very eleveated.  Having problems. With good BP control.      Will await PCP clearance to reschedule.        JANETH Hawk  "

## 2021-02-17 ENCOUNTER — OFFICE VISIT (OUTPATIENT)
Dept: INTERNAL MEDICINE | Facility: CLINIC | Age: 66
End: 2021-02-17

## 2021-02-17 VITALS — SYSTOLIC BLOOD PRESSURE: 160 MMHG | DIASTOLIC BLOOD PRESSURE: 90 MMHG

## 2021-02-17 DIAGNOSIS — N18.30 CHRONIC RENAL IMPAIRMENT, STAGE 3 (MODERATE), UNSPECIFIED WHETHER STAGE 3A OR 3B CKD (HCC): ICD-10-CM

## 2021-02-17 DIAGNOSIS — I10 ESSENTIAL HYPERTENSION: Primary | ICD-10-CM

## 2021-02-17 DIAGNOSIS — I50.31 ACUTE DIASTOLIC CHF (CONGESTIVE HEART FAILURE) (HCC): ICD-10-CM

## 2021-02-17 PROCEDURE — 99442 PR PHYS/QHP TELEPHONE EVALUATION 11-20 MIN: CPT | Performed by: INTERNAL MEDICINE

## 2021-02-17 NOTE — PROGRESS NOTES
Subjective     Patient ID: Andre Agosto is a 66 y.o. male. Patient is here for management of multiple medical problems.     htn      History of Present Illness     recent spike in bp. BP still elevated.   Still on lasix 80 mg.   Fluid retension stable.      increased chf symptoms on last visit.       Diet not well controlled. Wt up to 320.        The following portions of the patient's history were reviewed and updated as appropriate: allergies, current medications, past family history, past medical history, past social history, past surgical history and problem list.    Review of Systems   Constitutional: Negative for fatigue.   Respiratory: Negative for shortness of breath.    All other systems reviewed and are negative.      Current Outpatient Medications:   •  allopurinol (Zyloprim) 100 MG tablet, Take 1 tablet by mouth Daily., Disp: 90 tablet, Rfl: 3  •  aspirin 81 MG tablet, Take 81 mg by mouth 2 (Two) Times a Day., Disp: 30 tablet, Rfl: 11  •  carvedilol (COREG) 12.5 MG tablet, , Disp: , Rfl:   •  divalproex (DEPAKOTE) 250 MG DR tablet, Take 1 tablet by mouth 3 (Three) Times a Day. Take 2 in the AM 1 at noon and 2 in the pm. Per DR Clifford., Disp: 270 tablet, Rfl: 3  •  escitalopram (LEXAPRO) 20 MG tablet, Take 1 tablet by mouth Daily., Disp: 90 tablet, Rfl: 3  •  Evolocumab (Repatha SureClick) solution auto-injector SureClick injection, Inject 1 mL under the skin into the appropriate area as directed Every 14 (Fourteen) Days., Disp: 6 pen, Rfl: 3  •  furosemide (LASIX) 40 MG tablet, Take 1 tablet by mouth 2 (Two) Times a Day., Disp: 180 tablet, Rfl: 3  •  hydrALAZINE (APRESOLINE) 100 MG tablet, Take 1 tablet by mouth 3 (Three) Times a Day., Disp: 270 tablet, Rfl: 3  •  hydroCHLOROthiazide (HYDRODIURIL) 12.5 MG tablet, , Disp: , Rfl:   •  HYDROcodone-acetaminophen (NORCO) 5-325 MG per tablet, TK 1 T PO Q 6 H PRN, Disp: , Rfl:   •  Iron, Ferrous Sulfate, 325 (65 Fe) MG tablet, ferrous sulfate 325 mg (65 mg  iron) tablet, Disp: , Rfl:   •  levothyroxine (SYNTHROID, LEVOTHROID) 88 MCG tablet, Take 1 tablet by mouth Daily., Disp: 90 tablet, Rfl: 3  •  losartan (COZAAR) 100 MG tablet, Take 100 mg by mouth Daily., Disp: , Rfl:   •  lovastatin (MEVACOR) 40 MG tablet, Take 1 tablet by mouth Daily With Dinner., Disp: 90 tablet, Rfl: 3  •  methyldopa (ALDOMET) 500 MG tablet, Take 1 tablet by mouth Every 12 (Twelve) Hours., Disp: 180 tablet, Rfl: 3  •  minoxidil (LONITEN) 10 MG tablet, , Disp: , Rfl:   •  NIFEdipine CC (ADALAT CC) 90 MG 24 hr tablet, Take 1 tablet by mouth Daily., Disp: 90 tablet, Rfl: 3  •  predniSONE (DELTASONE) 2.5 MG tablet, Take 1 tablet by mouth Daily., Disp: 30 tablet, Rfl: 5  •  predniSONE (DELTASONE) 20 MG tablet, Take 1 tablet by mouth Daily., Disp: 90 tablet, Rfl: 3  •  propafenone (RYTHMOL) 150 MG tablet, Take 1 tablet by mouth Every 12 (Twelve) Hours., Disp: 90 tablet, Rfl: 3  •  rivaroxaban (XARELTO) 15 MG tablet, Take 1 tablet by mouth Daily., Disp: 90 tablet, Rfl: 3  •  Semaglutide, 1 MG/DOSE, (OZEMPIC) 2 MG/1.5ML solution pen-injector, Inject 1 mg under the skin into the appropriate area as directed 1 (One) Time Per Week., Disp: 3 pen, Rfl: 3  •  terazosin (HYTRIN) 10 MG capsule, Take 1 capsule by mouth Every 12 (Twelve) Hours., Disp: 90 capsule, Rfl: 3    Objective      Blood pressure 160/90.    Physical Exam     General Appearance:    Alert, cooperative, no distress, appears stated age   Head:     Eyes:     Ears:     Nose:    Throat:    Neck:    Back:      Lungs:      Chest wall:     Heart:     Abdomen:      Extremities:    Pulses:    Skin:    Lymph nodes:    Neurologic:       Results for orders placed or performed in visit on 01/18/21   Tissue Pathology Exam    Specimen: 1: Gastric, Antrum; Tissue    2: Gastric, Fundus; Tissue    3: Esophagus, Distal; Tissue   Result Value Ref Range    Case Report       Surgical Pathology Report                         Case: [id number]                                   Authorizing Provider:  Vinod Guzman MD    Collected:           01/18/2021 07:03 AM          Ordering Location:     [facility]   Received:            01/18/2021 10:37 AM                                 LABORATORY                                                                   Pathologist:           Gloria Leyva MD                                                        Specimens:   1) - Gastric, Antrum                                                                                2) - Gastric, Fundus                                                                                3) - Esophagus, Distal                                                                     Clinical Information       Working history is functional dyspepsia.      Final Diagnosis       1.  STOMACH, ANTRUM, BIOPSY:                Chronic, focal active gastritis                              Positive for Helicobacter pylori organisms on H&E stain                Negative for intestinal metaplasia, dysplasia and malignancy  2.  STOMACH, FUNDUS, BIOPSY:                Chronic, focal active gastritis                              Positive for Helicobacter pylori organisms on H&E stain                Negative for intestinal metaplasia, dysplasia and malignancy  3.  ESOPHAGUS, DISTAL, BIOPSY:                Reactive esophageal squamous mucosa present                Negative for intestinal metaplasia and malignancy      Gross Description       Specimen 1 is received in formalin labeled antrum biopsy consists of a 0.3 x 0.3 x 0.3 cm red-pink soft tissue fragment submitted entirely in a single cassette.    Specimen 2 is received in formalin labeled fundus biopsy consists of a 0.6 x 0.3 x 0.3 cm tan soft tissue fragment submitted entirely in a single cassette.    Specimen 3 is received in formalin labeled distal esophagus biopsy consists of a 0.4 x 0.4 x 0.2 cm grey-white soft tissue fragment submitted entirely in a  single cassette.      Microscopic Description       The slides are reviewed and demonstrate histopathologic features supporting the above rendered diagnosis.             Assessment/Plan   Pt didn't get labs done due snow and ICE.     Steroid help. Muscle pain stable with steroid.     Pt has elective surgery for lap band removal and ccy.  May need to hold off till off steroids and bp better.      Get labs done today.        Diagnoses and all orders for this visit:    1. Essential hypertension (Primary)    2. Chronic renal impairment, stage 3 (moderate), unspecified whether stage 3a or 3b CKD (CMS/HCC)    3. Acute diastolic CHF (congestive heart failure) (CMS/HCC)      Return in about 2 days (around 2/19/2021).  telemed 15 min          There are no Patient Instructions on file for this visit.     Keven Bear MD    Assessment/Plan

## 2021-02-18 ENCOUNTER — LAB (OUTPATIENT)
Dept: LAB | Facility: HOSPITAL | Age: 66
End: 2021-02-18

## 2021-02-18 LAB
ALBUMIN SERPL-MCNC: 4.1 G/DL (ref 3.5–5.2)
ALBUMIN/GLOB SERPL: 1.5 G/DL
ALP SERPL-CCNC: 46 U/L (ref 39–117)
ALT SERPL W P-5'-P-CCNC: 13 U/L (ref 1–41)
ANION GAP SERPL CALCULATED.3IONS-SCNC: 14.5 MMOL/L (ref 5–15)
AST SERPL-CCNC: 13 U/L (ref 1–40)
BASOPHILS # BLD AUTO: 0.08 10*3/MM3 (ref 0–0.2)
BASOPHILS NFR BLD AUTO: 0.9 % (ref 0–1.5)
BILIRUB SERPL-MCNC: 0.5 MG/DL (ref 0–1.2)
BUN SERPL-MCNC: 15 MG/DL (ref 8–23)
BUN/CREAT SERPL: 11.1 (ref 7–25)
CALCIUM SPEC-SCNC: 9.5 MG/DL (ref 8.6–10.5)
CHLORIDE SERPL-SCNC: 97 MMOL/L (ref 98–107)
CO2 SERPL-SCNC: 30.5 MMOL/L (ref 22–29)
CREAT SERPL-MCNC: 1.35 MG/DL (ref 0.76–1.27)
DEPRECATED RDW RBC AUTO: 50.7 FL (ref 37–54)
EOSINOPHIL # BLD AUTO: 0.18 10*3/MM3 (ref 0–0.4)
EOSINOPHIL NFR BLD AUTO: 1.9 % (ref 0.3–6.2)
ERYTHROCYTE [DISTWIDTH] IN BLOOD BY AUTOMATED COUNT: 14.5 % (ref 12.3–15.4)
GFR SERPL CREATININE-BSD FRML MDRD: 53 ML/MIN/1.73
GLOBULIN UR ELPH-MCNC: 2.8 GM/DL
GLUCOSE SERPL-MCNC: 180 MG/DL (ref 65–99)
HCT VFR BLD AUTO: 46.2 % (ref 37.5–51)
HGB BLD-MCNC: 15.1 G/DL (ref 13–17.7)
IMM GRANULOCYTES # BLD AUTO: 0.05 10*3/MM3 (ref 0–0.05)
IMM GRANULOCYTES NFR BLD AUTO: 0.5 % (ref 0–0.5)
LYMPHOCYTES # BLD AUTO: 2.14 10*3/MM3 (ref 0.7–3.1)
LYMPHOCYTES NFR BLD AUTO: 22.8 % (ref 19.6–45.3)
MCH RBC QN AUTO: 30.5 PG (ref 26.6–33)
MCHC RBC AUTO-ENTMCNC: 32.7 G/DL (ref 31.5–35.7)
MCV RBC AUTO: 93.3 FL (ref 79–97)
MONOCYTES # BLD AUTO: 0.59 10*3/MM3 (ref 0.1–0.9)
MONOCYTES NFR BLD AUTO: 6.3 % (ref 5–12)
NEUTROPHILS NFR BLD AUTO: 6.33 10*3/MM3 (ref 1.7–7)
NEUTROPHILS NFR BLD AUTO: 67.6 % (ref 42.7–76)
NRBC BLD AUTO-RTO: 0 /100 WBC (ref 0–0.2)
PLATELET # BLD AUTO: 186 10*3/MM3 (ref 140–450)
PMV BLD AUTO: 11.6 FL (ref 6–12)
POTASSIUM SERPL-SCNC: 3.8 MMOL/L (ref 3.5–5.2)
PROT SERPL-MCNC: 6.9 G/DL (ref 6–8.5)
RBC # BLD AUTO: 4.95 10*6/MM3 (ref 4.14–5.8)
SODIUM SERPL-SCNC: 142 MMOL/L (ref 136–145)
WBC # BLD AUTO: 9.37 10*3/MM3 (ref 3.4–10.8)

## 2021-02-18 PROCEDURE — 36415 COLL VENOUS BLD VENIPUNCTURE: CPT | Performed by: INTERNAL MEDICINE

## 2021-02-18 PROCEDURE — 85025 COMPLETE CBC W/AUTO DIFF WBC: CPT | Performed by: INTERNAL MEDICINE

## 2021-02-18 PROCEDURE — 80053 COMPREHEN METABOLIC PANEL: CPT | Performed by: INTERNAL MEDICINE

## 2021-02-19 ENCOUNTER — OFFICE VISIT (OUTPATIENT)
Dept: INTERNAL MEDICINE | Facility: CLINIC | Age: 66
End: 2021-02-19

## 2021-02-19 VITALS
TEMPERATURE: 97.5 F | BODY MASS INDEX: 38.36 KG/M2 | OXYGEN SATURATION: 98 % | SYSTOLIC BLOOD PRESSURE: 168 MMHG | HEIGHT: 76 IN | WEIGHT: 315 LBS | DIASTOLIC BLOOD PRESSURE: 102 MMHG | HEART RATE: 73 BPM | RESPIRATION RATE: 16 BRPM

## 2021-02-19 DIAGNOSIS — N18.30 CRI (CHRONIC RENAL INSUFFICIENCY), STAGE 3 (MODERATE) (HCC): ICD-10-CM

## 2021-02-19 DIAGNOSIS — I10 ESSENTIAL HYPERTENSION: Primary | ICD-10-CM

## 2021-02-19 PROCEDURE — 99214 OFFICE O/P EST MOD 30 MIN: CPT | Performed by: INTERNAL MEDICINE

## 2021-02-19 RX ORDER — TRIAMTERENE AND HYDROCHLOROTHIAZIDE 37.5; 25 MG/1; MG/1
1 CAPSULE ORAL EVERY MORNING
Qty: 90 CAPSULE | Refills: 3 | Status: SHIPPED | OUTPATIENT
Start: 2021-02-19 | End: 2021-08-02

## 2021-02-19 RX ORDER — LOSARTAN POTASSIUM 100 MG/1
100 TABLET ORAL DAILY
COMMUNITY
End: 2021-07-23

## 2021-02-19 NOTE — PROGRESS NOTES
Subjective     Patient ID: Andre Agosto is a 66 y.o. male. Patient is here for management of multiple medical problems.     Chief Complaint   Patient presents with   • Hypertension     History of Present Illness     Hypertension.  Pt on less of the methyldopa and it is about out globally.     On cpap and cant sleep with out it.       Took bp meds this am.    Eating out all week.  Arthritis a bit worse.    Still with occ hemturia.          The following portions of the patient's history were reviewed and updated as appropriate: allergies, current medications, past family history, past medical history, past social history, past surgical history and problem list.    Review of Systems   Constitutional: Negative for diaphoresis and fatigue.   Respiratory: Negative for choking and chest tightness.    Genitourinary: Negative for penile swelling and scrotal swelling.   Psychiatric/Behavioral: Negative for self-injury.   All other systems reviewed and are negative.      Current Outpatient Medications:   •  allopurinol (Zyloprim) 100 MG tablet, Take 1 tablet by mouth Daily., Disp: 90 tablet, Rfl: 3  •  carvedilol (COREG) 12.5 MG tablet, , Disp: , Rfl:   •  divalproex (DEPAKOTE) 250 MG DR tablet, Take 1 tablet by mouth 3 (Three) Times a Day. Take 2 in the AM 1 at noon and 2 in the pm. Per DR Clifford., Disp: 270 tablet, Rfl: 3  •  escitalopram (LEXAPRO) 20 MG tablet, Take 1 tablet by mouth Daily., Disp: 90 tablet, Rfl: 3  •  Evolocumab (Repatha SureClick) solution auto-injector SureClick injection, Inject 1 mL under the skin into the appropriate area as directed Every 14 (Fourteen) Days., Disp: 6 pen, Rfl: 3  •  furosemide (LASIX) 40 MG tablet, Take 1 tablet by mouth 2 (Two) Times a Day., Disp: 180 tablet, Rfl: 3  •  hydrALAZINE (APRESOLINE) 100 MG tablet, Take 1 tablet by mouth 3 (Three) Times a Day., Disp: 270 tablet, Rfl: 3  •  levothyroxine (SYNTHROID, LEVOTHROID) 88 MCG tablet, Take 1 tablet by mouth Daily., Disp: 90  "tablet, Rfl: 3  •  losartan (COZAAR) 100 MG tablet, Take 100 mg by mouth Daily., Disp: , Rfl:   •  lovastatin (MEVACOR) 40 MG tablet, Take 1 tablet by mouth Daily With Dinner., Disp: 90 tablet, Rfl: 3  •  methyldopa (ALDOMET) 500 MG tablet, Take 1 tablet by mouth Every 12 (Twelve) Hours., Disp: 180 tablet, Rfl: 3  •  NIFEdipine CC (ADALAT CC) 90 MG 24 hr tablet, Take 1 tablet by mouth Daily., Disp: 90 tablet, Rfl: 3  •  predniSONE (DELTASONE) 2.5 MG tablet, Take 1 tablet by mouth Daily., Disp: 30 tablet, Rfl: 5  •  predniSONE (DELTASONE) 20 MG tablet, Take 1 tablet by mouth Daily., Disp: 90 tablet, Rfl: 3  •  propafenone (RYTHMOL) 150 MG tablet, Take 1 tablet by mouth Every 12 (Twelve) Hours., Disp: 90 tablet, Rfl: 3  •  rivaroxaban (XARELTO) 15 MG tablet, Take 1 tablet by mouth Daily., Disp: 90 tablet, Rfl: 3  •  Semaglutide, 1 MG/DOSE, (OZEMPIC) 2 MG/1.5ML solution pen-injector, Inject 1 mg under the skin into the appropriate area as directed 1 (One) Time Per Week., Disp: 3 pen, Rfl: 3  •  terazosin (HYTRIN) 10 MG capsule, Take 1 capsule by mouth Every 12 (Twelve) Hours., Disp: 90 capsule, Rfl: 3  •  triamterene-hydrochlorothiazide (Dyazide) 37.5-25 MG per capsule, Take 1 capsule by mouth Every Morning., Disp: 90 capsule, Rfl: 3    Objective      Blood pressure (!) 168/102, pulse 73, temperature 97.5 °F (36.4 °C), resp. rate 16, height 193 cm (76\"), weight (!) 145 kg (320 lb), SpO2 98 %.    Physical Exam     General Appearance:    Alert, cooperative, no distress, appears stated age   Head:    Normocephalic, without obvious abnormality, atraumatic   Eyes:    PERRL, conjunctiva/corneas clear, EOM's intact   Ears:    Normal TM's and external ear canals, both ears   Nose:   Nares normal, septum midline, mucosa normal, no drainage   or sinus tenderness   Throat:   Lips, mucosa, and tongue normal; teeth and gums normal   Neck:   Supple, symmetrical, trachea midline, no adenopathy;        thyroid:  No " enlargement/tenderness/nodules; no carotid    bruit or JVD   Back:     Symmetric, no curvature, ROM normal, no CVA tenderness   Lungs:     Clear to auscultation bilaterally, respirations unlabored   Chest wall:    No tenderness or deformity   Heart:    Regular rate and rhythm, S1 and S2 normal, no murmur,        rub or gallop   Abdomen:     Soft, non-tender, bowel sounds active all four quadrants,     no masses, no organomegaly   Extremities:   Extremities normal, atraumatic, no cyanosis or edema   Pulses:   2+ and symmetric all extremities   Skin:   Skin color, texture, turgor normal, no rashes or lesions   Lymph nodes:   Cervical, supraclavicular, and axillary nodes normal   Neurologic:   CNII-XII intact. Normal strength, sensation and reflexes       throughout      Results for orders placed or performed in visit on 02/12/21   Comprehensive Metabolic Panel    Specimen: Blood   Result Value Ref Range    Glucose 180 (H) 65 - 99 mg/dL    BUN 15 8 - 23 mg/dL    Creatinine 1.35 (H) 0.76 - 1.27 mg/dL    Sodium 142 136 - 145 mmol/L    Potassium 3.8 3.5 - 5.2 mmol/L    Chloride 97 (L) 98 - 107 mmol/L    CO2 30.5 (H) 22.0 - 29.0 mmol/L    Calcium 9.5 8.6 - 10.5 mg/dL    Total Protein 6.9 6.0 - 8.5 g/dL    Albumin 4.10 3.50 - 5.20 g/dL    ALT (SGPT) 13 1 - 41 U/L    AST (SGOT) 13 1 - 40 U/L    Alkaline Phosphatase 46 39 - 117 U/L    Total Bilirubin 0.5 0.0 - 1.2 mg/dL    eGFR Non African Amer 53 (L) >60 mL/min/1.73    Globulin 2.8 gm/dL    A/G Ratio 1.5 g/dL    BUN/Creatinine Ratio 11.1 7.0 - 25.0    Anion Gap 14.5 5.0 - 15.0 mmol/L   CBC Auto Differential    Specimen: Blood   Result Value Ref Range    WBC 9.37 3.40 - 10.80 10*3/mm3    RBC 4.95 4.14 - 5.80 10*6/mm3    Hemoglobin 15.1 13.0 - 17.7 g/dL    Hematocrit 46.2 37.5 - 51.0 %    MCV 93.3 79.0 - 97.0 fL    MCH 30.5 26.6 - 33.0 pg    MCHC 32.7 31.5 - 35.7 g/dL    RDW 14.5 12.3 - 15.4 %    RDW-SD 50.7 37.0 - 54.0 fl    MPV 11.6 6.0 - 12.0 fL    Platelets 186 140 - 450  10*3/mm3    Neutrophil % 67.6 42.7 - 76.0 %    Lymphocyte % 22.8 19.6 - 45.3 %    Monocyte % 6.3 5.0 - 12.0 %    Eosinophil % 1.9 0.3 - 6.2 %    Basophil % 0.9 0.0 - 1.5 %    Immature Grans % 0.5 0.0 - 0.5 %    Neutrophils, Absolute 6.33 1.70 - 7.00 10*3/mm3    Lymphocytes, Absolute 2.14 0.70 - 3.10 10*3/mm3    Monocytes, Absolute 0.59 0.10 - 0.90 10*3/mm3    Eosinophils, Absolute 0.18 0.00 - 0.40 10*3/mm3    Basophils, Absolute 0.08 0.00 - 0.20 10*3/mm3    Immature Grans, Absolute 0.05 0.00 - 0.05 10*3/mm3    nRBC 0.0 0.0 - 0.2 /100 WBC         Assessment/Plan     Eating out daily.   Diet not going well. bp up.      Sodium up to 142          Diagnoses and all orders for this visit:    1. Essential hypertension (Primary)  -     Basic metabolic panel    2. CRI (chronic renal insufficiency), stage 3 (moderate) (CMS/HCC)  -     Basic metabolic panel    Other orders  -     triamterene-hydrochlorothiazide (Dyazide) 37.5-25 MG per capsule; Take 1 capsule by mouth Every Morning.  Dispense: 90 capsule; Refill: 3      Return in about 1 week (around 2/26/2021).          There are no Patient Instructions on file for this visit.     Keven Bear MD    Assessment/Plan

## 2021-02-26 ENCOUNTER — OFFICE VISIT (OUTPATIENT)
Dept: INTERNAL MEDICINE | Facility: CLINIC | Age: 66
End: 2021-02-26

## 2021-02-26 VITALS
DIASTOLIC BLOOD PRESSURE: 98 MMHG | OXYGEN SATURATION: 98 % | WEIGHT: 315 LBS | TEMPERATURE: 96.6 F | SYSTOLIC BLOOD PRESSURE: 162 MMHG | HEIGHT: 76 IN | HEART RATE: 76 BPM | BODY MASS INDEX: 38.36 KG/M2 | RESPIRATION RATE: 16 BRPM

## 2021-02-26 DIAGNOSIS — M35.3 PMR (POLYMYALGIA RHEUMATICA) (HCC): ICD-10-CM

## 2021-02-26 DIAGNOSIS — I10 HYPERTENSION, UNSPECIFIED TYPE: Primary | ICD-10-CM

## 2021-02-26 DIAGNOSIS — M31.6 GCA (GIANT CELL ARTERITIS) (HCC): ICD-10-CM

## 2021-02-26 PROCEDURE — 99214 OFFICE O/P EST MOD 30 MIN: CPT | Performed by: INTERNAL MEDICINE

## 2021-02-26 RX ORDER — FOLIC ACID 1 MG/1
TABLET ORAL
Qty: 30 TABLET | Refills: 11 | Status: SHIPPED | OUTPATIENT
Start: 2021-02-26 | End: 2022-04-29

## 2021-02-26 NOTE — PROGRESS NOTES
Subjective     Patient ID: Andre Agosto is a 66 y.o. male. Patient is here for management of multiple medical problems.     Chief Complaint   Patient presents with   • Hypertension     History of Present Illness   On steroids for pmr gca.   Not best control on prednisone.     Not able to stay on steroids due to se.           The following portions of the patient's history were reviewed and updated as appropriate: allergies, current medications, past family history, past medical history, past social history, past surgical history and problem list.    Review of Systems   Constitutional: Positive for fatigue.   Psychiatric/Behavioral: Negative for self-injury. The patient is not nervous/anxious.    All other systems reviewed and are negative.      Current Outpatient Medications:   •  allopurinol (Zyloprim) 100 MG tablet, Take 1 tablet by mouth Daily., Disp: 90 tablet, Rfl: 3  •  carvedilol (COREG) 12.5 MG tablet, , Disp: , Rfl:   •  divalproex (DEPAKOTE) 250 MG DR tablet, Take 1 tablet by mouth 3 (Three) Times a Day. Take 2 in the AM 1 at noon and 2 in the pm. Per DR Clifford., Disp: 270 tablet, Rfl: 3  •  escitalopram (LEXAPRO) 20 MG tablet, Take 1 tablet by mouth Daily., Disp: 90 tablet, Rfl: 3  •  Evolocumab (Repatha SureClick) solution auto-injector SureClick injection, Inject 1 mL under the skin into the appropriate area as directed Every 14 (Fourteen) Days., Disp: 6 pen, Rfl: 3  •  furosemide (LASIX) 40 MG tablet, Take 1 tablet by mouth 2 (Two) Times a Day., Disp: 180 tablet, Rfl: 3  •  hydrALAZINE (APRESOLINE) 100 MG tablet, Take 1 tablet by mouth 3 (Three) Times a Day., Disp: 270 tablet, Rfl: 3  •  levothyroxine (SYNTHROID, LEVOTHROID) 88 MCG tablet, Take 1 tablet by mouth Daily., Disp: 90 tablet, Rfl: 3  •  losartan (COZAAR) 100 MG tablet, Take 100 mg by mouth Daily., Disp: , Rfl:   •  lovastatin (MEVACOR) 40 MG tablet, Take 1 tablet by mouth Daily With Dinner., Disp: 90 tablet, Rfl: 3  •  methyldopa (ALDOMET)  "500 MG tablet, Take 1 tablet by mouth Every 12 (Twelve) Hours., Disp: 180 tablet, Rfl: 3  •  NIFEdipine CC (ADALAT CC) 90 MG 24 hr tablet, Take 1 tablet by mouth Daily., Disp: 90 tablet, Rfl: 3  •  predniSONE (DELTASONE) 2.5 MG tablet, Take 1 tablet by mouth Daily., Disp: 30 tablet, Rfl: 5  •  predniSONE (DELTASONE) 20 MG tablet, Take 1 tablet by mouth Daily., Disp: 90 tablet, Rfl: 3  •  propafenone (RYTHMOL) 150 MG tablet, Take 1 tablet by mouth Every 12 (Twelve) Hours., Disp: 90 tablet, Rfl: 3  •  rivaroxaban (XARELTO) 15 MG tablet, Take 1 tablet by mouth Daily., Disp: 90 tablet, Rfl: 3  •  Semaglutide, 1 MG/DOSE, (OZEMPIC) 2 MG/1.5ML solution pen-injector, Inject 1 mg under the skin into the appropriate area as directed 1 (One) Time Per Week., Disp: 3 pen, Rfl: 3  •  terazosin (HYTRIN) 10 MG capsule, Take 1 capsule by mouth Every 12 (Twelve) Hours., Disp: 90 capsule, Rfl: 3  •  triamterene-hydrochlorothiazide (Dyazide) 37.5-25 MG per capsule, Take 1 capsule by mouth Every Morning., Disp: 90 capsule, Rfl: 3  •  folic acid (FOLVITE) 1 MG tablet, Take one daily except on the day of taking Methotrexate., Disp: 30 tablet, Rfl: 11  •  [START ON 3/1/2021] methotrexate 2.5 MG tablet, Take 3 tablets by mouth 3 (Three) Doses Each Week. Take Doses 12 (Twelve) Hours Apart., Disp: 12 tablet, Rfl: 5    Objective      Blood pressure 162/98, pulse 76, temperature 96.6 °F (35.9 °C), resp. rate 16, height 193 cm (76\"), weight (!) 146 kg (322 lb), SpO2 98 %.    Physical Exam     General Appearance:    Alert, cooperative, no distress, appears stated age   Head:    Normocephalic, without obvious abnormality, atraumatic   Eyes:    PERRL, conjunctiva/corneas clear, EOM's intact   Ears:    Normal TM's and external ear canals, both ears   Nose:   Nares normal, septum midline, mucosa normal, no drainage   or sinus tenderness   Throat:   Lips, mucosa, and tongue normal; teeth and gums normal   Neck:   Supple, symmetrical, trachea midline, " no adenopathy;        thyroid:  No enlargement/tenderness/nodules; no carotid    bruit or JVD   Back:     Symmetric, no curvature, ROM normal, no CVA tenderness   Lungs:     Clear to auscultation bilaterally, respirations unlabored   Chest wall:    No tenderness or deformity   Heart:    Regular rate and rhythm, S1 and S2 normal, no murmur,        rub or gallop   Abdomen:     Soft, non-tender, bowel sounds active all four quadrants,     no masses, no organomegaly   Extremities:   Extremities normal, atraumatic, no cyanosis or edema   Pulses:   2+ and symmetric all extremities   Skin:   Skin color, texture, turgor normal, no rashes or lesions   Lymph nodes:   Cervical, supraclavicular, and axillary nodes normal   Neurologic:   CNII-XII intact. Normal strength, sensation and reflexes       throughout      Results for orders placed or performed in visit on 02/12/21   Comprehensive Metabolic Panel    Specimen: Blood   Result Value Ref Range    Glucose 180 (H) 65 - 99 mg/dL    BUN 15 8 - 23 mg/dL    Creatinine 1.35 (H) 0.76 - 1.27 mg/dL    Sodium 142 136 - 145 mmol/L    Potassium 3.8 3.5 - 5.2 mmol/L    Chloride 97 (L) 98 - 107 mmol/L    CO2 30.5 (H) 22.0 - 29.0 mmol/L    Calcium 9.5 8.6 - 10.5 mg/dL    Total Protein 6.9 6.0 - 8.5 g/dL    Albumin 4.10 3.50 - 5.20 g/dL    ALT (SGPT) 13 1 - 41 U/L    AST (SGOT) 13 1 - 40 U/L    Alkaline Phosphatase 46 39 - 117 U/L    Total Bilirubin 0.5 0.0 - 1.2 mg/dL    eGFR Non African Amer 53 (L) >60 mL/min/1.73    Globulin 2.8 gm/dL    A/G Ratio 1.5 g/dL    BUN/Creatinine Ratio 11.1 7.0 - 25.0    Anion Gap 14.5 5.0 - 15.0 mmol/L   CBC Auto Differential    Specimen: Blood   Result Value Ref Range    WBC 9.37 3.40 - 10.80 10*3/mm3    RBC 4.95 4.14 - 5.80 10*6/mm3    Hemoglobin 15.1 13.0 - 17.7 g/dL    Hematocrit 46.2 37.5 - 51.0 %    MCV 93.3 79.0 - 97.0 fL    MCH 30.5 26.6 - 33.0 pg    MCHC 32.7 31.5 - 35.7 g/dL    RDW 14.5 12.3 - 15.4 %    RDW-SD 50.7 37.0 - 54.0 fl    MPV 11.6 6.0 -  12.0 fL    Platelets 186 140 - 450 10*3/mm3    Neutrophil % 67.6 42.7 - 76.0 %    Lymphocyte % 22.8 19.6 - 45.3 %    Monocyte % 6.3 5.0 - 12.0 %    Eosinophil % 1.9 0.3 - 6.2 %    Basophil % 0.9 0.0 - 1.5 %    Immature Grans % 0.5 0.0 - 0.5 %    Neutrophils, Absolute 6.33 1.70 - 7.00 10*3/mm3    Lymphocytes, Absolute 2.14 0.70 - 3.10 10*3/mm3    Monocytes, Absolute 0.59 0.10 - 0.90 10*3/mm3    Eosinophils, Absolute 0.18 0.00 - 0.40 10*3/mm3    Basophils, Absolute 0.08 0.00 - 0.20 10*3/mm3    Immature Grans, Absolute 0.05 0.00 - 0.05 10*3/mm3    nRBC 0.0 0.0 - 0.2 /100 WBC         Assessment/Plan           Diagnoses and all orders for this visit:    1. Hypertension, unspecified type (Primary)  -     Basic metabolic panel    2. PMR (polymyalgia rheumatica) (CMS/[facility])  -     Basic metabolic panel  -     methotrexate 2.5 MG tablet; Take 3 tablets by mouth 3 (Three) Doses Each Week. Take Doses 12 (Twelve) Hours Apart.  Dispense: 12 tablet; Refill: 5  -     folic acid (FOLVITE) 1 MG tablet; Take one daily except on the day of taking Methotrexate.  Dispense: 30 tablet; Refill: 11    3. GCA (giant cell arteritis) (CMS/[facility])  -     Basic metabolic panel  -     methotrexate 2.5 MG tablet; Take 3 tablets by mouth 3 (Three) Doses Each Week. Take Doses 12 (Twelve) Hours Apart.  Dispense: 12 tablet; Refill: 5  -     folic acid (FOLVITE) 1 MG tablet; Take one daily except on the day of taking Methotrexate.  Dispense: 30 tablet; Refill: 11      Return in about 1 week (around 3/5/2021).          There are no Patient Instructions on file for this visit.     Keven Bear MD    Assessment/Plan

## 2021-03-01 ENCOUNTER — APPOINTMENT (OUTPATIENT)
Dept: PREADMISSION TESTING | Facility: HOSPITAL | Age: 66
End: 2021-03-01

## 2021-03-03 ENCOUNTER — HOSPITAL ENCOUNTER (EMERGENCY)
Facility: HOSPITAL | Age: 66
End: 2021-03-03

## 2021-03-03 VITALS
DIASTOLIC BLOOD PRESSURE: 89 MMHG | RESPIRATION RATE: 18 BRPM | HEIGHT: 76 IN | WEIGHT: 315 LBS | BODY MASS INDEX: 38.36 KG/M2 | OXYGEN SATURATION: 97 % | SYSTOLIC BLOOD PRESSURE: 156 MMHG | TEMPERATURE: 98.4 F | HEART RATE: 87 BPM

## 2021-03-03 RX ORDER — CLONIDINE 0.1 MG/24H
1 PATCH, EXTENDED RELEASE TRANSDERMAL WEEKLY
Qty: 4 PATCH | Refills: 11 | Status: SHIPPED | OUTPATIENT
Start: 2021-03-03 | End: 2021-08-02

## 2021-03-04 ENCOUNTER — READMISSION MANAGEMENT (OUTPATIENT)
Dept: CALL CENTER | Facility: HOSPITAL | Age: 66
End: 2021-03-04

## 2021-03-04 NOTE — TELEPHONE ENCOUNTER
"The patient wrote us on 3/2 via [employer] regarding his BP-    \"My BP is out of control. I am out of methyldopa officially. Cant get anymore of it. What do we need to do?\"     My BP's the past few days:  2/27 6:30pm 174/92  2/28 10:00 am 162/95 before meds            11:21 pm 186/100   3/1 11:30 pm 171/92. Breathing issues.   3/2 6:00 pm 195/101          11:00 pm 187/107         11:45 pm 195/107      Current med list:   Depakote 250- 3AM; 2PM   Levothyroxine 88- 1AM  Propafenone 150- 1AM; 1PM  Hydralazine 100- 1AM; 1 @ 12PM; 1PM   Terazosin 10- 1AM; 1PM   Allopurinol 100- 1AM  Furosemide 40- 1-2AM; PRN   Carvedilol 6.25- 1 AM; 1 PM  Lovastatin 40- 1PM  Xarelto 15- 1 AM  Escitalopram 20- 1 AM  Prednisone 20- 1/2 AM   Triamterene-HCTZ 37.5-25- 2AM  Methotrexate 2.5- 1 tablet 3 times a week as directed.    Someone told him to go to the ER yesterday. He went but waited for three hours, never seen and went home.     Thoughts?  "

## 2021-03-04 NOTE — OUTREACH NOTE
Prep Survey      Responses   [facility] facility patient discharged from?  Non-BH   Is LACE score < 7 ?  Non-BH Discharge   Emergency Room discharge w/ pulse ox?  No   Eligibility  [facility]   Date of Discharge  03/04/21   Discharge Disposition  Home or Self Care   Discharge diagnosis  chest pain   Does the patient have one of the following disease processes/diagnoses(primary or secondary)?  Other   Prep survey completed?  Yes          Nabila Archibald RN

## 2021-03-05 ENCOUNTER — TRANSITIONAL CARE MANAGEMENT TELEPHONE ENCOUNTER (OUTPATIENT)
Dept: CALL CENTER | Facility: HOSPITAL | Age: 66
End: 2021-03-05

## 2021-03-05 RX ORDER — LABETALOL 200 MG/1
200 TABLET, FILM COATED ORAL 2 TIMES DAILY
Qty: 180 TABLET | Refills: 1 | OUTPATIENT
Start: 2021-03-05

## 2021-03-05 NOTE — OUTREACH NOTE
Call Center TCM Note      Responses   [facility] patient discharged from?  Non-   Does the patient have one of the following disease processes/diagnoses(primary or secondary)?  Other   TCM attempt successful?  Yes   Call start time  1441   Call end time  1445   Discharge diagnosis  chest pain   Is patient permission given to speak with other caregiver?  Yes   List who call center can speak with  daughterWatson Abreu   Person spoke with today (if not patient) and relationship  daughter- Lois   Does the patient have all medications ordered at discharge?  Yes   Is the patient taking all medications as directed (includes completed medication regime)?  Yes   Does the patient have a primary care provider?   Yes   Does the patient have an appointment with their PCP within 7 days of discharge?  Yes   Comments regarding PCP  appt with Dr. Bear at 11 am   Has the patient kept scheduled appointments due by today?  N/A   Psychosocial issues?  No   Did the patient receive a copy of their discharge instructions?  Yes   What is the patient's perception of their health status since discharge?  Improving   Is the patient/caregiver able to teach back the hierarchy of who to call/visit for symptoms/problems? PCP, Specialist, Home health nurse, Urgent Care, ED, 911  Yes   TCM call completed?  Yes   Wrap up additional comments  Per daughter, patient is at home and doing well, no questions or concerns at this time.          Isabel Sheppard RN    3/5/2021, 14:45 EST

## 2021-03-11 ENCOUNTER — TELEPHONE (OUTPATIENT)
Dept: INTERNAL MEDICINE | Facility: CLINIC | Age: 66
End: 2021-03-11

## 2021-03-11 ENCOUNTER — READMISSION MANAGEMENT (OUTPATIENT)
Dept: CALL CENTER | Facility: HOSPITAL | Age: 66
End: 2021-03-11

## 2021-03-11 NOTE — TELEPHONE ENCOUNTER
Caller: [facility]    Relationship to patient: N/A    Best call back number: [phone]    New or established patient?  [] New  [x] Established    Date of discharge: 03/11/21    Facility discharged from:  [facility]    Diagnosis/Symptoms: LEG PAIN    Length of stay (If applicable): 03/7/21-03/11/21

## 2021-03-11 NOTE — OUTREACH NOTE
Prep Survey      Responses   [facility] facility patient discharged from?  Non-BH   Is LACE score < 7 ?  Non-BH Discharge   Emergency Room discharge w/ pulse ox?  No   Eligibility  [facility]   Date of Discharge  03/11/21   Discharge diagnosis  leg pain   Does the patient have one of the following disease processes/diagnoses(primary or secondary)?  Other   Prep survey completed?  Yes          Nabila Archibald RN

## 2021-03-12 ENCOUNTER — TRANSITIONAL CARE MANAGEMENT TELEPHONE ENCOUNTER (OUTPATIENT)
Dept: CALL CENTER | Facility: HOSPITAL | Age: 66
End: 2021-03-12

## 2021-03-12 NOTE — OUTREACH NOTE
Call Center TCM Note      Responses   [facility] patient discharged from?  Non-   Does the patient have one of the following disease processes/diagnoses(primary or secondary)?  Other   TCM attempt successful?  No   Unsuccessful attempts  Attempt 2          Isabel Sheppard RN    3/12/2021, 13:27 EST

## 2021-03-12 NOTE — OUTREACH NOTE
Call Center TCM Note      Responses   [facility] patient discharged from?  Non-BH   Does the patient have one of the following disease processes/diagnoses(primary or secondary)?  Other   TCM attempt successful?  No   Unsuccessful attempts  Attempt 1          Isabel Sheppard RN    3/12/2021, 10:53 EST

## 2021-03-13 ENCOUNTER — TRANSITIONAL CARE MANAGEMENT TELEPHONE ENCOUNTER (OUTPATIENT)
Dept: CALL CENTER | Facility: HOSPITAL | Age: 66
End: 2021-03-13

## 2021-03-13 NOTE — OUTREACH NOTE
Call Center TCM Note      Responses   [facility] patient discharged from?  Non-   Does the patient have one of the following disease processes/diagnoses(primary or secondary)?  Other   TCM attempt successful?  Yes   Call start time  1214   Call end time  1218   Discharge diagnosis  leg pain   Is patient permission given to speak with other caregiver?  Yes   List who call center can speak with  Rory   Person spoke with today (if not patient) and relationship  daughter-Lor    Meds reviewed with patient/caregiver?  Yes   Is the patient having any side effects they believe may be caused by any medication additions or changes?  No   Does the patient have all medications ordered at discharge?  Yes   Is the patient taking all medications as directed (includes completed medication regime)?  Yes   Comments regarding appointments  Appt with cardiology on 3/18/21   Does the patient have a primary care provider?   Yes   Does the patient have an appointment with their PCP within 7 days of discharge?  Yes   Comments regarding PCP  Hospital d/c f/u appt is on 3/15/21 at 11:45 am   Has the patient kept scheduled appointments due by today?  N/A   What is the Home health agency?      Home health comments  HH should visit on 3/15/21   Has all DME been delivered?  Yes   DME comments  Pt received a walker   Psychosocial issues?  No   Psychosocial comments  Pt is staying with tobin Hor[facility]    Did the patient receive a copy of their discharge instructions?  Yes   Nursing interventions  Reviewed instructions with patient   What is the patient's perception of their health status since discharge?  Improving   Is the patient/caregiver able to teach back signs and symptoms related to disease process for when to call PCP?  Yes   Is the patient/caregiver able to teach back signs and symptoms related to disease process for when to call 911?  Yes   Is the patient/caregiver able to teach back the hierarchy of who  to call/visit for symptoms/problems? PCP, Specialist, Home health nurse, Urgent Care, ED, 911  Yes   TCM call completed?  Yes          Gabrielle Rajan RN    3/13/2021, 12:18 EST

## 2021-03-16 LAB — RENIN PLAS-CCNC: 0.31 NG/ML/HR (ref 0.17–5.38)

## 2021-03-17 ENCOUNTER — OFFICE VISIT (OUTPATIENT)
Dept: INTERNAL MEDICINE | Facility: CLINIC | Age: 66
End: 2021-03-17

## 2021-03-17 VITALS
DIASTOLIC BLOOD PRESSURE: 85 MMHG | HEART RATE: 71 BPM | HEIGHT: 76 IN | OXYGEN SATURATION: 97 % | SYSTOLIC BLOOD PRESSURE: 135 MMHG | BODY MASS INDEX: 37.38 KG/M2 | WEIGHT: 307 LBS | RESPIRATION RATE: 16 BRPM | TEMPERATURE: 98.4 F

## 2021-03-17 DIAGNOSIS — N18.30 CHRONIC RENAL IMPAIRMENT, STAGE 3 (MODERATE), UNSPECIFIED WHETHER STAGE 3A OR 3B CKD (HCC): ICD-10-CM

## 2021-03-17 DIAGNOSIS — I25.118 CORONARY ARTERY DISEASE INVOLVING NATIVE CORONARY ARTERY WITH OTHER FORMS OF ANGINA PECTORIS, UNSPECIFIED WHETHER NATIVE OR TRANSPLANTED HEART (HCC): Primary | ICD-10-CM

## 2021-03-17 DIAGNOSIS — M62.81 MUSCLE WEAKNESS: ICD-10-CM

## 2021-03-17 DIAGNOSIS — Z95.5 STENTED CORONARY ARTERY: ICD-10-CM

## 2021-03-17 PROBLEM — Z78.9 MEDICATION INTOLERANCE: Status: ACTIVE | Noted: 2021-03-11

## 2021-03-17 PROBLEM — T81.718A PSEUDOANEURYSM OF FEMORAL ARTERY FOLLOWING PROCEDURE (HCC): Status: ACTIVE | Noted: 2021-03-07

## 2021-03-17 PROBLEM — M79.604 RIGHT LEG PAIN: Status: ACTIVE | Noted: 2020-08-20

## 2021-03-17 PROBLEM — I72.4 PSEUDOANEURYSM OF FEMORAL ARTERY FOLLOWING PROCEDURE: Status: ACTIVE | Noted: 2021-03-07

## 2021-03-17 PROBLEM — N40.0 BENIGN PROSTATIC HYPERPLASIA: Status: ACTIVE | Noted: 2021-03-17

## 2021-03-17 PROBLEM — N18.9 CHRONIC RENAL INSUFFICIENCY: Status: ACTIVE | Noted: 2021-03-07

## 2021-03-17 PROBLEM — I48.91 ATRIAL FIBRILLATION (HCC): Status: ACTIVE | Noted: 2020-12-02

## 2021-03-17 PROCEDURE — 99214 OFFICE O/P EST MOD 30 MIN: CPT | Performed by: INTERNAL MEDICINE

## 2021-03-17 RX ORDER — SACUBITRIL AND VALSARTAN 49; 51 MG/1; MG/1
TABLET, FILM COATED ORAL
COMMUNITY
Start: 2021-03-11 | End: 2021-03-17 | Stop reason: SDUPTHER

## 2021-03-17 RX ORDER — SACUBITRIL AND VALSARTAN 49; 51 MG/1; MG/1
1 TABLET, FILM COATED ORAL 2 TIMES DAILY
Qty: 60 TABLET | Refills: 11
Start: 2021-03-17 | End: 2022-01-11

## 2021-03-17 RX ORDER — CLOPIDOGREL BISULFATE 75 MG/1
75 TABLET ORAL EVERY OTHER DAY
COMMUNITY
Start: 2021-03-11 | End: 2022-04-29

## 2021-03-17 RX ORDER — AMLODIPINE BESYLATE 10 MG/1
10 TABLET ORAL
COMMUNITY
Start: 2021-03-04 | End: 2021-04-03

## 2021-03-21 ENCOUNTER — LAB (OUTPATIENT)
Dept: LAB | Facility: HOSPITAL | Age: 66
End: 2021-03-21

## 2021-03-21 LAB
ALBUMIN SERPL-MCNC: 3.9 G/DL (ref 3.5–5.2)
ALBUMIN/GLOB SERPL: 1 G/DL
ALP SERPL-CCNC: 92 U/L (ref 39–117)
ALT SERPL W P-5'-P-CCNC: 10 U/L (ref 1–41)
ANION GAP SERPL CALCULATED.3IONS-SCNC: 10.5 MMOL/L (ref 5–15)
AST SERPL-CCNC: 12 U/L (ref 1–40)
BACTERIA UR QL AUTO: ABNORMAL /HPF
BASOPHILS # BLD AUTO: 0.07 10*3/MM3 (ref 0–0.2)
BASOPHILS NFR BLD AUTO: 0.9 % (ref 0–1.5)
BILIRUB SERPL-MCNC: 0.5 MG/DL (ref 0–1.2)
BILIRUB UR QL STRIP: NEGATIVE
BUN SERPL-MCNC: 12 MG/DL (ref 8–23)
BUN/CREAT SERPL: 9.4 (ref 7–25)
CALCIUM SPEC-SCNC: 9.1 MG/DL (ref 8.6–10.5)
CHLORIDE SERPL-SCNC: 100 MMOL/L (ref 98–107)
CK SERPL-CCNC: 41 U/L (ref 20–200)
CLARITY UR: ABNORMAL
CO2 SERPL-SCNC: 29.5 MMOL/L (ref 22–29)
COLOR UR: ABNORMAL
CREAT SERPL-MCNC: 1.28 MG/DL (ref 0.76–1.27)
DEPRECATED RDW RBC AUTO: 46.7 FL (ref 37–54)
EOSINOPHIL # BLD AUTO: 0.2 10*3/MM3 (ref 0–0.4)
EOSINOPHIL NFR BLD AUTO: 2.5 % (ref 0.3–6.2)
ERYTHROCYTE [DISTWIDTH] IN BLOOD BY AUTOMATED COUNT: 13.9 % (ref 12.3–15.4)
GFR SERPL CREATININE-BSD FRML MDRD: 56 ML/MIN/1.73
GLOBULIN UR ELPH-MCNC: 3.8 GM/DL
GLUCOSE SERPL-MCNC: 140 MG/DL (ref 65–99)
GLUCOSE UR STRIP-MCNC: NEGATIVE MG/DL
HCT VFR BLD AUTO: 42.5 % (ref 37.5–51)
HGB BLD-MCNC: 14.1 G/DL (ref 13–17.7)
HGB UR QL STRIP.AUTO: ABNORMAL
HYALINE CASTS UR QL AUTO: ABNORMAL /LPF
IMM GRANULOCYTES # BLD AUTO: 0.03 10*3/MM3 (ref 0–0.05)
IMM GRANULOCYTES NFR BLD AUTO: 0.4 % (ref 0–0.5)
KETONES UR QL STRIP: ABNORMAL
LEUKOCYTE ESTERASE UR QL STRIP.AUTO: ABNORMAL
LYMPHOCYTES # BLD AUTO: 2.73 10*3/MM3 (ref 0.7–3.1)
LYMPHOCYTES NFR BLD AUTO: 33.6 % (ref 19.6–45.3)
MCH RBC QN AUTO: 30.4 PG (ref 26.6–33)
MCHC RBC AUTO-ENTMCNC: 33.2 G/DL (ref 31.5–35.7)
MCV RBC AUTO: 91.6 FL (ref 79–97)
MONOCYTES # BLD AUTO: 0.71 10*3/MM3 (ref 0.1–0.9)
MONOCYTES NFR BLD AUTO: 8.7 % (ref 5–12)
MYOGLOBIN SERPL-MCNC: 36.1 NG/ML (ref 28–72)
NEUTROPHILS NFR BLD AUTO: 4.39 10*3/MM3 (ref 1.7–7)
NEUTROPHILS NFR BLD AUTO: 53.9 % (ref 42.7–76)
NITRITE UR QL STRIP: NEGATIVE
NRBC BLD AUTO-RTO: 0 /100 WBC (ref 0–0.2)
NT-PROBNP SERPL-MCNC: 320.5 PG/ML (ref 0–900)
PH UR STRIP.AUTO: 7 [PH] (ref 5–8)
PLATELET # BLD AUTO: 317 10*3/MM3 (ref 140–450)
PMV BLD AUTO: 10.1 FL (ref 6–12)
POTASSIUM SERPL-SCNC: 3.7 MMOL/L (ref 3.5–5.2)
PROT SERPL-MCNC: 7.7 G/DL (ref 6–8.5)
PROT UR QL STRIP: ABNORMAL
RBC # BLD AUTO: 4.64 10*6/MM3 (ref 4.14–5.8)
RBC # UR: ABNORMAL /HPF
REF LAB TEST METHOD: ABNORMAL
SODIUM SERPL-SCNC: 140 MMOL/L (ref 136–145)
SP GR UR STRIP: 1.02 (ref 1–1.03)
SQUAMOUS #/AREA URNS HPF: ABNORMAL /HPF
UROBILINOGEN UR QL STRIP: ABNORMAL
VIT B12 BLD-MCNC: 770 PG/ML (ref 211–946)
WBC # BLD AUTO: 8.13 10*3/MM3 (ref 3.4–10.8)
WBC UR QL AUTO: ABNORMAL /HPF

## 2021-03-21 PROCEDURE — 82607 VITAMIN B-12: CPT | Performed by: INTERNAL MEDICINE

## 2021-03-21 PROCEDURE — 80053 COMPREHEN METABOLIC PANEL: CPT | Performed by: INTERNAL MEDICINE

## 2021-03-21 PROCEDURE — 83520 IMMUNOASSAY QUANT NOS NONAB: CPT | Performed by: INTERNAL MEDICINE

## 2021-03-21 PROCEDURE — 83874 ASSAY OF MYOGLOBIN: CPT | Performed by: INTERNAL MEDICINE

## 2021-03-21 PROCEDURE — 83880 ASSAY OF NATRIURETIC PEPTIDE: CPT | Performed by: INTERNAL MEDICINE

## 2021-03-21 PROCEDURE — 85025 COMPLETE CBC W/AUTO DIFF WBC: CPT | Performed by: INTERNAL MEDICINE

## 2021-03-21 PROCEDURE — 86038 ANTINUCLEAR ANTIBODIES: CPT | Performed by: INTERNAL MEDICINE

## 2021-03-21 PROCEDURE — 81001 URINALYSIS AUTO W/SCOPE: CPT | Performed by: INTERNAL MEDICINE

## 2021-03-21 PROCEDURE — 82550 ASSAY OF CK (CPK): CPT | Performed by: INTERNAL MEDICINE

## 2021-03-21 PROCEDURE — 84207 ASSAY OF VITAMIN B-6: CPT | Performed by: INTERNAL MEDICINE

## 2021-03-22 RX ORDER — AMOXICILLIN AND CLAVULANATE POTASSIUM 875; 125 MG/1; MG/1
1 TABLET, FILM COATED ORAL EVERY 12 HOURS SCHEDULED
Qty: 20 TABLET | Refills: 0 | Status: SHIPPED | OUTPATIENT
Start: 2021-03-22 | End: 2021-04-07

## 2021-03-23 LAB — ANA TITR SER IF: NEGATIVE {TITER}

## 2021-03-24 ENCOUNTER — OFFICE VISIT (OUTPATIENT)
Dept: INTERNAL MEDICINE | Facility: CLINIC | Age: 66
End: 2021-03-24

## 2021-03-24 VITALS
TEMPERATURE: 98 F | HEART RATE: 76 BPM | HEIGHT: 76 IN | DIASTOLIC BLOOD PRESSURE: 80 MMHG | WEIGHT: 310 LBS | RESPIRATION RATE: 16 BRPM | OXYGEN SATURATION: 97 % | BODY MASS INDEX: 37.75 KG/M2 | SYSTOLIC BLOOD PRESSURE: 132 MMHG

## 2021-03-24 DIAGNOSIS — I10 HYPERTENSION, UNSPECIFIED TYPE: ICD-10-CM

## 2021-03-24 DIAGNOSIS — R31.0 GROSS HEMATURIA: Primary | ICD-10-CM

## 2021-03-24 LAB
MYELOPEROXIDASE AB SER IA-ACNC: <9 U/ML (ref 0–9)
PROTEINASE3 AB SER IA-ACNC: <3.5 U/ML (ref 0–3.5)

## 2021-03-24 PROCEDURE — 99214 OFFICE O/P EST MOD 30 MIN: CPT | Performed by: INTERNAL MEDICINE

## 2021-03-24 NOTE — PROGRESS NOTES
Subjective     Patient ID: Andre Agosto is a 66 y.o. male. Patient is here for management of multiple medical problems.     Chief Complaint   Patient presents with   • Coronary Artery Disease     History of Present Illness       Cad and s/p stent.  On anticoagulation      The following portions of the patient's history were reviewed and updated as appropriate: allergies, current medications, past family history, past medical history, past social history, past surgical history and problem list.    Review of Systems   Constitutional: Negative for diaphoresis and fatigue.   Respiratory: Negative for cough and shortness of breath.    Psychiatric/Behavioral: Negative for self-injury and sleep disturbance. The patient is not hyperactive.    All other systems reviewed and are negative.      Current Outpatient Medications:   •  allopurinol (Zyloprim) 100 MG tablet, Take 1 tablet by mouth Daily., Disp: 90 tablet, Rfl: 3  •  amLODIPine (NORVASC) 10 MG tablet, 10 mg., Disp: , Rfl:   •  amoxicillin-clavulanate (Augmentin) 875-125 MG per tablet, Take 1 tablet by mouth Every 12 (Twelve) Hours., Disp: 20 tablet, Rfl: 0  •  cloNIDine (CATAPRES-TTS) 0.1 MG/24HR patch, Place 1 patch on the skin as directed by provider 1 (One) Time Per Week., Disp: 4 patch, Rfl: 11  •  clopidogrel (PLAVIX) 75 MG tablet, , Disp: , Rfl:   •  divalproex (DEPAKOTE) 250 MG DR tablet, Take 1 tablet by mouth 3 (Three) Times a Day. Take 2 in the AM 1 at noon and 2 in the pm. Per DR Clifford., Disp: 270 tablet, Rfl: 3  •  Entresto 49-51 MG tablet, Take 1 tablet by mouth 2 (Two) Times a Day., Disp: 60 tablet, Rfl: 11  •  escitalopram (LEXAPRO) 20 MG tablet, Take 1 tablet by mouth Daily., Disp: 90 tablet, Rfl: 3  •  Evolocumab (Repatha SureClick) solution auto-injector SureClick injection, Inject 1 mL under the skin into the appropriate area as directed Every 14 (Fourteen) Days., Disp: 6 pen, Rfl: 3  •  folic acid (FOLVITE) 1 MG tablet, Take one daily except on  "the day of taking Methotrexate., Disp: 30 tablet, Rfl: 11  •  furosemide (LASIX) 40 MG tablet, Take 1 tablet by mouth 2 (Two) Times a Day., Disp: 180 tablet, Rfl: 3  •  levothyroxine (SYNTHROID, LEVOTHROID) 88 MCG tablet, Take 1 tablet by mouth Daily., Disp: 90 tablet, Rfl: 3  •  losartan (COZAAR) 100 MG tablet, Take 100 mg by mouth Daily., Disp: , Rfl:   •  lovastatin (MEVACOR) 40 MG tablet, Take 1 tablet by mouth Daily With Dinner., Disp: 90 tablet, Rfl: 3  •  NIFEdipine CC (ADALAT CC) 90 MG 24 hr tablet, Take 1 tablet by mouth Daily., Disp: 90 tablet, Rfl: 3  •  propafenone (RYTHMOL) 150 MG tablet, Take 1 tablet by mouth Every 12 (Twelve) Hours., Disp: 90 tablet, Rfl: 3  •  rivaroxaban (XARELTO) 15 MG tablet, Take 1 tablet by mouth Daily., Disp: 90 tablet, Rfl: 3  •  Semaglutide, 1 MG/DOSE, (OZEMPIC) 2 MG/1.5ML solution pen-injector, Inject 1 mg under the skin into the appropriate area as directed 1 (One) Time Per Week., Disp: 3 pen, Rfl: 3  •  terazosin (HYTRIN) 10 MG capsule, Take 1 capsule by mouth Every 12 (Twelve) Hours., Disp: 90 capsule, Rfl: 3  •  triamterene-hydrochlorothiazide (Dyazide) 37.5-25 MG per capsule, Take 1 capsule by mouth Every Morning., Disp: 90 capsule, Rfl: 3    Objective      Blood pressure 132/80, pulse 76, temperature 98 °F (36.7 °C), resp. rate 16, height 193 cm (76\"), weight (!) 141 kg (310 lb), SpO2 97 %.    Physical Exam     General Appearance:    Alert, cooperative, no distress, appears stated age   Head:    Normocephalic, without obvious abnormality, atraumatic   Eyes:    PERRL, conjunctiva/corneas clear, EOM's intact   Ears:    Normal TM's and external ear canals, both ears   Nose:   Nares normal, septum midline, mucosa normal, no drainage   or sinus tenderness   Throat:   Lips, mucosa, and tongue normal; teeth and gums normal   Neck:   Supple, symmetrical, trachea midline, no adenopathy;        thyroid:  No enlargement/tenderness/nodules; no carotid    bruit or JVD   Back:     " Symmetric, no curvature, ROM normal, no CVA tenderness   Lungs:     Clear to auscultation bilaterally, respirations unlabored   Chest wall:    No tenderness or deformity   Heart:    Regular rate and rhythm, S1 and S2 normal, no murmur,        rub or gallop   Abdomen:     Soft, non-tender, bowel sounds active all four quadrants,     no masses, no organomegaly   Extremities:   Extremities normal, atraumatic, no cyanosis or edema   Pulses:   2+ and symmetric all extremities   Skin:   Skin color, texture, turgor normal, no rashes or lesions   Lymph nodes:   Cervical, supraclavicular, and axillary nodes normal   Neurologic:   CNII-XII intact. Normal strength, sensation and reflexes       throughout      Results for orders placed or performed in visit on 03/17/21   CK    Specimen: Blood   Result Value Ref Range    Creatine Kinase 41 20 - 200 U/L   Myoglobin, Serum    Specimen: Blood   Result Value Ref Range    Myoglobin 36.1 28.0 - 72.0 ng/mL   Vitamin B12    Specimen: Blood   Result Value Ref Range    Vitamin B-12 770 211 - 946 pg/mL   Nuclear Antigen Antibody, IFA    Specimen: Blood   Result Value Ref Range    RITA Negative    Urinalysis without microscopic (no culture) - Urine, Clean Catch    Specimen: Urine, Clean Catch   Result Value Ref Range    Color, UA Orange (A) Yellow, Straw    Appearance, UA Cloudy (A) Clear    pH, UA 7.0 5.0 - 8.0    Specific Gravity, UA 1.017 1.005 - 1.030    Glucose, UA Negative Negative    Ketones, UA Trace (A) Negative    Bilirubin, UA Negative Negative    Blood, UA Large (3+) (A) Negative    Protein,  mg/dL (2+) (A) Negative    Leuk Esterase, UA Small (1+) (A) Negative    Nitrite, UA Negative Negative    Urobilinogen, UA 1.0 E.U./dL 0.2 - 1.0 E.U./dL   Urinalysis, Microscopic Only - Urine, Clean Catch    Specimen: Urine, Clean Catch   Result Value Ref Range    RBC, UA Too Numerous to Count (A) None Seen /HPF    WBC, UA Unable to determine due to loaded field (A) None Seen /HPF     Bacteria, UA Unable to determine due to loaded field (A) None Seen /HPF    Squamous Epithelial Cells, UA Unable to determine due to loaded field (A) None Seen, 0-2 /HPF    Hyaline Casts, UA Unable to determine due to loaded field None Seen /LPF    Methodology Manual Light Microscopy          Assessment/Plan     Has apt with Urology.   Restart plavix 75 mg a day.      Diagnoses and all orders for this visit:    1. Gross hematuria (Primary)  -     CBC & Differential  -     Basic metabolic panel    2. Hypertension, unspecified type  -     CBC & Differential  -     Basic metabolic panel      Return in about 6 months (around 9/24/2021).          There are no Patient Instructions on file for this visit.     Keven Bear MD    Assessment/Plan

## 2021-03-25 ENCOUNTER — OFFICE VISIT (OUTPATIENT)
Dept: UROLOGY | Facility: CLINIC | Age: 66
End: 2021-03-25

## 2021-03-25 VITALS
TEMPERATURE: 97.3 F | BODY MASS INDEX: 37.75 KG/M2 | RESPIRATION RATE: 17 BRPM | HEIGHT: 76 IN | WEIGHT: 310 LBS | DIASTOLIC BLOOD PRESSURE: 76 MMHG | SYSTOLIC BLOOD PRESSURE: 124 MMHG | HEART RATE: 86 BPM | OXYGEN SATURATION: 98 %

## 2021-03-25 DIAGNOSIS — R31.0 GROSS HEMATURIA: Primary | ICD-10-CM

## 2021-03-25 LAB
BILIRUB BLD-MCNC: NEGATIVE MG/DL
CLARITY, POC: CLEAR
COLOR UR: ABNORMAL
GLUCOSE UR STRIP-MCNC: NEGATIVE MG/DL
KETONES UR QL: ABNORMAL
LEUKOCYTE EST, POC: NEGATIVE
NITRITE UR-MCNC: NEGATIVE MG/ML
PH UR: 6.5 [PH] (ref 5–8)
PROT UR STRIP-MCNC: ABNORMAL MG/DL
RBC # UR STRIP: ABNORMAL /UL
SP GR UR: 1.01 (ref 1–1.03)
UROBILINOGEN UR QL: NORMAL

## 2021-03-25 PROCEDURE — 81003 URINALYSIS AUTO W/O SCOPE: CPT | Performed by: UROLOGY

## 2021-03-25 PROCEDURE — 99214 OFFICE O/P EST MOD 30 MIN: CPT | Performed by: UROLOGY

## 2021-03-26 LAB
APPEARANCE UR: ABNORMAL
BACTERIA #/AREA URNS HPF: ABNORMAL /HPF
BILIRUB UR QL STRIP: NEGATIVE
CASTS URNS MICRO: ABNORMAL
COLOR UR: ABNORMAL
EPI CELLS #/AREA URNS HPF: ABNORMAL /HPF
GLUCOSE UR QL: NEGATIVE
HGB UR QL STRIP: ABNORMAL
KETONES UR QL STRIP: NEGATIVE
LEUKOCYTE ESTERASE UR QL STRIP: ABNORMAL
NITRITE UR QL STRIP: NEGATIVE
PH UR STRIP: 7 [PH] (ref 5–8)
PROT UR QL STRIP: ABNORMAL
RBC #/AREA URNS HPF: ABNORMAL /HPF
SP GR UR: 1.02 (ref 1–1.03)
UROBILINOGEN UR STRIP-MCNC: ABNORMAL MG/DL
WBC #/AREA URNS HPF: ABNORMAL /HPF

## 2021-03-28 LAB — VIT B6 SERPL-MCNC: 7.8 UG/L (ref 5.3–46.7)

## 2021-04-04 ENCOUNTER — OUTSIDE FACILITY SERVICE (OUTPATIENT)
Dept: INTERNAL MEDICINE | Facility: CLINIC | Age: 66
End: 2021-04-04

## 2021-04-04 PROCEDURE — G0180 MD CERTIFICATION HHA PATIENT: HCPCS | Performed by: INTERNAL MEDICINE

## 2021-04-06 ENCOUNTER — LAB (OUTPATIENT)
Dept: LAB | Facility: HOSPITAL | Age: 66
End: 2021-04-06

## 2021-04-06 LAB
ANION GAP SERPL CALCULATED.3IONS-SCNC: 8.9 MMOL/L (ref 5–15)
BASOPHILS # BLD AUTO: 0.04 10*3/MM3 (ref 0–0.2)
BASOPHILS NFR BLD AUTO: 0.5 % (ref 0–1.5)
BUN SERPL-MCNC: 12 MG/DL (ref 8–23)
BUN/CREAT SERPL: 11 (ref 7–25)
CALCIUM SPEC-SCNC: 9.2 MG/DL (ref 8.6–10.5)
CHLORIDE SERPL-SCNC: 99 MMOL/L (ref 98–107)
CO2 SERPL-SCNC: 30.1 MMOL/L (ref 22–29)
CREAT SERPL-MCNC: 1.09 MG/DL (ref 0.76–1.27)
DEPRECATED RDW RBC AUTO: 48.2 FL (ref 37–54)
EOSINOPHIL # BLD AUTO: 0.2 10*3/MM3 (ref 0–0.4)
EOSINOPHIL NFR BLD AUTO: 2.7 % (ref 0.3–6.2)
ERYTHROCYTE [DISTWIDTH] IN BLOOD BY AUTOMATED COUNT: 14.6 % (ref 12.3–15.4)
GFR SERPL CREATININE-BSD FRML MDRD: 68 ML/MIN/1.73
GLUCOSE SERPL-MCNC: 212 MG/DL (ref 65–99)
HCT VFR BLD AUTO: 44.9 % (ref 37.5–51)
HGB BLD-MCNC: 14.7 G/DL (ref 13–17.7)
IMM GRANULOCYTES # BLD AUTO: 0.02 10*3/MM3 (ref 0–0.05)
IMM GRANULOCYTES NFR BLD AUTO: 0.3 % (ref 0–0.5)
LYMPHOCYTES # BLD AUTO: 2.4 10*3/MM3 (ref 0.7–3.1)
LYMPHOCYTES NFR BLD AUTO: 32.1 % (ref 19.6–45.3)
MCH RBC QN AUTO: 29.8 PG (ref 26.6–33)
MCHC RBC AUTO-ENTMCNC: 32.7 G/DL (ref 31.5–35.7)
MCV RBC AUTO: 91.1 FL (ref 79–97)
MONOCYTES # BLD AUTO: 0.44 10*3/MM3 (ref 0.1–0.9)
MONOCYTES NFR BLD AUTO: 5.9 % (ref 5–12)
NEUTROPHILS NFR BLD AUTO: 4.38 10*3/MM3 (ref 1.7–7)
NEUTROPHILS NFR BLD AUTO: 58.5 % (ref 42.7–76)
NRBC BLD AUTO-RTO: 0 /100 WBC (ref 0–0.2)
PLATELET # BLD AUTO: 202 10*3/MM3 (ref 140–450)
PMV BLD AUTO: 10.7 FL (ref 6–12)
POTASSIUM SERPL-SCNC: 3.6 MMOL/L (ref 3.5–5.2)
RBC # BLD AUTO: 4.93 10*6/MM3 (ref 4.14–5.8)
SODIUM SERPL-SCNC: 138 MMOL/L (ref 136–145)
WBC # BLD AUTO: 7.48 10*3/MM3 (ref 3.4–10.8)

## 2021-04-06 PROCEDURE — 80048 BASIC METABOLIC PNL TOTAL CA: CPT | Performed by: INTERNAL MEDICINE

## 2021-04-06 PROCEDURE — 36415 COLL VENOUS BLD VENIPUNCTURE: CPT | Performed by: INTERNAL MEDICINE

## 2021-04-06 PROCEDURE — 84154 ASSAY OF PSA FREE: CPT | Performed by: UROLOGY

## 2021-04-06 PROCEDURE — 84153 ASSAY OF PSA TOTAL: CPT | Performed by: UROLOGY

## 2021-04-06 PROCEDURE — 85025 COMPLETE CBC W/AUTO DIFF WBC: CPT | Performed by: INTERNAL MEDICINE

## 2021-04-07 ENCOUNTER — OFFICE VISIT (OUTPATIENT)
Dept: INTERNAL MEDICINE | Facility: CLINIC | Age: 66
End: 2021-04-07

## 2021-04-07 VITALS
HEART RATE: 59 BPM | SYSTOLIC BLOOD PRESSURE: 110 MMHG | WEIGHT: 313 LBS | OXYGEN SATURATION: 98 % | RESPIRATION RATE: 16 BRPM | DIASTOLIC BLOOD PRESSURE: 78 MMHG | TEMPERATURE: 98.6 F | BODY MASS INDEX: 38.11 KG/M2 | HEIGHT: 76 IN

## 2021-04-07 DIAGNOSIS — N18.2 CHRONIC RENAL IMPAIRMENT, STAGE 2 (MILD): Primary | ICD-10-CM

## 2021-04-07 DIAGNOSIS — I10 ESSENTIAL HYPERTENSION: ICD-10-CM

## 2021-04-07 DIAGNOSIS — E53.1 VITAMIN B6 DEFICIENCY: ICD-10-CM

## 2021-04-07 DIAGNOSIS — R42 DIZZINESSES: ICD-10-CM

## 2021-04-07 PROCEDURE — 99213 OFFICE O/P EST LOW 20 MIN: CPT | Performed by: INTERNAL MEDICINE

## 2021-04-07 RX ORDER — MULTIVITAMIN WITH IRON
1 TABLET ORAL DAILY
Qty: 30 TABLET | Refills: 11 | Status: SHIPPED | OUTPATIENT
Start: 2021-04-07 | End: 2021-07-22 | Stop reason: SDUPTHER

## 2021-04-07 NOTE — PROGRESS NOTES
Subjective     Patient ID: Andre Agosto is a 66 y.o. male. Patient is here for management of multiple medical problems.     Chief Complaint   Patient presents with   • Blood in Urine   • Coronary Artery Disease     History of Present Illness     Pt with episode of dizziness while eating out at [employer].  Went to er. IVF made him better.      bp spike from eating out.     The following portions of the patient's history were reviewed and updated as appropriate: allergies, current medications, past family history, past medical history, past social history, past surgical history and problem list.    Review of Systems   All other systems reviewed and are negative.      Current Outpatient Medications:   •  allopurinol (Zyloprim) 100 MG tablet, Take 1 tablet by mouth Daily., Disp: 90 tablet, Rfl: 3  •  cloNIDine (CATAPRES-TTS) 0.1 MG/24HR patch, Place 1 patch on the skin as directed by provider 1 (One) Time Per Week., Disp: 4 patch, Rfl: 11  •  clopidogrel (PLAVIX) 75 MG tablet, , Disp: , Rfl:   •  divalproex (DEPAKOTE) 250 MG DR tablet, Take 1 tablet by mouth 3 (Three) Times a Day. Take 2 in the AM 1 at noon and 2 in the pm. Per DR Clifford., Disp: 270 tablet, Rfl: 3  •  Entresto 49-51 MG tablet, Take 1 tablet by mouth 2 (Two) Times a Day., Disp: 60 tablet, Rfl: 11  •  escitalopram (LEXAPRO) 20 MG tablet, Take 1 tablet by mouth Daily., Disp: 90 tablet, Rfl: 3  •  Evolocumab (Repatha SureClick) solution auto-injector SureClick injection, Inject 1 mL under the skin into the appropriate area as directed Every 14 (Fourteen) Days., Disp: 6 pen, Rfl: 3  •  folic acid (FOLVITE) 1 MG tablet, Take one daily except on the day of taking Methotrexate., Disp: 30 tablet, Rfl: 11  •  furosemide (LASIX) 40 MG tablet, Take 1 tablet by mouth 2 (Two) Times a Day., Disp: 180 tablet, Rfl: 3  •  levothyroxine (SYNTHROID, LEVOTHROID) 88 MCG tablet, Take 1 tablet by mouth Daily., Disp: 90 tablet, Rfl: 3  •  losartan (COZAAR) 100 MG  "tablet, Take 100 mg by mouth Daily., Disp: , Rfl:   •  lovastatin (MEVACOR) 40 MG tablet, Take 1 tablet by mouth Daily With Dinner., Disp: 90 tablet, Rfl: 3  •  NIFEdipine CC (ADALAT CC) 90 MG 24 hr tablet, Take 1 tablet by mouth Daily., Disp: 90 tablet, Rfl: 3  •  propafenone (RYTHMOL) 150 MG tablet, Take 1 tablet by mouth Every 12 (Twelve) Hours., Disp: 90 tablet, Rfl: 3  •  rivaroxaban (XARELTO) 15 MG tablet, Take 1 tablet by mouth Daily., Disp: 90 tablet, Rfl: 3  •  Semaglutide, 1 MG/DOSE, (OZEMPIC) 2 MG/1.5ML solution pen-injector, Inject 1 mg under the skin into the appropriate area as directed 1 (One) Time Per Week., Disp: 3 pen, Rfl: 3  •  terazosin (HYTRIN) 10 MG capsule, Take 1 capsule by mouth Every 12 (Twelve) Hours., Disp: 90 capsule, Rfl: 3  •  triamterene-hydrochlorothiazide (Dyazide) 37.5-25 MG per capsule, Take 1 capsule by mouth Every Morning., Disp: 90 capsule, Rfl: 3  •  B Complex-C (B-complex with vitamin C) tablet, Take 1 tablet by mouth Daily., Disp: 30 tablet, Rfl: 11    Objective      Blood pressure 110/78, pulse 59, temperature 98.6 °F (37 °C), resp. rate 16, height 193 cm (75.98\"), weight (!) 142 kg (313 lb), SpO2 98 %.    Physical Exam     General Appearance:    Alert, cooperative, no distress, appears stated age   Head:    Normocephalic, without obvious abnormality, atraumatic   Eyes:    PERRL, conjunctiva/corneas clear, EOM's intact   Ears:    Normal TM's and external ear canals, both ears   Nose:   Nares normal, septum midline, mucosa normal, no drainage   or sinus tenderness   Throat:   Lips, mucosa, and tongue normal; teeth and gums normal   Neck:   Supple, symmetrical, trachea midline, no adenopathy;        thyroid:  No enlargement/tenderness/nodules; no carotid    bruit or JVD   Back:     Symmetric, no curvature, ROM normal, no CVA tenderness   Lungs:     Clear to auscultation bilaterally, respirations unlabored   Chest wall:    No tenderness or deformity   Heart:    Regular rate " and rhythm, S1 and S2 normal, no murmur,        rub or gallop   Abdomen:     Soft, non-tender, bowel sounds active all four quadrants,     no masses, no organomegaly   Extremities:   Extremities normal, atraumatic, no cyanosis or edema   Pulses:   2+ and symmetric all extremities   Skin:   Skin color, texture, turgor normal, no rashes or lesions   Lymph nodes:   Cervical, supraclavicular, and axillary nodes normal   Neurologic:   CNII-XII intact. Normal strength, sensation and reflexes       throughout      Results for orders placed or performed in visit on 03/25/21   Microscopic Examination -   Result Value Ref Range    WBC, UA 6-12 (A) /HPF    RBC, UA See below: (A) /HPF    Epithelial Cells (non renal) 3-6 (A) /HPF    Cast Type Comment     Bacteria, UA Comment None Seen /HPF   POC Urinalysis Dipstick, Automated    Specimen: Urine   Result Value Ref Range    Color Red (A) Yellow, Straw, Dark Yellow, Misty    Clarity, UA Clear Clear    Specific Gravity  1.015 1.005 - 1.030    pH, Urine 6.5 5.0 - 8.0    Leukocytes Negative Negative    Nitrite, UA Negative Negative    Protein, POC 2+ (A) Negative mg/dL    Glucose, UA Negative Negative, 1000 mg/dL (3+) mg/dL    Ketones, UA Trace (A) Negative    Urobilinogen, UA Normal Normal    Bilirubin Negative Negative    Blood, UA 3+ (A) Negative   Urinalysis With Microscopic - Urine, Clean Catch    Specimen: Urine, Clean Catch   Result Value Ref Range    Specific Gravity, UA 1.016 1.005 - 1.030    pH, UA 7.0 5.0 - 8.0    Color, UA Red (A)     Appearance, UA Cloudy (A) Clear    Leukocytes, UA See below: (A) Negative    Protein See below: (A) Negative    Glucose, UA Negative Negative    Ketones Negative Negative    Blood, UA See below: (A) Negative    Bilirubin, UA Negative Negative    Urobilinogen, UA Comment     Nitrite, UA Negative Negative         Assessment/Plan   Avoid eating out due to salt load and the spike in BP.  Pt ida ok for now.       Ekg for  jaya  compaired with  9/11/2020 no change.      Diagnoses and all orders for this visit:    1. Chronic renal impairment, stage 2 (mild) (Primary)    2. Dizzinesses    3. Essential hypertension    4. Vitamin B6 deficiency  -     B Complex-C (B-complex with vitamin C) tablet; Take 1 tablet by mouth Daily.  Dispense: 30 tablet; Refill: 11      Return in about 2 weeks (around 4/21/2021).          There are no Patient Instructions on file for this visit.     Keven Bear MD    Assessment/Plan

## 2021-04-08 ENCOUNTER — PROCEDURE VISIT (OUTPATIENT)
Dept: UROLOGY | Facility: CLINIC | Age: 66
End: 2021-04-08

## 2021-04-08 DIAGNOSIS — R31.29 OTHER MICROSCOPIC HEMATURIA: Primary | ICD-10-CM

## 2021-04-08 PROCEDURE — 52000 CYSTOURETHROSCOPY: CPT | Performed by: UROLOGY

## 2021-04-08 NOTE — PROGRESS NOTES
Chief Complaint  Gross hematuria    referring  No ref. provider found      HPI  Mr. Agosto is a 66 y.o. male with history of CAD who presents with gross hematuria. He had a cardiac stent was placed three weeks ago for myocardial infarction and was started on Brilinta and Xarelto. He did not tolerate Brilinta well and was switched to Plavix which is when the hematuria began. 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 Dr. Tim at [facility] did those procedures for him. He stopped taking his blood thinner medications on 03/22/2021 due to the bleeding.     He also previously had  a CT scan in 01/2021 for pain but he denies any bleeding at that time.     History of smoking?    yes   History of chemotherapy?   no  History of radiation?    no  History of kidney or bladder stones?  yes 5 to 6 years ago  History of frequent urinary tract infections? no    He currently denies fevers, chills, nausea, vomiting, constipation or flank pain. He denies any symptoms of urinary tract infection.    Past Medical History  Past Medical History:   Diagnosis Date   • 6th nerve palsy, right     right eye    • Anxiety and depression    • Atrial fibrillation, persistent (CMS/HCC) 12/02/2020    on Xarelto   • Coronary artery disease involving native coronary artery of native heart without angina pectoris 09/12/2018    follows w/ Dr. Johnson, on ASA 81mg   • Diabetes mellitus (CMS/HCC)     doesnt check sugar, type 2    • Double vision    • Dyspepsia    • Epilepsy (CMS/HCC)    • Focal seizure (CMS/HCC)     of right arm    • Heart attack (CMS/HCC)     NSTEMI 2015, s/p stenting   • History of Helicobacter pylori infection     in 2008, treated w/ PrevPak - 2021 EGD bx (+), PCP RX - PPI/Augmentin/Doxy/Flagyl (x 14 days) 1/22/21   • HL (hearing loss)     (L) ear - child luevano measles   • Hyperlipidemia    • Hypertension    • Kidney stone    • Memory loss 2005    d/t meningitis   • Meningitis     unsure of bacterial or viral    •  Obesity    • Seizures (CMS/HCC)    • Sleep apnea treated with nocturnal BiPAP     compliant with  machine    • Stroke (CMS/HCC)     2017/2018   • Wears glasses        Past Surgical History  Past Surgical History:   Procedure Laterality Date   • CARDIAC CATHETERIZATION N/A 10/12/2018    Procedure: Left Heart Cath;  Surgeon: Yoselin Johnson MD;  Location:  [facility];  Service: Cardiology   • COLONOSCOPY  10/2020    w/ Dr. Jacobs, hx colon polyps   • CORONARY STENT PLACEMENT  2015   • LAPAROSCOPIC GASTRIC BANDING  2008    s/p LAGB Realize 6/2008 by MASONO.   • UMBILICAL HERNIA REPAIR  2008    incarcerated UHR w/ mesh by Dr. Velasquez       Medications    Current Outpatient Medications:   •  allopurinol (Zyloprim) 100 MG tablet, Take 1 tablet by mouth Daily., Disp: 90 tablet, Rfl: 3  •  B Complex-C (B-complex with vitamin C) tablet, Take 1 tablet by mouth Daily., Disp: 30 tablet, Rfl: 11  •  cloNIDine (CATAPRES-TTS) 0.1 MG/24HR patch, Place 1 patch on the skin as directed by provider 1 (One) Time Per Week., Disp: 4 patch, Rfl: 11  •  clopidogrel (PLAVIX) 75 MG tablet, , Disp: , Rfl:   •  divalproex (DEPAKOTE) 250 MG DR tablet, Take 1 tablet by mouth 3 (Three) Times a Day. Take 2 in the AM 1 at noon and 2 in the pm. Per DR Clifford., Disp: 270 tablet, Rfl: 3  •  Entresto 49-51 MG tablet, Take 1 tablet by mouth 2 (Two) Times a Day., Disp: 60 tablet, Rfl: 11  •  escitalopram (LEXAPRO) 20 MG tablet, Take 1 tablet by mouth Daily., Disp: 90 tablet, Rfl: 3  •  Evolocumab (Repatha SureClick) solution auto-injector SureClick injection, Inject 1 mL under the skin into the appropriate area as directed Every 14 (Fourteen) Days., Disp: 6 pen, Rfl: 3  •  folic acid (FOLVITE) 1 MG tablet, Take one daily except on the day of taking Methotrexate., Disp: 30 tablet, Rfl: 11  •  furosemide (LASIX) 40 MG tablet, Take 1 tablet by mouth 2 (Two) Times a Day., Disp: 180 tablet, Rfl: 3  •  levothyroxine (SYNTHROID, LEVOTHROID) 88  MCG tablet, Take 1 tablet by mouth Daily., Disp: 90 tablet, Rfl: 3  •  losartan (COZAAR) 100 MG tablet, Take 100 mg by mouth Daily., Disp: , Rfl:   •  lovastatin (MEVACOR) 40 MG tablet, Take 1 tablet by mouth Daily With Dinner., Disp: 90 tablet, Rfl: 3  •  NIFEdipine CC (ADALAT CC) 90 MG 24 hr tablet, Take 1 tablet by mouth Daily., Disp: 90 tablet, Rfl: 3  •  propafenone (RYTHMOL) 150 MG tablet, Take 1 tablet by mouth Every 12 (Twelve) Hours., Disp: 90 tablet, Rfl: 3  •  rivaroxaban (XARELTO) 15 MG tablet, Take 1 tablet by mouth Daily., Disp: 90 tablet, Rfl: 3  •  Semaglutide, 1 MG/DOSE, (OZEMPIC) 2 MG/1.5ML solution pen-injector, Inject 1 mg under the skin into the appropriate area as directed 1 (One) Time Per Week., Disp: 3 pen, Rfl: 3  •  terazosin (HYTRIN) 10 MG capsule, Take 1 capsule by mouth Every 12 (Twelve) Hours., Disp: 90 capsule, Rfl: 3  •  triamterene-hydrochlorothiazide (Dyazide) 37.5-25 MG per capsule, Take 1 capsule by mouth Every Morning., Disp: 90 capsule, Rfl: 3    Allergies  Allergies   Allergen Reactions   • Amlodipine Swelling   • Statins Myalgia       Social History  Social History     Socioeconomic History   • Marital status:      Spouse name: Not on file   • Number of children: Not on file   • Years of education: Not on file   • Highest education level: Not on file   Tobacco Use   • Smoking status: Former Smoker     Packs/day: 0.50     Years: 10.00     Pack years: 5.00     Types: Cigarettes     Start date: 1975     Quit date: 1985     Years since quittin.2   • Smokeless tobacco: Never Used   Substance and Sexual Activity   • Alcohol use: No   • Drug use: No   • Sexual activity: Defer       Family History  He has no family history of prostate, bladder or kidney cancer    Review of Systems  Constitutional: No fevers or chills  Skin: Negative for rash  Endocrine: No heat/cold intolerance   Cardiovascular: Negative for chest pain or dyspnea on exertion  Respiratory:  Negative for shortness of breath or wheezing  Gastrointestinal: No constipation, nausea or vomiting  Genitourinary: Negative for new lower urinary tract symptoms or dysuria.  Musculoskeletal: No flank pain  Neurological:  Negative for frequent headaches or dizziness  Lymph/Heme: Negative for leg swelling or calf pain.    Physical Exam  There were no vitals taken for this visit.  Constitutional: NAD, WDWN.   HEENT: NCAT. Conjunctivae normal.  MMM.    Cardiovascular: Regular rate.  Pulmonary/Chest: Respirations are even and non-labored bilaterally.  Abdominal: Soft. No distension, tenderness, masses or guarding. No CVA tenderness.  Neurological: A + O x 3.  Cranial Nerves II-XII grossly intact.  Extremities: KRUNAL x 4, Warm. No clubbing.  No cyanosis.    Skin: Pink, warm and dry.  No rashes noted.  Psychiatric:  Normal mood and affect    Genitourinary  Deferred    Labs  Brief Urine Lab Results  (Last result in the past 365 days)      Color   Clarity   Blood   Leuk Est   Nitrite   Protein   CREAT   Urine HCG        03/25/21 1350 Red  Comment:  Any Substance that causes an abnormal urine color can alter  the accuracy of the chemical reactions.  REFERENCE RANGE: Yellow, Straw   Cloudy See below:  Comment:  Large (3+) See below:  Comment:  Small (1+) Negative See below:  Comment:  100 mg/dL (2+)             Lab Results   Component Value Date    PSA 3.670 01/16/2020    PSA 2.830 12/05/2018    PSA 2.780 07/12/2018         Radiologic Studies    CT Abdomen Pelvis Without Contrast [[id number]] (Order [id number])  Order  Status: Final result   Appointment Information    Display Notes    car; [phone]            PACS Images     Radiology Images   Study Result    Narrative & Impression   PROCEDURE: CT ABDOMEN PELVIS WO CONTRAST-     HISTORY: hematuria; R31.0-Gross hematuria     COMPARISON: None .     PROCEDURE: Axial images were obtained from the lung bases through the  pubic symphysis without intravenous contrast. .      FINDINGS:       ABDOMEN: The lung bases are clear. The heart size is normal. The limited  noncontrast images of the liver are normal. Stones are present within  the gallbladder. The spleen is normal. No adrenal masses are seen.  The  pancreas has an unremarkable unenhanced appearance.. The aorta is normal  in caliber. There is no significant free fluid or adenopathy.  There are  bilateral nonobstructing renal stones. There is no hydronephrosis.  Limited noncontrast images of the bowel are unremarkable. Note is made  of prior laparoscopic band surgery. Note is made of prior ventral hernia  repair.     PELVIS: The appendix is normal. The urinary bladder is unremarkable. The  prostate gland is enlarged. There is no significant fluid or adenopathy.     IMPRESSION:  Bilateral nonobstructing renal stones with no hydronephrosis  or ureterolithiasis.              1777.40 mGy.cm.  37.99 mGy     This study was performed with techniques to keep radiation doses as low  as reasonably achievable (ALARA). Individualized dose reduction  techniques using automated exposure control or adjustment of mA and/or  kV according to the patient size were employed.      This report was finalized on 1/19/2021 3:45 PM by Leelee Ramos M.D..     I personally reviewed these images.   2  Preprocedure diagnosis  hematuria    Postprocedure diagnosis  No bladder tumors    Procedure  Flexible Cystourethroscopy    Attending surgeon  Tonio De La Cruz MD    Anesthesia  2% lidocaine jelly intraurethrally    Complications  None    Indications  66 y.o. male undergoing a flexible cystoscopy for the above mentioned indications.  Informed consent was obtained.      Findings  Cystoscopic findings included one right and left ureteral orifice in the normal anatomic position with normal bladder mucosa and no tumors, masses or stones. The urethral urothelium was within normal limits with no strictures.  There was not a prominent median lobe.  The lateral lobes were  obstructive in appearance.      Procedure  The patient was placed in supine position and prepped and draped in sterile fashion with lidocaine jelly per urethra for anesthesia.  A timeout was performed.  The 14F flexible cystoscope was lubricated and gently placed through the penile urethra and into the bladder.  The bladder was completely visualized.  The cystoscope was retroflexed and the bladder neck and prostate visualized.  The cystoscope was slowly withdrawn while visualizing the urethra and the procedure terminated.  The patient tolerated the procedure well.        Assessment  66 y.o. male who presents with gross hematuria.  Risk factors include congestive heart failure, myocardial infarction, strokes, and TIAs as well as nephrolithiasis and a pseudoaneurysm of the femoral artery.    He is in a difficult situation since he has significant coronary artery disease and recently started on multiple blood thinners approximately a month ago. He has moderate volume bilateral renal stones. I would not recommend any intervention or any further stoppage of his blood thinners unless he has evidence of altered hemodynamic status, decreasing hematocrit, or inability to urinate due to increased blood.  We had a thorough discussion about this in a separate room and separate encounter.      Plan  1.  Repeat PSA draw since it was not sent when labs were checked last time  2.  Follow-up in 1 year with KUB  3.  Recommend restarting blood thinners

## 2021-04-09 LAB
PSA FREE MFR SERPL: 23.4 %
PSA FREE SERPL-MCNC: 0.96 NG/ML
PSA SERPL-MCNC: 4.1 NG/ML (ref 0–4)

## 2021-04-13 DIAGNOSIS — E03.9 ACQUIRED HYPOTHYROIDISM: ICD-10-CM

## 2021-04-13 DIAGNOSIS — I10 ESSENTIAL HYPERTENSION: ICD-10-CM

## 2021-04-14 RX ORDER — LEVOTHYROXINE SODIUM 88 UG/1
88 TABLET ORAL DAILY
Qty: 90 TABLET | Refills: 3 | Status: SHIPPED | OUTPATIENT
Start: 2021-04-14 | End: 2022-03-30

## 2021-04-14 RX ORDER — ESCITALOPRAM OXALATE 20 MG/1
20 TABLET ORAL DAILY
Qty: 90 TABLET | Refills: 3 | Status: SHIPPED | OUTPATIENT
Start: 2021-04-14 | End: 2021-08-02

## 2021-04-14 RX ORDER — DIVALPROEX SODIUM 250 MG/1
TABLET, DELAYED RELEASE ORAL
Qty: 450 TABLET | Refills: 3 | Status: SHIPPED | OUTPATIENT
Start: 2021-04-14 | End: 2021-07-23 | Stop reason: SDUPTHER

## 2021-04-14 RX ORDER — FUROSEMIDE 40 MG/1
40 TABLET ORAL 2 TIMES DAILY
Qty: 180 TABLET | Refills: 3 | Status: SHIPPED | OUTPATIENT
Start: 2021-04-14 | End: 2021-08-16 | Stop reason: SDUPTHER

## 2021-05-03 RX ORDER — CARVEDILOL 25 MG/1
25 TABLET ORAL 2 TIMES DAILY WITH MEALS
Qty: 180 TABLET | Refills: 3 | Status: SHIPPED | OUTPATIENT
Start: 2021-05-03 | End: 2021-07-23 | Stop reason: SDUPTHER

## 2021-05-04 ENCOUNTER — DOCUMENTATION (OUTPATIENT)
Dept: CARDIOLOGY | Facility: CLINIC | Age: 66
End: 2021-05-04

## 2021-05-04 NOTE — PROGRESS NOTES
Pt has established with new cardiologist at [facility]. Cancelled appt with Dr. Johnson scheduled for 5/5/2021

## 2021-05-07 DIAGNOSIS — E03.9 ACQUIRED HYPOTHYROIDISM: ICD-10-CM

## 2021-05-07 DIAGNOSIS — I10 ESSENTIAL HYPERTENSION: ICD-10-CM

## 2021-05-07 RX ORDER — HYDROCHLOROTHIAZIDE 12.5 MG/1
TABLET ORAL
Qty: 90 TABLET | Refills: 2 | Status: SHIPPED | OUTPATIENT
Start: 2021-05-07 | End: 2021-08-02

## 2021-07-19 ENCOUNTER — TRANSCRIBE ORDERS (OUTPATIENT)
Dept: LAB | Facility: HOSPITAL | Age: 66
End: 2021-07-19

## 2021-07-19 ENCOUNTER — LAB (OUTPATIENT)
Dept: LAB | Facility: HOSPITAL | Age: 66
End: 2021-07-19

## 2021-07-19 DIAGNOSIS — I25.10 CAD IN NATIVE ARTERY: ICD-10-CM

## 2021-07-19 DIAGNOSIS — I48.91 ATRIAL FIBRILLATION, UNSPECIFIED TYPE (HCC): ICD-10-CM

## 2021-07-19 DIAGNOSIS — E78.00 PURE HYPERCHOLESTEROLEMIA: Primary | ICD-10-CM

## 2021-07-19 DIAGNOSIS — E78.00 PURE HYPERCHOLESTEROLEMIA: ICD-10-CM

## 2021-07-19 LAB
ALBUMIN SERPL-MCNC: 4.1 G/DL (ref 3.5–5.2)
ALP SERPL-CCNC: 58 U/L (ref 39–117)
ALT SERPL W P-5'-P-CCNC: 14 U/L (ref 1–41)
ANION GAP SERPL CALCULATED.3IONS-SCNC: 11.1 MMOL/L (ref 5–15)
AST SERPL-CCNC: 18 U/L (ref 1–40)
BILIRUB CONJ SERPL-MCNC: <0.2 MG/DL (ref 0–0.3)
BILIRUB INDIRECT SERPL-MCNC: NORMAL MG/DL
BILIRUB SERPL-MCNC: 0.5 MG/DL (ref 0–1.2)
BUN SERPL-MCNC: 11 MG/DL (ref 8–23)
BUN/CREAT SERPL: 12.4 (ref 7–25)
CALCIUM SPEC-SCNC: 8.8 MG/DL (ref 8.6–10.5)
CHLORIDE SERPL-SCNC: 100 MMOL/L (ref 98–107)
CHOLEST SERPL-MCNC: 163 MG/DL (ref 0–200)
CO2 SERPL-SCNC: 27.9 MMOL/L (ref 22–29)
CREAT SERPL-MCNC: 0.89 MG/DL (ref 0.76–1.27)
DEPRECATED RDW RBC AUTO: 47 FL (ref 37–54)
ERYTHROCYTE [DISTWIDTH] IN BLOOD BY AUTOMATED COUNT: 14.6 % (ref 12.3–15.4)
GFR SERPL CREATININE-BSD FRML MDRD: 86 ML/MIN/1.73
GLUCOSE SERPL-MCNC: 145 MG/DL (ref 65–99)
HCT VFR BLD AUTO: 44.9 % (ref 37.5–51)
HDLC SERPL-MCNC: 41 MG/DL (ref 40–60)
HGB BLD-MCNC: 15.4 G/DL (ref 13–17.7)
LDLC SERPL CALC-MCNC: 84 MG/DL (ref 0–100)
LDLC/HDLC SERPL: 1.87 {RATIO}
MCH RBC QN AUTO: 29.8 PG (ref 26.6–33)
MCHC RBC AUTO-ENTMCNC: 34.3 G/DL (ref 31.5–35.7)
MCV RBC AUTO: 87 FL (ref 79–97)
PLATELET # BLD AUTO: 209 10*3/MM3 (ref 140–450)
PMV BLD AUTO: 11.4 FL (ref 6–12)
POTASSIUM SERPL-SCNC: 3.8 MMOL/L (ref 3.5–5.2)
PROT SERPL-MCNC: 7.1 G/DL (ref 6–8.5)
RBC # BLD AUTO: 5.16 10*6/MM3 (ref 4.14–5.8)
SODIUM SERPL-SCNC: 139 MMOL/L (ref 136–145)
TRIGL SERPL-MCNC: 227 MG/DL (ref 0–150)
VLDLC SERPL-MCNC: 38 MG/DL (ref 5–40)
WBC # BLD AUTO: 7.5 10*3/MM3 (ref 3.4–10.8)

## 2021-07-19 PROCEDURE — 80061 LIPID PANEL: CPT

## 2021-07-19 PROCEDURE — 85027 COMPLETE CBC AUTOMATED: CPT

## 2021-07-19 PROCEDURE — 80048 BASIC METABOLIC PNL TOTAL CA: CPT | Performed by: INTERNAL MEDICINE

## 2021-07-19 PROCEDURE — 36415 COLL VENOUS BLD VENIPUNCTURE: CPT | Performed by: INTERNAL MEDICINE

## 2021-07-19 PROCEDURE — 80076 HEPATIC FUNCTION PANEL: CPT

## 2021-07-21 ENCOUNTER — TELEPHONE (OUTPATIENT)
Dept: INTERNAL MEDICINE | Facility: CLINIC | Age: 66
End: 2021-07-21

## 2021-07-21 NOTE — TELEPHONE ENCOUNTER
Pharmacy Name: [facility]     Pharmacy representative phone number: [phone]    What medication are you calling in regards to: carvedilol (Coreg)   THEY NEED THE STRENGTH FOR THIS MEDICATION     lovastatin (MEVACOR) 40 MG tablet  THEY STATED THERE IS AN ALLERGY TO STATINS   PLEASE ADVISE     Who is the provider that prescribed the medication: DR ELLIS     REF # [id number]

## 2021-07-22 ENCOUNTER — OFFICE VISIT (OUTPATIENT)
Dept: INTERNAL MEDICINE | Facility: CLINIC | Age: 66
End: 2021-07-22

## 2021-07-22 VITALS
RESPIRATION RATE: 16 BRPM | TEMPERATURE: 97.3 F | BODY MASS INDEX: 38.36 KG/M2 | SYSTOLIC BLOOD PRESSURE: 130 MMHG | HEART RATE: 61 BPM | OXYGEN SATURATION: 98 % | HEIGHT: 76 IN | WEIGHT: 315 LBS | DIASTOLIC BLOOD PRESSURE: 90 MMHG

## 2021-07-22 DIAGNOSIS — E78.00 HYPERCHOLESTEREMIA: ICD-10-CM

## 2021-07-22 DIAGNOSIS — I48.11 LONGSTANDING PERSISTENT ATRIAL FIBRILLATION (HCC): Primary | ICD-10-CM

## 2021-07-22 DIAGNOSIS — R93.3 ABNORMAL FINDINGS ON DIAGNOSTIC IMAGING OF OTHER PARTS OF DIGESTIVE TRACT: ICD-10-CM

## 2021-07-22 DIAGNOSIS — E53.1 VITAMIN B6 DEFICIENCY: ICD-10-CM

## 2021-07-22 DIAGNOSIS — R79.9 ABNORMAL FINDING OF BLOOD CHEMISTRY, UNSPECIFIED: ICD-10-CM

## 2021-07-22 PROCEDURE — 99214 OFFICE O/P EST MOD 30 MIN: CPT | Performed by: INTERNAL MEDICINE

## 2021-07-22 RX ORDER — AMLODIPINE BESYLATE 10 MG/1
TABLET ORAL
COMMUNITY
End: 2021-08-23

## 2021-07-22 RX ORDER — AMLODIPINE BESYLATE 10 MG/1
TABLET ORAL
COMMUNITY
Start: 2021-06-06 | End: 2021-07-22

## 2021-07-22 RX ORDER — MULTIVITAMIN WITH IRON
1 TABLET ORAL DAILY
Qty: 90 TABLET | Refills: 3 | Status: SHIPPED | OUTPATIENT
Start: 2021-07-22 | End: 2022-04-29

## 2021-07-22 NOTE — TELEPHONE ENCOUNTER
Caller: [facility] - [place], [place] - [address] - [phone] [facility] [phone]    Relationship: Pharmacy    Best call back number: [phone]    What was the call regarding: CHERIE WITH [facility] STATES THAT THEY NEED A CALL BACK ABOUT THE PATIENTS PRESCRIPTIONS.    Do you require a callback: YES

## 2021-07-23 DIAGNOSIS — I10 ESSENTIAL HYPERTENSION: Primary | ICD-10-CM

## 2021-07-23 RX ORDER — DIVALPROEX SODIUM 250 MG/1
TABLET, DELAYED RELEASE ORAL
Qty: 450 TABLET | Refills: 3 | Status: SHIPPED | OUTPATIENT
Start: 2021-07-23 | End: 2022-04-11

## 2021-07-23 RX ORDER — CARVEDILOL 25 MG/1
25 TABLET ORAL 2 TIMES DAILY WITH MEALS
Qty: 180 TABLET | Refills: 3 | Status: SHIPPED | OUTPATIENT
Start: 2021-07-23

## 2021-07-23 RX ORDER — LOVASTATIN 40 MG/1
40 TABLET ORAL
Qty: 90 TABLET | Refills: 3 | Status: SHIPPED | OUTPATIENT
Start: 2021-07-23 | End: 2022-04-01

## 2021-07-23 RX ORDER — LOSARTAN POTASSIUM 100 MG/1
TABLET ORAL
Qty: 90 TABLET | Refills: 3 | Status: SHIPPED | OUTPATIENT
Start: 2021-07-23 | End: 2021-08-02

## 2021-07-27 ENCOUNTER — TELEPHONE (OUTPATIENT)
Dept: INTERNAL MEDICINE | Facility: CLINIC | Age: 66
End: 2021-07-27

## 2021-07-28 NOTE — TELEPHONE ENCOUNTER
Left vm for patient that I can get him in with dr mark anthony sher 7/29 at 9:15am. If he can make this appt please cancel dl appt for 8/2. Ok for hub to read.

## 2021-07-28 NOTE — TELEPHONE ENCOUNTER
Daughter called in and states patient is still in icu at [facility]. Will call once discharged to make appt with mark anthony only.

## 2021-07-28 NOTE — TELEPHONE ENCOUNTER
Left 2nd voicemail regarding appt nikky with dr hopson at 9:15am. If he can make this appt, please cancel dl appt for 8/2. Ok for hub to read.

## 2021-07-30 ENCOUNTER — READMISSION MANAGEMENT (OUTPATIENT)
Dept: CALL CENTER | Facility: HOSPITAL | Age: 66
End: 2021-07-30

## 2021-07-30 NOTE — OUTREACH NOTE
Prep Survey      Responses   [facility] facility patient discharged from?  Non-BH   Is LACE score < 7 ?  Non-BH Discharge   Emergency Room discharge w/ pulse ox?  No   Eligibility  [facility]   Date of Admission  07/24/21   Date of Discharge  07/30/21   Discharge diagnosis  unavailable but was in ICU   Does the patient have one of the following disease processes/diagnoses(primary or secondary)?  Other   Prep survey completed?  Yes          Digna Wei RN

## 2021-08-02 ENCOUNTER — PATIENT MESSAGE (OUTPATIENT)
Dept: INTERNAL MEDICINE | Facility: CLINIC | Age: 66
End: 2021-08-02

## 2021-08-02 ENCOUNTER — TRANSITIONAL CARE MANAGEMENT TELEPHONE ENCOUNTER (OUTPATIENT)
Dept: CALL CENTER | Facility: HOSPITAL | Age: 66
End: 2021-08-02

## 2021-08-02 ENCOUNTER — OFFICE VISIT (OUTPATIENT)
Dept: INTERNAL MEDICINE | Facility: CLINIC | Age: 66
End: 2021-08-02

## 2021-08-02 VITALS
WEIGHT: 313 LBS | DIASTOLIC BLOOD PRESSURE: 95 MMHG | TEMPERATURE: 97.5 F | OXYGEN SATURATION: 98 % | HEIGHT: 76 IN | SYSTOLIC BLOOD PRESSURE: 135 MMHG | BODY MASS INDEX: 38.11 KG/M2 | RESPIRATION RATE: 16 BRPM | HEART RATE: 63 BPM

## 2021-08-02 DIAGNOSIS — Z86.74 HISTORY OF SUDDEN CARDIAC DEATH SUCCESSFULLY RESUSCITATED: ICD-10-CM

## 2021-08-02 DIAGNOSIS — I50.41 ACUTE COMBINED SYSTOLIC (CONGESTIVE) AND DIASTOLIC (CONGESTIVE) HEART FAILURE (HCC): ICD-10-CM

## 2021-08-02 DIAGNOSIS — G47.33 OSA TREATED WITH BIPAP: ICD-10-CM

## 2021-08-02 DIAGNOSIS — R79.9 ABNORMAL FINDING OF BLOOD CHEMISTRY, UNSPECIFIED: ICD-10-CM

## 2021-08-02 DIAGNOSIS — R42 DIZZINESS: ICD-10-CM

## 2021-08-02 DIAGNOSIS — I10 ESSENTIAL HYPERTENSION: Primary | ICD-10-CM

## 2021-08-02 PROBLEM — I47.20 SUSTAINED VENTRICULAR TACHYCARDIA: Status: ACTIVE | Noted: 2021-07-30

## 2021-08-02 PROCEDURE — 99214 OFFICE O/P EST MOD 30 MIN: CPT | Performed by: INTERNAL MEDICINE

## 2021-08-02 RX ORDER — AMIODARONE HYDROCHLORIDE 400 MG/1
400 TABLET ORAL 2 TIMES DAILY
COMMUNITY
Start: 2021-07-30 | End: 2021-11-30

## 2021-08-02 RX ORDER — BUSPIRONE HYDROCHLORIDE 7.5 MG/1
7.5 TABLET ORAL
COMMUNITY
Start: 2021-07-30 | End: 2021-08-16

## 2021-08-02 NOTE — OUTREACH NOTE
Call Center TCM Note      Responses   [facility] patient discharged from?  Non-   Does the patient have one of the following disease processes/diagnoses(primary or secondary)?  Other   TCM attempt successful?  No [Paula, spouse,  Lois, daughter]   Unsuccessful attempts  Attempt 1          Melina Fraser RN    8/2/2021, 14:07 EDT

## 2021-08-02 NOTE — OUTREACH NOTE
Call Center TCM Note      Responses   [facility] patient discharged from?  Non-   Does the patient have one of the following disease processes/diagnoses(primary or secondary)?  Other   TCM attempt successful?  Yes   Call start time  1613   Call end time  1618   Discharge diagnosis  unavailable but was in ICU - Pacemaker placed   Person spoke with today (if not patient) and relationship  Patient   Meds reviewed with patient/caregiver?  Yes   Is the patient having any side effects they believe may be caused by any medication additions or changes?  No   Does the patient have all medications ordered at discharge?  Yes   Is the patient taking all medications as directed (includes completed medication regime)?  Yes   Does the patient have a primary care provider?   Yes   Does the patient have an appointment with their PCP within 7 days of discharge?  Yes   Has the patient kept scheduled appointments due by today?  N/A   Psychosocial issues?  No   Did the patient receive a copy of their discharge instructions?  Yes   Nursing interventions  Reviewed instructions with patient   What is the patient's perception of their health status since discharge?  Improving   Is the patient/caregiver able to teach back signs and symptoms related to disease process for when to call PCP?  Yes   Is the patient/caregiver able to teach back signs and symptoms related to disease process for when to call 911?  Yes   Is the patient/caregiver able to teach back the hierarchy of who to call/visit for symptoms/problems? PCP, Specialist, Home health nurse, Urgent Care, ED, 911  Yes   If the patient is a current smoker, are they able to teach back resources for cessation?  Not a smoker   TCM call completed?  Yes   Wrap up additional comments  Pt states he is doing better. No questions/concerns.          Melina Fraser RN    8/2/2021, 16:21 EDT
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
## 2021-08-03 ENCOUNTER — LAB (OUTPATIENT)
Dept: LAB | Facility: HOSPITAL | Age: 66
End: 2021-08-03

## 2021-08-03 ENCOUNTER — TELEPHONE (OUTPATIENT)
Dept: INTERNAL MEDICINE | Facility: CLINIC | Age: 66
End: 2021-08-03

## 2021-08-03 DIAGNOSIS — I10 ESSENTIAL HYPERTENSION: Primary | ICD-10-CM

## 2021-08-03 LAB
ALBUMIN SERPL-MCNC: 4.2 G/DL (ref 3.5–5.2)
ALBUMIN/GLOB SERPL: 1 G/DL
ALP SERPL-CCNC: 68 U/L (ref 39–117)
ALT SERPL W P-5'-P-CCNC: 16 U/L (ref 1–41)
ANION GAP SERPL CALCULATED.3IONS-SCNC: 11.5 MMOL/L (ref 5–15)
AST SERPL-CCNC: 22 U/L (ref 1–40)
BASOPHILS # BLD AUTO: 0.06 10*3/MM3 (ref 0–0.2)
BASOPHILS NFR BLD AUTO: 0.7 % (ref 0–1.5)
BILIRUB SERPL-MCNC: 0.8 MG/DL (ref 0–1.2)
BUN SERPL-MCNC: 20 MG/DL (ref 8–23)
BUN/CREAT SERPL: 14.1 (ref 7–25)
CALCIUM SPEC-SCNC: 10 MG/DL (ref 8.6–10.5)
CHLORIDE SERPL-SCNC: 100 MMOL/L (ref 98–107)
CHOLEST SERPL-MCNC: 162 MG/DL (ref 0–200)
CO2 SERPL-SCNC: 25.5 MMOL/L (ref 22–29)
CREAT SERPL-MCNC: 1.42 MG/DL (ref 0.76–1.27)
DEPRECATED RDW RBC AUTO: 48 FL (ref 37–54)
EOSINOPHIL # BLD AUTO: 0.2 10*3/MM3 (ref 0–0.4)
EOSINOPHIL NFR BLD AUTO: 2.4 % (ref 0.3–6.2)
ERYTHROCYTE [DISTWIDTH] IN BLOOD BY AUTOMATED COUNT: 14.6 % (ref 12.3–15.4)
GFR SERPL CREATININE-BSD FRML MDRD: 50 ML/MIN/1.73
GLOBULIN UR ELPH-MCNC: 4.4 GM/DL
GLUCOSE SERPL-MCNC: 107 MG/DL (ref 65–99)
HBA1C MFR BLD: 6.8 % (ref 4.8–5.6)
HCT VFR BLD AUTO: 47.1 % (ref 37.5–51)
HDLC SERPL-MCNC: 39 MG/DL (ref 40–60)
HGB BLD-MCNC: 15.7 G/DL (ref 13–17.7)
IMM GRANULOCYTES # BLD AUTO: 0.06 10*3/MM3 (ref 0–0.05)
IMM GRANULOCYTES NFR BLD AUTO: 0.7 % (ref 0–0.5)
LDLC SERPL CALC-MCNC: 88 MG/DL (ref 0–100)
LDLC/HDLC SERPL: 2.1 {RATIO}
LYMPHOCYTES # BLD AUTO: 2.49 10*3/MM3 (ref 0.7–3.1)
LYMPHOCYTES NFR BLD AUTO: 29.3 % (ref 19.6–45.3)
MCH RBC QN AUTO: 30 PG (ref 26.6–33)
MCHC RBC AUTO-ENTMCNC: 33.3 G/DL (ref 31.5–35.7)
MCV RBC AUTO: 90.1 FL (ref 79–97)
MONOCYTES # BLD AUTO: 0.58 10*3/MM3 (ref 0.1–0.9)
MONOCYTES NFR BLD AUTO: 6.8 % (ref 5–12)
NEUTROPHILS NFR BLD AUTO: 5.1 10*3/MM3 (ref 1.7–7)
NEUTROPHILS NFR BLD AUTO: 60.1 % (ref 42.7–76)
NRBC BLD AUTO-RTO: 0 /100 WBC (ref 0–0.2)
NT-PROBNP SERPL-MCNC: 408.6 PG/ML (ref 0–900)
PLATELET # BLD AUTO: 292 10*3/MM3 (ref 140–450)
PMV BLD AUTO: 11.2 FL (ref 6–12)
POTASSIUM SERPL-SCNC: 4 MMOL/L (ref 3.5–5.2)
PROT SERPL-MCNC: 8.6 G/DL (ref 6–8.5)
RBC # BLD AUTO: 5.23 10*6/MM3 (ref 4.14–5.8)
SODIUM SERPL-SCNC: 137 MMOL/L (ref 136–145)
T4 FREE SERPL-MCNC: 1.41 NG/DL (ref 0.93–1.7)
TRIGL SERPL-MCNC: 205 MG/DL (ref 0–150)
TSH SERPL DL<=0.05 MIU/L-ACNC: 4.99 UIU/ML (ref 0.27–4.2)
VIT B12 BLD-MCNC: 585 PG/ML (ref 211–946)
VLDLC SERPL-MCNC: 35 MG/DL (ref 5–40)
WBC # BLD AUTO: 8.49 10*3/MM3 (ref 3.4–10.8)

## 2021-08-03 PROCEDURE — 84443 ASSAY THYROID STIM HORMONE: CPT | Performed by: INTERNAL MEDICINE

## 2021-08-03 PROCEDURE — 84439 ASSAY OF FREE THYROXINE: CPT | Performed by: INTERNAL MEDICINE

## 2021-08-03 PROCEDURE — 80061 LIPID PANEL: CPT | Performed by: INTERNAL MEDICINE

## 2021-08-03 PROCEDURE — 82607 VITAMIN B-12: CPT | Performed by: INTERNAL MEDICINE

## 2021-08-03 PROCEDURE — 80053 COMPREHEN METABOLIC PANEL: CPT | Performed by: INTERNAL MEDICINE

## 2021-08-03 PROCEDURE — 83036 HEMOGLOBIN GLYCOSYLATED A1C: CPT | Performed by: INTERNAL MEDICINE

## 2021-08-03 PROCEDURE — 85025 COMPLETE CBC W/AUTO DIFF WBC: CPT | Performed by: INTERNAL MEDICINE

## 2021-08-03 PROCEDURE — 36415 COLL VENOUS BLD VENIPUNCTURE: CPT | Performed by: INTERNAL MEDICINE

## 2021-08-03 PROCEDURE — 83880 ASSAY OF NATRIURETIC PEPTIDE: CPT | Performed by: INTERNAL MEDICINE

## 2021-08-03 NOTE — TELEPHONE ENCOUNTER
----- Message from Keven Bear MD sent at 8/3/2021  8:39 AM EDT -----  Please have mr gusman come infor labs this am.

## 2021-08-09 ENCOUNTER — PATIENT MESSAGE (OUTPATIENT)
Dept: INTERNAL MEDICINE | Facility: CLINIC | Age: 66
End: 2021-08-09

## 2021-08-11 ENCOUNTER — TELEPHONE (OUTPATIENT)
Dept: INTERNAL MEDICINE | Facility: CLINIC | Age: 66
End: 2021-08-11

## 2021-08-11 NOTE — TELEPHONE ENCOUNTER
Caller: Andre Agosto    Relationship to patient: Self    Best call back number: [phone]     New or established patient?  [] New  [x] Established  PATIENT STATES THAT HE NEEDS AN APPOINTMENT WITH DR. ELLIS NEXT WEEK FOR THIS FOLLOW UP. THERE WAS NOTHING AVAILABLE AND THERE WAS NO ANSWER WHEN I TRIED TO TRANSFER TO OFFICE. STATES HE IS A LONG TIME PATIENT. PLEASE CALL.    Date of discharge: 08/02/21    Facility discharged from: [facility]    Diagnosis/Symptoms: PACE MAKER    Length of stay (If applicable):     Specialty Only: Did you see a [facility] health provider?    [] Yes  [x] No  If so, who?

## 2021-08-16 ENCOUNTER — OFFICE VISIT (OUTPATIENT)
Dept: INTERNAL MEDICINE | Facility: CLINIC | Age: 66
End: 2021-08-16

## 2021-08-16 VITALS
TEMPERATURE: 97.3 F | OXYGEN SATURATION: 98 % | SYSTOLIC BLOOD PRESSURE: 130 MMHG | WEIGHT: 305 LBS | BODY MASS INDEX: 37.14 KG/M2 | HEIGHT: 76 IN | RESPIRATION RATE: 16 BRPM | HEART RATE: 67 BPM | DIASTOLIC BLOOD PRESSURE: 80 MMHG

## 2021-08-16 DIAGNOSIS — R56.9 SEIZURE (HCC): ICD-10-CM

## 2021-08-16 DIAGNOSIS — N18.2 CHRONIC RENAL IMPAIRMENT, STAGE 2 (MILD): ICD-10-CM

## 2021-08-16 DIAGNOSIS — R06.09 DYSPNEA ON EXERTION: ICD-10-CM

## 2021-08-16 DIAGNOSIS — I50.82 BIVENTRICULAR CONGESTIVE HEART FAILURE (HCC): ICD-10-CM

## 2021-08-16 DIAGNOSIS — H53.9 VISION CHANGES: Primary | ICD-10-CM

## 2021-08-16 PROCEDURE — 99214 OFFICE O/P EST MOD 30 MIN: CPT | Performed by: INTERNAL MEDICINE

## 2021-08-16 RX ORDER — FUROSEMIDE 40 MG/1
40 TABLET ORAL DAILY
Qty: 90 TABLET | Refills: 3 | Status: SHIPPED | OUTPATIENT
Start: 2021-08-16 | End: 2022-01-31 | Stop reason: SDUPTHER

## 2021-08-16 NOTE — PROGRESS NOTES
Subjective     Patient ID: Andre Agosto is a 66 y.o. male. Patient is here for management of multiple medical problems.     Chief Complaint   Patient presents with   • Follow-up     pace maker      History of Present Illness      Pacemaker.       Now on amiodarone. Only on 200 mg bid.   Now on higher dose.      Water pill      The following portions of the patient's history were reviewed and updated as appropriate: allergies, current medications, past family history, past medical history, past social history, past surgical history and problem list.    Review of Systems    Current Outpatient Medications:   •  amiodarone (PACERONE) 400 MG tablet, 400 mg., Disp: , Rfl:   •  amLODIPine (NORVASC) 10 MG tablet, amlodipine 10 mg tablet, Disp: , Rfl:   •  B Complex-C (B-complex with vitamin C) tablet, Take 1 tablet by mouth Daily., Disp: 90 tablet, Rfl: 3  •  carvedilol (Coreg) 25 MG tablet, Take 1 tablet by mouth 2 (Two) Times a Day With Meals., Disp: 180 tablet, Rfl: 3  •  clopidogrel (PLAVIX) 75 MG tablet, , Disp: , Rfl:   •  divalproex (DEPAKOTE) 250 MG DR tablet, Take 2 tablets in the AM, 1 tablet at noon, and 2 tablets in the pm. Per DR Clifford., Disp: 450 tablet, Rfl: 3  •  Entresto 49-51 MG tablet, Take 1 tablet by mouth 2 (Two) Times a Day., Disp: 60 tablet, Rfl: 11  •  Evolocumab (Repatha SureClick) solution auto-injector SureClick injection, Inject 1 mL under the skin into the appropriate area as directed Every 14 (Fourteen) Days., Disp: 6 pen, Rfl: 3  •  folic acid (FOLVITE) 1 MG tablet, Take one daily except on the day of taking Methotrexate., Disp: 30 tablet, Rfl: 11  •  furosemide (LASIX) 40 MG tablet, Take 1 tablet by mouth 2 (Two) Times a Day., Disp: 180 tablet, Rfl: 3  •  levothyroxine (SYNTHROID, LEVOTHROID) 88 MCG tablet, Take 1 tablet by mouth Daily., Disp: 90 tablet, Rfl: 3  •  lovastatin (MEVACOR) 40 MG tablet, Take 1 tablet by mouth Daily With Dinner. Pt can tolerat lovastatin., Disp: 90 tablet, Rfl:  "3  •  Semaglutide, 1 MG/DOSE, (OZEMPIC) 2 MG/1.5ML solution pen-injector, Inject 1 mg under the skin into the appropriate area as directed 1 (One) Time Per Week., Disp: 3 pen, Rfl: 3    Objective      Blood pressure 130/80, pulse 67, temperature 97.3 °F (36.3 °C), temperature source Temporal, resp. rate 16, height 193 cm (76\"), weight (!) 138 kg (305 lb), SpO2 98 %.    Physical Exam     General Appearance:    Alert, cooperative, no distress, appears stated age   Head:    Normocephalic, without obvious abnormality, atraumatic   Eyes:    PERRL, conjunctiva/corneas clear, EOM's intact   Ears:    Normal TM's and external ear canals, both ears   Nose:   Nares normal, septum midline, mucosa normal, no drainage   or sinus tenderness   Throat:   Lips, mucosa, and tongue normal; teeth and gums normal   Neck:   Supple, symmetrical, trachea midline, no adenopathy;        thyroid:  No enlargement/tenderness/nodules; no carotid    bruit or JVD   Back:     Symmetric, no curvature, ROM normal, no CVA tenderness   Lungs:     Clear to auscultation bilaterally, respirations unlabored   Chest wall:    No tenderness or deformity   Heart:    Regular rate and rhythm, S1 and S2 normal, no murmur,        rub or gallop   Abdomen:     Soft, non-tender, bowel sounds active all four quadrants,     no masses, no organomegaly   Extremities:   Extremities normal, atraumatic, no cyanosis or edema   Pulses:   2+ and symmetric all extremities   Skin:   Skin color, texture, turgor normal, no rashes or lesions   Lymph nodes:   Cervical, supraclavicular, and axillary nodes normal   Neurologic:   CNII-XII intact. Normal strength, sensation and reflexes       throughout      Results for orders placed or performed in visit on 08/02/21   BNP    Specimen: Blood   Result Value Ref Range    proBNP 408.6 0.0 - 900.0 pg/mL         Assessment/Plan   Wt down 11 lbs        Diagnoses and all orders for this visit:    1. Vision changes (Primary)  -     Ambulatory " Referral to Ophthalmology  -     Comprehensive Metabolic Panel  -     CBC & Differential  -     BNP    2. Seizure (CMS/HCC)  -     Ambulatory Referral to Neurology  -     Comprehensive Metabolic Panel  -     CBC & Differential  -     BNP    3. Biventricular congestive heart failure (CMS/HCC)  -     Comprehensive Metabolic Panel  -     CBC & Differential  -     BNP    4. Chronic renal impairment, stage 2 (mild)  -     Comprehensive Metabolic Panel  -     CBC & Differential  -     BNP    5. Dyspnea on exertion   -     BNP      Return in about 1 week (around 8/23/2021).          There are no Patient Instructions on file for this visit.     Keven Bear MD    Assessment/Plan

## 2021-08-20 ENCOUNTER — LAB (OUTPATIENT)
Dept: LAB | Facility: HOSPITAL | Age: 66
End: 2021-08-20

## 2021-08-20 LAB
ALBUMIN SERPL-MCNC: 4.2 G/DL (ref 3.5–5.2)
ALBUMIN UR-MCNC: 30.1 MG/DL
ALBUMIN/GLOB SERPL: 1.3 G/DL
ALP SERPL-CCNC: 56 U/L (ref 39–117)
ALT SERPL W P-5'-P-CCNC: 17 U/L (ref 1–41)
ANION GAP SERPL CALCULATED.3IONS-SCNC: 11.9 MMOL/L (ref 5–15)
AST SERPL-CCNC: 17 U/L (ref 1–40)
BASOPHILS # BLD AUTO: 0.04 10*3/MM3 (ref 0–0.2)
BASOPHILS NFR BLD AUTO: 0.6 % (ref 0–1.5)
BILIRUB SERPL-MCNC: 0.6 MG/DL (ref 0–1.2)
BUN SERPL-MCNC: 19 MG/DL (ref 8–23)
BUN/CREAT SERPL: 12.8 (ref 7–25)
CALCIUM SPEC-SCNC: 9.1 MG/DL (ref 8.6–10.5)
CHLORIDE SERPL-SCNC: 101 MMOL/L (ref 98–107)
CO2 SERPL-SCNC: 24.1 MMOL/L (ref 22–29)
CREAT SERPL-MCNC: 1.48 MG/DL (ref 0.76–1.27)
DEPRECATED RDW RBC AUTO: 47.3 FL (ref 37–54)
EOSINOPHIL # BLD AUTO: 0.18 10*3/MM3 (ref 0–0.4)
EOSINOPHIL NFR BLD AUTO: 2.8 % (ref 0.3–6.2)
ERYTHROCYTE [DISTWIDTH] IN BLOOD BY AUTOMATED COUNT: 14.7 % (ref 12.3–15.4)
GFR SERPL CREATININE-BSD FRML MDRD: 48 ML/MIN/1.73
GLOBULIN UR ELPH-MCNC: 3.3 GM/DL
GLUCOSE SERPL-MCNC: 168 MG/DL (ref 65–99)
HCT VFR BLD AUTO: 45.3 % (ref 37.5–51)
HGB BLD-MCNC: 15.5 G/DL (ref 13–17.7)
IMM GRANULOCYTES # BLD AUTO: 0.02 10*3/MM3 (ref 0–0.05)
IMM GRANULOCYTES NFR BLD AUTO: 0.3 % (ref 0–0.5)
LYMPHOCYTES # BLD AUTO: 1.93 10*3/MM3 (ref 0.7–3.1)
LYMPHOCYTES NFR BLD AUTO: 29.9 % (ref 19.6–45.3)
MCH RBC QN AUTO: 30.8 PG (ref 26.6–33)
MCHC RBC AUTO-ENTMCNC: 34.2 G/DL (ref 31.5–35.7)
MCV RBC AUTO: 89.9 FL (ref 79–97)
MONOCYTES # BLD AUTO: 0.39 10*3/MM3 (ref 0.1–0.9)
MONOCYTES NFR BLD AUTO: 6 % (ref 5–12)
NEUTROPHILS NFR BLD AUTO: 3.89 10*3/MM3 (ref 1.7–7)
NEUTROPHILS NFR BLD AUTO: 60.4 % (ref 42.7–76)
NRBC BLD AUTO-RTO: 0 /100 WBC (ref 0–0.2)
NT-PROBNP SERPL-MCNC: 161.9 PG/ML (ref 0–900)
PLATELET # BLD AUTO: 226 10*3/MM3 (ref 140–450)
PMV BLD AUTO: 11.7 FL (ref 6–12)
POTASSIUM SERPL-SCNC: 4 MMOL/L (ref 3.5–5.2)
PROT SERPL-MCNC: 7.5 G/DL (ref 6–8.5)
RBC # BLD AUTO: 5.04 10*6/MM3 (ref 4.14–5.8)
SODIUM SERPL-SCNC: 137 MMOL/L (ref 136–145)
VIT B12 BLD-MCNC: 515 PG/ML (ref 211–946)
WBC # BLD AUTO: 6.45 10*3/MM3 (ref 3.4–10.8)

## 2021-08-20 PROCEDURE — 82607 VITAMIN B-12: CPT | Performed by: INTERNAL MEDICINE

## 2021-08-20 PROCEDURE — 85025 COMPLETE CBC W/AUTO DIFF WBC: CPT | Performed by: INTERNAL MEDICINE

## 2021-08-20 PROCEDURE — 83880 ASSAY OF NATRIURETIC PEPTIDE: CPT | Performed by: INTERNAL MEDICINE

## 2021-08-20 PROCEDURE — 82043 UR ALBUMIN QUANTITATIVE: CPT | Performed by: INTERNAL MEDICINE

## 2021-08-20 PROCEDURE — 80053 COMPREHEN METABOLIC PANEL: CPT | Performed by: INTERNAL MEDICINE

## 2021-08-20 PROCEDURE — 36415 COLL VENOUS BLD VENIPUNCTURE: CPT | Performed by: INTERNAL MEDICINE

## 2021-08-23 ENCOUNTER — TELEPHONE (OUTPATIENT)
Dept: INTERNAL MEDICINE | Facility: CLINIC | Age: 66
End: 2021-08-23

## 2021-08-23 ENCOUNTER — OFFICE VISIT (OUTPATIENT)
Dept: INTERNAL MEDICINE | Facility: CLINIC | Age: 66
End: 2021-08-23

## 2021-08-23 VITALS
RESPIRATION RATE: 16 BRPM | TEMPERATURE: 97 F | DIASTOLIC BLOOD PRESSURE: 87 MMHG | HEIGHT: 76 IN | SYSTOLIC BLOOD PRESSURE: 136 MMHG | OXYGEN SATURATION: 99 % | WEIGHT: 304 LBS | BODY MASS INDEX: 37.02 KG/M2 | HEART RATE: 66 BPM

## 2021-08-23 DIAGNOSIS — I25.10 CORONARY ARTERY DISEASE INVOLVING NATIVE CORONARY ARTERY OF NATIVE HEART WITHOUT ANGINA PECTORIS: ICD-10-CM

## 2021-08-23 DIAGNOSIS — Z71.89 GLUCOMETER INSTRUCTION, ENCOUNTER FOR: ICD-10-CM

## 2021-08-23 DIAGNOSIS — I95.1 ORTHOSTATIC HYPOTENSION: Primary | ICD-10-CM

## 2021-08-23 DIAGNOSIS — N18.2 CRI (CHRONIC RENAL INSUFFICIENCY), STAGE 2 (MILD): ICD-10-CM

## 2021-08-23 PROCEDURE — 99214 OFFICE O/P EST MOD 30 MIN: CPT | Performed by: INTERNAL MEDICINE

## 2021-08-23 RX ORDER — ALLOPURINOL 100 MG/1
TABLET ORAL
Qty: 90 TABLET | Refills: 3 | OUTPATIENT
Start: 2021-08-23

## 2021-08-23 RX ORDER — DAPAGLIFLOZIN 10 MG/1
10 TABLET, FILM COATED ORAL DAILY
COMMUNITY
Start: 2021-08-17

## 2021-08-23 RX ORDER — BLOOD-GLUCOSE METER
1 KIT MISCELLANEOUS AS NEEDED
Qty: 1 EACH | Refills: 1 | Status: SHIPPED | OUTPATIENT
Start: 2021-08-23 | End: 2022-11-11

## 2021-08-23 RX ORDER — AMLODIPINE BESYLATE 5 MG/1
5 TABLET ORAL DAILY
Qty: 30 TABLET | Refills: 11 | Status: SHIPPED | OUTPATIENT
Start: 2021-08-23 | End: 2021-09-28 | Stop reason: SDUPTHER

## 2021-08-23 NOTE — PROGRESS NOTES
Subjective     Patient ID: Andre Agosto is a 66 y.o. male. Patient is here for management of multiple medical problems.     Chief Complaint   Patient presents with   • Follow-up     1 week, pt states he is still having dizziness      History of Present Illness     Dizziness  Pt on amiodarone.   On 400 mg bid.   [facility] questioning the dose.  Pt was tapered 200 mg.         The following portions of the patient's history were reviewed and updated as appropriate: allergies, current medications, past family history, past medical history, past social history, past surgical history and problem list.    Review of Systems   Constitutional: Negative for fatigue.   Neurological: Positive for dizziness. Negative for tremors, syncope and speech difficulty.   All other systems reviewed and are negative.      Current Outpatient Medications:   •  amiodarone (PACERONE) 400 MG tablet, 400 mg., Disp: , Rfl:   •  B Complex-C (B-complex with vitamin C) tablet, Take 1 tablet by mouth Daily., Disp: 90 tablet, Rfl: 3  •  carvedilol (Coreg) 25 MG tablet, Take 1 tablet by mouth 2 (Two) Times a Day With Meals., Disp: 180 tablet, Rfl: 3  •  clopidogrel (PLAVIX) 75 MG tablet, , Disp: , Rfl:   •  divalproex (DEPAKOTE) 250 MG DR tablet, Take 2 tablets in the AM, 1 tablet at noon, and 2 tablets in the pm. Per DR Clifford., Disp: 450 tablet, Rfl: 3  •  Entresto 49-51 MG tablet, Take 1 tablet by mouth 2 (Two) Times a Day., Disp: 60 tablet, Rfl: 11  •  Evolocumab (Repatha SureClick) solution auto-injector SureClick injection, Inject 1 mL under the skin into the appropriate area as directed Every 14 (Fourteen) Days., Disp: 6 pen, Rfl: 3  •  folic acid (FOLVITE) 1 MG tablet, Take one daily except on the day of taking Methotrexate., Disp: 30 tablet, Rfl: 11  •  furosemide (LASIX) 40 MG tablet, Take 1 tablet by mouth Daily., Disp: 90 tablet, Rfl: 3  •  levothyroxine (SYNTHROID, LEVOTHROID) 88 MCG tablet, Take 1 tablet by mouth Daily., Disp: 90 tablet,  "Rfl: 3  •  lovastatin (MEVACOR) 40 MG tablet, Take 1 tablet by mouth Daily With Dinner. Pt can tolerat lovastatin., Disp: 90 tablet, Rfl: 3  •  Semaglutide, 1 MG/DOSE, (OZEMPIC) 2 MG/1.5ML solution pen-injector, Inject 1 mg under the skin into the appropriate area as directed 1 (One) Time Per Week., Disp: 3 pen, Rfl: 3  •  amLODIPine (Norvasc) 5 MG tablet, Take 1 tablet by mouth Daily., Disp: 30 tablet, Rfl: 11  •  Farxiga 10 MG tablet, Take 10 mg by mouth Daily., Disp: , Rfl:   •  glucose monitor monitoring kit, 1 each As Needed (bs)., Disp: 1 each, Rfl: 1    Objective      Blood pressure 136/87, pulse 66, temperature 97 °F (36.1 °C), temperature source Temporal, resp. rate 16, height 193 cm (76\"), weight (!) 138 kg (304 lb), SpO2 99 %.    Physical Exam     General Appearance:    Alert, cooperative, no distress, appears stated age   Head:    Normocephalic, without obvious abnormality, atraumatic   Eyes:    PERRL, conjunctiva/corneas clear, EOM's intact   Ears:    Normal TM's and external ear canals, both ears   Nose:   Nares normal, septum midline, mucosa normal, no drainage   or sinus tenderness   Throat:   Lips, mucosa, and tongue normal; teeth and gums normal   Neck:   Supple, symmetrical, trachea midline, no adenopathy;        thyroid:  No enlargement/tenderness/nodules; no carotid    bruit or JVD   Back:     Symmetric, no curvature, ROM normal, no CVA tenderness   Lungs:     Clear to auscultation bilaterally, respirations unlabored   Chest wall:    AICD pocket full and tight. No erythema.     Heart:    Regular rate and rhythm, S1 and S2 normal, no murmur,        rub or gallop   Abdomen:     Soft, non-tender, bowel sounds active all four quadrants,     no masses, no organomegaly   Extremities:   Extremities normal, atraumatic, no cyanosis or edema   Pulses:   2+ and symmetric all extremities   Skin:   Skin color, texture, turgor normal, no rashes or lesions   Lymph nodes:   Cervical, supraclavicular, and " axillary nodes normal   Neurologic:   CNII-XII intact. Normal strength, sensation and reflexes       throughout      Results for orders placed or performed in visit on 08/16/21   Comprehensive Metabolic Panel    Specimen: Blood   Result Value Ref Range    Glucose 168 (H) 65 - 99 mg/dL    BUN 19 8 - 23 mg/dL    Creatinine 1.48 (H) 0.76 - 1.27 mg/dL    Sodium 137 136 - 145 mmol/L    Potassium 4.0 3.5 - 5.2 mmol/L    Chloride 101 98 - 107 mmol/L    CO2 24.1 22.0 - 29.0 mmol/L    Calcium 9.1 8.6 - 10.5 mg/dL    Total Protein 7.5 6.0 - 8.5 g/dL    Albumin 4.20 3.50 - 5.20 g/dL    ALT (SGPT) 17 1 - 41 U/L    AST (SGOT) 17 1 - 40 U/L    Alkaline Phosphatase 56 39 - 117 U/L    Total Bilirubin 0.6 0.0 - 1.2 mg/dL    eGFR Non African Amer 48 (L) >60 mL/min/1.73    Globulin 3.3 gm/dL    A/G Ratio 1.3 g/dL    BUN/Creatinine Ratio 12.8 7.0 - 25.0    Anion Gap 11.9 5.0 - 15.0 mmol/L   BNP    Specimen: Blood   Result Value Ref Range    proBNP 161.9 0.0 - 900.0 pg/mL   CBC Auto Differential    Specimen: Blood   Result Value Ref Range    WBC 6.45 3.40 - 10.80 10*3/mm3    RBC 5.04 4.14 - 5.80 10*6/mm3    Hemoglobin 15.5 13.0 - 17.7 g/dL    Hematocrit 45.3 37.5 - 51.0 %    MCV 89.9 79.0 - 97.0 fL    MCH 30.8 26.6 - 33.0 pg    MCHC 34.2 31.5 - 35.7 g/dL    RDW 14.7 12.3 - 15.4 %    RDW-SD 47.3 37.0 - 54.0 fl    MPV 11.7 6.0 - 12.0 fL    Platelets 226 140 - 450 10*3/mm3    Neutrophil % 60.4 42.7 - 76.0 %    Lymphocyte % 29.9 19.6 - 45.3 %    Monocyte % 6.0 5.0 - 12.0 %    Eosinophil % 2.8 0.3 - 6.2 %    Basophil % 0.6 0.0 - 1.5 %    Immature Grans % 0.3 0.0 - 0.5 %    Neutrophils, Absolute 3.89 1.70 - 7.00 10*3/mm3    Lymphocytes, Absolute 1.93 0.70 - 3.10 10*3/mm3    Monocytes, Absolute 0.39 0.10 - 0.90 10*3/mm3    Eosinophils, Absolute 0.18 0.00 - 0.40 10*3/mm3    Basophils, Absolute 0.04 0.00 - 0.20 10*3/mm3    Immature Grans, Absolute 0.02 0.00 - 0.05 10*3/mm3    nRBC 0.0 0.0 - 0.2 /100 WBC         Assessment/Plan   Orthostatic  hypotension. Decrease norvasc to 5 mg.    Pt on amiodarone.     Cr elevated post cath and sudden cardiac death.          Diagnoses and all orders for this visit:    1. Orthostatic hypotension (Primary)  -     Basic metabolic panel  -     Treadmill Stress Test - Rehab Protocol; Future    2. Glucometer instruction, encounter for  -     Basic metabolic panel  -     Treadmill Stress Test - Rehab Protocol; Future    3. CRI (chronic renal insufficiency), stage 2 (mild)  -     Basic metabolic panel  -     Treadmill Stress Test - Rehab Protocol; Future    4. Coronary artery disease involving native coronary artery of native heart without angina pectoris  -     Treadmill Stress Test - Rehab Protocol; Future    Other orders  -     amLODIPine (Norvasc) 5 MG tablet; Take 1 tablet by mouth Daily.  Dispense: 30 tablet; Refill: 11  -     glucose monitor monitoring kit; 1 each As Needed (bs).  Dispense: 1 each; Refill: 1      Return in about 1 week (around 8/30/2021).          There are no Patient Instructions on file for this visit.     Keven Bear MD    Assessment/Plan

## 2021-08-23 NOTE — TELEPHONE ENCOUNTER
The original prescription was discontinued on 12/18/2020 by Keven Bear MD for the following reason: Reorder

## 2021-08-23 NOTE — TELEPHONE ENCOUNTER
Pharmacy Name: [facility] [address] [place] [address] [address] [facility] [phone] [facility] [phone]      Pharmacy representative name: SRIKANTH    Pharmacy representative phone number: [phone]    What medication are you calling in regards to: AMLODIPINE     What question does the pharmacy have: PHARMACY WOULD LIKE TO KNOW IF THE MEDICATION NEEDS TO BE CHANGED OR IF THE PROVIDER WOULD LIKE FOR THE PATIENT TO CONTINUE ON THIS MEDICATION.     Who is the provider that prescribed the medication:  CALEB     Additional notes: PHARMACY SHOWS THAT THE PATIENT IS ALLERGIC TO AMLODIPINE

## 2021-08-27 ENCOUNTER — LAB (OUTPATIENT)
Dept: LAB | Facility: HOSPITAL | Age: 66
End: 2021-08-27

## 2021-08-27 PROCEDURE — 80048 BASIC METABOLIC PNL TOTAL CA: CPT | Performed by: INTERNAL MEDICINE

## 2021-08-27 PROCEDURE — 36415 COLL VENOUS BLD VENIPUNCTURE: CPT | Performed by: INTERNAL MEDICINE

## 2021-08-28 LAB
ANION GAP SERPL CALCULATED.3IONS-SCNC: 13 MMOL/L (ref 5–15)
BUN SERPL-MCNC: 17 MG/DL (ref 8–23)
BUN/CREAT SERPL: 11.7 (ref 7–25)
CALCIUM SPEC-SCNC: 9.3 MG/DL (ref 8.6–10.5)
CHLORIDE SERPL-SCNC: 105 MMOL/L (ref 98–107)
CO2 SERPL-SCNC: 24 MMOL/L (ref 22–29)
CREAT SERPL-MCNC: 1.45 MG/DL (ref 0.76–1.27)
GFR SERPL CREATININE-BSD FRML MDRD: 49 ML/MIN/1.73
GLUCOSE SERPL-MCNC: 89 MG/DL (ref 65–99)
POTASSIUM SERPL-SCNC: 3.9 MMOL/L (ref 3.5–5.2)
SODIUM SERPL-SCNC: 142 MMOL/L (ref 136–145)

## 2021-08-30 ENCOUNTER — OFFICE VISIT (OUTPATIENT)
Dept: INTERNAL MEDICINE | Facility: CLINIC | Age: 66
End: 2021-08-30

## 2021-08-30 VITALS
SYSTOLIC BLOOD PRESSURE: 140 MMHG | HEART RATE: 66 BPM | RESPIRATION RATE: 16 BRPM | DIASTOLIC BLOOD PRESSURE: 87 MMHG | WEIGHT: 303 LBS | OXYGEN SATURATION: 98 % | BODY MASS INDEX: 36.9 KG/M2 | HEIGHT: 76 IN | TEMPERATURE: 96.9 F

## 2021-08-30 DIAGNOSIS — I10 ESSENTIAL HYPERTENSION: Primary | ICD-10-CM

## 2021-08-30 PROCEDURE — 99213 OFFICE O/P EST LOW 20 MIN: CPT | Performed by: INTERNAL MEDICINE

## 2021-08-30 NOTE — PROGRESS NOTES
Subjective     Patient ID: Andre Agosto is a 66 y.o. male. Patient is here for management of multiple medical problems.     Chief Complaint   Patient presents with   • Follow-up     discuss medication     History of Present Illness     Cri stable.    Following bp and low in the afternoon. May have a bad cuff.    142/84  Then 112 /60 6 pm.  Out and about. In the heat.    laid down.     The following portions of the patient's history were reviewed and updated as appropriate: allergies, current medications, past family history, past medical history, past social history, past surgical history and problem list.    Review of Systems   HENT: Negative for congestion and dental problem.    Psychiatric/Behavioral: Negative for self-injury and sleep disturbance. The patient is not nervous/anxious.    All other systems reviewed and are negative.      Current Outpatient Medications:   •  amiodarone (PACERONE) 400 MG tablet, 400 mg., Disp: , Rfl:   •  amLODIPine (Norvasc) 5 MG tablet, Take 1 tablet by mouth Daily., Disp: 30 tablet, Rfl: 11  •  B Complex-C (B-complex with vitamin C) tablet, Take 1 tablet by mouth Daily., Disp: 90 tablet, Rfl: 3  •  carvedilol (Coreg) 25 MG tablet, Take 1 tablet by mouth 2 (Two) Times a Day With Meals., Disp: 180 tablet, Rfl: 3  •  clopidogrel (PLAVIX) 75 MG tablet, , Disp: , Rfl:   •  divalproex (DEPAKOTE) 250 MG DR tablet, Take 2 tablets in the AM, 1 tablet at noon, and 2 tablets in the pm. Per DR Clifford., Disp: 450 tablet, Rfl: 3  •  Entresto 49-51 MG tablet, Take 1 tablet by mouth 2 (Two) Times a Day., Disp: 60 tablet, Rfl: 11  •  Evolocumab (Repatha SureClick) solution auto-injector SureClick injection, Inject 1 mL under the skin into the appropriate area as directed Every 14 (Fourteen) Days., Disp: 6 pen, Rfl: 3  •  Farxiga 10 MG tablet, Take 10 mg by mouth Daily., Disp: , Rfl:   •  folic acid (FOLVITE) 1 MG tablet, Take one daily except on the day of taking Methotrexate., Disp: 30 tablet,  "Rfl: 11  •  furosemide (LASIX) 40 MG tablet, Take 1 tablet by mouth Daily., Disp: 90 tablet, Rfl: 3  •  glucose monitor monitoring kit, 1 each As Needed (bs)., Disp: 1 each, Rfl: 1  •  levothyroxine (SYNTHROID, LEVOTHROID) 88 MCG tablet, Take 1 tablet by mouth Daily., Disp: 90 tablet, Rfl: 3  •  lovastatin (MEVACOR) 40 MG tablet, Take 1 tablet by mouth Daily With Dinner. Pt can tolerat lovastatin., Disp: 90 tablet, Rfl: 3  •  Semaglutide, 1 MG/DOSE, (OZEMPIC) 2 MG/1.5ML solution pen-injector, Inject 1 mg under the skin into the appropriate area as directed 1 (One) Time Per Week., Disp: 3 pen, Rfl: 3    Objective      Blood pressure 140/87, pulse 66, temperature 96.9 °F (36.1 °C), temperature source Temporal, resp. rate 16, height 193 cm (76\"), weight (!) 137 kg (303 lb), SpO2 98 %.    Physical Exam     General Appearance:    Alert, cooperative, no distress, appears stated age   Head:    Normocephalic, without obvious abnormality, atraumatic   Eyes:    PERRL, conjunctiva/corneas clear, EOM's intact   Ears:    Normal TM's and external ear canals, both ears   Nose:   Nares normal, septum midline, mucosa normal, no drainage   or sinus tenderness   Throat:   Lips, mucosa, and tongue normal; teeth and gums normal   Neck:   Supple, symmetrical, trachea midline, no adenopathy;        thyroid:  No enlargement/tenderness/nodules; no carotid    bruit or JVD   Back:     Symmetric, no curvature, ROM normal, no CVA tenderness   Lungs:     Clear to auscultation bilaterally, respirations unlabored   Chest wall:    No tenderness or deformity   Heart:    Regular rate and rhythm, S1 and S2 normal, no murmur,        rub or gallop   Abdomen:     Soft, non-tender, bowel sounds active all four quadrants,     no masses, no organomegaly   Extremities:   Extremities normal, atraumatic, no cyanosis or edema   Pulses:   2+ and symmetric all extremities   Skin:   Skin color, texture, turgor normal, no rashes or lesions   Lymph nodes:   " Cervical, supraclavicular, and axillary nodes normal   Neurologic:   CNII-XII intact. Normal strength, sensation and reflexes       throughout      Results for orders placed or performed in visit on 08/23/21   Basic metabolic panel    Specimen: Blood   Result Value Ref Range    Glucose 89 65 - 99 mg/dL    BUN 17 8 - 23 mg/dL    Creatinine 1.45 (H) 0.76 - 1.27 mg/dL    Sodium 142 136 - 145 mmol/L    Potassium 3.9 3.5 - 5.2 mmol/L    Chloride 105 98 - 107 mmol/L    CO2 24.0 22.0 - 29.0 mmol/L    Calcium 9.3 8.6 - 10.5 mg/dL    eGFR Non African Amer 49 (L) >60 mL/min/1.73    BUN/Creatinine Ratio 11.7 7.0 - 25.0    Anion Gap 13.0 5.0 - 15.0 mmol/L         Assessment/Plan   Change Norvasc to 5 mg at night.    Going to eye doing not well. Has apt next week.        Diagnoses and all orders for this visit:    1. Essential hypertension (Primary)  -     Basic metabolic panel      Return in about 2 weeks (around 9/13/2021).          There are no Patient Instructions on file for this visit.     Keven Bear MD    Assessment/Plan       Answers for HPI/ROS submitted by the patient on 8/29/2021  What is the primary reason for your visit?: Other  Please describe your symptoms.: Followup  Have you had these symptoms before?: Yes  How long have you been having these symptoms?: Greater than 2 weeks  Please list any medications you are currently taking for this condition.: On file

## 2021-09-09 DIAGNOSIS — N18.2 CHRONIC RENAL IMPAIRMENT, STAGE 2 (MILD): Primary | ICD-10-CM

## 2021-09-10 ENCOUNTER — APPOINTMENT (OUTPATIENT)
Dept: CARDIOLOGY | Facility: HOSPITAL | Age: 66
End: 2021-09-10

## 2021-09-13 ENCOUNTER — TELEMEDICINE (OUTPATIENT)
Dept: INTERNAL MEDICINE | Facility: CLINIC | Age: 66
End: 2021-09-13

## 2021-09-13 ENCOUNTER — LAB (OUTPATIENT)
Dept: LAB | Facility: HOSPITAL | Age: 66
End: 2021-09-13

## 2021-09-13 ENCOUNTER — TELEPHONE (OUTPATIENT)
Dept: UROLOGY | Facility: CLINIC | Age: 66
End: 2021-09-13

## 2021-09-13 DIAGNOSIS — R30.0 DYSURIA: Primary | ICD-10-CM

## 2021-09-13 DIAGNOSIS — N20.0 NEPHROLITHIASIS: Primary | ICD-10-CM

## 2021-09-13 DIAGNOSIS — N20.0 RENAL STONE: Primary | ICD-10-CM

## 2021-09-13 DIAGNOSIS — R31.9 HEMATURIA, UNSPECIFIED TYPE: ICD-10-CM

## 2021-09-13 DIAGNOSIS — N23 RENAL COLIC: ICD-10-CM

## 2021-09-13 LAB
ALBUMIN SERPL-MCNC: 4.2 G/DL (ref 3.5–5.2)
ALBUMIN/GLOB SERPL: 1.3 G/DL
ALP SERPL-CCNC: 58 U/L (ref 39–117)
ALT SERPL W P-5'-P-CCNC: 25 U/L (ref 1–41)
ANION GAP SERPL CALCULATED.3IONS-SCNC: 13.4 MMOL/L (ref 5–15)
AST SERPL-CCNC: 25 U/L (ref 1–40)
BACTERIA UR QL AUTO: ABNORMAL /HPF
BASOPHILS # BLD AUTO: 0.05 10*3/MM3 (ref 0–0.2)
BASOPHILS NFR BLD AUTO: 0.8 % (ref 0–1.5)
BILIRUB SERPL-MCNC: 0.5 MG/DL (ref 0–1.2)
BILIRUB UR QL STRIP: NEGATIVE
BUN SERPL-MCNC: 16 MG/DL (ref 8–23)
BUN/CREAT SERPL: 12.1 (ref 7–25)
CALCIUM SPEC-SCNC: 9.2 MG/DL (ref 8.6–10.5)
CHLORIDE SERPL-SCNC: 106 MMOL/L (ref 98–107)
CLARITY UR: ABNORMAL
CO2 SERPL-SCNC: 21.6 MMOL/L (ref 22–29)
COLOR UR: YELLOW
CREAT SERPL-MCNC: 1.32 MG/DL (ref 0.76–1.27)
DEPRECATED RDW RBC AUTO: 46.4 FL (ref 37–54)
EOSINOPHIL # BLD AUTO: 0.18 10*3/MM3 (ref 0–0.4)
EOSINOPHIL NFR BLD AUTO: 3 % (ref 0.3–6.2)
ERYTHROCYTE [DISTWIDTH] IN BLOOD BY AUTOMATED COUNT: 14.1 % (ref 12.3–15.4)
GFR SERPL CREATININE-BSD FRML MDRD: 54 ML/MIN/1.73
GLOBULIN UR ELPH-MCNC: 3.3 GM/DL
GLUCOSE SERPL-MCNC: 100 MG/DL (ref 65–99)
GLUCOSE UR STRIP-MCNC: ABNORMAL MG/DL
HCT VFR BLD AUTO: 47.5 % (ref 37.5–51)
HGB BLD-MCNC: 16.1 G/DL (ref 13–17.7)
HGB UR QL STRIP.AUTO: ABNORMAL
HYALINE CASTS UR QL AUTO: ABNORMAL /LPF
IMM GRANULOCYTES # BLD AUTO: 0.03 10*3/MM3 (ref 0–0.05)
IMM GRANULOCYTES NFR BLD AUTO: 0.5 % (ref 0–0.5)
KETONES UR QL STRIP: ABNORMAL
LEUKOCYTE ESTERASE UR QL STRIP.AUTO: NEGATIVE
LYMPHOCYTES # BLD AUTO: 1.77 10*3/MM3 (ref 0.7–3.1)
LYMPHOCYTES NFR BLD AUTO: 29.3 % (ref 19.6–45.3)
MCH RBC QN AUTO: 30.4 PG (ref 26.6–33)
MCHC RBC AUTO-ENTMCNC: 33.9 G/DL (ref 31.5–35.7)
MCV RBC AUTO: 89.6 FL (ref 79–97)
MONOCYTES # BLD AUTO: 0.46 10*3/MM3 (ref 0.1–0.9)
MONOCYTES NFR BLD AUTO: 7.6 % (ref 5–12)
NEUTROPHILS NFR BLD AUTO: 3.56 10*3/MM3 (ref 1.7–7)
NEUTROPHILS NFR BLD AUTO: 58.8 % (ref 42.7–76)
NITRITE UR QL STRIP: NEGATIVE
NRBC BLD AUTO-RTO: 0 /100 WBC (ref 0–0.2)
PH UR STRIP.AUTO: 7.5 [PH] (ref 5–8)
PLATELET # BLD AUTO: 187 10*3/MM3 (ref 140–450)
PMV BLD AUTO: 11.4 FL (ref 6–12)
POTASSIUM SERPL-SCNC: 4.3 MMOL/L (ref 3.5–5.2)
PROT SERPL-MCNC: 7.5 G/DL (ref 6–8.5)
PROT UR QL STRIP: ABNORMAL
RBC # BLD AUTO: 5.3 10*6/MM3 (ref 4.14–5.8)
RBC # UR: ABNORMAL /HPF
REF LAB TEST METHOD: ABNORMAL
SODIUM SERPL-SCNC: 141 MMOL/L (ref 136–145)
SP GR UR STRIP: 1.01 (ref 1–1.03)
SQUAMOUS #/AREA URNS HPF: ABNORMAL /HPF
UROBILINOGEN UR QL STRIP: ABNORMAL
WBC # BLD AUTO: 6.05 10*3/MM3 (ref 3.4–10.8)
WBC UR QL AUTO: ABNORMAL /HPF

## 2021-09-13 PROCEDURE — 99213 OFFICE O/P EST LOW 20 MIN: CPT | Performed by: INTERNAL MEDICINE

## 2021-09-13 PROCEDURE — 80053 COMPREHEN METABOLIC PANEL: CPT | Performed by: INTERNAL MEDICINE

## 2021-09-13 PROCEDURE — 81001 URINALYSIS AUTO W/SCOPE: CPT | Performed by: INTERNAL MEDICINE

## 2021-09-13 PROCEDURE — 85025 COMPLETE CBC W/AUTO DIFF WBC: CPT | Performed by: INTERNAL MEDICINE

## 2021-09-13 PROCEDURE — 87086 URINE CULTURE/COLONY COUNT: CPT

## 2021-09-13 PROCEDURE — 36415 COLL VENOUS BLD VENIPUNCTURE: CPT | Performed by: INTERNAL MEDICINE

## 2021-09-13 NOTE — PROGRESS NOTES
Subjective     Patient ID: Andre Agosto is a 66 y.o. male. Patient is here for management of multiple medical problems.     You have chosen to receive care through a telehealth visit.  Do you consent to use a video/audio connection for your medical care today? Yes  flank pain    History of Present Illness   2 weeks of worsening flank pian. Has know 2 large renal stones.     Starting to have more episodes of pain with them. No dysuria.  No freequency. No hes.          The following portions of the patient's history were reviewed and updated as appropriate: allergies, current medications, past family history, past medical history, past social history, past surgical history and problem list.    Review of Systems   Constitutional: Positive for fatigue. Negative for fever.   Genitourinary: Positive for flank pain. Negative for dysuria and frequency.   Musculoskeletal: Positive for back pain.       Current Outpatient Medications:   •  amiodarone (PACERONE) 400 MG tablet, 400 mg., Disp: , Rfl:   •  amLODIPine (Norvasc) 5 MG tablet, Take 1 tablet by mouth Daily., Disp: 30 tablet, Rfl: 11  •  B Complex-C (B-complex with vitamin C) tablet, Take 1 tablet by mouth Daily., Disp: 90 tablet, Rfl: 3  •  carvedilol (Coreg) 25 MG tablet, Take 1 tablet by mouth 2 (Two) Times a Day With Meals., Disp: 180 tablet, Rfl: 3  •  clopidogrel (PLAVIX) 75 MG tablet, , Disp: , Rfl:   •  divalproex (DEPAKOTE) 250 MG DR tablet, Take 2 tablets in the AM, 1 tablet at noon, and 2 tablets in the pm. Per DR Clifford., Disp: 450 tablet, Rfl: 3  •  Entresto 49-51 MG tablet, Take 1 tablet by mouth 2 (Two) Times a Day., Disp: 60 tablet, Rfl: 11  •  Evolocumab (Repatha SureClick) solution auto-injector SureClick injection, Inject 1 mL under the skin into the appropriate area as directed Every 14 (Fourteen) Days., Disp: 6 pen, Rfl: 3  •  Farxiga 10 MG tablet, Take 10 mg by mouth Daily., Disp: , Rfl:   •  folic acid (FOLVITE) 1 MG tablet, Take one daily except  on the day of taking Methotrexate., Disp: 30 tablet, Rfl: 11  •  furosemide (LASIX) 40 MG tablet, Take 1 tablet by mouth Daily., Disp: 90 tablet, Rfl: 3  •  glucose monitor monitoring kit, 1 each As Needed (bs)., Disp: 1 each, Rfl: 1  •  levothyroxine (SYNTHROID, LEVOTHROID) 88 MCG tablet, Take 1 tablet by mouth Daily., Disp: 90 tablet, Rfl: 3  •  lovastatin (MEVACOR) 40 MG tablet, Take 1 tablet by mouth Daily With Dinner. Pt can tolerat lovastatin., Disp: 90 tablet, Rfl: 3  •  Semaglutide, 1 MG/DOSE, (OZEMPIC) 2 MG/1.5ML solution pen-injector, Inject 1 mg under the skin into the appropriate area as directed 1 (One) Time Per Week., Disp: 3 pen, Rfl: 3    Objective      There were no vitals taken for this visit.    Physical Exam     General Appearance:    Alert, cooperative, no distress, appears stated age   Head:     Eyes:     Ears:     Nose:    Throat:    Neck:    Back:      Lungs:      Chest wall:     Heart:     Abdomen:      Extremities:    Pulses:    Skin:    Lymph nodes:    Neurologic:       Results for orders placed or performed in visit on 09/13/21   Urinalysis without microscopic (no culture) - Urine, Clean Catch    Specimen: Urine, Clean Catch   Result Value Ref Range    Color, UA Yellow Yellow, Straw    Appearance, UA Cloudy (A) Clear    pH, UA 7.5 5.0 - 8.0    Specific Gravity, UA 1.015 1.005 - 1.030    Glucose, UA >=1000 mg/dL (3+) (A) Negative    Ketones, UA Trace (A) Negative    Bilirubin, UA Negative Negative    Blood, UA Large (3+) (A) Negative    Protein, UA 30 mg/dL (1+) (A) Negative    Leuk Esterase, UA Negative Negative    Nitrite, UA Negative Negative    Urobilinogen, UA 0.2 E.U./dL 0.2 - 1.0 E.U./dL   Urinalysis, Microscopic Only - Urine, Clean Catch    Specimen: Urine, Clean Catch   Result Value Ref Range    RBC, UA Too Numerous to Count (A) None Seen /HPF    WBC, UA 0-2 (A) None Seen /HPF    Bacteria, UA None Seen None Seen /HPF    Squamous Epithelial Cells, UA 0-2 None Seen, 0-2 /HPF     Hyaline Casts, UA None Seen None Seen /LPF    Methodology Manual Light Microscopy          Assessment/Plan     Video visit. 12 min    ua today with sig hematuria. Minimal wbc. Likely renal colic.  Pt more symptomatic. Hoping we can try to move apt up to see Dr De La Cruz sooner.  This seems reasonable and urgent urology consult was placed today.        Diagnoses and all orders for this visit:    1. Nephrolithiasis (Primary)    2. Renal colic    3. Hematuria, unspecified type      No follow-ups on file.          There are no Patient Instructions on file for this visit.     Keven Bear MD    Assessment/Plan

## 2021-09-14 DIAGNOSIS — N20.0 RENAL STONES: Primary | ICD-10-CM

## 2021-09-14 LAB — BACTERIA SPEC AEROBE CULT: NO GROWTH

## 2021-09-15 ENCOUNTER — HOSPITAL ENCOUNTER (OUTPATIENT)
Dept: GENERAL RADIOLOGY | Facility: HOSPITAL | Age: 66
Discharge: HOME | End: 2021-09-15
Admitting: INTERNAL MEDICINE

## 2021-09-15 DIAGNOSIS — N20.0 RENAL STONES: ICD-10-CM

## 2021-09-15 PROCEDURE — 74018 RADEX ABDOMEN 1 VIEW: CPT

## 2021-09-20 ENCOUNTER — OFFICE VISIT (OUTPATIENT)
Dept: UROLOGY | Facility: CLINIC | Age: 66
End: 2021-09-20

## 2021-09-20 ENCOUNTER — TRANSCRIBE ORDERS (OUTPATIENT)
Dept: LAB | Facility: HOSPITAL | Age: 66
End: 2021-09-20

## 2021-09-20 ENCOUNTER — LAB (OUTPATIENT)
Dept: LAB | Facility: HOSPITAL | Age: 66
End: 2021-09-20

## 2021-09-20 VITALS
HEART RATE: 71 BPM | WEIGHT: 303 LBS | TEMPERATURE: 97.8 F | SYSTOLIC BLOOD PRESSURE: 130 MMHG | HEIGHT: 76 IN | BODY MASS INDEX: 36.9 KG/M2 | DIASTOLIC BLOOD PRESSURE: 80 MMHG

## 2021-09-20 DIAGNOSIS — R31.29 OTHER MICROSCOPIC HEMATURIA: Primary | ICD-10-CM

## 2021-09-20 DIAGNOSIS — I25.10 HEART FAILURE, DIASTOLIC, DUE TO CAD, UNSPECIFIED FAILURE CHRONICITY (HCC): Primary | ICD-10-CM

## 2021-09-20 DIAGNOSIS — I50.30 HEART FAILURE, DIASTOLIC, DUE TO CAD, UNSPECIFIED FAILURE CHRONICITY (HCC): Primary | ICD-10-CM

## 2021-09-20 DIAGNOSIS — I25.10 CAD IN NATIVE ARTERY: ICD-10-CM

## 2021-09-20 LAB
ALBUMIN SERPL-MCNC: 4.5 G/DL (ref 3.5–5.2)
ALP SERPL-CCNC: 64 U/L (ref 39–117)
ALT SERPL W P-5'-P-CCNC: 20 U/L (ref 1–41)
ANION GAP SERPL CALCULATED.3IONS-SCNC: 12.7 MMOL/L (ref 5–15)
AST SERPL-CCNC: 16 U/L (ref 1–40)
BILIRUB BLD-MCNC: NEGATIVE MG/DL
BILIRUB CONJ SERPL-MCNC: 0.2 MG/DL (ref 0–0.3)
BILIRUB INDIRECT SERPL-MCNC: 0.5 MG/DL
BILIRUB SERPL-MCNC: 0.7 MG/DL (ref 0–1.2)
BUN SERPL-MCNC: 14 MG/DL (ref 8–23)
BUN/CREAT SERPL: 10.7 (ref 7–25)
CALCIUM SPEC-SCNC: 9.5 MG/DL (ref 8.6–10.5)
CHLORIDE SERPL-SCNC: 101 MMOL/L (ref 98–107)
CHOLEST SERPL-MCNC: 126 MG/DL (ref 0–200)
CLARITY, POC: CLEAR
CO2 SERPL-SCNC: 24.3 MMOL/L (ref 22–29)
COLOR UR: YELLOW
CREAT SERPL-MCNC: 1.31 MG/DL (ref 0.76–1.27)
GFR SERPL CREATININE-BSD FRML MDRD: 55 ML/MIN/1.73
GLUCOSE SERPL-MCNC: 105 MG/DL (ref 65–99)
GLUCOSE UR STRIP-MCNC: ABNORMAL MG/DL
HDLC SERPL-MCNC: 39 MG/DL (ref 40–60)
KETONES UR QL: NEGATIVE
LDLC SERPL CALC-MCNC: 58 MG/DL (ref 0–100)
LDLC/HDLC SERPL: 1.35 {RATIO}
LEUKOCYTE EST, POC: NEGATIVE
NITRITE UR-MCNC: NEGATIVE MG/ML
PH UR: 6 [PH] (ref 5–8)
POTASSIUM SERPL-SCNC: 3.5 MMOL/L (ref 3.5–5.2)
PROT SERPL-MCNC: 8.2 G/DL (ref 6–8.5)
PROT UR STRIP-MCNC: ABNORMAL MG/DL
RBC # UR STRIP: ABNORMAL /UL
SODIUM SERPL-SCNC: 138 MMOL/L (ref 136–145)
SP GR UR: 1.01 (ref 1–1.03)
T4 FREE SERPL-MCNC: 1.53 NG/DL (ref 0.93–1.7)
TRIGL SERPL-MCNC: 172 MG/DL (ref 0–150)
TSH SERPL DL<=0.05 MIU/L-ACNC: 1.67 UIU/ML (ref 0.27–4.2)
UROBILINOGEN UR QL: NORMAL
VLDLC SERPL-MCNC: 29 MG/DL (ref 5–40)

## 2021-09-20 PROCEDURE — 84439 ASSAY OF FREE THYROXINE: CPT

## 2021-09-20 PROCEDURE — 99214 OFFICE O/P EST MOD 30 MIN: CPT | Performed by: UROLOGY

## 2021-09-20 PROCEDURE — 80048 BASIC METABOLIC PNL TOTAL CA: CPT

## 2021-09-20 PROCEDURE — 81003 URINALYSIS AUTO W/O SCOPE: CPT | Performed by: UROLOGY

## 2021-09-20 PROCEDURE — 80076 HEPATIC FUNCTION PANEL: CPT

## 2021-09-20 PROCEDURE — 85025 COMPLETE CBC W/AUTO DIFF WBC: CPT

## 2021-09-20 PROCEDURE — 84480 ASSAY TRIIODOTHYRONINE (T3): CPT

## 2021-09-20 PROCEDURE — 80061 LIPID PANEL: CPT

## 2021-09-20 PROCEDURE — 36415 COLL VENOUS BLD VENIPUNCTURE: CPT

## 2021-09-20 PROCEDURE — 84443 ASSAY THYROID STIM HORMONE: CPT

## 2021-09-20 RX ORDER — AMIODARONE HYDROCHLORIDE 200 MG/1
100 TABLET ORAL DAILY
COMMUNITY
Start: 2021-09-15 | End: 2023-02-09 | Stop reason: SDUPTHER

## 2021-09-20 RX ORDER — RIVAROXABAN 15 MG/1
15 TABLET, FILM COATED ORAL
COMMUNITY
Start: 2021-09-16

## 2021-09-20 RX ORDER — NIFEDIPINE 90 MG/1
90 TABLET, FILM COATED, EXTENDED RELEASE ORAL DAILY
COMMUNITY
Start: 2021-09-10 | End: 2022-01-11

## 2021-09-20 NOTE — PROGRESS NOTES
Chief Complaint   Patient presents with   • Follow-up     hematuria, history of kidney stones     HPI  Ms. Agosto is a 66 y.o. male with history below in assessment, who presents for follow up.     At this visit he has been having left flank pain x 3 weeks. He has not been to ER but he is very concerned. KUB demonstrates multiple bilateral calcifications over both kidneys that likely represents stones, as well as a very large calcification, likely representing the gallbladder stone.    Past Medical History:   Diagnosis Date   • 6th nerve palsy, right     right eye    • Anxiety and depression    • Atrial fibrillation (CMS/HCC)    • Atrial fibrillation, persistent (CMS/HCC) 12/02/2020    on Xarelto   • Coronary artery disease involving native coronary artery of native heart without angina pectoris 09/12/2018    follows w/ Dr. Johnson, on ASA 81mg   • Diabetes mellitus (CMS/HCC)     doesnt check sugar, type 2    • Double vision    • Dyspepsia    • Epilepsy (CMS/HCC)    • Focal seizure (CMS/HCC)     of right arm    • Heart attack (CMS/HCC)     NSTEMI 2015, s/p stenting   • History of Helicobacter pylori infection     in 2008, treated w/ PrevPak - 2021 EGD bx (+), PCP RX - PPI/Augmentin/Doxy/Flagyl (x 14 days) 1/22/21   • HL (hearing loss)     (L) ear - child luevano measles   • Hyperlipidemia    • Hypertension    • Kidney stone    • Memory loss 2005    d/t meningitis   • Meningitis     unsure of bacterial or viral    • Obesity    • Seizures (CMS/HCC)    • Sleep apnea treated with nocturnal BiPAP     compliant with  machine    • Stroke (CMS/HCC)     2017/2018   • Ventricular tachycardia (CMS/HCC)     3 different times   • Wears glasses        Past Surgical History:   Procedure Laterality Date   • BARIATRIC SURGERY  06/01/2012    Lap nand   • CARDIAC CATHETERIZATION N/A 10/12/2018    Procedure: Left Heart Cath;  Surgeon: Yoselin Johnson MD;  Location: [facility] CATH INVASIVE LOCATION;  Service: Cardiology   •  CARDIAC DEFIBRILLATOR PLACEMENT     • COLONOSCOPY  10/2020    w/ Dr. Jacobs, hx colon polyps   • CORONARY STENT PLACEMENT  2015   • LAPAROSCOPIC GASTRIC BANDING  2008    s/p LAGB Realize 6/2008 by [facility]OMERO.   • PACEMAKER IMPLANTATION  07/25/2021   • UMBILICAL HERNIA REPAIR  2008    incarcerated UHR w/ mesh by Dr. Velasquez         Current Outpatient Medications:   •  amiodarone (PACERONE) 400 MG tablet, 400 mg., Disp: , Rfl:   •  amLODIPine (Norvasc) 5 MG tablet, Take 1 tablet by mouth Daily., Disp: 30 tablet, Rfl: 11  •  B Complex-C (B-complex with vitamin C) tablet, Take 1 tablet by mouth Daily., Disp: 90 tablet, Rfl: 3  •  carvedilol (Coreg) 25 MG tablet, Take 1 tablet by mouth 2 (Two) Times a Day With Meals., Disp: 180 tablet, Rfl: 3  •  clopidogrel (PLAVIX) 75 MG tablet, , Disp: , Rfl:   •  divalproex (DEPAKOTE) 250 MG DR tablet, Take 2 tablets in the AM, 1 tablet at noon, and 2 tablets in the pm. Per DR Clifford., Disp: 450 tablet, Rfl: 3  •  Entresto 49-51 MG tablet, Take 1 tablet by mouth 2 (Two) Times a Day., Disp: 60 tablet, Rfl: 11  •  Evolocumab (Repatha SureClick) solution auto-injector SureClick injection, Inject 1 mL under the skin into the appropriate area as directed Every 14 (Fourteen) Days., Disp: 6 pen, Rfl: 3  •  Farxiga 10 MG tablet, Take 10 mg by mouth Daily., Disp: , Rfl:   •  folic acid (FOLVITE) 1 MG tablet, Take one daily except on the day of taking Methotrexate., Disp: 30 tablet, Rfl: 11  •  furosemide (LASIX) 40 MG tablet, Take 1 tablet by mouth Daily., Disp: 90 tablet, Rfl: 3  •  glucose monitor monitoring kit, 1 each As Needed (bs)., Disp: 1 each, Rfl: 1  •  levothyroxine (SYNTHROID, LEVOTHROID) 88 MCG tablet, Take 1 tablet by mouth Daily., Disp: 90 tablet, Rfl: 3  •  lovastatin (MEVACOR) 40 MG tablet, Take 1 tablet by mouth Daily With Dinner. Pt can tolerat lovastatin., Disp: 90 tablet, Rfl: 3  •  Semaglutide, 1 MG/DOSE, (OZEMPIC) 2 MG/1.5ML solution pen-injector, Inject 1 mg under the skin into  "the appropriate area as directed 1 (One) Time Per Week., Disp: 3 pen, Rfl: 3  •  amiodarone (PACERONE) 200 MG tablet, , Disp: , Rfl:   •  NIFEdipine CC (ADALAT CC) 90 MG 24 hr tablet, , Disp: , Rfl:   •  Xarelto 15 MG tablet, , Disp: , Rfl:      Physical Exam  Visit Vitals  /80   Pulse 71   Temp 97.8 °F (36.6 °C)   Ht 193 cm (76\")   Wt (!) 137 kg (303 lb)   BMI 36.88 kg/m²       Labs  Brief Urine Lab Results  (Last result in the past 365 days)      Color   Clarity   Blood   Leuk Est   Nitrite   Protein   CREAT   Urine HCG        09/20/21 1130 Yellow Clear 3+ Negative Negative Trace               Lab Results   Component Value Date    GLUCOSE 100 (H) 09/13/2021    CALCIUM 9.2 09/13/2021     09/13/2021    K 4.3 09/13/2021    CO2 21.6 (L) 09/13/2021     09/13/2021    BUN 16 09/13/2021    CREATININE 1.32 (H) 09/13/2021    EGFRIFAFRI 57 (L) 12/05/2018    EGFRIFNONA 54 (L) 09/13/2021    BCR 12.1 09/13/2021    ANIONGAP 13.4 09/13/2021       Lab Results   Component Value Date    WBC 6.05 09/13/2021    HGB 16.1 09/13/2021    HCT 47.5 09/13/2021    MCV 89.6 09/13/2021     09/13/2021       Urine Culture    Urine Culture 9/29/20 1/14/21 9/13/21   Urine Culture No growth Final report No growth              Lab Results   Component Value Date    PSA 4.1 (H) 04/08/2021    PSA 4.7 (H) 04/06/2021    PSA 3.670 01/16/2020     No results found for: TESTOSTERONE     Radiographic Studies  XR Abdomen KUB  Result Date: 9/17/2021  Radiopaque density overlying the left upper quadrant in which clinical correlation is needed. Multiple calcifications are seen overlying the renal shadow suggesting renal stones. There is increased stool seen within the colon suggesting clinical presentation of constipation. No signs of obstruction.  D:  09/17/2021 E:  09/17/2021  This report was finalized on 9/17/2021 10:47 AM by Dr. Kristen Dave MD.      I have reviewed above labs and imaging.     Assessment  66 y.o. male with known " bilateral renal stones.  He is experiencing an acute exacerbation of his chronic disease with symptomatic left flank pain x3 weeks.  KUB demonstrates bilateral stones but is unclear if they are obstructing him.    Plan  1. CT stone  2. FU in 2 weeks to discuss URS / LL  3. Get approval from Dr. Mendoza for him to come off plavix and Xarelto 5d prior

## 2021-09-21 LAB
BASOPHILS # BLD AUTO: 0.05 10*3/MM3 (ref 0–0.2)
BASOPHILS NFR BLD AUTO: 0.7 % (ref 0–1.5)
DEPRECATED RDW RBC AUTO: 49.4 FL (ref 37–54)
EOSINOPHIL # BLD AUTO: 0.09 10*3/MM3 (ref 0–0.4)
EOSINOPHIL NFR BLD AUTO: 1.3 % (ref 0.3–6.2)
ERYTHROCYTE [DISTWIDTH] IN BLOOD BY AUTOMATED COUNT: 14.6 % (ref 12.3–15.4)
HCT VFR BLD AUTO: 54.1 % (ref 37.5–51)
HGB BLD-MCNC: 17.7 G/DL (ref 13–17.7)
IMM GRANULOCYTES # BLD AUTO: 0.01 10*3/MM3 (ref 0–0.05)
IMM GRANULOCYTES NFR BLD AUTO: 0.1 % (ref 0–0.5)
LYMPHOCYTES # BLD AUTO: 2.32 10*3/MM3 (ref 0.7–3.1)
LYMPHOCYTES NFR BLD AUTO: 33 % (ref 19.6–45.3)
MCH RBC QN AUTO: 30.2 PG (ref 26.6–33)
MCHC RBC AUTO-ENTMCNC: 32.7 G/DL (ref 31.5–35.7)
MCV RBC AUTO: 92.2 FL (ref 79–97)
MONOCYTES # BLD AUTO: 0.47 10*3/MM3 (ref 0.1–0.9)
MONOCYTES NFR BLD AUTO: 6.7 % (ref 5–12)
NEUTROPHILS NFR BLD AUTO: 4.08 10*3/MM3 (ref 1.7–7)
NEUTROPHILS NFR BLD AUTO: 58.2 % (ref 42.7–76)
NRBC BLD AUTO-RTO: 0 /100 WBC (ref 0–0.2)
PLATELET # BLD AUTO: 211 10*3/MM3 (ref 140–450)
PMV BLD AUTO: 11.3 FL (ref 6–12)
RBC # BLD AUTO: 5.87 10*6/MM3 (ref 4.14–5.8)
T3 SERPL-MCNC: 62.2 NG/DL (ref 80–200)
WBC # BLD AUTO: 7.02 10*3/MM3 (ref 3.4–10.8)

## 2021-09-24 DIAGNOSIS — N20.0 KIDNEY STONE: Primary | ICD-10-CM

## 2021-09-28 ENCOUNTER — OFFICE VISIT (OUTPATIENT)
Dept: NEUROLOGY | Facility: CLINIC | Age: 66
End: 2021-09-28

## 2021-09-28 VITALS
BODY MASS INDEX: 37.02 KG/M2 | TEMPERATURE: 97.5 F | DIASTOLIC BLOOD PRESSURE: 80 MMHG | OXYGEN SATURATION: 97 % | HEIGHT: 76 IN | HEART RATE: 68 BPM | SYSTOLIC BLOOD PRESSURE: 130 MMHG | WEIGHT: 304 LBS

## 2021-09-28 DIAGNOSIS — G47.33 OBSTRUCTIVE SLEEP APNEA: Primary | ICD-10-CM

## 2021-09-28 PROCEDURE — 99213 OFFICE O/P EST LOW 20 MIN: CPT | Performed by: NURSE PRACTITIONER

## 2021-09-28 RX ORDER — AMLODIPINE BESYLATE 10 MG/1
10 TABLET ORAL DAILY
COMMUNITY
Start: 2021-09-22 | End: 2022-09-12

## 2021-09-28 NOTE — PROGRESS NOTES
New Sleep Patient Office Visit      Patient Name: Andre Agosto  : 1955   MRN: [id number]     Referring Physician: Keven Bear MD    Chief Complaint:    Chief Complaint   Patient presents with   • Follow-up     pt in office for follow up on ALEX       History of Present Illness: Andre Agosto is a 66 y.o. male who is here today to follow up with ALEX.  He was diagnosed with ALEX at least 10 years ago.  Currently on Bipap 15/10cm, AHI 2.3/hour, compliance 100%.  He is tolerating his pressures well.  He says he can't sleep without his machine.  He is using a full face mask.  He asks for an order for a new Bipap machine-his is over 5 years old.  Additional risk factors- BMI 37, CAD, seizure disorder, HTN, history of CVA, dyslipidemia, hypothyroidism, atrial fibrillation, pacemaker/defibrillator in place.      Pertinent Medical History:   Current compliance report reviewed and has been scanned into the patient's medical record.    Subjective      Review of Systems:   Review of Systems   Constitutional: Negative for chills, fatigue and fever.   HENT: Negative for facial swelling, hearing loss, sore throat, tinnitus and trouble swallowing.    Eyes: Negative for blurred vision, double vision, photophobia and visual disturbance.   Respiratory: Negative for cough, chest tightness and shortness of breath.    Cardiovascular: Negative for chest pain, palpitations and leg swelling.   Gastrointestinal: Negative for abdominal pain, nausea and vomiting.   Endocrine: Negative for cold intolerance and heat intolerance.   Musculoskeletal: Negative for gait problem, neck pain and neck stiffness.   Skin: Negative for color change and rash.   Allergic/Immunologic: Negative for environmental allergies and food allergies.   Neurological: Negative for dizziness, syncope, speech difficulty, weakness, light-headedness, numbness, headache and memory problem.   Psychiatric/Behavioral: Negative for behavioral problems, sleep  disturbance and depressed mood. The patient is not nervous/anxious.        Past Medical History:   Past Medical History:   Diagnosis Date   • 6th nerve palsy, right     right eye    • Anxiety and depression    • Atrial fibrillation (CMS/HCC)    • Atrial fibrillation, persistent (CMS/HCC) 12/02/2020    on Xarelto   • Coronary artery disease involving native coronary artery of native heart without angina pectoris 09/12/2018    follows w/ Dr. Johnson, on ASA 81mg   • Diabetes mellitus (CMS/HCC)     doesnt check sugar, type 2    • Double vision    • Dyspepsia    • Epilepsy (CMS/HCC)    • Focal seizure (CMS/HCC)     of right arm    • Heart attack (CMS/HCC)     NSTEMI 2015, s/p stenting   • History of Helicobacter pylori infection     in 2008, treated w/ PrevPak - 2021 EGD bx (+), PCP RX - PPI/Augmentin/Doxy/Flagyl (x 14 days) 1/22/21   • HL (hearing loss)     (L) ear - child luevano measles   • Hyperlipidemia    • Hypertension    • Kidney stone    • Memory loss 2005    d/t meningitis   • Meningitis     unsure of bacterial or viral    • Obesity    • Seizures (CMS/HCC)    • Sleep apnea treated with nocturnal BiPAP     compliant with  machine    • Stroke (CMS/HCC)     2017/2018   • Ventricular tachycardia (CMS/HCC)     3 different times   • Wears glasses        Past Surgical History:   Past Surgical History:   Procedure Laterality Date   • BARIATRIC SURGERY  06/01/2012    Lap nand   • CARDIAC CATHETERIZATION N/A 10/12/2018    Procedure: Left Heart Cath;  Surgeon: Yoselin Johnson MD;  Location: [facility] CATH INVASIVE LOCATION;  Service: Cardiology   • CARDIAC DEFIBRILLATOR PLACEMENT     • COLONOSCOPY  10/2020    w/ Dr. Jacobs, hx colon polyps   • CORONARY STENT PLACEMENT  2015   • LAPAROSCOPIC GASTRIC BANDING  2008    s/p LAGB Realize 6/2008 by [facility].   • PACEMAKER IMPLANTATION  07/25/2021   • UMBILICAL HERNIA REPAIR  2008    incarcerated UHR w/ mesh by Dr. Velasquez       Family History:   Family History   Problem  Relation Age of Onset   • Stroke Mother    • Hypertension Mother    • COPD Father    • Hypertension Father        Social History:   Social History     Socioeconomic History   • Marital status:      Spouse name: Not on file   • Number of children: Not on file   • Years of education: Not on file   • Highest education level: Not on file   Tobacco Use   • Smoking status: Former Smoker     Packs/day: 0.50     Years: 10.00     Pack years: 5.00     Types: Cigarettes     Start date: 1975     Quit date: 1985     Years since quittin.7   • Smokeless tobacco: Never Used   Substance and Sexual Activity   • Alcohol use: No   • Drug use: No   • Sexual activity: Not Currently       Medications:     Current Outpatient Medications:   •  amiodarone (PACERONE) 200 MG tablet, , Disp: , Rfl:   •  carvedilol (Coreg) 25 MG tablet, Take 1 tablet by mouth 2 (Two) Times a Day With Meals., Disp: 180 tablet, Rfl: 3  •  clopidogrel (PLAVIX) 75 MG tablet, , Disp: , Rfl:   •  divalproex (DEPAKOTE) 250 MG DR tablet, Take 2 tablets in the AM, 1 tablet at noon, and 2 tablets in the pm. Per DR Clifford., Disp: 450 tablet, Rfl: 3  •  Entresto 49-51 MG tablet, Take 1 tablet by mouth 2 (Two) Times a Day., Disp: 60 tablet, Rfl: 11  •  Evolocumab (Repatha SureClick) solution auto-injector SureClick injection, Inject 1 mL under the skin into the appropriate area as directed Every 14 (Fourteen) Days., Disp: 6 pen, Rfl: 3  •  Farxiga 10 MG tablet, Take 10 mg by mouth Daily., Disp: , Rfl:   •  furosemide (LASIX) 40 MG tablet, Take 1 tablet by mouth Daily., Disp: 90 tablet, Rfl: 3  •  glucose monitor monitoring kit, 1 each As Needed (bs)., Disp: 1 each, Rfl: 1  •  levothyroxine (SYNTHROID, LEVOTHROID) 88 MCG tablet, Take 1 tablet by mouth Daily., Disp: 90 tablet, Rfl: 3  •  lovastatin (MEVACOR) 40 MG tablet, Take 1 tablet by mouth Daily With Dinner. Pt can tolerat lovastatin., Disp: 90 tablet, Rfl: 3  •  NIFEdipine CC (ADALAT CC) 90 MG 24 hr  "tablet, , Disp: , Rfl:   •  Semaglutide, 1 MG/DOSE, (OZEMPIC) 2 MG/1.5ML solution pen-injector, Inject 1 mg under the skin into the appropriate area as directed 1 (One) Time Per Week., Disp: 3 pen, Rfl: 3  •  Xarelto 15 MG tablet, , Disp: , Rfl:   •  amiodarone (PACERONE) 400 MG tablet, 400 mg., Disp: , Rfl:   •  amLODIPine (NORVASC) 10 MG tablet, , Disp: , Rfl:   •  B Complex-C (B-complex with vitamin C) tablet, Take 1 tablet by mouth Daily., Disp: 90 tablet, Rfl: 3  •  folic acid (FOLVITE) 1 MG tablet, Take one daily except on the day of taking Methotrexate., Disp: 30 tablet, Rfl: 11    Allergies:   Allergies   Allergen Reactions   • Amlodipine Swelling   • Statins Myalgia and Other (See Comments)       Objective     Physical Exam:  Vital Signs:   Vitals:    09/28/21 1527   BP: 130/80   Pulse: 68   Temp: 97.5 °F (36.4 °C)   SpO2: 97%   Weight: (!) 138 kg (304 lb)   Height: 193 cm (76\")   PainSc:   5   PainLoc: Abdomen     BMI: Body mass index is 37 kg/m².    Physical Exam  Vitals and nursing note reviewed.   Constitutional:       General: He is not in acute distress.     Appearance: He is well-developed. He is obese. He is not diaphoretic.   HENT:      Head: Normocephalic and atraumatic.      Comments: Mallampati 4 with thick tongue  Eyes:      Conjunctiva/sclera: Conjunctivae normal.      Pupils: Pupils are equal, round, and reactive to light.   Neck:      Trachea: Trachea normal.   Cardiovascular:      Rate and Rhythm: Normal rate and regular rhythm.      Heart sounds: Normal heart sounds. No murmur heard.   No friction rub. No gallop.    Pulmonary:      Effort: Pulmonary effort is normal. No respiratory distress.      Breath sounds: Normal breath sounds. No wheezing or rales.   Musculoskeletal:         General: Normal range of motion.   Skin:     General: Skin is warm and dry.      Findings: No rash.   Neurological:      Mental Status: He is alert and oriented to person, place, and time.   Psychiatric:         " Mood and Affect: Mood normal.         Behavior: Behavior normal.         Thought Content: Thought content normal.         Judgment: Judgment normal.         Assessment / Plan      Assessment/Plan:   Diagnoses and all orders for this visit:    1. Obstructive sleep apnea (Primary)    2. BMI 37.0-37.9, adult    *Order for new Bipap machine sent to [facility].   *Advised patient to avoid driving if drowsy.   *Encouraged weight loss with a BMI goal of 24.    *I have advised patient to use a bacterial in line filter since his Bipap machine has been included in the recent Day recall.     Follow Up:   Return in about 3 months (around 12/28/2021) for F/U Obstructive Sleep Apnea.    I have advised the patient the need to continue the use of CPAP.  Gold standard for treatment of sleep apnea includes weight loss, use of cpap and avoidance of alcohol.  Untreated ALEX may increase the risk for development of hypertension, stroke, myocardial infarction, diabetes, cardiovascular disease, work-related issues and driving accidents. I have counseled and advised the patient to avoid driving or operating heavy/dangerous equipment if feeling drowsy.     YESY Maradiaga, FNP-C  [facility]       Please note that portions of this note may have been completed with a voice recognition program. Efforts were made to edit the dictations, but occasionally words are mistranscribed.

## 2021-09-30 ENCOUNTER — OFFICE VISIT (OUTPATIENT)
Dept: INTERNAL MEDICINE | Facility: CLINIC | Age: 66
End: 2021-09-30

## 2021-09-30 ENCOUNTER — TRANSCRIBE ORDERS (OUTPATIENT)
Dept: CARDIAC REHAB | Facility: HOSPITAL | Age: 66
End: 2021-09-30

## 2021-09-30 ENCOUNTER — HOSPITAL ENCOUNTER (OUTPATIENT)
Dept: CT IMAGING | Facility: HOSPITAL | Age: 66
Discharge: HOME | End: 2021-09-30
Admitting: UROLOGY

## 2021-09-30 VITALS
BODY MASS INDEX: 36.9 KG/M2 | SYSTOLIC BLOOD PRESSURE: 140 MMHG | OXYGEN SATURATION: 99 % | TEMPERATURE: 98 F | RESPIRATION RATE: 16 BRPM | HEART RATE: 76 BPM | DIASTOLIC BLOOD PRESSURE: 80 MMHG | WEIGHT: 303 LBS | HEIGHT: 76 IN

## 2021-09-30 DIAGNOSIS — N20.0 KIDNEY STONE: ICD-10-CM

## 2021-09-30 DIAGNOSIS — N18.2 CRI (CHRONIC RENAL INSUFFICIENCY), STAGE 2 (MILD): ICD-10-CM

## 2021-09-30 DIAGNOSIS — Z95.5 S/P PRIMARY ANGIOPLASTY WITH CORONARY STENT: Primary | ICD-10-CM

## 2021-09-30 DIAGNOSIS — I10 ESSENTIAL HYPERTENSION: Primary | ICD-10-CM

## 2021-09-30 PROBLEM — H18.529 ABMD (ANTERIOR BASEMENT MEMBRANE DYSTROPHY): Status: ACTIVE | Noted: 2020-06-05

## 2021-09-30 PROBLEM — E66.9 OBESITY: Status: ACTIVE | Noted: 2021-09-07

## 2021-09-30 PROBLEM — N18.9 CKD (CHRONIC KIDNEY DISEASE): Status: ACTIVE | Noted: 2018-11-05

## 2021-09-30 PROBLEM — G24.5 EYE TWITCH: Status: ACTIVE | Noted: 2020-06-05

## 2021-09-30 PROBLEM — R10.13 DYSPEPSIA: Status: ACTIVE | Noted: 2021-09-07

## 2021-09-30 PROBLEM — H52.221 REGULAR ASTIGMATISM OF RIGHT EYE: Status: ACTIVE | Noted: 2020-06-05

## 2021-09-30 PROBLEM — H52.13 BILATERAL MYOPIA: Status: ACTIVE | Noted: 2020-06-05

## 2021-09-30 PROBLEM — E26.9 HYPERALDOSTERONISM (HCC): Status: ACTIVE | Noted: 2019-02-04

## 2021-09-30 PROBLEM — E78.5 HYPERLIPIDEMIA: Status: ACTIVE | Noted: 2021-09-07

## 2021-09-30 PROBLEM — R56.9 SEIZURE-LIKE ACTIVITY: Status: ACTIVE | Noted: 2020-02-19

## 2021-09-30 PROBLEM — H66.90 CHRONIC OTITIS MEDIA: Status: ACTIVE | Noted: 2017-01-10

## 2021-09-30 PROBLEM — Z86.19 HISTORY OF HELICOBACTER PYLORI INFECTION: Status: ACTIVE | Noted: 2021-09-07

## 2021-09-30 PROCEDURE — 90662 IIV NO PRSV INCREASED AG IM: CPT | Performed by: INTERNAL MEDICINE

## 2021-09-30 PROCEDURE — G0008 ADMIN INFLUENZA VIRUS VAC: HCPCS | Performed by: INTERNAL MEDICINE

## 2021-09-30 PROCEDURE — 74176 CT ABD & PELVIS W/O CONTRAST: CPT

## 2021-09-30 PROCEDURE — 99214 OFFICE O/P EST MOD 30 MIN: CPT | Performed by: INTERNAL MEDICINE

## 2021-10-01 ENCOUNTER — OFFICE VISIT (OUTPATIENT)
Dept: UROLOGY | Facility: CLINIC | Age: 66
End: 2021-10-01

## 2021-10-01 DIAGNOSIS — N20.0 NEPHROLITHIASIS: Primary | ICD-10-CM

## 2021-10-01 PROCEDURE — 99442 PR PHYS/QHP TELEPHONE EVALUATION 11-20 MIN: CPT | Performed by: UROLOGY

## 2021-10-01 NOTE — PROGRESS NOTES
CT Abdomen Pelvis Stone Protocol  Result Date: 9/30/2021  1. Bilateral nephrolithiasis, largest stones measuring 9 mm on right and 7 mm on the left without hydronephrosis or obstructing stone. 2. Cholelithiasis  This study was performed with techniques to keep radiation doses as low as reasonably achievable (ALARA). Individualized dose reduction techniques using automated exposure control or adjustment of vA and/or kV according to the patient size were employed.  This report was finalized on 9/30/2021 4:48 PM by Clemente Barrett MD.    I had a 15-minute telephone conversation with the patient where we reviewed his CT scan.  He continues to have left flank pain that is just under his rib cage, compatible with the area with his kidney.  We discussed that it was possible that since the stones are not actively obstructing him on the CT scan that his pain could be due to other causes, however he he desires he would very much like them removed as he does think the pain is related.  We discussed the risk, benefits, and alternatives to ureteroscopy with laser lithotripsy.  He wishes to proceed.  He will stop his Plavix 5 days beforehand, as well as his Xarelto 3 days beforehand.  He has obtained clearance from his cardiologist.

## 2021-10-05 DIAGNOSIS — N20.0 NEPHROLITHIASIS: Primary | ICD-10-CM

## 2021-10-05 RX ORDER — SODIUM CHLORIDE 9 MG/ML
100 INJECTION, SOLUTION INTRAVENOUS CONTINUOUS
Status: CANCELLED | OUTPATIENT
Start: 2021-10-05

## 2021-10-07 ENCOUNTER — PRE-ADMISSION TESTING (OUTPATIENT)
Dept: PREADMISSION TESTING | Facility: HOSPITAL | Age: 66
End: 2021-10-07

## 2021-10-07 VITALS — BODY MASS INDEX: 36.9 KG/M2 | HEIGHT: 76 IN | WEIGHT: 303 LBS

## 2021-10-07 LAB
QT INTERVAL: 450 MS
QTC INTERVAL: 468 MS

## 2021-10-07 PROCEDURE — 93010 ELECTROCARDIOGRAM REPORT: CPT | Performed by: INTERNAL MEDICINE

## 2021-10-07 PROCEDURE — 93005 ELECTROCARDIOGRAM TRACING: CPT

## 2021-10-13 ENCOUNTER — LAB (OUTPATIENT)
Dept: LAB | Facility: HOSPITAL | Age: 66
End: 2021-10-13

## 2021-10-13 LAB
ALBUMIN SERPL-MCNC: 4.2 G/DL (ref 3.5–5.2)
ALBUMIN/GLOB SERPL: 1.2 G/DL
ALP SERPL-CCNC: 54 U/L (ref 39–117)
ALT SERPL W P-5'-P-CCNC: 12 U/L (ref 1–41)
ANION GAP SERPL CALCULATED.3IONS-SCNC: 11.1 MMOL/L (ref 5–15)
AST SERPL-CCNC: 15 U/L (ref 1–40)
BASOPHILS # BLD AUTO: 0.06 10*3/MM3 (ref 0–0.2)
BASOPHILS NFR BLD AUTO: 0.9 % (ref 0–1.5)
BILIRUB SERPL-MCNC: 0.6 MG/DL (ref 0–1.2)
BUN SERPL-MCNC: 15 MG/DL (ref 8–23)
BUN/CREAT SERPL: 11.5 (ref 7–25)
CALCIUM SPEC-SCNC: 9.4 MG/DL (ref 8.6–10.5)
CHLORIDE SERPL-SCNC: 101 MMOL/L (ref 98–107)
CO2 SERPL-SCNC: 26.9 MMOL/L (ref 22–29)
CREAT SERPL-MCNC: 1.31 MG/DL (ref 0.76–1.27)
DEPRECATED RDW RBC AUTO: 43.6 FL (ref 37–54)
EOSINOPHIL # BLD AUTO: 0.1 10*3/MM3 (ref 0–0.4)
EOSINOPHIL NFR BLD AUTO: 1.5 % (ref 0.3–6.2)
ERYTHROCYTE [DISTWIDTH] IN BLOOD BY AUTOMATED COUNT: 13.6 % (ref 12.3–15.4)
GFR SERPL CREATININE-BSD FRML MDRD: 55 ML/MIN/1.73
GLOBULIN UR ELPH-MCNC: 3.5 GM/DL
GLUCOSE SERPL-MCNC: 174 MG/DL (ref 65–99)
HCT VFR BLD AUTO: 47.2 % (ref 37.5–51)
HGB BLD-MCNC: 16 G/DL (ref 13–17.7)
IMM GRANULOCYTES # BLD AUTO: 0.02 10*3/MM3 (ref 0–0.05)
IMM GRANULOCYTES NFR BLD AUTO: 0.3 % (ref 0–0.5)
LYMPHOCYTES # BLD AUTO: 2.02 10*3/MM3 (ref 0.7–3.1)
LYMPHOCYTES NFR BLD AUTO: 30.7 % (ref 19.6–45.3)
MCH RBC QN AUTO: 29.9 PG (ref 26.6–33)
MCHC RBC AUTO-ENTMCNC: 33.9 G/DL (ref 31.5–35.7)
MCV RBC AUTO: 88.1 FL (ref 79–97)
MONOCYTES # BLD AUTO: 0.39 10*3/MM3 (ref 0.1–0.9)
MONOCYTES NFR BLD AUTO: 5.9 % (ref 5–12)
NEUTROPHILS NFR BLD AUTO: 4 10*3/MM3 (ref 1.7–7)
NEUTROPHILS NFR BLD AUTO: 60.7 % (ref 42.7–76)
NRBC BLD AUTO-RTO: 0 /100 WBC (ref 0–0.2)
PLATELET # BLD AUTO: 210 10*3/MM3 (ref 140–450)
PMV BLD AUTO: 11.5 FL (ref 6–12)
POTASSIUM SERPL-SCNC: 3.6 MMOL/L (ref 3.5–5.2)
PROT SERPL-MCNC: 7.7 G/DL (ref 6–8.5)
RBC # BLD AUTO: 5.36 10*6/MM3 (ref 4.14–5.8)
SODIUM SERPL-SCNC: 139 MMOL/L (ref 136–145)
WBC # BLD AUTO: 6.59 10*3/MM3 (ref 3.4–10.8)

## 2021-10-13 PROCEDURE — 85025 COMPLETE CBC W/AUTO DIFF WBC: CPT | Performed by: INTERNAL MEDICINE

## 2021-10-13 PROCEDURE — 80053 COMPREHEN METABOLIC PANEL: CPT | Performed by: INTERNAL MEDICINE

## 2021-10-13 PROCEDURE — 36415 COLL VENOUS BLD VENIPUNCTURE: CPT | Performed by: INTERNAL MEDICINE

## 2021-10-14 ENCOUNTER — OFFICE VISIT (OUTPATIENT)
Dept: INTERNAL MEDICINE | Facility: CLINIC | Age: 66
End: 2021-10-14

## 2021-10-14 VITALS
OXYGEN SATURATION: 98 % | DIASTOLIC BLOOD PRESSURE: 88 MMHG | WEIGHT: 304 LBS | SYSTOLIC BLOOD PRESSURE: 140 MMHG | TEMPERATURE: 98 F | HEIGHT: 76 IN | HEART RATE: 68 BPM | BODY MASS INDEX: 37.02 KG/M2 | RESPIRATION RATE: 16 BRPM

## 2021-10-14 DIAGNOSIS — E11.9 TYPE 2 DIABETES MELLITUS WITHOUT COMPLICATION, WITHOUT LONG-TERM CURRENT USE OF INSULIN (HCC): ICD-10-CM

## 2021-10-14 DIAGNOSIS — N20.0 RENAL STONE: Primary | ICD-10-CM

## 2021-10-14 DIAGNOSIS — I10 BENIGN HYPERTENSION: ICD-10-CM

## 2021-10-14 PROCEDURE — 99214 OFFICE O/P EST MOD 30 MIN: CPT | Performed by: INTERNAL MEDICINE

## 2021-10-14 NOTE — PROGRESS NOTES
"Subjective     Patient ID: Andre Agosto is a 66 y.o. male. Patient is here for management of multiple medical problems.     Chief Complaint   Patient presents with   • Hypertension     History of Present Illness   Gait abnoralities  \"Walks like a drunk\"    faling to the left.        The following portions of the patient's history were reviewed and updated as appropriate: allergies, current medications, past family history, past medical history, past social history, past surgical history and problem list.    Review of Systems    Current Outpatient Medications:   •  amiodarone (PACERONE) 200 MG tablet, Take 200 mg by mouth Daily., Disp: , Rfl:   •  amiodarone (PACERONE) 400 MG tablet, 400 mg., Disp: , Rfl:   •  amLODIPine (NORVASC) 10 MG tablet, Take 10 mg by mouth Daily., Disp: , Rfl:   •  B Complex-C (B-complex with vitamin C) tablet, Take 1 tablet by mouth Daily., Disp: 90 tablet, Rfl: 3  •  carvedilol (Coreg) 25 MG tablet, Take 1 tablet by mouth 2 (Two) Times a Day With Meals., Disp: 180 tablet, Rfl: 3  •  clopidogrel (PLAVIX) 75 MG tablet, Take 75 mg by mouth Daily. Hold 5 days prior to surgery and take baby asa, Disp: , Rfl:   •  divalproex (DEPAKOTE) 250 MG DR tablet, Take 2 tablets in the AM, 1 tablet at noon, and 2 tablets in the pm. Per DR Clifford. (Patient taking differently: Take 250 mg by mouth. Take 3 tablets in the AM  and 2 tablets in the pm. Per DR Clifford.), Disp: 450 tablet, Rfl: 3  •  Entresto 49-51 MG tablet, Take 1 tablet by mouth 2 (Two) Times a Day., Disp: 60 tablet, Rfl: 11  •  Evolocumab (Repatha SureClick) solution auto-injector SureClick injection, Inject 1 mL under the skin into the appropriate area as directed Every 14 (Fourteen) Days., Disp: 6 pen, Rfl: 3  •  Farxiga 10 MG tablet, Take 10 mg by mouth Daily., Disp: , Rfl:   •  folic acid (FOLVITE) 1 MG tablet, Take one daily except on the day of taking Methotrexate., Disp: 30 tablet, Rfl: 11  •  furosemide (LASIX) 40 MG tablet, Take 1 tablet " "by mouth Daily. (Patient taking differently: Take 40 mg by mouth Daily As Needed.), Disp: 90 tablet, Rfl: 3  •  glucose monitor monitoring kit, 1 each As Needed (bs)., Disp: 1 each, Rfl: 1  •  levothyroxine (SYNTHROID, LEVOTHROID) 88 MCG tablet, Take 1 tablet by mouth Daily., Disp: 90 tablet, Rfl: 3  •  lovastatin (MEVACOR) 40 MG tablet, Take 1 tablet by mouth Daily With Dinner. Pt can tolerat lovastatin., Disp: 90 tablet, Rfl: 3  •  NIFEdipine CC (ADALAT CC) 90 MG 24 hr tablet, Take 90 mg by mouth Daily., Disp: , Rfl:   •  Semaglutide, 1 MG/DOSE, (OZEMPIC) 2 MG/1.5ML solution pen-injector, Inject 1 mg under the skin into the appropriate area as directed 1 (One) Time Per Week., Disp: 3 pen, Rfl: 3  •  Xarelto 15 MG tablet, Take 15 mg by mouth Daily With Dinner., Disp: , Rfl:     Objective      Blood pressure 140/88, pulse 68, temperature 98 °F (36.7 °C), resp. rate 16, height 193 cm (76\"), weight (!) 138 kg (304 lb), SpO2 98 %.    Physical Exam     General Appearance:    Alert, cooperative, no distress, appears stated age   Head:    Normocephalic, without obvious abnormality, atraumatic   Eyes:    PERRL, conjunctiva/corneas clear, EOM's intact   Ears:    Normal TM's and external ear canals, both ears   Nose:   Nares normal, septum midline, mucosa normal, no drainage   or sinus tenderness   Throat:   Lips, mucosa, and tongue normal; teeth and gums normal   Neck:   Supple, symmetrical, trachea midline, no adenopathy;        thyroid:  No enlargement/tenderness/nodules; no carotid    bruit or JVD   Back:     Symmetric, no curvature, ROM normal, no CVA tenderness   Lungs:     Clear to auscultation bilaterally, respirations unlabored   Chest wall:    No tenderness or deformity   Heart:    Regular rate and rhythm, S1 and S2 normal, no murmur,        rub or gallop   Abdomen:     Soft, non-tender, bowel sounds active all four quadrants,     no masses, no organomegaly   Extremities:   Extremities normal, atraumatic, no " cyanosis or edema   Pulses:   2+ and symmetric all extremities   Skin:   Skin color, texture, turgor normal, no rashes or lesions   Lymph nodes:   Cervical, supraclavicular, and axillary nodes normal   Neurologic:   CNII-XII intact. Normal strength, sensation and reflexes       throughout      Results for orders placed or performed in visit on 10/07/21   ECG 12 Lead   Result Value Ref Range    QT Interval 450 ms    QTC Interval 468 ms         Assessment/Plan     Pt will start cardiac rehab soon.      Nephrolith. Surgery soon.     Unsteady gate. Once better with other issues will start with renal stone issues.                    Diagnoses and all orders for this visit:    1. Renal stone (Primary)  -     Lipid Panel  -     CBC & Differential  -     Comprehensive Metabolic Panel  -     TSH  -     Hemoglobin A1c  -     Vitamin B12  -     Vitamin B6    2. Benign hypertension  -     Lipid Panel  -     CBC & Differential  -     Comprehensive Metabolic Panel  -     TSH  -     Hemoglobin A1c  -     Vitamin B12  -     Vitamin B6    3. Type 2 diabetes mellitus without complication, without long-term current use of insulin (HCC)  -     Hemoglobin A1c      Return in about 6 weeks (around 11/25/2021).          There are no Patient Instructions on file for this visit.     Keven Bear MD    Assessment/Plan

## 2021-10-18 ENCOUNTER — LAB (OUTPATIENT)
Dept: LAB | Facility: HOSPITAL | Age: 66
End: 2021-10-18

## 2021-10-18 ENCOUNTER — TRANSCRIBE ORDERS (OUTPATIENT)
Dept: LAB | Facility: HOSPITAL | Age: 66
End: 2021-10-18

## 2021-10-18 DIAGNOSIS — Z01.818 PRE-OP TESTING: Primary | ICD-10-CM

## 2021-10-18 DIAGNOSIS — Z01.818 PRE-OP TESTING: ICD-10-CM

## 2021-10-18 LAB — SARS-COV-2 RNA NOSE QL NAA+PROBE: NOT DETECTED

## 2021-10-18 PROCEDURE — U0004 COV-19 TEST NON-CDC HGH THRU: HCPCS

## 2021-10-20 ENCOUNTER — ANESTHESIA EVENT (OUTPATIENT)
Dept: PERIOP | Facility: HOSPITAL | Age: 66
End: 2021-10-20

## 2021-10-20 ENCOUNTER — ANESTHESIA (OUTPATIENT)
Dept: PERIOP | Facility: HOSPITAL | Age: 66
End: 2021-10-20

## 2021-10-20 ENCOUNTER — HOSPITAL ENCOUNTER (OUTPATIENT)
Facility: HOSPITAL | Age: 66
Setting detail: HOSPITAL OUTPATIENT SURGERY
Discharge: HOME | End: 2021-10-20
Attending: UROLOGY | Admitting: UROLOGY

## 2021-10-20 VITALS
DIASTOLIC BLOOD PRESSURE: 84 MMHG | RESPIRATION RATE: 18 BRPM | HEART RATE: 65 BPM | TEMPERATURE: 97 F | OXYGEN SATURATION: 97 % | SYSTOLIC BLOOD PRESSURE: 135 MMHG

## 2021-10-20 DIAGNOSIS — N20.0 NEPHROLITHIASIS: Primary | ICD-10-CM

## 2021-10-20 DIAGNOSIS — N20.0 NEPHROLITHIASIS: ICD-10-CM

## 2021-10-20 LAB — GLUCOSE BLDC GLUCOMTR-MCNC: 99 MG/DL (ref 70–130)

## 2021-10-20 PROCEDURE — 25010000002 FENTANYL CITRATE (PF) 100 MCG/2ML SOLUTION: Performed by: NURSE ANESTHETIST, CERTIFIED REGISTERED

## 2021-10-20 PROCEDURE — 25010000002 IOPAMIDOL 61 % SOLUTION: Performed by: UROLOGY

## 2021-10-20 PROCEDURE — C1894 INTRO/SHEATH, NON-LASER: HCPCS | Performed by: UROLOGY

## 2021-10-20 PROCEDURE — C1769 GUIDE WIRE: HCPCS | Performed by: UROLOGY

## 2021-10-20 PROCEDURE — 25010000002 PROPOFOL 200 MG/20ML EMULSION: Performed by: NURSE ANESTHETIST, CERTIFIED REGISTERED

## 2021-10-20 PROCEDURE — 82962 GLUCOSE BLOOD TEST: CPT

## 2021-10-20 PROCEDURE — C1758 CATHETER, URETERAL: HCPCS | Performed by: UROLOGY

## 2021-10-20 PROCEDURE — 25010000002 ONDANSETRON PER 1 MG: Performed by: NURSE ANESTHETIST, CERTIFIED REGISTERED

## 2021-10-20 PROCEDURE — 25010000003 CEFAZOLIN PER 500 MG: Performed by: UROLOGY

## 2021-10-20 PROCEDURE — 25010000002 FUROSEMIDE PER 20 MG: Performed by: NURSE ANESTHETIST, CERTIFIED REGISTERED

## 2021-10-20 PROCEDURE — C2617 STENT, NON-COR, TEM W/O DEL: HCPCS | Performed by: UROLOGY

## 2021-10-20 PROCEDURE — 94799 UNLISTED PULMONARY SVC/PX: CPT

## 2021-10-20 PROCEDURE — 52356 CYSTO/URETERO W/LITHOTRIPSY: CPT | Performed by: UROLOGY

## 2021-10-20 DEVICE — URETERAL STENT
Type: IMPLANTABLE DEVICE | Site: URETER | Status: FUNCTIONAL
Brand: CONTOUR™

## 2021-10-20 RX ORDER — IPRATROPIUM BROMIDE AND ALBUTEROL SULFATE 2.5; .5 MG/3ML; MG/3ML
3 SOLUTION RESPIRATORY (INHALATION) ONCE AS NEEDED
Status: DISCONTINUED | OUTPATIENT
Start: 2021-10-20 | End: 2021-10-20 | Stop reason: HOSPADM

## 2021-10-20 RX ORDER — TAMSULOSIN HYDROCHLORIDE 0.4 MG/1
1 CAPSULE ORAL NIGHTLY
Qty: 10 CAPSULE | Refills: 0 | Status: ON HOLD | OUTPATIENT
Start: 2021-10-20 | End: 2021-11-03 | Stop reason: SDUPTHER

## 2021-10-20 RX ORDER — FUROSEMIDE 10 MG/ML
20 INJECTION INTRAMUSCULAR; INTRAVENOUS ONCE
Status: COMPLETED | OUTPATIENT
Start: 2021-10-20 | End: 2021-10-20

## 2021-10-20 RX ORDER — ONDANSETRON 2 MG/ML
INJECTION INTRAMUSCULAR; INTRAVENOUS AS NEEDED
Status: DISCONTINUED | OUTPATIENT
Start: 2021-10-20 | End: 2021-10-20 | Stop reason: SURG

## 2021-10-20 RX ORDER — OXYCODONE HYDROCHLORIDE 5 MG/1
5 TABLET ORAL EVERY 6 HOURS PRN
Qty: 5 TABLET | Refills: 0 | Status: SHIPPED | OUTPATIENT
Start: 2021-10-20 | End: 2021-11-03 | Stop reason: HOSPADM

## 2021-10-20 RX ORDER — CIPROFLOXACIN 500 MG/1
500 TABLET, FILM COATED ORAL 2 TIMES DAILY
Qty: 6 TABLET | Refills: 0 | Status: SHIPPED | OUTPATIENT
Start: 2021-10-20 | End: 2021-10-20

## 2021-10-20 RX ORDER — LORAZEPAM 2 MG/ML
0.25 INJECTION INTRAMUSCULAR
Status: DISCONTINUED | OUTPATIENT
Start: 2021-10-20 | End: 2021-10-20 | Stop reason: HOSPADM

## 2021-10-20 RX ORDER — LIDOCAINE HYDROCHLORIDE 20 MG/ML
INJECTION, SOLUTION INTRAVENOUS AS NEEDED
Status: DISCONTINUED | OUTPATIENT
Start: 2021-10-20 | End: 2021-10-20 | Stop reason: SURG

## 2021-10-20 RX ORDER — HYDROCODONE BITARTRATE AND ACETAMINOPHEN 7.5; 325 MG/1; MG/1
1 TABLET ORAL ONCE AS NEEDED
Status: DISCONTINUED | OUTPATIENT
Start: 2021-10-20 | End: 2021-10-20 | Stop reason: HOSPADM

## 2021-10-20 RX ORDER — PROMETHAZINE HYDROCHLORIDE 25 MG/1
25 TABLET ORAL ONCE AS NEEDED
Status: DISCONTINUED | OUTPATIENT
Start: 2021-10-20 | End: 2021-10-20 | Stop reason: HOSPADM

## 2021-10-20 RX ORDER — SODIUM CHLORIDE 0.9 % (FLUSH) 0.9 %
10 SYRINGE (ML) INJECTION AS NEEDED
Status: DISCONTINUED | OUTPATIENT
Start: 2021-10-20 | End: 2021-10-20 | Stop reason: HOSPADM

## 2021-10-20 RX ORDER — ONDANSETRON 2 MG/ML
4 INJECTION INTRAMUSCULAR; INTRAVENOUS ONCE AS NEEDED
Status: DISCONTINUED | OUTPATIENT
Start: 2021-10-20 | End: 2021-10-20 | Stop reason: HOSPADM

## 2021-10-20 RX ORDER — PROPOFOL 10 MG/ML
INJECTION, EMULSION INTRAVENOUS AS NEEDED
Status: DISCONTINUED | OUTPATIENT
Start: 2021-10-20 | End: 2021-10-20 | Stop reason: SURG

## 2021-10-20 RX ORDER — SODIUM CHLORIDE 9 MG/ML
100 INJECTION, SOLUTION INTRAVENOUS CONTINUOUS
Status: CANCELLED | OUTPATIENT
Start: 2021-10-20

## 2021-10-20 RX ORDER — DOCUSATE SODIUM 100 MG/1
100 CAPSULE, LIQUID FILLED ORAL 2 TIMES DAILY
Qty: 15 CAPSULE | Refills: 1 | Status: SHIPPED | OUTPATIENT
Start: 2021-10-20 | End: 2021-11-03 | Stop reason: HOSPADM

## 2021-10-20 RX ORDER — ACETAMINOPHEN 500 MG
1000 TABLET ORAL EVERY 6 HOURS
Qty: 30 TABLET | Refills: 0 | Status: SHIPPED | OUTPATIENT
Start: 2021-10-20 | End: 2021-10-24

## 2021-10-20 RX ORDER — KETAMINE HCL IN NACL, ISO-OSM 100MG/10ML
SYRINGE (ML) INJECTION AS NEEDED
Status: DISCONTINUED | OUTPATIENT
Start: 2021-10-20 | End: 2021-10-20 | Stop reason: SURG

## 2021-10-20 RX ORDER — OXYBUTYNIN CHLORIDE 10 MG/1
10 TABLET, EXTENDED RELEASE ORAL DAILY PRN
Qty: 10 TABLET | Refills: 0 | Status: ON HOLD | OUTPATIENT
Start: 2021-10-20 | End: 2021-11-03 | Stop reason: SDUPTHER

## 2021-10-20 RX ORDER — FENTANYL CITRATE 50 UG/ML
INJECTION, SOLUTION INTRAMUSCULAR; INTRAVENOUS AS NEEDED
Status: DISCONTINUED | OUTPATIENT
Start: 2021-10-20 | End: 2021-10-20 | Stop reason: SURG

## 2021-10-20 RX ORDER — SODIUM CHLORIDE 9 MG/ML
100 INJECTION, SOLUTION INTRAVENOUS CONTINUOUS
Status: DISCONTINUED | OUTPATIENT
Start: 2021-10-20 | End: 2021-10-20 | Stop reason: HOSPADM

## 2021-10-20 RX ORDER — PROMETHAZINE HYDROCHLORIDE 25 MG/1
25 SUPPOSITORY RECTAL ONCE AS NEEDED
Status: DISCONTINUED | OUTPATIENT
Start: 2021-10-20 | End: 2021-10-20 | Stop reason: HOSPADM

## 2021-10-20 RX ORDER — CEPHALEXIN 500 MG/1
500 CAPSULE ORAL DAILY
Qty: 14 CAPSULE | Refills: 0 | Status: ON HOLD | OUTPATIENT
Start: 2021-10-20 | End: 2021-11-03 | Stop reason: SDUPTHER

## 2021-10-20 RX ORDER — ATROPA BELLADONNA AND OPIUM 16.2; 3 MG/1; MG/1
SUPPOSITORY RECTAL AS NEEDED
Status: DISCONTINUED | OUTPATIENT
Start: 2021-10-20 | End: 2021-10-20 | Stop reason: HOSPADM

## 2021-10-20 RX ORDER — PHENAZOPYRIDINE HYDROCHLORIDE 100 MG/1
100 TABLET, FILM COATED ORAL 3 TIMES DAILY PRN
Qty: 21 TABLET | Refills: 0 | Status: ON HOLD | OUTPATIENT
Start: 2021-10-20 | End: 2021-11-03 | Stop reason: SDUPTHER

## 2021-10-20 RX ADMIN — SODIUM CHLORIDE 100 ML/HR: 9 INJECTION, SOLUTION INTRAVENOUS at 08:55

## 2021-10-20 RX ADMIN — Medication 30 MG: at 09:56

## 2021-10-20 RX ADMIN — FUROSEMIDE 20 MG: 10 INJECTION, SOLUTION INTRAMUSCULAR; INTRAVENOUS at 12:11

## 2021-10-20 RX ADMIN — ONDANSETRON 4 MG: 2 INJECTION INTRAMUSCULAR; INTRAVENOUS at 09:56

## 2021-10-20 RX ADMIN — CEFAZOLIN SODIUM 3 G: 1 INJECTION, POWDER, FOR SOLUTION INTRAMUSCULAR; INTRAVENOUS at 10:02

## 2021-10-20 RX ADMIN — FENTANYL CITRATE 100 MCG: 50 INJECTION INTRAMUSCULAR; INTRAVENOUS at 09:56

## 2021-10-20 RX ADMIN — PROPOFOL 150 MG: 10 INJECTION, EMULSION INTRAVENOUS at 09:56

## 2021-10-20 RX ADMIN — LIDOCAINE HYDROCHLORIDE 60 MG: 20 INJECTION, SOLUTION INTRAVENOUS at 09:56

## 2021-10-20 NOTE — DISCHARGE INSTRUCTIONS
Home Care After Ureteroscopy and Laser Lithotripsy  The following instructions will help you care for yourself, or be cared for upon your return home today.    These are guidelines for your care right after surgery only.     Diet  Drink plenty of liquids and eat light meals today.    Start your regular diet tomorrow.    Activity  Start normal activities in twenty-four (24) hours.    Wound Care and Hygiene  No restrictions, start normal routine.    Anesthesia Precautions & Expectations  After anesthesia, rest for 24 hours.    Do not drive, drink alcoholic beverages or make any important decisions during this time.  General anesthesia may cause a sore throat, jaw discomfort or muscle aches.    These symptoms can last for one or two days.     What to Expect after Surgery  Mild pain with voiding.  Frequency or urgency.  Bladder cramps.  Minimal bleeding with voiding.    Call your Doctor  Passing clots in urine preventing bladder emptying  Severe pain not controlled by oral medication  Temperature above 101.5 degrees  Inability to urinate within eight (8) hours after surgery    After Stent Placement  It is common to have blood tinged urine for 3-5 days.  It is common to have pain in your side and in your back when you urinate for 3-5 days.  It is common to have urgency with urination.  This is a temporary stent and will need to be exchanged at your next surgery.  Do not take the Pyridium 24 hours prior to your stent removal.    Other Contacts  Urology Office:  [id number]   [place] [address] [phone] office  [phone] fax    Other Instructions  Take tamsulosin daily until the stent comes out.  Take oxybutynin daily as needed for bladder spasm while the stent is in.  Take Pyridium up to 3 times daily as needed for burning with urination.   Take Tylenol scheduled every 6 hours for the first 3 days.  Take the oxycodone on top of that as needed.  Take all of the antibiotics.     Follow up  Appointment  We will make you a follow-up in 2 weeks for the follow-up surgery on November 3.    To assist you in voiding:  Drink plenty of fluids  Listen to running water while attempting to void.    If you are unable to urinate and you have an uncomfortable urge to void or it has been   6 hours since you were discharged, return to the Emergency Room    No pushing, pulling, tugging,  heavy lifting, or strenuous activity.  No major decision making, driving, or drinking alcoholic beverages for 24 hours. ( due to the medications you have  received)  Always use good hand hygiene/washing techniques.  NO driving while taking pain medications.    * if you have an incision:  Check your incision area every day for signs of infection.   Check for:  * more redness, swelling, or pain  *more fluid or blood  *warmth  *pus or bad smell

## 2021-10-20 NOTE — ANESTHESIA PROCEDURE NOTES
Airway  Urgency: elective    Date/Time: 10/20/2021 9:56 AM  Airway not difficult    General Information and Staff    Patient location during procedure: OR  CRNA: Ulisses Bennett CRNA    Indications and Patient Condition  Indications for airway management: airway protection    Preoxygenated: yes  MILS maintained throughout  Mask difficulty assessment: 1 - vent by mask    Final Airway Details  Final airway type: supraglottic airway      Successful airway: unique  Size 4    Number of attempts at approach: 1  Assessment: lips, teeth, and gum same as pre-op and atraumatic intubation    Additional Comments  LMA placed easily without trauma. Dentition and lips as noted pre-induction. Factory cuff pressure to minimal occlusive pressure.

## 2021-10-20 NOTE — H&P
HPI  Andre Agosto is a 66 y.o. with history of   1. Nephrolithiasis         No recent fevers or new LUTS  Does not take any blood thinners    Past Medical History  Past Medical History:   Diagnosis Date   • 6th nerve palsy, right     right eye    • Anxiety and depression    • Atrial fibrillation, persistent (HCC) 12/02/2020    on Xarelto   • Coronary artery disease involving native coronary artery of native heart without angina pectoris 09/12/2018    follows w/ Dr. Johnson, on ASA 81mg   • CRI (chronic renal insufficiency), stage 2 (mild)    • CVA (cerebral vascular accident) (HCC)    • Diabetes mellitus (HCC)     doesnt check sugar, type 2    • Disease of thyroid gland    • Double vision     resolved- right eye   • Elevated cholesterol    • Epilepsy (HCC)     right arm focal seizures   • Focal seizure (HCC)     of right arm    • Heart attack (HCC)     NSTEMI 2015, s/p stenting   • History of Helicobacter pylori infection     in 2008, treated w/ PrevPak - 2021 EGD bx (+), PCP RX - PPI/Augmentin/Doxy/Flagyl (x 14 days) 1/22/21   • HL (hearing loss)     (L) ear - child luevano measles   • Hyperlipidemia    • Hypertension    • Kidney stone    • Kidney stone    • Memory loss 2005    d/t meningitis   • Meningitis 2005    unsure of bacterial or viral    • Obesity    • Seizures (HCC)     right arm focal seizures   • Sleep apnea treated with nocturnal BiPAP     compliant with  machine    • Stroke (HCC)     2017/2018   • Ventricular tachycardia (HCC)     3 different times   • Wears glasses        Past Surgical History  Past Surgical History:   Procedure Laterality Date   • BARIATRIC SURGERY  06/01/2012    Lap nand   • CARDIAC CATHETERIZATION N/A 10/12/2018    Procedure: Left Heart Cath;  Surgeon: Yoselin Johnson MD;  Location: [facility] CATH INVASIVE LOCATION;  Service: Cardiology   • CARDIAC CATHETERIZATION  03/2021    stent   • CARDIAC CATHETERIZATION  07/2021    just checking the after pacemaker placed-   Eres   • CARDIAC DEFIBRILLATOR PLACEMENT     • COLONOSCOPY  10/2020    w/ Dr. Jacobs, hx colon polyps   • CORONARY STENT PLACEMENT     • LAPAROSCOPIC GASTRIC BANDING      s/p LAGB Realize 2008 by MASONO.   • PACEMAKER IMPLANTATION  2021   • UMBILICAL HERNIA REPAIR      incarcerated UHR w/ mesh by Dr. Velasquez       Medications    Current Facility-Administered Medications:   •  ceFAZolin (ANCEF) 3 g in sodium chloride 0.9 % 100 mL IVPB, 3 g, Intravenous, Once, Tonio De La Cruz MD  •  sodium chloride 0.9 % flush 10 mL, 10 mL, Intravenous, PRN, Tonio De La Cruz MD  •  sodium chloride 0.9 % infusion, 100 mL/hr, Intravenous, Continuous, Tonio De La Cruz MD    Allergies  Allergies   Allergen Reactions   • Statins Myalgia and Other (See Comments)       Social History  Social History     Socioeconomic History   • Marital status:    Tobacco Use   • Smoking status: Former Smoker     Packs/day: 0.50     Years: 10.00     Pack years: 5.00     Types: Cigarettes     Start date: 1975     Quit date: 1985     Years since quittin.8   • Smokeless tobacco: Never Used   Vaping Use   • Vaping Use: Never used   Substance and Sexual Activity   • Alcohol use: No   • Drug use: No   • Sexual activity: Not Currently       Review of Systems  Constitutional: No fevers or chills  Skin: Negative for rash  Endocrine: No heat/cold intolerance   Cardiovascular: Negative for chest pain or dyspnea on exertion  Respiratory: Negative for shortness of breath or wheezing  Gastrointestinal: No constipation, nausea or vomiting  Genitourinary: Negative for new lower urinary tract symptoms, current gross hematuria or dysuria.  Musculoskeletal: No flank pain  Neurological:  Negative for frequent headaches or dizziness  Lymph/Heme: Negative for leg swelling or calf pain.    Physical Exam  There were no vitals taken for this visit.  Constitutional: NAD, WDWN.   HEENT: NCAT. Conjunctivae normal.  MMM.     Cardiovascular: Regular rate.  Pulmonary/Chest: Respirations are even and non-labored bilaterally.  Abdominal: Soft. No distension, tenderness, masses or guarding. No CVA tenderness.  Neurological: A + O x 3.  Cranial Nerves II-XII grossly intact. Normal gait.  Extremities: KRUNAL x 4, Warm. No clubbing.  No cyanosis.    Skin: Pink, warm and dry.  No rashes noted.  Psychiatric:  Normal mood and affect    Labs & Imaging  Lab Results   Component Value Date    GLUCOSE 174 (H) 10/13/2021    CALCIUM 9.4 10/13/2021     10/13/2021    K 3.6 10/13/2021    CO2 26.9 10/13/2021     10/13/2021    BUN 15 10/13/2021    CREATININE 1.31 (H) 10/13/2021    EGFRIFAFRI 57 (L) 12/05/2018    EGFRIFNONA 55 (L) 10/13/2021    BCR 11.5 10/13/2021    ANIONGAP 11.1 10/13/2021     Lab Results   Component Value Date    WBC 6.59 10/13/2021    HGB 16.0 10/13/2021    HCT 47.2 10/13/2021    MCV 88.1 10/13/2021     10/13/2021     Brief Urine Lab Results  (Last result in the past 365 days)      Color   Clarity   Blood   Leuk Est   Nitrite   Protein   CREAT   Urine HCG        09/20/21 1130 Yellow   Clear   3+   Negative   Negative   Trace               Urine Culture    Urine Culture 1/14/21 9/13/21   Urine Culture Final report No growth              CT Abdomen Pelvis Stone Protocol    Result Date: 9/30/2021  PROCEDURE: CT ABDOMEN PELVIS STONE PROTOCOL-  HISTORY: Flank pain, kidney stone suspected; N20.0-Calculus of kidney  COMPARISON:01/19/2021  TECHNIQUE: Noncontrast exam  CT ABDOMEN:  Numerous bilateral renal stones are present without obstruction. At least 3 stones are identified of the right kidney with largest measuring up to 9 mm. At least 9 stones are identified of the left kidney with largest measuring up to 7 mm. Remaining solid organs are unremarkable. The bowel shows no evidence of obstruction. There is no free air or free fluid within the abdomen.  Cholelithiasis is present. Gastric distention is noted with surgical changes  of gastric banding.  CT PELVIS: No bowel dilatation is seen. There is no free fluid. Appendix is normal. Prostate is mildly enlarged. Bladder is unremarkable.      1. Bilateral nephrolithiasis, largest stones measuring 9 mm on right and 7 mm on the left without hydronephrosis or obstructing stone. 2. Cholelithiasis  This study was performed with techniques to keep radiation doses as low as reasonably achievable (ALARA). Individualized dose reduction techniques using automated exposure control or adjustment of vA and/or kV according to the patient size were employed.  This report was finalized on 9/30/2021 4:48 PM by Clemente Barrett MD.          Assessment  Andre Agosto is a 66 y.o. male who presents with the following diagnosis:  1. Nephrolithiasis         Plan  1. To OR for LEFT URETEROSCOPY LASER LITHOTRIPSY WITH STENT INSERTION     Tonio De La Cruz MD         Quality 130: Documentation Of Current Medications In The Medical Record: Current Medications Documented Quality 47: Advance Care Plan: Advance Care Planning discussed and documented; advance care plan or surrogate decision maker documented in the medical record. Quality 226: Preventive Care And Screening: Tobacco Use: Screening And Cessation Intervention: Patient screened for tobacco use and is an ex/non-smoker Quality 431: Preventive Care And Screening: Unhealthy Alcohol Use - Screening: Patient screened for unhealthy alcohol use using a single question and scores less than 2 times per year Detail Level: Detailed

## 2021-10-20 NOTE — OP NOTE
Preoperative diagnosis  left kidney stones    Postoperative diagnosis  left kidney stones    Procedure performed  1.  Flexible cystoscopy, left retrograde pyelogram with ureteral stent placement 6 Fr x 28 cm  2.  left ureteroscopy with holmium-YAG laser lithotripsy and basket extraction of stone fragments (34746)   3.  Fluoroscopy time < 1 hour with interpretation of images    Surgeon  Tonio De La Cruz MD    Anesthesia  General    Complications  None    Specimen  Stone fragments for biochemical analysis    Findings  Urethroscopy revealed no strictures or other abnormalities.  Cystoscopy revealed no tumors, stones or other mucosal abnormalities.    left retrograde pyelogram revealed a delicate system with identification of the stone seen on pre-op imaging.  No other filling defects and caliber of left ureter was smooth and normal.    Ureteroscopy revealed multiple large very hard black stones, likely consistent with calcium oxalate monohydrate.  We treated approximately 85% of the stones, however, while working in the lower pole treating the stones that were embedded within the kidney he started to have bleeding and we were unable to visualize the rest of the stones to treat them successfully.  We therefore had to place a stent and abort the case and will come back in 2 weeks when visualization is better.     Indications  66 y.o. male agreed to undergo the above named procedure after discussion of the alternatives, risks and benefits.  Informed consent was obtained.      Procedure  The patient was taken to the operating room and placed supine on the operating table.  Pre-operative antibiotics were administered.  Bilateral lower extremity SCDs were placed.  After induction of general anesthesia the patient was positioned in dorsal lithotomy, prepped and draped in a sterile fashion.  A time-out was performed.      A 14-Belarusian flexible cystoscope was passed carefully via urethra into the bladder.  The left ureteral  orifice was identified and a Sensor wire was passed retrograde to the level of the kidney and confirmed by fluoroscopy.  The flexible scope was off-loaded and the bladder emptied with a straight catheter.  A dual lumen open-ended catheter was passed over the wire. A retrograde pyelogram was performed by slowly injecting 5 mL of 50% Omnipaque contrast via the 5-Slovenian catheter with findings described above.  An Amplatz super-stiff was placed to the level of the renal pelvis and confirmed by fluoroscopy. The dual lumen was removed. The Sensor wire was clipped to the drape as a safety wire.  An 11/13, 46-cm access sheath was advanced over the super-stiff wire to level of the UPJ under direct fluoroscopic guidance. The inner stylet and super-stiff wire were removed and the sheath was sutured in place with 2-0 silk.  The kidney was entered with the flexible ureteroscope.  The kidney stones were identified and fragmented with a 200-micron holmium YAG laser fiber at laser settings of 0.3 joules and a frequency of 50 Hz.  Findings as above.  The access sheath was removed under direct vision.  Ureteral edema but no obvious obstruction was present.  A 5-Slovenian catheter was passed over the safety wire and the wire withdrawn.   Contrast was injected into the renal pelvis via the 5-Slovenian open-ended catheter.  A super-stiff wire was placed and confirmed by fluoroscopy.  A 6 Fr x 26 cm stent was positioned with the upper end in the kidney and the lower in the bladder confirmed by fluoroscopy. The bladder was emptied and the procedure was complete. The patient tolerated the procedure well and was stable throughout.    The patient will follow up with me in 2 weeks for repeat ureteroscopy and laser lithotripsy as part of a staged procedure.

## 2021-10-20 NOTE — ANESTHESIA PREPROCEDURE EVALUATION
Anesthesia Evaluation     Patient summary reviewed and Nursing notes reviewed   no history of anesthetic complications:  NPO Solid Status: > 8 hours  NPO Liquid Status: > 8 hours           Airway   Mallampati: III  TM distance: >3 FB  Neck ROM: full  Possible difficult intubation and Large neck circumference  Dental - normal exam     Pulmonary - normal exam   (+) pneumonia resolved , shortness of breath, sleep apnea,     ROS comment: BiPAP  Cardiovascular - normal exam  Exercise tolerance: good (4-7 METS)    ECG reviewed  PT is on anticoagulation therapy  Patient on routine beta blocker    (+) pacemaker pacemaker interrogated unknown, hypertension, past MI , CAD, dysrhythmias Atrial Fib, Tachycardia, hyperlipidemia,     ROS comment: NSTEMI 2015    ECG:   Vent. Rate :  65 BPM     Atrial Rate :  65 BPM     P-R Int : 282 ms          QRS Dur : 138 ms      QT Int : 450 ms       P-R-T Axes :   * -65 124 degrees     QTc Int : 468 ms     Atrial-paced rhythm with prolonged AV conduction  Left axis deviation  Nonspecific intraventricular block        Abnormal ECG  When compared with ECG of 04-SEP-2020 12:39,  Electronic atrial pacemaker has replaced Sinus rhythm  Questionable change in QRS duration  Questionable change in initial forces of Anterior leads  Confirmed by ROYAL DOBBINS ([id number]) on 10/7/2021 9:37:16 PM     Referred By: DONITA           Confirmed By: ROYAL DBOBINS    Specimen Collected: 10/07/21 09:56      9/2020 ECHO     · Estimated EF = 45%.  · Left ventricular systolic function is mildly decreased.  · Atrial fibrillation is noted throughout the study.  · Trace mitral valve regurgitation is present  · Mild tricuspid valve regurgitation is present.  · No thrombus is seen within the patient's left atrium or left atrial appendage.        Neuro/Psych  (+) seizures well controlled, TIA, CVA, numbness, psychiatric history Depression and Anxiety,       ROS Comment: CVA 2017/2018  GI/Hepatic/Renal/Endo    (+) obesity,  morbid obesity,  renal disease stones and CRI, diabetes mellitus type 2, thyroid problem hypothyroidism    Musculoskeletal (-) negative ROS    Abdominal  - normal exam   Substance History - negative use     OB/GYN negative ob/gyn ROS         Other        ROS/Med Hx Other: BMI: 36.9    Essential hypertension  Focal seizure (HCC)  CVA (cerebral vascular accident) (HCC)  Hypokalemia  Diabetes mellitus without complication (HCC)  BiPAP (biphasic positive airway pressure) dependence  Benign hypertension  Hyperlipidemia  Epilepsy (HCC)  Obesity  Dyspepsia  Coronary artery disease involving native coronary artery of native heart without angina pectoris  Abnormal stress test  TIA (transient ischemic attack)  Pneumonia of right lower lobe due to infectious organism  History of CVA (cerebrovascular accident)  ALEX (obstructive sleep apnea)  SOB (shortness of breath) on exertion  Suspected COVID-19 virus infection  Fever and chills  Dyspnea  Fever and chills  Localized edema  Right leg pain  Atrial fibrillation (HCC)  Memory loss  Blood glucose abnormal  Generalized anxiety disorder  History of Helicobacter pylori infection  Benign prostatic hyperplasia  CKD (chronic kidney disease)  Medication intolerance  Pseudoaneurysm of femoral artery following procedure (HCC)  Sustained ventricular tachycardia (HCC)  ABMD (anterior basement membrane dystrophy)  Bilateral myopia  Chronic otitis media  Eye twitch  Hyperaldosteronism (HCC)  Otitis externa of right ear  Regular astigmatism of right eye  Seizure-like activity (HCC)  Nephrolithiasis  [place]  6th nerve palsy, right eye  Memory loss                      Anesthesia Plan    ASA 3     general   (Risks and benefits of general anesthesia discussed with patient, including, aspiration, recall, dental damage, cardiac or respiratory compromise, stroke, fluctuations in blood pressure, seizure or death.     Pt advised that a endotracheal tube (ETT), laryngeal mask airway (LMA) or mask would be  utilized to maintain the airway. Pt verbalized understanding and agreed to plan.)  intravenous induction     Anesthetic plan, all risks, benefits, and alternatives have been provided, discussed and informed consent has been obtained with: patient.    Plan discussed with CRNA.

## 2021-10-20 NOTE — ANESTHESIA POSTPROCEDURE EVALUATION
Patient: Andre Agosto    Procedure Summary     Date: 10/20/21 Room / Location: [facility] /  [facility] OR    Anesthesia Start: 0955 Anesthesia Stop: 1123    Procedure: URETEROSCOPY LASER LITHOTRIPSY WITH STENT INSERTION (Left ) Diagnosis:       Nephrolithiasis      (Nephrolithiasis [N20.0])    Surgeons: Tonio De La Cruz MD Provider: Ulisses Bennett CRNA    Anesthesia Type: general ASA Status: 3          Anesthesia Type: general    Vitals  Vitals Value Taken Time   /84 10/20/21 1121   Temp     Pulse 64 10/20/21 1123   Resp     SpO2 97 % 10/20/21 1123   Vitals shown include unvalidated device data.        Post Anesthesia Care and Evaluation    Patient location during evaluation: PACU  Patient participation: complete - patient participated  Level of consciousness: awake and sleepy but conscious  Pain management: adequate  Airway patency: patent  Anesthetic complications: No anesthetic complications  PONV Status: none  Cardiovascular status: acceptable and hemodynamically stable  Respiratory status: acceptable, nonlabored ventilation, spontaneous ventilation, oral airway and face mask  Hydration status: acceptable

## 2021-10-21 ENCOUNTER — TELEPHONE (OUTPATIENT)
Dept: UROLOGY | Facility: CLINIC | Age: 66
End: 2021-10-21

## 2021-10-21 NOTE — TELEPHONE ENCOUNTER
Caller: kylee hall     Relationship: DAUGHTER OF PT    Best call back number: [phone]    What is your medical concern? PT IS LEOPOLDO HALL MRN [id number]. PT HAD A PROCEDURE YESTERDAY PERFORMED BY DR DUCKWORTH AND IS NOW HAVING TROUBLE CONTROLLING HIS BLADDER. HE WAS GIVEN SOME MEDICATIONS AND  HE HAS TAKEN ALL OF THOSE ON TIME.     Is your provider already aware of this issue? NO    Have you been treated for this issue? NO

## 2021-11-01 ENCOUNTER — LAB (OUTPATIENT)
Dept: LAB | Facility: HOSPITAL | Age: 66
End: 2021-11-01

## 2021-11-01 DIAGNOSIS — Z01.818 PRE-OP TESTING: Primary | ICD-10-CM

## 2021-11-01 DIAGNOSIS — N20.0 NEPHROLITHIASIS: ICD-10-CM

## 2021-11-01 LAB — SARS-COV-2 RNA NOSE QL NAA+PROBE: NOT DETECTED

## 2021-11-01 PROCEDURE — U0004 COV-19 TEST NON-CDC HGH THRU: HCPCS

## 2021-11-02 NOTE — PRE-PROCEDURE INSTRUCTIONS
PAT phone history completed with pt for upcoming procedure on 11/3/21, with Dr. De La Cruz.     PAT PASS GIVEN/REVIEWED WITH PT.  VERBALIZED UNDERSTANDING OF THE FOLLOWING:  DO NOT EAT, DRINK, SMOKE, USE SMOKELESS TOBACCO OR CHEW GUM AFTER MIDNIGHT THE NIGHT BEFORE SURGERY.  THIS ALSO INCLUDES HARD CANDIES AND MINTS.    DO NOT SHAVE THE AREA TO BE OPERATED ON AT LEAST 48 HOURS PRIOR TO THE PROCEDURE.  DO NOT WEAR MAKE UP OR NAIL POLISH.  DO NOT LEAVE IN ANY PIERCING OR WEAR JEWELRY THE DAY OF SURGERY.      DO NOT USE ADHESIVES IF YOU WEAR DENTURES.    DO NOT WEAR EYE CONTACTS; BRING IN YOUR GLASSES.    ONLY TAKE MEDICATION THE MORNING OF YOUR PROCEDURE IF INSTRUCTED BY YOUR SURGEON WITH ENOUGH WATER TO SWALLOW THE MEDICATION.  IF YOUR SURGEON DID NOT SPECIFY WHICH MEDICATIONS TO TAKE, YOU WILL NEED TO CALL THEIR OFFICE FOR FURTHER INSTRUCTIONS AND DO AS THEY INSTRUCT.    LEAVE ANYTHING YOU CONSIDER VALUABLE AT HOME.    YOU WILL NEED TO ARRANGE FOR SOMEONE TO DRIVE YOU HOME AFTER SURGERY.  IT IS RECOMMENDED THAT YOU DO NOT DRIVE, WORK, DRINK ALCOHOL OR MAKE MAJOR DECISIONS FOR AT LEAST 24 HOURS AFTER YOUR PROCEDURE IS COMPLETE.      THE DAY OF YOUR PROCEDURE, BRING IN THE FOLLOWING IF APPLICABLE:   PICTURE ID AND INSURANCE/MEDICARE OR MEDICAID CARDS/ANY CO-PAY THAT MAY BE DUE   COPY OF ADVANCED DIRECTIVE/LIVING WILL/POWER OR    CPAP/BIPAP/INHALERS   SKIN PREP SHEET   YOUR PREADMISSION TESTING PASS (IF NOT A PHONE HISTORY)           COVID self-quarantine instructions reviewed with the pt.  Verbalized understanding.

## 2021-11-03 ENCOUNTER — ANESTHESIA (OUTPATIENT)
Dept: PERIOP | Facility: HOSPITAL | Age: 66
End: 2021-11-03

## 2021-11-03 ENCOUNTER — HOSPITAL ENCOUNTER (OUTPATIENT)
Facility: HOSPITAL | Age: 66
Setting detail: HOSPITAL OUTPATIENT SURGERY
Discharge: HOME | End: 2021-11-03
Attending: UROLOGY | Admitting: UROLOGY

## 2021-11-03 ENCOUNTER — ANESTHESIA EVENT (OUTPATIENT)
Dept: PERIOP | Facility: HOSPITAL | Age: 66
End: 2021-11-03

## 2021-11-03 VITALS
WEIGHT: 297 LBS | HEIGHT: 76 IN | OXYGEN SATURATION: 96 % | SYSTOLIC BLOOD PRESSURE: 149 MMHG | DIASTOLIC BLOOD PRESSURE: 77 MMHG | TEMPERATURE: 98.5 F | BODY MASS INDEX: 36.17 KG/M2 | RESPIRATION RATE: 17 BRPM | HEART RATE: 67 BPM

## 2021-11-03 DIAGNOSIS — N20.0 NEPHROLITHIASIS: ICD-10-CM

## 2021-11-03 LAB — GLUCOSE BLDC GLUCOMTR-MCNC: 103 MG/DL (ref 70–130)

## 2021-11-03 PROCEDURE — 82962 GLUCOSE BLOOD TEST: CPT

## 2021-11-03 PROCEDURE — 25010000002 FENTANYL CITRATE (PF) 100 MCG/2ML SOLUTION: Performed by: NURSE ANESTHETIST, CERTIFIED REGISTERED

## 2021-11-03 PROCEDURE — C1894 INTRO/SHEATH, NON-LASER: HCPCS | Performed by: UROLOGY

## 2021-11-03 PROCEDURE — 52356 CYSTO/URETERO W/LITHOTRIPSY: CPT | Performed by: UROLOGY

## 2021-11-03 PROCEDURE — 25010000002 DEXAMETHASONE PER 1 MG: Performed by: NURSE ANESTHETIST, CERTIFIED REGISTERED

## 2021-11-03 PROCEDURE — 94799 UNLISTED PULMONARY SVC/PX: CPT

## 2021-11-03 PROCEDURE — 25010000002 ONDANSETRON PER 1 MG: Performed by: NURSE ANESTHETIST, CERTIFIED REGISTERED

## 2021-11-03 PROCEDURE — C1769 GUIDE WIRE: HCPCS | Performed by: UROLOGY

## 2021-11-03 PROCEDURE — C1758 CATHETER, URETERAL: HCPCS | Performed by: UROLOGY

## 2021-11-03 PROCEDURE — 25010000002 IOPAMIDOL 61 % SOLUTION: Performed by: UROLOGY

## 2021-11-03 PROCEDURE — 25010000002 PROPOFOL 200 MG/20ML EMULSION: Performed by: NURSE ANESTHETIST, CERTIFIED REGISTERED

## 2021-11-03 PROCEDURE — 0 CEFAZOLIN SODIUM-DEXTROSE 2-3 GM-%(50ML) RECONSTITUTED SOLUTION: Performed by: UROLOGY

## 2021-11-03 PROCEDURE — C2617 STENT, NON-COR, TEM W/O DEL: HCPCS | Performed by: UROLOGY

## 2021-11-03 DEVICE — URETERAL STENT
Type: IMPLANTABLE DEVICE | Site: URETER | Status: FUNCTIONAL
Brand: CONTOUR™

## 2021-11-03 RX ORDER — SODIUM CHLORIDE 9 MG/ML
100 INJECTION, SOLUTION INTRAVENOUS CONTINUOUS
Status: DISCONTINUED | OUTPATIENT
Start: 2021-11-03 | End: 2021-11-03 | Stop reason: HOSPADM

## 2021-11-03 RX ORDER — PROPOFOL 10 MG/ML
INJECTION, EMULSION INTRAVENOUS AS NEEDED
Status: DISCONTINUED | OUTPATIENT
Start: 2021-11-03 | End: 2021-11-03 | Stop reason: SURG

## 2021-11-03 RX ORDER — DEXAMETHASONE SODIUM PHOSPHATE 4 MG/ML
INJECTION, SOLUTION INTRA-ARTICULAR; INTRALESIONAL; INTRAMUSCULAR; INTRAVENOUS; SOFT TISSUE AS NEEDED
Status: DISCONTINUED | OUTPATIENT
Start: 2021-11-03 | End: 2021-11-03 | Stop reason: SURG

## 2021-11-03 RX ORDER — LIDOCAINE HYDROCHLORIDE 20 MG/ML
INJECTION, SOLUTION INTRAVENOUS AS NEEDED
Status: DISCONTINUED | OUTPATIENT
Start: 2021-11-03 | End: 2021-11-03 | Stop reason: SURG

## 2021-11-03 RX ORDER — PHENAZOPYRIDINE HYDROCHLORIDE 100 MG/1
100 TABLET, FILM COATED ORAL 3 TIMES DAILY PRN
Qty: 21 TABLET | Refills: 0 | Status: SHIPPED | OUTPATIENT
Start: 2021-11-03 | End: 2022-01-11

## 2021-11-03 RX ORDER — TAMSULOSIN HYDROCHLORIDE 0.4 MG/1
1 CAPSULE ORAL NIGHTLY
Qty: 10 CAPSULE | Refills: 0 | Status: SHIPPED | OUTPATIENT
Start: 2021-11-03 | End: 2022-04-29

## 2021-11-03 RX ORDER — SODIUM CHLORIDE 0.9 % (FLUSH) 0.9 %
10 SYRINGE (ML) INJECTION AS NEEDED
Status: DISCONTINUED | OUTPATIENT
Start: 2021-11-03 | End: 2021-11-03 | Stop reason: HOSPADM

## 2021-11-03 RX ORDER — MAGNESIUM HYDROXIDE 1200 MG/15ML
LIQUID ORAL AS NEEDED
Status: DISCONTINUED | OUTPATIENT
Start: 2021-11-03 | End: 2021-11-03 | Stop reason: HOSPADM

## 2021-11-03 RX ORDER — CEPHALEXIN 500 MG/1
500 CAPSULE ORAL DAILY
Qty: 3 CAPSULE | Refills: 0 | Status: SHIPPED | OUTPATIENT
Start: 2021-11-03 | End: 2021-11-06

## 2021-11-03 RX ORDER — OXYBUTYNIN CHLORIDE 10 MG/1
10 TABLET, EXTENDED RELEASE ORAL DAILY PRN
Qty: 10 TABLET | Refills: 0 | Status: SHIPPED | OUTPATIENT
Start: 2021-11-03 | End: 2022-01-11

## 2021-11-03 RX ORDER — ONDANSETRON 2 MG/ML
INJECTION INTRAMUSCULAR; INTRAVENOUS AS NEEDED
Status: DISCONTINUED | OUTPATIENT
Start: 2021-11-03 | End: 2021-11-03 | Stop reason: SURG

## 2021-11-03 RX ORDER — DOCUSATE SODIUM 100 MG/1
100 CAPSULE, LIQUID FILLED ORAL 2 TIMES DAILY
Qty: 15 CAPSULE | Refills: 1 | Status: SHIPPED | OUTPATIENT
Start: 2021-11-03 | End: 2022-04-29

## 2021-11-03 RX ORDER — FENTANYL CITRATE 50 UG/ML
INJECTION, SOLUTION INTRAMUSCULAR; INTRAVENOUS AS NEEDED
Status: DISCONTINUED | OUTPATIENT
Start: 2021-11-03 | End: 2021-11-03 | Stop reason: SURG

## 2021-11-03 RX ORDER — CEFAZOLIN SODIUM 2 G/50ML
2 SOLUTION INTRAVENOUS ONCE
Status: COMPLETED | OUTPATIENT
Start: 2021-11-03 | End: 2021-11-03

## 2021-11-03 RX ORDER — ATROPA BELLADONNA AND OPIUM 16.2; 3 MG/1; MG/1
SUPPOSITORY RECTAL AS NEEDED
Status: DISCONTINUED | OUTPATIENT
Start: 2021-11-03 | End: 2021-11-03 | Stop reason: HOSPADM

## 2021-11-03 RX ORDER — ACETAMINOPHEN 500 MG
1000 TABLET ORAL EVERY 6 HOURS
Qty: 30 TABLET | Refills: 0 | Status: SHIPPED | OUTPATIENT
Start: 2021-11-03 | End: 2021-11-07

## 2021-11-03 RX ADMIN — SODIUM CHLORIDE 100 ML/HR: 9 INJECTION, SOLUTION INTRAVENOUS at 11:50

## 2021-11-03 RX ADMIN — LIDOCAINE HYDROCHLORIDE 60 MG: 20 INJECTION, SOLUTION INTRAVENOUS at 14:04

## 2021-11-03 RX ADMIN — PROPOFOL 200 MG: 10 INJECTION, EMULSION INTRAVENOUS at 14:04

## 2021-11-03 RX ADMIN — FENTANYL CITRATE 100 MCG: 50 INJECTION INTRAMUSCULAR; INTRAVENOUS at 14:04

## 2021-11-03 RX ADMIN — CEFAZOLIN SODIUM 2 G: 2 SOLUTION INTRAVENOUS at 14:05

## 2021-11-03 RX ADMIN — ONDANSETRON 4 MG: 2 INJECTION INTRAMUSCULAR; INTRAVENOUS at 14:14

## 2021-11-03 RX ADMIN — DEXAMETHASONE SODIUM PHOSPHATE 4 MG: 4 INJECTION, SOLUTION INTRAMUSCULAR; INTRAVENOUS at 14:14

## 2021-11-03 NOTE — OP NOTE
Preoperative diagnosis  left kidney stones    Postoperative diagnosis  left kidney stones    Procedure performed  1.  Flexible cystoscopy, left retrograde pyelogram with ureteral stent exchange 6 Fr x 28 cm  2.  left ureteroscopy with holmium-YAG laser lithotripsy (93952)   3.  Fluoroscopy time < 1 hour with interpretation of images    Surgeon  Tonio De La Cruz MD    Anesthesia  General    Complications  None    Specimen  none    Findings  Urethroscopy revealed no strictures or other abnormalities.  Cystoscopy revealed no tumors, stones or other mucosal abnormalities.    left retrograde pyelogram revealed a delicate system with identification of the stone seen on pre-op imaging.  No other filling defects and caliber of left ureter was smooth and normal.    Ureteroscopy revealed multiple small left stone fragments.  These were all lasered into tiny pieces and dust    Indications  66 y.o. male presents for follow-up second look ureteroscopy after bleeding with first stage ureteroscopy.  Agreed to undergo the above named procedure after discussion of the alternatives, risks and benefits.  Informed consent was obtained.      Procedure  The patient was taken to the operating room and placed supine on the operating table.  Pre-operative antibiotics were administered.  Bilateral lower extremity SCDs were placed.  After induction of general anesthesia the patient was positioned in dorsal lithotomy, prepped and draped in a sterile fashion.  A time-out was performed.      A 14-[place]izen of [place] flexible cystoscope was passed carefully via urethra into the bladder.  The left ureteral orifice was identified and a Sensor wire was passed retrograde to the level of the kidney and confirmed by fluoroscopy.  The previous indwelling stent was grasped and removed.  The flexible scope was off-loaded and the bladder emptied with a straight catheter.  A dual lumen open-ended catheter was passed over the wire. A retrograde pyelogram was performed by  slowly injecting 5 mL of 50% Omnipaque contrast via the 5-Pashto catheter with findings described above.  An Amplatz super-stiff was placed to the level of the renal pelvis and confirmed by fluoroscopy. The dual lumen was removed. The Sensor wire was clipped to the drape as a safety wire.  A 12/14, 46-cm access sheath was advanced over the super-stiff wire to level of the UPJ under direct fluoroscopic guidance. The inner stylet and super-stiff wire were removed and the sheath was sutured in place with 2-0 silk.  The kidney was entered with the flexible ureteroscope.  The kidney stones were identified and fragmented with a 200-micron holmium YAG laser fiber at laser settings of 0.3 joules and a frequency of 50 Hz. At the completion of the procedure, all clinically significant fragments were removed and only dust-like fragments remained.  The access sheath was removed under direct vision.  Ureteral edema but no obvious obstruction was present. Contrast was injected into the renal pelvis v  A 6 Fr x 28 cm stent was positioned with the upper end in the kidney and the lower in the bladder confirmed by fluoroscopy. The bladder was emptied and the procedure was complete. The patient tolerated the procedure well and was stable throughout.    The patient will follow up with me next week  for stent removal.

## 2021-11-03 NOTE — ANESTHESIA PROCEDURE NOTES
Airway  Urgency: elective    Date/Time: 11/3/2021 2:02 PM  Airway not difficult    General Information and Staff    Patient location during procedure: OR  CRNA: Andre De Los Santos CRNA    Indications and Patient Condition  Indications for airway management: CNS depression    Preoxygenated: yes  Mask difficulty assessment: 1 - vent by mask    Final Airway Details  Final airway type: supraglottic airway      Successful airway: classic  Size 4    Number of attempts at approach: 1  Assessment: lips, teeth, and gum same as pre-op and atraumatic intubation

## 2021-11-03 NOTE — DISCHARGE INSTRUCTIONS
Home Care After Ureteroscopy and Laser Lithotripsy  The following instructions will help you care for yourself, or be cared for upon your return home today.    These are guidelines for your care right after surgery only.     Diet  Drink plenty of liquids and eat light meals today.    Start your regular diet tomorrow.    Activity  Start normal activities in twenty-four (24) hours.    Wound Care and Hygiene  No restrictions, start normal routine.    Anesthesia Precautions & Expectations  After anesthesia, rest for 24 hours.    Do not drive, drink alcoholic beverages or make any important decisions during this time.  General anesthesia may cause a sore throat, jaw discomfort or muscle aches.    These symptoms can last for one or two days.     What to Expect after Surgery  Mild pain with voiding.  Frequency or urgency.  Bladder cramps.  Minimal bleeding with voiding.    Call your Doctor  Passing clots in urine preventing bladder emptying  Severe pain not controlled by oral medication  Temperature above 101.5 degrees  Inability to urinate within eight (8) hours after surgery    After Stent Placement  It is common to have blood tinged urine for 3-5 days.  It is common to have pain in your side and in your back when you urinate for 3-5 days.  It is common to have urgency with urination.  This is a temporary stent and will need to be removed in the office in 1 week.  Do not take the Pyridium 24 hours prior to your stent removal.    Other Contacts  Urology Office:  [address] [id number]   [place] [address] [phone] office  [phone] fax    Other Instructions  Take tamsulosin daily until the stent comes out.  Take oxybutynin daily as needed for bladder spasm while the stent is in.  Take Pyridium up to 3 times daily as needed for burning with urination.   Take Tylenol scheduled every 6 hours for the first 3 days.  Take the oxycodone on top of that as needed.  Take all of the antibiotics.     Follow up  Appointment  1 week for stent removal. Please See After visit Summary. Call if you do not have an appt already scheduled.

## 2021-11-03 NOTE — ANESTHESIA PREPROCEDURE EVALUATION
Anesthesia Evaluation                  Airway   Mallampati: II  TM distance: >3 FB  Neck ROM: full  Possible difficult intubation  Dental      Pulmonary    (+) pneumonia resolved , shortness of breath, sleep apnea,   Cardiovascular     (+) hypertension, past MI , CAD, dysrhythmias Atrial Fib, hyperlipidemia,       Neuro/Psych  (+) seizures, TIA, CVA, numbness, psychiatric history,       ROS Comment: 6th nerve palsy, right  GI/Hepatic/Renal/Endo    (+) obesity,   renal disease stones and CRI, diabetes mellitus,     Musculoskeletal     Abdominal    Substance History      OB/GYN          Other   arthritis,      ROS/Med Hx Other: Essential hypertension  Focal seizure (HCC)  CVA (cerebral vascular accident) (HCC)  Hypokalemia  Diabetes mellitus without complication (HCC)  BiPAP (biphasic positive airway pressure) dependence  Benign hypertension  Hyperlipidemia  Epilepsy (HCC)  Obesity  Dyspepsia  Coronary artery disease involving native coronary artery of native heart without angina pectoris  Abnormal stress test  TIA (transient ischemic attack)  Pneumonia of right lower lobe due to infectious organism  History of CVA (cerebrovascular accident)  ALEX (obstructive sleep apnea)  SOB (shortness of breath) on exertion  Suspected COVID-19 virus infection  Fever and chills  Dyspnea  Fever and chills  Localized edema  Right leg pain  Atrial fibrillation (HCC)  Memory loss  Blood glucose abnormal  Generalized anxiety disorder  History of Helicobacter pylori infection  Benign prostatic hyperplasia  CKD (chronic kidney disease)  Medication intolerance  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    Plan discussed with CRNA.

## 2021-11-03 NOTE — ANESTHESIA POSTPROCEDURE EVALUATION
Patient: Andre Agosto    Procedure Summary     Date: 11/03/21 Room / Location: [facility] FLUORO /  [facility] OR    Anesthesia Start: 1402 Anesthesia Stop: 1454    Procedure: URETEROSCOPY LEFT, LEFT RETROGRADE PYELOGRAM, CYSTOSCOPY, LASER LITHOTRIPSY WITH STENT EXCHANGE (Left ) Diagnosis:       Nephrolithiasis      (Nephrolithiasis [N20.0])    Surgeons: Tonio De La Cruz MD Provider: Andre De Los Santos CRNA    Anesthesia Type: general ASA Status: 3          Anesthesia Type: general    Vitals  Vitals Value Taken Time   /84 11/03/21 1454   Temp     Pulse 65 11/03/21 1454   Resp     SpO2 98 % 11/03/21 1454   Vitals shown include unvalidated device data.        Post Anesthesia Care and Evaluation    Patient location during evaluation: PACU  Patient participation: complete - patient participated  Level of consciousness: awake and alert and sleepy but conscious  Pain score: 2  Pain management: adequate  Airway patency: patent  Anesthetic complications: No anesthetic complications  PONV Status: none  Cardiovascular status: acceptable  Respiratory status: acceptable and face mask  Hydration status: acceptable

## 2021-11-03 NOTE — H&P
HPI  Andre Agosto is a 66 y.o. with history of   1. Nephrolithiasis         No recent fevers or new LUTS  Does not take any blood thinners    Past Medical History  Past Medical History:   Diagnosis Date   • 6th nerve palsy, right     right eye    • Anxiety and depression    • Atrial fibrillation, persistent (HCC) 12/02/2020    on Xarelto   • Coronary artery disease involving native coronary artery of native heart without angina pectoris 09/12/2018    follows w/ Dr. Johnson, on ASA 81mg   • CRI (chronic renal insufficiency), stage 2 (mild)    • CVA (cerebral vascular accident) (HCC)    • Diabetes mellitus (HCC)     doesnt check sugar, type 2    • Disease of thyroid gland    • Double vision     resolved- right eye   • Elevated cholesterol    • Epilepsy (HCC)     right arm focal seizures   • Focal seizure (HCC)     of right arm    • Heart attack (HCC)     NSTEMI 2015, s/p stenting   • History of Helicobacter pylori infection     in 2008, treated w/ PrevPak - 2021 EGD bx (+), PCP RX - PPI/Augmentin/Doxy/Flagyl (x 14 days) 1/22/21   • HL (hearing loss)     (L) ear - child luevano measles   • Hyperlipidemia    • Hypertension    • Kidney stone    • Kidney stone    • Memory loss 2005    d/t meningitis   • Meningitis 2005    unsure of bacterial or viral    • Obesity    • Seizures (HCC)     right arm focal seizures   • Sleep apnea treated with nocturnal BiPAP     compliant with  machine    • Stroke (HCC)     2017/2018   • Ventricular tachycardia (HCC)     3 different times   • Wears glasses        Past Surgical History  Past Surgical History:   Procedure Laterality Date   • BARIATRIC SURGERY  06/01/2012    Lap nand   • CARDIAC CATHETERIZATION N/A 10/12/2018    Procedure: Left Heart Cath;  Surgeon: Yoselin Johnson MD;  Location: [facility] CATH INVASIVE LOCATION;  Service: Cardiology   • CARDIAC CATHETERIZATION  03/2021    stent   • CARDIAC CATHETERIZATION  07/2021    just checking the after pacemaker placed-   Eres   • CARDIAC DEFIBRILLATOR PLACEMENT     • COLONOSCOPY  10/2020    w/ Dr. Jacobs, hx colon polyps   • CORONARY STENT PLACEMENT     • LAPAROSCOPIC GASTRIC BANDING  2008    s/p LAGB Realize 2008 by HOMERO.   • PACEMAKER IMPLANTATION  2021   • UMBILICAL HERNIA REPAIR      incarcerated UHR w/ mesh by Dr. Velasquez   • URETEROSCOPY LASER LITHOTRIPSY WITH STENT INSERTION Left 10/20/2021    Procedure: URETEROSCOPY LASER LITHOTRIPSY WITH STENT INSERTION;  Surgeon: Tonio De La Cruz MD;  Location: [facility];  Service: Urology;  Laterality: Left;       Medications    Current Facility-Administered Medications:   •  ceFAZolin Sodium-Dextrose (ANCEF) IVPB (duplex) 2 g, 2 g, Intravenous, Once, Tonio De aL Cruz MD  •  sodium chloride 0.9 % flush 10 mL, 10 mL, Intravenous, PRN, Tonio De La Cruz MD  •  sodium chloride 0.9 % infusion, 100 mL/hr, Intravenous, Continuous, Tonio De La Cruz MD, Last Rate: 100 mL/hr at 21 1150, 100 mL/hr at 21 1150    Allergies  Allergies   Allergen Reactions   • Statins Myalgia and Other (See Comments)       Social History  Social History     Socioeconomic History   • Marital status:    Tobacco Use   • Smoking status: Former Smoker     Packs/day: 0.50     Years: 10.00     Pack years: 5.00     Types: Cigarettes     Start date: 1975     Quit date: 1985     Years since quittin.8   • Smokeless tobacco: Never Used   Vaping Use   • Vaping Use: Never used   Substance and Sexual Activity   • Alcohol use: No   • Drug use: No   • Sexual activity: Defer       Review of Systems  Constitutional: No fevers or chills  Skin: Negative for rash  Endocrine: No heat/cold intolerance   Cardiovascular: Negative for chest pain or dyspnea on exertion  Respiratory: Negative for shortness of breath or wheezing  Gastrointestinal: No constipation, nausea or vomiting  Genitourinary: Negative for new lower urinary tract symptoms, current gross hematuria or  "dysuria.  Musculoskeletal: No flank pain  Neurological:  Negative for frequent headaches or dizziness  Lymph/Heme: Negative for leg swelling or calf pain.    Physical Exam  Visit Vitals  /97 (BP Location: Left arm, Patient Position: Sitting)   Pulse 66   Temp 98 °F (36.7 °C) (Temporal)   Resp 21   Ht 193 cm (76\")   Wt 135 kg (297 lb)   SpO2 97%   BMI 36.15 kg/m²     Constitutional: NAD, WDWN.   HEENT: NCAT. Conjunctivae normal.  MMM.    Cardiovascular: Regular rate.  Pulmonary/Chest: Respirations are even and non-labored bilaterally.  Abdominal: Soft. No distension, tenderness, masses or guarding. No CVA tenderness.  Neurological: A + O x 3.  Cranial Nerves II-XII grossly intact. Normal gait.  Extremities: KRUNAL x 4, Warm. No clubbing.  No cyanosis.    Skin: Pink, warm and dry.  No rashes noted.  Psychiatric:  Normal mood and affect    Labs & Imaging  Lab Results   Component Value Date    GLUCOSE 174 (H) 10/13/2021    CALCIUM 9.4 10/13/2021     10/13/2021    K 3.6 10/13/2021    CO2 26.9 10/13/2021     10/13/2021    BUN 15 10/13/2021    CREATININE 1.31 (H) 10/13/2021    EGFRIFAFRI 57 (L) 12/05/2018    EGFRIFNONA 55 (L) 10/13/2021    BCR 11.5 10/13/2021    ANIONGAP 11.1 10/13/2021     Lab Results   Component Value Date    WBC 6.59 10/13/2021    HGB 16.0 10/13/2021    HCT 47.2 10/13/2021    MCV 88.1 10/13/2021     10/13/2021     Brief Urine Lab Results  (Last result in the past 365 days)      Color   Clarity   Blood   Leuk Est   Nitrite   Protein   CREAT   Urine HCG        09/20/21 1130 Yellow   Clear   3+   Negative   Negative   Trace               Urine Culture    Urine Culture 1/14/21 9/13/21   Urine Culture Final report No growth              No results found.        Assessment  Andre Agosto is a 66 y.o. male who presents with the following diagnosis:  1. Nephrolithiasis         Plan  1. To OR for LEFT URETEROSCOPY LASER LITHOTRIPSY WITH STENT EXCHANGE     Tonio De La Cruz MD        "

## 2021-11-08 ENCOUNTER — TELEPHONE (OUTPATIENT)
Dept: UROLOGY | Facility: CLINIC | Age: 66
End: 2021-11-08

## 2021-11-08 NOTE — TELEPHONE ENCOUNTER
Thank you.  I spoke with them and we will reschedule his stent removal to next week.  I sent a separate note to scheduling pool

## 2021-11-08 NOTE — TELEPHONE ENCOUNTER
Patient is in [facility] with UTI, urinary incontinence, high fever of 103, and his daughter feels these are complications from his surgery and wanted to let you know, they wanted to know if they need to do anything or if you have advice?  [phone]

## 2021-11-11 ENCOUNTER — READMISSION MANAGEMENT (OUTPATIENT)
Dept: CALL CENTER | Facility: HOSPITAL | Age: 66
End: 2021-11-11

## 2021-11-11 ENCOUNTER — TRANSCRIBE ORDERS (OUTPATIENT)
Dept: ADMINISTRATIVE | Facility: HOSPITAL | Age: 66
End: 2021-11-11

## 2021-11-11 DIAGNOSIS — A41.59 OTHER GRAM-NEGATIVE SEPSIS (HCC): Primary | ICD-10-CM

## 2021-11-12 ENCOUNTER — HOSPITAL ENCOUNTER (OUTPATIENT)
Dept: INFUSION THERAPY | Facility: HOSPITAL | Age: 66
Discharge: HOME | End: 2021-11-12
Admitting: INTERNAL MEDICINE

## 2021-11-12 ENCOUNTER — TRANSITIONAL CARE MANAGEMENT TELEPHONE ENCOUNTER (OUTPATIENT)
Dept: CALL CENTER | Facility: HOSPITAL | Age: 66
End: 2021-11-12

## 2021-11-12 VITALS
HEIGHT: 76 IN | HEART RATE: 65 BPM | OXYGEN SATURATION: 99 % | SYSTOLIC BLOOD PRESSURE: 147 MMHG | WEIGHT: 300 LBS | TEMPERATURE: 97.8 F | BODY MASS INDEX: 36.53 KG/M2 | RESPIRATION RATE: 18 BRPM | DIASTOLIC BLOOD PRESSURE: 96 MMHG

## 2021-11-12 DIAGNOSIS — A41.59 OTHER GRAM-NEGATIVE SEPSIS (HCC): ICD-10-CM

## 2021-11-12 PROCEDURE — C1751 CATH, INF, PER/CENT/MIDLINE: HCPCS

## 2021-11-12 PROCEDURE — C1894 INTRO/SHEATH, NON-LASER: HCPCS

## 2021-11-12 RX ORDER — SODIUM CHLORIDE 0.9 % (FLUSH) 0.9 %
10 SYRINGE (ML) INJECTION AS NEEDED
Status: DISCONTINUED | OUTPATIENT
Start: 2021-11-12 | End: 2021-11-14 | Stop reason: HOSPADM

## 2021-11-12 NOTE — OUTREACH NOTE
Prep Survey      Responses   [facility] patient discharged from? Non-BH   Is LACE score < 7 ? Non-BH Discharge   Emergency Room discharge w/ pulse ox? No   Eligibility TCM [facility]   Date of Admission 11/08/21   Date of Discharge 11/11/21   Discharge diagnosis Nephrolithiasis    Does the patient have one of the following disease processes/diagnoses(primary or secondary)? Other   Prep survey completed? Yes          Digna Wei RN

## 2021-11-12 NOTE — OUTREACH NOTE
Call Center TCM Note      Responses   [facility] patient discharged from? Non-   Does the patient have one of the following disease processes/diagnoses(primary or secondary)? Other   TCM attempt successful? Yes  [Wife]   Call start time 0923   Call end time 0926   Discharge diagnosis Nephrolithiasis    Meds reviewed with patient/caregiver? Yes   Is the patient having any side effects they believe may be caused by any medication additions or changes? No   Does the patient have all medications ordered at discharge? Yes   Is the patient taking all medications as directed (includes completed medication regime)? Yes   Does the patient have a primary care provider?  Yes   Does the patient have an appointment with their PCP within 7 days of discharge? Greater than 7 days   Comments regarding PCP 11/24/21 at 11:00am   What is preventing the patient from scheduling follow up appointments within 7 days of discharge? Earlier appointment not available   Nursing Interventions Verified appointment date/time/provider   Has the patient kept scheduled appointments due by today? N/A   Has home health visited the patient within 72 hours of discharge? N/A   Psychosocial issues? No   Did the patient receive a copy of their discharge instructions? Yes   Nursing interventions Reviewed instructions with patient   What is the patient's perception of their health status since discharge? Improving   Is the patient/caregiver able to teach back signs and symptoms related to disease process for when to call PCP? Yes   Is the patient/caregiver able to teach back signs and symptoms related to disease process for when to call 911? Yes   Is the patient/caregiver able to teach back the hierarchy of who to call/visit for symptoms/problems? PCP, Specialist, Home health nurse, Urgent Care, ED, 911 Yes   If the patient is a current smoker, are they able to teach back resources for cessation? Not a smoker   TCM call completed? Yes           Janeth Madrid RN    11/12/2021, 09:29 EST

## 2021-11-15 ENCOUNTER — TRANSCRIBE ORDERS (OUTPATIENT)
Dept: LAB | Facility: HOSPITAL | Age: 66
End: 2021-11-15

## 2021-11-15 ENCOUNTER — LAB (OUTPATIENT)
Dept: LAB | Facility: HOSPITAL | Age: 66
End: 2021-11-15

## 2021-11-15 DIAGNOSIS — I25.10 ATHEROSCLEROSIS OF NATIVE CORONARY ARTERY, UNSPECIFIED WHETHER ANGINA PRESENT, UNSPECIFIED WHETHER NATIVE OR TRANSPLANTED HEART: ICD-10-CM

## 2021-11-15 DIAGNOSIS — N39.0 URINARY TRACT INFECTION WITHOUT HEMATURIA, SITE UNSPECIFIED: ICD-10-CM

## 2021-11-15 DIAGNOSIS — B96.89 BACTERIAL CHOLANGITIS: Primary | ICD-10-CM

## 2021-11-15 DIAGNOSIS — K83.09 BACTERIAL CHOLANGITIS: ICD-10-CM

## 2021-11-15 DIAGNOSIS — B96.89 BACTERIAL CHOLANGITIS: ICD-10-CM

## 2021-11-15 DIAGNOSIS — K83.09 BACTERIAL CHOLANGITIS: Primary | ICD-10-CM

## 2021-11-15 DIAGNOSIS — A41.59 HEMORRHAGIC SEPTICEMIA DUE TO PASTEURELLA MULTOCIDA (HCC): ICD-10-CM

## 2021-11-15 DIAGNOSIS — A28.0 HEMORRHAGIC SEPTICEMIA DUE TO PASTEURELLA MULTOCIDA (HCC): ICD-10-CM

## 2021-11-15 LAB
ALBUMIN SERPL-MCNC: 4 G/DL (ref 3.5–5.2)
ALBUMIN/GLOB SERPL: 0.9 G/DL
ALP SERPL-CCNC: 70 U/L (ref 39–117)
ALT SERPL W P-5'-P-CCNC: 16 U/L (ref 1–41)
ANION GAP SERPL CALCULATED.3IONS-SCNC: 13 MMOL/L (ref 5–15)
AST SERPL-CCNC: 12 U/L (ref 1–40)
BASOPHILS # BLD AUTO: 0.1 10*3/MM3 (ref 0–0.2)
BASOPHILS NFR BLD AUTO: 1 % (ref 0–1.5)
BILIRUB SERPL-MCNC: 0.4 MG/DL (ref 0–1.2)
BUN SERPL-MCNC: 13 MG/DL (ref 8–23)
BUN/CREAT SERPL: 9.8 (ref 7–25)
CALCIUM SPEC-SCNC: 9.4 MG/DL (ref 8.6–10.5)
CHLORIDE SERPL-SCNC: 103 MMOL/L (ref 98–107)
CO2 SERPL-SCNC: 26 MMOL/L (ref 22–29)
CREAT SERPL-MCNC: 1.33 MG/DL (ref 0.76–1.27)
CRP SERPL-MCNC: 2.86 MG/DL (ref 0–0.5)
DEPRECATED RDW RBC AUTO: 46.7 FL (ref 37–54)
EOSINOPHIL # BLD AUTO: 0.31 10*3/MM3 (ref 0–0.4)
EOSINOPHIL NFR BLD AUTO: 3.1 % (ref 0.3–6.2)
ERYTHROCYTE [DISTWIDTH] IN BLOOD BY AUTOMATED COUNT: 14.3 % (ref 12.3–15.4)
ERYTHROCYTE [SEDIMENTATION RATE] IN BLOOD: 35 MM/HR (ref 0–20)
GFR SERPL CREATININE-BSD FRML MDRD: 54 ML/MIN/1.73
GLOBULIN UR ELPH-MCNC: 4.4 GM/DL
GLUCOSE SERPL-MCNC: 86 MG/DL (ref 65–99)
HCT VFR BLD AUTO: 51.6 % (ref 37.5–51)
HGB BLD-MCNC: 16.4 G/DL (ref 13–17.7)
IMM GRANULOCYTES # BLD AUTO: 0.13 10*3/MM3 (ref 0–0.05)
IMM GRANULOCYTES NFR BLD AUTO: 1.3 % (ref 0–0.5)
LYMPHOCYTES # BLD AUTO: 3.04 10*3/MM3 (ref 0.7–3.1)
LYMPHOCYTES NFR BLD AUTO: 30.1 % (ref 19.6–45.3)
MCH RBC QN AUTO: 28.7 PG (ref 26.6–33)
MCHC RBC AUTO-ENTMCNC: 31.8 G/DL (ref 31.5–35.7)
MCV RBC AUTO: 90.2 FL (ref 79–97)
MONOCYTES # BLD AUTO: 0.73 10*3/MM3 (ref 0.1–0.9)
MONOCYTES NFR BLD AUTO: 7.2 % (ref 5–12)
NEUTROPHILS NFR BLD AUTO: 5.79 10*3/MM3 (ref 1.7–7)
NEUTROPHILS NFR BLD AUTO: 57.3 % (ref 42.7–76)
NRBC BLD AUTO-RTO: 0 /100 WBC (ref 0–0.2)
PLATELET # BLD AUTO: 260 10*3/MM3 (ref 140–450)
PMV BLD AUTO: 10.5 FL (ref 6–12)
POTASSIUM SERPL-SCNC: 3.8 MMOL/L (ref 3.5–5.2)
PROT SERPL-MCNC: 8.4 G/DL (ref 6–8.5)
RBC # BLD AUTO: 5.72 10*6/MM3 (ref 4.14–5.8)
SODIUM SERPL-SCNC: 142 MMOL/L (ref 136–145)
WBC # BLD AUTO: 10.1 10*3/MM3 (ref 3.4–10.8)

## 2021-11-15 PROCEDURE — 85025 COMPLETE CBC W/AUTO DIFF WBC: CPT

## 2021-11-15 PROCEDURE — 86140 C-REACTIVE PROTEIN: CPT

## 2021-11-15 PROCEDURE — 85652 RBC SED RATE AUTOMATED: CPT

## 2021-11-15 PROCEDURE — 36415 COLL VENOUS BLD VENIPUNCTURE: CPT

## 2021-11-15 PROCEDURE — 80053 COMPREHEN METABOLIC PANEL: CPT

## 2021-11-16 ENCOUNTER — HOSPITAL ENCOUNTER (OUTPATIENT)
Dept: INFUSION THERAPY | Facility: HOSPITAL | Age: 66
Setting detail: INFUSION SERIES
Discharge: HOME | End: 2021-11-16

## 2021-11-16 VITALS
OXYGEN SATURATION: 98 % | DIASTOLIC BLOOD PRESSURE: 85 MMHG | SYSTOLIC BLOOD PRESSURE: 137 MMHG | RESPIRATION RATE: 18 BRPM | HEART RATE: 82 BPM | TEMPERATURE: 98.6 F

## 2021-11-16 DIAGNOSIS — N39.0 URINARY TRACT INFECTION WITHOUT HEMATURIA, SITE UNSPECIFIED: Primary | ICD-10-CM

## 2021-11-16 PROCEDURE — 96365 THER/PROPH/DIAG IV INF INIT: CPT

## 2021-11-16 PROCEDURE — 25010000002 ERTAPENEM PER 500 MG: Performed by: INTERNAL MEDICINE

## 2021-11-16 RX ORDER — SODIUM CHLORIDE 9 MG/ML
250 INJECTION, SOLUTION INTRAVENOUS ONCE
Status: CANCELLED | OUTPATIENT
Start: 2021-11-17

## 2021-11-16 RX ORDER — SODIUM CHLORIDE 9 MG/ML
250 INJECTION, SOLUTION INTRAVENOUS ONCE
Status: DISCONTINUED | OUTPATIENT
Start: 2021-11-16 | End: 2021-11-18 | Stop reason: HOSPADM

## 2021-11-16 RX ADMIN — ERTAPENEM SODIUM 1 G: 1 INJECTION, POWDER, LYOPHILIZED, FOR SOLUTION INTRAMUSCULAR; INTRAVENOUS at 09:27

## 2021-11-17 ENCOUNTER — HOSPITAL ENCOUNTER (OUTPATIENT)
Dept: INFUSION THERAPY | Facility: HOSPITAL | Age: 66
Setting detail: INFUSION SERIES
Discharge: HOME | End: 2021-11-17

## 2021-11-17 VITALS
RESPIRATION RATE: 18 BRPM | TEMPERATURE: 98.4 F | OXYGEN SATURATION: 96 % | HEART RATE: 78 BPM | DIASTOLIC BLOOD PRESSURE: 82 MMHG | SYSTOLIC BLOOD PRESSURE: 139 MMHG

## 2021-11-17 DIAGNOSIS — N39.0 URINARY TRACT INFECTION WITHOUT HEMATURIA, SITE UNSPECIFIED: Primary | ICD-10-CM

## 2021-11-17 PROCEDURE — 25010000002 ERTAPENEM PER 500 MG: Performed by: INTERNAL MEDICINE

## 2021-11-17 PROCEDURE — 96365 THER/PROPH/DIAG IV INF INIT: CPT

## 2021-11-17 RX ORDER — SODIUM CHLORIDE 9 MG/ML
250 INJECTION, SOLUTION INTRAVENOUS ONCE
Status: DISCONTINUED | OUTPATIENT
Start: 2021-11-17 | End: 2021-11-19 | Stop reason: HOSPADM

## 2021-11-17 RX ORDER — SODIUM CHLORIDE 9 MG/ML
250 INJECTION, SOLUTION INTRAVENOUS ONCE
Status: CANCELLED | OUTPATIENT
Start: 2021-11-18

## 2021-11-17 RX ADMIN — ERTAPENEM SODIUM 1 G: 1 INJECTION, POWDER, LYOPHILIZED, FOR SOLUTION INTRAMUSCULAR; INTRAVENOUS at 09:09

## 2021-11-18 ENCOUNTER — HOSPITAL ENCOUNTER (OUTPATIENT)
Dept: INFUSION THERAPY | Facility: HOSPITAL | Age: 66
Setting detail: INFUSION SERIES
Discharge: HOME | End: 2021-11-18

## 2021-11-18 ENCOUNTER — OFFICE VISIT (OUTPATIENT)
Dept: UROLOGY | Facility: CLINIC | Age: 66
End: 2021-11-18

## 2021-11-18 VITALS
DIASTOLIC BLOOD PRESSURE: 87 MMHG | TEMPERATURE: 98.2 F | SYSTOLIC BLOOD PRESSURE: 158 MMHG | HEART RATE: 65 BPM | OXYGEN SATURATION: 98 %

## 2021-11-18 DIAGNOSIS — N39.0 URINARY TRACT INFECTION WITHOUT HEMATURIA, SITE UNSPECIFIED: Primary | ICD-10-CM

## 2021-11-18 DIAGNOSIS — N20.0 NEPHROLITHIASIS: Primary | ICD-10-CM

## 2021-11-18 PROCEDURE — 96365 THER/PROPH/DIAG IV INF INIT: CPT

## 2021-11-18 PROCEDURE — 52310 CYSTOSCOPY AND TREATMENT: CPT | Performed by: UROLOGY

## 2021-11-18 PROCEDURE — 25010000002 ERTAPENEM PER 500 MG: Performed by: INTERNAL MEDICINE

## 2021-11-18 RX ORDER — SODIUM CHLORIDE 9 MG/ML
250 INJECTION, SOLUTION INTRAVENOUS ONCE
Status: CANCELLED | OUTPATIENT
Start: 2021-11-19

## 2021-11-18 RX ORDER — SODIUM CHLORIDE 9 MG/ML
250 INJECTION, SOLUTION INTRAVENOUS ONCE
Status: DISCONTINUED | OUTPATIENT
Start: 2021-11-18 | End: 2021-11-20 | Stop reason: HOSPADM

## 2021-11-18 RX ADMIN — ERTAPENEM SODIUM 1 G: 1 INJECTION, POWDER, LYOPHILIZED, FOR SOLUTION INTRAMUSCULAR; INTRAVENOUS at 08:57

## 2021-11-18 NOTE — PROGRESS NOTES
Preoperative diagnosis  Foreign body in genitourinary tract    Postoperative diagnosis  Foreign body in genitourinary tract    Procedure  Flexible cystourethroscopy with stent removal    Attending surgeon  Tonio De LaC ruz MD    Anesthesia  2% lidocaine jelly intraurethrally    Complications  None    Indications  66 y.o. male who is status post ureteroscopy with laser lithotripsy who presents for stent removal.    Procedure  Detailed information of all possible complications and side effects were discussed with the patient.  Informed consent was obtained. Patient was given one dose of antibiotics. The patient was placed in supine position and a timeout was performed. The patient was prepped and draped in sterile fashion.  Next, 2% lidocaine jelly was bluntly injected per urethra without difficulty. The 14 [place] flexible cystoscope was passed through the urethra and into the bladder.  The stent was visualized, grasped and removed in its entirety.  The patient tolerated the procedure well.    Plan  1. Provided education regarding water intake of at least 2 liters per day  2. F/u in 8 weeks with a renal ultrasound.  We will observe his right-sided stones for now and discuss treatment of them in the future.  He has had somewhat of a rough postoperative course.

## 2021-11-19 ENCOUNTER — HOSPITAL ENCOUNTER (OUTPATIENT)
Dept: INFUSION THERAPY | Facility: HOSPITAL | Age: 66
Setting detail: INFUSION SERIES
Discharge: HOME | End: 2021-11-19

## 2021-11-19 VITALS
SYSTOLIC BLOOD PRESSURE: 151 MMHG | RESPIRATION RATE: 16 BRPM | TEMPERATURE: 97.5 F | HEART RATE: 65 BPM | OXYGEN SATURATION: 99 % | DIASTOLIC BLOOD PRESSURE: 79 MMHG

## 2021-11-19 DIAGNOSIS — I10 ESSENTIAL HYPERTENSION: Primary | ICD-10-CM

## 2021-11-19 DIAGNOSIS — E11.9 TYPE 2 DIABETES MELLITUS WITHOUT COMPLICATION, WITHOUT LONG-TERM CURRENT USE OF INSULIN (HCC): ICD-10-CM

## 2021-11-19 DIAGNOSIS — N39.0 URINARY TRACT INFECTION WITHOUT HEMATURIA, SITE UNSPECIFIED: Primary | ICD-10-CM

## 2021-11-19 PROCEDURE — 25010000002 ERTAPENEM PER 500 MG: Performed by: INTERNAL MEDICINE

## 2021-11-19 PROCEDURE — 96365 THER/PROPH/DIAG IV INF INIT: CPT

## 2021-11-19 RX ORDER — SODIUM CHLORIDE 9 MG/ML
250 INJECTION, SOLUTION INTRAVENOUS ONCE
Status: DISCONTINUED | OUTPATIENT
Start: 2021-11-19 | End: 2021-11-21 | Stop reason: HOSPADM

## 2021-11-19 RX ORDER — SODIUM CHLORIDE 9 MG/ML
250 INJECTION, SOLUTION INTRAVENOUS ONCE
Status: CANCELLED | OUTPATIENT
Start: 2021-11-20

## 2021-11-19 RX ADMIN — ERTAPENEM SODIUM 1 G: 1 INJECTION, POWDER, LYOPHILIZED, FOR SOLUTION INTRAMUSCULAR; INTRAVENOUS at 09:33

## 2021-11-20 ENCOUNTER — HOSPITAL ENCOUNTER (OUTPATIENT)
Dept: INFUSION THERAPY | Facility: HOSPITAL | Age: 66
Setting detail: INFUSION SERIES
Discharge: HOME | End: 2021-11-20

## 2021-11-20 DIAGNOSIS — N39.0 URINARY TRACT INFECTION WITHOUT HEMATURIA, SITE UNSPECIFIED: Primary | ICD-10-CM

## 2021-11-20 PROCEDURE — 96365 THER/PROPH/DIAG IV INF INIT: CPT

## 2021-11-20 PROCEDURE — 25010000002 ERTAPENEM PER 500 MG: Performed by: INTERNAL MEDICINE

## 2021-11-20 RX ORDER — SODIUM CHLORIDE 9 MG/ML
250 INJECTION, SOLUTION INTRAVENOUS ONCE
Status: CANCELLED | OUTPATIENT
Start: 2021-11-21

## 2021-11-20 RX ORDER — SODIUM CHLORIDE 9 MG/ML
250 INJECTION, SOLUTION INTRAVENOUS ONCE
Status: DISCONTINUED | OUTPATIENT
Start: 2021-11-20 | End: 2021-11-22 | Stop reason: HOSPADM

## 2021-11-20 RX ADMIN — ERTAPENEM SODIUM 1 G: 1 INJECTION, POWDER, LYOPHILIZED, FOR SOLUTION INTRAMUSCULAR; INTRAVENOUS at 10:09

## 2021-11-21 ENCOUNTER — HOSPITAL ENCOUNTER (OUTPATIENT)
Dept: INFUSION THERAPY | Facility: HOSPITAL | Age: 66
Setting detail: INFUSION SERIES
Discharge: HOME | End: 2021-11-21

## 2021-11-21 VITALS
TEMPERATURE: 98.5 F | DIASTOLIC BLOOD PRESSURE: 89 MMHG | OXYGEN SATURATION: 98 % | SYSTOLIC BLOOD PRESSURE: 148 MMHG | HEART RATE: 65 BPM

## 2021-11-21 DIAGNOSIS — N39.0 URINARY TRACT INFECTION WITHOUT HEMATURIA, SITE UNSPECIFIED: Primary | ICD-10-CM

## 2021-11-21 PROCEDURE — 96365 THER/PROPH/DIAG IV INF INIT: CPT

## 2021-11-21 PROCEDURE — 25010000002 ERTAPENEM PER 500 MG: Performed by: INTERNAL MEDICINE

## 2021-11-21 RX ORDER — SODIUM CHLORIDE 9 MG/ML
250 INJECTION, SOLUTION INTRAVENOUS ONCE
Status: DISCONTINUED | OUTPATIENT
Start: 2021-11-21 | End: 2021-11-23 | Stop reason: HOSPADM

## 2021-11-21 RX ORDER — SODIUM CHLORIDE 9 MG/ML
250 INJECTION, SOLUTION INTRAVENOUS ONCE
Status: CANCELLED | OUTPATIENT
Start: 2021-11-22

## 2021-11-21 RX ADMIN — ERTAPENEM SODIUM 1 G: 1 INJECTION, POWDER, LYOPHILIZED, FOR SOLUTION INTRAMUSCULAR; INTRAVENOUS at 10:13

## 2021-11-22 ENCOUNTER — HOSPITAL ENCOUNTER (OUTPATIENT)
Dept: INFUSION THERAPY | Facility: HOSPITAL | Age: 66
Setting detail: INFUSION SERIES
Discharge: HOME | End: 2021-11-22

## 2021-11-22 VITALS
TEMPERATURE: 97.8 F | DIASTOLIC BLOOD PRESSURE: 91 MMHG | RESPIRATION RATE: 18 BRPM | HEART RATE: 65 BPM | OXYGEN SATURATION: 97 % | SYSTOLIC BLOOD PRESSURE: 169 MMHG

## 2021-11-22 DIAGNOSIS — N39.0 URINARY TRACT INFECTION WITHOUT HEMATURIA, SITE UNSPECIFIED: Primary | ICD-10-CM

## 2021-11-22 LAB
ALBUMIN SERPL-MCNC: 3.9 G/DL (ref 3.5–5.2)
ALBUMIN/GLOB SERPL: 1.1 G/DL
ALP SERPL-CCNC: 63 U/L (ref 39–117)
ALT SERPL W P-5'-P-CCNC: 14 U/L (ref 1–41)
ANION GAP SERPL CALCULATED.3IONS-SCNC: 14 MMOL/L (ref 5–15)
AST SERPL-CCNC: 15 U/L (ref 1–40)
BASOPHILS # BLD AUTO: 0.08 10*3/MM3 (ref 0–0.2)
BASOPHILS NFR BLD AUTO: 0.8 % (ref 0–1.5)
BILIRUB SERPL-MCNC: 0.4 MG/DL (ref 0–1.2)
BUN SERPL-MCNC: 14 MG/DL (ref 8–23)
BUN/CREAT SERPL: 9.5 (ref 7–25)
CALCIUM SPEC-SCNC: 8.8 MG/DL (ref 8.6–10.5)
CHLORIDE SERPL-SCNC: 102 MMOL/L (ref 98–107)
CO2 SERPL-SCNC: 22 MMOL/L (ref 22–29)
CREAT SERPL-MCNC: 1.48 MG/DL (ref 0.76–1.27)
CRP SERPL-MCNC: 1.16 MG/DL (ref 0–0.5)
DEPRECATED RDW RBC AUTO: 46.5 FL (ref 37–54)
EOSINOPHIL # BLD AUTO: 0.36 10*3/MM3 (ref 0–0.4)
EOSINOPHIL NFR BLD AUTO: 3.8 % (ref 0.3–6.2)
ERYTHROCYTE [DISTWIDTH] IN BLOOD BY AUTOMATED COUNT: 14.5 % (ref 12.3–15.4)
ERYTHROCYTE [SEDIMENTATION RATE] IN BLOOD: 5 MM/HR (ref 0–15)
GFR SERPL CREATININE-BSD FRML MDRD: 48 ML/MIN/1.73
GLOBULIN UR ELPH-MCNC: 3.7 GM/DL
GLUCOSE SERPL-MCNC: 162 MG/DL (ref 65–99)
HBA1C MFR BLD: 6 % (ref 4.8–5.6)
HCT VFR BLD AUTO: 47.2 % (ref 37.5–51)
HGB BLD-MCNC: 15.5 G/DL (ref 13–17.7)
IMM GRANULOCYTES # BLD AUTO: 0.03 10*3/MM3 (ref 0–0.05)
IMM GRANULOCYTES NFR BLD AUTO: 0.3 % (ref 0–0.5)
LYMPHOCYTES # BLD AUTO: 2.46 10*3/MM3 (ref 0.7–3.1)
LYMPHOCYTES NFR BLD AUTO: 25.8 % (ref 19.6–45.3)
MCH RBC QN AUTO: 29.2 PG (ref 26.6–33)
MCHC RBC AUTO-ENTMCNC: 32.8 G/DL (ref 31.5–35.7)
MCV RBC AUTO: 88.9 FL (ref 79–97)
MONOCYTES # BLD AUTO: 0.45 10*3/MM3 (ref 0.1–0.9)
MONOCYTES NFR BLD AUTO: 4.7 % (ref 5–12)
NEUTROPHILS NFR BLD AUTO: 6.16 10*3/MM3 (ref 1.7–7)
NEUTROPHILS NFR BLD AUTO: 64.6 % (ref 42.7–76)
NRBC BLD AUTO-RTO: 0 /100 WBC (ref 0–0.2)
PLATELET # BLD AUTO: 233 10*3/MM3 (ref 140–450)
PMV BLD AUTO: 10.5 FL (ref 6–12)
POTASSIUM SERPL-SCNC: 3.8 MMOL/L (ref 3.5–5.2)
PROT SERPL-MCNC: 7.6 G/DL (ref 6–8.5)
RBC # BLD AUTO: 5.31 10*6/MM3 (ref 4.14–5.8)
SODIUM SERPL-SCNC: 138 MMOL/L (ref 136–145)
T4 FREE SERPL-MCNC: 1.37 NG/DL (ref 0.93–1.7)
TSH SERPL DL<=0.05 MIU/L-ACNC: 1.84 UIU/ML (ref 0.27–4.2)
VIT B12 BLD-MCNC: 600 PG/ML (ref 211–946)
WBC NRBC COR # BLD: 9.54 10*3/MM3 (ref 3.4–10.8)

## 2021-11-22 PROCEDURE — 85651 RBC SED RATE NONAUTOMATED: CPT | Performed by: INTERNAL MEDICINE

## 2021-11-22 PROCEDURE — 86140 C-REACTIVE PROTEIN: CPT | Performed by: INTERNAL MEDICINE

## 2021-11-22 PROCEDURE — 83036 HEMOGLOBIN GLYCOSYLATED A1C: CPT | Performed by: INTERNAL MEDICINE

## 2021-11-22 PROCEDURE — 80053 COMPREHEN METABOLIC PANEL: CPT | Performed by: INTERNAL MEDICINE

## 2021-11-22 PROCEDURE — 84207 ASSAY OF VITAMIN B-6: CPT | Performed by: INTERNAL MEDICINE

## 2021-11-22 PROCEDURE — 25010000002 ERTAPENEM PER 500 MG: Performed by: INTERNAL MEDICINE

## 2021-11-22 PROCEDURE — 84439 ASSAY OF FREE THYROXINE: CPT | Performed by: INTERNAL MEDICINE

## 2021-11-22 PROCEDURE — 96365 THER/PROPH/DIAG IV INF INIT: CPT

## 2021-11-22 PROCEDURE — 82607 VITAMIN B-12: CPT | Performed by: INTERNAL MEDICINE

## 2021-11-22 PROCEDURE — 84443 ASSAY THYROID STIM HORMONE: CPT | Performed by: INTERNAL MEDICINE

## 2021-11-22 PROCEDURE — 85025 COMPLETE CBC W/AUTO DIFF WBC: CPT | Performed by: INTERNAL MEDICINE

## 2021-11-22 PROCEDURE — 36415 COLL VENOUS BLD VENIPUNCTURE: CPT

## 2021-11-22 PROCEDURE — 36415 COLL VENOUS BLD VENIPUNCTURE: CPT | Performed by: INTERNAL MEDICINE

## 2021-11-22 RX ORDER — SODIUM CHLORIDE 9 MG/ML
250 INJECTION, SOLUTION INTRAVENOUS ONCE
Status: CANCELLED | OUTPATIENT
Start: 2021-11-23

## 2021-11-22 RX ORDER — SODIUM CHLORIDE 9 MG/ML
250 INJECTION, SOLUTION INTRAVENOUS ONCE
Status: DISCONTINUED | OUTPATIENT
Start: 2021-11-22 | End: 2021-11-24 | Stop reason: HOSPADM

## 2021-11-22 RX ADMIN — ERTAPENEM SODIUM 1 G: 1 INJECTION, POWDER, LYOPHILIZED, FOR SOLUTION INTRAMUSCULAR; INTRAVENOUS at 10:08

## 2021-11-23 ENCOUNTER — HOSPITAL ENCOUNTER (OUTPATIENT)
Dept: INFUSION THERAPY | Facility: HOSPITAL | Age: 66
Setting detail: INFUSION SERIES
Discharge: HOME | End: 2021-11-23

## 2021-11-23 VITALS
TEMPERATURE: 97.3 F | OXYGEN SATURATION: 99 % | SYSTOLIC BLOOD PRESSURE: 150 MMHG | DIASTOLIC BLOOD PRESSURE: 86 MMHG | HEART RATE: 65 BPM | RESPIRATION RATE: 18 BRPM

## 2021-11-23 DIAGNOSIS — N39.0 URINARY TRACT INFECTION WITHOUT HEMATURIA, SITE UNSPECIFIED: Primary | ICD-10-CM

## 2021-11-23 PROCEDURE — 96365 THER/PROPH/DIAG IV INF INIT: CPT

## 2021-11-23 PROCEDURE — 25010000002 ERTAPENEM PER 500 MG: Performed by: INTERNAL MEDICINE

## 2021-11-23 RX ORDER — SODIUM CHLORIDE 9 MG/ML
250 INJECTION, SOLUTION INTRAVENOUS ONCE
OUTPATIENT
Start: 2021-11-24

## 2021-11-23 RX ORDER — SODIUM CHLORIDE 9 MG/ML
250 INJECTION, SOLUTION INTRAVENOUS ONCE
Status: DISCONTINUED | OUTPATIENT
Start: 2021-11-23 | End: 2021-11-25 | Stop reason: HOSPADM

## 2021-11-23 RX ADMIN — ERTAPENEM SODIUM 1 G: 1 INJECTION, POWDER, LYOPHILIZED, FOR SOLUTION INTRAMUSCULAR; INTRAVENOUS at 09:28

## 2021-11-30 ENCOUNTER — OFFICE VISIT (OUTPATIENT)
Dept: INTERNAL MEDICINE | Facility: CLINIC | Age: 66
End: 2021-11-30

## 2021-11-30 ENCOUNTER — LAB (OUTPATIENT)
Dept: LAB | Facility: HOSPITAL | Age: 66
End: 2021-11-30

## 2021-11-30 ENCOUNTER — OFFICE VISIT (OUTPATIENT)
Dept: NEUROLOGY | Facility: CLINIC | Age: 66
End: 2021-11-30

## 2021-11-30 VITALS
SYSTOLIC BLOOD PRESSURE: 150 MMHG | HEART RATE: 59 BPM | BODY MASS INDEX: 61.84 KG/M2 | HEIGHT: 60 IN | DIASTOLIC BLOOD PRESSURE: 90 MMHG | WEIGHT: 315 LBS | OXYGEN SATURATION: 96 % | TEMPERATURE: 97.1 F

## 2021-11-30 VITALS
BODY MASS INDEX: 37.99 KG/M2 | OXYGEN SATURATION: 98 % | TEMPERATURE: 96.8 F | HEART RATE: 80 BPM | WEIGHT: 312 LBS | SYSTOLIC BLOOD PRESSURE: 142 MMHG | HEIGHT: 76 IN | DIASTOLIC BLOOD PRESSURE: 86 MMHG | RESPIRATION RATE: 16 BRPM

## 2021-11-30 DIAGNOSIS — N28.9 ACUTE RENAL INSUFFICIENCY: Primary | ICD-10-CM

## 2021-11-30 DIAGNOSIS — G40.109 FOCAL MOTOR SEIZURE DISORDER (HCC): ICD-10-CM

## 2021-11-30 DIAGNOSIS — G47.33 OBSTRUCTIVE SLEEP APNEA: Primary | ICD-10-CM

## 2021-11-30 DIAGNOSIS — I10 PRIMARY HYPERTENSION: ICD-10-CM

## 2021-11-30 DIAGNOSIS — N20.0 RENAL STONES: ICD-10-CM

## 2021-11-30 LAB
BASOPHILS # BLD AUTO: 0.05 10*3/MM3 (ref 0–0.2)
BASOPHILS NFR BLD AUTO: 0.6 % (ref 0–1.5)
DEPRECATED RDW RBC AUTO: 46.2 FL (ref 37–54)
EOSINOPHIL # BLD AUTO: 0.21 10*3/MM3 (ref 0–0.4)
EOSINOPHIL NFR BLD AUTO: 2.5 % (ref 0.3–6.2)
ERYTHROCYTE [DISTWIDTH] IN BLOOD BY AUTOMATED COUNT: 14.4 % (ref 12.3–15.4)
ERYTHROCYTE [SEDIMENTATION RATE] IN BLOOD: 20 MM/HR (ref 0–20)
HBA1C MFR BLD: 6.23 % (ref 4.8–5.6)
HCT VFR BLD AUTO: 47.8 % (ref 37.5–51)
HGB BLD-MCNC: 16 G/DL (ref 13–17.7)
IMM GRANULOCYTES # BLD AUTO: 0.02 10*3/MM3 (ref 0–0.05)
IMM GRANULOCYTES NFR BLD AUTO: 0.2 % (ref 0–0.5)
LYMPHOCYTES # BLD AUTO: 2.1 10*3/MM3 (ref 0.7–3.1)
LYMPHOCYTES NFR BLD AUTO: 24.8 % (ref 19.6–45.3)
MCH RBC QN AUTO: 29.6 PG (ref 26.6–33)
MCHC RBC AUTO-ENTMCNC: 33.5 G/DL (ref 31.5–35.7)
MCV RBC AUTO: 88.4 FL (ref 79–97)
MONOCYTES # BLD AUTO: 0.51 10*3/MM3 (ref 0.1–0.9)
MONOCYTES NFR BLD AUTO: 6 % (ref 5–12)
NEUTROPHILS NFR BLD AUTO: 5.57 10*3/MM3 (ref 1.7–7)
NEUTROPHILS NFR BLD AUTO: 65.9 % (ref 42.7–76)
NRBC BLD AUTO-RTO: 0 /100 WBC (ref 0–0.2)
PLATELET # BLD AUTO: 204 10*3/MM3 (ref 140–450)
PMV BLD AUTO: 11.2 FL (ref 6–12)
RBC # BLD AUTO: 5.41 10*6/MM3 (ref 4.14–5.8)
WBC NRBC COR # BLD: 8.46 10*3/MM3 (ref 3.4–10.8)

## 2021-11-30 PROCEDURE — 99214 OFFICE O/P EST MOD 30 MIN: CPT | Performed by: NURSE PRACTITIONER

## 2021-11-30 PROCEDURE — 36415 COLL VENOUS BLD VENIPUNCTURE: CPT | Performed by: INTERNAL MEDICINE

## 2021-11-30 PROCEDURE — 83036 HEMOGLOBIN GLYCOSYLATED A1C: CPT | Performed by: INTERNAL MEDICINE

## 2021-11-30 PROCEDURE — 82607 VITAMIN B-12: CPT | Performed by: INTERNAL MEDICINE

## 2021-11-30 PROCEDURE — 85652 RBC SED RATE AUTOMATED: CPT | Performed by: INTERNAL MEDICINE

## 2021-11-30 PROCEDURE — 80053 COMPREHEN METABOLIC PANEL: CPT | Performed by: INTERNAL MEDICINE

## 2021-11-30 PROCEDURE — 84207 ASSAY OF VITAMIN B-6: CPT | Performed by: INTERNAL MEDICINE

## 2021-11-30 PROCEDURE — 99214 OFFICE O/P EST MOD 30 MIN: CPT | Performed by: INTERNAL MEDICINE

## 2021-11-30 PROCEDURE — 84443 ASSAY THYROID STIM HORMONE: CPT | Performed by: INTERNAL MEDICINE

## 2021-11-30 PROCEDURE — 80061 LIPID PANEL: CPT | Performed by: INTERNAL MEDICINE

## 2021-11-30 PROCEDURE — 85025 COMPLETE CBC W/AUTO DIFF WBC: CPT | Performed by: INTERNAL MEDICINE

## 2021-11-30 PROCEDURE — 86140 C-REACTIVE PROTEIN: CPT | Performed by: INTERNAL MEDICINE

## 2021-11-30 RX ORDER — HYDROCHLOROTHIAZIDE 12.5 MG/1
12.5 TABLET ORAL DAILY
Qty: 90 TABLET | Refills: 3 | Status: SHIPPED | OUTPATIENT
Start: 2021-11-30 | End: 2022-01-31

## 2021-11-30 NOTE — PROGRESS NOTES
"     Follow Up Office Visit      Patient Name: Andre Agosto  : 1955   MRN: [id number]     Chief Complaint:    Chief Complaint   Patient presents with   • Follow-up     Patient in office to follow up on alex and focal motor seizures       History of Present Illness: Andre Aogsto is a 66 y.o. male who is here today to follow up with ALEX and was last seen on 2021.  Currently on Bipap 15/10cm.  A current compliance report has been requested, from [facility], for review.  He says he is using his machine every night for at least 4 hours.  He is tolerating his pressures well.  He is using a full face mask and [facility].  Additional risk factors- BMI 38, CAD, seizure disorder, HTN, history of CVA, HL, hypothyroidism, atrial fibrillation, pacemaker/defibrillator in place.   *An order for a new Bipap machine was sent to [facility], after previous visit, but he never received a new machine.      Seizures- He says he was diagnosed with, \"Right arm focal seizures\" in  (saw a neurologist at  initially; saw neurology at [facility]; saw neurology at [place]; Most recently seen by Dr. Rony Clifford, neurologist).  He has one of these seizures about once per month, and states, \"It doesn't really affect me\".  He does not lose consciousness with the episodes.  Taking Depakote DR 250mg 3 tablets in the AM and 2 tablets in the PM-he reports good compliance and toleration.      Following taken from previous visit note:  Andre gAosto is a 66 y.o. male who is here today to follow up with ALEX.  He was diagnosed with ALEX at least 10 years ago.  Currently on Bipap 15/10cm, AHI 2.3/hour, compliance 100%.  He is tolerating his pressures well.  He says he can't sleep without his machine.  He is using a full face mask.  He asks for an order for a new Bipap machine-his is over 5 years old.  Additional risk factors- BMI 37, CAD, seizure disorder, HTN, history of CVA, dyslipidemia, hypothyroidism, atrial " fibrillation, pacemaker/defibrillator in place.       Pertinent Medical History:   Current compliance report reviewed and has been scanned into the patient's medical record.    Subjective      Review of Systems:   Review of Systems   Constitutional: Negative for chills, fatigue and fever.   HENT: Negative for facial swelling, hearing loss, sore throat, tinnitus and trouble swallowing.    Eyes: Negative for blurred vision, double vision, photophobia and visual disturbance.   Respiratory: Positive for apnea. Negative for cough, chest tightness and shortness of breath.    Cardiovascular: Negative for chest pain, palpitations and leg swelling.   Gastrointestinal: Negative for abdominal pain, nausea and vomiting.   Endocrine: Negative for cold intolerance and heat intolerance.   Musculoskeletal: Negative for gait problem, neck pain and neck stiffness.   Skin: Negative for color change and rash.   Allergic/Immunologic: Negative for environmental allergies and food allergies.   Neurological: Positive for seizures. Negative for dizziness, syncope, speech difficulty, weakness, light-headedness, numbness, headache and memory problem.   Psychiatric/Behavioral: Negative for behavioral problems, sleep disturbance and depressed mood. The patient is not nervous/anxious.        I have reviewed and the following portions of the patient's history were updated as appropriate: past family history, past medical history, past social history, past surgical history and problem list.    Medications:     Current Outpatient Medications:   •  amiodarone (PACERONE) 200 MG tablet, Take 200 mg by mouth Daily., Disp: , Rfl:   •  amLODIPine (NORVASC) 10 MG tablet, Take 10 mg by mouth Daily., Disp: , Rfl:   •  B Complex-C (B-complex with vitamin C) tablet, Take 1 tablet by mouth Daily., Disp: 90 tablet, Rfl: 3  •  carvedilol (Coreg) 25 MG tablet, Take 1 tablet by mouth 2 (Two) Times a Day With Meals., Disp: 180 tablet, Rfl: 3  •  clopidogrel  (PLAVIX) 75 MG tablet, Take 75 mg by mouth Daily. Hold 5 days prior to surgery and take baby asa   LAST DOSE oCTOBER 15, Disp: , Rfl:   •  divalproex (DEPAKOTE) 250 MG DR tablet, Take 2 tablets in the AM, 1 tablet at noon, and 2 tablets in the pm. Per DR Clifford. (Patient taking differently: Take 250 mg by mouth 2 (Two) Times a Day. Take 3 tablets in the AM  and 2 tablets in the pm. Per DR Clifford.), Disp: 450 tablet, Rfl: 3  •  docusate sodium (Colace) 100 MG capsule, Take 1 capsule by mouth 2 (Two) Times a Day if taking oxycodone, Disp: 15 capsule, Rfl: 1  •  Entresto 49-51 MG tablet, Take 1 tablet by mouth 2 (Two) Times a Day., Disp: 60 tablet, Rfl: 11  •  Evolocumab (Repatha SureClick) solution auto-injector SureClick injection, Inject 1 mL under the skin into the appropriate area as directed Every 14 (Fourteen) Days., Disp: 6 pen, Rfl: 3  •  Farxiga 10 MG tablet, Take 10 mg by mouth Daily., Disp: , Rfl:   •  folic acid (FOLVITE) 1 MG tablet, Take one daily except on the day of taking Methotrexate. (Patient taking differently: 1 mg. Take one daily except on the day of taking Methotrexate.), Disp: 30 tablet, Rfl: 11  •  furosemide (LASIX) 40 MG tablet, Take 1 tablet by mouth Daily. (Patient taking differently: Take 40 mg by mouth Daily As Needed.), Disp: 90 tablet, Rfl: 3  •  glucose monitor monitoring kit, 1 each As Needed (bs)., Disp: 1 each, Rfl: 1  •  hydroCHLOROthiazide (HYDRODIURIL) 12.5 MG tablet, Take 1 tablet by mouth Daily., Disp: 90 tablet, Rfl: 3  •  levothyroxine (SYNTHROID, LEVOTHROID) 88 MCG tablet, Take 1 tablet by mouth Daily., Disp: 90 tablet, Rfl: 3  •  lovastatin (MEVACOR) 40 MG tablet, Take 1 tablet by mouth Daily With Dinner. Pt can tolerat lovastatin., Disp: 90 tablet, Rfl: 3  •  NIFEdipine CC (ADALAT CC) 90 MG 24 hr tablet, Take 90 mg by mouth Daily., Disp: , Rfl:   •  oxybutynin XL (Ditropan XL) 10 MG 24 hr tablet, Take 1 tablet by mouth Daily As Needed for bladder spasm, Disp: 10 tablet, Rfl:  "0  •  phenazopyridine (PYRIDIUM) 100 MG tablet, Take 1 tablet by mouth 3 (Three) Times a Day As Needed urinary burning, Disp: 21 tablet, Rfl: 0  •  Semaglutide, 1 MG/DOSE, (OZEMPIC) 2 MG/1.5ML solution pen-injector, Inject 1 mg under the skin into the appropriate area as directed 1 (One) Time Per Week., Disp: 3 pen, Rfl: 3  •  tamsulosin (FLOMAX) 0.4 MG capsule 24 hr capsule, Take 1 capsule by mouth Every Night., Disp: 10 capsule, Rfl: 0  •  Xarelto 15 MG tablet, Take 15 mg by mouth Daily With Dinner., Disp: , Rfl:     Allergies:   Allergies   Allergen Reactions   • Statins Myalgia and Other (See Comments)       Objective     Physical Exam:  Vital Signs:   Vitals:    11/30/21 1307   BP: 150/90   BP Location: Left arm   Patient Position: Sitting   Cuff Size: Adult   Pulse: 59   Temp: 97.1 °F (36.2 °C)   SpO2: 96%   Weight: (!) 144 kg (316 lb 9.6 oz)   Height: 76 cm (29.92\")   PainSc: 0-No pain     Body mass index is 248.63 kg/m².    Physical Exam  Vitals and nursing note reviewed.   Constitutional:       General: He is not in acute distress.     Appearance: He is well-developed. He is obese. He is not diaphoretic.   HENT:      Head: Normocephalic and atraumatic.   Eyes:      Conjunctiva/sclera: Conjunctivae normal.      Pupils: Pupils are equal, round, and reactive to light.   Pulmonary:      Effort: Pulmonary effort is normal. No respiratory distress.   Skin:     General: Skin is warm and dry.      Findings: No rash.   Neurological:      Mental Status: He is alert and oriented to person, place, and time.      Coordination: Finger-Nose-Finger Test normal.      Gait: Gait is intact.   Psychiatric:         Mood and Affect: Mood normal.         Behavior: Behavior normal.         Thought Content: Thought content normal.         Judgment: Judgment normal.         Neurologic Exam     Mental Status   Oriented to person, place, and time.     Cranial Nerves   Cranial nerves II through XII intact.     CN III, IV, VI   Pupils " are equal, round, and reactive to light.  Right pupil: Size: 2 mm.   Left pupil: Size: 2 mm.     Motor Exam   Muscle bulk: normal    Strength   Right biceps: 5/5  Left biceps: 5/5  Right triceps: 5/5  Left triceps: 5/5    Sensory Exam   Light touch normal.     Gait, Coordination, and Reflexes     Gait  Gait: normal    Coordination   Finger to nose coordination: normal    Tremor   Resting tremor: absent  Intention tremor: absent       Assessment / Plan      Assessment/Plan:   Diagnoses and all orders for this visit:    1. Obstructive sleep apnea (Primary)    2. Focal motor seizure disorder (HCC)  -     Valproic Acid Level, Total; Future    3. BMI 38.0-38.9,adult    *Order for new Bipap machine and supplies sent to [facility].   *Continue Depakote DR as prescribed.     Follow Up:   Return in about 3 months (around 2/28/2022) for F/U Obstructive Sleep Apnea.    YESY Maradiaga, FNP-C  [facility]       Please note that portions of this note may have been completed with a voice recognition program. Efforts were made to edit the dictations, but occasionally words are mistranscribed.

## 2021-11-30 NOTE — PROGRESS NOTES
Subjective     Patient ID: Andre Agosto is a 66 y.o. male. Patient is here for management of multiple medical problems.     Chief Complaint   Patient presents with   • Urinary Tract Infection     History of Present Illness     UTI and has ID involved now.     Left side stone are out.   Right side stone still in.      Pt with a lot of bleeding with stone removal.            The following portions of the patient's history were reviewed and updated as appropriate: allergies, current medications, past family history, past medical history, past social history, past surgical history and problem list.    Review of Systems   HENT: Negative for congestion and dental problem.    Psychiatric/Behavioral: Negative for self-injury and sleep disturbance. The patient is not nervous/anxious.    All other systems reviewed and are negative.      Current Outpatient Medications:   •  amiodarone (PACERONE) 200 MG tablet, Take 200 mg by mouth Daily., Disp: , Rfl:   •  amLODIPine (NORVASC) 10 MG tablet, Take 10 mg by mouth Daily., Disp: , Rfl:   •  B Complex-C (B-complex with vitamin C) tablet, Take 1 tablet by mouth Daily., Disp: 90 tablet, Rfl: 3  •  carvedilol (Coreg) 25 MG tablet, Take 1 tablet by mouth 2 (Two) Times a Day With Meals., Disp: 180 tablet, Rfl: 3  •  clopidogrel (PLAVIX) 75 MG tablet, Take 75 mg by mouth Daily. Hold 5 days prior to surgery and take baby asa   LAST DOSE oCTOBER 15, Disp: , Rfl:   •  divalproex (DEPAKOTE) 250 MG DR tablet, Take 2 tablets in the AM, 1 tablet at noon, and 2 tablets in the pm. Per DR Clifford. (Patient taking differently: Take 250 mg by mouth 2 (Two) Times a Day. Take 3 tablets in the AM  and 2 tablets in the pm. Per DR Clifford.), Disp: 450 tablet, Rfl: 3  •  docusate sodium (Colace) 100 MG capsule, Take 1 capsule by mouth 2 (Two) Times a Day if taking oxycodone, Disp: 15 capsule, Rfl: 1  •  Entresto 49-51 MG tablet, Take 1 tablet by mouth 2 (Two) Times a Day., Disp: 60 tablet, Rfl: 11  •   "Evolocumab (Repatha SureClick) solution auto-injector SureClick injection, Inject 1 mL under the skin into the appropriate area as directed Every 14 (Fourteen) Days., Disp: 6 pen, Rfl: 3  •  Farxiga 10 MG tablet, Take 10 mg by mouth Daily., Disp: , Rfl:   •  folic acid (FOLVITE) 1 MG tablet, Take one daily except on the day of taking Methotrexate. (Patient taking differently: 1 mg. Take one daily except on the day of taking Methotrexate.), Disp: 30 tablet, Rfl: 11  •  furosemide (LASIX) 40 MG tablet, Take 1 tablet by mouth Daily. (Patient taking differently: Take 40 mg by mouth Daily As Needed.), Disp: 90 tablet, Rfl: 3  •  glucose monitor monitoring kit, 1 each As Needed (bs)., Disp: 1 each, Rfl: 1  •  levothyroxine (SYNTHROID, LEVOTHROID) 88 MCG tablet, Take 1 tablet by mouth Daily., Disp: 90 tablet, Rfl: 3  •  lovastatin (MEVACOR) 40 MG tablet, Take 1 tablet by mouth Daily With Dinner. Pt can tolerat lovastatin., Disp: 90 tablet, Rfl: 3  •  NIFEdipine CC (ADALAT CC) 90 MG 24 hr tablet, Take 90 mg by mouth Daily., Disp: , Rfl:   •  oxybutynin XL (Ditropan XL) 10 MG 24 hr tablet, Take 1 tablet by mouth Daily As Needed for bladder spasm, Disp: 10 tablet, Rfl: 0  •  phenazopyridine (PYRIDIUM) 100 MG tablet, Take 1 tablet by mouth 3 (Three) Times a Day As Needed urinary burning, Disp: 21 tablet, Rfl: 0  •  Semaglutide, 1 MG/DOSE, (OZEMPIC) 2 MG/1.5ML solution pen-injector, Inject 1 mg under the skin into the appropriate area as directed 1 (One) Time Per Week., Disp: 3 pen, Rfl: 3  •  tamsulosin (FLOMAX) 0.4 MG capsule 24 hr capsule, Take 1 capsule by mouth Every Night., Disp: 10 capsule, Rfl: 0  •  Xarelto 15 MG tablet, Take 15 mg by mouth Daily With Dinner., Disp: , Rfl:   •  hydroCHLOROthiazide (HYDRODIURIL) 12.5 MG tablet, Take 1 tablet by mouth Daily., Disp: 90 tablet, Rfl: 3    Objective      Blood pressure 142/86, pulse 80, temperature 96.8 °F (36 °C), resp. rate 16, height 193 cm (76\"), weight (!) 142 kg (312 " lb), SpO2 98 %.    Physical Exam     General Appearance:    Alert, cooperative, no distress, appears stated age   Head:    Normocephalic, without obvious abnormality, atraumatic   Eyes:    PERRL, conjunctiva/corneas clear, EOM's intact   Ears:    Normal TM's and external ear canals, both ears   Nose:   Nares normal, septum midline, mucosa normal, no drainage   or sinus tenderness   Throat:   Lips, mucosa, and tongue normal; teeth and gums normal   Neck:   Supple, symmetrical, trachea midline, no adenopathy;        thyroid:  No enlargement/tenderness/nodules; no carotid    bruit or JVD   Back:     Symmetric, no curvature, ROM normal, no CVA tenderness   Lungs:     Clear to auscultation bilaterally, respirations unlabored   Chest wall:    No tenderness or deformity   Heart:    Regular rate and rhythm, S1 and S2 normal, no murmur,        rub or gallop   Abdomen:     Soft, non-tender, bowel sounds active all four quadrants,     no masses, no organomegaly   Extremities:   Extremities normal, atraumatic, no cyanosis or edema   Pulses:   2+ and symmetric all extremities   Skin:   Skin color, texture, turgor normal, no rashes or lesions   Lymph nodes:   Cervical, supraclavicular, and axillary nodes normal   Neurologic:   CNII-XII intact. Normal strength, sensation and reflexes       throughout      Results for orders placed or performed in visit on 11/19/21   Vitamin B12    Specimen: Blood   Result Value Ref Range    Vitamin B-12 600 211 - 946 pg/mL   Comprehensive Metabolic Panel    Specimen: Blood   Result Value Ref Range    Glucose 162 (H) 65 - 99 mg/dL    BUN 14 8 - 23 mg/dL    Creatinine 1.48 (H) 0.76 - 1.27 mg/dL    Sodium 138 136 - 145 mmol/L    Potassium 3.8 3.5 - 5.2 mmol/L    Chloride 102 98 - 107 mmol/L    CO2 22.0 22.0 - 29.0 mmol/L    Calcium 8.8 8.6 - 10.5 mg/dL    Total Protein 7.6 6.0 - 8.5 g/dL    Albumin 3.90 3.50 - 5.20 g/dL    ALT (SGPT) 14 1 - 41 U/L    AST (SGOT) 15 1 - 40 U/L    Alkaline Phosphatase  63 39 - 117 U/L    Total Bilirubin 0.4 0.0 - 1.2 mg/dL    eGFR Non African Amer 48 (L) >60 mL/min/1.73    Globulin 3.7 gm/dL    A/G Ratio 1.1 g/dL    BUN/Creatinine Ratio 9.5 7.0 - 25.0    Anion Gap 14.0 5.0 - 15.0 mmol/L   TSH    Specimen: Blood   Result Value Ref Range    TSH 1.840 0.270 - 4.200 uIU/mL   T4, Free    Specimen: Blood   Result Value Ref Range    Free T4 1.37 0.93 - 1.70 ng/dL   Sedimentation Rate    Specimen: Blood   Result Value Ref Range    Sed Rate 5 0 - 15 mm/hr   C-reactive Protein    Specimen: Blood   Result Value Ref Range    C-Reactive Protein 1.16 (H) 0.00 - 0.50 mg/dL   Hemoglobin A1c    Specimen: Blood   Result Value Ref Range    Hemoglobin A1C 6.00 (H) 4.80 - 5.60 %   CBC Auto Differential    Specimen: Blood   Result Value Ref Range    WBC 9.54 3.40 - 10.80 10*3/mm3    RBC 5.31 4.14 - 5.80 10*6/mm3    Hemoglobin 15.5 13.0 - 17.7 g/dL    Hematocrit 47.2 37.5 - 51.0 %    MCV 88.9 79.0 - 97.0 fL    MCH 29.2 26.6 - 33.0 pg    MCHC 32.8 31.5 - 35.7 g/dL    RDW 14.5 12.3 - 15.4 %    RDW-SD 46.5 37.0 - 54.0 fl    MPV 10.5 6.0 - 12.0 fL    Platelets 233 140 - 450 10*3/mm3    Neutrophil % 64.6 42.7 - 76.0 %    Lymphocyte % 25.8 19.6 - 45.3 %    Monocyte % 4.7 (L) 5.0 - 12.0 %    Eosinophil % 3.8 0.3 - 6.2 %    Basophil % 0.8 0.0 - 1.5 %    Immature Grans % 0.3 0.0 - 0.5 %    Neutrophils, Absolute 6.16 1.70 - 7.00 10*3/mm3    Lymphocytes, Absolute 2.46 0.70 - 3.10 10*3/mm3    Monocytes, Absolute 0.45 0.10 - 0.90 10*3/mm3    Eosinophils, Absolute 0.36 0.00 - 0.40 10*3/mm3    Basophils, Absolute 0.08 0.00 - 0.20 10*3/mm3    Immature Grans, Absolute 0.03 0.00 - 0.05 10*3/mm3    nRBC 0.0 0.0 - 0.2 /100 WBC         Assessment/Plan   perif edema. Acute on chronic renal failure. Hold lasix as tolerated.  Start hctz.  Should help with prevent the stones and keep the sodium low.  Increase water only for fluid.        Diagnoses and all orders for this visit:    1. Acute renal insufficiency (Primary)  -      hydroCHLOROthiazide (HYDRODIURIL) 12.5 MG tablet; Take 1 tablet by mouth Daily.  Dispense: 90 tablet; Refill: 3  -     CBC & Differential  -     Basic metabolic panel    2. Renal stones  -     hydroCHLOROthiazide (HYDRODIURIL) 12.5 MG tablet; Take 1 tablet by mouth Daily.  Dispense: 90 tablet; Refill: 3  -     CBC & Differential  -     Basic metabolic panel    3. Primary hypertension  -     hydroCHLOROthiazide (HYDRODIURIL) 12.5 MG tablet; Take 1 tablet by mouth Daily.  Dispense: 90 tablet; Refill: 3  -     CBC & Differential  -     Basic metabolic panel      Return in about 4 weeks (around 12/28/2021).          There are no Patient Instructions on file for this visit.     Keven Bear MD    Assessment/Plan

## 2021-12-01 LAB
ALBUMIN SERPL-MCNC: 4.1 G/DL (ref 3.5–5.2)
ALBUMIN/GLOB SERPL: 1.3 G/DL
ALP SERPL-CCNC: 57 U/L (ref 39–117)
ALT SERPL W P-5'-P-CCNC: 19 U/L (ref 1–41)
ANION GAP SERPL CALCULATED.3IONS-SCNC: 13.5 MMOL/L (ref 5–15)
AST SERPL-CCNC: 15 U/L (ref 1–40)
BILIRUB SERPL-MCNC: 0.7 MG/DL (ref 0–1.2)
BUN SERPL-MCNC: 11 MG/DL (ref 8–23)
BUN/CREAT SERPL: 8.9 (ref 7–25)
CALCIUM SPEC-SCNC: 9.2 MG/DL (ref 8.6–10.5)
CHLORIDE SERPL-SCNC: 99 MMOL/L (ref 98–107)
CHOLEST SERPL-MCNC: 188 MG/DL (ref 0–200)
CO2 SERPL-SCNC: 24.5 MMOL/L (ref 22–29)
CREAT SERPL-MCNC: 1.23 MG/DL (ref 0.76–1.27)
CRP SERPL-MCNC: 1.18 MG/DL (ref 0–0.5)
GFR SERPL CREATININE-BSD FRML MDRD: 59 ML/MIN/1.73
GLOBULIN UR ELPH-MCNC: 3.1 GM/DL
GLUCOSE SERPL-MCNC: 264 MG/DL (ref 65–99)
HDLC SERPL-MCNC: 45 MG/DL (ref 40–60)
LDLC SERPL CALC-MCNC: 105 MG/DL (ref 0–100)
LDLC/HDLC SERPL: 2.2 {RATIO}
POTASSIUM SERPL-SCNC: 3.5 MMOL/L (ref 3.5–5.2)
PROT SERPL-MCNC: 7.2 G/DL (ref 6–8.5)
SODIUM SERPL-SCNC: 137 MMOL/L (ref 136–145)
TRIGL SERPL-MCNC: 220 MG/DL (ref 0–150)
TSH SERPL DL<=0.05 MIU/L-ACNC: 1.5 UIU/ML (ref 0.27–4.2)
VIT B12 BLD-MCNC: 514 PG/ML (ref 211–946)
VLDLC SERPL-MCNC: 38 MG/DL (ref 5–40)

## 2021-12-03 LAB — VIT B6 SERPL-MCNC: NORMAL NG/ML

## 2021-12-06 LAB — VIT B6 SERPL-MCNC: 7.9 UG/L (ref 5.3–46.7)

## 2021-12-16 ENCOUNTER — TREATMENT (OUTPATIENT)
Dept: CARDIAC REHAB | Facility: HOSPITAL | Age: 66
End: 2021-12-16

## 2021-12-16 DIAGNOSIS — I21.3 ST ELEVATION MYOCARDIAL INFARCTION (STEMI), UNSPECIFIED ARTERY (HCC): ICD-10-CM

## 2021-12-16 DIAGNOSIS — Z95.5 S/P PRIMARY ANGIOPLASTY WITH CORONARY STENT: Primary | ICD-10-CM

## 2021-12-16 PROCEDURE — 93797 PHYS/QHP OP CAR RHAB WO ECG: CPT

## 2021-12-16 PROCEDURE — 93798 PHYS/QHP OP CAR RHAB W/ECG: CPT

## 2021-12-16 NOTE — PROGRESS NOTES
Pt was seen today in CR for a Phase 2 visit.  Vital signs and session notes recorded in [employer] and will be scanned into Epic by HIM.

## 2021-12-20 ENCOUNTER — TREATMENT (OUTPATIENT)
Dept: CARDIAC REHAB | Facility: HOSPITAL | Age: 66
End: 2021-12-20

## 2021-12-20 DIAGNOSIS — Z95.5 S/P PRIMARY ANGIOPLASTY WITH CORONARY STENT: Primary | ICD-10-CM

## 2021-12-20 DIAGNOSIS — I21.3 ST ELEVATION MYOCARDIAL INFARCTION (STEMI), UNSPECIFIED ARTERY (HCC): ICD-10-CM

## 2021-12-20 PROCEDURE — 93798 PHYS/QHP OP CAR RHAB W/ECG: CPT

## 2021-12-20 NOTE — PROGRESS NOTES
Pt was seen today in CR for a Phase 2 visit.  Vital signs and session notes recorded in [employer] and will be scanned into Epic by HIM.

## 2021-12-22 ENCOUNTER — TREATMENT (OUTPATIENT)
Dept: CARDIAC REHAB | Facility: HOSPITAL | Age: 66
End: 2021-12-22

## 2021-12-22 DIAGNOSIS — Z95.5 S/P PRIMARY ANGIOPLASTY WITH CORONARY STENT: Primary | ICD-10-CM

## 2021-12-22 DIAGNOSIS — I21.3 ST ELEVATION MYOCARDIAL INFARCTION (STEMI), UNSPECIFIED ARTERY (HCC): ICD-10-CM

## 2021-12-22 PROCEDURE — 93798 PHYS/QHP OP CAR RHAB W/ECG: CPT

## 2021-12-22 NOTE — PROGRESS NOTES
Pt was seen today in CR for a Phase 2 visit.  Vital signs and session notes recorded in [employer] and will be scanned into Epic by HIM.

## 2021-12-27 ENCOUNTER — TREATMENT (OUTPATIENT)
Dept: CARDIAC REHAB | Facility: HOSPITAL | Age: 66
End: 2021-12-27

## 2021-12-27 DIAGNOSIS — Z95.5 S/P PRIMARY ANGIOPLASTY WITH CORONARY STENT: Primary | ICD-10-CM

## 2021-12-27 DIAGNOSIS — I21.3 ST ELEVATION MYOCARDIAL INFARCTION (STEMI), UNSPECIFIED ARTERY (HCC): ICD-10-CM

## 2021-12-27 PROCEDURE — 93798 PHYS/QHP OP CAR RHAB W/ECG: CPT

## 2021-12-27 NOTE — PROGRESS NOTES
Pt was seen today in CR for a Phase 2 visit.  Vital signs and session notes recorded in [employer] and will be scanned into Epic by HIM.

## 2021-12-29 ENCOUNTER — TREATMENT (OUTPATIENT)
Dept: CARDIAC REHAB | Facility: HOSPITAL | Age: 66
End: 2021-12-29

## 2021-12-29 DIAGNOSIS — Z95.5 S/P PRIMARY ANGIOPLASTY WITH CORONARY STENT: Primary | ICD-10-CM

## 2021-12-29 DIAGNOSIS — I21.3 ST ELEVATION MYOCARDIAL INFARCTION (STEMI), UNSPECIFIED ARTERY (HCC): ICD-10-CM

## 2021-12-29 PROCEDURE — 93798 PHYS/QHP OP CAR RHAB W/ECG: CPT

## 2021-12-29 RX ORDER — ESCITALOPRAM OXALATE 20 MG/1
TABLET ORAL
Qty: 90 TABLET | Refills: 3 | Status: SHIPPED | OUTPATIENT
Start: 2021-12-29 | End: 2022-01-11

## 2021-12-29 NOTE — PROGRESS NOTES
Pt was seen today in CR for a Phase 2 visit.  Vital signs and session notes recorded in [employer] and will be scanned into Epic by HIM.

## 2022-01-03 ENCOUNTER — TREATMENT (OUTPATIENT)
Dept: CARDIAC REHAB | Facility: HOSPITAL | Age: 67
End: 2022-01-03

## 2022-01-03 DIAGNOSIS — I21.3 ST ELEVATION MYOCARDIAL INFARCTION (STEMI), UNSPECIFIED ARTERY: ICD-10-CM

## 2022-01-03 DIAGNOSIS — Z95.5 S/P PRIMARY ANGIOPLASTY WITH CORONARY STENT: Primary | ICD-10-CM

## 2022-01-03 PROCEDURE — 93798 PHYS/QHP OP CAR RHAB W/ECG: CPT

## 2022-01-03 NOTE — PROGRESS NOTES
Pt was seen today in CR for a Phase 2 visit.  Vital signs and session notes recorded in [employer] and will be scanned into Epic by HIM.

## 2022-01-05 ENCOUNTER — TREATMENT (OUTPATIENT)
Dept: CARDIAC REHAB | Facility: HOSPITAL | Age: 67
End: 2022-01-05

## 2022-01-05 DIAGNOSIS — E11.9 TYPE 2 DIABETES MELLITUS WITHOUT COMPLICATION, WITHOUT LONG-TERM CURRENT USE OF INSULIN: ICD-10-CM

## 2022-01-05 DIAGNOSIS — N18.30 CHRONIC RENAL IMPAIRMENT, STAGE 3 (MODERATE), UNSPECIFIED WHETHER STAGE 3A OR 3B CKD: Primary | ICD-10-CM

## 2022-01-05 DIAGNOSIS — I21.3 ST ELEVATION MYOCARDIAL INFARCTION (STEMI), UNSPECIFIED ARTERY: ICD-10-CM

## 2022-01-05 DIAGNOSIS — Z95.5 S/P PRIMARY ANGIOPLASTY WITH CORONARY STENT: Primary | ICD-10-CM

## 2022-01-05 PROCEDURE — 93798 PHYS/QHP OP CAR RHAB W/ECG: CPT

## 2022-01-05 NOTE — PROGRESS NOTES
Pt was seen today in CR for a Phase 2 visit.  Vital signs and session notes recorded in [employer] and will be scanned into Epic by HIM.

## 2022-01-07 ENCOUNTER — APPOINTMENT (OUTPATIENT)
Dept: CARDIAC REHAB | Facility: HOSPITAL | Age: 67
End: 2022-01-07

## 2022-01-10 ENCOUNTER — LAB (OUTPATIENT)
Dept: LAB | Facility: HOSPITAL | Age: 67
End: 2022-01-10

## 2022-01-10 ENCOUNTER — TREATMENT (OUTPATIENT)
Dept: CARDIAC REHAB | Facility: HOSPITAL | Age: 67
End: 2022-01-10

## 2022-01-10 ENCOUNTER — TELEPHONE (OUTPATIENT)
Dept: INTERNAL MEDICINE | Facility: CLINIC | Age: 67
End: 2022-01-10

## 2022-01-10 DIAGNOSIS — N30.00 ACUTE CYSTITIS WITHOUT HEMATURIA: Primary | ICD-10-CM

## 2022-01-10 DIAGNOSIS — I21.3 ST ELEVATION MYOCARDIAL INFARCTION (STEMI), UNSPECIFIED ARTERY: ICD-10-CM

## 2022-01-10 DIAGNOSIS — Z03.818 ENCOUNTER FOR PATIENT CONCERN ABOUT EXPOSURE TO INFECTIOUS ORGANISM: Primary | ICD-10-CM

## 2022-01-10 DIAGNOSIS — Z95.5 S/P PRIMARY ANGIOPLASTY WITH CORONARY STENT: Primary | ICD-10-CM

## 2022-01-10 LAB
ALBUMIN SERPL-MCNC: 4.4 G/DL (ref 3.5–5.2)
ALBUMIN/GLOB SERPL: 1.4 G/DL
ALP SERPL-CCNC: 59 U/L (ref 39–117)
ALT SERPL W P-5'-P-CCNC: 10 U/L (ref 1–41)
ANION GAP SERPL CALCULATED.3IONS-SCNC: 6.8 MMOL/L (ref 5–15)
AST SERPL-CCNC: 16 U/L (ref 1–40)
BASOPHILS # BLD AUTO: 0.07 10*3/MM3 (ref 0–0.2)
BASOPHILS NFR BLD AUTO: 1 % (ref 0–1.5)
BILIRUB SERPL-MCNC: 0.5 MG/DL (ref 0–1.2)
BUN SERPL-MCNC: 13 MG/DL (ref 8–23)
BUN/CREAT SERPL: 10 (ref 7–25)
CALCIUM SPEC-SCNC: 9.2 MG/DL (ref 8.6–10.5)
CHLORIDE SERPL-SCNC: 100 MMOL/L (ref 98–107)
CO2 SERPL-SCNC: 31.2 MMOL/L (ref 22–29)
CREAT SERPL-MCNC: 1.3 MG/DL (ref 0.76–1.27)
DEPRECATED RDW RBC AUTO: 47 FL (ref 37–54)
EOSINOPHIL # BLD AUTO: 0.22 10*3/MM3 (ref 0–0.4)
EOSINOPHIL NFR BLD AUTO: 3 % (ref 0.3–6.2)
ERYTHROCYTE [DISTWIDTH] IN BLOOD BY AUTOMATED COUNT: 14.9 % (ref 12.3–15.4)
GFR SERPL CREATININE-BSD FRML MDRD: 55 ML/MIN/1.73
GLOBULIN UR ELPH-MCNC: 3.2 GM/DL
GLUCOSE SERPL-MCNC: 147 MG/DL (ref 65–99)
HBA1C MFR BLD: 7.38 % (ref 4.8–5.6)
HCT VFR BLD AUTO: 47.6 % (ref 37.5–51)
HGB BLD-MCNC: 16.2 G/DL (ref 13–17.7)
IMM GRANULOCYTES # BLD AUTO: 0.03 10*3/MM3 (ref 0–0.05)
IMM GRANULOCYTES NFR BLD AUTO: 0.4 % (ref 0–0.5)
LYMPHOCYTES # BLD AUTO: 2.37 10*3/MM3 (ref 0.7–3.1)
LYMPHOCYTES NFR BLD AUTO: 32.8 % (ref 19.6–45.3)
MCH RBC QN AUTO: 29.7 PG (ref 26.6–33)
MCHC RBC AUTO-ENTMCNC: 34 G/DL (ref 31.5–35.7)
MCV RBC AUTO: 87.2 FL (ref 79–97)
MONOCYTES # BLD AUTO: 0.48 10*3/MM3 (ref 0.1–0.9)
MONOCYTES NFR BLD AUTO: 6.6 % (ref 5–12)
NEUTROPHILS NFR BLD AUTO: 4.05 10*3/MM3 (ref 1.7–7)
NEUTROPHILS NFR BLD AUTO: 56.2 % (ref 42.7–76)
NRBC BLD AUTO-RTO: 0 /100 WBC (ref 0–0.2)
PLATELET # BLD AUTO: 216 10*3/MM3 (ref 140–450)
PMV BLD AUTO: 11.9 FL (ref 6–12)
POTASSIUM SERPL-SCNC: 3.6 MMOL/L (ref 3.5–5.2)
PROT SERPL-MCNC: 7.6 G/DL (ref 6–8.5)
RBC # BLD AUTO: 5.46 10*6/MM3 (ref 4.14–5.8)
SARS-COV-2 RNA NOSE QL NAA+PROBE: NOT DETECTED
SODIUM SERPL-SCNC: 138 MMOL/L (ref 136–145)
WBC NRBC COR # BLD: 7.22 10*3/MM3 (ref 3.4–10.8)

## 2022-01-10 PROCEDURE — C9803 HOPD COVID-19 SPEC COLLECT: HCPCS | Performed by: INTERNAL MEDICINE

## 2022-01-10 PROCEDURE — 80053 COMPREHEN METABOLIC PANEL: CPT | Performed by: INTERNAL MEDICINE

## 2022-01-10 PROCEDURE — 85025 COMPLETE CBC W/AUTO DIFF WBC: CPT | Performed by: INTERNAL MEDICINE

## 2022-01-10 PROCEDURE — U0004 COV-19 TEST NON-CDC HGH THRU: HCPCS

## 2022-01-10 PROCEDURE — 93798 PHYS/QHP OP CAR RHAB W/ECG: CPT

## 2022-01-10 PROCEDURE — 83036 HEMOGLOBIN GLYCOSYLATED A1C: CPT | Performed by: INTERNAL MEDICINE

## 2022-01-10 PROCEDURE — 36415 COLL VENOUS BLD VENIPUNCTURE: CPT | Performed by: INTERNAL MEDICINE

## 2022-01-10 RX ORDER — FLUCONAZOLE 100 MG/1
100 TABLET ORAL DAILY
Qty: 1 TABLET | Refills: 0 | Status: SHIPPED | OUTPATIENT
Start: 2022-01-10 | End: 2022-04-29

## 2022-01-10 RX ORDER — AMOXICILLIN AND CLAVULANATE POTASSIUM 875; 125 MG/1; MG/1
1 TABLET, FILM COATED ORAL EVERY 12 HOURS SCHEDULED
Qty: 20 TABLET | Refills: 0 | Status: SHIPPED | OUTPATIENT
Start: 2022-01-10 | End: 2022-01-31

## 2022-01-10 NOTE — PROGRESS NOTES
Pt was seen today in CR for a Phase 2 visit.  Vital signs and session notes recorded in [employer] and will be scanned into Epic by HIM.

## 2022-01-10 NOTE — TELEPHONE ENCOUNTER
Caller: Andre Agosto    Relationship to patient: Self    Best call back number: [phone]    Chief complaint: FOLLOW UP    Type of visit: OFFICE VISIT    Requested date: ASAP    If rescheduling, when is the original appointment: N/A    Additional notes: PATIENT WOULD LIKE TO SEE  FOR SOME FOLLOW UP ON BLOOD WORK IS HAVING TODAY AND HE WANTS TO GET INTO HIM TOMORROW    PLEASE ADVISE ASAP

## 2022-01-11 ENCOUNTER — OFFICE VISIT (OUTPATIENT)
Dept: INTERNAL MEDICINE | Facility: CLINIC | Age: 67
End: 2022-01-11

## 2022-01-11 VITALS
OXYGEN SATURATION: 97 % | HEIGHT: 76 IN | SYSTOLIC BLOOD PRESSURE: 126 MMHG | TEMPERATURE: 98.2 F | BODY MASS INDEX: 38.36 KG/M2 | WEIGHT: 315 LBS | HEART RATE: 67 BPM | DIASTOLIC BLOOD PRESSURE: 86 MMHG | RESPIRATION RATE: 16 BRPM

## 2022-01-11 DIAGNOSIS — I50.31 ACUTE DIASTOLIC CHF (CONGESTIVE HEART FAILURE): ICD-10-CM

## 2022-01-11 DIAGNOSIS — E11.9 DIABETES MELLITUS WITHOUT COMPLICATION: ICD-10-CM

## 2022-01-11 DIAGNOSIS — R82.6 ABNORMAL URINE LEVELS OF SUBSTANCES CHIEFLY NONMEDICINAL AS TO SOURCE: ICD-10-CM

## 2022-01-11 DIAGNOSIS — I10 ESSENTIAL HYPERTENSION: Primary | ICD-10-CM

## 2022-01-11 PROBLEM — F41.9 ANXIETY: Status: ACTIVE | Noted: 2022-01-11

## 2022-01-11 PROBLEM — N18.30 STAGE 3 CHRONIC KIDNEY DISEASE (HCC): Status: ACTIVE | Noted: 2018-11-05

## 2022-01-11 PROCEDURE — 99214 OFFICE O/P EST MOD 30 MIN: CPT | Performed by: INTERNAL MEDICINE

## 2022-01-11 RX ORDER — SACUBITRIL AND VALSARTAN 97; 103 MG/1; MG/1
1 TABLET, FILM COATED ORAL 2 TIMES DAILY
COMMUNITY
Start: 2022-01-02

## 2022-01-11 NOTE — PROGRESS NOTES
Subjective     Patient ID: Andre Agosto is a 67 y.o. male. Patient is here for management of multiple medical problems.     Chief Complaint   Patient presents with   • Flank Pain     left side     History of Present Illness   Right flank pain.  U/S a month ago with stones.     Also needs management of htn  DM   chf        The following portions of the patient's history were reviewed and updated as appropriate: allergies, current medications, past family history, past medical history, past social history, past surgical history and problem list.    Review of Systems   Constitutional: Negative for diaphoresis.   Genitourinary: Negative for scrotal swelling and testicular pain.   Psychiatric/Behavioral: Negative for decreased concentration and hallucinations. The patient is not nervous/anxious and is not hyperactive.    All other systems reviewed and are negative.      Current Outpatient Medications:   •  amiodarone (PACERONE) 200 MG tablet, Take 200 mg by mouth Daily., Disp: , Rfl:   •  amLODIPine (NORVASC) 10 MG tablet, Take 10 mg by mouth Daily., Disp: , Rfl:   •  amoxicillin-clavulanate (Augmentin) 875-125 MG per tablet, Take 1 tablet by mouth Every 12 (Twelve) Hours., Disp: 20 tablet, Rfl: 0  •  B Complex-C (B-complex with vitamin C) tablet, Take 1 tablet by mouth Daily., Disp: 90 tablet, Rfl: 3  •  carvedilol (Coreg) 25 MG tablet, Take 1 tablet by mouth 2 (Two) Times a Day With Meals., Disp: 180 tablet, Rfl: 3  •  clopidogrel (PLAVIX) 75 MG tablet, Take 75 mg by mouth Daily. Hold 5 days prior to surgery and take baby asa   LAST DOSE oCTOBER 15, Disp: , Rfl:   •  divalproex (DEPAKOTE) 250 MG DR tablet, Take 2 tablets in the AM, 1 tablet at noon, and 2 tablets in the pm. Per DR Clifford. (Patient taking differently: Take 250 mg by mouth 2 (Two) Times a Day. Take 3 tablets in the AM  and 2 tablets in the pm. Per DR Clifford.), Disp: 450 tablet, Rfl: 3  •  docusate sodium (Colace) 100 MG capsule, Take 1 capsule by mouth 2  "(Two) Times a Day if taking oxycodone, Disp: 15 capsule, Rfl: 1  •  Entresto  MG tablet, Take  by mouth Daily., Disp: , Rfl:   •  Farxiga 10 MG tablet, Take 10 mg by mouth Daily., Disp: , Rfl:   •  fluconazole (Diflucan) 100 MG tablet, Take 1 tablet by mouth Daily., Disp: 1 tablet, Rfl: 0  •  folic acid (FOLVITE) 1 MG tablet, Take one daily except on the day of taking Methotrexate. (Patient taking differently: 1 mg. Take one daily except on the day of taking Methotrexate.), Disp: 30 tablet, Rfl: 11  •  furosemide (LASIX) 40 MG tablet, Take 1 tablet by mouth Daily. (Patient taking differently: Take 40 mg by mouth Daily As Needed.), Disp: 90 tablet, Rfl: 3  •  glucose monitor monitoring kit, 1 each As Needed (bs)., Disp: 1 each, Rfl: 1  •  hydroCHLOROthiazide (HYDRODIURIL) 12.5 MG tablet, Take 1 tablet by mouth Daily., Disp: 90 tablet, Rfl: 3  •  levothyroxine (SYNTHROID, LEVOTHROID) 88 MCG tablet, Take 1 tablet by mouth Daily., Disp: 90 tablet, Rfl: 3  •  lovastatin (MEVACOR) 40 MG tablet, Take 1 tablet by mouth Daily With Dinner. Pt can tolerat lovastatin., Disp: 90 tablet, Rfl: 3  •  Semaglutide, 1 MG/DOSE, (OZEMPIC) 2 MG/1.5ML solution pen-injector, Inject 1 mg under the skin into the appropriate area as directed 1 (One) Time Per Week., Disp: 3 pen, Rfl: 3  •  tamsulosin (FLOMAX) 0.4 MG capsule 24 hr capsule, Take 1 capsule by mouth Every Night., Disp: 10 capsule, Rfl: 0  •  Xarelto 15 MG tablet, Take 15 mg by mouth Daily With Dinner., Disp: , Rfl:     Objective      Blood pressure 126/86, pulse 67, temperature 98.2 °F (36.8 °C), resp. rate 16, height 193 cm (76\"), weight (!) 147 kg (323 lb), SpO2 97 %.    Physical Exam     General Appearance:    Alert, cooperative, no distress, appears stated age   Head:    Normocephalic, without obvious abnormality, atraumatic   Eyes:    PERRL, conjunctiva/corneas clear, EOM's intact   Ears:    Normal TM's and external ear canals, both ears   Nose:   Nares normal, septum " midline, mucosa normal, no drainage   or sinus tenderness   Throat:   Lips, mucosa, and tongue normal; teeth and gums normal   Neck:   Supple, symmetrical, trachea midline, no adenopathy;        thyroid:  No enlargement/tenderness/nodules; no carotid    bruit or JVD   Back:     Symmetric, no curvature, ROM normal, no CVA tenderness   Lungs:     Clear to auscultation bilaterally, respirations unlabored   Chest wall:    No tenderness or deformity   Heart:    Regular rate and rhythm, S1 and S2 normal, no murmur,        rub or gallop   Abdomen:     Soft, non-tender, bowel sounds active all four quadrants,     no masses, no organomegaly   Extremities:   Extremities normal, atraumatic, no cyanosis or edema   Pulses:   2+ and symmetric all extremities   Skin:   Skin color, texture, turgor normal, no rashes or lesions   Lymph nodes:   Cervical, supraclavicular, and axillary nodes normal   Neurologic:   CNII-XII intact. Normal strength, sensation and reflexes       throughout      Results for orders placed or performed in visit on 01/10/22   COVID-19 PCR, [facility], NP SWAB IN [place] VIRAL TRANSPORT MEDIA/ORAL SWISH 24-30 HR TAT - Swab, Nasopharynx    Specimen: Nasopharynx; Swab   Result Value Ref Range    SARS-CoV-2 ILAN Not Detected Not Detected         Assessment/Plan    will get in with dietician.   Increase exercise as tolerated.        Diagnoses and all orders for this visit:    1. Essential hypertension (Primary)  -     Comprehensive Metabolic Panel  -     CBC & Differential  -     Urinalysis With Microscopic - Urine, Clean Catch  -     Urine Culture - Urine, Urine, Clean Catch    2. Diabetes mellitus without complication (HCC)  -     Urinalysis With Microscopic - Urine, Clean Catch  -     Urine Culture - Urine, Urine, Clean Catch    3. Acute diastolic CHF (congestive heart failure) (HCC)    4. Abnormal urine levels of substances chiefly nonmedicinal as to source   -     Urine Culture - Urine, Urine, Clean  Catch      Return in about 6 weeks (around 2/22/2022).          There are no Patient Instructions on file for this visit.     Keven Bear MD    Assessment/Plan

## 2022-01-12 ENCOUNTER — TREATMENT (OUTPATIENT)
Dept: CARDIAC REHAB | Facility: HOSPITAL | Age: 67
End: 2022-01-12

## 2022-01-12 DIAGNOSIS — Z95.5 S/P PRIMARY ANGIOPLASTY WITH CORONARY STENT: Primary | ICD-10-CM

## 2022-01-12 DIAGNOSIS — I21.3 ST ELEVATION MYOCARDIAL INFARCTION (STEMI), UNSPECIFIED ARTERY: ICD-10-CM

## 2022-01-12 PROCEDURE — 93798 PHYS/QHP OP CAR RHAB W/ECG: CPT

## 2022-01-12 NOTE — PROGRESS NOTES
Pt was seen today in [facility] for a Phase 2 visit.  Vital signs and session notes recorded in "[employer] and will be scanned into Epic by HIM.

## 2022-01-14 ENCOUNTER — APPOINTMENT (OUTPATIENT)
Dept: CARDIAC REHAB | Facility: HOSPITAL | Age: 67
End: 2022-01-14

## 2022-01-17 ENCOUNTER — APPOINTMENT (OUTPATIENT)
Dept: CARDIAC REHAB | Facility: HOSPITAL | Age: 67
End: 2022-01-17

## 2022-01-24 ENCOUNTER — HOSPITAL ENCOUNTER (OUTPATIENT)
Dept: ULTRASOUND IMAGING | Facility: HOSPITAL | Age: 67
Discharge: HOME | End: 2022-01-24
Admitting: UROLOGY

## 2022-01-24 ENCOUNTER — TELEPHONE (OUTPATIENT)
Dept: UROLOGY | Facility: CLINIC | Age: 67
End: 2022-01-24

## 2022-01-24 ENCOUNTER — APPOINTMENT (OUTPATIENT)
Dept: CARDIAC REHAB | Facility: HOSPITAL | Age: 67
End: 2022-01-24

## 2022-01-24 DIAGNOSIS — N20.0 NEPHROLITHIASIS: ICD-10-CM

## 2022-01-24 PROCEDURE — 76775 US EXAM ABDO BACK WALL LIM: CPT

## 2022-01-26 ENCOUNTER — TREATMENT (OUTPATIENT)
Dept: CARDIAC REHAB | Facility: HOSPITAL | Age: 67
End: 2022-01-26

## 2022-01-26 DIAGNOSIS — Z95.5 S/P PRIMARY ANGIOPLASTY WITH CORONARY STENT: Primary | ICD-10-CM

## 2022-01-26 DIAGNOSIS — I21.3 ST ELEVATION MYOCARDIAL INFARCTION (STEMI), UNSPECIFIED ARTERY: ICD-10-CM

## 2022-01-26 PROCEDURE — 93798 PHYS/QHP OP CAR RHAB W/ECG: CPT

## 2022-01-26 NOTE — PROGRESS NOTES
Pt was seen today in [facility] for a Phase 2 visit.  Vital signs and session notes recorded in [employer] and will be scanned into Epic by HIM.

## 2022-01-27 ENCOUNTER — OFFICE VISIT (OUTPATIENT)
Dept: UROLOGY | Facility: CLINIC | Age: 67
End: 2022-01-27

## 2022-01-27 VITALS
DIASTOLIC BLOOD PRESSURE: 68 MMHG | HEIGHT: 76 IN | BODY MASS INDEX: 37.14 KG/M2 | TEMPERATURE: 97.4 F | SYSTOLIC BLOOD PRESSURE: 152 MMHG | HEART RATE: 64 BPM | WEIGHT: 305 LBS | OXYGEN SATURATION: 94 %

## 2022-01-27 DIAGNOSIS — R97.20 ELEVATED PSA: Primary | ICD-10-CM

## 2022-01-27 DIAGNOSIS — N20.0 NEPHROLITHIASIS: ICD-10-CM

## 2022-01-27 PROCEDURE — 99213 OFFICE O/P EST LOW 20 MIN: CPT | Performed by: UROLOGY

## 2022-01-27 NOTE — PROGRESS NOTES
Chief Complaint   Patient presents with   • Follow-up     2 mo fu w/ renal ultrasound results        HPI  Ms. Agosto is a 67 y.o. male with history below in assessment, who presents for follow up.     At this visit no flank pain.     Past Medical History:   Diagnosis Date   • 6th nerve palsy, right     right eye    • Anxiety and depression    • Atrial fibrillation, persistent (HCC) 12/02/2020    on Xarelto   • Coronary artery disease involving native coronary artery of native heart without angina pectoris 09/12/2018    follows w/ Dr. Johnson, on ASA 81mg   • CRI (chronic renal insufficiency), stage 2 (mild)    • CVA (cerebral vascular accident) (HCC)    • Diabetes mellitus (HCC)     doesnt check sugar, type 2    • Disease of thyroid gland    • Double vision     resolved- right eye   • Elevated cholesterol    • Epilepsy (HCC)     right arm focal seizures   • Focal seizure (HCC)     of right arm    • Heart attack (HCC)     NSTEMI 2015, s/p stenting   • History of Helicobacter pylori infection     in 2008, treated w/ PrevPak - 2021 EGD bx (+), PCP RX - PPI/Augmentin/Doxy/Flagyl (x 14 days) 1/22/21   • HL (hearing loss)     (L) ear - child luevano measles   • Hyperlipidemia    • Hypertension    • Kidney stone    • Kidney stone    • Memory loss 2005    d/t meningitis   • Meningitis 2005    unsure of bacterial or viral    • Obesity    • Seizures (HCC)     right arm focal seizures   • Sleep apnea treated with nocturnal BiPAP     compliant with  machine    • Stroke (HCC)     2017/2018   • Ventricular tachycardia (HCC)     3 different times   • Wears glasses        Past Surgical History:   Procedure Laterality Date   • BARIATRIC SURGERY  06/01/2012    Lap nand   • CARDIAC CATHETERIZATION N/A 10/12/2018    Procedure: Left Heart Cath;  Surgeon: Yoselin Johnson MD;  Location: [facility] CATH INVASIVE LOCATION;  Service: Cardiology   • CARDIAC CATHETERIZATION  03/2021    stent   • CARDIAC CATHETERIZATION  07/2021    just  checking the after pacemaker placed- Dr. Tim   • CARDIAC DEFIBRILLATOR PLACEMENT     • COLONOSCOPY  10/2020    w/ Dr. Jacobs, hx colon polyps   • CORONARY STENT PLACEMENT  2015   • LAPAROSCOPIC GASTRIC BANDING  2008    s/p LAGB Realize 6/2008 by HOMERO.   • PACEMAKER IMPLANTATION  07/25/2021   • UMBILICAL HERNIA REPAIR  2008    incarcerated UHR w/ mesh by Dr. Velasquez   • URETEROSCOPY LASER LITHOTRIPSY WITH STENT INSERTION Left 10/20/2021    Procedure: URETEROSCOPY LASER LITHOTRIPSY WITH STENT INSERTION;  Surgeon: Tonio De La Cruz MD;  Location: [facility] OR;  Service: Urology;  Laterality: Left;   • URETEROSCOPY LASER LITHOTRIPSY WITH STENT INSERTION Left 11/3/2021    Procedure: URETEROSCOPY LEFT, LEFT RETROGRADE PYELOGRAM, CYSTOSCOPY, LASER LITHOTRIPSY WITH STENT EXCHANGE;  Surgeon: Tonio De La Cruz MD;  Location: [facility] OR;  Service: Urology;  Laterality: Left;         Current Outpatient Medications:   •  amiodarone (PACERONE) 200 MG tablet, Take 200 mg by mouth Daily., Disp: , Rfl:   •  amLODIPine (NORVASC) 10 MG tablet, Take 10 mg by mouth Daily., Disp: , Rfl:   •  amoxicillin-clavulanate (Augmentin) 875-125 MG per tablet, Take 1 tablet by mouth Every 12 (Twelve) Hours., Disp: 20 tablet, Rfl: 0  •  B Complex-C (B-complex with vitamin C) tablet, Take 1 tablet by mouth Daily., Disp: 90 tablet, Rfl: 3  •  carvedilol (Coreg) 25 MG tablet, Take 1 tablet by mouth 2 (Two) Times a Day With Meals., Disp: 180 tablet, Rfl: 3  •  clopidogrel (PLAVIX) 75 MG tablet, Take 75 mg by mouth Daily. Hold 5 days prior to surgery and take baby asa   LAST DOSE oCTOBER 15, Disp: , Rfl:   •  divalproex (DEPAKOTE) 250 MG DR tablet, Take 2 tablets in the AM, 1 tablet at noon, and 2 tablets in the pm. Per DR Clifford. (Patient taking differently: Take 250 mg by mouth 2 (Two) Times a Day. Take 3 tablets in the AM  and 2 tablets in the pm. Per DR Clifford.), Disp: 450 tablet, Rfl: 3  •  docusate sodium (Colace) 100 MG capsule, Take 1 capsule by  "mouth 2 (Two) Times a Day if taking oxycodone, Disp: 15 capsule, Rfl: 1  •  Entresto  MG tablet, Take  by mouth Daily., Disp: , Rfl:   •  Farxiga 10 MG tablet, Take 10 mg by mouth Daily., Disp: , Rfl:   •  fluconazole (Diflucan) 100 MG tablet, Take 1 tablet by mouth Daily., Disp: 1 tablet, Rfl: 0  •  folic acid (FOLVITE) 1 MG tablet, Take one daily except on the day of taking Methotrexate. (Patient taking differently: 1 mg. Take one daily except on the day of taking Methotrexate.), Disp: 30 tablet, Rfl: 11  •  furosemide (LASIX) 40 MG tablet, Take 1 tablet by mouth Daily. (Patient taking differently: Take 40 mg by mouth Daily As Needed.), Disp: 90 tablet, Rfl: 3  •  glucose monitor monitoring kit, 1 each As Needed (bs)., Disp: 1 each, Rfl: 1  •  hydroCHLOROthiazide (HYDRODIURIL) 12.5 MG tablet, Take 1 tablet by mouth Daily., Disp: 90 tablet, Rfl: 3  •  levothyroxine (SYNTHROID, LEVOTHROID) 88 MCG tablet, Take 1 tablet by mouth Daily., Disp: 90 tablet, Rfl: 3  •  lovastatin (MEVACOR) 40 MG tablet, Take 1 tablet by mouth Daily With Dinner. Pt can tolerat lovastatin., Disp: 90 tablet, Rfl: 3  •  Semaglutide, 1 MG/DOSE, (OZEMPIC) 2 MG/1.5ML solution pen-injector, Inject 1 mg under the skin into the appropriate area as directed 1 (One) Time Per Week., Disp: 3 pen, Rfl: 3  •  tamsulosin (FLOMAX) 0.4 MG capsule 24 hr capsule, Take 1 capsule by mouth Every Night., Disp: 10 capsule, Rfl: 0  •  Xarelto 15 MG tablet, Take 15 mg by mouth Daily With Dinner., Disp: , Rfl:      Physical Exam  Visit Vitals  /68   Pulse 64   Temp 97.4 °F (36.3 °C)   Ht 193 cm (76\")   Wt (!) 138 kg (305 lb)   SpO2 94%   BMI 37.13 kg/m²       Labs  Brief Urine Lab Results  (Last result in the past 365 days)      Color   Clarity   Blood   Leuk Est   Nitrite   Protein   CREAT   Urine HCG        09/20/21 1130 Yellow   Clear   3+   Negative   Negative   Trace                 Lab Results   Component Value Date    GLUCOSE 147 (H) 01/10/2022    " CALCIUM 9.2 01/10/2022     01/10/2022    K 3.6 01/10/2022    CO2 31.2 (H) 01/10/2022     01/10/2022    BUN 13 01/10/2022    CREATININE 1.30 (H) 01/10/2022    EGFRIFAFRI 57 (L) 12/05/2018    EGFRIFNONA 55 (L) 01/10/2022    BCR 10.0 01/10/2022    ANIONGAP 6.8 01/10/2022       Lab Results   Component Value Date    WBC 7.22 01/10/2022    HGB 16.2 01/10/2022    HCT 47.6 01/10/2022    MCV 87.2 01/10/2022     01/10/2022       Urine Culture    Urine Culture 9/13/21   Urine Culture No growth              Lab Results   Component Value Date    PSA 4.1 (H) 04/08/2021    PSA 4.7 (H) 04/06/2021    PSA 3.670 01/16/2020           Radiographic Studies  US Renal Bilateral  Result Date: 1/24/2022  Findings consistent with bilateral renal stones with no evidence of hydronephrosis.  This report was finalized on 1/24/2022 2:27 PM by Leelee Ramos M.D..    CT Abdomen Pelvis Stone Protocol  Result Date: 9/30/2021  1. Bilateral nephrolithiasis, largest stones measuring 9 mm on right and 7 mm on the left without hydronephrosis or obstructing stone. 2. Cholelithiasis  This study was performed with techniques to keep radiation doses as low as reasonably achievable (ALARA). Individualized dose reduction techniques using automated exposure control or adjustment of vA and/or kV according to the patient size were employed.  This report was finalized on 9/30/2021 4:48 PM by Clemente Barrett MD.        I have reviewed above labs and imaging.     Assessment  67 y.o. male with Hx of CAD, MI, bilateral stones, s/p L URS/LL. He also has a Hx of newly elevated pSA in 2021.     Plan  1. FU in 6mo w/ KUB   2. PSA and litholink

## 2022-01-31 ENCOUNTER — OFFICE VISIT (OUTPATIENT)
Dept: INTERNAL MEDICINE | Facility: CLINIC | Age: 67
End: 2022-01-31

## 2022-01-31 VITALS
RESPIRATION RATE: 16 BRPM | DIASTOLIC BLOOD PRESSURE: 78 MMHG | TEMPERATURE: 96.8 F | HEART RATE: 64 BPM | BODY MASS INDEX: 38.36 KG/M2 | WEIGHT: 315 LBS | SYSTOLIC BLOOD PRESSURE: 128 MMHG | HEIGHT: 76 IN | OXYGEN SATURATION: 96 %

## 2022-01-31 DIAGNOSIS — E11.9 DIABETES MELLITUS WITHOUT COMPLICATION: ICD-10-CM

## 2022-01-31 DIAGNOSIS — Z12.5 ENCOUNTER FOR SCREENING FOR MALIGNANT NEOPLASM OF PROSTATE: ICD-10-CM

## 2022-01-31 DIAGNOSIS — E78.2 MIXED HYPERLIPIDEMIA: ICD-10-CM

## 2022-01-31 DIAGNOSIS — I10 ESSENTIAL HYPERTENSION: ICD-10-CM

## 2022-01-31 DIAGNOSIS — S22.32XA CLOSED FRACTURE OF ONE RIB OF LEFT SIDE, INITIAL ENCOUNTER: Primary | ICD-10-CM

## 2022-01-31 DIAGNOSIS — G47.33 OSA TREATED WITH BIPAP: ICD-10-CM

## 2022-01-31 PROCEDURE — 99214 OFFICE O/P EST MOD 30 MIN: CPT | Performed by: INTERNAL MEDICINE

## 2022-01-31 RX ORDER — FUROSEMIDE 40 MG/1
40 TABLET ORAL DAILY
Qty: 90 TABLET | Refills: 3 | Status: SHIPPED | OUTPATIENT
Start: 2022-01-31 | End: 2023-03-27

## 2022-01-31 NOTE — PROGRESS NOTES
Subjective     Patient ID: Andre Agosto is a 67 y.o. male. Patient is here for management of multiple medical problems.     Chief Complaint   Patient presents with   • Hypertension   • Fall     in the bathtub, on the left side     History of Present Illness       Feel in bath tube in hotel room at [facility]  Back still hurts from fall.  Fell 3 weeks ago on Friday.   Tube pictures with on anti slip on the surface.  No safety handle bars.                             The following portions of the patient's history were reviewed and updated as appropriate: allergies, current medications, past family history, past medical history, past social history, past surgical history and problem list.    Review of Systems   Constitutional: Negative for diaphoresis and fatigue.   Psychiatric/Behavioral: Negative for self-injury and sleep disturbance. The patient is not nervous/anxious.    All other systems reviewed and are negative.      Current Outpatient Medications:   •  amiodarone (PACERONE) 200 MG tablet, Take 200 mg by mouth Daily., Disp: , Rfl:   •  amLODIPine (NORVASC) 10 MG tablet, Take 10 mg by mouth Daily., Disp: , Rfl:   •  B Complex-C (B-complex with vitamin C) tablet, Take 1 tablet by mouth Daily., Disp: 90 tablet, Rfl: 3  •  carvedilol (Coreg) 25 MG tablet, Take 1 tablet by mouth 2 (Two) Times a Day With Meals., Disp: 180 tablet, Rfl: 3  •  clopidogrel (PLAVIX) 75 MG tablet, Take 75 mg by mouth Every Other Day. Hold 5 days prior to surgery and take baby asa   LAST DOSE oCTOBER 15, Disp: , Rfl:   •  divalproex (DEPAKOTE) 250 MG DR tablet, Take 2 tablets in the AM, 1 tablet at noon, and 2 tablets in the pm. Per DR Clifford. (Patient taking differently: Take 250 mg by mouth 2 (Two) Times a Day. Take 3 tablets in the AM  and 2 tablets in the pm. Per DR Clifford.), Disp: 450 tablet, Rfl: 3  •  docusate sodium (Colace) 100 MG capsule, Take 1 capsule by mouth 2 (Two) Times a Day if taking oxycodone, Disp: 15 capsule, Rfl:  "1  •  Entresto  MG tablet, Take  by mouth Daily., Disp: , Rfl:   •  Farxiga 10 MG tablet, Take 10 mg by mouth Daily., Disp: , Rfl:   •  fluconazole (Diflucan) 100 MG tablet, Take 1 tablet by mouth Daily., Disp: 1 tablet, Rfl: 0  •  folic acid (FOLVITE) 1 MG tablet, Take one daily except on the day of taking Methotrexate. (Patient taking differently: 1 mg. Take one daily except on the day of taking Methotrexate.), Disp: 30 tablet, Rfl: 11  •  furosemide (LASIX) 40 MG tablet, Take 1 tablet by mouth Daily., Disp: 90 tablet, Rfl: 3  •  glucose monitor monitoring kit, 1 each As Needed (bs)., Disp: 1 each, Rfl: 1  •  levothyroxine (SYNTHROID, LEVOTHROID) 88 MCG tablet, Take 1 tablet by mouth Daily., Disp: 90 tablet, Rfl: 3  •  lovastatin (MEVACOR) 40 MG tablet, Take 1 tablet by mouth Daily With Dinner. Pt can tolerat lovastatin., Disp: 90 tablet, Rfl: 3  •  Semaglutide, 1 MG/DOSE, (OZEMPIC) 2 MG/1.5ML solution pen-injector, Inject 1 mg under the skin into the appropriate area as directed 1 (One) Time Per Week., Disp: 3 pen, Rfl: 3  •  tamsulosin (FLOMAX) 0.4 MG capsule 24 hr capsule, Take 1 capsule by mouth Every Night., Disp: 10 capsule, Rfl: 0  •  Xarelto 15 MG tablet, Take 15 mg by mouth Daily With Dinner., Disp: , Rfl:     Objective      Blood pressure 128/78, pulse 64, temperature 96.8 °F (36 °C), resp. rate 16, height 193 cm (76\"), weight (!) 143 kg (315 lb), SpO2 96 %.    Physical Exam     General Appearance:    Alert, cooperative, no distress, appears stated age   Head:    Normocephalic, without obvious abnormality, atraumatic   Eyes:    PERRL, conjunctiva/corneas clear, EOM's intact   Ears:    Normal TM's and external ear canals, both ears   Nose:   Nares normal, septum midline, mucosa normal, no drainage   or sinus tenderness   Throat:   Lips, mucosa, and tongue normal; teeth and gums normal   Neck:   Supple, symmetrical, trachea midline, no adenopathy;        thyroid:  No enlargement/tenderness/nodules; " no carotid    bruit or JVD   Back:     Symmetric, no curvature, ROM normal, no CVA tenderness   Lungs:     Clear to auscultation bilaterally, respirations unlabored   Chest wall:    No tenderness or deformity   Heart:    Regular rate and rhythm, S1 and S2 normal, no murmur,        rub or gallop   Abdomen:     Soft, non-tender, bowel sounds active all four quadrants,     no masses, no organomegaly   Extremities:   Extremities normal, atraumatic, no cyanosis or edema   Pulses:   2+ and symmetric all extremities   Skin:   Skin color, texture, turgor normal, no rashes or lesions   Lymph nodes:   Cervical, supraclavicular, and axillary nodes normal   Neurologic:   CNII-XII intact. Normal strength, sensation and reflexes       throughout      Results for orders placed or performed in visit on 01/10/22   COVID-19 PCR, [facility], [place]/ORAL SWISH 24-30 HR TAT - Swab, Nasopharynx    Specimen: Nasopharynx; Swab   Result Value Ref Range    SARS-CoV-2 ILAN Not Detected Not Detected         Assessment/Plan     Likely lower rib fracture from fall in Bath tube. No xr as this will not .      beverly treated on bipap started last week.  . On lasix.            Diagnoses and all orders for this visit:    1. Closed fracture of one rib of left side, initial encounter (Primary)  -     PSA Screen  -     Lipid Panel  -     CBC & Differential  -     Vitamin B12  -     Comprehensive Metabolic Panel  -     TSH  -     T4, Free  -     Hemoglobin A1c    2. Essential hypertension  -     PSA Screen  -     Lipid Panel  -     CBC & Differential  -     Vitamin B12  -     Comprehensive Metabolic Panel  -     TSH  -     T4, Free  -     Hemoglobin A1c    3. BEVERLY treated with BiPAP  -     PSA Screen  -     Lipid Panel  -     CBC & Differential  -     Vitamin B12  -     Comprehensive Metabolic Panel  -     TSH  -     T4, Free  -     Hemoglobin A1c    4. Mixed hyperlipidemia  -     PSA Screen  -     Lipid  Panel  -     CBC & Differential  -     Vitamin B12  -     Comprehensive Metabolic Panel  -     TSH  -     T4, Free  -     Hemoglobin A1c    5. Diabetes mellitus without complication (HCC)  -     PSA Screen  -     Lipid Panel  -     CBC & Differential  -     Vitamin B12  -     Comprehensive Metabolic Panel  -     TSH  -     T4, Free  -     Hemoglobin A1c  -     COVID-19 PCR, [facility], NP SWAB IN [place] VIRAL TRANSPORT MEDIA/ORAL SWISH 24-30 HR TAT - Swab, Nasopharynx; Future    6. Encounter for screening for malignant neoplasm of prostate   -     PSA Screen    Other orders  -     furosemide (LASIX) 40 MG tablet; Take 1 tablet by mouth Daily.  Dispense: 90 tablet; Refill: 3      Return in about 4 weeks (around 2/28/2022).          There are no Patient Instructions on file for this visit.     Keven Bear MD    Assessment/Plan

## 2022-02-04 ENCOUNTER — APPOINTMENT (OUTPATIENT)
Dept: CARDIAC REHAB | Facility: HOSPITAL | Age: 67
End: 2022-02-04

## 2022-02-09 ENCOUNTER — OFFICE VISIT (OUTPATIENT)
Dept: INTERNAL MEDICINE | Facility: CLINIC | Age: 67
End: 2022-02-09

## 2022-02-09 VITALS
HEIGHT: 76 IN | WEIGHT: 312 LBS | DIASTOLIC BLOOD PRESSURE: 84 MMHG | OXYGEN SATURATION: 95 % | TEMPERATURE: 98 F | RESPIRATION RATE: 16 BRPM | SYSTOLIC BLOOD PRESSURE: 140 MMHG | BODY MASS INDEX: 37.99 KG/M2 | HEART RATE: 68 BPM

## 2022-02-09 DIAGNOSIS — R42 DIZZINESS: ICD-10-CM

## 2022-02-09 DIAGNOSIS — Q17.8 EUSTACHIAN TUBE ANOMALY: Primary | ICD-10-CM

## 2022-02-09 PROCEDURE — 99214 OFFICE O/P EST MOD 30 MIN: CPT | Performed by: INTERNAL MEDICINE

## 2022-02-09 RX ORDER — GRANULES FOR ORAL 3 G/1
POWDER ORAL
COMMUNITY
Start: 2021-11-24 | End: 2022-04-19

## 2022-02-09 RX ORDER — DOXYCYCLINE HYCLATE 100 MG/1
100 CAPSULE ORAL 2 TIMES DAILY
Qty: 20 CAPSULE | Refills: 0 | Status: SHIPPED | OUTPATIENT
Start: 2022-02-09 | End: 2022-04-18

## 2022-02-09 RX ORDER — FLUTICASONE PROPIONATE 50 MCG
2 SPRAY, SUSPENSION (ML) NASAL DAILY
Qty: 1 ML | Refills: 11 | Status: SHIPPED | OUTPATIENT
Start: 2022-02-09 | End: 2022-04-29

## 2022-02-09 RX ORDER — HYDROCHLOROTHIAZIDE 12.5 MG/1
TABLET ORAL
COMMUNITY
Start: 2021-11-30 | End: 2022-04-29

## 2022-02-09 NOTE — PROGRESS NOTES
Subjective     Patient ID: Andre Agosto is a 67 y.o. male. Patient is here for management of multiple medical problems.     Chief Complaint   Patient presents with   • Dizziness     History of Present Illness     Dizziness    Come home from ball game.  Dizziness. Moving to the right.    Nausea.   Seen in [facility].     The pull has slowed.            The following portions of the patient's history were reviewed and updated as appropriate: allergies, current medications, past family history, past medical history, past social history, past surgical history and problem list.    Review of Systems   Respiratory: Negative for shortness of breath.    Musculoskeletal: Negative for joint swelling.   Psychiatric/Behavioral: Negative for self-injury.       Current Outpatient Medications:   •  amiodarone (PACERONE) 200 MG tablet, Take 200 mg by mouth Daily., Disp: , Rfl:   •  amLODIPine (NORVASC) 10 MG tablet, Take 10 mg by mouth Daily., Disp: , Rfl:   •  B Complex-C (B-complex with vitamin C) tablet, Take 1 tablet by mouth Daily., Disp: 90 tablet, Rfl: 3  •  carvedilol (Coreg) 25 MG tablet, Take 1 tablet by mouth 2 (Two) Times a Day With Meals., Disp: 180 tablet, Rfl: 3  •  clopidogrel (PLAVIX) 75 MG tablet, Take 75 mg by mouth Every Other Day. Hold 5 days prior to surgery and take baby asa   LAST DOSE oCTOBER 15, Disp: , Rfl:   •  divalproex (DEPAKOTE) 250 MG DR tablet, Take 2 tablets in the AM, 1 tablet at noon, and 2 tablets in the pm. Per DR Clifford. (Patient taking differently: Take 250 mg by mouth 2 (Two) Times a Day. Take 3 tablets in the AM  and 2 tablets in the pm. Per DR Cliffrod.), Disp: 450 tablet, Rfl: 3  •  docusate sodium (Colace) 100 MG capsule, Take 1 capsule by mouth 2 (Two) Times a Day if taking oxycodone, Disp: 15 capsule, Rfl: 1  •  Entresto  MG tablet, Take  by mouth Daily., Disp: , Rfl:   •  Farxiga 10 MG tablet, Take 10 mg by mouth Daily., Disp: , Rfl:   •  fluconazole (Diflucan) 100 MG tablet, Take  "1 tablet by mouth Daily., Disp: 1 tablet, Rfl: 0  •  folic acid (FOLVITE) 1 MG tablet, Take one daily except on the day of taking Methotrexate. (Patient taking differently: 1 mg. Take one daily except on the day of taking Methotrexate.), Disp: 30 tablet, Rfl: 11  •  fosfomycin (MONUROL) 3 g pack, , Disp: , Rfl:   •  furosemide (LASIX) 40 MG tablet, Take 1 tablet by mouth Daily., Disp: 90 tablet, Rfl: 3  •  glucose monitor monitoring kit, 1 each As Needed (bs)., Disp: 1 each, Rfl: 1  •  hydroCHLOROthiazide (HYDRODIURIL) 12.5 MG tablet, , Disp: , Rfl:   •  levothyroxine (SYNTHROID, LEVOTHROID) 88 MCG tablet, Take 1 tablet by mouth Daily., Disp: 90 tablet, Rfl: 3  •  lovastatin (MEVACOR) 40 MG tablet, Take 1 tablet by mouth Daily With Dinner. Pt can tolerat lovastatin., Disp: 90 tablet, Rfl: 3  •  Semaglutide, 1 MG/DOSE, (OZEMPIC) 2 MG/1.5ML solution pen-injector, Inject 1 mg under the skin into the appropriate area as directed 1 (One) Time Per Week., Disp: 3 pen, Rfl: 3  •  tamsulosin (FLOMAX) 0.4 MG capsule 24 hr capsule, Take 1 capsule by mouth Every Night., Disp: 10 capsule, Rfl: 0  •  Xarelto 15 MG tablet, Take 15 mg by mouth Daily With Dinner., Disp: , Rfl:   •  doxycycline (VIBRAMYCIN) 100 MG capsule, Take 1 capsule by mouth 2 (Two) Times a Day., Disp: 20 capsule, Rfl: 0  •  fluticasone (Flonase) 50 MCG/ACT nasal spray, 2 sprays into the nostril(s) as directed by provider Daily., Disp: 1 mL, Rfl: 11    Objective      Blood pressure 140/84, pulse 68, temperature 98 °F (36.7 °C), resp. rate 16, height 193 cm (76\"), weight (!) 142 kg (312 lb), SpO2 95 %.    Physical Exam     General Appearance:    Alert, cooperative, no distress, appears stated age   Head:    Normocephalic, without obvious abnormality, atraumatic   Eyes:    PERRL, conjunctiva/corneas clear, EOM's intact   Ears:    Normal TM's and external ear canals, both ears   Nose:   Nares normal, septum midline, mucosa normal, no drainage   or sinus tenderness "   Throat:   Lips, mucosa, and tongue normal; teeth and gums normal   Neck:   Supple, symmetrical, trachea midline, no adenopathy;        thyroid:  No enlargement/tenderness/nodules; no carotid    bruit or JVD   Back:     Symmetric, no curvature, ROM normal, no CVA tenderness   Lungs:     Clear to auscultation bilaterally, respirations unlabored   Chest wall:    No tenderness or deformity   Heart:    Regular rate and rhythm, S1 and S2 normal, no murmur,        rub or gallop   Abdomen:     Soft, non-tender, bowel sounds active all four quadrants,     no masses, no organomegaly   Extremities:   Extremities normal, atraumatic, no cyanosis or edema   Pulses:   2+ and symmetric all extremities   Skin:   Skin color, texture, turgor normal, no rashes or lesions   Lymph nodes:   Cervical, supraclavicular, and axillary nodes normal   Neurologic:   CNII-XII intact. Normal strength, sensation and reflexes       throughout      Results for orders placed or performed in visit on 01/10/22   COVID-19 PCR, [facility], NP SWAB IN [facility] VIRAL TRANSPORT MEDIA/ORAL SWISH 24-30 HR TAT - Swab, Nasopharynx    Specimen: Nasopharynx; Swab   Result Value Ref Range    SARS-CoV-2 ILAN Not Detected Not Detected         Assessment/Plan       Diagnoses and all orders for this visit:    1. Eustachian tube anomaly (Primary)    2. Dizziness    Other orders  -     fluticasone (Flonase) 50 MCG/ACT nasal spray; 2 sprays into the nostril(s) as directed by provider Daily.  Dispense: 1 mL; Refill: 11  -     doxycycline (VIBRAMYCIN) 100 MG capsule; Take 1 capsule by mouth 2 (Two) Times a Day.  Dispense: 20 capsule; Refill: 0      No follow-ups on file.          There are no Patient Instructions on file for this visit.     Keven Bear MD    Assessment/Plan

## 2022-02-11 ENCOUNTER — APPOINTMENT (OUTPATIENT)
Dept: CARDIAC REHAB | Facility: HOSPITAL | Age: 67
End: 2022-02-11

## 2022-02-14 ENCOUNTER — TREATMENT (OUTPATIENT)
Dept: CARDIAC REHAB | Facility: HOSPITAL | Age: 67
End: 2022-02-14

## 2022-02-14 DIAGNOSIS — Z95.5 S/P PRIMARY ANGIOPLASTY WITH CORONARY STENT: Primary | ICD-10-CM

## 2022-02-14 DIAGNOSIS — I21.3 ST ELEVATION MYOCARDIAL INFARCTION (STEMI), UNSPECIFIED ARTERY: ICD-10-CM

## 2022-02-14 PROCEDURE — 93798 PHYS/QHP OP CAR RHAB W/ECG: CPT

## 2022-02-14 RX ORDER — HYDROXYZINE HYDROCHLORIDE 25 MG/1
25 TABLET, FILM COATED ORAL 3 TIMES DAILY PRN
Qty: 30 TABLET | Refills: 0 | Status: SHIPPED | OUTPATIENT
Start: 2022-02-14 | End: 2022-09-12

## 2022-02-14 NOTE — PROGRESS NOTES
Pt was seen today in [facility] for a Phase 2 visit.  Vital signs and session notes recorded in [employer] and will be scanned into Epic by HIM.

## 2022-02-18 ENCOUNTER — APPOINTMENT (OUTPATIENT)
Dept: CARDIAC REHAB | Facility: HOSPITAL | Age: 67
End: 2022-02-18

## 2022-02-21 ENCOUNTER — APPOINTMENT (OUTPATIENT)
Dept: CARDIAC REHAB | Facility: HOSPITAL | Age: 67
End: 2022-02-21

## 2022-02-22 ENCOUNTER — TELEPHONE (OUTPATIENT)
Dept: NEUROLOGY | Facility: CLINIC | Age: 67
End: 2022-02-22

## 2022-02-22 NOTE — TELEPHONE ENCOUNTER
Elizabeth from [facility] stated they got the fax however at the bottom where the provider signature is its missing.     She is asking for that form to be refaxed again corrected     Fax: [phone]    Please advise

## 2022-02-25 ENCOUNTER — APPOINTMENT (OUTPATIENT)
Dept: CARDIAC REHAB | Facility: HOSPITAL | Age: 67
End: 2022-02-25

## 2022-02-28 RX ORDER — DULAGLUTIDE 3 MG/.5ML
3 INJECTION, SOLUTION SUBCUTANEOUS WEEKLY
Qty: 1 PEN | Refills: 11 | Status: SHIPPED | OUTPATIENT
Start: 2022-02-28 | End: 2022-03-07 | Stop reason: SDUPTHER

## 2022-03-01 DIAGNOSIS — Z20.822 CLOSE EXPOSURE TO COVID-19 VIRUS: Primary | ICD-10-CM

## 2022-03-07 RX ORDER — DULAGLUTIDE 3 MG/.5ML
3 INJECTION, SOLUTION SUBCUTANEOUS WEEKLY
Qty: 5 PEN | Refills: 3 | Status: SHIPPED | OUTPATIENT
Start: 2022-03-07 | End: 2022-08-18

## 2022-03-15 ENCOUNTER — LAB (OUTPATIENT)
Dept: LAB | Facility: HOSPITAL | Age: 67
End: 2022-03-15

## 2022-03-15 LAB
ALBUMIN SERPL-MCNC: 4.5 G/DL (ref 3.5–5.2)
ALBUMIN/GLOB SERPL: 1.3 G/DL
ALP SERPL-CCNC: 66 U/L (ref 39–117)
ALT SERPL W P-5'-P-CCNC: 14 U/L (ref 1–41)
ANION GAP SERPL CALCULATED.3IONS-SCNC: 12 MMOL/L (ref 5–15)
AST SERPL-CCNC: 18 U/L (ref 1–40)
BASOPHILS # BLD AUTO: 0.05 10*3/MM3 (ref 0–0.2)
BASOPHILS NFR BLD AUTO: 0.7 % (ref 0–1.5)
BILIRUB SERPL-MCNC: 0.5 MG/DL (ref 0–1.2)
BUN SERPL-MCNC: 13 MG/DL (ref 8–23)
BUN/CREAT SERPL: 10.5 (ref 7–25)
CALCIUM SPEC-SCNC: 9.6 MG/DL (ref 8.6–10.5)
CHLORIDE SERPL-SCNC: 101 MMOL/L (ref 98–107)
CHOLEST SERPL-MCNC: 166 MG/DL (ref 0–200)
CO2 SERPL-SCNC: 27 MMOL/L (ref 22–29)
CREAT SERPL-MCNC: 1.24 MG/DL (ref 0.76–1.27)
DEPRECATED RDW RBC AUTO: 46 FL (ref 37–54)
EGFRCR SERPLBLD CKD-EPI 2021: 64.1 ML/MIN/1.73
EOSINOPHIL # BLD AUTO: 0.13 10*3/MM3 (ref 0–0.4)
EOSINOPHIL NFR BLD AUTO: 1.9 % (ref 0.3–6.2)
ERYTHROCYTE [DISTWIDTH] IN BLOOD BY AUTOMATED COUNT: 14.2 % (ref 12.3–15.4)
GLOBULIN UR ELPH-MCNC: 3.5 GM/DL
GLUCOSE SERPL-MCNC: 120 MG/DL (ref 65–99)
HCT VFR BLD AUTO: 51.8 % (ref 37.5–51)
HDLC SERPL-MCNC: 47 MG/DL (ref 40–60)
HGB BLD-MCNC: 17.2 G/DL (ref 13–17.7)
IMM GRANULOCYTES # BLD AUTO: 0.01 10*3/MM3 (ref 0–0.05)
IMM GRANULOCYTES NFR BLD AUTO: 0.1 % (ref 0–0.5)
LDLC SERPL CALC-MCNC: 86 MG/DL (ref 0–100)
LDLC/HDLC SERPL: 1.71 {RATIO}
LYMPHOCYTES # BLD AUTO: 2.14 10*3/MM3 (ref 0.7–3.1)
LYMPHOCYTES NFR BLD AUTO: 31.6 % (ref 19.6–45.3)
MCH RBC QN AUTO: 29.7 PG (ref 26.6–33)
MCHC RBC AUTO-ENTMCNC: 33.2 G/DL (ref 31.5–35.7)
MCV RBC AUTO: 89.3 FL (ref 79–97)
MONOCYTES # BLD AUTO: 0.46 10*3/MM3 (ref 0.1–0.9)
MONOCYTES NFR BLD AUTO: 6.8 % (ref 5–12)
NEUTROPHILS NFR BLD AUTO: 3.99 10*3/MM3 (ref 1.7–7)
NEUTROPHILS NFR BLD AUTO: 58.9 % (ref 42.7–76)
NRBC BLD AUTO-RTO: 0 /100 WBC (ref 0–0.2)
PLATELET # BLD AUTO: 210 10*3/MM3 (ref 140–450)
PMV BLD AUTO: 12.7 FL (ref 6–12)
POTASSIUM SERPL-SCNC: 3.8 MMOL/L (ref 3.5–5.2)
PROT SERPL-MCNC: 8 G/DL (ref 6–8.5)
RBC # BLD AUTO: 5.8 10*6/MM3 (ref 4.14–5.8)
SODIUM SERPL-SCNC: 140 MMOL/L (ref 136–145)
TRIGL SERPL-MCNC: 194 MG/DL (ref 0–150)
VLDLC SERPL-MCNC: 33 MG/DL (ref 5–40)
WBC NRBC COR # BLD: 6.78 10*3/MM3 (ref 3.4–10.8)

## 2022-03-15 PROCEDURE — 85025 COMPLETE CBC W/AUTO DIFF WBC: CPT | Performed by: INTERNAL MEDICINE

## 2022-03-15 PROCEDURE — 84439 ASSAY OF FREE THYROXINE: CPT | Performed by: INTERNAL MEDICINE

## 2022-03-15 PROCEDURE — G0103 PSA SCREENING: HCPCS | Performed by: INTERNAL MEDICINE

## 2022-03-15 PROCEDURE — 82607 VITAMIN B-12: CPT | Performed by: INTERNAL MEDICINE

## 2022-03-15 PROCEDURE — 83036 HEMOGLOBIN GLYCOSYLATED A1C: CPT | Performed by: INTERNAL MEDICINE

## 2022-03-15 PROCEDURE — 80053 COMPREHEN METABOLIC PANEL: CPT | Performed by: INTERNAL MEDICINE

## 2022-03-15 PROCEDURE — 80061 LIPID PANEL: CPT | Performed by: INTERNAL MEDICINE

## 2022-03-15 PROCEDURE — 84154 ASSAY OF PSA FREE: CPT | Performed by: UROLOGY

## 2022-03-15 PROCEDURE — 84443 ASSAY THYROID STIM HORMONE: CPT | Performed by: INTERNAL MEDICINE

## 2022-03-15 PROCEDURE — 84153 ASSAY OF PSA TOTAL: CPT | Performed by: UROLOGY

## 2022-03-15 PROCEDURE — 36415 COLL VENOUS BLD VENIPUNCTURE: CPT | Performed by: INTERNAL MEDICINE

## 2022-03-16 LAB
HBA1C MFR BLD: 6.2 % (ref 4.8–5.6)
PSA SERPL-MCNC: 6.12 NG/ML (ref 0–4)
T4 FREE SERPL-MCNC: 1.61 NG/DL (ref 0.93–1.7)
TSH SERPL DL<=0.05 MIU/L-ACNC: 1.53 UIU/ML (ref 0.27–4.2)
VIT B12 BLD-MCNC: 501 PG/ML (ref 211–946)

## 2022-03-17 DIAGNOSIS — Z12.5 PROSTATE CANCER SCREENING: ICD-10-CM

## 2022-03-17 DIAGNOSIS — E11.9 DIABETES MELLITUS WITHOUT COMPLICATION: Primary | ICD-10-CM

## 2022-03-30 DIAGNOSIS — E03.9 ACQUIRED HYPOTHYROIDISM: ICD-10-CM

## 2022-03-30 DIAGNOSIS — I10 ESSENTIAL HYPERTENSION: ICD-10-CM

## 2022-03-30 RX ORDER — LEVOTHYROXINE SODIUM 88 UG/1
TABLET ORAL
Qty: 90 TABLET | Refills: 3 | Status: SHIPPED | OUTPATIENT
Start: 2022-03-30 | End: 2023-03-27

## 2022-03-31 DIAGNOSIS — E78.2 MIXED HYPERLIPIDEMIA: Primary | ICD-10-CM

## 2022-04-01 RX ORDER — LOVASTATIN 40 MG/1
TABLET ORAL
Qty: 90 TABLET | Refills: 3 | Status: SHIPPED | OUTPATIENT
Start: 2022-04-01 | End: 2022-04-19

## 2022-04-01 NOTE — TELEPHONE ENCOUNTER
Rx Refill Note  Requested Prescriptions     Pending Prescriptions Disp Refills   • lovastatin (MEVACOR) 40 MG tablet [Pharmacy Med Name: LOVASTATIN TABS 40MG] 90 tablet 3     Sig: TAKE 1 TABLET DAILY WITH DINNER      Last office visit with prescribing clinician: 2/9/2022      Next office visit with prescribing clinician: Visit date not found            SAVANA DUNHAM MA  04/01/22, 10:01 EDT

## 2022-04-11 RX ORDER — DIVALPROEX SODIUM 250 MG/1
TABLET, DELAYED RELEASE ORAL
Qty: 450 TABLET | Refills: 3 | Status: SHIPPED | OUTPATIENT
Start: 2022-04-11 | End: 2022-09-12

## 2022-04-14 ENCOUNTER — TELEMEDICINE (OUTPATIENT)
Dept: INTERNAL MEDICINE | Facility: CLINIC | Age: 67
End: 2022-04-14

## 2022-04-14 ENCOUNTER — LAB (OUTPATIENT)
Dept: LAB | Facility: HOSPITAL | Age: 67
End: 2022-04-14

## 2022-04-14 DIAGNOSIS — T46.6X5A STATIN-INDUCED MYOSITIS: Primary | ICD-10-CM

## 2022-04-14 DIAGNOSIS — M60.9 STATIN-INDUCED MYOSITIS: Primary | ICD-10-CM

## 2022-04-14 PROBLEM — H52.4 PRESBYOPIA: Status: ACTIVE | Noted: 2022-04-14

## 2022-04-14 LAB
BACTERIA UR QL AUTO: ABNORMAL /HPF
BILIRUB UR QL STRIP: NEGATIVE
CLARITY UR: CLEAR
COLOR UR: YELLOW
GLUCOSE UR STRIP-MCNC: ABNORMAL MG/DL
HGB UR QL STRIP.AUTO: ABNORMAL
HYALINE CASTS UR QL AUTO: ABNORMAL /LPF
KETONES UR QL STRIP: ABNORMAL
LEUKOCYTE ESTERASE UR QL STRIP.AUTO: NEGATIVE
NITRITE UR QL STRIP: NEGATIVE
PH UR STRIP.AUTO: 6.5 [PH] (ref 5–8)
PROT UR QL STRIP: ABNORMAL
RBC # UR STRIP: ABNORMAL /HPF
REF LAB TEST METHOD: ABNORMAL
SP GR UR STRIP: 1.03 (ref 1–1.03)
SQUAMOUS #/AREA URNS HPF: ABNORMAL /HPF
UROBILINOGEN UR QL STRIP: ABNORMAL
WBC # UR STRIP: ABNORMAL /HPF

## 2022-04-14 PROCEDURE — 83874 ASSAY OF MYOGLOBIN: CPT | Performed by: INTERNAL MEDICINE

## 2022-04-14 PROCEDURE — G0103 PSA SCREENING: HCPCS | Performed by: INTERNAL MEDICINE

## 2022-04-14 PROCEDURE — 81001 URINALYSIS AUTO W/SCOPE: CPT | Performed by: INTERNAL MEDICINE

## 2022-04-14 PROCEDURE — 36415 COLL VENOUS BLD VENIPUNCTURE: CPT | Performed by: INTERNAL MEDICINE

## 2022-04-14 PROCEDURE — 99214 OFFICE O/P EST MOD 30 MIN: CPT | Performed by: INTERNAL MEDICINE

## 2022-04-14 PROCEDURE — 82085 ASSAY OF ALDOLASE: CPT | Performed by: INTERNAL MEDICINE

## 2022-04-14 RX ORDER — DOXYCYCLINE HYCLATE 100 MG/1
100 CAPSULE ORAL 2 TIMES DAILY
Qty: 20 CAPSULE | Refills: 0 | Status: SHIPPED | OUTPATIENT
Start: 2022-04-14 | End: 2022-04-29

## 2022-04-14 NOTE — PROGRESS NOTES
You have chosen to receive care through a telehealth visit.  Do you consent to use a video/audio connection for your medical care today? Yes  People present in this video visit:  Andre Agosto (Patient)  Debbie Xiong, [facility]  Keven Weathers  Subjective     Patient ID: Andre Agosto is a 67 y.o. male. Patient is here for management of multiple medical problems.     Chief Complaint   Patient presents with   • Extremity Weakness   • Fatigue   • Chills     History of Present Illness       Extreme weakness.   Feeling some better today  Since Monday. All extermities sore and hurt.    On lovastatin.    Hx of statin myopoathy.     Pt called me at home last night concered about stiffness and weaknees. instricted to go to ER in [place] at his current location. Didn't go. On his way home. More hx and looking over the chart. UTI vs statin myopathy likely      The following portions of the patient's history were reviewed and updated as appropriate: allergies, current medications, past family history, past medical history, past social history, past surgical history and problem list.    Review of Systems    Current Outpatient Medications:   •  amiodarone (PACERONE) 200 MG tablet, Take 200 mg by mouth Daily., Disp: , Rfl:   •  amLODIPine (NORVASC) 10 MG tablet, Take 10 mg by mouth Daily., Disp: , Rfl:   •  B Complex-C (B-complex with vitamin C) tablet, Take 1 tablet by mouth Daily., Disp: 90 tablet, Rfl: 3  •  carvedilol (Coreg) 25 MG tablet, Take 1 tablet by mouth 2 (Two) Times a Day With Meals., Disp: 180 tablet, Rfl: 3  •  clopidogrel (PLAVIX) 75 MG tablet, Take 75 mg by mouth Every Other Day. Hold 5 days prior to surgery and take baby asa   LAST DOSE oCTOBER 15, Disp: , Rfl:   •  divalproex (DEPAKOTE) 250 MG DR tablet, TAKE 2 TABLETS IN THE MORNING 1 TABLET AT NOON AND 2 TABLETS IN THE EVENING PER DR VALENTINO, Disp: 450 tablet, Rfl: 3  •  docusate sodium (Colace) 100 MG capsule, Take 1 capsule by mouth 2 (Two) Times a Day if taking  oxycodone, Disp: 15 capsule, Rfl: 1  •  doxycycline (VIBRAMYCIN) 100 MG capsule, Take 1 capsule by mouth 2 (Two) Times a Day., Disp: 20 capsule, Rfl: 0  •  Dulaglutide (Trulicity) 3 MG/0.5ML solution pen-injector, Inject 0.5 mL under the skin into the appropriate area as directed 1 (One) Time Per Week., Disp: 5 pen, Rfl: 3  •  Entresto  MG tablet, Take  by mouth Daily., Disp: , Rfl:   •  Farxiga 10 MG tablet, Take 10 mg by mouth Daily., Disp: , Rfl:   •  fluconazole (Diflucan) 100 MG tablet, Take 1 tablet by mouth Daily., Disp: 1 tablet, Rfl: 0  •  fluticasone (Flonase) 50 MCG/ACT nasal spray, 2 sprays into the nostril(s) as directed by provider Daily., Disp: 1 mL, Rfl: 11  •  folic acid (FOLVITE) 1 MG tablet, Take one daily except on the day of taking Methotrexate. (Patient taking differently: 1 mg. Take one daily except on the day of taking Methotrexate.), Disp: 30 tablet, Rfl: 11  •  fosfomycin (MONUROL) 3 g pack, , Disp: , Rfl:   •  furosemide (LASIX) 40 MG tablet, Take 1 tablet by mouth Daily., Disp: 90 tablet, Rfl: 3  •  glucose monitor monitoring kit, 1 each As Needed (bs)., Disp: 1 each, Rfl: 1  •  hydroCHLOROthiazide (HYDRODIURIL) 12.5 MG tablet, , Disp: , Rfl:   •  hydrOXYzine (ATARAX) 25 MG tablet, Take 1 tablet by mouth 3 (Three) Times a Day As Needed (motion sickness)., Disp: 30 tablet, Rfl: 0  •  levothyroxine (SYNTHROID, LEVOTHROID) 88 MCG tablet, TAKE 1 TABLET DAILY, Disp: 90 tablet, Rfl: 3  •  lovastatin (MEVACOR) 40 MG tablet, TAKE 1 TABLET DAILY WITH DINNER, Disp: 90 tablet, Rfl: 3  •  tamsulosin (FLOMAX) 0.4 MG capsule 24 hr capsule, Take 1 capsule by mouth Every Night., Disp: 10 capsule, Rfl: 0  •  Xarelto 15 MG tablet, Take 15 mg by mouth Daily With Dinner., Disp: , Rfl:   •  doxycycline (VIBRAMYCIN) 100 MG capsule, Take 1 capsule by mouth 2 (Two) Times a Day., Disp: 20 capsule, Rfl: 0    Objective      There were no vitals taken for this visit.    Physical Exam     General Appearance:     Alert, cooperative, no distress, appears stated age   Head:    Normocephalic, without obvious abnormality, atraumatic   Eyes:    PERRL, conjunctiva/corneas clear, EOM's intact   Ears:    Normal TM's and external ear canals, both ears   Nose:   Nares normal, septum midline, mucosa normal, no drainage   or sinus tenderness   Throat:   Lips, mucosa, and tongue normal; teeth and gums normal   Neck:   Supple, symmetrical, trachea midline, no adenopathy;        thyroid:  No enlargement/tenderness/nodules; no carotid    bruit or JVD   Back:     Symmetric, no curvature, ROM normal, no CVA tenderness   Lungs:     Clear to auscultation bilaterally, respirations unlabored   Chest wall:    No tenderness or deformity   Heart:    Regular rate and rhythm, S1 and S2 normal, no murmur,        rub or gallop   Abdomen:     Soft, non-tender, bowel sounds active all four quadrants,     no masses, no organomegaly   Extremities:   Extremities normal, atraumatic, no cyanosis or edema   Pulses:   2+ and symmetric all extremities   Skin:   Skin color, texture, turgor normal, no rashes or lesions   Lymph nodes:   Cervical, supraclavicular, and axillary nodes normal   Neurologic:   CNII-XII intact. Normal strength, sensation and reflexes       throughout      Results for orders placed or performed in visit on 02/01/22   PSA, Total & Free    Specimen: Blood   Result Value Ref Range    PSA 6.0 (H) 0.0 - 4.0 ng/mL    PSA, Free 1.24 N/A ng/mL    PSA, Free % 20.7 %         Assessment/Plan   Pt called me at home last night concered about stiffness and weaknees. instricted to go to ER in [place] at his current location. Didn't go. On his way home. More hx and looking over the chart. UTI vs statin myopathy likely    Unable to complete visit using a video connection to the patient. A phone visit was used to complete this visits. Total time of discussion was 15  minutes.    Diagnoses and all orders for this visit:    1. Statin-induced myositis (Primary)  -      CK  -     Aldolase  -     Myoglobin, Serum    Other orders  -     doxycycline (VIBRAMYCIN) 100 MG capsule; Take 1 capsule by mouth 2 (Two) Times a Day.  Dispense: 20 capsule; Refill: 0      No follow-ups on file.          There are no Patient Instructions on file for this visit.     Keven Bear MD    Assessment/Plan     e, MD

## 2022-04-15 LAB
MYOGLOBIN SERPL-MCNC: 24.4 NG/ML (ref 28–72)
PSA SERPL-MCNC: 4.11 NG/ML (ref 0–4)

## 2022-04-18 ENCOUNTER — OFFICE VISIT (OUTPATIENT)
Dept: UROLOGY | Facility: CLINIC | Age: 67
End: 2022-04-18

## 2022-04-18 VITALS
SYSTOLIC BLOOD PRESSURE: 128 MMHG | OXYGEN SATURATION: 98 % | BODY MASS INDEX: 37.99 KG/M2 | WEIGHT: 312 LBS | DIASTOLIC BLOOD PRESSURE: 78 MMHG | HEART RATE: 74 BPM | HEIGHT: 76 IN

## 2022-04-18 DIAGNOSIS — R97.20 ELEVATED PSA: ICD-10-CM

## 2022-04-18 DIAGNOSIS — R31.0 GROSS HEMATURIA: Primary | ICD-10-CM

## 2022-04-18 LAB
ALDOLASE SERPL-CCNC: 14.6 U/L (ref 3.3–10.3)
BILIRUB BLD-MCNC: NEGATIVE MG/DL
CLARITY, POC: ABNORMAL
COLOR UR: ABNORMAL
EXPIRATION DATE: ABNORMAL
GLUCOSE UR STRIP-MCNC: ABNORMAL MG/DL
KETONES UR QL: NEGATIVE
LEUKOCYTE EST, POC: NEGATIVE
Lab: ABNORMAL
NITRITE UR-MCNC: NEGATIVE MG/ML
PH UR: 7 [PH] (ref 5–8)
PROT UR STRIP-MCNC: ABNORMAL MG/DL
RBC # UR STRIP: NEGATIVE /UL
SP GR UR: 1.01 (ref 1–1.03)
UROBILINOGEN UR QL: NORMAL

## 2022-04-18 PROCEDURE — 82550 ASSAY OF CK (CPK): CPT | Performed by: INTERNAL MEDICINE

## 2022-04-18 PROCEDURE — 81003 URINALYSIS AUTO W/O SCOPE: CPT | Performed by: PHYSICIAN ASSISTANT

## 2022-04-18 PROCEDURE — 80048 BASIC METABOLIC PNL TOTAL CA: CPT | Performed by: INTERNAL MEDICINE

## 2022-04-18 PROCEDURE — 99213 OFFICE O/P EST LOW 20 MIN: CPT | Performed by: PHYSICIAN ASSISTANT

## 2022-04-18 NOTE — PROGRESS NOTES
Chief Complaint   Patient presents with   • Follow-up   • Urinary Frequency        HPI  Mr. Agosto is a 67 y.o. male with history of BPH and nephrolithiasis who presents for follow up.     At this visit, has been having dysuria, as well as profound lower body weakness and muscle pain after a family trip to [place].  He is concerned that his global symptoms could be related to a UTI.    Past Medical History:   Diagnosis Date   • 6th nerve palsy, right     right eye    • Anxiety and depression    • Atrial fibrillation, persistent (HCC) 12/02/2020    on Xarelto   • Coronary artery disease involving native coronary artery of native heart without angina pectoris 09/12/2018    follows w/ Dr. Johnson, on ASA 81mg   • CRI (chronic renal insufficiency), stage 2 (mild)    • CVA (cerebral vascular accident) (HCC)    • Diabetes mellitus (HCC)     doesnt check sugar, type 2    • Disease of thyroid gland    • Double vision     resolved- right eye   • Elevated cholesterol    • Epilepsy (HCC)     right arm focal seizures   • Focal seizure (HCC)     of right arm    • Heart attack (HCC)     NSTEMI 2015, s/p stenting   • History of Helicobacter pylori infection     in 2008, treated w/ PrevPak - 2021 EGD bx (+), PCP RX - PPI/Augmentin/Doxy/Flagyl (x 14 days) 1/22/21   • HL (hearing loss)     (L) ear - child luevano measles   • Hyperlipidemia    • Hypertension    • Kidney stone    • Kidney stone    • Memory loss 2005    d/t meningitis   • Meningitis 2005    unsure of bacterial or viral    • Obesity    • Seizures (HCC)     right arm focal seizures   • Sleep apnea treated with nocturnal BiPAP     compliant with  machine    • Stroke (HCC)     2017/2018   • Ventricular tachycardia (HCC)     3 different times   • Wears glasses        Past Surgical History:   Procedure Laterality Date   • BARIATRIC SURGERY  06/01/2012    Lap nand   • CARDIAC CATHETERIZATION N/A 10/12/2018    Procedure: Left Heart Cath;  Surgeon: Yoselin Johnson  MD;  Location:  [facility];  Service: Cardiology   • CARDIAC CATHETERIZATION  03/2021    stent   • CARDIAC CATHETERIZATION  07/2021    just checking the after pacemaker placed- Dr. Tim   • CARDIAC DEFIBRILLATOR PLACEMENT     • COLONOSCOPY  10/2020    w/ kelsi Gaspar colon polyps   • CORONARY STENT PLACEMENT  2015   • LAPAROSCOPIC GASTRIC BANDING  2008    s/p LAGB Realize 6/2008 by HOMERO.   • PACEMAKER IMPLANTATION  07/25/2021   • UMBILICAL HERNIA REPAIR  2008    incarcerated UHR w/ mesh by Dr. Velasquez   • URETEROSCOPY LASER LITHOTRIPSY WITH STENT INSERTION Left 10/20/2021    Procedure: URETEROSCOPY LASER LITHOTRIPSY WITH STENT INSERTION;  Surgeon: Tonio De La Cruz MD;  Location: [facility];  Service: Urology;  Laterality: Left;   • URETEROSCOPY LASER LITHOTRIPSY WITH STENT INSERTION Left 11/3/2021    Procedure: URETEROSCOPY LEFT, LEFT RETROGRADE PYELOGRAM, CYSTOSCOPY, LASER LITHOTRIPSY WITH STENT EXCHANGE;  Surgeon: Tonio De La Cruz MD;  Location: [facility];  Service: Urology;  Laterality: Left;         Current Outpatient Medications:   •  amiodarone (PACERONE) 200 MG tablet, Take 200 mg by mouth Daily., Disp: , Rfl:   •  amLODIPine (NORVASC) 10 MG tablet, Take 10 mg by mouth Daily., Disp: , Rfl:   •  B Complex-C (B-complex with vitamin C) tablet, Take 1 tablet by mouth Daily., Disp: 90 tablet, Rfl: 3  •  carvedilol (Coreg) 25 MG tablet, Take 1 tablet by mouth 2 (Two) Times a Day With Meals., Disp: 180 tablet, Rfl: 3  •  clopidogrel (PLAVIX) 75 MG tablet, Take 75 mg by mouth Every Other Day. Hold 5 days prior to surgery and take baby asa   LAST DOSE oCTOBER 15, Disp: , Rfl:   •  divalproex (DEPAKOTE) 250 MG DR tablet, TAKE 2 TABLETS IN THE MORNING 1 TABLET AT NOON AND 2 TABLETS IN THE EVENING PER DR VALENTINO, Disp: 450 tablet, Rfl: 3  •  docusate sodium (Colace) 100 MG capsule, Take 1 capsule by mouth 2 (Two) Times a Day if taking oxycodone, Disp: 15 capsule, Rfl: 1  •  doxycycline (VIBRAMYCIN)  "100 MG capsule, Take 1 capsule by mouth 2 (Two) Times a Day., Disp: 20 capsule, Rfl: 0  •  Dulaglutide (Trulicity) 3 MG/0.5ML solution pen-injector, Inject 0.5 mL under the skin into the appropriate area as directed 1 (One) Time Per Week., Disp: 5 pen, Rfl: 3  •  Entresto  MG tablet, Take  by mouth Daily., Disp: , Rfl:   •  Farxiga 10 MG tablet, Take 10 mg by mouth Daily., Disp: , Rfl:   •  fluconazole (Diflucan) 100 MG tablet, Take 1 tablet by mouth Daily., Disp: 1 tablet, Rfl: 0  •  fluticasone (Flonase) 50 MCG/ACT nasal spray, 2 sprays into the nostril(s) as directed by provider Daily., Disp: 1 mL, Rfl: 11  •  folic acid (FOLVITE) 1 MG tablet, Take one daily except on the day of taking Methotrexate. (Patient taking differently: 1 mg. Take one daily except on the day of taking Methotrexate.), Disp: 30 tablet, Rfl: 11  •  furosemide (LASIX) 40 MG tablet, Take 1 tablet by mouth Daily., Disp: 90 tablet, Rfl: 3  •  glucose monitor monitoring kit, 1 each As Needed (bs)., Disp: 1 each, Rfl: 1  •  hydroCHLOROthiazide (HYDRODIURIL) 12.5 MG tablet, , Disp: , Rfl:   •  hydrOXYzine (ATARAX) 25 MG tablet, Take 1 tablet by mouth 3 (Three) Times a Day As Needed (motion sickness)., Disp: 30 tablet, Rfl: 0  •  levothyroxine (SYNTHROID, LEVOTHROID) 88 MCG tablet, TAKE 1 TABLET DAILY, Disp: 90 tablet, Rfl: 3  •  tamsulosin (FLOMAX) 0.4 MG capsule 24 hr capsule, Take 1 capsule by mouth Every Night., Disp: 10 capsule, Rfl: 0  •  Xarelto 15 MG tablet, Take 15 mg by mouth Daily With Dinner., Disp: , Rfl:      Physical Exam  Visit Vitals  /78   Pulse 74   Ht 193 cm (76\")   Wt (!) 142 kg (312 lb)   SpO2 98%   BMI 37.98 kg/m²       Labs  Brief Urine Lab Results  (Last result in the past 365 days)      Color   Clarity   Blood   Leuk Est   Nitrite   Protein   CREAT   Urine HCG        04/18/22 1521 Straw   Cloudy   Negative   Negative   Negative   3+                 Lab Results   Component Value Date    GLUCOSE 92 04/18/2022    " CALCIUM 9.5 04/18/2022     04/18/2022    K 3.9 04/18/2022    CO2 27.0 04/18/2022     04/18/2022    BUN 11 04/18/2022    CREATININE 1.28 (H) 04/18/2022    EGFRIFAFRI 57 (L) 12/05/2018    EGFRIFNONA 55 (L) 01/10/2022    BCR 8.6 04/18/2022    ANIONGAP 14.0 04/18/2022       Lab Results   Component Value Date    WBC 6.78 03/15/2022    HGB 17.2 03/15/2022    HCT 51.8 (H) 03/15/2022    MCV 89.3 03/15/2022     03/15/2022       Urine Culture    Urine Culture 9/13/21   Urine Culture No growth              Lab Results   Component Value Date    PSA 4.110 (H) 04/14/2022    PSA 6.0 (H) 03/15/2022    PSA 6.120 (H) 03/15/2022         Assessment  67 y.o. male with history of nephrolithiasis presenting with concern for UTI.  His urine reveals large glucosuria and proteinuria.  It is not concerning for an infection.  I have advised him that the glucosuria and proteinuria or abnormal and should be addressed by his primary care provider.  He denies a previous diagnosis of diabetes.  His previous A1c's are also abnormal and he has had glucosuria present for years.    Notably, he also complains of profound lower muscular weakness and discomfort after a trip to [place].  He states his primary care provider has some basic labs standing for him today and he will continue follow-up with him.    Plan  1.  Follow-up as planned in 6 months for KUB and nephrolithiasis surveillance

## 2022-04-19 ENCOUNTER — OFFICE VISIT (OUTPATIENT)
Dept: INTERNAL MEDICINE | Facility: CLINIC | Age: 67
End: 2022-04-19

## 2022-04-19 VITALS
BODY MASS INDEX: 37.51 KG/M2 | HEART RATE: 78 BPM | DIASTOLIC BLOOD PRESSURE: 92 MMHG | OXYGEN SATURATION: 96 % | SYSTOLIC BLOOD PRESSURE: 140 MMHG | HEIGHT: 76 IN | RESPIRATION RATE: 16 BRPM | TEMPERATURE: 98.4 F | WEIGHT: 308 LBS

## 2022-04-19 DIAGNOSIS — R53.1 WEAKNESS: Primary | ICD-10-CM

## 2022-04-19 DIAGNOSIS — T46.6X5A STATIN MYOPATHY: ICD-10-CM

## 2022-04-19 DIAGNOSIS — E79.0 HYPERURICEMIA: ICD-10-CM

## 2022-04-19 DIAGNOSIS — G72.0 STATIN MYOPATHY: ICD-10-CM

## 2022-04-19 DIAGNOSIS — M19.90 ARTHRITIS: ICD-10-CM

## 2022-04-19 PROCEDURE — 99214 OFFICE O/P EST MOD 30 MIN: CPT | Performed by: INTERNAL MEDICINE

## 2022-04-19 NOTE — PROGRESS NOTES
Subjective     Patient ID: Andre Agosto is a 67 y.o. male. Patient is here for management of multiple medical problems.     Chief Complaint   Patient presents with   • Leg Pain   • Extremity Weakness     Bilateral       History of Present Illness     Pt off cholest meds since Thrusday night last week. Weakness and stiffness.   Some better today.  Was in [place] and had excevie waking and then was worse.    Hot show lossens him up and he can move around.    Drinking  Soda at [place].   Now back to [facility] only.              The following portions of the patient's history were reviewed and updated as appropriate: allergies, current medications, past family history, past medical history, past social history, past surgical history and problem list.    Review of Systems    Current Outpatient Medications:   •  amiodarone (PACERONE) 200 MG tablet, Take 200 mg by mouth Daily., Disp: , Rfl:   •  amLODIPine (NORVASC) 10 MG tablet, Take 10 mg by mouth Daily., Disp: , Rfl:   •  B Complex-C (B-complex with vitamin C) tablet, Take 1 tablet by mouth Daily., Disp: 90 tablet, Rfl: 3  •  carvedilol (Coreg) 25 MG tablet, Take 1 tablet by mouth 2 (Two) Times a Day With Meals., Disp: 180 tablet, Rfl: 3  •  clopidogrel (PLAVIX) 75 MG tablet, Take 75 mg by mouth Every Other Day. Hold 5 days prior to surgery and take baby asa   LAST DOSE oCTOBER 15, Disp: , Rfl:   •  divalproex (DEPAKOTE) 250 MG DR tablet, TAKE 2 TABLETS IN THE MORNING 1 TABLET AT NOON AND 2 TABLETS IN THE EVENING PER DR VALENTINO, Disp: 450 tablet, Rfl: 3  •  docusate sodium (Colace) 100 MG capsule, Take 1 capsule by mouth 2 (Two) Times a Day if taking oxycodone, Disp: 15 capsule, Rfl: 1  •  doxycycline (VIBRAMYCIN) 100 MG capsule, Take 1 capsule by mouth 2 (Two) Times a Day., Disp: 20 capsule, Rfl: 0  •  Dulaglutide (Trulicity) 3 MG/0.5ML solution pen-injector, Inject 0.5 mL under the skin into the appropriate area as directed 1 (One) Time Per Week., Disp: 5 pen, Rfl: 3  •   "Entresto  MG tablet, Take  by mouth Daily., Disp: , Rfl:   •  Farxiga 10 MG tablet, Take 10 mg by mouth Daily., Disp: , Rfl:   •  fluconazole (Diflucan) 100 MG tablet, Take 1 tablet by mouth Daily., Disp: 1 tablet, Rfl: 0  •  fluticasone (Flonase) 50 MCG/ACT nasal spray, 2 sprays into the nostril(s) as directed by provider Daily., Disp: 1 mL, Rfl: 11  •  folic acid (FOLVITE) 1 MG tablet, Take one daily except on the day of taking Methotrexate. (Patient taking differently: 1 mg. Take one daily except on the day of taking Methotrexate.), Disp: 30 tablet, Rfl: 11  •  fosfomycin (MONUROL) 3 g pack, , Disp: , Rfl:   •  furosemide (LASIX) 40 MG tablet, Take 1 tablet by mouth Daily., Disp: 90 tablet, Rfl: 3  •  glucose monitor monitoring kit, 1 each As Needed (bs)., Disp: 1 each, Rfl: 1  •  hydroCHLOROthiazide (HYDRODIURIL) 12.5 MG tablet, , Disp: , Rfl:   •  hydrOXYzine (ATARAX) 25 MG tablet, Take 1 tablet by mouth 3 (Three) Times a Day As Needed (motion sickness)., Disp: 30 tablet, Rfl: 0  •  levothyroxine (SYNTHROID, LEVOTHROID) 88 MCG tablet, TAKE 1 TABLET DAILY, Disp: 90 tablet, Rfl: 3  •  tamsulosin (FLOMAX) 0.4 MG capsule 24 hr capsule, Take 1 capsule by mouth Every Night., Disp: 10 capsule, Rfl: 0  •  Xarelto 15 MG tablet, Take 15 mg by mouth Daily With Dinner., Disp: , Rfl:     Objective      Blood pressure 140/92, pulse 78, temperature 98.4 °F (36.9 °C), resp. rate 16, height 193 cm (76\"), weight (!) 140 kg (308 lb), SpO2 96 %.    Physical Exam     General Appearance:    Alert, cooperative, no distress, appears stated age   Head:    Normocephalic, without obvious abnormality, atraumatic   Eyes:    PERRL, conjunctiva/corneas clear, EOM's intact   Ears:    Normal TM's and external ear canals, both ears   Nose:   Nares normal, septum midline, mucosa normal, no drainage   or sinus tenderness   Throat:   Lips, mucosa, and tongue normal; teeth and gums normal   Neck:   Supple, symmetrical, trachea midline, no " adenopathy;        thyroid:  No enlargement/tenderness/nodules; no carotid    bruit or JVD   Back:     Symmetric, no curvature, ROM normal, no CVA tenderness   Lungs:     Clear to auscultation bilaterally, respirations unlabored   Chest wall:    No tenderness or deformity   Heart:    Regular rate and rhythm, S1 and S2 normal, no murmur,        rub or gallop   Abdomen:     Soft, non-tender, bowel sounds active all four quadrants,     no masses, no organomegaly   Extremities:   Extremities normal, atraumatic, no cyanosis or edema   Pulses:   2+ and symmetric all extremities   Skin:   Skin color, texture, turgor normal, no rashes or lesions   Lymph nodes:   Cervical, supraclavicular, and axillary nodes normal   Neurologic:   CNII-XII intact. Normal strength, sensation and reflexes       throughout      Results for orders placed or performed in visit on 04/18/22   POC Urinalysis Dipstick, Automated    Specimen: Urine   Result Value Ref Range    Color Straw Yellow, Straw, Dark Yellow, Misty    Clarity, UA Cloudy (A) Clear    Specific Gravity  1.015 1.005 - 1.030    pH, Urine 7.0 5.0 - 8.0    Leukocytes Negative Negative    Nitrite, UA Negative Negative    Protein, POC 3+ (A) Negative mg/dL    Glucose, UA 3+ (A) Negative, 1000 mg/dL (3+) mg/dL    Ketones, UA Negative Negative    Urobilinogen, UA Normal Normal    Bilirubin Negative Negative    Blood, UA Negative Negative    Lot Number [id number]     Expiration Date 10/21/2023          Assessment/Plan   Pt with multiple etiologies for muscle  Pain and joint pain.  Hx of hyperuricemia.   May have been worsened with vacation.    Aldolase elevated to suggest statin myopathy.     Stop lovastatin 40mg      Diagnoses and all orders for this visit:    1. Weakness (Primary)  -     Uric acid  -     Basic metabolic panel    2. Arthritis  -     Uric acid  -     Basic metabolic panel    3. Statin myopathy  -     Uric acid  -     Basic metabolic panel    4. Hyperuricemia  -     Uric  acid  -     Basic metabolic panel      Return in about 2 weeks (around 5/3/2022).          There are no Patient Instructions on file for this visit.     Keven Bear MD    Assessment/Plan

## 2022-04-25 ENCOUNTER — OFFICE VISIT (OUTPATIENT)
Dept: NEUROLOGY | Facility: CLINIC | Age: 67
End: 2022-04-25

## 2022-04-25 VITALS
DIASTOLIC BLOOD PRESSURE: 92 MMHG | BODY MASS INDEX: 37.51 KG/M2 | TEMPERATURE: 97.8 F | HEART RATE: 63 BPM | WEIGHT: 308 LBS | HEIGHT: 76 IN | OXYGEN SATURATION: 96 % | SYSTOLIC BLOOD PRESSURE: 150 MMHG

## 2022-04-25 DIAGNOSIS — G47.33 OBSTRUCTIVE SLEEP APNEA: Primary | ICD-10-CM

## 2022-04-25 PROCEDURE — 99213 OFFICE O/P EST LOW 20 MIN: CPT | Performed by: NURSE PRACTITIONER

## 2022-04-25 NOTE — PROGRESS NOTES
"     Follow Up Office Visit      Patient Name: Andre Agosto  : 1955   MRN: [id number]     Chief Complaint:    Chief Complaint   Patient presents with   • Follow-up     Patient in office to follow up on alex.          History of Present Illness: Andre Agosto is a 67 y.o. male who is here today to follow up for ALEX and was last seen on 2021.  Currently on VAuto Bipap 12/8cm, AHI 0.6/hour, compliance 93%.  He is tolerating his pressures well.  He is using a full face mask and [facility].  He has no complaints or concerns today.  Additional risk factors- BMI 37, seizure disorder, diabetes, HTN, dyslipidemia, history of CVA, history of MI, SVT, cardiac pacemaker/defrillator in place.   *He has an appointment scheduled, for 2022, with Dr. Rony Clifford, neurologist, for his seizure disorder.     Following taken from previous visit note:  Andre Agosto is a 66 y.o. male who is here today to follow up with ALEX and was last seen on 2021.  Currently on Bipap 15/10cm.  A current compliance report has been requested, from [facility], for review.  He says he is using his machine every night for at least 4 hours.  He is tolerating his pressures well.  He is using a full face mask and [facility].  Additional risk factors- BMI 38, CAD, seizure disorder, HTN, history of CVA, HL, hypothyroidism, atrial fibrillation, pacemaker/defibrillator in place.   *An order for a new Bipap machine was sent to [facility], after previous visit, but he never received a new machine.       Seizures- He says he was diagnosed with, \"Right arm focal seizures\" in  (saw a neurologist at  initially; saw neurology at [facility]; saw neurology at [place]; Most recently seen by Dr. Rony Clifford, neurologist).  He has one of these seizures about once per month, and states, \"It doesn't really affect me\".  He does not lose consciousness with the episodes.  Taking Depakote DR 250mg 3 tablets in the AM and 2 tablets in " the PM-he reports good compliance and toleration.      Subjective      Review of Systems:   Review of Systems   Constitutional: Negative for chills, fatigue and fever.   HENT: Negative for facial swelling, hearing loss, sore throat, tinnitus and trouble swallowing.    Eyes: Negative for blurred vision, double vision, photophobia and visual disturbance.   Respiratory: Positive for apnea. Negative for cough, chest tightness and shortness of breath.    Cardiovascular: Negative for chest pain, palpitations and leg swelling.   Gastrointestinal: Negative for abdominal pain, nausea and vomiting.   Endocrine: Negative for cold intolerance and heat intolerance.   Musculoskeletal: Negative for gait problem, neck pain and neck stiffness.   Skin: Negative for color change and rash.   Allergic/Immunologic: Negative for environmental allergies and food allergies.   Neurological: Negative for dizziness, syncope, speech difficulty, weakness, light-headedness, numbness, headache and memory problem.   Psychiatric/Behavioral: Negative for behavioral problems, sleep disturbance and depressed mood. The patient is not nervous/anxious.        I have reviewed and the following portions of the patient's history were updated as appropriate: past family history, past medical history, past social history, past surgical history and problem list.    Medications:     Current Outpatient Medications:   •  amiodarone (PACERONE) 200 MG tablet, Take 200 mg by mouth Daily., Disp: , Rfl:   •  amLODIPine (NORVASC) 10 MG tablet, Take 10 mg by mouth Daily., Disp: , Rfl:   •  B Complex-C (B-complex with vitamin C) tablet, Take 1 tablet by mouth Daily., Disp: 90 tablet, Rfl: 3  •  carvedilol (Coreg) 25 MG tablet, Take 1 tablet by mouth 2 (Two) Times a Day With Meals., Disp: 180 tablet, Rfl: 3  •  clopidogrel (PLAVIX) 75 MG tablet, Take 75 mg by mouth Every Other Day. Hold 5 days prior to surgery and take baby asa   LAST DOSE oCTOBER 15, Disp: , Rfl:   •   divalproex (DEPAKOTE) 250 MG DR tablet, TAKE 2 TABLETS IN THE MORNING 1 TABLET AT NOON AND 2 TABLETS IN THE EVENING PER DR VALENTINO, Disp: 450 tablet, Rfl: 3  •  docusate sodium (Colace) 100 MG capsule, Take 1 capsule by mouth 2 (Two) Times a Day if taking oxycodone, Disp: 15 capsule, Rfl: 1  •  doxycycline (VIBRAMYCIN) 100 MG capsule, Take 1 capsule by mouth 2 (Two) Times a Day., Disp: 20 capsule, Rfl: 0  •  Dulaglutide (Trulicity) 3 MG/0.5ML solution pen-injector, Inject 0.5 mL under the skin into the appropriate area as directed 1 (One) Time Per Week., Disp: 5 pen, Rfl: 3  •  Entresto  MG tablet, Take  by mouth Daily., Disp: , Rfl:   •  Farxiga 10 MG tablet, Take 10 mg by mouth Daily., Disp: , Rfl:   •  fluconazole (Diflucan) 100 MG tablet, Take 1 tablet by mouth Daily., Disp: 1 tablet, Rfl: 0  •  fluticasone (Flonase) 50 MCG/ACT nasal spray, 2 sprays into the nostril(s) as directed by provider Daily., Disp: 1 mL, Rfl: 11  •  folic acid (FOLVITE) 1 MG tablet, Take one daily except on the day of taking Methotrexate. (Patient taking differently: 1 mg. Take one daily except on the day of taking Methotrexate.), Disp: 30 tablet, Rfl: 11  •  furosemide (LASIX) 40 MG tablet, Take 1 tablet by mouth Daily., Disp: 90 tablet, Rfl: 3  •  glucose monitor monitoring kit, 1 each As Needed (bs)., Disp: 1 each, Rfl: 1  •  hydroCHLOROthiazide (HYDRODIURIL) 12.5 MG tablet, , Disp: , Rfl:   •  hydrOXYzine (ATARAX) 25 MG tablet, Take 1 tablet by mouth 3 (Three) Times a Day As Needed (motion sickness)., Disp: 30 tablet, Rfl: 0  •  levothyroxine (SYNTHROID, LEVOTHROID) 88 MCG tablet, TAKE 1 TABLET DAILY, Disp: 90 tablet, Rfl: 3  •  tamsulosin (FLOMAX) 0.4 MG capsule 24 hr capsule, Take 1 capsule by mouth Every Night., Disp: 10 capsule, Rfl: 0  •  Xarelto 15 MG tablet, Take 15 mg by mouth Daily With Dinner., Disp: , Rfl:     Allergies:   Allergies   Allergen Reactions   • Amlodipine Swelling   • Statins Myalgia and Other (See Comments)  "      Objective     Physical Exam:  Vital Signs:   Vitals:    04/25/22 1501   BP: 150/92   BP Location: Left arm   Patient Position: Sitting   Cuff Size: Adult   Pulse: 63   Temp: 97.8 °F (36.6 °C)   SpO2: 96%   Weight: (!) 140 kg (308 lb)   Height: 193 cm (76\")   PainSc: 0-No pain     Body mass index is 37.49 kg/m².    Physical Exam  Vitals and nursing note reviewed.   Constitutional:       General: He is not in acute distress.     Appearance: He is well-developed. He is obese. He is not diaphoretic.   HENT:      Head: Normocephalic and atraumatic.   Eyes:      Extraocular Movements: Extraocular movements intact.      Conjunctiva/sclera: Conjunctivae normal.   Pulmonary:      Effort: Pulmonary effort is normal. No respiratory distress.   Musculoskeletal:         General: Normal range of motion.   Skin:     General: Skin is dry.      Findings: No rash.   Neurological:      Mental Status: He is alert and oriented to person, place, and time.   Psychiatric:         Mood and Affect: Mood normal.         Behavior: Behavior normal.         Thought Content: Thought content normal.         Judgment: Judgment normal.         Neurologic Exam     Mental Status   Oriented to person, place, and time.        Assessment / Plan      Assessment/Plan:   Diagnoses and all orders for this visit:    1. Obstructive sleep apnea (Primary)    2. BMI 37.0-37.9, adult    *Advised patient to avoid driving if drowsy.   *Weight loss encouraged with BMI goal of 24.     Follow Up:   Return in about 1 year (around 4/25/2023) for F/U Obstructive Sleep Apnea.    YESY Maradiaga, FNP-C  Psychiatric Neurology and Sleep Medicine       Please note that portions of this note may have been completed with a voice recognition program. Efforts were made to edit the dictations, but occasionally words are mistranscribed.   "

## 2022-04-29 ENCOUNTER — OFFICE VISIT (OUTPATIENT)
Dept: BARIATRICS/WEIGHT MGMT | Facility: CLINIC | Age: 67
End: 2022-04-29

## 2022-04-29 VITALS
DIASTOLIC BLOOD PRESSURE: 80 MMHG | TEMPERATURE: 97.9 F | WEIGHT: 303.5 LBS | RESPIRATION RATE: 18 BRPM | HEIGHT: 76 IN | OXYGEN SATURATION: 98 % | BODY MASS INDEX: 36.96 KG/M2 | SYSTOLIC BLOOD PRESSURE: 124 MMHG | HEART RATE: 65 BPM

## 2022-04-29 DIAGNOSIS — R10.13 DYSPEPSIA: Primary | ICD-10-CM

## 2022-04-29 DIAGNOSIS — K95.09 COMPLICATION OF GASTRIC BANDING: ICD-10-CM

## 2022-04-29 PROCEDURE — 99214 OFFICE O/P EST MOD 30 MIN: CPT | Performed by: SURGERY

## 2022-05-02 ENCOUNTER — TRANSCRIBE ORDERS (OUTPATIENT)
Dept: LAB | Facility: HOSPITAL | Age: 67
End: 2022-05-02

## 2022-05-02 ENCOUNTER — LAB (OUTPATIENT)
Dept: LAB | Facility: HOSPITAL | Age: 67
End: 2022-05-02

## 2022-05-02 DIAGNOSIS — E78.00 PURE HYPERCHOLESTEROLEMIA: ICD-10-CM

## 2022-05-02 DIAGNOSIS — E78.00 PURE HYPERCHOLESTEROLEMIA: Primary | ICD-10-CM

## 2022-05-02 LAB
BASOPHILS # BLD AUTO: 0.05 10*3/MM3 (ref 0–0.2)
BASOPHILS NFR BLD AUTO: 0.7 % (ref 0–1.5)
DEPRECATED RDW RBC AUTO: 45.3 FL (ref 37–54)
EOSINOPHIL # BLD AUTO: 0.11 10*3/MM3 (ref 0–0.4)
EOSINOPHIL NFR BLD AUTO: 1.5 % (ref 0.3–6.2)
ERYTHROCYTE [DISTWIDTH] IN BLOOD BY AUTOMATED COUNT: 14.4 % (ref 12.3–15.4)
HCT VFR BLD AUTO: 47.5 % (ref 37.5–51)
HGB BLD-MCNC: 16.4 G/DL (ref 13–17.7)
IMM GRANULOCYTES # BLD AUTO: 0.04 10*3/MM3 (ref 0–0.05)
IMM GRANULOCYTES NFR BLD AUTO: 0.5 % (ref 0–0.5)
LYMPHOCYTES # BLD AUTO: 2.12 10*3/MM3 (ref 0.7–3.1)
LYMPHOCYTES NFR BLD AUTO: 28.6 % (ref 19.6–45.3)
MCH RBC QN AUTO: 29.8 PG (ref 26.6–33)
MCHC RBC AUTO-ENTMCNC: 34.5 G/DL (ref 31.5–35.7)
MCV RBC AUTO: 86.4 FL (ref 79–97)
MONOCYTES # BLD AUTO: 0.45 10*3/MM3 (ref 0.1–0.9)
MONOCYTES NFR BLD AUTO: 6.1 % (ref 5–12)
NEUTROPHILS NFR BLD AUTO: 4.63 10*3/MM3 (ref 1.7–7)
NEUTROPHILS NFR BLD AUTO: 62.6 % (ref 42.7–76)
NRBC BLD AUTO-RTO: 0 /100 WBC (ref 0–0.2)
PLATELET # BLD AUTO: 204 10*3/MM3 (ref 140–450)
PMV BLD AUTO: 11.7 FL (ref 6–12)
RBC # BLD AUTO: 5.5 10*6/MM3 (ref 4.14–5.8)
WBC NRBC COR # BLD: 7.4 10*3/MM3 (ref 3.4–10.8)

## 2022-05-02 PROCEDURE — 85025 COMPLETE CBC W/AUTO DIFF WBC: CPT

## 2022-05-02 PROCEDURE — 36415 COLL VENOUS BLD VENIPUNCTURE: CPT

## 2022-05-02 PROCEDURE — 80048 BASIC METABOLIC PNL TOTAL CA: CPT

## 2022-05-02 PROCEDURE — 84550 ASSAY OF BLOOD/URIC ACID: CPT | Performed by: INTERNAL MEDICINE

## 2022-05-03 LAB
ANION GAP SERPL CALCULATED.3IONS-SCNC: 12.4 MMOL/L (ref 5–15)
BUN SERPL-MCNC: 11 MG/DL (ref 8–23)
BUN/CREAT SERPL: 7.6 (ref 7–25)
CALCIUM SPEC-SCNC: 9.2 MG/DL (ref 8.6–10.5)
CHLORIDE SERPL-SCNC: 100 MMOL/L (ref 98–107)
CO2 SERPL-SCNC: 27.6 MMOL/L (ref 22–29)
CREAT SERPL-MCNC: 1.45 MG/DL (ref 0.76–1.27)
EGFRCR SERPLBLD CKD-EPI 2021: 53.1 ML/MIN/1.73
GLUCOSE SERPL-MCNC: 165 MG/DL (ref 65–99)
POTASSIUM SERPL-SCNC: 3.7 MMOL/L (ref 3.5–5.2)
SODIUM SERPL-SCNC: 140 MMOL/L (ref 136–145)
URATE SERPL-MCNC: 4.9 MG/DL (ref 3.4–7)

## 2022-05-04 DIAGNOSIS — N28.9 ACUTE RENAL INSUFFICIENCY: Primary | ICD-10-CM

## 2022-05-05 ENCOUNTER — LAB (OUTPATIENT)
Dept: LAB | Facility: HOSPITAL | Age: 67
End: 2022-05-05

## 2022-05-05 LAB
ANION GAP SERPL CALCULATED.3IONS-SCNC: 15 MMOL/L (ref 5–15)
BUN SERPL-MCNC: 10 MG/DL (ref 8–23)
BUN/CREAT SERPL: 7.9 (ref 7–25)
CALCIUM SPEC-SCNC: 9.3 MG/DL (ref 8.6–10.5)
CHLORIDE SERPL-SCNC: 100 MMOL/L (ref 98–107)
CO2 SERPL-SCNC: 25 MMOL/L (ref 22–29)
CREAT SERPL-MCNC: 1.27 MG/DL (ref 0.76–1.27)
EGFRCR SERPLBLD CKD-EPI 2021: 62.3 ML/MIN/1.73
GLUCOSE SERPL-MCNC: 116 MG/DL (ref 65–99)
POTASSIUM SERPL-SCNC: 3.4 MMOL/L (ref 3.5–5.2)
SODIUM SERPL-SCNC: 140 MMOL/L (ref 136–145)

## 2022-05-05 PROCEDURE — 80048 BASIC METABOLIC PNL TOTAL CA: CPT | Performed by: INTERNAL MEDICINE

## 2022-05-05 PROCEDURE — 36415 COLL VENOUS BLD VENIPUNCTURE: CPT | Performed by: INTERNAL MEDICINE

## 2022-05-06 ENCOUNTER — HOSPITAL ENCOUNTER (OUTPATIENT)
Dept: CT IMAGING | Facility: HOSPITAL | Age: 67
Discharge: HOME | End: 2022-05-06
Admitting: PHYSICIAN ASSISTANT

## 2022-05-06 ENCOUNTER — OFFICE VISIT (OUTPATIENT)
Dept: INTERNAL MEDICINE | Facility: CLINIC | Age: 67
End: 2022-05-06

## 2022-05-06 ENCOUNTER — LAB (OUTPATIENT)
Dept: UROLOGY | Facility: CLINIC | Age: 67
End: 2022-05-06

## 2022-05-06 VITALS
BODY MASS INDEX: 38.36 KG/M2 | HEIGHT: 76 IN | TEMPERATURE: 98 F | WEIGHT: 315 LBS | RESPIRATION RATE: 16 BRPM | OXYGEN SATURATION: 97 % | DIASTOLIC BLOOD PRESSURE: 82 MMHG | HEART RATE: 74 BPM | SYSTOLIC BLOOD PRESSURE: 128 MMHG

## 2022-05-06 DIAGNOSIS — R10.9 LEFT FLANK PAIN: Primary | ICD-10-CM

## 2022-05-06 DIAGNOSIS — N20.0 NEPHROLITHIASIS: ICD-10-CM

## 2022-05-06 DIAGNOSIS — N18.30 STAGE 3 CHRONIC KIDNEY DISEASE, UNSPECIFIED WHETHER STAGE 3A OR 3B CKD: ICD-10-CM

## 2022-05-06 DIAGNOSIS — R31.0 GROSS HEMATURIA: ICD-10-CM

## 2022-05-06 DIAGNOSIS — N20.0 NEPHROLITHIASIS: Primary | ICD-10-CM

## 2022-05-06 DIAGNOSIS — E11.9 TYPE 2 DIABETES MELLITUS WITHOUT COMPLICATION, WITHOUT LONG-TERM CURRENT USE OF INSULIN: ICD-10-CM

## 2022-05-06 DIAGNOSIS — N20.0 NEPHROLITH: ICD-10-CM

## 2022-05-06 LAB
BILIRUB BLD-MCNC: NEGATIVE MG/DL
CLARITY, POC: CLEAR
COLOR UR: YELLOW
EXPIRATION DATE: ABNORMAL
GLUCOSE UR STRIP-MCNC: ABNORMAL MG/DL
KETONES UR QL: ABNORMAL
LEUKOCYTE EST, POC: NEGATIVE
Lab: ABNORMAL
NITRITE UR-MCNC: NEGATIVE MG/ML
PH UR: 6 [PH] (ref 5–8)
PROT UR STRIP-MCNC: ABNORMAL MG/DL
RBC # UR STRIP: ABNORMAL /UL
SP GR UR: 1.01 (ref 1–1.03)
UROBILINOGEN UR QL: NORMAL

## 2022-05-06 PROCEDURE — 99214 OFFICE O/P EST MOD 30 MIN: CPT | Performed by: INTERNAL MEDICINE

## 2022-05-06 PROCEDURE — 74176 CT ABD & PELVIS W/O CONTRAST: CPT

## 2022-05-06 PROCEDURE — 81003 URINALYSIS AUTO W/O SCOPE: CPT | Performed by: PHYSICIAN ASSISTANT

## 2022-05-06 NOTE — PROGRESS NOTES
Subjective     Patient ID: Andre Agosto is a 67 y.o. male. Patient is here for management of multiple medical problems.     Chief Complaint   Patient presents with   • Arthritis     History of Present Illness     ARTHritis.      Left flank pain  CT. Ab pelvis.   Hematuria.          The following portions of the patient's history were reviewed and updated as appropriate: allergies, current medications, past family history, past medical history, past social history, past surgical history and problem list.    Review of Systems   Constitutional: Positive for fatigue.   Psychiatric/Behavioral: Negative for self-injury and sleep disturbance. The patient is not nervous/anxious.    All other systems reviewed and are negative.      Current Outpatient Medications:   •  amiodarone (PACERONE) 200 MG tablet, Take 200 mg by mouth Daily., Disp: , Rfl:   •  amLODIPine (NORVASC) 10 MG tablet, Take 10 mg by mouth Daily., Disp: , Rfl:   •  carvedilol (Coreg) 25 MG tablet, Take 1 tablet by mouth 2 (Two) Times a Day With Meals., Disp: 180 tablet, Rfl: 3  •  divalproex (DEPAKOTE) 250 MG DR tablet, TAKE 2 TABLETS IN THE MORNING 1 TABLET AT NOON AND 2 TABLETS IN THE EVENING PER DR VALENTINO, Disp: 450 tablet, Rfl: 3  •  Dulaglutide (Trulicity) 3 MG/0.5ML solution pen-injector, Inject 0.5 mL under the skin into the appropriate area as directed 1 (One) Time Per Week., Disp: 5 pen, Rfl: 3  •  Entresto  MG tablet, Take  by mouth Daily., Disp: , Rfl:   •  Farxiga 10 MG tablet, Take 10 mg by mouth Daily., Disp: , Rfl:   •  furosemide (LASIX) 40 MG tablet, Take 1 tablet by mouth Daily., Disp: 90 tablet, Rfl: 3  •  glucose monitor monitoring kit, 1 each As Needed (bs)., Disp: 1 each, Rfl: 1  •  hydrOXYzine (ATARAX) 25 MG tablet, Take 1 tablet by mouth 3 (Three) Times a Day As Needed (motion sickness)., Disp: 30 tablet, Rfl: 0  •  levothyroxine (SYNTHROID, LEVOTHROID) 88 MCG tablet, TAKE 1 TABLET DAILY, Disp: 90 tablet, Rfl: 3  •  Xarelto 15 MG  "tablet, Take 15 mg by mouth Daily With Dinner., Disp: , Rfl:     Objective      Blood pressure 128/82, pulse 74, temperature 98 °F (36.7 °C), resp. rate 16, height 193 cm (76\"), weight (!) 144 kg (317 lb), SpO2 97 %.    Physical Exam     General Appearance:    Alert, cooperative, no distress, appears stated age   Head:    Normocephalic, without obvious abnormality, atraumatic   Eyes:    PERRL, conjunctiva/corneas clear, EOM's intact   Ears:    Normal TM's and external ear canals, both ears   Nose:   Nares normal, septum midline, mucosa normal, no drainage   or sinus tenderness   Throat:   Lips, mucosa, and tongue normal; teeth and gums normal   Neck:   Supple, symmetrical, trachea midline, no adenopathy;        thyroid:  No enlargement/tenderness/nodules; no carotid    bruit or JVD   Back:     Symmetric, no curvature, ROM normal, no CVA tenderness   Lungs:     Clear to auscultation bilaterally, respirations unlabored   Chest wall:    No tenderness or deformity   Heart:    Regular rate and rhythm, S1 and S2 normal, no murmur,        rub or gallop   Abdomen:     Soft, non-tender, bowel sounds active all four quadrants,     no masses, no organomegaly   Extremities:   Extremities normal, atraumatic, no cyanosis or edema   Pulses:   2+ and symmetric all extremities   Skin:   Skin color, texture, turgor normal, no rashes or lesions   Lymph nodes:   Cervical, supraclavicular, and axillary nodes normal   Neurologic:   CNII-XII intact. Normal strength, sensation and reflexes       throughout      Results for orders placed or performed in visit on 05/06/22   POC Urinalysis Dipstick, Automated    Specimen: Urine   Result Value Ref Range    Color Yellow Yellow, Straw, Dark Yellow, Misty    Clarity, UA Clear Clear    Specific Gravity  1.010 1.005 - 1.030    pH, Urine 6.0 5.0 - 8.0    Leukocytes Negative Negative    Nitrite, UA Negative Negative    Protein, POC 2+ (A) Negative mg/dL    Glucose, UA 3+ (A) Negative, 1000 mg/dL (3+) " mg/dL    Ketones, UA Trace (A) Negative    Urobilinogen, UA Normal Normal    Bilirubin Negative Negative    Blood, UA 1+ (A) Negative    Lot Number [id number]     Expiration Date 10/21/2023      *Note: Due to a large number of results and/or encounters for the requested time period, some results have not been displayed. A complete set of results can be found in Results Review.         Assessment/Plan     Left flank pain.  Hematuria  Doxy helps.     Renal function improve on last labs. Continue good hydration.      nephrolith and pt is on Faxiga by cardiology. This increases renal stones.  Will decrease to 5 mg and monitor fluid balance.       Diagnoses and all orders for this visit:    1. Left flank pain (Primary)    2. Type 2 diabetes mellitus without complication, without long-term current use of insulin (HCC)    3. Stage 3 chronic kidney disease, unspecified whether stage 3a or 3b CKD (HCC)    4. Nephrolith  -     Ambulatory Referral to Urology      Return in about 6 weeks (around 6/17/2022).          There are no Patient Instructions on file for this visit.     Keven Bear MD    Assessment/Plan

## 2022-05-10 ENCOUNTER — OFFICE VISIT (OUTPATIENT)
Dept: UROLOGY | Facility: CLINIC | Age: 67
End: 2022-05-10

## 2022-05-10 ENCOUNTER — HOSPITAL ENCOUNTER (OUTPATIENT)
Facility: HOSPITAL | Age: 67
Setting detail: HOSPITAL OUTPATIENT SURGERY
End: 2022-05-10
Attending: UROLOGY | Admitting: UROLOGY

## 2022-05-10 DIAGNOSIS — N20.0 NEPHROLITHIASIS: Primary | ICD-10-CM

## 2022-05-10 PROCEDURE — 99443 PR PHYS/QHP TELEPHONE EVALUATION 21-30 MIN: CPT | Performed by: UROLOGY

## 2022-05-10 RX ORDER — SODIUM CHLORIDE 9 MG/ML
100 INJECTION, SOLUTION INTRAVENOUS CONTINUOUS
Status: CANCELLED | OUTPATIENT
Start: 2022-05-10

## 2022-05-10 RX ORDER — SULFAMETHOXAZOLE AND TRIMETHOPRIM 800; 160 MG/1; MG/1
1 TABLET ORAL DAILY
Qty: 5 TABLET | Refills: 0 | Status: SHIPPED | OUTPATIENT
Start: 2022-05-10 | End: 2022-09-12

## 2022-05-10 NOTE — PROGRESS NOTES
22 min telephone conversation with patient     CT Abdomen Pelvis Stone Protocol  Result Date: 5/6/2022  1.  Stable bilateral nonobstructing renal stones. 2.  Stable gallstones.    This study was performed with techniques to keep radiation doses as low as reasonably achievable (ALARA). Individualized dose reduction techniques using automated exposure control or adjustment of mA and/or kV according to the patient size were employed.  This report was signed and finalized on 5/6/2022 12:56 PM by Anastacia Moise MD.    Patient reports mild intermittent left soreness of his flank.  He would like that side cleaned out first prophylactically but would like to wait until July.    Schedule left ureteroscopy laser lithotripsy with stent at hospital (due to extensive cardiovascular history) on July 7.  He knows to stop his Xarelto 3 days beforehand.  I have sent in 5 days of Bactrim to be taken leading up to the surgery due to his history of sepsis and the fact that he is immunocompromised with diabetes, though this is well controlled with A1c less than 7.    You have chosen to receive care through a telephone visit. Do you consent to use a telephone visit for your medical care today? Yes

## 2022-05-26 RX ORDER — SODIUM CHLORIDE 9 MG/ML
150 INJECTION, SOLUTION INTRAVENOUS CONTINUOUS
Status: CANCELLED | OUTPATIENT
Start: 2022-05-26

## 2022-06-20 ENCOUNTER — CLINICAL SUPPORT (OUTPATIENT)
Dept: INTERNAL MEDICINE | Facility: CLINIC | Age: 67
End: 2022-06-20

## 2022-06-20 DIAGNOSIS — U07.1 COVID-19 VIRUS INFECTION: ICD-10-CM

## 2022-06-20 DIAGNOSIS — U07.1 COVID-19 VIRUS INFECTION: Primary | ICD-10-CM

## 2022-06-20 PROCEDURE — U0004 COV-19 TEST NON-CDC HGH THRU: HCPCS | Performed by: INTERNAL MEDICINE

## 2022-06-21 LAB — SARS-COV-2 RNA NOSE QL NAA+PROBE: DETECTED

## 2022-06-22 ENCOUNTER — TELEPHONE (OUTPATIENT)
Dept: PREADMISSION TESTING | Facility: HOSPITAL | Age: 67
End: 2022-06-22

## 2022-06-23 ENCOUNTER — APPOINTMENT (OUTPATIENT)
Dept: PREADMISSION TESTING | Facility: HOSPITAL | Age: 67
End: 2022-06-23

## 2022-07-14 ENCOUNTER — OFFICE VISIT (OUTPATIENT)
Dept: UROLOGY | Facility: CLINIC | Age: 67
End: 2022-07-14

## 2022-07-14 ENCOUNTER — LAB (OUTPATIENT)
Dept: LAB | Facility: HOSPITAL | Age: 67
End: 2022-07-14

## 2022-07-14 VITALS
HEIGHT: 76 IN | SYSTOLIC BLOOD PRESSURE: 151 MMHG | DIASTOLIC BLOOD PRESSURE: 85 MMHG | BODY MASS INDEX: 38.24 KG/M2 | HEART RATE: 65 BPM | TEMPERATURE: 96.8 F | OXYGEN SATURATION: 99 % | WEIGHT: 314 LBS

## 2022-07-14 DIAGNOSIS — N20.0 NEPHROLITHIASIS: Primary | ICD-10-CM

## 2022-07-14 LAB
ANION GAP SERPL CALCULATED.3IONS-SCNC: 14 MMOL/L (ref 5–15)
BILIRUB BLD-MCNC: NEGATIVE MG/DL
BUN SERPL-MCNC: 15 MG/DL (ref 8–23)
BUN/CREAT SERPL: 10.9 (ref 7–25)
CALCIUM SPEC-SCNC: 9.1 MG/DL (ref 8.6–10.5)
CHLORIDE SERPL-SCNC: 100 MMOL/L (ref 98–107)
CLARITY, POC: CLEAR
CO2 SERPL-SCNC: 28 MMOL/L (ref 22–29)
COLOR UR: YELLOW
CREAT SERPL-MCNC: 1.38 MG/DL (ref 0.76–1.27)
EGFRCR SERPLBLD CKD-EPI 2021: 56.4 ML/MIN/1.73
EXPIRATION DATE: ABNORMAL
GLUCOSE SERPL-MCNC: 108 MG/DL (ref 65–99)
GLUCOSE UR STRIP-MCNC: ABNORMAL MG/DL
KETONES UR QL: NEGATIVE
LEUKOCYTE EST, POC: NEGATIVE
Lab: ABNORMAL
NITRITE UR-MCNC: NEGATIVE MG/ML
PH UR: 7 [PH] (ref 5–8)
POTASSIUM SERPL-SCNC: 3.7 MMOL/L (ref 3.5–5.2)
PROT UR STRIP-MCNC: ABNORMAL MG/DL
RBC # UR STRIP: ABNORMAL /UL
SODIUM SERPL-SCNC: 142 MMOL/L (ref 136–145)
SP GR UR: 1.01 (ref 1–1.03)
UROBILINOGEN UR QL: NORMAL

## 2022-07-14 PROCEDURE — 80048 BASIC METABOLIC PNL TOTAL CA: CPT | Performed by: INTERNAL MEDICINE

## 2022-07-14 PROCEDURE — 81003 URINALYSIS AUTO W/O SCOPE: CPT | Performed by: PHYSICIAN ASSISTANT

## 2022-07-14 PROCEDURE — 99213 OFFICE O/P EST LOW 20 MIN: CPT | Performed by: PHYSICIAN ASSISTANT

## 2022-07-14 PROCEDURE — 36415 COLL VENOUS BLD VENIPUNCTURE: CPT | Performed by: INTERNAL MEDICINE

## 2022-07-14 NOTE — PROGRESS NOTES
Chief Complaint   Patient presents with   • Nephrolithiasis        HPI  Mr. Agosto is a 67 y.o. male with history of nephrolithiasis who presents for follow up.     At this visit, he is scheduled for a left ureteroscopy with laser lithotripsy in August, but is not sure that he wants to proceed with prophylactic stone treatment due to an upcoming trip with family.    Past Medical History:   Diagnosis Date   • 6th nerve palsy, right     right eye    • Anxiety and depression    • Atrial fibrillation, persistent (HCC) 12/02/2020    on Xarelto   • Coronary artery disease involving native coronary artery of native heart without angina pectoris 09/12/2018    follows w/ Dr. Johnson, on ASA 81mg   • CRI (chronic renal insufficiency), stage 2 (mild)    • CVA (cerebral vascular accident) (HCC)    • Diabetes mellitus (HCC)     doesnt check sugar, type 2    • Disease of thyroid gland    • Double vision     resolved- right eye   • Elevated cholesterol    • Epilepsy (HCC)     right arm focal seizures   • Focal seizure (HCC)     of right arm    • Heart attack (HCC)     NSTEMI 2015, s/p stenting   • History of Helicobacter pylori infection     in 2008, treated w/ PrevPak - 2021 EGD bx (+), PCP RX - PPI/Augmentin/Doxy/Flagyl (x 14 days) 1/22/21   • HL (hearing loss)     (L) ear - child luevano measles   • Hyperlipidemia    • Hypertension    • Kidney stone    • Kidney stone    • Memory loss 2005    d/t meningitis   • Meningitis 2005    unsure of bacterial or viral    • Obesity    • Seizures (HCC)     right arm focal seizures   • Sleep apnea treated with nocturnal BiPAP     compliant with  machine    • Stroke (HCC)     2017/2018   • Ventricular tachycardia (HCC)     3 different times   • Wears glasses        Past Surgical History:   Procedure Laterality Date   • BARIATRIC SURGERY  06/01/2012    Lap nand   • CARDIAC CATHETERIZATION N/A 10/12/2018    Procedure: Left Heart Cath;  Surgeon: Yoselin Johnson MD;  Location: [facility]  CATH INVASIVE LOCATION;  Service: Cardiology   • CARDIAC CATHETERIZATION  03/2021    stent   • CARDIAC CATHETERIZATION  07/2021    just checking the after pacemaker placed- Dr. Tim   • CARDIAC DEFIBRILLATOR PLACEMENT     • COLONOSCOPY  10/2020    w/ kelsi Gaspar colon polyps   • CORONARY STENT PLACEMENT  2015   • LAPAROSCOPIC GASTRIC BANDING  2008    s/p LAGB Realize 6/2008 by J[facility].   • PACEMAKER IMPLANTATION  07/25/2021   • UMBILICAL HERNIA REPAIR  2008    incarcerated UHR w/ mesh by Dr. Velasquez   • URETEROSCOPY LASER LITHOTRIPSY WITH STENT INSERTION Left 10/20/2021    Procedure: URETEROSCOPY LASER LITHOTRIPSY WITH STENT INSERTION;  Surgeon: Tonio De La Cruz MD;  Location: [facility] OR;  Service: Urology;  Laterality: Left;   • URETEROSCOPY LASER LITHOTRIPSY WITH STENT INSERTION Left 11/3/2021    Procedure: URETEROSCOPY LEFT, LEFT RETROGRADE PYELOGRAM, CYSTOSCOPY, LASER LITHOTRIPSY WITH STENT EXCHANGE;  Surgeon: Tonio De La Cruz MD;  Location: [facility] OR;  Service: Urology;  Laterality: Left;         Current Outpatient Medications:   •  amiodarone (PACERONE) 200 MG tablet, Take 200 mg by mouth Daily., Disp: , Rfl:   •  amLODIPine (NORVASC) 10 MG tablet, Take 10 mg by mouth Daily., Disp: , Rfl:   •  carvedilol (Coreg) 25 MG tablet, Take 1 tablet by mouth 2 (Two) Times a Day With Meals., Disp: 180 tablet, Rfl: 3  •  divalproex (DEPAKOTE) 250 MG DR tablet, TAKE 2 TABLETS IN THE MORNING 1 TABLET AT NOON AND 2 TABLETS IN THE EVENING PER DR VALENTINO, Disp: 450 tablet, Rfl: 3  •  Dulaglutide (Trulicity) 3 MG/0.5ML solution pen-injector, Inject 0.5 mL under the skin into the appropriate area as directed 1 (One) Time Per Week., Disp: 5 pen, Rfl: 3  •  Entresto  MG tablet, Take  by mouth Daily., Disp: , Rfl:   •  Farxiga 10 MG tablet, Take 10 mg by mouth Daily., Disp: , Rfl:   •  furosemide (LASIX) 40 MG tablet, Take 1 tablet by mouth Daily., Disp: 90 tablet, Rfl: 3  •  glucose monitor monitoring kit, 1 each As  "Needed (bs)., Disp: 1 each, Rfl: 1  •  hydrOXYzine (ATARAX) 25 MG tablet, Take 1 tablet by mouth 3 (Three) Times a Day As Needed (motion sickness)., Disp: 30 tablet, Rfl: 0  •  levothyroxine (SYNTHROID, LEVOTHROID) 88 MCG tablet, TAKE 1 TABLET DAILY, Disp: 90 tablet, Rfl: 3  •  sulfamethoxazole-trimethoprim (Bactrim DS) 800-160 MG per tablet, Take 1 tablet by mouth Daily. Start 5 days before surgery, Disp: 5 tablet, Rfl: 0  •  Xarelto 15 MG tablet, Take 15 mg by mouth Daily With Dinner., Disp: , Rfl:      Physical Exam  Visit Vitals  /85   Pulse 65   Temp 96.8 °F (36 °C)   Ht 193 cm (76\")   Wt (!) 142 kg (314 lb)   SpO2 99%   BMI 38.22 kg/m²       Labs  Brief Urine Lab Results  (Last result in the past 365 days)      Color   Clarity   Blood   Leuk Est   Nitrite   Protein   CREAT   Urine HCG        07/14/22 0852 Yellow   Clear   Trace   Negative   Negative   2+                 Lab Results   Component Value Date    GLUCOSE 116 (H) 05/05/2022    CALCIUM 9.3 05/05/2022     05/05/2022    K 3.4 (L) 05/05/2022    CO2 25.0 05/05/2022     05/05/2022    BUN 10 05/05/2022    CREATININE 1.27 05/05/2022    EGFRIFAFRI 57 (L) 12/05/2018    EGFRIFNONA 55 (L) 01/10/2022    BCR 7.9 05/05/2022    ANIONGAP 15.0 05/05/2022       Lab Results   Component Value Date    WBC 7.40 05/02/2022    HGB 16.4 05/02/2022    HCT 47.5 05/02/2022    MCV 86.4 05/02/2022     05/02/2022       Urine Culture    Urine Culture 9/13/21   Urine Culture No growth              Lab Results   Component Value Date    PSA 4.110 (H) 04/14/2022    PSA 6.0 (H) 03/15/2022    PSA 6.120 (H) 03/15/2022       No results found for: TESTOSTERONE         Radiographic Studies  CT Abdomen Pelvis Stone Protocol    Result Date: 5/6/2022  1.  Stable bilateral nonobstructing renal stones. 2.  Stable gallstones.    This study was performed with techniques to keep radiation doses as low as reasonably achievable (ALARA). Individualized dose reduction techniques " using automated exposure control or adjustment of mA and/or kV according to the patient size were employed.  This report was signed and finalized on 5/6/2022 12:56 PM by Anastacia Moise MD.      Assessment  67 y.o. male with known bilateral nonobstructing stones scheduled for prophylactic stone management in August with Dr. De La Cruz.  We discussed strict indications for stone management to include infection, intractable pain or p.o. intolerance, or obstruction.  As none of these are present at this time, we do not have to proceed with surgery until the patient is ready.  He states he has had some right flank pain occasionally, but otherwise is comfortable.  He developed sepsis after a previous stone surgery and is concerned about postoperative complications from this one.  He overall wants to proceed with surgery, but wants to make sure that he is in his normal state of health for an upcoming trip with his family ([place]  I have encouraged Mr. Agosto that we want him to be comfortable and ready for surgery, whenever it is best for him.  We did acknowledge that for each stone, he has an approximate 10% risk of the stone dropping into the ureter over the course of a year, and this could happen while he is on that trip.  He acknowledges this risk.     As he does wish to proceed with surgery, we will reschedule it for September.  Mr. Agosto and I reviewed his CT together and measure his intrarenal stones.  Given his occasional right flank pain, he would like to proceed with right-sided treatment first.    Plan  1.  Consented for right URS/LL with stent, planned for September  2.  As before, case request would be placed at the hospital due to the patient's extensive cardiac history  3.  Stop Xarelto 3 days prior to surgery, start Bactrim 5 days prior  4.  Cancel upcoming left URS/LL for August

## 2022-07-20 ENCOUNTER — APPOINTMENT (OUTPATIENT)
Dept: PREADMISSION TESTING | Facility: HOSPITAL | Age: 67
End: 2022-07-20

## 2022-07-21 DIAGNOSIS — N20.0 NEPHROLITHIASIS: Primary | ICD-10-CM

## 2022-07-21 RX ORDER — NITROFURANTOIN 25; 75 MG/1; MG/1
100 CAPSULE ORAL 2 TIMES DAILY
Qty: 14 CAPSULE | Refills: 0 | Status: SHIPPED | OUTPATIENT
Start: 2022-07-21 | End: 2022-09-12

## 2022-07-21 RX ORDER — SODIUM CHLORIDE 9 MG/ML
100 INJECTION, SOLUTION INTRAVENOUS CONTINUOUS
Status: CANCELLED | OUTPATIENT
Start: 2022-07-21

## 2022-08-08 DIAGNOSIS — E03.9 ACQUIRED HYPOTHYROIDISM: ICD-10-CM

## 2022-08-08 DIAGNOSIS — E78.2 MIXED HYPERLIPIDEMIA: ICD-10-CM

## 2022-08-08 DIAGNOSIS — E11.9 TYPE 2 DIABETES MELLITUS WITHOUT COMPLICATION, WITHOUT LONG-TERM CURRENT USE OF INSULIN: Primary | ICD-10-CM

## 2022-08-18 RX ORDER — DULAGLUTIDE 3 MG/.5ML
INJECTION, SOLUTION SUBCUTANEOUS
Qty: 2 ML | Refills: 12 | Status: SHIPPED | OUTPATIENT
Start: 2022-08-18

## 2022-09-06 ENCOUNTER — PREP FOR SURGERY (OUTPATIENT)
Dept: OTHER | Facility: HOSPITAL | Age: 67
End: 2022-09-06

## 2022-09-06 ENCOUNTER — TELEPHONE (OUTPATIENT)
Dept: PREADMISSION TESTING | Facility: HOSPITAL | Age: 67
End: 2022-09-06

## 2022-09-06 DIAGNOSIS — N20.0 NEPHROLITHIASIS: ICD-10-CM

## 2022-09-06 RX ORDER — SODIUM CHLORIDE 9 MG/ML
100 INJECTION, SOLUTION INTRAVENOUS CONTINUOUS
Status: CANCELLED | OUTPATIENT
Start: 2022-09-06

## 2022-09-09 ENCOUNTER — LAB (OUTPATIENT)
Dept: LAB | Facility: HOSPITAL | Age: 67
End: 2022-09-09

## 2022-09-09 LAB
ALBUMIN SERPL-MCNC: 3.6 G/DL (ref 3.5–5.2)
ALBUMIN/GLOB SERPL: 1 G/DL
ALP SERPL-CCNC: 50 U/L (ref 39–117)
ALT SERPL W P-5'-P-CCNC: 21 U/L (ref 1–41)
ANION GAP SERPL CALCULATED.3IONS-SCNC: 14 MMOL/L (ref 5–15)
AST SERPL-CCNC: 18 U/L (ref 1–40)
BASOPHILS # BLD AUTO: 0.04 10*3/MM3 (ref 0–0.2)
BASOPHILS NFR BLD AUTO: 0.6 % (ref 0–1.5)
BILIRUB SERPL-MCNC: 0.6 MG/DL (ref 0–1.2)
BUN SERPL-MCNC: 16 MG/DL (ref 8–23)
BUN/CREAT SERPL: 11 (ref 7–25)
CALCIUM SPEC-SCNC: 8.7 MG/DL (ref 8.6–10.5)
CHLORIDE SERPL-SCNC: 102 MMOL/L (ref 98–107)
CHOLEST SERPL-MCNC: 211 MG/DL (ref 0–200)
CO2 SERPL-SCNC: 24 MMOL/L (ref 22–29)
CREAT SERPL-MCNC: 1.45 MG/DL (ref 0.76–1.27)
DEPRECATED RDW RBC AUTO: 48.4 FL (ref 37–54)
EGFRCR SERPLBLD CKD-EPI 2021: 53.1 ML/MIN/1.73
EOSINOPHIL # BLD AUTO: 0.13 10*3/MM3 (ref 0–0.4)
EOSINOPHIL NFR BLD AUTO: 2 % (ref 0.3–6.2)
ERYTHROCYTE [DISTWIDTH] IN BLOOD BY AUTOMATED COUNT: 15 % (ref 12.3–15.4)
GLOBULIN UR ELPH-MCNC: 3.6 GM/DL
GLUCOSE SERPL-MCNC: 114 MG/DL (ref 65–99)
HBA1C MFR BLD: 6.2 % (ref 4.8–5.6)
HCT VFR BLD AUTO: 50.4 % (ref 37.5–51)
HDLC SERPL-MCNC: 39 MG/DL (ref 40–60)
HGB BLD-MCNC: 16.8 G/DL (ref 13–17.7)
IMM GRANULOCYTES # BLD AUTO: 0.01 10*3/MM3 (ref 0–0.05)
IMM GRANULOCYTES NFR BLD AUTO: 0.2 % (ref 0–0.5)
LDLC SERPL CALC-MCNC: 131 MG/DL (ref 0–100)
LDLC/HDLC SERPL: 3.25 {RATIO}
LYMPHOCYTES # BLD AUTO: 1.85 10*3/MM3 (ref 0.7–3.1)
LYMPHOCYTES NFR BLD AUTO: 29 % (ref 19.6–45.3)
MCH RBC QN AUTO: 29.8 PG (ref 26.6–33)
MCHC RBC AUTO-ENTMCNC: 33.3 G/DL (ref 31.5–35.7)
MCV RBC AUTO: 89.4 FL (ref 79–97)
MONOCYTES # BLD AUTO: 0.5 10*3/MM3 (ref 0.1–0.9)
MONOCYTES NFR BLD AUTO: 7.8 % (ref 5–12)
NEUTROPHILS NFR BLD AUTO: 3.84 10*3/MM3 (ref 1.7–7)
NEUTROPHILS NFR BLD AUTO: 60.4 % (ref 42.7–76)
NRBC BLD AUTO-RTO: 0 /100 WBC (ref 0–0.2)
PLATELET # BLD AUTO: 199 10*3/MM3 (ref 140–450)
PMV BLD AUTO: 12.3 FL (ref 6–12)
POTASSIUM SERPL-SCNC: 3.3 MMOL/L (ref 3.5–5.2)
PROT SERPL-MCNC: 7.2 G/DL (ref 6–8.5)
RBC # BLD AUTO: 5.64 10*6/MM3 (ref 4.14–5.8)
SODIUM SERPL-SCNC: 140 MMOL/L (ref 136–145)
T4 FREE SERPL-MCNC: 1.54 NG/DL (ref 0.93–1.7)
TRIGL SERPL-MCNC: 227 MG/DL (ref 0–150)
TSH SERPL DL<=0.05 MIU/L-ACNC: 3.06 UIU/ML (ref 0.27–4.2)
URATE SERPL-MCNC: 5.8 MG/DL (ref 3.4–7)
VIT B12 BLD-MCNC: 484 PG/ML (ref 211–946)
VLDLC SERPL-MCNC: 41 MG/DL (ref 5–40)
WBC NRBC COR # BLD: 6.37 10*3/MM3 (ref 3.4–10.8)

## 2022-09-09 PROCEDURE — 85025 COMPLETE CBC W/AUTO DIFF WBC: CPT | Performed by: INTERNAL MEDICINE

## 2022-09-09 PROCEDURE — 83036 HEMOGLOBIN GLYCOSYLATED A1C: CPT | Performed by: INTERNAL MEDICINE

## 2022-09-09 PROCEDURE — 80061 LIPID PANEL: CPT | Performed by: INTERNAL MEDICINE

## 2022-09-09 PROCEDURE — 82607 VITAMIN B-12: CPT | Performed by: INTERNAL MEDICINE

## 2022-09-09 PROCEDURE — 84550 ASSAY OF BLOOD/URIC ACID: CPT | Performed by: INTERNAL MEDICINE

## 2022-09-09 PROCEDURE — 84439 ASSAY OF FREE THYROXINE: CPT | Performed by: INTERNAL MEDICINE

## 2022-09-09 PROCEDURE — 80053 COMPREHEN METABOLIC PANEL: CPT | Performed by: INTERNAL MEDICINE

## 2022-09-09 PROCEDURE — 84443 ASSAY THYROID STIM HORMONE: CPT | Performed by: INTERNAL MEDICINE

## 2022-09-09 PROCEDURE — 36415 COLL VENOUS BLD VENIPUNCTURE: CPT | Performed by: INTERNAL MEDICINE

## 2022-09-12 ENCOUNTER — OFFICE VISIT (OUTPATIENT)
Dept: INTERNAL MEDICINE | Facility: CLINIC | Age: 67
End: 2022-09-12

## 2022-09-12 ENCOUNTER — TELEPHONE (OUTPATIENT)
Dept: UROLOGY | Facility: CLINIC | Age: 67
End: 2022-09-12

## 2022-09-12 VITALS
RESPIRATION RATE: 16 BRPM | WEIGHT: 311 LBS | SYSTOLIC BLOOD PRESSURE: 142 MMHG | BODY MASS INDEX: 37.87 KG/M2 | TEMPERATURE: 98.6 F | HEIGHT: 76 IN | OXYGEN SATURATION: 95 % | DIASTOLIC BLOOD PRESSURE: 78 MMHG | HEART RATE: 73 BPM

## 2022-09-12 DIAGNOSIS — R10.9 LEFT FLANK PAIN: ICD-10-CM

## 2022-09-12 DIAGNOSIS — R53.1 WEAKNESS: ICD-10-CM

## 2022-09-12 DIAGNOSIS — I10 BENIGN HYPERTENSION: ICD-10-CM

## 2022-09-12 DIAGNOSIS — Z95.5 H/O HEART ARTERY STENT: ICD-10-CM

## 2022-09-12 DIAGNOSIS — N18.30 CHRONIC RENAL IMPAIRMENT, STAGE 3 (MODERATE), UNSPECIFIED WHETHER STAGE 3A OR 3B CKD: Primary | ICD-10-CM

## 2022-09-12 DIAGNOSIS — I25.10 CVD (CARDIOVASCULAR DISEASE): ICD-10-CM

## 2022-09-12 PROCEDURE — 99214 OFFICE O/P EST MOD 30 MIN: CPT | Performed by: INTERNAL MEDICINE

## 2022-09-12 RX ORDER — AMLODIPINE BESYLATE 5 MG/1
5 TABLET ORAL DAILY
COMMUNITY
Start: 2022-08-18

## 2022-09-12 NOTE — TELEPHONE ENCOUNTER
Caller: LEOPOLDO HALL    Relationship to patient: SELF    Best call back number: [phone]    Patient is needing: PT WOULD LIKE TO CONFIRM THAT HIS SURGERY IS 9/14/22 AND POST-OP IS 9/21/22. PT STATES HE THOUGHT HIS SURGERY WAS ON 9/21/22.

## 2022-09-12 NOTE — PROGRESS NOTES
"Subjective     Patient ID: Andre Agosto is a 67 y.o. male. Patient is here for management of multiple medical problems.     Chief Complaint   Patient presents with   • Hypertension   • Foot Pain     bilateral     History of Present Illness     The following portions of the patient's history were reviewed and updated as appropriate: allergies, current medications, past family history, past medical history, past social history, past surgical history and problem list.    Review of Systems    Current Outpatient Medications:   •  amiodarone (PACERONE) 200 MG tablet, Take 200 mg by mouth Daily., Disp: , Rfl:   •  amLODIPine (NORVASC) 5 MG tablet, , Disp: , Rfl:   •  carvedilol (Coreg) 25 MG tablet, Take 1 tablet by mouth 2 (Two) Times a Day With Meals., Disp: 180 tablet, Rfl: 3  •  Entresto  MG tablet, Take  by mouth Daily., Disp: , Rfl:   •  Farxiga 10 MG tablet, Take 10 mg by mouth Daily., Disp: , Rfl:   •  furosemide (LASIX) 40 MG tablet, Take 1 tablet by mouth Daily., Disp: 90 tablet, Rfl: 3  •  glucose monitor monitoring kit, 1 each As Needed (bs)., Disp: 1 each, Rfl: 1  •  levothyroxine (SYNTHROID, LEVOTHROID) 88 MCG tablet, TAKE 1 TABLET DAILY, Disp: 90 tablet, Rfl: 3  •  Trulicity 3 MG/0.5ML solution pen-injector, INJECT 0.5 ML UNDER THE SKIN INTO THE APPROPRIATE AREA AS DIRECTED ONE TIME PER WEEK, Disp: 2 mL, Rfl: 12  •  Xarelto 15 MG tablet, Take 15 mg by mouth Daily With Dinner., Disp: , Rfl:     Objective      Blood pressure 142/78, pulse 73, temperature 98.6 °F (37 °C), resp. rate 16, height 193 cm (76\"), weight (!) 141 kg (311 lb), SpO2 95 %.    Physical Exam     General Appearance:    Alert, cooperative, no distress, appears stated age   Head:    Normocephalic, without obvious abnormality, atraumatic   Eyes:    PERRL, conjunctiva/corneas clear, EOM's intact   Ears:    Normal TM's and external ear canals, both ears   Nose:   Nares normal, septum midline, mucosa normal, no drainage   or sinus " tenderness   Throat:   Lips, mucosa, and tongue normal; teeth and gums normal   Neck:   Supple, symmetrical, trachea midline, no adenopathy;        thyroid:  No enlargement/tenderness/nodules; no carotid    bruit or JVD   Back:     Symmetric, no curvature, ROM normal, no CVA tenderness   Lungs:     Clear to auscultation bilaterally, respirations unlabored   Chest wall:    No tenderness or deformity   Heart:    Regular rate and rhythm, S1 and S2 normal, no murmur,        rub or gallop   Abdomen:     Soft, non-tender, bowel sounds active all four quadrants,     no masses, no organomegaly   Extremities:   Extremities normal, atraumatic, no cyanosis or edema   Pulses:   2+ and symmetric all extremities   Skin:   Skin color, texture, turgor normal, no rashes or lesions   Lymph nodes:   Cervical, supraclavicular, and axillary nodes normal   Neurologic:   CNII-XII intact. Normal strength, sensation and reflexes       throughout      Results for orders placed or performed in visit on 08/08/22   Comprehensive Metabolic Panel    Specimen: Blood   Result Value Ref Range    Glucose 114 (H) 65 - 99 mg/dL    BUN 16 8 - 23 mg/dL    Creatinine 1.45 (H) 0.76 - 1.27 mg/dL    Sodium 140 136 - 145 mmol/L    Potassium 3.3 (L) 3.5 - 5.2 mmol/L    Chloride 102 98 - 107 mmol/L    CO2 24.0 22.0 - 29.0 mmol/L    Calcium 8.7 8.6 - 10.5 mg/dL    Total Protein 7.2 6.0 - 8.5 g/dL    Albumin 3.60 3.50 - 5.20 g/dL    ALT (SGPT) 21 1 - 41 U/L    AST (SGOT) 18 1 - 40 U/L    Alkaline Phosphatase 50 39 - 117 U/L    Total Bilirubin 0.6 0.0 - 1.2 mg/dL    Globulin 3.6 gm/dL    A/G Ratio 1.0 g/dL    BUN/Creatinine Ratio 11.0 7.0 - 25.0    Anion Gap 14.0 5.0 - 15.0 mmol/L    eGFR 53.1 (L) >60.0 mL/min/1.73   Lipid Panel    Specimen: Blood   Result Value Ref Range    Total Cholesterol 211 (H) 0 - 200 mg/dL    Triglycerides 227 (H) 0 - 150 mg/dL    HDL Cholesterol 39 (L) 40 - 60 mg/dL    LDL Cholesterol  131 (H) 0 - 100 mg/dL    VLDL Cholesterol 41 (H) 5 -  40 mg/dL    LDL/HDL Ratio 3.25    Vitamin B12    Specimen: Blood   Result Value Ref Range    Vitamin B-12 484 211 - 946 pg/mL   TSH    Specimen: Blood   Result Value Ref Range    TSH 3.060 0.270 - 4.200 uIU/mL   T4, Free    Specimen: Blood   Result Value Ref Range    Free T4 1.54 0.93 - 1.70 ng/dL   Hemoglobin A1c    Specimen: Blood   Result Value Ref Range    Hemoglobin A1C 6.20 (H) 4.80 - 5.60 %   Uric acid    Specimen: Blood   Result Value Ref Range    Uric Acid 5.8 3.4 - 7.0 mg/dL   CBC Auto Differential    Specimen: Blood   Result Value Ref Range    WBC 6.37 3.40 - 10.80 10*3/mm3    RBC 5.64 4.14 - 5.80 10*6/mm3    Hemoglobin 16.8 13.0 - 17.7 g/dL    Hematocrit 50.4 37.5 - 51.0 %    MCV 89.4 79.0 - 97.0 fL    MCH 29.8 26.6 - 33.0 pg    MCHC 33.3 31.5 - 35.7 g/dL    RDW 15.0 12.3 - 15.4 %    RDW-SD 48.4 37.0 - 54.0 fl    MPV 12.3 (H) 6.0 - 12.0 fL    Platelets 199 140 - 450 10*3/mm3    Neutrophil % 60.4 42.7 - 76.0 %    Lymphocyte % 29.0 19.6 - 45.3 %    Monocyte % 7.8 5.0 - 12.0 %    Eosinophil % 2.0 0.3 - 6.2 %    Basophil % 0.6 0.0 - 1.5 %    Immature Grans % 0.2 0.0 - 0.5 %    Neutrophils, Absolute 3.84 1.70 - 7.00 10*3/mm3    Lymphocytes, Absolute 1.85 0.70 - 3.10 10*3/mm3    Monocytes, Absolute 0.50 0.10 - 0.90 10*3/mm3    Eosinophils, Absolute 0.13 0.00 - 0.40 10*3/mm3    Basophils, Absolute 0.04 0.00 - 0.20 10*3/mm3    Immature Grans, Absolute 0.01 0.00 - 0.05 10*3/mm3    nRBC 0.0 0.0 - 0.2 /100 WBC     *Note: Due to a large number of results and/or encounters for the requested time period, some results have not been displayed. A complete set of results can be found in Results Review.         Assessment & Plan     Hypertension.  Cri    Increased strength. With cardiac rehab/.     increased pain in left flank.        Falling to the left.            Diagnoses and all orders for this visit:    1. Chronic renal impairment, stage 3 (moderate), unspecified whether stage 3a or 3b CKD (HCC) (Primary)    2.  Benign hypertension    3. Weakness  -     Ambulatory Referral to Physical Therapy Evaluate and treat    4. Left flank pain  -     Urinalysis With Microscopic - Urine, Clean Catch      Return in about 6 months (around 3/12/2023).          There are no Patient Instructions on file for this visit.     Keven Bear MD    Assessment & Plan

## 2022-09-20 ENCOUNTER — TELEPHONE (OUTPATIENT)
Dept: PREADMISSION TESTING | Facility: HOSPITAL | Age: 67
End: 2022-09-20

## 2022-09-22 ENCOUNTER — PRE-ADMISSION TESTING (OUTPATIENT)
Dept: PREADMISSION TESTING | Facility: HOSPITAL | Age: 67
End: 2022-09-22

## 2022-09-22 ENCOUNTER — TELEPHONE (OUTPATIENT)
Dept: UROLOGY | Facility: CLINIC | Age: 67
End: 2022-09-22

## 2022-09-22 VITALS — HEIGHT: 76 IN | WEIGHT: 311 LBS | BODY MASS INDEX: 37.87 KG/M2

## 2022-09-22 DIAGNOSIS — N20.0 NEPHROLITHIASIS: ICD-10-CM

## 2022-09-22 LAB
BACTERIA UR QL AUTO: NORMAL /HPF
BILIRUB UR QL STRIP: NEGATIVE
CLARITY UR: CLEAR
COLOR UR: YELLOW
GLUCOSE UR STRIP-MCNC: ABNORMAL MG/DL
HGB UR QL STRIP.AUTO: NEGATIVE
HYALINE CASTS UR QL AUTO: NORMAL /LPF
KETONES UR QL STRIP: NEGATIVE
LEUKOCYTE ESTERASE UR QL STRIP.AUTO: NEGATIVE
NITRITE UR QL STRIP: NEGATIVE
PH UR STRIP.AUTO: 8.5 [PH] (ref 5–8)
PROT UR QL STRIP: ABNORMAL
RBC # UR STRIP: NORMAL /HPF
REF LAB TEST METHOD: NORMAL
SP GR UR STRIP: 1.02 (ref 1–1.03)
SQUAMOUS #/AREA URNS HPF: NORMAL /HPF
UROBILINOGEN UR QL STRIP: ABNORMAL
WBC # UR STRIP: NORMAL /HPF

## 2022-09-22 PROCEDURE — 81001 URINALYSIS AUTO W/SCOPE: CPT

## 2022-09-22 PROCEDURE — 93005 ELECTROCARDIOGRAM TRACING: CPT

## 2022-09-22 NOTE — TELEPHONE ENCOUNTER
SCHEDULED PATIENT AN APPOINTMENT FOR CARDIAC CLEARANCE WITH DR HERNADEZ ON Thursday September 29TH @ 9:15 AM.  PATIENT NOTIFIED.

## 2022-09-22 NOTE — PAT
Patient seen for PAT appointment today for upcoming procedure with Dr. De La Cruz on 10/6/22. Patient sees Dr. Mendoza regularly ~ Q3 months. Patient denies any chest pain or shortness of breath. EKG obtained per protocol, unconfirmed, did have some changes from prior EKG in our system. EKG atrial-paced rhythm with prolonged AV conduction, left axis deviation, left bundle branch block. LBBB new. Ernst De Los Santos CRNA notified of above information. He would like patient to be cleared by his cardiologist before proceeding. Vivian Alston and Dr. De La Cruz's office notified at this time.

## 2022-09-23 LAB
QT INTERVAL: 462 MS
QTC INTERVAL: 484 MS

## 2022-09-27 ENCOUNTER — TREATMENT (OUTPATIENT)
Dept: CARDIAC REHAB | Facility: HOSPITAL | Age: 67
End: 2022-09-27

## 2022-09-27 DIAGNOSIS — Z95.5 S/P PRIMARY ANGIOPLASTY WITH CORONARY STENT: Primary | ICD-10-CM

## 2022-09-27 PROCEDURE — 93798 PHYS/QHP OP CAR RHAB W/ECG: CPT

## 2022-09-27 NOTE — PROGRESS NOTES
Pt was seen today in [facility] for a Phase II visit. Vital signs and session notes recorded in [employer] and will be scanned into Epic by HIM.

## 2022-10-03 ENCOUNTER — TELEPHONE (OUTPATIENT)
Dept: UROLOGY | Facility: CLINIC | Age: 67
End: 2022-10-03

## 2022-10-03 NOTE — TELEPHONE ENCOUNTER
Caller: LEOPOLDO    Relationship to patient: SELF    Best call back number: [phone]    Patient is needing: PT IS STATING THAT HE IS WANTING A CALL BACK FROM A NURSE TO SPEAK ABOUT HIS SURGERY ON 10.6.22.

## 2022-10-04 NOTE — PROGRESS NOTES
Patient called and we discussed he would like his surgery switch to left URS/LL instead of right.  He is more symptomatic on the left side and would like to have this taken care of first and then proceed with a right URS/LL.  I confirmed this with Dr. De La Cruz and have requested Gale Leggett in the OR and make the change.

## 2022-10-06 ENCOUNTER — ANESTHESIA (OUTPATIENT)
Dept: PERIOP | Facility: HOSPITAL | Age: 67
End: 2022-10-06

## 2022-10-06 ENCOUNTER — ANESTHESIA EVENT (OUTPATIENT)
Dept: PERIOP | Facility: HOSPITAL | Age: 67
End: 2022-10-06

## 2022-10-06 ENCOUNTER — HOSPITAL ENCOUNTER (OUTPATIENT)
Facility: HOSPITAL | Age: 67
Setting detail: HOSPITAL OUTPATIENT SURGERY
Discharge: HOME | End: 2022-10-06
Attending: UROLOGY | Admitting: UROLOGY

## 2022-10-06 VITALS
TEMPERATURE: 98.4 F | DIASTOLIC BLOOD PRESSURE: 97 MMHG | SYSTOLIC BLOOD PRESSURE: 150 MMHG | OXYGEN SATURATION: 93 % | RESPIRATION RATE: 16 BRPM | HEART RATE: 74 BPM

## 2022-10-06 DIAGNOSIS — N20.0 NEPHROLITHIASIS: ICD-10-CM

## 2022-10-06 PROCEDURE — 52356 CYSTO/URETERO W/LITHOTRIPSY: CPT | Performed by: UROLOGY

## 2022-10-06 PROCEDURE — 25010000002 PROPOFOL 200 MG/20ML EMULSION: Performed by: NURSE ANESTHETIST, CERTIFIED REGISTERED

## 2022-10-06 PROCEDURE — 25010000002 GENTAMICIN PER 80 MG: Performed by: UROLOGY

## 2022-10-06 PROCEDURE — 25010000002 MIDAZOLAM PER 1MG: Performed by: NURSE ANESTHETIST, CERTIFIED REGISTERED

## 2022-10-06 PROCEDURE — 25010000002 DEXAMETHASONE PER 1 MG: Performed by: NURSE ANESTHETIST, CERTIFIED REGISTERED

## 2022-10-06 PROCEDURE — C2617 STENT, NON-COR, TEM W/O DEL: HCPCS | Performed by: UROLOGY

## 2022-10-06 PROCEDURE — 25010000002 ONDANSETRON PER 1 MG: Performed by: NURSE ANESTHETIST, CERTIFIED REGISTERED

## 2022-10-06 PROCEDURE — C1894 INTRO/SHEATH, NON-LASER: HCPCS | Performed by: UROLOGY

## 2022-10-06 PROCEDURE — C1758 CATHETER, URETERAL: HCPCS | Performed by: UROLOGY

## 2022-10-06 PROCEDURE — C1769 GUIDE WIRE: HCPCS | Performed by: UROLOGY

## 2022-10-06 PROCEDURE — 25010000002 FENTANYL CITRATE (PF) 100 MCG/2ML SOLUTION: Performed by: NURSE ANESTHETIST, CERTIFIED REGISTERED

## 2022-10-06 PROCEDURE — 82962 GLUCOSE BLOOD TEST: CPT

## 2022-10-06 PROCEDURE — 25010000002 IOPAMIDOL 61 % SOLUTION: Performed by: UROLOGY

## 2022-10-06 DEVICE — URETERAL STENT
Type: IMPLANTABLE DEVICE | Site: URETER | Status: FUNCTIONAL
Brand: CONTOUR™

## 2022-10-06 RX ORDER — SODIUM CHLORIDE 9 MG/ML
INJECTION, SOLUTION INTRAVENOUS CONTINUOUS PRN
Status: DISCONTINUED | OUTPATIENT
Start: 2022-10-06 | End: 2022-10-06 | Stop reason: SURG

## 2022-10-06 RX ORDER — DEXAMETHASONE SODIUM PHOSPHATE 4 MG/ML
INJECTION, SOLUTION INTRA-ARTICULAR; INTRALESIONAL; INTRAMUSCULAR; INTRAVENOUS; SOFT TISSUE AS NEEDED
Status: DISCONTINUED | OUTPATIENT
Start: 2022-10-06 | End: 2022-10-06 | Stop reason: SURG

## 2022-10-06 RX ORDER — MIDAZOLAM HYDROCHLORIDE 2 MG/2ML
INJECTION, SOLUTION INTRAMUSCULAR; INTRAVENOUS AS NEEDED
Status: DISCONTINUED | OUTPATIENT
Start: 2022-10-06 | End: 2022-10-06 | Stop reason: SURG

## 2022-10-06 RX ORDER — OXYBUTYNIN CHLORIDE 10 MG/1
10 TABLET, EXTENDED RELEASE ORAL DAILY PRN
Qty: 10 TABLET | Refills: 0 | Status: SHIPPED | OUTPATIENT
Start: 2022-10-06 | End: 2022-11-11

## 2022-10-06 RX ORDER — LORAZEPAM 2 MG/ML
1 INJECTION INTRAMUSCULAR ONCE
Status: DISCONTINUED | OUTPATIENT
Start: 2022-10-06 | End: 2022-10-06 | Stop reason: HOSPADM

## 2022-10-06 RX ORDER — ONDANSETRON 2 MG/ML
4 INJECTION INTRAMUSCULAR; INTRAVENOUS ONCE
Status: DISCONTINUED | OUTPATIENT
Start: 2022-10-06 | End: 2022-10-06 | Stop reason: HOSPADM

## 2022-10-06 RX ORDER — ACETAMINOPHEN 500 MG
1000 TABLET ORAL EVERY 6 HOURS
Qty: 30 TABLET | Refills: 0 | Status: SHIPPED | OUTPATIENT
Start: 2022-10-06 | End: 2022-10-10

## 2022-10-06 RX ORDER — ONDANSETRON 2 MG/ML
INJECTION INTRAMUSCULAR; INTRAVENOUS AS NEEDED
Status: DISCONTINUED | OUTPATIENT
Start: 2022-10-06 | End: 2022-10-06 | Stop reason: SURG

## 2022-10-06 RX ORDER — OXYCODONE HYDROCHLORIDE 5 MG/1
5 TABLET ORAL EVERY 6 HOURS PRN
Qty: 5 TABLET | Refills: 0 | Status: SHIPPED | OUTPATIENT
Start: 2022-10-06 | End: 2022-11-11

## 2022-10-06 RX ORDER — PHENAZOPYRIDINE HYDROCHLORIDE 100 MG/1
100 TABLET, FILM COATED ORAL 3 TIMES DAILY PRN
Qty: 21 TABLET | Refills: 0 | Status: SHIPPED | OUTPATIENT
Start: 2022-10-06 | End: 2023-03-27

## 2022-10-06 RX ORDER — IPRATROPIUM BROMIDE AND ALBUTEROL SULFATE 2.5; .5 MG/3ML; MG/3ML
3 SOLUTION RESPIRATORY (INHALATION) ONCE
Status: DISCONTINUED | OUTPATIENT
Start: 2022-10-06 | End: 2022-10-06 | Stop reason: HOSPADM

## 2022-10-06 RX ORDER — CEFDINIR 300 MG/1
300 CAPSULE ORAL 2 TIMES DAILY
Qty: 14 CAPSULE | Refills: 0 | Status: SHIPPED | OUTPATIENT
Start: 2022-10-06 | End: 2022-10-13

## 2022-10-06 RX ORDER — FENTANYL CITRATE 50 UG/ML
INJECTION, SOLUTION INTRAMUSCULAR; INTRAVENOUS AS NEEDED
Status: DISCONTINUED | OUTPATIENT
Start: 2022-10-06 | End: 2022-10-06 | Stop reason: SURG

## 2022-10-06 RX ORDER — KETAMINE HCL IN NACL, ISO-OSM 100MG/10ML
SYRINGE (ML) INJECTION AS NEEDED
Status: DISCONTINUED | OUTPATIENT
Start: 2022-10-06 | End: 2022-10-06 | Stop reason: SURG

## 2022-10-06 RX ORDER — TAMSULOSIN HYDROCHLORIDE 0.4 MG/1
1 CAPSULE ORAL NIGHTLY
Qty: 10 CAPSULE | Refills: 0 | Status: SHIPPED | OUTPATIENT
Start: 2022-10-06 | End: 2023-03-27

## 2022-10-06 RX ORDER — SODIUM CHLORIDE 9 MG/ML
100 INJECTION, SOLUTION INTRAVENOUS CONTINUOUS
Status: DISCONTINUED | OUTPATIENT
Start: 2022-10-06 | End: 2022-10-06 | Stop reason: HOSPADM

## 2022-10-06 RX ORDER — DOCUSATE SODIUM 100 MG/1
100 CAPSULE, LIQUID FILLED ORAL 2 TIMES DAILY
Qty: 15 CAPSULE | Refills: 1 | Status: SHIPPED | OUTPATIENT
Start: 2022-10-06 | End: 2023-03-27

## 2022-10-06 RX ORDER — PROPOFOL 10 MG/ML
INJECTION, EMULSION INTRAVENOUS AS NEEDED
Status: DISCONTINUED | OUTPATIENT
Start: 2022-10-06 | End: 2022-10-06 | Stop reason: SURG

## 2022-10-06 RX ORDER — LIDOCAINE HYDROCHLORIDE 20 MG/ML
INJECTION, SOLUTION INTRAVENOUS AS NEEDED
Status: DISCONTINUED | OUTPATIENT
Start: 2022-10-06 | End: 2022-10-06 | Stop reason: SURG

## 2022-10-06 RX ADMIN — MIDAZOLAM HYDROCHLORIDE 2 MG: 1 INJECTION, SOLUTION INTRAMUSCULAR; INTRAVENOUS at 10:01

## 2022-10-06 RX ADMIN — FENTANYL CITRATE 50 MCG: 50 INJECTION INTRAMUSCULAR; INTRAVENOUS at 10:32

## 2022-10-06 RX ADMIN — LIDOCAINE HYDROCHLORIDE 40 MG: 20 INJECTION, SOLUTION INTRAVENOUS at 10:15

## 2022-10-06 RX ADMIN — DEXAMETHASONE SODIUM PHOSPHATE 4 MG: 4 INJECTION, SOLUTION INTRAMUSCULAR; INTRAVENOUS at 10:32

## 2022-10-06 RX ADMIN — SODIUM CHLORIDE: 9 INJECTION, SOLUTION INTRAVENOUS at 09:27

## 2022-10-06 RX ADMIN — GENTAMICIN SULFATE 430 MG: 40 INJECTION, SOLUTION INTRAMUSCULAR; INTRAVENOUS at 09:30

## 2022-10-06 RX ADMIN — SODIUM CHLORIDE 100 ML/HR: 9 INJECTION, SOLUTION INTRAVENOUS at 09:18

## 2022-10-06 RX ADMIN — Medication 25 MG: at 10:17

## 2022-10-06 RX ADMIN — PROPOFOL 50 MG: 10 INJECTION, EMULSION INTRAVENOUS at 10:17

## 2022-10-06 RX ADMIN — SODIUM CHLORIDE: 9 INJECTION, SOLUTION INTRAVENOUS at 10:53

## 2022-10-06 RX ADMIN — FENTANYL CITRATE 50 MCG: 50 INJECTION INTRAMUSCULAR; INTRAVENOUS at 10:27

## 2022-10-06 RX ADMIN — ONDANSETRON 4 MG: 2 INJECTION INTRAMUSCULAR; INTRAVENOUS at 10:32

## 2022-10-06 RX ADMIN — PROPOFOL 150 MG: 10 INJECTION, EMULSION INTRAVENOUS at 10:16

## 2022-10-06 NOTE — ANESTHESIA PREPROCEDURE EVALUATION
Anesthesia Evaluation     Patient summary reviewed and Nursing notes reviewed   no history of anesthetic complications:  NPO Solid Status: > 8 hours  NPO Liquid Status: > 8 hours           Airway   Mallampati: II  TM distance: >3 FB  Neck ROM: full  Possible difficult intubation  Dental - normal exam     Pulmonary - normal exam   (+) pneumonia resolved , shortness of breath, sleep apnea,   Cardiovascular - normal exam    ECG reviewed  PT is on anticoagulation therapy  Patient on routine beta blocker    (+) pacemaker pacemaker, ICD interrogated <1 month ago, hypertension, past MI , CAD, dysrhythmias Atrial Fib, PVD, hyperlipidemia,   Pericardial effusion:         Neuro/Psych  (+) seizures, TIA, CVA, numbness, psychiatric history,    Dementia:  memory loss.    ROS Comment: 6th nerve palsy, right  GI/Hepatic/Renal/Endo    (+) obesity, morbid obesity,  renal disease stones and CRI, diabetes mellitus type 2 poorly controlled, thyroid problem hypothyroidism    Musculoskeletal     (+) arthralgias, back pain, chronic pain, myalgias,   Abdominal   (+) obese,    Substance History      OB/GYN          Other   arthritis,      ROS/Med Hx Other: Cardiac Clearance on chart. Pacer/defib clearance  Labs reviewed   ekg a paced lad lbbb          Essential hypertension  Focal seizure (HCC)  CVA (cerebral vascular accident) (HCC)  Hypokalemia  Diabetes mellitus without complication (HCC)  BiPAP (biphasic positive airway pressure) dependence  Benign hypertension  Hyperlipidemia  Epilepsy (HCC)  Obesity  Dyspepsia  Coronary artery disease involving native coronary artery of native heart without angina pectoris  Abnormal stress test  TIA (transient ischemic attack)  Pneumonia of right lower lobe due to infectious organism  History of CVA (cerebrovascular accident)  ALEX (obstructive sleep apnea)  SOB (shortness of breath) on exertion  Suspected COVID-19 virus infection  Fever and chills  Dyspnea  Fever and chills  Localized edema  Right  leg pain  Atrial fibrillation (HCC)  Memory loss  Blood glucose abnormal  Generalized anxiety disorder  History of Helicobacter pylori infection  Benign prostatic hyperplasia  CKD (chronic kidney disease)  Medication intolerance  Pseudoaneurysm of femoral artery following procedure (HCC)  Sustained ventricular tachycardia (HCC)  ABMD (anterior basement membrane dystrophy)  Bilateral myopia  Chronic otitis media  Eye twitch  Hyperaldosteronism (HCC)  Otitis externa of right ear  Regular astigmatism of right eye  Seizure-like activity (HCC)  Nephrolithiasis                      Anesthesia Plan    ASA 4     general     (Risks and benefits discussed including risk of aspiration, recall and dental damage.   Pt increased intraop and postop risk for cv, resp, and neuro events, All patient questions answered.    Will continue with plan of care.)  intravenous induction     Anesthetic plan, risks, benefits, and alternatives have been provided, discussed and informed consent has been obtained with: patient.  Pre-procedure education provided  Plan discussed with CRNA.

## 2022-10-06 NOTE — ANESTHESIA PROCEDURE NOTES
Airway  Urgency: elective    Date/Time: 10/6/2022 10:00 AM    General Information and Staff    Patient location during procedure: OR  CRNA/CAA: Arsh Sidhu CRNA    Indications and Patient Condition  Indications: management.    Preoxygenated: yes  Mask difficulty assessment: 2 - vent by mask + OA or adjuvant +/- NMBA    Final Airway Details  Final airway type: supraglottic airway      Successful airway: classic  Size 5    Number of attempts at approach: 1  Assessment: lips, teeth, and gum same as pre-op and atraumatic intubation    Additional Comments  Lma placed by standard fashion, cuff up with mov, bbs and expansion =, + etco, tolerated without adverse rx. Syringe to  balloon for cuff pressure equalization, gauge in the green

## 2022-10-06 NOTE — DISCHARGE INSTRUCTIONS
Home Care After Ureteroscopy and Laser Lithotripsy  The following instructions will help you care for yourself, or be cared for upon your return home today.    These are guidelines for your care right after surgery only.     Diet  Drink plenty of liquids and eat light meals today.    Start your regular diet tomorrow.    Activity  Start normal activities in twenty-four (24) hours.    Wound Care and Hygiene  No restrictions, start normal routine.    Anesthesia Precautions & Expectations  After anesthesia, rest for 24 hours.    Do not drive, drink alcoholic beverages or make any important decisions during this time.  General anesthesia may cause a sore throat, jaw discomfort or muscle aches.    These symptoms can last for one or two days.     What to Expect after Surgery  Mild pain with voiding.  Frequency or urgency.  Bladder cramps.  Minimal bleeding with voiding.    Call your Doctor  Passing clots in urine preventing bladder emptying  Severe pain not controlled by oral medication  Temperature above 101.5 degrees  Inability to urinate within eight (8) hours after surgery    After Stent Placement  It is common to have blood tinged urine for 3-5 days.  It is common to have pain in your side and in your back when you urinate for 3-5 days.  It is common to have urgency with urination.  This is a temporary stent and will need to be removed in the office in 1 week.  Do not take the Pyridium 24 hours prior to your stent removal.    Other Contacts  Urology Office:  [id number]   [place] [address] [phone] office  [phone] fax    Other Instructions  Take tamsulosin daily until the stent comes out.  Take oxybutynin daily as needed for bladder spasm while the stent is in.  Take Pyridium up to 3 times daily as needed for burning with urination.   Take Tylenol scheduled every 6 hours for the first 3 days.  Take the oxycodone on top of that as needed.  Take all of the antibiotics.     Follow up  Appointment  1 week for stent removal. Please See After visit Summary. Call if you do not have an appt already scheduled.

## 2022-10-06 NOTE — ANESTHESIA POSTPROCEDURE EVALUATION
Patient: Andre Agosto    Procedure Summary     Date: 10/06/22 Room / Location: [facility] /  [facility] OR    Anesthesia Start: 1000 Anesthesia Stop:     Procedure: URETEROSCOPY LASER LITHOTRIPSY WITH STENT INSERTION (Left ) Diagnosis:       Nephrolithiasis      (Nephrolithiasis [N20.0])    Surgeons: Tonio De La Cruz MD Provider: Arsh Sidhu CRNA    Anesthesia Type: general ASA Status: 4          Anesthesia Type: general    Vitals  No vitals data found for the desired time range.          Post Anesthesia Care and Evaluation    Patient location during evaluation: PACU  Patient participation: complete - patient participated  Level of consciousness: awake  Pain score: 0  Pain management: adequate    Airway patency: patent  Anesthetic complications: No anesthetic complications  PONV Status: none  Cardiovascular status: acceptable  Respiratory status: acceptable and face mask  Hydration status: acceptable    Comments: vsss resp spont, reflexes intact, responsive, report given to pacu nurse.  See R.N. note for postop vital signs.

## 2022-10-06 NOTE — H&P
CC  No chief complaint on file.       HPI  Andre Agosto is a 67 y.o. with history of No diagnosis found.     No recent fevers or new LUTS  Does not take any blood thinners    Past Medical History  Past Medical History:   Diagnosis Date   • 6th nerve palsy, right     right eye    • Anxiety and depression    • Atrial fibrillation, persistent (HCC) 12/02/2020    on Xarelto   • Coronary artery disease involving native coronary artery of native heart without angina pectoris 09/12/2018    follows w/ Dr. Johnson, on ASA 81mg   • CRI (chronic renal insufficiency), stage 2 (mild)    • CVA (cerebral vascular accident) (HCC)    • Diabetes mellitus (HCC)     doesnt check sugar, type 2    • Disease of thyroid gland    • Double vision     resolved- right eye   • Elevated cholesterol    • Focal seizure (HCC)     of right arm    • Heart attack (HCC)     NSTEMI 2015, s/p stenting   • History of Helicobacter pylori infection     in 2008, treated w/ PrevPak - 2021 EGD bx (+), PCP RX - PPI/Augmentin/Doxy/Flagyl (x 14 days) 1/22/21   • HL (hearing loss)     (L) ear - child luevano measles   • Hyperlipidemia    • Hypertension    • Kidney stone    • Memory loss 2005    d/t meningitis   • Meningitis 2005    unsure of bacterial or viral    • Obesity    • Sleep apnea treated with nocturnal BiPAP     compliant with  machine    • Stroke ([facility])     2017/2018   • Ventricular tachycardia     3 different times   • Wears glasses        Past Surgical History  Past Surgical History:   Procedure Laterality Date   • CARDIAC CATHETERIZATION N/A 10/12/2018    Procedure: Left Heart Cath;  Surgeon: Yoselin Johnson MD;  Location: [facility] CATH INVASIVE LOCATION;  Service: Cardiology   • CARDIAC CATHETERIZATION  03/2021    stent   • CARDIAC CATHETERIZATION  07/2021    just checking the after pacemaker placed- Dr. Tim   • CARDIAC DEFIBRILLATOR PLACEMENT     • COLONOSCOPY  10/2020    w/ Dr. Jacobs, hx colon polyps   • CORONARY STENT PLACEMENT      • LAPAROSCOPIC GASTRIC BANDING  2008    s/p LAGB Realize 2008 by HOMERO.   • PACEMAKER IMPLANTATION  2021   • UMBILICAL HERNIA REPAIR      incarcerated UHR w/ mesh by Dr. Velasquez   • URETEROSCOPY LASER LITHOTRIPSY WITH STENT INSERTION Left 10/20/2021    Procedure: URETEROSCOPY LASER LITHOTRIPSY WITH STENT INSERTION;  Surgeon: Tonio De La Cruz MD;  [facility];  Service: Urology;  Laterality: Left;   • URETEROSCOPY LASER LITHOTRIPSY WITH STENT INSERTION Left 2021    Procedure: URETEROSCOPY LEFT, LEFT RETROGRADE PYELOGRAM, CYSTOSCOPY, LASER LITHOTRIPSY WITH STENT EXCHANGE;  Surgeon: Tonio De La Cruz MD;  Location: [facility] OR;  Service: Urology;  Laterality: Left;       Medications  No current facility-administered medications for this encounter.    Allergies  Allergies   Allergen Reactions   • Statins Myalgia and Other (See Comments)       Social History  Social History     Socioeconomic History   • Marital status:    Tobacco Use   • Smoking status: Former Smoker     Packs/day: 0.50     Years: 10.00     Pack years: 5.00     Types: Cigarettes     Start date: 1975     Quit date: 1985     Years since quittin.7   • Smokeless tobacco: Never Used   Vaping Use   • Vaping Use: Never used   Substance and Sexual Activity   • Alcohol use: No   • Drug use: No   • Sexual activity: Defer       Review of Systems  Constitutional: No fevers or chills  Skin: Negative for rash  Endocrine: No heat/cold intolerance   Cardiovascular: Negative for chest pain or dyspnea on exertion  Respiratory: Negative for shortness of breath or wheezing  Gastrointestinal: No constipation, nausea or vomiting  Genitourinary: Negative for new lower urinary tract symptoms, current gross hematuria or dysuria.  Musculoskeletal: No flank pain  Neurological:  Negative for frequent headaches or dizziness  Lymph/Heme: Negative for leg swelling or calf pain.    Physical Exam  There were no vitals taken for  this visit.  Constitutional: NAD, WDWN.   HEENT: NCAT. Conjunctivae normal.  MMM.    Cardiovascular: Regular rate.  Pulmonary/Chest: Respirations are even and non-labored bilaterally.  Abdominal: Soft. No distension, tenderness, masses or guarding. No CVA tenderness.  Neurological: A + O x 3.  Cranial Nerves II-XII grossly intact. Normal gait.  Extremities: KRUNAL x 4, Warm. No clubbing.  No cyanosis.    Skin: Pink, warm and dry.  No rashes noted.  Psychiatric:  Normal mood and affect    Labs & Imaging  Lab Results   Component Value Date    GLUCOSE 114 (H) 09/09/2022    CALCIUM 8.7 09/09/2022     09/09/2022    K 3.3 (L) 09/09/2022    CO2 24.0 09/09/2022     09/09/2022    BUN 16 09/09/2022    CREATININE 1.45 (H) 09/09/2022    EGFRIFAFRI 57 (L) 12/05/2018    EGFRIFNONA 55 (L) 01/10/2022    BCR 11.0 09/09/2022    ANIONGAP 14.0 09/09/2022     Lab Results   Component Value Date    WBC 6.37 09/09/2022    HGB 16.8 09/09/2022    HCT 50.4 09/09/2022    MCV 89.4 09/09/2022     09/09/2022     Brief Urine Lab Results  (Last result in the past 365 days)      Color   Clarity   Blood   Leuk Est   Nitrite   Protein   CREAT   Urine HCG        09/22/22 1041 Yellow   Clear   Negative   Negative   Negative   100 mg/dL (2+)                    No results found.        Assessment  Andre Agosto is a 67 y.o. male who presents with the following diagnosis:  No diagnosis found.     Plan  1. To OR for URETEROSCOPY LASER LITHOTRIPSY WITH STENT INSERTION     Tonio De La Cruz MD

## 2022-10-06 NOTE — OP NOTE
Preoperative diagnosis  left kidney stone    Postoperative diagnosis  left kidney stone    Procedure performed  1.  Flexible cystoscopy, left retrograde pyelogram with ureteral stent placement   2.  Left ureteroscopy with holmium-YAG laser lithotripsy (25566)   3.  Fluoroscopy time < 1 hour with interpretation of images    Surgeon  Tonio De La Cruz MD    Anesthesia  General    Complications  None     Specimen  None    EBL  Minimal    Findings  Urethroscopy revealed no strictures or other abnormalities.  Cystoscopy revealed no tumors, stones or other mucosal abnormalities.  Retrograde pyelogram revealed a delicate system with identification of the stone seen on pre-op imaging.  No other filling defects and caliber of the ureter was smooth and normal.    Ureteroscopy revealed Left small non-obstructing renal stones.     Indications  67 y.o. male agreed to undergo the above named procedure after discussion of the alternatives, risks and benefits.  Informed consent was obtained.      Procedure  The patient was taken to the operating room, identified by name and medical record number, and placed supine on the operating table.  Pre-operative antibiotics were administered.  Bilateral lower extremity SCDs were placed.  After induction of general anesthesia the patient was positioned in dorsal lithotomy, prepped and draped in a sterile fashion.  A time-out was performed.      A 14-Vietnamese flexible cystoscope was passed carefully via urethra into the bladder.  The ureteral orifice was identified and a Sensor wire was passed retrograde to the level of the kidney and confirmed by fluoroscopy.  The flexible scope was off-loaded and the bladder emptied with a straight catheter.  A dual lumen open-ended catheter was passed over the wire. A retrograde pyelogram was performed by slowly injecting 5 mL of 50% Omnipaque contrast via the catheter with findings described above.  An Amplatz super-stiff was placed to the level of the renal  pelvis and confirmed by fluoroscopy. The dual lumen was removed. The Sensor wire was clipped to the drape as a safety wire.  A ureteral access sheath was advanced over the super-stiff wire to level of the UPJ under direct fluoroscopic guidance. The kidney was entered with the flexible ureteroscope.  The kidney stone was identified and fragmented with a 200-micron  laser fiber at laser settings of  0.3 joules and a frequency of 50 Hz. At the completion of the procedure, all clinically significant fragments were removed and only dust-like fragments remained.  The access sheath was removed under direct vision.  Ureteral edema but no obvious obstruction was present. A retrograde pyelogram was performed and a  6 Fr x 28 cm stent was positioned with the upper end in the kidney and the lower in the bladder confirmed by fluoroscopy. The bladder was emptied and the procedure was complete. The patient tolerated the procedure well and was stable throughout.    The patient will follow up with me next week for stent removal.

## 2022-10-07 LAB — GLUCOSE BLDC GLUCOMTR-MCNC: 100 MG/DL (ref 70–130)

## 2022-10-11 ENCOUNTER — TREATMENT (OUTPATIENT)
Dept: PHYSICAL THERAPY | Facility: CLINIC | Age: 67
End: 2022-10-11

## 2022-10-11 DIAGNOSIS — R26.89 BALANCE PROBLEMS: Primary | ICD-10-CM

## 2022-10-11 PROCEDURE — 97161 PT EVAL LOW COMPLEX 20 MIN: CPT | Performed by: PHYSICAL THERAPIST

## 2022-10-11 NOTE — PROGRESS NOTES
Physical Therapy Initial Evaluation and Plan of Care      Patient: Andre Agosto   : 1955  Diagnosis/ICD-10 Code:  Balance problems [R26.89]  Referring practitioner: Rony Clifford MD    Subjective Evaluation    History of Present Illness  Mechanism of injury: Patient presents to physical therapy for an initial evaluation of weakness/balance issues. Patient has a history of a CVA and vestibular issues. He states that he feels off balance when walking and he veers to the left. He also states that when in the shower if he closes his eyes and turns he feels as if he is going to fall out of the shower. He also feels very unsteady on unlevel ground and has difficulty getting up from a chair. He fell approximately two weeks ago while carrying a fuel tank and injured his knee, but has not fallen since.       Patient Occupation: Retired  Quality of life: poor    Pain  Current pain ratin  At best pain ratin  At worst pain ratin  Relieving factors: support  Aggravating factors: movement, ambulation, stairs and squatting  Progression: no change    Social Support  Lives in: multiple-level home  Lives with: spouse    Hand dominance: right    Patient Goals  Patient goals for therapy: improved balance and increased strength  Patient goal: Feel more stable           Objective          Strength/Myotome Testing     Left Hip   Planes of Motion   Flexion: 4-  Abduction: 3+    Right Hip   Planes of Motion   Flexion: 4-  Abduction: 4-    Left Knee   Flexion: 4-  Extension: 4    Right Knee   Flexion: 4  Extension: 4+    Left Ankle/Foot   Dorsiflexion: 5    Right Ankle/Foot   Dorsiflexion: 5    Functional Assessment     Comments  No symptoms provoked with head movements or visual tracking  Sways when standing with EC worse than with EO    Unable to  Tandem stance bilaterally without UE support by PT (mod)    Unable to  SLS bilaterally without max assist from PT.           Assessment & Plan      Assessment  Impairments: abnormal gait, activity intolerance, impaired balance, impaired physical strength, lacks appropriate home exercise program and safety issue  Functional Limitations: walking  Assessment details: Patient is a 67 year old male presenting to physical therapy for an initial evaluation of weakness/balance issues. In ambulation, he demonstrates forward posture and veers to the left. He demonstrated decreased balance with EC, tandem stance, and SL stance. He also has significant weakness in bilateral hips in both flexion and abduction. PT is indicated in order to assist pt in gaining bilateral hip strength and improving balance and gait to ensure safety at home and in the community    Barriers to therapy: None  Prognosis: good  Prognosis details: Patient has no foreseeable barriers to hinder progress with therapy    Goals  Plan Goals: Short term (2 weeks)  1) IND with HEP  2) Pt will be able to stand with EC with minimal sway  3) Pt will be able to stand on balance pad with EO without losing balance for one minute  Long term (4 weeks)  1) Pt will have bilateral hip strength of 4/5 in both flexion and abduction MMT  2) Patient will be able to perform tandem stance for 30 seconds with EO without losing balance  3) Patient will walk 20 feet without veering to the left    Plan  Therapy options: will be seen for skilled therapy services  Planned therapy interventions: balance/weight-bearing training, flexibility, functional ROM exercises, gait training, home exercise program, neuromuscular re-education, postural training, strengthening, stretching, therapeutic activities and motor coordination training  Frequency: 2x week  Duration in weeks: 6  Treatment plan discussed with: patient        Manual Therapy:         mins  98491;  Therapeutic Exercise:         mins  24526;     Neuromuscular Adam:        mins  79195;    Therapeutic Activity:          mins  26708;     Gait Training:           mins  89848;      Ultrasound:          mins  32777;    Electrical Stimulation:         mins  20760 ( );  Dry Needling          mins self-pay    Timed Treatment:      mins   Total Treatment:     40   mins    PT SIGNATURE: RORO Villegas License: [unfilled]  DATE TREATMENT INITIATED: 10/11/2022    Initial Certification  Certification Period: 1/8/2023  I certify that the therapy services are furnished while this patient is under my care.  The services outlined above are required by this patient, and will be reviewed every 90 days.     PHYSICIAN: Rony Clifford MD      DATE:     Please sign and return via fax to [phone].. Thank you, [facility].

## 2022-10-13 ENCOUNTER — PROCEDURE VISIT (OUTPATIENT)
Dept: UROLOGY | Facility: CLINIC | Age: 67
End: 2022-10-13

## 2022-10-13 DIAGNOSIS — N20.0 NEPHROLITHIASIS: Primary | ICD-10-CM

## 2022-10-13 PROCEDURE — 52310 CYSTOSCOPY AND TREATMENT: CPT | Performed by: UROLOGY

## 2022-10-13 NOTE — PROGRESS NOTES
Preoperative diagnosis  Foreign body in genitourinary tract    Postoperative diagnosis  Foreign body in genitourinary tract    Procedure  Flexible cystourethroscopy with stent removal    Attending surgeon  Tonio De La Cruz MD    Anesthesia  2% lidocaine jelly intraurethrally    Complications  None    Indications  67 y.o. male who is status post ureteroscopy with laser lithotripsy who presents for stent removal.    Procedure  Detailed information of all possible complications and side effects were discussed with the patient.  Informed consent was obtained. Patient was given one dose of antibiotics. The patient was placed in supine position and a timeout was performed. The patient was prepped and draped in sterile fashion.  Next, 2% lidocaine jelly was bluntly injected per urethra without difficulty. The 14 Colombian flexible cystoscope was passed through the urethra and into the bladder.  The stent was visualized, grasped and removed in its entirety.  The patient tolerated the procedure well.    Plan  1. Provided education regarding water intake of at least 2 liters per day  2. F/u in 8 weeks with a renal ultrasound

## 2022-11-02 ENCOUNTER — TREATMENT (OUTPATIENT)
Dept: PHYSICAL THERAPY | Facility: CLINIC | Age: 67
End: 2022-11-02

## 2022-11-02 DIAGNOSIS — R26.89 BALANCE PROBLEMS: Primary | ICD-10-CM

## 2022-11-02 PROCEDURE — 97112 NEUROMUSCULAR REEDUCATION: CPT | Performed by: PHYSICAL THERAPIST

## 2022-11-02 PROCEDURE — 97530 THERAPEUTIC ACTIVITIES: CPT | Performed by: PHYSICAL THERAPIST

## 2022-11-02 PROCEDURE — 97110 THERAPEUTIC EXERCISES: CPT | Performed by: PHYSICAL THERAPIST

## 2022-11-02 NOTE — PROGRESS NOTES
"Physical Therapy Daily Treatment Note      Visit #: 2    Andre Agosto reports that he has not been consistent with his HEP and has only done them about half the time. Pt reports that he is \"just lazy.\" Pt states that he is having some soreness in the low back today. Pt reports that this soreness goes on quite a bit. Pt states that his knee has healed up and that he has had no falls.         Objective Pt present to PT today with no distress.     Pt reports symptoms when he looks up when walking around although no noted symptoms when walking with PT in the clinic. Pt performed dynamic walking without veering to the L or R with head movements.     Pt with no LOB with standing on airex with EO or EC.     Pt tolerated exercises well today.     Pt with poor posture with head forward and down with sitting, standing, and walking.       See Exercise, Manual, and Modality Logs for complete treatment.     Assessment/Plan  Pt continues to report poor balance with walking and unsteadiness on the feet due to feeling dizzy. PT has been unable to replicate symptoms in the clinic. Pt to continue with PT to help improve hip strength, functional mobility, and balance with daily activities.       Progress per Plan of Care      Visit Diagnosis:    ICD-10-CM ICD-9-CM   1. Balance problems  R26.89 781.99            Manual Therapy:         mins  02281;  Therapeutic Exercise:    12     mins  12490;     Neuromuscular Adam:    16    mins  64982;    Therapeutic Activity:     12     mins  80719;     Gait Training:           mins  03284;     Ultrasound:          mins  02932;    Electrical Stimulation:         mins  50643 ( );  Dry Needling          mins self-pay  Iontophoresis          mins 68397      Timed Treatment:  40    mins   Total Treatment:     47   mins    Devang Church, PT  Physical Therapist        "

## 2022-11-04 ENCOUNTER — TREATMENT (OUTPATIENT)
Dept: PHYSICAL THERAPY | Facility: CLINIC | Age: 67
End: 2022-11-04

## 2022-11-04 DIAGNOSIS — R26.89 BALANCE PROBLEMS: Primary | ICD-10-CM

## 2022-11-04 PROCEDURE — 97530 THERAPEUTIC ACTIVITIES: CPT | Performed by: PHYSICAL THERAPIST

## 2022-11-04 PROCEDURE — 97110 THERAPEUTIC EXERCISES: CPT | Performed by: PHYSICAL THERAPIST

## 2022-11-04 PROCEDURE — 97112 NEUROMUSCULAR REEDUCATION: CPT | Performed by: PHYSICAL THERAPIST

## 2022-11-04 NOTE — PROGRESS NOTES
Physical Therapy Daily Treatment Note      Visit #: 3    Andre Agosto reports that he felt a little less unsteady and did not notice symptoms as much after last session. Pt reports that his back was a little sore but did well overall.         Objective Pt present to PT today with no distress at rest.     Pt did well with activities today with some challenge with balance activities but no reproduction of dizziness and unsteadiness with activities.       See Exercise, Manual, and Modality Logs for complete treatment.     Assessment/Plan  Pt continues to do well with the clinic with balance and functional activities. Pt to continue with functional training, vestibular exercises, and balance activities to improve activity tolerance and safety around the house and community.       Progress per Plan of Care      Visit Diagnosis:    ICD-10-CM ICD-9-CM   1. Balance problems  R26.89 781.99            Manual Therapy:         mins  73214;  Therapeutic Exercise:    10     mins  83058;     Neuromuscular Adam:    17    mins  70736;    Therapeutic Activity:     20     mins  05292;     Gait Training:           mins  88821;     Ultrasound:          mins  16014;    Electrical Stimulation:         mins  26172 ( );  Dry Needling          mins self-pay  Iontophoresis          mins 52646      Timed Treatment:   47   mins   Total Treatment:     47   mins    Devang Church, PT  Physical Therapist

## 2022-11-09 ENCOUNTER — TELEPHONE (OUTPATIENT)
Dept: PHYSICAL THERAPY | Facility: OTHER | Age: 67
End: 2022-11-09

## 2022-11-10 ENCOUNTER — LAB (OUTPATIENT)
Dept: LAB | Facility: HOSPITAL | Age: 67
End: 2022-11-10

## 2022-11-10 LAB
BACTERIA UR QL AUTO: ABNORMAL /HPF
BILIRUB UR QL STRIP: NEGATIVE
CLARITY UR: CLEAR
COLOR UR: ABNORMAL
GLUCOSE UR STRIP-MCNC: ABNORMAL MG/DL
HGB UR QL STRIP.AUTO: ABNORMAL
HYALINE CASTS UR QL AUTO: ABNORMAL /LPF
KETONES UR QL STRIP: ABNORMAL
LEUKOCYTE ESTERASE UR QL STRIP.AUTO: NEGATIVE
NITRITE UR QL STRIP: NEGATIVE
PH UR STRIP.AUTO: 6 [PH] (ref 5–8)
PROT UR QL STRIP: ABNORMAL
RBC # UR STRIP: ABNORMAL /HPF
REF LAB TEST METHOD: ABNORMAL
SP GR UR STRIP: >=1.03 (ref 1–1.03)
SQUAMOUS #/AREA URNS HPF: ABNORMAL /HPF
UROBILINOGEN UR QL STRIP: ABNORMAL
WBC # UR STRIP: ABNORMAL /HPF

## 2022-11-10 PROCEDURE — 84443 ASSAY THYROID STIM HORMONE: CPT | Performed by: INTERNAL MEDICINE

## 2022-11-10 PROCEDURE — 81001 URINALYSIS AUTO W/SCOPE: CPT | Performed by: INTERNAL MEDICINE

## 2022-11-10 PROCEDURE — 36415 COLL VENOUS BLD VENIPUNCTURE: CPT | Performed by: INTERNAL MEDICINE

## 2022-11-10 PROCEDURE — 80048 BASIC METABOLIC PNL TOTAL CA: CPT | Performed by: INTERNAL MEDICINE

## 2022-11-10 PROCEDURE — 84439 ASSAY OF FREE THYROXINE: CPT | Performed by: INTERNAL MEDICINE

## 2022-11-11 ENCOUNTER — OFFICE VISIT (OUTPATIENT)
Dept: INTERNAL MEDICINE | Facility: CLINIC | Age: 67
End: 2022-11-11

## 2022-11-11 ENCOUNTER — TELEPHONE (OUTPATIENT)
Dept: PHYSICAL THERAPY | Facility: CLINIC | Age: 67
End: 2022-11-11

## 2022-11-11 VITALS
BODY MASS INDEX: 38.36 KG/M2 | OXYGEN SATURATION: 98 % | SYSTOLIC BLOOD PRESSURE: 142 MMHG | WEIGHT: 315 LBS | RESPIRATION RATE: 12 BRPM | HEIGHT: 76 IN | HEART RATE: 65 BPM | DIASTOLIC BLOOD PRESSURE: 82 MMHG

## 2022-11-11 DIAGNOSIS — E11.9 DIABETES MELLITUS WITHOUT COMPLICATION: ICD-10-CM

## 2022-11-11 DIAGNOSIS — I10 BENIGN HYPERTENSION: ICD-10-CM

## 2022-11-11 DIAGNOSIS — N18.31 STAGE 3A CHRONIC KIDNEY DISEASE: Primary | ICD-10-CM

## 2022-11-11 DIAGNOSIS — I50.31 ACUTE DIASTOLIC CHF (CONGESTIVE HEART FAILURE): ICD-10-CM

## 2022-11-11 LAB
ANION GAP SERPL CALCULATED.3IONS-SCNC: 11.2 MMOL/L (ref 5–15)
BUN SERPL-MCNC: 17 MG/DL (ref 8–23)
BUN/CREAT SERPL: 9.7 (ref 7–25)
CALCIUM SPEC-SCNC: 9.3 MG/DL (ref 8.6–10.5)
CHLORIDE SERPL-SCNC: 103 MMOL/L (ref 98–107)
CO2 SERPL-SCNC: 27.8 MMOL/L (ref 22–29)
CREAT SERPL-MCNC: 1.76 MG/DL (ref 0.76–1.27)
EGFRCR SERPLBLD CKD-EPI 2021: 42.1 ML/MIN/1.73
GLUCOSE SERPL-MCNC: 109 MG/DL (ref 65–99)
POTASSIUM SERPL-SCNC: 3.9 MMOL/L (ref 3.5–5.2)
SODIUM SERPL-SCNC: 142 MMOL/L (ref 136–145)
T4 FREE SERPL-MCNC: 1.61 NG/DL (ref 0.93–1.7)
TSH SERPL DL<=0.05 MIU/L-ACNC: 1.73 UIU/ML (ref 0.27–4.2)

## 2022-11-11 PROCEDURE — 99214 OFFICE O/P EST MOD 30 MIN: CPT | Performed by: INTERNAL MEDICINE

## 2022-11-11 RX ORDER — DIVALPROEX SODIUM 250 MG/1
1000 TABLET, DELAYED RELEASE ORAL 2 TIMES DAILY
COMMUNITY
Start: 2022-10-06

## 2022-11-11 RX ORDER — SELENIUM SULFIDE 2.5 MG/100ML
250 LOTION TOPICAL AS NEEDED
COMMUNITY
Start: 2022-07-01 | End: 2023-03-27

## 2022-11-11 NOTE — PROGRESS NOTES
Subjective     Patient ID: Andre Agosto is a 67 y.o. male. Patient is here for management of multiple medical problems.     Chief Complaint   Patient presents with   • Follow-up     Blood work     History of Present Illness     ckd      Cri    DM          The following portions of the patient's history were reviewed and updated as appropriate: allergies, current medications, past family history, past medical history, past social history, past surgical history and problem list.    Review of Systems    Current Outpatient Medications:   •  amiodarone (PACERONE) 200 MG tablet, Take 200 mg by mouth Daily., Disp: , Rfl:   •  amLODIPine (NORVASC) 5 MG tablet, Take 5 mg by mouth Daily., Disp: , Rfl:   •  carvedilol (Coreg) 25 MG tablet, Take 1 tablet by mouth 2 (Two) Times a Day With Meals., Disp: 180 tablet, Rfl: 3  •  divalproex (DEPAKOTE) 250 MG DR tablet, Take 4 tablets by mouth 2 (Two) Times a Day., Disp: , Rfl:   •  docusate sodium (Colace) 100 MG capsule, Take 1 capsule by mouth 2 (Two) Times a Day. If taking oxycodone, Disp: 15 capsule, Rfl: 1  •  Entresto  MG tablet, Take 1 tablet by mouth 2 (Two) Times a Day., Disp: , Rfl:   •  Farxiga 10 MG tablet, Take 10 mg by mouth Daily., Disp: , Rfl:   •  furosemide (LASIX) 40 MG tablet, Take 1 tablet by mouth Daily. (Patient taking differently: Take 1 tablet by mouth Daily As Needed (swelling).), Disp: 90 tablet, Rfl: 3  •  levothyroxine (SYNTHROID, LEVOTHROID) 88 MCG tablet, TAKE 1 TABLET DAILY, Disp: 90 tablet, Rfl: 3  •  selenium sulfide (SELSUN) 2.5 % lotion, 250 application As Needed., Disp: , Rfl:   •  phenazopyridine (PYRIDIUM) 100 MG tablet, Take 1 tablet by mouth 3 (Three) Times a Day As Needed for urinary burning, Disp: 21 tablet, Rfl: 0  •  tamsulosin (FLOMAX) 0.4 MG capsule 24 hr capsule, Take 1 capsule by mouth Every Night., Disp: 10 capsule, Rfl: 0  •  Trulicity 3 MG/0.5ML solution pen-injector, INJECT 0.5 ML UNDER THE SKIN INTO THE APPROPRIATE AREA  "AS DIRECTED ONE TIME PER WEEK, Disp: 2 mL, Rfl: 12  •  Xarelto 15 MG tablet, Take 15 mg by mouth Daily With Dinner., Disp: , Rfl:     Objective      Blood pressure 142/82, pulse 65, resp. rate 12, height 193 cm (76\"), weight (!) 145 kg (318 lb 12.8 oz), SpO2 98 %.    Physical Exam     General Appearance:    Alert, cooperative, no distress, appears stated age   Head:    Normocephalic, without obvious abnormality, atraumatic   Eyes:    PERRL, conjunctiva/corneas clear, EOM's intact   Ears:    Normal TM's and external ear canals, both ears   Nose:   Nares normal, septum midline, mucosa normal, no drainage   or sinus tenderness   Throat:   Lips, mucosa, and tongue normal; teeth and gums normal   Neck:   Supple, symmetrical, trachea midline, no adenopathy;        thyroid:  No enlargement/tenderness/nodules; no carotid    bruit or JVD   Back:     Symmetric, no curvature, ROM normal, no CVA tenderness   Lungs:     Clear to auscultation bilaterally, respirations unlabored   Chest wall:    No tenderness or deformity   Heart:    Regular rate and rhythm, S1 and S2 normal, no murmur,        rub or gallop   Abdomen:     Soft, non-tender, bowel sounds active all four quadrants,     no masses, no organomegaly   Extremities:   Extremities normal, atraumatic, no cyanosis or edema   Pulses:   2+ and symmetric all extremities   Skin:   Skin color, texture, turgor normal, no rashes or lesions   Lymph nodes:   Cervical, supraclavicular, and axillary nodes normal   Neurologic:   CNII-XII intact. Normal strength, sensation and reflexes       throughout      Results for orders placed or performed during the hospital encounter of 10/06/22   POC Glucose Once    Specimen: Blood   Result Value Ref Range    Glucose 100 70 - 130 mg/dL     *Note: Due to a large number of results and/or encounters for the requested time period, some results have not been displayed. A complete set of results can be found in Results Review.         Assessment & Plan "   ckd up a bit.    Hematuria  Hx of renal stones.        Diagnoses and all orders for this visit:    1. Stage 3a chronic kidney disease (HCC) (Primary)  -     Basic metabolic panel    2. Benign hypertension    3. Diabetes mellitus without complication (HCC)    4. Acute diastolic CHF (congestive heart failure) (HCC)      Return in about 3 months (around 2/11/2023).          There are no Patient Instructions on file for this visit.     Keven Bear MD    Assessment & Plan

## 2022-12-06 ENCOUNTER — HOSPITAL ENCOUNTER (OUTPATIENT)
Dept: ULTRASOUND IMAGING | Facility: HOSPITAL | Age: 67
Discharge: HOME | End: 2022-12-06
Admitting: UROLOGY

## 2022-12-06 DIAGNOSIS — N20.0 NEPHROLITHIASIS: ICD-10-CM

## 2022-12-06 PROCEDURE — 76775 US EXAM ABDO BACK WALL LIM: CPT

## 2022-12-09 ENCOUNTER — TRANSCRIBE ORDERS (OUTPATIENT)
Dept: LAB | Facility: HOSPITAL | Age: 67
End: 2022-12-09

## 2022-12-09 ENCOUNTER — LAB (OUTPATIENT)
Dept: LAB | Facility: HOSPITAL | Age: 67
End: 2022-12-09

## 2022-12-09 DIAGNOSIS — E78.00 PURE HYPERCHOLESTEROLEMIA: ICD-10-CM

## 2022-12-09 DIAGNOSIS — E78.00 PURE HYPERCHOLESTEROLEMIA: Primary | ICD-10-CM

## 2022-12-09 LAB
ALBUMIN SERPL-MCNC: 4 G/DL (ref 3.5–5.2)
ALBUMIN UR-MCNC: 80.7 MG/DL
ALP SERPL-CCNC: 63 U/L (ref 39–117)
ALT SERPL W P-5'-P-CCNC: 30 U/L (ref 1–41)
ANION GAP SERPL CALCULATED.3IONS-SCNC: 10.9 MMOL/L (ref 5–15)
AST SERPL-CCNC: 33 U/L (ref 1–40)
BASOPHILS # BLD AUTO: 0.03 10*3/MM3 (ref 0–0.2)
BASOPHILS NFR BLD AUTO: 0.4 % (ref 0–1.5)
BILIRUB CONJ SERPL-MCNC: <0.2 MG/DL (ref 0–0.3)
BILIRUB INDIRECT SERPL-MCNC: NORMAL MG/DL
BILIRUB SERPL-MCNC: 0.7 MG/DL (ref 0–1.2)
BUN SERPL-MCNC: 18 MG/DL (ref 8–23)
BUN/CREAT SERPL: 12.4 (ref 7–25)
CALCIUM SPEC-SCNC: 9.2 MG/DL (ref 8.6–10.5)
CHLORIDE SERPL-SCNC: 103 MMOL/L (ref 98–107)
CHOLEST SERPL-MCNC: 229 MG/DL (ref 0–200)
CO2 SERPL-SCNC: 27.1 MMOL/L (ref 22–29)
CREAT SERPL-MCNC: 1.45 MG/DL (ref 0.76–1.27)
CREAT UR-MCNC: 95.6 MG/DL
DEPRECATED RDW RBC AUTO: 47 FL (ref 37–54)
EGFRCR SERPLBLD CKD-EPI 2021: 53.1 ML/MIN/1.73
EOSINOPHIL # BLD AUTO: 0.11 10*3/MM3 (ref 0–0.4)
EOSINOPHIL NFR BLD AUTO: 1.6 % (ref 0.3–6.2)
ERYTHROCYTE [DISTWIDTH] IN BLOOD BY AUTOMATED COUNT: 14.5 % (ref 12.3–15.4)
GLUCOSE SERPL-MCNC: 116 MG/DL (ref 65–99)
HCT VFR BLD AUTO: 52 % (ref 37.5–51)
HDLC SERPL-MCNC: 37 MG/DL (ref 40–60)
HGB BLD-MCNC: 17.6 G/DL (ref 13–17.7)
IMM GRANULOCYTES # BLD AUTO: 0.02 10*3/MM3 (ref 0–0.05)
IMM GRANULOCYTES NFR BLD AUTO: 0.3 % (ref 0–0.5)
LDLC SERPL CALC-MCNC: 145 MG/DL (ref 0–100)
LDLC/HDLC SERPL: 3.79 {RATIO}
LYMPHOCYTES # BLD AUTO: 1.91 10*3/MM3 (ref 0.7–3.1)
LYMPHOCYTES NFR BLD AUTO: 27.7 % (ref 19.6–45.3)
MCH RBC QN AUTO: 30.2 PG (ref 26.6–33)
MCHC RBC AUTO-ENTMCNC: 33.8 G/DL (ref 31.5–35.7)
MCV RBC AUTO: 89.3 FL (ref 79–97)
MICROALBUMIN/CREAT UR: 844.1 MG/G
MONOCYTES # BLD AUTO: 0.63 10*3/MM3 (ref 0.1–0.9)
MONOCYTES NFR BLD AUTO: 9.1 % (ref 5–12)
NEUTROPHILS NFR BLD AUTO: 4.19 10*3/MM3 (ref 1.7–7)
NEUTROPHILS NFR BLD AUTO: 60.9 % (ref 42.7–76)
NRBC BLD AUTO-RTO: 0 /100 WBC (ref 0–0.2)
PLATELET # BLD AUTO: 176 10*3/MM3 (ref 140–450)
PMV BLD AUTO: 11.6 FL (ref 6–12)
POTASSIUM SERPL-SCNC: 3.6 MMOL/L (ref 3.5–5.2)
PROT SERPL-MCNC: 7.4 G/DL (ref 6–8.5)
RBC # BLD AUTO: 5.82 10*6/MM3 (ref 4.14–5.8)
SODIUM SERPL-SCNC: 141 MMOL/L (ref 136–145)
T3 SERPL-MCNC: 64.2 NG/DL (ref 80–200)
T4 FREE SERPL-MCNC: 1.58 NG/DL (ref 0.93–1.7)
TRIGL SERPL-MCNC: 258 MG/DL (ref 0–150)
TSH SERPL DL<=0.05 MIU/L-ACNC: 2.57 UIU/ML (ref 0.27–4.2)
VLDLC SERPL-MCNC: 47 MG/DL (ref 5–40)
WBC NRBC COR # BLD: 6.89 10*3/MM3 (ref 3.4–10.8)

## 2022-12-09 PROCEDURE — 36415 COLL VENOUS BLD VENIPUNCTURE: CPT

## 2022-12-09 PROCEDURE — 80048 BASIC METABOLIC PNL TOTAL CA: CPT

## 2022-12-09 PROCEDURE — 80076 HEPATIC FUNCTION PANEL: CPT

## 2022-12-09 PROCEDURE — 82043 UR ALBUMIN QUANTITATIVE: CPT

## 2022-12-09 PROCEDURE — 84480 ASSAY TRIIODOTHYRONINE (T3): CPT

## 2022-12-09 PROCEDURE — 84443 ASSAY THYROID STIM HORMONE: CPT

## 2022-12-09 PROCEDURE — 84439 ASSAY OF FREE THYROXINE: CPT

## 2022-12-09 PROCEDURE — 85025 COMPLETE CBC W/AUTO DIFF WBC: CPT

## 2022-12-09 PROCEDURE — 82570 ASSAY OF URINE CREATININE: CPT

## 2022-12-09 PROCEDURE — 80061 LIPID PANEL: CPT

## 2023-01-23 DIAGNOSIS — N18.31 STAGE 3A CHRONIC KIDNEY DISEASE: Primary | ICD-10-CM

## 2023-02-07 ENCOUNTER — LAB (OUTPATIENT)
Dept: LAB | Facility: HOSPITAL | Age: 68
End: 2023-02-07
Payer: MEDICARE

## 2023-02-07 DIAGNOSIS — N18.31 STAGE 3A CHRONIC KIDNEY DISEASE: Primary | ICD-10-CM

## 2023-02-07 LAB
BASOPHILS # BLD AUTO: 0.04 10*3/MM3 (ref 0–0.2)
BASOPHILS NFR BLD AUTO: 0.6 % (ref 0–1.5)
DEPRECATED RDW RBC AUTO: 46.4 FL (ref 37–54)
EOSINOPHIL # BLD AUTO: 0.11 10*3/MM3 (ref 0–0.4)
EOSINOPHIL NFR BLD AUTO: 1.6 % (ref 0.3–6.2)
ERYTHROCYTE [DISTWIDTH] IN BLOOD BY AUTOMATED COUNT: 14.2 % (ref 12.3–15.4)
HCT VFR BLD AUTO: 49.6 % (ref 37.5–51)
HGB BLD-MCNC: 17 G/DL (ref 13–17.7)
IMM GRANULOCYTES # BLD AUTO: 0.03 10*3/MM3 (ref 0–0.05)
IMM GRANULOCYTES NFR BLD AUTO: 0.4 % (ref 0–0.5)
LYMPHOCYTES # BLD AUTO: 2.02 10*3/MM3 (ref 0.7–3.1)
LYMPHOCYTES NFR BLD AUTO: 29.3 % (ref 19.6–45.3)
MCH RBC QN AUTO: 30.7 PG (ref 26.6–33)
MCHC RBC AUTO-ENTMCNC: 34.3 G/DL (ref 31.5–35.7)
MCV RBC AUTO: 89.7 FL (ref 79–97)
MONOCYTES # BLD AUTO: 0.41 10*3/MM3 (ref 0.1–0.9)
MONOCYTES NFR BLD AUTO: 6 % (ref 5–12)
NEUTROPHILS NFR BLD AUTO: 4.28 10*3/MM3 (ref 1.7–7)
NEUTROPHILS NFR BLD AUTO: 62.1 % (ref 42.7–76)
NRBC BLD AUTO-RTO: 0 /100 WBC (ref 0–0.2)
PLATELET # BLD AUTO: 212 10*3/MM3 (ref 140–450)
PMV BLD AUTO: 11.5 FL (ref 6–12)
RBC # BLD AUTO: 5.53 10*6/MM3 (ref 4.14–5.8)
WBC NRBC COR # BLD: 6.89 10*3/MM3 (ref 3.4–10.8)

## 2023-02-07 PROCEDURE — 36415 COLL VENOUS BLD VENIPUNCTURE: CPT | Performed by: INTERNAL MEDICINE

## 2023-02-07 PROCEDURE — 85025 COMPLETE CBC W/AUTO DIFF WBC: CPT | Performed by: INTERNAL MEDICINE

## 2023-02-07 PROCEDURE — 80053 COMPREHEN METABOLIC PANEL: CPT | Performed by: INTERNAL MEDICINE

## 2023-02-08 LAB
ALBUMIN SERPL-MCNC: 4 G/DL (ref 3.5–5.2)
ALBUMIN/GLOB SERPL: 1.1 G/DL
ALP SERPL-CCNC: 50 U/L (ref 39–117)
ALT SERPL W P-5'-P-CCNC: 16 U/L (ref 1–41)
ANION GAP SERPL CALCULATED.3IONS-SCNC: 12.4 MMOL/L (ref 5–15)
AST SERPL-CCNC: 30 U/L (ref 1–40)
BILIRUB SERPL-MCNC: 0.5 MG/DL (ref 0–1.2)
BUN SERPL-MCNC: 17 MG/DL (ref 8–23)
BUN/CREAT SERPL: 11.6 (ref 7–25)
CALCIUM SPEC-SCNC: 8.9 MG/DL (ref 8.6–10.5)
CHLORIDE SERPL-SCNC: 104 MMOL/L (ref 98–107)
CO2 SERPL-SCNC: 23.6 MMOL/L (ref 22–29)
CREAT SERPL-MCNC: 1.47 MG/DL (ref 0.76–1.27)
EGFRCR SERPLBLD CKD-EPI 2021: 52 ML/MIN/1.73
GLOBULIN UR ELPH-MCNC: 3.5 GM/DL
GLUCOSE SERPL-MCNC: 112 MG/DL (ref 65–99)
POTASSIUM SERPL-SCNC: 4.2 MMOL/L (ref 3.5–5.2)
PROT SERPL-MCNC: 7.5 G/DL (ref 6–8.5)
SODIUM SERPL-SCNC: 140 MMOL/L (ref 136–145)

## 2023-02-09 ENCOUNTER — OFFICE VISIT (OUTPATIENT)
Dept: INTERNAL MEDICINE | Facility: CLINIC | Age: 68
End: 2023-02-09
Payer: MEDICARE

## 2023-02-09 VITALS
BODY MASS INDEX: 37.75 KG/M2 | OXYGEN SATURATION: 95 % | SYSTOLIC BLOOD PRESSURE: 124 MMHG | WEIGHT: 310 LBS | RESPIRATION RATE: 16 BRPM | TEMPERATURE: 97.8 F | HEART RATE: 65 BPM | DIASTOLIC BLOOD PRESSURE: 86 MMHG | HEIGHT: 76 IN

## 2023-02-09 DIAGNOSIS — Z12.5 PROSTATE CANCER SCREENING: ICD-10-CM

## 2023-02-09 DIAGNOSIS — R79.9 ABNORMAL FINDING OF BLOOD CHEMISTRY, UNSPECIFIED: ICD-10-CM

## 2023-02-09 DIAGNOSIS — N18.31 STAGE 3A CHRONIC KIDNEY DISEASE: Primary | ICD-10-CM

## 2023-02-09 DIAGNOSIS — E03.9 ACQUIRED HYPOTHYROIDISM: ICD-10-CM

## 2023-02-09 PROCEDURE — 1170F FXNL STATUS ASSESSED: CPT | Performed by: INTERNAL MEDICINE

## 2023-02-09 PROCEDURE — 99214 OFFICE O/P EST MOD 30 MIN: CPT | Performed by: INTERNAL MEDICINE

## 2023-02-09 PROCEDURE — 1159F MED LIST DOCD IN RCRD: CPT | Performed by: INTERNAL MEDICINE

## 2023-02-09 PROCEDURE — G0439 PPPS, SUBSEQ VISIT: HCPCS | Performed by: INTERNAL MEDICINE

## 2023-02-09 RX ORDER — AMIODARONE HYDROCHLORIDE 100 MG/1
100 TABLET ORAL DAILY
Qty: 60 TABLET | Refills: 11
Start: 2023-02-09

## 2023-02-09 NOTE — PROGRESS NOTES
Subjective     Patient ID: Andre Agosto is a 68 y.o. male. Patient is here for management of multiple medical problems.     Chief Complaint   Patient presents with   • Chronic Kidney Disease     History of Present Illness     CKD      [place] diarrhea from [place] he returned from.         The following portions of the patient's history were reviewed and updated as appropriate: allergies, current medications, past family history, past medical history, past social history, past surgical history and problem list.    Review of Systems    Current Outpatient Medications:   •  amiodarone (PACERONE) 100 MG tablet, Take 1 tablet by mouth Daily., Disp: 60 tablet, Rfl: 11  •  amLODIPine (NORVASC) 5 MG tablet, Take 5 mg by mouth Daily., Disp: , Rfl:   •  carvedilol (Coreg) 25 MG tablet, Take 1 tablet by mouth 2 (Two) Times a Day With Meals., Disp: 180 tablet, Rfl: 3  •  divalproex (DEPAKOTE) 250 MG DR tablet, Take 4 tablets by mouth 2 (Two) Times a Day., Disp: , Rfl:   •  docusate sodium (Colace) 100 MG capsule, Take 1 capsule by mouth 2 (Two) Times a Day. If taking oxycodone, Disp: 15 capsule, Rfl: 1  •  Entresto  MG tablet, Take 1 tablet by mouth 2 (Two) Times a Day., Disp: , Rfl:   •  Farxiga 10 MG tablet, Take 10 mg by mouth Daily., Disp: , Rfl:   •  furosemide (LASIX) 40 MG tablet, Take 1 tablet by mouth Daily. (Patient taking differently: Take 40 mg by mouth Daily As Needed (swelling).), Disp: 90 tablet, Rfl: 3  •  levothyroxine (SYNTHROID, LEVOTHROID) 88 MCG tablet, TAKE 1 TABLET DAILY, Disp: 90 tablet, Rfl: 3  •  phenazopyridine (PYRIDIUM) 100 MG tablet, Take 1 tablet by mouth 3 (Three) Times a Day As Needed for urinary burning, Disp: 21 tablet, Rfl: 0  •  selenium sulfide (SELSUN) 2.5 % lotion, 250 application As Needed., Disp: , Rfl:   •  tamsulosin (FLOMAX) 0.4 MG capsule 24 hr capsule, Take 1 capsule by mouth Every Night., Disp: 10 capsule, Rfl: 0  •  Trulicity 3 MG/0.5ML solution pen-injector, INJECT 0.5 ML  "UNDER THE SKIN INTO THE APPROPRIATE AREA AS DIRECTED ONE TIME PER WEEK, Disp: 2 mL, Rfl: 12  •  Xarelto 15 MG tablet, Take 15 mg by mouth Daily With Dinner., Disp: , Rfl:     Objective      Blood pressure 124/86, pulse 65, temperature 97.8 °F (36.6 °C), resp. rate 16, height 193 cm (76\"), weight (!) 141 kg (310 lb), SpO2 95 %.    Physical Exam     General Appearance:    Alert, cooperative, no distress, appears stated age   Head:    Normocephalic, without obvious abnormality, atraumatic   Eyes:    PERRL, conjunctiva/corneas clear, EOM's intact   Ears:    Normal TM's and external ear canals, both ears   Nose:   Nares normal, septum midline, mucosa normal, no drainage   or sinus tenderness   Throat:   Lips, mucosa, and tongue normal; teeth and gums normal   Neck:   Supple, symmetrical, trachea midline, no adenopathy;        thyroid:  No enlargement/tenderness/nodules; no carotid    bruit or JVD   Back:     Symmetric, no curvature, ROM normal, no CVA tenderness   Lungs:     Clear to auscultation bilaterally, respirations unlabored   Chest wall:    No tenderness or deformity   Heart:    Regular rate and rhythm, S1 and S2 normal, no murmur,        rub or gallop   Abdomen:     Soft, non-tender, bowel sounds active all four quadrants,     no masses, no organomegaly   Extremities:   Extremities normal, atraumatic, no cyanosis or edema   Pulses:   2+ and symmetric all extremities   Skin:   Skin color, texture, turgor normal, no rashes or lesions   Lymph nodes:   Cervical, supraclavicular, and axillary nodes normal   Neurologic:   CNII-XII intact. Normal strength, sensation and reflexes       throughout      Results for orders placed or performed in visit on 02/07/23   Comprehensive Metabolic Panel    Specimen: Blood   Result Value Ref Range    Glucose 112 (H) 65 - 99 mg/dL    BUN 17 8 - 23 mg/dL    Creatinine 1.47 (H) 0.76 - 1.27 mg/dL    Sodium 140 136 - 145 mmol/L    Potassium 4.2 3.5 - 5.2 mmol/L    Chloride 104 98 - 107 " mmol/L    CO2 23.6 22.0 - 29.0 mmol/L    Calcium 8.9 8.6 - 10.5 mg/dL    Total Protein 7.5 6.0 - 8.5 g/dL    Albumin 4.0 3.5 - 5.2 g/dL    ALT (SGPT) 16 1 - 41 U/L    AST (SGOT) 30 1 - 40 U/L    Alkaline Phosphatase 50 39 - 117 U/L    Total Bilirubin 0.5 0.0 - 1.2 mg/dL    Globulin 3.5 gm/dL    A/G Ratio 1.1 g/dL    BUN/Creatinine Ratio 11.6 7.0 - 25.0    Anion Gap 12.4 5.0 - 15.0 mmol/L    eGFR 52.0 (L) >60.0 mL/min/1.73     *Note: Due to a large number of results and/or encounters for the requested time period, some results have not been displayed. A complete set of results can be found in Results Review.         Assessment & Plan       Diagnoses and all orders for this visit:    1. Stage 3a chronic kidney disease (HCC) (Primary)  -     Hemoglobin A1c  -     MicroAlbumin, Urine, Random - Urine, Clean Catch  -     TSH  -     T4, Free  -     Comprehensive Metabolic Panel  -     CBC & Differential  -     Vitamin B12  -     Lipid Panel  -     PSA Screen    2. Acquired hypothyroidism  -     Hemoglobin A1c  -     MicroAlbumin, Urine, Random - Urine, Clean Catch  -     TSH  -     T4, Free  -     Comprehensive Metabolic Panel  -     CBC & Differential  -     Vitamin B12  -     Lipid Panel  -     PSA Screen    3. Prostate cancer screening  -     Hemoglobin A1c  -     MicroAlbumin, Urine, Random - Urine, Clean Catch  -     TSH  -     T4, Free  -     Comprehensive Metabolic Panel  -     CBC & Differential  -     Vitamin B12  -     Lipid Panel  -     PSA Screen    4. Abnormal finding of blood chemistry, unspecified  -     Hemoglobin A1c    Other orders  -     amiodarone (PACERONE) 100 MG tablet; Take 1 tablet by mouth Daily.  Dispense: 60 tablet; Refill: 11      Return in about 3 months (around 5/9/2023).          There are no Patient Instructions on file for this visit.     Keven Bear MD    Assessment & Plan       Answers for HPI/ROS submitted by the patient on 2/8/2023  What is the primary reason for your visit?:  Other  Please describe your symptoms.: Followup  Have you had these symptoms before?: No  How long have you been having these symptoms?: 1-4 days  Please list any medications you are currently taking for this condition.: On file

## 2023-02-09 NOTE — PROGRESS NOTES
The ABCs of the Annual Wellness Visit  Subsequent Medicare Wellness Visit    Subjective      Andre Agosto is a 68 y.o. male who presents for a Subsequent Medicare Wellness Visit.    The following portions of the patient's history were reviewed and   updated as appropriate: allergies, current medications, past family history, past medical history, past social history, past surgical history and problem list.    Compared to one year ago, the patient feels his physical   health is the same.    Compared to one year ago, the patient feels his mental   health is the same.    Recent Hospitalizations:  He was not admitted to the hospital during the last year.       Current Medical Providers:  Patient Care Team:  Keven Bear MD as PCP - General (Internal Medicine)  [employer] as Vendor (DME Services)    Outpatient Medications Prior to Visit   Medication Sig Dispense Refill   • amLODIPine (NORVASC) 5 MG tablet Take 5 mg by mouth Daily.     • carvedilol (Coreg) 25 MG tablet Take 1 tablet by mouth 2 (Two) Times a Day With Meals. 180 tablet 3   • divalproex (DEPAKOTE) 250 MG DR tablet Take 4 tablets by mouth 2 (Two) Times a Day.     • docusate sodium (Colace) 100 MG capsule Take 1 capsule by mouth 2 (Two) Times a Day. If taking oxycodone 15 capsule 1   • Entresto  MG tablet Take 1 tablet by mouth 2 (Two) Times a Day.     • Farxiga 10 MG tablet Take 10 mg by mouth Daily.     • furosemide (LASIX) 40 MG tablet Take 1 tablet by mouth Daily. (Patient taking differently: Take 40 mg by mouth Daily As Needed (swelling).) 90 tablet 3   • levothyroxine (SYNTHROID, LEVOTHROID) 88 MCG tablet TAKE 1 TABLET DAILY 90 tablet 3   • phenazopyridine (PYRIDIUM) 100 MG tablet Take 1 tablet by mouth 3 (Three) Times a Day As Needed for urinary burning 21 tablet 0   • selenium sulfide (SELSUN) 2.5 % lotion 250 application As Needed.     • tamsulosin (FLOMAX) 0.4 MG capsule 24 hr capsule Take 1 capsule by mouth Every Night. 10 capsule 0    • Trulicity 3 MG/0.5ML solution pen-injector INJECT 0.5 ML UNDER THE SKIN INTO THE APPROPRIATE AREA AS DIRECTED ONE TIME PER WEEK 2 mL 12   • Xarelto 15 MG tablet Take 15 mg by mouth Daily With Dinner.     • amiodarone (PACERONE) 200 MG tablet Take 100 mg by mouth Daily.       No facility-administered medications prior to visit.       No opioid medication identified on active medication list. I have reviewed chart for other potential  high risk medication/s and harmful drug interactions in the elderly.          Aspirin is not on active medication list.  Aspirin use is not indicated based on review of current medical condition/s. Risk of harm outweighs potential benefits.  .    Patient Active Problem List   Diagnosis   • Essential hypertension   • Epilepsy ([facility])   • CVA (cerebral vascular accident) ([facility])   • Hypokalemia   • Diabetes mellitus without complication ([facility])   • BiPAP (biphasic positive airway pressure) dependence   • Benign hypertension   • Hyperlipidemia   • Epilepsy ([facility])   • Obesity   • Dyspepsia   • Coronary arteriosclerosis   • Abnormal stress test   • TIA (transient ischemic attack)   • Pneumonia of right lower lobe due to infectious organism   • History of cerebrovascular accident   • ALEX (obstructive sleep apnea)   • SOB (shortness of breath) on exertion   • Suspected COVID-19 virus infection   • Fever and chills   • Dyspnea   • Fever and chills   • Localized edema   • Right leg pain   • Atrial fibrillation ([facility])   • Memory loss   • Blood glucose abnormal   • Generalized anxiety disorder   • History of Helicobacter pylori infection   • Benign prostatic hyperplasia   • Stage 3 chronic kidney disease ([facility])   • Medication intolerance   • 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  ACP discussion was held with the patient during this visit. Patient has an advance directive (not in EMR), copy requested.     Objective    Vitals:    23 0821   BP: 124/86   Pulse: 65   Resp: 16   Temp: 97.8 °F (36.6 °C)   SpO2: 95%   Weight: (!) 141 kg (310 lb)   Height: 193 cm (76\")   PainSc: 0-No pain     Estimated body mass index is 37.73 kg/m² as calculated from the following:    Height as of this encounter: 193 cm (76\").    Weight as of this encounter: 141 kg (310 lb).    Class 2 Severe Obesity (BMI >=35 and <=39.9). Obesity-related health conditions include the following: obstructive sleep apnea, hypertension, coronary heart disease, diabetes mellitus, dyslipidemias and peripheral vascular disease. Obesity is improving with lifestyle modifications. BMI is is above average; BMI management plan is completed. We discussed portion control and increasing exercise.      Does the patient have evidence of cognitive impairment?   No    Lab Results   Component Value Date    TRIG 258 (H) 2022    HDL 37 (L) 2022     (H) 2022    VLDL 47 (H) 2022          HEALTH RISK ASSESSMENT    Smoking Status:  Social History     Tobacco Use   Smoking Status Former   • Packs/day: 0.50   • Years: 10.00   • Pack years: 5.00   • Types: Cigarettes   • Start date: 1975   • Quit date: 1985   • Years since quittin.1   Smokeless Tobacco Never     Alcohol Consumption:  Social History     Substance and Sexual Activity   Alcohol Use No     Fall Risk Screen:    STEADI Fall Risk Assessment has not been completed.    Depression Screening:  PHQ-2/PHQ-9 Depression Screening 2022   Little Interest or Pleasure in Doing Things 0-->not at all   Feeling Down, Depressed or " Hopeless 0-->not at all   PHQ-9: Brief Depression Severity Measure Score 0       Health Habits and Functional and Cognitive Screening:  Functional & Cognitive Status 2/9/2023   Do you have difficulty preparing food and eating? No   Do you have difficulty bathing yourself, getting dressed or grooming yourself? No   Do you have difficulty using the toilet? No   Do you have difficulty moving around from place to place? No   Do you have trouble with steps or getting out of a bed or a chair? No   Current Diet Well Balanced Diet   Dental Exam Up to date   Eye Exam Up to date   Exercise (times per week) 5 times per week   Current Exercises Include Walking   Current Exercise Activities Include -   Do you need help using the phone?  No   Are you deaf or do you have serious difficulty hearing?  No   Do you need help with transportation? No   Do you need help shopping? No   Do you need help preparing meals?  No   Do you need help with housework?  No   Do you need help with laundry? No   Do you need help taking your medications? No   Do you need help managing money? No   Do you ever drive or ride in a car without wearing a seat belt? No   Have you felt unusual stress, anger or loneliness in the last month? No   Who do you live with? Spouse   If you need help, do you have trouble finding someone available to you? No   Have you been bothered in the last four weeks by sexual problems? No   Do you have difficulty concentrating, remembering or making decisions? No       Age-appropriate Screening Schedule:  Refer to the list below for future screening recommendations based on patient's age, sex and/or medical conditions. Orders for these recommended tests are listed in the plan section. The patient has been provided with a written plan.    Health Maintenance   Topic Date Due   • DIABETIC FOOT EXAM  12/05/2019   • DIABETIC EYE EXAM  09/07/2022   • ZOSTER VACCINE (2 of 2) 02/09/2023 (Originally 8/24/2022)   • HEMOGLOBIN A1C  03/09/2023    • LIPID PANEL  12/09/2023   • URINE MICROALBUMIN  12/09/2023   • TDAP/TD VACCINES (3 - Td or Tdap) 08/16/2025   • INFLUENZA VACCINE  Completed                CMS Preventative Services Quick Reference  Risk Factors Identified During Encounter:    Fall Risk-High or Moderate: Discussed Fall Prevention in the home and Information on Fall Prevention Shared in After Visit Summary  Polypharmacy: Medication List reviewed and Medications are appropriate for patient    The above risks/problems have been discussed with the patient.  Pertinent information has been shared with the patient in the After Visit Summary.    Diagnoses and all orders for this visit:    1. Stage 3a chronic kidney disease (HCC) (Primary)  -     Hemoglobin A1c  -     MicroAlbumin, Urine, Random - Urine, Clean Catch  -     TSH  -     T4, Free  -     Comprehensive Metabolic Panel  -     CBC & Differential  -     Vitamin B12  -     Lipid Panel  -     PSA Screen    2. Acquired hypothyroidism  -     Hemoglobin A1c  -     MicroAlbumin, Urine, Random - Urine, Clean Catch  -     TSH  -     T4, Free  -     Comprehensive Metabolic Panel  -     CBC & Differential  -     Vitamin B12  -     Lipid Panel  -     PSA Screen    3. Prostate cancer screening  -     Hemoglobin A1c  -     MicroAlbumin, Urine, Random - Urine, Clean Catch  -     TSH  -     T4, Free  -     Comprehensive Metabolic Panel  -     CBC & Differential  -     Vitamin B12  -     Lipid Panel  -     PSA Screen    4. Abnormal finding of blood chemistry, unspecified  -     Hemoglobin A1c    Other orders  -     amiodarone (PACERONE) 100 MG tablet; Take 1 tablet by mouth Daily.  Dispense: 60 tablet; Refill: 11        Follow Up:   Next Medicare Wellness visit to be scheduled in 1 year.      An After Visit Summary and PPPS were made available to the patient.          Answers for HPI/ROS submitted by the patient on 2/8/2023  What is the primary reason for your visit?: Other  Please describe your symptoms.:  Followup  Have you had these symptoms before?: No  How long have you been having these symptoms?: 1-4 days  Please list any medications you are currently taking for this condition.: On file

## 2023-03-20 ENCOUNTER — OFFICE VISIT (OUTPATIENT)
Dept: INTERNAL MEDICINE | Facility: CLINIC | Age: 68
End: 2023-03-20
Payer: MEDICARE

## 2023-03-20 VITALS
WEIGHT: 310 LBS | TEMPERATURE: 97.2 F | DIASTOLIC BLOOD PRESSURE: 98 MMHG | SYSTOLIC BLOOD PRESSURE: 160 MMHG | HEIGHT: 76 IN | OXYGEN SATURATION: 96 % | BODY MASS INDEX: 37.75 KG/M2 | RESPIRATION RATE: 16 BRPM | HEART RATE: 70 BPM

## 2023-03-20 DIAGNOSIS — M54.6 THORACIC SPINE PAIN: Primary | ICD-10-CM

## 2023-03-20 PROBLEM — M25.561 ARTHRALGIA OF RIGHT KNEE: Status: ACTIVE | Noted: 2023-02-14

## 2023-03-20 PROCEDURE — 3080F DIAST BP >= 90 MM HG: CPT | Performed by: INTERNAL MEDICINE

## 2023-03-20 PROCEDURE — 3077F SYST BP >= 140 MM HG: CPT | Performed by: INTERNAL MEDICINE

## 2023-03-20 PROCEDURE — 99214 OFFICE O/P EST MOD 30 MIN: CPT | Performed by: INTERNAL MEDICINE

## 2023-03-20 NOTE — PROGRESS NOTES
Subjective     Patient ID: Andre Agosto is a 68 y.o. male. Patient is here for management of multiple medical problems.     Chief Complaint   Patient presents with   • Shoulder Pain     Right Pain     History of Present Illness     Pain in the shoulder right shoulder. Starts in the back and radiates down into the right elbow.   Tylenol 500 will decrease pain. Lay on side helps.   Pain x 1 week.   Hx of focal sz in right arm.     Feel one month ago and hurt knee.         The following portions of the patient's history were reviewed and updated as appropriate: allergies, current medications, past family history, past medical history, past social history, past surgical history and problem list.    Review of Systems    Current Outpatient Medications:   •  amiodarone (PACERONE) 100 MG tablet, Take 1 tablet by mouth Daily., Disp: 60 tablet, Rfl: 11  •  amLODIPine (NORVASC) 5 MG tablet, Take 1 tablet by mouth Daily., Disp: , Rfl:   •  carvedilol (Coreg) 25 MG tablet, Take 1 tablet by mouth 2 (Two) Times a Day With Meals., Disp: 180 tablet, Rfl: 3  •  divalproex (DEPAKOTE) 250 MG DR tablet, Take 4 tablets by mouth 2 (Two) Times a Day., Disp: , Rfl:   •  docusate sodium (Colace) 100 MG capsule, Take 1 capsule by mouth 2 (Two) Times a Day. If taking oxycodone, Disp: 15 capsule, Rfl: 1  •  Entresto  MG tablet, Take 1 tablet by mouth 2 (Two) Times a Day., Disp: , Rfl:   •  Farxiga 10 MG tablet, Take 10 mg by mouth Daily., Disp: , Rfl:   •  furosemide (LASIX) 40 MG tablet, Take 1 tablet by mouth Daily. (Patient taking differently: Take 1 tablet by mouth Daily As Needed (swelling).), Disp: 90 tablet, Rfl: 3  •  levothyroxine (SYNTHROID, LEVOTHROID) 88 MCG tablet, TAKE 1 TABLET DAILY, Disp: 90 tablet, Rfl: 3  •  phenazopyridine (PYRIDIUM) 100 MG tablet, Take 1 tablet by mouth 3 (Three) Times a Day As Needed for urinary burning, Disp: 21 tablet, Rfl: 0  •  selenium sulfide (SELSUN) 2.5 % lotion, 250 application As  "Needed., Disp: , Rfl:   •  tamsulosin (FLOMAX) 0.4 MG capsule 24 hr capsule, Take 1 capsule by mouth Every Night., Disp: 10 capsule, Rfl: 0  •  Trulicity 3 MG/0.5ML solution pen-injector, INJECT 0.5 ML UNDER THE SKIN INTO THE APPROPRIATE AREA AS DIRECTED ONE TIME PER WEEK, Disp: 2 mL, Rfl: 12  •  Xarelto 15 MG tablet, Take 1 tablet by mouth Daily With Dinner., Disp: , Rfl:     Objective      Blood pressure 160/98, pulse 70, temperature 97.2 °F (36.2 °C), resp. rate 16, height 193 cm (76\"), weight (!) 141 kg (310 lb), SpO2 96 %.    Physical Exam     General Appearance:    Alert, cooperative, no distress, appears stated age   Head:    Normocephalic, without obvious abnormality, atraumatic   Eyes:    PERRL, conjunctiva/corneas clear, EOM's intact   Ears:    Normal TM's and external ear canals, both ears   Nose:   Nares normal, septum midline, mucosa normal, no drainage   or sinus tenderness   Throat:   Lips, mucosa, and tongue normal; teeth and gums normal   Neck:   Supple, symmetrical, trachea midline, no adenopathy;        thyroid:  No enlargement/tenderness/nodules; no carotid    bruit or JVD   Back:     Symmetric, no curvature, ROM normal, no CVA tenderness   Lungs:     Clear to auscultation bilaterally, respirations unlabored   Chest wall:    No tenderness or deformity   Heart:    Regular rate and rhythm, S1 and S2 normal, no murmur,        rub or gallop   Abdomen:     Soft, non-tender, bowel sounds active all four quadrants,     no masses, no organomegaly   Extremities:   t2 area with step off from t3.     Extremities normal, atraumatic, no cyanosis or edema   Pulses:   2+ and symmetric all extremities   Skin:   Skin color, texture, turgor normal, no rashes or lesions   Lymph nodes:   Cervical, supraclavicular, and axillary nodes normal   Neurologic:   CNII-XII intact. Normal strength, sensation and reflexes       throughout      Results for orders placed or performed in visit on 02/07/23   Comprehensive " Metabolic Panel    Specimen: Blood   Result Value Ref Range    Glucose 112 (H) 65 - 99 mg/dL    BUN 17 8 - 23 mg/dL    Creatinine 1.47 (H) 0.76 - 1.27 mg/dL    Sodium 140 136 - 145 mmol/L    Potassium 4.2 3.5 - 5.2 mmol/L    Chloride 104 98 - 107 mmol/L    CO2 23.6 22.0 - 29.0 mmol/L    Calcium 8.9 8.6 - 10.5 mg/dL    Total Protein 7.5 6.0 - 8.5 g/dL    Albumin 4.0 3.5 - 5.2 g/dL    ALT (SGPT) 16 1 - 41 U/L    AST (SGOT) 30 1 - 40 U/L    Alkaline Phosphatase 50 39 - 117 U/L    Total Bilirubin 0.5 0.0 - 1.2 mg/dL    Globulin 3.5 gm/dL    A/G Ratio 1.1 g/dL    BUN/Creatinine Ratio 11.6 7.0 - 25.0    Anion Gap 12.4 5.0 - 15.0 mmol/L    eGFR 52.0 (L) >60.0 mL/min/1.73     *Note: Due to a large number of results and/or encounters for the requested time period, some results have not been displayed. A complete set of results can be found in Results Review.         Assessment & Plan       Diagnoses and all orders for this visit:    1. Thoracic spine pain (Primary)  -     XR Chest PA & Lateral      Return in about 1 week (around 3/27/2023).          There are no Patient Instructions on file for this visit.     Keven Bear MD    Assessment & Plan

## 2023-03-22 ENCOUNTER — HOSPITAL ENCOUNTER (OUTPATIENT)
Dept: GENERAL RADIOLOGY | Facility: HOSPITAL | Age: 68
Discharge: HOME | End: 2023-03-22
Admitting: INTERNAL MEDICINE
Payer: MEDICARE

## 2023-03-22 PROCEDURE — 71046 X-RAY EXAM CHEST 2 VIEWS: CPT

## 2023-03-23 LAB
ALBUMIN SERPL-MCNC: 4.5 G/DL (ref 3.5–5.2)
ALBUMIN/GLOB SERPL: 1.5 G/DL
ALP SERPL-CCNC: 64 U/L (ref 39–117)
ALT SERPL-CCNC: 13 U/L (ref 1–41)
AST SERPL-CCNC: 17 U/L (ref 1–40)
BASOPHILS # BLD AUTO: 0.04 10*3/MM3 (ref 0–0.2)
BASOPHILS NFR BLD AUTO: 0.7 % (ref 0–1.5)
BILIRUB SERPL-MCNC: 0.8 MG/DL (ref 0–1.2)
BUN SERPL-MCNC: 10 MG/DL (ref 8–23)
BUN/CREAT SERPL: 8.5 (ref 7–25)
CALCIUM SERPL-MCNC: 9.3 MG/DL (ref 8.6–10.5)
CHLORIDE SERPL-SCNC: 100 MMOL/L (ref 98–107)
CHOLEST SERPL-MCNC: 230 MG/DL (ref 0–200)
CO2 SERPL-SCNC: 27.9 MMOL/L (ref 22–29)
CREAT SERPL-MCNC: 1.17 MG/DL (ref 0.76–1.27)
EGFRCR SERPLBLD CKD-EPI 2021: 68.3 ML/MIN/1.73
EOSINOPHIL # BLD AUTO: 0.11 10*3/MM3 (ref 0–0.4)
EOSINOPHIL NFR BLD AUTO: 1.9 % (ref 0.3–6.2)
ERYTHROCYTE [DISTWIDTH] IN BLOOD BY AUTOMATED COUNT: 14.6 % (ref 12.3–15.4)
GLOBULIN SER CALC-MCNC: 3.1 GM/DL
GLUCOSE SERPL-MCNC: 117 MG/DL (ref 65–99)
HBA1C MFR BLD: 5.8 % (ref 4.8–5.6)
HCT VFR BLD AUTO: 49.6 % (ref 37.5–51)
HDLC SERPL-MCNC: 42 MG/DL (ref 40–60)
HGB BLD-MCNC: 17.1 G/DL (ref 13–17.7)
IMM GRANULOCYTES # BLD AUTO: 0.01 10*3/MM3 (ref 0–0.05)
IMM GRANULOCYTES NFR BLD AUTO: 0.2 % (ref 0–0.5)
LDLC SERPL CALC-MCNC: 144 MG/DL (ref 0–100)
LYMPHOCYTES # BLD AUTO: 1.89 10*3/MM3 (ref 0.7–3.1)
LYMPHOCYTES NFR BLD AUTO: 32.2 % (ref 19.6–45.3)
MCH RBC QN AUTO: 31.3 PG (ref 26.6–33)
MCHC RBC AUTO-ENTMCNC: 34.5 G/DL (ref 31.5–35.7)
MCV RBC AUTO: 90.7 FL (ref 79–97)
MICROALBUMIN UR-MCNC: 209.2 UG/ML
MONOCYTES # BLD AUTO: 0.43 10*3/MM3 (ref 0.1–0.9)
MONOCYTES NFR BLD AUTO: 7.3 % (ref 5–12)
NEUTROPHILS # BLD AUTO: 3.39 10*3/MM3 (ref 1.7–7)
NEUTROPHILS NFR BLD AUTO: 57.7 % (ref 42.7–76)
NRBC BLD AUTO-RTO: 0 /100 WBC (ref 0–0.2)
PLATELET # BLD AUTO: 172 10*3/MM3 (ref 140–450)
POTASSIUM SERPL-SCNC: 3.8 MMOL/L (ref 3.5–5.2)
PROT SERPL-MCNC: 7.6 G/DL (ref 6–8.5)
PSA SERPL-MCNC: 5.05 NG/ML (ref 0–4)
RBC # BLD AUTO: 5.47 10*6/MM3 (ref 4.14–5.8)
SODIUM SERPL-SCNC: 141 MMOL/L (ref 136–145)
T4 FREE SERPL-MCNC: 1.71 NG/DL (ref 0.93–1.7)
TRIGL SERPL-MCNC: 244 MG/DL (ref 0–150)
TSH SERPL DL<=0.005 MIU/L-ACNC: 2.05 UIU/ML (ref 0.27–4.2)
VIT B12 SERPL-MCNC: 573 PG/ML (ref 211–946)
VLDLC SERPL CALC-MCNC: 44 MG/DL (ref 5–40)
WBC # BLD AUTO: 5.87 10*3/MM3 (ref 3.4–10.8)

## 2023-03-27 ENCOUNTER — OFFICE VISIT (OUTPATIENT)
Dept: INTERNAL MEDICINE | Facility: CLINIC | Age: 68
End: 2023-03-27
Payer: MEDICARE

## 2023-03-27 VITALS
TEMPERATURE: 97.3 F | SYSTOLIC BLOOD PRESSURE: 130 MMHG | DIASTOLIC BLOOD PRESSURE: 84 MMHG | RESPIRATION RATE: 16 BRPM | WEIGHT: 310 LBS | HEART RATE: 68 BPM | BODY MASS INDEX: 37.75 KG/M2 | OXYGEN SATURATION: 96 % | HEIGHT: 76 IN

## 2023-03-27 DIAGNOSIS — I10 ESSENTIAL HYPERTENSION: ICD-10-CM

## 2023-03-27 DIAGNOSIS — E11.9 TYPE 2 DIABETES MELLITUS WITHOUT COMPLICATION, WITHOUT LONG-TERM CURRENT USE OF INSULIN: Primary | ICD-10-CM

## 2023-03-27 DIAGNOSIS — M54.12 CERVICAL RADICULITIS: ICD-10-CM

## 2023-03-27 DIAGNOSIS — I10 PRIMARY HYPERTENSION: ICD-10-CM

## 2023-03-27 DIAGNOSIS — E03.9 ACQUIRED HYPOTHYROIDISM: ICD-10-CM

## 2023-03-27 RX ORDER — LEVOTHYROXINE SODIUM 88 UG/1
TABLET ORAL
Qty: 90 TABLET | Refills: 3 | Status: SHIPPED | OUTPATIENT
Start: 2023-03-27

## 2023-03-27 NOTE — PROGRESS NOTES
"Subjective     Patient ID: Andre Agosto is a 68 y.o. male. Patient is here for management of multiple medical problems.     Chief Complaint   Patient presents with   • Elevated PSA   • Elbow Pain     Right elbow     History of Present Illness     Right arm pain.    Heat helps    Improved some.           The following portions of the patient's history were reviewed and updated as appropriate: allergies, current medications, past family history, past medical history, past social history, past surgical history and problem list.    Review of Systems    Current Outpatient Medications:   •  amiodarone (PACERONE) 100 MG tablet, Take 1 tablet by mouth Daily., Disp: 60 tablet, Rfl: 11  •  amLODIPine (NORVASC) 5 MG tablet, Take 1 tablet by mouth Daily., Disp: , Rfl:   •  carvedilol (Coreg) 25 MG tablet, Take 1 tablet by mouth 2 (Two) Times a Day With Meals., Disp: 180 tablet, Rfl: 3  •  divalproex (DEPAKOTE) 250 MG DR tablet, Take 4 tablets by mouth 2 (Two) Times a Day., Disp: , Rfl:   •  Entresto  MG tablet, Take 1 tablet by mouth 2 (Two) Times a Day., Disp: , Rfl:   •  Farxiga 10 MG tablet, Take 10 mg by mouth Daily., Disp: , Rfl:   •  levothyroxine (SYNTHROID, LEVOTHROID) 88 MCG tablet, TAKE 1 TABLET DAILY, Disp: 90 tablet, Rfl: 3  •  Trulicity 3 MG/0.5ML solution pen-injector, INJECT 0.5 ML UNDER THE SKIN INTO THE APPROPRIATE AREA AS DIRECTED ONE TIME PER WEEK, Disp: 2 mL, Rfl: 12  •  Xarelto 15 MG tablet, Take 1 tablet by mouth Daily With Dinner., Disp: , Rfl:   •  Diclofenac Sodium (VOLTAREN) 1 % gel gel, Apply 4 g topically to the appropriate area as directed 4 (Four) Times a Day As Needed (pain)., Disp: 100 g, Rfl: 1    Objective      Blood pressure 130/84, pulse 68, temperature 97.3 °F (36.3 °C), resp. rate 16, height 193 cm (76\"), weight (!) 141 kg (310 lb), SpO2 96 %.    Physical Exam     General Appearance:    Alert, cooperative, no distress, appears stated age   Head:    Normocephalic, without obvious " abnormality, atraumatic   Eyes:    PERRL, conjunctiva/corneas clear, EOM's intact   Ears:    Normal TM's and external ear canals, both ears   Nose:   Nares normal, septum midline, mucosa normal, no drainage   or sinus tenderness   Throat:   Lips, mucosa, and tongue normal; teeth and gums normal   Neck:   Supple, symmetrical, trachea midline, no adenopathy;        thyroid:  No enlargement/tenderness/nodules; no carotid    bruit or JVD   Back:     Symmetric, no curvature, ROM normal, no CVA tenderness   Lungs:     Clear to auscultation bilaterally, respirations unlabored   Chest wall:    No tenderness or deformity   Heart:    Regular rate and rhythm, S1 and S2 normal, no murmur,        rub or gallop   Abdomen:     Soft, non-tender, bowel sounds active all four quadrants,     no masses, no organomegaly   Extremities:   Extremities normal, atraumatic, no cyanosis or edema   Pulses:   2+ and symmetric all extremities   Skin:   Skin color, texture, turgor normal, no rashes or lesions   Lymph nodes:   Cervical, supraclavicular, and axillary nodes normal   Neurologic:   CNII-XII intact. Normal strength, sensation and reflexes       throughout      Results for orders placed or performed in visit on 02/09/23   Hemoglobin A1c    Specimen: Blood   Result Value Ref Range    Hemoglobin A1C 5.80 (H) 4.80 - 5.60 %   MicroAlbumin, Urine, Random - Urine, Clean Catch    Specimen: Urine, Clean Catch   Result Value Ref Range    Microalbumin, Urine 209.2 Not Estab. ug/mL   TSH    Specimen: Blood   Result Value Ref Range    TSH 2.050 0.270 - 4.200 uIU/mL   T4, Free    Specimen: Blood   Result Value Ref Range    Free T4 1.71 (H) 0.93 - 1.70 ng/dL   Comprehensive Metabolic Panel    Specimen: Blood   Result Value Ref Range    Glucose 117 (H) 65 - 99 mg/dL    BUN 10 8 - 23 mg/dL    Creatinine 1.17 0.76 - 1.27 mg/dL    EGFR Result 68.3 >60.0 mL/min/1.73    BUN/Creatinine Ratio 8.5 7.0 - 25.0    Sodium 141 136 - 145 mmol/L    Potassium 3.8 3.5  - 5.2 mmol/L    Chloride 100 98 - 107 mmol/L    Total CO2 27.9 22.0 - 29.0 mmol/L    Calcium 9.3 8.6 - 10.5 mg/dL    Total Protein 7.6 6.0 - 8.5 g/dL    Albumin 4.5 3.5 - 5.2 g/dL    Globulin 3.1 gm/dL    A/G Ratio 1.5 g/dL    Total Bilirubin 0.8 0.0 - 1.2 mg/dL    Alkaline Phosphatase 64 39 - 117 U/L    AST (SGOT) 17 1 - 40 U/L    ALT (SGPT) 13 1 - 41 U/L   Vitamin B12    Specimen: Blood   Result Value Ref Range    Vitamin B-12 573 211 - 946 pg/mL   Lipid Panel    Specimen: Blood   Result Value Ref Range    Total Cholesterol 230 (H) 0 - 200 mg/dL    Triglycerides 244 (H) 0 - 150 mg/dL    HDL Cholesterol 42 40 - 60 mg/dL    VLDL Cholesterol Jimmy 44 (H) 5 - 40 mg/dL    LDL Chol Calc (NIH) 144 (H) 0 - 100 mg/dL   PSA Screen    Specimen: Blood   Result Value Ref Range    PSA 5.050 (H) 0.000 - 4.000 ng/mL   CBC & Differential    Specimen: Blood   Result Value Ref Range    WBC 5.87 3.40 - 10.80 10*3/mm3    RBC 5.47 4.14 - 5.80 10*6/mm3    Hemoglobin 17.1 13.0 - 17.7 g/dL    Hematocrit 49.6 37.5 - 51.0 %    MCV 90.7 79.0 - 97.0 fL    MCH 31.3 26.6 - 33.0 pg    MCHC 34.5 31.5 - 35.7 g/dL    RDW 14.6 12.3 - 15.4 %    Platelets 172 140 - 450 10*3/mm3    Neutrophil Rel % 57.7 42.7 - 76.0 %    Lymphocyte Rel % 32.2 19.6 - 45.3 %    Monocyte Rel % 7.3 5.0 - 12.0 %    Eosinophil Rel % 1.9 0.3 - 6.2 %    Basophil Rel % 0.7 0.0 - 1.5 %    Neutrophils Absolute 3.39 1.70 - 7.00 10*3/mm3    Lymphocytes Absolute 1.89 0.70 - 3.10 10*3/mm3    Monocytes Absolute 0.43 0.10 - 0.90 10*3/mm3    Eosinophils Absolute 0.11 0.00 - 0.40 10*3/mm3    Basophils Absolute 0.04 0.00 - 0.20 10*3/mm3    Immature Granulocyte Rel % 0.2 0.0 - 0.5 %    Immature Grans Absolute 0.01 0.00 - 0.05 10*3/mm3    nRBC 0.0 0.0 - 0.2 /100 WBC     *Note: Due to a large number of results and/or encounters for the requested time period, some results have not been displayed. A complete set of results can be found in Results Review.         Assessment & Plan     Cr improved    With hydration.      cxr ok.     Will try diclofenac cream to lessen the se from oral NSAID'S due to ht.chf, cri.      Diagnoses and all orders for this visit:    1. Type 2 diabetes mellitus without complication, without long-term current use of insulin (HCC) (Primary)  -     Diclofenac Sodium (VOLTAREN) 1 % gel gel; Apply 4 g topically to the appropriate area as directed 4 (Four) Times a Day As Needed (pain).  Dispense: 100 g; Refill: 1    2. Cervical radiculitis  -     Diclofenac Sodium (VOLTAREN) 1 % gel gel; Apply 4 g topically to the appropriate area as directed 4 (Four) Times a Day As Needed (pain).  Dispense: 100 g; Refill: 1    3. Primary hypertension  -     Diclofenac Sodium (VOLTAREN) 1 % gel gel; Apply 4 g topically to the appropriate area as directed 4 (Four) Times a Day As Needed (pain).  Dispense: 100 g; Refill: 1      No follow-ups on file.          There are no Patient Instructions on file for this visit.     Keven Bear MD    Assessment & Plan       Answers for HPI/ROS submitted by the patient on 3/26/2023  What is the primary reason for your visit?: Other  Please describe your symptoms.: Shoulder nerve pain followup  Have you had these symptoms before?: Yes  How long have you been having these symptoms?: 1-2 weeks  Please list any medications you are currently taking for this condition.: Tylonel

## 2023-04-05 RX ORDER — DIVALPROEX SODIUM 250 MG/1
TABLET, DELAYED RELEASE ORAL
Qty: 450 TABLET | Refills: 3 | OUTPATIENT
Start: 2023-04-05

## 2023-04-05 NOTE — TELEPHONE ENCOUNTER
Rx Refill Note  Requested Prescriptions     Pending Prescriptions Disp Refills   • divalproex (DEPAKOTE) 250 MG DR tablet [Pharmacy Med Name: DIVALPROEX SOD DR TABS 250MG] 450 tablet 3     Sig: TAKE 2 TABLETS IN THE MORNING 1 TABLET AT NOON AND 2 TABLETS IN THE EVENING PER DR VALENTINO      Last office visit with prescribing clinician: 3/27/2023   Last telemedicine visit with prescribing clinician: Visit date not found   Next office visit with prescribing clinician: Visit date not found                         Would you like a call back once the refill request has been completed: [] Yes [] No    If the office needs to give you a call back, can they leave a voicemail: [] Yes [] No    Dinesh Mckeon MA  04/05/23, 09:03 EDT

## 2023-05-16 ENCOUNTER — OFFICE VISIT (OUTPATIENT)
Dept: NEUROLOGY | Facility: CLINIC | Age: 68
End: 2023-05-16
Payer: MEDICARE

## 2023-05-16 VITALS
DIASTOLIC BLOOD PRESSURE: 84 MMHG | WEIGHT: 315 LBS | OXYGEN SATURATION: 98 % | BODY MASS INDEX: 38.36 KG/M2 | TEMPERATURE: 96.9 F | HEART RATE: 82 BPM | HEIGHT: 76 IN | SYSTOLIC BLOOD PRESSURE: 134 MMHG

## 2023-05-16 DIAGNOSIS — G47.33 OBSTRUCTIVE SLEEP APNEA: Primary | ICD-10-CM

## 2023-05-16 DIAGNOSIS — G40.109 FOCAL MOTOR SEIZURE DISORDER: ICD-10-CM

## 2023-05-16 NOTE — PROGRESS NOTES
"     New Sleep Patient Office Visit      Patient Name: Andre Agosto  : 1955   MRN: [id number]     Referring Physician: No ref. provider found    Chief Complaint:    Chief Complaint   Patient presents with   • Follow-up     Patient in office to follow up on laex.        History of Present Illness: Andre Agosto is a 68 y.o. male who is here today to follow up and was last seen on 2022.  Currently on VAuto Bipap 12/8cm, compliance 100%, AHI 1.24/hour.  He is tolerating his pressures well.  He is using a full face mask and [employer] [facility].  *He saw neurology, at , in 2022 for right arm focal seizures, dizziness and poor balance, but would like to follow up here, if possible.  He is taking Divalproex 750mg QAM and 500mgQHS.  Valproic acid level in 2022 was therapeutic.  His most recent seizure was \"quite a while ago\".      Pertinent Medical History:   Current compliance report reviewed and has been scanned into the patient's medical record.    Following taken from previous visit note:  Andre Agosto is a 67 y.o. male who is here today to follow up for ALEX and was last seen on 2021.  Currently on VAuto Bipap 12/8cm, AHI 0.6/hour, compliance 93%.  He is tolerating his pressures well.  He is using a full face mask and [employer].  He has no complaints or concerns today.  Additional risk factors- BMI 37, seizure disorder, diabetes, HTN, dyslipidemia, history of CVA, history of MI, SVT, cardiac pacemaker/defrillator in place.   *He has an appointment scheduled, for 2022, with Dr. Rony Clifford, neurologist, for his seizure disorder.     Subjective      Review of Systems:   Review of Systems   Constitutional: Negative for chills, fatigue and fever.   HENT: Negative for facial swelling, hearing loss, sore throat, tinnitus and trouble swallowing.    Eyes: Negative for blurred vision, double vision, photophobia and visual disturbance.   Respiratory: Positive for apnea. Negative " for cough, chest tightness and shortness of breath.    Cardiovascular: Negative for chest pain, palpitations and leg swelling.   Gastrointestinal: Negative for abdominal pain, nausea and vomiting.   Endocrine: Negative for cold intolerance and heat intolerance.   Musculoskeletal: Negative for gait problem, neck pain and neck stiffness.   Skin: Negative for color change and rash.   Allergic/Immunologic: Negative for environmental allergies and food allergies.   Neurological: Negative for dizziness, syncope, speech difficulty, weakness, light-headedness, numbness, headache and memory problem.   Psychiatric/Behavioral: Negative for behavioral problems, sleep disturbance and depressed mood. The patient is not nervous/anxious.        Past Medical History:   Past Medical History:   Diagnosis Date   • 6th nerve palsy, right     right eye    • Anxiety and depression    • Atrial fibrillation, persistent 12/02/2020    on Xarelto   • Coronary artery disease involving native coronary artery of native heart without angina pectoris 09/12/2018    follows w/ Dr. Johnson, on ASA 81mg   • CRI (chronic renal insufficiency), stage 2 (mild)    • CVA (cerebral vascular accident)    • Diabetes mellitus     doesnt check sugar, type 2    • Disease of thyroid gland    • Double vision     resolved- right eye   • Elevated cholesterol    • Focal seizure     of right arm    • Heart attack     NSTEMI 2015, s/p stenting   • History of Helicobacter pylori infection     in 2008, treated w/ PrevPak - 2021 EGD bx (+), PCP RX - PPI/Augmentin/Doxy/Flagyl (x 14 days) 1/22/21   • HL (hearing loss)     (L) ear - child luevano measles   • Hyperlipidemia    • Hypertension    • Kidney stone    • Memory loss 2005    d/t meningitis   • Meningitis 2005    unsure of bacterial or viral    • Obesity    • Sleep apnea treated with nocturnal BiPAP     compliant with  machine    • Stroke     2017/2018   • Ventricular tachycardia     3 different times   • Wears  glasses        Past Surgical History:   Past Surgical History:   Procedure Laterality Date   • CARDIAC CATHETERIZATION N/A 10/12/2018    Procedure: Left Heart Cath;  Surgeon: Yoselin Johnson MD;  Location: [facility] CATH INVASIVE LOCATION;  Service: Cardiology   • CARDIAC CATHETERIZATION  2021    stent   • CARDIAC CATHETERIZATION  2021    just checking the after pacemaker placed- Dr. Tim   • CARDIAC DEFIBRILLATOR PLACEMENT     • COLONOSCOPY  10/2020    w/ Dr. Jacobs, hx colon polyps   • CORONARY STENT PLACEMENT     • LAPAROSCOPIC GASTRIC BANDING      s/p LAGB Realize 2008 by MASONO.   • PACEMAKER IMPLANTATION  2021   • UMBILICAL HERNIA REPAIR      incarcerated UHR w/ mesh by Dr. Velasquez   • URETEROSCOPY LASER LITHOTRIPSY WITH STENT INSERTION Left 10/20/2021    Procedure: URETEROSCOPY LASER LITHOTRIPSY WITH STENT INSERTION;  Surgeon: Tonio De La Cruz MD;  Location: [facility];  Service: Urology;  Laterality: Left;   • URETEROSCOPY LASER LITHOTRIPSY WITH STENT INSERTION Left 2021    Procedure: URETEROSCOPY LEFT, LEFT RETROGRADE PYELOGRAM, CYSTOSCOPY, LASER LITHOTRIPSY WITH STENT EXCHANGE;  Surgeon: Tonio De La Cruz MD;  Location: [facility];  Service: Urology;  Laterality: Left;   • URETEROSCOPY LASER LITHOTRIPSY WITH STENT INSERTION Left 10/6/2022    Procedure: URETEROSCOPY LASER LITHOTRIPSY WITH STENT INSERTION;  Surgeon: Tonio De La Cruz MD;  Location: [facility];  Service: Urology;  Laterality: Left;       Family History:   Family History   Problem Relation Age of Onset   • Stroke Mother    • Hypertension Mother    • COPD Father    • Hypertension Father        Social History:   Social History     Socioeconomic History   • Marital status:    Tobacco Use   • Smoking status: Former     Packs/day: 0.50     Years: 15.00     Pack years: 7.50     Types: Cigarettes     Start date: 1975     Quit date: 1985     Years since quittin.3   • Smokeless tobacco:  "Never   Vaping Use   • Vaping Use: Never used   Substance and Sexual Activity   • Alcohol use: No   • Drug use: No   • Sexual activity: Defer       Medications:     Current Outpatient Medications:   •  amiodarone (PACERONE) 100 MG tablet, Take 1 tablet by mouth Daily., Disp: 60 tablet, Rfl: 11  •  amLODIPine (NORVASC) 5 MG tablet, Take 1 tablet by mouth Daily., Disp: , Rfl:   •  carvedilol (Coreg) 25 MG tablet, Take 1 tablet by mouth 2 (Two) Times a Day With Meals., Disp: 180 tablet, Rfl: 3  •  Diclofenac Sodium (VOLTAREN) 1 % gel gel, Apply 4 g topically to the appropriate area as directed 4 (Four) Times a Day As Needed (pain)., Disp: 100 g, Rfl: 1  •  divalproex (DEPAKOTE) 250 MG DR tablet, Take 4 tablets by mouth 2 (Two) Times a Day., Disp: , Rfl:   •  Entresto  MG tablet, Take 1 tablet by mouth 2 (Two) Times a Day., Disp: , Rfl:   •  Farxiga 10 MG tablet, Take 10 mg by mouth Daily., Disp: , Rfl:   •  levothyroxine (SYNTHROID, LEVOTHROID) 88 MCG tablet, TAKE 1 TABLET DAILY, Disp: 90 tablet, Rfl: 3  •  Trulicity 3 MG/0.5ML solution pen-injector, INJECT 0.5 ML UNDER THE SKIN INTO THE APPROPRIATE AREA AS DIRECTED ONE TIME PER WEEK, Disp: 2 mL, Rfl: 12  •  Xarelto 15 MG tablet, Take 1 tablet by mouth Daily With Dinner., Disp: , Rfl:     Allergies:   Allergies   Allergen Reactions   • Statins Myalgia and Other (See Comments)       Objective     Physical Exam:  Vital Signs:   Vitals:    05/16/23 1132   BP: 134/84   BP Location: Right arm   Patient Position: Sitting   Cuff Size: Adult   Pulse: 82   Temp: 96.9 °F (36.1 °C)   SpO2: 98%   Weight: (!) 144 kg (318 lb 6.4 oz)   Height: 193 cm (76\")   PainSc: 0-No pain     BMI: Body mass index is 38.76 kg/m².    Physical Exam  Vitals and nursing note reviewed.   Constitutional:       General: He is not in acute distress.     Appearance: He is well-developed. He is obese. He is not diaphoretic.   HENT:      Head: Normocephalic and atraumatic.   Eyes:      Extraocular " Movements: Extraocular movements intact.      Conjunctiva/sclera: Conjunctivae normal.      Pupils: Pupils are equal, round, and reactive to light.   Pulmonary:      Effort: Pulmonary effort is normal. No respiratory distress.   Musculoskeletal:         General: Normal range of motion.   Skin:     General: Skin is warm and dry.      Findings: No rash.   Neurological:      Mental Status: He is alert and oriented to person, place, and time.   Psychiatric:         Mood and Affect: Mood normal.         Behavior: Behavior normal.         Thought Content: Thought content normal.         Judgment: Judgment normal.         Assessment / Plan      Assessment/Plan:   Diagnoses and all orders for this visit:    1. Obstructive sleep apnea (Primary)    2. Focal motor seizure disorder    3. BMI 38.0-38.9,adult    *I have advised patient to decrease his Divalproex dose to 500mg BID. I have advised patient to let me know how he is doing on the decrease dose.   *Advised patient to avoid driving if drowsy.     Follow Up:   Return in about 6 months (around 11/16/2023) for Follow Up. and will follow up for ALEX in 1 year.     I have advised the patient the need to continue the use of CPAP.  Gold standard for treatment of sleep apnea includes weight loss, use of cpap and avoidance of alcohol.  Untreated ALEX may increase the risk for development of hypertension, stroke, myocardial infarction, diabetes, cardiovascular disease, work-related issues and driving accidents. I have counseled and advised the patient to avoid driving or operating heavy/dangerous equipment if feeling drowsy.     YESY Maradiaga, FNP-C  [facility]

## 2023-09-20 RX ORDER — DULAGLUTIDE 3 MG/.5ML
INJECTION, SOLUTION SUBCUTANEOUS
Qty: 2 ML | Refills: 12 | Status: SHIPPED | OUTPATIENT
Start: 2023-09-20

## 2023-09-25 DIAGNOSIS — E03.9 ACQUIRED HYPOTHYROIDISM: ICD-10-CM

## 2023-09-25 DIAGNOSIS — E11.59 TYPE 2 DIABETES MELLITUS WITH OTHER CIRCULATORY COMPLICATION, WITHOUT LONG-TERM CURRENT USE OF INSULIN: Primary | ICD-10-CM

## 2023-09-25 DIAGNOSIS — Z12.5 PROSTATE CANCER SCREENING: ICD-10-CM

## 2023-09-25 DIAGNOSIS — E55.9 VITAMIN D DEFICIENCY, UNSPECIFIED: ICD-10-CM

## 2023-09-25 DIAGNOSIS — E79.0 HYPERURICEMIA: ICD-10-CM

## 2023-10-09 ENCOUNTER — OFFICE VISIT (OUTPATIENT)
Dept: INTERNAL MEDICINE | Facility: CLINIC | Age: 68
End: 2023-10-09
Payer: MEDICARE

## 2023-10-09 VITALS
HEIGHT: 76 IN | WEIGHT: 315 LBS | BODY MASS INDEX: 38.36 KG/M2 | RESPIRATION RATE: 16 BRPM | HEART RATE: 69 BPM | TEMPERATURE: 97.6 F | DIASTOLIC BLOOD PRESSURE: 84 MMHG | OXYGEN SATURATION: 96 % | SYSTOLIC BLOOD PRESSURE: 124 MMHG

## 2023-10-09 DIAGNOSIS — R94.39 ABNORMAL STRESS TEST: Primary | ICD-10-CM

## 2023-10-09 DIAGNOSIS — I10 BENIGN HYPERTENSION: ICD-10-CM

## 2023-10-09 DIAGNOSIS — E11.29 TYPE 2 DIABETES MELLITUS WITH OTHER DIABETIC KIDNEY COMPLICATION, WITHOUT LONG-TERM CURRENT USE OF INSULIN: ICD-10-CM

## 2023-10-09 PROCEDURE — 99214 OFFICE O/P EST MOD 30 MIN: CPT | Performed by: INTERNAL MEDICINE

## 2023-10-09 PROCEDURE — 3079F DIAST BP 80-89 MM HG: CPT | Performed by: INTERNAL MEDICINE

## 2023-10-09 PROCEDURE — 3074F SYST BP LT 130 MM HG: CPT | Performed by: INTERNAL MEDICINE

## 2023-10-09 PROCEDURE — 3044F HG A1C LEVEL LT 7.0%: CPT | Performed by: INTERNAL MEDICINE

## 2023-10-09 NOTE — PROGRESS NOTES
"Subjective     Patient ID: Andre Agosto is a 68 y.o. male. Patient is here for management of multiple medical problems.     Chief Complaint   Patient presents with    Diabetes    Fatigue     History of Present Illness     PFT's done in [facility] with COPD,  pt has never smoked.      Had an abnormal stress test and heart cath. Will be going back to discuss options.      Wt down a bit/             The following portions of the patient's history were reviewed and updated as appropriate: allergies, current medications, past family history, past medical history, past social history, past surgical history and problem list.    Review of Systems    Current Outpatient Medications:     amiodarone (PACERONE) 100 MG tablet, Take 1 tablet by mouth Daily., Disp: 60 tablet, Rfl: 11    amLODIPine (NORVASC) 5 MG tablet, Take 1 tablet by mouth Daily., Disp: , Rfl:     carvedilol (Coreg) 25 MG tablet, Take 1 tablet by mouth 2 (Two) Times a Day With Meals., Disp: 180 tablet, Rfl: 3    Diclofenac Sodium (VOLTAREN) 1 % gel gel, Apply 4 g topically to the appropriate area as directed 4 (Four) Times a Day As Needed (pain)., Disp: 100 g, Rfl: 1    divalproex (DEPAKOTE) 250 MG DR tablet, Take 2 tablets by mouth 2 (Two) Times a Day., Disp: 360 tablet, Rfl: 2    Entresto  MG tablet, Take 1 tablet by mouth 2 (Two) Times a Day., Disp: , Rfl:     Farxiga 10 MG tablet, Take 10 mg by mouth Daily., Disp: , Rfl:     levothyroxine (SYNTHROID, LEVOTHROID) 88 MCG tablet, TAKE 1 TABLET DAILY, Disp: 90 tablet, Rfl: 3    Trulicity 3 MG/0.5ML solution pen-injector, INJECT 0.5 ML UNDER THE SKIN INTO THE APPROPRIATE AREA AS DIRECTED ONE TIME PER WEEK, Disp: 2 mL, Rfl: 12    Xarelto 15 MG tablet, Take 1 tablet by mouth Daily With Dinner., Disp: , Rfl:     Objective      Blood pressure 124/84, pulse 69, temperature 97.6 øF (36.4 øC), resp. rate 16, height 193 cm (76\"), weight (!) 144 kg (317 lb), SpO2 96%.    Physical Exam     General Appearance:    Alert, " cooperative, no distress, appears stated age   Head:    Normocephalic, without obvious abnormality, atraumatic   Eyes:    PERRL, conjunctiva/corneas clear, EOM's intact   Ears:    Normal TM's and external ear canals, both ears   Nose:   Nares normal, septum midline, mucosa normal, no drainage   or sinus tenderness   Throat:   Lips, mucosa, and tongue normal; teeth and gums normal   Neck:   Supple, symmetrical, trachea midline, no adenopathy;        thyroid:  No enlargement/tenderness/nodules; no carotid    bruit or JVD   Back:     Symmetric, no curvature, ROM normal, no CVA tenderness   Lungs:     Clear to auscultation bilaterally, respirations unlabored   Chest wall:    No tenderness or deformity   Heart:    Regular rate and rhythm, S1 and S2 normal, no murmur,        rub or gallop   Abdomen:     Soft, non-tender, bowel sounds active all four quadrants,     no masses, no organomegaly   Extremities:   Extremities normal, atraumatic, no cyanosis or edema   Pulses:   2+ and symmetric all extremities   Skin:   Skin color, texture, turgor normal, no rashes or lesions   Lymph nodes:   Cervical, supraclavicular, and axillary nodes normal   Neurologic:   CNII-XII intact. Normal strength, sensation and reflexes       throughout      Results for orders placed or performed in visit on 03/27/23   Lipid Panel    Specimen: Blood   Result Value Ref Range    Total Cholesterol 251 (H) 0 - 200 mg/dL    Triglycerides 274 (H) 0 - 150 mg/dL    HDL Cholesterol 42 40 - 60 mg/dL    VLDL Cholesterol Jimmy 51 (H) 5 - 40 mg/dL    LDL Chol Calc (NIH) 158 (H) 0 - 100 mg/dL   Vitamin B12    Specimen: Blood   Result Value Ref Range    Vitamin B-12 522 211 - 946 pg/mL   Comprehensive Metabolic Panel    Specimen: Blood   Result Value Ref Range    Glucose 137 (H) 65 - 99 mg/dL    BUN 13 8 - 23 mg/dL    Creatinine 1.37 (H) 0.76 - 1.27 mg/dL    EGFR Result 56.5 (L) >60.0 mL/min/1.73    BUN/Creatinine Ratio 9.5 7.0 - 25.0    Sodium 142 136 - 145 mmol/L     Potassium 4.1 3.5 - 5.2 mmol/L    Chloride 103 98 - 107 mmol/L    Total CO2 26.7 22.0 - 29.0 mmol/L    Calcium 9.6 8.6 - 10.5 mg/dL    Total Protein 7.3 6.0 - 8.5 g/dL    Albumin 4.5 3.5 - 5.2 g/dL    Globulin 2.8 gm/dL    A/G Ratio 1.6 g/dL    Total Bilirubin 0.8 0.0 - 1.2 mg/dL    Alkaline Phosphatase 65 39 - 117 U/L    AST (SGOT) 12 1 - 40 U/L    ALT (SGPT) 13 1 - 41 U/L   TSH    Specimen: Blood   Result Value Ref Range    TSH 1.410 0.270 - 4.200 uIU/mL   T4, Free    Specimen: Blood   Result Value Ref Range    Free T4 1.93 (H) 0.93 - 1.70 ng/dL   Hemoglobin A1c    Specimen: Blood   Result Value Ref Range    Hemoglobin A1C 6.40 (H) 4.80 - 5.60 %   MicroAlbumin, Urine, Random - Urine, Clean Catch    Specimen: Urine, Clean Catch   Result Value Ref Range    Microalbumin, Urine 339.5 Not Estab. ug/mL   CBC & Differential    Specimen: Blood   Result Value Ref Range    WBC 5.86 3.40 - 10.80 10*3/mm3    RBC 5.69 4.14 - 5.80 10*6/mm3    Hemoglobin 17.2 13.0 - 17.7 g/dL    Hematocrit 51.1 (H) 37.5 - 51.0 %    MCV 89.8 79.0 - 97.0 fL    MCH 30.2 26.6 - 33.0 pg    MCHC 33.7 31.5 - 35.7 g/dL    RDW 14.5 12.3 - 15.4 %    Platelets 168 140 - 450 10*3/mm3    Neutrophil Rel % 57.6 42.7 - 76.0 %    Lymphocyte Rel % 33.1 19.6 - 45.3 %    Monocyte Rel % 6.7 5.0 - 12.0 %    Eosinophil Rel % 1.4 0.3 - 6.2 %    Basophil Rel % 0.7 0.0 - 1.5 %    Neutrophils Absolute 3.38 1.70 - 7.00 10*3/mm3    Lymphocytes Absolute 1.94 0.70 - 3.10 10*3/mm3    Monocytes Absolute 0.39 0.10 - 0.90 10*3/mm3    Eosinophils Absolute 0.08 0.00 - 0.40 10*3/mm3    Basophils Absolute 0.04 0.00 - 0.20 10*3/mm3    Immature Granulocyte Rel % 0.5 0.0 - 0.5 %    Immature Grans Absolute 0.03 0.00 - 0.05 10*3/mm3    nRBC 0.0 0.0 - 0.2 /100 WBC     *Note: Due to a large number of results and/or encounters for the requested time period, some results have not been displayed. A complete set of results can be found in Results Review.         Assessment & Plan   DM need  labs    CRI get labs.    Chf. Euvolemic today.        Diagnoses and all orders for this visit:    1. Abnormal stress test (Primary)    2. Type 2 diabetes mellitus with other diabetic kidney complication, without long-term current use of insulin    3. Benign hypertension      Return in about 3 months (around 1/9/2024).          There are no Patient Instructions on file for this visit.     Keven Bear MD    Assessment & Plan

## 2023-10-13 ENCOUNTER — TELEPHONE (OUTPATIENT)
Dept: UROLOGY | Facility: CLINIC | Age: 68
End: 2023-10-13

## 2023-10-13 LAB
25(OH)D3+25(OH)D2 SERPL-MCNC: 22.7 NG/ML (ref 30–100)
ALBUMIN SERPL-MCNC: 4.2 G/DL (ref 3.5–5.2)
ALBUMIN/GLOB SERPL: 1.4 G/DL
ALP SERPL-CCNC: 63 U/L (ref 39–117)
ALT SERPL-CCNC: 14 U/L (ref 1–41)
AST SERPL-CCNC: 14 U/L (ref 1–40)
BASOPHILS # BLD AUTO: 0.03 10*3/MM3 (ref 0–0.2)
BASOPHILS NFR BLD AUTO: 0.5 % (ref 0–1.5)
BILIRUB SERPL-MCNC: 0.9 MG/DL (ref 0–1.2)
BUN SERPL-MCNC: 13 MG/DL (ref 8–23)
BUN/CREAT SERPL: 10 (ref 7–25)
CALCIUM SERPL-MCNC: 9.9 MG/DL (ref 8.6–10.5)
CHLORIDE SERPL-SCNC: 99 MMOL/L (ref 98–107)
CHOLEST SERPL-MCNC: 239 MG/DL (ref 0–200)
CO2 SERPL-SCNC: 28.5 MMOL/L (ref 22–29)
CREAT SERPL-MCNC: 1.3 MG/DL (ref 0.76–1.27)
EGFRCR SERPLBLD CKD-EPI 2021: 60.2 ML/MIN/1.73
EOSINOPHIL # BLD AUTO: 0.11 10*3/MM3 (ref 0–0.4)
EOSINOPHIL NFR BLD AUTO: 1.8 % (ref 0.3–6.2)
ERYTHROCYTE [DISTWIDTH] IN BLOOD BY AUTOMATED COUNT: 14.3 % (ref 12.3–15.4)
GLOBULIN SER CALC-MCNC: 3 GM/DL
GLUCOSE SERPL-MCNC: 122 MG/DL (ref 65–99)
HBA1C MFR BLD: 6.3 % (ref 4.8–5.6)
HCT VFR BLD AUTO: 52.4 % (ref 37.5–51)
HDLC SERPL-MCNC: 36 MG/DL (ref 40–60)
HGB BLD-MCNC: 17.9 G/DL (ref 13–17.7)
IMM GRANULOCYTES # BLD AUTO: 0.03 10*3/MM3 (ref 0–0.05)
IMM GRANULOCYTES NFR BLD AUTO: 0.5 % (ref 0–0.5)
LDLC SERPL CALC-MCNC: 141 MG/DL (ref 0–100)
LYMPHOCYTES # BLD AUTO: 1.73 10*3/MM3 (ref 0.7–3.1)
LYMPHOCYTES NFR BLD AUTO: 28.2 % (ref 19.6–45.3)
MCH RBC QN AUTO: 30.3 PG (ref 26.6–33)
MCHC RBC AUTO-ENTMCNC: 34.2 G/DL (ref 31.5–35.7)
MCV RBC AUTO: 88.8 FL (ref 79–97)
MICROALBUMIN UR-MCNC: 457.1 UG/ML
MONOCYTES # BLD AUTO: 0.44 10*3/MM3 (ref 0.1–0.9)
MONOCYTES NFR BLD AUTO: 7.2 % (ref 5–12)
NEUTROPHILS # BLD AUTO: 3.79 10*3/MM3 (ref 1.7–7)
NEUTROPHILS NFR BLD AUTO: 61.8 % (ref 42.7–76)
NRBC BLD AUTO-RTO: 0 /100 WBC (ref 0–0.2)
PLATELET # BLD AUTO: 183 10*3/MM3 (ref 140–450)
POTASSIUM SERPL-SCNC: 3.6 MMOL/L (ref 3.5–5.2)
PROT SERPL-MCNC: 7.2 G/DL (ref 6–8.5)
PSA SERPL-MCNC: 5.37 NG/ML (ref 0–4)
RBC # BLD AUTO: 5.9 10*6/MM3 (ref 4.14–5.8)
SODIUM SERPL-SCNC: 141 MMOL/L (ref 136–145)
T4 FREE SERPL-MCNC: 2.3 NG/DL (ref 0.93–1.7)
TRIGL SERPL-MCNC: 340 MG/DL (ref 0–150)
TSH SERPL DL<=0.005 MIU/L-ACNC: 0.01 UIU/ML (ref 0.27–4.2)
VALPROATE SERPL-MCNC: 25 MCG/ML (ref 50–125)
VIT B12 SERPL-MCNC: 456 PG/ML (ref 211–946)
VLDLC SERPL CALC-MCNC: 62 MG/DL (ref 5–40)
WBC # BLD AUTO: 6.13 10*3/MM3 (ref 3.4–10.8)

## 2023-10-13 NOTE — TELEPHONE ENCOUNTER
"    Hub staff attempted to follow warm transfer process and was unsuccessful     Caller: Andre Agosto \"Bernardo\"    Relationship to patient: Self    Best call back number: [phone]    Patient is needing: PT IS EXPERIENCING SOME KIDNEY STONE SIGNS. HE WOULD LIKE TO GET AN APPT. PLEASE CALL HIM. THANK YOU.        "

## 2023-10-16 DIAGNOSIS — E11.29 TYPE 2 DIABETES MELLITUS WITH OTHER DIABETIC KIDNEY COMPLICATION, WITHOUT LONG-TERM CURRENT USE OF INSULIN: Primary | ICD-10-CM

## 2023-10-16 DIAGNOSIS — I10 ESSENTIAL HYPERTENSION: ICD-10-CM

## 2023-10-17 ENCOUNTER — HOSPITAL ENCOUNTER (EMERGENCY)
Facility: HOSPITAL | Age: 68
Discharge: HOME | End: 2023-10-17
Attending: EMERGENCY MEDICINE | Admitting: EMERGENCY MEDICINE
Payer: MEDICARE

## 2023-10-17 ENCOUNTER — OFFICE VISIT (OUTPATIENT)
Dept: UROLOGY | Facility: CLINIC | Age: 68
End: 2023-10-17
Payer: MEDICARE

## 2023-10-17 ENCOUNTER — TELEPHONE (OUTPATIENT)
Dept: NEUROLOGY | Facility: CLINIC | Age: 68
End: 2023-10-17
Payer: MEDICARE

## 2023-10-17 ENCOUNTER — HOSPITAL ENCOUNTER (OUTPATIENT)
Dept: CT IMAGING | Facility: HOSPITAL | Age: 68
Discharge: HOME | End: 2023-10-17
Admitting: PHYSICIAN ASSISTANT
Payer: MEDICARE

## 2023-10-17 VITALS
OXYGEN SATURATION: 96 % | HEART RATE: 69 BPM | DIASTOLIC BLOOD PRESSURE: 88 MMHG | HEIGHT: 76 IN | BODY MASS INDEX: 38.36 KG/M2 | TEMPERATURE: 97.2 F | SYSTOLIC BLOOD PRESSURE: 142 MMHG | WEIGHT: 315 LBS

## 2023-10-17 VITALS
RESPIRATION RATE: 20 BRPM | DIASTOLIC BLOOD PRESSURE: 91 MMHG | HEIGHT: 76 IN | OXYGEN SATURATION: 92 % | SYSTOLIC BLOOD PRESSURE: 150 MMHG | WEIGHT: 314 LBS | HEART RATE: 67 BPM | TEMPERATURE: 98.6 F | BODY MASS INDEX: 38.24 KG/M2

## 2023-10-17 DIAGNOSIS — N20.0 BILATERAL NEPHROLITHIASIS: ICD-10-CM

## 2023-10-17 DIAGNOSIS — N23 RENAL COLIC ON LEFT SIDE: Primary | ICD-10-CM

## 2023-10-17 DIAGNOSIS — N20.0 NEPHROLITHIASIS: ICD-10-CM

## 2023-10-17 DIAGNOSIS — Z79.01 CHRONIC ANTICOAGULATION: ICD-10-CM

## 2023-10-17 DIAGNOSIS — Z86.79 HISTORY OF ATRIAL FIBRILLATION: ICD-10-CM

## 2023-10-17 DIAGNOSIS — R10.9 FLANK PAIN: Primary | ICD-10-CM

## 2023-10-17 DIAGNOSIS — Z86.39 HISTORY OF DIABETES MELLITUS: ICD-10-CM

## 2023-10-17 DIAGNOSIS — R10.9 FLANK PAIN: ICD-10-CM

## 2023-10-17 LAB
ALBUMIN SERPL-MCNC: 4.1 G/DL (ref 3.5–5.2)
ALBUMIN/GLOB SERPL: 1.1 G/DL
ALP SERPL-CCNC: 61 U/L (ref 39–117)
ALT SERPL W P-5'-P-CCNC: 19 U/L (ref 1–41)
ANION GAP SERPL CALCULATED.3IONS-SCNC: 11 MMOL/L (ref 5–15)
AST SERPL-CCNC: 19 U/L (ref 1–40)
BACTERIA UR QL AUTO: NORMAL /HPF
BASOPHILS # BLD AUTO: 0.04 10*3/MM3 (ref 0–0.2)
BASOPHILS NFR BLD AUTO: 0.5 % (ref 0–1.5)
BILIRUB BLD-MCNC: NEGATIVE MG/DL
BILIRUB SERPL-MCNC: 0.7 MG/DL (ref 0–1.2)
BILIRUB UR QL STRIP: NEGATIVE
BUN SERPL-MCNC: 11 MG/DL (ref 8–23)
BUN/CREAT SERPL: 9 (ref 7–25)
CALCIUM SPEC-SCNC: 9.4 MG/DL (ref 8.6–10.5)
CHLORIDE SERPL-SCNC: 100 MMOL/L (ref 98–107)
CLARITY UR: CLEAR
CLARITY, POC: CLEAR
CO2 SERPL-SCNC: 29 MMOL/L (ref 22–29)
COLOR UR: YELLOW
COLOR UR: YELLOW
CREAT SERPL-MCNC: 1.22 MG/DL (ref 0.76–1.27)
D-LACTATE SERPL-SCNC: 2.4 MMOL/L (ref 0.5–2)
DEPRECATED RDW RBC AUTO: 47.5 FL (ref 37–54)
EGFRCR SERPLBLD CKD-EPI 2021: 65 ML/MIN/1.73
EOSINOPHIL # BLD AUTO: 0.16 10*3/MM3 (ref 0–0.4)
EOSINOPHIL NFR BLD AUTO: 2.1 % (ref 0.3–6.2)
ERYTHROCYTE [DISTWIDTH] IN BLOOD BY AUTOMATED COUNT: 14.1 % (ref 12.3–15.4)
EXPIRATION DATE: ABNORMAL
GLOBULIN UR ELPH-MCNC: 3.6 GM/DL
GLUCOSE SERPL-MCNC: 195 MG/DL (ref 65–99)
GLUCOSE UR STRIP-MCNC: ABNORMAL MG/DL
GLUCOSE UR STRIP-MCNC: NEGATIVE MG/DL
HCT VFR BLD AUTO: 53.6 % (ref 37.5–51)
HGB BLD-MCNC: 17.9 G/DL (ref 13–17.7)
HGB UR QL STRIP.AUTO: NEGATIVE
HYALINE CASTS UR QL AUTO: NORMAL /LPF
IMM GRANULOCYTES # BLD AUTO: 0.04 10*3/MM3 (ref 0–0.05)
IMM GRANULOCYTES NFR BLD AUTO: 0.5 % (ref 0–0.5)
KETONES UR QL STRIP: NEGATIVE
KETONES UR QL: NEGATIVE
LEUKOCYTE EST, POC: NEGATIVE
LEUKOCYTE ESTERASE UR QL STRIP.AUTO: NEGATIVE
LIPASE SERPL-CCNC: 58 U/L (ref 13–60)
LYMPHOCYTES # BLD AUTO: 2.18 10*3/MM3 (ref 0.7–3.1)
LYMPHOCYTES NFR BLD AUTO: 28.2 % (ref 19.6–45.3)
Lab: ABNORMAL
MCH RBC QN AUTO: 30.5 PG (ref 26.6–33)
MCHC RBC AUTO-ENTMCNC: 33.4 G/DL (ref 31.5–35.7)
MCV RBC AUTO: 91.5 FL (ref 79–97)
MONOCYTES # BLD AUTO: 0.45 10*3/MM3 (ref 0.1–0.9)
MONOCYTES NFR BLD AUTO: 5.8 % (ref 5–12)
NEUTROPHILS NFR BLD AUTO: 4.85 10*3/MM3 (ref 1.7–7)
NEUTROPHILS NFR BLD AUTO: 62.9 % (ref 42.7–76)
NITRITE UR QL STRIP: NEGATIVE
NITRITE UR-MCNC: NEGATIVE MG/ML
NRBC BLD AUTO-RTO: 0 /100 WBC (ref 0–0.2)
PH UR STRIP.AUTO: 6.5 [PH] (ref 5–8)
PH UR: 6.5 [PH] (ref 5–8)
PLATELET # BLD AUTO: 209 10*3/MM3 (ref 140–450)
PMV BLD AUTO: 11.7 FL (ref 6–12)
POTASSIUM SERPL-SCNC: 3.6 MMOL/L (ref 3.5–5.2)
PROCALCITONIN SERPL-MCNC: 0.05 NG/ML (ref 0–0.25)
PROT SERPL-MCNC: 7.7 G/DL (ref 6–8.5)
PROT UR QL STRIP: ABNORMAL
PROT UR STRIP-MCNC: ABNORMAL MG/DL
QT INTERVAL: 468 MS
QTC INTERVAL: 490 MS
RBC # BLD AUTO: 5.86 10*6/MM3 (ref 4.14–5.8)
RBC # UR STRIP: NEGATIVE /UL
RBC # UR STRIP: NORMAL /HPF
REF LAB TEST METHOD: NORMAL
SODIUM SERPL-SCNC: 140 MMOL/L (ref 136–145)
SP GR UR STRIP: 1.02 (ref 1–1.03)
SP GR UR: 1 (ref 1–1.03)
SQUAMOUS #/AREA URNS HPF: NORMAL /HPF
TROPONIN T SERPL HS-MCNC: 16 NG/L
UROBILINOGEN UR QL STRIP: ABNORMAL
UROBILINOGEN UR QL: NORMAL
WBC # UR STRIP: NORMAL /HPF
WBC NRBC COR # BLD: 7.72 10*3/MM3 (ref 3.4–10.8)

## 2023-10-17 PROCEDURE — 36415 COLL VENOUS BLD VENIPUNCTURE: CPT

## 2023-10-17 PROCEDURE — 85025 COMPLETE CBC W/AUTO DIFF WBC: CPT | Performed by: EMERGENCY MEDICINE

## 2023-10-17 PROCEDURE — 84145 PROCALCITONIN (PCT): CPT | Performed by: PHYSICIAN ASSISTANT

## 2023-10-17 PROCEDURE — 84484 ASSAY OF TROPONIN QUANT: CPT | Performed by: EMERGENCY MEDICINE

## 2023-10-17 PROCEDURE — 25810000003 SODIUM CHLORIDE 0.9 % SOLUTION: Performed by: EMERGENCY MEDICINE

## 2023-10-17 PROCEDURE — 25010000002 HYDROMORPHONE 1 MG/ML SOLUTION: Performed by: EMERGENCY MEDICINE

## 2023-10-17 PROCEDURE — 80053 COMPREHEN METABOLIC PANEL: CPT | Performed by: EMERGENCY MEDICINE

## 2023-10-17 PROCEDURE — 99283 EMERGENCY DEPT VISIT LOW MDM: CPT

## 2023-10-17 PROCEDURE — 96375 TX/PRO/DX INJ NEW DRUG ADDON: CPT

## 2023-10-17 PROCEDURE — 83605 ASSAY OF LACTIC ACID: CPT | Performed by: EMERGENCY MEDICINE

## 2023-10-17 PROCEDURE — 93005 ELECTROCARDIOGRAM TRACING: CPT | Performed by: EMERGENCY MEDICINE

## 2023-10-17 PROCEDURE — 83690 ASSAY OF LIPASE: CPT | Performed by: EMERGENCY MEDICINE

## 2023-10-17 PROCEDURE — 96374 THER/PROPH/DIAG INJ IV PUSH: CPT

## 2023-10-17 PROCEDURE — 25010000002 ONDANSETRON PER 1 MG: Performed by: EMERGENCY MEDICINE

## 2023-10-17 PROCEDURE — 74176 CT ABD & PELVIS W/O CONTRAST: CPT

## 2023-10-17 PROCEDURE — 81001 URINALYSIS AUTO W/SCOPE: CPT | Performed by: PHYSICIAN ASSISTANT

## 2023-10-17 RX ORDER — ONDANSETRON 4 MG/1
4 TABLET, ORALLY DISINTEGRATING ORAL EVERY 4 HOURS
Qty: 12 TABLET | Refills: 0 | Status: SHIPPED | OUTPATIENT
Start: 2023-10-17

## 2023-10-17 RX ORDER — POTASSIUM CITRATE 10 MEQ/1
20 TABLET, EXTENDED RELEASE ORAL 2 TIMES DAILY
Qty: 120 TABLET | Refills: 11 | Status: SHIPPED | OUTPATIENT
Start: 2023-10-17 | End: 2023-10-18

## 2023-10-17 RX ORDER — OXYCODONE HYDROCHLORIDE AND ACETAMINOPHEN 5; 325 MG/1; MG/1
1 TABLET ORAL EVERY 4 HOURS PRN
Qty: 10 TABLET | Refills: 0 | Status: SHIPPED | OUTPATIENT
Start: 2023-10-17

## 2023-10-17 RX ORDER — ONDANSETRON 2 MG/ML
4 INJECTION INTRAMUSCULAR; INTRAVENOUS ONCE
Status: COMPLETED | OUTPATIENT
Start: 2023-10-17 | End: 2023-10-17

## 2023-10-17 RX ORDER — SODIUM CHLORIDE 0.9 % (FLUSH) 0.9 %
10 SYRINGE (ML) INJECTION AS NEEDED
Status: DISCONTINUED | OUTPATIENT
Start: 2023-10-17 | End: 2023-10-18 | Stop reason: HOSPADM

## 2023-10-17 RX ADMIN — SODIUM CHLORIDE 500 ML: 9 INJECTION, SOLUTION INTRAVENOUS at 21:19

## 2023-10-17 RX ADMIN — ONDANSETRON 4 MG: 2 INJECTION INTRAMUSCULAR; INTRAVENOUS at 21:18

## 2023-10-17 RX ADMIN — HYDROMORPHONE HYDROCHLORIDE 1 MG: 1 INJECTION, SOLUTION INTRAMUSCULAR; INTRAVENOUS; SUBCUTANEOUS at 21:18

## 2023-10-17 NOTE — PROGRESS NOTES
Chief Complaint   Patient presents with    Flank Pain     Follow up        HPI  Mr. Agosto is a 68 y.o. male with history of nephrolithiasis who presents for follow up.     At this visit, started having left lower quadrant pain about 2 weeks ago, feels identical to previous stones. No gross hematuria.     Past Medical History:   Diagnosis Date    6th nerve palsy, right     right eye     Anxiety and depression     Atrial fibrillation, persistent 12/02/2020    on Xarelto    Coronary artery disease involving native coronary artery of native heart without angina pectoris 09/12/2018    follows w/ Dr. Johnson, on ASA 81mg    CRI (chronic renal insufficiency), stage 2 (mild)     CVA (cerebral vascular accident)     Diabetes mellitus     doesnt check sugar, type 2     Disease of thyroid gland     Double vision     resolved- right eye    Elevated cholesterol     Focal seizure     of right arm     Heart attack     NSTEMI 2015, s/p stenting    History of Helicobacter pylori infection     in 2008, treated w/ PrevPak - 2021 EGD bx (+), PCP RX - PPI/Augmentin/Doxy/Flagyl (x 14 days) 1/22/21    HL (hearing loss)     (L) ear - child luevano measles    Hyperlipidemia     Hypertension     Kidney stone     Memory loss 2005    d/t meningitis    Meningitis 2005    unsure of bacterial or viral     Obesity     Sleep apnea treated with nocturnal BiPAP     compliant with  machine     Stroke     2017/2018    Ventricular tachycardia     3 different times    Wears glasses        Past Surgical History:   Procedure Laterality Date    CARDIAC CATHETERIZATION N/A 10/12/2018    Procedure: Left Heart Cath;  Surgeon: Yoselin Johnson MD;  Location: [facility] CATH INVASIVE LOCATION;  Service: Cardiology    CARDIAC CATHETERIZATION  03/2021    stent    CARDIAC CATHETERIZATION  07/2021    just checking the after pacemaker placed- Dr. Tim    CARDIAC DEFIBRILLATOR PLACEMENT      COLONOSCOPY  10/2020    w/ Dr. Jacobs, hx colon polyps    CORONARY STENT  PLACEMENT  2015    LAPAROSCOPIC GASTRIC BANDING  2008    s/p LAGB Realize 6/2008 by HOMERO.    PACEMAKER IMPLANTATION  07/25/2021    UMBILICAL HERNIA REPAIR  2008    incarcerated UHR w/ mesh by Dr. Velasquez    URETEROSCOPY LASER LITHOTRIPSY WITH STENT INSERTION Left 10/20/2021    Procedure: URETEROSCOPY LASER LITHOTRIPSY WITH STENT INSERTION;  Surgeon: Tonio De La Cruz MD;  Location: [facility] OR;  Service: Urology;  Laterality: Left;    URETEROSCOPY LASER LITHOTRIPSY WITH STENT INSERTION Left 11/03/2021    Procedure: URETEROSCOPY LEFT, LEFT RETROGRADE PYELOGRAM, CYSTOSCOPY, LASER LITHOTRIPSY WITH STENT EXCHANGE;  Surgeon: Tonio De La Cruz MD;  Location: [facility] OR;  Service: Urology;  Laterality: Left;    URETEROSCOPY LASER LITHOTRIPSY WITH STENT INSERTION Left 10/6/2022    Procedure: URETEROSCOPY LASER LITHOTRIPSY WITH STENT INSERTION;  Surgeon: Tonio De La Cruz MD;  Location: [facility] OR;  Service: Urology;  Laterality: Left;         Current Outpatient Medications:     amiodarone (PACERONE) 100 MG tablet, Take 1 tablet by mouth Daily., Disp: 60 tablet, Rfl: 11    amLODIPine (NORVASC) 5 MG tablet, Take 1 tablet by mouth Daily., Disp: , Rfl:     carvedilol (Coreg) 25 MG tablet, Take 1 tablet by mouth 2 (Two) Times a Day With Meals., Disp: 180 tablet, Rfl: 3    Diclofenac Sodium (VOLTAREN) 1 % gel gel, Apply 4 g topically to the appropriate area as directed 4 (Four) Times a Day As Needed (pain)., Disp: 100 g, Rfl: 1    divalproex (DEPAKOTE) 250 MG DR tablet, Take 2 tablets by mouth 2 (Two) Times a Day., Disp: 360 tablet, Rfl: 2    Entresto  MG tablet, Take 1 tablet by mouth 2 (Two) Times a Day., Disp: , Rfl:     Farxiga 10 MG tablet, Take 10 mg by mouth Daily., Disp: , Rfl:     levothyroxine (SYNTHROID, LEVOTHROID) 88 MCG tablet, TAKE 1 TABLET DAILY, Disp: 90 tablet, Rfl: 3    Trulicity 3 MG/0.5ML solution pen-injector, INJECT 0.5 ML UNDER THE SKIN INTO THE APPROPRIATE AREA AS DIRECTED ONE TIME PER WEEK,  "Disp: 2 mL, Rfl: 12    Xarelto 15 MG tablet, Take 1 tablet by mouth Daily With Dinner., Disp: , Rfl:     potassium citrate (UROCIT-K) 10 MEQ (1080 MG) CR tablet, Take 2 tablets by mouth 2 (Two) Times a Day., Disp: 120 tablet, Rfl: 11     Physical Exam  Visit Vitals  /88 (BP Location: Left arm, Patient Position: Sitting, Cuff Size: Adult)   Pulse 69   Temp 97.2 °F (36.2 °C) (Temporal)   Ht 193 cm (76\")   Wt (!) 144 kg (317 lb)   SpO2 96%   BMI 38.59 kg/m²       Labs  Brief Urine Lab Results  (Last result in the past 365 days)        Color   Clarity   Blood   Leuk Est   Nitrite   Protein   CREAT   Urine HCG        10/17/23 1057 Yellow   Clear   Negative   Negative   Negative   Trace                   Lab Results   Component Value Date    GLUCOSE 122 (H) 10/12/2023    CALCIUM 9.9 10/12/2023     10/12/2023    K 3.6 10/12/2023    CO2 28.5 10/12/2023    CL 99 10/12/2023    BUN 13 10/12/2023    CREATININE 1.30 (H) 10/12/2023    EGFRIFAFRI 57 (L) 12/05/2018    EGFRIFNONA 55 (L) 01/10/2022    BCR 10.0 10/12/2023    ANIONGAP 12.4 02/07/2023       Lab Results   Component Value Date    WBC 6.13 10/12/2023    HGB 17.9 (H) 10/12/2023    HCT 52.4 (H) 10/12/2023    MCV 88.8 10/12/2023     10/12/2023            Radiographic Studies  Narrative & Impression   PROCEDURE: CT ABDOMEN PELVIS STONE PROTOCOL-     HISTORY: Nephrolithiasis     COMPARISON: May 2022.     PROCEDURE: Axial images were obtained from the lung bases through the  pubic symphysis without intravenous contrast.     FINDINGS:     ABDOMEN: The lung bases are clear. The heart size is normal. There is a  trace pericardial effusion. A pacemaker lead is seen. A lap band is  present in good position. The lap band port is also identified. The  limited noncontrast images of the liver are normal. The gallbladder is  present and contains gallstones. The spleen is normal. No adrenal masses  are seen.  The pancreas has an unremarkable unenhanced appearance. " The  aorta is normal in caliber. There are vascular calcifications. There is  no significant free fluid or adenopathy. There is bilateral  nephrolithiasis. There is no hydronephrosis. Limited noncontrast images  of the bowel are unremarkable.     PELVIS: The appendix is normal. The urinary bladder is unremarkable. The  prostate is unremarkable. There is no significant fluid or adenopathy.     IMPRESSION:  1. Bilateral nonobstructing nephrolithiasis without hydronephrosis.  2. Cholelithiasis.  3. Lap band apparatus in good position.              CTDI: 31.44 mGy  DLP: 1574.14 mGy.cm        This study was performed with techniques to keep radiation doses as low  as reasonably achievable (ALARA). Individualized dose reduction  techniques using automated exposure control or adjustment of mA and/or  kV according to the patient size were employed.           Images were reviewed, interpreted, and dictated by Dr. Jane Kay MD  Transcribed by Rocio Roe PA-C.        This report was signed and finalized on 10/17/2023 3:25 PM by Jane Kay MD.       Assessment  68 y.o. male with history of recurrent nephrolithiasis.    He is experiencing left flank pain that he states feels identical to previous stones.  We will obtain a stat CT to confirm.  His urine is benign.  We discussed his previous Litholink results, particularly his low fluid intake.  He states he has rectified that in the past 12 months.  We discussed that it may be time to add a medical intervention to prevent future stones.    Plan  1.  Obtain stat CT stone      Stat CT stone is obtained after this appointment is complete.  Upon my personal interpretation, there are no left ureteral stones and there is very scant left-sided stone burden.  He appears to have formed essentially no stones since his left ureteroscopy laser lithotripsy.  His right-sided stones are still present.  I called the patient and informed him of these results.  He would like to start  medical management for stone prevention and we will start Potassium Citrate.

## 2023-10-17 NOTE — ED PROVIDER NOTES
Subjective   History of Present Illness  This is a 68-year-old male that presents the ER with waxing and waning left renal colic for the last 10 days to 2 weeks.  Patient has history of kidney stones and follows with Lois Reyna urology PA in [place], [place].  He actually saw urology earlier this same day and had normal urinalysis and had outpatient CT imaging of the abdomen/pelvis which showed bilateral kidney stones but no evidence of ureteral stone or other acute infectious or inflammatory process.  Patient went home and continued to have increased pain.  He described it as sharp and stabbing and worse with movement and riding in the car.  He denies any particular anorexia or nausea/vomiting.  He had a normal bowel movement this evening.  He denies dysuria, urgency, frequency, or hematuria.  Past medical history is significant for MI, coronary artery disease, atrial fibrillation, chronic anticoagulation with Xarelto, diabetes mellitus, hypertension, anxiety/depression, memory loss, kidney stones, history of stroke without residual deficits, hypothyroidism, obstructive sleep apnea, and history of ventricular tachycardia with subsequent pacemaker/defibrillator.    History provided by:  Patient  Flank Pain  Pain location:  L flank  Pain quality: sharp and stabbing    Pain radiates to:  LLQ  Duration:  2 weeks  Timing:  Constant  Chronicity:  Recurrent  Context: previous surgery (lap band)    Relieved by:  Nothing  Worsened by:  Movement  Ineffective treatments:  None tried  Associated symptoms: no anorexia, no belching, no chest pain, no chills, no constipation (Normal BM tonight.), no diarrhea, no dysuria, no fatigue, no fever, no flatus, no hematuria, no nausea, no shortness of breath and no vomiting    Risk factors comment:  History of pacemaker/defibrillator.  Recent heart cath 3 wks ago at [facility]; normal per patient history.      Review of Systems   Constitutional: Negative.  Negative for activity  change, appetite change, chills, fatigue and fever.   Respiratory: Negative.  Negative for shortness of breath.    Cardiovascular: Negative.  Negative for chest pain.   Gastrointestinal:  Positive for abdominal pain (LLQ). Negative for anorexia, constipation (Normal BM tonight.), diarrhea, flatus, nausea and vomiting.        Last colonoscopy was 4-5 years ago; normal per patient.   Genitourinary:  Positive for flank pain (left renal colic). Negative for dysuria, frequency, hematuria and urgency.        History of kidney stones.  Seen per Urology, Dr. Reyna, today.   Musculoskeletal:  Positive for back pain (left CVA pain.).   All other systems reviewed and are negative.      Past Medical History:   Diagnosis Date    6th nerve palsy, right     right eye     Anxiety and depression     Atrial fibrillation, persistent 12/02/2020    on Xarelto    Coronary artery disease involving native coronary artery of native heart without angina pectoris 09/12/2018    follows w/ Dr. Johnson, on ASA 81mg    CRI (chronic renal insufficiency), stage 2 (mild)     CVA (cerebral vascular accident)     Diabetes mellitus     doesnt check sugar, type 2     Disease of thyroid gland     Double vision     resolved- right eye    Elevated cholesterol     Focal seizure     of right arm     Heart attack     NSTEMI 2015, s/p stenting    History of Helicobacter pylori infection     in 2008, treated w/ PrevPak - 2021 EGD bx (+), PCP RX - PPI/Augmentin/Doxy/Flagyl (x 14 days) 1/22/21    HL (hearing loss)     (L) ear - child luevano measles    Hyperlipidemia     Hypertension     Kidney stone     Memory loss 2005    d/t meningitis    Meningitis 2005    unsure of bacterial or viral     Obesity     Sleep apnea treated with nocturnal BiPAP     compliant with  machine     Stroke     2017/2018    Ventricular tachycardia     3 different times    Wears glasses        Allergies   Allergen Reactions    Statins Myalgia and Other (See Comments)       Past  Surgical History:   Procedure Laterality Date    CARDIAC CATHETERIZATION N/A 10/12/2018    Procedure: Left Heart Cath;  Surgeon: Yoselin Johnson MD;  Location:  [facility];  Service: Cardiology    CARDIAC CATHETERIZATION  2021    stent    CARDIAC CATHETERIZATION  2021    just checking the after pacemaker placed- Dr. Tim    CARDIAC DEFIBRILLATOR PLACEMENT      COLONOSCOPY  10/2020    w/ Dr. Jacobs, hx colon polyps    CORONARY STENT PLACEMENT      LAPAROSCOPIC GASTRIC BANDING  2008    s/p LAGB Realize 2008 by [facility].    PACEMAKER IMPLANTATION  2021    UMBILICAL HERNIA REPAIR      incarcerated UHR w/ mesh by Dr. Velasquez    URETEROSCOPY LASER LITHOTRIPSY WITH STENT INSERTION Left 10/20/2021    Procedure: URETEROSCOPY LASER LITHOTRIPSY WITH STENT INSERTION;  Surgeon: Tonio De La Cruz MD;  Location: [facility] OR;  Service: Urology;  Laterality: Left;    URETEROSCOPY LASER LITHOTRIPSY WITH STENT INSERTION Left 2021    Procedure: URETEROSCOPY LEFT, LEFT RETROGRADE PYELOGRAM, CYSTOSCOPY, LASER LITHOTRIPSY WITH STENT EXCHANGE;  Surgeon: Tonio De La Cruz MD;  Location: [facility] OR;  Service: Urology;  Laterality: Left;    URETEROSCOPY LASER LITHOTRIPSY WITH STENT INSERTION Left 10/6/2022    Procedure: URETEROSCOPY LASER LITHOTRIPSY WITH STENT INSERTION;  Surgeon: Tonio De La Cruz MD;  Location: [facility] OR;  Service: Urology;  Laterality: Left;       Family History   Problem Relation Age of Onset    Stroke Mother     Hypertension Mother     COPD Father     Hypertension Father        Social History     Socioeconomic History    Marital status:    Tobacco Use    Smoking status: Former     Packs/day: 0.50     Years: 15.00     Additional pack years: 0.00     Total pack years: 7.50     Types: Cigarettes     Start date: 1975     Quit date: 1985     Years since quittin.8    Smokeless tobacco: Never   Vaping Use    Vaping Use: Never used   Substance and  Sexual Activity    Alcohol use: No    Drug use: No    Sexual activity: Defer           Objective   Physical Exam  Vitals and nursing note reviewed.   Constitutional:       General: He is not in acute distress.     Appearance: Normal appearance. He is obese. He is not ill-appearing, toxic-appearing or diaphoretic.      Comments: Patient appears uncomfortable secondary to left renal colic.  No acute distress.  Nontoxic.   HENT:      Head: Normocephalic and atraumatic.      Nose: Nose normal.      Mouth/Throat:      Mouth: Mucous membranes are moist.      Pharynx: Oropharynx is clear.   Eyes:      Extraocular Movements: Extraocular movements intact.      Conjunctiva/sclera: Conjunctivae normal.      Pupils: Pupils are equal, round, and reactive to light.   Cardiovascular:      Rate and Rhythm: Normal rate and regular rhythm. No extrasystoles are present.     Pulses: Normal pulses.      Heart sounds: Normal heart sounds. No murmur heard.     Comments: Regular rate and rhythm.  No ectopy or tachycardia.  Pulmonary:      Effort: Pulmonary effort is normal.      Breath sounds: Normal breath sounds.   Abdominal:      General: Bowel sounds are normal. There is no distension.      Palpations: Abdomen is soft.      Tenderness: There is abdominal tenderness. There is left CVA tenderness. There is no right CVA tenderness, guarding or rebound.      Comments: Central obesity.  Soft without distention.  Active bowel sounds in all 4 quadrants.  Tenderness to palpation to the left CVA and left flank region.  No particular left lower quadrant tenderness.  Abdominal exam is benign and nonsurgical.   Musculoskeletal:         General: Normal range of motion.      Cervical back: Normal range of motion and neck supple.      Right lower leg: No edema.      Left lower leg: No edema.   Skin:     General: Skin is warm and dry.   Neurological:      General: No focal deficit present.      Mental Status: He is alert.         Procedures            ED Course  ED Course as of 10/17/23 2326   Tue Oct 17, 2023   2323 CBC reveals normal white blood cell count at 7.72.  Differential is within normal limits.  Chemistries were within normal limits and creatinine is 1.22.  Baseline creatinine is around 1.3-1.4.  Lactic acid was slightly elevated at 2.4.  Procalcitonin was normal.  Lipase is 58.  LFTs were normal.  Urinalysis revealed glucosuria and proteinuria but no acute infectious process and no microscopic blood.  High-sensitivity troponin was 16 and EKG showed paced rhythm without evidence of ectopy or ischemia.  CT of the abdomen/pelvis without contrast was performed in the outpatient setting earlier this same day which revealed bilateral nonobstructing nephrolithiasis without evidence of hydronephrosis.  Gallstones.  Lap-Band apparatus is in good position and there was no abnormal fluid collection or other acute infectious process.  Abdominal exam is benign and nonsurgical.  Discussed the case with Dr. Valle, ER attending physician.  Differential diagnoses includes left renal colic.  Patient could have a stone that is possibly leaving the kidney with history of bilateral renal stones.  We will prescribe short course of Percocet 5 mg by mouth every 4-6 hours as needed for moderate pain dispense 10 no refills and Zofran 4 mg ODT every 4-6 hours as needed for nausea/vomiting.  Patient was given IV fluid bolus and Dilaudid/Zofran with marked improvement in symptoms.  Contact his routine urologist, Lois Reyna tomorrow for close follow-up.  Return to the ER if worsening symptoms.  ER work-up is reassuring. [FC]      ED Course User Index  [FC] Molly Jaquez PA-C           Recent Results (from the past 24 hour(s))   POC Urinalysis Dipstick, Automated    Collection Time: 10/17/23 10:57 AM    Specimen: Urine   Result Value Ref Range    Color Yellow Yellow, Straw, Dark Yellow, Misty    Clarity, UA Clear Clear    Specific Gravity  1.005 1.005 - 1.030     pH, Urine 6.5 5.0 - 8.0    Leukocytes Negative Negative    Nitrite, UA Negative Negative    Protein, POC Trace (A) Negative mg/dL    Glucose, UA Negative Negative mg/dL    Ketones, UA Negative Negative    Urobilinogen, UA Normal Normal, 0.2 E.U./dL    Bilirubin Negative Negative    Blood, UA Negative Negative    Lot Number [id number]     Expiration Date 07/13/2024    ECG 12 Lead Other; flank pain, recent heart cath    Collection Time: 10/17/23  7:48 PM   Result Value Ref Range    QT Interval 468 ms    QTC Interval 490 ms   Comprehensive Metabolic Panel    Collection Time: 10/17/23  8:04 PM    Specimen: Blood   Result Value Ref Range    Glucose 195 (H) 65 - 99 mg/dL    BUN 11 8 - 23 mg/dL    Creatinine 1.22 0.76 - 1.27 mg/dL    Sodium 140 136 - 145 mmol/L    Potassium 3.6 3.5 - 5.2 mmol/L    Chloride 100 98 - 107 mmol/L    CO2 29.0 22.0 - 29.0 mmol/L    Calcium 9.4 8.6 - 10.5 mg/dL    Total Protein 7.7 6.0 - 8.5 g/dL    Albumin 4.1 3.5 - 5.2 g/dL    ALT (SGPT) 19 1 - 41 U/L    AST (SGOT) 19 1 - 40 U/L    Alkaline Phosphatase 61 39 - 117 U/L    Total Bilirubin 0.7 0.0 - 1.2 mg/dL    Globulin 3.6 gm/dL    A/G Ratio 1.1 g/dL    BUN/Creatinine Ratio 9.0 7.0 - 25.0    Anion Gap 11.0 5.0 - 15.0 mmol/L    eGFR 65.0 >60.0 mL/min/1.73   Lipase    Collection Time: 10/17/23  8:04 PM    Specimen: Blood   Result Value Ref Range    Lipase 58 13 - 60 U/L   Lactic Acid, Plasma    Collection Time: 10/17/23  8:04 PM    Specimen: Blood   Result Value Ref Range    Lactate 2.4 (C) 0.5 - 2.0 mmol/L   Single High Sensitivity Troponin T    Collection Time: 10/17/23  8:04 PM    Specimen: Blood   Result Value Ref Range    HS Troponin T 16 (H) <15 ng/L   CBC Auto Differential    Collection Time: 10/17/23  8:04 PM    Specimen: Blood   Result Value Ref Range    WBC 7.72 3.40 - 10.80 10*3/mm3    RBC 5.86 (H) 4.14 - 5.80 10*6/mm3    Hemoglobin 17.9 (H) 13.0 - 17.7 g/dL    Hematocrit 53.6 (H) 37.5 - 51.0 %    MCV 91.5 79.0 - 97.0 fL    MCH 30.5  26.6 - 33.0 pg    MCHC 33.4 31.5 - 35.7 g/dL    RDW 14.1 12.3 - 15.4 %    RDW-SD 47.5 37.0 - 54.0 fl    MPV 11.7 6.0 - 12.0 fL    Platelets 209 140 - 450 10*3/mm3    Neutrophil % 62.9 42.7 - 76.0 %    Lymphocyte % 28.2 19.6 - 45.3 %    Monocyte % 5.8 5.0 - 12.0 %    Eosinophil % 2.1 0.3 - 6.2 %    Basophil % 0.5 0.0 - 1.5 %    Immature Grans % 0.5 0.0 - 0.5 %    Neutrophils, Absolute 4.85 1.70 - 7.00 10*3/mm3    Lymphocytes, Absolute 2.18 0.70 - 3.10 10*3/mm3    Monocytes, Absolute 0.45 0.10 - 0.90 10*3/mm3    Eosinophils, Absolute 0.16 0.00 - 0.40 10*3/mm3    Basophils, Absolute 0.04 0.00 - 0.20 10*3/mm3    Immature Grans, Absolute 0.04 0.00 - 0.05 10*3/mm3    nRBC 0.0 0.0 - 0.2 /100 WBC   Procalcitonin    Collection Time: 10/17/23  8:04 PM    Specimen: Blood   Result Value Ref Range    Procalcitonin 0.05 0.00 - 0.25 ng/mL   Urinalysis With Culture If Indicated - Urine, Clean Catch    Collection Time: 10/17/23 10:21 PM    Specimen: Urine, Clean Catch   Result Value Ref Range    Color, UA Yellow Yellow, Straw    Appearance, UA Clear Clear    pH, UA 6.5 5.0 - 8.0    Specific Gravity, UA 1.024 1.001 - 1.030    Glucose, UA >=1000 mg/dL (3+) (A) Negative    Ketones, UA Negative Negative    Bilirubin, UA Negative Negative    Blood, UA Negative Negative    Protein, UA 30 mg/dL (1+) (A) Negative    Leuk Esterase, UA Negative Negative    Nitrite, UA Negative Negative    Urobilinogen, UA 1.0 E.U./dL 0.2 - 1.0 E.U./dL   Urinalysis, Microscopic Only - Urine, Clean Catch    Collection Time: 10/17/23 10:21 PM    Specimen: Urine, Clean Catch   Result Value Ref Range    RBC, UA 0-2 None Seen, 0-2 /HPF    WBC, UA 0-2 None Seen, 0-2 /HPF    Bacteria, UA None Seen None Seen, Trace /HPF    Squamous Epithelial Cells, UA 0-2 None Seen, 0-2 /HPF    Hyaline Casts, UA None Seen 0 - 6 /LPF    Methodology Automated Microscopy      Note: In addition to lab results from this visit, the labs listed above may include labs taken at another  "facility or during a different encounter within the last 24 hours. Please correlate lab times with ED admission and discharge times for further clarification of the services performed during this visit.    No orders to display     Vitals:    10/17/23 1924 10/17/23 2202 10/17/23 2302   BP: 173/92 158/99 150/91   BP Location: Left arm     Patient Position: Sitting     Pulse: 60 66 67   Resp: 20     Temp: 98.6 °F (37 °C)     TempSrc: Oral     SpO2: 97% 92% 92%   Weight: (!) 142 kg (314 lb)     Height: 193 cm (76\")       Medications   sodium chloride 0.9 % flush 10 mL (has no administration in time range)   sodium chloride 0.9 % bolus 500 mL (0 mL Intravenous Stopped 10/17/23 2218)   HYDROmorphone (DILAUDID) injection 1 mg (1 mg Intravenous Given 10/17/23 2118)   ondansetron (ZOFRAN) injection 4 mg (4 mg Intravenous Given 10/17/23 2118)     ECG/EMG Results (last 24 hours)       ** No results found for the last 24 hours. **          ECG 12 Lead Other; flank pain, recent heart cath   Final Result   Test Reason : Other~   Blood Pressure :   */*   mmHG   Vent. Rate :  66 BPM     Atrial Rate :  66 BPM      P-R Int : 260 ms          QRS Dur : 170 ms       QT Int : 468 ms       P-R-T Axes :  56 -31 153 degrees      QTc Int : 490 ms      Atrial-paced rhythm with prolonged AV conduction   Left axis deviation   Left bundle branch block   Abnormal ECG   When compared with ECG of 22-SEP-2022 10:36,   No significant change was found   Confirmed by ADAMA VALLE MD ([id number]) on 10/17/2023 10:07:06 PM      Referred By: [facility]           Confirmed By: ADAMA EUCEDA query complete. Treatment plan to include limited course of prescribed  controlled substance. Risks including addiction, benefits, and alternatives presented to patient.                           OK reviewed by Adama Valle MD       Medical Decision Making  Problems Addressed:  Bilateral nephrolithiasis: complicated acute illness or injury  Chronic " anticoagulation: complicated acute illness or injury  History of atrial fibrillation: complicated acute illness or injury  History of diabetes mellitus: complicated acute illness or injury  Renal colic on left side: complicated acute illness or injury    Amount and/or Complexity of Data Reviewed  Labs: ordered.  ECG/medicine tests: ordered.    Risk  Prescription drug management.        Final diagnoses:   Renal colic on left side   Bilateral nephrolithiasis   History of atrial fibrillation   Chronic anticoagulation   History of diabetes mellitus       ED Disposition  ED Disposition       ED Disposition   Discharge    Condition   Stable    Comment   --               Lois Reyna PA-C  [address] [facility] [address] [facility] [address]  [place] [address]  [phone]    Call in 1 day  call tomorrow for close re-check    [facility] EMERGENCY DEPARTMENT  [address] [facility] [address] [phone]    If symptoms worsen         Medication List        New Prescriptions      ondansetron ODT 4 MG disintegrating tablet  Commonly known as: ZOFRAN-ODT  Place 1 tablet on the tongue Every 4 (Four) Hours.     oxyCODONE-acetaminophen 5-325 MG per tablet  Commonly known as: PERCOCET  Take 1 tablet by mouth Every 4 (Four) Hours As Needed for Moderate Pain.               Where to Get Your Medications        These medications were sent to [facility] [id number] - [place], [place] - [address] [place], [place] [address] [address] - [phone] [facility] [phone] [address] [address]      Phone: [phone]   ondansetron ODT 4 MG disintegrating tablet  oxyCODONE-acetaminophen 5-325 MG per tablet            Molly Jaquez PA-C  10/17/23 2468

## 2023-10-17 NOTE — TELEPHONE ENCOUNTER
Patient stopped by office requesting refill on Depakote. He said you reduced it to 4 pills a day, but he needs to go back up to 5 pills a day. He would like for you to send the refill to [facility]

## 2023-10-17 NOTE — Clinical Note
Scott Reyes, can you reach out to Bernardo to schedule a 6-month follow-up for his kidney stones?  Thank you

## 2023-10-18 ENCOUNTER — TELEPHONE (OUTPATIENT)
Dept: INTERNAL MEDICINE | Facility: CLINIC | Age: 68
End: 2023-10-18

## 2023-10-18 ENCOUNTER — OFFICE VISIT (OUTPATIENT)
Dept: INTERNAL MEDICINE | Facility: CLINIC | Age: 68
End: 2023-10-18
Payer: MEDICARE

## 2023-10-18 VITALS
DIASTOLIC BLOOD PRESSURE: 76 MMHG | HEIGHT: 76 IN | WEIGHT: 314 LBS | RESPIRATION RATE: 16 BRPM | SYSTOLIC BLOOD PRESSURE: 134 MMHG | HEART RATE: 72 BPM | OXYGEN SATURATION: 96 % | BODY MASS INDEX: 38.24 KG/M2 | TEMPERATURE: 97.1 F

## 2023-10-18 DIAGNOSIS — R10.9 RIGHT LATERAL ABDOMINAL PAIN: Primary | ICD-10-CM

## 2023-10-18 DIAGNOSIS — E11.59 TYPE 2 DIABETES MELLITUS WITH OTHER CIRCULATORY COMPLICATION, WITHOUT LONG-TERM CURRENT USE OF INSULIN: ICD-10-CM

## 2023-10-18 DIAGNOSIS — I50.31 ACUTE DIASTOLIC CHF (CONGESTIVE HEART FAILURE): ICD-10-CM

## 2023-10-18 DIAGNOSIS — N18.30 CHRONIC RENAL IMPAIRMENT, STAGE 3 (MODERATE), UNSPECIFIED WHETHER STAGE 3A OR 3B CKD: ICD-10-CM

## 2023-10-18 DIAGNOSIS — G40.909 SEIZURE DISORDER: ICD-10-CM

## 2023-10-18 DIAGNOSIS — N20.0 NEPHROLITHIASIS: ICD-10-CM

## 2023-10-18 PROBLEM — I48.92 PAROXYSMAL ATRIAL FLUTTER: Status: ACTIVE | Noted: 2021-11-12

## 2023-10-18 PROBLEM — B96.89 OTHER SPECIFIED BACTERIAL AGENTS AS THE CAUSE OF DISEASES CLASSIFIED ELSEWHERE: Status: ACTIVE | Noted: 2021-11-12

## 2023-10-18 PROBLEM — A41.59 OTHER GRAM-NEGATIVE SEPSIS: Status: ACTIVE | Noted: 2021-11-11

## 2023-10-18 PROBLEM — I25.10 CORONARY ATHEROSCLEROSIS: Status: ACTIVE | Noted: 2021-11-12

## 2023-10-18 PROBLEM — Z71.6 TOBACCO ABUSE COUNSELING: Status: ACTIVE | Noted: 2021-11-22

## 2023-10-18 PROBLEM — E66.09 OTHER OBESITY DUE TO EXCESS CALORIES: Status: ACTIVE | Noted: 2021-11-22

## 2023-10-18 PROBLEM — I11.0 BENIGN HYPERTENSIVE HEART DISEASE WITH CONGESTIVE HEART FAILURE: Status: ACTIVE | Noted: 2021-11-12

## 2023-10-18 PROBLEM — N39.0 URINARY TRACT INFECTION, SITE NOT SPECIFIED: Status: ACTIVE | Noted: 2021-11-11

## 2023-10-18 RX ORDER — DIVALPROEX SODIUM 250 MG/1
TABLET, DELAYED RELEASE ORAL
Qty: 450 TABLET | Refills: 1 | Status: SHIPPED | OUTPATIENT
Start: 2023-10-18

## 2023-10-18 RX ORDER — FUROSEMIDE 40 MG/1
40 TABLET ORAL DAILY
COMMUNITY

## 2023-10-18 NOTE — TELEPHONE ENCOUNTER
I have sent a 90 day supply to [facility]- 750mg (3 tablets QAM) and 500mg (2 tablets QHS). Thanks.

## 2023-10-18 NOTE — TELEPHONE ENCOUNTER
Pt was seen in ER 10/17. He needs a hospital f/u but wants sooner than next Thursday. Can someone work him in? He prefers Dr Bear. Kidney stones was r/o but still having lots of side pain.

## 2023-10-18 NOTE — PROGRESS NOTES
Subjective     Patient ID: Andre Agosto is a 68 y.o. male. Patient is here for management of multiple medical problems.     Chief Complaint   Patient presents with    Back Pain     Severe back pain, under the rib cage for the past 5 days.    Abdominal Pain     Lower abdominal pain on the left side wrapping around to the back.     History of Present Illness   Left flank pain.  Seen PA in Dr Wharton office. Working renal stones.        The following portions of the patient's history were reviewed and updated as appropriate: allergies, current medications, past family history, past medical history, past social history, past surgical history and problem list.    Review of Systems    Current Outpatient Medications:     amiodarone (PACERONE) 100 MG tablet, Take 1 tablet by mouth Daily., Disp: 60 tablet, Rfl: 11    amLODIPine (NORVASC) 5 MG tablet, Take 1 tablet by mouth Daily., Disp: , Rfl:     carvedilol (Coreg) 25 MG tablet, Take 1 tablet by mouth 2 (Two) Times a Day With Meals., Disp: 180 tablet, Rfl: 3    Diclofenac Sodium (VOLTAREN) 1 % gel gel, Apply 4 g topically to the appropriate area as directed 4 (Four) Times a Day As Needed (pain)., Disp: 100 g, Rfl: 1    divalproex (DEPAKOTE) 250 MG DR tablet, 750mg QAM and 500mg QHS (Patient taking differently: Take 2 tablets by mouth 2 (Two) Times a Day. 500mg QAM and 500mg QHS), Disp: 450 tablet, Rfl: 1    Entresto  MG tablet, Take 1 tablet by mouth 2 (Two) Times a Day., Disp: , Rfl:     Farxiga 10 MG tablet, Take 10 mg by mouth Daily., Disp: , Rfl:     furosemide (LASIX) 40 MG tablet, Take 1 tablet by mouth Daily., Disp: , Rfl:     levothyroxine (SYNTHROID, LEVOTHROID) 88 MCG tablet, TAKE 1 TABLET DAILY, Disp: 90 tablet, Rfl: 3    ondansetron ODT (ZOFRAN-ODT) 4 MG disintegrating tablet, Place 1 tablet on the tongue Every 4 (Four) Hours., Disp: 12 tablet, Rfl: 0    oxyCODONE-acetaminophen (PERCOCET) 5-325 MG per tablet, Take 1 tablet by mouth Every 4 (Four) Hours As  "Needed for Moderate Pain., Disp: 10 tablet, Rfl: 0    Trulicity 3 MG/0.5ML solution pen-injector, INJECT 0.5 ML UNDER THE SKIN INTO THE APPROPRIATE AREA AS DIRECTED ONE TIME PER WEEK (Patient not taking: Reported on 10/19/2023), Disp: 2 mL, Rfl: 12    Xarelto 15 MG tablet, Take 1 tablet by mouth Daily With Dinner., Disp: , Rfl:     tamsulosin (FLOMAX) 0.4 MG capsule 24 hr capsule, Take 1 capsule by mouth Daily., Disp: 30 capsule, Rfl: 5    Objective      Blood pressure 134/76, pulse 72, temperature 97.1 °F (36.2 °C), resp. rate 16, height 193 cm (76\"), weight (!) 142 kg (314 lb), SpO2 96%.    Physical Exam     General Appearance:    Alert, cooperative, no distress, appears stated age   Head:    Normocephalic, without obvious abnormality, atraumatic   Eyes:    PERRL, conjunctiva/corneas clear, EOM's intact   Ears:    Normal TM's and external ear canals, both ears   Nose:   Nares normal, septum midline, mucosa normal, no drainage   or sinus tenderness   Throat:   Lips, mucosa, and tongue normal; teeth and gums normal   Neck:   Supple, symmetrical, trachea midline, no adenopathy;        thyroid:  No enlargement/tenderness/nodules; no carotid    bruit or JVD   Back:     Symmetric, no curvature, ROM normal, no CVA tenderness   Lungs:     Clear to auscultation bilaterally, respirations unlabored   Chest wall:    No tenderness or deformity   Heart:    Regular rate and rhythm, S1 and S2 normal, no murmur,        rub or gallop   Abdomen:     Soft, non-tender, bowel sounds active all four quadrants,     no masses, no organomegaly   Extremities:   Extremities normal, atraumatic, no cyanosis or edema   Pulses:   2+ and symmetric all extremities   Skin:   Skin color, texture, turgor normal, no rashes or lesions   Lymph nodes:   Cervical, supraclavicular, and axillary nodes normal   Neurologic:   CNII-XII intact. Normal strength, sensation and reflexes       throughout      Results for orders placed or performed during the " hospital encounter of 10/17/23   Comprehensive Metabolic Panel    Specimen: Blood   Result Value Ref Range    Glucose 195 (H) 65 - 99 mg/dL    BUN 11 8 - 23 mg/dL    Creatinine 1.22 0.76 - 1.27 mg/dL    Sodium 140 136 - 145 mmol/L    Potassium 3.6 3.5 - 5.2 mmol/L    Chloride 100 98 - 107 mmol/L    CO2 29.0 22.0 - 29.0 mmol/L    Calcium 9.4 8.6 - 10.5 mg/dL    Total Protein 7.7 6.0 - 8.5 g/dL    Albumin 4.1 3.5 - 5.2 g/dL    ALT (SGPT) 19 1 - 41 U/L    AST (SGOT) 19 1 - 40 U/L    Alkaline Phosphatase 61 39 - 117 U/L    Total Bilirubin 0.7 0.0 - 1.2 mg/dL    Globulin 3.6 gm/dL    A/G Ratio 1.1 g/dL    BUN/Creatinine Ratio 9.0 7.0 - 25.0    Anion Gap 11.0 5.0 - 15.0 mmol/L    eGFR 65.0 >60.0 mL/min/1.73   Lipase    Specimen: Blood   Result Value Ref Range    Lipase 58 13 - 60 U/L   Lactic Acid, Plasma    Specimen: Blood   Result Value Ref Range    Lactate 2.4 (C) 0.5 - 2.0 mmol/L   Single High Sensitivity Troponin T    Specimen: Blood   Result Value Ref Range    HS Troponin T 16 (H) <15 ng/L   CBC Auto Differential    Specimen: Blood   Result Value Ref Range    WBC 7.72 3.40 - 10.80 10*3/mm3    RBC 5.86 (H) 4.14 - 5.80 10*6/mm3    Hemoglobin 17.9 (H) 13.0 - 17.7 g/dL    Hematocrit 53.6 (H) 37.5 - 51.0 %    MCV 91.5 79.0 - 97.0 fL    MCH 30.5 26.6 - 33.0 pg    MCHC 33.4 31.5 - 35.7 g/dL    RDW 14.1 12.3 - 15.4 %    RDW-SD 47.5 37.0 - 54.0 fl    MPV 11.7 6.0 - 12.0 fL    Platelets 209 140 - 450 10*3/mm3    Neutrophil % 62.9 42.7 - 76.0 %    Lymphocyte % 28.2 19.6 - 45.3 %    Monocyte % 5.8 5.0 - 12.0 %    Eosinophil % 2.1 0.3 - 6.2 %    Basophil % 0.5 0.0 - 1.5 %    Immature Grans % 0.5 0.0 - 0.5 %    Neutrophils, Absolute 4.85 1.70 - 7.00 10*3/mm3    Lymphocytes, Absolute 2.18 0.70 - 3.10 10*3/mm3    Monocytes, Absolute 0.45 0.10 - 0.90 10*3/mm3    Eosinophils, Absolute 0.16 0.00 - 0.40 10*3/mm3    Basophils, Absolute 0.04 0.00 - 0.20 10*3/mm3    Immature Grans, Absolute 0.04 0.00 - 0.05 10*3/mm3    nRBC 0.0 0.0 - 0.2  /100 WBC   Urinalysis With Culture If Indicated - Urine, Clean Catch    Specimen: Urine, Clean Catch   Result Value Ref Range    Color, UA Yellow Yellow, Straw    Appearance, UA Clear Clear    pH, UA 6.5 5.0 - 8.0    Specific Gravity, UA 1.024 1.001 - 1.030    Glucose, UA >=1000 mg/dL (3+) (A) Negative    Ketones, UA Negative Negative    Bilirubin, UA Negative Negative    Blood, UA Negative Negative    Protein, UA 30 mg/dL (1+) (A) Negative    Leuk Esterase, UA Negative Negative    Nitrite, UA Negative Negative    Urobilinogen, UA 1.0 E.U./dL 0.2 - 1.0 E.U./dL   Procalcitonin    Specimen: Blood   Result Value Ref Range    Procalcitonin 0.05 0.00 - 0.25 ng/mL   Urinalysis, Microscopic Only - Urine, Clean Catch    Specimen: Urine, Clean Catch   Result Value Ref Range    RBC, UA 0-2 None Seen, 0-2 /HPF    WBC, UA 0-2 None Seen, 0-2 /HPF    Bacteria, UA None Seen None Seen, Trace /HPF    Squamous Epithelial Cells, UA 0-2 None Seen, 0-2 /HPF    Hyaline Casts, UA None Seen 0 - 6 /LPF    Methodology Automated Microscopy    ECG 12 Lead Other; flank pain, recent heart cath   Result Value Ref Range    QT Interval 468 ms    QTC Interval 490 ms     *Note: Due to a large number of results and/or encounters for the requested time period, some results have not been displayed. A complete set of results can be found in Results Review.         Assessment & Plan     Pt with B/L nephrolith. May need to stop Trulicity.  Will try to move stone with aggressive   Pain may be unrelated. Lipase normal     Narrative & Impression   PROCEDURE: CT ABDOMEN PELVIS STONE PROTOCOL-     HISTORY: Nephrolithiasis     COMPARISON: May 2022.     PROCEDURE: Axial images were obtained from the lung bases through the  pubic symphysis without intravenous contrast.     FINDINGS:     ABDOMEN: The lung bases are clear. The heart size is normal. There is a  trace pericardial effusion. A pacemaker lead is seen. A lap band is  present in good position. The lap band  port is also identified. The  limited noncontrast images of the liver are normal. The gallbladder is  present and contains gallstones. The spleen is normal. No adrenal masses  are seen.  The pancreas has an unremarkable unenhanced appearance. The  aorta is normal in caliber. There are vascular calcifications. There is  no significant free fluid or adenopathy. There is bilateral  nephrolithiasis. There is no hydronephrosis. Limited noncontrast images  of the bowel are unremarkable.     PELVIS: The appendix is normal. The urinary bladder is unremarkable. The  prostate is unremarkable. There is no significant fluid or adenopathy.     IMPRESSION:  1. Bilateral nonobstructing nephrolithiasis without hydronephrosis.  2. Cholelithiasis.  3. Lap band apparatus in good position.                  Component  Ref Range & Units 3 d ago  (10/17/23) 11 mo ago  (11/10/22) 1 yr ago  (9/22/22) 1 yr ago  (4/14/22) 2 yr ago  (9/13/21) 2 yr ago  (3/25/21) 2 yr ago  (3/21/21)   RBC, UA  None Seen, 0-2 /HPF 0-2 21-30 Abnormal  0-2 6-12 Abnormal  Too Numerous to Count Abnormal  R See below: Abnormal  R, CM Too Numerous to Count Abnormal  R   WBC, UA  None Seen, 0-2 /HPF 0-2 3-5 Abnormal  0-2 0-2 0-2 Abnormal  R 6-12 Abnormal  R, CM Unable to determine due to loaded field Abnormal  R   Comment: Urine culture not indicated.   Bacteria, UA  None Seen, Trace /HPF None Seen None Seen R None Seen R None Seen R None Seen R Comment R, CM Unable to determine due to loaded field Abnormal  R   Squamous Epithelial Cells, UA  None Seen, 0-2 /HPF 0-2 3-6 Abnormal  0-2 0-2 0-2  Unable to determine due to loaded field Abnormal    Hyaline Casts, UA  0 - 6 /LPF None Seen 3-6 R 0-2 R              Diagnoses and all orders for this visit:    1. Right lateral abdominal pain (Primary)    2. Nephrolithiasis  -     tamsulosin (FLOMAX) 0.4 MG capsule 24 hr capsule; Take 1 capsule by mouth Daily.  Dispense: 30 capsule; Refill: 5    3. Type 2 diabetes mellitus with  other circulatory complication, without long-term current use of insulin    4. Chronic renal impairment, stage 3 (moderate), unspecified whether stage 3a or 3b CKD    5. Acute diastolic CHF (congestive heart failure)      No follow-ups on file.          There are no Patient Instructions on file for this visit.     Keven Bear MD    Assessment & Plan

## 2023-10-18 NOTE — DISCHARGE INSTRUCTIONS
ER evaluation revealed bilateral kidney stones but there was no stone in the ureter.  Urinalysis revealed no microscopic blood or other acute infectious process.  Labs were within normal limits, including improved kidney function with creatinine of 1.22.  Rx for Percocet and Zofran as directed and recommend close follow-up with urology Lois FOWLER.  Return to the ER if worsening symptoms.

## 2023-10-19 ENCOUNTER — OFFICE VISIT (OUTPATIENT)
Dept: BARIATRICS/WEIGHT MGMT | Facility: CLINIC | Age: 68
End: 2023-10-19
Payer: MEDICARE

## 2023-10-19 ENCOUNTER — TELEPHONE (OUTPATIENT)
Dept: NEUROLOGY | Facility: CLINIC | Age: 68
End: 2023-10-19
Payer: MEDICARE

## 2023-10-19 VITALS
WEIGHT: 311.8 LBS | OXYGEN SATURATION: 96 % | TEMPERATURE: 98 F | HEART RATE: 84 BPM | SYSTOLIC BLOOD PRESSURE: 120 MMHG | BODY MASS INDEX: 37.97 KG/M2 | DIASTOLIC BLOOD PRESSURE: 82 MMHG | HEIGHT: 76 IN

## 2023-10-19 DIAGNOSIS — E66.9 OBESITY, CLASS II, BMI 35-39.9: Primary | ICD-10-CM

## 2023-10-19 DIAGNOSIS — R10.13 DYSPEPSIA: ICD-10-CM

## 2023-10-19 DIAGNOSIS — Z98.84 S/P BARIATRIC SURGERY: ICD-10-CM

## 2023-10-19 NOTE — TELEPHONE ENCOUNTER
PATIENT CALLED AND SAID THAT SOMEONE IN THE OFFICE HAD CALLED HIM, HE THINKS HER NAME WAS OLIVER. I TOOK HIS MESSAGE AND GAVE IT TO OLIVER.

## 2023-10-19 NOTE — PROGRESS NOTES
"[facility]  [address]  [address]  [facility] [address] [phone]        Patient Name:  Andre Agosto.  :  1955      Date of Visit: 10/30/2023      Reason for Visit:  AGB followup      HPI:  Andre Agosto is a 68 y.o. male s/p LAGB Realize 2008 by JSO.    c/o abd.pain/port pain.  Wants to discuss band removal.      Band unfilled in 2018 (-1.5cc clear fluid) for chronic episodic dysphagia/vomiting.     Denies fevers/chills.  No recent UGIs or endoscopic evals.      CT ab/pel 10/17/23 @[facility] noted lapband in place, multiple gallstones, nonobstructing (B) kidney stones.    s/p defibrillator/pacemaker.  Follows w/ Dr. Mendoza.  On Xarelto.  Has not required bridging.  Recent cardiac cath okay.     Also notes, very \"hard stick,\" will plan accordingly.        Presurgery weight:  350 lbs.  Lowest weight achieved:  260 lbs.  Current weight:  311 lbs.       Past Medical History:   Diagnosis Date    6th nerve palsy, right     right eye     Anxiety and depression     Atrial fibrillation, persistent 2020    on Xarelto    Coronary artery disease involving native coronary artery of native heart without angina pectoris 2018    follows w/ Dr. Mendoza    CRI (chronic renal insufficiency), stage 2 (mild)     CVA (cerebral vascular accident)     Diabetes mellitus     doesnt check sugar, type 2     Disease of thyroid gland     Double vision     resolved- right eye    Elevated cholesterol     Focal seizure     of right arm     Heart attack     NSTEMI , s/p stenting    History of Helicobacter pylori infection     in , treated w/ PrevPak -  EGD bx (+), PCP RX - PPI/Augmentin/Doxy/Flagyl (x 14 days) 21    HL (hearing loss)     (L) ear - child luevano measles    Hyperlipidemia     Hypertension     Kidney stone     Memory loss     d/t meningitis    Meningitis     unsure of bacterial or viral     Obesity     Sleep apnea treated with nocturnal BiPAP     " compliant with  machine     Stroke     2017/2018    Ventricular tachycardia     3 different times    Wears glasses      Past Surgical History:   Procedure Laterality Date    CARDIAC CATHETERIZATION N/A 10/12/2018    Procedure: Left Heart Cath;  Surgeon: Yoselin Johnson MD;  Location:  [facility];  Service: Cardiology    CARDIAC CATHETERIZATION  03/2021    stent    CARDIAC CATHETERIZATION  07/2021    just checking the after pacemaker placed- Dr. Tim    CARDIAC DEFIBRILLATOR PLACEMENT  2021    COLONOSCOPY  10/2020    w/ Dr. Jacobs, hx colon polyps    CORONARY STENT PLACEMENT  2015    LAPAROSCOPIC GASTRIC BANDING  2008    s/p LAGB Realize 6/2008 by JSO.    PACEMAKER IMPLANTATION  07/25/2021    UMBILICAL HERNIA REPAIR  2008    incarcerated UHR w/ mesh by Dr. Velasquez    URETEROSCOPY LASER LITHOTRIPSY WITH STENT INSERTION Left 10/20/2021    Procedure: URETEROSCOPY LASER LITHOTRIPSY WITH STENT INSERTION;  Surgeon: Tonio De La Cruz MD;  Location: [facility] OR;  Service: Urology;  Laterality: Left;    URETEROSCOPY LASER LITHOTRIPSY WITH STENT INSERTION Left 11/03/2021    Procedure: URETEROSCOPY LEFT, LEFT RETROGRADE PYELOGRAM, CYSTOSCOPY, LASER LITHOTRIPSY WITH STENT EXCHANGE;  Surgeon: Tonio De La Cruz MD;  Location: [facility] OR;  Service: Urology;  Laterality: Left;    URETEROSCOPY LASER LITHOTRIPSY WITH STENT INSERTION Left 10/06/2022    Procedure: URETEROSCOPY LASER LITHOTRIPSY WITH STENT INSERTION;  Surgeon: Tonio De La Cruz MD;  Location: [facility] OR;  Service: Urology;  Laterality: Left;       Allergies   Allergen Reactions    Amlodipine Swelling    Statins Myalgia and Other (See Comments)       Outpatient Medications Marked as Taking for the 10/19/23 encounter (Office Visit) with Leanne Emerson PA   Medication Sig Dispense Refill    amiodarone (PACERONE) 100 MG tablet Take 1 tablet by mouth Daily. 60 tablet 11    amLODIPine (NORVASC) 5 MG tablet Take 1 tablet by mouth Daily.    "   carvedilol (Coreg) 25 MG tablet Take 1 tablet by mouth 2 (Two) Times a Day With Meals. 180 tablet 3    Diclofenac Sodium (VOLTAREN) 1 % gel gel Apply 4 g topically to the appropriate area as directed 4 (Four) Times a Day As Needed (pain). 100 g 1    divalproex (DEPAKOTE) 250 MG DR tablet 750mg QAM and 500mg QHS (Patient taking differently: Take 2 tablets by mouth 2 (Two) Times a Day. 500mg QAM and 500mg QHS) 450 tablet 1    Entresto  MG tablet Take 1 tablet by mouth 2 (Two) Times a Day.      Farxiga 10 MG tablet Take 10 mg by mouth Daily.      furosemide (LASIX) 40 MG tablet Take 1 tablet by mouth Daily.      levothyroxine (SYNTHROID, LEVOTHROID) 88 MCG tablet TAKE 1 TABLET DAILY 90 tablet 3    ondansetron ODT (ZOFRAN-ODT) 4 MG disintegrating tablet Place 1 tablet on the tongue Every 4 (Four) Hours. 12 tablet 0    Xarelto 15 MG tablet Take 1 tablet by mouth Daily With Dinner.         Social History     Socioeconomic History    Marital status:    Tobacco Use    Smoking status: Former     Packs/day: 0.50     Years: 15.00     Additional pack years: 0.00     Total pack years: 7.50     Types: Cigarettes     Start date: 1975     Quit date: 1985     Years since quittin.8    Smokeless tobacco: Never   Vaping Use    Vaping Use: Never used   Substance and Sexual Activity    Alcohol use: No    Drug use: No    Sexual activity: Defer         /82 (BP Location: Left arm, Patient Position: Sitting)   Pulse 84   Temp 98 °F (36.7 °C)   Ht 193 cm (76\")   Wt (!) 141 kg (311 lb 12.8 oz)   SpO2 96%   BMI 37.95 kg/m²     Physical Exam   Constitutional: He appears well-developed.   Cardiovascular: Normal rate and regular rhythm.   Pulmonary/Chest: Effort normal.   Abdominal: Soft. There is no abdominal tenderness. No hernia.   LLQ port - site unremarkable   Musculoskeletal: Normal range of motion.   Neurological: He is alert.   Skin: Skin is warm and dry.       Assessment: s/p LAGB Realize 2008 " by J[facility], pursuing band removal.       ICD-10-CM ICD-9-CM   1. Obesity, Class II, BMI 35-39.9  E66.9 278.00   2. Dyspepsia  R10.13 536.8   3. S/P bariatric surgery  Z98.84 V45.86        Class 2 Severe Obesity (BMI >=35 and <=39.9). Obesity-related health conditions include the following:  see above . Obesity is unchanged. BMI is is above average; BMI management plan is completed. We discussed  see plan .    Plan:  UGI + EGD @[facility] for further eval.  Additional input to follow.  Will refer to [facility] for further eval/assistance as well - not currently interested in revision.          JANETH Hawk

## 2023-10-20 RX ORDER — TAMSULOSIN HYDROCHLORIDE 0.4 MG/1
1 CAPSULE ORAL DAILY
Qty: 30 CAPSULE | Refills: 5 | Status: SHIPPED | OUTPATIENT
Start: 2023-10-20

## 2023-11-07 ENCOUNTER — HOSPITAL ENCOUNTER (OUTPATIENT)
Dept: GENERAL RADIOLOGY | Facility: HOSPITAL | Age: 68
Discharge: HOME | End: 2023-11-07
Admitting: PHYSICIAN ASSISTANT
Payer: MEDICARE

## 2023-11-07 DIAGNOSIS — R10.13 DYSPEPSIA: ICD-10-CM

## 2023-11-07 PROCEDURE — 74240 X-RAY XM UPR GI TRC 1CNTRST: CPT

## 2023-11-08 ENCOUNTER — LAB (OUTPATIENT)
Dept: UROLOGY | Facility: CLINIC | Age: 68
End: 2023-11-08
Payer: MEDICARE

## 2023-11-08 ENCOUNTER — TELEPHONE (OUTPATIENT)
Dept: UROLOGY | Facility: CLINIC | Age: 68
End: 2023-11-08

## 2023-11-08 PROBLEM — R97.20 ELEVATED PROSTATE SPECIFIC ANTIGEN (PSA): Status: ACTIVE | Noted: 2023-11-08

## 2023-11-08 NOTE — TELEPHONE ENCOUNTER
"Provider: DR. DUCKWORTH    Caller: Andre Agosto \"Bernardo\"    Relationship to Patient: Self     Phone Number: [phone]    Reason for Call: BLOODWORK APPOINTMENT    When was the patient last seen: 11/07/23 (NURSE VISIT)    Notes:  MR. AGOSTO CALLED TO FIND OUT WHAT TIME HE IS SUPPOSED TO HAVE HIS BLOODWORK DONE.     PLEASE CALL MR. AGOSTO TO DISCUSS.     ---UNABLE TO WARM TRANSFER.     "

## 2023-11-16 ENCOUNTER — TELEPHONE (OUTPATIENT)
Dept: BARIATRICS/WEIGHT MGMT | Facility: CLINIC | Age: 68
End: 2023-11-16
Payer: MEDICARE

## 2023-11-17 NOTE — TELEPHONE ENCOUNTER
"----- Message from Vinod Guzman MD sent at 11/10/2023  4:55 PM EST -----    Yes if he has not had an EGD recently he needs that prior to scheduling band removal in case he has an erosion.  Then we can plan AGBR/humberto.  thanks!    ----- Message -----  From: Leanne Emerson PA  Sent: 11/10/2023   2:42 PM EST  To: Vinod Guzman MD  Subject: please review                                    69 y/o gentleman s/p LAGB Realize 6/2008 by JSO.    Wants band removed.   Dissatisfied w/ weight loss.  c/o abd.pain/port pain - \"bothersome\".      Band unfilled in 2018 (-1.5cc clear fluid) for chronic episodic dysphagia/vomiting.      CT ab/pel 10/17/23 @[facility] noted lapband in place, multiple gallstones, nonobstructing (B) kidney stones.    UGI 11/7/23 @[facility] - images reviewed, looks okay.     Hx significant for CAD s/p stenting, AFib on Xarelto, CHF w/ EF 29%, s/p defibrillator/pacemaker, CKD, DM, ALEX, Seizure d/o.      Recent cardiac cath \"okay\" - patient report, awaiting cardiac clearance from Dr. Mendoza.      Says has not required bridging RX in the past.   Also notes, \"very hard stick.\"     Next step EGD @[facility], pending clearance?  Thank you!      "

## 2023-11-27 ENCOUNTER — OFFICE VISIT (OUTPATIENT)
Dept: UROLOGY | Facility: CLINIC | Age: 68
End: 2023-11-27
Payer: MEDICARE

## 2023-11-27 DIAGNOSIS — I50.31 ACUTE DIASTOLIC CHF (CONGESTIVE HEART FAILURE): ICD-10-CM

## 2023-11-27 DIAGNOSIS — E55.9 VITAMIN D DEFICIENCY, UNSPECIFIED: ICD-10-CM

## 2023-11-27 DIAGNOSIS — N18.30 CHRONIC RENAL IMPAIRMENT, STAGE 3 (MODERATE), UNSPECIFIED WHETHER STAGE 3A OR 3B CKD: ICD-10-CM

## 2023-11-27 DIAGNOSIS — R97.20 ELEVATED PSA: Primary | ICD-10-CM

## 2023-11-27 DIAGNOSIS — E11.59 TYPE 2 DIABETES MELLITUS WITH OTHER CIRCULATORY COMPLICATION, WITHOUT LONG-TERM CURRENT USE OF INSULIN: Primary | ICD-10-CM

## 2023-11-27 RX ORDER — CIPROFLOXACIN 500 MG/1
500 TABLET, FILM COATED ORAL 2 TIMES DAILY
Qty: 6 TABLET | Refills: 0 | Status: SHIPPED | OUTPATIENT
Start: 2023-11-27

## 2023-11-27 RX ORDER — MAGNESIUM HYDROXIDE 1200 MG/15ML
1 LIQUID ORAL ONCE
Qty: 1 ENEMA | Refills: 0 | Status: SHIPPED | OUTPATIENT
Start: 2023-11-27 | End: 2023-11-27

## 2023-11-27 NOTE — PROGRESS NOTES
CC  Elevated PSA  Nephrolithiasis    HPI  Mr. Agosto is a 68 y.o. male with history below in assessment, who presents for follow up.     At this visit he has intermittent left low back pain sometimes wraps around to the front.    Past Medical History:   Diagnosis Date    6th nerve palsy, right     right eye     Anxiety and depression     Atrial fibrillation, persistent 12/02/2020    on Xarelto    Coronary artery disease involving native coronary artery of native heart without angina pectoris 09/12/2018    follows w/ Dr. Mendoza    CRI (chronic renal insufficiency), stage 2 (mild)     CVA (cerebral vascular accident)     Diabetes mellitus     doesnt check sugar, type 2     Disease of thyroid gland     Double vision     resolved- right eye    Elevated cholesterol     Elevated prostate specific antigen (PSA) 11/8/2023    Focal seizure     of right arm     Heart attack     NSTEMI 2015, s/p stenting    History of Helicobacter pylori infection     in 2008, treated w/ PrevPak - 2021 EGD bx (+), PCP RX - PPI/Augmentin/Doxy/Flagyl (x 14 days) 1/22/21    HL (hearing loss)     (L) ear - child luevano measles    Hyperlipidemia     Hypertension     Kidney stone     Memory loss 2005    d/t meningitis    Meningitis 2005    unsure of bacterial or viral     Obesity     Sleep apnea treated with nocturnal BiPAP     compliant with  machine     Stroke     2017/2018    Ventricular tachycardia     3 different times    Wears glasses        Past Surgical History:   Procedure Laterality Date    CARDIAC CATHETERIZATION N/A 10/12/2018    Procedure: Left Heart Cath;  Surgeon: Yoselin Johnson MD;  Location: [facility] CATH INVASIVE LOCATION;  Service: Cardiology    CARDIAC CATHETERIZATION  03/2021    stent    CARDIAC CATHETERIZATION  07/2021    just checking the after pacemaker placed- Dr. Tim    CARDIAC DEFIBRILLATOR PLACEMENT  2021    COLONOSCOPY  10/2020    w/ Dr. Jacobs, hx colon polyps    CORONARY STENT PLACEMENT  2015    LAPAROSCOPIC GASTRIC  BANDING  2008    s/p LAGB Realize 6/2008 by [facility].    PACEMAKER IMPLANTATION  07/25/2021    UMBILICAL HERNIA REPAIR  2008    incarcerated UHR w/ mesh by Dr. Velasquez    URETEROSCOPY LASER LITHOTRIPSY WITH STENT INSERTION Left 10/20/2021    Procedure: URETEROSCOPY LASER LITHOTRIPSY WITH STENT INSERTION;  Surgeon: Tonio De La Cruz MD;  Location: [facility] OR;  Service: Urology;  Laterality: Left;    URETEROSCOPY LASER LITHOTRIPSY WITH STENT INSERTION Left 11/03/2021    Procedure: URETEROSCOPY LEFT, LEFT RETROGRADE PYELOGRAM, CYSTOSCOPY, LASER LITHOTRIPSY WITH STENT EXCHANGE;  Surgeon: Tonio De La Cruz MD;  Location: [facility] OR;  Service: Urology;  Laterality: Left;    URETEROSCOPY LASER LITHOTRIPSY WITH STENT INSERTION Left 10/06/2022    Procedure: URETEROSCOPY LASER LITHOTRIPSY WITH STENT INSERTION;  Surgeon: Tonio De La Cruz MD;  Location: [facility] OR;  Service: Urology;  Laterality: Left;         Current Outpatient Medications:     amiodarone (PACERONE) 100 MG tablet, Take 1 tablet by mouth Daily., Disp: 60 tablet, Rfl: 11    amLODIPine (NORVASC) 5 MG tablet, Take 1 tablet by mouth Daily., Disp: , Rfl:     carvedilol (Coreg) 25 MG tablet, Take 1 tablet by mouth 2 (Two) Times a Day With Meals., Disp: 180 tablet, Rfl: 3    Diclofenac Sodium (VOLTAREN) 1 % gel gel, Apply 4 g topically to the appropriate area as directed 4 (Four) Times a Day As Needed (pain)., Disp: 100 g, Rfl: 1    divalproex (DEPAKOTE) 250 MG DR tablet, 750mg QAM and 500mg QHS (Patient taking differently: Take 2 tablets by mouth 2 (Two) Times a Day. 500mg QAM and 500mg QHS), Disp: 450 tablet, Rfl: 1    Entresto  MG tablet, Take 1 tablet by mouth 2 (Two) Times a Day., Disp: , Rfl:     Farxiga 10 MG tablet, Take 10 mg by mouth Daily., Disp: , Rfl:     furosemide (LASIX) 40 MG tablet, Take 1 tablet by mouth Daily., Disp: , Rfl:     levothyroxine (SYNTHROID, LEVOTHROID) 88 MCG tablet, TAKE 1 TABLET DAILY, Disp: 90 tablet, Rfl: 3    ondansetron  ODT (ZOFRAN-ODT) 4 MG disintegrating tablet, Place 1 tablet on the tongue Every 4 (Four) Hours., Disp: 12 tablet, Rfl: 0    oxyCODONE-acetaminophen (PERCOCET) 5-325 MG per tablet, Take 1 tablet by mouth Every 4 (Four) Hours As Needed for Moderate Pain., Disp: 10 tablet, Rfl: 0    tamsulosin (FLOMAX) 0.4 MG capsule 24 hr capsule, Take 1 capsule by mouth Daily., Disp: 30 capsule, Rfl: 5    Trulicity 3 MG/0.5ML solution pen-injector, INJECT 0.5 ML UNDER THE SKIN INTO THE APPROPRIATE AREA AS DIRECTED ONE TIME PER WEEK (Patient not taking: Reported on 10/19/2023), Disp: 2 mL, Rfl: 12    Xarelto 15 MG tablet, Take 1 tablet by mouth Daily With Dinner., Disp: , Rfl:      Physical Exam  There were no vitals taken for this visit.    Labs  Brief Urine Lab Results  (Last result in the past 365 days)        Color   Clarity   Blood   Leuk Est   Nitrite   Protein   CREAT   Urine HCG        10/17/23 2221 Yellow   Clear   Negative   Negative   Negative   30 mg/dL (1+)                   Lab Results   Component Value Date    GLUCOSE 195 (H) 10/17/2023    CALCIUM 9.4 10/17/2023     10/17/2023    K 3.6 10/17/2023    CO2 29.0 10/17/2023     10/17/2023    BUN 11 10/17/2023    CREATININE 1.22 10/17/2023    EGFRIFAFRI 57 (L) 12/05/2018    EGFRIFNONA 55 (L) 01/10/2022    BCR 9.0 10/17/2023    ANIONGAP 11.0 10/17/2023       Lab Results   Component Value Date    WBC 7.72 10/17/2023    HGB 17.9 (H) 10/17/2023    HCT 53.6 (H) 10/17/2023    MCV 91.5 10/17/2023     10/17/2023            Lab Results   Component Value Date    PSA 5.370 (H) 10/12/2023    PSA 5.050 (H) 03/22/2023    PSA 4.110 (H) 04/14/2022             Radiographic Studies  FL Upper GI Single Contrast With KUB    Result Date: 11/7/2023  1. Small hiatal hernia. 2. Minimal dysmotility. 3. Minimal gastroesophageal reflux. 4. Small duodenal diverticulum. 5. Lap band device in good position.          Images were reviewed, interpreted, and dictated by Dr. Jane Kay MD  Transcribed by Rocio Roe PA-C.  This report was signed and finalized on 11/7/2023 11:04 AM by Jane Kay MD.      CT Abdomen Pelvis Stone Protocol    Result Date: 10/17/2023  1. Bilateral nonobstructing nephrolithiasis without hydronephrosis. 2. Cholelithiasis. 3. Lap band apparatus in good position.     CTDI: 31.44 mGy DLP: 1574.14 mGy.cm   This study was performed with techniques to keep radiation doses as low as reasonably achievable (ALARA). Individualized dose reduction techniques using automated exposure control or adjustment of mA and/or kV according to the patient size were employed.    Images were reviewed, interpreted, and dictated by Dr. Jane Kay MD Transcribed by Rocio Roe PA-C.   This report was signed and finalized on 10/17/2023 3:25 PM by Jane Kay MD.      CT chest without IV contrast    Result Date: 8/9/2023  Mild dilation of the ascending aorta. Small pericardial effusion. Otherwise, no acute process. Films reviewed, interpreted, and dictated by Dr. Segovia. Transcribed by Lexus Kline PA-C        I have reviewed above labs and imaging.     Assessment  68 y.o. male with history of chronic nephrolithiasis, with bilateral nonobstructing stones.  His last surgery was left ureteroscopy with laser lithotripsy in 2022.  He also has an elevated PSA with 4K results today showing a 19% chance of having high-grade prostate cancer.    I told him that he has bilateral nonobstructing renal stones.  Musculoskeletal pathology can often cause similar pain.    Plan  1.  MRI and prostate biopsy.  Rectal culture today.  Cipro sent in.  We discussed the small but possible risk of arrhythmia with Cipro and amiodarone.  He is willing to accept this.  He will stop his Xarelto 3 days beforehand.

## 2023-11-29 ENCOUNTER — TELEPHONE (OUTPATIENT)
Dept: UROLOGY | Facility: CLINIC | Age: 68
End: 2023-11-29
Payer: MEDICARE

## 2023-11-29 NOTE — TELEPHONE ENCOUNTER
"Provider: DR DUCKWORTH    Caller: Andre Agosto \"Bernardo\"     Relationship to Patient: SELF    Phone Number: [phone]     Reason for Call: PT IS SUPPOSED TO HAVE HAVE MRI BIOPSY BUT CENTRAL SCHEDULING IS SAYING THEY CAN'T GET PT IN UNTIL THE END OF FEBRUARY. PT HAS A PACEMAKER/DEFIBRILLATOR ALSO.    PT ASKING IF HE CAN JUST PROCEED WITH A BIOPSY IF NEEDED. PT HAS [place] [employer] INSURANCE AND WOULD LIKE TO TRY TO GET IT DONE BEFORE THE END OF THE YEAR.     "

## 2023-11-30 ENCOUNTER — OFFICE VISIT (OUTPATIENT)
Dept: INTERNAL MEDICINE | Facility: CLINIC | Age: 68
End: 2023-11-30
Payer: MEDICARE

## 2023-11-30 VITALS
SYSTOLIC BLOOD PRESSURE: 146 MMHG | OXYGEN SATURATION: 98 % | HEART RATE: 79 BPM | DIASTOLIC BLOOD PRESSURE: 86 MMHG | BODY MASS INDEX: 38.36 KG/M2 | WEIGHT: 315 LBS | HEIGHT: 76 IN | RESPIRATION RATE: 16 BRPM | TEMPERATURE: 97.4 F

## 2023-11-30 DIAGNOSIS — E11.51 TYPE 2 DIABETES MELLITUS WITH DIABETIC PERIPHERAL ANGIOPATHY WITHOUT GANGRENE, WITHOUT LONG-TERM CURRENT USE OF INSULIN: ICD-10-CM

## 2023-11-30 DIAGNOSIS — D75.1 POLYCYTHEMIA: Primary | ICD-10-CM

## 2023-11-30 LAB
25(OH)D3+25(OH)D2 SERPL-MCNC: 24.1 NG/ML (ref 30–100)
ALBUMIN SERPL-MCNC: 4.3 G/DL (ref 3.5–5.2)
ALBUMIN/GLOB SERPL: 1.4 G/DL
ALP SERPL-CCNC: 69 U/L (ref 39–117)
ALT SERPL-CCNC: 20 U/L (ref 1–41)
AST SERPL-CCNC: 18 U/L (ref 1–40)
BASOPHILS # BLD AUTO: 0.07 10*3/MM3 (ref 0–0.2)
BASOPHILS NFR BLD AUTO: 1 % (ref 0–1.5)
BILIRUB SERPL-MCNC: 0.7 MG/DL (ref 0–1.2)
BUN SERPL-MCNC: 10 MG/DL (ref 8–23)
BUN/CREAT SERPL: 8.9 (ref 7–25)
CALCIUM SERPL-MCNC: 9.3 MG/DL (ref 8.6–10.5)
CHLORIDE SERPL-SCNC: 100 MMOL/L (ref 98–107)
CHOLEST SERPL-MCNC: 265 MG/DL (ref 0–200)
CO2 SERPL-SCNC: 30.6 MMOL/L (ref 22–29)
CREAT SERPL-MCNC: 1.12 MG/DL (ref 0.76–1.27)
EGFRCR SERPLBLD CKD-EPI 2021: 72 ML/MIN/1.73
EOSINOPHIL # BLD AUTO: 0.14 10*3/MM3 (ref 0–0.4)
EOSINOPHIL NFR BLD AUTO: 2 % (ref 0.3–6.2)
ERYTHROCYTE [DISTWIDTH] IN BLOOD BY AUTOMATED COUNT: 14.3 % (ref 12.3–15.4)
GLOBULIN SER CALC-MCNC: 3.1 GM/DL
GLUCOSE SERPL-MCNC: 129 MG/DL (ref 65–99)
HBA1C MFR BLD: 6.7 % (ref 4.8–5.6)
HCT VFR BLD AUTO: 54.6 % (ref 37.5–51)
HDLC SERPL-MCNC: 37 MG/DL (ref 40–60)
HGB BLD-MCNC: 18.6 G/DL (ref 13–17.7)
IMM GRANULOCYTES # BLD AUTO: 0.02 10*3/MM3 (ref 0–0.05)
IMM GRANULOCYTES NFR BLD AUTO: 0.3 % (ref 0–0.5)
LDLC SERPL CALC-MCNC: 158 MG/DL (ref 0–100)
LYMPHOCYTES # BLD AUTO: 2.19 10*3/MM3 (ref 0.7–3.1)
LYMPHOCYTES NFR BLD AUTO: 30.7 % (ref 19.6–45.3)
MCH RBC QN AUTO: 30.4 PG (ref 26.6–33)
MCHC RBC AUTO-ENTMCNC: 34.1 G/DL (ref 31.5–35.7)
MCV RBC AUTO: 89.2 FL (ref 79–97)
MONOCYTES # BLD AUTO: 0.47 10*3/MM3 (ref 0.1–0.9)
MONOCYTES NFR BLD AUTO: 6.6 % (ref 5–12)
NEUTROPHILS # BLD AUTO: 4.25 10*3/MM3 (ref 1.7–7)
NEUTROPHILS NFR BLD AUTO: 59.4 % (ref 42.7–76)
NRBC BLD AUTO-RTO: 0.1 /100 WBC (ref 0–0.2)
PLATELET # BLD AUTO: 188 10*3/MM3 (ref 140–450)
POTASSIUM SERPL-SCNC: 3.6 MMOL/L (ref 3.5–5.2)
PROT SERPL-MCNC: 7.4 G/DL (ref 6–8.5)
RBC # BLD AUTO: 6.12 10*6/MM3 (ref 4.14–5.8)
SODIUM SERPL-SCNC: 142 MMOL/L (ref 136–145)
TRIGL SERPL-MCNC: 368 MG/DL (ref 0–150)
TSH SERPL DL<=0.005 MIU/L-ACNC: 0.67 UIU/ML (ref 0.27–4.2)
VIT B12 SERPL-MCNC: 545 PG/ML (ref 211–946)
VLDLC SERPL CALC-MCNC: 70 MG/DL (ref 5–40)
WBC # BLD AUTO: 7.14 10*3/MM3 (ref 3.4–10.8)

## 2023-11-30 PROCEDURE — 99214 OFFICE O/P EST MOD 30 MIN: CPT | Performed by: INTERNAL MEDICINE

## 2023-11-30 PROCEDURE — 3079F DIAST BP 80-89 MM HG: CPT | Performed by: INTERNAL MEDICINE

## 2023-11-30 PROCEDURE — 3044F HG A1C LEVEL LT 7.0%: CPT | Performed by: INTERNAL MEDICINE

## 2023-11-30 PROCEDURE — 3077F SYST BP >= 140 MM HG: CPT | Performed by: INTERNAL MEDICINE

## 2023-11-30 RX ORDER — AMLODIPINE BESYLATE 10 MG/1
10 TABLET ORAL DAILY
COMMUNITY
Start: 2023-11-16

## 2023-11-30 RX ORDER — SEMAGLUTIDE 1.34 MG/ML
0.5 INJECTION, SOLUTION SUBCUTANEOUS
Qty: 1.5 ML | Refills: 3 | Status: SHIPPED | OUTPATIENT
Start: 2023-11-30

## 2023-11-30 NOTE — ADDENDUM NOTE
Addended by: SCAR ELLIS on: 11/30/2023 11:20 AM     Modules accepted: Orders    
(3) slightly limited

## 2023-11-30 NOTE — PROGRESS NOTES
Subjective     Patient ID: Andre Agosto is a 68 y.o. male. Patient is here for management of multiple medical problems.     Chief Complaint   Patient presents with    Abdominal Pain    Fatigue     History of Present Illness   Ab pain      Fatigue        The following portions of the patient's history were reviewed and updated as appropriate: allergies, current medications, past family history, past medical history, past social history, past surgical history and problem list.    Review of Systems    Current Outpatient Medications:     amiodarone (PACERONE) 100 MG tablet, Take 1 tablet by mouth Daily., Disp: 60 tablet, Rfl: 11    amLODIPine (NORVASC) 10 MG tablet, Take 1 tablet by mouth Daily., Disp: , Rfl:     carvedilol (Coreg) 25 MG tablet, Take 1 tablet by mouth 2 (Two) Times a Day With Meals., Disp: 180 tablet, Rfl: 3    ciprofloxacin (Cipro) 500 MG tablet, Take 1 tablet by mouth 2 (Two) Times a Day. Start taking the day prior to biopsy, Disp: 6 tablet, Rfl: 0    Diclofenac Sodium (VOLTAREN) 1 % gel gel, Apply 4 g topically to the appropriate area as directed 4 (Four) Times a Day As Needed (pain)., Disp: 100 g, Rfl: 1    divalproex (DEPAKOTE) 250 MG DR tablet, 750mg QAM and 500mg QHS (Patient taking differently: Take 2 tablets by mouth 2 (Two) Times a Day. 500mg QAM and 500mg QHS), Disp: 450 tablet, Rfl: 1    Entresto  MG tablet, Take 1 tablet by mouth 2 (Two) Times a Day., Disp: , Rfl:     Farxiga 10 MG tablet, Take 10 mg by mouth Daily., Disp: , Rfl:     furosemide (LASIX) 40 MG tablet, Take 1 tablet by mouth Daily., Disp: , Rfl:     levothyroxine (SYNTHROID, LEVOTHROID) 88 MCG tablet, TAKE 1 TABLET DAILY, Disp: 90 tablet, Rfl: 3    ondansetron ODT (ZOFRAN-ODT) 4 MG disintegrating tablet, Place 1 tablet on the tongue Every 4 (Four) Hours., Disp: 12 tablet, Rfl: 0    oxyCODONE-acetaminophen (PERCOCET) 5-325 MG per tablet, Take 1 tablet by mouth Every 4 (Four) Hours As Needed for Moderate Pain., Disp: 10  "tablet, Rfl: 0    tamsulosin (FLOMAX) 0.4 MG capsule 24 hr capsule, Take 1 capsule by mouth Daily., Disp: 30 capsule, Rfl: 5    Trulicity 3 MG/0.5ML solution pen-injector, INJECT 0.5 ML UNDER THE SKIN INTO THE APPROPRIATE AREA AS DIRECTED ONE TIME PER WEEK, Disp: 2 mL, Rfl: 12    Xarelto 15 MG tablet, Take 1 tablet by mouth Daily With Dinner., Disp: , Rfl:     Objective      Blood pressure 146/86, pulse 79, temperature 97.4 °F (36.3 °C), resp. rate 16, height 193 cm (76\"), weight (!) 145 kg (320 lb), SpO2 98%.            Physical Exam     General Appearance:    Alert, cooperative, no distress, appears stated age   Head:    Normocephalic, without obvious abnormality, atraumatic   Eyes:    PERRL, conjunctiva/corneas clear, EOM's intact   Ears:    Normal TM's and external ear canals, both ears   Nose:   Nares normal, septum midline, mucosa normal, no drainage   or sinus tenderness   Throat:   Lips, mucosa, and tongue normal; teeth and gums normal   Neck:   Supple, symmetrical, trachea midline, no adenopathy;        thyroid:  No enlargement/tenderness/nodules; no carotid    bruit or JVD   Back:     Symmetric, no curvature, ROM normal, no CVA tenderness   Lungs:     Clear to auscultation bilaterally, respirations unlabored   Chest wall:    No tenderness or deformity   Heart:    Regular rate and rhythm, S1 and S2 normal, no murmur,        rub or gallop   Abdomen:     Soft, non-tender, bowel sounds active all four quadrants,     no masses, no organomegaly   Extremities:   Extremities normal, atraumatic, no cyanosis or edema   Pulses:   2+ and symmetric all extremities   Skin:   Skin color, texture, turgor normal, no rashes or lesions   Lymph nodes:   Cervical, supraclavicular, and axillary nodes normal   Neurologic:   CNII-XII intact. Normal strength, sensation and reflexes       throughout      Results for orders placed or performed in visit on 11/27/23   Lipid Panel    Specimen: Blood   Result Value Ref Range    Total " Cholesterol 265 (H) 0 - 200 mg/dL    Triglycerides 368 (H) 0 - 150 mg/dL    HDL Cholesterol 37 (L) 40 - 60 mg/dL    VLDL Cholesterol Jimmy 70 (H) 5 - 40 mg/dL    LDL Chol Calc (NIH) 158 (H) 0 - 100 mg/dL   Vitamin B12    Specimen: Blood   Result Value Ref Range    Vitamin B-12 545 211 - 946 pg/mL   Comprehensive Metabolic Panel    Specimen: Blood   Result Value Ref Range    Glucose 129 (H) 65 - 99 mg/dL    BUN 10 8 - 23 mg/dL    Creatinine 1.12 0.76 - 1.27 mg/dL    EGFR Result 72.0 >60.0 mL/min/1.73    BUN/Creatinine Ratio 8.9 7.0 - 25.0    Sodium 142 136 - 145 mmol/L    Potassium 3.6 3.5 - 5.2 mmol/L    Chloride 100 98 - 107 mmol/L    Total CO2 30.6 (H) 22.0 - 29.0 mmol/L    Calcium 9.3 8.6 - 10.5 mg/dL    Total Protein 7.4 6.0 - 8.5 g/dL    Albumin 4.3 3.5 - 5.2 g/dL    Globulin 3.1 gm/dL    A/G Ratio 1.4 g/dL    Total Bilirubin 0.7 0.0 - 1.2 mg/dL    Alkaline Phosphatase 69 39 - 117 U/L    AST (SGOT) 18 1 - 40 U/L    ALT (SGPT) 20 1 - 41 U/L   TSH    Specimen: Blood   Result Value Ref Range    TSH 0.673 0.270 - 4.200 uIU/mL   Vitamin D,25-Hydroxy    Specimen: Blood   Result Value Ref Range    25 Hydroxy, Vitamin D 24.1 (L) 30.0 - 100.0 ng/ml   Hemoglobin A1c    Specimen: Blood   Result Value Ref Range    Hemoglobin A1C 6.70 (H) 4.80 - 5.60 %   CBC & Differential    Specimen: Blood   Result Value Ref Range    WBC 7.14 3.40 - 10.80 10*3/mm3    RBC 6.12 (H) 4.14 - 5.80 10*6/mm3    Hemoglobin 18.6 (H) 13.0 - 17.7 g/dL    Hematocrit 54.6 (H) 37.5 - 51.0 %    MCV 89.2 79.0 - 97.0 fL    MCH 30.4 26.6 - 33.0 pg    MCHC 34.1 31.5 - 35.7 g/dL    RDW 14.3 12.3 - 15.4 %    Platelets 188 140 - 450 10*3/mm3    Neutrophil Rel % 59.4 42.7 - 76.0 %    Lymphocyte Rel % 30.7 19.6 - 45.3 %    Monocyte Rel % 6.6 5.0 - 12.0 %    Eosinophil Rel % 2.0 0.3 - 6.2 %    Basophil Rel % 1.0 0.0 - 1.5 %    Neutrophils Absolute 4.25 1.70 - 7.00 10*3/mm3    Lymphocytes Absolute 2.19 0.70 - 3.10 10*3/mm3    Monocytes Absolute 0.47 0.10 - 0.90  10*3/mm3    Eosinophils Absolute 0.14 0.00 - 0.40 10*3/mm3    Basophils Absolute 0.07 0.00 - 0.20 10*3/mm3    Immature Granulocyte Rel % 0.3 0.0 - 0.5 %    Immature Grans Absolute 0.02 0.00 - 0.05 10*3/mm3    nRBC 0.1 0.0 - 0.2 /100 WBC     *Note: Due to a large number of results and/or encounters for the requested time period, some results have not been displayed. A complete set of results can be found in Results Review.         Assessment & Plan   Rbc count up.  Getting worse        Diagnoses and all orders for this visit:    1. Polycythemia (Primary)  -     Ambulatory Referral to Hematology / Oncology      No follow-ups on file.          There are no Patient Instructions on file for this visit.     Keven Bear MD    Assessment & Plan

## 2023-11-30 NOTE — TELEPHONE ENCOUNTER
Oh I see.  Having the MRI done will improve my biopsy.  Please call him and reassure him that I said it is very unlikely anything will change between now and February.

## 2023-11-30 NOTE — PROGRESS NOTES
Subjective     Patient ID: Andre Agosto is a 68 y.o. male. Patient is here for management of multiple medical problems.     Chief Complaint   Patient presents with    Abdominal Pain    Fatigue     History of Present Illness   Fatigue.       Ab pain.       Bx with psoriaiss per pt report.       The following portions of the patient's history were reviewed and updated as appropriate: allergies, current medications, past family history, past medical history, past social history, past surgical history and problem list.    Review of Systems    Current Outpatient Medications:     amiodarone (PACERONE) 100 MG tablet, Take 1 tablet by mouth Daily., Disp: 60 tablet, Rfl: 11    amLODIPine (NORVASC) 10 MG tablet, Take 1 tablet by mouth Daily., Disp: , Rfl:     carvedilol (Coreg) 25 MG tablet, Take 1 tablet by mouth 2 (Two) Times a Day With Meals., Disp: 180 tablet, Rfl: 3    ciprofloxacin (Cipro) 500 MG tablet, Take 1 tablet by mouth 2 (Two) Times a Day. Start taking the day prior to biopsy, Disp: 6 tablet, Rfl: 0    Diclofenac Sodium (VOLTAREN) 1 % gel gel, Apply 4 g topically to the appropriate area as directed 4 (Four) Times a Day As Needed (pain)., Disp: 100 g, Rfl: 1    divalproex (DEPAKOTE) 250 MG DR tablet, 750mg QAM and 500mg QHS (Patient taking differently: Take 2 tablets by mouth 2 (Two) Times a Day. 500mg QAM and 500mg QHS), Disp: 450 tablet, Rfl: 1    Entresto  MG tablet, Take 1 tablet by mouth 2 (Two) Times a Day., Disp: , Rfl:     Farxiga 10 MG tablet, Take 10 mg by mouth Daily., Disp: , Rfl:     furosemide (LASIX) 40 MG tablet, Take 1 tablet by mouth Daily., Disp: , Rfl:     levothyroxine (SYNTHROID, LEVOTHROID) 88 MCG tablet, TAKE 1 TABLET DAILY, Disp: 90 tablet, Rfl: 3    ondansetron ODT (ZOFRAN-ODT) 4 MG disintegrating tablet, Place 1 tablet on the tongue Every 4 (Four) Hours., Disp: 12 tablet, Rfl: 0    oxyCODONE-acetaminophen (PERCOCET) 5-325 MG per tablet, Take 1 tablet by mouth Every 4 (Four)  "Hours As Needed for Moderate Pain., Disp: 10 tablet, Rfl: 0    tamsulosin (FLOMAX) 0.4 MG capsule 24 hr capsule, Take 1 capsule by mouth Daily., Disp: 30 capsule, Rfl: 5    Xarelto 15 MG tablet, Take 1 tablet by mouth Daily With Dinner., Disp: , Rfl:     Semaglutide,0.25 or 0.5MG/DOS, (Ozempic, 0.25 or 0.5 MG/DOSE,) 2 MG/1.5ML solution pen-injector, Inject 0.5 mg under the skin into the appropriate area as directed Every 7 (Seven) Days., Disp: 1.5 mL, Rfl: 3    Objective      Blood pressure 146/86, pulse 79, temperature 97.4 °F (36.3 °C), resp. rate 16, height 193 cm (76\"), weight (!) 145 kg (320 lb), SpO2 98%.            Physical Exam     General Appearance:    Alert, cooperative, no distress, appears stated age   Head:    Normocephalic, without obvious abnormality, atraumatic   Eyes:    PERRL, conjunctiva/corneas clear, EOM's intact   Ears:    Normal TM's and external ear canals, both ears   Nose:   Nares normal, septum midline, mucosa normal, no drainage   or sinus tenderness   Throat:   Lips, mucosa, and tongue normal; teeth and gums normal   Neck:   Supple, symmetrical, trachea midline, no adenopathy;        thyroid:  No enlargement/tenderness/nodules; no carotid    bruit or JVD   Back:     Symmetric, no curvature, ROM normal, no CVA tenderness   Lungs:     Clear to auscultation bilaterally, respirations unlabored   Chest wall:    No tenderness or deformity   Heart:    Regular rate and rhythm, S1 and S2 normal, no murmur,        rub or gallop   Abdomen:     Soft, non-tender, bowel sounds active all four quadrants,     no masses, no organomegaly   Extremities:   Extremities normal, atraumatic, no cyanosis or edema   Pulses:   2+ and symmetric all extremities   Skin:   Skin color, texture, turgor normal, no rashes or lesions   Lymph nodes:   Cervical, supraclavicular, and axillary nodes normal   Neurologic:   CNII-XII intact. Normal strength, sensation and reflexes       throughout      Results for orders placed " or performed in visit on 11/27/23   Lipid Panel    Specimen: Blood   Result Value Ref Range    Total Cholesterol 265 (H) 0 - 200 mg/dL    Triglycerides 368 (H) 0 - 150 mg/dL    HDL Cholesterol 37 (L) 40 - 60 mg/dL    VLDL Cholesterol Jimmy 70 (H) 5 - 40 mg/dL    LDL Chol Calc (NIH) 158 (H) 0 - 100 mg/dL   Vitamin B12    Specimen: Blood   Result Value Ref Range    Vitamin B-12 545 211 - 946 pg/mL   Comprehensive Metabolic Panel    Specimen: Blood   Result Value Ref Range    Glucose 129 (H) 65 - 99 mg/dL    BUN 10 8 - 23 mg/dL    Creatinine 1.12 0.76 - 1.27 mg/dL    EGFR Result 72.0 >60.0 mL/min/1.73    BUN/Creatinine Ratio 8.9 7.0 - 25.0    Sodium 142 136 - 145 mmol/L    Potassium 3.6 3.5 - 5.2 mmol/L    Chloride 100 98 - 107 mmol/L    Total CO2 30.6 (H) 22.0 - 29.0 mmol/L    Calcium 9.3 8.6 - 10.5 mg/dL    Total Protein 7.4 6.0 - 8.5 g/dL    Albumin 4.3 3.5 - 5.2 g/dL    Globulin 3.1 gm/dL    A/G Ratio 1.4 g/dL    Total Bilirubin 0.7 0.0 - 1.2 mg/dL    Alkaline Phosphatase 69 39 - 117 U/L    AST (SGOT) 18 1 - 40 U/L    ALT (SGPT) 20 1 - 41 U/L   TSH    Specimen: Blood   Result Value Ref Range    TSH 0.673 0.270 - 4.200 uIU/mL   Vitamin D,25-Hydroxy    Specimen: Blood   Result Value Ref Range    25 Hydroxy, Vitamin D 24.1 (L) 30.0 - 100.0 ng/ml   Hemoglobin A1c    Specimen: Blood   Result Value Ref Range    Hemoglobin A1C 6.70 (H) 4.80 - 5.60 %   CBC & Differential    Specimen: Blood   Result Value Ref Range    WBC 7.14 3.40 - 10.80 10*3/mm3    RBC 6.12 (H) 4.14 - 5.80 10*6/mm3    Hemoglobin 18.6 (H) 13.0 - 17.7 g/dL    Hematocrit 54.6 (H) 37.5 - 51.0 %    MCV 89.2 79.0 - 97.0 fL    MCH 30.4 26.6 - 33.0 pg    MCHC 34.1 31.5 - 35.7 g/dL    RDW 14.3 12.3 - 15.4 %    Platelets 188 140 - 450 10*3/mm3    Neutrophil Rel % 59.4 42.7 - 76.0 %    Lymphocyte Rel % 30.7 19.6 - 45.3 %    Monocyte Rel % 6.6 5.0 - 12.0 %    Eosinophil Rel % 2.0 0.3 - 6.2 %    Basophil Rel % 1.0 0.0 - 1.5 %    Neutrophils Absolute 4.25 1.70 - 7.00  10*3/mm3    Lymphocytes Absolute 2.19 0.70 - 3.10 10*3/mm3    Monocytes Absolute 0.47 0.10 - 0.90 10*3/mm3    Eosinophils Absolute 0.14 0.00 - 0.40 10*3/mm3    Basophils Absolute 0.07 0.00 - 0.20 10*3/mm3    Immature Granulocyte Rel % 0.3 0.0 - 0.5 %    Immature Grans Absolute 0.02 0.00 - 0.05 10*3/mm3    nRBC 0.1 0.0 - 0.2 /100 WBC     *Note: Due to a large number of results and/or encounters for the requested time period, some results have not been displayed. A complete set of results can be found in Results Review.         Assessment & Plan   Renal function look great. Neg heart cath recently.    Dm stable. Would like better. Pt trying to go back to ozempic.        Diagnoses and all orders for this visit:    1. Polycythemia (Primary)  -     Ambulatory Referral to Hematology / Oncology    2. Type 2 diabetes mellitus with diabetic peripheral angiopathy without gangrene, without long-term current use of insulin  -     Semaglutide,0.25 or 0.5MG/DOS, (Ozempic, 0.25 or 0.5 MG/DOSE,) 2 MG/1.5ML solution pen-injector; Inject 0.5 mg under the skin into the appropriate area as directed Every 7 (Seven) Days.  Dispense: 1.5 mL; Refill: 3      No follow-ups on file.          There are no Patient Instructions on file for this visit.     Keven Bear MD    Assessment & Plan

## 2023-12-25 DIAGNOSIS — I10 BENIGN HYPERTENSION: ICD-10-CM

## 2023-12-25 DIAGNOSIS — E79.0 HYPERURICEMIA: ICD-10-CM

## 2023-12-25 DIAGNOSIS — E55.9 VITAMIN D DEFICIENCY, UNSPECIFIED: ICD-10-CM

## 2023-12-25 DIAGNOSIS — Z12.5 PROSTATE CANCER SCREENING: Primary | ICD-10-CM

## 2023-12-25 DIAGNOSIS — E03.9 ACQUIRED HYPOTHYROIDISM: ICD-10-CM

## 2023-12-25 DIAGNOSIS — E11.29 TYPE 2 DIABETES MELLITUS WITH OTHER DIABETIC KIDNEY COMPLICATION, WITHOUT LONG-TERM CURRENT USE OF INSULIN: ICD-10-CM

## 2023-12-29 LAB
25(OH)D3+25(OH)D2 SERPL-MCNC: 12.8 NG/ML (ref 30–100)
ALBUMIN SERPL-MCNC: 4.3 G/DL (ref 3.9–4.9)
ALBUMIN/GLOB SERPL: 1.3 {RATIO} (ref 1.2–2.2)
ALP SERPL-CCNC: 70 IU/L (ref 44–121)
ALT SERPL-CCNC: 16 IU/L (ref 0–44)
AST SERPL-CCNC: 16 IU/L (ref 0–40)
BASOPHILS # BLD AUTO: 0 X10E3/UL (ref 0–0.2)
BASOPHILS NFR BLD AUTO: 1 %
BILIRUB SERPL-MCNC: 0.6 MG/DL (ref 0–1.2)
BUN SERPL-MCNC: 13 MG/DL (ref 8–27)
BUN/CREAT SERPL: 9 (ref 10–24)
CALCIUM SERPL-MCNC: 8.8 MG/DL (ref 8.6–10.2)
CHLORIDE SERPL-SCNC: 100 MMOL/L (ref 96–106)
CHOLEST SERPL-MCNC: 261 MG/DL (ref 100–199)
CO2 SERPL-SCNC: 23 MMOL/L (ref 20–29)
CREAT SERPL-MCNC: 1.48 MG/DL (ref 0.76–1.27)
EGFRCR SERPLBLD CKD-EPI 2021: 52 ML/MIN/1.73
EOSINOPHIL # BLD AUTO: 0.1 X10E3/UL (ref 0–0.4)
EOSINOPHIL NFR BLD AUTO: 2 %
ERYTHROCYTE [DISTWIDTH] IN BLOOD BY AUTOMATED COUNT: 15 % (ref 11.6–15.4)
GLOBULIN SER CALC-MCNC: 3.2 G/DL (ref 1.5–4.5)
GLUCOSE SERPL-MCNC: 131 MG/DL (ref 70–99)
HCT VFR BLD AUTO: 53.9 % (ref 37.5–51)
HDLC SERPL-MCNC: 38 MG/DL
HGB BLD-MCNC: 18.4 G/DL (ref 13–17.7)
IMM GRANULOCYTES # BLD AUTO: 0 X10E3/UL (ref 0–0.1)
IMM GRANULOCYTES NFR BLD AUTO: 0 %
LDLC SERPL CALC-MCNC: 167 MG/DL (ref 0–99)
LYMPHOCYTES # BLD AUTO: 2.2 X10E3/UL (ref 0.7–3.1)
LYMPHOCYTES NFR BLD AUTO: 32 %
MCH RBC QN AUTO: 30.2 PG (ref 26.6–33)
MCHC RBC AUTO-ENTMCNC: 34.1 G/DL (ref 31.5–35.7)
MCV RBC AUTO: 88 FL (ref 79–97)
MONOCYTES # BLD AUTO: 0.4 X10E3/UL (ref 0.1–0.9)
MONOCYTES NFR BLD AUTO: 6 %
NEUTROPHILS # BLD AUTO: 4 X10E3/UL (ref 1.4–7)
NEUTROPHILS NFR BLD AUTO: 59 %
PLATELET # BLD AUTO: 180 X10E3/UL (ref 150–450)
POTASSIUM SERPL-SCNC: 3.5 MMOL/L (ref 3.5–5.2)
PROT SERPL-MCNC: 7.5 G/DL (ref 6–8.5)
PSA SERPL-MCNC: 4.7 NG/ML (ref 0–4)
RBC # BLD AUTO: 6.1 X10E6/UL (ref 4.14–5.8)
SODIUM SERPL-SCNC: 143 MMOL/L (ref 134–144)
T4 FREE SERPL-MCNC: 1.48 NG/DL (ref 0.82–1.77)
TRIGL SERPL-MCNC: 294 MG/DL (ref 0–149)
TSH SERPL DL<=0.005 MIU/L-ACNC: 2.3 UIU/ML (ref 0.45–4.5)
URATE SERPL-MCNC: 7.9 MG/DL (ref 3.8–8.4)
VALPROATE SERPL-MCNC: 74 UG/ML (ref 50–100)
VIT B12 SERPL-MCNC: 514 PG/ML (ref 232–1245)
VLDLC SERPL CALC-MCNC: 56 MG/DL (ref 5–40)
WBC # BLD AUTO: 6.8 X10E3/UL (ref 3.4–10.8)

## 2024-01-02 ENCOUNTER — TELEPHONE (OUTPATIENT)
Dept: UROLOGY | Facility: CLINIC | Age: 69
End: 2024-01-02
Payer: MEDICARE

## 2024-01-02 NOTE — TELEPHONE ENCOUNTER
"Hub staff attempted to follow warm transfer process and was unsuccessful     Caller: Andre Agosto \"Bernardo\"    Relationship to patient: Self    Best call back number: [phone]     Patient is needing: RECEIVED A CALL FROM PT. HE CALLED REQUESTING TO SPEAK WITH [facility]. HE STATED THAT HIS MRI NEEDS TO BE SCHEDULED THROUGH OUR OFFICE AND IT CAN BE DONE WITH HIS PACEMAKER. BUT IT HAS TO BE SCHEDULED THROUGH [facility].           "

## 2024-01-02 NOTE — TELEPHONE ENCOUNTER
I spoke to patient and called Dr. Mendoza's office to get a cardiac clearance for MRI PROSTATE. I left  for Ashly.

## 2024-01-17 ENCOUNTER — OFFICE VISIT (OUTPATIENT)
Dept: INTERNAL MEDICINE | Facility: CLINIC | Age: 69
End: 2024-01-17
Payer: MEDICARE

## 2024-01-17 VITALS
HEIGHT: 76 IN | HEART RATE: 66 BPM | WEIGHT: 315 LBS | BODY MASS INDEX: 38.36 KG/M2 | OXYGEN SATURATION: 99 % | RESPIRATION RATE: 16 BRPM | TEMPERATURE: 97.8 F | SYSTOLIC BLOOD PRESSURE: 128 MMHG | DIASTOLIC BLOOD PRESSURE: 72 MMHG

## 2024-01-17 DIAGNOSIS — E11.29 TYPE 2 DIABETES MELLITUS WITH OTHER DIABETIC KIDNEY COMPLICATION, WITHOUT LONG-TERM CURRENT USE OF INSULIN: Primary | ICD-10-CM

## 2024-01-17 DIAGNOSIS — I10 ESSENTIAL HYPERTENSION: ICD-10-CM

## 2024-01-17 DIAGNOSIS — E78.2 MIXED HYPERLIPIDEMIA: ICD-10-CM

## 2024-01-17 DIAGNOSIS — N18.31 STAGE 3A CHRONIC KIDNEY DISEASE: ICD-10-CM

## 2024-01-17 PROCEDURE — 99214 OFFICE O/P EST MOD 30 MIN: CPT | Performed by: INTERNAL MEDICINE

## 2024-01-17 PROCEDURE — G2211 COMPLEX E/M VISIT ADD ON: HCPCS | Performed by: INTERNAL MEDICINE

## 2024-01-17 PROCEDURE — 3074F SYST BP LT 130 MM HG: CPT | Performed by: INTERNAL MEDICINE

## 2024-01-17 PROCEDURE — 3078F DIAST BP <80 MM HG: CPT | Performed by: INTERNAL MEDICINE

## 2024-01-17 NOTE — PROGRESS NOTES
Subjective     Patient ID: Andre Agosto is a 69 y.o. male. Patient is here for management of multiple medical problems.     Chief Complaint   Patient presents with    Polycythemia     History of Present Illness   Polycythemia. Seen heme/onc. Further lab test pending      PM/defib checked out ok    Psa with mild elevated numbers. Has seen Dr De La Cruz.  Has PM/Difib making MRI difficult.                      The following portions of the patient's history were reviewed and updated as appropriate: allergies, current medications, past family history, past medical history, past social history, past surgical history and problem list.    Review of Systems    Current Outpatient Medications:     amiodarone (PACERONE) 100 MG tablet, Take 1 tablet by mouth Daily., Disp: 60 tablet, Rfl: 11    amLODIPine (NORVASC) 10 MG tablet, Take 1 tablet by mouth Daily., Disp: , Rfl:     carvedilol (Coreg) 25 MG tablet, Take 1 tablet by mouth 2 (Two) Times a Day With Meals., Disp: 180 tablet, Rfl: 3    ciprofloxacin (Cipro) 500 MG tablet, Take 1 tablet by mouth 2 (Two) Times a Day. Start taking the day prior to biopsy, Disp: 6 tablet, Rfl: 0    divalproex (DEPAKOTE) 250 MG DR tablet, 750mg QAM and 500mg QHS (Patient taking differently: Take 2 tablets by mouth 2 (Two) Times a Day. 500mg QAM and 500mg QHS), Disp: 450 tablet, Rfl: 1    Entresto  MG tablet, Take 1 tablet by mouth 2 (Two) Times a Day., Disp: , Rfl:     Farxiga 10 MG tablet, Take 10 mg by mouth Daily., Disp: , Rfl:     furosemide (LASIX) 40 MG tablet, Take 1 tablet by mouth 2 (Two) Times a Day., Disp: , Rfl:     levothyroxine (SYNTHROID, LEVOTHROID) 88 MCG tablet, TAKE 1 TABLET DAILY, Disp: 90 tablet, Rfl: 3    Semaglutide,0.25 or 0.5MG/DOS, (Ozempic, 0.25 or 0.5 MG/DOSE,) 2 MG/1.5ML solution pen-injector, Inject 0.5 mg under the skin into the appropriate area as directed Every 7 (Seven) Days., Disp: 1.5 mL, Rfl: 3    tamsulosin (FLOMAX) 0.4 MG capsule 24 hr capsule, Take 1  "capsule by mouth Daily., Disp: 30 capsule, Rfl: 5    Xarelto 15 MG tablet, Take 1 tablet by mouth Daily With Dinner., Disp: , Rfl:     Cholecalciferol (Vitamin D3) 25 MCG (1000 UT) capsule, Take 2 capsules by mouth Daily., Disp: 180 capsule, Rfl: 11    Objective      Blood pressure 128/72, pulse 66, temperature 97.8 °F (36.6 °C), resp. rate 16, height 193 cm (76\"), weight (!) 144 kg (318 lb), SpO2 99%.            Physical Exam     General Appearance:    Alert, cooperative, no distress, appears stated age   Head:    Normocephalic, without obvious abnormality, atraumatic   Eyes:    PERRL, conjunctiva/corneas clear, EOM's intact   Ears:    Normal TM's and external ear canals, both ears   Nose:   Nares normal, septum midline, mucosa normal, no drainage   or sinus tenderness   Throat:   Lips, mucosa, and tongue normal; teeth and gums normal   Neck:   Supple, symmetrical, trachea midline, no adenopathy;        thyroid:  No enlargement/tenderness/nodules; no carotid    bruit or JVD   Back:     Symmetric, no curvature, ROM normal, no CVA tenderness   Lungs:     Clear to auscultation bilaterally, respirations unlabored   Chest wall:    No tenderness or deformity   Heart:    Regular rate and rhythm, S1 and S2 normal, no murmur,        rub or gallop   Abdomen:     Soft, non-tender, bowel sounds active all four quadrants,     no masses, no organomegaly   Extremities:   Extremities normal, atraumatic, no cyanosis or edema   Pulses:   2+ and symmetric all extremities   Skin:   Skin color, texture, turgor normal, no rashes or lesions   Lymph nodes:   Cervical, supraclavicular, and axillary nodes normal   Neurologic:   CNII-XII intact. Normal strength, sensation and reflexes       throughout      Results for orders placed or performed in visit on 12/25/23   Lipid Panel    Specimen: Blood   Result Value Ref Range    Total Cholesterol 261 (H) 100 - 199 mg/dL    Triglycerides 294 (H) 0 - 149 mg/dL    HDL Cholesterol 38 (L) >39 mg/dL "    VLDL Cholesterol Jimmy 56 (H) 5 - 40 mg/dL    LDL Chol Calc (NIH) 167 (H) 0 - 99 mg/dL   PSA Screen    Specimen: Blood   Result Value Ref Range    PSA 4.7 (H) 0.0 - 4.0 ng/mL   Vitamin B12    Specimen: Blood   Result Value Ref Range    Vitamin B-12 514 232 - 1,245 pg/mL   Comprehensive Metabolic Panel    Specimen: Blood   Result Value Ref Range    Glucose 131 (H) 70 - 99 mg/dL    BUN 13 8 - 27 mg/dL    Creatinine 1.48 (H) 0.76 - 1.27 mg/dL    EGFR Result 52 (L) >59 mL/min/1.73    BUN/Creatinine Ratio 9 (L) 10 - 24    Sodium 143 134 - 144 mmol/L    Potassium 3.5 3.5 - 5.2 mmol/L    Chloride 100 96 - 106 mmol/L    Total CO2 23 20 - 29 mmol/L    Calcium 8.8 8.6 - 10.2 mg/dL    Total Protein 7.5 6.0 - 8.5 g/dL    Albumin 4.3 3.9 - 4.9 g/dL    Globulin 3.2 1.5 - 4.5 g/dL    A/G Ratio 1.3 1.2 - 2.2    Total Bilirubin 0.6 0.0 - 1.2 mg/dL    Alkaline Phosphatase 70 44 - 121 IU/L    AST (SGOT) 16 0 - 40 IU/L    ALT (SGPT) 16 0 - 44 IU/L   TSH    Specimen: Blood   Result Value Ref Range    TSH 2.300 0.450 - 4.500 uIU/mL   T4, Free    Specimen: Blood   Result Value Ref Range    Free T4 1.48 0.82 - 1.77 ng/dL   Vitamin D,25-Hydroxy    Specimen: Blood   Result Value Ref Range    25 Hydroxy, Vitamin D 12.8 (L) 30.0 - 100.0 ng/mL   Valproic Acid Level, Total    Specimen: Blood   Result Value Ref Range    Valproic Acid 74 50 - 100 ug/mL   Uric Acid    Specimen: Blood   Result Value Ref Range    Uric Acid 7.9 3.8 - 8.4 mg/dL   CBC & Differential    Specimen: Blood   Result Value Ref Range    WBC 6.8 3.4 - 10.8 x10E3/uL    RBC 6.10 (H) 4.14 - 5.80 x10E6/uL    Hemoglobin 18.4 (H) 13.0 - 17.7 g/dL    Hematocrit 53.9 (H) 37.5 - 51.0 %    MCV 88 79 - 97 fL    MCH 30.2 26.6 - 33.0 pg    MCHC 34.1 31.5 - 35.7 g/dL    RDW 15.0 11.6 - 15.4 %    Platelets 180 150 - 450 x10E3/uL    Neutrophil Rel % 59 Not Estab. %    Lymphocyte Rel % 32 Not Estab. %    Monocyte Rel % 6 Not Estab. %    Eosinophil Rel % 2 Not Estab. %    Basophil Rel % 1 Not  Estab. %    Neutrophils Absolute 4.0 1.4 - 7.0 x10E3/uL    Lymphocytes Absolute 2.2 0.7 - 3.1 x10E3/uL    Monocytes Absolute 0.4 0.1 - 0.9 x10E3/uL    Eosinophils Absolute 0.1 0.0 - 0.4 x10E3/uL    Basophils Absolute 0.0 0.0 - 0.2 x10E3/uL    Immature Granulocyte Rel % 0 Not Estab. %    Immature Grans Absolute 0.0 0.0 - 0.1 x10E3/uL     *Note: Due to a large number of results and/or encounters for the requested time period, some results have not been displayed. A complete set of results can be found in Results Review.         Assessment & Plan   Polycythemia. Seen heme/onc. Further lab test pending      PM/defib checked out ok    Psa with mild elevated numbers. Has seen Dr De La Cruz.  Has PM/Difib making MRI difficult.      Wt is not going down. Cholest up.   Cr up.   Vit d to low.    Nutrition discussed will get in with Minal Ochoa.    Chf stable.                Diagnoses and all orders for this visit:    1. Type 2 diabetes mellitus with other diabetic kidney complication, without long-term current use of insulin (Primary)  -     Vitamin B12  -     Comprehensive Metabolic Panel  -     CBC & Differential  -     Lipid Panel  -     TSH  -     T4, Free  -     Vitamin D,25-Hydroxy  -     Hemoglobin A1c    2. Essential hypertension  -     Vitamin B12  -     Comprehensive Metabolic Panel  -     CBC & Differential  -     Lipid Panel  -     TSH  -     T4, Free  -     Vitamin D,25-Hydroxy  -     Hemoglobin A1c    3. Stage 3a chronic kidney disease  -     Vitamin B12  -     Comprehensive Metabolic Panel  -     CBC & Differential  -     Lipid Panel  -     TSH  -     T4, Free  -     Vitamin D,25-Hydroxy  -     Hemoglobin A1c    4. Mixed hyperlipidemia  -     Vitamin B12  -     Comprehensive Metabolic Panel  -     CBC & Differential  -     Lipid Panel  -     TSH  -     T4, Free  -     Vitamin D,25-Hydroxy  -     Hemoglobin A1c    Other orders  -     Cholecalciferol (Vitamin D3) 25 MCG (1000 UT) capsule; Take 2 capsules by mouth  Daily.  Dispense: 180 capsule; Refill: 11      No follow-ups on file.          There are no Patient Instructions on file for this visit.     Keven Bear MD    Assessment & Plan

## 2024-01-18 ENCOUNTER — TELEMEDICINE (OUTPATIENT)
Dept: BARIATRICS/WEIGHT MGMT | Facility: CLINIC | Age: 69
End: 2024-01-18
Payer: MEDICARE

## 2024-01-18 DIAGNOSIS — E66.9 OBESITY, CLASS II, BMI 35-39.9: Primary | ICD-10-CM

## 2024-01-18 NOTE — PROGRESS NOTES
[facility]  [address]  [address]  [facility] [id number] [phone]    This visit was conducted as a video visit, in an effort to limit spread of the novel coronavirus during the COVID-19 pandemic.  The patient gave consent.      Patient Name:  Andre Agosto  :  1955      Date of Visit: 2024      Reason for Visit:   port pain     HPI: Andre Agosto is a 69 y.o. male s/p LAGB Realize 2008 by JSO.     Patient was seen in office in 2023 and was complaining of abdominal pain/report pain and wanted to discuss band removal.  He has been unfilled in 2018 for chronic episodic dysphagia/vomiting.  He was recently found to have polycythemia and has a referral to hematology pending.  He also has a prostate biopsy scheduled for next week.  He has otherwise been in his usual state of health and denies any issues with chest pain, shortness of breath, fevers, cough.  He denies any port site pain today.  No nausea, vomiting, reflux, abdominal pain.      UGI 23    IMPRESSION:  1. Small hiatal hernia.  2. Minimal dysmotility.  3. Minimal gastroesophageal reflux.  4. Small duodenal diverticulum.  5. Lap band device in good position.       Past Medical History:   Diagnosis Date    6th nerve palsy, right     right eye     Anxiety and depression     Atrial fibrillation, persistent 2020    on Xarelto    Coronary artery disease involving native coronary artery of native heart without angina pectoris 2018    follows w/ Dr. Mendoza    CRI (chronic renal insufficiency), stage 2 (mild)     CVA (cerebral vascular accident)     Diabetes mellitus     doesnt check sugar, type 2     Disease of thyroid gland     Double vision     resolved- right eye    Elevated cholesterol     Elevated prostate specific antigen (PSA) 2023    Focal seizure     of right arm     Heart attack     NSTEMI , s/p stenting    History of Helicobacter pylori infection     in  2008, treated w/ PrevPak - 2021 EGD bx (+), PCP RX - PPI/Augmentin/Doxy/Flagyl (x 14 days) 1/22/21    HL (hearing loss)     (L) ear - child luevano measles    Hyperlipidemia     Hypertension     Kidney stone     Memory loss 2005    d/t meningitis    Meningitis 2005    unsure of bacterial or viral     Obesity     Sleep apnea treated with nocturnal BiPAP     compliant with  machine     Stroke     2017/2018    Ventricular tachycardia     3 different times    Wears glasses      Past Surgical History:   Procedure Laterality Date    CARDIAC CATHETERIZATION N/A 10/12/2018    Procedure: Left Heart Cath;  Surgeon: Yoselin Johnson MD;  Location:  [facility];  Service: Cardiology    CARDIAC CATHETERIZATION  03/2021    stent    CARDIAC CATHETERIZATION  07/2021    just checking the after pacemaker placed- Dr. Tim    CARDIAC DEFIBRILLATOR PLACEMENT  2021    COLONOSCOPY  10/2020    w/ Dr. Jacobs, hx colon polyps    CORONARY STENT PLACEMENT  2015    LAPAROSCOPIC GASTRIC BANDING  2008    s/p LAGB Realize 6/2008 by HOMERO.    PACEMAKER IMPLANTATION  07/25/2021    UMBILICAL HERNIA REPAIR  2008    incarcerated UHR w/ mesh by Dr. Velasquez    URETEROSCOPY LASER LITHOTRIPSY WITH STENT INSERTION Left 10/20/2021    Procedure: URETEROSCOPY LASER LITHOTRIPSY WITH STENT INSERTION;  Surgeon: Tonio De La Cruz MD;  Location: [facility];  Service: Urology;  Laterality: Left;    URETEROSCOPY LASER LITHOTRIPSY WITH STENT INSERTION Left 11/03/2021    Procedure: URETEROSCOPY LEFT, LEFT RETROGRADE PYELOGRAM, CYSTOSCOPY, LASER LITHOTRIPSY WITH STENT EXCHANGE;  Surgeon: Tonio De La Cruz MD;  Location: [facility] OR;  Service: Urology;  Laterality: Left;    URETEROSCOPY LASER LITHOTRIPSY WITH STENT INSERTION Left 10/06/2022    Procedure: URETEROSCOPY LASER LITHOTRIPSY WITH STENT INSERTION;  Surgeon: Tonio De La Cruz MD;  Location: [facility] OR;  Service: Urology;  Laterality: Left;     Outpatient Medications Marked as Taking for  the 24 encounter (Telemedicine) with Evelia Robison PA   Medication Sig Dispense Refill    amiodarone (PACERONE) 100 MG tablet Take 1 tablet by mouth Daily. 60 tablet 11    amLODIPine (NORVASC) 10 MG tablet Take 1 tablet by mouth Daily.      carvedilol (Coreg) 25 MG tablet Take 1 tablet by mouth 2 (Two) Times a Day With Meals. 180 tablet 3    Cholecalciferol (Vitamin D3) 25 MCG (1000 UT) capsule Take 2 capsules by mouth Daily. 180 capsule 11    Entresto  MG tablet Take 1 tablet by mouth 2 (Two) Times a Day.      Farxiga 10 MG tablet Take 10 mg by mouth Daily.      furosemide (LASIX) 40 MG tablet Take 1 tablet by mouth 2 (Two) Times a Day.      levothyroxine (SYNTHROID, LEVOTHROID) 88 MCG tablet TAKE 1 TABLET DAILY 90 tablet 3    tamsulosin (FLOMAX) 0.4 MG capsule 24 hr capsule Take 1 capsule by mouth Daily. 30 capsule 5    Xarelto 15 MG tablet Take 1 tablet by mouth Daily With Dinner.         Allergies   Allergen Reactions    Amlodipine Swelling    Statins Myalgia and Other (See Comments)       Social History     Socioeconomic History    Marital status:    Tobacco Use    Smoking status: Former     Packs/day: 0.50     Years: 15.00     Additional pack years: 0.00     Total pack years: 7.50     Types: Cigarettes     Start date: 1975     Quit date: 1985     Years since quittin.0    Smokeless tobacco: Never   Vaping Use    Vaping Use: Never used   Substance and Sexual Activity    Alcohol use: No    Drug use: No    Sexual activity: Defer     Social History     Social History Narrative    .  Lives in [place].  Retired.         Review of Systems   General: Denies fever, chills, unintentional weight loss   HEENT: Denies nasal congestion, change in vision, ear pain, change in hearing, sore throat   CARDIAC: Denies chest pain, palpitations, edema   RESP: denies shortness of breath, cough, wheezing   GI: Denies abdominal pain, nausea, vomiting, diarrhea, constipation, reflux   Urinary:  Denies polyuria, dysuria, hematuria   MSK: denies joint pain, swelling, gout, joint stiffness   Neuro: Denies seizures, weakness, numbness/tingling, LOC   Heme/Endo: denies bleeding, bruising, heat/cold intolerance, sweating   Psych: denies depression, anxiety    There were no vitals taken for this visit.    Physical Exam  Constitutional:       General: He is not in acute distress.  Pulmonary:      Effort: Pulmonary effort is normal.   Neurological:      Mental Status: He is alert and oriented to person, place, and time.   Psychiatric:         Mood and Affect: Mood normal.         Behavior: Behavior normal.         Thought Content: Thought content normal.         Judgment: Judgment normal.           Assessment:      ICD-10-CM ICD-9-CM   1. Obesity, Class II, BMI 35-39.9  E66.9 278.00              Plan:  [facility] The risks and benefits of the upper endoscopy were discussed with the patient in detail and all questions were answered.  Possibility of perforation, bleeding, aspiration, and anesthesia reaction were reviewed.  Patient agrees to proceed.    Hold Ozempic x 2 weeks

## 2024-01-24 ENCOUNTER — PRE-ADMISSION TESTING (OUTPATIENT)
Dept: PREADMISSION TESTING | Facility: HOSPITAL | Age: 69
DRG: 872 | End: 2024-01-24
Payer: MEDICARE

## 2024-01-24 VITALS — BODY MASS INDEX: 38.36 KG/M2 | HEIGHT: 76 IN | WEIGHT: 315 LBS

## 2024-01-24 DIAGNOSIS — I10 ESSENTIAL HYPERTENSION, MALIGNANT: Primary | ICD-10-CM

## 2024-01-24 DIAGNOSIS — N18.31 CHRONIC KIDNEY DISEASE (CKD) STAGE G3A/A1, MODERATELY DECREASED GLOMERULAR FILTRATION RATE (GFR) BETWEEN 45-59 ML/MIN/1.73 SQUARE METER AND ALBUMINURIA CREATININE RATIO LESS THAN 30 MG/G (CMS/H*: ICD-10-CM

## 2024-01-24 DIAGNOSIS — E78.2 MIXED HYPERLIPIDEMIA: ICD-10-CM

## 2024-01-24 LAB
25(OH)D3 SERPL-MCNC: 22.8 NG/ML (ref 30–100)
ALBUMIN SERPL-MCNC: 4.8 G/DL (ref 3.5–5.2)
ALBUMIN/GLOB SERPL: 1.5 G/DL
ALP SERPL-CCNC: 81 U/L (ref 39–117)
ALT SERPL W P-5'-P-CCNC: 17 U/L (ref 1–41)
ANION GAP SERPL CALCULATED.3IONS-SCNC: 15 MMOL/L (ref 5–15)
AST SERPL-CCNC: 16 U/L (ref 1–40)
BASOPHILS # BLD AUTO: 0.04 10*3/MM3 (ref 0–0.2)
BASOPHILS NFR BLD AUTO: 0.6 % (ref 0–1.5)
BILIRUB SERPL-MCNC: 0.9 MG/DL (ref 0–1.2)
BUN SERPL-MCNC: 12 MG/DL (ref 8–23)
BUN/CREAT SERPL: 8 (ref 7–25)
CALCIUM SPEC-SCNC: 9.6 MG/DL (ref 8.6–10.5)
CHLORIDE SERPL-SCNC: 99 MMOL/L (ref 98–107)
CHOLEST SERPL-MCNC: 251 MG/DL (ref 0–200)
CO2 SERPL-SCNC: 27 MMOL/L (ref 22–29)
CREAT SERPL-MCNC: 1.5 MG/DL (ref 0.76–1.27)
DEPRECATED RDW RBC AUTO: 50.8 FL (ref 37–54)
EGFRCR SERPLBLD CKD-EPI 2021: 50.4 ML/MIN/1.73
EOSINOPHIL # BLD AUTO: 0.1 10*3/MM3 (ref 0–0.4)
EOSINOPHIL NFR BLD AUTO: 1.5 % (ref 0.3–6.2)
ERYTHROCYTE [DISTWIDTH] IN BLOOD BY AUTOMATED COUNT: 15.2 % (ref 12.3–15.4)
GLOBULIN UR ELPH-MCNC: 3.3 GM/DL
GLUCOSE SERPL-MCNC: 109 MG/DL (ref 65–99)
HBA1C MFR BLD: 6.3 % (ref 4.8–5.6)
HCT VFR BLD AUTO: 57.2 % (ref 37.5–51)
HDLC SERPL-MCNC: 41 MG/DL (ref 40–60)
HGB BLD-MCNC: 19 G/DL (ref 13–17.7)
IMM GRANULOCYTES # BLD AUTO: 0.02 10*3/MM3 (ref 0–0.05)
IMM GRANULOCYTES NFR BLD AUTO: 0.3 % (ref 0–0.5)
INR PPP: 1.2 (ref 0.89–1.12)
LDLC SERPL CALC-MCNC: 158 MG/DL (ref 0–100)
LDLC/HDLC SERPL: 3.77 {RATIO}
LYMPHOCYTES # BLD AUTO: 2.03 10*3/MM3 (ref 0.7–3.1)
LYMPHOCYTES NFR BLD AUTO: 30.3 % (ref 19.6–45.3)
MCH RBC QN AUTO: 30.8 PG (ref 26.6–33)
MCHC RBC AUTO-ENTMCNC: 33.2 G/DL (ref 31.5–35.7)
MCV RBC AUTO: 92.9 FL (ref 79–97)
MONOCYTES # BLD AUTO: 0.42 10*3/MM3 (ref 0.1–0.9)
MONOCYTES NFR BLD AUTO: 6.3 % (ref 5–12)
NEUTROPHILS NFR BLD AUTO: 4.09 10*3/MM3 (ref 1.7–7)
NEUTROPHILS NFR BLD AUTO: 61 % (ref 42.7–76)
NRBC BLD AUTO-RTO: 0 /100 WBC (ref 0–0.2)
PLATELET # BLD AUTO: 186 10*3/MM3 (ref 140–450)
PMV BLD AUTO: 11.5 FL (ref 6–12)
POTASSIUM SERPL-SCNC: 3.4 MMOL/L (ref 3.5–5.2)
PROT SERPL-MCNC: 8.1 G/DL (ref 6–8.5)
PROTHROMBIN TIME: 15.3 SECONDS (ref 12.2–14.5)
RBC # BLD AUTO: 6.16 10*6/MM3 (ref 4.14–5.8)
SODIUM SERPL-SCNC: 141 MMOL/L (ref 136–145)
T4 FREE SERPL-MCNC: 1.48 NG/DL (ref 0.93–1.7)
TRIGL SERPL-MCNC: 277 MG/DL (ref 0–150)
TSH SERPL DL<=0.05 MIU/L-ACNC: 4.21 UIU/ML (ref 0.27–4.2)
VIT B12 BLD-MCNC: 615 PG/ML (ref 211–946)
VLDLC SERPL-MCNC: 52 MG/DL (ref 5–40)
WBC NRBC COR # BLD AUTO: 6.7 10*3/MM3 (ref 3.4–10.8)

## 2024-01-24 PROCEDURE — 84439 ASSAY OF FREE THYROXINE: CPT

## 2024-01-24 PROCEDURE — 80061 LIPID PANEL: CPT

## 2024-01-24 PROCEDURE — 83036 HEMOGLOBIN GLYCOSYLATED A1C: CPT

## 2024-01-24 PROCEDURE — 82607 VITAMIN B-12: CPT

## 2024-01-24 PROCEDURE — 85025 COMPLETE CBC W/AUTO DIFF WBC: CPT

## 2024-01-24 PROCEDURE — 80053 COMPREHEN METABOLIC PANEL: CPT

## 2024-01-24 PROCEDURE — 84443 ASSAY THYROID STIM HORMONE: CPT

## 2024-01-24 PROCEDURE — 82306 VITAMIN D 25 HYDROXY: CPT

## 2024-01-24 PROCEDURE — 85610 PROTHROMBIN TIME: CPT

## 2024-01-25 ENCOUNTER — PROCEDURE VISIT (OUTPATIENT)
Dept: UROLOGY | Facility: CLINIC | Age: 69
End: 2024-01-25
Payer: MEDICARE

## 2024-01-25 DIAGNOSIS — R97.20 ELEVATED PSA: Primary | ICD-10-CM

## 2024-01-25 NOTE — PROGRESS NOTES
TRUS BIOPSY OF PROSTATE    Preoperative diagnosis  Elevated PSA    Postoperative diagnosis  Elevated PSA    Procedure  1.  Transrectal ultrasound of the prostate  2.  Transrectal ultrasound guidance of needle biopsy  3.  Prostate biopsy    Attending Surgeon  Tonio De La Cruz MD    Anesthesia  2% lidocaine solution, periprostatic injection, 10mL    Complications  None    Specimen  Prostate biopsy x 14    Indications  Mr. Agosto is a 69 y.o. year old male with an elevated PSA:   Lab Results   Component Value Date    PSA 4.7 (H) 12/28/2023    PSA 5.370 (H) 10/12/2023    PSA 5.050 (H) 03/22/2023       CT Abdomen Pelvis Stone Protocol  Result Date: 10/17/2023  1. Bilateral nonobstructing nephrolithiasis without hydronephrosis. 2. Cholelithiasis. 3. Lap band apparatus in good position.     CTDI: 31.44 mGy DLP: 1574.14 mGy.cm   This study was performed with techniques to keep radiation doses as low as reasonably achievable (ALARA). Individualized dose reduction techniques using automated exposure control or adjustment of mA and/or kV according to the patient size were employed.    Images were reviewed, interpreted, and dictated by Dr. Jane Kay MD Transcribed by Rocio Roe PA-C.   This report was signed and finalized on 10/17/2023 3:25 PM by Jane Kay MD.             After discussing his options, the patient decided to proceed with prostate biopsy.  Informed consent was obtained. Possible complications were discussed with the patient during his last visit including, but not limited to, hematuria, hematochezia, prostatitis, urinary tract infection, sepsis, and urinary retention.  He did start the prescribed oral antibiotic regimen.    Procedure  The patient was positioned and prepped in a left lateral position with lower extremities flexed.  Lidocaine jelly, 2%, was injected per rectum. A digital rectal exam was performed.The rectal ultrasound probe was slowly introduced into the rectum without difficulty.  The  prostate and seminal vesicles were inspected systematically using cross and sagittal views with the ultrasound.  The dimensions of the prostate were measured, for a calculated volume of 67 mL.  Using a true cut 14 Fr biopsy needle, 14 prostate cores were collected. The specific locations were the following: left lateral base, left lateral mid, left lateral apex, left medial base, left medial mid, and left medial apex, right lateral base, right lateral mid, right lateral apex, right medial base, right medial mid, right medial apex, and right and left transition zones. The rectal ultrasound probe was removed.  A RAFAEL was again performed revealing little blood in the rectum.  The patient tolerated the procedure well.    Plan  1.  The patient was instructed to drink plenty of fluids and warned about possible complications and side effects including, but not limited to, blood in the urine, stool and semen as well as bloodstream infection.  He was instructed to call the office if there are any issues, especially fevers or flu-like symptoms.    2.  Continue antibiotic for a total of 3 days.  3.  The patient will return to clinic in approximately 1 week for discussion of the pathological report.

## 2024-01-26 ENCOUNTER — HOSPITAL ENCOUNTER (INPATIENT)
Facility: HOSPITAL | Age: 69
LOS: 5 days | Discharge: HOME | DRG: 872 | End: 2024-01-31
Attending: EMERGENCY MEDICINE | Admitting: FAMILY MEDICINE
Payer: MEDICARE

## 2024-01-26 ENCOUNTER — APPOINTMENT (OUTPATIENT)
Dept: CT IMAGING | Facility: HOSPITAL | Age: 69
DRG: 872 | End: 2024-01-26
Payer: MEDICARE

## 2024-01-26 ENCOUNTER — TELEPHONE (OUTPATIENT)
Dept: UROLOGY | Facility: CLINIC | Age: 69
End: 2024-01-26
Payer: MEDICARE

## 2024-01-26 ENCOUNTER — APPOINTMENT (OUTPATIENT)
Dept: GENERAL RADIOLOGY | Facility: HOSPITAL | Age: 69
DRG: 872 | End: 2024-01-26
Payer: MEDICARE

## 2024-01-26 DIAGNOSIS — A41.51 SEPSIS DUE TO ESCHERICHIA COLI, UNSPECIFIED WHETHER ACUTE ORGAN DYSFUNCTION PRESENT: ICD-10-CM

## 2024-01-26 DIAGNOSIS — I50.31 ACUTE DIASTOLIC CHF (CONGESTIVE HEART FAILURE): ICD-10-CM

## 2024-01-26 DIAGNOSIS — A41.9 SEPSIS WITHOUT ACUTE ORGAN DYSFUNCTION, DUE TO UNSPECIFIED ORGANISM: Primary | ICD-10-CM

## 2024-01-26 DIAGNOSIS — N30.01 ACUTE CYSTITIS WITH HEMATURIA: ICD-10-CM

## 2024-01-26 DIAGNOSIS — R97.20 ELEVATED PSA: ICD-10-CM

## 2024-01-26 DIAGNOSIS — R78.81 BACTEREMIA: ICD-10-CM

## 2024-01-26 LAB
ALBUMIN SERPL-MCNC: 4.3 G/DL (ref 3.5–5.2)
ALBUMIN/GLOB SERPL: 1.1 G/DL
ALP SERPL-CCNC: 97 U/L (ref 39–117)
ALT SERPL W P-5'-P-CCNC: 16 U/L (ref 1–41)
ANION GAP SERPL CALCULATED.3IONS-SCNC: 14.9 MMOL/L (ref 5–15)
AST SERPL-CCNC: 18 U/L (ref 1–40)
BACTERIA UR QL AUTO: ABNORMAL /HPF
BASOPHILS # BLD AUTO: 0.02 10*3/MM3 (ref 0–0.2)
BASOPHILS NFR BLD AUTO: 0.6 % (ref 0–1.5)
BILIRUB SERPL-MCNC: 1.2 MG/DL (ref 0–1.2)
BILIRUB UR QL STRIP: ABNORMAL
BUN SERPL-MCNC: 16 MG/DL (ref 8–23)
BUN/CREAT SERPL: 9.4 (ref 7–25)
CALCIUM SPEC-SCNC: 9.2 MG/DL (ref 8.6–10.5)
CHLORIDE SERPL-SCNC: 100 MMOL/L (ref 98–107)
CLARITY UR: ABNORMAL
CO2 SERPL-SCNC: 26.1 MMOL/L (ref 22–29)
COLOR UR: ABNORMAL
CREAT SERPL-MCNC: 1.7 MG/DL (ref 0.76–1.27)
D-LACTATE SERPL-SCNC: 3.2 MMOL/L (ref 0.5–2)
D-LACTATE SERPL-SCNC: 3.8 MMOL/L (ref 0.5–2)
DEPRECATED RDW RBC AUTO: 49.3 FL (ref 37–54)
EGFRCR SERPLBLD CKD-EPI 2021: 43.4 ML/MIN/1.73
EOSINOPHIL # BLD AUTO: 0.01 10*3/MM3 (ref 0–0.4)
EOSINOPHIL NFR BLD AUTO: 0.3 % (ref 0.3–6.2)
ERYTHROCYTE [DISTWIDTH] IN BLOOD BY AUTOMATED COUNT: 14.9 % (ref 12.3–15.4)
FLUAV SUBTYP SPEC NAA+PROBE: NOT DETECTED
FLUBV RNA ISLT QL NAA+PROBE: NOT DETECTED
GLOBULIN UR ELPH-MCNC: 3.8 GM/DL
GLUCOSE SERPL-MCNC: 107 MG/DL (ref 65–99)
GLUCOSE UR STRIP-MCNC: ABNORMAL MG/DL
HCT VFR BLD AUTO: 53.7 % (ref 37.5–51)
HGB BLD-MCNC: 18.2 G/DL (ref 13–17.7)
HGB UR QL STRIP.AUTO: ABNORMAL
HOLD SPECIMEN: NORMAL
HOLD SPECIMEN: NORMAL
HYALINE CASTS UR QL AUTO: ABNORMAL /LPF
IMM GRANULOCYTES # BLD AUTO: 0.03 10*3/MM3 (ref 0–0.05)
IMM GRANULOCYTES NFR BLD AUTO: 0.8 % (ref 0–0.5)
KETONES UR QL STRIP: NEGATIVE
LEUKOCYTE ESTERASE UR QL STRIP.AUTO: ABNORMAL
LYMPHOCYTES # BLD AUTO: 0.16 10*3/MM3 (ref 0.7–3.1)
LYMPHOCYTES NFR BLD AUTO: 4.5 % (ref 19.6–45.3)
MCH RBC QN AUTO: 30.5 PG (ref 26.6–33)
MCHC RBC AUTO-ENTMCNC: 33.9 G/DL (ref 31.5–35.7)
MCV RBC AUTO: 90.1 FL (ref 79–97)
MONOCYTES # BLD AUTO: 0.01 10*3/MM3 (ref 0.1–0.9)
MONOCYTES NFR BLD AUTO: 0.3 % (ref 5–12)
NEUTROPHILS NFR BLD AUTO: 3.36 10*3/MM3 (ref 1.7–7)
NEUTROPHILS NFR BLD AUTO: 93.5 % (ref 42.7–76)
NITRITE UR QL STRIP: POSITIVE
NRBC BLD AUTO-RTO: 0 /100 WBC (ref 0–0.2)
PH UR STRIP.AUTO: 6 [PH] (ref 5–8)
PLATELET # BLD AUTO: 127 10*3/MM3 (ref 140–450)
PMV BLD AUTO: 10.2 FL (ref 6–12)
POTASSIUM SERPL-SCNC: 3.6 MMOL/L (ref 3.5–5.2)
PROCALCITONIN SERPL-MCNC: 4.22 NG/ML (ref 0–0.25)
PROT SERPL-MCNC: 8.1 G/DL (ref 6–8.5)
PROT UR QL STRIP: ABNORMAL
RBC # BLD AUTO: 5.96 10*6/MM3 (ref 4.14–5.8)
RBC # UR STRIP: ABNORMAL /HPF
REF LAB TEST METHOD: ABNORMAL
SARS-COV-2 RNA RESP QL NAA+PROBE: NOT DETECTED
SODIUM SERPL-SCNC: 141 MMOL/L (ref 136–145)
SP GR UR STRIP: 1.02 (ref 1–1.03)
SQUAMOUS #/AREA URNS HPF: ABNORMAL /HPF
UROBILINOGEN UR QL STRIP: ABNORMAL
WBC # UR STRIP: ABNORMAL /HPF
WBC NRBC COR # BLD AUTO: 3.59 10*3/MM3 (ref 3.4–10.8)
WHOLE BLOOD HOLD COAG: NORMAL
WHOLE BLOOD HOLD SPECIMEN: NORMAL

## 2024-01-26 PROCEDURE — 71045 X-RAY EXAM CHEST 1 VIEW: CPT

## 2024-01-26 PROCEDURE — 87086 URINE CULTURE/COLONY COUNT: CPT

## 2024-01-26 PROCEDURE — 25810000003 SODIUM CHLORIDE 0.9 % SOLUTION

## 2024-01-26 PROCEDURE — 87636 SARSCOV2 & INF A&B AMP PRB: CPT

## 2024-01-26 PROCEDURE — 84145 PROCALCITONIN (PCT): CPT

## 2024-01-26 PROCEDURE — 87077 CULTURE AEROBIC IDENTIFY: CPT | Performed by: EMERGENCY MEDICINE

## 2024-01-26 PROCEDURE — 74176 CT ABD & PELVIS W/O CONTRAST: CPT

## 2024-01-26 PROCEDURE — 87186 SC STD MICRODIL/AGAR DIL: CPT

## 2024-01-26 PROCEDURE — 87077 CULTURE AEROBIC IDENTIFY: CPT

## 2024-01-26 PROCEDURE — 51702 INSERT TEMP BLADDER CATH: CPT

## 2024-01-26 PROCEDURE — 99223 1ST HOSP IP/OBS HIGH 75: CPT | Performed by: FAMILY MEDICINE

## 2024-01-26 PROCEDURE — 83605 ASSAY OF LACTIC ACID: CPT

## 2024-01-26 PROCEDURE — 80053 COMPREHEN METABOLIC PANEL: CPT

## 2024-01-26 PROCEDURE — 87186 SC STD MICRODIL/AGAR DIL: CPT | Performed by: EMERGENCY MEDICINE

## 2024-01-26 PROCEDURE — 81001 URINALYSIS AUTO W/SCOPE: CPT

## 2024-01-26 PROCEDURE — 87150 DNA/RNA AMPLIFIED PROBE: CPT | Performed by: EMERGENCY MEDICINE

## 2024-01-26 PROCEDURE — 85025 COMPLETE CBC W/AUTO DIFF WBC: CPT

## 2024-01-26 PROCEDURE — 99285 EMERGENCY DEPT VISIT HI MDM: CPT

## 2024-01-26 PROCEDURE — 93005 ELECTROCARDIOGRAM TRACING: CPT

## 2024-01-26 PROCEDURE — 36415 COLL VENOUS BLD VENIPUNCTURE: CPT

## 2024-01-26 PROCEDURE — 87154 CUL TYP ID BLD PTHGN 6+ TRGT: CPT | Performed by: EMERGENCY MEDICINE

## 2024-01-26 PROCEDURE — 25010000002 FENTANYL CITRATE (PF) 50 MCG/ML SOLUTION: Performed by: EMERGENCY MEDICINE

## 2024-01-26 PROCEDURE — 25010000002 ERTAPENEM PER 500 MG

## 2024-01-26 PROCEDURE — 87040 BLOOD CULTURE FOR BACTERIA: CPT | Performed by: EMERGENCY MEDICINE

## 2024-01-26 RX ORDER — LIDOCAINE HYDROCHLORIDE 20 MG/ML
JELLY TOPICAL ONCE
Status: COMPLETED | OUTPATIENT
Start: 2024-01-26 | End: 2024-01-26

## 2024-01-26 RX ORDER — SODIUM CHLORIDE 0.9 % (FLUSH) 0.9 %
10 SYRINGE (ML) INJECTION AS NEEDED
Status: DISCONTINUED | OUTPATIENT
Start: 2024-01-26 | End: 2024-01-31 | Stop reason: HOSPADM

## 2024-01-26 RX ORDER — ACETAMINOPHEN 500 MG
1000 TABLET ORAL ONCE
Status: DISCONTINUED | OUTPATIENT
Start: 2024-01-26 | End: 2024-01-31 | Stop reason: HOSPADM

## 2024-01-26 RX ORDER — FENTANYL CITRATE 50 UG/ML
50 INJECTION, SOLUTION INTRAMUSCULAR; INTRAVENOUS ONCE
Status: COMPLETED | OUTPATIENT
Start: 2024-01-26 | End: 2024-01-26

## 2024-01-26 RX ADMIN — ERTAPENEM 1000 MG: 1 INJECTION INTRAMUSCULAR; INTRAVENOUS at 20:44

## 2024-01-26 RX ADMIN — LIDOCAINE HYDROCHLORIDE: 20 JELLY TOPICAL at 20:08

## 2024-01-26 RX ADMIN — FENTANYL CITRATE 50 MCG: 50 INJECTION, SOLUTION INTRAMUSCULAR; INTRAVENOUS at 21:30

## 2024-01-26 RX ADMIN — SODIUM CHLORIDE 1000 ML: 9 INJECTION, SOLUTION INTRAVENOUS at 19:48

## 2024-01-26 NOTE — TELEPHONE ENCOUNTER
"    Caller: Andre Agosto \"Bernardo\"    Relationship: Self    Best call back number: [phone]    Requested Prescriptions: [facility]     Pharmacy where request should be sent:    [facility] [id number] [place] [address] AT [facility] [phone] [facility] [phone]  [phone]       Last office visit with prescribing clinician: 11/27/2023   Last telemedicine visit with prescribing clinician: Visit date not found   Next office visit with prescribing clinician: 2/8/2024       Does the patient have less than a 3 day supply:  [x] Yes  [] No    Would you like a call back once the refill request has been completed: [x] Yes [] No    If the office needs to give you a call back, can they leave a voicemail: [x] Yes [] No    Hailey Vernon Rep   01/26/24 15:31 EST     "

## 2024-01-27 LAB
ANION GAP SERPL CALCULATED.3IONS-SCNC: 14.8 MMOL/L (ref 5–15)
BACTERIA BLD CULT: ABNORMAL
BACTERIA ID TEST ISLT QL CULT: ABNORMAL
BOTTLE TYPE: ABNORMAL
BUN SERPL-MCNC: 18 MG/DL (ref 8–23)
BUN/CREAT SERPL: 9.9 (ref 7–25)
CALCIUM SPEC-SCNC: 8.1 MG/DL (ref 8.6–10.5)
CHLORIDE SERPL-SCNC: 102 MMOL/L (ref 98–107)
CO2 SERPL-SCNC: 21.2 MMOL/L (ref 22–29)
CREAT SERPL-MCNC: 1.81 MG/DL (ref 0.76–1.27)
D-LACTATE SERPL-SCNC: 2.3 MMOL/L (ref 0.5–2)
D-LACTATE SERPL-SCNC: 2.5 MMOL/L (ref 0.5–2)
D-LACTATE SERPL-SCNC: 2.8 MMOL/L (ref 0.5–2)
D-LACTATE SERPL-SCNC: 2.8 MMOL/L (ref 0.5–2)
DEPRECATED RDW RBC AUTO: 51.4 FL (ref 37–54)
EGFRCR SERPLBLD CKD-EPI 2021: 40.2 ML/MIN/1.73
ERYTHROCYTE [DISTWIDTH] IN BLOOD BY AUTOMATED COUNT: 15.4 % (ref 12.3–15.4)
GLUCOSE SERPL-MCNC: 139 MG/DL (ref 65–99)
HCT VFR BLD AUTO: 45.2 % (ref 37.5–51)
HGB BLD-MCNC: 15.5 G/DL (ref 13–17.7)
MCH RBC QN AUTO: 31.3 PG (ref 26.6–33)
MCHC RBC AUTO-ENTMCNC: 34.3 G/DL (ref 31.5–35.7)
MCV RBC AUTO: 91.1 FL (ref 79–97)
PLATELET # BLD AUTO: 109 10*3/MM3 (ref 140–450)
PMV BLD AUTO: 11.7 FL (ref 6–12)
POTASSIUM SERPL-SCNC: 3.6 MMOL/L (ref 3.5–5.2)
RBC # BLD AUTO: 4.96 10*6/MM3 (ref 4.14–5.8)
SODIUM SERPL-SCNC: 138 MMOL/L (ref 136–145)
WBC NRBC COR # BLD AUTO: 18.21 10*3/MM3 (ref 3.4–10.8)

## 2024-01-27 PROCEDURE — 83605 ASSAY OF LACTIC ACID: CPT

## 2024-01-27 PROCEDURE — 25810000003 SODIUM CHLORIDE 0.9 % SOLUTION: Performed by: FAMILY MEDICINE

## 2024-01-27 PROCEDURE — 99232 SBSQ HOSP IP/OBS MODERATE 35: CPT | Performed by: INTERNAL MEDICINE

## 2024-01-27 PROCEDURE — 85027 COMPLETE CBC AUTOMATED: CPT | Performed by: FAMILY MEDICINE

## 2024-01-27 PROCEDURE — 25010000002 ERTAPENEM PER 500 MG: Performed by: FAMILY MEDICINE

## 2024-01-27 PROCEDURE — 80048 BASIC METABOLIC PNL TOTAL CA: CPT | Performed by: FAMILY MEDICINE

## 2024-01-27 PROCEDURE — 99222 1ST HOSP IP/OBS MODERATE 55: CPT | Performed by: UROLOGY

## 2024-01-27 RX ORDER — ONDANSETRON 4 MG/1
4 TABLET, ORALLY DISINTEGRATING ORAL EVERY 6 HOURS PRN
Status: DISCONTINUED | OUTPATIENT
Start: 2024-01-27 | End: 2024-01-31 | Stop reason: HOSPADM

## 2024-01-27 RX ORDER — ACETAMINOPHEN 160 MG/5ML
650 SOLUTION ORAL EVERY 4 HOURS PRN
Status: DISCONTINUED | OUTPATIENT
Start: 2024-01-27 | End: 2024-01-31 | Stop reason: HOSPADM

## 2024-01-27 RX ORDER — CARVEDILOL 25 MG/1
25 TABLET ORAL 2 TIMES DAILY WITH MEALS
Status: DISCONTINUED | OUTPATIENT
Start: 2024-01-27 | End: 2024-01-28

## 2024-01-27 RX ORDER — AMIODARONE HYDROCHLORIDE 200 MG/1
100 TABLET ORAL DAILY
Status: DISCONTINUED | OUTPATIENT
Start: 2024-01-27 | End: 2024-01-31 | Stop reason: HOSPADM

## 2024-01-27 RX ORDER — NITROGLYCERIN 0.4 MG/1
0.4 TABLET SUBLINGUAL
Status: DISCONTINUED | OUTPATIENT
Start: 2024-01-27 | End: 2024-01-30

## 2024-01-27 RX ORDER — CHOLECALCIFEROL (VITAMIN D3) 125 MCG
5 CAPSULE ORAL NIGHTLY PRN
Status: DISCONTINUED | OUTPATIENT
Start: 2024-01-27 | End: 2024-01-31 | Stop reason: HOSPADM

## 2024-01-27 RX ORDER — TAMSULOSIN HYDROCHLORIDE 0.4 MG/1
0.4 CAPSULE ORAL DAILY
Status: DISCONTINUED | OUTPATIENT
Start: 2024-01-27 | End: 2024-01-31 | Stop reason: HOSPADM

## 2024-01-27 RX ORDER — ACETAMINOPHEN 650 MG/1
650 SUPPOSITORY RECTAL EVERY 4 HOURS PRN
Status: DISCONTINUED | OUTPATIENT
Start: 2024-01-27 | End: 2024-01-31 | Stop reason: HOSPADM

## 2024-01-27 RX ORDER — AMLODIPINE BESYLATE 5 MG/1
10 TABLET ORAL NIGHTLY
Status: DISCONTINUED | OUTPATIENT
Start: 2024-01-27 | End: 2024-01-31 | Stop reason: HOSPADM

## 2024-01-27 RX ORDER — AMOXICILLIN 250 MG
2 CAPSULE ORAL 2 TIMES DAILY
Status: DISCONTINUED | OUTPATIENT
Start: 2024-01-27 | End: 2024-01-31 | Stop reason: HOSPADM

## 2024-01-27 RX ORDER — LEVOTHYROXINE SODIUM 88 UG/1
88 TABLET ORAL DAILY
Status: DISCONTINUED | OUTPATIENT
Start: 2024-01-27 | End: 2024-01-31 | Stop reason: HOSPADM

## 2024-01-27 RX ORDER — OXYCODONE AND ACETAMINOPHEN 7.5; 325 MG/1; MG/1
1 TABLET ORAL EVERY 4 HOURS PRN
Status: DISCONTINUED | OUTPATIENT
Start: 2024-01-27 | End: 2024-01-31 | Stop reason: HOSPADM

## 2024-01-27 RX ORDER — POLYETHYLENE GLYCOL 3350 17 G/17G
17 POWDER, FOR SOLUTION ORAL DAILY PRN
Status: DISCONTINUED | OUTPATIENT
Start: 2024-01-27 | End: 2024-01-31 | Stop reason: HOSPADM

## 2024-01-27 RX ORDER — CARVEDILOL 25 MG/1
25 TABLET ORAL 2 TIMES DAILY WITH MEALS
Status: DISCONTINUED | OUTPATIENT
Start: 2024-01-27 | End: 2024-01-27

## 2024-01-27 RX ORDER — ONDANSETRON 2 MG/ML
4 INJECTION INTRAMUSCULAR; INTRAVENOUS EVERY 6 HOURS PRN
Status: DISCONTINUED | OUTPATIENT
Start: 2024-01-27 | End: 2024-01-31 | Stop reason: HOSPADM

## 2024-01-27 RX ORDER — SODIUM CHLORIDE 0.9 % (FLUSH) 0.9 %
10 SYRINGE (ML) INJECTION EVERY 12 HOURS SCHEDULED
Status: DISCONTINUED | OUTPATIENT
Start: 2024-01-27 | End: 2024-01-31 | Stop reason: HOSPADM

## 2024-01-27 RX ORDER — SODIUM CHLORIDE 9 MG/ML
40 INJECTION, SOLUTION INTRAVENOUS AS NEEDED
Status: DISCONTINUED | OUTPATIENT
Start: 2024-01-27 | End: 2024-01-31 | Stop reason: HOSPADM

## 2024-01-27 RX ORDER — ALUMINA, MAGNESIA, AND SIMETHICONE 2400; 2400; 240 MG/30ML; MG/30ML; MG/30ML
15 SUSPENSION ORAL EVERY 6 HOURS PRN
Status: DISCONTINUED | OUTPATIENT
Start: 2024-01-27 | End: 2024-01-31 | Stop reason: HOSPADM

## 2024-01-27 RX ORDER — DIVALPROEX SODIUM 500 MG/1
500 TABLET, DELAYED RELEASE ORAL NIGHTLY
Status: DISCONTINUED | OUTPATIENT
Start: 2024-01-27 | End: 2024-01-29

## 2024-01-27 RX ORDER — BISACODYL 5 MG/1
5 TABLET, DELAYED RELEASE ORAL DAILY PRN
Status: DISCONTINUED | OUTPATIENT
Start: 2024-01-27 | End: 2024-01-31 | Stop reason: HOSPADM

## 2024-01-27 RX ORDER — BISACODYL 10 MG
10 SUPPOSITORY, RECTAL RECTAL DAILY PRN
Status: DISCONTINUED | OUTPATIENT
Start: 2024-01-27 | End: 2024-01-31 | Stop reason: HOSPADM

## 2024-01-27 RX ORDER — SODIUM CHLORIDE 0.9 % (FLUSH) 0.9 %
10 SYRINGE (ML) INJECTION AS NEEDED
Status: DISCONTINUED | OUTPATIENT
Start: 2024-01-27 | End: 2024-01-31 | Stop reason: HOSPADM

## 2024-01-27 RX ORDER — SODIUM CHLORIDE 9 MG/ML
125 INJECTION, SOLUTION INTRAVENOUS CONTINUOUS
Status: DISCONTINUED | OUTPATIENT
Start: 2024-01-27 | End: 2024-01-30

## 2024-01-27 RX ORDER — ACETAMINOPHEN 325 MG/1
650 TABLET ORAL EVERY 4 HOURS PRN
Status: DISCONTINUED | OUTPATIENT
Start: 2024-01-27 | End: 2024-01-31 | Stop reason: HOSPADM

## 2024-01-27 RX ADMIN — ERTAPENEM 1000 MG: 1 INJECTION INTRAMUSCULAR; INTRAVENOUS at 21:21

## 2024-01-27 RX ADMIN — OXYCODONE HYDROCHLORIDE AND ACETAMINOPHEN 1 TABLET: 7.5; 325 TABLET ORAL at 01:53

## 2024-01-27 RX ADMIN — DIVALPROEX SODIUM 500 MG: 500 TABLET, DELAYED RELEASE ORAL at 01:53

## 2024-01-27 RX ADMIN — TAMSULOSIN HYDROCHLORIDE 0.4 MG: 0.4 CAPSULE ORAL at 09:14

## 2024-01-27 RX ADMIN — DIVALPROEX SODIUM 500 MG: 500 TABLET, DELAYED RELEASE ORAL at 21:28

## 2024-01-27 RX ADMIN — LEVOTHYROXINE SODIUM 88 MCG: 88 TABLET ORAL at 09:14

## 2024-01-27 RX ADMIN — Medication 10 ML: at 01:55

## 2024-01-27 RX ADMIN — CARVEDILOL 25 MG: 25 TABLET, FILM COATED ORAL at 18:03

## 2024-01-27 RX ADMIN — SODIUM CHLORIDE 500 ML: 9 INJECTION, SOLUTION INTRAVENOUS at 00:12

## 2024-01-27 RX ADMIN — Medication 5 MG: at 21:28

## 2024-01-27 RX ADMIN — OXYCODONE HYDROCHLORIDE AND ACETAMINOPHEN 1 TABLET: 7.5; 325 TABLET ORAL at 12:22

## 2024-01-27 RX ADMIN — Medication 10 ML: at 09:14

## 2024-01-27 RX ADMIN — ACETAMINOPHEN 650 MG: 325 TABLET, FILM COATED ORAL at 09:14

## 2024-01-27 RX ADMIN — SODIUM CHLORIDE 100 ML/HR: 9 INJECTION, SOLUTION INTRAVENOUS at 01:54

## 2024-01-27 RX ADMIN — DIVALPROEX SODIUM 750 MG: 250 TABLET, DELAYED RELEASE ORAL at 09:13

## 2024-01-27 RX ADMIN — CARVEDILOL 25 MG: 25 TABLET, FILM COATED ORAL at 01:53

## 2024-01-27 RX ADMIN — OXYCODONE HYDROCHLORIDE AND ACETAMINOPHEN 1 TABLET: 7.5; 325 TABLET ORAL at 18:04

## 2024-01-27 RX ADMIN — AMIODARONE HYDROCHLORIDE 100 MG: 200 TABLET ORAL at 09:13

## 2024-01-27 RX ADMIN — Medication 5 MG: at 02:59

## 2024-01-27 NOTE — PLAN OF CARE
Goal Outcome Evaluation:  Plan of Care Reviewed With: patient        Progress: no change  Outcome Evaluation: NEW ADMISSION FROM THE ED.  PT ALERT, ORIENTED X3,   IVF'S  AS ORDERED. AFEBRILE ON ADMISSSION,  BP AND HR STABLE.

## 2024-01-27 NOTE — PAYOR COMM NOTE
"Leopoldo Agosto \"Bernardo\" (69 y.o. Male)       Date of Birth   1955    Social Security Number       Address   [address] [place] [id number]    Home Phone   [phone]    MRN   [id number]       Sabianism   Voodoo    Marital Status                               Admission Date   24    Admission Type   Emergency    Admitting Provider   Evelyn Cohen DO    Attending Provider   Evelyn Cohen DO    Department, Room/Bed   [facility] TELEMETRY 4, 426/       Discharge Date       Discharge Disposition       Discharge Destination                                 Attending Provider: Evelyn Cohen DO    Allergies: Statins    Isolation: None   Infection: COVID (rule out) (24), ESBL (24)   Code Status: CPR    Ht: 188 cm (74\")   Wt: 141 kg (311 lb 1.1 oz)    Admission Cmt: None   Principal Problem: Sepsis [A41.9]                   Active Insurance as of 2024       Primary Coverage       Payor Plan Insurance Group Employer/Plan Group    [facility] MEDICARE REPLACEMENT [facility] [employer] [employer] [id number]       Payor Plan Address Payor Plan Phone Number Payor Plan Fax Number Effective Dates    [address] [address]   2023 - None Entered    [facility] [id number]         Subscriber Name Subscriber Birth Date Member ID       LEOPOLDO AGOSTO 1955 [id number]                     Emergency Contacts        (Rel.) Home Phone Work Phone Mobile Phone    Paula Agosto (Spouse) [phone] -- [phone]    kylee agosto (Daughter) [phone] -- [phone]                 History & Physical        Evelyn Cohen DO at 24 2345            [facility] HOSPITALIST   HISTORY AND PHYSICAL      Name:  Leopoldo Agosto   Age:  69 y.o.  Sex:  male  :  1955  MRN:  [id number]   Visit Number:  [id number]  Admission Date:  2024  Date Of Service:  24  Primary Care Physician:  Keven Bear, " MD    Chief Complaint:     Fever, confusion, dysuria    History Of Presenting Illness:      Patient is a 69-year-old with multiple medical comorbidities including CAD, prior stroke, obstructive sleep apnea, hypothyroidism, diabetes, nephrolithiasis, seizure disorder, atrial fibrillation, who presented from home via EMS with multiple complaints.  Patient apparently had just underwent a prostate biopsy with Dr. De La Cruz about 24 hours ago.  He had taken Xarelto the night after his procedure.  He noted developing a fever earlier in the day on the 26th, become increasingly confused, felt dehydrated.  Was having pain with urination and fairly bright blood noted.  Currently is feeling some better, was noted to have fever up to 104 upon arrival.  History obtained from patient and daughter at bedside.    In the ER, he was febrile.  He had blood pressure 140/80 range he was nonhypoxic on 2 L of oxygen and had a heart rate in the 80s.  Creatinine 1.7 down to lactic acid 3.8 white count 3500 hemoglobin 18 and platelets 127.  Procalcitonin 4.22.  Urinalysis positive nitrites, leukocyte Estrace, large blood.  He was given IV fluid resuscitation with 1.5 L of saline, pain control, and Invanz.  He had a CT scan abdomen pelvis without contrast which was demonstrating nonobstructing kidney stones bilaterally, Crespo catheter with balloon inflated in the prostate gland, otherwise no other findings.  ER did talk with Dr. De La Cruz who recommended admission and placement of catheter and antibiotics.  He will see over the weekend in consultation.  We were asked admit thereafter    Review Of Systems:    All systems were reviewed and negative except as mentioned in history of presenting illness, assessment and plan.    Past Medical History: Patient  has a past medical history of 6th nerve palsy, right, Anxiety and depression, Atrial fibrillation, persistent (12/02/2020), Coronary artery disease involving native coronary artery of native heart  without angina pectoris (09/12/2018), CRI (chronic renal insufficiency), stage 2 (mild), CVA (cerebral vascular accident), Diabetes mellitus, Disease of thyroid gland, Double vision, Elevated cholesterol, Elevated prostate specific antigen (PSA) (11/08/2023), Focal seizure, Heart attack, History of Helicobacter pylori infection, HL (hearing loss), Hyperlipidemia, Hypertension, Kidney stone, Memory loss (2005), Meningitis (2005), Obesity, Sleep apnea treated with nocturnal BiPAP, Stroke, Ventricular tachycardia, and Wears glasses.    Past Surgical History: Patient  has a past surgical history that includes Coronary stent placement (2015); Laparoscopic gastric banding (2008); Umbilical hernia repair (2008); Colonoscopy (10/2020); Cardiac catheterization (N/A, 10/12/2018); Pacemaker Implantation (07/25/2021); Cardiac defibrillator placement (2021); Cardiac catheterization (03/2021); Cardiac catheterization (07/2021); ureteroscopy laser lithotripsy with stent insertion (Left, 10/20/2021); ureteroscopy laser lithotripsy with stent insertion (Left, 11/03/2021); and ureteroscopy laser lithotripsy with stent insertion (Left, 10/06/2022).    Social History: Patient  reports that he quit smoking about 38 years ago. His smoking use included cigarettes. He started smoking about 49 years ago. He has a 7.50 pack-year smoking history. He has never used smokeless tobacco. He reports that he does not drink alcohol and does not use drugs.    Family History:  Patient's family history has been reviewed and found to be noncontributory.     Allergies:      Amlodipine and Statins    Home Medications:    Prior to Admission Medications       Prescriptions Last Dose Informant Patient Reported? Taking?    amiodarone (PACERONE) 100 MG tablet   No No    Take 1 tablet by mouth Daily.    amLODIPine (NORVASC) 10 MG tablet   Yes No    Take 1 tablet by mouth Daily.    carvedilol (Coreg) 25 MG tablet  Self No No    Take 1 tablet by mouth 2 (Two)  Times a Day With Meals.    Cholecalciferol (Vitamin D3) 25 MCG (1000 UT) capsule   No No    Take 2 capsules by mouth Daily.    ciprofloxacin (Cipro) 500 MG tablet   No No    Take 1 tablet by mouth 2 (Two) Times a Day. Start taking the day prior to biopsy    divalproex (DEPAKOTE) 250 MG DR tablet   No No    750mg QAM and 500mg QHS    Entresto  MG tablet   Yes No    Take 1 tablet by mouth 2 (Two) Times a Day.    Farxiga 10 MG tablet  Self Yes No    Take 10 mg by mouth Daily.    furosemide (LASIX) 40 MG tablet   Yes No    Take 1 tablet by mouth 2 (Two) Times a Day.    levothyroxine (SYNTHROID, LEVOTHROID) 88 MCG tablet   No No    TAKE 1 TABLET DAILY    Semaglutide,0.25 or 0.5MG/DOS, (Ozempic, 0.25 or 0.5 MG/DOSE,) 2 MG/1.5ML solution pen-injector   No No    Inject 0.5 mg under the skin into the appropriate area as directed Every 7 (Seven) Days.    tamsulosin (FLOMAX) 0.4 MG capsule 24 hr capsule   No No    Take 1 capsule by mouth Daily.    Xarelto 15 MG tablet  Self Yes No    Take 1 tablet by mouth Daily With Dinner.          ED Medications:    Medications   sodium chloride 0.9 % flush 10 mL (has no administration in time range)   acetaminophen (TYLENOL) tablet 1,000 mg (1,000 mg Oral Not Given 1/26/24 2107)   sodium chloride 0.9 % bolus 1,000 mL (0 mL Intravenous Stopped 1/26/24 2018)   Lidocaine HCl gel (XYLOCAINE) urethral/mucosal syringe ( Urethral Given 1/26/24 2008)   ertapenem (INVanz) 1000 mg/100 mL 0.9% NS VTB (mbp) (0 mg Intravenous Stopped 1/26/24 2114)   fentaNYL citrate (PF) (SUBLIMAZE) injection 50 mcg (50 mcg Intravenous Given 1/26/24 2130)     Vital Signs:  Temp:  [100.1 °F (37.8 °C)-103.2 °F (39.6 °C)] 100.1 °F (37.8 °C)  Heart Rate:  [82-99] 82  Resp:  [18-40] 32  BP: (123-169)/() 123/72    There were no vitals filed for this visit.  There is no height or weight on file to calculate BMI.    Physical Exam:     Most recent vital Signs: /72   Pulse 82   Temp 100.1 °F (37.8 °C) (Oral)    Resp (!) 32   SpO2 95%     Physical Exam  Constitutional:       Appearance: He is obese. He is ill-appearing.   HENT:      Mouth/Throat:      Mouth: Mucous membranes are dry.   Eyes:      Extraocular Movements: Extraocular movements intact.      Pupils: Pupils are equal, round, and reactive to light.   Cardiovascular:      Rate and Rhythm: Normal rate and regular rhythm.      Pulses: Normal pulses.      Heart sounds: No murmur heard.  Pulmonary:      Effort: Pulmonary effort is normal.      Breath sounds: No wheezing or rhonchi.   Abdominal:      General: Abdomen is flat. Bowel sounds are normal. There is no distension.      Tenderness: There is abdominal tenderness.      Hernia: No hernia is present.      Comments: Crespo cath, blood noted   Musculoskeletal:         General: Normal range of motion.      Right lower leg: No edema.      Left lower leg: No edema.   Skin:     General: Skin is warm.      Capillary Refill: Capillary refill takes less than 2 seconds.      Findings: No bruising or lesion.   Neurological:      General: No focal deficit present.      Mental Status: He is alert. Mental status is at baseline.      Motor: Weakness present.   Psychiatric:         Mood and Affect: Mood normal.         Thought Content: Thought content normal.         Laboratory data:    I have reviewed the labs done in the emergency room.    Results from last 7 days   Lab Units 01/26/24  1921 01/24/24  1316   SODIUM mmol/L 141 141   POTASSIUM mmol/L 3.6 3.4*   CHLORIDE mmol/L 100 99   CO2 mmol/L 26.1 27.0   BUN mg/dL 16 12   CREATININE mg/dL 1.70* 1.50*   CALCIUM mg/dL 9.2 9.6   BILIRUBIN mg/dL 1.2 0.9   ALK PHOS U/L 97 81   ALT (SGPT) U/L 16 17   AST (SGOT) U/L 18 16   GLUCOSE mg/dL 107* 109*     Results from last 7 days   Lab Units 01/26/24  1921 01/24/24  1316   WBC 10*3/mm3 3.59 6.70   HEMOGLOBIN g/dL 18.2* 19.0*   HEMATOCRIT % 53.7* 57.2*   PLATELETS 10*3/mm3 127* 186     Results from last 7 days   Lab Units 01/24/24  1116    INR  1.20*                         Results from last 7 days   Lab Units 01/26/24 2103   COLOR UA  Red*   GLUCOSE UA  500 mg/dL (2+)*   KETONES UA  Negative   BLOOD UA  Large (3+)*   LEUKOCYTES UA  Large (3+)*   PH, URINE  6.0   BILIRUBIN UA  Moderate (2+)*   UROBILINOGEN UA  0.2 E.U./dL   RBC UA /HPF 6-10*   WBC UA /HPF 21-50*       Pain Management Panel  More data exists         Latest Ref Rng & Units 12/9/2022 8/8/2020   Pain Management Panel   Creatinine, Urine mg/dL 95.6  185.7        EKG:      EKG appears to demonstrate likely paced rhythm, rate of 100, left bundle branch block.    Radiology:    CT Abdomen Pelvis Without Contrast    Result Date: 1/26/2024  FINAL REPORT TECHNIQUE: Axial CT images were performed from the lung bases through the symphysis pubis without IV contrast.  This study was performed with techniques to keep radiation doses as low as reasonably achievable (ALARA). Individualized dose reduction techniques using automated exposure control or adjustment of mA and/or kV according to the patient's size were employed. CLINICAL HISTORY: Abdominal pain, post-op; prostate biopsy yesterday, woke today with weakness and sepsis symptoms FINDINGS: Lack of intravenous contrast limits evaluation of solid abdominal and pelvic organs.  LOWER CHEST: The heart is normal size.  The lung bases are clear.  ABDOMEN/PELVIS:  Liver, gallbladder and bile ducts: The unenhanced liver is grossly unremarkable without focal abnormality. there are gallstones without cholecystitis.  There is no definite biliary duct dilatation.  Adrenal glands: The adrenal glands are morphologically unremarkable without suspicious lesion. Kidneys, ureter and urinary bladder: No nephrolithiasis.  There are nonobstructing bilateral kidney stones.  There is urinary bladder wall thickening.  Spleen: The spleen is normal size. Pancreas: The pancreas is grossly unremarkable.  GI systems and mesentery: A gastric banding device is noted.  No  evidence of bowel obstruction.  The appendix is not visualized, but there is no sign of appendicitis.  No significant mesenteric inflammation.  Lymph nodes: No definite pathologically enlarged abdominal or pelvic lymph nodes present within the limits of this noncontrast enhanced exam.  Vessels: The abdominal aorta is normal in caliber.  The inferior vena cava is unremarkable. Peritoneum: No free intraperitoneal fluid or pneumoperitoneum. Pelvic viscera: A Crespo catheter is inflated within the prostate gland.  Body wall: No body wall contusion.  Bones: No acute fracture.     1.  Malpositioned Crespo catheter with balloon inflated in the prostate gland. Authenticated and Electronically Signed by Jose J Dupree MD on 01/26/2024 11:38:21 PM     Assessment:    Sepsis, due to unknown organism, POA  Complicated urinary tract infection, present on admission  Gross hematuria, POA  Chronic kidney disease stage III, POA  Lactic acidosis likely due to #1  CAD  History of atrial fibrillation, pacemaker  Obstructive sleep apnea with BiPAP  Seizure disorder  Obesity    Plan:    Admit as inpatient    Sepsis/UTI/hematuria:  Patient had been on ciprofloxacin prior to procedure per report.  Patient's daughter noted had fairly severe illness in the past requiring a PICC line, on review of chart it appears she had Providencia rustigianni in the past.  Antibiotic treatment with Invanz.  Will continue on IV fluids overnight he does not appear volume overloaded but with renal failure and heart conditions will monitor.  Continue with Crespo catheter.  Will place consult for Dr. De La Cruz who is available for patient over the weekend.    CKD 3:  Current numbers are similar to prior, appears creatinines around 1.4 range at baseline.  Will monitor urine output.  He currently appears dehydrated and with sepsis.  Hold his Lasix and Entresto for now.    Atrial fibrillation:  Pacemaker, amiodarone will be continued.  Will hold his Xarelto due to  significant hematuria for now.    ALEX:  Can use home BiPAP    Seizure disorder:  Continue his Depakote.    Patient otherwise meets inpatient level of care than anticipated stay greater than 2 midnights.  Further recommendations are depend upon improvement of clinical condition.  Plan of care discussed with the patient and his daughter at bedside.    Risk Assessment: High  DVT Prophylaxis: SCDs due to gross hematuria  Code Status: Full  Diet: Cardiac    Advance Care Planning  ACP discussion was held with the patient during this visit. Patient does not have an advance directive, declines further assistance.           Evelyn Cohen DO  01/26/24  23:45 EST    Dictated utilizing Dragon dictation.    Electronically signed by Evelyn Cohen DO at 01/27/24 0133          Emergency Department Notes        Mercedes Matta RN at 01/26/24 2310          Urinary catheter balloon deflated and urinary catheter inserted further per APRN verbal order.  Balloon reinflated.  Pt tolerated well, sterility maintained.      Electronically signed by Mercedes Matta RN at 01/27/24 0029       Jake Collins, APRN at 01/26/24 1858       Attestation signed by Xochitl Chatterjee MD at 01/27/24 0551        NON FACE TO FACE: This visit was performed by BOTH a physician and an APC. I performed all aspects of the MDM as documented.  Xochitl Chatterjee MD 1/27/2024 05:51 EST                         Subjective:  History of Present Illness:    Patient is a 69-year-old male here today for fever and decreased mental status.  He was brought from home via EMS reporting worsening mental status as the day progressed and a fever up to 102.4.  Family reports that the patient had a prostate biopsy yesterday in office and seemed to be okay yesterday however did change today.  Patient is alert to his name and can answer questions appropriately but appears lethargic.  Is very weak.      Nurses Notes reviewed and agree, including vitals,  allergies, social history and prior medical history.     REVIEW OF SYSTEMS: All systems reviewed and not pertinent unless noted.  Review of Systems    Past Medical History:   Diagnosis Date    6th nerve palsy, right     right eye     Anxiety and depression     Atrial fibrillation, persistent 12/02/2020    on Xarelto    Coronary artery disease involving native coronary artery of native heart without angina pectoris 09/12/2018    follows w/ Dr. Mendoza    CRI (chronic renal insufficiency), stage 2 (mild)     CVA (cerebral vascular accident)     Diabetes mellitus     doesnt check sugar, type 2     Disease of thyroid gland     Double vision     resolved- right eye    Elevated cholesterol     Elevated prostate specific antigen (PSA) 11/08/2023    Focal seizure     of right arm     Heart attack     NSTEMI 2015, s/p stenting    History of Helicobacter pylori infection     in 2008, treated w/ PrevPak - 2021 EGD bx (+), PCP RX - PPI/Augmentin/Doxy/Flagyl (x 14 days) 1/22/21    HL (hearing loss)     (L) ear - child luevano measles    Hyperlipidemia     Hypertension     Kidney stone     Memory loss 2005    d/t meningitis    Meningitis 2005    unsure of bacterial or viral     Obesity     Sleep apnea treated with nocturnal BiPAP     compliant with  machine     Stroke     2017/2018    Ventricular tachycardia     3 different times    Wears glasses        Allergies:    Statins      Past Surgical History:   Procedure Laterality Date    CARDIAC CATHETERIZATION N/A 10/12/2018    Procedure: Left Heart Cath;  Surgeon: Yoselin Johnson MD;  Location: [facility] CATH INVASIVE LOCATION;  Service: Cardiology    CARDIAC CATHETERIZATION  03/2021    stent    CARDIAC CATHETERIZATION  07/2021    just checking the after pacemaker placed- Dr. Tim    CARDIAC DEFIBRILLATOR PLACEMENT  2021    COLONOSCOPY  10/2020    w/ Dr. Jacobs, hx colon polyps    CORONARY STENT PLACEMENT  2015    LAPAROSCOPIC GASTRIC BANDING  2008    s/p LAGB Realize 6/2008 by HOMERO.  "   PACEMAKER IMPLANTATION  2021    UMBILICAL HERNIA REPAIR  2008    incarcerated UHR w/ mesh by Dr. Velasquez    URETEROSCOPY LASER LITHOTRIPSY WITH STENT INSERTION Left 10/20/2021    Procedure: URETEROSCOPY LASER LITHOTRIPSY WITH STENT INSERTION;  Surgeon: Tonio De La Cruz MD;  Location: [facility];  Service: Urology;  Laterality: Left;    URETEROSCOPY LASER LITHOTRIPSY WITH STENT INSERTION Left 2021    Procedure: URETEROSCOPY LEFT, LEFT RETROGRADE PYELOGRAM, CYSTOSCOPY, LASER LITHOTRIPSY WITH STENT EXCHANGE;  Surgeon: Tonio De La Cruz MD;  Location: [facility];  Service: Urology;  Laterality: Left;    URETEROSCOPY LASER LITHOTRIPSY WITH STENT INSERTION Left 10/06/2022    Procedure: URETEROSCOPY LASER LITHOTRIPSY WITH STENT INSERTION;  Surgeon: Tonio De La Cruz MD;  Location: [facility];  Service: Urology;  Laterality: Left;         Social History     Socioeconomic History    Marital status:    Tobacco Use    Smoking status: Former     Packs/day: 0.50     Years: 15.00     Additional pack years: 0.00     Total pack years: 7.50     Types: Cigarettes     Start date: 1975     Quit date: 1985     Years since quittin.0    Smokeless tobacco: Never   Vaping Use    Vaping Use: Never used   Substance and Sexual Activity    Alcohol use: No    Drug use: No    Sexual activity: Defer         Family History   Problem Relation Age of Onset    Stroke Mother     Hypertension Mother     COPD Father     Hypertension Father        Objective  Physical Exam:  /68 (BP Location: Left arm, Patient Position: Lying)   Pulse 76   Temp 97.6 °F (36.4 °C) (Oral)   Resp (!) 30   Ht 188 cm (74\")   Wt (!) 141 kg (311 lb 1.1 oz)   SpO2 94%   BMI 39.94 kg/m²      Physical Exam  Vitals and nursing note reviewed.   Constitutional:       Appearance: Normal appearance. He is obese. He is ill-appearing.   HENT:      Head: Normocephalic and atraumatic.      Right Ear: Tympanic membrane, ear canal and " external ear normal.      Left Ear: Tympanic membrane, ear canal and external ear normal.      Nose: Nose normal.      Mouth/Throat:      Mouth: Mucous membranes are moist.      Pharynx: Oropharynx is clear.   Eyes:      Extraocular Movements: Extraocular movements intact.      Conjunctiva/sclera: Conjunctivae normal.      Pupils: Pupils are equal, round, and reactive to light.   Neck:      Vascular: No carotid bruit.   Cardiovascular:      Rate and Rhythm: Regular rhythm. Tachycardia present.      Pulses: Normal pulses.      Heart sounds: Normal heart sounds.   Pulmonary:      Effort: Pulmonary effort is normal.      Breath sounds: Normal breath sounds.   Abdominal:      General: Abdomen is flat. Bowel sounds are normal. There is no distension.      Palpations: Abdomen is soft.      Tenderness: There is no abdominal tenderness.   Musculoskeletal:      Cervical back: Neck supple. No tenderness.      Right lower leg: No edema.      Left lower leg: No edema.   Lymphadenopathy:      Cervical: No cervical adenopathy.   Skin:     General: Skin is warm and dry.      Capillary Refill: Capillary refill takes less than 2 seconds.   Neurological:      General: No focal deficit present.      Mental Status: He is oriented to person, place, and time. He is lethargic.      GCS: GCS eye subscore is 3. GCS verbal subscore is 5. GCS motor subscore is 6.   Psychiatric:         Mood and Affect: Mood normal.         Behavior: Behavior normal.         Procedures    ED Course:    ED Course as of 01/27/24 0248 Fri Jan 26, 2024   1930 EKG interpreted by me reveals a paced rhythm with rate of 100 bpm.  There is a left bundle branch block pattern.  Similar to previous.  This is an abnormal appearing EKG. [TB]      ED Course User Index  [TB] Xochitl Chatterjee MD       Lab Results (last 24 hours)       Procedure Component Value Units Date/Time    TISSUE EXAM, [facility] ([facility]) [[id number]] Collected: 01/26/24 1030    Specimen:  Tissue Updated: 01/26/24 1030    CBC & Differential [[id number]]  (Abnormal) Collected: 01/26/24 1921    Specimen: Blood Updated: 01/1955    Narrative:      The following orders were created for panel order CBC & Differential.  Procedure                               Abnormality         Status                     ---------                               -----------         ------                     CBC Auto Differential[[id number]]        Abnormal            Final result               Scan Slide[[id number]]                                                                    Please view results for these tests on the individual orders.    Comprehensive Metabolic Panel [[id number]]  (Abnormal) Collected: 01/26/24 1921    Specimen: Blood Updated: 01/26/24 2008     Glucose 107 mg/dL      BUN 16 mg/dL      Creatinine 1.70 mg/dL      Sodium 141 mmol/L      Potassium 3.6 mmol/L      Chloride 100 mmol/L      CO2 26.1 mmol/L      Calcium 9.2 mg/dL      Total Protein 8.1 g/dL      Albumin 4.3 g/dL      ALT (SGPT) 16 U/L      AST (SGOT) 18 U/L      Alkaline Phosphatase 97 U/L      Total Bilirubin 1.2 mg/dL      Globulin 3.8 gm/dL      A/G Ratio 1.1 g/dL      BUN/Creatinine Ratio 9.4     Anion Gap 14.9 mmol/L      eGFR 43.4 mL/min/1.73     Narrative:      GFR Normal >60  Chronic Kidney Disease <60  Kidney Failure <15      Lactic Acid, Plasma [[id number]]  (Abnormal) Collected: 01/26/24 1921    Specimen: Blood Updated: 01/26/24 2016     Lactate 3.8 mmol/L     CBC Auto Differential [[id number]]  (Abnormal) Collected: 01/26/24 1921    Specimen: Blood Updated: 01/1955     WBC 3.59 10*3/mm3      RBC 5.96 10*6/mm3      Hemoglobin 18.2 g/dL      Hematocrit 53.7 %      MCV 90.1 fL      MCH 30.5 pg      MCHC 33.9 g/dL      RDW 14.9 %      RDW-SD 49.3 fl      MPV 10.2 fL      Platelets 127 10*3/mm3      Neutrophil % 93.5 %      Lymphocyte % 4.5 %      Monocyte % 0.3 %      Eosinophil % 0.3 %      Basophil % 0.6 %      Immature Grans  "% 0.8 %      Neutrophils, Absolute 3.36 10*3/mm3      Lymphocytes, Absolute 0.16 10*3/mm3      Monocytes, Absolute 0.01 10*3/mm3      Eosinophils, Absolute 0.01 10*3/mm3      Basophils, Absolute 0.02 10*3/mm3      Immature Grans, Absolute 0.03 10*3/mm3      nRBC 0.0 /100 WBC     Procalcitonin [[id number]]  (Abnormal) Collected: 01/26/24 1921    Specimen: Blood Updated: 01/26/24 2037     Procalcitonin 4.22 ng/mL     Narrative:      As a Marker for Sepsis (Non-Neonates):    1. <0.5 ng/mL represents a low risk of severe sepsis and/or septic shock.  2. >2 ng/mL represents a high risk of severe sepsis and/or septic shock.    As a Marker for Lower Respiratory Tract Infections that require antibiotic therapy:    PCT on Admission    Antibiotic Therapy       6-12 Hrs later    >0.5                Strongly Recommended  >0.25 - <0.5        Recommended   0.1 - 0.25          Discouraged              Remeasure/reassess PCT  <0.1                Strongly Discouraged     Remeasure/reassess PCT    As 28 day mortality risk marker: \"Change in Procalcitonin Result\" (>80% or <=80%) if Day 0 (or Day 1) and Day 4 values are available. Refer to [email or web address]-[email or web address]    Change in PCT <=80%  A decrease of PCT levels below or equal to 80% defines a positive change in PCT test result representing a higher risk for 28-day all-cause mortality of patients diagnosed with severe sepsis for septic shock.    Change in PCT >80%  A decrease of PCT levels of more than 80% defines a negative change in PCT result representing a lower risk for 28-day all-cause mortality of patients diagnosed with severe sepsis or septic shock.       COVID-19 and FLU A/B PCR, 1 HR TAT - Swab, Nasopharynx [[id number]]  (Normal) Collected: 01/26/24 2035    Specimen: Swab from Nasopharynx Updated: 01/26/24 2058     COVID19 Not Detected     Influenza A PCR Not Detected     Influenza B PCR Not Detected    Narrative:      Fact sheet for providers: " https://www.fda.gov/media/667192/download    Fact sheet for patients: https://www.fda.gov/media/945947/download    Test performed by PCR.    Blood Culture With ECHO - Blood, Arm, Right [[id number]] Collected: 01/26/24 2039    Specimen: Blood from Arm, Right Updated: 01/26/24 2039    Blood Culture With ECHO - Blood, Arm, Left [[id number]] Collected: 01/26/24 2039    Specimen: Blood from Arm, Left Updated: 01/26/24 2039    Urinalysis With Culture If Indicated - Indwelling Urethral Catheter [[id number]]  (Abnormal) Collected: 01/26/24 2103    Specimen: Urine from Indwelling Urethral Catheter Updated: 01/26/24 2126     Color, UA Red     Appearance, UA Turbid     pH, UA 6.0     Specific Gravity, UA 1.020     Glucose,  mg/dL (2+)     Ketones, UA Negative     Bilirubin, UA Moderate (2+)     Blood, UA Large (3+)     Protein,  mg/dL (2+)     Leuk Esterase, UA Large (3+)     Nitrite, UA Positive     Urobilinogen, UA 0.2 E.U./dL    Narrative:      In absence of clinical symptoms, the presence of pyuria, bacteria, and/or nitrites on the urinalysis result does not correlate with infection.    Urinalysis, Microscopic Only - Indwelling Urethral Catheter [[id number]]  (Abnormal) Collected: 01/26/24 2103    Specimen: Urine from Indwelling Urethral Catheter Updated: 01/26/24 2128     RBC, UA 6-10 /HPF      WBC, UA 21-50 /HPF      Bacteria, UA 2+ /HPF      Squamous Epithelial Cells, UA 7-12 /HPF      Hyaline Casts, UA None Seen /LPF      Methodology Manual Light Microscopy    Urine Culture - Urine, Indwelling Urethral Catheter [[id number]] Collected: 01/26/24 2103    Specimen: Urine from Indwelling Urethral Catheter Updated: 01/26/24 2128    STAT Lactic Acid, Reflex [[id number]]  (Abnormal) Collected: 01/26/24 2313    Specimen: Blood Updated: 01/26/24 2356     Lactate 3.2 mmol/L     STAT Lactic Acid, Reflex [[id number]]  (Abnormal) Collected: 01/27/24 0203    Specimen: Blood Updated: 01/27/24 0231     Lactate 2.8 mmol/L               CT Abdomen Pelvis Without Contrast    Result Date: 1/26/2024  FINAL REPORT TECHNIQUE: Axial CT images were performed from the lung bases through the symphysis pubis without IV contrast.  This study was performed with techniques to keep radiation doses as low as reasonably achievable (ALARA). Individualized dose reduction techniques using automated exposure control or adjustment of mA and/or kV according to the patient's size were employed. CLINICAL HISTORY: Abdominal pain, post-op; prostate biopsy yesterday, woke today with weakness and sepsis symptoms FINDINGS: Lack of intravenous contrast limits evaluation of solid abdominal and pelvic organs.  LOWER CHEST: The heart is normal size.  The lung bases are clear.  ABDOMEN/PELVIS:  Liver, gallbladder and bile ducts: The unenhanced liver is grossly unremarkable without focal abnormality. there are gallstones without cholecystitis.  There is no definite biliary duct dilatation.  Adrenal glands: The adrenal glands are morphologically unremarkable without suspicious lesion. Kidneys, ureter and urinary bladder: No nephrolithiasis.  There are nonobstructing bilateral kidney stones.  There is urinary bladder wall thickening.  Spleen: The spleen is normal size. Pancreas: The pancreas is grossly unremarkable.  GI systems and mesentery: A gastric banding device is noted.  No evidence of bowel obstruction.  The appendix is not visualized, but there is no sign of appendicitis.  No significant mesenteric inflammation.  Lymph nodes: No definite pathologically enlarged abdominal or pelvic lymph nodes present within the limits of this noncontrast enhanced exam.  Vessels: The abdominal aorta is normal in caliber.  The inferior vena cava is unremarkable. Peritoneum: No free intraperitoneal fluid or pneumoperitoneum. Pelvic viscera: A Crespo catheter is inflated within the prostate gland.  Body wall: No body wall contusion.  Bones: No acute fracture.     Impression: 1.  Malpositioned  Crespo catheter with balloon inflated in the prostate gland. Authenticated and Electronically Signed by Jose J Dupree MD on 01/26/2024 11:38:21 PM        MDM     Amount and/or Complexity of Data Reviewed  Clinical lab tests: reviewed  Tests in the medicine section of CPT®: reviewed        Initial impression of presenting illness: Patient is a 69-year-old male here today with fever, hematuria, and altered mental status    DDX: includes but is not limited to: Sepsis, postop bleeding, pneumonia, among others    Patient arrives tachycardic, febrile with vitals interpreted by myself.     Pertinent features from physical exam: lethargic, tachycardia, tachypnea, GCS of 14, hematuria noted on urinal.    Initial diagnostic plan: CBC, CMP, lactate, procalcitonin, COVID/influenza swab, blood cultures, urinalysis, chest x-ray, EKG, and CT abdomen and pelvis    Results from initial plan were reviewed and interpreted by me revealing white blood count of 3.5, lactate of 3.8, procalcitonin of 4.22, and a creatinine of 1.7.  Urine appears to be grossly infected with positive nitrites, 21-50 white blood cells, 6-10 red blood cells, and 2+ bacteria.  He is negative for COVID and influenza.  CT scan does not show any acute process.    Diagnostic information from other sources: Medical record    Interventions / Re-evaluation: Patient was given IV fluids and had a catheter placed.  Also was given Tylenol for fever and fentanyl for pain control.    Results/clinical rationale were discussed with Dr. Chatterjee who spoke with Dr. De La Cruz as he was notified by the family.  Provided recommendations on having the patient admitted to the hospitalist and to give ertapenem.  Will see patient over the weekend.    Consultations/Discussion of results with other physicians: Discussed the patient with Dr. Cohen and advised him on the plan set by Dr. De La Cruz.  Excepted patient for admission.    Disposition plan: Patient admitted to the hospital for further  care and management.  -----        Final diagnoses:   Sepsis without acute organ dysfunction, due to unspecified organism   Acute cystitis with hematuria          Jake Collins YOHAN, APRN  01/27/24 0248      Electronically signed by Xochitl Chatterjee MD at 01/27/24 0551       Vital Signs (last 2 days)       Date/Time Temp Temp src Pulse Resp BP Patient Position SpO2    01/27/24 0740 97.4 (36.3) Oral 67 18 122/74 Lying 95    01/27/24 0409 97.9 (36.6) Oral -- 18 104/62 Lying 94    01/27/24 0153 -- -- 76 -- -- -- --    01/27/24 0100 97.6 (36.4) Oral 76 30 120/68 Lying 94    01/27/24 0030 -- -- 80 -- 127/72 -- 97    01/26/24 2358 -- -- 80 -- 118/66 -- 96    01/26/24 2330 98.3 (36.8) Oral 80 28 125/73 Lying 96    01/26/24 2301 -- -- 82 -- 123/72 -- 95    01/26/24 2232 -- -- 84 -- 128/76 -- 94    01/26/24 2230 -- -- 83 -- -- -- 93    01/26/24 2129 -- -- 98 32 125/85 -- 94    01/26/24 2059 -- -- 98 -- 143/83 -- 94    01/26/24 2034 -- -- 98 -- 136/83 -- 93    01/26/24 2032 100.1 (37.8) Oral 99 40 136/83 Sitting 92    01/26/24 2000 -- -- -- -- 147/88 -- --    01/26/24 19:14:27 103.2 (39.6) Oral -- -- -- -- --    01/26/24 1847 102.5 (39.2) -- 98 18 169/100 -- 95          Lab Results (last 48 hours)       Procedure Component Value Units Date/Time    Blood Culture With ECHO - Blood, Arm, Left [[id number]]  (Abnormal) Collected: 01/26/24 2039    Specimen: Blood from Arm, Left Updated: 01/27/24 1001     Blood Culture Abnormal Stain     Gram Stain Pediatric Bottle Gram negative bacilli    Blood Culture With ECHO - Blood, Arm, Right [[id number]]  (Abnormal) Collected: 01/26/24 2039    Specimen: Blood from Arm, Right Updated: 01/27/24 1001     Blood Culture Abnormal Stain     Gram Stain Anaerobic Bottle Gram negative bacilli      Aerobic Bottle Gram negative bacilli    Blood Culture ID, PCR - Blood, Arm, Right [[id number]]  (Abnormal) Collected: 01/26/24 2039    Specimen: Blood from Arm, Right Updated: 01/27/24 1000     BCID, PCR  Escherichia coli. Identification by BCID2 PCR.     BCID, PCR 2 CTX-M (ESBL) detected. Identification by BCID2 PCR     BOTTLE TYPE Anaerobic Bottle    STAT Lactic Acid, Reflex [[id number]]  (Abnormal) Collected: 01/27/24 0532    Specimen: Blood Updated: 01/27/24 0637     Lactate 2.3 mmol/L     Basic Metabolic Panel [[id number]]  (Abnormal) Collected: 01/27/24 0532    Specimen: Blood Updated: 01/27/24 0628     Glucose 139 mg/dL      BUN 18 mg/dL      Creatinine 1.81 mg/dL      Sodium 138 mmol/L      Potassium 3.6 mmol/L      Chloride 102 mmol/L      CO2 21.2 mmol/L      Calcium 8.1 mg/dL      BUN/Creatinine Ratio 9.9     Anion Gap 14.8 mmol/L      eGFR 40.2 mL/min/1.73     Narrative:      GFR Normal >60  Chronic Kidney Disease <60  Kidney Failure <15      CBC (No Diff) [[id number]]  (Abnormal) Collected: 01/27/24 0532    Specimen: Blood Updated: 01/27/24 0612     WBC 18.21 10*3/mm3      RBC 4.96 10*6/mm3      Hemoglobin 15.5 g/dL      Hematocrit 45.2 %      MCV 91.1 fL      MCH 31.3 pg      MCHC 34.3 g/dL      RDW 15.4 %      RDW-SD 51.4 fl      MPV 11.7 fL      Platelets 109 10*3/mm3     STAT Lactic Acid, Reflex [[id number]]  (Abnormal) Collected: 01/27/24 0203    Specimen: Blood Updated: 01/27/24 0231     Lactate 2.8 mmol/L     STAT Lactic Acid, Reflex [[id number]]  (Abnormal) Collected: 01/26/24 2313    Specimen: Blood Updated: 01/26/24 2356     Lactate 3.2 mmol/L     Urinalysis, Microscopic Only - Indwelling Urethral Catheter [[id number]]  (Abnormal) Collected: 01/26/24 2103    Specimen: Urine from Indwelling Urethral Catheter Updated: 01/26/24 2128     RBC, UA 6-10 /HPF      WBC, UA 21-50 /HPF      Bacteria, UA 2+ /HPF      Squamous Epithelial Cells, UA 7-12 /HPF      Hyaline Casts, UA None Seen /LPF      Methodology Manual Light Microscopy    Urine Culture - Urine, Indwelling Urethral Catheter [[id number]] Collected: 01/26/24 2103    Specimen: Urine from Indwelling Urethral Catheter Updated: 01/26/24 2128    Urinalysis  "With Culture If Indicated - Indwelling Urethral Catheter [[id number]]  (Abnormal) Collected: 01/26/24 2103    Specimen: Urine from Indwelling Urethral Catheter Updated: 01/26/24 2126     Color, UA Red     Appearance, UA Turbid     pH, UA 6.0     Specific Gravity, UA 1.020     Glucose,  mg/dL (2+)     Ketones, UA Negative     Bilirubin, UA Moderate (2+)     Blood, UA Large (3+)     Protein,  mg/dL (2+)     Leuk Esterase, UA Large (3+)     Nitrite, UA Positive     Urobilinogen, UA 0.2 E.U./dL    Narrative:      In absence of clinical symptoms, the presence of pyuria, bacteria, and/or nitrites on the urinalysis result does not correlate with infection.    COVID-19 and FLU A/B PCR, 1 HR TAT - Swab, Nasopharynx [[id number]]  (Normal) Collected: 01/26/24 2035    Specimen: Swab from Nasopharynx Updated: 01/26/24 2058     COVID19 Not Detected     Influenza A PCR Not Detected     Influenza B PCR Not Detected    Narrative:      Fact sheet for providers: https://www.fda.gov/media/688955/download    Fact sheet for patients: https://www.fda.gov/media/429465/download    Test performed by PCR.    Procalcitonin [[id number]]  (Abnormal) Collected: 01/26/24 1921    Specimen: Blood Updated: 01/26/24 2037     Procalcitonin 4.22 ng/mL     Narrative:      As a Marker for Sepsis (Non-Neonates):    1. <0.5 ng/mL represents a low risk of severe sepsis and/or septic shock.  2. >2 ng/mL represents a high risk of severe sepsis and/or septic shock.    As a Marker for Lower Respiratory Tract Infections that require antibiotic therapy:    PCT on Admission    Antibiotic Therapy       6-12 Hrs later    >0.5                Strongly Recommended  >0.25 - <0.5        Recommended   0.1 - 0.25          Discouraged              Remeasure/reassess PCT  <0.1                Strongly Discouraged     Remeasure/reassess PCT    As 28 day mortality risk marker: \"Change in Procalcitonin Result\" (>80% or <=80%) if Day 0 (or Day 1) and Day 4 values are " available. Refer to http://www.[facility]-pct-calculator.com    Change in PCT <=80%  A decrease of PCT levels below or equal to 80% defines a positive change in PCT test result representing a higher risk for 28-day all-cause mortality of patients diagnosed with severe sepsis for septic shock.    Change in PCT >80%  A decrease of PCT levels of more than 80% defines a negative change in PCT result representing a lower risk for 28-day all-cause mortality of patients diagnosed with severe sepsis or septic shock.       [place] [facility] [[id number]] Collected: 01/26/24 1921    Specimen: Blood Updated: 01/26/24 2030    Narrative:      The following orders were created for panel order [place] [facility].  Procedure                               Abnormality         Status                     ---------                               -----------         ------                     Green Top (Gel)[[id number]]                                  Final result               Lavender Top[[id number]]                                     Final result               Gold Top - SST[[id number]]                                   Final result               Light Blue Top[[id number]]                                   Final result                 Please view results for these tests on the individual orders.    Green Top (Gel) [[id number]] Collected: 01/26/24 1921    Specimen: Blood Updated: 01/26/24 2030     Extra Tube Hold for add-ons.     Comment: Auto resulted.       Lavender Top [[id number]] Collected: 01/26/24 1921    Specimen: Blood Updated: 01/26/24 2030     Extra Tube hold for add-on     Comment: Auto resulted       Gold Top - SST [[id number]] Collected: 01/26/24 1921    Specimen: Blood Updated: 01/26/24 2030     Extra Tube Hold for add-ons.     Comment: Auto resulted.       Light Blue Top [[id number]] Collected: 01/26/24 1921    Specimen: Blood Updated: 01/26/24 2030     Extra Tube Hold for add-ons.     Comment: Auto resulted       Lactic Acid, Plasma  [[id number]]  (Abnormal) Collected: 01/26/24 1921    Specimen: Blood Updated: 01/26/24 2016     Lactate 3.8 mmol/L     Comprehensive Metabolic Panel [[id number]]  (Abnormal) Collected: 01/26/24 1921    Specimen: Blood Updated: 01/26/24 2008     Glucose 107 mg/dL      BUN 16 mg/dL      Creatinine 1.70 mg/dL      Sodium 141 mmol/L      Potassium 3.6 mmol/L      Chloride 100 mmol/L      CO2 26.1 mmol/L      Calcium 9.2 mg/dL      Total Protein 8.1 g/dL      Albumin 4.3 g/dL      ALT (SGPT) 16 U/L      AST (SGOT) 18 U/L      Alkaline Phosphatase 97 U/L      Total Bilirubin 1.2 mg/dL      Globulin 3.8 gm/dL      A/G Ratio 1.1 g/dL      BUN/Creatinine Ratio 9.4     Anion Gap 14.9 mmol/L      eGFR 43.4 mL/min/1.73     Narrative:      GFR Normal >60  Chronic Kidney Disease <60  Kidney Failure <15      CBC & Differential [[id number]]  (Abnormal) Collected: 01/26/24 1921    Specimen: Blood Updated: 01/1956    Narrative:      The following orders were created for panel order CBC & Differential.  Procedure                               Abnormality         Status                     ---------                               -----------         ------                     CBC Auto Differential[[id number]]        Abnormal            Final result               Scan Slide[[id number]]                                                                    Please view results for these tests on the individual orders.    CBC Auto Differential [[id number]]  (Abnormal) Collected: 01/26/24 1921    Specimen: Blood Updated: 01/1956     WBC 3.59 10*3/mm3      RBC 5.96 10*6/mm3      Hemoglobin 18.2 g/dL      Hematocrit 53.7 %      MCV 90.1 fL      MCH 30.5 pg      MCHC 33.9 g/dL      RDW 14.9 %      RDW-SD 49.3 fl      MPV 10.2 fL      Platelets 127 10*3/mm3      Neutrophil % 93.5 %      Lymphocyte % 4.5 %      Monocyte % 0.3 %      Eosinophil % 0.3 %      Basophil % 0.6 %      Immature Grans % 0.8 %      Neutrophils, Absolute 3.36 10*3/mm3       Lymphocytes, Absolute 0.16 10*3/mm3      Monocytes, Absolute 0.01 10*3/mm3      Eosinophils, Absolute 0.01 10*3/mm3      Basophils, Absolute 0.02 10*3/mm3      Immature Grans, Absolute 0.03 10*3/mm3      nRBC 0.0 /100 WBC           Imaging Results (Last 48 Hours)       Procedure Component Value Units Date/Time    XR Chest 1 View [[id number]] Collected: 01/27/24 0746     Updated: 01/27/24 0749    Narrative:      PORTABLE CHEST  1/26/2024 7:56 PM     HISTORY: Chest pain protocol     COMPARISON:   None     FINDINGS: Left chest wall ICD. The cardiac silhouette is enlarged. The  mediastinal and hilar contours are unremarkable.  The lungs are clear.  There is no pneumothorax. The visualized osseous structures demonstrate  no acute abnormalities.       Impression:      No acute cardiopulmonary process.                       This report was signed and finalized on 1/27/2024 7:47 AM by Torey Allen MD.       CT Abdomen Pelvis Without Contrast [[id number]] Collected: 01/26/24 2338     Updated: 01/26/24 2339    Narrative:      FINAL REPORT    TECHNIQUE:  Axial CT images were performed from the lung bases through the  symphysis pubis without IV contrast.  This study was performed  with techniques to keep radiation doses as low as reasonably  achievable (ALARA). Individualized dose reduction techniques  using automated exposure control or adjustment of mA and/or kV  according to the patient's size were employed.    CLINICAL HISTORY:  Abdominal pain, post-op; prostate biopsy yesterday, woke today  with weakness and sepsis symptoms    FINDINGS:  Lack of intravenous contrast limits evaluation of solid  abdominal and pelvic organs.  LOWER CHEST: The heart is normal  size.  The lung bases are clear.  ABDOMEN/PELVIS:  Liver,  gallbladder and bile ducts: The unenhanced liver is grossly  unremarkable without focal abnormality. there are gallstones  without cholecystitis.  There is no definite biliary duct  dilatation.  Adrenal  glands: The adrenal glands are  morphologically unremarkable without suspicious lesion.  Kidneys, ureter and urinary bladder: No nephrolithiasis.  There  are nonobstructing bilateral kidney stones.  There is urinary  bladder wall thickening.  Spleen: The spleen is normal size.  Pancreas: The pancreas is grossly unremarkable.  GI systems and  mesentery: A gastric banding device is noted.  No evidence of  bowel obstruction.  The appendix is not visualized, but there is  no sign of appendicitis.  No significant mesenteric  inflammation.  Lymph nodes: No definite pathologically enlarged  abdominal or pelvic lymph nodes present within the limits of  this noncontrast enhanced exam.  Vessels: The abdominal aorta is  normal in caliber.  The inferior vena cava is unremarkable.  Peritoneum: No free intraperitoneal fluid or pneumoperitoneum.  Pelvic viscera: A Crespo catheter is inflated within the prostate  gland.  Body wall: No body wall contusion.  Bones: No acute  fracture.      Impression:      1.  Malpositioned Crespo catheter with balloon inflated in the  prostate gland.    Authenticated and Electronically Signed by Jose J Dupree MD on  01/26/2024 11:38:21 PM          Physician Progress Notes (last 24 hours)  Notes from 01/26/24 1017 through 01/27/24 1017   No notes of this type exist for this encounter.       Consult Notes (last 24 hours)  Notes from 01/26/24 1017 through 01/27/24 1017   No notes of this type exist for this encounter.

## 2024-01-27 NOTE — H&P
[facility]   HISTORY AND PHYSICAL      Name:  Andre Agosto   Age:  69 y.o.  Sex:  male  :  1955  MRN:  [id number]   Visit Number:  [id number]  Admission Date:  2024  Date Of Service:  24  Primary Care Physician:  Keven Bear MD    Chief Complaint:     Fever, confusion, dysuria    History Of Presenting Illness:      Patient is a 69-year-old with multiple medical comorbidities including CAD, prior stroke, obstructive sleep apnea, hypothyroidism, diabetes, nephrolithiasis, seizure disorder, atrial fibrillation, who presented from home via EMS with multiple complaints.  Patient apparently had just underwent a prostate biopsy with Dr. De La Cruz about 24 hours ago.  He had taken Xarelto the night after his procedure.  He noted developing a fever earlier in the day on the , become increasingly confused, felt dehydrated.  Was having pain with urination and fairly bright blood noted.  Currently is feeling some better, was noted to have fever up to 104 upon arrival.  History obtained from patient and daughter at bedside.    In the ER, he was febrile.  He had blood pressure 140/80 range he was nonhypoxic on 2 L of oxygen and had a heart rate in the 80s.  Creatinine 1.7 down to lactic acid 3.8 white count 3500 hemoglobin 18 and platelets 127.  Procalcitonin 4.22.  Urinalysis positive nitrites, leukocyte Estrace, large blood.  He was given IV fluid resuscitation with 1.5 L of saline, pain control, and Invanz.  He had a CT scan abdomen pelvis without contrast which was demonstrating nonobstructing kidney stones bilaterally, Crespo catheter with balloon inflated in the prostate gland, otherwise no other findings.  ER did talk with Dr. De La Cruz who recommended admission and placement of catheter and antibiotics.  He will see over the weekend in consultation.  We were asked admit thereafter    Review Of Systems:    All systems were reviewed and negative except as mentioned in history  of presenting illness, assessment and plan.    Past Medical History: Patient  has a past medical history of 6th nerve palsy, right, Anxiety and depression, Atrial fibrillation, persistent (12/02/2020), Coronary artery disease involving native coronary artery of native heart without angina pectoris (09/12/2018), CRI (chronic renal insufficiency), stage 2 (mild), CVA (cerebral vascular accident), Diabetes mellitus, Disease of thyroid gland, Double vision, Elevated cholesterol, Elevated prostate specific antigen (PSA) (11/08/2023), Focal seizure, Heart attack, History of Helicobacter pylori infection, HL (hearing loss), Hyperlipidemia, Hypertension, Kidney stone, Memory loss (2005), Meningitis (2005), Obesity, Sleep apnea treated with nocturnal BiPAP, Stroke, Ventricular tachycardia, and Wears glasses.    Past Surgical History: Patient  has a past surgical history that includes Coronary stent placement (2015); Laparoscopic gastric banding (2008); Umbilical hernia repair (2008); Colonoscopy (10/2020); Cardiac catheterization (N/A, 10/12/2018); Pacemaker Implantation (07/25/2021); Cardiac defibrillator placement (2021); Cardiac catheterization (03/2021); Cardiac catheterization (07/2021); ureteroscopy laser lithotripsy with stent insertion (Left, 10/20/2021); ureteroscopy laser lithotripsy with stent insertion (Left, 11/03/2021); and ureteroscopy laser lithotripsy with stent insertion (Left, 10/06/2022).    Social History: Patient  reports that he quit smoking about 38 years ago. His smoking use included cigarettes. He started smoking about 49 years ago. He has a 7.50 pack-year smoking history. He has never used smokeless tobacco. He reports that he does not drink alcohol and does not use drugs.    Family History:  Patient's family history has been reviewed and found to be noncontributory.     Allergies:      Amlodipine and Statins    Home Medications:    Prior to Admission Medications       Prescriptions Last Dose  Informant Patient Reported? Taking?    amiodarone (PACERONE) 100 MG tablet   No No    Take 1 tablet by mouth Daily.    amLODIPine (NORVASC) 10 MG tablet   Yes No    Take 1 tablet by mouth Daily.    carvedilol (Coreg) 25 MG tablet  Self No No    Take 1 tablet by mouth 2 (Two) Times a Day With Meals.    Cholecalciferol (Vitamin D3) 25 MCG (1000 UT) capsule   No No    Take 2 capsules by mouth Daily.    ciprofloxacin (Cipro) 500 MG tablet   No No    Take 1 tablet by mouth 2 (Two) Times a Day. Start taking the day prior to biopsy    divalproex (DEPAKOTE) 250 MG DR tablet   No No    750mg QAM and 500mg QHS    Entresto  MG tablet   Yes No    Take 1 tablet by mouth 2 (Two) Times a Day.    Farxiga 10 MG tablet  Self Yes No    Take 10 mg by mouth Daily.    furosemide (LASIX) 40 MG tablet   Yes No    Take 1 tablet by mouth 2 (Two) Times a Day.    levothyroxine (SYNTHROID, LEVOTHROID) 88 MCG tablet   No No    TAKE 1 TABLET DAILY    Semaglutide,0.25 or 0.5MG/DOS, (Ozempic, 0.25 or 0.5 MG/DOSE,) 2 MG/1.5ML solution pen-injector   No No    Inject 0.5 mg under the skin into the appropriate area as directed Every 7 (Seven) Days.    tamsulosin (FLOMAX) 0.4 MG capsule 24 hr capsule   No No    Take 1 capsule by mouth Daily.    Xarelto 15 MG tablet  Self Yes No    Take 1 tablet by mouth Daily With Dinner.          ED Medications:    Medications   sodium chloride 0.9 % flush 10 mL (has no administration in time range)   acetaminophen (TYLENOL) tablet 1,000 mg (1,000 mg Oral Not Given 1/26/24 2107)   sodium chloride 0.9 % bolus 1,000 mL (0 mL Intravenous Stopped 1/26/24 2018)   Lidocaine HCl gel (XYLOCAINE) urethral/mucosal syringe ( Urethral Given 1/26/24 2008)   ertapenem (INVanz) 1000 mg/100 mL 0.9% NS VTB (mbp) (0 mg Intravenous Stopped 1/26/24 2114)   fentaNYL citrate (PF) (SUBLIMAZE) injection 50 mcg (50 mcg Intravenous Given 1/26/24 2130)     Vital Signs:  Temp:  [100.1 °F (37.8 °C)-103.2 °F (39.6 °C)] 100.1 °F (37.8  °C)  Heart Rate:  [82-99] 82  Resp:  [18-40] 32  BP: (123-169)/() 123/72    There were no vitals filed for this visit.  There is no height or weight on file to calculate BMI.    Physical Exam:     Most recent vital Signs: /72   Pulse 82   Temp 100.1 °F (37.8 °C) (Oral)   Resp (!) 32   SpO2 95%     Physical Exam  Constitutional:       Appearance: He is obese. He is ill-appearing.   HENT:      Mouth/Throat:      Mouth: Mucous membranes are dry.   Eyes:      Extraocular Movements: Extraocular movements intact.      Pupils: Pupils are equal, round, and reactive to light.   Cardiovascular:      Rate and Rhythm: Normal rate and regular rhythm.      Pulses: Normal pulses.      Heart sounds: No murmur heard.  Pulmonary:      Effort: Pulmonary effort is normal.      Breath sounds: No wheezing or rhonchi.   Abdominal:      General: Abdomen is flat. Bowel sounds are normal. There is no distension.      Tenderness: There is abdominal tenderness.      Hernia: No hernia is present.      Comments: Crespo cath, blood noted   Musculoskeletal:         General: Normal range of motion.      Right lower leg: No edema.      Left lower leg: No edema.   Skin:     General: Skin is warm.      Capillary Refill: Capillary refill takes less than 2 seconds.      Findings: No bruising or lesion.   Neurological:      General: No focal deficit present.      Mental Status: He is alert. Mental status is at baseline.      Motor: Weakness present.   Psychiatric:         Mood and Affect: Mood normal.         Thought Content: Thought content normal.         Laboratory data:    I have reviewed the labs done in the emergency room.    Results from last 7 days   Lab Units 01/26/24  1921 01/24/24  1316   SODIUM mmol/L 141 141   POTASSIUM mmol/L 3.6 3.4*   CHLORIDE mmol/L 100 99   CO2 mmol/L 26.1 27.0   BUN mg/dL 16 12   CREATININE mg/dL 1.70* 1.50*   CALCIUM mg/dL 9.2 9.6   BILIRUBIN mg/dL 1.2 0.9   ALK PHOS U/L 97 81   ALT (SGPT) U/L 16 17    AST (SGOT) U/L 18 16   GLUCOSE mg/dL 107* 109*     Results from last 7 days   Lab Units 01/26/24  1921 01/24/24  1316   WBC 10*3/mm3 3.59 6.70   HEMOGLOBIN g/dL 18.2* 19.0*   HEMATOCRIT % 53.7* 57.2*   PLATELETS 10*3/mm3 127* 186     Results from last 7 days   Lab Units 01/24/24  1116   INR  1.20*                         Results from last 7 days   Lab Units 01/26/24  2103   COLOR UA  Red*   GLUCOSE UA  500 mg/dL (2+)*   KETONES UA  Negative   BLOOD UA  Large (3+)*   LEUKOCYTES UA  Large (3+)*   PH, URINE  6.0   BILIRUBIN UA  Moderate (2+)*   UROBILINOGEN UA  0.2 E.U./dL   RBC UA /HPF 6-10*   WBC UA /HPF 21-50*       Pain Management Panel  More data exists         Latest Ref Rng & Units 12/9/2022 8/8/2020   Pain Management Panel   Creatinine, Urine mg/dL 95.6  185.7        EKG:      EKG appears to demonstrate likely paced rhythm, rate of 100, left bundle branch block.    Radiology:    CT Abdomen Pelvis Without Contrast    Result Date: 1/26/2024  FINAL REPORT TECHNIQUE: Axial CT images were performed from the lung bases through the symphysis pubis without IV contrast.  This study was performed with techniques to keep radiation doses as low as reasonably achievable (ALARA). Individualized dose reduction techniques using automated exposure control or adjustment of mA and/or kV according to the patient's size were employed. CLINICAL HISTORY: Abdominal pain, post-op; prostate biopsy yesterday, woke today with weakness and sepsis symptoms FINDINGS: Lack of intravenous contrast limits evaluation of solid abdominal and pelvic organs.  LOWER CHEST: The heart is normal size.  The lung bases are clear.  ABDOMEN/PELVIS:  Liver, gallbladder and bile ducts: The unenhanced liver is grossly unremarkable without focal abnormality. there are gallstones without cholecystitis.  There is no definite biliary duct dilatation.  Adrenal glands: The adrenal glands are morphologically unremarkable without suspicious lesion. Kidneys, ureter  and urinary bladder: No nephrolithiasis.  There are nonobstructing bilateral kidney stones.  There is urinary bladder wall thickening.  Spleen: The spleen is normal size. Pancreas: The pancreas is grossly unremarkable.  GI systems and mesentery: A gastric banding device is noted.  No evidence of bowel obstruction.  The appendix is not visualized, but there is no sign of appendicitis.  No significant mesenteric inflammation.  Lymph nodes: No definite pathologically enlarged abdominal or pelvic lymph nodes present within the limits of this noncontrast enhanced exam.  Vessels: The abdominal aorta is normal in caliber.  The inferior vena cava is unremarkable. Peritoneum: No free intraperitoneal fluid or pneumoperitoneum. Pelvic viscera: A Crespo catheter is inflated within the prostate gland.  Body wall: No body wall contusion.  Bones: No acute fracture.     1.  Malpositioned Crespo catheter with balloon inflated in the prostate gland. Authenticated and Electronically Signed by Jose J Dupree MD on 01/26/2024 11:38:21 PM     Assessment:    Sepsis, due to unknown organism, POA  Complicated urinary tract infection, present on admission  Gross hematuria, POA  Chronic kidney disease stage III, POA  Lactic acidosis likely due to #1  CAD  History of atrial fibrillation, pacemaker  Obstructive sleep apnea with BiPAP  Seizure disorder  Obesity    Plan:    Admit as inpatient    Sepsis/UTI/hematuria:  Patient had been on ciprofloxacin prior to procedure per report.  Patient's daughter noted had fairly severe illness in the past requiring a PICC line, on review of chart it appears she had Providencia rustigianni in the past.  Antibiotic treatment with Invanz.  Will continue on IV fluids overnight he does not appear volume overloaded but with renal failure and heart conditions will monitor.  Continue with Crespo catheter.  Will place consult for Dr. De La Cruz who is available for patient over the weekend.    CKD 3:  Current numbers are  similar to prior, appears creatinines around 1.4 range at baseline.  Will monitor urine output.  He currently appears dehydrated and with sepsis.  Hold his Lasix and Entresto for now.    Atrial fibrillation:  Pacemaker, amiodarone will be continued.  Will hold his Xarelto due to significant hematuria for now.    ALEX:  Can use home BiPAP    Seizure disorder:  Continue his Depakote.    Patient otherwise meets inpatient level of care than anticipated stay greater than 2 midnights.  Further recommendations are depend upon improvement of clinical condition.  Plan of care discussed with the patient and his daughter at bedside.    Risk Assessment: High  DVT Prophylaxis: SCDs due to gross hematuria  Code Status: Full  Diet: Cardiac    Advance Care Planning   ACP discussion was held with the patient during this visit. Patient does not have an advance directive, declines further assistance.           Evelyn Cohen DO  01/26/24  23:45 EST    Dictated utilizing Dragon dictation.

## 2024-01-27 NOTE — CONSULTS
Reason for Consult  Post TRUS/BX sepsis    HPI  Mr. Agosto is a 69 y.o. male with PMHx of nephrolithiasis, BPH, URS/LL in 2022 (with UTI/sepsis after that) who presents with sepsis after prostate biopsy on 1/25/24.    He had a negative urinalysis when he was last seen in the fall, and had rectal culture screening with appropriate fluoroquinolone antibiotic prior to entering his prostate biopsy on the 25th.  Unfortunately he developed fevers and gross hematuria yesterday, presented to the ER with signs of sepsis and urinary tract infection.  He also had gross hematuria and Crespo catheter was placed, however it was inflated within the prostate.It was later repositioned and he was started on ertapenem.    Past Medical History  Past Medical History:   Diagnosis Date    6th nerve palsy, right     right eye     Anxiety and depression     Atrial fibrillation, persistent 12/02/2020    on Xarelto    Coronary artery disease involving native coronary artery of native heart without angina pectoris 09/12/2018    follows w/ Dr. Mendoza    CRI (chronic renal insufficiency), stage 2 (mild)     CVA (cerebral vascular accident)     Diabetes mellitus     doesnt check sugar, type 2     Disease of thyroid gland     Double vision     resolved- right eye    Elevated cholesterol     Elevated prostate specific antigen (PSA) 11/08/2023    Focal seizure     of right arm     Heart attack     NSTEMI 2015, s/p stenting    History of Helicobacter pylori infection     in 2008, treated w/ PrevPak - 2021 EGD bx (+), PCP RX - PPI/Augmentin/Doxy/Flagyl (x 14 days) 1/22/21    HL (hearing loss)     (L) ear - child luevano measles    Hyperlipidemia     Hypertension     Kidney stone     Memory loss 2005    d/t meningitis    Meningitis 2005    unsure of bacterial or viral     Obesity     Sleep apnea treated with nocturnal BiPAP     compliant with  machine     Stroke     2017/2018    Ventricular tachycardia     3 different times    Wears glasses        Past  Surgical History  Past Surgical History:   Procedure Laterality Date    CARDIAC CATHETERIZATION N/A 10/12/2018    Procedure: Left Heart Cath;  Surgeon: Yoselin Johnson MD;  Location:  [facility];  Service: Cardiology    CARDIAC CATHETERIZATION  03/2021    stent    CARDIAC CATHETERIZATION  07/2021    just checking the after pacemaker placed- Dr. Tim    CARDIAC DEFIBRILLATOR PLACEMENT  2021    COLONOSCOPY  10/2020    w/ Dr. Jacobs, hx colon polyps    CORONARY STENT PLACEMENT  2015    LAPAROSCOPIC GASTRIC BANDING  2008    s/p LAGB Realize 6/2008 by [facility].    PACEMAKER IMPLANTATION  07/25/2021    UMBILICAL HERNIA REPAIR  2008    incarcerated UHR w/ mesh by Dr. Velasquez    URETEROSCOPY LASER LITHOTRIPSY WITH STENT INSERTION Left 10/20/2021    Procedure: URETEROSCOPY LASER LITHOTRIPSY WITH STENT INSERTION;  Surgeon: Tonio De La Cruz MD;  Location: [facility] OR;  Service: Urology;  Laterality: Left;    URETEROSCOPY LASER LITHOTRIPSY WITH STENT INSERTION Left 11/03/2021    Procedure: URETEROSCOPY LEFT, LEFT RETROGRADE PYELOGRAM, CYSTOSCOPY, LASER LITHOTRIPSY WITH STENT EXCHANGE;  Surgeon: Tonio De La Cruz MD;  Location: [facility] OR;  Service: Urology;  Laterality: Left;    URETEROSCOPY LASER LITHOTRIPSY WITH STENT INSERTION Left 10/06/2022    Procedure: URETEROSCOPY LASER LITHOTRIPSY WITH STENT INSERTION;  Surgeon: Tonio De La Cruz MD;  Location: [facility] OR;  Service: Urology;  Laterality: Left;       Medications    Current Facility-Administered Medications:     acetaminophen (TYLENOL) tablet 650 mg, 650 mg, Oral, Q4H PRN, 650 mg at 01/27/24 0914 **OR** acetaminophen (TYLENOL) 160 MG/5ML oral solution 650 mg, 650 mg, Oral, Q4H PRN **OR** acetaminophen (TYLENOL) suppository 650 mg, 650 mg, Rectal, Q4H PRN, Evelyn Cohen DO    acetaminophen (TYLENOL) tablet 1,000 mg, 1,000 mg, Oral, Once, Evelyn Cohen,     aluminum-magnesium hydroxide-simethicone (MAALOX MAX) 400-400-40  MG/5ML suspension 15 mL, 15 mL, Oral, Q6H PRN, Evelyn Cohen DO    amiodarone (PACERONE) tablet 100 mg, 100 mg, Oral, Daily, Evelyn Cohen DO, 100 mg at 01/27/24 0913    amLODIPine (NORVASC) tablet 10 mg, 10 mg, Oral, Nightly, Evelyn Cohen DO    sennosides-docusate (PERICOLACE) 8.6-50 MG per tablet 2 tablet, 2 tablet, Oral, BID **AND** polyethylene glycol (MIRALAX) packet 17 g, 17 g, Oral, Daily PRN **AND** bisacodyl (DULCOLAX) EC tablet 5 mg, 5 mg, Oral, Daily PRN **AND** bisacodyl (DULCOLAX) suppository 10 mg, 10 mg, Rectal, Daily PRN, Evelyn Cohen DO    carvedilol (COREG) tablet 25 mg, 25 mg, Oral, BID With Meals, Evelyn Cohen DO, 25 mg at 01/27/24 0153    divalproex (DEPAKOTE) DR tablet 500 mg, 500 mg, Oral, Nightly, Evelyn Cohen, DO, 500 mg at 01/27/24 0153    divalproex (DEPAKOTE) DR tablet 750 mg, 750 mg, Oral, QAM, Evelyn Cohen, DO, 750 mg at 01/27/24 0913    ertapenem (INVanz) 1000 mg/100 mL 0.9% NS VTB (mbp), 1,000 mg, Intravenous, Q24H, Evelyn Cohen DO    levothyroxine (SYNTHROID, LEVOTHROID) tablet 88 mcg, 88 mcg, Oral, Daily, Evelyn Cohen DO, 88 mcg at 01/27/24 0914    melatonin tablet 5 mg, 5 mg, Oral, Nightly PRN, Evelyn Cohen, , 5 mg at 01/27/24 0259    morphine injection 3 mg, 3 mg, Intravenous, Q4H PRN, Evelyn Cohen DO    nitroglycerin (NITROSTAT) SL tablet 0.4 mg, 0.4 mg, Sublingual, Q5 Min PRN, Evelyn Cohen DO    ondansetron ODT (ZOFRAN-ODT) disintegrating tablet 4 mg, 4 mg, Oral, Q6H PRN **OR** ondansetron (ZOFRAN) injection 4 mg, 4 mg, Intravenous, Q6H PRN, Evelyn Cohen DO    oxyCODONE-acetaminophen (PERCOCET) 7.5-325 MG per tablet 1 tablet, 1 tablet, Oral, Q4H PRN, Evelyn Cohen DO, 1 tablet at 01/27/24 1222    sodium chloride 0.9 % flush 10 mL, 10 mL, Intravenous, PRN, Evelyn Cohen DO    sodium chloride 0.9 % flush 10 mL, 10 mL,  "Intravenous, Q12H, Evelyn Cohenus, DO, 10 mL at 24 09    sodium chloride 0.9 % flush 10 mL, 10 mL, Intravenous, PRN, Evelyn Cohen Pee, DO    sodium chloride 0.9 % infusion 40 mL, 40 mL, Intravenous, PRN, Noel, Evelyn Pee, DO    sodium chloride 0.9 % infusion, 100 mL/hr, Intravenous, Continuous, Evelyn Cohenus, DO, Last Rate: 100 mL/hr at 24, 100 mL/hr at 24    tamsulosin (FLOMAX) 24 hr capsule 0.4 mg, 0.4 mg, Oral, Daily, Evelyn Cohen, , 0.4 mg at 24    Allergies  Allergies   Allergen Reactions    Statins Myalgia and Other (See Comments)       Social History  Social History     Socioeconomic History    Marital status:    Tobacco Use    Smoking status: Former     Packs/day: 0.50     Years: 15.00     Additional pack years: 0.00     Total pack years: 7.50     Types: Cigarettes     Start date: 1975     Quit date: 1985     Years since quittin.0    Smokeless tobacco: Never   Vaping Use    Vaping Use: Never used   Substance and Sexual Activity    Alcohol use: No    Drug use: No    Sexual activity: Defer       Family History  Family History   Problem Relation Age of Onset    Stroke Mother     Hypertension Mother     COPD Father     Hypertension Father      Physical Exam  /61 (BP Location: Left arm, Patient Position: Lying)   Pulse 69   Temp 97.3 °F (36.3 °C) (Axillary)   Resp 20   Ht 188 cm (74\")   Wt (!) 141 kg (311 lb 1.1 oz)   SpO2 94%   BMI 39.94 kg/m²   Constitutional: NAD, WDWN.     Urine: Yellow, concentrated    I/O last 3 completed shifts:  In: 2130 [P.O.:120; I.V.:410; IV Piggyback:1600]  Out: 210 [Urine:210]    Labs  Lab Results   Component Value Date    GLUCOSE 139 (H) 2024    CALCIUM 8.1 (L) 2024     2024    K 3.6 2024    CO2 21.2 (L) 2024     2024    BUN 18 2024    CREATININE 1.81 (H) 2024    EGFRIFAFRI 57 (L) 2018    EGFRIFNONA 55 " "(L) 01/10/2022    BCR 9.9 01/27/2024    ANIONGAP 14.8 01/27/2024       Lab Results   Component Value Date    WBC 18.21 (H) 01/27/2024    HGB 15.5 01/27/2024    HCT 45.2 01/27/2024    MCV 91.1 01/27/2024     (L) 01/27/2024       Brief Urine Lab Results  (Last result in the past 365 days)        Color   Clarity   Blood   Leuk Est   Nitrite   Protein   CREAT   Urine HCG        01/26/24 2103 Red   Turbid   Large (3+)   Large (3+)   Positive   100 mg/dL (2+)                   No results found for: \"URINECX\"    Radiographic Studies  XR Chest 1 View    Result Date: 1/27/2024  PORTABLE CHEST  1/26/2024 7:56 PM  HISTORY: Chest pain protocol  COMPARISON:   None  FINDINGS: Left chest wall ICD. The cardiac silhouette is enlarged. The mediastinal and hilar contours are unremarkable.  The lungs are clear. There is no pneumothorax. The visualized osseous structures demonstrate no acute abnormalities.      No acute cardiopulmonary process.        This report was signed and finalized on 1/27/2024 7:47 AM by Torey Allen MD.      CT Abdomen Pelvis Without Contrast    Result Date: 1/26/2024  FINAL REPORT TECHNIQUE: Axial CT images were performed from the lung bases through the symphysis pubis without IV contrast.  This study was performed with techniques to keep radiation doses as low as reasonably achievable (ALARA). Individualized dose reduction techniques using automated exposure control or adjustment of mA and/or kV according to the patient's size were employed. CLINICAL HISTORY: Abdominal pain, post-op; prostate biopsy yesterday, woke today with weakness and sepsis symptoms FINDINGS: Lack of intravenous contrast limits evaluation of solid abdominal and pelvic organs.  LOWER CHEST: The heart is normal size.  The lung bases are clear.  ABDOMEN/PELVIS:  Liver, gallbladder and bile ducts: The unenhanced liver is grossly unremarkable without focal abnormality. there are gallstones without cholecystitis.  There is no " definite biliary duct dilatation.  Adrenal glands: The adrenal glands are morphologically unremarkable without suspicious lesion. Kidneys, ureter and urinary bladder: No nephrolithiasis.  There are nonobstructing bilateral kidney stones.  There is urinary bladder wall thickening.  Spleen: The spleen is normal size. Pancreas: The pancreas is grossly unremarkable.  GI systems and mesentery: A gastric banding device is noted.  No evidence of bowel obstruction.  The appendix is not visualized, but there is no sign of appendicitis.  No significant mesenteric inflammation.  Lymph nodes: No definite pathologically enlarged abdominal or pelvic lymph nodes present within the limits of this noncontrast enhanced exam.  Vessels: The abdominal aorta is normal in caliber.  The inferior vena cava is unremarkable. Peritoneum: No free intraperitoneal fluid or pneumoperitoneum. Pelvic viscera: A Kimball catheter is inflated within the prostate gland.  Body wall: No body wall contusion.  Bones: No acute fracture.     1.  Malpositioned Kimball catheter with balloon inflated in the prostate gland. Authenticated and Electronically Signed by Jose J Dupree MD on 01/26/2024 11:38:21 PM      I have personally reviewed these labs and imaging.     Assessment  Mr. Agosto is a 69 y.o. male with  PMHx of nephrolithiasis, BPH, URS/LL in 2022 (with UTI/sepsis after that) who presents with sepsis after prostate biopsy on 1/25/24. Improving with ertapenem, fluids, and kimball.     Plan  1.  Continue ertapenem. He will need PICC at discharge and 2 weeks duration.     2. Keep kimball in until he follows up with me in just over a week due to large 67 cc prostate and infection. We will do fill and void at appt on 2/8/24 and I will discuss path results.     3. Recommend either increasing fluids or IV fluid bolus due to recent sepsis, concentrated urine, and azotemia, at discretion of hospital medicine, given Hx of EF 45%.     This was an audio and video enabled  telemedicine encounter.

## 2024-01-27 NOTE — PROGRESS NOTES
[facility]    Name:  Andre Agosto   Age:  69 y.o.  Sex:  male  :  1955  MRN:  [id number]   Visit Number:  [id number]  Admission Date:  2024  Date Of Service:  24  Primary Care Physician:  Keven Bear MD     LOS: 1 day :  Patient Care Team:  Keven Bear MD as PCP - General (Internal Medicine)  [employer] as Vendor (DME Services):    Chief Complaint:    UTI    Subjective / Interval History:   Patient was seen this morning with daughter at bedside.  He had a fever and was diaphoretic early this morning but now feels much better.  He denied any chest pains.  Crespo catheter remains in place with orange urine output.  Daughter notes that the patient's blood pressures are on the low side for the patient.  He normally runs in the 150-160s.     Hospital Course:   Patient is a 69-year-old with multiple medical comorbidities including CAD, prior stroke, obstructive sleep apnea, hypothyroidism, diabetes, nephrolithiasis, seizure disorder, atrial fibrillation, who presented from home via EMS with multiple complaints.  Patient apparently had just underwent a prostate biopsy with Dr. De La Cruz about 24 hours prior to admission.  He had taken Xarelto the night after his procedure.  He noted developing a fever earlier in the day on the , become increasingly confused, felt dehydrated.  Was having pain with urination and fairly bright blood noted. He was noted to have fever up to 104 upon arrival.  History obtained from patient and daughter at bedside.     In the ER, he was febrile.  He had blood pressure 140/80 range he was nonhypoxic on 2 L of oxygen and had a heart rate in the 80s.  Creatinine 1.7 down to lactic acid 3.8 white count 3500 hemoglobin 18 and platelets 127.  Procalcitonin 4.22.  Urinalysis positive nitrites, leukocyte Estrace, large blood.  He was given IV fluid resuscitation with 1.5 L of saline, pain control, and Invanz.  He had a CT  scan abdomen pelvis without contrast which was demonstrating nonobstructing kidney stones bilaterally, Crespo catheter with balloon inflated in the prostate gland, otherwise no other findings.  ER did talk with Dr. De La Cruz who recommended admission and placement of catheter and antibiotics such that he could monitor over the weekend.       Review of Systems:   Review of Systems   Constitutional:  Negative for chills, fatigue and fever.   HENT:  Negative for congestion, ear pain, rhinorrhea, sinus pressure and sore throat.    Eyes:  Negative for visual disturbance.   Respiratory:  Negative for cough, chest tightness, shortness of breath and wheezing.    Cardiovascular:  Negative for chest pain, palpitations and leg swelling.   Gastrointestinal:  Negative for abdominal pain, blood in stool, constipation, diarrhea, nausea and vomiting.   Endocrine: Negative for polydipsia and polyuria.   Genitourinary:  Negative for dysuria and hematuria.   Musculoskeletal:  Negative for arthralgias and back pain.   Skin:  Negative for rash.   Neurological:  Negative for dizziness, light-headedness, numbness and headaches.   Psychiatric/Behavioral:  Negative for dysphoric mood and sleep disturbance. The patient is not nervous/anxious.          Vital Signs:    Temp:  [97.4 °F (36.3 °C)-103.2 °F (39.6 °C)] 97.7 °F (36.5 °C)  Heart Rate:  [67-99] 68  Resp:  [18-40] 20  BP: (104-169)/() 104/65    Intake and output:    I/O last 3 completed shifts:  In: 2130 [P.O.:120; I.V.:410; IV Piggyback:1600]  Out: 210 [Urine:210]  No intake/output data recorded.    Physical Examination:  Physical Exam  Vitals and nursing note reviewed.   Constitutional:       Appearance: Normal appearance. He is well-developed. He is obese.   HENT:      Head: Normocephalic and atraumatic.      Right Ear: Tympanic membrane and external ear normal.      Left Ear: Tympanic membrane and external ear normal.      Nose: Nose normal. No congestion.      Mouth/Throat:       Mouth: Mucous membranes are moist.      Pharynx: No oropharyngeal exudate.   Eyes:      General: No scleral icterus.        Right eye: No discharge.      Pupils: Pupils are equal, round, and reactive to light.   Neck:      Thyroid: No thyromegaly.      Vascular: No JVD.   Cardiovascular:      Rate and Rhythm: Normal rate and regular rhythm.      Heart sounds: Normal heart sounds. No murmur heard.     No friction rub.   Pulmonary:      Effort: Pulmonary effort is normal. No respiratory distress.      Breath sounds: Normal breath sounds. No stridor. No wheezing.   Abdominal:      General: Bowel sounds are normal. There is no distension.      Palpations: Abdomen is soft.      Tenderness: There is no abdominal tenderness. There is no guarding.   Genitourinary:     Comments: Crespo catheter in place, orange urine output  Musculoskeletal:         General: No tenderness. Normal range of motion.      Cervical back: Normal range of motion and neck supple.   Lymphadenopathy:      Cervical: No cervical adenopathy.   Skin:     General: Skin is warm and dry.      Findings: No rash.   Neurological:      Mental Status: He is alert and oriented to person, place, and time.      Cranial Nerves: No cranial nerve deficit.   Psychiatric:         Mood and Affect: Mood normal.         Behavior: Behavior normal.         Thought Content: Thought content normal.             Laboratory results:  I have reviewed the patient's laboratory results.    Results from last 7 days   Lab Units 01/27/24  0532 01/26/24 1921 01/24/24  1316   SODIUM mmol/L 138 141 141   POTASSIUM mmol/L 3.6 3.6 3.4*   CHLORIDE mmol/L 102 100 99   CO2 mmol/L 21.2* 26.1 27.0   BUN mg/dL 18 16 12   CREATININE mg/dL 1.81* 1.70* 1.50*   CALCIUM mg/dL 8.1* 9.2 9.6   BILIRUBIN mg/dL  --  1.2 0.9   ALK PHOS U/L  --  97 81   ALT (SGPT) U/L  --  16 17   AST (SGOT) U/L  --  18 16   GLUCOSE mg/dL 139* 107* 109*     Results from last 7 days   Lab Units 01/27/24  0532 01/26/24 1921  01/24/24  1316   WBC 10*3/mm3 18.21* 3.59 6.70   HEMOGLOBIN g/dL 15.5 18.2* 19.0*   HEMATOCRIT % 45.2 53.7* 57.2*   PLATELETS 10*3/mm3 109* 127* 186     Results from last 7 days   Lab Units 01/24/24  1116   INR  1.20*         Results from last 7 days   Lab Units 01/26/24 2039   BLOODCX  Abnormal Stain*  Abnormal Stain*       Radiology results:  I have reviewed the patient's radiology reports.  Imaging Results (Last 24 Hours)       Procedure Component Value Units Date/Time    XR Chest 1 View [[id number]] Collected: 01/27/24 0746     Updated: 01/27/24 0749    Narrative:      PORTABLE CHEST  1/26/2024 7:56 PM     HISTORY: Chest pain protocol     COMPARISON:   None     FINDINGS: Left chest wall ICD. The cardiac silhouette is enlarged. The  mediastinal and hilar contours are unremarkable.  The lungs are clear.  There is no pneumothorax. The visualized osseous structures demonstrate  no acute abnormalities.       Impression:      No acute cardiopulmonary process.                       This report was signed and finalized on 1/27/2024 7:47 AM by Torey Allen MD.       CT Abdomen Pelvis Without Contrast [[id number]] Collected: 01/26/24 2338     Updated: 01/26/24 2339    Narrative:      FINAL REPORT    TECHNIQUE:  Axial CT images were performed from the lung bases through the  symphysis pubis without IV contrast.  This study was performed  with techniques to keep radiation doses as low as reasonably  achievable (ALARA). Individualized dose reduction techniques  using automated exposure control or adjustment of mA and/or kV  according to the patient's size were employed.    CLINICAL HISTORY:  Abdominal pain, post-op; prostate biopsy yesterday, woke today  with weakness and sepsis symptoms    FINDINGS:  Lack of intravenous contrast limits evaluation of solid  abdominal and pelvic organs.  LOWER CHEST: The heart is normal  size.  The lung bases are clear.  ABDOMEN/PELVIS:  Liver,  gallbladder and bile ducts: The  unenhanced liver is grossly  unremarkable without focal abnormality. there are gallstones  without cholecystitis.  There is no definite biliary duct  dilatation.  Adrenal glands: The adrenal glands are  morphologically unremarkable without suspicious lesion.  Kidneys, ureter and urinary bladder: No nephrolithiasis.  There  are nonobstructing bilateral kidney stones.  There is urinary  bladder wall thickening.  Spleen: The spleen is normal size.  Pancreas: The pancreas is grossly unremarkable.  GI systems and  mesentery: A gastric banding device is noted.  No evidence of  bowel obstruction.  The appendix is not visualized, but there is  no sign of appendicitis.  No significant mesenteric  inflammation.  Lymph nodes: No definite pathologically enlarged  abdominal or pelvic lymph nodes present within the limits of  this noncontrast enhanced exam.  Vessels: The abdominal aorta is  normal in caliber.  The inferior vena cava is unremarkable.  Peritoneum: No free intraperitoneal fluid or pneumoperitoneum.  Pelvic viscera: A Crespo catheter is inflated within the prostate  gland.  Body wall: No body wall contusion.  Bones: No acute  fracture.      Impression:      1.  Malpositioned Crespo catheter with balloon inflated in the  prostate gland.    Authenticated and Electronically Signed by Jose J Dupree MD on  01/26/2024 11:38:21 PM                Medication Review:   I have reviewed the patient's active and prn medications.     Assessment:    Sepsis        Plan:    Sepsis/UTI/hematuria:  - Patient had been on ciprofloxacin prior to procedure per report.  Patient's daughter noted had fairly severe illness in the past requiring a PICC line, on review of chart it appears she had Providencia rustigianni in the past.  - continue antibiotic treatment with Invanz.    - Continue IV fluids for sepsis coverage.   -  Continue with Crespo catheter. Dr. De La Cruz has been consulted.   - WBC did trend upwards today, monitor closely.     ESBL  Bacteremia  - preliminary cultures in blood are concerning for ESBL Ecoli.  Awaiting complete sensitivities at this time.  Continue Invanz.      CKD 3:  - baseline Cr 1.4, Cr was 1.7 on admission.    - Cr is now 1.8 close monitoring needed, cont IV fluids.      Atrial fibrillation:  - Pacemaker, amiodarone will be continued.    - Xarelto remains on hold     ALEX:  - Can use home BiPAP     Seizure disorder:  - Continue Depakote.        Risk Assessment: High  DVT Prophylaxis: SCDs due to gross hematuria  Code Status: Full  Diet: Cardiac      Cristian Mcbride DO  01/27/24  14:23 EST

## 2024-01-27 NOTE — CASE MANAGEMENT/SOCIAL WORK
Discharge Planning Assessment   Kumar     Patient Name: Andre Agosto  MRN: [id number]  Today's Date: 1/27/2024    Admit Date: 1/26/2024    Plan: Home with family   Discharge Needs Assessment       Row Name 01/27/24 0941       Living Environment    People in Home spouse    Current Living Arrangements home    Potentially Unsafe Housing Conditions none    In the past 12 months has the electric, gas, oil, or water company threatened to shut off services in your home? No    Primary Care Provided by self    Provides Primary Care For no one    Able to Return to Prior Arrangements yes       Resource/Environmental Concerns    Resource/Environmental Concerns none    Transportation Concerns none       Transportation Needs    In the past 12 months, has lack of transportation kept you from medical appointments or from getting medications? no    In the past 12 months, has lack of transportation kept you from meetings, work, or from getting things needed for daily living? No       Food Insecurity    Within the past 12 months, you worried that your food would run out before you got the money to buy more. Never true    Within the past 12 months, the food you bought just didn't last and you didn't have money to get more. Never true       Transition Planning    Patient/Family Anticipates Transition to home with family    Patient/Family Anticipated Services at Transition none    Transportation Anticipated family or friend will provide       Discharge Needs Assessment    Readmission Within the Last 30 Days no previous admission in last 30 days    Equipment Currently Used at Home bipap    Concerns to be Addressed no discharge needs identified    Anticipated Changes Related to Illness none    Equipment Needed After Discharge none                   Discharge Plan       Row Name 01/27/24 0942       Plan    Plan Home with family    Patient/Family in Agreement with Plan yes    Plan Comments Met with patient at bedside.Verified  patient's address, phone number, contacts, physician and pharmacy. Patient has adequate transportation. Reports no financial or food insecurity.Patient lives with his spouse. They have 4 steps in the home to navigate. He uses a bipap at night, no other DME. Patient is determined to return home at discharge, may be agreeable to HH. Declines meds to bed.    Final Discharge Disposition Code 01 - home or self-care                  Continued Care and Services - Admitted Since 1/26/2024    Coordination has not been started for this encounter.       Expected Discharge Date and Time       Expected Discharge Date Expected Discharge Time    Jan 29, 2024            Demographic Summary       Row Name 01/27/24 0940       General Information    Admission Type inpatient    Arrived From emergency department    Required Notices Provided Important Message from Medicare    Referral Source admission list    Reason for Consult discharge planning    Preferred Language English       Contact Information    Permission Granted to Share Info With                    Functional Status       Row Name 01/27/24 0940       Functional Status    Usual Activity Tolerance moderate    Current Activity Tolerance moderate       Physical Activity    On average, how many days per week do you engage in moderate to strenuous exercise (like a brisk walk)? 0 days    On average, how many minutes do you engage in exercise at this level? 0 min    Number of minutes of exercise per week 0       Assessment of Health Literacy    How often do you have someone help you read hospital materials? Occasionally    How often do you have problems learning about your medical condition because of difficulty understanding written information? Occasionally    How often do you have a problem understanding what is told to you about your medical condition? Occasionally    How confident are you filling out medical forms by yourself? Quite a bit    Health Literacy Good        Functional Status, IADL    Medications independent    Meal Preparation independent    Housekeeping independent    Laundry independent    Shopping independent                   Psychosocial       Row Name 01/27/24 0984       Values/Beliefs    Spiritual, Cultural Beliefs, Confucianism Practices, Values that Affect Care no       Mental Health    Little Interest or Pleasure in Doing Things 0-->not at all    Feeling Down, Depressed or Hopeless 0-->not at all       Stress    Do you feel stress - tense, restless, nervous, or anxious, or unable to sleep at night because your mind is troubled all the time - these days? Not at all       Coping/Stress    Major Change/Loss/Stressor illness    Patient Personal Strengths able to adapt;resilient    Sources of Support significant other    Techniques to Radiant with Loss/Stress/Change diversional activities    Reaction to Health Status accepting    Understanding of Condition and Treatment adequate understanding of medical condition;adequate understanding of treatment       Developmental Stage (Eriksson's)    Developmental Stage Stage 8 (65 years-death/Late Adulthood) Integrity vs. Despair       C-SSRS (Recent)    Q1 Wished to be Dead (Past Month) no    Q2 Suicidal Thoughts (Past Month) no    Q6 Suicide Behavior (Lifetime) no       Violence Risk    Feels Like Hurting Others no    Previous Attempt to Harm Others no                   Abuse/Neglect    No documentation.                  Legal    No documentation.                  Substance Abuse    No documentation.                  Patient Forms    No documentation.                     Rony Shaw RN

## 2024-01-27 NOTE — ED NOTES
Urinary catheter balloon deflated and urinary catheter inserted further per APRN verbal order.  Balloon reinflated.  Pt tolerated well, sterility maintained.

## 2024-01-27 NOTE — THERAPY EVALUATION
Attempted PT evaluation this pm but pt with Dr. De La Cruz via telecall. Will plan to check back tomorrow.

## 2024-01-28 ENCOUNTER — APPOINTMENT (OUTPATIENT)
Dept: CT IMAGING | Facility: HOSPITAL | Age: 69
DRG: 872 | End: 2024-01-28
Payer: MEDICARE

## 2024-01-28 LAB
ALBUMIN SERPL-MCNC: 3.4 G/DL (ref 3.5–5.2)
ALBUMIN/GLOB SERPL: 1.1 G/DL
ALP SERPL-CCNC: 55 U/L (ref 39–117)
ALT SERPL W P-5'-P-CCNC: 19 U/L (ref 1–41)
ANION GAP SERPL CALCULATED.3IONS-SCNC: 13 MMOL/L (ref 5–15)
AST SERPL-CCNC: 71 U/L (ref 1–40)
BACTERIA SPEC AEROBE CULT: ABNORMAL
BILIRUB SERPL-MCNC: 0.7 MG/DL (ref 0–1.2)
BUN SERPL-MCNC: 35 MG/DL (ref 8–23)
BUN/CREAT SERPL: 16.7 (ref 7–25)
CALCIUM SPEC-SCNC: 8 MG/DL (ref 8.6–10.5)
CHLORIDE SERPL-SCNC: 100 MMOL/L (ref 98–107)
CO2 SERPL-SCNC: 23 MMOL/L (ref 22–29)
CREAT SERPL-MCNC: 2.1 MG/DL (ref 0.76–1.27)
DEPRECATED RDW RBC AUTO: 51.4 FL (ref 37–54)
EGFRCR SERPLBLD CKD-EPI 2021: 33.7 ML/MIN/1.73
ERYTHROCYTE [DISTWIDTH] IN BLOOD BY AUTOMATED COUNT: 15.3 % (ref 12.3–15.4)
GLOBULIN UR ELPH-MCNC: 3 GM/DL
GLUCOSE SERPL-MCNC: 123 MG/DL (ref 65–99)
HCT VFR BLD AUTO: 45.9 % (ref 37.5–51)
HGB BLD-MCNC: 15.5 G/DL (ref 13–17.7)
MCH RBC QN AUTO: 30.9 PG (ref 26.6–33)
MCHC RBC AUTO-ENTMCNC: 33.8 G/DL (ref 31.5–35.7)
MCV RBC AUTO: 91.4 FL (ref 79–97)
PLATELET # BLD AUTO: 90 10*3/MM3 (ref 140–450)
PMV BLD AUTO: 12.2 FL (ref 6–12)
POTASSIUM SERPL-SCNC: 3.9 MMOL/L (ref 3.5–5.2)
PROT SERPL-MCNC: 6.4 G/DL (ref 6–8.5)
RBC # BLD AUTO: 5.02 10*6/MM3 (ref 4.14–5.8)
SODIUM SERPL-SCNC: 136 MMOL/L (ref 136–145)
WBC NRBC COR # BLD AUTO: 18.28 10*3/MM3 (ref 3.4–10.8)

## 2024-01-28 PROCEDURE — 25010000002 ERTAPENEM PER 500 MG: Performed by: FAMILY MEDICINE

## 2024-01-28 PROCEDURE — 25810000003 SODIUM CHLORIDE 0.9 % SOLUTION: Performed by: FAMILY MEDICINE

## 2024-01-28 PROCEDURE — 80053 COMPREHEN METABOLIC PANEL: CPT | Performed by: INTERNAL MEDICINE

## 2024-01-28 PROCEDURE — 99232 SBSQ HOSP IP/OBS MODERATE 35: CPT | Performed by: NURSE PRACTITIONER

## 2024-01-28 PROCEDURE — 87040 BLOOD CULTURE FOR BACTERIA: CPT | Performed by: INTERNAL MEDICINE

## 2024-01-28 PROCEDURE — 85027 COMPLETE CBC AUTOMATED: CPT | Performed by: INTERNAL MEDICINE

## 2024-01-28 PROCEDURE — 97162 PT EVAL MOD COMPLEX 30 MIN: CPT

## 2024-01-28 PROCEDURE — 74176 CT ABD & PELVIS W/O CONTRAST: CPT

## 2024-01-28 RX ORDER — CARVEDILOL 12.5 MG/1
12.5 TABLET ORAL 2 TIMES DAILY WITH MEALS
Status: DISCONTINUED | OUTPATIENT
Start: 2024-01-29 | End: 2024-01-31 | Stop reason: HOSPADM

## 2024-01-28 RX ADMIN — CARVEDILOL 25 MG: 25 TABLET, FILM COATED ORAL at 17:08

## 2024-01-28 RX ADMIN — SODIUM CHLORIDE 125 ML/HR: 9 INJECTION, SOLUTION INTRAVENOUS at 17:48

## 2024-01-28 RX ADMIN — SODIUM CHLORIDE 125 ML/HR: 9 INJECTION, SOLUTION INTRAVENOUS at 06:06

## 2024-01-28 RX ADMIN — ERTAPENEM 1000 MG: 1 INJECTION INTRAMUSCULAR; INTRAVENOUS at 20:50

## 2024-01-28 RX ADMIN — DIVALPROEX SODIUM 500 MG: 500 TABLET, DELAYED RELEASE ORAL at 20:48

## 2024-01-28 RX ADMIN — AMIODARONE HYDROCHLORIDE 100 MG: 200 TABLET ORAL at 08:49

## 2024-01-28 RX ADMIN — DIVALPROEX SODIUM 750 MG: 250 TABLET, DELAYED RELEASE ORAL at 09:04

## 2024-01-28 RX ADMIN — TAMSULOSIN HYDROCHLORIDE 0.4 MG: 0.4 CAPSULE ORAL at 08:48

## 2024-01-28 RX ADMIN — DOCUSATE SODIUM 50MG AND SENNOSIDES 8.6MG 2 TABLET: 8.6; 5 TABLET, FILM COATED ORAL at 20:48

## 2024-01-28 RX ADMIN — OXYCODONE HYDROCHLORIDE AND ACETAMINOPHEN 1 TABLET: 7.5; 325 TABLET ORAL at 20:49

## 2024-01-28 RX ADMIN — Medication 5 MG: at 23:08

## 2024-01-28 RX ADMIN — CARVEDILOL 25 MG: 25 TABLET, FILM COATED ORAL at 08:49

## 2024-01-28 RX ADMIN — Medication 10 ML: at 08:50

## 2024-01-28 RX ADMIN — LEVOTHYROXINE SODIUM 88 MCG: 88 TABLET ORAL at 08:49

## 2024-01-28 NOTE — CONSULTS
[facility]      Nephrology Consultation      Referring Provider:   Xochitl Chatterjee,*    Reason for Consultation:  Acute kidney injury associated problems.    Subjective:  Chief complaint   Chief Complaint   Patient presents with    Fever    Post-op Problem     History of present illness:    Patient is 69-year-old male with multimedical problems including chronic kidney disease stage III, coronary artery disease, history of prior stroke, type 2 diabetes, hypertension, obstructive sleep apnea, history of nephrolithiasis, atrial fibrillation and seizure disorder.  He had a prostate biopsy done by Dr. De La Cruz and starting to have fever as well as increased confusion.  He was also having pain on urination and fairly bright red blood noted.  He took his Xarelto the night after the procedure.  He was brought into the emergency room and noted to have increased lactic acid and Pro-Jimmy with hypotension, he was admitted through the hospitalist service for likely septic shock.  Patient was given 1.5 L of normal saline and Invanz was started in the ER.  He also had noted to have nonobstructing renal stones.  Crespo catheter was passed.  This morning patient is sitting up in the chair awake alert and interactive bedside some nonspecific pain in the groin area denies having any chest pain or shortness of breath, no nausea vomiting or abdominal pain.  His initial temperature was close to 103 and has been afebrile since in the hospital.  He denies taking any nonsteroidals.  I have reviewed labs/imaging/records from this hospitalization, including ER staff and admitting/attending physicians H/P's and progress notes to establish a comprehensive understanding of this patient's clinical hospital course, as well as to establish plan of care appropriately.   Past Medical History:   Diagnosis Date    6th nerve palsy, right     right eye     Anxiety and depression     Atrial fibrillation, persistent 12/02/2020    on  Xarelto    Coronary artery disease involving native coronary artery of native heart without angina pectoris 09/12/2018    follows w/ Dr. Mendoza    CRI (chronic renal insufficiency), stage 2 (mild)     CVA (cerebral vascular accident)     Diabetes mellitus     doesnt check sugar, type 2     Disease of thyroid gland     Double vision     resolved- right eye    Elevated cholesterol     Elevated prostate specific antigen (PSA) 11/08/2023    Focal seizure     of right arm     Heart attack     NSTEMI 2015, s/p stenting    History of Helicobacter pylori infection     in 2008, treated w/ PrevPak - 2021 EGD bx (+), PCP RX - PPI/Augmentin/Doxy/Flagyl (x 14 days) 1/22/21    HL (hearing loss)     (L) ear - child luevano measles    Hyperlipidemia     Hypertension     Kidney stone     Memory loss 2005    d/t meningitis    Meningitis 2005    unsure of bacterial or viral     Obesity     Sleep apnea treated with nocturnal BiPAP     compliant with  machine     Stroke     2017/2018    Ventricular tachycardia     3 different times    Wears glasses        Past Surgical History:   Procedure Laterality Date    CARDIAC CATHETERIZATION N/A 10/12/2018    Procedure: Left Heart Cath;  Surgeon: Yoselin Johnson MD;  Location:  [facility];  Service: Cardiology    CARDIAC CATHETERIZATION  03/2021    stent    CARDIAC CATHETERIZATION  07/2021    just checking the after pacemaker placed- Dr. Tim    CARDIAC DEFIBRILLATOR PLACEMENT  2021    COLONOSCOPY  10/2020    w/ Dr. Jacbos, hx colon polyps    CORONARY STENT PLACEMENT  2015    LAPAROSCOPIC GASTRIC BANDING  2008    s/p LAGB Realize 6/2008 by MASONO.    PACEMAKER IMPLANTATION  07/25/2021    UMBILICAL HERNIA REPAIR  2008    incarcerated UHR w/ mesh by Dr. Velasquez    URETEROSCOPY LASER LITHOTRIPSY WITH STENT INSERTION Left 10/20/2021    Procedure: URETEROSCOPY LASER LITHOTRIPSY WITH STENT INSERTION;  Surgeon: Tonio De La Cruz MD;  Location: [facility] OR;  Service: Urology;   Laterality: Left;    URETEROSCOPY LASER LITHOTRIPSY WITH STENT INSERTION Left 2021    Procedure: URETEROSCOPY LEFT, LEFT RETROGRADE PYELOGRAM, CYSTOSCOPY, LASER LITHOTRIPSY WITH STENT EXCHANGE;  Surgeon: Tonio De La Cruz MD;  Location: [facility];  Service: Urology;  Laterality: Left;    URETEROSCOPY LASER LITHOTRIPSY WITH STENT INSERTION Left 10/06/2022    Procedure: URETEROSCOPY LASER LITHOTRIPSY WITH STENT INSERTION;  Surgeon: Tonio De La Cruz MD;  Location: [facility];  Service: Urology;  Laterality: Left;     Family History   Problem Relation Age of Onset    Stroke Mother     Hypertension Mother     COPD Father     Hypertension Father      negative h/o ESRD     Social History     Tobacco Use    Smoking status: Former     Packs/day: 0.50     Years: 15.00     Additional pack years: 0.00     Total pack years: 7.50     Types: Cigarettes     Start date: 1975     Quit date: 1985     Years since quittin.1    Smokeless tobacco: Never   Vaping Use    Vaping Use: Never used   Substance Use Topics    Alcohol use: No    Drug use: No     Home medications:   Prior to Admission Medications       Prescriptions Last Dose Informant Patient Reported? Taking?    amiodarone (PACERONE) 100 MG tablet 2024  No Yes    Take 1 tablet by mouth Daily.    carvedilol (Coreg) 25 MG tablet 2024 Self No Yes    Take 1 tablet by mouth 2 (Two) Times a Day With Meals.    divalproex (DEPAKOTE) 250 MG DR tablet 2024  No Yes    750mg QAM and 500mg QHS    Entresto  MG tablet 2024  Yes Yes    Take 1 tablet by mouth 2 (Two) Times a Day.    Farxiga 10 MG tablet 2024 Self Yes Yes    Take 10 mg by mouth Daily.    furosemide (LASIX) 40 MG tablet 2024  Yes Yes    Take 1 tablet by mouth 2 (Two) Times a Day.    levothyroxine (SYNTHROID, LEVOTHROID) 88 MCG tablet 2024  No Yes    TAKE 1 TABLET DAILY    Semaglutide,0.25 or 0.5MG/DOS, (Ozempic, 0.25 or 0.5 MG/DOSE,) 2 MG/1.5ML solution  pen-injector Past Week  No Yes    Inject 0.5 mg under the skin into the appropriate area as directed Every 7 (Seven) Days.    tamsulosin (FLOMAX) 0.4 MG capsule 24 hr capsule Past Week  No Yes    Take 1 capsule by mouth Daily.    Xarelto 15 MG tablet 1/26/2024 Self Yes Yes    Take 1 tablet by mouth Daily With Dinner.    amLODIPine (NORVASC) 10 MG tablet 1/25/2024  Yes No    Take 1 tablet by mouth Every Night.    Cholecalciferol (Vitamin D3) 25 MCG (1000 UT) capsule   No No    Take 2 capsules by mouth Daily.    ciprofloxacin (Cipro) 500 MG tablet Not Taking  No No    Take 1 tablet by mouth 2 (Two) Times a Day. Start taking the day prior to biopsy    Patient not taking:  Reported on 1/27/2024          Emergency department medications:   Medications   sodium chloride 0.9 % flush 10 mL (has no administration in time range)   acetaminophen (TYLENOL) tablet 1,000 mg (1,000 mg Oral Not Given 1/26/24 2107)   morphine injection 3 mg (has no administration in time range)   amiodarone (PACERONE) tablet 100 mg (100 mg Oral Given 1/28/24 0849)   divalproex (DEPAKOTE) DR tablet 500 mg (500 mg Oral Given 1/28/24 2048)   levothyroxine (SYNTHROID, LEVOTHROID) tablet 88 mcg (88 mcg Oral Given 1/28/24 0849)   tamsulosin (FLOMAX) 24 hr capsule 0.4 mg (0.4 mg Oral Given 1/28/24 0848)   amLODIPine (NORVASC) tablet 10 mg ( Oral Dose Auto Held 2/12/24 2100)   sodium chloride 0.9 % flush 10 mL (10 mL Intravenous Not Given 1/28/24 2055)   sodium chloride 0.9 % flush 10 mL (has no administration in time range)   sodium chloride 0.9 % infusion 40 mL (has no administration in time range)   sennosides-docusate (PERICOLACE) 8.6-50 MG per tablet 2 tablet (2 tablets Oral Given 1/28/24 2048)     And   polyethylene glycol (MIRALAX) packet 17 g (has no administration in time range)     And   bisacodyl (DULCOLAX) EC tablet 5 mg (has no administration in time range)     And   bisacodyl (DULCOLAX) suppository 10 mg (has no administration in time range)  "  nitroglycerin (NITROSTAT) SL tablet 0.4 mg (has no administration in time range)   acetaminophen (TYLENOL) tablet 650 mg (650 mg Oral Given 1/27/24 0914)     Or   acetaminophen (TYLENOL) 160 MG/5ML oral solution 650 mg ( Oral Not Given:  See Alt 1/27/24 0914)     Or   acetaminophen (TYLENOL) suppository 650 mg ( Rectal Not Given:  See Alt 1/27/24 0914)   oxyCODONE-acetaminophen (PERCOCET) 7.5-325 MG per tablet 1 tablet (1 tablet Oral Given 1/28/24 2049)   melatonin tablet 5 mg (5 mg Oral Given 1/28/24 2308)   ondansetron ODT (ZOFRAN-ODT) disintegrating tablet 4 mg (has no administration in time range)     Or   ondansetron (ZOFRAN) injection 4 mg (has no administration in time range)   aluminum-magnesium hydroxide-simethicone (MAALOX MAX) 400-400-40 MG/5ML suspension 15 mL (has no administration in time range)   sodium chloride 0.9 % infusion (125 mL/hr Intravenous New Bag 1/29/24 0254)   ertapenem (INVanz) 1000 mg/100 mL 0.9% NS VTB (mbp) (1,000 mg Intravenous New Bag 1/28/24 2050)   divalproex (DEPAKOTE) DR tablet 750 mg (750 mg Oral Given 1/28/24 0904)   carvedilol (COREG) tablet 12.5 mg (has no administration in time range)   sodium chloride 0.9 % bolus 1,000 mL (0 mL Intravenous Stopped 1/26/24 2018)   Lidocaine HCl gel (XYLOCAINE) urethral/mucosal syringe ( Urethral Given 1/26/24 2008)   ertapenem (INVanz) 1000 mg/100 mL 0.9% NS VTB (mbp) (0 mg Intravenous Stopped 1/26/24 2114)   fentaNYL citrate (PF) (SUBLIMAZE) injection 50 mcg (50 mcg Intravenous Given 1/26/24 2130)   sodium chloride 0.9 % bolus 500 mL (0 mL Intravenous Stopped 1/27/24 0112)       Allergies:  Statins    Review of Systems  14 point review of system were done and are negative except as mentioned in the history of present illness and assessment and plan.      Physical Exam:  Objective:  Vital Signs  /78 (BP Location: Left arm, Patient Position: Sitting)   Pulse 68   Temp 98 °F (36.7 °C) (Temporal)   Resp 18   Ht 188 cm (74\")   Wt " (!) 150 kg (331 lb 9.2 oz)   SpO2 94%   BMI 42.57 kg/m²   Objective    No intake/output data recorded.    Intake/Output Summary (Last 24 hours) at 1/29/2024 0704  Last data filed at 1/29/2024 0629  Gross per 24 hour   Intake 2210 ml   Output 1600 ml   Net 610 ml        Physical Exam     Constitutional: Awake, alert  Eyes: sclerae anicteric, no conjunctival injection  HEENT: mucous membranes dry  Neck: Supple, no thyromegaly, no lymphadenopathy, trachea midline, No JVD  Respiratory: Clear to auscultation bilaterally, nonlabored respirations   Cardiovascular: IRRR, positive murmurs, no rubs, or gallops.  Gastrointestinal: Positive bowel sounds, obese, soft, nontender, nondistended  Musculoskeletal: Trace to 1+ edema, no clubbing or cyanosis  Psychiatric: Appropriate affect, cooperative  Neurologic: Oriented x 3, moving all extremities, Cranial Nerves grossly intact, speech clear  Skin: warm and dry, no rashes            Results Review:   Results from last 7 days   Lab Units 01/29/24  0508 01/28/24  0541 01/27/24  0532 01/26/24  1921 01/24/24  1316   SODIUM mmol/L 134* 136 138 141 141   POTASSIUM mmol/L 3.5 3.9 3.6 3.6 3.4*   CHLORIDE mmol/L 100 100 102 100 99   CO2 mmol/L 20.8* 23.0 21.2* 26.1 27.0   BUN mg/dL 38* 35* 18 16 12   CREATININE mg/dL 1.65* 2.10* 1.81* 1.70* 1.50*   CALCIUM mg/dL 7.9* 8.0* 8.1* 9.2 9.6   ALBUMIN g/dL  --  3.4*  --  4.3 4.8   BILIRUBIN mg/dL  --  0.7  --  1.2 0.9   ALK PHOS U/L  --  55  --  97 81   ALT (SGPT) U/L  --  19  --  16 17   AST (SGOT) U/L  --  71*  --  18 16   GLUCOSE mg/dL 96 123* 139* 107* 109*     Estimated Creatinine Clearance: 66.1 mL/min (A) (by C-G formula based on SCr of 1.65 mg/dL (H)).          Results from last 7 days   Lab Units 01/29/24  0508 01/28/24  0541 01/27/24  0532 01/26/24  1921 01/24/24  1316   WBC 10*3/mm3 12.99* 18.28* 18.21* 3.59 6.70   HEMOGLOBIN g/dL 14.6 15.5 15.5 18.2* 19.0*   PLATELETS 10*3/mm3 125* 90* 109* 127* 186     Results from last 7 days  "  Lab Units 01/24/24  1116   INR  1.20*     Brief Urine Lab Results  (Last result in the past 365 days)        Color   Clarity   Blood   Leuk Est   Nitrite   Protein   CREAT   Urine HCG        01/26/24 2103 Red   Turbid   Large (3+)   Large (3+)   Positive   100 mg/dL (2+)                 No results found for: \"UTPCR\"  Imaging Results (Last 24 Hours)       Procedure Component Value Units Date/Time    CT Abdomen Pelvis Without Contrast [[id number]] Collected: 01/28/24 1053     Updated: 01/28/24 1102    Narrative:      CT of the abdomen and pelvis without contrast     INDICATION: Renal failure     COMPARISON: January 26, 2024     TECHNIQUE: Helically acquired axial multidetector CT images were  obtained from the lung bases through the pelvis without IV contrast.  Dose reduction techniques to achieve ALARA were employed.     Findings:     Lung bases: Small bilateral pleural effusions with bibasilar  atelectasis.     Cardiomegaly with trace pericardial fluid. Extensive multivessel  coronary artery calcifications. ICD leads are noted.     Liver: [Grossly unremarkable]     Spleen: [Unremarkable]     Gallbladder/biliary tree: [ No biliary ductal dilatation].  Cholelithiasis with multiple calcified gallstones, largest measures 1.6  cm.     Pancreas: [Grossly unremarkable].     Adrenals: [Unremarkable]     Kidneys: Bilateral nonobstructing renal calculi, largest right measures  approximately 1.1 cm and largest left measures approximately 0.8 cm. No  hydronephrosis bilaterally. Bilateral nonspecific perinephric edema..     GI: Gastric lap band is noted. [No bowel obstruction]. Moderate colonic  gas and scattered small bowel gas may reflect underlying mild ileus. No  definite obstruction.     Bladder: Redemonstration of severe circumferential bladder wall  thickening with perivesical stranding, suggestive of underlying  cystitis. Mild prostatomegaly. Crespo catheter about appears positioned  in the prostatic urethra. Consider " readjustment.     Bones: [No significant osseous abnormalities are identified].     Soft Tissues: Prior ventral wall hernia repair with mesh.       Impression:         1. Bilateral nonobstructing renal calculi. No hydronephrosis.     2. Severe urinary bladder thickening with perivesical inflammatory  changes suggestive of underlying cystitis. Malpositioned Crespo catheter  with bulb in the prostatic urethra.        CTDI: 20.02 mGy  DLP:1116.91 mGy.cm     This report was signed and finalized on 1/28/2024 11:00 AM by Torey Allen MD.             acetaminophen, 1,000 mg, Oral, Once  amiodarone, 100 mg, Oral, Daily  [Held by provider] amLODIPine, 10 mg, Oral, Nightly  carvedilol, 12.5 mg, Oral, BID With Meals  divalproex, 500 mg, Oral, Nightly  divalproex, 750 mg, Oral, Daily  ertapenem, 1,000 mg, Intravenous, Q24H  levothyroxine, 88 mcg, Oral, Daily  senna-docusate sodium, 2 tablet, Oral, BID  sodium chloride, 10 mL, Intravenous, Q12H  tamsulosin, 0.4 mg, Oral, Daily      sodium chloride, 125 mL/hr, Last Rate: 125 mL/hr (01/29/24 0254)        Assessment/Plan:      Sepsis    Acute kidney injury: Patient had normal renal function on October 17, 2023 with a EGFR of 65 mill per minute since then he has gradually worsened up until yesterday where creatinine has been slowly going up, yesterday was 2.10, preadmission creatinine of 1.50 and on the day of admission it was 1.70.  Potassium was noted to be on the low side but it has improved.  Chronic kidney disease stage IIIa: It appears his baseline GFR is around 50.  Sepsis: Initial presentation increased procalcitonin and lactate was noted which has improved slightly.  Treated as per hospitalist service and urology.  He does appear all this infection started when he had prostate biopsy, leading to lew hematuria as well as fever and chills.  History of nephrolithiasis: It does not appear he has any obstructive stone, multiple nonobstructive stone noted on the  CT.  Coronary artery disease: No acute issues with coronary artery disease.  Denies having any chest pain or shortness of breath.  Atrial fibrillation: Anticoagulated and rate controlled  Status post pacemaker:  Hypothyroidism: Continue home medications.  Obstructive sleep apnea on BiPAP: He said he has been compliant with his BiPAP.  Seizure disorder: Continue current medications  Morbid obesity: Complicates all forms of care.      Risk and complexity: High      Plan:  Potassium is on the low side we will give her at least 1 dose of 40 mg p.o.  He has been getting IV fluids with improving renal function.  Likely will settle down to his baseline.  Check magnesium in the morning as well.  Avoid nephrotoxic medications.  Continue with rest of the current treatment plan and surveillance labs.  Details were discussed with the patient as well as family in the room.  Daughter and wife at the bedside.  Both of them had multiple questions about his baseline chronic kidney disease, nobody has discussed with him about his chronic kidney disease patient and family were very much interested to set up a follow-up.  Details were also discussed with the hospitalist service.   Further recommendations will depend on clinical course of the patient during the current hospitalization.    I also discussed the details with the nursing staff.  Rest as ordered.    In closing, I sincerely appreciate opportunity to participate in care of this patient. If I can be of any further assistance with the management of this patient, please don’t hesitate to contact me.    Noel Sierra MD, TERESSA    01/29/24  07:35 EST    Dictated using Dragon.

## 2024-01-28 NOTE — PLAN OF CARE
"Goal Outcome Evaluation:  Plan of Care Reviewed With: patient, daughter        Progress: no change  Outcome Evaluation: pt participated well in PT evaluation this date. Pt agreeable to evaluation. Pt sitting in bedside chair with daughters at bedside this date. Pt completed STS from chair using RW min A and ambulated 26 ft x 6 CGA using RW. Pt demonstrated forward flexed posture and slight festinating gait. Pt reported that he would like to return to bed, so pt sat EOB and transferred sit to supine mod A. Pt reported throughout evaluation that he had pain in \"catheter area\". Pt left with daughters at bedside, call bell in reach. Pt to benefit from skilled PT during this inpatient stay and would continue to benefit from HH upon dc.      Anticipated Discharge Disposition (PT): home with home health                        "

## 2024-01-28 NOTE — PLAN OF CARE
Problem: Adult Inpatient Plan of Care  Goal: Plan of Care Review  Outcome: Ongoing, Progressing  Flowsheets (Taken 1/28/2024 1628)  Progress: improving  Plan of Care Reviewed With:   patient   daughter  Outcome Evaluation: VSS. Pt. agreeable to work with therapy and ambulated in room with walker and sat in chair some of day. F/C intact- with estimated 800 cc urine output this shift. Dr. De La Cruz to see pt. tomorrow. Nephrology consulted. Continue IVF and IV ABX. Discharge pending.  Goal: Patient-Specific Goal (Individualized)  Outcome: Ongoing, Progressing  Goal: Absence of Hospital-Acquired Illness or Injury  Outcome: Ongoing, Progressing  Intervention: Identify and Manage Fall Risk  Recent Flowsheet Documentation  Taken 1/28/2024 1216 by Peter Ruiz RN  Safety Promotion/Fall Prevention:   activity supervised   clutter free environment maintained   assistive device/personal items within reach   fall prevention program maintained   nonskid shoes/slippers when out of bed   safety round/check completed  Intervention: Prevent Skin Injury  Recent Flowsheet Documentation  Taken 1/28/2024 1216 by Peter Ruiz RN  Body Position: (up in chair) --  Intervention: Prevent and Manage VTE (Venous Thromboembolism) Risk  Recent Flowsheet Documentation  Taken 1/28/2024 1216 by Peter Ruiz RN  Activity Management:   ambulated in room   up in chair  Intervention: Prevent Infection  Recent Flowsheet Documentation  Taken 1/28/2024 1216 by Peter Ruiz RN  Infection Prevention:   environmental surveillance performed   hand hygiene promoted   single patient room provided  Taken 1/28/2024 0849 by Peter Ruiz RN  Infection Prevention:   environmental surveillance performed   hand hygiene promoted   single patient room provided  Goal: Optimal Comfort and Wellbeing  Outcome: Ongoing, Progressing  Goal: Readiness for Transition of Care  Outcome: Ongoing, Progressing     Problem: Adjustment to Illness (Sepsis/Septic Shock)  Goal: Optimal  Coping  Outcome: Ongoing, Progressing     Problem: Bleeding (Sepsis/Septic Shock)  Goal: Absence of Bleeding  Outcome: Ongoing, Progressing     Problem: Glycemic Control Impaired (Sepsis/Septic Shock)  Goal: Blood Glucose Level Within Desired Range  Outcome: Ongoing, Progressing  Intervention: Optimize Glycemic Control  Recent Flowsheet Documentation  Taken 1/28/2024 0849 by Peter Ruiz RN  Glycemic Management: oral hydration promoted     Problem: Infection Progression (Sepsis/Septic Shock)  Goal: Absence of Infection Signs and Symptoms  Outcome: Ongoing, Progressing  Intervention: Initiate Sepsis Management  Recent Flowsheet Documentation  Taken 1/28/2024 1216 by Peter Ruiz RN  Infection Management: aseptic technique maintained  Infection Prevention:   environmental surveillance performed   hand hygiene promoted   single patient room provided  Taken 1/28/2024 0849 by Peter Ruiz RN  Infection Prevention:   environmental surveillance performed   hand hygiene promoted   single patient room provided  Intervention: Promote Recovery  Recent Flowsheet Documentation  Taken 1/28/2024 1216 by Peter Ruiz RN  Activity Management:   ambulated in room   up in chair  Taken 1/28/2024 0849 by Peter Ruiz RN  Airway/Ventilation Support: (encouraged I/S) pulmonary hygiene promoted  Intervention: Promote Stabilization  Recent Flowsheet Documentation  Taken 1/28/2024 0849 by Peter Ruiz RN  Fluid/Electrolyte Management: fluids provided     Problem: Nutrition Impaired (Sepsis/Septic Shock)  Goal: Optimal Nutrition Intake  Outcome: Ongoing, Progressing     Problem: Skin Injury Risk Increased  Goal: Skin Health and Integrity  Outcome: Ongoing, Progressing     Problem: Diabetes Comorbidity  Goal: Blood Glucose Level Within Targeted Range  Outcome: Ongoing, Progressing  Intervention: Monitor and Manage Glycemia  Recent Flowsheet Documentation  Taken 1/28/2024 0849 by Peter Ruiz RN  Glycemic Management: oral hydration  promoted     Problem: Hypertension Comorbidity  Goal: Blood Pressure in Desired Range  Outcome: Ongoing, Progressing     Problem: Obstructive Sleep Apnea Risk or Actual Comorbidity Management  Goal: Unobstructed Breathing During Sleep  Outcome: Ongoing, Progressing     Problem: Fall Injury Risk  Goal: Absence of Fall and Fall-Related Injury  Outcome: Ongoing, Progressing  Intervention: Promote Injury-Free Environment  Recent Flowsheet Documentation  Taken 1/28/2024 1216 by Peter Ruiz RN  Safety Promotion/Fall Prevention:   activity supervised   clutter free environment maintained   assistive device/personal items within reach   fall prevention program maintained   nonskid shoes/slippers when out of bed   safety round/check completed   Goal Outcome Evaluation:  Plan of Care Reviewed With: patient, daughter        Progress: improving  Outcome Evaluation: VSS. Pt. agreeable to work with therapy and ambulated in room with walker and sat in chair some of day. F/C intact- draining minimal UOP. Dr. De La Cruz to see tomorrow for position change. Nephrology consulted. Continue IVF and IV ABX. Discharge pending.

## 2024-01-28 NOTE — PROGRESS NOTES
[facility]    Name:  Andre Agosto   Age:  69 y.o.  Sex:  male  :  1955  MRN:  [id number]   Visit Number:  [id number]  Admission Date:  2024  Date Of Service:  24  Primary Care Physician:  Keven Bear MD     LOS: 2 days :    Chief Complaint:      UTI    Subjective:    Patient seen and examined at bedside this morning.  His daughters are at bedside.  Discussed lab results in depth with patient and daughters regarding appropriate antibiotic therapy at this time.  Discussed repeating CT scan today and they are in agreement.  He does not appear overloaded currently.  Denies any shortness of breath.  Pleasant in conversation.    Hospital Course:    Patient is a 69-year-old with multiple medical comorbidities including CAD, prior stroke, obstructive sleep apnea, hypothyroidism, diabetes, nephrolithiasis, seizure disorder, atrial fibrillation, who presented from home via EMS with multiple complaints.  Patient apparently had just underwent a prostate biopsy with Dr. De La Cruz about 24 hours prior to admission.  He had taken Xarelto the night after his procedure.  He noted developing a fever earlier in the day on the , become increasingly confused, felt dehydrated.  Was having pain with urination and fairly bright blood noted. He was noted to have fever up to 104 upon arrival.  History obtained from patient and daughter at bedside.     In the ER, he was febrile.  He had blood pressure 140/80 range he was nonhypoxic on 2 L of oxygen and had a heart rate in the 80s.  Creatinine 1.7 down to lactic acid 3.8 white count 3500 hemoglobin 18 and platelets 127.  Procalcitonin 4.22.  Urinalysis positive nitrites, leukocyte Estrace, large blood.  He was given IV fluid resuscitation with 1.5 L of saline, pain control, and Invanz.  He had a CT scan abdomen pelvis without contrast which was demonstrating nonobstructing kidney stones bilaterally, Crespo catheter with  balloon inflated in the prostate gland, otherwise no other findings.  ER did talk with Dr. De La Cruz who recommended admission and placement of catheter and antibiotics such that he could monitor over the weekend.    Review of Systems:     All systems were reviewed and negative except as mentioned in subjective, assessment and plan.    Vital Signs:    Temp:  [97.3 °F (36.3 °C)-98 °F (36.7 °C)] 97.9 °F (36.6 °C)  Heart Rate:  [68-72] 68  Resp:  [18-20] 18  BP: ()/(57-77) 129/77    Intake and output:    I/O last 3 completed shifts:  In: 4673 [P.O.:220; I.V.:2853; IV Piggyback:1600]  Out: 785 [Urine:785]  I/O this shift:  In: 140 [P.O.:140]  Out: 325 [Urine:325]    Physical Examination:    General Appearance:  Alert and cooperative, pleasant middle aged male, no acute distress on exam, obese with BMI-40   Head:  Atraumatic and normocephalic.   Eyes: Conjunctivae and sclerae normal, no icterus. No pallor.   Throat: No oral lesions, no thrush, oral mucosa moist.   Neck: Supple, trachea midline   Lungs:   Breath sounds heard bilaterally equally.  No wheezing or crackles. Unlabored on room air.   Heart:  Normal S1 and S2, no murmur, no gallop, no rub. No JVD.   Abdomen:   Normal bowel sounds, no masses, no organomegaly. Soft, nontender, nondistended, kimball catheter in place draining yellow urine   Extremities: Supple, no edema, no cyanosis, no clubbing.   Skin: No bleeding or rash.   Neurologic: Alert and oriented x 3. No facial asymmetry. Moves all four limbs. No tremors.      Laboratory results:    Results from last 7 days   Lab Units 01/28/24  0541 01/27/24  0532 01/26/24  1921 01/24/24  1316   SODIUM mmol/L 136 138 141 141   POTASSIUM mmol/L 3.9 3.6 3.6 3.4*   CHLORIDE mmol/L 100 102 100 99   CO2 mmol/L 23.0 21.2* 26.1 27.0   BUN mg/dL 35* 18 16 12   CREATININE mg/dL 2.10* 1.81* 1.70* 1.50*   CALCIUM mg/dL 8.0* 8.1* 9.2 9.6   BILIRUBIN mg/dL 0.7  --  1.2 0.9   ALK PHOS U/L 55  --  97 81   ALT (SGPT) U/L 19  --  16 17  "  AST (SGOT) U/L 71*  --  18 16   GLUCOSE mg/dL 123* 139* 107* 109*     Results from last 7 days   Lab Units 01/28/24  0541 01/27/24  0532 01/26/24  1921   WBC 10*3/mm3 18.28* 18.21* 3.59   HEMOGLOBIN g/dL 15.5 15.5 18.2*   HEMATOCRIT % 45.9 45.2 53.7*   PLATELETS 10*3/mm3 90* 109* 127*     Results from last 7 days   Lab Units 01/24/24  1116   INR  1.20*         Results from last 7 days   Lab Units 01/26/24  2103 01/26/24 2039   BLOODCX   --  Gram Negative Bacilli*  Gram Negative Bacilli*   URINECX  50,000 CFU/mL Escherichia coli ESBL*  --      No results for input(s): \"PHART\", \"EYG0YLA\", \"PO2ART\", \"OPW5ORU\", \"BASEEXCESS\" in the last 8760 hours.   I have reviewed the patient's laboratory results.    Radiology results:    CT Abdomen Pelvis Without Contrast    Result Date: 1/28/2024  CT of the abdomen and pelvis without contrast  INDICATION: Renal failure  COMPARISON: January 26, 2024  TECHNIQUE: Helically acquired axial multidetector CT images were obtained from the lung bases through the pelvis without IV contrast. Dose reduction techniques to achieve ALARA were employed.  Findings:  Lung bases: Small bilateral pleural effusions with bibasilar atelectasis.  Cardiomegaly with trace pericardial fluid. Extensive multivessel coronary artery calcifications. ICD leads are noted.  Liver: [Grossly unremarkable]  Spleen: [Unremarkable]  Gallbladder/biliary tree: [ No biliary ductal dilatation]. Cholelithiasis with multiple calcified gallstones, largest measures 1.6 cm.  Pancreas: [Grossly unremarkable].  Adrenals: [Unremarkable]  Kidneys: Bilateral nonobstructing renal calculi, largest right measures approximately 1.1 cm and largest left measures approximately 0.8 cm. No hydronephrosis bilaterally. Bilateral nonspecific perinephric edema..  GI: Gastric lap band is noted. [No bowel obstruction]. Moderate colonic gas and scattered small bowel gas may reflect underlying mild ileus. No definite obstruction.  Bladder: " Redemonstration of severe circumferential bladder wall thickening with perivesical stranding, suggestive of underlying cystitis. Mild prostatomegaly. Crespo catheter about appears positioned in the prostatic urethra. Consider readjustment.  Bones: [No significant osseous abnormalities are identified].  Soft Tissues: Prior ventral wall hernia repair with mesh.      Impression:  1. Bilateral nonobstructing renal calculi. No hydronephrosis.  2. Severe urinary bladder thickening with perivesical inflammatory changes suggestive of underlying cystitis. Malpositioned Crespo catheter with bulb in the prostatic urethra.   CTDI: 20.02 mGy DLP:1116.91 mGy.cm  This report was signed and finalized on 1/28/2024 11:00 AM by Torey Allen MD.      XR Chest 1 View    Result Date: 1/27/2024  PORTABLE CHEST  1/26/2024 7:56 PM  HISTORY: Chest pain protocol  COMPARISON:   None  FINDINGS: Left chest wall ICD. The cardiac silhouette is enlarged. The mediastinal and hilar contours are unremarkable.  The lungs are clear. There is no pneumothorax. The visualized osseous structures demonstrate no acute abnormalities.      Impression: No acute cardiopulmonary process.        This report was signed and finalized on 1/27/2024 7:47 AM by Torey Allen MD.      CT Abdomen Pelvis Without Contrast    Result Date: 1/26/2024  FINAL REPORT TECHNIQUE: Axial CT images were performed from the lung bases through the symphysis pubis without IV contrast.  This study was performed with techniques to keep radiation doses as low as reasonably achievable (ALARA). Individualized dose reduction techniques using automated exposure control or adjustment of mA and/or kV according to the patient's size were employed. CLINICAL HISTORY: Abdominal pain, post-op; prostate biopsy yesterday, woke today with weakness and sepsis symptoms FINDINGS: Lack of intravenous contrast limits evaluation of solid abdominal and pelvic organs.  LOWER CHEST: The heart is normal  size.  The lung bases are clear.  ABDOMEN/PELVIS:  Liver, gallbladder and bile ducts: The unenhanced liver is grossly unremarkable without focal abnormality. there are gallstones without cholecystitis.  There is no definite biliary duct dilatation.  Adrenal glands: The adrenal glands are morphologically unremarkable without suspicious lesion. Kidneys, ureter and urinary bladder: No nephrolithiasis.  There are nonobstructing bilateral kidney stones.  There is urinary bladder wall thickening.  Spleen: The spleen is normal size. Pancreas: The pancreas is grossly unremarkable.  GI systems and mesentery: A gastric banding device is noted.  No evidence of bowel obstruction.  The appendix is not visualized, but there is no sign of appendicitis.  No significant mesenteric inflammation.  Lymph nodes: No definite pathologically enlarged abdominal or pelvic lymph nodes present within the limits of this noncontrast enhanced exam.  Vessels: The abdominal aorta is normal in caliber.  The inferior vena cava is unremarkable. Peritoneum: No free intraperitoneal fluid or pneumoperitoneum. Pelvic viscera: A Crespo catheter is inflated within the prostate gland.  Body wall: No body wall contusion.  Bones: No acute fracture.     Impression: 1.  Malpositioned Crespo catheter with balloon inflated in the prostate gland. Authenticated and Electronically Signed by Jose J Dupree MD on 01/26/2024 11:38:21 PM   I have reviewed the patient's radiology reports.    Medication Review:     I have reviewed the patient's active and prn medications.     Problem List:      Sepsis      Assessment:    Sepsis  Acute UTI/ Hematuria  ESBL Bacteremia  CKD stage 3  Atrial Fibrillation  ALEX  Seizure disorder    Plan:    Sepsis/ Acute UTI/ Hematuria:  - On Cipro prior to arrival to hospital  - Continue antibiotic treatment with Invanz appropriate based on urine culture sensitivities    - Continue IV fluids for now  - Continue with Crespo catheter. Dr. De La Cruz has  been consulted.   - Leukocytosis noted yesterday and today   - With Leukocytosis and documented UOP since admission around 600ml despite continued IVF (total around 3.5L) will get CTAP to rule out obstruction and ensure kimball catheter is placed correctly as this was a problem with 1st catheter attempt.  - CT scan today noted malpositioned kimball catheter.  Discussed with Dr. De La Cruz who advised since patient does not have any abdominal pain and vitals are reassuring, keep current catheter in place for now and he will try to reposition tomorrow.  Bladder scan noted 0ml.  With underlying CKD will consult nephrology for recommendations as well.     ESBL Bacteremia:  - Blood cultures noted ESBL Ecoli.    - Continue Invanz.   - Awaiting sensitivities     CKD 3:  - baseline Creatinine- 1.4, noted to trend up at 2.1 today  - Continuing IVF for now  - Will consult Nephrology for BURAK today     Atrial fibrillation:  - Pacemaker, amiodarone will be continued.    - Xarelto on hold with hematuria     ALEX:  - Continue home BiPAP     Seizure disorder:  - Continue Depakote    DVT Prophylaxis: SCDs  Code Status: Full Code  Diet: Cardiac  Discharge Plan: several days    Lulu Carson, APRN  01/28/24  11:40 EST    Dictated utilizing Dragon dictation.

## 2024-01-28 NOTE — THERAPY EVALUATION
Patient Name: Andre Agosto  : 1955    MRN: [id number]                              Today's Date: 2024       Admit Date: 2024    Visit Dx:     ICD-10-CM ICD-9-CM   1. Sepsis without acute organ dysfunction, due to unspecified organism  A41.9 038.9     995.91   2. Acute cystitis with hematuria  N30.01 595.0     Patient Active Problem List   Diagnosis    Essential hypertension    Epilepsy    CVA (cerebral vascular accident)    Hypokalemia    Diabetes mellitus without complication    BiPAP (biphasic positive airway pressure) dependence    Benign hypertension    Hyperlipidemia    Epilepsy    Obesity    Dyspepsia    Coronary arteriosclerosis    Abnormal stress test    TIA (transient ischemic attack)    Pneumonia of right lower lobe due to infectious organism    History of cerebrovascular accident    ALEX (obstructive sleep apnea)    SOB (shortness of breath) on exertion    Suspected COVID-19 virus infection    Fever and chills    Dyspnea    Fever and chills    Localized edema    Right leg pain    Atrial fibrillation    Memory loss    Blood glucose abnormal    Generalized anxiety disorder    History of Helicobacter pylori infection    Benign prostatic hyperplasia    Stage 3 chronic kidney disease    Medication intolerance    Pseudoaneurysm of femoral artery following procedure    Sustained ventricular tachycardia    ABMD (anterior basement membrane dystrophy)    Myopia, bilateral    Chronic otitis media    Eye twitch    Hyperaldosteronism    Otitis externa of right ear    Regular astigmatism, right eye    Seizure-like activity    Nephrolithiasis    UTI (urinary tract infection)    Anxiety    Presbyopia    Otalgia    Arthralgia of right knee    Acute suppurative otitis media without spontaneous rupture of ear drum    Recurrent acute suppurative otitis media    Benign hypertensive heart disease with congestive heart failure    Eustachian tube disorder    Dre hematuria    Other gram-negative sepsis     Other specified bacterial agents as the cause of diseases classified elsewhere    Otorrhea    Paroxysmal atrial flutter    Sensorineural hearing loss    Tobacco abuse counseling    Coronary atherosclerosis    Kidney stone    Other obesity due to excess calories    Otitis externa of right ear    Urinary tract infection, site not specified    Elevated prostate specific antigen (PSA)    Sepsis     Past Medical History:   Diagnosis Date    6th nerve palsy, right     right eye     Anxiety and depression     Atrial fibrillation, persistent 12/02/2020    on Xarelto    Coronary artery disease involving native coronary artery of native heart without angina pectoris 09/12/2018    follows w/ Dr. Mendoza    CRI (chronic renal insufficiency), stage 2 (mild)     CVA (cerebral vascular accident)     Diabetes mellitus     doesnt check sugar, type 2     Disease of thyroid gland     Double vision     resolved- right eye    Elevated cholesterol     Elevated prostate specific antigen (PSA) 11/08/2023    Focal seizure     of right arm     Heart attack     NSTEMI 2015, s/p stenting    History of Helicobacter pylori infection     in 2008, treated w/ PrevPak - 2021 EGD bx (+), PCP RX - PPI/Augmentin/Doxy/Flagyl (x 14 days) 1/22/21    HL (hearing loss)     (L) ear - child luevano measles    Hyperlipidemia     Hypertension     Kidney stone     Memory loss 2005    d/t meningitis    Meningitis 2005    unsure of bacterial or viral     Obesity     Sleep apnea treated with nocturnal BiPAP     compliant with  machine     Stroke     2017/2018    Ventricular tachycardia     3 different times    Wears glasses      Past Surgical History:   Procedure Laterality Date    CARDIAC CATHETERIZATION N/A 10/12/2018    Procedure: Left Heart Cath;  Surgeon: Yoselin Johnson MD;  Location: [facility] CATH INVASIVE LOCATION;  Service: Cardiology    CARDIAC CATHETERIZATION  03/2021    stent    CARDIAC CATHETERIZATION  07/2021    just checking the after pacemaker  placed- Dr. Tim    CARDIAC DEFIBRILLATOR PLACEMENT  2021    COLONOSCOPY  10/2020    w/ Dr. Jacobs, hx colon polyps    CORONARY STENT PLACEMENT  2015    LAPAROSCOPIC GASTRIC BANDING  2008    s/p LAGB Realize 6/2008 by HOMERO.    PACEMAKER IMPLANTATION  07/25/2021    UMBILICAL HERNIA REPAIR  2008    incarcerated UHR w/ mesh by Dr. Velasquez    URETEROSCOPY LASER LITHOTRIPSY WITH STENT INSERTION Left 10/20/2021    Procedure: URETEROSCOPY LASER LITHOTRIPSY WITH STENT INSERTION;  Surgeon: Tonio De La Cruz MD;  Location: [facility] OR;  Service: Urology;  Laterality: Left;    URETEROSCOPY LASER LITHOTRIPSY WITH STENT INSERTION Left 11/03/2021    Procedure: URETEROSCOPY LEFT, LEFT RETROGRADE PYELOGRAM, CYSTOSCOPY, LASER LITHOTRIPSY WITH STENT EXCHANGE;  Surgeon: Tonio De La Cruz MD;  Location: [facility] OR;  Service: Urology;  Laterality: Left;    URETEROSCOPY LASER LITHOTRIPSY WITH STENT INSERTION Left 10/06/2022    Procedure: URETEROSCOPY LASER LITHOTRIPSY WITH STENT INSERTION;  Surgeon: Tonio De La Cruz MD;  Location: [facility] OR;  Service: Urology;  Laterality: Left;      General Information       [facility] Name 01/28/24 1321          Physical Therapy Time and Intention    Document Type evaluation  -     Mode of Treatment physical therapy  -       Row Name 01/28/24 1321          General Information    Patient Profile Reviewed yes  -     Prior Level of Function independent:;ADL's;driving  -     Barriers to Rehab previous functional deficit;medically complex  -       Row Name 01/28/24 1321          Living Environment    People in Home spouse  -       Row Name 01/28/24 1321          Home Main Entrance    Number of Stairs, Main Entrance four  -     Stair Railings, Main Entrance railings safe and in good condition  -       Row Name 01/28/24 1321          Stairs Within Home, Primary    Stairs, Within Home, Primary reports that he does not have to use stairs in home  -       Row Name 01/28/24 1321           Cognition    Orientation Status (Cognition) oriented x 4  -       Row Name 01/28/24 1321          Safety Issues, Functional Mobility    Safety Issues Affecting Function (Mobility) awareness of need for assistance;impulsivity;insight into deficits/self-awareness;safety precaution awareness;safety precautions follow-through/compliance  -     Impairments Affecting Function (Mobility) endurance/activity tolerance;pain  -               User Key  (r) = Recorded By, (t) = Taken By, (c) = Cosigned By      Initials Name Provider Type    Dinesh Vogt PT Physical Therapist                   Mobility       Row Name 01/28/24 1322          Bed Mobility    Bed Mobility sit-supine  -     Sit-Supine [place] (Bed Mobility) moderate assist (50% patient effort)  -     Assistive Device (Bed Mobility) bed rails;head of bed elevated  -       Row Name 01/28/24 1322          Sit-Stand Transfer    Sit-Stand [place] (Transfers) minimum assist (75% patient effort)  -     Assistive Device (Sit-Stand Transfers) walker, front-wheeled  -       Row Name 01/28/24 1322          Gait/Stairs (Locomotion)    [place] Level (Gait) contact guard  -     Assistive Device (Gait) walker, front-wheeled  -     Patient was able to Ambulate yes  -     Distance in Feet (Gait) 26 ft x6  -     Deviations/Abnormal Patterns (Gait) festinating/shuffling  -     Bilateral Gait Deviations forward flexed posture  -               User Key  (r) = Recorded By, (t) = Taken By, (c) = Cosigned By      Initials Name Provider Type    Dinesh Vogt PT Physical Therapist                   Obj/Interventions       Row Name 01/28/24 1323          Range of Motion Comprehensive    General Range of Motion no range of motion deficits identified  -       Row Name 01/28/24 1323          Strength Comprehensive (MMT)    General Manual Muscle Testing (MMT) Assessment lower extremity strength deficits identified  -     Comment, General  Manual Muscle Testing (MMT) Assessment grossly 3+/5 MMT  -       Row Name 01/28/24 1323          Balance    Balance Assessment sitting static balance;sitting dynamic balance;sit to stand dynamic balance;standing dynamic balance;standing static balance  -     Static Sitting Balance modified independence  -     Dynamic Sitting Balance modified independence  -     Position, Sitting Balance unsupported;sitting edge of bed  -     Sit to Stand Dynamic Balance minimal assist  -     Static Standing Balance contact guard  -     Dynamic Standing Balance contact guard  -     Position/Device Used, Standing Balance walker, front-wheeled  -MK     Balance Interventions sitting;standing;sit to stand  -MK               User Key  (r) = Recorded By, (t) = Taken By, (c) = Cosigned By      Initials Name Provider Type     Dinesh Avila, PT Physical Therapist                   Goals/Plan       Row Name 01/28/24 1331          Bed Mobility Goal 1 (PT)    Activity/Assistive Device (Bed Mobility Goal 1, PT) supine to sit;sit to supine  -     [place] Level/Cues Needed (Bed Mobility Goal 1, PT) modified independence  -MK     Time Frame (Bed Mobility Goal 1, PT) long term goal (LTG);10 days  -     Progress/Outcomes (Bed Mobility Goal 1, PT) goal ongoing  -       Row Name 01/28/24 1331          Transfer Goal 1 (PT)    Activity/Assistive Device (Transfer Goal 1, PT) sit-to-stand/stand-to-sit  -     [place] Level/Cues Needed (Transfer Goal 1, PT) modified independence  -MK     Time Frame (Transfer Goal 1, PT) long term goal (LTG);10 days  -     Progress/Outcome (Transfer Goal 1, PT) goal ongoing  -       Row Name 01/28/24 1331          Gait Training Goal 1 (PT)    Activity/Assistive Device (Gait Training Goal 1, PT) gait (walking locomotion)  -     [place] Level (Gait Training Goal 1, PT) independent  -     Distance (Gait Training Goal 1, PT) 250 ft  -     Time Frame (Gait Training Goal 1, PT) long  "term goal (LTG);10 days  -     Progress/Outcome (Gait Training Goal 1, PT) goal ongoing  -       Row Name 01/28/24 1331          Patient Education Goal (PT)    Activity (Patient Education Goal, PT) pt to participate in B LE ther ex program at least 3x per week  -     [place]/Cues/Accuracy (Memory Goal 2, PT) demonstrates adequately  -     Time Frame (Patient Education Goal, PT) long term goal (LTG);10 days  -     Progress/Outcome (Patient Education Goal, PT) goal ongoing  -       Row Name 01/28/24 1331          Therapy Assessment/Plan (PT)    Planned Therapy Interventions (PT) balance training;bed mobility training;gait training;home exercise program;manual therapy techniques;neuromuscular re-education;patient/family education;strengthening;transfer training  -               User Key  (r) = Recorded By, (t) = Taken By, (c) = Cosigned By      Initials Name Provider Type    Dinesh Vogt, PT Physical Therapist                   Clinical Impression       Row Name 01/28/24 1325          Pain    Pretreatment Pain Rating 4/10  -     Posttreatment Pain Rating 4/10  -     Pain Location - perineum  -     Pain Intervention(s) Ambulation/increased activity;Repositioned  -     Additional Documentation Pain Scale: Numbers Pre/Post-Treatment (Group)  -       Row Name 01/28/24 1325          Plan of Care Review    Plan of Care Reviewed With patient;daughter  -     Progress no change  -     Outcome Evaluation pt participated well in PT evaluation this date. Pt agreeable to evaluation. Pt sitting in bedside chair with daughters at bedside this date. Pt completed STS from chair using RW min A and ambulated 26 ft x 6 CGA using RW. Pt demonstrated forward flexed posture and slight festinating gait. Pt reported that he would like to return to bed, so pt sat EOB and transferred sit to supine mod A. Pt reported throughout evaluation that he had pain in \"catheter area\". Pt left with daughters at bedside, " call bell in reach. Pt to benefit from skilled PT during this inpatient stay and would continue to benefit from HH upon dc.  -       Row Name 01/28/24 1325          Therapy Assessment/Plan (PT)    Patient/Family Therapy Goals Statement (PT) to go home  -     Rehab Potential (PT) good, to achieve stated therapy goals  -     Criteria for Skilled Interventions Met (PT) yes;skilled treatment is necessary  -     Therapy Frequency (PT) 5 times/wk  -     Predicted Duration of Therapy Intervention (PT) 2 weeks  -       Row Name 01/28/24 1325          Vital Signs    O2 Delivery Pre Treatment room air  -     O2 Delivery Intra Treatment room air  -     O2 Delivery Post Treatment room air  -     Pre Patient Position Sitting  -     Intra Patient Position Standing  -     Post Patient Position Supine  -       Row Name 01/28/24 1325          Positioning and Restraints    Pre-Treatment Position sitting in chair/recliner  -     Post Treatment Position bed  -     In Bed supine;call light within reach;encouraged to call for assist;with family/caregiver  -               User Key  (r) = Recorded By, (t) = Taken By, (c) = Cosigned By      Initials Name Provider Type    Dinesh Vogt, PT Physical Therapist                   Outcome Measures       Row Name 01/28/24 1332 01/28/24 0849       How much help from another person do you currently need...    Turning from your back to your side while in flat bed without using bedrails? 4  - 3  -RS    Moving from lying on back to sitting on the side of a flat bed without bedrails? 3  - 3  -RS    Moving to and from a bed to a chair (including a wheelchair)? 3  - 2  -RS    Standing up from a chair using your arms (e.g., wheelchair, bedside chair)? 3  - 2  -RS    Climbing 3-5 steps with a railing? 2  - 2  -RS    To walk in hospital room? 3  - 2  -RS    AM-PAC 6 Clicks Score (PT) 18  - 14  -RS    Highest Level of Mobility Goal 6 --> Walk 10 steps or more   - 4 --> Transfer to chair/commode  -      Row Name 01/28/24 1332          Functional Assessment    Outcome Measure Options AM-PAC 6 Clicks Basic Mobility (PT)  -               User Key  (r) = Recorded By, (t) = Taken By, (c) = Cosigned By      Initials Name Provider Type    Dinesh Vogt, PT Physical Therapist    Calos Menendez, RN Registered Nurse                                 Physical Therapy Education       Title: PT OT SLP Therapies (In Progress)       Topic: Physical Therapy (In Progress)       Point: Mobility training (Done)       Learning Progress Summary             Patient Acceptance, E,D, VU,DU by  at 1/28/2024 1333                         Point: Home exercise program (Not Started)       Learner Progress:  Not documented in this visit.              Point: Body mechanics (Not Started)       Learner Progress:  Not documented in this visit.              Point: Precautions (Not Started)       Learner Progress:  Not documented in this visit.                              User Key       Initials Effective Dates Name Provider Type Discipline     06/13/23 -  Dinesh Avila, PT Physical Therapist PT                  PT Recommendation and Plan  Planned Therapy Interventions (PT): balance training, bed mobility training, gait training, home exercise program, manual therapy techniques, neuromuscular re-education, patient/family education, strengthening, transfer training  Plan of Care Reviewed With: patient, daughter  Progress: no change  Outcome Evaluation: pt participated well in PT evaluation this date. Pt agreeable to evaluation. Pt sitting in bedside chair with daughters at bedside this date. Pt completed STS from chair using RW min A and ambulated 26 ft x 6 CGA using RW. Pt demonstrated forward flexed posture and slight festinating gait. Pt reported that he would like to return to bed, so pt sat EOB and transferred sit to supine mod A. Pt reported throughout evaluation that he had pain in  "\"catheter area\". Pt left with daughters at bedside, call bell in reach. Pt to benefit from skilled PT during this inpatient stay and would continue to benefit from HH upon dc.     Time Calculation:   PT Evaluation Complexity  History, PT Evaluation Complexity: 3 or more personal factors and/or comorbidities  Examination of Body Systems (PT Eval Complexity): total of 3 or more elements  Clinical Presentation (PT Evaluation Complexity): evolving  Clinical Decision Making (PT Evaluation Complexity): moderate complexity  Overall Complexity (PT Evaluation Complexity): moderate complexity     PT Charges       Row Name 01/28/24 1253             Time Calculation    Start Time 1253  -MK      PT Received On 01/28/24  -MK      PT Goal Re-Cert Due Date 02/07/24  -MK         Untimed Charges    PT Eval/Re-eval Minutes 32  -MK         Total Minutes    Untimed Charges Total Minutes 32  -MK       Total Minutes 32  -MK                User Key  (r) = Recorded By, (t) = Taken By, (c) = Cosigned By      Initials Name Provider Type    Dinesh Vogt, PT Physical Therapist                  Therapy Charges for Today       Code Description Service Date Service Provider Modifiers Qty    13018218264 HC PT EVAL MOD COMPLEXITY 2 1/28/2024 Dinesh Avila, PT GP 1            PT G-Codes  Outcome Measure Options: AM-PAC 6 Clicks Basic Mobility (PT)  AM-PAC 6 Clicks Score (PT): 18  PT Discharge Summary  Anticipated Discharge Disposition (PT): home with home health    Dinesh Avila PT  1/28/2024    "

## 2024-01-29 ENCOUNTER — TELEPHONE (OUTPATIENT)
Dept: BARIATRICS/WEIGHT MGMT | Facility: CLINIC | Age: 69
End: 2024-01-29
Payer: MEDICARE

## 2024-01-29 LAB
ANION GAP SERPL CALCULATED.3IONS-SCNC: 13.2 MMOL/L (ref 5–15)
BACTERIA SPEC AEROBE CULT: ABNORMAL
BACTERIA SPEC AEROBE CULT: ABNORMAL
BASOPHILS # BLD AUTO: 0.06 10*3/MM3 (ref 0–0.2)
BASOPHILS NFR BLD AUTO: 0.5 % (ref 0–1.5)
BUN SERPL-MCNC: 38 MG/DL (ref 8–23)
BUN/CREAT SERPL: 23 (ref 7–25)
CALCIUM SPEC-SCNC: 7.9 MG/DL (ref 8.6–10.5)
CHLORIDE SERPL-SCNC: 100 MMOL/L (ref 98–107)
CO2 SERPL-SCNC: 20.8 MMOL/L (ref 22–29)
CREAT SERPL-MCNC: 1.65 MG/DL (ref 0.76–1.27)
DEPRECATED RDW RBC AUTO: 50.9 FL (ref 37–54)
EGFRCR SERPLBLD CKD-EPI 2021: 45 ML/MIN/1.73
EOSINOPHIL # BLD AUTO: 0.22 10*3/MM3 (ref 0–0.4)
EOSINOPHIL NFR BLD AUTO: 1.7 % (ref 0.3–6.2)
ERYTHROCYTE [DISTWIDTH] IN BLOOD BY AUTOMATED COUNT: 15.1 % (ref 12.3–15.4)
GLUCOSE SERPL-MCNC: 96 MG/DL (ref 65–99)
GRAM STN SPEC: ABNORMAL
HCT VFR BLD AUTO: 43.8 % (ref 37.5–51)
HGB BLD-MCNC: 14.6 G/DL (ref 13–17.7)
IMM GRANULOCYTES # BLD AUTO: 0.11 10*3/MM3 (ref 0–0.05)
IMM GRANULOCYTES NFR BLD AUTO: 0.8 % (ref 0–0.5)
ISOLATED FROM: ABNORMAL
ISOLATED FROM: ABNORMAL
LYMPHOCYTES # BLD AUTO: 1.43 10*3/MM3 (ref 0.7–3.1)
LYMPHOCYTES NFR BLD AUTO: 11 % (ref 19.6–45.3)
MCH RBC QN AUTO: 30.4 PG (ref 26.6–33)
MCHC RBC AUTO-ENTMCNC: 33.3 G/DL (ref 31.5–35.7)
MCV RBC AUTO: 91.1 FL (ref 79–97)
MONOCYTES # BLD AUTO: 0.99 10*3/MM3 (ref 0.1–0.9)
MONOCYTES NFR BLD AUTO: 7.6 % (ref 5–12)
NEUTROPHILS NFR BLD AUTO: 10.18 10*3/MM3 (ref 1.7–7)
NEUTROPHILS NFR BLD AUTO: 78.4 % (ref 42.7–76)
NRBC BLD AUTO-RTO: 0 /100 WBC (ref 0–0.2)
PLATELET # BLD AUTO: 125 10*3/MM3 (ref 140–450)
PMV BLD AUTO: 12.3 FL (ref 6–12)
POTASSIUM SERPL-SCNC: 3.5 MMOL/L (ref 3.5–5.2)
RBC # BLD AUTO: 4.81 10*6/MM3 (ref 4.14–5.8)
SODIUM SERPL-SCNC: 134 MMOL/L (ref 136–145)
VALPROATE SERPL-MCNC: 13.1 MCG/ML (ref 50–125)
WBC NRBC COR # BLD AUTO: 12.99 10*3/MM3 (ref 3.4–10.8)

## 2024-01-29 PROCEDURE — 99232 SBSQ HOSP IP/OBS MODERATE 35: CPT | Performed by: NURSE PRACTITIONER

## 2024-01-29 PROCEDURE — 97110 THERAPEUTIC EXERCISES: CPT

## 2024-01-29 PROCEDURE — 97116 GAIT TRAINING THERAPY: CPT

## 2024-01-29 PROCEDURE — 80048 BASIC METABOLIC PNL TOTAL CA: CPT | Performed by: NURSE PRACTITIONER

## 2024-01-29 PROCEDURE — 85025 COMPLETE CBC W/AUTO DIFF WBC: CPT | Performed by: NURSE PRACTITIONER

## 2024-01-29 PROCEDURE — 51700 IRRIGATION OF BLADDER: CPT | Performed by: UROLOGY

## 2024-01-29 PROCEDURE — 80164 ASSAY DIPROPYLACETIC ACD TOT: CPT | Performed by: FAMILY MEDICINE

## 2024-01-29 PROCEDURE — 25010000002 ERTAPENEM PER 500 MG: Performed by: FAMILY MEDICINE

## 2024-01-29 PROCEDURE — 99232 SBSQ HOSP IP/OBS MODERATE 35: CPT | Performed by: UROLOGY

## 2024-01-29 PROCEDURE — 25810000003 SODIUM CHLORIDE 0.9 % SOLUTION: Performed by: FAMILY MEDICINE

## 2024-01-29 RX ORDER — POTASSIUM CHLORIDE 750 MG/1
40 CAPSULE, EXTENDED RELEASE ORAL ONCE
Status: COMPLETED | OUTPATIENT
Start: 2024-01-29 | End: 2024-01-29

## 2024-01-29 RX ADMIN — DOCUSATE SODIUM 50MG AND SENNOSIDES 8.6MG 2 TABLET: 8.6; 5 TABLET, FILM COATED ORAL at 08:30

## 2024-01-29 RX ADMIN — DIVALPROEX SODIUM 750 MG: 250 TABLET, DELAYED RELEASE ORAL at 08:28

## 2024-01-29 RX ADMIN — CARVEDILOL 12.5 MG: 12.5 TABLET, FILM COATED ORAL at 18:11

## 2024-01-29 RX ADMIN — SODIUM CHLORIDE 125 ML/HR: 9 INJECTION, SOLUTION INTRAVENOUS at 02:54

## 2024-01-29 RX ADMIN — TAMSULOSIN HYDROCHLORIDE 0.4 MG: 0.4 CAPSULE ORAL at 08:28

## 2024-01-29 RX ADMIN — CARVEDILOL 12.5 MG: 12.5 TABLET, FILM COATED ORAL at 08:28

## 2024-01-29 RX ADMIN — POTASSIUM CHLORIDE 40 MEQ: 10 CAPSULE, COATED, EXTENDED RELEASE ORAL at 18:46

## 2024-01-29 RX ADMIN — SODIUM CHLORIDE 125 ML/HR: 9 INJECTION, SOLUTION INTRAVENOUS at 20:15

## 2024-01-29 RX ADMIN — DIVALPROEX SODIUM 750 MG: 250 TABLET, DELAYED RELEASE ORAL at 22:11

## 2024-01-29 RX ADMIN — SODIUM CHLORIDE 125 ML/HR: 9 INJECTION, SOLUTION INTRAVENOUS at 11:35

## 2024-01-29 RX ADMIN — Medication 5 MG: at 22:29

## 2024-01-29 RX ADMIN — Medication 10 ML: at 22:13

## 2024-01-29 RX ADMIN — OXYCODONE HYDROCHLORIDE AND ACETAMINOPHEN 1 TABLET: 7.5; 325 TABLET ORAL at 22:29

## 2024-01-29 RX ADMIN — LEVOTHYROXINE SODIUM 88 MCG: 88 TABLET ORAL at 08:27

## 2024-01-29 RX ADMIN — ERTAPENEM 1000 MG: 1 INJECTION INTRAMUSCULAR; INTRAVENOUS at 22:12

## 2024-01-29 RX ADMIN — AMIODARONE HYDROCHLORIDE 100 MG: 200 TABLET ORAL at 08:27

## 2024-01-29 NOTE — CASE MANAGEMENT/SOCIAL WORK
Case Management/Social Work    Patient Name:  Andre Agosto  YOB: 1955  MRN: [id number]  Admit Date:  1/26/2024        10:15 EST  Met with patient at bedside. No new CM needs identified at this time. CM will continue to follow.        Electronically signed by:  Rony Shaw RN  01/29/24 10:15 EST

## 2024-01-29 NOTE — TELEPHONE ENCOUNTER
----- Message from Dione Forbes MA sent at 1/29/2024 11:18 AM EST -----  Regarding: FW: Patient Hospitalized    Do we need to reschedule the EGD?  Patient had a prostate biopsy on 1/25/24, presented to the ED with Temp of 102.4 with decreased mental status. He is still in the hospital. Please advise thank you.       ----- Message -----  From: Raul Lang  Sent: 1/29/2024  11:10 AM EST  To: [facility] Clinical [facility]  Subject: Patient Hospitalized                             Good morning!    This patient is scheduled for an EGD with Dr. Guzman on Wednesday, January 31. I had to adjust his arrival time and I noticed in his chart that he has been hospitalized @ [facility] since Friday.  Please advise!    ThanksRaul

## 2024-01-29 NOTE — THERAPY TREATMENT NOTE
Patient Name: Andre Agosto  : 1955    MRN: [id number]                              Today's Date: 2024       Admit Date: 2024    Visit Dx:     ICD-10-CM ICD-9-CM   1. Sepsis without acute organ dysfunction, due to unspecified organism  A41.9 038.9     995.91   2. Acute cystitis with hematuria  N30.01 595.0     Patient Active Problem List   Diagnosis    Essential hypertension    Epilepsy    CVA (cerebral vascular accident)    Hypokalemia    Diabetes mellitus without complication    BiPAP (biphasic positive airway pressure) dependence    Benign hypertension    Hyperlipidemia    Epilepsy    Obesity    Dyspepsia    Coronary arteriosclerosis    Abnormal stress test    TIA (transient ischemic attack)    Pneumonia of right lower lobe due to infectious organism    History of cerebrovascular accident    ALEX (obstructive sleep apnea)    SOB (shortness of breath) on exertion    Suspected COVID-19 virus infection    Fever and chills    Dyspnea    Fever and chills    Localized edema    Right leg pain    Atrial fibrillation    Memory loss    Blood glucose abnormal    Generalized anxiety disorder    History of Helicobacter pylori infection    Benign prostatic hyperplasia    Stage 3 chronic kidney disease    Medication intolerance    Pseudoaneurysm of femoral artery following procedure    Sustained ventricular tachycardia    ABMD (anterior basement membrane dystrophy)    Myopia, bilateral    Chronic otitis media    Eye twitch    Hyperaldosteronism    Otitis externa of right ear    Regular astigmatism, right eye    Seizure-like activity    Nephrolithiasis    UTI (urinary tract infection)    Anxiety    Presbyopia    Otalgia    Arthralgia of right knee    Acute suppurative otitis media without spontaneous rupture of ear drum    Recurrent acute suppurative otitis media    Benign hypertensive heart disease with congestive heart failure    Eustachian tube disorder    Dre hematuria    Other gram-negative sepsis     Other specified bacterial agents as the cause of diseases classified elsewhere    Otorrhea    Paroxysmal atrial flutter    Sensorineural hearing loss    Tobacco abuse counseling    Coronary atherosclerosis    Kidney stone    Other obesity due to excess calories    Otitis externa of right ear    Urinary tract infection, site not specified    Elevated prostate specific antigen (PSA)    Sepsis     Past Medical History:   Diagnosis Date    6th nerve palsy, right     right eye     Anxiety and depression     Atrial fibrillation, persistent 12/02/2020    on Xarelto    Coronary artery disease involving native coronary artery of native heart without angina pectoris 09/12/2018    follows w/ Dr. Mendoza    CRI (chronic renal insufficiency), stage 2 (mild)     CVA (cerebral vascular accident)     Diabetes mellitus     doesnt check sugar, type 2     Disease of thyroid gland     Double vision     resolved- right eye    Elevated cholesterol     Elevated prostate specific antigen (PSA) 11/08/2023    Focal seizure     of right arm     Heart attack     NSTEMI 2015, s/p stenting    History of Helicobacter pylori infection     in 2008, treated w/ PrevPak - 2021 EGD bx (+), PCP RX - PPI/Augmentin/Doxy/Flagyl (x 14 days) 1/22/21    HL (hearing loss)     (L) ear - child luevano measles    Hyperlipidemia     Hypertension     Kidney stone     Memory loss 2005    d/t meningitis    Meningitis 2005    unsure of bacterial or viral     Obesity     Sleep apnea treated with nocturnal BiPAP     compliant with  machine     Stroke     2017/2018    Ventricular tachycardia     3 different times    Wears glasses      Past Surgical History:   Procedure Laterality Date    CARDIAC CATHETERIZATION N/A 10/12/2018    Procedure: Left Heart Cath;  Surgeon: Yoselin Johnson MD;  Location: [facility] CATH INVASIVE LOCATION;  Service: Cardiology    CARDIAC CATHETERIZATION  03/2021    stent    CARDIAC CATHETERIZATION  07/2021    just checking the after pacemaker  placed- Dr. Tim    CARDIAC DEFIBRILLATOR PLACEMENT  2021    COLONOSCOPY  10/2020    w/ Dr. Jacobs, hx colon polyps    CORONARY STENT PLACEMENT  2015    LAPAROSCOPIC GASTRIC BANDING  2008    s/p LAGB Realize 6/2008 by MASONO.    PACEMAKER IMPLANTATION  07/25/2021    UMBILICAL HERNIA REPAIR  2008    incarcerated UHR w/ mesh by Dr. Velasquez    URETEROSCOPY LASER LITHOTRIPSY WITH STENT INSERTION Left 10/20/2021    Procedure: URETEROSCOPY LASER LITHOTRIPSY WITH STENT INSERTION;  Surgeon: Tonio De La Cruz MD;  Location: [facility] OR;  Service: Urology;  Laterality: Left;    URETEROSCOPY LASER LITHOTRIPSY WITH STENT INSERTION Left 11/03/2021    Procedure: URETEROSCOPY LEFT, LEFT RETROGRADE PYELOGRAM, CYSTOSCOPY, LASER LITHOTRIPSY WITH STENT EXCHANGE;  Surgeon: Tonio De La Cruz MD;  Location: [facility] OR;  Service: Urology;  Laterality: Left;    URETEROSCOPY LASER LITHOTRIPSY WITH STENT INSERTION Left 10/06/2022    Procedure: URETEROSCOPY LASER LITHOTRIPSY WITH STENT INSERTION;  Surgeon: Tonio De La Cruz MD;  Location: [facility];  Service: Urology;  Laterality: Left;      General Information       Row Name 01/29/24 1202          Physical Therapy Time and Intention    Document Type therapy note (daily note)  -RM     Mode of Treatment physical therapy  -RM       Row Name 01/29/24 1202          General Information    Patient Profile Reviewed yes  -RM       Row Name 01/29/24 1202          Cognition    Orientation Status (Cognition) oriented x 4  -RM       Row Name 01/29/24 1202          Safety Issues, Functional Mobility    Safety Issues Affecting Function (Mobility) safety precaution awareness;safety precautions follow-through/compliance  -RM     Impairments Affecting Function (Mobility) endurance/activity tolerance  -RM               User Key  (r) = Recorded By, (t) = Taken By, (c) = Cosigned By      Initials Name Provider Type    RM Omar Reyes, PTA Physical Therapist Assistant                   Mobility        Row Name 01/29/24 1202          Bed Mobility    Comment, (Bed Mobility) Pt sitting UIC  -RM       Row Name 01/29/24 1202          Sit-Stand Transfer    Sit-Stand [place] (Transfers) contact guard;minimum assist (75% patient effort);verbal cues;nonverbal cues (demo/gesture)  -RM     Assistive Device (Sit-Stand Transfers) walker, front-wheeled  -RM       Row Name 01/29/24 1202          Gait/Stairs (Locomotion)    [place] Level (Gait) standby assist;contact guard;verbal cues  -RM     Assistive Device (Gait) walker, front-wheeled  -RM     Distance in Feet (Gait) 192'  -RM     Deviations/Abnormal Patterns (Gait) gait speed decreased;stride length decreased  -RM     Bilateral Gait Deviations forward flexed posture  -RM               User Key  (r) = Recorded By, (t) = Taken By, (c) = Cosigned By      Initials Name Provider Type    Omar Aleman, DEBORAH Physical Therapist Assistant                   Obj/Interventions       Row Name 01/29/24 1204          Motor Skills    Therapeutic Exercise hip;knee;ankle  -RM       Row Name 01/29/24 1204          Hip (Therapeutic Exercise)    Hip (Therapeutic Exercise) strengthening exercise;isometric exercises  -RM     Hip Isometrics (Therapeutic Exercise) bilateral;gluteal sets;10 repetitions  -RM     Hip Strengthening (Therapeutic Exercise) bilateral;marching while seated;10 repetitions  -RM       Row Name 01/29/24 1204          Knee (Therapeutic Exercise)    Knee (Therapeutic Exercise) strengthening exercise  -RM     Knee Strengthening (Therapeutic Exercise) bilateral;LAQ (long arc quad);10 repetitions  -RM       Row Name 01/29/24 1204          Ankle (Therapeutic Exercise)    Ankle (Therapeutic Exercise) AROM (active range of motion)  -RM     Ankle AROM (Therapeutic Exercise) bilateral;dorsiflexion;plantarflexion;10 repetitions  -RM               User Key  (r) = Recorded By, (t) = Taken By, (c) = Cosigned By      Initials Name Provider Type    Omar Aleman, PTA  Physical Therapist Assistant                   Goals/Plan    No documentation.                  Clinical Impression       Row Name 01/29/24 1205          Pain    Pretreatment Pain Rating 0/10 - no pain  -RM     Posttreatment Pain Rating 0/10 - no pain  -RM       Row Name 01/29/24 1205          Plan of Care Review    Plan of Care Reviewed With patient;daughter  -RM     Progress improving  -RM     Outcome Evaluation Pt sitting UI and willing to participate with treatment. Pt performed sit to stand from bedside recliner with cga/min a with rw.  Pt performed gait 192' sba/cga with rw.  Pt also performed B LE seated exercises.  See flowsheet for details. Cont PT per POC progressing to goals as pt tolerates.  -RM       Row Name 01/29/24 1205          Vital Signs    O2 Delivery Pre Treatment room air  -RM     O2 Delivery Intra Treatment room air  -RM     O2 Delivery Post Treatment room air  -RM     Pre Patient Position Sitting  -RM     Intra Patient Position Standing  -RM     Post Patient Position Sitting  -RM       Row Name 01/29/24 1205          Positioning and Restraints    Post Treatment Position chair  -RM     In Chair reclined;call light within reach;encouraged to call for assist;with family/caregiver;notified nsg  -RM               User Key  (r) = Recorded By, (t) = Taken By, (c) = Cosigned By      Initials Name Provider Type    RM Omar Reyes, PTA Physical Therapist Assistant                   Outcome Measures       Row Name 01/29/24 1207 01/29/24 0822       How much help from another person do you currently need...    Turning from your back to your side while in flat bed without using bedrails? 4  -RM 4  -RS    Moving from lying on back to sitting on the side of a flat bed without bedrails? 3  -RM 3  -RS    Moving to and from a bed to a chair (including a wheelchair)? 3  -RM 3  -RS    Standing up from a chair using your arms (e.g., wheelchair, bedside chair)? 3  -RM 3  -RS    Climbing 3-5 steps with a  railing? 2  -RM 2  -RS    To walk in hospital room? 3  -RM 3  -RS    AM-PAC 6 Clicks Score (PT) 18  -RM 18  -RS    Highest Level of Mobility Goal 6 --> Walk 10 steps or more  -RM 6 --> Walk 10 steps or more  -RS      Row Name 01/29/24 1207          Functional Assessment    Outcome Measure Options AM-PAC 6 Clicks Basic Mobility (PT)  -               User Key  (r) = Recorded By, (t) = Taken By, (c) = Cosigned By      Initials Name Provider Type     Omar Reyes, PTA Physical Therapist Assistant    RS Calos Alston, RN Registered Nurse                                 Physical Therapy Education       Title: PT OT SLP Therapies (In Progress)       Topic: Physical Therapy (In Progress)       Point: Mobility training (Done)       Learning Progress Summary             Patient Acceptance, E,TB,D, VU,NR by  at 1/29/2024 1207    Comment: exercise techniques   importance of advancing daily activity as tolerates.    Acceptance, E,D, VU,DU by  at 1/28/2024 1333                         Point: Home exercise program (Done)       Learning Progress Summary             Patient Acceptance, E,TB,D, VU,NR by  at 1/29/2024 1207    Comment: exercise techniques   importance of advancing daily activity as tolerates.                         Point: Body mechanics (Not Started)       Learner Progress:  Not documented in this visit.              Point: Precautions (Not Started)       Learner Progress:  Not documented in this visit.                              User Key       Initials Effective Dates Name Provider Type Discipline     06/16/21 -  Omar Reyes, DEBORAH Physical Therapist Assistant PT     06/13/23 -  Dinesh Avila PT Physical Therapist PT                  PT Recommendation and Plan     Plan of Care Reviewed With: patient, daughter  Progress: improving  Outcome Evaluation: Pt sitting UIC and willing to participate with treatment. Pt performed sit to stand from bedside recliner with cga/min a with rw.  Pt performed  gait 192' sba/cga with rw.  Pt also performed B LE seated exercises.  See flowsheet for details. Cont PT per POC progressing to goals as pt tolerates.     Time Calculation:         PT Charges       Row Name 01/29/24 1209             Time Calculation    Start Time 1125  -RM      Stop Time 1148  -RM      Time Calculation (min) 23 min  -RM      PT Received On 01/29/24  -RM      PT Goal Re-Cert Due Date 02/07/24  -RM         Time Calculation- PT    Total Timed Code Minutes- PT 23 minute(s)  -RM         Timed Charges    04922 - PT Therapeutic Exercise Minutes 10  -RM      59379 - Gait Training Minutes  13  -RM         Total Minutes    Timed Charges Total Minutes 23  -RM       Total Minutes 23  -RM                User Key  (r) = Recorded By, (t) = Taken By, (c) = Cosigned By      Initials Name Provider Type    Omar Aleman, PTA Physical Therapist Assistant                  Therapy Charges for Today       Code Description Service Date Service Provider Modifiers Qty    09021249607 HC PT THER PROC EA 15 MIN 1/29/2024 Omar Reyes, PTA GP 1    17770404512 HC GAIT TRAINING EA 15 MIN 1/29/2024 Omar Reyes, PTA GP 1            PT G-Codes  Outcome Measure Options: AM-PAC 6 Clicks Basic Mobility (PT)  AM-PAC 6 Clicks Score (PT): 18       Omar Reyes PTA  1/29/2024

## 2024-01-29 NOTE — PLAN OF CARE
Goal Outcome Evaluation:  Plan of Care Reviewed With: patient, daughter        Progress: improving  Outcome Evaluation: Pt sitting UIC and willing to participate with treatment. Pt performed sit to stand from bedside recliner with cga/min a with rw.  Pt performed gait 192' sba/cga with rw.  Pt also performed B LE seated exercises.  See flowsheet for details. Cont PT per POC progressing to goals as pt tolerates.

## 2024-01-29 NOTE — PROGRESS NOTES
"Urology Inpatient Progress Note    Subjective  Doing well.  No fevers overnight.  Labs improved.    Physical Exam  Visit Vitals  /86 (BP Location: Right arm, Patient Position: Lying)   Pulse 69   Temp 97.9 °F (36.6 °C) (Oral)   Resp 18   Ht 188 cm (74\")   Wt (!) 150 kg (331 lb 9.2 oz)   SpO2 96%   BMI 42.57 kg/m²     Constitutional: NAD, WDWN.  Cardiovascular: Regular rate.  Pulmonary/Chest: Respirations are even and non-labored bilaterally.  Abdominal: Soft. No distension, tenderness, masses or guarding. No CVA tenderness.  Extremities: KRUNAL x 4, Warm. No clubbing.  No cyanosis.    Skin: Pink, warm and dry.  No rashes noted.    Genitourinary  Urine clear yellow    I/O last 3 completed shifts:  In: 4753 [P.O.:1080; I.V.:3673]  Out: 1975 [Urine:1975]      Current Facility-Administered Medications:     acetaminophen (TYLENOL) tablet 650 mg, 650 mg, Oral, Q4H PRN, 650 mg at 01/27/24 0914 **OR** acetaminophen (TYLENOL) 160 MG/5ML oral solution 650 mg, 650 mg, Oral, Q4H PRN **OR** acetaminophen (TYLENOL) suppository 650 mg, 650 mg, Rectal, Q4H PRN, Evelyn Cohen DO    acetaminophen (TYLENOL) tablet 1,000 mg, 1,000 mg, Oral, Once, Evelyn Cohen DO    aluminum-magnesium hydroxide-simethicone (MAALOX MAX) 400-400-40 MG/5ML suspension 15 mL, 15 mL, Oral, Q6H PRN, Evelyn Cohen DO    amiodarone (PACERONE) tablet 100 mg, 100 mg, Oral, Daily, Evelyn Cohen DO, 100 mg at 01/29/24 0827    [Held by provider] amLODIPine (NORVASC) tablet 10 mg, 10 mg, Oral, Nightly, Evelyn Cohen DO    sennosides-docusate (PERICOLACE) 8.6-50 MG per tablet 2 tablet, 2 tablet, Oral, BID, 2 tablet at 01/29/24 0830 **AND** polyethylene glycol (MIRALAX) packet 17 g, 17 g, Oral, Daily PRN **AND** bisacodyl (DULCOLAX) EC tablet 5 mg, 5 mg, Oral, Daily PRN **AND** bisacodyl (DULCOLAX) suppository 10 mg, 10 mg, Rectal, Daily PRN, Evelyn Cohen DO    carvedilol (COREG) tablet 12.5 mg, 12.5 mg, " Oral, BID With Meals, Evelyn Cohen DO, 12.5 mg at 01/29/24 0828    divalproex (DEPAKOTE) DR tablet 750 mg, 750 mg, Oral, Daily, Evelyn Cohen DO, 750 mg at 01/29/24 0828    divalproex (DEPAKOTE) DR tablet 750 mg, 750 mg, Oral, Nightly, Lulu Carson APRN    ertapenem (INVanz) 1000 mg/100 mL 0.9% NS VTB (mbp), 1,000 mg, Intravenous, Q24H, Evelyn Cohen DO, 1,000 mg at 01/28/24 2050    levothyroxine (SYNTHROID, LEVOTHROID) tablet 88 mcg, 88 mcg, Oral, Daily, Evelyn Cohen, , 88 mcg at 01/29/24 0827    melatonin tablet 5 mg, 5 mg, Oral, Nightly PRN, Evelyn Cohen DO, 5 mg at 01/28/24 2308    morphine injection 3 mg, 3 mg, Intravenous, Q4H PRN, Evelyn Cohen DO    nitroglycerin (NITROSTAT) SL tablet 0.4 mg, 0.4 mg, Sublingual, Q5 Min PRN, Evelyn Cohen DO    ondansetron ODT (ZOFRAN-ODT) disintegrating tablet 4 mg, 4 mg, Oral, Q6H PRN **OR** ondansetron (ZOFRAN) injection 4 mg, 4 mg, Intravenous, Q6H PRN, Evelyn Cohen DO    oxyCODONE-acetaminophen (PERCOCET) 7.5-325 MG per tablet 1 tablet, 1 tablet, Oral, Q4H PRN, Evelyn Cohen DO, 1 tablet at 01/28/24 2049    sodium chloride 0.9 % flush 10 mL, 10 mL, Intravenous, PRN, Evelyn Cohen, DO    sodium chloride 0.9 % flush 10 mL, 10 mL, Intravenous, Q12H, Evelyn Cohen, DO, 10 mL at 01/28/24 0850    sodium chloride 0.9 % flush 10 mL, 10 mL, Intravenous, PRN, Evelyn Cohen, DO    sodium chloride 0.9 % infusion 40 mL, 40 mL, Intravenous, PRN, Evelyn Cohen, DO    sodium chloride 0.9 % infusion, 125 mL/hr, Intravenous, Continuous, Evelyn Cohen, DO, Last Rate: 125 mL/hr at 01/29/24 1135, 125 mL/hr at 01/29/24 1135    tamsulosin (FLOMAX) 24 hr capsule 0.4 mg, 0.4 mg, Oral, Daily, Evelyn Cohen DO, 0.4 mg at 01/29/24 0828    Labs  Lab Results   Component Value Date    GLUCOSE 96 01/29/2024    CALCIUM 7.9 (L) 01/29/2024      (L) 01/29/2024    K 3.5 01/29/2024    CO2 20.8 (L) 01/29/2024     01/29/2024    BUN 38 (H) 01/29/2024    CREATININE 1.65 (H) 01/29/2024    EGFRIFAFRI 57 (L) 12/05/2018    EGFRIFNONA 55 (L) 01/10/2022    BCR 23.0 01/29/2024    ANIONGAP 13.2 01/29/2024       Lab Results   Component Value Date    WBC 12.99 (H) 01/29/2024    HGB 14.6 01/29/2024    HCT 43.8 01/29/2024    MCV 91.1 01/29/2024     (L) 01/29/2024       Urine Culture   Date Value Ref Range Status   01/26/2024 50,000 CFU/mL Escherichia coli ESBL (A)  Final     Comment:       Consider infectious disease consult.  Susceptibility results may not correlate to clinical outcomes.       Brief Urine Lab Results  (Last result in the past 365 days)        Color   Clarity   Blood   Leuk Est   Nitrite   Protein   CREAT   Urine HCG        01/26/24 2103 Red   Turbid   Large (3+)   Large (3+)   Positive   100 mg/dL (2+)                     Radiographic Studies  CT Abdomen Pelvis Without Contrast    Result Date: 1/28/2024   1. Bilateral nonobstructing renal calculi. No hydronephrosis.  2. Severe urinary bladder thickening with perivesical inflammatory changes suggestive of underlying cystitis. Malpositioned Crespo catheter with bulb in the prostatic urethra.   CTDI: 20.02 mGy DLP:1116.91 mGy.cm  This report was signed and finalized on 1/28/2024 11:00 AM by Torey Allen MD.      XR Chest 1 View    Result Date: 1/27/2024  No acute cardiopulmonary process.        This report was signed and finalized on 1/27/2024 7:47 AM by Torey Allen MD.      CT Abdomen Pelvis Without Contrast    Result Date: 1/26/2024  1.  Malpositioned Crespo catheter with balloon inflated in the prostate gland. Authenticated and Electronically Signed by Jose J Dupree MD on 01/26/2024 11:38:21 PM       Assessment  69 y.o. male  has a past medical history of 6th nerve palsy, right, Anxiety and depression, Atrial fibrillation, persistent (12/02/2020), Coronary artery disease involving  native coronary artery of native heart without angina pectoris (09/12/2018), CRI (chronic renal insufficiency), stage 2 (mild), CVA (cerebral vascular accident), Diabetes mellitus, Disease of thyroid gland, Double vision, Elevated cholesterol, Elevated prostate specific antigen (PSA) (11/08/2023), Focal seizure, Heart attack, History of Helicobacter pylori infection, HL (hearing loss), Hyperlipidemia, Hypertension, Kidney stone, Memory loss (2005), Meningitis (2005), Obesity, Sleep apnea treated with nocturnal BiPAP, Stroke, Ventricular tachycardia, and Wears glasses., who presents with sepsis/bacteremia after prostate biopsy.     Plan  1.  Urine has been completely clear and patient did not have difficulty voiding prior to biopsy, therefore a fill and void was performed with postvoid residual of 0 mL after voiding 150 mL.  He can keep catheter out until he follows up with me, which is already scheduled.    2.  Agree with PICC line in 2 weeks total of antibiotics, preferably ertapenem.  Cultures pending.    3.  Please call with any further urologic concerns.

## 2024-01-29 NOTE — PROGRESS NOTES
[facility]    Name:  Andre Agosto   Age:  69 y.o.  Sex:  male  :  1955  MRN:  [id number]   Visit Number:  [id number]  Admission Date:  2024  Date Of Service:  24  Primary Care Physician:  Keven Bear MD     LOS: 3 days :    Chief Complaint:      UTI    Subjective:    Patient seen and examined at bedside this morning.  His family is present at time of exam.  He is up in the recliner and states he is feeling much better today.  Vitals have been stable.  Denies any chest pain or shortness of breath.  Stable on room air.  Discussed PICC and possible d/c home in the next day or 2 as well as seizure medication adjustment.    Hospital Course:    Patient is a 69-year-old with multiple medical comorbidities including CAD, prior stroke, obstructive sleep apnea, hypothyroidism, diabetes, nephrolithiasis, seizure disorder, atrial fibrillation, who presented from home via EMS with multiple complaints.  Patient apparently had just underwent a prostate biopsy with Dr. De La Cruz about 24 hours prior to admission.  He had taken Xarelto the night after his procedure.  He noted developing a fever earlier in the day on the , become increasingly confused, felt dehydrated.  Was having pain with urination and fairly bright blood noted. He was noted to have fever up to 104 upon arrival.  History obtained from patient and daughter at bedside.     In the ER, he was febrile.  He had blood pressure 140/80 range he was nonhypoxic on 2 L of oxygen and had a heart rate in the 80s.  Creatinine 1.7 down to lactic acid 3.8 white count 3500 hemoglobin 18 and platelets 127.  Procalcitonin 4.22.  Urinalysis positive nitrites, leukocyte Estrace, large blood.  He was given IV fluid resuscitation with 1.5 L of saline, pain control, and Invanz.  He had a CT scan abdomen pelvis without contrast which was demonstrating nonobstructing kidney stones bilaterally, Crespo catheter with  balloon inflated in the prostate gland, otherwise no other findings.  ER did talk with Dr. De La Cruz who recommended admission and placement of catheter and antibiotics such that he could monitor over the weekend.    Review of Systems:     All systems were reviewed and negative except as mentioned in subjective, assessment and plan.    Vital Signs:    Temp:  [97.7 °F (36.5 °C)-98 °F (36.7 °C)] 97.9 °F (36.6 °C)  Heart Rate:  [68] 68  Resp:  [18-20] 18  BP: (123-139)/(77-79) 139/79    Intake and output:    I/O last 3 completed shifts:  In: 4753 [P.O.:1080; I.V.:3673]  Out: 1975 [Urine:1975]  I/O this shift:  In: -   Out: 150 [Urine:150]    Physical Examination:  Examined again 1/29/2024    General Appearance:  Alert and cooperative, pleasant middle aged male, no acute distress on exam, obese with BMI-40   Head:  Atraumatic and normocephalic.   Eyes: Conjunctivae and sclerae normal, no icterus. No pallor.   Throat: No oral lesions, no thrush, oral mucosa moist.   Neck: Supple, trachea midline   Lungs:   Breath sounds heard bilaterally equally.  No wheezing or crackles. Unlabored on room air.   Heart:  Normal S1 and S2, no murmur, no gallop, no rub. No JVD.   Abdomen:   Normal bowel sounds, no masses, no organomegaly. Soft, nontender, nondistended   Extremities: Supple, no edema, no cyanosis, no clubbing.   Skin: No bleeding or rash.   Neurologic: Alert and oriented x 3. No facial asymmetry. Moves all four limbs. No tremors.      Laboratory results:    Results from last 7 days   Lab Units 01/29/24  0508 01/28/24  0541 01/27/24  0532 01/26/24  1921 01/24/24  1316   SODIUM mmol/L 134* 136 138 141 141   POTASSIUM mmol/L 3.5 3.9 3.6 3.6 3.4*   CHLORIDE mmol/L 100 100 102 100 99   CO2 mmol/L 20.8* 23.0 21.2* 26.1 27.0   BUN mg/dL 38* 35* 18 16 12   CREATININE mg/dL 1.65* 2.10* 1.81* 1.70* 1.50*   CALCIUM mg/dL 7.9* 8.0* 8.1* 9.2 9.6   BILIRUBIN mg/dL  --  0.7  --  1.2 0.9   ALK PHOS U/L  --  55  --  97 81   ALT (SGPT) U/L  --   "19  --  16 17   AST (SGOT) U/L  --  71*  --  18 16   GLUCOSE mg/dL 96 123* 139* 107* 109*     Results from last 7 days   Lab Units 01/29/24  0508 01/28/24  0541 01/27/24  0532   WBC 10*3/mm3 12.99* 18.28* 18.21*   HEMOGLOBIN g/dL 14.6 15.5 15.5   HEMATOCRIT % 43.8 45.9 45.2   PLATELETS 10*3/mm3 125* 90* 109*     Results from last 7 days   Lab Units 01/24/24  1116   INR  1.20*         Results from last 7 days   Lab Units 01/28/24  0540 01/28/24  0531 01/26/24  2103 01/26/24 2039   BLOODCX  No growth at 24 hours No growth at 24 hours  --  Escherichia coli ESBL*  Escherichia coli ESBL*   URINECX   --   --  50,000 CFU/mL Escherichia coli ESBL*  --      No results for input(s): \"PHART\", \"LGF4AVS\", \"PO2ART\", \"NJV3OUU\", \"BASEEXCESS\" in the last 8760 hours.   I have reviewed the patient's laboratory results.    Radiology results:    CT Abdomen Pelvis Without Contrast    Result Date: 1/28/2024  CT of the abdomen and pelvis without contrast  INDICATION: Renal failure  COMPARISON: January 26, 2024  TECHNIQUE: Helically acquired axial multidetector CT images were obtained from the lung bases through the pelvis without IV contrast. Dose reduction techniques to achieve ALARA were employed.  Findings:  Lung bases: Small bilateral pleural effusions with bibasilar atelectasis.  Cardiomegaly with trace pericardial fluid. Extensive multivessel coronary artery calcifications. ICD leads are noted.  Liver: [Grossly unremarkable]  Spleen: [Unremarkable]  Gallbladder/biliary tree: [ No biliary ductal dilatation]. Cholelithiasis with multiple calcified gallstones, largest measures 1.6 cm.  Pancreas: [Grossly unremarkable].  Adrenals: [Unremarkable]  Kidneys: Bilateral nonobstructing renal calculi, largest right measures approximately 1.1 cm and largest left measures approximately 0.8 cm. No hydronephrosis bilaterally. Bilateral nonspecific perinephric edema..  GI: Gastric lap band is noted. [No bowel obstruction]. Moderate colonic gas and " scattered small bowel gas may reflect underlying mild ileus. No definite obstruction.  Bladder: Redemonstration of severe circumferential bladder wall thickening with perivesical stranding, suggestive of underlying cystitis. Mild prostatomegaly. Kimball catheter about appears positioned in the prostatic urethra. Consider readjustment.  Bones: [No significant osseous abnormalities are identified].  Soft Tissues: Prior ventral wall hernia repair with mesh.      Impression:  1. Bilateral nonobstructing renal calculi. No hydronephrosis.  2. Severe urinary bladder thickening with perivesical inflammatory changes suggestive of underlying cystitis. Malpositioned Kimball catheter with bulb in the prostatic urethra.   CTDI: 20.02 mGy DLP:1116.91 mGy.cm  This report was signed and finalized on 1/28/2024 11:00 AM by Torey Allen MD.     I have reviewed the patient's radiology reports.    Medication Review:     I have reviewed the patient's active and prn medications.     Problem List:      Sepsis      Assessment:    Sepsis  Acute UTI/ Hematuria  ESBL Bacteremia  CKD stage 3  Atrial Fibrillation  ALEX  Seizure disorder    Plan:    Sepsis/ Acute UTI/ Hematuria:  - On Cipro prior to arrival to hospital  - Continue antibiotic treatment with Invanz appropriate based on urine culture sensitivities--Dr. De La Cruz recommends PICC placement and total 2 weeks IV abx    - Continue IV fluids for now  - Leukocytosis noted yesterday which improved today  - With Leukocytosis and documented UOP since admission around 600ml despite continued IVF (total around 3.5L) will get CTAP to rule out obstruction and ensure kimball catheter is placed correctly as this was a problem with 1st catheter attempt.  - CT scan 1/28/2024 noted malpositioned kimball catheter.  Discussed with Dr. De La Cruz who advised since patient does not have any abdominal pain and vitals are reassuring, keep current catheter in place for now and he will try to reposition tomorrow.  Bladder  scan noted 0ml.  With underlying CKD will consult nephrology for recommendations as well.  - Crespo catheter removed for fill and void trial today per Dr. De La Cruz  - Consult placed for PICC line placement in am with repeat blood cultures no growth      ESBL Bacteremia:  - Blood cultures noted ESBL Ecoli.    - Continue Invanz appropriate per sensitivites     CKD 3:  - Baseline Creatinine- 1.4--trending down today  - Continuing IVF for now  - Nephrology consulted for recommendations with continued IV fluids     Atrial fibrillation:  - Pacemaker, amiodarone will be continued.    - Xarelto on hold with hematuria     ALEX:  - Continue home BiPAP     Seizure disorder:  - Continue Depakote  - Discussed with pharmacy today who advised that Invantz decreasing absorption of Depakote.  Depakote level low with labs this morning.  Will likely need to increase dose while on Invantz with PCP following Depakote levels.    - Will plan to adjust Depakote dosing and recheck levels in am.  Currently taking 750mg in am and 500mg at HS.  Discussed with pharmacy who recommended increasing dose to 750mg BID during administration of Invantz then return to normal dose after completing antibiotics.  Depakote levels should rebound to baseline after completing antibiotics.    DVT Prophylaxis: SCDs  Code Status: Full Code  Diet: Cardiac  Discharge Plan: Home 1-2 days with HH for IV abx    Lulu Carson, APRN  01/29/24  11:47 EST    Dictated utilizing Dragon dictation.

## 2024-01-30 ENCOUNTER — TELEPHONE (OUTPATIENT)
Dept: UROLOGY | Facility: CLINIC | Age: 69
End: 2024-01-30
Payer: MEDICARE

## 2024-01-30 PROBLEM — A41.51 SEPSIS DUE TO ESCHERICHIA COLI WITHOUT ACUTE ORGAN DYSFUNCTION: Status: ACTIVE | Noted: 2024-01-30

## 2024-01-30 LAB
ALBUMIN SERPL-MCNC: 2.7 G/DL (ref 3.5–5.2)
ALBUMIN/GLOB SERPL: 0.8 G/DL
ALP SERPL-CCNC: 60 U/L (ref 39–117)
ALT SERPL W P-5'-P-CCNC: 14 U/L (ref 1–41)
ANION GAP SERPL CALCULATED.3IONS-SCNC: 10.1 MMOL/L (ref 5–15)
AST SERPL-CCNC: 30 U/L (ref 1–40)
BASOPHILS # BLD AUTO: 0.07 10*3/MM3 (ref 0–0.2)
BASOPHILS NFR BLD AUTO: 0.9 % (ref 0–1.5)
BILIRUB SERPL-MCNC: 0.6 MG/DL (ref 0–1.2)
BUN SERPL-MCNC: 24 MG/DL (ref 8–23)
BUN/CREAT SERPL: 20 (ref 7–25)
CALCIUM SPEC-SCNC: 8.1 MG/DL (ref 8.6–10.5)
CHLORIDE SERPL-SCNC: 107 MMOL/L (ref 98–107)
CO2 SERPL-SCNC: 16.9 MMOL/L (ref 22–29)
CREAT SERPL-MCNC: 1.2 MG/DL (ref 0.76–1.27)
DEPRECATED RDW RBC AUTO: 51.3 FL (ref 37–54)
EGFRCR SERPLBLD CKD-EPI 2021: 65.9 ML/MIN/1.73
EOSINOPHIL # BLD AUTO: 0.13 10*3/MM3 (ref 0–0.4)
EOSINOPHIL NFR BLD AUTO: 1.6 % (ref 0.3–6.2)
ERYTHROCYTE [DISTWIDTH] IN BLOOD BY AUTOMATED COUNT: 15.2 % (ref 12.3–15.4)
GLOBULIN UR ELPH-MCNC: 3.3 GM/DL
GLUCOSE SERPL-MCNC: 97 MG/DL (ref 65–99)
HCT VFR BLD AUTO: 45.1 % (ref 37.5–51)
HGB BLD-MCNC: 15 G/DL (ref 13–17.7)
IMM GRANULOCYTES # BLD AUTO: 0.11 10*3/MM3 (ref 0–0.05)
IMM GRANULOCYTES NFR BLD AUTO: 1.3 % (ref 0–0.5)
LYMPHOCYTES # BLD AUTO: 1.42 10*3/MM3 (ref 0.7–3.1)
LYMPHOCYTES NFR BLD AUTO: 17.3 % (ref 19.6–45.3)
MAGNESIUM SERPL-MCNC: 2 MG/DL (ref 1.6–2.4)
MCH RBC QN AUTO: 30.5 PG (ref 26.6–33)
MCHC RBC AUTO-ENTMCNC: 33.3 G/DL (ref 31.5–35.7)
MCV RBC AUTO: 91.7 FL (ref 79–97)
MONOCYTES # BLD AUTO: 0.71 10*3/MM3 (ref 0.1–0.9)
MONOCYTES NFR BLD AUTO: 8.6 % (ref 5–12)
NEUTROPHILS NFR BLD AUTO: 5.79 10*3/MM3 (ref 1.7–7)
NEUTROPHILS NFR BLD AUTO: 70.3 % (ref 42.7–76)
NRBC BLD AUTO-RTO: 0 /100 WBC (ref 0–0.2)
PLATELET # BLD AUTO: 115 10*3/MM3 (ref 140–450)
PMV BLD AUTO: 11.5 FL (ref 6–12)
POTASSIUM SERPL-SCNC: 3.8 MMOL/L (ref 3.5–5.2)
PROCALCITONIN SERPL-MCNC: 13.6 NG/ML (ref 0–0.25)
PROT SERPL-MCNC: 6 G/DL (ref 6–8.5)
RBC # BLD AUTO: 4.92 10*6/MM3 (ref 4.14–5.8)
REF LAB TEST METHOD: NORMAL
SODIUM SERPL-SCNC: 134 MMOL/L (ref 136–145)
WBC NRBC COR # BLD AUTO: 8.23 10*3/MM3 (ref 3.4–10.8)

## 2024-01-30 PROCEDURE — 97116 GAIT TRAINING THERAPY: CPT

## 2024-01-30 PROCEDURE — 05HY33Z INSERTION OF INFUSION DEVICE INTO UPPER VEIN, PERCUTANEOUS APPROACH: ICD-10-PCS | Performed by: FAMILY MEDICINE

## 2024-01-30 PROCEDURE — 05HY33Z INSERTION OF INFUSION DEVICE INTO UPPER VEIN, PERCUTANEOUS APPROACH: ICD-10-PCS | Performed by: NURSE PRACTITIONER

## 2024-01-30 PROCEDURE — 85025 COMPLETE CBC W/AUTO DIFF WBC: CPT | Performed by: NURSE PRACTITIONER

## 2024-01-30 PROCEDURE — 80053 COMPREHEN METABOLIC PANEL: CPT | Performed by: NURSE PRACTITIONER

## 2024-01-30 PROCEDURE — 83735 ASSAY OF MAGNESIUM: CPT | Performed by: INTERNAL MEDICINE

## 2024-01-30 PROCEDURE — C1751 CATH, INF, PER/CENT/MIDLINE: HCPCS

## 2024-01-30 PROCEDURE — C1894 INTRO/SHEATH, NON-LASER: HCPCS

## 2024-01-30 PROCEDURE — 99232 SBSQ HOSP IP/OBS MODERATE 35: CPT | Performed by: NURSE PRACTITIONER

## 2024-01-30 PROCEDURE — 25010000002 ERTAPENEM PER 500 MG: Performed by: FAMILY MEDICINE

## 2024-01-30 PROCEDURE — 25810000003 SODIUM CHLORIDE 0.9 % SOLUTION: Performed by: FAMILY MEDICINE

## 2024-01-30 PROCEDURE — 84145 PROCALCITONIN (PCT): CPT | Performed by: NURSE PRACTITIONER

## 2024-01-30 RX ORDER — SODIUM CHLORIDE 0.9 % (FLUSH) 0.9 %
10 SYRINGE (ML) INJECTION AS NEEDED
Status: DISCONTINUED | OUTPATIENT
Start: 2024-01-30 | End: 2024-01-31 | Stop reason: HOSPADM

## 2024-01-30 RX ORDER — SODIUM CHLORIDE 0.9 % (FLUSH) 0.9 %
20 SYRINGE (ML) INJECTION AS NEEDED
Status: DISCONTINUED | OUTPATIENT
Start: 2024-01-30 | End: 2024-01-31 | Stop reason: HOSPADM

## 2024-01-30 RX ORDER — SODIUM CHLORIDE 0.9 % (FLUSH) 0.9 %
10 SYRINGE (ML) INJECTION EVERY 12 HOURS SCHEDULED
Status: DISCONTINUED | OUTPATIENT
Start: 2024-01-30 | End: 2024-01-31 | Stop reason: HOSPADM

## 2024-01-30 RX ORDER — CIPROFLOXACIN 500 MG/1
500 TABLET, FILM COATED ORAL 2 TIMES DAILY
Qty: 6 TABLET | Refills: 0 | OUTPATIENT
Start: 2024-01-30 | End: 2024-01-31

## 2024-01-30 RX ADMIN — DIVALPROEX SODIUM 750 MG: 250 TABLET, DELAYED RELEASE ORAL at 09:13

## 2024-01-30 RX ADMIN — CARVEDILOL 12.5 MG: 12.5 TABLET, FILM COATED ORAL at 17:21

## 2024-01-30 RX ADMIN — SODIUM CHLORIDE 125 ML/HR: 9 INJECTION, SOLUTION INTRAVENOUS at 05:52

## 2024-01-30 RX ADMIN — ERTAPENEM 1000 MG: 1 INJECTION INTRAMUSCULAR; INTRAVENOUS at 21:56

## 2024-01-30 RX ADMIN — AMIODARONE HYDROCHLORIDE 100 MG: 200 TABLET ORAL at 09:13

## 2024-01-30 RX ADMIN — CARVEDILOL 12.5 MG: 12.5 TABLET, FILM COATED ORAL at 09:13

## 2024-01-30 RX ADMIN — TAMSULOSIN HYDROCHLORIDE 0.4 MG: 0.4 CAPSULE ORAL at 09:14

## 2024-01-30 RX ADMIN — Medication 10 ML: at 10:26

## 2024-01-30 RX ADMIN — LEVOTHYROXINE SODIUM 88 MCG: 88 TABLET ORAL at 09:14

## 2024-01-30 RX ADMIN — Medication 5 MG: at 21:55

## 2024-01-30 RX ADMIN — DIVALPROEX SODIUM 750 MG: 250 TABLET, DELAYED RELEASE ORAL at 21:54

## 2024-01-30 RX ADMIN — Medication 10 ML: at 22:10

## 2024-01-30 RX ADMIN — SODIUM CHLORIDE 125 ML/HR: 9 INJECTION, SOLUTION INTRAVENOUS at 14:20

## 2024-01-30 NOTE — TELEPHONE ENCOUNTER
Caller: AZEEM    Relationship: [facility]    CALLING TO INFORM PT DX THAT ONE OF HIS PROSTATE BIOPSIES SHOWS PROSTATIC ACINAR ADENOCARCINOMA

## 2024-01-30 NOTE — PLAN OF CARE
Goal Outcome Evaluation:  Plan of Care Reviewed With: patient, spouse           Outcome Evaluation: Pt up with walker in room and to bathroom this shift,.  Continuies to receive IV antibiotics as ordered.  Family at bedside.  No acute events noted this shift.  Compliant to wear home bipap unit while sleeping.  PICC placement planned for today for antibiotic use at home.

## 2024-01-30 NOTE — THERAPY TREATMENT NOTE
Patient Name: Andre Agosto  : 1955    MRN: [id number]                              Today's Date: 2024       Admit Date: 2024    Visit Dx:     ICD-10-CM ICD-9-CM   1. Sepsis without acute organ dysfunction, due to unspecified organism  A41.9 038.9     995.91   2. Acute cystitis with hematuria  N30.01 595.0   3. Sepsis due to Escherichia coli, unspecified whether acute organ dysfunction present  A41.51 038.42     995.91   4. Bacteremia  R78.81 790.7     Patient Active Problem List   Diagnosis    Essential hypertension    Epilepsy    CVA (cerebral vascular accident)    Hypokalemia    Diabetes mellitus without complication    BiPAP (biphasic positive airway pressure) dependence    Benign hypertension    Hyperlipidemia    Epilepsy    Obesity    Dyspepsia    Coronary arteriosclerosis    Abnormal stress test    TIA (transient ischemic attack)    Pneumonia of right lower lobe due to infectious organism    History of cerebrovascular accident    ALEX (obstructive sleep apnea)    SOB (shortness of breath) on exertion    Suspected COVID-19 virus infection    Fever and chills    Dyspnea    Fever and chills    Localized edema    Right leg pain    Atrial fibrillation    Memory loss    Blood glucose abnormal    Generalized anxiety disorder    History of Helicobacter pylori infection    Benign prostatic hyperplasia    Stage 3 chronic kidney disease    Medication intolerance    Pseudoaneurysm of femoral artery following procedure    Sustained ventricular tachycardia    ABMD (anterior basement membrane dystrophy)    Myopia, bilateral    Chronic otitis media    Eye twitch    Hyperaldosteronism    Otitis externa of right ear    Regular astigmatism, right eye    Seizure-like activity    Nephrolithiasis    UTI (urinary tract infection)    Anxiety    Presbyopia    Otalgia    Arthralgia of right knee    Acute suppurative otitis media without spontaneous rupture of ear drum    Recurrent acute suppurative otitis media     Benign hypertensive heart disease with congestive heart failure    Eustachian tube disorder    Dre hematuria    Other gram-negative sepsis    Other specified bacterial agents as the cause of diseases classified elsewhere    Otorrhea    Paroxysmal atrial flutter    Sensorineural hearing loss    Tobacco abuse counseling    Coronary atherosclerosis    Kidney stone    Other obesity due to excess calories    Otitis externa of right ear    Urinary tract infection, site not specified    Elevated prostate specific antigen (PSA)    Sepsis    Sepsis due to Escherichia coli without acute organ dysfunction     Past Medical History:   Diagnosis Date    6th nerve palsy, right     right eye     Anxiety and depression     Atrial fibrillation, persistent 12/02/2020    on Xarelto    Coronary artery disease involving native coronary artery of native heart without angina pectoris 09/12/2018    follows w/ Dr. Mendoza    CRI (chronic renal insufficiency), stage 2 (mild)     CVA (cerebral vascular accident)     Diabetes mellitus     doesnt check sugar, type 2     Disease of thyroid gland     Double vision     resolved- right eye    Elevated cholesterol     Elevated prostate specific antigen (PSA) 11/08/2023    Focal seizure     of right arm     Heart attack     NSTEMI 2015, s/p stenting    History of Helicobacter pylori infection     in 2008, treated w/ PrevPak - 2021 EGD bx (+), PCP RX - PPI/Augmentin/Doxy/Flagyl (x 14 days) 1/22/21    HL (hearing loss)     (L) ear - child luevano measles    Hyperlipidemia     Hypertension     Kidney stone     Memory loss 2005    d/t meningitis    Meningitis 2005    unsure of bacterial or viral     Obesity     Sleep apnea treated with nocturnal BiPAP     compliant with  machine     Stroke     2017/2018    Ventricular tachycardia     3 different times    Wears glasses      Past Surgical History:   Procedure Laterality Date    CARDIAC CATHETERIZATION N/A 10/12/2018    Procedure: Left Heart Cath;  Surgeon:  Yoselin Johnson MD;  Location:  [facility];  Service: Cardiology    CARDIAC CATHETERIZATION  03/2021    stent    CARDIAC CATHETERIZATION  07/2021    just checking the after pacemaker placed- Dr. Tim    CARDIAC DEFIBRILLATOR PLACEMENT  2021    COLONOSCOPY  10/2020    w/ Dr. Jacobs hx colon polyps    CORONARY STENT PLACEMENT  2015    LAPAROSCOPIC GASTRIC BANDING  2008    s/p LAGB Realize 6/2008 by [facility].    PACEMAKER IMPLANTATION  07/25/2021    UMBILICAL HERNIA REPAIR  2008    incarcerated UHR w/ mesh by Dr. Velasquez    URETEROSCOPY LASER LITHOTRIPSY WITH STENT INSERTION Left 10/20/2021    Procedure: URETEROSCOPY LASER LITHOTRIPSY WITH STENT INSERTION;  Surgeon: Tonio De La Cruz MD;  Location: [facility] OR;  Service: Urology;  Laterality: Left;    URETEROSCOPY LASER LITHOTRIPSY WITH STENT INSERTION Left 11/03/2021    Procedure: URETEROSCOPY LEFT, LEFT RETROGRADE PYELOGRAM, CYSTOSCOPY, LASER LITHOTRIPSY WITH STENT EXCHANGE;  Surgeon: Tonio De La Cruz MD;  Location:  [facility] OR;  Service: Urology;  Laterality: Left;    URETEROSCOPY LASER LITHOTRIPSY WITH STENT INSERTION Left 10/06/2022    Procedure: URETEROSCOPY LASER LITHOTRIPSY WITH STENT INSERTION;  Surgeon: Tonio De La Cruz MD;  Location:  [facility] OR;  Service: Urology;  Laterality: Left;      General Information       Row Name 01/30/24 1738          Physical Therapy Time and Intention    Document Type therapy note (daily note)  -CC     Mode of Treatment physical therapy  -CC       Row Name 01/30/24 1738          General Information    Patient Profile Reviewed yes  -CC     Existing Precautions/Restrictions fall  -CC       Row Name 01/30/24 1738          Safety Issues, Functional Mobility    Safety Issues Affecting Function (Mobility) safety precautions follow-through/compliance  -CC     Impairments Affecting Function (Mobility) endurance/activity tolerance  -CC               User Key  (r) = Recorded By, (t) = Taken By, (c) = Cosigned  By      Initials Name Provider Type    Yareli Armas PTA Physical Therapist Assistant                   Mobility       Row Name 01/30/24 1736          Sit-Stand Transfer    Sit-Stand [place] (Transfers) supervision;standby assist  -     Assistive Device (Sit-Stand Transfers) walker, front-wheeled  -       Row Name 01/30/24 1739          Gait/Stairs (Locomotion)    [place] Level (Gait) supervision  -     Assistive Device (Gait) walker, front-wheeled  -     Distance in Feet (Gait) 375  -CC     Deviations/Abnormal Patterns (Gait) gait speed decreased  -     Bilateral Gait Deviations forward flexed posture  -               User Key  (r) = Recorded By, (t) = Taken By, (c) = Cosigned By      Initials Name Provider Type    Yareli Armas PTA Physical Therapist Assistant                   Obj/Interventions    No documentation.                  Goals/Plan    No documentation.                  Clinical Impression       Row Name 01/30/24 1740          Pain    Pretreatment Pain Rating 0/10 - no pain  -CC     Posttreatment Pain Rating 0/10 - no pain  -CC     Additional Documentation Pain Scale: Numbers Pre/Post-Treatment (Group)  -       Row Name 01/30/24 1740          Plan of Care Review    Plan of Care Reviewed With patient;family  -CC     Progress improving  -     Outcome Evaluation Pt agreeable to physical therapy. Performed sit <->stand mod I, amb to bathroom and then amb in hallway 375 feet with S/mod I no LOB or path deviations noted. Con't with PT POC and progress as tolerated  -       Row Name 01/30/24 1744          Positioning and Restraints    Pre-Treatment Position sitting in chair/recliner  -CC     Post Treatment Position chair  -CC     In Chair notified nsg;reclined;call light within reach;with family/caregiver  -               User Key  (r) = Recorded By, (t) = Taken By, (c) = Cosigned By      Initials Name Provider Type    Yareli Armas PTA Physical Therapist  Assistant                   Outcome Measures       Row Name 01/30/24 1746 01/30/24 0914       How much help from another person do you currently need...    Turning from your back to your side while in flat bed without using bedrails? 4  -CC 4  -RS    Moving from lying on back to sitting on the side of a flat bed without bedrails? 3  -CC 3  -RS    Moving to and from a bed to a chair (including a wheelchair)? 4  -CC 3  -RS    Standing up from a chair using your arms (e.g., wheelchair, bedside chair)? 4  -CC 3  -RS    Climbing 3-5 steps with a railing? 2  -CC 2  -RS    To walk in hospital room? 4  -CC 3  -RS    AM-PAC 6 Clicks Score (PT) 21  -CC 18  -RS    Highest Level of Mobility Goal 6 --> Walk 10 steps or more  -CC 6 --> Walk 10 steps or more  -RS      Row Name 01/30/24 1746          Functional Assessment    Outcome Measure Options AM-PAC 6 Clicks Basic Mobility (PT)  -CC               User Key  (r) = Recorded By, (t) = Taken By, (c) = Cosigned By      Initials Name Provider Type    CC Yareli Mayer PTA Physical Therapist Assistant    RS Calos Alston, RN Registered Nurse                                 Physical Therapy Education       Title: PT OT SLP Therapies (In Progress)       Topic: Physical Therapy (In Progress)       Point: Mobility training (Done)       Learning Progress Summary             Patient Acceptance, E,TB, VU by  at 1/30/2024 1747    Comment: increase mobility daily    Acceptance, E,TB,D, VU,NR by  at 1/29/2024 1207    Comment: exercise techniques   importance of advancing daily activity as tolerates.    Acceptance, E,D, VU,DU by  at 1/28/2024 1333                         Point: Home exercise program (Done)       Learning Progress Summary             Patient Acceptance, E,TB,D, VU,NR by  at 1/29/2024 1207    Comment: exercise techniques   importance of advancing daily activity as tolerates.                         Point: Body mechanics (Not Started)       Learner Progress:  Not  documented in this visit.              Point: Precautions (Not Started)       Learner Progress:  Not documented in this visit.                              User Key       Initials Effective Dates Name Provider Type Discipline    CC 06/16/21 -  Yareli Mayer PTA Physical Therapist Assistant PT    RM 06/16/21 -  Omar Reyes PTA Physical Therapist Assistant PT     06/13/23 -  Dinesh Avila, PT Physical Therapist PT                  PT Recommendation and Plan     Plan of Care Reviewed With: patient, family  Progress: improving  Outcome Evaluation: Pt agreeable to physical therapy. Performed sit <->stand mod I, amb to bathroom and then amb in hallway 375 feet with S/mod I no LOB or path deviations noted. Con't with PT POC and progress as tolerated     Time Calculation:         PT Charges       Row Name 01/30/24 1748             Time Calculation    Start Time 1627  -CC      Stop Time 1645  -CC      Time Calculation (min) 18 min  -CC      PT Received On 01/30/24  -CC      PT Goal Re-Cert Due Date 02/07/24  -CC         Timed Charges    88522 - Gait Training Minutes  18  -CC         Total Minutes    Timed Charges Total Minutes 18  -CC       Total Minutes 18  -CC                User Key  (r) = Recorded By, (t) = Taken By, (c) = Cosigned By      Initials Name Provider Type    CC Yareli Mayer PTA Physical Therapist Assistant                  Therapy Charges for Today       Code Description Service Date Service Provider Modifiers Qty    [id number] HC GAIT TRAINING EA 15 MIN 1/30/2024 Yareli Mayer PTA GP 1            PT G-Codes  Outcome Measure Options: AM-PAC 6 Clicks Basic Mobility (PT)  AM-PAC 6 Clicks Score (PT): 21       Yareli Mayer PTA  1/30/2024

## 2024-01-30 NOTE — PLAN OF CARE
Goal Outcome Evaluation:  Plan of Care Reviewed With: patient, family        Progress: improving  Outcome Evaluation: Pt agreeable to physical therapy. Performed sit <->stand mod I, amb to bathroom and then amb in hallway 375 feet with S/mod I no LOB or path deviations noted. Con't with PT POC and progress as tolerated

## 2024-01-30 NOTE — CASE MANAGEMENT/SOCIAL WORK
Case Management/Social Work    Patient Name:  Andre Agosto  YOB: 1955  MRN: [id number]  Admit Date:  1/26/2024        09:40 EST  Met with patient at bedside. Spoke with them about home infusion, patient and his family requested antibiotics be given in outpatient infusion instead if possible. Will notify MD.    13:02 EST  Faxed patient's home infusion referral to [facility].    13:54 EST  [facility] called to confirm referral receipt. They're running benefits and will notify if they can accept.    Electronically signed by:  Rony Shaw RN  01/30/24 09:40 EST

## 2024-01-30 NOTE — PROGRESS NOTES
[facility]    Name:  Andre Agosto   Age:  69 y.o.  Sex:  male  :  1955  MRN:  [id number]   Visit Number:  [id number]  Admission Date:  2024  Date Of Service:  24  Primary Care Physician:  Keven Bear MD     LOS: 4 days :    Chief Complaint:      UTI    Subjective:    Patient seen and examined with wife at bedside. Updated PICC to be inserted. He requests home health upon discharge.  Wife concerned as he has not started back on multiple medications.  Updated nephrology would be seeing as well.  He states overall feeling improved.  Updated procalcitonin levels were elevated compared to admission.  Otherwise reassuring labs and vitals noted.  Voiding per usual.  Has not noted any further hematuria.    Hospital Course:    Patient is a 69-year-old with multiple medical comorbidities including CAD, prior stroke, obstructive sleep apnea, hypothyroidism, diabetes, nephrolithiasis, seizure disorder, atrial fibrillation on Xarelto, who presented from home via EMS with multiple complaints.  Patient apparently had just underwent a prostate biopsy with Dr. De La Cruz about 24 hours prior to admission.  He had taken Xarelto the night after his procedure.  He noted developing a fever earlier in the day on the , become increasingly confused, felt dehydrated.  Was having pain with urination and fairly bright blood noted. He was noted to have fever up to 104 upon arrival.       In the ER, he was febrile.  He had blood pressure 140/80 range he was nonhypoxic on 2 L of oxygen and had a heart rate in the 80s.  Creatinine 1.7 down to lactic acid 3.8 white count 3500 hemoglobin 18 and platelets 127.  Procalcitonin 4.22.  Urinalysis positive nitrites, leukocyte Estrace, large blood.  He was given IV fluid resuscitation with 1.5 L of saline, pain control, and Invanz.  He had a CT scan abdomen pelvis without contrast which was demonstrating nonobstructing kidney stones  bilaterally, Crespo catheter with balloon inflated in the prostate gland, otherwise no other findings.  ER did talk with Dr. De La Cruz who recommended admission and placement of catheter and antibiotics.  Unfortunately blood cultures noted ESBL E. coli/also noted on urine culture.  Patient initiated on ertapenem with PICC line placed.  Due to underlying CKD nephrology consulted.  Xarelto continued to be on hold.  Depakote increased due to interaction noted with decreased absorption with ertapenem.  Otherwise reassuring labs and vitals noted.  Crespo catheter pulled per Dr. De La Cruz.    Review of Systems:     All systems were reviewed and negative except as mentioned in subjective, assessment and plan.    Vital Signs:    Temp:  [96.9 °F (36.1 °C)-99.7 °F (37.6 °C)] 98.2 °F (36.8 °C)  Heart Rate:  [65-69] 68  Resp:  [18-20] 20  BP: (134-162)/(76-97) 151/92    Intake and output:    I/O last 3 completed shifts:  In: 2270 [P.O.:1020; I.V.:1250]  Out: 2250 [Urine:2250]  I/O this shift:  In: -   Out: 125 [Urine:125]    Physical Examination:    General Appearance:  Alert and cooperative.  Middle-age male.  Pleasant.  No acute distress.   Head:  Atraumatic and normocephalic.   Eyes: Conjunctivae and sclerae normal, no icterus. No pallor.   Throat: No oral lesions, no thrush, oral mucosa moist.   Neck: Supple, trachea midline, no thyromegaly.   Lungs:   Breath sounds heard bilaterally equally.  No wheezing or crackles. No Pleural rub or bronchial breathing.  On room air unlabored.  Pacemaker noted to left chest wall.   Heart:  Normal S1 and S2, no murmur, no gallop, no rub. No JVD.   Abdomen:   Normal bowel sounds, no masses, no organomegaly. Soft, nontender, nondistended, no rebound tenderness.   Extremities: Supple, trace bilateral lower extremity edema, no cyanosis, no clubbing.   Skin: No bleeding or rash.   Neurologic: Alert and oriented x 3. No facial asymmetry. Moves all four limbs. No tremors.  Generalized weakness.  "    Laboratory results:    Results from last 7 days   Lab Units 01/30/24  0543 01/29/24  0508 01/28/24  0541 01/27/24  0532 01/26/24  1921   SODIUM mmol/L 134* 134* 136   < > 141   POTASSIUM mmol/L 3.8 3.5 3.9   < > 3.6   CHLORIDE mmol/L 107 100 100   < > 100   CO2 mmol/L 16.9* 20.8* 23.0   < > 26.1   BUN mg/dL 24* 38* 35*   < > 16   CREATININE mg/dL 1.20 1.65* 2.10*   < > 1.70*   CALCIUM mg/dL 8.1* 7.9* 8.0*   < > 9.2   BILIRUBIN mg/dL 0.6  --  0.7  --  1.2   ALK PHOS U/L 60  --  55  --  97   ALT (SGPT) U/L 14  --  19  --  16   AST (SGOT) U/L 30  --  71*  --  18   GLUCOSE mg/dL 97 96 123*   < > 107*    < > = values in this interval not displayed.     Results from last 7 days   Lab Units 01/30/24  0543 01/29/24  0508 01/28/24  0541   WBC 10*3/mm3 8.23 12.99* 18.28*   HEMOGLOBIN g/dL 15.0 14.6 15.5   HEMATOCRIT % 45.1 43.8 45.9   PLATELETS 10*3/mm3 115* 125* 90*     Results from last 7 days   Lab Units 01/24/24  1116   INR  1.20*         Results from last 7 days   Lab Units 01/28/24  0540 01/28/24  0531 01/26/24  2103 01/26/24 2039   BLOODCX  No growth at 2 days No growth at 2 days  --  Escherichia coli ESBL*  Escherichia coli ESBL*   URINECX   --   --  50,000 CFU/mL Escherichia coli ESBL*  --      No results for input(s): \"PHART\", \"ATB9VXL\", \"PO2ART\", \"ENO9FDT\", \"BASEEXCESS\" in the last 8760 hours.   I have reviewed the patient's laboratory results.    Radiology results:    No radiology results from the last 24 hrs  I have reviewed the patient's radiology reports.    Medication Review:     I have reviewed the patient's active and prn medications.     Problem List:      Sepsis      Assessment:    Sepsis with ESBL E. coli bacteremia, POA  Acute UTI ESBL E. coli/ Hematuria, POA  CKD stage 3  Atrial Fibrillation on Xarelto/pacemaker  ALEX  Seizure disorder    Plan:    Sepsis/ Acute UTI/ Hematuria/bacteremia  - On Cipro prior to arrival to hospital  - Continue antibiotic treatment with Invanz-appropriate based on urine " culture sensitivities  -PICC line placed.  Patient will need total 14 days ertapenem per home health.  -No current urinary retention noted.  -Trend procalcitonin.  Otherwise reassuring labs and vitals noted.    CKD 3:  - Baseline Creatinine- 1.4--trending down   - Nephrology consulted for recommendations with continued IV fluids     Atrial fibrillation:  - Pacemaker, amiodarone will be continued.    - Xarelto on hold with hematuria     ALEX:  - Continue home BiPAP     Seizure disorder:  - Continue Depakote  - Invantz decreasing absorption of Depakote.  Depakote level low with labs. Will likely need to increase dose while on Invantz with PCP following Depakote levels.    - Currently taking 750mg in am and 500mg at HS.  Discussed with pharmacy who recommended increasing dose to 750mg BID during administration of Invantz then return to normal dose after completing antibiotics.  Depakote levels should rebound to baseline after completing antibiotics.    I have reviewed the copied text and it is accurate as of 1/30/2024      DVT Prophylaxis: SCDs  Code Status: Full Code  Diet: Cardiac  Discharge Plan: Likely home tomorrow with home health.    Ela Serrato, APRN  01/30/24  11:31 EST    Dictated utilizing Dragon dictation.

## 2024-01-30 NOTE — PAYOR COMM NOTE
"To:  [facility]  From: Ursula Roman RN  Phone: [phone]  Fax: [phone]  NPI: [id number]  TIN: [id number]  Member ID: [id number]   MRN: [id number]    Leopoldo Agosto [facility] \"Bernardo\" (69 y.o. Male)       Date of Birth   1955    Social Security Number       Address   [address] [place] [id number]    Home Phone   [phone]    MRN   [id number]       Restoration   Religious    Marital Status                               Admission Date   1/26/24    Admission Type   Emergency    Admitting Provider   Evelyn Cohen DO    Attending Provider   Evelyn Cohen DO    Department, Room/Bed   [facility] TELEMETRY 4, 426/1       Discharge Date       Discharge Disposition       Discharge Destination                                 Attending Provider: Evelyn Cohen DO    Allergies: Statins    Isolation: Contact   Infection: ESBL (01/27/24), ESBL E coli (01/28/24)   Code Status: CPR    Ht: 188 cm (74\")   Wt: 150 kg (330 lb 4 oz)    Admission Cmt: None   Principal Problem: Sepsis [A41.9]                   Active Insurance as of 1/26/2024       Primary Coverage       Payor Plan Insurance Group Employer/Plan Group    [facility] MED[facility]ARE REPLACEMENT [facility] [employer] [id number]       Payor Plan Address Payor Plan Phone Number Payor Plan Fax Number Effective Dates    [address] [address]   1/1/2023 - None Entered    [facility] [id number]         Subscriber Name Subscriber Birth Date Member ID       LEOPOLDO AGOSTO [facility] 1955 [id number]                     Emergency Contacts        (Rel.) Home Phone Work Phone Mobile Phone    Paula Agosto (Spouse) [phone] -- [phone]    kylee agosto (Daughter) [phone] -- [phone]              Vital Signs (last day)       Date/Time Temp Temp src Pulse Resp BP Patient Position SpO2    01/30/24 1115 98.2 (36.8) -- 68 20 151/92 Lying 95    01/30/24 0725 96.9 (36.1) Axillary -- 20 162/97 Lying --    01/30/24 0304 " 97.6 (36.4) Axillary -- 18 145/85 Lying 93    01/29/24 2300 99.7 (37.6) Axillary -- 18 134/76 Lying 92    01/29/24 1900 98 (36.7) Oral -- 18 157/92 Lying 96    01/29/24 1515 97.3 (36.3) Oral 65 19 147/95 Lying 95    01/29/24 1229 97.9 (36.6) Oral 69 18 140/86 Lying 96    01/29/24 0700 97.9 (36.6) Oral 68 18 139/79 Lying 96    01/29/24 0403 98 (36.7) Temporal -- 18 131/78 Sitting 94          Current Facility-Administered Medications   Medication Dose Route Frequency Provider Last Rate Last Admin    acetaminophen (TYLENOL) tablet 650 mg  650 mg Oral Q4H PRN Evelyn Cohen DO   650 mg at 01/27/24 0914    Or    acetaminophen (TYLENOL) 160 MG/5ML oral solution 650 mg  650 mg Oral Q4H PRN Evelyn Cohen DO        Or    acetaminophen (TYLENOL) suppository 650 mg  650 mg Rectal Q4H PRN Evelyn Cohen DO        acetaminophen (TYLENOL) tablet 1,000 mg  1,000 mg Oral Once Evelyn Cohen DO        aluminum-magnesium hydroxide-simethicone (MAALOX MAX) 400-400-40 MG/5ML suspension 15 mL  15 mL Oral Q6H PRN Evelyn Cohen DO        amiodarone (PACERONE) tablet 100 mg  100 mg Oral Daily Evelyn Cohen DO   100 mg at 01/30/24 0913    [Held by provider] amLODIPine (NORVASC) tablet 10 mg  10 mg Oral Nightly Evelyn Cohen DO        sennosides-docusate (PERICOLACE) 8.6-50 MG per tablet 2 tablet  2 tablet Oral BID Evelyn Cohen DO   2 tablet at 01/29/24 0830    And    polyethylene glycol (MIRALAX) packet 17 g  17 g Oral Daily PRN Evelyn Cohen DO        And    bisacodyl (DULCOLAX) EC tablet 5 mg  5 mg Oral Daily PRN Evelyn Cohen DO        And    bisacodyl (DULCOLAX) suppository 10 mg  10 mg Rectal Daily PRN Evelyn Cohen DO        carvedilol (COREG) tablet 12.5 mg  12.5 mg Oral BID With Meals Evelyn Cohen DO   12.5 mg at 01/30/24 0913    divalproex (DEPAKOTE) DR tablet 750 mg  750 mg Oral Daily Evelyn Cohen DO    750 mg at 01/30/24 0913    divalproex (DEPAKOTE) DR tablet 750 mg  750 mg Oral Nightly Yamileth Lulu B, APRN   750 mg at 01/29/24 2211    ertapenem (INVanz) 1000 mg/100 mL 0.9% NS VTB (mbp)  1,000 mg Intravenous Q24H Evelyn Cohen, DO   1,000 mg at 01/29/24 2212    levothyroxine (SYNTHROID, LEVOTHROID) tablet 88 mcg  88 mcg Oral Daily Evelyn Cohen, DO   88 mcg at 01/30/24 0914    melatonin tablet 5 mg  5 mg Oral Nightly PRN Evelyn Cohen, DO   5 mg at 01/29/24 2229    nitroglycerin (NITROSTAT) SL tablet 0.4 mg  0.4 mg Sublingual Q5 Min PRN Evelyn Cohen, DO        ondansetron ODT (ZOFRAN-ODT) disintegrating tablet 4 mg  4 mg Oral Q6H PRN Evelyn Cohen DO        Or    ondansetron (ZOFRAN) injection 4 mg  4 mg Intravenous Q6H PRN Evelyn Cohen, DO        oxyCODONE-acetaminophen (PERCOCET) 7.5-325 MG per tablet 1 tablet  1 tablet Oral Q4H PRN Evelyn Cohen, DO   1 tablet at 01/29/24 2229    sodium chloride 0.9 % flush 10 mL  10 mL Intravenous PRN Evelyn Cohen, DO        sodium chloride 0.9 % flush 10 mL  10 mL Intravenous Q12H Evelyn Cohen, DO   10 mL at 01/29/24 2213    sodium chloride 0.9 % flush 10 mL  10 mL Intravenous PRN Evelyn Cohen, DO        sodium chloride 0.9 % flush 10 mL  10 mL Intravenous Q12H Yamileth Lulu B, APRN   10 mL at 01/30/24 1026    sodium chloride 0.9 % flush 10 mL  10 mL Intravenous PRN Yamileth, Lulu B, APRN        sodium chloride 0.9 % flush 10 mL  10 mL Intravenous Q12H Yamileth, Lulu B, APRN   10 mL at 01/30/24 1026    sodium chloride 0.9 % flush 10 mL  10 mL Intravenous PRN Lulu Carson, APRN        sodium chloride 0.9 % flush 20 mL  20 mL Intravenous PRN Yamileth Lulu B, APRN        sodium chloride 0.9 % flush 20 mL  20 mL Intravenous PRN Lulu Carson, APRN        sodium chloride 0.9 % infusion 40 mL  40 mL Intravenous PRN Evelyn Cohen, DO        sodium chloride 0.9  % infusion  125 mL/hr Intravenous Continuous Evelyn Cohen,  mL/hr at 01/30/24 0552 125 mL/hr at 01/30/24 0552    tamsulosin (FLOMAX) 24 hr capsule 0.4 mg  0.4 mg Oral Daily Evelyn Cohen, DO   0.4 mg at 01/30/24 0914     Lab Results (last 24 hours)       Procedure Component Value Units Date/Time    Magnesium [[id number]]  (Normal) Collected: 01/30/24 0543    Specimen: Blood Updated: 01/30/24 0651     Magnesium 2.0 mg/dL     Comprehensive Metabolic Panel [[id number]]  (Abnormal) Collected: 01/30/24 0543    Specimen: Blood Updated: 01/30/24 0651     Glucose 97 mg/dL      BUN 24 mg/dL      Creatinine 1.20 mg/dL      Sodium 134 mmol/L      Potassium 3.8 mmol/L      Comment: Slight hemolysis detected by analyzer. Result may be falsely elevated.        Chloride 107 mmol/L      CO2 16.9 mmol/L      Calcium 8.1 mg/dL      Total Protein 6.0 g/dL      Albumin 2.7 g/dL      ALT (SGPT) 14 U/L      AST (SGOT) 30 U/L      Comment: Slight hemolysis detected by analyzer. Result may be falsely elevated.        Alkaline Phosphatase 60 U/L      Total Bilirubin 0.6 mg/dL      Globulin 3.3 gm/dL      A/G Ratio 0.8 g/dL      BUN/Creatinine Ratio 20.0     Anion Gap 10.1 mmol/L      eGFR 65.9 mL/min/1.73     Narrative:      GFR Normal >60  Chronic Kidney Disease <60  Kidney Failure <15      Procalcitonin [[id number]]  (Abnormal) Collected: 01/30/24 0543    Specimen: Blood Updated: 01/30/24 0625     Procalcitonin 13.60 ng/mL     Narrative:      As a Marker for Sepsis (Non-Neonates):    1. <0.5 ng/mL represents a low risk of severe sepsis and/or septic shock.  2. >2 ng/mL represents a high risk of severe sepsis and/or septic shock.    As a Marker for Lower Respiratory Tract Infections that require antibiotic therapy:    PCT on Admission    Antibiotic Therapy       6-12 Hrs later    >0.5                Strongly Recommended  >0.25 - <0.5        Recommended   0.1 - 0.25          Discouraged              Remeasure/reassess  "PCT  <0.1                Strongly Discouraged     Remeasure/reassess PCT    As 28 day mortality risk marker: \"Change in Procalcitonin Result\" (>80% or <=80%) if Day 0 (or Day 1) and Day 4 values are available. Refer to http://[email or web address]-pct-calculator.com    Change in PCT <=80%  A decrease of PCT levels below or equal to 80% defines a positive change in PCT test result representing a higher risk for 28-day all-cause mortality of patients diagnosed with severe sepsis for septic shock.    Change in PCT >80%  A decrease of PCT levels of more than 80% defines a negative change in PCT result representing a lower risk for 28-day all-cause mortality of patients diagnosed with severe sepsis or septic shock.       CBC & Differential [[id number]]  (Abnormal) Collected: 01/30/24 0543    Specimen: Blood Updated: 01/30/24 0603    Narrative:      The following orders were created for panel order CBC & Differential.  Procedure                               Abnormality         Status                     ---------                               -----------         ------                     CBC Auto Differential[[id number]]        Abnormal            Final result               Scan Slide[[id number]]                                                                    Please view results for these tests on the individual orders.    CBC Auto Differential [[id number]]  (Abnormal) Collected: 01/30/24 0543    Specimen: Blood Updated: 01/30/24 0603     WBC 8.23 10*3/mm3      RBC 4.92 10*6/mm3      Hemoglobin 15.0 g/dL      Hematocrit 45.1 %      MCV 91.7 fL      MCH 30.5 pg      MCHC 33.3 g/dL      RDW 15.2 %      RDW-SD 51.3 fl      MPV 11.5 fL      Platelets 115 10*3/mm3      Neutrophil % 70.3 %      Lymphocyte % 17.3 %      Monocyte % 8.6 %      Eosinophil % 1.6 %      Basophil % 0.9 %      Immature Grans % 1.3 %      Neutrophils, Absolute 5.79 10*3/mm3      Lymphocytes, Absolute 1.42 10*3/mm3      Monocytes, Absolute 0.71 10*3/mm3      " Eosinophils, Absolute 0.13 10*3/mm3      Basophils, Absolute 0.07 10*3/mm3      Immature Grans, Absolute 0.11 10*3/mm3      nRBC 0.0 /100 WBC     Blood Culture With ECHO - Blood, Hand, Right [[id number]]  (Normal) Collected: 01/28/24 0540    Specimen: Blood from Hand, Right Updated: 01/30/24 0600     Blood Culture No growth at 2 days    Blood Culture With ECHO - Blood, Hand, Right [[id number]]  (Normal) Collected: 01/28/24 0531    Specimen: Blood from Hand, Right Updated: 01/30/24 0600     Blood Culture No growth at 2 days          Imaging Results (Last 24 Hours)       ** No results found for the last 24 hours. **          Orders (last 24 hrs)        Start     Ordered    01/31/24 0600  PICC Site Care  Weekly      Comments: Dressing Changes to PICC Site Every 7 Days and As Needed    01/30/24 1019    01/30/24 1115  sodium chloride 0.9 % flush 10 mL  Every 12 Hours Scheduled         01/30/24 1020    01/30/24 1115  sodium chloride 0.9 % flush 10 mL  Every 12 Hours Scheduled         01/30/24 1019    01/30/24 1021  PICC LINE CARE - Change Transparent Dressing With CHG Disk & Securement Device Every 7 Days  Weekly        Comments: Per [facility] Policy    01/30/24 1020    01/30/24 1021  PICC LINE CARE - Connectors / Hubs Must Be Scrubbed 15 Seconds Using 70% Alcohol & Allowed to Dry Before Accessing Line  Continuous         01/30/24 1020    01/30/24 1020  sodium chloride 0.9 % flush 10 mL  As Needed         01/30/24 1020    01/30/24 1020  sodium chloride 0.9 % flush 20 mL  As Needed         01/30/24 1020    01/30/24 1020  Use 3CG Technology For Placement Verification (PICC Nurse)  Once         01/30/24 1019    01/30/24 1019  sodium chloride 0.9 % flush 20 mL  As Needed         01/30/24 1019    01/30/24 1019  sodium chloride 0.9 % flush 10 mL  As Needed         01/30/24 1019    01/30/24 1019  Verify Informed Consent for PICC Line Placement  Once         01/30/24 1019    01/30/24 1019  Catheter Care PICC  Per Order Details         Comments: 1) Follow PICC Protocol For Care  2) No Blood Pressure in Arm With PICC  3) Warm Packs to PICC Arm As Needed For Discomfort  4) Measure & Record Arm Circumference if Patient Experiences Pain, Swelling, Redness or Warmth in PICC Arm; Compare Measurement to Initial Measure, Report to Provider if Greater Than 3cm Difference  5) Follow Flush Protocol Per Facility    01/30/24 1019    01/30/24 1019  Ensure Needleless Connectors are In Place  Per Order Details        Comments: Change Needleless Connectors Per [facility] Policy    01/30/24 1019    01/30/24 1019  No Venipuncture or BP in PICC Arm  Continuous        Comments: Right    01/30/24 1019    01/30/24 1019  For Sluggish / Occluded Line, Use CATHFLO Activase Per Policy (Per IV Nurse Only)  Once        Comments: Per Facility Policy    01/30/24 1019    01/30/24 1019  Ensure PICC Securing Device is in Use  Continuous         01/30/24 1019    01/30/24 1019  Do NOT Draw From PICC  Continuous         01/30/24 1019    01/30/24 1019  May Use PICC for Power Injected CT  Continuous         01/30/24 1019    01/30/24 1019  No Dilantin Through PICC  Continuous         01/30/24 1019    01/30/24 1019  Notify Provider if PICC is Occluded  Until Discontinued         01/30/24 1019    01/30/24 0600  CBC & Differential  Morning Draw         01/29/24 1725    01/30/24 0600  Comprehensive Metabolic Panel  Morning Draw         01/29/24 1725    01/30/24 0600  Procalcitonin  Morning Draw         01/29/24 1725    01/30/24 0600  Magnesium  Morning Draw         01/29/24 1828    01/30/24 0600  CBC Auto Differential  PROCEDURE ONCE         01/29/24 2202    01/30/24 0559  Scan Slide  Once,   Status:  Canceled         01/30/24 0558    01/29/24 2100  divalproex (DEPAKOTE) DR tablet 750 mg  Nightly         01/29/24 1201    01/29/24 1915  potassium chloride (MICRO-K/KLOR-CON) CR capsule  Once         01/29/24 1828    01/29/24 1204  Inpatient PICC Consult  Once        Provider:  (Not yet assigned)     01/29/24 1203    01/29/24 1204  RN To Release PICC Line Care Orders Once Line in Place  Once         01/29/24 1203    01/29/24 0800  carvedilol (COREG) tablet 12.5 mg  2 Times Daily With Meals         01/28/24 2048 01/28/24 1930  Bladder Scan  Every Shift      Comments: More often if not producing adequate urine or noted pain    01/28/24 1625    01/28/24 0900  divalproex (DEPAKOTE) DR tablet 750 mg  Daily         01/28/24 0805    01/27/24 2100  [Held by provider]  amLODIPine (NORVASC) tablet 10 mg  Nightly        (On hold since Sat 1/27/2024 at 2101 until manually unheld; held by Evelyn Cohen DOHold Reason: Other (Comment Required)Hold Comments: hypotension)    01/27/24 0122    01/27/24 2100  ertapenem (INVanz) 1000 mg/100 mL 0.9% NS VTB (mbp)  Every 24 Hours         01/27/24 0124 01/27/24 0900  amiodarone (PACERONE) tablet 100 mg  Daily         01/27/24 0122    01/27/24 0900  levothyroxine (SYNTHROID, LEVOTHROID) tablet 88 mcg  Daily         01/27/24 0122    01/27/24 0900  tamsulosin (FLOMAX) 24 hr capsule 0.4 mg  Daily         01/27/24 0122 01/27/24 0900  sennosides-docusate (PERICOLACE) 8.6-50 MG per tablet 2 tablet  2 Times Daily        See Hyperspace for full Linked Orders Report.    01/27/24 0122    01/27/24 0800  Oral Care  2 Times Daily       01/27/24 0122    01/27/24 0400  Vital Signs  Every 4 Hours       01/27/24 0122 01/27/24 0215  divalproex (DEPAKOTE) DR tablet 500 mg  Nightly,   Status:  Discontinued         01/27/24 0122 01/27/24 0215  sodium chloride 0.9 % flush 10 mL  Every 12 Hours Scheduled         01/27/24 0122    01/27/24 0215  sodium chloride 0.9 % infusion  Continuous         01/27/24 0123    01/27/24 0122  aluminum-magnesium hydroxide-simethicone (MAALOX MAX) 400-400-40 MG/5ML suspension 15 mL  Every 6 Hours PRN         01/27/24 0122    01/27/24 0122  ondansetron ODT (ZOFRAN-ODT) disintegrating tablet 4 mg  Every 6 Hours PRN        See Hyperspace for full Linked  Orders Report.    01/27/24 0122    01/27/24 0122  ondansetron (ZOFRAN) injection 4 mg  Every 6 Hours PRN        See Hyperspace for full Linked Orders Report.    01/27/24 0122    01/27/24 0122  melatonin tablet 5 mg  Nightly PRN         01/27/24 0122    01/27/24 0122  oxyCODONE-acetaminophen (PERCOCET) 7.5-325 MG per tablet 1 tablet  Every 4 Hours PRN         01/27/24 0122    01/27/24 0122  acetaminophen (TYLENOL) tablet 650 mg  Every 4 Hours PRN        See Hyperspace for full Linked Orders Report.    01/27/24 0122    01/27/24 0122  acetaminophen (TYLENOL) 160 MG/5ML oral solution 650 mg  Every 4 Hours PRN        See Hyperspace for full Linked Orders Report.    01/27/24 0122    01/27/24 0122  acetaminophen (TYLENOL) suppository 650 mg  Every 4 Hours PRN        See Hyperspace for full Linked Orders Report.    01/27/24 0122    01/27/24 0122  Intake & Output  Every Shift       01/27/24 0122    01/27/24 0121  nitroglycerin (NITROSTAT) SL tablet 0.4 mg  Every 5 Minutes PRN         01/27/24 0122    01/27/24 0121  sodium chloride 0.9 % flush 10 mL  As Needed         01/27/24 0122    01/27/24 0121  sodium chloride 0.9 % infusion 40 mL  As Needed         01/27/24 0122    01/27/24 0121  polyethylene glycol (MIRALAX) packet 17 g  Daily PRN        See Hyperspace for full Linked Orders Report.    01/27/24 0122    01/27/24 0121  bisacodyl (DULCOLAX) EC tablet 5 mg  Daily PRN        See Hyperspace for full Linked Orders Report.    01/27/24 0122    01/27/24 0121  bisacodyl (DULCOLAX) suppository 10 mg  Daily PRN        See Hyperspace for full Linked Orders Report.    01/27/24 0122    01/27/24 0110  morphine injection 3 mg  Every 4 Hours PRN,   Status:  Discontinued         01/27/24 0110 01/26/24 1957  acetaminophen (TYLENOL) tablet 1,000 mg  Once         01/26/24 1941 01/26/24 1955  Urinary Catheter Care  Every Shift      See [place] for full Linked Orders Report.    01/26/24 1954 01/26/24 1915  sodium chloride 0.9 %  "flush 10 mL  As Needed         01/26/24 1916    Unscheduled  Oxygen Therapy- Nasal Cannula; Titrate 1-6 LPM Per SpO2; 90 - 95%  Continuous PRN       01/26/24 1916    Unscheduled  Patient May Use Home CPAP / BIPAP For Sleep or As Needed  As Needed       01/27/24 0122    Unscheduled  PICC LINE CARE - Change Dressing As Needed When Damp, Loose or Soiled  As Needed       01/30/24 1020    Unscheduled  PICC LINE CARE - Change Needleless Connectors  As Needed      Comments: Per [facility] Policy    01/30/24 1020    Unscheduled  Remove PICC Cap for CT  As Needed       01/30/24 1019    --  SCANNED - TELEMETRY           01/26/24 0000    --  SCANNED - TELEMETRY           01/26/24 0000    --  SCANNED - TELEMETRY           01/26/24 0000    --  SCANNED - TELEMETRY           01/26/24 0000    --  SCANNED - TELEMETRY           01/26/24 0000    --  SCANNED - TELEMETRY           01/26/24 0000    --  SCANNED - TELEMETRY           01/26/24 0000    --  SCANNED - TELEMETRY           01/26/24 0000    --  SCANNED - TELEMETRY           01/26/24 0000                     Physician Progress Notes (most recent note)        Tonio De La Cruz MD at 01/29/24 1252          Urology Inpatient Progress Note    Subjective  Doing well.  No fevers overnight.  Labs improved.    Physical Exam  Visit Vitals  /86 (BP Location: Right arm, Patient Position: Lying)   Pulse 69   Temp 97.9 °F (36.6 °C) (Oral)   Resp 18   Ht 188 cm (74\")   Wt (!) 150 kg (331 lb 9.2 oz)   SpO2 96%   BMI 42.57 kg/m²     Constitutional: NAD, WDWN.  Cardiovascular: Regular rate.  Pulmonary/Chest: Respirations are even and non-labored bilaterally.  Abdominal: Soft. No distension, tenderness, masses or guarding. No CVA tenderness.  Extremities: KRUNAL x 4, Warm. No clubbing.  No cyanosis.    Skin: Pink, warm and dry.  No rashes noted.    Genitourinary  Urine clear yellow    I/O last 3 completed shifts:  In: 4753 [P.O.:1080; I.V.:3673]  Out: 1975 [Urine:1975]      Current " Facility-Administered Medications:     acetaminophen (TYLENOL) tablet 650 mg, 650 mg, Oral, Q4H PRN, 650 mg at 01/27/24 0914 **OR** acetaminophen (TYLENOL) 160 MG/5ML oral solution 650 mg, 650 mg, Oral, Q4H PRN **OR** acetaminophen (TYLENOL) suppository 650 mg, 650 mg, Rectal, Q4H PRN, Evelyn Cohen DO    acetaminophen (TYLENOL) tablet 1,000 mg, 1,000 mg, Oral, Once, Evelyn Cohen DO    aluminum-magnesium hydroxide-simethicone (MAALOX MAX) 400-400-40 MG/5ML suspension 15 mL, 15 mL, Oral, Q6H PRN, Evelyn Cohen DO    amiodarone (PACERONE) tablet 100 mg, 100 mg, Oral, Daily, Evelyn Cohen DO, 100 mg at 01/29/24 0827    [Held by provider] amLODIPine (NORVASC) tablet 10 mg, 10 mg, Oral, Nightly, Evelyn Cohen DO    sennosides-docusate (PERICOLACE) 8.6-50 MG per tablet 2 tablet, 2 tablet, Oral, BID, 2 tablet at 01/29/24 0830 **AND** polyethylene glycol (MIRALAX) packet 17 g, 17 g, Oral, Daily PRN **AND** bisacodyl (DULCOLAX) EC tablet 5 mg, 5 mg, Oral, Daily PRN **AND** bisacodyl (DULCOLAX) suppository 10 mg, 10 mg, Rectal, Daily PRN, Evelyn Cohen DO    carvedilol (COREG) tablet 12.5 mg, 12.5 mg, Oral, BID With Meals, Evelyn Cohen DO, 12.5 mg at 01/29/24 0828    divalproex (DEPAKOTE) DR tablet 750 mg, 750 mg, Oral, Daily, Evelyn Cohen DO, 750 mg at 01/29/24 0828    divalproex (DEPAKOTE) DR tablet 750 mg, 750 mg, Oral, Nightly, Yamileth, Lulu B, APRN    ertapenem (INVanz) 1000 mg/100 mL 0.9% NS VTB (mbp), 1,000 mg, Intravenous, Q24H, Evelyn Cohen DO, 1,000 mg at 01/28/24 2050    levothyroxine (SYNTHROID, LEVOTHROID) tablet 88 mcg, 88 mcg, Oral, Daily, Evelyn Cohen DO, 88 mcg at 01/29/24 0827    melatonin tablet 5 mg, 5 mg, Oral, Nightly PRN, Evelyn Cohen DO, 5 mg at 01/28/24 2308    morphine injection 3 mg, 3 mg, Intravenous, Q4H PRN, Evelyn Cohen DO    nitroglycerin (NITROSTAT) SL tablet 0.4 mg,  0.4 mg, Sublingual, Q5 Min PRN, Evelyn Cohenus, DO    ondansetron ODT (ZOFRAN-ODT) disintegrating tablet 4 mg, 4 mg, Oral, Q6H PRN **OR** ondansetron (ZOFRAN) injection 4 mg, 4 mg, Intravenous, Q6H PRN, Evelyn Cohen Pee, DO    oxyCODONE-acetaminophen (PERCOCET) 7.5-325 MG per tablet 1 tablet, 1 tablet, Oral, Q4H PRN, Evelyn Cohenus, DO, 1 tablet at 01/28/24 2049    sodium chloride 0.9 % flush 10 mL, 10 mL, Intravenous, PRN, Evelyn Cohen Pee, DO    sodium chloride 0.9 % flush 10 mL, 10 mL, Intravenous, Q12H, Noel, Evelyn Pee, DO, 10 mL at 01/28/24 0850    sodium chloride 0.9 % flush 10 mL, 10 mL, Intravenous, PRN, Evelyn Cohen Pee, DO    sodium chloride 0.9 % infusion 40 mL, 40 mL, Intravenous, PRN, Noel, Evelyn Pee, DO    sodium chloride 0.9 % infusion, 125 mL/hr, Intravenous, Continuous, Noel, Evelyn Pee, DO, Last Rate: 125 mL/hr at 01/29/24 1135, 125 mL/hr at 01/29/24 1135    tamsulosin (FLOMAX) 24 hr capsule 0.4 mg, 0.4 mg, Oral, Daily, Evelyn Cohenus, DO, 0.4 mg at 01/29/24 0828    Labs  Lab Results   Component Value Date    GLUCOSE 96 01/29/2024    CALCIUM 7.9 (L) 01/29/2024     (L) 01/29/2024    K 3.5 01/29/2024    CO2 20.8 (L) 01/29/2024     01/29/2024    BUN 38 (H) 01/29/2024    CREATININE 1.65 (H) 01/29/2024    EGFRIFAFRI 57 (L) 12/05/2018    EGFRIFNONA 55 (L) 01/10/2022    BCR 23.0 01/29/2024    ANIONGAP 13.2 01/29/2024       Lab Results   Component Value Date    WBC 12.99 (H) 01/29/2024    HGB 14.6 01/29/2024    HCT 43.8 01/29/2024    MCV 91.1 01/29/2024     (L) 01/29/2024       Urine Culture   Date Value Ref Range Status   01/26/2024 50,000 CFU/mL Escherichia coli ESBL (A)  Final     Comment:       Consider infectious disease consult.  Susceptibility results may not correlate to clinical outcomes.       Brief Urine Lab Results  (Last result in the past 365 days)        Color   Clarity   Blood   Leuk Est   Nitrite   Protein    CREAT   Urine HCG        01/26/24 2103 Red   Turbid   Large (3+)   Large (3+)   Positive   100 mg/dL (2+)                     Radiographic Studies  CT Abdomen Pelvis Without Contrast    Result Date: 1/28/2024   1. Bilateral nonobstructing renal calculi. No hydronephrosis.  2. Severe urinary bladder thickening with perivesical inflammatory changes suggestive of underlying cystitis. Malpositioned Crespo catheter with bulb in the prostatic urethra.   CTDI: 20.02 mGy DLP:1116.91 mGy.cm  This report was signed and finalized on 1/28/2024 11:00 AM by Torey Allen MD.      XR Chest 1 View    Result Date: 1/27/2024  No acute cardiopulmonary process.        This report was signed and finalized on 1/27/2024 7:47 AM by Torey Allen MD.      CT Abdomen Pelvis Without Contrast    Result Date: 1/26/2024  1.  Malpositioned Crespo catheter with balloon inflated in the prostate gland. Authenticated and Electronically Signed by Jose J Dupree MD on 01/26/2024 11:38:21 PM       Assessment  69 y.o. male  has a past medical history of 6th nerve palsy, right, Anxiety and depression, Atrial fibrillation, persistent (12/02/2020), Coronary artery disease involving native coronary artery of native heart without angina pectoris (09/12/2018), CRI (chronic renal insufficiency), stage 2 (mild), CVA (cerebral vascular accident), Diabetes mellitus, Disease of thyroid gland, Double vision, Elevated cholesterol, Elevated prostate specific antigen (PSA) (11/08/2023), Focal seizure, Heart attack, History of Helicobacter pylori infection, HL (hearing loss), Hyperlipidemia, Hypertension, Kidney stone, Memory loss (2005), Meningitis (2005), Obesity, Sleep apnea treated with nocturnal BiPAP, Stroke, Ventricular tachycardia, and Wears glasses., who presents with sepsis/bacteremia after prostate biopsy.     Plan  1.  Urine has been completely clear and patient did not have difficulty voiding prior to biopsy, therefore a fill and void was performed  with postvoid residual of 0 mL after voiding 150 mL.  He can keep catheter out until he follows up with me, which is already scheduled.    2.  Agree with PICC line in 2 weeks total of antibiotics, preferably ertapenem.  Cultures pending.    3.  Please call with any further urologic concerns.      Electronically signed by Tonio De La Cruz MD at 01/29/24 1255          Consult Notes (most recent note)        Noel Sierra MD, FASN at 01/29/24 0704        Consult Orders    1. Inpatient Nephrology Consult [[id number]] ordered by Lulu Carson APRN at 01/28/24 1145                         [facility]      Nephrology Consultation      Referring Provider:   Xochitl Chatterjee,*    Reason for Consultation:  Acute kidney injury associated problems.    Subjective:  Chief complaint   Chief Complaint   Patient presents with    Fever    Post-op Problem     History of present illness:    Patient is 69-year-old male with multimedical problems including chronic kidney disease stage III, coronary artery disease, history of prior stroke, type 2 diabetes, hypertension, obstructive sleep apnea, history of nephrolithiasis, atrial fibrillation and seizure disorder.  He had a prostate biopsy done by Dr. De La Cruz and starting to have fever as well as increased confusion.  He was also having pain on urination and fairly bright red blood noted.  He took his Xarelto the night after the procedure.  He was brought into the emergency room and noted to have increased lactic acid and Pro-Jimmy with hypotension, he was admitted through the hospitalist service for likely septic shock.  Patient was given 1.5 L of normal saline and Invanz was started in the ER.  He also had noted to have nonobstructing renal stones.  Crespo catheter was passed.  This morning patient is sitting up in the chair awake alert and interactive bedside some nonspecific pain in the groin area denies having any chest pain or shortness of breath, no nausea  vomiting or abdominal pain.  His initial temperature was close to 103 and has been afebrile since in the hospital.  He denies taking any nonsteroidals.  I have reviewed labs/imaging/records from this hospitalization, including ER staff and admitting/attending physicians H/P's and progress notes to establish a comprehensive understanding of this patient's clinical hospital course, as well as to establish plan of care appropriately.   Past Medical History:   Diagnosis Date    6th nerve palsy, right     right eye     Anxiety and depression     Atrial fibrillation, persistent 12/02/2020    on Xarelto    Coronary artery disease involving native coronary artery of native heart without angina pectoris 09/12/2018    follows w/ Dr. Mendoza    CRI (chronic renal insufficiency), stage 2 (mild)     CVA (cerebral vascular accident)     Diabetes mellitus     doesnt check sugar, type 2     Disease of thyroid gland     Double vision     resolved- right eye    Elevated cholesterol     Elevated prostate specific antigen (PSA) 11/08/2023    Focal seizure     of right arm     Heart attack     NSTEMI 2015, s/p stenting    History of Helicobacter pylori infection     in 2008, treated w/ PrevPak - 2021 EGD bx (+), PCP RX - PPI/Augmentin/Doxy/Flagyl (x 14 days) 1/22/21    HL (hearing loss)     (L) ear - child luevano measles    Hyperlipidemia     Hypertension     Kidney stone     Memory loss 2005    d/t meningitis    Meningitis 2005    unsure of bacterial or viral     Obesity     Sleep apnea treated with nocturnal BiPAP     compliant with  machine     Stroke     2017/2018    Ventricular tachycardia     3 different times    Wears glasses        Past Surgical History:   Procedure Laterality Date    CARDIAC CATHETERIZATION N/A 10/12/2018    Procedure: Left Heart Cath;  Surgeon: Yoselin Johnson MD;  Location: [facility] CATH INVASIVE LOCATION;  Service: Cardiology    CARDIAC CATHETERIZATION  03/2021    stent    CARDIAC CATHETERIZATION  07/2021     just checking the after pacemaker placed- Dr. Tim    CARDIAC DEFIBRILLATOR PLACEMENT      COLONOSCOPY  10/2020    w/ Dr. Jacobs, hx colon polyps    CORONARY STENT PLACEMENT      LAPAROSCOPIC GASTRIC BANDING  2008    s/p LAGB Realize 2008 by HOMERO.    PACEMAKER IMPLANTATION  2021    UMBILICAL HERNIA REPAIR  2008    incarcerated UHR w/ mesh by Dr. Velasquez    URETEROSCOPY LASER LITHOTRIPSY WITH STENT INSERTION Left 10/20/2021    Procedure: URETEROSCOPY LASER LITHOTRIPSY WITH STENT INSERTION;  Surgeon: Tonio De La Cruz MD;  Location: [facility];  Service: Urology;  Laterality: Left;    URETEROSCOPY LASER LITHOTRIPSY WITH STENT INSERTION Left 2021    Procedure: URETEROSCOPY LEFT, LEFT RETROGRADE PYELOGRAM, CYSTOSCOPY, LASER LITHOTRIPSY WITH STENT EXCHANGE;  Surgeon: Tonio De La Cruz MD;  Location: [facility];  Service: Urology;  Laterality: Left;    URETEROSCOPY LASER LITHOTRIPSY WITH STENT INSERTION Left 10/06/2022    Procedure: URETEROSCOPY LASER LITHOTRIPSY WITH STENT INSERTION;  Surgeon: Tonio De La Cruz MD;  Location: [facility];  Service: Urology;  Laterality: Left;     Family History   Problem Relation Age of Onset    Stroke Mother     Hypertension Mother     COPD Father     Hypertension Father      negative h/o ESRD     Social History     Tobacco Use    Smoking status: Former     Packs/day: 0.50     Years: 15.00     Additional pack years: 0.00     Total pack years: 7.50     Types: Cigarettes     Start date: 1975     Quit date: 1985     Years since quittin.1    Smokeless tobacco: Never   Vaping Use    Vaping Use: Never used   Substance Use Topics    Alcohol use: No    Drug use: No     Home medications:   Prior to Admission Medications       Prescriptions Last Dose Informant Patient Reported? Taking?    amiodarone (PACERONE) 100 MG tablet 2024  No Yes    Take 1 tablet by mouth Daily.    carvedilol (Coreg) 25 MG tablet 2024 Self No Yes    Take 1 tablet by  mouth 2 (Two) Times a Day With Meals.    divalproex (DEPAKOTE) 250 MG DR tablet 1/26/2024  No Yes    750mg QAM and 500mg QHS    Entresto  MG tablet 1/26/2024  Yes Yes    Take 1 tablet by mouth 2 (Two) Times a Day.    Farxiga 10 MG tablet 1/26/2024 Self Yes Yes    Take 10 mg by mouth Daily.    furosemide (LASIX) 40 MG tablet 1/26/2024  Yes Yes    Take 1 tablet by mouth 2 (Two) Times a Day.    levothyroxine (SYNTHROID, LEVOTHROID) 88 MCG tablet 1/26/2024  No Yes    TAKE 1 TABLET DAILY    Semaglutide,0.25 or 0.5MG/DOS, (Ozempic, 0.25 or 0.5 MG/DOSE,) 2 MG/1.5ML solution pen-injector Past Week  No Yes    Inject 0.5 mg under the skin into the appropriate area as directed Every 7 (Seven) Days.    tamsulosin (FLOMAX) 0.4 MG capsule 24 hr capsule Past Week  No Yes    Take 1 capsule by mouth Daily.    Xarelto 15 MG tablet 1/26/2024 Self Yes Yes    Take 1 tablet by mouth Daily With Dinner.    amLODIPine (NORVASC) 10 MG tablet 1/25/2024  Yes No    Take 1 tablet by mouth Every Night.    Cholecalciferol (Vitamin D3) 25 MCG (1000 UT) capsule   No No    Take 2 capsules by mouth Daily.    ciprofloxacin (Cipro) 500 MG tablet Not Taking  No No    Take 1 tablet by mouth 2 (Two) Times a Day. Start taking the day prior to biopsy    Patient not taking:  Reported on 1/27/2024          Emergency department medications:   Medications   sodium chloride 0.9 % flush 10 mL (has no administration in time range)   acetaminophen (TYLENOL) tablet 1,000 mg (1,000 mg Oral Not Given 1/26/24 2107)   morphine injection 3 mg (has no administration in time range)   amiodarone (PACERONE) tablet 100 mg (100 mg Oral Given 1/28/24 0849)   divalproex (DEPAKOTE) DR tablet 500 mg (500 mg Oral Given 1/28/24 2048)   levothyroxine (SYNTHROID, LEVOTHROID) tablet 88 mcg (88 mcg Oral Given 1/28/24 0849)   tamsulosin (FLOMAX) 24 hr capsule 0.4 mg (0.4 mg Oral Given 1/28/24 0848)   amLODIPine (NORVASC) tablet 10 mg ( Oral Dose Auto Held 2/12/24 2100)   sodium  chloride 0.9 % flush 10 mL (10 mL Intravenous Not Given 1/28/24 2055)   sodium chloride 0.9 % flush 10 mL (has no administration in time range)   sodium chloride 0.9 % infusion 40 mL (has no administration in time range)   sennosides-docusate (PERICOLACE) 8.6-50 MG per tablet 2 tablet (2 tablets Oral Given 1/28/24 2048)     And   polyethylene glycol (MIRALAX) packet 17 g (has no administration in time range)     And   bisacodyl (DULCOLAX) EC tablet 5 mg (has no administration in time range)     And   bisacodyl (DULCOLAX) suppository 10 mg (has no administration in time range)   nitroglycerin (NITROSTAT) SL tablet 0.4 mg (has no administration in time range)   acetaminophen (TYLENOL) tablet 650 mg (650 mg Oral Given 1/27/24 0914)     Or   acetaminophen (TYLENOL) 160 MG/5ML oral solution 650 mg ( Oral Not Given:  See Alt 1/27/24 0914)     Or   acetaminophen (TYLENOL) suppository 650 mg ( Rectal Not Given:  See Alt 1/27/24 0914)   oxyCODONE-acetaminophen (PERCOCET) 7.5-325 MG per tablet 1 tablet (1 tablet Oral Given 1/28/24 2049)   melatonin tablet 5 mg (5 mg Oral Given 1/28/24 2308)   ondansetron ODT (ZOFRAN-ODT) disintegrating tablet 4 mg (has no administration in time range)     Or   ondansetron (ZOFRAN) injection 4 mg (has no administration in time range)   aluminum-magnesium hydroxide-simethicone (MAALOX MAX) 400-400-40 MG/5ML suspension 15 mL (has no administration in time range)   sodium chloride 0.9 % infusion (125 mL/hr Intravenous New Bag 1/29/24 0254)   ertapenem (INVanz) 1000 mg/100 mL 0.9% NS VTB (mbp) (1,000 mg Intravenous New Bag 1/28/24 2050)   divalproex (DEPAKOTE) DR tablet 750 mg (750 mg Oral Given 1/28/24 0904)   carvedilol (COREG) tablet 12.5 mg (has no administration in time range)   sodium chloride 0.9 % bolus 1,000 mL (0 mL Intravenous Stopped 1/26/24 2018)   Lidocaine HCl gel (XYLOCAINE) urethral/mucosal syringe ( Urethral Given 1/26/24 2008)   ertapenem (INVanz) 1000 mg/100 mL 0.9% NS VTB  "(mbp) (0 mg Intravenous Stopped 1/26/24 2114)   fentaNYL citrate (PF) (SUBLIMAZE) injection 50 mcg (50 mcg Intravenous Given 1/26/24 2130)   sodium chloride 0.9 % bolus 500 mL (0 mL Intravenous Stopped 1/27/24 0112)       Allergies:  Statins    Review of Systems  14 point review of system were done and are negative except as mentioned in the history of present illness and assessment and plan.      Physical Exam:  Objective:  Vital Signs  /78 (BP Location: Left arm, Patient Position: Sitting)   Pulse 68   Temp 98 °F (36.7 °C) (Temporal)   Resp 18   Ht 188 cm (74\")   Wt (!) 150 kg (331 lb 9.2 oz)   SpO2 94%   BMI 42.57 kg/m²   Objective    No intake/output data recorded.    Intake/Output Summary (Last 24 hours) at 1/29/2024 0735  Last data filed at 1/29/2024 0629  Gross per 24 hour   Intake 2210 ml   Output 1600 ml   Net 610 ml        Physical Exam     Constitutional: Awake, alert  Eyes: sclerae anicteric, no conjunctival injection  HEENT: mucous membranes dry  Neck: Supple, no thyromegaly, no lymphadenopathy, trachea midline, No JVD  Respiratory: Clear to auscultation bilaterally, nonlabored respirations   Cardiovascular: IRRR, positive murmurs, no rubs, or gallops.  Gastrointestinal: Positive bowel sounds, obese, soft, nontender, nondistended  Musculoskeletal: Trace to 1+ edema, no clubbing or cyanosis  Psychiatric: Appropriate affect, cooperative  Neurologic: Oriented x 3, moving all extremities, Cranial Nerves grossly intact, speech clear  Skin: warm and dry, no rashes            Results Review:   Results from last 7 days   Lab Units 01/29/24  0508 01/28/24  0541 01/27/24  0532 01/26/24  1921 01/24/24  1316   SODIUM mmol/L 134* 136 138 141 141   POTASSIUM mmol/L 3.5 3.9 3.6 3.6 3.4*   CHLORIDE mmol/L 100 100 102 100 99   CO2 mmol/L 20.8* 23.0 21.2* 26.1 27.0   BUN mg/dL 38* 35* 18 16 12   CREATININE mg/dL 1.65* 2.10* 1.81* 1.70* 1.50*   CALCIUM mg/dL 7.9* 8.0* 8.1* 9.2 9.6   ALBUMIN g/dL  --  3.4*  -- " " 4.3 4.8   BILIRUBIN mg/dL  --  0.7  --  1.2 0.9   ALK PHOS U/L  --  55  --  97 81   ALT (SGPT) U/L  --  19  --  16 17   AST (SGOT) U/L  --  71*  --  18 16   GLUCOSE mg/dL 96 123* 139* 107* 109*     Estimated Creatinine Clearance: 66.1 mL/min (A) (by C-G formula based on SCr of 1.65 mg/dL (H)).          Results from last 7 days   Lab Units 01/29/24  0508 01/28/24  0541 01/27/24  0532 01/26/24  1921 01/24/24  1316   WBC 10*3/mm3 12.99* 18.28* 18.21* 3.59 6.70   HEMOGLOBIN g/dL 14.6 15.5 15.5 18.2* 19.0*   PLATELETS 10*3/mm3 125* 90* 109* 127* 186     Results from last 7 days   Lab Units 01/24/24  1116   INR  1.20*     Brief Urine Lab Results  (Last result in the past 365 days)        Color   Clarity   Blood   Leuk Est   Nitrite   Protein   CREAT   Urine HCG        01/26/24 2103 Red   Turbid   Large (3+)   Large (3+)   Positive   100 mg/dL (2+)                 No results found for: \"UTPCR\"  Imaging Results (Last 24 Hours)       Procedure Component Value Units Date/Time    CT Abdomen Pelvis Without Contrast [[id number]] Collected: 01/28/24 1053     Updated: 01/28/24 1102    Narrative:      CT of the abdomen and pelvis without contrast     INDICATION: Renal failure     COMPARISON: January 26, 2024     TECHNIQUE: Helically acquired axial multidetector CT images were  obtained from the lung bases through the pelvis without IV contrast.  Dose reduction techniques to achieve ALARA were employed.     Findings:     Lung bases: Small bilateral pleural effusions with bibasilar  atelectasis.     Cardiomegaly with trace pericardial fluid. Extensive multivessel  coronary artery calcifications. ICD leads are noted.     Liver: [Grossly unremarkable]     Spleen: [Unremarkable]     Gallbladder/biliary tree: [ No biliary ductal dilatation].  Cholelithiasis with multiple calcified gallstones, largest measures 1.6  cm.     Pancreas: [Grossly unremarkable].     Adrenals: [Unremarkable]     Kidneys: Bilateral nonobstructing renal calculi, " largest right measures  approximately 1.1 cm and largest left measures approximately 0.8 cm. No  hydronephrosis bilaterally. Bilateral nonspecific perinephric edema..     GI: Gastric lap band is noted. [No bowel obstruction]. Moderate colonic  gas and scattered small bowel gas may reflect underlying mild ileus. No  definite obstruction.     Bladder: Redemonstration of severe circumferential bladder wall  thickening with perivesical stranding, suggestive of underlying  cystitis. Mild prostatomegaly. Crespo catheter about appears positioned  in the prostatic urethra. Consider readjustment.     Bones: [No significant osseous abnormalities are identified].     Soft Tissues: Prior ventral wall hernia repair with mesh.       Impression:         1. Bilateral nonobstructing renal calculi. No hydronephrosis.     2. Severe urinary bladder thickening with perivesical inflammatory  changes suggestive of underlying cystitis. Malpositioned Crespo catheter  with bulb in the prostatic urethra.        CTDI: 20.02 mGy  DLP:1116.91 mGy.cm     This report was signed and finalized on 1/28/2024 11:00 AM by Torey Allen MD.             acetaminophen, 1,000 mg, Oral, Once  amiodarone, 100 mg, Oral, Daily  [Held by provider] amLODIPine, 10 mg, Oral, Nightly  carvedilol, 12.5 mg, Oral, BID With Meals  divalproex, 500 mg, Oral, Nightly  divalproex, 750 mg, Oral, Daily  ertapenem, 1,000 mg, Intravenous, Q24H  levothyroxine, 88 mcg, Oral, Daily  senna-docusate sodium, 2 tablet, Oral, BID  sodium chloride, 10 mL, Intravenous, Q12H  tamsulosin, 0.4 mg, Oral, Daily      sodium chloride, 125 mL/hr, Last Rate: 125 mL/hr (01/29/24 0254)        Assessment/Plan:      Sepsis    Acute kidney injury: Patient had normal renal function on October 17, 2023 with a EGFR of 65 mill per minute since then he has gradually worsened up until yesterday where creatinine has been slowly going up, yesterday was 2.10, preadmission creatinine of 1.50 and on the day  of admission it was 1.70.  Potassium was noted to be on the low side but it has improved.  Chronic kidney disease stage IIIa: It appears his baseline GFR is around 50.  Sepsis: Initial presentation increased procalcitonin and lactate was noted which has improved slightly.  Treated as per hospitalist service and urology.  He does appear all this infection started when he had prostate biopsy, leading to lew hematuria as well as fever and chills.  History of nephrolithiasis: It does not appear he has any obstructive stone, multiple nonobstructive stone noted on the CT.  Coronary artery disease: No acute issues with coronary artery disease.  Denies having any chest pain or shortness of breath.  Atrial fibrillation: Anticoagulated and rate controlled  Status post pacemaker:  Hypothyroidism: Continue home medications.  Obstructive sleep apnea on BiPAP: He said he has been compliant with his BiPAP.  Seizure disorder: Continue current medications  Morbid obesity: Complicates all forms of care.      Risk and complexity: High      Plan:  Potassium is on the low side we will give her at least 1 dose of 40 mg p.o.  He has been getting IV fluids with improving renal function.  Likely will settle down to his baseline.  Check magnesium in the morning as well.  Avoid nephrotoxic medications.  Continue with rest of the current treatment plan and surveillance labs.  Details were discussed with the patient as well as family in the room.  Daughter and wife at the bedside.  Both of them had multiple questions about his baseline chronic kidney disease, nobody has discussed with him about his chronic kidney disease patient and family were very much interested to set up a follow-up.  Details were also discussed with the hospitalist service.   Further recommendations will depend on clinical course of the patient during the current hospitalization.    I also discussed the details with the nursing staff.  Rest as ordered.    In closing, I  sincerely appreciate opportunity to participate in care of this patient. If I can be of any further assistance with the management of this patient, please don’t hesitate to contact me.    Noel Sierra MD, DANGELON    01/29/24  07:35 EST    Dictated using Dragon.            Electronically signed by Noel Sierra MD, FASN at 01/29/24 4230

## 2024-01-30 NOTE — PROGRESS NOTES
[facility] Progress Note  [facility]. [place]        Patient Name: Andre Agosto  : 1955  MRN: [id number]   LOS: 4 days    Patient Care Team:  Keven Bear MD as PCP - General (Internal Medicine)  [employer] as Vendor (DME Services)    Chief Complaint:    Chief Complaint   Patient presents with    Fever    Post-op Problem     Primary Care Physician:  Keven Bear MD  Date of admission: 2024    Subjective     Interval History:   Follow-up acute on chronic kidney disease stage III a.  Patient is a lot more awake alert and interactive this morning.  He was up walking, denies having any chest pain or shortness of breath.  Events noted from last 24 hours.  I reviewed the chart and other providers notes, labs and procedures done since my last note.    Review of Systems:   As noted above.    Objective     Vitals:   Temp:  [96.9 °F (36.1 °C)-99.7 °F (37.6 °C)] 96.9 °F (36.1 °C)  Heart Rate:  [65-69] 65  Resp:  [18-20] 20  BP: (134-162)/(76-97) 162/97    Intake/Output Summary (Last 24 hours) at 2024 0732  Last data filed at 2024 0552  Gross per 24 hour   Intake 1430 ml   Output 1450 ml   Net -20 ml       Physical Exam:    General Appearance: alert, oriented x 3, no acute distress   Skin: warm and dry  HEENT: oral mucosa normal, nonicteric sclera  Neck: supple, no JVD  Lungs: CTA  Heart: RRR, normal S1 and S2  Abdomen: obese, soft, nontender, non distended and positive bowel sounds.  : no palpable bladder  Extremities: Trace edema, no cyanosis or clubbing  Neuro: normal speech and mental status     Scheduled Meds:     Current Facility-Administered Medications   Medication Dose Route Frequency Provider Last Rate Last Admin    acetaminophen (TYLENOL) tablet 650 mg  650 mg Oral Q4H PRN Evelyn Cohen DO   650 mg at 24 0914    Or    acetaminophen (TYLENOL) 160 MG/5ML oral solution 650 mg  650 mg Oral Q4H PRN Evelyn Cohen DO         Or    acetaminophen (TYLENOL) suppository 650 mg  650 mg Rectal Q4H PRN Evelyn Cohen DO        acetaminophen (TYLENOL) tablet 1,000 mg  1,000 mg Oral Once Evelyn Cohen DO        aluminum-magnesium hydroxide-simethicone (MAALOX MAX) 400-400-40 MG/5ML suspension 15 mL  15 mL Oral Q6H PRN Evelyn Cohen DO        amiodarone (PACERONE) tablet 100 mg  100 mg Oral Daily Evelyn Cohen DO   100 mg at 01/29/24 0827    [Held by provider] amLODIPine (NORVASC) tablet 10 mg  10 mg Oral Nightly Evelyn Cohen DO        sennosides-docusate (PERICOLACE) 8.6-50 MG per tablet 2 tablet  2 tablet Oral BID Evelyn Cohen DO   2 tablet at 01/29/24 0830    And    polyethylene glycol (MIRALAX) packet 17 g  17 g Oral Daily PRN Evelyn Cohen DO        And    bisacodyl (DULCOLAX) EC tablet 5 mg  5 mg Oral Daily PRN Evelyn Cohen DO        And    bisacodyl (DULCOLAX) suppository 10 mg  10 mg Rectal Daily PRN Evelyn Cohen DO        carvedilol (COREG) tablet 12.5 mg  12.5 mg Oral BID With Meals Evelyn Cohen DO   12.5 mg at 01/29/24 1811    divalproex (DEPAKOTE) DR tablet 750 mg  750 mg Oral Daily Evelyn Cohen DO   750 mg at 01/29/24 0828    divalproex (DEPAKOTE) DR tablet 750 mg  750 mg Oral Nightly Lulu Carson APRN   750 mg at 01/29/24 2211    ertapenem (INVanz) 1000 mg/100 mL 0.9% NS VTB (mbp)  1,000 mg Intravenous Q24H Evelyn Cohen DO   1,000 mg at 01/29/24 2212    levothyroxine (SYNTHROID, LEVOTHROID) tablet 88 mcg  88 mcg Oral Daily Evelyn Cohen DO   88 mcg at 01/29/24 0827    melatonin tablet 5 mg  5 mg Oral Nightly PRN Evelyn Cohen, DO   5 mg at 01/29/24 2229    morphine injection 3 mg  3 mg Intravenous Q4H PRN Evelyn Cohen,         nitroglycerin (NITROSTAT) SL tablet 0.4 mg  0.4 mg Sublingual Q5 Min PRN Evelyn Cohen,         ondansetron ODT (ZOFRAN-ODT)  disintegrating tablet 4 mg  4 mg Oral Q6H PRN Noel, Evelyn Wyattus, DO        Or    ondansetron (ZOFRAN) injection 4 mg  4 mg Intravenous Q6H PRN Noel, Evelyn Pee, DO        oxyCODONE-acetaminophen (PERCOCET) 7.5-325 MG per tablet 1 tablet  1 tablet Oral Q4H PRN Noel, Evelyn Wyattus, DO   1 tablet at 01/29/24 2229    sodium chloride 0.9 % flush 10 mL  10 mL Intravenous PRN Karbigg, Evelyn Pee, DO        sodium chloride 0.9 % flush 10 mL  10 mL Intravenous Q12H Karbigg, Evelyn Pee, DO   10 mL at 01/29/24 2213    sodium chloride 0.9 % flush 10 mL  10 mL Intravenous PRN Noel, Evelyn Pee, DO        sodium chloride 0.9 % infusion 40 mL  40 mL Intravenous PRN Noel, Evelyn Wyattus, DO        sodium chloride 0.9 % infusion  125 mL/hr Intravenous Continuous Evelyn Cohenus,  mL/hr at 01/30/24 0552 125 mL/hr at 01/30/24 0552    tamsulosin (FLOMAX) 24 hr capsule 0.4 mg  0.4 mg Oral Daily Noel, Evelyn Wyattus, DO   0.4 mg at 01/29/24 0828       acetaminophen, 1,000 mg, Oral, Once  amiodarone, 100 mg, Oral, Daily  [Held by provider] amLODIPine, 10 mg, Oral, Nightly  carvedilol, 12.5 mg, Oral, BID With Meals  divalproex, 750 mg, Oral, Daily  divalproex, 750 mg, Oral, Nightly  ertapenem, 1,000 mg, Intravenous, Q24H  levothyroxine, 88 mcg, Oral, Daily  senna-docusate sodium, 2 tablet, Oral, BID  sodium chloride, 10 mL, Intravenous, Q12H  tamsulosin, 0.4 mg, Oral, Daily        IV Meds:   sodium chloride, 125 mL/hr, Last Rate: 125 mL/hr (01/30/24 0552)        Results Reviewed:   I have personally reviewed the results from the time of this admission to 1/30/2024 07:32 EST     Results from last 7 days   Lab Units 01/30/24  0543 01/29/24  0508 01/28/24  0541 01/27/24  0532 01/26/24  1921   SODIUM mmol/L 134* 134* 136   < > 141   POTASSIUM mmol/L 3.8 3.5 3.9   < > 3.6   CHLORIDE mmol/L 107 100 100   < > 100   CO2 mmol/L 16.9* 20.8* 23.0   < > 26.1   BUN mg/dL 24* 38* 35*   < > 16   CREATININE mg/dL  "1.20 1.65* 2.10*   < > 1.70*   CALCIUM mg/dL 8.1* 7.9* 8.0*   < > 9.2   BILIRUBIN mg/dL 0.6  --  0.7  --  1.2   ALK PHOS U/L 60  --  55  --  97   ALT (SGPT) U/L 14  --  19  --  16   AST (SGOT) U/L 30  --  71*  --  18   GLUCOSE mg/dL 97 96 123*   < > 107*    < > = values in this interval not displayed.       Estimated Creatinine Clearance: 90.8 mL/min (by C-G formula based on SCr of 1.2 mg/dL).    Results from last 7 days   Lab Units 01/30/24  0543   MAGNESIUM mg/dL 2.0             Results from last 7 days   Lab Units 01/30/24  0543 01/29/24  0508 01/28/24  0541 01/27/24  0532 01/26/24  1921   WBC 10*3/mm3 8.23 12.99* 18.28* 18.21* 3.59   HEMOGLOBIN g/dL 15.0 14.6 15.5 15.5 18.2*   PLATELETS 10*3/mm3 115* 125* 90* 109* 127*       Results from last 7 days   Lab Units 01/24/24  1116   INR  1.20*       Brief Urine Lab Results  (Last result in the past 365 days)        Color   Clarity   Blood   Leuk Est   Nitrite   Protein   CREAT   Urine HCG        01/26/24 2103 Red   Turbid   Large (3+)   Large (3+)   Positive   100 mg/dL (2+)                   No results found for: \"UTPCR\"    Imaging Results (Last 24 Hours)       ** No results found for the last 24 hours. **                Assessment / Plan     ASSESSMENT:    Sepsis    Acute kidney injury: Patient had normal renal function on October 17, 2023 with a EGFR of 65 mill per minute since then he has gradually worsened up until yesterday where creatinine has been slowly going up, yesterday was 2.10, preadmission creatinine of 1.50 and on the day of admission it was 1.70.  Potassium was noted to be on the low side but it has improved.  Chronic kidney disease stage IIIa: It appears his baseline GFR is around 50.  Sepsis: Initial presentation increased procalcitonin and lactate was noted which has improved slightly.  Treated as per hospitalist service and urology.  He does appear all this infection started when he had prostate biopsy, leading to lew hematuria as well as fever " and chills.  History of nephrolithiasis: It does not appear he has any obstructive stone, multiple nonobstructive stone noted on the CT.  Coronary artery disease: No acute issues with coronary artery disease.  Denies having any chest pain or shortness of breath.  Atrial fibrillation: Anticoagulated and rate controlled  Status post pacemaker:  Hypothyroidism: Continue home medications.  Obstructive sleep apnea on BiPAP: He said he has been compliant with his BiPAP.  Seizure disorder: Continue current medications  Morbid obesity: Complicates all forms of care.      PLAN:  Renal function is significantly better with hydration.  Blood pressures improved significantly, gradually restart home medications.  He has been eating and drinking fairly well and stop the IV fluids.  Patient is getting a PICC line for 2 weeks of IV antibiotics.  Details were discussed with the patient as well as family in the room.  Follow-up with me in 6 to 8 weeks.  Details were also discussed with the hospitalist service and or other providers as needed.   Continue with rest of the current treatment plan, and monitor with surveillance labs.  Further recommendations will depend on clinical course of the patient during the current hospitalization.   I have reviewed the copied text to this note, it was edited and the changes made as needed.  It is accurate to the point, when the note was signed today.     Thank you for involving us in the care of Andre Agosto.  Please feel free to call with any questions.    Noel Sierra MD, FASN  01/30/24  07:32 [facility]    [facility]  [phone]      Part of this note may be an electronic transcription/translation of spoken language to printed text using the Dragon Dictation System.

## 2024-01-30 NOTE — DISCHARGE PLACEMENT REQUEST
"SurendraLeopoldo Regis \"Bernardo\" (69 y.o. Male)       Date of Birth   1955    Social Security Number       Address   [address] [facility] [address]    Home Phone   [phone]    MRN   [id number]       Amish   Christian    Marital Status                               Admission Date   1/26/24    Admission Type   Emergency    Admitting Provider   Evelyn Cohen DO    Attending Provider   Evelyn Cohen DO    Department, Room/Bed   [facility] TELEMETRY 4, 426/1       Discharge Date       Discharge Disposition       Discharge Destination                                 Attending Provider: Evelyn Cohen DO    Allergies: Statins    Isolation: Contact   Infection: ESBL (01/27/24), ESBL E coli (01/28/24)   Code Status: CPR    Ht: 188 cm (74\")   Wt: 150 kg (330 lb 4 oz)    Admission Cmt: None   Principal Problem: Sepsis [A41.9]                   Active Insurance as of 1/26/2024       Primary Coverage       Payor Plan Insurance Group Employer/Plan Group    [employer] MEDICARE REPLACEMENT [employer] MED ADV GROUP [id number]       Payor Plan Address Payor Plan Phone Number Payor Plan Fax Number Effective Dates    [address] [address]   1/1/2023 - None Entered    [facility] [id number]         Subscriber Name Subscriber Birth Date Member ID       LEOPOLDO HALL 1955 [id number]                     Emergency Contacts        (Rel.) Home Phone Work Phone Mobile Phone    Paula Hall (Spouse) [phone] -- [phone]    kylee hall (Daughter) [phone] -- [phone]              [place]: NPI [id number] Tax ID [id number]  Insurance Information                  [employer] MEDICARE REPLACEMENT/[employer] MED ADV GROUP Phone: --    Subscriber: Leopoldo Hall Regis Subscriber#: [id number]    Group#: [id number] Precert#: --            Lab Results (last 48 hours)       Procedure Component Value Units Date/Time    Magnesium [[id number]]  " "(Normal) Collected: 01/30/24 0543    Specimen: Blood Updated: 01/30/24 0651     Magnesium 2.0 mg/dL     Comprehensive Metabolic Panel [[id number]]  (Abnormal) Collected: 01/30/24 0543    Specimen: Blood Updated: 01/30/24 0651     Glucose 97 mg/dL      BUN 24 mg/dL      Creatinine 1.20 mg/dL      Sodium 134 mmol/L      Potassium 3.8 mmol/L      Comment: Slight hemolysis detected by analyzer. Result may be falsely elevated.        Chloride 107 mmol/L      CO2 16.9 mmol/L      Calcium 8.1 mg/dL      Total Protein 6.0 g/dL      Albumin 2.7 g/dL      ALT (SGPT) 14 U/L      AST (SGOT) 30 U/L      Comment: Slight hemolysis detected by analyzer. Result may be falsely elevated.        Alkaline Phosphatase 60 U/L      Total Bilirubin 0.6 mg/dL      Globulin 3.3 gm/dL      A/G Ratio 0.8 g/dL      BUN/Creatinine Ratio 20.0     Anion Gap 10.1 mmol/L      eGFR 65.9 mL/min/1.73     Narrative:      GFR Normal >60  Chronic Kidney Disease <60  Kidney Failure <15      Procalcitonin [[id number]]  (Abnormal) Collected: 01/30/24 0543    Specimen: Blood Updated: 01/30/24 0625     Procalcitonin 13.60 ng/mL     Narrative:      As a Marker for Sepsis (Non-Neonates):    1. <0.5 ng/mL represents a low risk of severe sepsis and/or septic shock.  2. >2 ng/mL represents a high risk of severe sepsis and/or septic shock.    As a Marker for Lower Respiratory Tract Infections that require antibiotic therapy:    PCT on Admission    Antibiotic Therapy       6-12 Hrs later    >0.5                Strongly Recommended  >0.25 - <0.5        Recommended   0.1 - 0.25          Discouraged              Remeasure/reassess PCT  <0.1                Strongly Discouraged     Remeasure/reassess PCT    As 28 day mortality risk marker: \"Change in Procalcitonin Result\" (>80% or <=80%) if Day 0 (or Day 1) and Day 4 values are available. Refer to http://www.[employer]-pct-calculator.com    Change in PCT <=80%  A decrease of PCT levels below or equal to 80% defines a positive " change in PCT test result representing a higher risk for 28-day all-cause mortality of patients diagnosed with severe sepsis for septic shock.    Change in PCT >80%  A decrease of PCT levels of more than 80% defines a negative change in PCT result representing a lower risk for 28-day all-cause mortality of patients diagnosed with severe sepsis or septic shock.       CBC & Differential [[id number]]  (Abnormal) Collected: 01/30/24 0543    Specimen: Blood Updated: 01/30/24 0603    Narrative:      The following orders were created for panel order CBC & Differential.  Procedure                               Abnormality         Status                     ---------                               -----------         ------                     CBC Auto Differential[[id number]]        Abnormal            Final result               Scan Slide[[id number]]                                                                    Please view results for these tests on the individual orders.    CBC Auto Differential [[id number]]  (Abnormal) Collected: 01/30/24 0543    Specimen: Blood Updated: 01/30/24 0603     WBC 8.23 10*3/mm3      RBC 4.92 10*6/mm3      Hemoglobin 15.0 g/dL      Hematocrit 45.1 %      MCV 91.7 fL      MCH 30.5 pg      MCHC 33.3 g/dL      RDW 15.2 %      RDW-SD 51.3 fl      MPV 11.5 fL      Platelets 115 10*3/mm3      Neutrophil % 70.3 %      Lymphocyte % 17.3 %      Monocyte % 8.6 %      Eosinophil % 1.6 %      Basophil % 0.9 %      Immature Grans % 1.3 %      Neutrophils, Absolute 5.79 10*3/mm3      Lymphocytes, Absolute 1.42 10*3/mm3      Monocytes, Absolute 0.71 10*3/mm3      Eosinophils, Absolute 0.13 10*3/mm3      Basophils, Absolute 0.07 10*3/mm3      Immature Grans, Absolute 0.11 10*3/mm3      nRBC 0.0 /100 WBC     Blood Culture With ECHO - Blood, Hand, Right [[id number]]  (Normal) Collected: 01/28/24 0540    Specimen: Blood from Hand, Right Updated: 01/30/24 0600     Blood Culture No growth at 2 days    Blood Culture  With ECHO - Blood, Hand, Right [[id number]]  (Normal) Collected: 01/28/24 0531    Specimen: Blood from Hand, Right Updated: 01/30/24 0600     Blood Culture No growth at 2 days    Blood Culture With ECHO - Blood, Arm, Right [[id number]]  (Abnormal)  (Susceptibility) Collected: 01/26/24 2039    Specimen: Blood from Arm, Right Updated: 01/29/24 0731     Blood Culture Escherichia coli ESBL     Comment:   Consider infectious disease consult.  Susceptibility results may not correlate to clinical outcomes.  For ESBL-producing infections in the blood, a carbapenem is recommended as first-line therapy for optimal clinical outcomes.        Isolated from Aerobic and Anaerobic Bottles     Gram Stain Anaerobic Bottle Gram negative bacilli      Aerobic Bottle Gram negative bacilli    Susceptibility        Escherichia coli ESBL      KIMBERLY      Ertapenem Susceptible      Levofloxacin Resistant      Meropenem Susceptible      Trimethoprim + Sulfamethoxazole Resistant                       Susceptibility Comments       Escherichia coli ESBL    Cefazolin sensitivity will not be reported for Enterobacteriaceae in non-urine isolates. If cefazolin is preferred, please call the microbiology lab to request an E-test.  With the exception of urinary-sourced infections, aminoglycosides should not be used as monotherapy.               Blood Culture With ECHO - Blood, Arm, Left [[id number]]  (Abnormal) Collected: 01/26/24 2039    Specimen: Blood from Arm, Left Updated: 01/29/24 0731     Blood Culture Escherichia coli ESBL     Comment:   Consider infectious disease consult.  Susceptibility results may not correlate to clinical outcomes.  For ESBL-producing infections in the blood, a carbapenem is recommended as first-line therapy for optimal clinical outcomes.        Isolated from --     Gram Stain Pediatric Bottle Gram negative bacilli    Narrative:      Refer to previous blood culture collected on 01/26/2024 2039 for MICs      Basic Metabolic Panel  [[id number]]  (Abnormal) Collected: 01/29/24 0508    Specimen: Blood Updated: 01/29/24 0709     Glucose 96 mg/dL      BUN 38 mg/dL      Creatinine 1.65 mg/dL      Sodium 134 mmol/L      Potassium 3.5 mmol/L      Chloride 100 mmol/L      CO2 20.8 mmol/L      Calcium 7.9 mg/dL      BUN/Creatinine Ratio 23.0     Anion Gap 13.2 mmol/L      eGFR 45.0 mL/min/1.73     Narrative:      GFR Normal >60  Chronic Kidney Disease <60  Kidney Failure <15      Valproic Acid Level, Total [[id number]]  (Abnormal) Collected: 01/29/24 0508    Specimen: Blood Updated: 01/29/24 0709     Valproic Acid 13.1 mcg/mL     Narrative:      Therapeutic Ranges for Valproic Acid    Epilepsy:       mcg/ml  Bipolar/Joan  up to 125 mcg/ml      CBC & Differential [[id number]]  (Abnormal) Collected: 01/29/24 0508    Specimen: Blood Updated: 01/29/24 0630    Narrative:      The following orders were created for panel order CBC & Differential.  Procedure                               Abnormality         Status                     ---------                               -----------         ------                     CBC Auto Differential[[id number]]        Abnormal            Final result                 Please view results for these tests on the individual orders.    CBC Auto Differential [[id number]]  (Abnormal) Collected: 01/29/24 0508    Specimen: Blood Updated: 01/29/24 0630     WBC 12.99 10*3/mm3      RBC 4.81 10*6/mm3      Hemoglobin 14.6 g/dL      Hematocrit 43.8 %      MCV 91.1 fL      MCH 30.4 pg      MCHC 33.3 g/dL      RDW 15.1 %      RDW-SD 50.9 fl      MPV 12.3 fL      Platelets 125 10*3/mm3      Neutrophil % 78.4 %      Lymphocyte % 11.0 %      Monocyte % 7.6 %      Eosinophil % 1.7 %      Basophil % 0.5 %      Immature Grans % 0.8 %      Neutrophils, Absolute 10.18 10*3/mm3      Lymphocytes, Absolute 1.43 10*3/mm3      Monocytes, Absolute 0.99 10*3/mm3      Eosinophils, Absolute 0.22 10*3/mm3      Basophils, Absolute 0.06 10*3/mm3       Immature Grans, Absolute 0.11 10*3/mm3      nRBC 0.0 /100 WBC              Physician Progress Notes (last 48 hours)        Ela Serrato APRN at 24 1131              [facility]    [facility]ESS [facility]    Name:  Andre Agosto   Age:  69 y.o.  Sex:  male  :  1955  MRN:  [id number]   Visit Number:  [id number]  Admission Date:  2024  Date Of Service:  24  Primary Care Physician:  Keven Bear MD     LOS: 4 days :    Chief Complaint:      UTI    Subjective:    Patient seen and examined with wife at bedside. Updated PICC to be inserted. He requests home health upon discharge.  Wife concerned as he has not started back on multiple medications.  Updated nephrology would be seeing as well.  He states overall feeling improved.  Updated procalcitonin levels were elevated compared to admission.  Otherwise reassuring labs and vitals noted.  Voiding per usual.  Has not noted any further hematuria.    Hospital Course:    Patient is a 69-year-old with multiple medical comorbidities including CAD, prior stroke, obstructive sleep apnea, hypothyroidism, diabetes, nephrolithiasis, seizure disorder, atrial fibrillation on Xarelto, who presented from home via EMS with multiple complaints.  Patient apparently had just underwent a prostate biopsy with Dr. De La Cruz about 24 hours prior to admission.  He had taken Xarelto the night after his procedure.  He noted developing a fever earlier in the day on the , become increasingly confused, felt dehydrated.  Was having pain with urination and fairly bright blood noted. He was noted to have fever up to 104 upon arrival.       In the ER, he was febrile.  He had blood pressure 140/80 range he was nonhypoxic on 2 L of oxygen and had a heart rate in the 80s.  Creatinine 1.7 down to lactic acid 3.8 white count 3500 hemoglobin 18 and platelets 127.  Procalcitonin 4.22.  Urinalysis positive nitrites, leukocyte Estrace, large blood.  He was given IV  fluid resuscitation with 1.5 L of saline, pain control, and Invanz.  He had a CT scan abdomen pelvis without contrast which was demonstrating nonobstructing kidney stones bilaterally, Crespo catheter with balloon inflated in the prostate gland, otherwise no other findings.  ER did talk with Dr. De La Cruz who recommended admission and placement of catheter and antibiotics.  Unfortunately blood cultures noted ESBL E. coli/also noted on urine culture.  Patient initiated on ertapenem with PICC line placed.  Due to underlying CKD nephrology consulted.  Xarelto continued to be on hold.  Depakote increased due to interaction noted with decreased absorption with ertapenem.  Otherwise reassuring labs and vitals noted.  Crespo catheter pulled per Dr. De La Cruz.    Review of Systems:     All systems were reviewed and negative except as mentioned in subjective, assessment and plan.    Vital Signs:    Temp:  [96.9 °F (36.1 °C)-99.7 °F (37.6 °C)] 98.2 °F (36.8 °C)  Heart Rate:  [65-69] 68  Resp:  [18-20] 20  BP: (134-162)/(76-97) 151/92    Intake and output:    I/O last 3 completed shifts:  In: 2270 [P.O.:1020; I.V.:1250]  Out: 2250 [Urine:2250]  I/O this shift:  In: -   Out: 125 [Urine:125]    Physical Examination:    General Appearance:  Alert and cooperative.  Middle-age male.  Pleasant.  No acute distress.   Head:  Atraumatic and normocephalic.   Eyes: Conjunctivae and sclerae normal, no icterus. No pallor.   Throat: No oral lesions, no thrush, oral mucosa moist.   Neck: Supple, trachea midline, no thyromegaly.   Lungs:   Breath sounds heard bilaterally equally.  No wheezing or crackles. No Pleural rub or bronchial breathing.  On room air unlabored.  Pacemaker noted to left chest wall.   Heart:  Normal S1 and S2, no murmur, no gallop, no rub. No JVD.   Abdomen:   Normal bowel sounds, no masses, no organomegaly. Soft, nontender, nondistended, no rebound tenderness.   Extremities: Supple, trace bilateral lower extremity edema, no cyanosis,  "no clubbing.   Skin: No bleeding or rash.   Neurologic: Alert and oriented x 3. No facial asymmetry. Moves all four limbs. No tremors.  Generalized weakness.     Laboratory results:    Results from last 7 days   Lab Units 01/30/24  0543 01/29/24  0508 01/28/24  0541 01/27/24  0532 01/26/24  1921   SODIUM mmol/L 134* 134* 136   < > 141   POTASSIUM mmol/L 3.8 3.5 3.9   < > 3.6   CHLORIDE mmol/L 107 100 100   < > 100   CO2 mmol/L 16.9* 20.8* 23.0   < > 26.1   BUN mg/dL 24* 38* 35*   < > 16   CREATININE mg/dL 1.20 1.65* 2.10*   < > 1.70*   CALCIUM mg/dL 8.1* 7.9* 8.0*   < > 9.2   BILIRUBIN mg/dL 0.6  --  0.7  --  1.2   ALK PHOS U/L 60  --  55  --  97   ALT (SGPT) U/L 14  --  19  --  16   AST (SGOT) U/L 30  --  71*  --  18   GLUCOSE mg/dL 97 96 123*   < > 107*    < > = values in this interval not displayed.     Results from last 7 days   Lab Units 01/30/24  0543 01/29/24  0508 01/28/24  0541   WBC 10*3/mm3 8.23 12.99* 18.28*   HEMOGLOBIN g/dL 15.0 14.6 15.5   HEMATOCRIT % 45.1 43.8 45.9   PLATELETS 10*3/mm3 115* 125* 90*     Results from last 7 days   Lab Units 01/24/24  1116   INR  1.20*         Results from last 7 days   Lab Units 01/28/24  0540 01/28/24  0531 01/26/24  2103 01/26/24 2039   BLOODCX  No growth at 2 days No growth at 2 days  --  Escherichia coli ESBL*  Escherichia coli ESBL*   URINECX   --   --  50,000 CFU/mL Escherichia coli ESBL*  --      No results for input(s): \"PHART\", \"RQR1KVL\", \"PO2ART\", \"ZIP4ABJ\", \"BASEEXCESS\" in the last 8760 hours.   I have reviewed the patient's laboratory results.    Radiology results:    No radiology results from the last 24 hrs  I have reviewed the patient's radiology reports.    Medication Review:     I have reviewed the patient's active and prn medications.     Problem List:      Sepsis      Assessment:    Sepsis with ESBL E. coli bacteremia, POA  Acute UTI ESBL E. coli/ Hematuria, POA  CKD stage 3  Atrial Fibrillation on Xarelto/pacemaker  ALEX  Seizure " "disorder    Plan:    Sepsis/ Acute UTI/ Hematuria/bacteremia  - On Cipro prior to arrival to hospital  - Continue antibiotic treatment with Invanz-appropriate based on urine culture sensitivities  -PICC line placed.  Patient will need total 14 days ertapenem per home health.  -No current urinary retention noted.  -Trend procalcitonin.  Otherwise reassuring labs and vitals noted.    CKD 3:  - Baseline Creatinine- 1.4--trending down   - Nephrology consulted for recommendations with continued IV fluids     Atrial fibrillation:  - Pacemaker, amiodarone will be continued.    - Xarelto on hold with hematuria     ALEX:  - Continue home BiPAP     Seizure disorder:  - Continue Depakote  - Invantz decreasing absorption of Depakote.  Depakote level low with labs. Will likely need to increase dose while on Invantz with PCP following Depakote levels.    - Currently taking 750mg in am and 500mg at HS.  Discussed with pharmacy who recommended increasing dose to 750mg BID during administration of Invantz then return to normal dose after completing antibiotics.  Depakote levels should rebound to baseline after completing antibiotics.    I have reviewed the copied text and it is accurate as of 1/30/2024      DVT Prophylaxis: SCDs  Code Status: Full Code  Diet: Cardiac  Discharge Plan: Likely home tomorrow with home health.    YESY Vincent  01/30/24  11:31 EST    Dictated utilizing Dragon dictation.      Electronically signed by Ela Serrato APRN at 01/30/24 1151       Tonio De La Cruz MD at 01/29/24 1252          Urology Inpatient Progress Note    Subjective  Doing well.  No fevers overnight.  Labs improved.    Physical Exam  Visit Vitals  /86 (BP Location: Right arm, Patient Position: Lying)   Pulse 69   Temp 97.9 °F (36.6 °C) (Oral)   Resp 18   Ht 188 cm (74\")   Wt (!) 150 kg (331 lb 9.2 oz)   SpO2 96%   BMI 42.57 kg/m²     Constitutional: NAD, WDWN.  Cardiovascular: Regular rate.  Pulmonary/Chest: " Respirations are even and non-labored bilaterally.  Abdominal: Soft. No distension, tenderness, masses or guarding. No CVA tenderness.  Extremities: KRUNAL x 4, Warm. No clubbing.  No cyanosis.    Skin: Pink, warm and dry.  No rashes noted.    Genitourinary  Urine clear yellow    I/O last 3 completed shifts:  In: 4753 [P.O.:1080; I.V.:3673]  Out: 1975 [Urine:1975]      Current Facility-Administered Medications:     acetaminophen (TYLENOL) tablet 650 mg, 650 mg, Oral, Q4H PRN, 650 mg at 01/27/24 0914 **OR** acetaminophen (TYLENOL) 160 MG/5ML oral solution 650 mg, 650 mg, Oral, Q4H PRN **OR** acetaminophen (TYLENOL) suppository 650 mg, 650 mg, Rectal, Q4H PRN, Evelyn Cohen DO    acetaminophen (TYLENOL) tablet 1,000 mg, 1,000 mg, Oral, Once, Evelyn Cohen DO    aluminum-magnesium hydroxide-simethicone (MAALOX MAX) 400-400-40 MG/5ML suspension 15 mL, 15 mL, Oral, Q6H PRN, Evelyn Cohen DO    amiodarone (PACERONE) tablet 100 mg, 100 mg, Oral, Daily, Evelyn Cohen DO, 100 mg at 01/29/24 0827    [Held by provider] amLODIPine (NORVASC) tablet 10 mg, 10 mg, Oral, Nightly, Evelyn Cohen DO    sennosides-docusate (PERICOLACE) 8.6-50 MG per tablet 2 tablet, 2 tablet, Oral, BID, 2 tablet at 01/29/24 0830 **AND** polyethylene glycol (MIRALAX) packet 17 g, 17 g, Oral, Daily PRN **AND** bisacodyl (DULCOLAX) EC tablet 5 mg, 5 mg, Oral, Daily PRN **AND** bisacodyl (DULCOLAX) suppository 10 mg, 10 mg, Rectal, Daily PRN, Evelyn Cohen DO    carvedilol (COREG) tablet 12.5 mg, 12.5 mg, Oral, BID With Meals, Evelyn Cohen DO, 12.5 mg at 01/29/24 0828    divalproex (DEPAKOTE)  tablet 750 mg, 750 mg, Oral, Daily, Evelyn Cohen DO, 750 mg at 01/29/24 0828    divalproex (DEPAKOTE)  tablet 750 mg, 750 mg, Oral, Nightly, Lulu Carson APRN    ertapenem (INVanz) 1000 mg/100 mL 0.9% NS VTB (mbp), 1,000 mg, Intravenous, Q24H, Evelyn Cohen DO, 1,000 mg  at 01/28/24 2050    levothyroxine (SYNTHROID, LEVOTHROID) tablet 88 mcg, 88 mcg, Oral, Daily, Evelyn Cohenus, DO, 88 mcg at 01/29/24 0827    melatonin tablet 5 mg, 5 mg, Oral, Nightly PRN, Noel, Evelyn Pee, DO, 5 mg at 01/28/24 2308    morphine injection 3 mg, 3 mg, Intravenous, Q4H PRN, Evelyn Cohenus, DO    nitroglycerin (NITROSTAT) SL tablet 0.4 mg, 0.4 mg, Sublingual, Q5 Min PRN, Evelyn Cohenus, DO    ondansetron ODT (ZOFRAN-ODT) disintegrating tablet 4 mg, 4 mg, Oral, Q6H PRN **OR** ondansetron (ZOFRAN) injection 4 mg, 4 mg, Intravenous, Q6H PRN, Evelyn Cohenus, DO    oxyCODONE-acetaminophen (PERCOCET) 7.5-325 MG per tablet 1 tablet, 1 tablet, Oral, Q4H PRN, Evelyn Cohenus, DO, 1 tablet at 01/28/24 2049    sodium chloride 0.9 % flush 10 mL, 10 mL, Intravenous, PRN, Evelyn Cohen Pee, DO    sodium chloride 0.9 % flush 10 mL, 10 mL, Intravenous, Q12H, Noel, Evelyn Pee, DO, 10 mL at 01/28/24 0850    sodium chloride 0.9 % flush 10 mL, 10 mL, Intravenous, PRN, Evelyn Cohen Pee, DO    sodium chloride 0.9 % infusion 40 mL, 40 mL, Intravenous, PRN, Evelyn Cohen Pee, DO    sodium chloride 0.9 % infusion, 125 mL/hr, Intravenous, Continuous, Evelyn Cohen Pee, DO, Last Rate: 125 mL/hr at 01/29/24 1135, 125 mL/hr at 01/29/24 1135    tamsulosin (FLOMAX) 24 hr capsule 0.4 mg, 0.4 mg, Oral, Daily, Evelyn Cohen Pee, DO, 0.4 mg at 01/29/24 0828    Labs  Lab Results   Component Value Date    GLUCOSE 96 01/29/2024    CALCIUM 7.9 (L) 01/29/2024     (L) 01/29/2024    K 3.5 01/29/2024    CO2 20.8 (L) 01/29/2024     01/29/2024    BUN 38 (H) 01/29/2024    CREATININE 1.65 (H) 01/29/2024    EGFRIFAFRI 57 (L) 12/05/2018    EGFRIFNONA 55 (L) 01/10/2022    BCR 23.0 01/29/2024    ANIONGAP 13.2 01/29/2024       Lab Results   Component Value Date    WBC 12.99 (H) 01/29/2024    HGB 14.6 01/29/2024    HCT 43.8 01/29/2024    MCV 91.1 01/29/2024      (L) 01/29/2024       Urine Culture   Date Value Ref Range Status   01/26/2024 50,000 CFU/mL Escherichia coli ESBL (A)  Final     Comment:       Consider infectious disease consult.  Susceptibility results may not correlate to clinical outcomes.       Brief Urine Lab Results  (Last result in the past 365 days)        Color   Clarity   Blood   Leuk Est   Nitrite   Protein   CREAT   Urine HCG        01/26/24 2103 Red   Turbid   Large (3+)   Large (3+)   Positive   100 mg/dL (2+)                     Radiographic Studies  CT Abdomen Pelvis Without Contrast    Result Date: 1/28/2024   1. Bilateral nonobstructing renal calculi. No hydronephrosis.  2. Severe urinary bladder thickening with perivesical inflammatory changes suggestive of underlying cystitis. Malpositioned Crespo catheter with bulb in the prostatic urethra.   CTDI: 20.02 mGy DLP:1116.91 mGy.cm  This report was signed and finalized on 1/28/2024 11:00 AM by Torey Allen MD.      XR Chest 1 View    Result Date: 1/27/2024  No acute cardiopulmonary process.        This report was signed and finalized on 1/27/2024 7:47 AM by Torey Allen MD.      CT Abdomen Pelvis Without Contrast    Result Date: 1/26/2024  1.  Malpositioned Crespo catheter with balloon inflated in the prostate gland. Authenticated and Electronically Signed by Jose J Dupree MD on 01/26/2024 11:38:21 PM       Assessment  69 y.o. male  has a past medical history of 6th nerve palsy, right, Anxiety and depression, Atrial fibrillation, persistent (12/02/2020), Coronary artery disease involving native coronary artery of native heart without angina pectoris (09/12/2018), CRI (chronic renal insufficiency), stage 2 (mild), CVA (cerebral vascular accident), Diabetes mellitus, Disease of thyroid gland, Double vision, Elevated cholesterol, Elevated prostate specific antigen (PSA) (11/08/2023), Focal seizure, Heart attack, History of Helicobacter pylori infection, HL (hearing loss), Hyperlipidemia,  Hypertension, Kidney stone, Memory loss (), Meningitis (), Obesity, Sleep apnea treated with nocturnal BiPAP, Stroke, Ventricular tachycardia, and Wears glasses., who presents with sepsis/bacteremia after prostate biopsy.     Plan  1.  Urine has been completely clear and patient did not have difficulty voiding prior to biopsy, therefore a fill and void was performed with postvoid residual of 0 mL after voiding 150 mL.  He can keep catheter out until he follows up with me, which is already scheduled.    2.  Agree with PICC line in 2 weeks total of antibiotics, preferably ertapenem.  Cultures pending.    3.  Please call with any further urologic concerns.      Electronically signed by Tonio De La Cruz MD at 24 1255       Lulu Carson APRN at 24 1147              [facility]    PROGRESS NOTE    Name:  Andre Agosto   Age:  69 y.o.  Sex:  male  :  1955  MRN:  [id number]   Visit Number:  [id number]  Admission Date:  2024  Date Of Service:  24  Primary Care Physician:  Keven Bear MD     LOS: 3 days :    Chief Complaint:      UTI    Subjective:    Patient seen and examined at bedside this morning.  His family is present at time of exam.  He is up in the recliner and states he is feeling much better today.  Vitals have been stable.  Denies any chest pain or shortness of breath.  Stable on room air.  Discussed PICC and possible d/c home in the next day or 2 as well as seizure medication adjustment.    Hospital Course:    Patient is a 69-year-old with multiple medical comorbidities including CAD, prior stroke, obstructive sleep apnea, hypothyroidism, diabetes, nephrolithiasis, seizure disorder, atrial fibrillation, who presented from home via EMS with multiple complaints.  Patient apparently had just underwent a prostate biopsy with Dr. De La Cruz about 24 hours prior to admission.  He had taken Xarelto the night after his procedure.  He noted  developing a fever earlier in the day on the 26th, become increasingly confused, felt dehydrated.  Was having pain with urination and fairly bright blood noted. He was noted to have fever up to 104 upon arrival.  History obtained from patient and daughter at bedside.     In the ER, he was febrile.  He had blood pressure 140/80 range he was nonhypoxic on 2 L of oxygen and had a heart rate in the 80s.  Creatinine 1.7 down to lactic acid 3.8 white count 3500 hemoglobin 18 and platelets 127.  Procalcitonin 4.22.  Urinalysis positive nitrites, leukocyte Estrace, large blood.  He was given IV fluid resuscitation with 1.5 L of saline, pain control, and Invanz.  He had a CT scan abdomen pelvis without contrast which was demonstrating nonobstructing kidney stones bilaterally, Crespo catheter with balloon inflated in the prostate gland, otherwise no other findings.  ER did talk with Dr. De La Cruz who recommended admission and placement of catheter and antibiotics such that he could monitor over the weekend.    Review of Systems:     All systems were reviewed and negative except as mentioned in subjective, assessment and plan.    Vital Signs:    Temp:  [97.7 °F (36.5 °C)-98 °F (36.7 °C)] 97.9 °F (36.6 °C)  Heart Rate:  [68] 68  Resp:  [18-20] 18  BP: (123-139)/(77-79) 139/79    Intake and output:    I/O last 3 completed shifts:  In: 4753 [P.O.:1080; I.V.:3673]  Out: 1975 [Urine:1975]  I/O this shift:  In: -   Out: 150 [Urine:150]    Physical Examination:  Examined again 1/29/2024    General Appearance:  Alert and cooperative, pleasant middle aged male, no acute distress on exam, obese with BMI-40   Head:  Atraumatic and normocephalic.   Eyes: Conjunctivae and sclerae normal, no icterus. No pallor.   Throat: No oral lesions, no thrush, oral mucosa moist.   Neck: Supple, trachea midline   Lungs:   Breath sounds heard bilaterally equally.  No wheezing or crackles. Unlabored on room air.   Heart:  Normal S1 and S2, no murmur, no gallop,  "no rub. No JVD.   Abdomen:   Normal bowel sounds, no masses, no organomegaly. Soft, nontender, nondistended   Extremities: Supple, no edema, no cyanosis, no clubbing.   Skin: No bleeding or rash.   Neurologic: Alert and oriented x 3. No facial asymmetry. Moves all four limbs. No tremors.      Laboratory results:    Results from last 7 days   Lab Units 01/29/24  0508 01/28/24  0541 01/27/24  0532 01/26/24  1921 01/24/24  1316   SODIUM mmol/L 134* 136 138 141 141   POTASSIUM mmol/L 3.5 3.9 3.6 3.6 3.4*   CHLORIDE mmol/L 100 100 102 100 99   CO2 mmol/L 20.8* 23.0 21.2* 26.1 27.0   BUN mg/dL 38* 35* 18 16 12   CREATININE mg/dL 1.65* 2.10* 1.81* 1.70* 1.50*   CALCIUM mg/dL 7.9* 8.0* 8.1* 9.2 9.6   BILIRUBIN mg/dL  --  0.7  --  1.2 0.9   ALK PHOS U/L  --  55  --  97 81   ALT (SGPT) U/L  --  19  --  16 17   AST (SGOT) U/L  --  71*  --  18 16   GLUCOSE mg/dL 96 123* 139* 107* 109*     Results from last 7 days   Lab Units 01/29/24  0508 01/28/24  0541 01/27/24  0532   WBC 10*3/mm3 12.99* 18.28* 18.21*   HEMOGLOBIN g/dL 14.6 15.5 15.5   HEMATOCRIT % 43.8 45.9 45.2   PLATELETS 10*3/mm3 125* 90* 109*     Results from last 7 days   Lab Units 01/24/24  1116   INR  1.20*         Results from last 7 days   Lab Units 01/28/24  0540 01/28/24  0531 01/26/24  2103 01/26/24  2039   BLOODCX  No growth at 24 hours No growth at 24 hours  --  Escherichia coli ESBL*  Escherichia coli ESBL*   URINECX   --   --  50,000 CFU/mL Escherichia coli ESBL*  --      No results for input(s): \"PHART\", \"OWF1WPD\", \"PO2ART\", \"DDN9KDV\", \"BASEEXCESS\" in the last 8760 hours.   I have reviewed the patient's laboratory results.    Radiology results:    CT Abdomen Pelvis Without Contrast    Result Date: 1/28/2024  CT of the abdomen and pelvis without contrast  INDICATION: Renal failure  COMPARISON: January 26, 2024  TECHNIQUE: Helically acquired axial multidetector CT images were obtained from the lung bases through the pelvis without IV contrast. Dose reduction " techniques to achieve ALARA were employed.  Findings:  Lung bases: Small bilateral pleural effusions with bibasilar atelectasis.  Cardiomegaly with trace pericardial fluid. Extensive multivessel coronary artery calcifications. ICD leads are noted.  Liver: [Grossly unremarkable]  Spleen: [Unremarkable]  Gallbladder/biliary tree: [ No biliary ductal dilatation]. Cholelithiasis with multiple calcified gallstones, largest measures 1.6 cm.  Pancreas: [Grossly unremarkable].  Adrenals: [Unremarkable]  Kidneys: Bilateral nonobstructing renal calculi, largest right measures approximately 1.1 cm and largest left measures approximately 0.8 cm. No hydronephrosis bilaterally. Bilateral nonspecific perinephric edema..  GI: Gastric lap band is noted. [No bowel obstruction]. Moderate colonic gas and scattered small bowel gas may reflect underlying mild ileus. No definite obstruction.  Bladder: Redemonstration of severe circumferential bladder wall thickening with perivesical stranding, suggestive of underlying cystitis. Mild prostatomegaly. Crespo catheter about appears positioned in the prostatic urethra. Consider readjustment.  Bones: [No significant osseous abnormalities are identified].  Soft Tissues: Prior ventral wall hernia repair with mesh.      Impression:  1. Bilateral nonobstructing renal calculi. No hydronephrosis.  2. Severe urinary bladder thickening with perivesical inflammatory changes suggestive of underlying cystitis. Malpositioned Crespo catheter with bulb in the prostatic urethra.   CTDI: 20.02 mGy DLP:1116.91 mGy.cm  This report was signed and finalized on 1/28/2024 11:00 AM by Torey Allen MD.     I have reviewed the patient's radiology reports.    Medication Review:     I have reviewed the patient's active and prn medications.     Problem List:      Sepsis      Assessment:    Sepsis  Acute UTI/ Hematuria  ESBL Bacteremia  CKD stage 3  Atrial Fibrillation  ALEX  Seizure disorder    Plan:    Sepsis/ Acute  UTI/ Hematuria:  - On Cipro prior to arrival to hospital  - Continue antibiotic treatment with Invanz appropriate based on urine culture sensitivities--Dr. De La Cruz recommends PICC placement and total 2 weeks IV abx    - Continue IV fluids for now  - Leukocytosis noted yesterday which improved today  - With Leukocytosis and documented UOP since admission around 600ml despite continued IVF (total around 3.5L) will get CTAP to rule out obstruction and ensure kimball catheter is placed correctly as this was a problem with 1st catheter attempt.  - CT scan 1/28/2024 noted malpositioned kimball catheter.  Discussed with Dr. De La Cruz who advised since patient does not have any abdominal pain and vitals are reassuring, keep current catheter in place for now and he will try to reposition tomorrow.  Bladder scan noted 0ml.  With underlying CKD will consult nephrology for recommendations as well.  - Kimball catheter removed for fill and void trial today per Dr. De La Cruz  - Consult placed for PICC line placement in am with repeat blood cultures no growth      ESBL Bacteremia:  - Blood cultures noted ESBL Ecoli.    - Continue Invanz appropriate per sensitivilorena     CKD 3:  - Baseline Creatinine- 1.4--trending down today  - Continuing IVF for now  - Nephrology consulted for recommendations with continued IV fluids     Atrial fibrillation:  - Pacemaker, amiodarone will be continued.    - Xarelto on hold with hematuria     ALEX:  - Continue home BiPAP     Seizure disorder:  - Continue Depakote  - Discussed with pharmacy today who advised that Invantz decreasing absorption of Depakote.  Depakote level low with labs this morning.  Will likely need to increase dose while on Invantz with PCP following Depakote levels.    - Will plan to adjust Depakote dosing and recheck levels in am.  Currently taking 750mg in am and 500mg at HS.  Discussed with pharmacy who recommended increasing dose to 750mg BID during administration of Invantz then return to normal  dose after completing antibiotics.  Depakote levels should rebound to baseline after completing antibiotics.    DVT Prophylaxis: SCDs  Code Status: Full Code  Diet: Cardiac  Discharge Plan: Home 1-2 days with HH for IV abx    YESY Zhang  01/29/24  11:47 EST    Dictated utilizing Dragon dictation.      Electronically signed by Lulu Carson APRN at 01/29/24 1205       Consult Notes (last 24 hours)  Notes from 01/29/24 1301 through 01/30/24 1301   No notes of this type exist for this encounter.            ertapenem (INVanz) 1000 mg/100 mL 0.9% NS VTB (mbp) [[id number]] (Order [id number])  Order  Date: 1/30/2024 Department: [place] [id number] Ordering/Authorizing: Ela Serrato APRN     Medication  ertapenem (INVanz) 1000 mg/100 mL 0.9% NS VTB (mbp) [[id number]]  Order History  Outpatient  Date/Time Action Taken User Additional Information   01/30/24 1157 Sign Ela Serrato APRN Reorder from Order:[id number]   01/30/24 1157 Taking Flag Checked Ela Serrato APRN [id number]     Outpatient Morphine Milligram Equivalents Per Day  Expand All  Collapse All  No orders with morphine equivalence     ertapenem (INVanz) 1000 mg/100 mL 0.9% NS VTB (mbp) [[id number]]    Order Details  Dose: 1,000 mg Route: Intravenous Frequency: Every 24 Hours   Dispense Quantity: -- Refills: --    Indications of Use: Sepsis, Urinary Tract Infection         Sig: Infuse 100 mL into a venous catheter Daily for 10 days. Indications: Infection with Dysregulated Inflammatory Response, Urinary Tract Infection         Start Date: 01/30/24 End Date: 02/09/24 after 10 doses   Written Date: 01/30/24 Expiration Date: 01/29/25   Providers    Ordering Provider and Authorizing Provider:  Ela Serrato APRN  [address] [facility] [address]  Phone: [phone]  NPI: [id number]        Ordering User: Ela Serrato APRN          Orders with any of the following pharmaceutical classes: Misc.  Antiinfectives    Name Dose Frequency Start Date End Date Medication Warnings Interventions? Order Mode    ertapenem (INVanz) 1000 mg/100 mL 0.9% NS VTB (mbp) 1,000 mg Every 24 Hours 01/27/24 2100 02/01/24 2059  Yes Inpatient    ertapenem (INVanz) 1000 mg/100 mL 0.9% NS VTB (mbp) 1,000 mg Once 01/26/24 2026 01/26/24 2114   Inpatient      Warnings Override History    Total number of overridden warnings: 1   Full Warnings History     Pharmacist Clinical Review History    This prescription has not been clinically reviewed.     Order Reconciliation Actions       Order Reconciliation Actions     E-Prescribing Status    Outpatient Medication Detail    ertapenem (INVanz) 1000 mg/100 mL 0.9% NS VTB (mbp)        Sig: Infuse 100 mL into a venous catheter Daily for 10 days. Indications: Infection with Dysregulated Inflammatory Response, Urinary Tract Infection        Class: No Print        Route: Intravenous          Event History       Event History     Tracking Reports    Cosign Tracking Order Transmittal Tracking

## 2024-01-30 NOTE — PLAN OF CARE
Goal Outcome Evaluation:  PICC inserted right upper arm. Adequate urinary output noted throughout shift. Ambulated hallways with PT, tolerated well. Possible DC tomorrow with home health.

## 2024-01-30 NOTE — TELEPHONE ENCOUNTER
Hub staff attempted to follow warm transfer process and was unsuccessful     Caller: JULIANA    Relationship to patient: SELF    Best call back number: [phone]    Patient is needing: PT RETURNED A MISSED CALL. PLEASE CALL HIM AT YOUR EARLIEST CONVENIENCE. THANK YOU

## 2024-01-31 ENCOUNTER — READMISSION MANAGEMENT (OUTPATIENT)
Dept: CALL CENTER | Facility: HOSPITAL | Age: 69
End: 2024-01-31
Payer: MEDICARE

## 2024-01-31 ENCOUNTER — TELEPHONE (OUTPATIENT)
Dept: UROLOGY | Facility: CLINIC | Age: 69
End: 2024-01-31
Payer: MEDICARE

## 2024-01-31 VITALS
BODY MASS INDEX: 40.43 KG/M2 | SYSTOLIC BLOOD PRESSURE: 156 MMHG | DIASTOLIC BLOOD PRESSURE: 89 MMHG | WEIGHT: 315 LBS | TEMPERATURE: 99.8 F | HEART RATE: 80 BPM | RESPIRATION RATE: 20 BRPM | OXYGEN SATURATION: 91 % | HEIGHT: 74 IN

## 2024-01-31 LAB
ALBUMIN SERPL-MCNC: 3.3 G/DL (ref 3.5–5.2)
ANION GAP SERPL CALCULATED.3IONS-SCNC: 11.5 MMOL/L (ref 5–15)
BASOPHILS # BLD AUTO: 0.06 10*3/MM3 (ref 0–0.2)
BASOPHILS NFR BLD AUTO: 0.7 % (ref 0–1.5)
BUN SERPL-MCNC: 16 MG/DL (ref 8–23)
BUN/CREAT SERPL: 13.4 (ref 7–25)
CALCIUM SPEC-SCNC: 8.3 MG/DL (ref 8.6–10.5)
CHLORIDE SERPL-SCNC: 107 MMOL/L (ref 98–107)
CLUMPED PLATELETS: PRESENT
CO2 SERPL-SCNC: 22.5 MMOL/L (ref 22–29)
CREAT SERPL-MCNC: 1.19 MG/DL (ref 0.76–1.27)
DEPRECATED RDW RBC AUTO: 49.5 FL (ref 37–54)
EGFRCR SERPLBLD CKD-EPI 2021: 66.5 ML/MIN/1.73
EOSINOPHIL # BLD AUTO: 0.17 10*3/MM3 (ref 0–0.4)
EOSINOPHIL NFR BLD AUTO: 2.1 % (ref 0.3–6.2)
ERYTHROCYTE [DISTWIDTH] IN BLOOD BY AUTOMATED COUNT: 14.9 % (ref 12.3–15.4)
GLUCOSE SERPL-MCNC: 99 MG/DL (ref 65–99)
HCT VFR BLD AUTO: 43.9 % (ref 37.5–51)
HGB BLD-MCNC: 14.9 G/DL (ref 13–17.7)
IMM GRANULOCYTES # BLD AUTO: 0.21 10*3/MM3 (ref 0–0.05)
IMM GRANULOCYTES NFR BLD AUTO: 2.6 % (ref 0–0.5)
LYMPHOCYTES # BLD AUTO: 1.85 10*3/MM3 (ref 0.7–3.1)
LYMPHOCYTES NFR BLD AUTO: 22.9 % (ref 19.6–45.3)
MCH RBC QN AUTO: 30.7 PG (ref 26.6–33)
MCHC RBC AUTO-ENTMCNC: 33.9 G/DL (ref 31.5–35.7)
MCV RBC AUTO: 90.3 FL (ref 79–97)
MONOCYTES # BLD AUTO: 0.61 10*3/MM3 (ref 0.1–0.9)
MONOCYTES NFR BLD AUTO: 7.5 % (ref 5–12)
NEUTROPHILS NFR BLD AUTO: 5.19 10*3/MM3 (ref 1.7–7)
NEUTROPHILS NFR BLD AUTO: 64.2 % (ref 42.7–76)
NRBC BLD AUTO-RTO: 0 /100 WBC (ref 0–0.2)
PHOSPHATE SERPL-MCNC: 2.5 MG/DL (ref 2.5–4.5)
PLATELET # BLD AUTO: 131 10*3/MM3 (ref 140–450)
PMV BLD AUTO: 11.7 FL (ref 6–12)
POTASSIUM SERPL-SCNC: 3.3 MMOL/L (ref 3.5–5.2)
PROCALCITONIN SERPL-MCNC: 5.67 NG/ML (ref 0–0.25)
RBC # BLD AUTO: 4.86 10*6/MM3 (ref 4.14–5.8)
RBC MORPH BLD: NORMAL
SODIUM SERPL-SCNC: 141 MMOL/L (ref 136–145)
VALPROATE SERPL-MCNC: 17.6 MCG/ML (ref 50–125)
WBC MORPH BLD: NORMAL
WBC NRBC COR # BLD AUTO: 8.09 10*3/MM3 (ref 3.4–10.8)

## 2024-01-31 PROCEDURE — 99239 HOSP IP/OBS DSCHRG MGMT >30: CPT | Performed by: NURSE PRACTITIONER

## 2024-01-31 PROCEDURE — 80164 ASSAY DIPROPYLACETIC ACD TOT: CPT | Performed by: NURSE PRACTITIONER

## 2024-01-31 PROCEDURE — 80069 RENAL FUNCTION PANEL: CPT | Performed by: NURSE PRACTITIONER

## 2024-01-31 PROCEDURE — 85007 BL SMEAR W/DIFF WBC COUNT: CPT | Performed by: NURSE PRACTITIONER

## 2024-01-31 PROCEDURE — 84145 PROCALCITONIN (PCT): CPT | Performed by: NURSE PRACTITIONER

## 2024-01-31 PROCEDURE — 85025 COMPLETE CBC W/AUTO DIFF WBC: CPT | Performed by: NURSE PRACTITIONER

## 2024-01-31 PROCEDURE — 25010000002 ERTAPENEM PER 500 MG: Performed by: NURSE PRACTITIONER

## 2024-01-31 RX ORDER — DIVALPROEX SODIUM 250 MG/1
750 TABLET, DELAYED RELEASE ORAL NIGHTLY
Qty: 30 TABLET | Refills: 0 | Status: SHIPPED | OUTPATIENT
Start: 2024-01-31 | End: 2024-01-31

## 2024-01-31 RX ORDER — POTASSIUM CHLORIDE 750 MG/1
10 CAPSULE, EXTENDED RELEASE ORAL ONCE
Status: COMPLETED | OUTPATIENT
Start: 2024-01-31 | End: 2024-01-31

## 2024-01-31 RX ORDER — SODIUM CHLORIDE 9 MG/ML
20 INJECTION, SOLUTION INTRAVENOUS ONCE
Status: CANCELLED | OUTPATIENT
Start: 2024-02-01

## 2024-01-31 RX ORDER — DIVALPROEX SODIUM 250 MG/1
750 TABLET, DELAYED RELEASE ORAL 2 TIMES DAILY
Qty: 60 TABLET | Refills: 0 | Status: SHIPPED | OUTPATIENT
Start: 2024-01-31

## 2024-01-31 RX ORDER — SACUBITRIL AND VALSARTAN 97; 103 MG/1; MG/1
1 TABLET, FILM COATED ORAL 2 TIMES DAILY
Start: 2024-01-31

## 2024-01-31 RX ADMIN — Medication 10 ML: at 00:58

## 2024-01-31 RX ADMIN — Medication 10 ML: at 09:53

## 2024-01-31 RX ADMIN — DIVALPROEX SODIUM 750 MG: 250 TABLET, DELAYED RELEASE ORAL at 09:50

## 2024-01-31 RX ADMIN — ERTAPENEM 1000 MG: 1 INJECTION INTRAMUSCULAR; INTRAVENOUS at 15:06

## 2024-01-31 RX ADMIN — Medication 10 ML: at 09:52

## 2024-01-31 RX ADMIN — CARVEDILOL 12.5 MG: 12.5 TABLET, FILM COATED ORAL at 09:51

## 2024-01-31 RX ADMIN — AMIODARONE HYDROCHLORIDE 100 MG: 200 TABLET ORAL at 09:52

## 2024-01-31 RX ADMIN — POTASSIUM CHLORIDE 10 MEQ: 10 CAPSULE, COATED, EXTENDED RELEASE ORAL at 10:28

## 2024-01-31 RX ADMIN — TAMSULOSIN HYDROCHLORIDE 0.4 MG: 0.4 CAPSULE ORAL at 09:51

## 2024-01-31 RX ADMIN — LEVOTHYROXINE SODIUM 88 MCG: 88 TABLET ORAL at 09:52

## 2024-01-31 NOTE — PROGRESS NOTES
Enter Query Response Below      Query Response:     Unable to determine           If applicable, please update the problem list.   If you have any questions about this query contact me at: [email or web address]     Dear Ms Serrato,    Patient admitted with sepsis due to acute cystitis was also diagnosed with BURAK. Treatment has included IVF, IV antibiotics, monitoring of serial labs and Neph consult.    Please clarify the following:    BURAK due to sepsis  BURAK due to _______  Other- please specify_______  Unable to determine      By submitting this query, we are merely seeking further clarification of documentation to accurately reflect all conditions that you are monitoring, evaluating, treating or that extend the hospitalization or utilize additional resources of care. Please utilize your independent clinical judgment when addressing the question(s) above.     This query and your response, once completed, will be entered into the legal medical record.    Sincerely,  Kylee Vaughn RN  Clinical Documentation Integrity Program

## 2024-01-31 NOTE — CASE MANAGEMENT/SOCIAL WORK
Case Management Discharge Note                Selected Continued Care - Admitted Since 1/26/2024       Destination    No services have been selected for the patient.                Durable Medical Equipment    No services have been selected for the patient.                Dialysis/Infusion    No services have been selected for the patient.                Home Medical Care    No services have been selected for the patient.                Therapy    No services have been selected for the patient.                Community Resources    No services have been selected for the patient.                [facility]    No services have been selected for the patient.                    Transportation Services  Private: Car    Final Discharge Disposition Code: 01 - home or self-care

## 2024-01-31 NOTE — DISCHARGE SUMMARY
[facility]   DISCHARGE SUMMARY      Name:  Andre Agosto   Age:  69 y.o.  Sex:  male  :  1955  MRN:  [id number]   Visit Number:  [id number]    Admission Date:  2024  Date of Discharge:  2024  Primary Care Physician:  Keven Bear MD    Important issues to note:    -Patient admitted due to sepsis with noted ESBL E. coli bacteremia/UTI.  -Initiated on ertapenem with otherwise reassuring labs and vitals noted.  -Depakote increased due to decreased absorption with ertapenem.  Will need repeat valproic acid level in 2 to 3 days with PCP follow-up.  Patient to continue increased dose of 750 mg twice daily.  Seizure precautions advised.  -PICC line placed to right upper extremity.  He will follow with outpatient infusion.  -Patient to follow with PCP/nephrology/urology as scheduled.  -Continue all home medications as ordered.  -Advised given no further hematuria, reasonable to restart Xarelto at this time.  -Monitor urinary output.  -Strict return precautions given.    Discharge Diagnoses:     Sepsis with ESBL E. coli bacteremia, POA  Acute UTI ESBL E. coli/ Hematuria, POA  CKD stage 3  Atrial Fibrillation on Xarelto/pacemaker  ALEX  Seizure disorder    Problem List:     Active Hospital Problems    Diagnosis  POA    **Sepsis due to Escherichia coli without acute organ dysfunction [A41.51]  Yes    Benign prostatic hyperplasia [N40.0]  Yes    Atrial fibrillation [I48.91]  Yes    Essential hypertension [I10]  Yes      Resolved Hospital Problems   No resolved problems to display.     Presenting Problem:    Chief Complaint   Patient presents with    Fever    Post-op Problem      Consults:     Consulting Physician(s)         Provider   Role Specialty     Noel Sierra MD, TERESSA  Consulting Physician Nephrology     Tonio De La Cruz MD  Consulting Physician Urology          Procedures Performed:        History of presenting illness/Hospital Course:    Patient is a  69-year-old with multiple medical comorbidities including CAD, prior stroke, obstructive sleep apnea, hypothyroidism, diabetes, nephrolithiasis, seizure disorder, atrial fibrillation on Xarelto, who presented from home via EMS with multiple complaints.  Patient apparently had just underwent a prostate biopsy with Dr. De La Cruz about 24 hours prior to admission.  He had taken Xarelto the night after his procedure.  He noted developing a fever earlier in the day on the 26th, become increasingly confused, felt dehydrated.  Was having pain with urination and fairly bright blood noted. He was noted to have fever up to 104 upon arrival.       In the ER, he was febrile.  He had blood pressure 140/80 range he was nonhypoxic on 2 L of oxygen and had a heart rate in the 80s.  Creatinine 1.7 down to lactic acid 3.8 white count 3500 hemoglobin 18 and platelets 127.  Procalcitonin 4.22.  Urinalysis positive nitrites, leukocyte Estrace, large blood.  He was given IV fluid resuscitation with 1.5 L of saline, pain control, and Invanz.  He had a CT scan abdomen pelvis without contrast which was demonstrating nonobstructing kidney stones bilaterally, Crespo catheter with balloon inflated in the prostate gland, otherwise no other findings.  ER did talk with Dr. De La Cruz who recommended admission and placement of catheter and antibiotics.  Unfortunately blood cultures noted ESBL E. coli/also noted on urine culture.  Patient initiated on ertapenem with PICC line placed.  Due to underlying CKD nephrology consulted.  Xarelto continued to be on hold.  Depakote increased due to interaction noted with decreased absorption with ertapenem.  Otherwise reassuring labs and vitals noted.  Crespo catheter pulled per Dr. De La Cruz.  Patient to continue ertapenem for total of 14 days due to bacteremia.  He will follow-up with outpatient infusion.  Patient will also see PCP/nephrology/urology as scheduled.  Patient to continue all home medications as directed.   Advise given no further hematuria would be reasonable to restart Xarelto.  Strict return precautions given.  Updated family as well as patient in room with all questions answered.    Vital Signs:    Temp:  [97.3 °F (36.3 °C)-99.8 °F (37.7 °C)] 99.8 °F (37.7 °C)  Heart Rate:  [67-80] 80  Resp:  [16-20] 20  BP: (147-157)/(85-95) 156/89    Physical Exam:    General Appearance:  Alert and cooperative.  Chronically ill-appearing middle-aged male.   Head:  Atraumatic and normocephalic.   Eyes: Conjunctivae and sclerae normal, no icterus. No pallor.   Ears:  Ears with no abnormalities noted.   Throat: No oral lesions, no thrush, oral mucosa moist.   Neck: Supple, trachea midline, no thyromegaly.   Back:   No kyphoscoliosis present. No tenderness to palpation.   Lungs:   Breath sounds heard bilaterally equally.  No crackles or wheezing. No Pleural rub or bronchial breathing.  Room air unlabored.  Pacemaker to left chest wall.   Heart:  Normal S1 and S2, no murmur, no gallop, no rub. No JVD.   Abdomen:   Normal bowel sounds, no masses, no organomegaly. Soft, nontender, nondistended, no rebound tenderness.   Extremities: Supple, slight bilateral lower extremity edema, no cyanosis, no clubbing.   Pulses: Pulses palpable bilaterally.   Skin: No bleeding or rash.   Neurologic: Alert and oriented x 3. No facial asymmetry. Moves all four limbs. No tremors.  Generalized weakness.     Pertinent Lab Results:     Results from last 7 days   Lab Units 01/31/24  0802 01/30/24  0543 01/29/24  0508 01/28/24  0541 01/27/24  0532 01/26/24  1921   SODIUM mmol/L 141 134* 134* 136   < > 141   POTASSIUM mmol/L 3.3* 3.8 3.5 3.9   < > 3.6   CHLORIDE mmol/L 107 107 100 100   < > 100   CO2 mmol/L 22.5 16.9* 20.8* 23.0   < > 26.1   BUN mg/dL 16 24* 38* 35*   < > 16   CREATININE mg/dL 1.19 1.20 1.65* 2.10*   < > 1.70*   CALCIUM mg/dL 8.3* 8.1* 7.9* 8.0*   < > 9.2   BILIRUBIN mg/dL  --  0.6  --  0.7  --  1.2   ALK PHOS U/L  --  60  --  55  --  97   ALT  (SGPT) U/L  --  14  --  19  --  16   AST (SGOT) U/L  --  30  --  71*  --  18   GLUCOSE mg/dL 99 97 96 123*   < > 107*    < > = values in this interval not displayed.     Results from last 7 days   Lab Units 01/31/24  0802 01/30/24  0543 01/29/24  0508   WBC 10*3/mm3 8.09 8.23 12.99*   HEMOGLOBIN g/dL 14.9 15.0 14.6   HEMATOCRIT % 43.9 45.1 43.8   PLATELETS 10*3/mm3 131* 115* 125*     Results from last 7 days   Lab Units 01/24/24  1116   INR  1.20*                         Results from last 7 days   Lab Units 01/28/24  0540 01/28/24  0531 01/26/24 2103 01/26/24 2039   BLOODCX  No growth at 3 days No growth at 3 days  --  Escherichia coli ESBL*  Escherichia coli ESBL*   URINECX   --   --  50,000 CFU/mL Escherichia coli ESBL*  --        Pertinent Radiology Results:    Imaging Results (All)       Procedure Component Value Units Date/Time    CT Abdomen Pelvis Without Contrast [[id number]] Collected: 01/28/24 1053     Updated: 01/28/24 1102    Narrative:      CT of the abdomen and pelvis without contrast     INDICATION: Renal failure     COMPARISON: January 26, 2024     TECHNIQUE: Helically acquired axial multidetector CT images were  obtained from the lung bases through the pelvis without IV contrast.  Dose reduction techniques to achieve ALARA were employed.     Findings:     Lung bases: Small bilateral pleural effusions with bibasilar  atelectasis.     Cardiomegaly with trace pericardial fluid. Extensive multivessel  coronary artery calcifications. ICD leads are noted.     Liver: [Grossly unremarkable]     Spleen: [Unremarkable]     Gallbladder/biliary tree: [ No biliary ductal dilatation].  Cholelithiasis with multiple calcified gallstones, largest measures 1.6  cm.     Pancreas: [Grossly unremarkable].     Adrenals: [Unremarkable]     Kidneys: Bilateral nonobstructing renal calculi, largest right measures  approximately 1.1 cm and largest left measures approximately 0.8 cm. No  hydronephrosis bilaterally. Bilateral  nonspecific perinephric edema..     GI: Gastric lap band is noted. [No bowel obstruction]. Moderate colonic  gas and scattered small bowel gas may reflect underlying mild ileus. No  definite obstruction.     Bladder: Redemonstration of severe circumferential bladder wall  thickening with perivesical stranding, suggestive of underlying  cystitis. Mild prostatomegaly. Crespo catheter about appears positioned  in the prostatic urethra. Consider readjustment.     Bones: [No significant osseous abnormalities are identified].     Soft Tissues: Prior ventral wall hernia repair with mesh.       Impression:         1. Bilateral nonobstructing renal calculi. No hydronephrosis.     2. Severe urinary bladder thickening with perivesical inflammatory  changes suggestive of underlying cystitis. Malpositioned Crespo catheter  with bulb in the prostatic urethra.        CTDI: 20.02 mGy  DLP:1116.91 mGy.cm     This report was signed and finalized on 1/28/2024 11:00 AM by Torey Allen MD.       XR Chest 1 View [[id number]] Collected: 01/27/24 0746     Updated: 01/27/24 0749    Narrative:      PORTABLE CHEST  1/26/2024 7:56 PM     HISTORY: Chest pain protocol     COMPARISON:   None     FINDINGS: Left chest wall ICD. The cardiac silhouette is enlarged. The  mediastinal and hilar contours are unremarkable.  The lungs are clear.  There is no pneumothorax. The visualized osseous structures demonstrate  no acute abnormalities.       Impression:      No acute cardiopulmonary process.                       This report was signed and finalized on 1/27/2024 7:47 AM by Torey Allen MD.       CT Abdomen Pelvis Without Contrast [[id number]] Collected: 01/26/24 2338     Updated: 01/26/24 2339    Narrative:      FINAL REPORT    TECHNIQUE:  Axial CT images were performed from the lung bases through the  symphysis pubis without IV contrast.  This study was performed  with techniques to keep radiation doses as low as reasonably  achievable  (ALAAKILA). Individualized dose reduction techniques  using automated exposure control or adjustment of mA and/or kV  according to the patient's size were employed.    CLINICAL HISTORY:  Abdominal pain, post-op; prostate biopsy yesterday, woke today  with weakness and sepsis symptoms    FINDINGS:  Lack of intravenous contrast limits evaluation of solid  abdominal and pelvic organs.  LOWER CHEST: The heart is normal  size.  The lung bases are clear.  ABDOMEN/PELVIS:  Liver,  gallbladder and bile ducts: The unenhanced liver is grossly  unremarkable without focal abnormality. there are gallstones  without cholecystitis.  There is no definite biliary duct  dilatation.  Adrenal glands: The adrenal glands are  morphologically unremarkable without suspicious lesion.  Kidneys, ureter and urinary bladder: No nephrolithiasis.  There  are nonobstructing bilateral kidney stones.  There is urinary  bladder wall thickening.  Spleen: The spleen is normal size.  Pancreas: The pancreas is grossly unremarkable.  GI systems and  mesentery: A gastric banding device is noted.  No evidence of  bowel obstruction.  The appendix is not visualized, but there is  no sign of appendicitis.  No significant mesenteric  inflammation.  Lymph nodes: No definite pathologically enlarged  abdominal or pelvic lymph nodes present within the limits of  this noncontrast enhanced exam.  Vessels: The abdominal aorta is  normal in caliber.  The inferior vena cava is unremarkable.  Peritoneum: No free intraperitoneal fluid or pneumoperitoneum.  Pelvic viscera: A Crespo catheter is inflated within the prostate  gland.  Body wall: No body wall contusion.  Bones: No acute  fracture.      Impression:      1.  Malpositioned Crespo catheter with balloon inflated in the  prostate gland.    Authenticated and Electronically Signed by Jose J Dupree MD on  01/26/2024 11:38:21 PM            Echo:    Results for orders placed during the hospital encounter of 09/04/20    Adult  Transesophageal Echo (SELWYN) W/ Cont if Necessary Per Protocol    Interpretation Summary  · Estimated EF = 45%.  · Left ventricular systolic function is mildly decreased.  · Atrial fibrillation is noted throughout the study.  · Trace mitral valve regurgitation is present  · Mild tricuspid valve regurgitation is present.  · No thrombus is seen within the patient's left atrium or left atrial appendage.    Anesthesia: Cath lab/Echo lab moderate sedation    I was present with the patient for the duration of moderate sedation and supervised staff who had no other duties and monitored the patient for the entire procedure.    Name of independent trained observer: Elizabeth Wang RN  Intra-service start time: 1210  Intra-service end time: 1235    Condition on Discharge:      Stable.    Code status during the hospital stay:    Code Status and Medical Interventions:   Ordered at: 01/27/24 0122     Code Status (Patient has no pulse and is not breathing):    CPR (Attempt to Resuscitate)     Medical Interventions (Patient has pulse or is breathing):    Full Support     Discharge Disposition:    Home or Self Care    Discharge Medications:       Discharge Medications        New Medications        Instructions Start Date   ertapenem 1000 mg/100ml solution IV  Commonly known as: INVanz   1,000 mg, Intravenous, Every 24 Hours             Changes to Medications        Instructions Start Date   divalproex 250 MG DR tablet  Commonly known as: DEPAKOTE  What changed:   how much to take  how to take this  when to take this  additional instructions   750 mg, Oral, Daily      divalproex 250 MG DR tablet  Commonly known as: DEPAKOTE  What changed: You were already taking a medication with the same name, and this prescription was added. Make sure you understand how and when to take each.   750 mg, Oral, Nightly      Entresto  MG tablet  Generic drug: sacubitril-valsartan  What changed: additional instructions   1 tablet, Oral, 2 Times Daily,  Hold until seen by PCP             Continue These Medications        Instructions Start Date   amiodarone 100 MG tablet  Commonly known as: PACERONE   100 mg, Oral, Daily      amLODIPine 10 MG tablet  Commonly known as: NORVASC   10 mg, Oral, Nightly      carvedilol 25 MG tablet  Commonly known as: Coreg   25 mg, Oral, 2 Times Daily With Meals      Farxiga 10 MG tablet  Generic drug: dapagliflozin Propanediol   10 mg, Oral, Daily      furosemide 40 MG tablet  Commonly known as: LASIX   40 mg, Oral, 2 Times Daily      levothyroxine 88 MCG tablet  Commonly known as: SYNTHROID, LEVOTHROID   TAKE 1 TABLET DAILY      Ozempic (0.25 or 0.5 MG/DOSE) 2 MG/1.5ML solution pen-injector  Generic drug: Semaglutide(0.25 or 0.5MG/DOS)   0.5 mg, Subcutaneous, Every 7 Days      tamsulosin 0.4 MG capsule 24 hr capsule  Commonly known as: FLOMAX   0.4 mg, Oral, Daily      Vitamin D3 25 MCG (1000 UT) capsule   2,000 Units, Oral, Daily      Xarelto 15 MG tablet  Generic drug: rivaroxaban   15 mg, Oral, Daily With Dinner             Stop These Medications      ciprofloxacin 500 MG tablet  Commonly known as: Cipro            Discharge Diet:     Diet Instructions       Diet: Cardiac Diets; Healthy Heart (2-3 Na+); Thin (IDDSI 0)      Discharge Diet: Cardiac Diets    Cardiac Diet: Healthy Heart (2-3 Na+)    Fluid Consistency: Thin (IDDSI 0)          Activity at Discharge:     Activity Instructions       Activity as Tolerated            Follow-up Appointments:    Additional Instructions for the Follow-ups that You Need to Schedule       Ambulatory Referral to Home Health (Hospital)   As directed      Ertapenem- 1000 mg daily for total 14 days duration-infuse over 30 minutes.  Per PICC line.    Order Comments: Ertapenem- 1000 mg daily for total 14 days duration-infuse over 30 minutes.  Per PICC line.    Face to Face Visit Date: 1/30/2024   Follow-up provider for Plan of Care?: I treated the patient in an acute care facility and will not  continue treatment after discharge.   Follow-up provider: KEVEN ELLIS [[id number]]   Reason/Clinical Findings: debility   Describe mobility limitations that make leaving home difficult: debility   Nursing/Therapeutic Services Requested: Skilled Nursing   Skilled nursing orders: PICC line care/instruction   Frequency: 1 Week 1               Follow-up Information       Noel Sierra MD, FASN. Call on 3/13/2024.    Specialty: Nephrology  Why: @ 11:15 AM  Contact information:  [address]  [place]  [place] [address]  [phone]               Keven Ellis MD. Call.    Specialty: Internal Medicine  Contact information:  [address]  [facility] [place] [address]  [phone]                           Future Appointments   Date Time Provider Department Center   2/1/2024 10:00 AM RM 4 - CHAIR 1 [facility]F [facility]   2/2/2024 10:00 AM RM 1 - CHAIR 2 [facility]   2/3/2024  8:00 AM RM 1 - CHAIR 1 [facility]   2/4/2024  8:00 AM RM 1 - CHAIR 1 [facility]   2/5/2024  9:00 AM RM 1 - CHAIR 2 [facility]   2/6/2024  9:00 AM RM 2 - BED 1 [facility]   2/7/2024  9:30 AM RM 4 - CHAIR 1 [facility]   2/8/2024  8:20 AM Tonio De La Cruz MD [facility]  [facility] [place] (   2/8/2024  9:00 AM RM 1 - CHAIR 1 [facility]F [facility] OPINF [facility]   2/9/2024  9:00 AM RM 3 - BED 1 [facility] OPINF [facility]   2/9/2024 11:00 AM Keven Ellis MD [facility]   2/12/2024  9:00 AM RM 1 - CHAIR 2 [facility] OPINF [facility]   2/12/2024 10:30 AM Janet Garcia APRN [facility]   2/13/2024 10:00 AM RM 4 - CHAIR 1 [facility] INF [facility] OPINF [facility]   4/23/2024  1:40 PM Lois Reyna PA-C [facility] [place] ([facility]   5/14/2024  1:30 PM Janet Garcia APRN [facility]     Test Results Pending at Discharge:    Pending Labs       Order Current Status    Blood Culture With  ECHO - Blood, Hand, Right Preliminary result    Blood Culture With ECHO - Blood, Hand, Right Preliminary result               Ela APARICIO Rojelio, APRN  01/31/24  09:48 EST    Time: I spent 45 minutes on this discharge activity which included: face-to-face encounter with the patient, reviewing the data in the system, coordination of the care with the nursing staff as well as consultants, documentation, and entering orders.     Dictated utilizing Dragon dictation.

## 2024-01-31 NOTE — TELEPHONE ENCOUNTER
Provider: DR. DUCKWORTH    Caller: julia hall    Relationship to Patient: Emergency Contact     Phone Number: [phone]    Reason for Call: ciprofloxacin (Cipro) 500 MG tablet     When was the patient last seen: 01/25/24 (PROCEDURE)    Notes:  JULIA CALLED TO REPORT THAT A PRESCRIPTION FOR ciprofloxacin (Cipro) 500 MG tablet WAS SENT TO [facility] YESTERDAY. THE DIRECTIONS STATE TO TAKE BEFORE THE BIOPSY, BUT PATIENT HAS ALREADY HAD THE BIOPSY. JULIA WOULD LIKE TO KNOW IF THIS WAS AN ERROR.     PLEASE CALL JULIA TO DISCUSS. MR. HALL IS CURRENTLY INPATIENT AT THE HOSPITAL, BUT IS EXPECTED TO BE DISCHARGED TODAY.

## 2024-01-31 NOTE — CASE MANAGEMENT/SOCIAL WORK
Case Management/Social Work    Patient Name:  Andre Agosto  YOB: 1955  MRN: [id number]  Admit Date:  1/26/2024        09:12 EST  Spoke with [facility] [place] [facility] infusion to arrange for daily infusions. They will call patient to schedule chair time. Patient to DC home today after antibiotic dose.      Electronically signed by:  Rony Shaw RN  01/31/24 09:12 EST

## 2024-01-31 NOTE — OUTREACH NOTE
Prep Survey      Flowsheet Row Responses   [facility] patient discharged from? [facility]   Is LACE score < 7 ? No   Eligibility [facility]   Date of Admission 01/26/24   Date of Discharge 01/31/24   Discharge Disposition Home or Self Care   Discharge diagnosis Sepsis due to Escherichia coli without acute organ dysfunction   Does the patient have one of the following disease processes/diagnoses(primary or secondary)? Sepsis   Does the patient have Home health ordered? No   Is there a DME ordered? No   Prep survey completed? Yes            Angela UNGER - Registered Nurse

## 2024-01-31 NOTE — PROGRESS NOTES
[facility] Progress Note  [facility]. [place]        Patient Name: Andre Agosto  : 1955  MRN: [id number]   LOS: 5 days    Patient Care Team:  Keven Bear MD as PCP - General (Internal Medicine)  [employer] as Vendor (DME Services)    Chief Complaint:    Chief Complaint   Patient presents with    Fever    Post-op Problem     Primary Care Physician:  Keven Bear MD  Date of admission: 2024    Subjective     Interval History:   Follow-up acute on chronic kidney disease stage III a.  Patient is a lot more awake alert and interactive this morning.  He has a BiPAP on.  PICC line in place.  He denies having any chest pain or shortness of breath.  Events noted from last 24 hours.  I reviewed the chart and other providers notes, labs and procedures done since my last note.    Review of Systems:   As noted above.    Objective     Vitals:   Temp:  [97.3 °F (36.3 °C)-99.8 °F (37.7 °C)] 99.8 °F (37.7 °C)  Heart Rate:  [67-80] 80  Resp:  [16-20] 20  BP: (147-157)/(85-95) 156/89    Intake/Output Summary (Last 24 hours) at 2024 1335  Last data filed at 2024 1303  Gross per 24 hour   Intake 1760 ml   Output 800 ml   Net 960 ml       Physical Exam:    General Appearance: alert, oriented x 3, no acute distress   Skin: warm and dry  HEENT: oral mucosa normal, nonicteric sclera  Neck: supple, no JVD  Lungs: CTA  Heart: RRR, normal S1 and S2  Abdomen: obese, soft, nontender, non distended and positive bowel sounds.  : no palpable bladder  Extremities: Trace edema, no cyanosis or clubbing  Neuro: normal speech and mental status     Scheduled Meds:     Current Facility-Administered Medications   Medication Dose Route Frequency Provider Last Rate Last Admin    acetaminophen (TYLENOL) tablet 650 mg  650 mg Oral Q4H PRN Evelyn Cohen DO   650 mg at 24 0914    Or    acetaminophen (TYLENOL) 160 MG/5ML oral solution 650 mg  650 mg Oral Q4H PRN  Evelyn Cohen DO        Or    acetaminophen (TYLENOL) suppository 650 mg  650 mg Rectal Q4H PRN Evelyn Cohen DO        acetaminophen (TYLENOL) tablet 1,000 mg  1,000 mg Oral Once Evelyn Cohen DO        aluminum-magnesium hydroxide-simethicone (MAALOX MAX) 400-400-40 MG/5ML suspension 15 mL  15 mL Oral Q6H PRN Evelyn Cohen DO        amiodarone (PACERONE) tablet 100 mg  100 mg Oral Daily Evelyn Cohen DO   100 mg at 01/31/24 0952    amLODIPine (NORVASC) tablet 10 mg  10 mg Oral Nightly RojelioEla west, APRZULLY        sennosides-docusate (PERICOLACE) 8.6-50 MG per tablet 2 tablet  2 tablet Oral BID Evelyn Cohen DO   2 tablet at 01/29/24 0830    And    polyethylene glycol (MIRALAX) packet 17 g  17 g Oral Daily PRN Evelyn Cohen DO        And    bisacodyl (DULCOLAX) EC tablet 5 mg  5 mg Oral Daily PRN Evelyn Cohen DO        And    bisacodyl (DULCOLAX) suppository 10 mg  10 mg Rectal Daily PRN Evelyn Cohen DO        carvedilol (COREG) tablet 12.5 mg  12.5 mg Oral BID With Meals Evelyn Cohen DO   12.5 mg at 01/31/24 0951    divalproex (DEPAKOTE) DR tablet 750 mg  750 mg Oral Daily Evelyn Cohen DO   750 mg at 01/31/24 0950    divalproex (DEPAKOTE) DR tablet 750 mg  750 mg Oral Nightly Lulu Carson APRN   750 mg at 01/30/24 2154    ertapenem (INVanz) 1000 mg/100 mL 0.9% NS VTB (mbp)  1,000 mg Intravenous Q24H RojelioEla west, APRZULLY        levothyroxine (SYNTHROID, LEVOTHROID) tablet 88 mcg  88 mcg Oral Daily Evelyn Cohen DO   88 mcg at 01/31/24 0952    melatonin tablet 5 mg  5 mg Oral Nightly PRN Evelyn Cohen DO   5 mg at 01/30/24 2155    ondansetron ODT (ZOFRAN-ODT) disintegrating tablet 4 mg  4 mg Oral Q6H PREvelyn Resendiz DO        Or    ondansetron (ZOFRAN) injection 4 mg  4 mg Intravenous Q6H PRN Evelyn Cohen DO        oxyCODONE-acetaminophen (PERCOCET)  7.5-325 MG per tablet 1 tablet  1 tablet Oral Q4H PRN Ela Serrato, APRN   1 tablet at 01/29/24 2229    sodium chloride 0.9 % flush 10 mL  10 mL Intravenous PRN Evelyn Cohen, DO        sodium chloride 0.9 % flush 10 mL  10 mL Intravenous Q12H Evelyn Cohen, DO   10 mL at 01/31/24 0058    sodium chloride 0.9 % flush 10 mL  10 mL Intravenous PRN Evelyn Cohen, DO        sodium chloride 0.9 % flush 10 mL  10 mL Intravenous Q12H Yamileth, Lulu B, APRN   10 mL at 01/31/24 0953    sodium chloride 0.9 % flush 10 mL  10 mL Intravenous PRN Yamileth, Lulu B, APRN        sodium chloride 0.9 % flush 10 mL  10 mL Intravenous Q12H Yamileth, Lulu B, APRN   10 mL at 01/31/24 0952    sodium chloride 0.9 % flush 10 mL  10 mL Intravenous PRN Yamileth, Lulu B, APRN        sodium chloride 0.9 % flush 20 mL  20 mL Intravenous PRN Yamileth, Lulu B, APRN        sodium chloride 0.9 % flush 20 mL  20 mL Intravenous PRN Yamileth, Lulu B, APRN        sodium chloride 0.9 % infusion 40 mL  40 mL Intravenous PRN Evelyn Cohen, DO        tamsulosin (FLOMAX) 24 hr capsule 0.4 mg  0.4 mg Oral Daily Evelyn Cohen, DO   0.4 mg at 01/31/24 0951       acetaminophen, 1,000 mg, Oral, Once  amiodarone, 100 mg, Oral, Daily  amLODIPine, 10 mg, Oral, Nightly  carvedilol, 12.5 mg, Oral, BID With Meals  divalproex, 750 mg, Oral, Daily  divalproex, 750 mg, Oral, Nightly  ertapenem, 1,000 mg, Intravenous, Q24H  levothyroxine, 88 mcg, Oral, Daily  senna-docusate sodium, 2 tablet, Oral, BID  sodium chloride, 10 mL, Intravenous, Q12H  sodium chloride, 10 mL, Intravenous, Q12H  sodium chloride, 10 mL, Intravenous, Q12H  tamsulosin, 0.4 mg, Oral, Daily        IV Meds:          Results Reviewed:   I have personally reviewed the results from the time of this admission to 1/31/2024 13:35 EST     Results from last 7 days   Lab Units 01/31/24  0802 01/30/24  0543 01/29/24  0508 01/28/24  0541 01/27/24  0532  "01/26/24  1921   SODIUM mmol/L 141 134* 134* 136   < > 141   POTASSIUM mmol/L 3.3* 3.8 3.5 3.9   < > 3.6   CHLORIDE mmol/L 107 107 100 100   < > 100   CO2 mmol/L 22.5 16.9* 20.8* 23.0   < > 26.1   BUN mg/dL 16 24* 38* 35*   < > 16   CREATININE mg/dL 1.19 1.20 1.65* 2.10*   < > 1.70*   CALCIUM mg/dL 8.3* 8.1* 7.9* 8.0*   < > 9.2   BILIRUBIN mg/dL  --  0.6  --  0.7  --  1.2   ALK PHOS U/L  --  60  --  55  --  97   ALT (SGPT) U/L  --  14  --  19  --  16   AST (SGOT) U/L  --  30  --  71*  --  18   GLUCOSE mg/dL 99 97 96 123*   < > 107*    < > = values in this interval not displayed.       Estimated Creatinine Clearance: 91.6 mL/min (by C-G formula based on SCr of 1.19 mg/dL).    Results from last 7 days   Lab Units 01/31/24  0802 01/30/24  0543   MAGNESIUM mg/dL  --  2.0   PHOSPHORUS mg/dL 2.5  --              Results from last 7 days   Lab Units 01/31/24  0802 01/30/24  0543 01/29/24  0508 01/28/24  0541 01/27/24  0532   WBC 10*3/mm3 8.09 8.23 12.99* 18.28* 18.21*   HEMOGLOBIN g/dL 14.9 15.0 14.6 15.5 15.5   PLATELETS 10*3/mm3 131* 115* 125* 90* 109*               Brief Urine Lab Results  (Last result in the past 365 days)        Color   Clarity   Blood   Leuk Est   Nitrite   Protein   CREAT   Urine HCG        01/26/24 2103 Red   Turbid   Large (3+)   Large (3+)   Positive   100 mg/dL (2+)                   No results found for: \"UTPCR\"    Imaging Results (Last 24 Hours)       ** No results found for the last 24 hours. **                Assessment / Plan     ASSESSMENT:    Sepsis due to Escherichia coli without acute organ dysfunction    Essential hypertension    Atrial fibrillation    Benign prostatic hyperplasia    Acute kidney injury: Patient had normal renal function on October 17, 2023 with a EGFR of 65 mill per minute since then he has gradually worsened up until yesterday where creatinine has been slowly going up, yesterday was 2.10, preadmission creatinine of 1.50 and on the day of admission it was 1.70.  " Potassium was noted to be on the low side but it has improved.  Chronic kidney disease stage IIIa: It appears his baseline GFR is around 50.  Sepsis: Initial presentation increased procalcitonin and lactate was noted which has improved slightly.  Treated as per hospitalist service and urology.  He does appear all this infection started when he had prostate biopsy, leading to lew hematuria as well as fever and chills.  History of nephrolithiasis: It does not appear he has any obstructive stone, multiple nonobstructive stone noted on the CT.  Coronary artery disease: No acute issues with coronary artery disease.  Denies having any chest pain or shortness of breath.  Atrial fibrillation: Anticoagulated and rate controlled  Status post pacemaker:  Hypothyroidism: Continue home medications.  Obstructive sleep apnea on BiPAP: He said he has been compliant with his BiPAP.  Seizure disorder: Continue current medications  Morbid obesity: Complicates all forms of care.      PLAN:  Renal functions back to normal.  Okay to restart home medications.  Low potassium noted and it has been supplemented.  Continue IV antibiotics with the PICC in place.  Family plans to bring him to the outpatient infusion center.  Details were discussed with the patient as well as family in the room.  Follow-up with me in 6 to 8 weeks.  Details were also discussed with the hospitalist service and or other providers as needed.   Continue with rest of the current treatment plan, and monitor with surveillance labs.  Further recommendations will depend on clinical course of the patient during the current hospitalization.   I have reviewed the copied text to this note, it was edited and the changes made as needed.  It is accurate to the point, when the note was signed today.     Thank you for involving us in the care of Andre Agosto.  Please feel free to call with any questions.    Noel Sierra MD, FASN  01/31/24  13:35 EST    Nephrology  [facility]  [phone]      Part of this note may be an electronic transcription/translation of spoken language to printed text using the Dragon Dictation System.

## 2024-01-31 NOTE — DISCHARGE INSTRUCTIONS
Patient to be discharged home.  Continue Ertapenem for total 14 days- 2/9/2014 through OP infusion.  Hold Entresto until seen by PCP.  Continue higher dose Depakote while taking Ertapenem as this antibiotic causes decreased absorption.  Continue seizure precautions.  Follow with PCP in 3 days.  Follow with Dr. De La Cruz as scheduled.  Follow with Nephrology as scheduled.  Return to ED for any worsening symptoms such as fever > 100.4, inability to urinate, or other concerns.

## 2024-01-31 NOTE — PLAN OF CARE
Goal Outcome Evaluation:  Plan of Care Reviewed With: patient, spouse        Progress: improving  Outcome Evaluation: Pt resting well this shift.  Tolerates home Cpap unit while sleeping.  Wife at bedside and supportive of care.  Continues to receive IV atibiotifs as ordered.  Up with walker in room to bathroom this shift.  Anticipates being able to return home with family today with home health and IV antibiotics via PICC line.

## 2024-02-01 ENCOUNTER — HOSPITAL ENCOUNTER (OUTPATIENT)
Dept: INFUSION THERAPY | Facility: HOSPITAL | Age: 69
Discharge: HOME | End: 2024-02-01
Payer: MEDICARE

## 2024-02-01 ENCOUNTER — TRANSITIONAL CARE MANAGEMENT TELEPHONE ENCOUNTER (OUTPATIENT)
Dept: CALL CENTER | Facility: HOSPITAL | Age: 69
End: 2024-02-01
Payer: MEDICARE

## 2024-02-01 VITALS
DIASTOLIC BLOOD PRESSURE: 88 MMHG | HEART RATE: 75 BPM | SYSTOLIC BLOOD PRESSURE: 154 MMHG | RESPIRATION RATE: 18 BRPM | OXYGEN SATURATION: 98 % | TEMPERATURE: 98.2 F

## 2024-02-01 DIAGNOSIS — N39.0 URINARY TRACT INFECTION WITHOUT HEMATURIA, SITE UNSPECIFIED: Primary | ICD-10-CM

## 2024-02-01 PROCEDURE — 96365 THER/PROPH/DIAG IV INF INIT: CPT

## 2024-02-01 PROCEDURE — 25010000002 ERTAPENEM PER 500 MG: Performed by: NURSE PRACTITIONER

## 2024-02-01 RX ORDER — SODIUM CHLORIDE 9 MG/ML
20 INJECTION, SOLUTION INTRAVENOUS ONCE
Status: CANCELLED | OUTPATIENT
Start: 2024-02-02

## 2024-02-01 RX ORDER — SODIUM CHLORIDE 9 MG/ML
20 INJECTION, SOLUTION INTRAVENOUS ONCE
Status: DISCONTINUED | OUTPATIENT
Start: 2024-02-01 | End: 2024-02-03 | Stop reason: HOSPADM

## 2024-02-01 RX ADMIN — ERTAPENEM 1000 MG: 1 INJECTION INTRAMUSCULAR; INTRAVENOUS at 10:51

## 2024-02-01 NOTE — PAYOR COMM NOTE
"To:  [facility]  From: Ursula Roman RN  Phone: [phone]  Fax: [phone]  NPI: [id number]  TIN: [id number]  Member ID: [id number]   MRN: [id number]    Leopoldo Agosto Regis \"Bernardo\" (69 y.o. Male)       Date of Birth   1955    Social Security Number       Address   [address] [place], [place] [address]    Home Phone   [phone]    MRN   [id number]       Roman Catholic   Rastafarian    Marital Status                               Admission Date   1/26/24    Admission Type   Emergency    Admitting Provider   Evelyn Cohen DO    Attending Provider       Department, Room/Bed   [facility] TELEMETRY 4, 426/1       Discharge Date   1/31/2024    Discharge Disposition   Home or Self Care    Discharge Destination                                 Attending Provider: (none)   Allergies: Statins    Isolation: None   Infection: ESBL (01/27/24), ESBL E coli (01/28/24)   Code Status: Prior    Ht: 188 cm (74\")   Wt: 150 kg (330 lb 4 oz)    Admission Cmt: None   Principal Problem: Sepsis due to Escherichia coli without acute organ dysfunction [A41.51]                   Active Insurance as of 1/26/2024       Primary Coverage       Payor Plan Insurance Group Employer/Plan Group    [facility] MEDICARE REPLACEMENT [facility] MED ADV GROUP [id number]       Payor Plan Address Payor Plan Phone Number Payor Plan Fax Number Effective Dates    [address] [address]   1/1/2023 - None Entered    [facility] [id number]         Subscriber Name Subscriber Birth Date Member ID       LEOPOLDO AGOSTO 1955 [id number]                     Emergency Contacts        (Rel.) Home Phone Work Phone Mobile Phone    Paula Agosto (Spouse) [phone] -- [phone]    kylee agosto (Daughter) [phone] -- [phone]                 Discharge Summary        Ela Serrato APRN at 01/31/24 0927       Attestation signed by Giuseppe Jordan DO at 01/31/24 8901    I have reviewed this documentation and " agree.                      [facility]   DISCHARGE SUMMARY      Name:  Andre Agosto   Age:  69 y.o.  Sex:  male  :  1955  MRN:  [id number]   Visit Number:  [id number]    Admission Date:  2024  Date of Discharge:  2024  Primary Care Physician:  Keven Bear MD    Important issues to note:    -Patient admitted due to sepsis with noted ESBL E. coli bacteremia/UTI.  -Initiated on ertapenem with otherwise reassuring labs and vitals noted.  -Depakote increased due to decreased absorption with ertapenem.  Will need repeat valproic acid level in 2 to 3 days with PCP follow-up.  Patient to continue increased dose of 750 mg twice daily.  Seizure precautions advised.  -PICC line placed to right upper extremity.  He will follow with outpatient infusion.  -Patient to follow with PCP/nephrology/urology as scheduled.  -Continue all home medications as ordered.  -Advised given no further hematuria, reasonable to restart Xarelto at this time.  -Monitor urinary output.  -Strict return precautions given.    Discharge Diagnoses:     Sepsis with ESBL E. coli bacteremia, POA  Acute UTI ESBL E. coli/ Hematuria, POA  CKD stage 3  Atrial Fibrillation on Xarelto/pacemaker  ALEX  Seizure disorder    Problem List:     Active Hospital Problems    Diagnosis  POA    **Sepsis due to Escherichia coli without acute organ dysfunction [A41.51]  Yes    Benign prostatic hyperplasia [N40.0]  Yes    Atrial fibrillation [I48.91]  Yes    Essential hypertension [I10]  Yes      Resolved Hospital Problems   No resolved problems to display.     Presenting Problem:    Chief Complaint   Patient presents with    Fever    Post-op Problem      Consults:     Consulting Physician(s)         Provider   Role Specialty     Noel Sierra MD, TERESSA  Consulting Physician Nephrology     Tonio De La Cruz MD  Consulting Physician Urology          Procedures Performed:        History of presenting illness/Hospital  Course:    Patient is a 69-year-old with multiple medical comorbidities including CAD, prior stroke, obstructive sleep apnea, hypothyroidism, diabetes, nephrolithiasis, seizure disorder, atrial fibrillation on Xarelto, who presented from home via EMS with multiple complaints.  Patient apparently had just underwent a prostate biopsy with Dr. De La Cruz about 24 hours prior to admission.  He had taken Xarelto the night after his procedure.  He noted developing a fever earlier in the day on the 26th, become increasingly confused, felt dehydrated.  Was having pain with urination and fairly bright blood noted. He was noted to have fever up to 104 upon arrival.       In the ER, he was febrile.  He had blood pressure 140/80 range he was nonhypoxic on 2 L of oxygen and had a heart rate in the 80s.  Creatinine 1.7 down to lactic acid 3.8 white count 3500 hemoglobin 18 and platelets 127.  Procalcitonin 4.22.  Urinalysis positive nitrites, leukocyte Estrace, large blood.  He was given IV fluid resuscitation with 1.5 L of saline, pain control, and Invanz.  He had a CT scan abdomen pelvis without contrast which was demonstrating nonobstructing kidney stones bilaterally, Crespo catheter with balloon inflated in the prostate gland, otherwise no other findings.  ER did talk with Dr. De La Cruz who recommended admission and placement of catheter and antibiotics.  Unfortunately blood cultures noted ESBL E. coli/also noted on urine culture.  Patient initiated on ertapenem with PICC line placed.  Due to underlying CKD nephrology consulted.  Xarelto continued to be on hold.  Depakote increased due to interaction noted with decreased absorption with ertapenem.  Otherwise reassuring labs and vitals noted.  Crespo catheter pulled per Dr. De La Cruz.  Patient to continue ertapenem for total of 14 days due to bacteremia.  He will follow-up with outpatient infusion.  Patient will also see PCP/nephrology/urology as scheduled.  Patient to continue all home  medications as directed.  Advise given no further hematuria would be reasonable to restart Xarelto.  Strict return precautions given.  Updated family as well as patient in room with all questions answered.    Vital Signs:    Temp:  [97.3 °F (36.3 °C)-99.8 °F (37.7 °C)] 99.8 °F (37.7 °C)  Heart Rate:  [67-80] 80  Resp:  [16-20] 20  BP: (147-157)/(85-95) 156/89    Physical Exam:    General Appearance:  Alert and cooperative.  Chronically ill-appearing middle-aged male.   Head:  Atraumatic and normocephalic.   Eyes: Conjunctivae and sclerae normal, no icterus. No pallor.   Ears:  Ears with no abnormalities noted.   Throat: No oral lesions, no thrush, oral mucosa moist.   Neck: Supple, trachea midline, no thyromegaly.   Back:   No kyphoscoliosis present. No tenderness to palpation.   Lungs:   Breath sounds heard bilaterally equally.  No crackles or wheezing. No Pleural rub or bronchial breathing.  Room air unlabored.  Pacemaker to left chest wall.   Heart:  Normal S1 and S2, no murmur, no gallop, no rub. No JVD.   Abdomen:   Normal bowel sounds, no masses, no organomegaly. Soft, nontender, nondistended, no rebound tenderness.   Extremities: Supple, slight bilateral lower extremity edema, no cyanosis, no clubbing.   Pulses: Pulses palpable bilaterally.   Skin: No bleeding or rash.   Neurologic: Alert and oriented x 3. No facial asymmetry. Moves all four limbs. No tremors.  Generalized weakness.     Pertinent Lab Results:     Results from last 7 days   Lab Units 01/31/24  0802 01/30/24  0543 01/29/24  0508 01/28/24  0541 01/27/24  0532 01/26/24  1921   SODIUM mmol/L 141 134* 134* 136   < > 141   POTASSIUM mmol/L 3.3* 3.8 3.5 3.9   < > 3.6   CHLORIDE mmol/L 107 107 100 100   < > 100   CO2 mmol/L 22.5 16.9* 20.8* 23.0   < > 26.1   BUN mg/dL 16 24* 38* 35*   < > 16   CREATININE mg/dL 1.19 1.20 1.65* 2.10*   < > 1.70*   CALCIUM mg/dL 8.3* 8.1* 7.9* 8.0*   < > 9.2   BILIRUBIN mg/dL  --  0.6  --  0.7  --  1.2   ALK PHOS U/L  --   60  --  55  --  97   ALT (SGPT) U/L  --  14  --  19  --  16   AST (SGOT) U/L  --  30  --  71*  --  18   GLUCOSE mg/dL 99 97 96 123*   < > 107*    < > = values in this interval not displayed.     Results from last 7 days   Lab Units 01/31/24  0802 01/30/24  0543 01/29/24  0508   WBC 10*3/mm3 8.09 8.23 12.99*   HEMOGLOBIN g/dL 14.9 15.0 14.6   HEMATOCRIT % 43.9 45.1 43.8   PLATELETS 10*3/mm3 131* 115* 125*     Results from last 7 days   Lab Units 01/24/24  1116   INR  1.20*                         Results from last 7 days   Lab Units 01/28/24  0540 01/28/24  0531 01/26/24  2103 01/26/24 2039   BLOODCX  No growth at 3 days No growth at 3 days  --  Escherichia coli ESBL*  Escherichia coli ESBL*   URINECX   --   --  50,000 CFU/mL Escherichia coli ESBL*  --        Pertinent Radiology Results:    Imaging Results (All)       Procedure Component Value Units Date/Time    CT Abdomen Pelvis Without Contrast [[id number]] Collected: 01/28/24 1053     Updated: 01/28/24 1102    Narrative:      CT of the abdomen and pelvis without contrast     INDICATION: Renal failure     COMPARISON: January 26, 2024     TECHNIQUE: Helically acquired axial multidetector CT images were  obtained from the lung bases through the pelvis without IV contrast.  Dose reduction techniques to achieve ALARA were employed.     Findings:     Lung bases: Small bilateral pleural effusions with bibasilar  atelectasis.     Cardiomegaly with trace pericardial fluid. Extensive multivessel  coronary artery calcifications. ICD leads are noted.     Liver: [Grossly unremarkable]     Spleen: [Unremarkable]     Gallbladder/biliary tree: [ No biliary ductal dilatation].  Cholelithiasis with multiple calcified gallstones, largest measures 1.6  cm.     Pancreas: [Grossly unremarkable].     Adrenals: [Unremarkable]     Kidneys: Bilateral nonobstructing renal calculi, largest right measures  approximately 1.1 cm and largest left measures approximately 0.8 cm.  No  hydronephrosis bilaterally. Bilateral nonspecific perinephric edema..     GI: Gastric lap band is noted. [No bowel obstruction]. Moderate colonic  gas and scattered small bowel gas may reflect underlying mild ileus. No  definite obstruction.     Bladder: Redemonstration of severe circumferential bladder wall  thickening with perivesical stranding, suggestive of underlying  cystitis. Mild prostatomegaly. Crespo catheter about appears positioned  in the prostatic urethra. Consider readjustment.     Bones: [No significant osseous abnormalities are identified].     Soft Tissues: Prior ventral wall hernia repair with mesh.       Impression:         1. Bilateral nonobstructing renal calculi. No hydronephrosis.     2. Severe urinary bladder thickening with perivesical inflammatory  changes suggestive of underlying cystitis. Malpositioned Crespo catheter  with bulb in the prostatic urethra.        CTDI: 20.02 mGy  DLP:1116.91 mGy.cm     This report was signed and finalized on 1/28/2024 11:00 AM by Torey Allen MD.       XR Chest 1 View [[id number]] Collected: 01/27/24 0746     Updated: 01/27/24 0749    Narrative:      PORTABLE CHEST  1/26/2024 7:56 PM     HISTORY: Chest pain protocol     COMPARISON:   None     FINDINGS: Left chest wall ICD. The cardiac silhouette is enlarged. The  mediastinal and hilar contours are unremarkable.  The lungs are clear.  There is no pneumothorax. The visualized osseous structures demonstrate  no acute abnormalities.       Impression:      No acute cardiopulmonary process.                       This report was signed and finalized on 1/27/2024 7:47 AM by Torey Allen MD.       CT Abdomen Pelvis Without Contrast [[id number]] Collected: 01/26/24 2338     Updated: 01/26/24 2339    Narrative:      FINAL REPORT    TECHNIQUE:  Axial CT images were performed from the lung bases through the  symphysis pubis without IV contrast.  This study was performed  with techniques to keep radiation  doses as low as reasonably  achievable (ALARA). Individualized dose reduction techniques  using automated exposure control or adjustment of mA and/or kV  according to the patient's size were employed.    CLINICAL HISTORY:  Abdominal pain, post-op; prostate biopsy yesterday, woke today  with weakness and sepsis symptoms    FINDINGS:  Lack of intravenous contrast limits evaluation of solid  abdominal and pelvic organs.  LOWER CHEST: The heart is normal  size.  The lung bases are clear.  ABDOMEN/PELVIS:  Liver,  gallbladder and bile ducts: The unenhanced liver is grossly  unremarkable without focal abnormality. there are gallstones  without cholecystitis.  There is no definite biliary duct  dilatation.  Adrenal glands: The adrenal glands are  morphologically unremarkable without suspicious lesion.  Kidneys, ureter and urinary bladder: No nephrolithiasis.  There  are nonobstructing bilateral kidney stones.  There is urinary  bladder wall thickening.  Spleen: The spleen is normal size.  Pancreas: The pancreas is grossly unremarkable.  GI systems and  mesentery: A gastric banding device is noted.  No evidence of  bowel obstruction.  The appendix is not visualized, but there is  no sign of appendicitis.  No significant mesenteric  inflammation.  Lymph nodes: No definite pathologically enlarged  abdominal or pelvic lymph nodes present within the limits of  this noncontrast enhanced exam.  Vessels: The abdominal aorta is  normal in caliber.  The inferior vena cava is unremarkable.  Peritoneum: No free intraperitoneal fluid or pneumoperitoneum.  Pelvic viscera: A Crespo catheter is inflated within the prostate  gland.  Body wall: No body wall contusion.  Bones: No acute  fracture.      Impression:      1.  Malpositioned Crespo catheter with balloon inflated in the  prostate gland.    Authenticated and Electronically Signed by Jose J Dupree MD on  01/26/2024 11:38:21 PM            Echo:    Results for orders placed during the  hospital encounter of 09/04/20    Adult Transesophageal Echo (SELWYN) W/ Cont if Necessary Per Protocol    Interpretation Summary  · Estimated EF = 45%.  · Left ventricular systolic function is mildly decreased.  · Atrial fibrillation is noted throughout the study.  · Trace mitral valve regurgitation is present  · Mild tricuspid valve regurgitation is present.  · No thrombus is seen within the patient's left atrium or left atrial appendage.    Anesthesia: Cath lab/Echo lab moderate sedation    I was present with the patient for the duration of moderate sedation and supervised staff who had no other duties and monitored the patient for the entire procedure.    Name of independent trained observer: Elizabeth Wang RN  Intra-service start time: 1210  Intra-service end time: 1235    Condition on Discharge:      Stable.    Code status during the hospital stay:    Code Status and Medical Interventions:   Ordered at: 01/27/24 0122     Code Status (Patient has no pulse and is not breathing):    CPR (Attempt to Resuscitate)     Medical Interventions (Patient has pulse or is breathing):    Full Support     Discharge Disposition:    Home or Self Care    Discharge Medications:       Discharge Medications        New Medications        Instructions Start Date   ertapenem 1000 mg/100ml solution IV  Commonly known as: INVanz   1,000 mg, Intravenous, Every 24 Hours             Changes to Medications        Instructions Start Date   divalproex 250 MG DR tablet  Commonly known as: DEPAKOTE  What changed:   how much to take  how to take this  when to take this  additional instructions   750 mg, Oral, Daily      divalproex 250 MG DR tablet  Commonly known as: DEPAKOTE  What changed: You were already taking a medication with the same name, and this prescription was added. Make sure you understand how and when to take each.   750 mg, Oral, Nightly      Entresto  MG tablet  Generic drug: sacubitril-valsartan  What changed: additional  instructions   1 tablet, Oral, 2 Times Daily, Hold until seen by PCP             Continue These Medications        Instructions Start Date   amiodarone 100 MG tablet  Commonly known as: PACERONE   100 mg, Oral, Daily      amLODIPine 10 MG tablet  Commonly known as: NORVASC   10 mg, Oral, Nightly      carvedilol 25 MG tablet  Commonly known as: Coreg   25 mg, Oral, 2 Times Daily With Meals      Farxiga 10 MG tablet  Generic drug: dapagliflozin Propanediol   10 mg, Oral, Daily      furosemide 40 MG tablet  Commonly known as: LASIX   40 mg, Oral, 2 Times Daily      levothyroxine 88 MCG tablet  Commonly known as: SYNTHROID, LEVOTHROID   TAKE 1 TABLET DAILY      Ozempic (0.25 or 0.5 MG/DOSE) 2 MG/1.5ML solution pen-injector  Generic drug: Semaglutide(0.25 or 0.5MG/DOS)   0.5 mg, Subcutaneous, Every 7 Days      tamsulosin 0.4 MG capsule 24 hr capsule  Commonly known as: FLOMAX   0.4 mg, Oral, Daily      Vitamin D3 25 MCG (1000 UT) capsule   2,000 Units, Oral, Daily      Xarelto 15 MG tablet  Generic drug: rivaroxaban   15 mg, Oral, Daily With Dinner             Stop These Medications      ciprofloxacin 500 MG tablet  Commonly known as: Cipro            Discharge Diet:     Diet Instructions       Diet: Cardiac Diets; Healthy Heart (2-3 Na+); Thin (IDDSI 0)      Discharge Diet: Cardiac Diets    Cardiac Diet: Healthy Heart (2-3 Na+)    Fluid Consistency: Thin (IDDSI 0)          Activity at Discharge:     Activity Instructions       Activity as Tolerated            Follow-up Appointments:    Additional Instructions for the Follow-ups that You Need to Schedule       Ambulatory Referral to Home Health (Hospital)   As directed      Ertapenem- 1000 mg daily for total 14 days duration-infuse over 30 minutes.  Per PICC line.    Order Comments: Ertapenem- 1000 mg daily for total 14 days duration-infuse over 30 minutes.  Per PICC line.    Face to Face Visit Date: 1/30/2024   Follow-up provider for Plan of Care?: I treated the patient  in an acute care facility and will not continue treatment after discharge.   Follow-up provider: KEVEN ELLIS [[id number]]   Reason/Clinical Findings: debility   Describe mobility limitations that make leaving home difficult: debility   Nursing/Therapeutic Services Requested: Skilled Nursing   Skilled nursing orders: PICC line care/instruction   Frequency: 1 Week 1               Follow-up Information       Noel Sierra MD, FASN. Call on 3/13/2024.    Specialty: Nephrology  Why: @ 11:15 AM  Contact information:  [address] [place]  [facility] [address]  [phone]               Keven Ellis MD. Call.    Specialty: Internal Medicine  Contact information:  [address]  [facility] [address]  [phone]                           Future Appointments   Date Time Provider Department [place]   2/1/2024 10:00 AM RM 4 - CHAIR 1 [facility] OP INF BH [facility] OPINF JEFF   2/2/2024 10:00 AM RM 1 - CHAIR 2 [facility] OP INF [facility] OPINF JEFF   2/3/2024  8:00 AM RM 1 - CHAIR 1 [facility] OP INF BH [facility] OPINF JEFF   2/4/2024  8:00 AM RM 1 - CHAIR 1 [facility] OP INF [facility] OPINF JEFF   2/5/2024  9:00 AM RM 1 - CHAIR 2 [facility] OP INF BH [facility] OPINF JEFF   2/6/2024  9:00 AM RM 2 - BED 1 [facility] OP INF BH [facility] OPINF JEFF   2/7/2024  9:30 AM RM 4 - CHAIR 1 [facility] OP INF BH [facility] OPINF JEFF   2/8/2024  8:20 AM Tonio De La Cruz MD [facility] (Cl   2/8/2024  9:00 AM RM 1 - CHAIR 1 [facility] OP INF BH [facility] OPINF JEFF   2/9/2024  9:00 AM RM 3 - BED 1 [facility] OP INF BH [facility] OPINF JEFF   2/9/2024 11:00 AM Keven Ellis MD [facility] PC RI MR JEFF   2/12/2024  9:00 AM RM 1 - CHAIR 2 [facility] OP INF BH JEFF OPINF JEFF   2/12/2024 10:30 AM Janet Garcia APRN [facility] N [facility]M JEFF   2/13/2024 10:00 AM RM 4 - CHAIR 1 [facility] OP INF BH JEFF OPINF JEFF   4/23/2024  1:40 PM Lois Reyna PA-C [facility] [place] ([facility]   5/14/2024  1:30 PM Janet Garcia APRN [facility] N [facility] JEFF     Test Results Pending at Discharge:    Pending Labs       Order  Current Status    Blood Culture With ECHO - Blood, Hand, Right Preliminary result    Blood Culture With ECHO - Blood, Hand, Right Preliminary result               Ela APARICIO Rojelio, APRN  01/31/24  09:48 EST    Time: I spent 45 minutes on this discharge activity which included: face-to-face encounter with the patient, reviewing the data in the system, coordination of the care with the nursing staff as well as consultants, documentation, and entering orders.     Dictated utilizing Dragon dictation.      Electronically signed by Giuseppe Jordan DO at 01/31/24 6224

## 2024-02-01 NOTE — OUTREACH NOTE
Call Center TCM Note      Flowsheet Row Responses   [facility] patient discharged from? [facility]   Does the patient have one of the following disease processes/diagnoses(primary or secondary)? Sepsis   TCM attempt successful? Yes   Call start time 1132   Call end time 1138   Discharge diagnosis Sepsis due to Escherichia coli without acute organ dysfunction   Person spoke with today (if not patient) and relationship patient   Medication alerts for this patient Going to infusion center for antibiotics.   Meds reviewed with patient/caregiver? Yes   Is the patient having any side effects they believe may be caused by any medication additions or changes? No   Does the patient have all medications related to this admission filled (includes all antibiotics, inhalers, nebulizers,steroids,etc.) Yes   Is the patient taking all medications as directed (includes completed medication regime)? Yes   Comments Patient has a hospital followup scheduled on 2/2 with Dr Bear   Does the patient have an appointment with their PCP within 7-14 days of discharge? Yes   Psychosocial issues? No   Did the patient receive a copy of their discharge instructions? Yes   Nursing interventions Reviewed instructions with patient   What is the patient's perception of their health status since discharge? Improving   Nursing interventions Nurse provided patient education   Nursing interventions Nurse provided reassurance to patient   Is patient/caregiver able to teach back steps to recovery at home? Set small, achievable goals for return to baseline health, Rest and regain strength   If the patient is a current smoker, are they able to teach back resources for cessation? Not a smoker   Is the patient/caregiver able to teach back the hierarchy of who to call/visit for symptoms/problems? PCP, Specialist, Home health nurse, Urgent Care, ED, 911 Yes   TCM call completed? Yes   Wrap up additional comments Doing well. no needs at this time.   Call end  time 1138   Would this patient benefit from a Referral to [facility] Social Work? No   Is the patient interested in additional calls from an ambulatory ? No            Jak Bah RN    2/1/2024, 11:39 EST

## 2024-02-02 ENCOUNTER — OFFICE VISIT (OUTPATIENT)
Dept: INTERNAL MEDICINE | Facility: CLINIC | Age: 69
End: 2024-02-02
Payer: MEDICARE

## 2024-02-02 ENCOUNTER — HOSPITAL ENCOUNTER (OUTPATIENT)
Dept: INFUSION THERAPY | Facility: HOSPITAL | Age: 69
Discharge: HOME | End: 2024-02-02
Payer: MEDICARE

## 2024-02-02 VITALS
TEMPERATURE: 98 F | WEIGHT: 315 LBS | HEIGHT: 74 IN | RESPIRATION RATE: 18 BRPM | OXYGEN SATURATION: 94 % | SYSTOLIC BLOOD PRESSURE: 147 MMHG | BODY MASS INDEX: 40.43 KG/M2 | DIASTOLIC BLOOD PRESSURE: 93 MMHG | HEART RATE: 67 BPM

## 2024-02-02 VITALS
BODY MASS INDEX: 40.04 KG/M2 | HEIGHT: 74 IN | WEIGHT: 312 LBS | DIASTOLIC BLOOD PRESSURE: 80 MMHG | TEMPERATURE: 97.2 F | SYSTOLIC BLOOD PRESSURE: 130 MMHG | OXYGEN SATURATION: 97 % | RESPIRATION RATE: 16 BRPM | HEART RATE: 90 BPM

## 2024-02-02 DIAGNOSIS — B00.1 FEVER BLISTER: ICD-10-CM

## 2024-02-02 DIAGNOSIS — I50.21 ACUTE SYSTOLIC CHF (CONGESTIVE HEART FAILURE): ICD-10-CM

## 2024-02-02 DIAGNOSIS — N39.0 URINARY TRACT INFECTION WITHOUT HEMATURIA, SITE UNSPECIFIED: Primary | ICD-10-CM

## 2024-02-02 DIAGNOSIS — I48.11 LONGSTANDING PERSISTENT ATRIAL FIBRILLATION: ICD-10-CM

## 2024-02-02 DIAGNOSIS — N20.0 RENAL STONE: ICD-10-CM

## 2024-02-02 DIAGNOSIS — A49.9 ESBL (EXTENDED SPECTRUM BETA-LACTAMASE) PRODUCING BACTERIA INFECTION: Primary | ICD-10-CM

## 2024-02-02 DIAGNOSIS — Z16.12 ESBL (EXTENDED SPECTRUM BETA-LACTAMASE) PRODUCING BACTERIA INFECTION: Primary | ICD-10-CM

## 2024-02-02 LAB
BACTERIA SPEC AEROBE CULT: NORMAL
BACTERIA SPEC AEROBE CULT: NORMAL

## 2024-02-02 PROCEDURE — 96365 THER/PROPH/DIAG IV INF INIT: CPT

## 2024-02-02 PROCEDURE — 25010000002 ERTAPENEM PER 500 MG: Performed by: NURSE PRACTITIONER

## 2024-02-02 PROCEDURE — 96366 THER/PROPH/DIAG IV INF ADDON: CPT

## 2024-02-02 RX ORDER — SODIUM CHLORIDE 9 MG/ML
20 INJECTION, SOLUTION INTRAVENOUS ONCE
Status: DISCONTINUED | OUTPATIENT
Start: 2024-02-02 | End: 2024-02-04 | Stop reason: HOSPADM

## 2024-02-02 RX ORDER — SODIUM CHLORIDE 9 MG/ML
20 INJECTION, SOLUTION INTRAVENOUS ONCE
Status: CANCELLED | OUTPATIENT
Start: 2024-02-03

## 2024-02-02 RX ORDER — GRANULES FOR ORAL 3 G/1
3 POWDER ORAL
Qty: 30 G | Refills: 0 | Status: SHIPPED | OUTPATIENT
Start: 2024-02-02

## 2024-02-02 RX ORDER — VALACYCLOVIR HYDROCHLORIDE 1 G/1
1000 TABLET, FILM COATED ORAL 2 TIMES DAILY
Qty: 3 TABLET | Refills: 0 | Status: SHIPPED | OUTPATIENT
Start: 2024-02-02

## 2024-02-02 RX ADMIN — ERTAPENEM 1000 MG: 1 INJECTION INTRAMUSCULAR; INTRAVENOUS at 10:28

## 2024-02-02 NOTE — PROGRESS NOTES
Subjective     Patient ID: Andre Agosto is a 69 y.o. male. Patient is here for management of multiple medical problems.     Chief Complaint   Patient presents with    Hospital Follow Up Visit     History of Present Illness       Hospital f/u.     Multiple renal stones.   ESBL with multiple resistances.           The following portions of the patient's history were reviewed and updated as appropriate: allergies, current medications, past family history, past medical history, past social history, past surgical history and problem list.    Review of Systems    Current Outpatient Medications:     amiodarone (PACERONE) 100 MG tablet, Take 1 tablet by mouth Daily., Disp: 60 tablet, Rfl: 11    amLODIPine (NORVASC) 10 MG tablet, Take 1 tablet by mouth Every Night., Disp: , Rfl:     carvedilol (Coreg) 25 MG tablet, Take 1 tablet by mouth 2 (Two) Times a Day With Meals., Disp: 180 tablet, Rfl: 3    Cholecalciferol (Vitamin D3) 25 MCG (1000 UT) capsule, Take 2 capsules by mouth Daily., Disp: 180 capsule, Rfl: 11    divalproex (DEPAKOTE) 250 MG DR tablet, Take 3 tablets by mouth 2 (Two) Times a Day., Disp: 60 tablet, Rfl: 0    Entresto  MG tablet, Take 1 tablet by mouth 2 (Two) Times a Day. Hold until seen by PCP, Disp: , Rfl:     ertapenem (INVanz) 1000 mg/100 mL 0.9% NS VTB (mbp), Infuse 100 mL into a venous catheter Daily for 10 days. Indications: Infection with Dysregulated Inflammatory Response, Urinary Tract Infection, Disp: , Rfl:     furosemide (LASIX) 40 MG tablet, Take 1 tablet by mouth 2 (Two) Times a Day., Disp: , Rfl:     levothyroxine (SYNTHROID, LEVOTHROID) 88 MCG tablet, TAKE 1 TABLET DAILY, Disp: 90 tablet, Rfl: 3    Semaglutide,0.25 or 0.5MG/DOS, (Ozempic, 0.25 or 0.5 MG/DOSE,) 2 MG/1.5ML solution pen-injector, Inject 0.5 mg under the skin into the appropriate area as directed Every 7 (Seven) Days., Disp: 1.5 mL, Rfl: 3    tamsulosin (FLOMAX) 0.4 MG capsule 24 hr capsule, Take 1 capsule by mouth  "Daily., Disp: 30 capsule, Rfl: 5    Xarelto 15 MG tablet, Take 1 tablet by mouth Daily With Dinner., Disp: , Rfl:     D-Mannose 500 MG capsule, Take 1 capsule by mouth 3 times a day., Disp: 90 capsule, Rfl: 0    fosfomycin (MONUROL) 3 g pack, Take 3 g by mouth Every Other Day., Disp: 30 g, Rfl: 0    valACYclovir (Valtrex) 1000 MG tablet, Take 1 tablet by mouth 2 (Two) Times a Day., Disp: 3 tablet, Rfl: 0  No current facility-administered medications for this visit.    Facility-Administered Medications Ordered in Other Visits:     sodium chloride 0.9 % infusion, 20 mL/hr, Intravenous, Once, Rojelio, Ela J, APRN    sodium chloride 0.9 % infusion, 20 mL/hr, Intravenous, Once, Rojelio, Ela J, APRN    Objective      Blood pressure 130/80, pulse 90, temperature 97.2 °F (36.2 °C), resp. rate 16, height 188 cm (74\"), weight (!) 142 kg (312 lb), SpO2 97%.            Physical Exam     General Appearance:    Alert, cooperative, no distress, appears stated age   Head:    Normocephalic, without obvious abnormality, atraumatic   Eyes:    PERRL, conjunctiva/corneas clear, EOM's intact   Ears:    Normal TM's and external ear canals, both ears   Nose:   Nares normal, septum midline, mucosa normal, no drainage   or sinus tenderness   Throat:   Lips, mucosa, and tongue normal; teeth and gums normal   Neck:   Supple, symmetrical, trachea midline, no adenopathy;        thyroid:  No enlargement/tenderness/nodules; no carotid    bruit or JVD   Back:     Symmetric, no curvature, ROM normal, no CVA tenderness   Lungs:     Clear to auscultation bilaterally, respirations unlabored   Chest wall:    No tenderness or deformity   Heart:    Regular rate and rhythm, S1 and S2 normal, no murmur,        rub or gallop   Abdomen:     Soft, non-tender, bowel sounds active all four quadrants,     no masses, no organomegaly   Extremities:   Extremities normal, atraumatic, no cyanosis or edema   Pulses:   2+ and symmetric all extremities   Skin:   Skin " color, texture, turgor normal, no rashes or lesions   Lymph nodes:   Cervical, supraclavicular, and axillary nodes normal   Neurologic:   CNII-XII intact. Normal strength, sensation and reflexes       throughout      Results for orders placed or performed during the hospital encounter of 01/26/24   Blood Culture With ECHO - Blood, Arm, Right    Specimen: Arm, Right; Blood   Result Value Ref Range    Blood Culture Escherichia coli ESBL (C)     Isolated from Aerobic and Anaerobic Bottles     Gram Stain Anaerobic Bottle Gram negative bacilli (C)     Gram Stain Aerobic Bottle Gram negative bacilli (C)        Susceptibility    Escherichia coli ESBL - KIMBERLY*     Ertapenem  Susceptible ug/ml     Levofloxacin  Resistant ug/ml     Meropenem  Susceptible ug/ml     Trimethoprim + Sulfamethoxazole  Resistant ug/ml     * Cefazolin sensitivity will not be reported for Enterobacteriaceae in non-urine isolates. If cefazolin is preferred, please call the microbiology lab to request an E-test.  With the exception of urinary-sourced infections, aminoglycosides should not be used as monotherapy.   Blood Culture With ECHO - Blood, Arm, Left    Specimen: Arm, Left; Blood   Result Value Ref Range    Blood Culture Escherichia coli ESBL (C)     Isolated from      Gram Stain Pediatric Bottle Gram negative bacilli (C)    COVID-19 and FLU A/B PCR, 1 HR TAT - Swab, Nasopharynx    Specimen: Nasopharynx; Swab   Result Value Ref Range    COVID19 Not Detected Not Detected - Ref. Range    Influenza A PCR Not Detected Not Detected    Influenza B PCR Not Detected Not Detected   Urine Culture - Urine, Indwelling Urethral Catheter    Specimen: Indwelling Urethral Catheter; Urine   Result Value Ref Range    Urine Culture 50,000 CFU/mL Escherichia coli ESBL (A)        Susceptibility    Escherichia coli ESBL - KIMBERLY     Ertapenem  Susceptible ug/ml     Gentamicin  Susceptible ug/ml     Levofloxacin  Resistant ug/ml     Meropenem  Susceptible ug/ml      Nitrofurantoin  Susceptible ug/ml     Piperacillin + Tazobactam  Susceptible ug/ml     Trimethoprim + Sulfamethoxazole  Resistant ug/ml   Blood Culture ID, PCR - Blood, Arm, Right    Specimen: Arm, Right; Blood   Result Value Ref Range    BCID, PCR Escherichia coli. Identification by BCID2 PCR. (A) Negative by BCID PCR. Culture to Follow.    BCID, PCR 2 CTX-M (ESBL) detected. Identification by BCID2 PCR (C) Negative by BCID PCR. Culture to Follow.    BOTTLE TYPE Anaerobic Bottle    Blood Culture With ECHO - Blood, Hand, Right    Specimen: Hand, Right; Blood   Result Value Ref Range    Blood Culture No growth at 5 days    Blood Culture With ECHO - Blood, Hand, Right    Specimen: Hand, Right; Blood   Result Value Ref Range    Blood Culture No growth at 5 days    Comprehensive Metabolic Panel    Specimen: Blood   Result Value Ref Range    Glucose 107 (H) 65 - 99 mg/dL    BUN 16 8 - 23 mg/dL    Creatinine 1.70 (H) 0.76 - 1.27 mg/dL    Sodium 141 136 - 145 mmol/L    Potassium 3.6 3.5 - 5.2 mmol/L    Chloride 100 98 - 107 mmol/L    CO2 26.1 22.0 - 29.0 mmol/L    Calcium 9.2 8.6 - 10.5 mg/dL    Total Protein 8.1 6.0 - 8.5 g/dL    Albumin 4.3 3.5 - 5.2 g/dL    ALT (SGPT) 16 1 - 41 U/L    AST (SGOT) 18 1 - 40 U/L    Alkaline Phosphatase 97 39 - 117 U/L    Total Bilirubin 1.2 0.0 - 1.2 mg/dL    Globulin 3.8 gm/dL    A/G Ratio 1.1 g/dL    BUN/Creatinine Ratio 9.4 7.0 - 25.0    Anion Gap 14.9 5.0 - 15.0 mmol/L    eGFR 43.4 (L) >60.0 mL/min/1.73   Lactic Acid, Plasma    Specimen: Blood   Result Value Ref Range    Lactate 3.8 (C) 0.5 - 2.0 mmol/L   Urinalysis With Culture If Indicated - Indwelling Urethral Catheter    Specimen: Indwelling Urethral Catheter; Urine   Result Value Ref Range    Color, UA Red (A) Yellow, Straw    Appearance, UA Turbid (A) Clear    pH, UA 6.0 5.0 - 8.0    Specific Gravity, UA 1.020 1.005 - 1.030    Glucose,  mg/dL (2+) (A) Negative    Ketones, UA Negative Negative    Bilirubin, UA Moderate (2+) (A)  Negative    Blood, UA Large (3+) (A) Negative    Protein,  mg/dL (2+) (A) Negative    Leuk Esterase, UA Large (3+) (A) Negative    Nitrite, UA Positive (A) Negative    Urobilinogen, UA 0.2 E.U./dL 0.2 - 1.0 E.U./dL   CBC Auto Differential    Specimen: Blood   Result Value Ref Range    WBC 3.59 3.40 - 10.80 10*3/mm3    RBC 5.96 (H) 4.14 - 5.80 10*6/mm3    Hemoglobin 18.2 (H) 13.0 - 17.7 g/dL    Hematocrit 53.7 (H) 37.5 - 51.0 %    MCV 90.1 79.0 - 97.0 fL    MCH 30.5 26.6 - 33.0 pg    MCHC 33.9 31.5 - 35.7 g/dL    RDW 14.9 12.3 - 15.4 %    RDW-SD 49.3 37.0 - 54.0 fl    MPV 10.2 6.0 - 12.0 fL    Platelets 127 (L) 140 - 450 10*3/mm3    Neutrophil % 93.5 (H) 42.7 - 76.0 %    Lymphocyte % 4.5 (L) 19.6 - 45.3 %    Monocyte % 0.3 (L) 5.0 - 12.0 %    Eosinophil % 0.3 0.3 - 6.2 %    Basophil % 0.6 0.0 - 1.5 %    Immature Grans % 0.8 (H) 0.0 - 0.5 %    Neutrophils, Absolute 3.36 1.70 - 7.00 10*3/mm3    Lymphocytes, Absolute 0.16 (L) 0.70 - 3.10 10*3/mm3    Monocytes, Absolute 0.01 (L) 0.10 - 0.90 10*3/mm3    Eosinophils, Absolute 0.01 0.00 - 0.40 10*3/mm3    Basophils, Absolute 0.02 0.00 - 0.20 10*3/mm3    Immature Grans, Absolute 0.03 0.00 - 0.05 10*3/mm3    nRBC 0.0 0.0 - 0.2 /100 WBC   Procalcitonin    Specimen: Blood   Result Value Ref Range    Procalcitonin 4.22 (H) 0.00 - 0.25 ng/mL   STAT Lactic Acid, Reflex    Specimen: Blood   Result Value Ref Range    Lactate 3.2 (C) 0.5 - 2.0 mmol/L   Urinalysis, Microscopic Only - Indwelling Urethral Catheter    Specimen: Indwelling Urethral Catheter; Urine   Result Value Ref Range    RBC, UA 6-10 (A) None Seen, 0-2 /HPF    WBC, UA 21-50 (A) None Seen, 0-2 /HPF    Bacteria, UA 2+ (A) None Seen /HPF    Squamous Epithelial Cells, UA 7-12 (A) None Seen, 0-2 /HPF    Hyaline Casts, UA None Seen None Seen /LPF    Methodology Manual Light Microscopy    STAT Lactic Acid, Reflex    Specimen: Blood   Result Value Ref Range    Lactate 2.8 (C) 0.5 - 2.0 mmol/L   Basic Metabolic Panel     Specimen: Blood   Result Value Ref Range    Glucose 139 (H) 65 - 99 mg/dL    BUN 18 8 - 23 mg/dL    Creatinine 1.81 (H) 0.76 - 1.27 mg/dL    Sodium 138 136 - 145 mmol/L    Potassium 3.6 3.5 - 5.2 mmol/L    Chloride 102 98 - 107 mmol/L    CO2 21.2 (L) 22.0 - 29.0 mmol/L    Calcium 8.1 (L) 8.6 - 10.5 mg/dL    BUN/Creatinine Ratio 9.9 7.0 - 25.0    Anion Gap 14.8 5.0 - 15.0 mmol/L    eGFR 40.2 (L) >60.0 mL/min/1.73   CBC (No Diff)    Specimen: Blood   Result Value Ref Range    WBC 18.21 (H) 3.40 - 10.80 10*3/mm3    RBC 4.96 4.14 - 5.80 10*6/mm3    Hemoglobin 15.5 13.0 - 17.7 g/dL    Hematocrit 45.2 37.5 - 51.0 %    MCV 91.1 79.0 - 97.0 fL    MCH 31.3 26.6 - 33.0 pg    MCHC 34.3 31.5 - 35.7 g/dL    RDW 15.4 12.3 - 15.4 %    RDW-SD 51.4 37.0 - 54.0 fl    MPV 11.7 6.0 - 12.0 fL    Platelets 109 (L) 140 - 450 10*3/mm3   STAT Lactic Acid, Reflex    Specimen: Blood   Result Value Ref Range    Lactate 2.3 (C) 0.5 - 2.0 mmol/L   STAT Lactic Acid, Reflex    Specimen: Blood   Result Value Ref Range    Lactate 2.8 (C) 0.5 - 2.0 mmol/L   STAT Lactic Acid, Reflex    Specimen: Blood   Result Value Ref Range    Lactate 2.5 (C) 0.5 - 2.0 mmol/L   CBC (No Diff)    Specimen: Blood   Result Value Ref Range    WBC 18.28 (H) 3.40 - 10.80 10*3/mm3    RBC 5.02 4.14 - 5.80 10*6/mm3    Hemoglobin 15.5 13.0 - 17.7 g/dL    Hematocrit 45.9 37.5 - 51.0 %    MCV 91.4 79.0 - 97.0 fL    MCH 30.9 26.6 - 33.0 pg    MCHC 33.8 31.5 - 35.7 g/dL    RDW 15.3 12.3 - 15.4 %    RDW-SD 51.4 37.0 - 54.0 fl    MPV 12.2 (H) 6.0 - 12.0 fL    Platelets 90 (L) 140 - 450 10*3/mm3   Comprehensive Metabolic Panel    Specimen: Blood   Result Value Ref Range    Glucose 123 (H) 65 - 99 mg/dL    BUN 35 (H) 8 - 23 mg/dL    Creatinine 2.10 (H) 0.76 - 1.27 mg/dL    Sodium 136 136 - 145 mmol/L    Potassium 3.9 3.5 - 5.2 mmol/L    Chloride 100 98 - 107 mmol/L    CO2 23.0 22.0 - 29.0 mmol/L    Calcium 8.0 (L) 8.6 - 10.5 mg/dL    Total Protein 6.4 6.0 - 8.5 g/dL    Albumin 3.4 (L)  3.5 - 5.2 g/dL    ALT (SGPT) 19 1 - 41 U/L    AST (SGOT) 71 (H) 1 - 40 U/L    Alkaline Phosphatase 55 39 - 117 U/L    Total Bilirubin 0.7 0.0 - 1.2 mg/dL    Globulin 3.0 gm/dL    A/G Ratio 1.1 g/dL    BUN/Creatinine Ratio 16.7 7.0 - 25.0    Anion Gap 13.0 5.0 - 15.0 mmol/L    eGFR 33.7 (L) >60.0 mL/min/1.73   Basic Metabolic Panel    Specimen: Blood   Result Value Ref Range    Glucose 96 65 - 99 mg/dL    BUN 38 (H) 8 - 23 mg/dL    Creatinine 1.65 (H) 0.76 - 1.27 mg/dL    Sodium 134 (L) 136 - 145 mmol/L    Potassium 3.5 3.5 - 5.2 mmol/L    Chloride 100 98 - 107 mmol/L    CO2 20.8 (L) 22.0 - 29.0 mmol/L    Calcium 7.9 (L) 8.6 - 10.5 mg/dL    BUN/Creatinine Ratio 23.0 7.0 - 25.0    Anion Gap 13.2 5.0 - 15.0 mmol/L    eGFR 45.0 (L) >60.0 mL/min/1.73   Valproic Acid Level, Total    Specimen: Blood   Result Value Ref Range    Valproic Acid 13.1 (L) 50.0 - 125.0 mcg/mL   CBC Auto Differential    Specimen: Blood   Result Value Ref Range    WBC 12.99 (H) 3.40 - 10.80 10*3/mm3    RBC 4.81 4.14 - 5.80 10*6/mm3    Hemoglobin 14.6 13.0 - 17.7 g/dL    Hematocrit 43.8 37.5 - 51.0 %    MCV 91.1 79.0 - 97.0 fL    MCH 30.4 26.6 - 33.0 pg    MCHC 33.3 31.5 - 35.7 g/dL    RDW 15.1 12.3 - 15.4 %    RDW-SD 50.9 37.0 - 54.0 fl    MPV 12.3 (H) 6.0 - 12.0 fL    Platelets 125 (L) 140 - 450 10*3/mm3    Neutrophil % 78.4 (H) 42.7 - 76.0 %    Lymphocyte % 11.0 (L) 19.6 - 45.3 %    Monocyte % 7.6 5.0 - 12.0 %    Eosinophil % 1.7 0.3 - 6.2 %    Basophil % 0.5 0.0 - 1.5 %    Immature Grans % 0.8 (H) 0.0 - 0.5 %    Neutrophils, Absolute 10.18 (H) 1.70 - 7.00 10*3/mm3    Lymphocytes, Absolute 1.43 0.70 - 3.10 10*3/mm3    Monocytes, Absolute 0.99 (H) 0.10 - 0.90 10*3/mm3    Eosinophils, Absolute 0.22 0.00 - 0.40 10*3/mm3    Basophils, Absolute 0.06 0.00 - 0.20 10*3/mm3    Immature Grans, Absolute 0.11 (H) 0.00 - 0.05 10*3/mm3    nRBC 0.0 0.0 - 0.2 /100 WBC   Comprehensive Metabolic Panel    Specimen: Blood   Result Value Ref Range    Glucose 97 65 -  99 mg/dL    BUN 24 (H) 8 - 23 mg/dL    Creatinine 1.20 0.76 - 1.27 mg/dL    Sodium 134 (L) 136 - 145 mmol/L    Potassium 3.8 3.5 - 5.2 mmol/L    Chloride 107 98 - 107 mmol/L    CO2 16.9 (L) 22.0 - 29.0 mmol/L    Calcium 8.1 (L) 8.6 - 10.5 mg/dL    Total Protein 6.0 6.0 - 8.5 g/dL    Albumin 2.7 (L) 3.5 - 5.2 g/dL    ALT (SGPT) 14 1 - 41 U/L    AST (SGOT) 30 1 - 40 U/L    Alkaline Phosphatase 60 39 - 117 U/L    Total Bilirubin 0.6 0.0 - 1.2 mg/dL    Globulin 3.3 gm/dL    A/G Ratio 0.8 g/dL    BUN/Creatinine Ratio 20.0 7.0 - 25.0    Anion Gap 10.1 5.0 - 15.0 mmol/L    eGFR 65.9 >60.0 mL/min/1.73   Procalcitonin    Specimen: Blood   Result Value Ref Range    Procalcitonin 13.60 (H) 0.00 - 0.25 ng/mL   Magnesium    Specimen: Blood   Result Value Ref Range    Magnesium 2.0 1.6 - 2.4 mg/dL   CBC Auto Differential    Specimen: Blood   Result Value Ref Range    WBC 8.23 3.40 - 10.80 10*3/mm3    RBC 4.92 4.14 - 5.80 10*6/mm3    Hemoglobin 15.0 13.0 - 17.7 g/dL    Hematocrit 45.1 37.5 - 51.0 %    MCV 91.7 79.0 - 97.0 fL    MCH 30.5 26.6 - 33.0 pg    MCHC 33.3 31.5 - 35.7 g/dL    RDW 15.2 12.3 - 15.4 %    RDW-SD 51.3 37.0 - 54.0 fl    MPV 11.5 6.0 - 12.0 fL    Platelets 115 (L) 140 - 450 10*3/mm3    Neutrophil % 70.3 42.7 - 76.0 %    Lymphocyte % 17.3 (L) 19.6 - 45.3 %    Monocyte % 8.6 5.0 - 12.0 %    Eosinophil % 1.6 0.3 - 6.2 %    Basophil % 0.9 0.0 - 1.5 %    Immature Grans % 1.3 (H) 0.0 - 0.5 %    Neutrophils, Absolute 5.79 1.70 - 7.00 10*3/mm3    Lymphocytes, Absolute 1.42 0.70 - 3.10 10*3/mm3    Monocytes, Absolute 0.71 0.10 - 0.90 10*3/mm3    Eosinophils, Absolute 0.13 0.00 - 0.40 10*3/mm3    Basophils, Absolute 0.07 0.00 - 0.20 10*3/mm3    Immature Grans, Absolute 0.11 (H) 0.00 - 0.05 10*3/mm3    nRBC 0.0 0.0 - 0.2 /100 WBC   Renal Function Panel    Specimen: Blood   Result Value Ref Range    Glucose 99 65 - 99 mg/dL    BUN 16 8 - 23 mg/dL    Creatinine 1.19 0.76 - 1.27 mg/dL    Sodium 141 136 - 145 mmol/L     Potassium 3.3 (L) 3.5 - 5.2 mmol/L    Chloride 107 98 - 107 mmol/L    CO2 22.5 22.0 - 29.0 mmol/L    Calcium 8.3 (L) 8.6 - 10.5 mg/dL    Albumin 3.3 (L) 3.5 - 5.2 g/dL    Phosphorus 2.5 2.5 - 4.5 mg/dL    Anion Gap 11.5 5.0 - 15.0 mmol/L    BUN/Creatinine Ratio 13.4 7.0 - 25.0    eGFR 66.5 >60.0 mL/min/1.73   Procalcitonin    Specimen: Blood   Result Value Ref Range    Procalcitonin 5.67 (H) 0.00 - 0.25 ng/mL   Valproic Acid Level, Total    Specimen: Blood   Result Value Ref Range    Valproic Acid 17.6 (L) 50.0 - 125.0 mcg/mL   CBC Auto Differential    Specimen: Blood   Result Value Ref Range    WBC 8.09 3.40 - 10.80 10*3/mm3    RBC 4.86 4.14 - 5.80 10*6/mm3    Hemoglobin 14.9 13.0 - 17.7 g/dL    Hematocrit 43.9 37.5 - 51.0 %    MCV 90.3 79.0 - 97.0 fL    MCH 30.7 26.6 - 33.0 pg    MCHC 33.9 31.5 - 35.7 g/dL    RDW 14.9 12.3 - 15.4 %    RDW-SD 49.5 37.0 - 54.0 fl    MPV 11.7 6.0 - 12.0 fL    Platelets 131 (L) 140 - 450 10*3/mm3    Neutrophil % 64.2 42.7 - 76.0 %    Lymphocyte % 22.9 19.6 - 45.3 %    Monocyte % 7.5 5.0 - 12.0 %    Eosinophil % 2.1 0.3 - 6.2 %    Basophil % 0.7 0.0 - 1.5 %    Immature Grans % 2.6 (H) 0.0 - 0.5 %    Neutrophils, Absolute 5.19 1.70 - 7.00 10*3/mm3    Lymphocytes, Absolute 1.85 0.70 - 3.10 10*3/mm3    Monocytes, Absolute 0.61 0.10 - 0.90 10*3/mm3    Eosinophils, Absolute 0.17 0.00 - 0.40 10*3/mm3    Basophils, Absolute 0.06 0.00 - 0.20 10*3/mm3    Immature Grans, Absolute 0.21 (H) 0.00 - 0.05 10*3/mm3    nRBC 0.0 0.0 - 0.2 /100 WBC   Scan Slide    Specimen: Blood   Result Value Ref Range    RBC Morphology Normal Normal    WBC Morphology Normal Normal    Clumped Platelets Present None Seen   Green Top (Gel)   Result Value Ref Range    Extra Tube Hold for add-ons.    Lavender Top   Result Value Ref Range    Extra Tube hold for add-on    Gold Top - SST   Result Value Ref Range    Extra Tube Hold for add-ons.    Light Blue Top   Result Value Ref Range    Extra Tube Hold for add-ons.       *Note: Due to a large number of results and/or encounters for the requested time period, some results have not been displayed. A complete set of results can be found in Results Review.         Assessment & Plan       Monoril with cr 1.19 1/31/.  Unfortunately daily dosing would be difficult to gauge.      oxyimino-beta cephalosporin is an option.   For sure best oral option.    Chf stable. Off entresto. Will restart. Will decrease     Complicating uti.  On faxiga.  Will need to stop for now.       Has atrial fib. Will restart xeralto.       Diagnoses and all orders for this visit:    1. ESBL (extended spectrum beta-lactamase) producing bacteria infection (Primary)  -     Ambulatory Referral to Infectious Disease  -     Ambulatory Referral to Urology  -     Basic metabolic panel    2. Renal stone  -     Ambulatory Referral to Infectious Disease  -     Ambulatory Referral to Urology  -     Basic metabolic panel    3. Acute systolic CHF (congestive heart failure)  -     Basic metabolic panel    4. Longstanding persistent atrial fibrillation    5. Fever blister    Other orders  -     fosfomycin (MONUROL) 3 g pack; Take 3 g by mouth Every Other Day.  Dispense: 30 g; Refill: 0  -     D-Mannose 500 MG capsule; Take 1 capsule by mouth 3 times a day.  Dispense: 90 capsule; Refill: 0  -     valACYclovir (Valtrex) 1000 MG tablet; Take 1 tablet by mouth 2 (Two) Times a Day.  Dispense: 3 tablet; Refill: 0    Stay off ozemic.       Return in about 4 days (around 2/6/2024).          There are no Patient Instructions on file for this visit.     Keven Bear MD    Assessment & Plan

## 2024-02-03 ENCOUNTER — HOSPITAL ENCOUNTER (OUTPATIENT)
Dept: INFUSION THERAPY | Facility: HOSPITAL | Age: 69
Discharge: HOME | End: 2024-02-03
Payer: MEDICARE

## 2024-02-03 VITALS
DIASTOLIC BLOOD PRESSURE: 83 MMHG | RESPIRATION RATE: 18 BRPM | TEMPERATURE: 97.6 F | HEART RATE: 68 BPM | SYSTOLIC BLOOD PRESSURE: 143 MMHG

## 2024-02-03 DIAGNOSIS — N39.0 URINARY TRACT INFECTION WITHOUT HEMATURIA, SITE UNSPECIFIED: Primary | ICD-10-CM

## 2024-02-03 PROCEDURE — 25010000002 ERTAPENEM PER 500 MG: Performed by: NURSE PRACTITIONER

## 2024-02-03 RX ORDER — SODIUM CHLORIDE 9 MG/ML
20 INJECTION, SOLUTION INTRAVENOUS ONCE
Status: DISCONTINUED | OUTPATIENT
Start: 2024-02-03 | End: 2024-02-05 | Stop reason: HOSPADM

## 2024-02-03 RX ORDER — SODIUM CHLORIDE 9 MG/ML
20 INJECTION, SOLUTION INTRAVENOUS ONCE
Status: CANCELLED | OUTPATIENT
Start: 2024-02-03

## 2024-02-03 RX ADMIN — ERTAPENEM 1000 MG: 1 INJECTION INTRAMUSCULAR; INTRAVENOUS at 09:01

## 2024-02-04 ENCOUNTER — HOSPITAL ENCOUNTER (OUTPATIENT)
Dept: INFUSION THERAPY | Facility: HOSPITAL | Age: 69
End: 2024-02-04
Payer: MEDICARE

## 2024-02-04 VITALS
DIASTOLIC BLOOD PRESSURE: 73 MMHG | RESPIRATION RATE: 18 BRPM | HEART RATE: 68 BPM | TEMPERATURE: 98 F | SYSTOLIC BLOOD PRESSURE: 125 MMHG

## 2024-02-04 DIAGNOSIS — N39.0 URINARY TRACT INFECTION WITHOUT HEMATURIA, SITE UNSPECIFIED: Primary | ICD-10-CM

## 2024-02-04 PROCEDURE — 96366 THER/PROPH/DIAG IV INF ADDON: CPT

## 2024-02-04 PROCEDURE — 25010000002 ERTAPENEM PER 500 MG: Performed by: NURSE PRACTITIONER

## 2024-02-04 RX ORDER — SODIUM CHLORIDE 9 MG/ML
20 INJECTION, SOLUTION INTRAVENOUS ONCE
Status: CANCELLED | OUTPATIENT
Start: 2024-02-04

## 2024-02-04 RX ORDER — SODIUM CHLORIDE 9 MG/ML
20 INJECTION, SOLUTION INTRAVENOUS ONCE
Status: ACTIVE | OUTPATIENT
Start: 2024-02-04

## 2024-02-04 RX ADMIN — ERTAPENEM 1000 MG: 1 INJECTION INTRAMUSCULAR; INTRAVENOUS at 09:07

## 2024-02-05 ENCOUNTER — HOSPITAL ENCOUNTER (OUTPATIENT)
Dept: INFUSION THERAPY | Facility: HOSPITAL | Age: 69
Discharge: HOME | End: 2024-02-05
Admitting: NURSE PRACTITIONER
Payer: MEDICARE

## 2024-02-05 ENCOUNTER — TELEPHONE (OUTPATIENT)
Dept: INTERNAL MEDICINE | Facility: CLINIC | Age: 69
End: 2024-02-05
Payer: MEDICARE

## 2024-02-05 VITALS
DIASTOLIC BLOOD PRESSURE: 81 MMHG | RESPIRATION RATE: 16 BRPM | TEMPERATURE: 97.6 F | HEART RATE: 65 BPM | SYSTOLIC BLOOD PRESSURE: 137 MMHG | OXYGEN SATURATION: 97 %

## 2024-02-05 DIAGNOSIS — N39.0 URINARY TRACT INFECTION WITHOUT HEMATURIA, SITE UNSPECIFIED: Primary | ICD-10-CM

## 2024-02-05 DIAGNOSIS — Z16.12 ESBL (EXTENDED SPECTRUM BETA-LACTAMASE) PRODUCING BACTERIA INFECTION: Primary | ICD-10-CM

## 2024-02-05 DIAGNOSIS — A49.9 ESBL (EXTENDED SPECTRUM BETA-LACTAMASE) PRODUCING BACTERIA INFECTION: Primary | ICD-10-CM

## 2024-02-05 LAB
ANION GAP SERPL CALCULATED.3IONS-SCNC: 11.6 MMOL/L (ref 5–15)
BUN SERPL-MCNC: 10 MG/DL (ref 8–23)
BUN/CREAT SERPL: 8.7 (ref 7–25)
CALCIUM SPEC-SCNC: 8.3 MG/DL (ref 8.6–10.5)
CHLORIDE SERPL-SCNC: 100 MMOL/L (ref 98–107)
CO2 SERPL-SCNC: 25.4 MMOL/L (ref 22–29)
CREAT SERPL-MCNC: 1.15 MG/DL (ref 0.76–1.27)
EGFRCR SERPLBLD CKD-EPI 2021: 69.3 ML/MIN/1.73
GLUCOSE SERPL-MCNC: 136 MG/DL (ref 65–99)
POTASSIUM SERPL-SCNC: 3.7 MMOL/L (ref 3.5–5.2)
SODIUM SERPL-SCNC: 137 MMOL/L (ref 136–145)

## 2024-02-05 PROCEDURE — 80048 BASIC METABOLIC PNL TOTAL CA: CPT | Performed by: INTERNAL MEDICINE

## 2024-02-05 PROCEDURE — 25010000002 ERTAPENEM PER 500 MG: Performed by: NURSE PRACTITIONER

## 2024-02-05 PROCEDURE — 96365 THER/PROPH/DIAG IV INF INIT: CPT

## 2024-02-05 RX ORDER — SODIUM CHLORIDE 9 MG/ML
20 INJECTION, SOLUTION INTRAVENOUS ONCE
Status: CANCELLED | OUTPATIENT
Start: 2024-02-06

## 2024-02-05 RX ORDER — SODIUM CHLORIDE 9 MG/ML
20 INJECTION, SOLUTION INTRAVENOUS ONCE
Status: DISCONTINUED | OUTPATIENT
Start: 2024-02-05 | End: 2024-02-07 | Stop reason: HOSPADM

## 2024-02-05 RX ADMIN — ERTAPENEM 1000 MG: 1 INJECTION INTRAMUSCULAR; INTRAVENOUS at 10:17

## 2024-02-05 NOTE — TELEPHONE ENCOUNTER
Patient would like a referral to Dr. Da Monroe at [facility], patient states he has seen him before but is needing a new referral to get scheduled with him. I have pended the referral to you already for this.

## 2024-02-06 ENCOUNTER — HOSPITAL ENCOUNTER (OUTPATIENT)
Dept: INFUSION THERAPY | Facility: HOSPITAL | Age: 69
Discharge: HOME | End: 2024-02-06
Admitting: NURSE PRACTITIONER
Payer: MEDICARE

## 2024-02-06 ENCOUNTER — OFFICE VISIT (OUTPATIENT)
Dept: INTERNAL MEDICINE | Facility: CLINIC | Age: 69
End: 2024-02-06
Payer: MEDICARE

## 2024-02-06 VITALS
RESPIRATION RATE: 16 BRPM | HEART RATE: 67 BPM | OXYGEN SATURATION: 97 % | SYSTOLIC BLOOD PRESSURE: 130 MMHG | HEIGHT: 74 IN | BODY MASS INDEX: 39.91 KG/M2 | DIASTOLIC BLOOD PRESSURE: 80 MMHG | WEIGHT: 311 LBS | TEMPERATURE: 96.7 F

## 2024-02-06 VITALS — SYSTOLIC BLOOD PRESSURE: 133 MMHG | DIASTOLIC BLOOD PRESSURE: 76 MMHG | HEART RATE: 68 BPM | TEMPERATURE: 96.7 F

## 2024-02-06 DIAGNOSIS — N39.0 URINARY TRACT INFECTION WITHOUT HEMATURIA, SITE UNSPECIFIED: Primary | ICD-10-CM

## 2024-02-06 DIAGNOSIS — Z86.19 HISTORY OF ESBL E. COLI INFECTION: Primary | ICD-10-CM

## 2024-02-06 DIAGNOSIS — N18.30 CHRONIC RENAL IMPAIRMENT, STAGE 3 (MODERATE), UNSPECIFIED WHETHER STAGE 3A OR 3B CKD: ICD-10-CM

## 2024-02-06 PROCEDURE — 25010000002 ERTAPENEM PER 500 MG: Performed by: NURSE PRACTITIONER

## 2024-02-06 PROCEDURE — 96365 THER/PROPH/DIAG IV INF INIT: CPT

## 2024-02-06 RX ORDER — SODIUM CHLORIDE 9 MG/ML
20 INJECTION, SOLUTION INTRAVENOUS ONCE
Status: CANCELLED | OUTPATIENT
Start: 2024-02-07

## 2024-02-06 RX ORDER — SODIUM CHLORIDE 9 MG/ML
20 INJECTION, SOLUTION INTRAVENOUS ONCE
Status: DISCONTINUED | OUTPATIENT
Start: 2024-02-06 | End: 2024-02-08 | Stop reason: HOSPADM

## 2024-02-06 RX ADMIN — ERTAPENEM 1000 MG: 1 INJECTION INTRAMUSCULAR; INTRAVENOUS at 10:20

## 2024-02-06 NOTE — PROGRESS NOTES
Subjective     Patient ID: Andre Agosto is a 69 y.o. male. Patient is here for management of multiple medical problems.     Chief Complaint   Patient presents with    ESBL     History of Present Illness   ESBL.   Thursday rund out of iv infusion.  Monopril is discontinued. Very limited supply.          The following portions of the patient's history were reviewed and updated as appropriate: allergies, current medications, past family history, past medical history, past social history, past surgical history and problem list.    Review of Systems    Current Outpatient Medications:     amiodarone (PACERONE) 100 MG tablet, Take 1 tablet by mouth Daily., Disp: 60 tablet, Rfl: 11    amLODIPine (NORVASC) 10 MG tablet, Take 1 tablet by mouth Every Night., Disp: , Rfl:     carvedilol (Coreg) 25 MG tablet, Take 1 tablet by mouth 2 (Two) Times a Day With Meals., Disp: 180 tablet, Rfl: 3    Cholecalciferol (Vitamin D3) 25 MCG (1000 UT) capsule, Take 2 capsules by mouth Daily., Disp: 180 capsule, Rfl: 11    D-Mannose 500 MG capsule, Take 1 capsule by mouth 3 times a day., Disp: 90 capsule, Rfl: 0    divalproex (DEPAKOTE) 250 MG DR tablet, Take 3 tablets by mouth 2 (Two) Times a Day., Disp: 60 tablet, Rfl: 0    Entresto  MG tablet, Take 1 tablet by mouth 2 (Two) Times a Day. Hold until seen by PCP, Disp: , Rfl:     ertapenem (INVanz) 1000 mg/100 mL 0.9% NS VTB (mbp), Infuse 100 mL into a venous catheter Daily for 10 days. Indications: Infection with Dysregulated Inflammatory Response, Urinary Tract Infection (Patient taking differently: Infuse 100 mL into a venous catheter Daily.), Disp: , Rfl:     fosfomycin (MONUROL) 3 g pack, Take 3 g by mouth Every Other Day., Disp: 30 g, Rfl: 0    furosemide (LASIX) 40 MG tablet, Take 1 tablet by mouth 2 (Two) Times a Day., Disp: , Rfl:     levothyroxine (SYNTHROID, LEVOTHROID) 88 MCG tablet, TAKE 1 TABLET DAILY, Disp: 90 tablet, Rfl: 3    Semaglutide,0.25 or 0.5MG/DOS, (Ozempic,  "0.25 or 0.5 MG/DOSE,) 2 MG/1.5ML solution pen-injector, Inject 0.5 mg under the skin into the appropriate area as directed Every 7 (Seven) Days., Disp: 1.5 mL, Rfl: 3    tamsulosin (FLOMAX) 0.4 MG capsule 24 hr capsule, Take 1 capsule by mouth Daily., Disp: 30 capsule, Rfl: 5    valACYclovir (Valtrex) 1000 MG tablet, Take 1 tablet by mouth 2 (Two) Times a Day., Disp: 3 tablet, Rfl: 0    Xarelto 15 MG tablet, Take 1 tablet by mouth Daily With Dinner., Disp: , Rfl:   No current facility-administered medications for this visit.    Facility-Administered Medications Ordered in Other Visits:     sodium chloride 0.9 % infusion, 20 mL/hr, Intravenous, Once, Rojelio, Ela J, APRN    sodium chloride 0.9 % infusion, 20 mL/hr, Intravenous, Once, Rojelio, Ela J, APRN    Objective      Blood pressure 130/80, pulse 67, temperature 96.7 °F (35.9 °C), resp. rate 16, height 188 cm (74\"), weight (!) 141 kg (311 lb), SpO2 97%.            Physical Exam     General Appearance:    Alert, cooperative, no distress, appears stated age   Head:    Normocephalic, without obvious abnormality, atraumatic   Eyes:    PERRL, conjunctiva/corneas clear, EOM's intact   Ears:    Normal TM's and external ear canals, both ears   Nose:   Nares normal, septum midline, mucosa normal, no drainage   or sinus tenderness   Throat:   Lips, mucosa, and tongue normal; teeth and gums normal   Neck:   Supple, symmetrical, trachea midline, no adenopathy;        thyroid:  No enlargement/tenderness/nodules; no carotid    bruit or JVD   Back:     Symmetric, no curvature, ROM normal, no CVA tenderness   Lungs:     Clear to auscultation bilaterally, respirations unlabored   Chest wall:    No tenderness or deformity   Heart:    Regular rate and rhythm, S1 and S2 normal, no murmur,        rub or gallop   Abdomen:     Soft, non-tender, bowel sounds active all four quadrants,     no masses, no organomegaly   Extremities:   Extremities normal, atraumatic, no cyanosis or " edema   Pulses:   2+ and symmetric all extremities   Skin:   Skin color, texture, turgor normal, no rashes or lesions   Lymph nodes:   Cervical, supraclavicular, and axillary nodes normal   Neurologic:   CNII-XII intact. Normal strength, sensation and reflexes       throughout      Results for orders placed or performed in visit on 02/02/24   Basic metabolic panel    Specimen: Blood   Result Value Ref Range    Glucose 136 (H) 65 - 99 mg/dL    BUN 10 8 - 23 mg/dL    Creatinine 1.15 0.76 - 1.27 mg/dL    Sodium 137 136 - 145 mmol/L    Potassium 3.7 3.5 - 5.2 mmol/L    Chloride 100 98 - 107 mmol/L    CO2 25.4 22.0 - 29.0 mmol/L    Calcium 8.3 (L) 8.6 - 10.5 mg/dL    BUN/Creatinine Ratio 8.7 7.0 - 25.0    Anion Gap 11.6 5.0 - 15.0 mmol/L    eGFR 69.3 >60.0 mL/min/1.73     *Note: Due to a large number of results and/or encounters for the requested time period, some results have not been displayed. A complete set of results can be found in Results Review.         Assessment & Plan     Pt doing better.  Will continue with the plan. Hydrate well.            Diagnoses and all orders for this visit:    1. History of ESBL E. coli infection (Primary)  -     Basic metabolic panel    2. Chronic renal impairment, stage 3 (moderate), unspecified whether stage 3a or 3b CKD  -     Basic metabolic panel      Return in about 1 week (around 2/13/2024).          There are no Patient Instructions on file for this visit.     Keven Bear MD    Assessment & Plan       Answers submitted by the patient for this visit:  Primary Reason for Visit (Submitted on 2/6/2024)  What is the primary reason for your visit?: Other  Other (Submitted on 2/6/2024)  Please describe your symptoms.: Followup  Have you had these symptoms before?: No  How long have you been having these symptoms?: 5-7 days

## 2024-02-07 ENCOUNTER — HOSPITAL ENCOUNTER (OUTPATIENT)
Dept: INFUSION THERAPY | Facility: HOSPITAL | Age: 69
Discharge: HOME | End: 2024-02-07
Admitting: NURSE PRACTITIONER
Payer: MEDICARE

## 2024-02-07 VITALS
DIASTOLIC BLOOD PRESSURE: 73 MMHG | HEART RATE: 67 BPM | OXYGEN SATURATION: 96 % | BODY MASS INDEX: 39.91 KG/M2 | WEIGHT: 311 LBS | HEIGHT: 74 IN | TEMPERATURE: 98 F | SYSTOLIC BLOOD PRESSURE: 118 MMHG | RESPIRATION RATE: 18 BRPM

## 2024-02-07 DIAGNOSIS — N39.0 URINARY TRACT INFECTION WITHOUT HEMATURIA, SITE UNSPECIFIED: Primary | ICD-10-CM

## 2024-02-07 PROCEDURE — 96366 THER/PROPH/DIAG IV INF ADDON: CPT

## 2024-02-07 PROCEDURE — 25010000002 ERTAPENEM PER 500 MG: Performed by: NURSE PRACTITIONER

## 2024-02-07 PROCEDURE — 25810000003 SODIUM CHLORIDE 0.9 % SOLUTION: Performed by: NURSE PRACTITIONER

## 2024-02-07 PROCEDURE — 96365 THER/PROPH/DIAG IV INF INIT: CPT

## 2024-02-07 RX ORDER — SODIUM CHLORIDE 9 MG/ML
20 INJECTION, SOLUTION INTRAVENOUS ONCE
Status: CANCELLED | OUTPATIENT
Start: 2024-02-08

## 2024-02-07 RX ORDER — SODIUM CHLORIDE 9 MG/ML
20 INJECTION, SOLUTION INTRAVENOUS ONCE
Status: COMPLETED | OUTPATIENT
Start: 2024-02-07 | End: 2024-02-07

## 2024-02-07 RX ADMIN — SODIUM CHLORIDE 200 ML/HR: 9 INJECTION, SOLUTION INTRAVENOUS at 10:23

## 2024-02-07 RX ADMIN — ERTAPENEM 1000 MG: 1 INJECTION INTRAMUSCULAR; INTRAVENOUS at 10:23

## 2024-02-08 ENCOUNTER — HOSPITAL ENCOUNTER (OUTPATIENT)
Dept: INFUSION THERAPY | Facility: HOSPITAL | Age: 69
Discharge: HOME | End: 2024-02-08
Payer: MEDICARE

## 2024-02-08 ENCOUNTER — TELEMEDICINE (OUTPATIENT)
Dept: UROLOGY | Facility: CLINIC | Age: 69
End: 2024-02-08
Payer: MEDICARE

## 2024-02-08 ENCOUNTER — TRANSCRIBE ORDERS (OUTPATIENT)
Dept: INFUSION THERAPY | Facility: HOSPITAL | Age: 69
End: 2024-02-08
Payer: MEDICARE

## 2024-02-08 DIAGNOSIS — Z16.12 EXTENDED SPECTRUM BETA LACTAMASE (ESBL) RESISTANCE: Primary | ICD-10-CM

## 2024-02-08 DIAGNOSIS — N30.00 ACUTE CYSTITIS WITHOUT HEMATURIA: ICD-10-CM

## 2024-02-08 DIAGNOSIS — N39.0 URINARY TRACT INFECTION WITHOUT HEMATURIA, SITE UNSPECIFIED: Primary | ICD-10-CM

## 2024-02-08 DIAGNOSIS — C61 PROSTATE CANCER: Primary | ICD-10-CM

## 2024-02-08 DIAGNOSIS — R97.20 ELEVATED PROSTATE SPECIFIC ANTIGEN (PSA): ICD-10-CM

## 2024-02-08 DIAGNOSIS — A41.51 SEPSIS DUE TO ESCHERICHIA COLI WITHOUT ACUTE ORGAN DYSFUNCTION: ICD-10-CM

## 2024-02-08 DIAGNOSIS — N20.0 NEPHROLITHIASIS: ICD-10-CM

## 2024-02-08 LAB
ALBUMIN SERPL-MCNC: 3.8 G/DL (ref 3.5–5.2)
ALBUMIN/GLOB SERPL: 1 G/DL
ALP SERPL-CCNC: 66 U/L (ref 39–117)
ALT SERPL W P-5'-P-CCNC: 12 U/L (ref 1–41)
ANION GAP SERPL CALCULATED.3IONS-SCNC: 11.7 MMOL/L (ref 5–15)
AST SERPL-CCNC: 13 U/L (ref 1–40)
BASOPHILS # BLD AUTO: 0.08 10*3/MM3 (ref 0–0.2)
BASOPHILS NFR BLD AUTO: 1.1 % (ref 0–1.5)
BILIRUB SERPL-MCNC: 0.6 MG/DL (ref 0–1.2)
BUN SERPL-MCNC: 12 MG/DL (ref 8–23)
BUN/CREAT SERPL: 10.3 (ref 7–25)
CALCIUM SPEC-SCNC: 8.8 MG/DL (ref 8.6–10.5)
CHLORIDE SERPL-SCNC: 102 MMOL/L (ref 98–107)
CO2 SERPL-SCNC: 26.3 MMOL/L (ref 22–29)
CREAT SERPL-MCNC: 1.17 MG/DL (ref 0.76–1.27)
CRP SERPL-MCNC: 1.56 MG/DL (ref 0–0.5)
DEPRECATED RDW RBC AUTO: 46.4 FL (ref 37–54)
EGFRCR SERPLBLD CKD-EPI 2021: 67.9 ML/MIN/1.73
EOSINOPHIL # BLD AUTO: 0.15 10*3/MM3 (ref 0–0.4)
EOSINOPHIL NFR BLD AUTO: 2.1 % (ref 0.3–6.2)
ERYTHROCYTE [DISTWIDTH] IN BLOOD BY AUTOMATED COUNT: 14.4 % (ref 12.3–15.4)
ERYTHROCYTE [SEDIMENTATION RATE] IN BLOOD: 16 MM/HR (ref 0–20)
GLOBULIN UR ELPH-MCNC: 3.8 GM/DL
GLUCOSE SERPL-MCNC: 96 MG/DL (ref 65–99)
HCT VFR BLD AUTO: 46.2 % (ref 37.5–51)
HGB BLD-MCNC: 15.8 G/DL (ref 13–17.7)
IMM GRANULOCYTES # BLD AUTO: 0.03 10*3/MM3 (ref 0–0.05)
IMM GRANULOCYTES NFR BLD AUTO: 0.4 % (ref 0–0.5)
LYMPHOCYTES # BLD AUTO: 2.04 10*3/MM3 (ref 0.7–3.1)
LYMPHOCYTES NFR BLD AUTO: 27.9 % (ref 19.6–45.3)
MCH RBC QN AUTO: 30.3 PG (ref 26.6–33)
MCHC RBC AUTO-ENTMCNC: 34.2 G/DL (ref 31.5–35.7)
MCV RBC AUTO: 88.5 FL (ref 79–97)
MONOCYTES # BLD AUTO: 0.32 10*3/MM3 (ref 0.1–0.9)
MONOCYTES NFR BLD AUTO: 4.4 % (ref 5–12)
NEUTROPHILS NFR BLD AUTO: 4.68 10*3/MM3 (ref 1.7–7)
NEUTROPHILS NFR BLD AUTO: 64.1 % (ref 42.7–76)
NRBC BLD AUTO-RTO: 0 /100 WBC (ref 0–0.2)
PLATELET # BLD AUTO: 267 10*3/MM3 (ref 140–450)
PMV BLD AUTO: 10.5 FL (ref 6–12)
POTASSIUM SERPL-SCNC: 3.5 MMOL/L (ref 3.5–5.2)
PROT SERPL-MCNC: 7.6 G/DL (ref 6–8.5)
RBC # BLD AUTO: 5.22 10*6/MM3 (ref 4.14–5.8)
SODIUM SERPL-SCNC: 140 MMOL/L (ref 136–145)
WBC NRBC COR # BLD AUTO: 7.3 10*3/MM3 (ref 3.4–10.8)

## 2024-02-08 PROCEDURE — 86140 C-REACTIVE PROTEIN: CPT | Performed by: INTERNAL MEDICINE

## 2024-02-08 PROCEDURE — 85652 RBC SED RATE AUTOMATED: CPT | Performed by: INTERNAL MEDICINE

## 2024-02-08 PROCEDURE — 96365 THER/PROPH/DIAG IV INF INIT: CPT

## 2024-02-08 PROCEDURE — 85025 COMPLETE CBC W/AUTO DIFF WBC: CPT | Performed by: INTERNAL MEDICINE

## 2024-02-08 PROCEDURE — 25010000002 ERTAPENEM PER 500 MG: Performed by: NURSE PRACTITIONER

## 2024-02-08 PROCEDURE — 80053 COMPREHEN METABOLIC PANEL: CPT | Performed by: INTERNAL MEDICINE

## 2024-02-08 RX ORDER — SODIUM CHLORIDE 9 MG/ML
20 INJECTION, SOLUTION INTRAVENOUS ONCE
Status: CANCELLED | OUTPATIENT
Start: 2024-02-09

## 2024-02-08 RX ORDER — SODIUM CHLORIDE 9 MG/ML
20 INJECTION, SOLUTION INTRAVENOUS ONCE
Status: DISCONTINUED | OUTPATIENT
Start: 2024-02-08 | End: 2024-02-10 | Stop reason: HOSPADM

## 2024-02-08 RX ADMIN — ERTAPENEM 1000 MG: 1 INJECTION INTRAMUSCULAR; INTRAVENOUS at 12:58

## 2024-02-08 NOTE — PROGRESS NOTES
CC  Prostate cancer    HPI  Mr. Agosto is a 69 y.o. male with history below in assessment, who presents for follow up.     At this visit the patient is doing well after his hospitalization.  He is still on ertapenem.    Past Medical History:   Diagnosis Date    6th nerve palsy, right     right eye     Anxiety and depression     Atrial fibrillation, persistent 12/02/2020    on Xarelto    Coronary artery disease involving native coronary artery of native heart without angina pectoris 09/12/2018    follows w/ Dr. Mendoza    CRI (chronic renal insufficiency), stage 2 (mild)     CVA (cerebral vascular accident)     Diabetes mellitus     doesnt check sugar, type 2     Disease of thyroid gland     Double vision     resolved- right eye    Elevated cholesterol     Elevated prostate specific antigen (PSA) 11/08/2023    Focal seizure     of right arm     Heart attack     NSTEMI 2015, s/p stenting    History of Helicobacter pylori infection     in 2008, treated w/ PrevPak - 2021 EGD bx (+), PCP RX - PPI/Augmentin/Doxy/Flagyl (x 14 days) 1/22/21    HL (hearing loss)     (L) ear - child luevano measles    Hyperlipidemia     Hypertension     Kidney stone     Memory loss 2005    d/t meningitis    Meningitis 2005    unsure of bacterial or viral     Obesity     Sleep apnea treated with nocturnal BiPAP     compliant with  machine     Stroke     2017/2018    Ventricular tachycardia     3 different times    Wears glasses        Past Surgical History:   Procedure Laterality Date    CARDIAC CATHETERIZATION N/A 10/12/2018    Procedure: Left Heart Cath;  Surgeon: Yoselin Johnson MD;  Location: [facility] CATH INVASIVE LOCATION;  Service: Cardiology    CARDIAC CATHETERIZATION  03/2021    stent    CARDIAC CATHETERIZATION  07/2021    just checking the after pacemaker placed- Dr. Tim    CARDIAC DEFIBRILLATOR PLACEMENT  2021    COLONOSCOPY  10/2020    w/ Dr. Jacobs, hx colon polyps    CORONARY STENT PLACEMENT  2015    LAPAROSCOPIC GASTRIC BANDING   2008    s/p LAGB Realize 6/2008 by [facility].    PACEMAKER IMPLANTATION  07/25/2021    UMBILICAL HERNIA REPAIR  2008    incarcerated UHR w/ mesh by Dr. Velasquez    URETEROSCOPY LASER LITHOTRIPSY WITH STENT INSERTION Left 10/20/2021    Procedure: URETEROSCOPY LASER LITHOTRIPSY WITH STENT INSERTION;  Surgeon: Tonio De La Cruz MD;  Location: [facility];  Service: Urology;  Laterality: Left;    URETEROSCOPY LASER LITHOTRIPSY WITH STENT INSERTION Left 11/03/2021    Procedure: URETEROSCOPY LEFT, LEFT RETROGRADE PYELOGRAM, CYSTOSCOPY, LASER LITHOTRIPSY WITH STENT EXCHANGE;  Surgeon: Tonio De La Cruz MD;  Location: [facility] OR;  Service: Urology;  Laterality: Left;    URETEROSCOPY LASER LITHOTRIPSY WITH STENT INSERTION Left 10/06/2022    Procedure: URETEROSCOPY LASER LITHOTRIPSY WITH STENT INSERTION;  Surgeon: Tonio De La Cruz MD;  Location: [facility];  Service: Urology;  Laterality: Left;         Current Outpatient Medications:     amiodarone (PACERONE) 100 MG tablet, Take 1 tablet by mouth Daily., Disp: 60 tablet, Rfl: 11    amLODIPine (NORVASC) 10 MG tablet, Take 1 tablet by mouth Every Night., Disp: , Rfl:     carvedilol (Coreg) 25 MG tablet, Take 1 tablet by mouth 2 (Two) Times a Day With Meals., Disp: 180 tablet, Rfl: 3    Cholecalciferol (Vitamin D3) 25 MCG (1000 UT) capsule, Take 2 capsules by mouth Daily., Disp: 180 capsule, Rfl: 11    D-Mannose 500 MG capsule, Take 1 capsule by mouth 3 times a day., Disp: 90 capsule, Rfl: 0    divalproex (DEPAKOTE) 250 MG DR tablet, Take 3 tablets by mouth 2 (Two) Times a Day., Disp: 60 tablet, Rfl: 0    Entresto  MG tablet, Take 1 tablet by mouth 2 (Two) Times a Day. Hold until seen by PCP, Disp: , Rfl:     ertapenem (INVanz) 1000 mg/100 mL 0.9% NS VTB (mbp), Infuse 100 mL into a venous catheter Daily for 10 days. Indications: Infection with Dysregulated Inflammatory Response, Urinary Tract Infection (Patient taking differently: Infuse 100 mL into a venous catheter  Daily.), Disp: , Rfl:     fosfomycin (MONUROL) 3 g pack, Take 3 g by mouth Every Other Day., Disp: 30 g, Rfl: 0    furosemide (LASIX) 40 MG tablet, Take 1 tablet by mouth 2 (Two) Times a Day., Disp: , Rfl:     levothyroxine (SYNTHROID, LEVOTHROID) 88 MCG tablet, TAKE 1 TABLET DAILY, Disp: 90 tablet, Rfl: 3    Semaglutide,0.25 or 0.5MG/DOS, (Ozempic, 0.25 or 0.5 MG/DOSE,) 2 MG/1.5ML solution pen-injector, Inject 0.5 mg under the skin into the appropriate area as directed Every 7 (Seven) Days., Disp: 1.5 mL, Rfl: 3    tamsulosin (FLOMAX) 0.4 MG capsule 24 hr capsule, Take 1 capsule by mouth Daily., Disp: 30 capsule, Rfl: 5    valACYclovir (Valtrex) 1000 MG tablet, Take 1 tablet by mouth 2 (Two) Times a Day., Disp: 3 tablet, Rfl: 0    Xarelto 15 MG tablet, Take 1 tablet by mouth Daily With Dinner., Disp: , Rfl:   No current facility-administered medications for this visit.     Physical Exam  There were no vitals taken for this visit.    Labs  Brief Urine Lab Results  (Last result in the past 365 days)        Color   Clarity   Blood   Leuk Est   Nitrite   Protein   CREAT   Urine HCG        01/26/24 2103 Red   Turbid   Large (3+)   Large (3+)   Positive   100 mg/dL (2+)                   Lab Results   Component Value Date    GLUCOSE 136 (H) 02/05/2024    CALCIUM 8.3 (L) 02/05/2024     02/05/2024    K 3.7 02/05/2024    CO2 25.4 02/05/2024     02/05/2024    BUN 10 02/05/2024    CREATININE 1.15 02/05/2024    EGFRIFAFRI 57 (L) 12/05/2018    EGFRIFNONA 55 (L) 01/10/2022    BCR 8.7 02/05/2024    ANIONGAP 11.6 02/05/2024       Lab Results   Component Value Date    WBC 8.09 01/31/2024    HGB 14.9 01/31/2024    HCT 43.9 01/31/2024    MCV 90.3 01/31/2024     (L) 01/31/2024       Urine Culture          1/26/2024    21:03   Urine Culture   Urine Culture 50,000 CFU/mL Escherichia coli ESBL         Lab Results   Component Value Date    PSA 4.7 (H) 12/28/2023    PSA 5.370 (H) 10/12/2023    PSA 5.050 (H) 03/22/2023          DATE COLLECTED            DATE RECEIVED          DATE REPORTED  01/26/2024 01/26/2024 01/30/2024  DIAGNOSIS:  A.     PROSTATE, NEEDLE CORE BIOPSY, RIGHT APEX:  Benign prostate tissue  Negative for malignancy  B.     PROSTATE, NEEDLE CORE BIOPSY, RIGHT MID:  Benign prostate tissue  Negative for malignancy  C.     PROSTATE, NEEDLE CORE BIOPSY, RIGHT BASE:  Benign prostate tissue  Negative for malignancy  D.     PROSTATE, NEEDLE CORE BIOPSY, LEFT APEX:  Prostatic acinar adenocarcinoma  Luray's pattern 3+3, grade group 1  Carcinoma involves 1/2 cores, approximately 5% of total tissue volume  Negative for perineural invasion  E.     PROSTATE, NEEDLE CORE BIOPSY, LEFT MID:  Benign prostate tissue  Negative for malignancy  F.     PROSTATE, NEEDLE CORE BIOPSY, LEFT BASE:  Benign prostate tissue  Negative for malignancy  G.     PROSTATE, NEEDLE CORE BIOPSY, RIGHT TRANS ZONE:  Benign prostate tissue  Negative for malignancy  H.     PROSTATE, NEEDLE CORE BIOPSY, LEFT TRANS ZONE:  Benign prostate tissue  Negative for malignancy     Radiographic Studies  CT Abdomen Pelvis Without Contrast  Result Date: 1/28/2024   1. Bilateral nonobstructing renal calculi. No hydronephrosis.  2. Severe urinary bladder thickening with perivesical inflammatory changes suggestive of underlying cystitis. Malpositioned Crespo catheter with bulb in the prostatic urethra.   CTDI: 20.02 mGy DLP:1116.91 mGy.cm  This report was signed and finalized on 1/28/2024 11:00 AM by Torey Allen MD.      I have reviewed above labs and imaging.     Assessment  69 y.o. male with history of nephrolithiasis, status post ureteroscopy laser lithotripsy in 2022, elevated PSA with prostate biopsy, which demonstrates very low risk disease, 1 core grade group 1 Ron 6 prostate cancer, low percentage.  He did unfortunately have post TRUS biopsy sepsis with ESBL E. coli bacteremia.  He has had issues with infections with multiple  past procedures despite preprocedure cultures and appropriate antibiotics.  He has a fairly extensive cardiac history.    I had a long discussion with him today that the risks of prophylactic right ureteroscopy with laser lithotripsy outweigh the benefits.  This is hospital independent and the outcome would not be different of the location performed.     Plan  1.  Follow-up in 6 months with PSA and MRI prostate.  He will work to coordinate with his cardiologist and the radiologist so that his defibrillator can be shut off before the MRI.    2.  Observe stones for now. If they are seeded and he gets a UTI he should come to ER at [facility].  We are certainly able to treat him here.  He finishes his ertapenem therapy today.    I spent a total of 30 minutes with the patient and the chart engaging in data gathering and interpretation, patient interaction, as well as counseling on the risks, benefits, and alternatives of the therapy and coordinating care.

## 2024-02-09 ENCOUNTER — HOSPITAL ENCOUNTER (OUTPATIENT)
Dept: INFUSION THERAPY | Facility: HOSPITAL | Age: 69
Discharge: HOME | End: 2024-02-09
Payer: MEDICARE

## 2024-02-09 VITALS
SYSTOLIC BLOOD PRESSURE: 161 MMHG | HEART RATE: 68 BPM | DIASTOLIC BLOOD PRESSURE: 82 MMHG | RESPIRATION RATE: 18 BRPM | TEMPERATURE: 97.5 F

## 2024-02-09 DIAGNOSIS — N39.0 URINARY TRACT INFECTION WITHOUT HEMATURIA, SITE UNSPECIFIED: Primary | ICD-10-CM

## 2024-02-09 PROCEDURE — 96365 THER/PROPH/DIAG IV INF INIT: CPT

## 2024-02-09 PROCEDURE — 25010000002 ERTAPENEM PER 500 MG: Performed by: NURSE PRACTITIONER

## 2024-02-09 RX ORDER — SODIUM CHLORIDE 9 MG/ML
20 INJECTION, SOLUTION INTRAVENOUS ONCE
Status: DISCONTINUED | OUTPATIENT
Start: 2024-02-09 | End: 2024-02-11 | Stop reason: HOSPADM

## 2024-02-09 RX ORDER — SODIUM CHLORIDE 9 MG/ML
20 INJECTION, SOLUTION INTRAVENOUS ONCE
Status: CANCELLED | OUTPATIENT
Start: 2024-02-10

## 2024-02-09 RX ADMIN — ERTAPENEM 1000 MG: 1 INJECTION INTRAMUSCULAR; INTRAVENOUS at 10:45

## 2024-02-10 ENCOUNTER — HOSPITAL ENCOUNTER (OUTPATIENT)
Dept: INFUSION THERAPY | Facility: HOSPITAL | Age: 69
Discharge: HOME | End: 2024-02-10
Payer: MEDICARE

## 2024-02-10 VITALS
TEMPERATURE: 98.2 F | DIASTOLIC BLOOD PRESSURE: 87 MMHG | HEART RATE: 67 BPM | RESPIRATION RATE: 18 BRPM | SYSTOLIC BLOOD PRESSURE: 144 MMHG

## 2024-02-10 DIAGNOSIS — N39.0 URINARY TRACT INFECTION WITHOUT HEMATURIA, SITE UNSPECIFIED: Primary | ICD-10-CM

## 2024-02-10 PROCEDURE — 96365 THER/PROPH/DIAG IV INF INIT: CPT

## 2024-02-10 PROCEDURE — 25010000002 ERTAPENEM PER 500 MG: Performed by: INTERNAL MEDICINE

## 2024-02-10 RX ORDER — SODIUM CHLORIDE 9 MG/ML
20 INJECTION, SOLUTION INTRAVENOUS ONCE
Status: CANCELLED | OUTPATIENT
Start: 2024-02-11

## 2024-02-10 RX ORDER — SODIUM CHLORIDE 9 MG/ML
20 INJECTION, SOLUTION INTRAVENOUS ONCE
Status: DISCONTINUED | OUTPATIENT
Start: 2024-02-10 | End: 2024-02-12 | Stop reason: HOSPADM

## 2024-02-10 RX ADMIN — ERTAPENEM 1000 MG: 1 INJECTION INTRAMUSCULAR; INTRAVENOUS at 09:05

## 2024-02-11 ENCOUNTER — HOSPITAL ENCOUNTER (OUTPATIENT)
Dept: INFUSION THERAPY | Facility: HOSPITAL | Age: 69
End: 2024-02-11
Payer: MEDICARE

## 2024-02-11 VITALS
SYSTOLIC BLOOD PRESSURE: 137 MMHG | TEMPERATURE: 98.1 F | RESPIRATION RATE: 16 BRPM | HEART RATE: 69 BPM | DIASTOLIC BLOOD PRESSURE: 78 MMHG

## 2024-02-11 DIAGNOSIS — N39.0 URINARY TRACT INFECTION WITHOUT HEMATURIA, SITE UNSPECIFIED: Primary | ICD-10-CM

## 2024-02-11 PROCEDURE — 25010000002 ERTAPENEM PER 500 MG: Performed by: INTERNAL MEDICINE

## 2024-02-11 PROCEDURE — 96365 THER/PROPH/DIAG IV INF INIT: CPT

## 2024-02-11 RX ORDER — SODIUM CHLORIDE 9 MG/ML
20 INJECTION, SOLUTION INTRAVENOUS ONCE
Status: CANCELLED | OUTPATIENT
Start: 2024-02-12

## 2024-02-11 RX ORDER — SODIUM CHLORIDE 9 MG/ML
20 INJECTION, SOLUTION INTRAVENOUS ONCE
Status: ACTIVE | OUTPATIENT
Start: 2024-02-11

## 2024-02-11 RX ADMIN — ERTAPENEM 1000 MG: 1 INJECTION INTRAMUSCULAR; INTRAVENOUS at 09:11

## 2024-02-12 ENCOUNTER — OFFICE VISIT (OUTPATIENT)
Dept: NEUROLOGY | Facility: CLINIC | Age: 69
End: 2024-02-12
Payer: MEDICARE

## 2024-02-12 ENCOUNTER — HOSPITAL ENCOUNTER (OUTPATIENT)
Dept: INFUSION THERAPY | Facility: HOSPITAL | Age: 69
Discharge: HOME | End: 2024-02-12
Admitting: NURSE PRACTITIONER
Payer: MEDICARE

## 2024-02-12 VITALS
HEIGHT: 74 IN | HEART RATE: 69 BPM | TEMPERATURE: 96.9 F | OXYGEN SATURATION: 95 % | DIASTOLIC BLOOD PRESSURE: 80 MMHG | BODY MASS INDEX: 40.43 KG/M2 | WEIGHT: 315 LBS | SYSTOLIC BLOOD PRESSURE: 142 MMHG

## 2024-02-12 VITALS
DIASTOLIC BLOOD PRESSURE: 88 MMHG | RESPIRATION RATE: 18 BRPM | TEMPERATURE: 98 F | HEART RATE: 69 BPM | OXYGEN SATURATION: 96 % | SYSTOLIC BLOOD PRESSURE: 136 MMHG

## 2024-02-12 DIAGNOSIS — N39.0 URINARY TRACT INFECTION WITHOUT HEMATURIA, SITE UNSPECIFIED: Primary | ICD-10-CM

## 2024-02-12 DIAGNOSIS — G47.33 OBSTRUCTIVE SLEEP APNEA: Primary | ICD-10-CM

## 2024-02-12 DIAGNOSIS — G40.109 FOCAL MOTOR SEIZURE DISORDER: ICD-10-CM

## 2024-02-12 PROCEDURE — 96365 THER/PROPH/DIAG IV INF INIT: CPT

## 2024-02-12 PROCEDURE — 3077F SYST BP >= 140 MM HG: CPT | Performed by: NURSE PRACTITIONER

## 2024-02-12 PROCEDURE — 25810000003 SODIUM CHLORIDE 0.9 % SOLUTION: Performed by: NURSE PRACTITIONER

## 2024-02-12 PROCEDURE — 96366 THER/PROPH/DIAG IV INF ADDON: CPT

## 2024-02-12 PROCEDURE — 3079F DIAST BP 80-89 MM HG: CPT | Performed by: NURSE PRACTITIONER

## 2024-02-12 PROCEDURE — 99214 OFFICE O/P EST MOD 30 MIN: CPT | Performed by: NURSE PRACTITIONER

## 2024-02-12 PROCEDURE — 25010000002 ERTAPENEM PER 500 MG: Performed by: INTERNAL MEDICINE

## 2024-02-12 RX ORDER — SODIUM CHLORIDE 9 MG/ML
20 INJECTION, SOLUTION INTRAVENOUS ONCE
OUTPATIENT
Start: 2024-02-13

## 2024-02-12 RX ORDER — SODIUM CHLORIDE 9 MG/ML
20 INJECTION, SOLUTION INTRAVENOUS ONCE
Status: COMPLETED | OUTPATIENT
Start: 2024-02-12 | End: 2024-02-12

## 2024-02-12 RX ADMIN — ERTAPENEM 1000 MG: 1 INJECTION INTRAMUSCULAR; INTRAVENOUS at 11:00

## 2024-02-12 RX ADMIN — SODIUM CHLORIDE 200 ML/HR: 9 INJECTION, SOLUTION INTRAVENOUS at 11:32

## 2024-02-14 ENCOUNTER — READMISSION MANAGEMENT (OUTPATIENT)
Dept: CALL CENTER | Facility: HOSPITAL | Age: 69
End: 2024-02-14
Payer: MEDICARE

## 2024-02-16 ENCOUNTER — APPOINTMENT (OUTPATIENT)
Dept: GENERAL RADIOLOGY | Facility: HOSPITAL | Age: 69
End: 2024-02-16
Payer: MEDICARE

## 2024-02-16 ENCOUNTER — HOSPITAL ENCOUNTER (INPATIENT)
Facility: HOSPITAL | Age: 69
LOS: 5 days | Discharge: HOME HEALTH | End: 2024-02-21
Attending: EMERGENCY MEDICINE | Admitting: INTERNAL MEDICINE
Payer: MEDICARE

## 2024-02-16 ENCOUNTER — TELEPHONE (OUTPATIENT)
Dept: INTERNAL MEDICINE | Facility: CLINIC | Age: 69
End: 2024-02-16

## 2024-02-16 DIAGNOSIS — A41.9 SEPSIS, DUE TO UNSPECIFIED ORGANISM, UNSPECIFIED WHETHER ACUTE ORGAN DYSFUNCTION PRESENT: ICD-10-CM

## 2024-02-16 DIAGNOSIS — E87.20 LACTIC ACIDOSIS: ICD-10-CM

## 2024-02-16 DIAGNOSIS — M79.604 RIGHT LEG PAIN: ICD-10-CM

## 2024-02-16 DIAGNOSIS — I10 ESSENTIAL HYPERTENSION: ICD-10-CM

## 2024-02-16 DIAGNOSIS — R78.81 BACTEREMIA DUE TO ESCHERICHIA COLI: ICD-10-CM

## 2024-02-16 DIAGNOSIS — I50.31 ACUTE DIASTOLIC CHF (CONGESTIVE HEART FAILURE): ICD-10-CM

## 2024-02-16 DIAGNOSIS — R56.9 SEIZURE-LIKE ACTIVITY: Primary | ICD-10-CM

## 2024-02-16 DIAGNOSIS — E11.51 TYPE 2 DIABETES MELLITUS WITH DIABETIC PERIPHERAL ANGIOPATHY WITHOUT GANGRENE, WITHOUT LONG-TERM CURRENT USE OF INSULIN: ICD-10-CM

## 2024-02-16 DIAGNOSIS — B96.20 BACTEREMIA DUE TO ESCHERICHIA COLI: ICD-10-CM

## 2024-02-16 DIAGNOSIS — I48.92 PAROXYSMAL ATRIAL FLUTTER: ICD-10-CM

## 2024-02-16 DIAGNOSIS — A41.51 SEPSIS DUE TO ESCHERICHIA COLI WITHOUT ACUTE ORGAN DYSFUNCTION: ICD-10-CM

## 2024-02-16 DIAGNOSIS — N20.0 NEPHROLITHIASIS: ICD-10-CM

## 2024-02-16 DIAGNOSIS — E03.9 ACQUIRED HYPOTHYROIDISM: ICD-10-CM

## 2024-02-16 DIAGNOSIS — N39.0 URINARY TRACT INFECTION IN MALE: ICD-10-CM

## 2024-02-16 DIAGNOSIS — M25.561 ARTHRALGIA OF RIGHT KNEE: ICD-10-CM

## 2024-02-16 LAB
ALBUMIN SERPL-MCNC: 4 G/DL (ref 3.5–5.2)
ALBUMIN/GLOB SERPL: 1.1 G/DL
ALP SERPL-CCNC: 77 U/L (ref 39–117)
ALT SERPL W P-5'-P-CCNC: 10 U/L (ref 1–41)
ANION GAP SERPL CALCULATED.3IONS-SCNC: 14 MMOL/L (ref 5–15)
AST SERPL-CCNC: 12 U/L (ref 1–40)
BACTERIA UR QL AUTO: ABNORMAL /HPF
BASOPHILS # BLD AUTO: 0.05 10*3/MM3 (ref 0–0.2)
BASOPHILS NFR BLD AUTO: 0.4 % (ref 0–1.5)
BILIRUB SERPL-MCNC: 1.5 MG/DL (ref 0–1.2)
BILIRUB UR QL STRIP: NEGATIVE
BUN SERPL-MCNC: 11 MG/DL (ref 8–23)
BUN/CREAT SERPL: 9.3 (ref 7–25)
CALCIUM SPEC-SCNC: 8.8 MG/DL (ref 8.6–10.5)
CHLORIDE SERPL-SCNC: 101 MMOL/L (ref 98–107)
CLARITY UR: CLEAR
CO2 SERPL-SCNC: 26 MMOL/L (ref 22–29)
COLOR UR: YELLOW
CREAT SERPL-MCNC: 1.18 MG/DL (ref 0.76–1.27)
D-LACTATE SERPL-SCNC: 1.3 MMOL/L (ref 0.5–2)
D-LACTATE SERPL-SCNC: 2.3 MMOL/L (ref 0.5–2)
D-LACTATE SERPL-SCNC: 2.7 MMOL/L (ref 0.5–2)
DEPRECATED RDW RBC AUTO: 49.2 FL (ref 37–54)
EGFRCR SERPLBLD CKD-EPI 2021: 67.2 ML/MIN/1.73
EOSINOPHIL # BLD AUTO: 0.05 10*3/MM3 (ref 0–0.4)
EOSINOPHIL NFR BLD AUTO: 0.4 % (ref 0.3–6.2)
ERYTHROCYTE [DISTWIDTH] IN BLOOD BY AUTOMATED COUNT: 14.6 % (ref 12.3–15.4)
FLUAV RNA RESP QL NAA+PROBE: NOT DETECTED
FLUBV RNA RESP QL NAA+PROBE: NOT DETECTED
GLOBULIN UR ELPH-MCNC: 3.8 GM/DL
GLUCOSE SERPL-MCNC: 137 MG/DL (ref 65–99)
GLUCOSE UR STRIP-MCNC: ABNORMAL MG/DL
HCT VFR BLD AUTO: 47.8 % (ref 37.5–51)
HGB BLD-MCNC: 16.5 G/DL (ref 13–17.7)
HGB UR QL STRIP.AUTO: ABNORMAL
HOLD SPECIMEN: NORMAL
HYALINE CASTS UR QL AUTO: ABNORMAL /LPF
IMM GRANULOCYTES # BLD AUTO: 0.05 10*3/MM3 (ref 0–0.05)
IMM GRANULOCYTES NFR BLD AUTO: 0.4 % (ref 0–0.5)
KETONES UR QL STRIP: NEGATIVE
LEUKOCYTE ESTERASE UR QL STRIP.AUTO: ABNORMAL
LIPASE SERPL-CCNC: 60 U/L (ref 13–60)
LYMPHOCYTES # BLD AUTO: 1.58 10*3/MM3 (ref 0.7–3.1)
LYMPHOCYTES NFR BLD AUTO: 12.2 % (ref 19.6–45.3)
MCH RBC QN AUTO: 31.9 PG (ref 26.6–33)
MCHC RBC AUTO-ENTMCNC: 34.5 G/DL (ref 31.5–35.7)
MCV RBC AUTO: 92.3 FL (ref 79–97)
MONOCYTES # BLD AUTO: 0.92 10*3/MM3 (ref 0.1–0.9)
MONOCYTES NFR BLD AUTO: 7.1 % (ref 5–12)
NEUTROPHILS NFR BLD AUTO: 10.33 10*3/MM3 (ref 1.7–7)
NEUTROPHILS NFR BLD AUTO: 79.5 % (ref 42.7–76)
NITRITE UR QL STRIP: NEGATIVE
NRBC BLD AUTO-RTO: 0 /100 WBC (ref 0–0.2)
PH UR STRIP.AUTO: 6.5 [PH] (ref 5–8)
PLATELET # BLD AUTO: 215 10*3/MM3 (ref 140–450)
PMV BLD AUTO: 10.6 FL (ref 6–12)
POTASSIUM SERPL-SCNC: 3.6 MMOL/L (ref 3.5–5.2)
PROCALCITONIN SERPL-MCNC: 0.12 NG/ML (ref 0–0.25)
PROT SERPL-MCNC: 7.8 G/DL (ref 6–8.5)
PROT UR QL STRIP: ABNORMAL
RBC # BLD AUTO: 5.18 10*6/MM3 (ref 4.14–5.8)
RBC # UR STRIP: ABNORMAL /HPF
REF LAB TEST METHOD: ABNORMAL
SARS-COV-2 RNA RESP QL NAA+PROBE: NOT DETECTED
SODIUM SERPL-SCNC: 141 MMOL/L (ref 136–145)
SP GR UR STRIP: 1.01 (ref 1–1.03)
SQUAMOUS #/AREA URNS HPF: ABNORMAL /HPF
UROBILINOGEN UR QL STRIP: ABNORMAL
WBC # UR STRIP: ABNORMAL /HPF
WBC NRBC COR # BLD AUTO: 12.98 10*3/MM3 (ref 3.4–10.8)
WHOLE BLOOD HOLD COAG: NORMAL
WHOLE BLOOD HOLD SPECIMEN: NORMAL

## 2024-02-16 PROCEDURE — 87077 CULTURE AEROBIC IDENTIFY: CPT | Performed by: NURSE PRACTITIONER

## 2024-02-16 PROCEDURE — 25010000002 MEROPENEM PER 100 MG: Performed by: INTERNAL MEDICINE

## 2024-02-16 PROCEDURE — 87040 BLOOD CULTURE FOR BACTERIA: CPT | Performed by: NURSE PRACTITIONER

## 2024-02-16 PROCEDURE — 99285 EMERGENCY DEPT VISIT HI MDM: CPT

## 2024-02-16 PROCEDURE — 71045 X-RAY EXAM CHEST 1 VIEW: CPT

## 2024-02-16 PROCEDURE — 87154 CUL TYP ID BLD PTHGN 6+ TRGT: CPT | Performed by: NURSE PRACTITIONER

## 2024-02-16 PROCEDURE — 87186 SC STD MICRODIL/AGAR DIL: CPT | Performed by: EMERGENCY MEDICINE

## 2024-02-16 PROCEDURE — 25810000003 SODIUM CHLORIDE 0.9 % SOLUTION: Performed by: FAMILY MEDICINE

## 2024-02-16 PROCEDURE — 25810000003 SODIUM CHLORIDE 0.9 % SOLUTION: Performed by: NURSE PRACTITIONER

## 2024-02-16 PROCEDURE — 99223 1ST HOSP IP/OBS HIGH 75: CPT | Performed by: FAMILY MEDICINE

## 2024-02-16 PROCEDURE — 36415 COLL VENOUS BLD VENIPUNCTURE: CPT

## 2024-02-16 PROCEDURE — 87186 SC STD MICRODIL/AGAR DIL: CPT | Performed by: NURSE PRACTITIONER

## 2024-02-16 PROCEDURE — 84145 PROCALCITONIN (PCT): CPT | Performed by: NURSE PRACTITIONER

## 2024-02-16 PROCEDURE — 25010000002 MEROPENEM PER 100 MG: Performed by: EMERGENCY MEDICINE

## 2024-02-16 PROCEDURE — 81001 URINALYSIS AUTO W/SCOPE: CPT | Performed by: EMERGENCY MEDICINE

## 2024-02-16 PROCEDURE — 87636 SARSCOV2 & INF A&B AMP PRB: CPT | Performed by: NURSE PRACTITIONER

## 2024-02-16 PROCEDURE — 83690 ASSAY OF LIPASE: CPT | Performed by: NURSE PRACTITIONER

## 2024-02-16 PROCEDURE — 85025 COMPLETE CBC W/AUTO DIFF WBC: CPT | Performed by: EMERGENCY MEDICINE

## 2024-02-16 PROCEDURE — 80053 COMPREHEN METABOLIC PANEL: CPT | Performed by: EMERGENCY MEDICINE

## 2024-02-16 PROCEDURE — 83605 ASSAY OF LACTIC ACID: CPT | Performed by: NURSE PRACTITIONER

## 2024-02-16 PROCEDURE — 87077 CULTURE AEROBIC IDENTIFY: CPT | Performed by: EMERGENCY MEDICINE

## 2024-02-16 PROCEDURE — 87086 URINE CULTURE/COLONY COUNT: CPT | Performed by: EMERGENCY MEDICINE

## 2024-02-16 RX ORDER — SODIUM CHLORIDE 0.9 % (FLUSH) 0.9 %
10 SYRINGE (ML) INJECTION AS NEEDED
Status: DISCONTINUED | OUTPATIENT
Start: 2024-02-16 | End: 2024-02-21 | Stop reason: HOSPADM

## 2024-02-16 RX ORDER — NITROGLYCERIN 0.4 MG/1
0.4 TABLET SUBLINGUAL
Status: DISCONTINUED | OUTPATIENT
Start: 2024-02-16 | End: 2024-02-21 | Stop reason: HOSPADM

## 2024-02-16 RX ORDER — AMIODARONE HYDROCHLORIDE 200 MG/1
100 TABLET ORAL DAILY
Status: DISCONTINUED | OUTPATIENT
Start: 2024-02-17 | End: 2024-02-21 | Stop reason: HOSPADM

## 2024-02-16 RX ORDER — SODIUM CHLORIDE 0.9 % (FLUSH) 0.9 %
10 SYRINGE (ML) INJECTION AS NEEDED
Status: DISCONTINUED | OUTPATIENT
Start: 2024-02-16 | End: 2024-02-19

## 2024-02-16 RX ORDER — BISACODYL 10 MG
10 SUPPOSITORY, RECTAL RECTAL DAILY PRN
Status: DISCONTINUED | OUTPATIENT
Start: 2024-02-16 | End: 2024-02-21 | Stop reason: HOSPADM

## 2024-02-16 RX ORDER — SODIUM CHLORIDE 9 MG/ML
100 INJECTION, SOLUTION INTRAVENOUS CONTINUOUS
Status: DISCONTINUED | OUTPATIENT
Start: 2024-02-16 | End: 2024-02-19

## 2024-02-16 RX ORDER — POTASSIUM CHLORIDE 20 MEQ/1
40 TABLET, EXTENDED RELEASE ORAL EVERY 4 HOURS
Qty: 4 TABLET | Refills: 0 | Status: COMPLETED | OUTPATIENT
Start: 2024-02-16 | End: 2024-02-17

## 2024-02-16 RX ORDER — DIVALPROEX SODIUM 250 MG/1
750 TABLET, DELAYED RELEASE ORAL 2 TIMES DAILY
Status: DISCONTINUED | OUTPATIENT
Start: 2024-02-16 | End: 2024-02-18

## 2024-02-16 RX ORDER — TAMSULOSIN HYDROCHLORIDE 0.4 MG/1
0.4 CAPSULE ORAL DAILY
Status: DISCONTINUED | OUTPATIENT
Start: 2024-02-17 | End: 2024-02-21 | Stop reason: HOSPADM

## 2024-02-16 RX ORDER — AMOXICILLIN 250 MG
2 CAPSULE ORAL 2 TIMES DAILY PRN
Status: DISCONTINUED | OUTPATIENT
Start: 2024-02-16 | End: 2024-02-21 | Stop reason: HOSPADM

## 2024-02-16 RX ORDER — SODIUM CHLORIDE 9 MG/ML
40 INJECTION, SOLUTION INTRAVENOUS AS NEEDED
Status: DISCONTINUED | OUTPATIENT
Start: 2024-02-16 | End: 2024-02-21 | Stop reason: HOSPADM

## 2024-02-16 RX ORDER — BISACODYL 5 MG/1
5 TABLET, DELAYED RELEASE ORAL DAILY PRN
Status: DISCONTINUED | OUTPATIENT
Start: 2024-02-16 | End: 2024-02-21 | Stop reason: HOSPADM

## 2024-02-16 RX ORDER — LEVOTHYROXINE SODIUM 88 UG/1
88 TABLET ORAL
Status: DISCONTINUED | OUTPATIENT
Start: 2024-02-17 | End: 2024-02-21 | Stop reason: HOSPADM

## 2024-02-16 RX ORDER — POTASSIUM CHLORIDE 1.5 G/1.58G
40 POWDER, FOR SOLUTION ORAL EVERY 4 HOURS
Status: DISCONTINUED | OUTPATIENT
Start: 2024-02-16 | End: 2024-02-16

## 2024-02-16 RX ORDER — POLYETHYLENE GLYCOL 3350 17 G/17G
17 POWDER, FOR SOLUTION ORAL DAILY PRN
Status: DISCONTINUED | OUTPATIENT
Start: 2024-02-16 | End: 2024-02-21 | Stop reason: HOSPADM

## 2024-02-16 RX ORDER — SODIUM CHLORIDE 0.9 % (FLUSH) 0.9 %
10 SYRINGE (ML) INJECTION EVERY 12 HOURS SCHEDULED
Status: DISCONTINUED | OUTPATIENT
Start: 2024-02-16 | End: 2024-02-21 | Stop reason: HOSPADM

## 2024-02-16 RX ORDER — CARVEDILOL 12.5 MG/1
25 TABLET ORAL 2 TIMES DAILY WITH MEALS
Status: DISCONTINUED | OUTPATIENT
Start: 2024-02-16 | End: 2024-02-21 | Stop reason: HOSPADM

## 2024-02-16 RX ORDER — ONDANSETRON 2 MG/ML
4 INJECTION INTRAMUSCULAR; INTRAVENOUS EVERY 6 HOURS PRN
Status: DISCONTINUED | OUTPATIENT
Start: 2024-02-16 | End: 2024-02-21 | Stop reason: HOSPADM

## 2024-02-16 RX ORDER — AMLODIPINE BESYLATE 10 MG/1
10 TABLET ORAL NIGHTLY
Status: DISCONTINUED | OUTPATIENT
Start: 2024-02-16 | End: 2024-02-21 | Stop reason: HOSPADM

## 2024-02-16 RX ADMIN — SODIUM CHLORIDE 500 ML: 9 INJECTION, SOLUTION INTRAVENOUS at 16:43

## 2024-02-16 RX ADMIN — MEROPENEM 500 MG: 500 INJECTION, POWDER, FOR SOLUTION INTRAVENOUS at 20:15

## 2024-02-16 RX ADMIN — AMLODIPINE BESYLATE 10 MG: 10 TABLET ORAL at 22:45

## 2024-02-16 RX ADMIN — CARVEDILOL 25 MG: 12.5 TABLET, FILM COATED ORAL at 22:45

## 2024-02-16 RX ADMIN — DIVALPROEX SODIUM 750 MG: 250 TABLET, DELAYED RELEASE ORAL at 22:45

## 2024-02-16 RX ADMIN — MEROPENEM 1000 MG: 1 INJECTION, POWDER, FOR SOLUTION INTRAVENOUS at 15:50

## 2024-02-16 RX ADMIN — RIVAROXABAN 15 MG: 15 TABLET, FILM COATED ORAL at 22:45

## 2024-02-16 RX ADMIN — POTASSIUM CHLORIDE 40 MEQ: 1500 TABLET, EXTENDED RELEASE ORAL at 22:57

## 2024-02-16 RX ADMIN — SODIUM CHLORIDE 500 ML: 9 INJECTION, SOLUTION INTRAVENOUS at 22:46

## 2024-02-16 RX ADMIN — SODIUM CHLORIDE 100 ML/HR: 900 INJECTION INTRAVENOUS at 22:52

## 2024-02-16 NOTE — ED PROVIDER NOTES
EMERGENCY DEPARTMENT ENCOUNTER    Pt Name: Andre Agosto  MRN: [id number]  Pt :   1955  Room Number:  S206/1  Date of encounter:  2024  PCP: Keven Bear MD  ED Provider: YESY Schwartz    Historian: Patient    HPI:  Chief Complaint: Fever, sepsis    Context: Andre Agosto is a 69 y.o. male who presents to the ED c/o fever, sepsis.  Patient was recently admitted to [facility] for sepsis E. coli.  Patient advises that his PICC line was discharged on .  He was feeling much better at this time.  He still continues to take an antibiotic for UTI however name unknown.  He reports that in the middle the night he woke up with a fever.  Got up to 102.5.  They contacted the PCP who suggested he come here for evaluation per Dr. Boogie with ID.  Patient has had fever, weakness.  He complains of chills.  He reports increase in urination.  Negative for nausea, vomiting.  He explains that he almost felt flulike.  HPI     REVIEW OF SYSTEMS  A chief complaint appropriate review of systems was completed and is negative except as noted in the HPI.     PAST MEDICAL HISTORY  Past Medical History:   Diagnosis Date    6th nerve palsy, right     right eye     Anxiety and depression     Atrial fibrillation, persistent 2020    on Xarelto    Coronary artery disease involving native coronary artery of native heart without angina pectoris 2018    follows w/ Dr. Mendoza    CRI (chronic renal insufficiency), stage 2 (mild)     CVA (cerebral vascular accident)     Diabetes mellitus     doesnt check sugar, type 2     Disease of thyroid gland     Double vision     resolved- right eye    Elevated cholesterol     Elevated prostate specific antigen (PSA) 2023    Focal seizure     of right arm     Heart attack     NSTEMI , s/p stenting    History of Helicobacter pylori infection     in , treated w/ PrevPak -  EGD bx (+), PCP RX - PPI/Augmentin/Doxy/Flagyl (x 14 days) 21    HL  (hearing loss)     (L) ear - child luevano measles    Hyperlipidemia     Hypertension     Kidney stone     Memory loss 2005    d/t meningitis    Meningitis 2005    unsure of bacterial or viral     Obesity     Sleep apnea treated with nocturnal BiPAP     compliant with  machine     Stroke     2017/2018    Ventricular tachycardia     3 different times    Wears glasses        PAST SURGICAL HISTORY  Past Surgical History:   Procedure Laterality Date    CARDIAC CATHETERIZATION N/A 10/12/2018    Procedure: Left Heart Cath;  Surgeon: Yoselin Johnson MD;  Location:  [facility];  Service: Cardiology    CARDIAC CATHETERIZATION  03/2021    stent    CARDIAC CATHETERIZATION  07/2021    just checking the after pacemaker placed- Dr. Tim    CARDIAC DEFIBRILLATOR PLACEMENT  2021    COLONOSCOPY  10/2020    w/ Dr. Jacobs, hx colon polyps    CORONARY STENT PLACEMENT  2015    LAPAROSCOPIC GASTRIC BANDING  2008    s/p LAGB Realize 6/2008 by HOMERO.    PACEMAKER IMPLANTATION  07/25/2021    UMBILICAL HERNIA REPAIR  2008    incarcerated UHR w/ mesh by Dr. Velasquez    URETEROSCOPY LASER LITHOTRIPSY WITH STENT INSERTION Left 10/20/2021    Procedure: URETEROSCOPY LASER LITHOTRIPSY WITH STENT INSERTION;  Surgeon: Tonio De La Cruz MD;  Location: [facility] OR;  Service: Urology;  Laterality: Left;    URETEROSCOPY LASER LITHOTRIPSY WITH STENT INSERTION Left 11/03/2021    Procedure: URETEROSCOPY LEFT, LEFT RETROGRADE PYELOGRAM, CYSTOSCOPY, LASER LITHOTRIPSY WITH STENT EXCHANGE;  Surgeon: Tonio De La Cruz MD;  Location: [facility] OR;  Service: Urology;  Laterality: Left;    URETEROSCOPY LASER LITHOTRIPSY WITH STENT INSERTION Left 10/06/2022    Procedure: URETEROSCOPY LASER LITHOTRIPSY WITH STENT INSERTION;  Surgeon: Tonio De La Cruz MD;  Location: [facility] OR;  Service: Urology;  Laterality: Left;       FAMILY HISTORY  Family History   Problem Relation Age of Onset    Stroke Mother     Hypertension Mother     COPD Father      Hypertension Father        SOCIAL HISTORY  Social History     Socioeconomic History    Marital status:    Tobacco Use    Smoking status: Former     Packs/day: 0.50     Years: 15.00     Additional pack years: 0.00     Total pack years: 7.50     Types: Cigarettes     Start date: 1975     Quit date: 1985     Years since quittin.1    Smokeless tobacco: Never   Vaping Use    Vaping Use: Never used   Substance and Sexual Activity    Alcohol use: No    Drug use: No    Sexual activity: Defer       ALLERGIES  Statins    PHYSICAL EXAM  Physical Exam  Vitals and nursing note reviewed.   Constitutional:       Appearance: Normal appearance. He is ill-appearing. He is not toxic-appearing.   HENT:      Head: Normocephalic and atraumatic.      Nose: Nose normal.      Mouth/Throat:      Mouth: Mucous membranes are moist.   Eyes:      Extraocular Movements: Extraocular movements intact.      Pupils: Pupils are equal, round, and reactive to light.   Cardiovascular:      Rate and Rhythm: Normal rate and regular rhythm.      Pulses: Normal pulses.      Heart sounds: Normal heart sounds.   Pulmonary:      Effort: Pulmonary effort is normal.      Breath sounds: Normal breath sounds.   Abdominal:      General: Bowel sounds are normal. There is no distension.      Tenderness: There is no abdominal tenderness.   Musculoskeletal:         General: Normal range of motion.      Cervical back: Normal range of motion and neck supple.   Skin:     General: Skin is warm and dry.   Neurological:      General: No focal deficit present.      Mental Status: He is alert and oriented to person, place, and time.   Psychiatric:         Mood and Affect: Mood normal.         Behavior: Behavior normal.           LAB RESULTS  Results for orders placed or performed during the hospital encounter of 24   COVID-19 and FLU A/B PCR, 1 HR TAT - Swab, Nasopharynx    Specimen: Nasopharynx; Swab   Result Value Ref Range    COVID19 Not  Detected Not Detected - Ref. Range    Influenza A PCR Not Detected Not Detected    Influenza B PCR Not Detected Not Detected   Comprehensive Metabolic Panel    Specimen: Blood   Result Value Ref Range    Glucose 137 (H) 65 - 99 mg/dL    BUN 11 8 - 23 mg/dL    Creatinine 1.18 0.76 - 1.27 mg/dL    Sodium 141 136 - 145 mmol/L    Potassium 3.6 3.5 - 5.2 mmol/L    Chloride 101 98 - 107 mmol/L    CO2 26.0 22.0 - 29.0 mmol/L    Calcium 8.8 8.6 - 10.5 mg/dL    Total Protein 7.8 6.0 - 8.5 g/dL    Albumin 4.0 3.5 - 5.2 g/dL    ALT (SGPT) 10 1 - 41 U/L    AST (SGOT) 12 1 - 40 U/L    Alkaline Phosphatase 77 39 - 117 U/L    Total Bilirubin 1.5 (H) 0.0 - 1.2 mg/dL    Globulin 3.8 gm/dL    A/G Ratio 1.1 g/dL    BUN/Creatinine Ratio 9.3 7.0 - 25.0    Anion Gap 14.0 5.0 - 15.0 mmol/L    eGFR 67.2 >60.0 mL/min/1.73   Urinalysis With Culture If Indicated - Urine, Clean Catch    Specimen: Urine, Clean Catch   Result Value Ref Range    Color, UA Yellow Yellow, Straw    Appearance, UA Clear Clear    pH, UA 6.5 5.0 - 8.0    Specific Gravity, UA 1.015 1.001 - 1.030    Glucose, UA >=1000 mg/dL (3+) (A) Negative    Ketones, UA Negative Negative    Bilirubin, UA Negative Negative    Blood, UA Moderate (2+) (A) Negative    Protein, UA 30 mg/dL (1+) (A) Negative    Leuk Esterase, UA Small (1+) (A) Negative    Nitrite, UA Negative Negative    Urobilinogen, UA 0.2 E.U./dL 0.2 - 1.0 E.U./dL   CBC Auto Differential    Specimen: Blood   Result Value Ref Range    WBC 12.98 (H) 3.40 - 10.80 10*3/mm3    RBC 5.18 4.14 - 5.80 10*6/mm3    Hemoglobin 16.5 13.0 - 17.7 g/dL    Hematocrit 47.8 37.5 - 51.0 %    MCV 92.3 79.0 - 97.0 fL    MCH 31.9 26.6 - 33.0 pg    MCHC 34.5 31.5 - 35.7 g/dL    RDW 14.6 12.3 - 15.4 %    RDW-SD 49.2 37.0 - 54.0 fl    MPV 10.6 6.0 - 12.0 fL    Platelets 215 140 - 450 10*3/mm3    Neutrophil % 79.5 (H) 42.7 - 76.0 %    Lymphocyte % 12.2 (L) 19.6 - 45.3 %    Monocyte % 7.1 5.0 - 12.0 %    Eosinophil % 0.4 0.3 - 6.2 %    Basophil %  0.4 0.0 - 1.5 %    Immature Grans % 0.4 0.0 - 0.5 %    Neutrophils, Absolute 10.33 (H) 1.70 - 7.00 10*3/mm3    Lymphocytes, Absolute 1.58 0.70 - 3.10 10*3/mm3    Monocytes, Absolute 0.92 (H) 0.10 - 0.90 10*3/mm3    Eosinophils, Absolute 0.05 0.00 - 0.40 10*3/mm3    Basophils, Absolute 0.05 0.00 - 0.20 10*3/mm3    Immature Grans, Absolute 0.05 0.00 - 0.05 10*3/mm3    nRBC 0.0 0.0 - 0.2 /100 WBC   Lipase    Specimen: Blood   Result Value Ref Range    Lipase 60 13 - 60 U/L   Lactic Acid, Plasma    Specimen: Blood   Result Value Ref Range    Lactate 2.3 (C) 0.5 - 2.0 mmol/L   Procalcitonin    Specimen: Blood   Result Value Ref Range    Procalcitonin 0.12 0.00 - 0.25 ng/mL   Urinalysis, Microscopic Only - Urine, Clean Catch    Specimen: Urine, Clean Catch   Result Value Ref Range    RBC, UA 11-20 (A) None Seen, 0-2 /HPF    WBC, UA Too Numerous to Count (A) None Seen, 0-2 /HPF    Bacteria, UA None Seen None Seen, Trace /HPF    Squamous Epithelial Cells, UA None Seen None Seen, 0-2 /HPF    Hyaline Casts, UA None Seen 0 - 6 /LPF    Methodology Automated Microscopy    STAT Lactic Acid, Reflex    Specimen: Blood   Result Value Ref Range    Lactate 2.7 (C) 0.5 - 2.0 mmol/L   STAT Lactic Acid, Reflex    Specimen: Blood   Result Value Ref Range    Lactate 1.3 0.5 - 2.0 mmol/L   POC Glucose Once    Specimen: Blood   Result Value Ref Range    Glucose 131 (H) 70 - 130 mg/dL   Green Top (Gel)   Result Value Ref Range    Extra Tube Hold for add-ons.    Lavender Top   Result Value Ref Range    Extra Tube hold for add-on    Gold Top - SST   Result Value Ref Range    Extra Tube Hold for add-ons.    Gray Top   Result Value Ref Range    Extra Tube Hold for add-ons.    Light Blue Top   Result Value Ref Range    Extra Tube Hold for add-ons.      *Note: Due to a large number of results and/or encounters for the requested time period, some results have not been displayed. A complete set of results can be found in Results Review.       If labs  were ordered, I independently reviewed the results and considered them in treating the patient.    RADIOLOGY  CT Abdomen Pelvis Stone Protocol   Final Result   Impression:      1. Multiple bilateral nonobstructing renal stones. No evidence of ureteral stone or hydronephrosis.   2. Cholelithiasis. No evidence of acute cholecystitis or biliary tract obstruction.                  Electronically Signed: Jose Matta MD     2/17/2024 8:25 AM EST     Workstation ID: [id number]      XR Chest 1 View   Final Result   Impression:      1. Stable cardiomegaly.   2. No acute process identified in the chest.         Electronically Signed: Nicolás Allred MD     2/16/2024 3:02 PM EST     Workstation ID: [id number]        [x] Radiologist's Report Reviewed:  I ordered and independently interpreted the above noted radiographic studies.  See radiologist's dictation for official interpretation.      PROCEDURES    Procedures    No orders to display       MEDICATIONS GIVEN IN ER    Medications   sodium chloride 0.9 % flush 10 mL (has no administration in time range)   sodium chloride 0.9 % flush 10 mL (has no administration in time range)   meropenem (MERREM) 500 mg in sodium chloride 0.9 % 100 mL IVPB (500 mg Intravenous New Bag 2/17/24 0049)   sodium chloride 0.9 % flush 10 mL ( Intravenous Canceled Entry 2/16/24 2252)   sodium chloride 0.9 % flush 10 mL (has no administration in time range)   sodium chloride 0.9 % infusion 40 mL (has no administration in time range)   tamsulosin (FLOMAX) 24 hr capsule 0.4 mg (has no administration in time range)   levothyroxine (SYNTHROID, LEVOTHROID) tablet 88 mcg (88 mcg Oral Given 2/17/24 2920)   carvedilol (COREG) tablet 25 mg (25 mg Oral Given 2/16/24 2245)   divalproex (DEPAKOTE) DR tablet 750 mg (750 mg Oral Given 2/16/24 2245)   amLODIPine (NORVASC) tablet 10 mg (10 mg Oral Given 2/16/24 2245)   amiodarone (PACERONE) tablet 100 mg (has no administration in time range)   nitroglycerin (NITROSTAT)  SL tablet 0.4 mg (has no administration in time range)   sodium chloride 0.9 % infusion (100 mL/hr Intravenous New Bag 2/16/24 1862)   Potassium Replacement - Follow Nurse / BPA Driven Protocol (has no administration in time range)   Magnesium Standard Dose Replacement - Follow Nurse / BPA Driven Protocol (has no administration in time range)   Phosphorus Replacement - Follow Nurse / BPA Driven Protocol (has no administration in time range)   Calcium Replacement - Follow Nurse / BPA Driven Protocol (has no administration in time range)   sennosides-docusate (PERICOLACE) 8.6-50 MG per tablet 2 tablet (has no administration in time range)     And   polyethylene glycol (MIRALAX) packet 17 g (has no administration in time range)     And   bisacodyl (DULCOLAX) EC tablet 5 mg (has no administration in time range)     And   bisacodyl (DULCOLAX) suppository 10 mg (has no administration in time range)   ondansetron (ZOFRAN) injection 4 mg (has no administration in time range)   dextrose (GLUTOSE) oral gel 15 g (has no administration in time range)   dextrose (D50W) (25 g/50 mL) IV injection 25 g (has no administration in time range)   glucagon (GLUCAGEN) injection 1 mg (has no administration in time range)   Insulin Lispro (humaLOG) injection 2-7 Units (has no administration in time range)   acetaminophen (TYLENOL) tablet 1,000 mg (1,000 mg Oral Given 2/17/24 0529)   sacubitril-valsartan (ENTRESTO)  MG tablet 1 tablet (1 tablet Oral Given 2/17/24 0049)   rivaroxaban (XARELTO) tablet 15 mg (has no administration in time range)   meropenem (MERREM) 1,000 mg in sodium chloride 0.9 % 100 mL IVPB (1,000 mg Intravenous New Bag 2/16/24 1550)   sodium chloride 0.9 % bolus 500 mL (500 mL Intravenous New Bag 2/16/24 1643)   meropenem (MERREM) 500 mg in sodium chloride 0.9 % 100 mL IVPB (500 mg Intravenous New Bag 2/16/24 2015)   sodium chloride 0.9 % bolus 500 mL (500 mL Intravenous New Bag 2/16/24 2246)   potassium chloride  (K-DUR,KLOR-CON) CR tablet 40 mEq (40 mEq Oral Given 2/17/24 0528)       MEDICAL DECISION MAKING, PROGRESS, and CONSULTS   Medical Decision Making  Andre Agosto is a 69 y.o. male who presents to the ED c/o fever, sepsis.  Patient was recently admitted to [facility] for sepsis E. coli.  Patient advises that his PICC line was discharged on February 12.  He was feeling much better at this time.  He still continues to take an antibiotic for UTI however name unknown.  He reports that in the middle the night he woke up with a fever.  Got up to 102.5.  They contacted the PCP who suggested he come here for evaluation per Dr. Boogie with ID.  Patient has had fever, weakness.  He complains of chills.  He reports increase in urination.  Negative for nausea, vomiting.  He explains that he almost felt flulike.      Problems Addressed:  Lactic acidosis: acute illness or injury     Details: Initial lactate was 2.3.  Sepsis, due to unspecified organism, unspecified whether acute organ dysfunction present: acute illness or injury     Details: Show lactate 2.3.  IV fluids infusing.  White blood cell count greater than 12,000.  Urinary tract infection in male: acute illness or injury     Details: Urine positive for leukocytes, too numerous to count WBCs, hematuria.  Lactate is elevated.  ID has been consulted.  We will start patient on Merrem.    Amount and/or Complexity of Data Reviewed  Labs: ordered. Decision-making details documented in ED Course.  Radiology: ordered. Decision-making details documented in ED Course.    Risk  Prescription drug management.  Decision regarding hospitalization.        All labs have been independently reviewed by me.  All radiology studies have been interpreted by me and the radiologist dictating the report.  All EKG's have been independently interpreted by me as well as and overseeing attending physician.    [] Discussed with radiology regarding test interpretation:    Discussion below represents  my analysis of pertinent findings related to patient's condition, differential diagnosis, treatment plan and final disposition.    Differential diagnosis:  The differential diagnosis associated with the patient's presentation includes: sepsis, Cystitis, UTI, COVID, influenza, pneumonia, electrolyte imbalance, dehydration    Additional sources  Discussed/ obtained information from independent historians:   [x] Spouse  [] Parent  [] Family member  [] Friend  [] EMS   [] Other:  External (non-ED) record review:   [] Inpatient record:   [] Office record:   [] Outpatient record:   [x] Prior Outpatient labs:   [x] Prior Outpatient radiology:   [] Primary Care record:   [] Outside ED record:   [] Other:   Patient's care impacted by:   [x] Diabetes  [x] Hypertension  [x] Hyperlipidemia  [] Hypothyroidism   [x] Coronary Artery Disease   [] COPD   [] Cancer   [x] Obesity  [] GERD   [] Tobacco Abuse   [] Substance Abuse    [x] Anxiety   [x] Depression   [] Other:   Care significantly affected by Social Determinants of Health (housing and economic circumstances, unemployment)    [] Yes     [x] No   If yes, Patient's care significantly limited by  Social Determinants of Health including:   [] Inadequate housing   [] Low income   [] Alcoholism and drug addiction in family   [] Problems related to primary support group   [] Unemployment   [] Problems related to employment   [] Other Social Determinants of Health:     Shared decision making: Shared decision making with patient and family we will start the patient on IV antibiotics.  We will admit to the hospitalist and consult ID.  Patient agrees with our treatment plan.    Orders placed during this visit:  Orders Placed This Encounter   Procedures    COVID PRE-OP / PRE-PROCEDURE SCREENING ORDER (NO ISOLATION) - Swab, Nasopharynx    Blood Culture - Blood,    Blood Culture - Blood,    COVID-19 and FLU A/B PCR, 1 HR TAT - Swab, Nasopharynx    Urine Culture - Urine,    XR Chest 1 View     CT Abdomen Pelvis Stone Protocol    [place] Draw    Comprehensive Metabolic Panel    Urinalysis With Culture If Indicated - Urine, Clean Catch    CBC Auto Differential    Lipase    Lactic Acid, Plasma    Procalcitonin    Urinalysis, Microscopic Only - Urine, Clean Catch    STAT Lactic Acid, Reflex    STAT Lactic Acid, Reflex    Basic Metabolic Panel    CBC (No Diff)    Potassium    Diet: Cardiac Diets, Diabetic Diets; Healthy Heart (2-3 Na+); Consistent Carbohydrate; Texture: Regular Texture (IDDSI 7); Fluid Consistency: Thin (IDDSI 0)    Vital Signs    Intake & Output    Weigh Patient    Oral Care    Telemetry - Maintain IV Access    Telemetry - Place Orders & Notify Provider of Results When Patient Experiences Acute Chest Pain, Dysrhythmia or Respiratory Distress    May Be Off Telemetry for Tests    Activity - Ad Mimi    Code Status and Medical Interventions:    Inpatient Infectious Diseases Consult    Inpatient Urology Consult    PT Consult: Eval & Treat Functional Mobility Below Baseline    POC Glucose 4x Daily Before Meals & at Bedtime    POC Glucose Once    Insert peripheral IV    Insert Peripheral IV    Insert Peripheral IV    Inpatient Admission    ED Bed Request    CBC & Differential    Green Top (Gel)    Lavender Top    Gold Top - SST    Gray Top    Light Blue Top       I considered prescription management  with:   [] Pain medication  [] Antiviral  [] Antibiotic   [] Other:   Rationale:  Additional orders considered but not ordered:  The following testing was considered but ultimately not selected after discussion with patient/family:    ED Course:    ED Course as of 02/17/24 0847   Fri Feb 16, 2024   1408 CT of the abdomen and pelvis reviewed from January 28    1. Bilateral nonobstructing renal calculi. No hydronephrosis.     2. Severe urinary bladder thickening with perivesical inflammatory  changes suggestive of underlying cystitis. Malpositioned Crespo catheter  with bulb in the prostatic urethra.    [KG]   1510 COVID19: Not Detected [KG]   1510 Influenza A PCR: Not Detected [KG]   1510 Influenza B PCR: Not Detected [KG]   1510 Leukocytes, UA(!): Small (1+) [KG]   1510 WBC, UA(!): Too Numerous to Count [KG]   1510 RBC, UA(!): 11-20 [KG]   1510 Blood, UA(!): Moderate (2+)  Dr. Boogie called and spoke with Dr. Grissom, will start patient on Merrem 1000mg IV and admit to the hospitalist.  [KG]   1510 Chest xray reviewed: no infectious process.    [KG]   1606 WBC(!): 12.98 [KG]   1606 Lactate(!!): 2.3 [KG]   1630 Dr. Mcdaniels contacted and agrees to admit the patient to the hospitalist service.    [KG]      ED Course User Index  [KG] Liya Bridges, APRN            DIAGNOSIS  Final diagnoses:   Urinary tract infection in male   Sepsis, due to unspecified organism, unspecified whether acute organ dysfunction present   Lactic acidosis       DISPOSITION    ED Disposition       ED Disposition   Decision to Admit    Condition   --    Comment   Level of Care: Telemetry [5]   Diagnosis: UTI (urinary tract infection) [[id number]]   Admitting Physician: HAIR MCDANIELS [[id number]]   Certification: I Certify That Inpatient Hospital Services Are Medically Necessary For Greater Than 2 Midnights                 Please note that portions of this document were completed with voice recognition software.       Liya Bridges APRN  02/17/24 0894

## 2024-02-16 NOTE — LETTER
February 19, 2024     Patient: Andre Agosto   YOB: 1955   Date of Visit: 2/16/2024       To Whom It May Concern:    Mr. Agosto is unable to travel for 21 days due to medical reasons.            Sincerely,    Mara Bridges DO

## 2024-02-16 NOTE — ED NOTES
Andre Agosto    Nursing Report ED to Floor:  Mental status: a/o4  Ambulatory status: times 1  Oxygen Therapy:  ra  Cardiac Rhythm: nsr  Admitted from: ed  Safety Concerns:  no  Social Issues: no  ED Room #:  28    ED Nurse Phone Extension - [phone] or may call [phone].      HPI:   Chief Complaint   Patient presents with    Fever       Past Medical History:  Past Medical History:   Diagnosis Date    6th nerve palsy, right     right eye     Anxiety and depression     Atrial fibrillation, persistent 12/02/2020    on Xarelto    Coronary artery disease involving native coronary artery of native heart without angina pectoris 09/12/2018    follows w/ Dr. Mendoza    CRI (chronic renal insufficiency), stage 2 (mild)     CVA (cerebral vascular accident)     Diabetes mellitus     doesnt check sugar, type 2     Disease of thyroid gland     Double vision     resolved- right eye    Elevated cholesterol     Elevated prostate specific antigen (PSA) 11/08/2023    Focal seizure     of right arm     Heart attack     NSTEMI 2015, s/p stenting    History of Helicobacter pylori infection     in 2008, treated w/ PrevPak - 2021 EGD bx (+), PCP RX - PPI/Augmentin/Doxy/Flagyl (x 14 days) 1/22/21    HL (hearing loss)     (L) ear - child luevano measles    Hyperlipidemia     Hypertension     Kidney stone     Memory loss 2005    d/t meningitis    Meningitis 2005    unsure of bacterial or viral     Obesity     Sleep apnea treated with nocturnal BiPAP     compliant with  machine     Stroke     2017/2018    Ventricular tachycardia     3 different times    Wears glasses         Past Surgical History:  Past Surgical History:   Procedure Laterality Date    CARDIAC CATHETERIZATION N/A 10/12/2018    Procedure: Left Heart Cath;  Surgeon: Yoselin Johnson MD;  Location: [facility] CATH INVASIVE LOCATION;  Service: Cardiology    CARDIAC CATHETERIZATION  03/2021    stent    CARDIAC CATHETERIZATION  07/2021    just checking the after pacemaker placed- Dr. Tim  "   CARDIAC DEFIBRILLATOR PLACEMENT  2021    COLONOSCOPY  10/2020    w/ Dr. Jacobs, hx colon polyps    CORONARY STENT PLACEMENT  2015    LAPAROSCOPIC GASTRIC BANDING  2008    s/p LAGB Realize 6/2008 by HOMERO.    PACEMAKER IMPLANTATION  07/25/2021    UMBILICAL HERNIA REPAIR  2008    incarcerated UHR w/ mesh by Dr. Velasquez    URETEROSCOPY LASER LITHOTRIPSY WITH STENT INSERTION Left 10/20/2021    Procedure: URETEROSCOPY LASER LITHOTRIPSY WITH STENT INSERTION;  Surgeon: Tonio De La Cruz MD;  Location: [facility] OR;  Service: Urology;  Laterality: Left;    URETEROSCOPY LASER LITHOTRIPSY WITH STENT INSERTION Left 11/03/2021    Procedure: URETEROSCOPY LEFT, LEFT RETROGRADE PYELOGRAM, CYSTOSCOPY, LASER LITHOTRIPSY WITH STENT EXCHANGE;  Surgeon: Tonio De La Cruz MD;  Location: [facility] OR;  Service: Urology;  Laterality: Left;    URETEROSCOPY LASER LITHOTRIPSY WITH STENT INSERTION Left 10/06/2022    Procedure: URETEROSCOPY LASER LITHOTRIPSY WITH STENT INSERTION;  Surgeon: Tonio De La Cruz MD;  Location: [facility] OR;  Service: Urology;  Laterality: Left;        Admitting Doctor:   Catherine Mcdaniels MD    Consulting Provider(s):  Consults       Date and Time Order Name Status Description    2/16/2024  2:42 PM Inpatient Infectious Diseases Consult Completed     1/28/2024 11:45 AM Inpatient Nephrology Consult Completed     1/27/2024  5:35 AM Inpatient Urology Consult Completed              Admitting Diagnosis:   There were no encounter diagnoses.    Most Recent Vitals:   Vitals:    02/16/24 1354   BP: (!) 196/109   BP Location: Left arm   Patient Position: Sitting   Pulse: 74   Resp: 20   Temp: 99.9 °F (37.7 °C)   TempSrc: Oral   SpO2: 94%   Weight: (!) 138 kg (305 lb)   Height: 193 cm (76\")       Active LDAs/IV Access:   Lines, Drains & Airways       Active LDAs       Name Placement date Placement time Site Days    Peripheral IV 02/16/24 1501 Anterior;Right Forearm 02/16/24  1501  Forearm  less than 1                    Labs " (abnormal labs have a star):   Labs Reviewed   COMPREHENSIVE METABOLIC PANEL - Abnormal; Notable for the following components:       Result Value    Glucose 137 (*)     Total Bilirubin 1.5 (*)     All other components within normal limits    Narrative:     GFR Normal >60  Chronic Kidney Disease <60  Kidney Failure <15     URINALYSIS W/ CULTURE IF INDICATED - Abnormal; Notable for the following components:    Glucose, UA >=1000 mg/dL (3+) (*)     Blood, UA Moderate (2+) (*)     Protein, UA 30 mg/dL (1+) (*)     Leuk Esterase, UA Small (1+) (*)     All other components within normal limits    Narrative:     In absence of clinical symptoms, the presence of pyuria, bacteria, and/or nitrites on the urinalysis result does not correlate with infection.   CBC WITH AUTO DIFFERENTIAL - Abnormal; Notable for the following components:    WBC 12.98 (*)     Neutrophil % 79.5 (*)     Lymphocyte % 12.2 (*)     Neutrophils, Absolute 10.33 (*)     Monocytes, Absolute 0.92 (*)     All other components within normal limits   LACTIC ACID, PLASMA - Abnormal; Notable for the following components:    Lactate 2.3 (*)     All other components within normal limits   URINALYSIS, MICROSCOPIC ONLY - Abnormal; Notable for the following components:    RBC, UA 11-20 (*)     WBC, UA Too Numerous to Count (*)     All other components within normal limits   COVID-19 AND FLU A/B, NP SWAB IN TRANSPORT MEDIA 1 HR TAT - Normal    Narrative:     Fact sheet for providers: https://www.fda.gov/media/805219/download    Fact sheet for patients: https://www.fda.gov/media/380070/download    Test performed by PCR.   LIPASE - Normal   PROCALCITONIN - Normal    Narrative:     As a Marker for Sepsis (Non-Neonates):    1. <0.5 ng/mL represents a low risk of severe sepsis and/or septic shock.  2. >2 ng/mL represents a high risk of severe sepsis and/or septic shock.    As a Marker for Lower Respiratory Tract Infections that require antibiotic therapy:    PCT on  "Admission    Antibiotic Therapy       6-12 Hrs later    >0.5                Strongly Recommended  >0.25 - <0.5        Recommended   0.1 - 0.25          Discouraged              Remeasure/reassess PCT  <0.1                Strongly Discouraged     Remeasure/reassess PCT    As 28 day mortality risk marker: \"Change in Procalcitonin Result\" (>80% or <=80%) if Day 0 (or Day 1) and Day 4 values are available. Refer to http://www.[facility]-pct-calculator.com    Change in PCT <=80%  A decrease of PCT levels below or equal to 80% defines a positive change in PCT test result representing a higher risk for 28-day all-cause mortality of patients diagnosed with severe sepsis for septic shock.    Change in PCT >80%  A decrease of PCT levels of more than 80% defines a negative change in PCT result representing a lower risk for 28-day all-cause mortality of patients diagnosed with severe sepsis or septic shock.      COVID PRE-OP / PRE-PROCEDURE SCREENING ORDER (NO ISOLATION)    Narrative:     The following orders were created for panel order COVID PRE-OP / PRE-PROCEDURE SCREENING ORDER (NO ISOLATION) - Swab, Nasopharynx.  Procedure                               Abnormality         Status                     ---------                               -----------         ------                     COVID-19 and FLU A/B PCR...[[id number]]  Normal              Final result                 Please view results for these tests on the individual orders.   BLOOD CULTURE   BLOOD CULTURE   URINE CULTURE   RAINBOW DRAW    Narrative:     The following orders were created for panel order Alpha Draw.  Procedure                               Abnormality         Status                     ---------                               -----------         ------                     Green Top (Gel)[[id number]]                                  Final result               Lavender Top[[id number]]                                     Final result               Gold Top - " SST[[id number]]                                   Final result               Gray Top[[id number]]                                         In process                 Light Blue Top[[id number]]                                   Final result                 Please view results for these tests on the individual orders.   LACTIC ACID, REFLEX   CBC AND DIFFERENTIAL    Narrative:     The following orders were created for panel order CBC & Differential.  Procedure                               Abnormality         Status                     ---------                               -----------         ------                     CBC Auto Differential[[id number]]        Abnormal            Final result                 Please view results for these tests on the individual orders.   GREEN TOP   LAVENDER TOP   GOLD TOP - SST   LIGHT BLUE TOP   GRAY TOP       Meds Given in ED:   Medications   sodium chloride 0.9 % flush 10 mL (has no administration in time range)   sodium chloride 0.9 % flush 10 mL (has no administration in time range)   sodium chloride 0.9 % bolus 500 mL (500 mL Intravenous New Bag 2/16/24 1643)   meropenem (MERREM) 1,000 mg in sodium chloride 0.9 % 100 mL IVPB (1,000 mg Intravenous New Bag 2/16/24 1550)

## 2024-02-16 NOTE — H&P
[facility] Medicine Services  HISTORY AND PHYSICAL    Patient Name: Andre Aogsto  : 1955  MRN: [id number]  Primary Care Physician: Keven Bear MD  Date of admission: 2024      Subjective   Subjective     Chief Complaint:  Fever, UTI sx's    HPI:  Andre Agosto is a 69 y.o. male presents to [facility] ED for recurrent fever despite treatment for UTI.  Patient recently admitted to [facility] for ESBL E. coli UTI after having a recent prostate biopsy.  Patient was admitted  discharged  with a PICC line and ongoing Invanz antibiotic treatment through  at which time PICC was removed.  Patient has been on PO Bactrim since  but started developing urine frequency and fevers 2/15, spiking up to 102.5 prompting ED eval.  No hematuria, no abd pain or flank pain.    UA nitrite neg with TNTC WBC and no bacteria seen.  WBC =13.  Otherwise labs not remarkable.       Personal History     Past Medical History:   Diagnosis Date    6th nerve palsy, right     right eye     Anxiety and depression     Atrial fibrillation, persistent 2020    on Xarelto    Coronary artery disease involving native coronary artery of native heart without angina pectoris 2018    follows w/ Dr. Mendoza    CRI (chronic renal insufficiency), stage 2 (mild)     CVA (cerebral vascular accident)     Diabetes mellitus     doesnt check sugar, type 2     Disease of thyroid gland     Double vision     resolved- right eye    Elevated cholesterol     Elevated prostate specific antigen (PSA) 2023    Focal seizure     of right arm     Heart attack     NSTEMI , s/p stenting    History of Helicobacter pylori infection     in , treated w/ PrevPak -  EGD bx (+), PCP RX - PPI/Augmentin/Doxy/Flagyl (x 14 days) 21    HL (hearing loss)     (L) ear - child luevano measles    Hyperlipidemia     Hypertension     Kidney stone     Memory loss     d/t  meningitis    Meningitis 2005    unsure of bacterial or viral     Obesity     Sleep apnea treated with nocturnal BiPAP     compliant with  machine     Stroke     2017/2018    Ventricular tachycardia     3 different times    Wears glasses            Past Surgical History:   Procedure Laterality Date    CARDIAC CATHETERIZATION N/A 10/12/2018    Procedure: Left Heart Cath;  Surgeon: Yoselin Johnson MD;  Location:  [facility] CATH INVASIVE LOCATION;  Service: Cardiology    CARDIAC CATHETERIZATION  03/2021    stent    CARDIAC CATHETERIZATION  07/2021    just checking the after pacemaker placed- Dr. Tim    CARDIAC DEFIBRILLATOR PLACEMENT  2021    COLONOSCOPY  10/2020    w/ Dr. Jacobs, hx colon polyps    CORONARY STENT PLACEMENT  2015    LAPAROSCOPIC GASTRIC BANDING  2008    s/p LAGB Realize 6/2008 by [facility]ONO.    PACEMAKER IMPLANTATION  07/25/2021    UMBILICAL HERNIA REPAIR  2008    incarcerated UHR w/ mesh by Dr. Velasquez    URETEROSCOPY LASER LITHOTRIPSY WITH STENT INSERTION Left 10/20/2021    Procedure: URETEROSCOPY LASER LITHOTRIPSY WITH STENT INSERTION;  Surgeon: Tonio De La Cruz MD;  Location: [facility] OR;  Service: Urology;  Laterality: Left;    URETEROSCOPY LASER LITHOTRIPSY WITH STENT INSERTION Left 11/03/2021    Procedure: URETEROSCOPY LEFT, LEFT RETROGRADE PYELOGRAM, CYSTOSCOPY, LASER LITHOTRIPSY WITH STENT EXCHANGE;  Surgeon: Tonio De La Cruz MD;  Location: [facility] OR;  Service: Urology;  Laterality: Left;    URETEROSCOPY LASER LITHOTRIPSY WITH STENT INSERTION Left 10/06/2022    Procedure: URETEROSCOPY LASER LITHOTRIPSY WITH STENT INSERTION;  Surgeon: Tonio De La Cruz MD;  Location: [facility] OR;  Service: Urology;  Laterality: Left;       Family History: family history includes COPD in his father; Hypertension in his father and mother; Stroke in his mother.     Social History:  reports that he quit smoking about 38 years ago. His smoking use included cigarettes. He started smoking about 49 years ago. He  has a 7.50 pack-year smoking history. He has never used smokeless tobacco. He reports that he does not drink alcohol and does not use drugs.  Social History     Social History Narrative    .  Lives in [place].  Retired.         Medications:  Available home medication information reviewed.  D-Mannose, Semaglutide(0.25 or 0.5MG/DOS), Vitamin D3, amLODIPine, amiodarone, carvedilol, divalproex, fosfomycin, furosemide, levothyroxine, rivaroxaban, sacubitril-valsartan, tamsulosin, and valACYclovir    Allergies   Allergen Reactions    Statins Myalgia and Other (See Comments)       Objective   Objective     Vital Signs:   Temp:  [98.1 °F (36.7 °C)-100.2 °F (37.9 °C)] 99.7 °F (37.6 °C)  Heart Rate:  [69-79] 69  Resp:  [18-20] 18  BP: (158-196)/() 161/90       Physical Exam   Constitutional: No acute distress, asleep on home CPAP  Respiratory: RA, good effort, nonlabored respirations   Cardiovascular: NSR tele  Gastrointestinal: Soft, nontender, nondistended  Musculoskeletal: No peripheral edema, normal muscle tone for age        LAB RESULTS:      Lab 02/16/24  2107 02/16/24  1753 02/16/24  1500   WBC  --   --  12.98*   HEMOGLOBIN  --   --  16.5   HEMATOCRIT  --   --  47.8   PLATELETS  --   --  215   NEUTROS ABS  --   --  10.33*   IMMATURE GRANS (ABS)  --   --  0.05   LYMPHS ABS  --   --  1.58   MONOS ABS  --   --  0.92*   EOS ABS  --   --  0.05   MCV  --   --  92.3   PROCALCITONIN  --   --  0.12   LACTATE 1.3 2.7* 2.3*         Lab 02/16/24  1500   SODIUM 141   POTASSIUM 3.6   CHLORIDE 101   CO2 26.0   ANION GAP 14.0   BUN 11   CREATININE 1.18   EGFR 67.2   GLUCOSE 137*   CALCIUM 8.8         Lab 02/16/24  1500   TOTAL PROTEIN 7.8   ALBUMIN 4.0   GLOBULIN 3.8   ALT (SGPT) 10   AST (SGOT) 12   BILIRUBIN 1.5*   ALK PHOS 77   LIPASE 60                     UA          10/17/2023    10:57 10/17/2023    22:21 1/26/2024    21:03 2/16/2024    14:29   Urinalysis   Squamous Epithelial Cells, UA  0-2  7-12  None Seen     Specific Gravity, UA  1.024  1.020  1.015    Ketones, UA Negative  Negative  Negative  Negative    Blood, UA  Negative  Large (3+)  Moderate (2+)    Leukocytes, UA Negative  Negative  Large (3+)  Small (1+)    Nitrite, UA  Negative  Positive  Negative    RBC, UA  0-2  6-10  11-20    WBC, UA  0-2  21-50  Too Numerous to Count    Bacteria, UA  None Seen  2+  None Seen        Microbiology Results (last 10 days)       Procedure Component Value - Date/Time    COVID PRE-OP / PRE-PROCEDURE SCREENING ORDER (NO ISOLATION) - Swab, Nasopharynx [[id number]]  (Normal) Collected: 02/16/24 1406    Lab Status: Final result Specimen: Swab from Nasopharynx Updated: 02/16/24 1500    Narrative:      The following orders were created for panel order COVID PRE-OP / PRE-PROCEDURE SCREENING ORDER (NO ISOLATION) - Swab, Nasopharynx.  Procedure                               Abnormality         Status                     ---------                               -----------         ------                     COVID-19 and FLU A/B PCR...[[id number]]  Normal              Final result                 Please view results for these tests on the individual orders.    COVID-19 and FLU A/B PCR, 1 HR TAT - Swab, Nasopharynx [[id number]]  (Normal) Collected: 02/16/24 1406    Lab Status: Final result Specimen: Swab from Nasopharynx Updated: 02/16/24 1500     COVID19 Not Detected     Influenza A PCR Not Detected     Influenza B PCR Not Detected    Narrative:      Fact sheet for providers: https://www.fda.gov/media/471492/download    Fact sheet for patients: https://www.fda.gov/media/271848/download    Test performed by PCR.            XR Chest 1 View    Result Date: 2/16/2024  XR CHEST 1 VW Date of Exam: 2/16/2024 2:39 PM EST Indication: fever Comparison: 1/16/2024 Findings: The heart size is enlarged but stable. The pulmonary vascular markings are normal. Left-sided transvenous pacemaker is in place with the leads in the right atrium and ventricle,  unchanged from the last study. The lungs are expanded the chest wall and they appear clear.     Impression: Impression: 1. Stable cardiomegaly. 2. No acute process identified in the chest. Electronically Signed: Nicolás Allred MD  2/16/2024 3:02 PM EST  Workstation ID: [id number]     Results for orders placed during the hospital encounter of 09/04/20    Adult Transesophageal Echo (SELWYN) W/ Cont if Necessary Per Protocol    Interpretation Summary  · Estimated EF = 45%.  · Left ventricular systolic function is mildly decreased.  · Atrial fibrillation is noted throughout the study.  · Trace mitral valve regurgitation is present  · Mild tricuspid valve regurgitation is present.  · No thrombus is seen within the patient's left atrium or left atrial appendage.    Anesthesia: Cath lab/Echo lab moderate sedation    I was present with the patient for the duration of moderate sedation and supervised staff who had no other duties and monitored the patient for the entire procedure.    Name of independent trained observer: Elizabeth Wang RN  Intra-service start time: 1210  Intra-service end time: 1235      Assessment & Plan   Assessment & Plan       UTI (urinary tract infection)    Essential hypertension    Epilepsy    Diabetes mellitus without complication    History of cerebrovascular accident    ALEX (obstructive sleep apnea)    Fever and chills    Paroxysmal atrial flutter      Recurrent ESBL UTI  - IV meropenem (completed course of Invanz 2/13/24)  - ID follows   - schedule Tylenol 1g TID x2 days for constitutional sx's  - DISCUSSION: Known chronic renal stones could be harboring residual bacteria and causing recurrent infection (hx of lithotripsy x3 in past).  Also while at [facility] patient had Crespo catheter but was malpositioned and inflated while in the prostatic urethra so could have residual focus of infection from injury.  - CT stone protocol pending to assess above issues  - Patient recently established care with   [facility], will consult for opinion on stone mitigation in context of recurring infection     New dx prostate Ca  - s/p bx 1/25/24  - per Dr. Marquis's outpt planning    Seizure d/o  - cont home meds    NIDDM  - on Ozempic outpt  - low dose SSI     HTN  ALEX  Hx CVA  PAF  CAD   -chronic/stable   - home meds as orderd    DVT prophylaxis:  Medical DVT prophylaxis orders are present.          CODE STATUS:    Code Status and Medical Interventions:   Ordered at: 02/16/24 2138     Code Status (Patient has no pulse and is not breathing):    CPR (Attempt to Resuscitate)     Medical Interventions (Patient has pulse or is breathing):    Full Support       Expected Discharge   Expected discharge date/ time has not been documented.     Catherine Mcdaniels MD  02/17/24

## 2024-02-16 NOTE — Clinical Note
Level of Care: Telemetry [5]   Diagnosis: UTI (urinary tract infection) [286447]   Admitting Physician: HAIR HINES [[id number]]

## 2024-02-16 NOTE — CONSULTS
INFECTIOUS DISEASE CONSULT/INITIAL HOSPITAL VISIT    Andre Agosto  1955  [id number]    Date of Consult: 2/16/2024    Admission Date: 2/16/2024      Requesting Provider: Luis Grissom MD  Evaluating Physician: Lor Marshall MD    Reason for Consultation: Persistent ESBL E.coli UTI, fever, sepsis    History of present illness:    Patient is a 69 y.o. male, known to Dr. Da Boogie, with h/o persistent afib/Xarelto, ppm, CAD, seizure disorder, CKD Stage II, CVA, T2DM, hypothyroidism, HLD, HTN, nephrolithiasis, obesity, and ALEX/BiPAP who was recently admitted to [facility] from 1/26 to 1/31 for sepsis related to ESBL E.coli bacteremia and UTI with hematuria after undergoing a prostate biopsy with Dr. De La Cruz around 1/25. Imaging revealed nonobstructing kidney stones.  He had a Crespo in place for about a week.  He was initially placed on Levaquin after the biopsy.  A PICC line was placed and he was treated with Invanz (with adjustment of seizure meds) until 2/12/24 with PICC line removal after dose on 2/12. He was placed on maintenance dose of Macrobid.  He states that he did have a couple of seizures while on Ertapenem.      He developed fever up to 102.5  with increased urinary frequency within the last day.  He contacted his PCP who recommended he come to [facility] for evaluation by Infectious Diseases.  He denies sore throat, sinus congestion, cough, shortness of breath, nausea, vomiting, diarrhea. He state that his symptoms are identical to his last uti.  On arrival to ED, his Tmax was 99.9.  Labs were WBC 13,000 with 79% neutrophils.  A UA WBC was TNTC. Urine culture is pending. COVID-19/Flu PCR is negative.  Blood cultures have been ordered.  He was started on Merrem.  ID was asked to evaluate and manage his antibiotic therapy.         Past Medical History:   Diagnosis Date    6th nerve palsy, right     right eye     Anxiety and depression     Atrial fibrillation, persistent 12/02/2020    on Xarelto    Coronary  artery disease involving native coronary artery of native heart without angina pectoris 09/12/2018    follows w/ Dr. Mendoza    CRI (chronic renal insufficiency), stage 2 (mild)     CVA (cerebral vascular accident)     Diabetes mellitus     doesnt check sugar, type 2     Disease of thyroid gland     Double vision     resolved- right eye    Elevated cholesterol     Elevated prostate specific antigen (PSA) 11/08/2023    Focal seizure     of right arm     Heart attack     NSTEMI 2015, s/p stenting    History of Helicobacter pylori infection     in 2008, treated w/ PrevPak - 2021 EGD bx (+), PCP RX - PPI/Augmentin/Doxy/Flagyl (x 14 days) 1/22/21    HL (hearing loss)     (L) ear - child luevano measles    Hyperlipidemia     Hypertension     Kidney stone     Memory loss 2005    d/t meningitis    Meningitis 2005    unsure of bacterial or viral     Obesity     Sleep apnea treated with nocturnal BiPAP     compliant with  machine     Stroke     2017/2018    Ventricular tachycardia     3 different times    Wears glasses        Past Surgical History:   Procedure Laterality Date    CARDIAC CATHETERIZATION N/A 10/12/2018    Procedure: Left Heart Cath;  Surgeon: Yoeslin Johnson MD;  Location:  [facility];  Service: Cardiology    CARDIAC CATHETERIZATION  03/2021    stent    CARDIAC CATHETERIZATION  07/2021    just checking the after pacemaker placed- Dr. Tim    CARDIAC DEFIBRILLATOR PLACEMENT  2021    COLONOSCOPY  10/2020    w/ Dr. Jacobs, hx colon polyps    CORONARY STENT PLACEMENT  2015    LAPAROSCOPIC GASTRIC BANDING  2008    s/p LAGB Realize 6/2008 by HOMERO.    PACEMAKER IMPLANTATION  07/25/2021    UMBILICAL HERNIA REPAIR  2008    incarcerated UHR w/ mesh by Dr. Velasquez    URETEROSCOPY LASER LITHOTRIPSY WITH STENT INSERTION Left 10/20/2021    Procedure: URETEROSCOPY LASER LITHOTRIPSY WITH STENT INSERTION;  Surgeon: Tonio De La Cruz MD;  Location: [facility] OR;  Service: Urology;  Laterality: Left;     URETEROSCOPY LASER LITHOTRIPSY WITH STENT INSERTION Left 2021    Procedure: URETEROSCOPY LEFT, LEFT RETROGRADE PYELOGRAM, CYSTOSCOPY, LASER LITHOTRIPSY WITH STENT EXCHANGE;  Surgeon: Tonio De La Cruz MD;  Location: [facility];  Service: Urology;  Laterality: Left;    URETEROSCOPY LASER LITHOTRIPSY WITH STENT INSERTION Left 10/06/2022    Procedure: URETEROSCOPY LASER LITHOTRIPSY WITH STENT INSERTION;  Surgeon: Tonio De La Cruz MD;  Location: [facility];  Service: Urology;  Laterality: Left;       Family History   Problem Relation Age of Onset    Stroke Mother     Hypertension Mother     COPD Father     Hypertension Father        Social History     Socioeconomic History    Marital status:    Tobacco Use    Smoking status: Former     Packs/day: 0.50     Years: 15.00     Additional pack years: 0.00     Total pack years: 7.50     Types: Cigarettes     Start date: 1975     Quit date: 1985     Years since quittin.1    Smokeless tobacco: Never   Vaping Use    Vaping Use: Never used   Substance and Sexual Activity    Alcohol use: No    Drug use: No    Sexual activity: Defer       Allergies   Allergen Reactions    Statins Myalgia and Other (See Comments)         Medication:    Current Facility-Administered Medications:     sodium chloride 0.9 % flush 10 mL, 10 mL, Intravenous, PRN, Luis Grissom MD    Insert Peripheral IV, , , Once **AND** sodium chloride 0.9 % flush 10 mL, 10 mL, Intravenous, PRN, Liya Bridges, APRN    Current Outpatient Medications:     amiodarone (PACERONE) 100 MG tablet, Take 1 tablet by mouth Daily., Disp: 60 tablet, Rfl: 11    amLODIPine (NORVASC) 10 MG tablet, Take 1 tablet by mouth Every Night., Disp: , Rfl:     carvedilol (Coreg) 25 MG tablet, Take 1 tablet by mouth 2 (Two) Times a Day With Meals., Disp: 180 tablet, Rfl: 3    Cholecalciferol (Vitamin D3) 25 MCG (1000 UT) capsule, Take 2 capsules by mouth Daily., Disp: 180 capsule, Rfl: 11    D-Mannose  500 MG capsule, Take 1 capsule by mouth 3 times a day., Disp: 90 capsule, Rfl: 0    divalproex (DEPAKOTE) 250 MG DR tablet, Take 3 tablets by mouth 2 (Two) Times a Day., Disp: 60 tablet, Rfl: 0    Entresto  MG tablet, Take 1 tablet by mouth 2 (Two) Times a Day. Hold until seen by PCP, Disp: , Rfl:     fosfomycin (MONUROL) 3 g pack, Take 3 g by mouth Every Other Day., Disp: 30 g, Rfl: 0    furosemide (LASIX) 40 MG tablet, Take 1 tablet by mouth 2 (Two) Times a Day., Disp: , Rfl:     levothyroxine (SYNTHROID, LEVOTHROID) 88 MCG tablet, TAKE 1 TABLET DAILY, Disp: 90 tablet, Rfl: 3    Semaglutide,0.25 or 0.5MG/DOS, (Ozempic, 0.25 or 0.5 MG/DOSE,) 2 MG/1.5ML solution pen-injector, Inject 0.5 mg under the skin into the appropriate area as directed Every 7 (Seven) Days., Disp: 1.5 mL, Rfl: 3    tamsulosin (FLOMAX) 0.4 MG capsule 24 hr capsule, Take 1 capsule by mouth Daily., Disp: 30 capsule, Rfl: 5    valACYclovir (Valtrex) 1000 MG tablet, Take 1 tablet by mouth 2 (Two) Times a Day., Disp: 3 tablet, Rfl: 0    Xarelto 15 MG tablet, Take 1 tablet by mouth Daily With Dinner., Disp: , Rfl:     Antibiotics:  Anti-Infectives (From admission, onward)      None              Review of Systems:  Constitutional-- + Fever, chills, sweats.  Appetite decreased, and no malaise. No fatigue.  HEENT-- No new vision, hearing or throat complaints.  No epistaxis or oral sores.  Denies odynophagia or dysphagia. No headache, photophobia or neck stiffness.  CV-- No chest pain, palpitation or syncope  Resp-- No SOB/cough/Hemoptysis  GI- No nausea, vomiting, or diarrhea.  No hematochezia, melena, or hematemesis. Denies jaundice or chronic liver disease.  -- No dysuria, hematuria, or flank pain.  Denies hesitancy, urgency or burning with urination.   Lymph- no swollen lymph nodes in neck/axilla or groin.   Heme- No active bruising or bleeding; no Hx of DVT or PE.  MS-- no swelling or pain in the bones or joints of arms/legs.  No new back  pain.  Neuro-- No acute focal weakness or numbness in the arms or legs.  + seizure d/o, with recent seizures while on ertapenem.  Skin--No rashes or lesions      Physical Exam:   Vital Signs  Temp (24hrs), Av.9 °F (37.7 °C), Min:99.9 °F (37.7 °C), Max:99.9 °F (37.7 °C)    Temp  Min: 99.9 °F (37.7 °C)  Max: 99.9 °F (37.7 °C)  BP  Min: 196/109  Max: 196/109  Pulse  Min: 74  Max: 74  Resp  Min: 20  Max: 20  SpO2  Min: 94 %  Max: 94 %    GENERAL: Awake and alert, in no acute distress. Ill appearing.   HEENT: Normocephalic, atraumatic. Flushed  PERRL. EOMI. No conjunctival injection. No icterus. Oropharynx clear without evidence of thrush or exudate.    NECK: Supple without nuchal rigidity. No mass.  LYMPH: No cervical, axillary or inguinal lymphadenopathy.  HEART: RRR; No murmur, rubs, gallops.   LUNGS: Clear to auscultation bilaterally without wheezing, rales, rhonchi. Normal respiratory effort. Nonlabored.   ABDOMEN: Soft, nontender, nondistended. Positive bowel sounds. No rebound or guarding. NO mass or HSM. Obese.  EXT:  No cyanosis, clubbing or edema. No cord.  :  Without Crespo catheter.  MSK: No joint effusions or erythema  SKIN: Warm and dry without cutaneous eruptions on Inspection/palpation.    NEURO: Oriented to PPT.  Motor 5/5 strength  PSYCHIATRIC: Normal insight and judgment. Cooperative with PE    Laboratory Data    WBC   Date Value Ref Range Status   2024 12.98 (H) 3.40 - 10.80 10*3/mm3 Final     RBC   Date Value Ref Range Status   2024 5.18 4.14 - 5.80 10*6/mm3 Final     Hemoglobin   Date Value Ref Range Status   2024 16.5 13.0 - 17.7 g/dL Final     Hematocrit   Date Value Ref Range Status   2024 47.8 37.5 - 51.0 % Final     MCV   Date Value Ref Range Status   2024 92.3 79.0 - 97.0 fL Final     MCH   Date Value Ref Range Status   2024 31.9 26.6 - 33.0 pg Final     MCHC   Date Value Ref Range Status   2024 34.5 31.5 - 35.7 g/dL Final     RDW   Date Value Ref  Range Status   02/16/2024 14.6 12.3 - 15.4 % Final     RDW-SD   Date Value Ref Range Status   02/16/2024 49.2 37.0 - 54.0 fl Final     MPV   Date Value Ref Range Status   02/16/2024 10.6 6.0 - 12.0 fL Final     Platelets   Date Value Ref Range Status   02/16/2024 215 140 - 450 10*3/mm3 Final     Neutrophil %   Date Value Ref Range Status   02/16/2024 79.5 (H) 42.7 - 76.0 % Final     Lymphocyte %   Date Value Ref Range Status   02/16/2024 12.2 (L) 19.6 - 45.3 % Final     Monocyte %   Date Value Ref Range Status   02/16/2024 7.1 5.0 - 12.0 % Final     Eosinophil %   Date Value Ref Range Status   02/16/2024 0.4 0.3 - 6.2 % Final     Basophil %   Date Value Ref Range Status   02/16/2024 0.4 0.0 - 1.5 % Final     Immature Grans %   Date Value Ref Range Status   02/16/2024 0.4 0.0 - 0.5 % Final     Neutrophils, Absolute   Date Value Ref Range Status   02/16/2024 10.33 (H) 1.70 - 7.00 10*3/mm3 Final     Lymphocytes, Absolute   Date Value Ref Range Status   02/16/2024 1.58 0.70 - 3.10 10*3/mm3 Final     Monocytes, Absolute   Date Value Ref Range Status   02/16/2024 0.92 (H) 0.10 - 0.90 10*3/mm3 Final     Eosinophils, Absolute   Date Value Ref Range Status   02/16/2024 0.05 0.00 - 0.40 10*3/mm3 Final     Basophils, Absolute   Date Value Ref Range Status   02/16/2024 0.05 0.00 - 0.20 10*3/mm3 Final     Immature Grans, Absolute   Date Value Ref Range Status   02/16/2024 0.05 0.00 - 0.05 10*3/mm3 Final     nRBC   Date Value Ref Range Status   02/16/2024 0.0 0.0 - 0.2 /100 WBC Final     Lab Results   Component Value Date    GLUCOSE 137 (H) 02/16/2024    BUN 11 02/16/2024    CREATININE 1.18 02/16/2024    EGFRRESULT 52 (L) 12/28/2023    EGFR 67.2 02/16/2024    BCR 9.3 02/16/2024    K 3.6 02/16/2024    CO2 26.0 02/16/2024    CALCIUM 8.8 02/16/2024    PROTENTOTREF 7.5 12/28/2023    ALBUMIN 4.0 02/16/2024    BILITOT 1.5 (H) 02/16/2024    AST 12 02/16/2024    ALT 10 02/16/2024                                       Estimated Creatinine  Clearance: 91.5 mL/min (by C-G formula based on SCr of 1.17 mg/dL).      Microbiology:  Microbiology Results (last 10 days)       Procedure Component Value - Date/Time    COVID PRE-OP / PRE-PROCEDURE SCREENING ORDER (NO ISOLATION) - Swab, Nasopharynx [[id number]]  (Normal) Collected: 02/16/24 1406    Lab Status: Final result Specimen: Swab from Nasopharynx Updated: 02/16/24 1500    Narrative:      The following orders were created for panel order COVID PRE-OP / PRE-PROCEDURE SCREENING ORDER (NO ISOLATION) - Swab, Nasopharynx.  Procedure                               Abnormality         Status                     ---------                               -----------         ------                     COVID-19 and FLU A/B PCR...[[id number]]  Normal              Final result                 Please view results for these tests on the individual orders.    COVID-19 and FLU A/B PCR, 1 HR TAT - Swab, Nasopharynx [[id number]]  (Normal) Collected: 02/16/24 1406    Lab Status: Final result Specimen: Swab from Nasopharynx Updated: 02/16/24 1500     COVID19 Not Detected     Influenza A PCR Not Detected     Influenza B PCR Not Detected    Narrative:      Fact sheet for providers: https://www.fda.gov/media/070474/download    Fact sheet for patients: https://www.fda.gov/media/671648/download    Test performed by PCR.                    Radiology:  XR Chest 1 View    Result Date: 2/16/2024  Impression: 1. Stable cardiomegaly. 2. No acute process identified in the chest. Electronically Signed: Nicolás Allred MD  2/16/2024 3:02 PM EST  Workstation ID: [id number]           Impression:   - Recurrent pyuria with UTI  The short interval between recurrent symptoms suggests a persistent focus of infection- ? Renal abscess ? Infected kidney stones  - Recent ESBL E.coli bacteremia with UTI 1/26/24.  Treated for 2 weeks with Invanz, followed by macrobid maintenance dosing.  - Recent prostate biopsy 1/25/24/treated with Levaquin post-procedure and  Crespo cath x 1 week.  - New onset of fevers  - Nonobstructing nephrolithiasis  - Persistent atrial fibrillation/Xarelto/ppm  - Seizure disorder/meds adjusted while on ertapenem.  - Chronic kidney disease Stage II  - Essential hypertension  - Hypothyroidism  - Morbid obesity  BMI 37  - Obstructive sleep apnea/BiPAP      PLAN/RECOMMENDATIONS:   Thank you for asking us to see Andre Stacy Surendra, I recommend the following:  - Follow blood and urine cultures  - Continue Merrem 500mg q 6  - Merrem can lower seizure thresholds, so may need to adjust his anticonvulsant medication accordingly.  Will defer to Hospitalist and Neurology for adjustment.  - repeat CT , ideally with contrast after creatinine improved to r/o abscess  - COVID-19/Flu PCR--negative.   - if the same pathogens are isolated from urine and no abscess is identified it may be necessary to assume that the small kidney stones are infected and treat with an even more prolonged course of antibiotics    Lor Marshall MD saw and examined patient, verified hx and PE, read pertinent radiographic studies, reviewed labs and micro data, and formulated dx, plan for treatment and all medical decision making.      CONSTANTIN PriestC for MD Pablo Parra PA  2/16/2024  14:32 EST

## 2024-02-17 ENCOUNTER — APPOINTMENT (OUTPATIENT)
Dept: CT IMAGING | Facility: HOSPITAL | Age: 69
End: 2024-02-17
Payer: MEDICARE

## 2024-02-17 LAB
ANION GAP SERPL CALCULATED.3IONS-SCNC: 9 MMOL/L (ref 5–15)
BACTERIA BLD CULT: ABNORMAL
BACTERIA ID TEST ISLT QL CULT: ABNORMAL
BOTTLE TYPE: ABNORMAL
BUN SERPL-MCNC: 13 MG/DL (ref 8–23)
BUN/CREAT SERPL: 10.9 (ref 7–25)
CALCIUM SPEC-SCNC: 8.6 MG/DL (ref 8.6–10.5)
CHLORIDE SERPL-SCNC: 103 MMOL/L (ref 98–107)
CO2 SERPL-SCNC: 24 MMOL/L (ref 22–29)
CREAT SERPL-MCNC: 1.19 MG/DL (ref 0.76–1.27)
DEPRECATED RDW RBC AUTO: 51.8 FL (ref 37–54)
EGFRCR SERPLBLD CKD-EPI 2021: 66.5 ML/MIN/1.73
ERYTHROCYTE [DISTWIDTH] IN BLOOD BY AUTOMATED COUNT: 14.9 % (ref 12.3–15.4)
GLUCOSE BLDC GLUCOMTR-MCNC: 131 MG/DL (ref 70–130)
GLUCOSE BLDC GLUCOMTR-MCNC: 164 MG/DL (ref 70–130)
GLUCOSE BLDC GLUCOMTR-MCNC: 189 MG/DL (ref 70–130)
GLUCOSE BLDC GLUCOMTR-MCNC: 191 MG/DL (ref 70–130)
GLUCOSE SERPL-MCNC: 156 MG/DL (ref 65–99)
HCT VFR BLD AUTO: 44.8 % (ref 37.5–51)
HGB BLD-MCNC: 15 G/DL (ref 13–17.7)
MCH RBC QN AUTO: 31.5 PG (ref 26.6–33)
MCHC RBC AUTO-ENTMCNC: 33.5 G/DL (ref 31.5–35.7)
MCV RBC AUTO: 94.1 FL (ref 79–97)
PLATELET # BLD AUTO: 159 10*3/MM3 (ref 140–450)
PMV BLD AUTO: 10.6 FL (ref 6–12)
POTASSIUM SERPL-SCNC: 3.9 MMOL/L (ref 3.5–5.2)
RBC # BLD AUTO: 4.76 10*6/MM3 (ref 4.14–5.8)
SODIUM SERPL-SCNC: 136 MMOL/L (ref 136–145)
WBC NRBC COR # BLD AUTO: 13.74 10*3/MM3 (ref 3.4–10.8)

## 2024-02-17 PROCEDURE — 82948 REAGENT STRIP/BLOOD GLUCOSE: CPT

## 2024-02-17 PROCEDURE — 97161 PT EVAL LOW COMPLEX 20 MIN: CPT

## 2024-02-17 PROCEDURE — 25010000002 MEROPENEM PER 100 MG: Performed by: INTERNAL MEDICINE

## 2024-02-17 PROCEDURE — 85027 COMPLETE CBC AUTOMATED: CPT | Performed by: FAMILY MEDICINE

## 2024-02-17 PROCEDURE — 99233 SBSQ HOSP IP/OBS HIGH 50: CPT | Performed by: HOSPITALIST

## 2024-02-17 PROCEDURE — 80048 BASIC METABOLIC PNL TOTAL CA: CPT | Performed by: FAMILY MEDICINE

## 2024-02-17 PROCEDURE — 63710000001 INSULIN LISPRO (HUMAN) PER 5 UNITS: Performed by: FAMILY MEDICINE

## 2024-02-17 PROCEDURE — 74176 CT ABD & PELVIS W/O CONTRAST: CPT

## 2024-02-17 RX ORDER — NICOTINE POLACRILEX 4 MG
15 LOZENGE BUCCAL
Status: DISCONTINUED | OUTPATIENT
Start: 2024-02-17 | End: 2024-02-21 | Stop reason: HOSPADM

## 2024-02-17 RX ORDER — IBUPROFEN 600 MG/1
1 TABLET ORAL
Status: DISCONTINUED | OUTPATIENT
Start: 2024-02-17 | End: 2024-02-21 | Stop reason: HOSPADM

## 2024-02-17 RX ORDER — DEXTROSE MONOHYDRATE 25 G/50ML
25 INJECTION, SOLUTION INTRAVENOUS
Status: DISCONTINUED | OUTPATIENT
Start: 2024-02-17 | End: 2024-02-21 | Stop reason: HOSPADM

## 2024-02-17 RX ORDER — INSULIN LISPRO 100 [IU]/ML
2-7 INJECTION, SOLUTION INTRAVENOUS; SUBCUTANEOUS
Status: DISCONTINUED | OUTPATIENT
Start: 2024-02-17 | End: 2024-02-21 | Stop reason: HOSPADM

## 2024-02-17 RX ORDER — ACETAMINOPHEN 500 MG
1000 TABLET ORAL 3 TIMES DAILY
Status: COMPLETED | OUTPATIENT
Start: 2024-02-17 | End: 2024-02-18

## 2024-02-17 RX ADMIN — ACETAMINOPHEN 1000 MG: 500 TABLET ORAL at 15:43

## 2024-02-17 RX ADMIN — RIVAROXABAN 15 MG: 15 TABLET, FILM COATED ORAL at 17:42

## 2024-02-17 RX ADMIN — MEROPENEM 500 MG: 500 INJECTION, POWDER, FOR SOLUTION INTRAVENOUS at 08:55

## 2024-02-17 RX ADMIN — CARVEDILOL 25 MG: 12.5 TABLET, FILM COATED ORAL at 17:42

## 2024-02-17 RX ADMIN — DIVALPROEX SODIUM 750 MG: 250 TABLET, DELAYED RELEASE ORAL at 20:12

## 2024-02-17 RX ADMIN — LEVOTHYROXINE SODIUM 88 MCG: 0.09 TABLET ORAL at 05:29

## 2024-02-17 RX ADMIN — ACETAMINOPHEN 1000 MG: 500 TABLET ORAL at 20:08

## 2024-02-17 RX ADMIN — MEROPENEM 500 MG: 500 INJECTION, POWDER, FOR SOLUTION INTRAVENOUS at 00:49

## 2024-02-17 RX ADMIN — ACETAMINOPHEN 1000 MG: 500 TABLET ORAL at 05:29

## 2024-02-17 RX ADMIN — DIVALPROEX SODIUM 750 MG: 250 TABLET, DELAYED RELEASE ORAL at 08:54

## 2024-02-17 RX ADMIN — SACUBITRIL AND VALSARTAN 1 TABLET: 97; 103 TABLET, FILM COATED ORAL at 08:54

## 2024-02-17 RX ADMIN — SACUBITRIL AND VALSARTAN 1 TABLET: 97; 103 TABLET, FILM COATED ORAL at 00:49

## 2024-02-17 RX ADMIN — AMIODARONE HYDROCHLORIDE 100 MG: 200 TABLET ORAL at 08:54

## 2024-02-17 RX ADMIN — INSULIN LISPRO 2 UNITS: 100 INJECTION, SOLUTION INTRAVENOUS; SUBCUTANEOUS at 17:42

## 2024-02-17 RX ADMIN — AMLODIPINE BESYLATE 10 MG: 10 TABLET ORAL at 20:08

## 2024-02-17 RX ADMIN — MEROPENEM 500 MG: 500 INJECTION, POWDER, FOR SOLUTION INTRAVENOUS at 13:45

## 2024-02-17 RX ADMIN — CARVEDILOL 25 MG: 12.5 TABLET, FILM COATED ORAL at 08:54

## 2024-02-17 RX ADMIN — MEROPENEM 500 MG: 500 INJECTION, POWDER, FOR SOLUTION INTRAVENOUS at 20:08

## 2024-02-17 RX ADMIN — TAMSULOSIN HYDROCHLORIDE 0.4 MG: 0.4 CAPSULE ORAL at 08:54

## 2024-02-17 RX ADMIN — POTASSIUM CHLORIDE 40 MEQ: 1500 TABLET, EXTENDED RELEASE ORAL at 05:28

## 2024-02-17 RX ADMIN — Medication 10 ML: at 20:12

## 2024-02-17 RX ADMIN — SACUBITRIL AND VALSARTAN 1 TABLET: 97; 103 TABLET, FILM COATED ORAL at 20:12

## 2024-02-17 RX ADMIN — INSULIN LISPRO 2 UNITS: 100 INJECTION, SOLUTION INTRAVENOUS; SUBCUTANEOUS at 20:47

## 2024-02-17 RX ADMIN — INSULIN LISPRO 2 UNITS: 100 INJECTION, SOLUTION INTRAVENOUS; SUBCUTANEOUS at 11:33

## 2024-02-17 NOTE — THERAPY EVALUATION
Patient Name: Andre Agosto  : 1955    MRN: [id number]                              Today's Date: 2024       Admit Date: 2024    Visit Dx:     ICD-10-CM ICD-9-CM   1. Urinary tract infection in male  N39.0 599.0   2. Sepsis, due to unspecified organism, unspecified whether acute organ dysfunction present  A41.9 038.9     995.91   3. Lactic acidosis  E87.20 276.2     Patient Active Problem List   Diagnosis    Essential hypertension    Epilepsy    CVA (cerebral vascular accident)    Hypokalemia    Diabetes mellitus without complication    BiPAP (biphasic positive airway pressure) dependence    Benign hypertension    Hyperlipidemia    Epilepsy    Obesity    Dyspepsia    Coronary arteriosclerosis    Abnormal stress test    TIA (transient ischemic attack)    Pneumonia of right lower lobe due to infectious organism    History of cerebrovascular accident    ALEX (obstructive sleep apnea)    SOB (shortness of breath) on exertion    Suspected COVID-19 virus infection    Fever and chills    Dyspnea    Fever and chills    Localized edema    Right leg pain    Atrial fibrillation    Memory loss    Blood glucose abnormal    Generalized anxiety disorder    History of Helicobacter pylori infection    Benign prostatic hyperplasia    Medication intolerance    Pseudoaneurysm of femoral artery following procedure    Sustained ventricular tachycardia    ABMD (anterior basement membrane dystrophy)    Myopia, bilateral    Chronic otitis media    Eye twitch    Hyperaldosteronism    Otitis externa of right ear    Regular astigmatism, right eye    Seizure-like activity    Nephrolithiasis    UTI (urinary tract infection)    Anxiety    Presbyopia    Otalgia    Arthralgia of right knee    Acute suppurative otitis media without spontaneous rupture of ear drum    Recurrent acute suppurative otitis media    Benign hypertensive heart disease with congestive heart failure    Eustachian tube disorder    Dre hematuria    Other  gram-negative sepsis    Other specified bacterial agents as the cause of diseases classified elsewhere    Otorrhea    Paroxysmal atrial flutter    Sensorineural hearing loss    Tobacco abuse counseling    Coronary atherosclerosis    Kidney stone    Other obesity due to excess calories    Otitis externa of right ear    Urinary tract infection, site not specified    Elevated prostate specific antigen (PSA)    Sepsis    Sepsis due to Escherichia coli without acute organ dysfunction     Past Medical History:   Diagnosis Date    6th nerve palsy, right     right eye     Anxiety and depression     Atrial fibrillation, persistent 12/02/2020    on Xarelto    Coronary artery disease involving native coronary artery of native heart without angina pectoris 09/12/2018    follows w/ Dr. Mendoza    CRI (chronic renal insufficiency), stage 2 (mild)     CVA (cerebral vascular accident)     Diabetes mellitus     doesnt check sugar, type 2     Disease of thyroid gland     Double vision     resolved- right eye    Elevated cholesterol     Elevated prostate specific antigen (PSA) 11/08/2023    Focal seizure     of right arm     Heart attack     NSTEMI 2015, s/p stenting    History of Helicobacter pylori infection     in 2008, treated w/ PrevPak - 2021 EGD bx (+), PCP RX - PPI/Augmentin/Doxy/Flagyl (x 14 days) 1/22/21    HL (hearing loss)     (L) ear - child luevano measles    Hyperlipidemia     Hypertension     Kidney stone     Memory loss 2005    d/t meningitis    Meningitis 2005    unsure of bacterial or viral     Obesity     Sleep apnea treated with nocturnal BiPAP     compliant with  machine     Stroke     2017/2018    Ventricular tachycardia     3 different times    Wears glasses      Past Surgical History:   Procedure Laterality Date    CARDIAC CATHETERIZATION N/A 10/12/2018    Procedure: Left Heart Cath;  Surgeon: Yoselin Johnson MD;  Location: [facility] CATH INVASIVE LOCATION;  Service: Cardiology    CARDIAC CATHETERIZATION   03/2021    stent    CARDIAC CATHETERIZATION  07/2021    just checking the after pacemaker placed- Dr. Tim    CARDIAC DEFIBRILLATOR PLACEMENT  2021    COLONOSCOPY  10/2020    w/ Dr. Jacobs, hx colon polyps    CORONARY STENT PLACEMENT  2015    LAPAROSCOPIC GASTRIC BANDING  2008    s/p LAGB Realize 6/2008 by HOMERO.    PACEMAKER IMPLANTATION  07/25/2021    UMBILICAL HERNIA REPAIR  2008    incarcerated UHR w/ mesh by Dr. Velasquez    URETEROSCOPY LASER LITHOTRIPSY WITH STENT INSERTION Left 10/20/2021    Procedure: URETEROSCOPY LASER LITHOTRIPSY WITH STENT INSERTION;  Surgeon: Tonio De La Cruz MD;  Location: [facility] OR;  Service: Urology;  Laterality: Left;    URETEROSCOPY LASER LITHOTRIPSY WITH STENT INSERTION Left 11/03/2021    Procedure: URETEROSCOPY LEFT, LEFT RETROGRADE PYELOGRAM, CYSTOSCOPY, LASER LITHOTRIPSY WITH STENT EXCHANGE;  Surgeon: Tonio De La Cruz MD;  Location: [facility] OR;  Service: Urology;  Laterality: Left;    URETEROSCOPY LASER LITHOTRIPSY WITH STENT INSERTION Left 10/06/2022    Procedure: URETEROSCOPY LASER LITHOTRIPSY WITH STENT INSERTION;  Surgeon: Tonio De La Cruz MD;  Location: [facility] OR;  Service: Urology;  Laterality: Left;      General Information       Row Name 02/17/24 1453          Physical Therapy Time and Intention    Document Type evaluation  -KG     Mode of Treatment physical therapy  -KG       Row Name 02/17/24 1453          General Information    Patient Profile Reviewed yes  -KG     Prior Level of Function independent:;all household mobility;gait;transfer;ADL's;dressing;bathing  -KG     Existing Precautions/Restrictions fall  -KG     Barriers to Rehab medically complex  -KG       Row Name 02/17/24 1453          Living Environment    People in Home spouse  -KG       Row Name 02/17/24 1453          Home Main Entrance    Number of Stairs, Main Entrance four  -KG     Stair Railings, Main Entrance railings on both sides of stairs  -KG       Row Name 02/17/24 1453          Stairs  Within Home, Primary    Number of Stairs, Within Home, Primary none  -KG       Row Name 02/17/24 1453          Cognition    Orientation Status (Cognition) oriented x 4  -KG       Row Name 02/17/24 1453          Safety Issues, Functional Mobility    Safety Issues Affecting Function (Mobility) awareness of need for assistance;insight into deficits/self-awareness;safety precaution awareness;safety precautions follow-through/compliance  -KG     Impairments Affecting Function (Mobility) balance;coordination;endurance/activity tolerance;postural/trunk control;strength  -KG               User Key  (r) = Recorded By, (t) = Taken By, (c) = Cosigned By      Initials Name Provider Type    KG Dinora Jade PT Physical Therapist                   Mobility       Row Name 02/17/24 1454          Bed Mobility    Comment, (Bed Mobility) UIC  -KG       Row Name 02/17/24 1454          Transfers    Comment, (Transfers) VC's for sequencing and safe hand placement. Toilet transfer in bathroom with RW and SBA.  -KG       Row Name 02/17/24 1454          Sit-Stand Transfer    Sit-Stand [place] (Transfers) minimum assist (75% patient effort);verbal cues  -KG       Row Name 02/17/24 1454          Gait/Stairs (Locomotion)    [place] Level (Gait) contact guard;verbal cues  -KG     Assistive Device (Gait) walker, front-wheeled  -KG     Distance in Feet (Gait) 450  -KG     Deviations/Abnormal Patterns (Gait) ru decreased;stride length decreased  -KG     Bilateral Gait Deviations forward flexed posture  -KG     Comment, (Gait/Stairs) Pt demonstrated step through gait pattern with slow ru and decreased step length. VC's for upright posture. Pt demonstrated adequate stability with no LOB. Limited by fatigue.  -KG               User Key  (r) = Recorded By, (t) = Taken By, (c) = Cosigned By      Initials Name Provider Type    KG Dinora Jade, PT Physical Therapist                   Obj/Interventions       Row Name  02/17/24 1455          Range of Motion Comprehensive    General Range of Motion no range of motion deficits identified  -KG     Comment, General Range of Motion B LE WFL  -KG       Row Name 02/17/24 1455          Strength Comprehensive (MMT)    Comment, General Manual Muscle Testing (MMT) Assessment B LE grossly 4-/5  -KG       Row Name 02/17/24 1455          Balance    Balance Assessment sitting static balance;standing static balance;standing dynamic balance  -KG     Static Sitting Balance independent  -KG     Position, Sitting Balance unsupported;sitting in chair  -KG     Static Standing Balance contact guard  -KG     Dynamic Standing Balance contact guard  -KG     Position/Device Used, Standing Balance supported;walker, rolling  -KG       Row Name 02/17/24 1455          Sensory Assessment (Somatosensory)    Sensory Assessment (Somatosensory) LE sensation intact  -KG               User Key  (r) = Recorded By, (t) = Taken By, (c) = Cosigned By      Initials Name Provider Type    KG Dinora Jade, PT Physical Therapist                   Goals/Plan       Row Name 02/17/24 1457          Bed Mobility Goal 1 (PT)    Activity/Assistive Device (Bed Mobility Goal 1, PT) sit to supine;supine to sit  -KG     [place] Level/Cues Needed (Bed Mobility Goal 1, PT) independent  -KG     Time Frame (Bed Mobility Goal 1, PT) 2 weeks  -KG     Progress/Outcomes (Bed Mobility Goal 1, PT) goal ongoing  -KG       Row Name 02/17/24 1457          Transfer Goal 1 (PT)    Activity/Assistive Device (Transfer Goal 1, PT) sit-to-stand/stand-to-sit;bed-to-chair/chair-to-bed;walker, rolling  -KG     [place] Level/Cues Needed (Transfer Goal 1, PT) modified independence  -KG     Time Frame (Transfer Goal 1, PT) 2 weeks  -KG     Progress/Outcome (Transfer Goal 1, PT) goal ongoing  -KG       Row Name 02/17/24 1457          Gait Training Goal 1 (PT)    Activity/Assistive Device (Gait Training Goal 1, PT) gait (walking  locomotion);assistive device use;walker, rolling  -KG     [place] Level (Gait Training Goal 1, PT) modified independence  -KG     Distance (Gait Training Goal 1, PT) 400 feet  -KG     Time Frame (Gait Training Goal 1, PT) 2 weeks  -KG     Progress/Outcome (Gait Training Goal 1, PT) goal ongoing  -KG       Row Name 02/17/24 1457          Stairs Goal 1 (PT)    Activity/Assistive Device (Stairs Goal 1, PT) ascending stairs;descending stairs;using handrail, left;using handrail, right;step-to-step  -KG     [place] Level/Cues Needed (Stairs Goal 1, PT) standby assist  -KG     Number of Stairs (Stairs Goal 1, PT) 4  -KG     Time Frame (Stairs Goal 1, PT) 2 weeks  -KG     Progress/Outcome (Stairs Goal 1, PT) goal ongoing  -KG       Row Name 02/17/24 1459          Therapy Assessment/Plan (PT)    Planned Therapy Interventions (PT) balance training;bed mobility training;gait training;stair training;strengthening;transfer training  -KG               User Key  (r) = Recorded By, (t) = Taken By, (c) = Cosigned By      Initials Name Provider Type    KG Dinora Jade N, PT Physical Therapist                   Clinical Impression       Row Name 02/17/24 9161          Pain    Pretreatment Pain Rating 0/10 - no pain  -KG     Posttreatment Pain Rating 0/10 - no pain  -KG       Row Name 02/17/24 1456          Plan of Care Review    Plan of Care Reviewed With patient  -KG     Outcome Evaluation PT initial evaluation completed for pt presenting with generalized weakness, impaired balance, and decreased functional mobility. Pt ambulated 450ft with RW and CGA. Pt's decreased independence warrants PT skilled care. Recommend D/C home with assistance and  PT services.  -KG       Row Name 02/17/24 1453          Therapy Assessment/Plan (PT)    Patient/Family Therapy Goals Statement (PT) return to PLOF  -KG     Rehab Potential (PT) good, to achieve stated therapy goals  -KG     Criteria for Skilled Interventions Met (PT)  yes;skilled treatment is necessary  -KG     Therapy Frequency (PT) daily  -KG       Row Name 02/17/24 1455          Vital Signs    Pre Systolic BP Rehab 129  -KG     Pre Treatment Diastolic BP 75  -KG     Pretreatment Heart Rate (beats/min) 69  -KG     Posttreatment Heart Rate (beats/min) 71  -KG     Pre SpO2 (%) 93  -KG     O2 Delivery Pre Treatment room air  -KG     Post SpO2 (%) 94  -KG     O2 Delivery Post Treatment room air  -KG     Pre Patient Position Sitting  -KG     Intra Patient Position Standing  -KG     Post Patient Position Sitting  -KG       Row Name 02/17/24 1455          Positioning and Restraints    Pre-Treatment Position sitting in chair/recliner  -KG     Post Treatment Position chair  -KG     In Chair notified nsg;reclined;call light within reach;encouraged to call for assist;exit alarm on;with family/caregiver;legs elevated  -KG               User Key  (r) = Recorded By, (t) = Taken By, (c) = Cosigned By      Initials Name Provider Type    KG Dinora Jade, PT Physical Therapist                   Outcome Measures       Row Name 02/17/24 1457 02/17/24 0800       How much help from another person do you currently need...    Turning from your back to your side while in flat bed without using bedrails? 4  -KG 4  -SH    Moving from lying on back to sitting on the side of a flat bed without bedrails? 3  -KG 3  -SH    Moving to and from a bed to a chair (including a wheelchair)? 3  -KG 3  -SH    Standing up from a chair using your arms (e.g., wheelchair, bedside chair)? 3  -KG 3  -SH    Climbing 3-5 steps with a railing? 2  -KG 2  -SH    To walk in hospital room? 3  -KG 3  -SH    AM-PAC 6 Clicks Score (PT) 18  -KG 18  -SH    Highest Level of Mobility Goal 6 --> Walk 10 steps or more  -KG 6 --> Walk 10 steps or more  -SH      Row Name 02/17/24 1457          Functional Assessment    Outcome Measure Options AM-PAC 6 Clicks Basic Mobility (PT)  -KG               User Key  (r) = Recorded By, (t) =  Taken By, (c) = Cosigned By      Initials Name Provider Type    KG Dinora Jade, PT Physical Therapist    Doinne Bentley RN Registered Nurse                                 Physical Therapy Education       Title: PT OT SLP Therapies (In Progress)       Topic: Physical Therapy (In Progress)       Point: Mobility training (Done)       Learning Progress Summary             Patient Acceptance, E, NR,VU by KG at 2/17/2024 1048                         Point: Home exercise program (Not Started)       Learner Progress:  Not documented in this visit.              Point: Body mechanics (Done)       Learning Progress Summary             Patient Acceptance, E, NR,VU by KG at 2/17/2024 1048                         Point: Precautions (Done)       Learning Progress Summary             Patient Acceptance, E, NR,VU by KG at 2/17/2024 1048                                         User Key       Initials Effective Dates Name Provider Type Discipline    DARRELL 05/22/20 -  Dinora Jade PT Physical Therapist PT                  PT Recommendation and Plan  Planned Therapy Interventions (PT): balance training, bed mobility training, gait training, stair training, strengthening, transfer training  Plan of Care Reviewed With: patient  Outcome Evaluation: PT initial evaluation completed for pt presenting with generalized weakness, impaired balance, and decreased functional mobility. Pt ambulated 450ft with RW and CGA. Pt's decreased independence warrants PT skilled care. Recommend D/C home with assistance and  PT services.     Time Calculation:   PT Evaluation Complexity  History, PT Evaluation Complexity: 1-2 personal factors and/or comorbidities  Examination of Body Systems (PT Eval Complexity): total of 3 or more elements  Clinical Presentation (PT Evaluation Complexity): stable  Clinical Decision Making (PT Evaluation Complexity): low complexity  Overall Complexity (PT Evaluation Complexity): low complexity     PT  Charges       Row Name 02/17/24 1048             Time Calculation    Start Time 1048  -KG      PT Received On 02/17/24  -KG      PT Goal Re-Cert Due Date 02/27/24  -KG         Untimed Charges    PT Eval/Re-eval Minutes 52  -KG         Total Minutes    Untimed Charges Total Minutes 52  -KG       Total Minutes 52  -KG                User Key  (r) = Recorded By, (t) = Taken By, (c) = Cosigned By      Initials Name Provider Type    KG Dinora Jade, PT Physical Therapist                  Therapy Charges for Today       Code Description Service Date Service Provider Modifiers Qty    [id number] HC PT EVAL LOW COMPLEXITY 4 2/17/2024 Dinora Jade, PT GP 1            PT G-Codes  Outcome Measure Options: AM-PAC 6 Clicks Basic Mobility (PT)  AM-PAC 6 Clicks Score (PT): 18  PT Discharge Summary  Anticipated Discharge Disposition (PT): home with assist, home with home health    Dena Jade, PT  2/17/2024

## 2024-02-17 NOTE — PLAN OF CARE
Goal Outcome Evaluation:  Plan of Care Reviewed With: patient           Outcome Evaluation: PT initial evaluation completed for pt presenting with generalized weakness, impaired balance, and decreased functional mobility. Pt ambulated 450ft with RW and CGA. Pt's decreased independence warrants PT skilled care. Recommend D/C home with assistance and  PT services.      Anticipated Discharge Disposition (PT): home with assist, home with home health

## 2024-02-17 NOTE — PROGRESS NOTES
[facility] Medicine Services  PROGRESS NOTE    Patient Name: Andre Agosto  : 1955  MRN: [id number]    Date of Admission: 2024  Primary Care Physician: Keven Bear MD    Subjective   Subjective     CC:  Fever, UTI sx's    HPI:  Patient resting in bedside chair with family at bedside. Patient states he woke up and his gown was damp, appears he broke a fever at some point overnight. Patient with ongoing low grade fever. States he is feeling better than when he arrived and urine output has increased overnight.       Objective   Objective     Vital Signs:   Temp:  [98.1 °F (36.7 °C)-100.4 °F (38 °C)] 99.6 °F (37.6 °C)  Heart Rate:  [68-79] 69  Resp:  [18-20] 18  BP: (128-196)/() 129/75     Physical Exam:  Constitutional: No acute distress, awake, alert  HENT: NCAT, mucous membranes moist  Respiratory: Clear to auscultation bilaterally, respiratory effort normal   Cardiovascular: RRR, no murmurs, rubs, or gallops  Gastrointestinal: Positive bowel sounds, soft, nontender, nondistended  Musculoskeletal: No bilateral ankle edema  Psychiatric: Appropriate affect, cooperative  Neurologic: Oriented x 3, BROWN, speech clear  Skin: No rashes    Results Reviewed:  LAB RESULTS:      Lab 24  0855 24  2107 24  1753 24  1500   WBC 13.74*  --   --  12.98*   HEMOGLOBIN 15.0  --   --  16.5   HEMATOCRIT 44.8  --   --  47.8   PLATELETS 159  --   --  215   NEUTROS ABS  --   --   --  10.33*   IMMATURE GRANS (ABS)  --   --   --  0.05   LYMPHS ABS  --   --   --  1.58   MONOS ABS  --   --   --  0.92*   EOS ABS  --   --   --  0.05   MCV 94.1  --   --  92.3   PROCALCITONIN  --   --   --  0.12   LACTATE  --  1.3 2.7* 2.3*         Lab 24  0855 24  1500   SODIUM 136 141   POTASSIUM 3.9 3.6   CHLORIDE 103 101   CO2 24.0 26.0   ANION GAP 9.0 14.0   BUN 13 11   CREATININE 1.19 1.18   EGFR 66.5 67.2   GLUCOSE 156* 137*   CALCIUM 8.6 8.8         Lab 24  1500    TOTAL PROTEIN 7.8   ALBUMIN 4.0   GLOBULIN 3.8   ALT (SGPT) 10   AST (SGOT) 12   BILIRUBIN 1.5*   ALK PHOS 77   LIPASE 60                     Brief Urine Lab Results  (Last result in the past 365 days)        Color   Clarity   Blood   Leuk Est   Nitrite   Protein   CREAT   Urine HCG        02/16/24 1429 Yellow   Clear   Moderate (2+)   Small (1+)   Negative   30 mg/dL (1+)                   Microbiology Results Abnormal       Procedure Component Value - Date/Time    COVID PRE-OP / PRE-PROCEDURE SCREENING ORDER (NO ISOLATION) - Swab, Nasopharynx [[id number]]  (Normal) Collected: 02/16/24 1406    Lab Status: Final result Specimen: Swab from Nasopharynx Updated: 02/16/24 1500    Narrative:      The following orders were created for panel order COVID PRE-OP / PRE-PROCEDURE SCREENING ORDER (NO ISOLATION) - Swab, Nasopharynx.  Procedure                               Abnormality         Status                     ---------                               -----------         ------                     COVID-19 and FLU A/B PCR...[[id number]]  Normal              Final result                 Please view results for these tests on the individual orders.    COVID-19 and FLU A/B PCR, 1 HR TAT - Swab, Nasopharynx [[id number]]  (Normal) Collected: 02/16/24 1406    Lab Status: Final result Specimen: Swab from Nasopharynx Updated: 02/16/24 1500     COVID19 Not Detected     Influenza A PCR Not Detected     Influenza B PCR Not Detected    Narrative:      Fact sheet for providers: https://www.fda.gov/media/949929/download    Fact sheet for patients: https://www.fda.gov/media/349635/download    Test performed by PCR.            CT Abdomen Pelvis Stone Protocol    Result Date: 2/17/2024  CT ABDOMEN PELVIS STONE PROTOCOL Date of Exam: 2/17/2024 12:25 AM EST Indication: Nephrolithiasis recurring ESBL E.coli UTI, recent prostate bx. Comparison: 1/28/2024 Technique: Axial CT images were obtained of the abdomen and pelvis without the  administration of contrast. Reconstructed coronal and sagittal images were also obtained. Automated exposure control and iterative construction methods were used. Findings: Visualized lung bases are clear. There is small pericardial effusion, increased from prior exam. The liver, pancreas, and spleen are within normal limits. There are multiple stones within the gallbladder. No inflammatory change around the gallbladder. No  evidence of biliary tract obstruction. Bilateral adrenal glands are within normal limits. There are multiple bilateral nonobstructing renal stones. There is no evidence of ureteral stone or hydronephrosis. No abdominal or retroperitoneal adenopathy. There is a lap band device around the proximal stomach. The upper GI tract is otherwise unremarkable. Patient is status post ventral hernia repair. No evidence of residual recurrent hernia. Urinary bladder is within normal limits. GI tract including the appendix is within normal limits. There is no pelvic or inguinal adenopathy. No free intraperitoneal fluid. No pelvic or inguinal adenopathy. There are degenerative changes of the spine. No lytic or sclerotic bony lesion identified.     Impression: Impression: 1. Multiple bilateral nonobstructing renal stones. No evidence of ureteral stone or hydronephrosis. 2. Cholelithiasis. No evidence of acute cholecystitis or biliary tract obstruction. Electronically Signed: Jose Matta MD  2/17/2024 8:25 AM EST  Workstation ID: [id number]    XR Chest 1 View    Result Date: 2/16/2024  XR CHEST 1 VW Date of Exam: 2/16/2024 2:39 PM EST Indication: fever Comparison: 1/16/2024 Findings: The heart size is enlarged but stable. The pulmonary vascular markings are normal. Left-sided transvenous pacemaker is in place with the leads in the right atrium and ventricle, unchanged from the last study. The lungs are expanded the chest wall and they appear clear.     Impression: Impression: 1. Stable cardiomegaly. 2. No acute  process identified in the chest. Electronically Signed: Nicolás Allred MD  2/16/2024 3:02 PM EST  Workstation ID: [id number]     Results for orders placed during the hospital encounter of 09/04/20    Adult Transesophageal Echo (SELWYN) W/ Cont if Necessary Per Protocol    Interpretation Summary  · Estimated EF = 45%.  · Left ventricular systolic function is mildly decreased.  · Atrial fibrillation is noted throughout the study.  · Trace mitral valve regurgitation is present  · Mild tricuspid valve regurgitation is present.  · No thrombus is seen within the patient's left atrium or left atrial appendage.    Anesthesia: Cath lab/Echo lab moderate sedation    I was present with the patient for the duration of moderate sedation and supervised staff who had no other duties and monitored the patient for the entire procedure.    Name of independent trained observer: Elizabeth Wang RN  Intra-service start time: 1210  Intra-service end time: 1235      Current medications:  Scheduled Meds:acetaminophen, 1,000 mg, Oral, TID  amiodarone, 100 mg, Oral, Daily  amLODIPine, 10 mg, Oral, Nightly  carvedilol, 25 mg, Oral, BID With Meals  divalproex, 750 mg, Oral, BID  insulin lispro, 2-7 Units, Subcutaneous, 4x Daily AC & at Bedtime  levothyroxine, 88 mcg, Oral, Q AM  meropenem, 500 mg, Intravenous, Q6H  rivaroxaban, 15 mg, Oral, Daily With Dinner  sacubitril-valsartan, 1 tablet, Oral, BID  sodium chloride, 10 mL, Intravenous, Q12H  tamsulosin, 0.4 mg, Oral, Daily      Continuous Infusions:sodium chloride, 100 mL/hr, Last Rate: 100 mL/hr (02/16/24 6832)      PRN Meds:.  senna-docusate sodium **AND** polyethylene glycol **AND** bisacodyl **AND** bisacodyl    Calcium Replacement - Follow Nurse / BPA Driven Protocol    dextrose    dextrose    glucagon (human recombinant)    Magnesium Standard Dose Replacement - Follow Nurse / BPA Driven Protocol    nitroglycerin    ondansetron    Phosphorus Replacement - Follow Nurse / BPA Driven Protocol     Potassium Replacement - Follow Nurse / BPA Driven Protocol    sodium chloride    [COMPLETED] Insert Peripheral IV **AND** sodium chloride    sodium chloride    sodium chloride    Assessment & Plan   Assessment & Plan     Active Hospital Problems    Diagnosis  POA    **UTI (urinary tract infection) [N39.0]  Yes    Paroxysmal atrial flutter [I48.92]  Yes    Fever and chills [R50.9]  Yes    ALEX (obstructive sleep apnea) [G47.33]  Yes    History of cerebrovascular accident [Z86.73]  Not Applicable    Diabetes mellitus without complication [E11.9]  Yes    Epilepsy [G40.909]  Yes    Essential hypertension [I10]  Yes      Resolved Hospital Problems   No resolved problems to display.        Brief Hospital Course to date:  Andre Agosto is a 69 y.o. male presented to [facility] ED for recurrent fever despite treatment for UTI.  Patient recently admitted to [facility] for ESBL E. coli UTI after having a recent prostate biopsy.  Patient was admitted 1/26 discharged 1/31 with a PICC line and ongoing Invanz antibiotic treatment through 2/12 at which time PICC was removed.  Patient has been on PO Bactrim since 2/12 but started developing urine frequency and fevers 2/15, spiking up to 102.5 prompting ED eval.  No hematuria, no abd pain or flank pain.    This patient's problems and plans were partially entered by my partner and updated as appropriate by me 02/17/24.    All problems are new to me today.    Recurrent ESBL UTI  - IV meropenem (completed course of Invanz 2/13/24)  - ID follows   - schedule Tylenol 1g TID x2 days for constitutional sx's  -Concern that known chronic renal stones could be harboring residual bacteria and causing recurrent infection (hx of lithotripsy x3 in past).  Also while at [facility] patient had Crespo catheter but was malpositioned and inflated while in the prostatic urethra so could have residual focus of infection from injury.  - CT stone protocol pending to assess  above issues  - Patient recently established care with Dr. Marquis, will consult for opinion on stone mitigation in context of recurring infection      New dx prostate Ca  - s/p bx 1/25/24  - per Dr. Marquis's outpt planning     Seizure d/o  - cont home meds     NIDDM  - on Ozempic outpt- hold  - low dose SSI      HTN  ALEX  Hx CVA  PAF  CAD   -chronic/stable   - home meds as orderd    Expected Discharge Location and Transportation: home  Expected Discharge   Expected Discharge Date: 2/20/2024; Expected Discharge Time:      DVT prophylaxis:  Medical DVT prophylaxis orders are present.         AM-PAC 6 Clicks Score (PT): 19 (02/16/24 2000)    CODE STATUS:   Code Status and Medical Interventions:   Ordered at: 02/16/24 2138     Code Status (Patient has no pulse and is not breathing):    CPR (Attempt to Resuscitate)     Medical Interventions (Patient has pulse or is breathing):    Full Support       Mara Bridges, DO  02/17/24

## 2024-02-17 NOTE — CONSULTS
[facility]   Consult Note    Patient Name: Andre Agosto  : 1955  MRN: [id number]  Primary Care Physician:  Keven Bear MD  Referring Physician: Andre Ray MD  Date of admission: 2024    Consults  Subjective   Subjective     Reason for Consult/ Chief Complaint: Relapsing urosepsis    History of Present Illness  Andre Agosto is a 69 y.o. male who underwent transrectal ultrasound needle biopsy of prostate in [place] at the end of January for elevated PSA.  Unfortunately developed posttreatment sepsis.  He grew ESBL E. coli.  He was originally treated with several days of IV antibiotics during hospital stay followed by [facility] as an outpatient.  Yesterday began feeling ill and was admitted here with fever and apparent recurrent urinary infection.  He has had 2 positive blood cultures with identification pending.  He also appears to have positive urine culture.  He is on empiric antibiotics and is feeling better.  CT scan showed some bilateral renal stones nonobstructing.  No obvious abscess on noncontrasted CT scan of the pelvis.  He states that he is voiding well.  He is followed by Dr. Marshall with infectious disease awaiting current cultures and further systemic evaluation.  Clinically it appears that he is somewhat colonized by ESBL E. coli.  His granddaughter states that 2 years ago while presenting with a ureteral stone he became septic.  He was hospitalized for several days at that time.  He did apparently have prostate cancer found on his recent needle biopsy of the prostate by Dr. Tonio De La Cruz in [place].    Review of Systems     Personal History     Past Medical History:   Diagnosis Date    6th nerve palsy, right     right eye     Anxiety and depression     Atrial fibrillation, persistent 2020    on Xarelto    Coronary artery disease involving native coronary artery of native heart without angina pectoris 2018    follows w/ Dr. Reji PERRY (chronic renal  insufficiency), stage 2 (mild)     CVA (cerebral vascular accident)     Diabetes mellitus     doesnt check sugar, type 2     Disease of thyroid gland     Double vision     resolved- right eye    Elevated cholesterol     Elevated prostate specific antigen (PSA) 11/08/2023    Focal seizure     of right arm     Heart attack     NSTEMI 2015, s/p stenting    History of Helicobacter pylori infection     in 2008, treated w/ PrevPak - 2021 EGD bx (+), PCP RX - PPI/Augmentin/Doxy/Flagyl (x 14 days) 1/22/21    HL (hearing loss)     (L) ear - child luevano measles    Hyperlipidemia     Hypertension     Kidney stone     Memory loss 2005    d/t meningitis    Meningitis 2005    unsure of bacterial or viral     Obesity     Sleep apnea treated with nocturnal BiPAP     compliant with  machine     Stroke     2017/2018    Ventricular tachycardia     3 different times    Wears glasses        Past Surgical History:   Procedure Laterality Date    CARDIAC CATHETERIZATION N/A 10/12/2018    Procedure: Left Heart Cath;  Surgeon: Yoselin Johnson MD;  Location:  [facility];  Service: Cardiology    CARDIAC CATHETERIZATION  03/2021    stent    CARDIAC CATHETERIZATION  07/2021    just checking the after pacemaker placed- Dr. Tim    CARDIAC DEFIBRILLATOR PLACEMENT  2021    COLONOSCOPY  10/2020    w/ Dr. Jacobs, hx colon polyps    CORONARY STENT PLACEMENT  2015    LAPAROSCOPIC GASTRIC BANDING  2008    s/p LAGB Realize 6/2008 by HOMERO.    PACEMAKER IMPLANTATION  07/25/2021    UMBILICAL HERNIA REPAIR  2008    incarcerated UHR w/ mesh by Dr. Velasquez    URETEROSCOPY LASER LITHOTRIPSY WITH STENT INSERTION Left 10/20/2021    Procedure: URETEROSCOPY LASER LITHOTRIPSY WITH STENT INSERTION;  Surgeon: Tonio De La Cruz MD;  [facility];  Service: Urology;  Laterality: Left;    URETEROSCOPY LASER LITHOTRIPSY WITH STENT INSERTION Left 11/03/2021    Procedure: URETEROSCOPY LEFT, LEFT RETROGRADE PYELOGRAM, CYSTOSCOPY, LASER  LITHOTRIPSY WITH STENT EXCHANGE;  Surgeon: Tonio De La Cruz MD;  Location: [facility];  Service: Urology;  Laterality: Left;    URETEROSCOPY LASER LITHOTRIPSY WITH STENT INSERTION Left 10/06/2022    Procedure: URETEROSCOPY LASER LITHOTRIPSY WITH STENT INSERTION;  Surgeon: Tonio De La Cruz MD;  Location: [facility];  Service: Urology;  Laterality: Left;       Family History: family history includes COPD in his father; Hypertension in his father and mother; Stroke in his mother. Otherwise pertinent FHx was reviewed and not pertinent to current issue.    Social History:  reports that he quit smoking about 38 years ago. His smoking use included cigarettes. He started smoking about 49 years ago. He has a 7.50 pack-year smoking history. He has never used smokeless tobacco. He reports that he does not drink alcohol and does not use drugs.    Home Medications:   Semaglutide(0.25 or 0.5MG/DOS), amLODIPine, amiodarone, carvedilol, divalproex, furosemide, levothyroxine, rivaroxaban, sacubitril-valsartan, and tamsulosin    Allergies:  Allergies   Allergen Reactions    Statins Myalgia and Other (See Comments)       Objective    Objective     Vitals:  Temp:  [98.1 °F (36.7 °C)-100.4 °F (38 °C)] 98.3 °F (36.8 °C)  Heart Rate:  [68-79] 69  Resp:  [18] 18  BP: (119-172)/(70-95) 119/70    Physical Exam  Patient is alert and appropriate.  Result Review    Result Review:  I have personally reviewed the results from the time of this admission to 2/17/2024 16:06 EST and agree with these findings:  []  Laboratory list / accordion  [x]  Microbiology  [x]  Radiology  []  EKG/Telemetry   []  Cardiology/Vascular   []  Pathology  []  Old records  []  Other:  Most notable findings include: CT scan findings with bilateral nonobstructing renal stones with no ureteral stones      Assessment & Plan   Assessment / Plan     Brief Patient Summary:  Andre Agosto is a 69 y.o. male who clinically has had relapsing sepsis of urologic origin  likely from previous colonization and manipulation with a transrectal sound needle biopsy of the prostate.  No immediate urologic intervention necessary at this time    Active Hospital Problems:  Active Hospital Problems    Diagnosis     **UTI (urinary tract infection)     Paroxysmal atrial flutter     Fever and chills     ALEX (obstructive sleep apnea)     History of cerebrovascular accident     Diabetes mellitus without complication     Epilepsy     Essential hypertension      Plan:       Andre Ray MD

## 2024-02-17 NOTE — PLAN OF CARE
Problem: Adult Inpatient Plan of Care  Goal: Plan of Care Review  Outcome: Ongoing, Progressing  Flowsheets (Taken 2/17/2024 1849)  Progress: improving  Outcome Evaluation: VSS, pt able to amblulate with 1 staff to bathroom today. PT ambulated in hallway. Pt reports feeling stronger today. Eating most of meal trays. Denies pain. Afebrile all shift. Urology consulted.  Goal: Patient-Specific Goal (Individualized)  Outcome: Ongoing, Progressing  Goal: Absence of Hospital-Acquired Illness or Injury  Outcome: Ongoing, Progressing  Intervention: Identify and Manage Fall Risk  Recent Flowsheet Documentation  Taken 2/17/2024 1800 by Dionne Mcdaniels RN  Safety Promotion/Fall Prevention:   activity supervised   fall prevention program maintained   nonskid shoes/slippers when out of bed   safety round/check completed  Taken 2/17/2024 1600 by Dionne Mcdaniels RN  Safety Promotion/Fall Prevention:   activity supervised   fall prevention program maintained   nonskid shoes/slippers when out of bed   safety round/check completed  Taken 2/17/2024 1400 by Dionne Mcdaniels RN  Safety Promotion/Fall Prevention:   activity supervised   fall prevention program maintained   nonskid shoes/slippers when out of bed   safety round/check completed  Taken 2/17/2024 1200 by Dionne Mcdaniels RN  Safety Promotion/Fall Prevention:   activity supervised   fall prevention program maintained   nonskid shoes/slippers when out of bed   safety round/check completed  Taken 2/17/2024 1000 by Dionne Mcdaniels RN  Safety Promotion/Fall Prevention:   activity supervised   fall prevention program maintained   nonskid shoes/slippers when out of bed   safety round/check completed  Taken 2/17/2024 0800 by Dionne Mcdaniels RN  Safety Promotion/Fall Prevention:   activity supervised   fall prevention program maintained   nonskid shoes/slippers when out of bed   safety round/check completed  Intervention: Prevent Skin Injury  Recent Flowsheet Documentation  Taken 2/17/2024  1800 by Dionne Mcdaniels RN  Body Position: position changed independently  Taken 2/17/2024 1600 by Dionne Mcdaniels RN  Body Position: position changed independently  Taken 2/17/2024 1400 by Dionne Mcdaniels RN  Body Position: position changed independently  Taken 2/17/2024 1200 by Dionne Mcdaniels RN  Body Position: position changed independently  Taken 2/17/2024 1000 by Dionne Mcdaniels RN  Body Position: position changed independently  Taken 2/17/2024 0800 by Dionne Mcdaniels RN  Body Position: position changed independently  Intervention: Prevent and Manage VTE (Venous Thromboembolism) Risk  Recent Flowsheet Documentation  Taken 2/17/2024 1800 by Dionne Mcdaniels RN  Activity Management: activity encouraged  Taken 2/17/2024 1600 by Dionne Mcdaniels RN  Activity Management: activity encouraged  Taken 2/17/2024 1400 by Dionne Mcdaniels RN  Activity Management: activity encouraged  Taken 2/17/2024 1200 by Dionne Mcdaniels RN  Activity Management: activity encouraged  Taken 2/17/2024 1000 by Dionne Mcdaniels RN  Activity Management: activity encouraged  Taken 2/17/2024 0800 by Dionne Mcdaniels RN  Activity Management: activity encouraged  Goal: Optimal Comfort and Wellbeing  Outcome: Ongoing, Progressing  Intervention: Provide Person-Centered Care  Recent Flowsheet Documentation  Taken 2/17/2024 0800 by Dionne Mcdaniels RN  Trust Relationship/Rapport:   care explained   thoughts/feelings acknowledged  Goal: Readiness for Transition of Care  Outcome: Ongoing, Progressing     Problem: UTI (Urinary Tract Infection)  Goal: Improved Infection Symptoms  Outcome: Ongoing, Progressing     Problem: Fall Injury Risk  Goal: Absence of Fall and Fall-Related Injury  Outcome: Ongoing, Progressing  Intervention: Promote Injury-Free Environment  Recent Flowsheet Documentation  Taken 2/17/2024 1800 by Dionne Mcdaniels RN  Safety Promotion/Fall Prevention:   activity supervised   fall prevention program maintained   nonskid shoes/slippers when out of bed    safety round/check completed  Taken 2/17/2024 1600 by Dionne Mcdaniels RN  Safety Promotion/Fall Prevention:   activity supervised   fall prevention program maintained   nonskid shoes/slippers when out of bed   safety round/check completed  Taken 2/17/2024 1400 by Dionne Mcdaniels RN  Safety Promotion/Fall Prevention:   activity supervised   fall prevention program maintained   nonskid shoes/slippers when out of bed   safety round/check completed  Taken 2/17/2024 1200 by Dionne Mcdaniels RN  Safety Promotion/Fall Prevention:   activity supervised   fall prevention program maintained   nonskid shoes/slippers when out of bed   safety round/check completed  Taken 2/17/2024 1000 by Dionne Mcdaniels RN  Safety Promotion/Fall Prevention:   activity supervised   fall prevention program maintained   nonskid shoes/slippers when out of bed   safety round/check completed  Taken 2/17/2024 0800 by Dionne Mcdaniels RN  Safety Promotion/Fall Prevention:   activity supervised   fall prevention program maintained   nonskid shoes/slippers when out of bed   safety round/check completed   Goal Outcome Evaluation:           Progress: improving  Outcome Evaluation: VSS, pt able to amblulate with 1 staff to bathroom today. PT ambulated in hallway. Pt reports feeling stronger today. Eating most of meal trays. Denies pain. Afebrile all shift. Urology consulted.

## 2024-02-17 NOTE — PLAN OF CARE
Goal Outcome Evaluation:  Plan of Care Reviewed With: patient        Progress: no change  Outcome Evaluation: VSS on RA awake, CPAP asleep. A&O x4. Voiding with use of urinal, some incontinence. No complaints of pain or nausea. CT of abdomen and pelvis done overnight. IVF and ABX infused. Tolerating diet. POC ongoing.

## 2024-02-17 NOTE — PROGRESS NOTES
[facility] Progress Note    Admission Date: 2/16/2024    Andre Agosto  1955  [id number]    Date: 2/17/2024    Antibiotics:  Anti-Infectives (From admission, onward)      Ordered     Dose/Rate Route Frequency Start Stop    02/16/24 1838  meropenem (MERREM) 500 mg in sodium chloride 0.9 % 100 mL IVPB        Ordering Provider: Lor Marshall MD    500 mg  over 3 Hours Intravenous Every 6 Hours 02/17/24 0130 02/24/24 0129    02/16/24 1838  meropenem (MERREM) 500 mg in sodium chloride 0.9 % 100 mL IVPB        Ordering Provider: Lor Marshall MD    500 mg  over 30 Minutes Intravenous Once 02/16/24 1930 02/16/24 2045    02/16/24 1435  meropenem (MERREM) 1,000 mg in sodium chloride 0.9 % 100 mL IVPB        Ordering Provider: Luis Grissom MD    1,000 mg  over 30 Minutes Intravenous Once 02/16/24 1451 02/16/24 1620            Requesting Provider: Luis Grissom MD  Evaluating Physician: Lor Marshall MD     Reason for Consultation: Persistent ESBL E.coli UTI, fever, sepsis     History of present illness:    Patient is a 69 y.o. male, known to Dr. Da Boogie, with h/o persistent afib/Xarelto, AICD 2021, CAD, seizure disorder, CKD Stage II, CVA, T2DM, hypothyroidism, HLD, HTN, nephrolithiasis, obesity, and ALEX/BiPAP who was recently admitted to [facility] from 1/26 to 1/31 for sepsis related to ESBL E.coli bacteremia and UTI with hematuria after undergoing a prostate biopsy with Dr. De La Cruz around 1/25. Imaging revealed nonobstructing kidney stones.  He had a Crespo in place for about a week.  He was initially placed on Levaquin after the biopsy.  A PICC line was placed and he was treated with Invanz (with adjustment of seizure meds) until 2/12/24 with PICC line removal after dose on 2/12. He was placed on maintenance dose of Macrobid.  He states that he did have a couple of seizures while on Ertapenem.     He developed fever up to 102.5  with increased urinary frequency within the last day.  He contacted his PCP  who recommended he come to [facility] for evaluation by Infectious Diseases.  He denies sore throat, sinus congestion, cough, shortness of breath, nausea, vomiting, diarrhea. He state that his symptoms are identical to his last uti.  On arrival to ED, his Tmax was 99.9.  Labs were WBC 13,000 with 79% neutrophils.  A UA WBC was TNTC. Urine culture is pending. COVID-19/Flu PCR is negative.  Blood cultures have been ordered.  He was started on Merrem.  ID was asked to evaluate and manage his antibiotic therapy.     2/17/24 Tm 100.4 Resting on bipap at the time of my visit. Feels a little stronger. Blood cultures growing gnr 1/2 sets with pcr indicating ESBL Ecoli; urine growing gnr  CT scan abd shows bilateral nonobstructing kidney stones. Lactic acidosis resolved       Review of Systems:  Constitutional-- + Fever, chills, sweats.  Appetite decreased, and no malaise. No fatigue.  HEENT-- No new vision, hearing or throat complaints.  No epistaxis or oral sores.  Denies odynophagia or dysphagia. No headache, photophobia or neck stiffness.  CV-- No chest pain, palpitation or syncope  Resp-- No SOB/cough/Hemoptysis  GI- No nausea, vomiting, or diarrhea.  No hematochezia, melena, or hematemesis. Denies jaundice or chronic liver disease.  -- No dysuria, hematuria, or flank pain.  Denies hesitancy, urgency or burning with urination.   Lymph- no swollen lymph nodes in neck/axilla or groin.   Heme- No active bruising or bleeding; no Hx of DVT or PE.  MS-- no swelling or pain in the bones or joints of arms/legs.  No new back pain.  Neuro-- No acute focal weakness or numbness in the arms or legs.  + seizure d/o, with recent seizures while on ertapenem.  Skin--No rashes or lesions       PE:  Vital Signs  Temp  Min: 98.1 °F (36.7 °C)  Max: 100.4 °F (38 °C)  BP  Min: 119/70  Max: 172/95  Pulse  Min: 68  Max: 79  Resp  Min: 18  Max: 18  SpO2  Min: 90 %  Max: 96 %  GENERAL: Awake and alert, in no acute distress. Less acutely ill appearing.    HEENT: Normocephalic, atraumatic. Flushed  PERRL. EOMI. No conjunctival injection. No icterus. Oropharynx clear without evidence of thrush or exudate.    NECK: Supple without nuchal rigidity. No mass.  CHEST WALL  L SCL AICD site w/o erythema, tenderness or soft tissue swelling  HEART: RRR; No murmur, rubs, gallops.   LUNGS: Clear to auscultation bilaterally without wheezing, rales, rhonchi. Normal respiratory effort. Nonlabored.   ABDOMEN: Soft, nontender, nondistended. Positive bowel sounds. No rebound or guarding. NO mass or HSM. Obese.  EXT:  No cyanosis, clubbing or edema. No cord.  :  Without Crespo catheter.  MSK: No joint effusions or erythema  SKIN: Warm and dry without cutaneous eruptions on Inspection/palpation.    NEURO: Oriented to PPT.  Motor 5/5 strength  PSYCHIATRIC: Normal insight and judgment. Cooperative with PE       Laboratory Data    Results from last 7 days   Lab Units 02/17/24  0855 02/16/24  1500   WBC 10*3/mm3 13.74* 12.98*   HEMOGLOBIN g/dL 15.0 16.5   HEMATOCRIT % 44.8 47.8   PLATELETS 10*3/mm3 159 215     Results from last 7 days   Lab Units 02/17/24  0855   SODIUM mmol/L 136   POTASSIUM mmol/L 3.9   CHLORIDE mmol/L 103   CO2 mmol/L 24.0   BUN mg/dL 13   CREATININE mg/dL 1.19   GLUCOSE mg/dL 156*   CALCIUM mg/dL 8.6     Results from last 7 days   Lab Units 02/16/24  1500   ALK PHOS U/L 77   BILIRUBIN mg/dL 1.5*   ALT (SGPT) U/L 10   AST (SGOT) U/L 12               Estimated Creatinine Clearance: 89.9 mL/min (by C-G formula based on SCr of 1.19 mg/dL).      Microbiology:  ESBL E coli blood    Radiology:  Imaging Results (Last 72 Hours)       Procedure Component Value Units Date/Time    CT Abdomen Pelvis Stone Protocol [[id number]] Collected: 02/17/24 0820     Updated: 02/17/24 0828    Narrative:      CT ABDOMEN PELVIS STONE PROTOCOL    Date of Exam: 2/17/2024 12:25 AM EST    Indication: Nephrolithiasis  recurring ESBL E.coli UTI, recent prostate bx.    Comparison:  1/28/2024    Technique: Axial CT images were obtained of the abdomen and pelvis without the administration of contrast. Reconstructed coronal and sagittal images were also obtained. Automated exposure control and iterative construction methods were used.      Findings:  Visualized lung bases are clear. There is small pericardial effusion, increased from prior exam. The liver, pancreas, and spleen are within normal limits. There are multiple stones within the gallbladder. No inflammatory change around the gallbladder. No   evidence of biliary tract obstruction.    Bilateral adrenal glands are within normal limits. There are multiple bilateral nonobstructing renal stones. There is no evidence of ureteral stone or hydronephrosis. No abdominal or retroperitoneal adenopathy. There is a lap band device around the   proximal stomach. The upper GI tract is otherwise unremarkable. Patient is status post ventral hernia repair. No evidence of residual recurrent hernia.    Urinary bladder is within normal limits. GI tract including the appendix is within normal limits. There is no pelvic or inguinal adenopathy. No free intraperitoneal fluid. No pelvic or inguinal adenopathy.    There are degenerative changes of the spine. No lytic or sclerotic bony lesion identified.      Impression:      Impression:    1. Multiple bilateral nonobstructing renal stones. No evidence of ureteral stone or hydronephrosis.  2. Cholelithiasis. No evidence of acute cholecystitis or biliary tract obstruction.            Electronically Signed: Jose Matta MD    2/17/2024 8:25 AM EST    Workstation ID: [id number]    XR Chest 1 View [[id number]] Collected: 02/16/24 1501     Updated: 02/16/24 1505    Narrative:      XR CHEST 1 VW    Date of Exam: 2/16/2024 2:39 PM EST    Indication: fever    Comparison: 1/16/2024    Findings:  The heart size is enlarged but stable. The pulmonary vascular markings are normal. Left-sided transvenous pacemaker is in place  with the leads in the right atrium and ventricle, unchanged from the last study. The lungs are expanded the chest wall and   they appear clear.      Impression:      Impression:    1. Stable cardiomegaly.  2. No acute process identified in the chest.      Electronically Signed: Nicolás Allred MD    2/16/2024 3:02 PM EST    Workstation ID: [id number]            I personally reviewed the radiographic studies        Impression:   - Recurrent pyuria with UTI  The short interval between recurrent symptoms suggests a persistent focus of infection- ? Renal abscess ? Infected kidney stones With the bacteremia, there is concern re: an endovascular infection  I.e.AICD infection, native valve endocarditiis etc  - Recurrent  ESBL E.coli bacteremia with UTI 1/26/24.  Treated for 2 weeks with Invanz, followed by macrobid maintenance dosing.  - Cardiomyopathy with AICD placement 2021  - Recent prostate biopsy 1/25/24/treated with Levaquin post-procedure and Crespo cath x 1 week.    - Nonobstructing nephrolithiasis  - Persistent atrial fibrillation/Xarelto/ppm  - Seizure disorder/meds adjusted while on ertapenem.Now on merrem and he may require continued adjustment  - Chronic kidney disease Stage II  - Essential hypertension  - Hypothyroidism  - Morbid obesity  BMI 37  - Obstructive sleep apnea/BiPAP        PLAN/RECOMMENDATIONS:   Thank you for asking us to see Andre Agosto, I recommend the following:  - Repeat blood culture in am to ensure that bacteremia is clearing  - Continue Merrem 500mg q 6  - Merrem can lower seizure thresholds, so may need to adjust his anticonvulsant medication accordingly.  Will defer to Hospitalist and Neurology for adjustment.  - TTE over the weekend   - I anticipate that he will need SELWYN  - COVID-19/Flu PCR--negative.   - if the same pathogens are isolated from urine and no abscess is identified it may be necessary to assume that the small kidney stones are infected and treat with an even more prolonged  course of antibiotics     Discussed with patient and daughter    Lor Marshall MD  2/17/2024

## 2024-02-18 ENCOUNTER — APPOINTMENT (OUTPATIENT)
Dept: CARDIOLOGY | Facility: HOSPITAL | Age: 69
End: 2024-02-18
Payer: MEDICARE

## 2024-02-18 PROBLEM — B96.20 BACTEREMIA DUE TO ESCHERICHIA COLI: Status: ACTIVE | Noted: 2024-02-18

## 2024-02-18 PROBLEM — R78.81 BACTEREMIA DUE TO ESCHERICHIA COLI: Status: ACTIVE | Noted: 2024-02-18

## 2024-02-18 LAB
ALBUMIN SERPL-MCNC: 3.5 G/DL (ref 3.5–5.2)
ALBUMIN/GLOB SERPL: 1.3 G/DL
ALP SERPL-CCNC: 68 U/L (ref 39–117)
ALT SERPL W P-5'-P-CCNC: 6 U/L (ref 1–41)
ANION GAP SERPL CALCULATED.3IONS-SCNC: 10 MMOL/L (ref 5–15)
AST SERPL-CCNC: 7 U/L (ref 1–40)
BACTERIA SPEC AEROBE CULT: ABNORMAL
BH CV ECHO MEAS - AI P1/2T: 632.8 MSEC
BH CV ECHO MEAS - AO MAX PG: 26.8 MMHG
BH CV ECHO MEAS - AO MEAN PG: 16.4 MMHG
BH CV ECHO MEAS - AO ROOT DIAM: 4.7 CM
BH CV ECHO MEAS - AO V2 MAX: 258.4 CM/SEC
BH CV ECHO MEAS - AO V2 VTI: 56.7 CM
BH CV ECHO MEAS - AVA(I,D): 1.28 CM2
BH CV ECHO MEAS - EDV(CUBED): 175.6 ML
BH CV ECHO MEAS - EDV(MOD-SP2): 226 ML
BH CV ECHO MEAS - EDV(MOD-SP4): 298 ML
BH CV ECHO MEAS - EF(MOD-BP): 38.7 %
BH CV ECHO MEAS - EF(MOD-SP2): 45.6 %
BH CV ECHO MEAS - EF(MOD-SP4): 35.9 %
BH CV ECHO MEAS - ESV(CUBED): 79.5 ML
BH CV ECHO MEAS - ESV(MOD-SP2): 123 ML
BH CV ECHO MEAS - ESV(MOD-SP4): 191 ML
BH CV ECHO MEAS - FS: 23.2 %
BH CV ECHO MEAS - IVS/LVPW: 1.09 CM
BH CV ECHO MEAS - IVSD: 1.2 CM
BH CV ECHO MEAS - LA DIMENSION: 3.9 CM
BH CV ECHO MEAS - LAT PEAK E' VEL: 5.6 CM/SEC
BH CV ECHO MEAS - LV MASS(C)D: 264.7 GRAMS
BH CV ECHO MEAS - LV MAX PG: 4.2 MMHG
BH CV ECHO MEAS - LV MEAN PG: 2 MMHG
BH CV ECHO MEAS - LV V1 MAX: 102 CM/SEC
BH CV ECHO MEAS - LV V1 VTI: 19.1 CM
BH CV ECHO MEAS - LVIDD: 5.6 CM
BH CV ECHO MEAS - LVIDS: 4.3 CM
BH CV ECHO MEAS - LVOT AREA: 3.8 CM2
BH CV ECHO MEAS - LVOT DIAM: 2.2 CM
BH CV ECHO MEAS - LVPWD: 1.1 CM
BH CV ECHO MEAS - MED PEAK E' VEL: 5.8 CM/SEC
BH CV ECHO MEAS - MV A MAX VEL: 84.4 CM/SEC
BH CV ECHO MEAS - MV DEC SLOPE: 379 CM/SEC2
BH CV ECHO MEAS - MV E MAX VEL: 62.9 CM/SEC
BH CV ECHO MEAS - MV E/A: 0.75
BH CV ECHO MEAS - MV MAX PG: 5.8 MMHG
BH CV ECHO MEAS - MV MEAN PG: 3 MMHG
BH CV ECHO MEAS - MV P1/2T: 84.2 MSEC
BH CV ECHO MEAS - MV V2 VTI: 34.5 CM
BH CV ECHO MEAS - MVA(P1/2T): 2.6 CM2
BH CV ECHO MEAS - MVA(VTI): 2.1 CM2
BH CV ECHO MEAS - PA ACC TIME: 0.13 SEC
BH CV ECHO MEAS - SV(LVOT): 72.6 ML
BH CV ECHO MEAS - SV(MOD-SP2): 103 ML
BH CV ECHO MEAS - SV(MOD-SP4): 107 ML
BH CV ECHO MEAS - TAPSE (>1.6): 2.7 CM
BH CV ECHO MEASUREMENTS AVERAGE E/E' RATIO: 11.04
BH CV XLRA - RV BASE: 4.4 CM
BH CV XLRA - RV LENGTH: 8.3 CM
BH CV XLRA - RV MID: 3.4 CM
BH CV XLRA - TDI S': 16.9 CM/SEC
BILIRUB SERPL-MCNC: 1.2 MG/DL (ref 0–1.2)
BUN SERPL-MCNC: 14 MG/DL (ref 8–23)
BUN/CREAT SERPL: 12.5 (ref 7–25)
CALCIUM SPEC-SCNC: 8.3 MG/DL (ref 8.6–10.5)
CHLORIDE SERPL-SCNC: 102 MMOL/L (ref 98–107)
CO2 SERPL-SCNC: 24 MMOL/L (ref 22–29)
CREAT SERPL-MCNC: 1.12 MG/DL (ref 0.76–1.27)
DEPRECATED RDW RBC AUTO: 48.9 FL (ref 37–54)
EGFRCR SERPLBLD CKD-EPI 2021: 71.6 ML/MIN/1.73
ERYTHROCYTE [DISTWIDTH] IN BLOOD BY AUTOMATED COUNT: 14.5 % (ref 12.3–15.4)
GLOBULIN UR ELPH-MCNC: 2.8 GM/DL
GLUCOSE BLDC GLUCOMTR-MCNC: 118 MG/DL (ref 70–130)
GLUCOSE BLDC GLUCOMTR-MCNC: 148 MG/DL (ref 70–130)
GLUCOSE BLDC GLUCOMTR-MCNC: 174 MG/DL (ref 70–130)
GLUCOSE BLDC GLUCOMTR-MCNC: 182 MG/DL (ref 70–130)
GLUCOSE SERPL-MCNC: 117 MG/DL (ref 65–99)
HCT VFR BLD AUTO: 45.6 % (ref 37.5–51)
HGB BLD-MCNC: 15 G/DL (ref 13–17.7)
LEFT ATRIUM VOLUME INDEX: 33.7 ML/M2
MCH RBC QN AUTO: 30 PG (ref 26.6–33)
MCHC RBC AUTO-ENTMCNC: 32.9 G/DL (ref 31.5–35.7)
MCV RBC AUTO: 91.2 FL (ref 79–97)
PLATELET # BLD AUTO: 166 10*3/MM3 (ref 140–450)
PMV BLD AUTO: 10.8 FL (ref 6–12)
POTASSIUM SERPL-SCNC: 3.5 MMOL/L (ref 3.5–5.2)
PROT SERPL-MCNC: 6.3 G/DL (ref 6–8.5)
RBC # BLD AUTO: 5 10*6/MM3 (ref 4.14–5.8)
SODIUM SERPL-SCNC: 136 MMOL/L (ref 136–145)
VALPROATE SERPL-MCNC: 22.4 MCG/ML (ref 50–125)
WBC NRBC COR # BLD AUTO: 9.97 10*3/MM3 (ref 3.4–10.8)

## 2024-02-18 PROCEDURE — 80053 COMPREHEN METABOLIC PANEL: CPT | Performed by: HOSPITALIST

## 2024-02-18 PROCEDURE — 63710000001 INSULIN LISPRO (HUMAN) PER 5 UNITS: Performed by: FAMILY MEDICINE

## 2024-02-18 PROCEDURE — 87040 BLOOD CULTURE FOR BACTERIA: CPT | Performed by: INTERNAL MEDICINE

## 2024-02-18 PROCEDURE — 25010000002 MEROPENEM PER 100 MG: Performed by: INTERNAL MEDICINE

## 2024-02-18 PROCEDURE — 82948 REAGENT STRIP/BLOOD GLUCOSE: CPT

## 2024-02-18 PROCEDURE — 93306 TTE W/DOPPLER COMPLETE: CPT | Performed by: INTERNAL MEDICINE

## 2024-02-18 PROCEDURE — 85027 COMPLETE CBC AUTOMATED: CPT | Performed by: HOSPITALIST

## 2024-02-18 PROCEDURE — 99232 SBSQ HOSP IP/OBS MODERATE 35: CPT | Performed by: HOSPITALIST

## 2024-02-18 PROCEDURE — 25010000002 SULFUR HEXAFLUORIDE MICROSPH 60.7-25 MG RECONSTITUTED SUSPENSION: Performed by: INTERNAL MEDICINE

## 2024-02-18 PROCEDURE — 80164 ASSAY DIPROPYLACETIC ACD TOT: CPT | Performed by: HOSPITALIST

## 2024-02-18 PROCEDURE — 93306 TTE W/DOPPLER COMPLETE: CPT

## 2024-02-18 PROCEDURE — 25810000003 SODIUM CHLORIDE 0.9 % SOLUTION: Performed by: FAMILY MEDICINE

## 2024-02-18 RX ORDER — POTASSIUM CHLORIDE 20 MEQ/1
40 TABLET, EXTENDED RELEASE ORAL EVERY 4 HOURS
Status: COMPLETED | OUTPATIENT
Start: 2024-02-18 | End: 2024-02-18

## 2024-02-18 RX ORDER — DIVALPROEX SODIUM 250 MG/1
1000 TABLET, DELAYED RELEASE ORAL 2 TIMES DAILY
Status: DISCONTINUED | OUTPATIENT
Start: 2024-02-18 | End: 2024-02-19

## 2024-02-18 RX ADMIN — MEROPENEM 500 MG: 500 INJECTION, POWDER, FOR SOLUTION INTRAVENOUS at 13:09

## 2024-02-18 RX ADMIN — ACETAMINOPHEN 1000 MG: 500 TABLET ORAL at 17:04

## 2024-02-18 RX ADMIN — RIVAROXABAN 15 MG: 15 TABLET, FILM COATED ORAL at 17:04

## 2024-02-18 RX ADMIN — ACETAMINOPHEN 1000 MG: 500 TABLET ORAL at 21:00

## 2024-02-18 RX ADMIN — SODIUM CHLORIDE 100 ML/HR: 900 INJECTION INTRAVENOUS at 00:51

## 2024-02-18 RX ADMIN — SACUBITRIL AND VALSARTAN 1 TABLET: 97; 103 TABLET, FILM COATED ORAL at 21:00

## 2024-02-18 RX ADMIN — MEROPENEM 500 MG: 500 INJECTION, POWDER, FOR SOLUTION INTRAVENOUS at 08:23

## 2024-02-18 RX ADMIN — POTASSIUM CHLORIDE 40 MEQ: 1500 TABLET, EXTENDED RELEASE ORAL at 17:04

## 2024-02-18 RX ADMIN — LEVOTHYROXINE SODIUM 88 MCG: 0.09 TABLET ORAL at 05:59

## 2024-02-18 RX ADMIN — SODIUM CHLORIDE 100 ML/HR: 900 INJECTION INTRAVENOUS at 21:02

## 2024-02-18 RX ADMIN — AMIODARONE HYDROCHLORIDE 100 MG: 200 TABLET ORAL at 08:22

## 2024-02-18 RX ADMIN — SULFUR HEXAFLUORIDE 3 ML: KIT at 09:31

## 2024-02-18 RX ADMIN — DIVALPROEX SODIUM 1000 MG: 250 TABLET, DELAYED RELEASE ORAL at 20:59

## 2024-02-18 RX ADMIN — INSULIN LISPRO 2 UNITS: 100 INJECTION, SOLUTION INTRAVENOUS; SUBCUTANEOUS at 11:15

## 2024-02-18 RX ADMIN — POTASSIUM CHLORIDE 40 MEQ: 1500 TABLET, EXTENDED RELEASE ORAL at 11:15

## 2024-02-18 RX ADMIN — CARVEDILOL 25 MG: 12.5 TABLET, FILM COATED ORAL at 08:21

## 2024-02-18 RX ADMIN — TAMSULOSIN HYDROCHLORIDE 0.4 MG: 0.4 CAPSULE ORAL at 08:22

## 2024-02-18 RX ADMIN — AMLODIPINE BESYLATE 10 MG: 10 TABLET ORAL at 21:00

## 2024-02-18 RX ADMIN — SACUBITRIL AND VALSARTAN 1 TABLET: 97; 103 TABLET, FILM COATED ORAL at 08:22

## 2024-02-18 RX ADMIN — INSULIN LISPRO 2 UNITS: 100 INJECTION, SOLUTION INTRAVENOUS; SUBCUTANEOUS at 21:00

## 2024-02-18 RX ADMIN — ACETAMINOPHEN 1000 MG: 500 TABLET ORAL at 08:22

## 2024-02-18 RX ADMIN — MEROPENEM 500 MG: 500 INJECTION, POWDER, FOR SOLUTION INTRAVENOUS at 21:00

## 2024-02-18 RX ADMIN — Medication 10 ML: at 21:01

## 2024-02-18 RX ADMIN — MEROPENEM 500 MG: 500 INJECTION, POWDER, FOR SOLUTION INTRAVENOUS at 01:51

## 2024-02-18 RX ADMIN — DIVALPROEX SODIUM 750 MG: 250 TABLET, DELAYED RELEASE ORAL at 08:22

## 2024-02-18 RX ADMIN — CARVEDILOL 25 MG: 12.5 TABLET, FILM COATED ORAL at 17:04

## 2024-02-18 NOTE — PROGRESS NOTES
Patient has been afebrile.  Cultures growing ESBL E. coli.  Discussed with infectious disease.  He will continue on IV antibiotics for several weeks.  He has nonobstructing bilateral renal stones which may need to be addressed at some point.  He has newly diagnosed low-volume low-grade prostate cancer with anticipated active surveillance per Dr. Santana Corral.

## 2024-02-18 NOTE — PLAN OF CARE
Goal Outcome Evaluation:  Plan of Care Reviewed With: patient        Progress: improving  Outcome Evaluation: VSS on RA, CPAP while asleep. A&O x4. A Paced. No complaints of pain or nausea. Afebrile. IVFs infusing. Contact and fall precautions in place. Wife at bedside. Will continue plan of care.

## 2024-02-18 NOTE — PROGRESS NOTES
[facility] Progress Note    Admission Date: 2/16/2024    Andre Agosto  1955  [id number]    Date: 2/18/2024    Antibiotics:  Anti-Infectives (From admission, onward)      Ordered     Dose/Rate Route Frequency Start Stop    02/16/24 1838  meropenem (MERREM) 500 mg in sodium chloride 0.9 % 100 mL IVPB        Ordering Provider: Lor Marshall MD    500 mg  over 3 Hours Intravenous Every 6 Hours 02/17/24 0130 02/24/24 0129    02/16/24 1838  meropenem (MERREM) 500 mg in sodium chloride 0.9 % 100 mL IVPB        Ordering Provider: Lor Marshall MD    500 mg  over 30 Minutes Intravenous Once 02/16/24 1930 02/16/24 2045    02/16/24 1435  meropenem (MERREM) 1,000 mg in sodium chloride 0.9 % 100 mL IVPB        Ordering Provider: Luis Grissom MD    1,000 mg  over 30 Minutes Intravenous Once 02/16/24 1451 02/16/24 1620            Requesting Provider: Luis Grissom MD  Evaluating Physician: Lor Marshall MD     Reason for Consultation: Persistent ESBL E.coli UTI, fever, sepsis     History of present illness:    Patient is a 69 y.o. male, known to Dr. Da Boogie, with h/o persistent afib/Xarelto, AICD 2021, CAD, seizure disorder, CKD Stage II, CVA, T2DM, hypothyroidism, HLD, HTN, nephrolithiasis, obesity, and ALEX/BiPAP who was recently admitted to [facility] from 1/26 to 1/31 for sepsis related to ESBL E.coli bacteremia and UTI with hematuria after undergoing a prostate biopsy with Dr. De La Cruz around 1/25. Imaging revealed nonobstructing kidney stones.  He had a Crespo in place for about a week.  He was initially placed on Levaquin after the biopsy.  A PICC line was placed and he was treated with Invanz (with adjustment of seizure meds) until 2/12/24 with PICC line removal after dose on 2/12. He was placed on maintenance dose of Macrobid.  He states that he did have a couple of seizures while on Ertapenem.     He developed fever up to 102.5  with increased urinary frequency within the last day.  He contacted his PCP  who recommended he come to [facility] for evaluation by Infectious Diseases.  He denies sore throat, sinus congestion, cough, shortness of breath, nausea, vomiting, diarrhea. He state that his symptoms are identical to his last uti.  On arrival to ED, his Tmax was 99.9.  Labs were WBC 13,000 with 79% neutrophils.  A UA WBC was TNTC. Urine culture is pending. COVID-19/Flu PCR is negative.  Blood cultures have been ordered.  He was started on Merrem.  ID was asked to evaluate and manage his antibiotic therapy.     2/17/24 Tm 100.4 Resting on bipap at the time of my visit. Feels a little stronger. Blood cultures growing gnr 1/2 sets with pcr indicating ESBL Ecoli; urine growing gnr  CT scan abd shows bilateral nonobstructing kidney stones. Lactic acidosis resolved    2/18/24: Tmax 99.6.  Sitting in chair.  Feeling a little better.  Urine and blood culture with ESBL E.coli.         Review of Systems:  Constitutional-- + Fever, no chills, sweats.  Appetite improved, and no malaise. + fatigue.  HEENT-- No new vision, hearing or throat complaints.  No epistaxis or oral sores.  Denies odynophagia or dysphagia. No headache, photophobia or neck stiffness.  CV-- No chest pain, palpitation or syncope  Resp-- No SOB/cough/Hemoptysis  GI- No nausea, vomiting, or diarrhea.  No hematochezia, melena, or hematemesis. Denies jaundice or chronic liver disease.  -- No dysuria, hematuria, or flank pain.  Denies hesitancy, urgency or burning with urination.   Lymph- no swollen lymph nodes in neck/axilla or groin.   Heme- No active bruising or bleeding; no Hx of DVT or PE.  MS-- no swelling or pain in the bones or joints of arms/legs.  No new back pain.  Neuro-- No acute focal weakness or numbness in the arms or legs.  + seizure d/o, with recent seizures while on ertapenem.  Skin--No rashes or lesions       PE:  Vital Signs  Temp  Min: 97.6 °F (36.4 °C)  Max: 98.9 °F (37.2 °C)  BP  Min: 116/67  Max: 144/83  Pulse  Min: 69  Max: 87  Resp  Min:  18  Max: 18  SpO2  Min: 92 %  Max: 98 %  GENERAL: Awake and alert, in no acute distress. Less acutely ill appearing.   HEENT: Normocephalic, atraumatic. Flushed  PERRL. EOMI. No conjunctival injection. No icterus. Oropharynx clear without evidence of thrush or exudate.    NECK: Supple without nuchal rigidity. No mass.  CHEST WALL  L SCL AICD site w/o erythema, tenderness or soft tissue swelling  HEART: RRR; No murmur, rubs, gallops.   LUNGS: Clear to auscultation bilaterally without wheezing, rales, rhonchi. Normal respiratory effort. Nonlabored.   ABDOMEN: Soft, nontender, nondistended. Positive bowel sounds. No rebound or guarding. NO mass or HSM. Obese.  EXT:  No cyanosis, clubbing or edema. No cord.  :  Without Crespo catheter.  MSK: No joint effusions or erythema  SKIN: Warm and dry without cutaneous eruptions on Inspection/palpation.    NEURO: Oriented to PPT.  Motor 5/5 strength  PSYCHIATRIC: Normal insight and judgment. Cooperative with PE       Laboratory Data    Results from last 7 days   Lab Units 02/18/24  0806 02/17/24  0855 02/16/24  1500   WBC 10*3/mm3 9.97 13.74* 12.98*   HEMOGLOBIN g/dL 15.0 15.0 16.5   HEMATOCRIT % 45.6 44.8 47.8   PLATELETS 10*3/mm3 166 159 215     Results from last 7 days   Lab Units 02/18/24  0806   SODIUM mmol/L 136   POTASSIUM mmol/L 3.5   CHLORIDE mmol/L 102   CO2 mmol/L 24.0   BUN mg/dL 14   CREATININE mg/dL 1.12   GLUCOSE mg/dL 117*   CALCIUM mg/dL 8.3*     Results from last 7 days   Lab Units 02/18/24  0806   ALK PHOS U/L 68   BILIRUBIN mg/dL 1.2   ALT (SGPT) U/L 6   AST (SGOT) U/L 7               Estimated Creatinine Clearance: 95.5 mL/min (by C-G formula based on SCr of 1.12 mg/dL).      Microbiology:  ESBL E coli blood    Radiology:  Imaging Results (Last 72 Hours)       Procedure Component Value Units Date/Time    CT Abdomen Pelvis Stone Protocol [[id number]] Collected: 02/17/24 0820     Updated: 02/17/24 0828    Narrative:      CT ABDOMEN PELVIS STONE PROTOCOL    Date  of Exam: 2/17/2024 12:25 AM EST    Indication: Nephrolithiasis  recurring ESBL E.coli UTI, recent prostate bx.    Comparison: 1/28/2024    Technique: Axial CT images were obtained of the abdomen and pelvis without the administration of contrast. Reconstructed coronal and sagittal images were also obtained. Automated exposure control and iterative construction methods were used.      Findings:  Visualized lung bases are clear. There is small pericardial effusion, increased from prior exam. The liver, pancreas, and spleen are within normal limits. There are multiple stones within the gallbladder. No inflammatory change around the gallbladder. No   evidence of biliary tract obstruction.    Bilateral adrenal glands are within normal limits. There are multiple bilateral nonobstructing renal stones. There is no evidence of ureteral stone or hydronephrosis. No abdominal or retroperitoneal adenopathy. There is a lap band device around the   proximal stomach. The upper GI tract is otherwise unremarkable. Patient is status post ventral hernia repair. No evidence of residual recurrent hernia.    Urinary bladder is within normal limits. GI tract including the appendix is within normal limits. There is no pelvic or inguinal adenopathy. No free intraperitoneal fluid. No pelvic or inguinal adenopathy.    There are degenerative changes of the spine. No lytic or sclerotic bony lesion identified.      Impression:      Impression:    1. Multiple bilateral nonobstructing renal stones. No evidence of ureteral stone or hydronephrosis.  2. Cholelithiasis. No evidence of acute cholecystitis or biliary tract obstruction.            Electronically Signed: Jose Matta MD    2/17/2024 8:25 AM EST    Workstation ID: [id number]    XR Chest 1 View [[id number]] Collected: 02/16/24 1501     Updated: 02/16/24 1505    Narrative:      XR CHEST 1 VW    Date of Exam: 2/16/2024 2:39 PM EST    Indication: fever    Comparison: 1/16/2024    Findings:  The  heart size is enlarged but stable. The pulmonary vascular markings are normal. Left-sided transvenous pacemaker is in place with the leads in the right atrium and ventricle, unchanged from the last study. The lungs are expanded the chest wall and   they appear clear.      Impression:      Impression:    1. Stable cardiomegaly.  2. No acute process identified in the chest.      Electronically Signed: Nicolás Allred MD    2/16/2024 3:02 PM EST    Workstation ID: [id number]            I personally reviewed the radiographic studies        Impression:   - Recurrent pyuria with UTI  The short interval between recurrent symptoms suggests a persistent focus of infection- ? Renal abscess ? Infected kidney stones With the bacteremia, there is concern re: an endovascular infection  I.e.AICD infection, native valve endocarditiis etc  - Recurrent  ESBL E.coli bacteremia with UTI 1/26/24.  Treated for 2 weeks with Invanz, followed by macrobid maintenance dosing.  - Cardiomyopathy with AICD placement 2021  - Recent prostate biopsy 1/25/24/treated with Levaquin post-procedure and Crespo cath x 1 week.    - Nonobstructing nephrolithiasis  - Persistent atrial fibrillation/Xarelto/ppm  - Seizure disorder/meds adjusted while on ertapenem.Now on merrem and he may require continued adjustment  - Chronic kidney disease Stage II  - Essential hypertension  - Hypothyroidism  - Morbid obesity  BMI 37  - Obstructive sleep apnea/BiPAP        PLAN/RECOMMENDATIONS:   Thank you for asking us to see Andre Agosto, I recommend the following:  - Repeat blood culture in am to ensure that bacteremia is clearing  - Continue Merrem 500mg q 6  - Merrem can lower seizure thresholds, so may need to adjust his anticonvulsant medication accordingly.  Will defer to Hospitalist and Neurology for adjustment.  - TTE over the weekend--reading pending.  - I anticipate that he will need SELWYN  - COVID-19/Flu PCR--negative.   - if the same pathogens are isolated from urine  and no abscess is identified it may be necessary to assume that the small kidney stones are infected and treat with an even more prolonged course of antibiotics     Discussed at length with patient and daughter    Lor Marshall MD saw and examined patient, verified hx and PE, read pertinent radiographic studies, reviewed labs and micro data, and formulated dx, plan for treatment and all medical decision making.      CONSTANTIN PriestC for MD Pablo Parra PA  2/18/2024

## 2024-02-18 NOTE — PROGRESS NOTES
[facility] Medicine Services  PROGRESS NOTE    Patient Name: Andre Agosto  : 1955  MRN: [id number]    Date of Admission: 2024  Primary Care Physician: Keven Bear MD    Subjective   Subjective     CC:  Fever, UTI sx's    HPI:  Patient resting in bed with family at bedside. No fevers overnight. Patient states he is feeling better this morning. He has no acute complaints, ate some breakfast.      Objective   Objective     Vital Signs:   Temp:  [97.8 °F (36.6 °C)-98.9 °F (37.2 °C)] 98.9 °F (37.2 °C)  Heart Rate:  [69-81] 69  Resp:  [18] 18  BP: (116-144)/(67-92) 144/83     Physical Exam:  Constitutional: No acute distress, awake, alert  HENT: NCAT, mucous membranes moist  Respiratory: Clear to auscultation bilaterally, respiratory effort normal   Cardiovascular: RRR, no murmurs, rubs, or gallops  Gastrointestinal: Positive bowel sounds, soft, nontender, nondistended  Musculoskeletal: No bilateral ankle edema  Psychiatric: Appropriate affect, cooperative  Neurologic: Oriented x 3, BROWN, speech clear  Skin: No rashes    Results Reviewed:  LAB RESULTS:      Lab 24  0824  0855 24  21024  1753 24  1500   WBC 9.97 13.74*  --   --  12.98*   HEMOGLOBIN 15.0 15.0  --   --  16.5   HEMATOCRIT 45.6 44.8  --   --  47.8   PLATELETS 166 159  --   --  215   NEUTROS ABS  --   --   --   --  10.33*   IMMATURE GRANS (ABS)  --   --   --   --  0.05   LYMPHS ABS  --   --   --   --  1.58   MONOS ABS  --   --   --   --  0.92*   EOS ABS  --   --   --   --  0.05   MCV 91.2 94.1  --   --  92.3   PROCALCITONIN  --   --   --   --  0.12   LACTATE  --   --  1.3 2.7* 2.3*         Lab 24  0806 24  0855 24  1500   SODIUM 136 136 141   POTASSIUM 3.5 3.9 3.6   CHLORIDE 102 103 101   CO2 24.0 24.0 26.0   ANION GAP 10.0 9.0 14.0   BUN 14 13 11   CREATININE 1.12 1.19 1.18   EGFR 71.6 66.5 67.2   GLUCOSE 117* 156* 137*   CALCIUM 8.3* 8.6 8.8         Lab  02/18/24  0806 02/16/24  1500   TOTAL PROTEIN 6.3 7.8   ALBUMIN 3.5 4.0   GLOBULIN 2.8 3.8   ALT (SGPT) 6 10   AST (SGOT) 7 12   BILIRUBIN 1.2 1.5*   ALK PHOS 68 77   LIPASE  --  60                     Brief Urine Lab Results  (Last result in the past 365 days)        Color   Clarity   Blood   Leuk Est   Nitrite   Protein   CREAT   Urine HCG        02/16/24 1429 Yellow   Clear   Moderate (2+)   Small (1+)   Negative   30 mg/dL (1+)                   Microbiology Results Abnormal       Procedure Component Value - Date/Time    Blood Culture - Blood, Arm, Right [[id number]]  (Normal) Collected: 02/16/24 1502    Lab Status: Preliminary result Specimen: Blood from Arm, Right Updated: 02/17/24 1515     Blood Culture No growth at 24 hours    COVID PRE-OP / PRE-PROCEDURE SCREENING ORDER (NO ISOLATION) - Swab, Nasopharynx [[id number]]  (Normal) Collected: 02/16/24 1406    Lab Status: Final result Specimen: Swab from Nasopharynx Updated: 02/16/24 1500    Narrative:      The following orders were created for panel order COVID PRE-OP / PRE-PROCEDURE SCREENING ORDER (NO ISOLATION) - Swab, Nasopharynx.  Procedure                               Abnormality         Status                     ---------                               -----------         ------                     COVID-19 and FLU A/B PCR...[[id number]]  Normal              Final result                 Please view results for these tests on the individual orders.    COVID-19 and FLU A/B PCR, 1 HR TAT - Swab, Nasopharynx [[id number]]  (Normal) Collected: 02/16/24 1406    Lab Status: Final result Specimen: Swab from Nasopharynx Updated: 02/16/24 1500     COVID19 Not Detected     Influenza A PCR Not Detected     Influenza B PCR Not Detected    Narrative:      Fact sheet for providers: https://www.fda.gov/media/078949/download    Fact sheet for patients: https://www.fda.gov/media/568893/download    Test performed by PCR.            CT Abdomen Pelvis Stone Protocol    Result  Date: 2/17/2024  CT ABDOMEN PELVIS STONE PROTOCOL Date of Exam: 2/17/2024 12:25 AM EST Indication: Nephrolithiasis recurring ESBL E.coli UTI, recent prostate bx. Comparison: 1/28/2024 Technique: Axial CT images were obtained of the abdomen and pelvis without the administration of contrast. Reconstructed coronal and sagittal images were also obtained. Automated exposure control and iterative construction methods were used. Findings: Visualized lung bases are clear. There is small pericardial effusion, increased from prior exam. The liver, pancreas, and spleen are within normal limits. There are multiple stones within the gallbladder. No inflammatory change around the gallbladder. No  evidence of biliary tract obstruction. Bilateral adrenal glands are within normal limits. There are multiple bilateral nonobstructing renal stones. There is no evidence of ureteral stone or hydronephrosis. No abdominal or retroperitoneal adenopathy. There is a lap band device around the proximal stomach. The upper GI tract is otherwise unremarkable. Patient is status post ventral hernia repair. No evidence of residual recurrent hernia. Urinary bladder is within normal limits. GI tract including the appendix is within normal limits. There is no pelvic or inguinal adenopathy. No free intraperitoneal fluid. No pelvic or inguinal adenopathy. There are degenerative changes of the spine. No lytic or sclerotic bony lesion identified.     Impression: Impression: 1. Multiple bilateral nonobstructing renal stones. No evidence of ureteral stone or hydronephrosis. 2. Cholelithiasis. No evidence of acute cholecystitis or biliary tract obstruction. Electronically Signed: Jose Matta MD  2/17/2024 8:25 AM EST  Workstation ID: [id number]    XR Chest 1 View    Result Date: 2/16/2024  XR CHEST 1 VW Date of Exam: 2/16/2024 2:39 PM EST Indication: fever Comparison: 1/16/2024 Findings: The heart size is enlarged but stable. The pulmonary vascular markings  are normal. Left-sided transvenous pacemaker is in place with the leads in the right atrium and ventricle, unchanged from the last study. The lungs are expanded the chest wall and they appear clear.     Impression: Impression: 1. Stable cardiomegaly. 2. No acute process identified in the chest. Electronically Signed: Nicolás Allred MD  2/16/2024 3:02 PM EST  Workstation ID: [id number]     Results for orders placed during the hospital encounter of 09/04/20    Adult Transesophageal Echo (SELWYN) W/ Cont if Necessary Per Protocol    Interpretation Summary  · Estimated EF = 45%.  · Left ventricular systolic function is mildly decreased.  · Atrial fibrillation is noted throughout the study.  · Trace mitral valve regurgitation is present  · Mild tricuspid valve regurgitation is present.  · No thrombus is seen within the patient's left atrium or left atrial appendage.    Anesthesia: Cath lab/Echo lab moderate sedation    I was present with the patient for the duration of moderate sedation and supervised staff who had no other duties and monitored the patient for the entire procedure.    Name of independent trained observer: Elizabeth Wang RN  Intra-service start time: 1210  Intra-service end time: 1235      Current medications:  Scheduled Meds:acetaminophen, 1,000 mg, Oral, TID  amiodarone, 100 mg, Oral, Daily  amLODIPine, 10 mg, Oral, Nightly  carvedilol, 25 mg, Oral, BID With Meals  divalproex, 1,000 mg, Oral, BID  insulin lispro, 2-7 Units, Subcutaneous, 4x Daily AC & at Bedtime  levothyroxine, 88 mcg, Oral, Q AM  meropenem, 500 mg, Intravenous, Q6H  potassium chloride ER, 40 mEq, Oral, Q4H  rivaroxaban, 15 mg, Oral, Daily With Dinner  sacubitril-valsartan, 1 tablet, Oral, BID  sodium chloride, 10 mL, Intravenous, Q12H  tamsulosin, 0.4 mg, Oral, Daily      Continuous Infusions:sodium chloride, 100 mL/hr, Last Rate: 100 mL/hr (02/18/24 0051)      PRN Meds:.  senna-docusate sodium **AND** polyethylene glycol **AND** bisacodyl  **AND** bisacodyl    Calcium Replacement - Follow Nurse / BPA Driven Protocol    dextrose    dextrose    glucagon (human recombinant)    Magnesium Standard Dose Replacement - Follow Nurse / BPA Driven Protocol    nitroglycerin    ondansetron    Phosphorus Replacement - Follow Nurse / BPA Driven Protocol    Potassium Replacement - Follow Nurse / BPA Driven Protocol    sodium chloride    [COMPLETED] Insert Peripheral IV **AND** sodium chloride    sodium chloride    sodium chloride    Assessment & Plan   Assessment & Plan     Active Hospital Problems    Diagnosis  POA    **UTI (urinary tract infection) [N39.0]  Yes    Bacteremia due to Escherichia coli [R78.81, B96.20]  Yes    Paroxysmal atrial flutter [I48.92]  Yes    Fever and chills [R50.9]  Yes    ALEX (obstructive sleep apnea) [G47.33]  Yes    History of cerebrovascular accident [Z86.73]  Not Applicable    Diabetes mellitus without complication [E11.9]  Yes    Epilepsy [G40.909]  Yes    Essential hypertension [I10]  Yes      Resolved Hospital Problems   No resolved problems to display.        Brief Hospital Course to date:  Andre Agosto is a 69 y.o. male presented to [facility] ED for recurrent fever despite treatment for UTI.  Patient recently admitted to [facility] for ESBL E. coli UTI after having a recent prostate biopsy.  Patient was admitted 1/26 discharged 1/31 with a PICC line and ongoing Invanz antibiotic treatment through 2/12 at which time PICC was removed.  Patient has been on PO Bactrim since 2/12 but started developing urine frequency and fevers 2/15, spiking up to 102.5 prompting ED eval.  No hematuria, no abd pain or flank pain.    This patient's problems and plans were partially entered by my partner and updated as appropriate by me 02/18/24.    Recurrent ESBL UTI  ESBL E Coli Bacteremia  - IV meropenem (completed course of Invanz 2/13/24)  - ID follows   - schedule Tylenol 1g TID x2 days for constitutional sx's  -  urology evaluated- no plan for intervention while patient is acutely bacteremic  - Merrem 500mg IV Q6H per ID  - increase Depakote to 1000mg PO BID as below     New dx prostate Ca  - s/p bx 1/25/24  - per Dr. Marquis's outpt planning     Seizure d/o  - cont home meds  - patient on Invanz during last admission and had a seizure  - Depakote trough level pending  - discussed Depakote dosing with neurology in setting of bacteremia and Merrem use. They recommend increasing his Depakote to 1000mg PO BID and checking a level  - Reduce him back down to home dose of 750mg PO BID once no longer on IV abx.  - antibiotics plus bacteremia both increase the patient's chances of seizure     NIDDM  - on Ozempic outpt- hold  - low dose SSI      HTN  ALEX  Hx CVA  PAF  CAD   -chronic/stable   - home meds as ordered    Expected Discharge Location and Transportation: home  Expected Discharge   Expected Discharge Date: 2/20/2024; Expected Discharge Time:      DVT prophylaxis:  Medical DVT prophylaxis orders are present.         AM-PAC 6 Clicks Score (PT): 19 (02/18/24 8148)    CODE STATUS:   Code Status and Medical Interventions:   Ordered at: 02/16/24 2136     Code Status (Patient has no pulse and is not breathing):    CPR (Attempt to Resuscitate)     Medical Interventions (Patient has pulse or is breathing):    Full Support       Mara Bridges, DO  02/18/24

## 2024-02-18 NOTE — PLAN OF CARE
Goal Outcome Evaluation:  Plan of Care Reviewed With: patient   VSS, RA, A&Ox4, ABX infusing, plan of care discussed with the patient and family at bedside     Progress: no change

## 2024-02-19 LAB
ALBUMIN SERPL-MCNC: 3.3 G/DL (ref 3.5–5.2)
ALBUMIN/GLOB SERPL: 1.1 G/DL
ALP SERPL-CCNC: 61 U/L (ref 39–117)
ALT SERPL W P-5'-P-CCNC: 6 U/L (ref 1–41)
ANION GAP SERPL CALCULATED.3IONS-SCNC: 14 MMOL/L (ref 5–15)
AST SERPL-CCNC: 11 U/L (ref 1–40)
BACTERIA SPEC AEROBE CULT: ABNORMAL
BILIRUB SERPL-MCNC: 0.8 MG/DL (ref 0–1.2)
BUN SERPL-MCNC: 11 MG/DL (ref 8–23)
BUN/CREAT SERPL: 12.5 (ref 7–25)
CALCIUM SPEC-SCNC: 8.5 MG/DL (ref 8.6–10.5)
CHLORIDE SERPL-SCNC: 106 MMOL/L (ref 98–107)
CO2 SERPL-SCNC: 19 MMOL/L (ref 22–29)
CREAT SERPL-MCNC: 0.88 MG/DL (ref 0.76–1.27)
DEPRECATED RDW RBC AUTO: 51.2 FL (ref 37–54)
EGFRCR SERPLBLD CKD-EPI 2021: 93.7 ML/MIN/1.73
ERYTHROCYTE [DISTWIDTH] IN BLOOD BY AUTOMATED COUNT: 14.7 % (ref 12.3–15.4)
GLOBULIN UR ELPH-MCNC: 3.1 GM/DL
GLUCOSE BLDC GLUCOMTR-MCNC: 117 MG/DL (ref 70–130)
GLUCOSE BLDC GLUCOMTR-MCNC: 124 MG/DL (ref 70–130)
GLUCOSE BLDC GLUCOMTR-MCNC: 127 MG/DL (ref 70–130)
GLUCOSE BLDC GLUCOMTR-MCNC: 194 MG/DL (ref 70–130)
GLUCOSE SERPL-MCNC: 129 MG/DL (ref 65–99)
GRAM STN SPEC: ABNORMAL
HCT VFR BLD AUTO: 44.6 % (ref 37.5–51)
HGB BLD-MCNC: 14.4 G/DL (ref 13–17.7)
ISOLATED FROM: ABNORMAL
MCH RBC QN AUTO: 30.2 PG (ref 26.6–33)
MCHC RBC AUTO-ENTMCNC: 32.3 G/DL (ref 31.5–35.7)
MCV RBC AUTO: 93.5 FL (ref 79–97)
NT-PROBNP SERPL-MCNC: 1108 PG/ML (ref 0–900)
PLATELET # BLD AUTO: 155 10*3/MM3 (ref 140–450)
PMV BLD AUTO: 10.7 FL (ref 6–12)
POTASSIUM SERPL-SCNC: 4 MMOL/L (ref 3.5–5.2)
PROT SERPL-MCNC: 6.4 G/DL (ref 6–8.5)
RBC # BLD AUTO: 4.77 10*6/MM3 (ref 4.14–5.8)
SODIUM SERPL-SCNC: 139 MMOL/L (ref 136–145)
WBC NRBC COR # BLD AUTO: 6.17 10*3/MM3 (ref 3.4–10.8)

## 2024-02-19 PROCEDURE — 63710000001 INSULIN LISPRO (HUMAN) PER 5 UNITS: Performed by: FAMILY MEDICINE

## 2024-02-19 PROCEDURE — C9399 UNCLASSIFIED DRUGS OR BIOLOG: HCPCS

## 2024-02-19 PROCEDURE — 25010000002 BRIVARACETAM 50 MG/5ML SOLUTION

## 2024-02-19 PROCEDURE — 83880 ASSAY OF NATRIURETIC PEPTIDE: CPT | Performed by: HOSPITALIST

## 2024-02-19 PROCEDURE — 99232 SBSQ HOSP IP/OBS MODERATE 35: CPT | Performed by: HOSPITALIST

## 2024-02-19 PROCEDURE — 80053 COMPREHEN METABOLIC PANEL: CPT | Performed by: HOSPITALIST

## 2024-02-19 PROCEDURE — 25010000002 MEROPENEM PER 100 MG: Performed by: INTERNAL MEDICINE

## 2024-02-19 PROCEDURE — 85027 COMPLETE CBC AUTOMATED: CPT | Performed by: HOSPITALIST

## 2024-02-19 PROCEDURE — 97530 THERAPEUTIC ACTIVITIES: CPT

## 2024-02-19 PROCEDURE — 93005 ELECTROCARDIOGRAM TRACING: CPT | Performed by: PHYSICIAN ASSISTANT

## 2024-02-19 PROCEDURE — 99222 1ST HOSP IP/OBS MODERATE 55: CPT | Performed by: INTERNAL MEDICINE

## 2024-02-19 PROCEDURE — 82948 REAGENT STRIP/BLOOD GLUCOSE: CPT

## 2024-02-19 PROCEDURE — 99223 1ST HOSP IP/OBS HIGH 75: CPT

## 2024-02-19 PROCEDURE — 93010 ELECTROCARDIOGRAM REPORT: CPT | Performed by: INTERNAL MEDICINE

## 2024-02-19 RX ORDER — FUROSEMIDE 20 MG/1
20 TABLET ORAL DAILY
Status: DISCONTINUED | OUTPATIENT
Start: 2024-02-19 | End: 2024-02-20

## 2024-02-19 RX ORDER — ASPIRIN 81 MG/1
81 TABLET ORAL DAILY
Status: DISCONTINUED | OUTPATIENT
Start: 2024-02-19 | End: 2024-02-21 | Stop reason: HOSPADM

## 2024-02-19 RX ORDER — DIAZEPAM 5 MG/ML
5 INJECTION, SOLUTION INTRAMUSCULAR; INTRAVENOUS EVERY 4 HOURS PRN
Status: DISCONTINUED | OUTPATIENT
Start: 2024-02-19 | End: 2024-02-21 | Stop reason: HOSPADM

## 2024-02-19 RX ADMIN — MEROPENEM 1000 MG: 1 INJECTION, POWDER, FOR SOLUTION INTRAVENOUS at 21:31

## 2024-02-19 RX ADMIN — BRIVARACETAM 50 MG: 50 TABLET, FILM COATED ORAL at 21:30

## 2024-02-19 RX ADMIN — INSULIN LISPRO 2 UNITS: 100 INJECTION, SOLUTION INTRAVENOUS; SUBCUTANEOUS at 21:30

## 2024-02-19 RX ADMIN — CARVEDILOL 25 MG: 12.5 TABLET, FILM COATED ORAL at 17:49

## 2024-02-19 RX ADMIN — SACUBITRIL AND VALSARTAN 1 TABLET: 97; 103 TABLET, FILM COATED ORAL at 21:41

## 2024-02-19 RX ADMIN — AMLODIPINE BESYLATE 10 MG: 10 TABLET ORAL at 21:30

## 2024-02-19 RX ADMIN — MEROPENEM 500 MG: 500 INJECTION, POWDER, FOR SOLUTION INTRAVENOUS at 08:06

## 2024-02-19 RX ADMIN — FUROSEMIDE 20 MG: 20 TABLET ORAL at 11:42

## 2024-02-19 RX ADMIN — LEVOTHYROXINE SODIUM 88 MCG: 0.09 TABLET ORAL at 06:44

## 2024-02-19 RX ADMIN — BRIVARACETAM 200 MG: 50 INJECTION, SUSPENSION INTRAVENOUS at 15:01

## 2024-02-19 RX ADMIN — MEROPENEM 500 MG: 500 INJECTION, POWDER, FOR SOLUTION INTRAVENOUS at 14:03

## 2024-02-19 RX ADMIN — SACUBITRIL AND VALSARTAN 1 TABLET: 97; 103 TABLET, FILM COATED ORAL at 08:06

## 2024-02-19 RX ADMIN — RIVAROXABAN 15 MG: 15 TABLET, FILM COATED ORAL at 17:49

## 2024-02-19 RX ADMIN — ASPIRIN 81 MG: 81 TABLET, COATED ORAL at 11:42

## 2024-02-19 RX ADMIN — TAMSULOSIN HYDROCHLORIDE 0.4 MG: 0.4 CAPSULE ORAL at 08:05

## 2024-02-19 RX ADMIN — Medication 10 ML: at 21:39

## 2024-02-19 RX ADMIN — MEROPENEM 500 MG: 500 INJECTION, POWDER, FOR SOLUTION INTRAVENOUS at 03:03

## 2024-02-19 RX ADMIN — DIVALPROEX SODIUM 1000 MG: 250 TABLET, DELAYED RELEASE ORAL at 08:05

## 2024-02-19 RX ADMIN — CARVEDILOL 25 MG: 12.5 TABLET, FILM COATED ORAL at 08:05

## 2024-02-19 RX ADMIN — AMIODARONE HYDROCHLORIDE 100 MG: 200 TABLET ORAL at 08:05

## 2024-02-19 NOTE — PROGRESS NOTES
Pharmacy Drug Information/Interaction Note    Of note, patient is admitted with ESBL E. Coli bacteremia treated with meropenem.    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    Significant drug interaction exists with meropenem/VPA which results in a dose independent interaction inactivating valproic acid.  This is a class effect with all carbapenems.    Discussed with hospitalist, neuro consultant (YESY Miles).    Changing therapy to Briviact 50mg BID, with 200mg IV x 1 loading dose.  Patient reports mood related side effects with levetiracetam, and cardiac contraindications to lacosamide.    Prior authorization started for Briviact insurance coverage.  Treatment with alternative anti-epileptic should continue for around two weeks post meropenem therapy to avoid potential interaction.  At this point, patient can return to valproic acid therapy if desired.    Thanks,  Eduardo Bertrand, PharmD, BCPS, BCCCP  02/19/24  16:19 EST

## 2024-02-19 NOTE — PROGRESS NOTES
[facility] Medicine Services  PROGRESS NOTE    Patient Name: Andre Agosto  : 1955  MRN: [id number]    Date of Admission: 2024  Primary Care Physician: Keven Bear MD    Subjective   Subjective     CC:  Fever, UTI sx's    HPI:  Patient resting in bed with family at bedside. Patient states he feels a little bit weaker this morning. States he had urinary retention and it took him 3 tries to fully empty his bladder this morning. Denies nausea/vomiting. States he has had some low abdominal pain this morning.      Objective   Objective     Vital Signs:   Temp:  [97.3 °F (36.3 °C)-98.1 °F (36.7 °C)] 97.9 °F (36.6 °C)  Heart Rate:  [68-87] 69  Resp:  [16-18] 16  BP: (131-158)/(76-99) 146/94     Physical Exam:  Constitutional: No acute distress, awake, alert  HENT: NCAT, mucous membranes moist  Respiratory: Clear to auscultation bilaterally, respiratory effort normal on RA  Cardiovascular: RRR, no murmurs, rubs, or gallops  Gastrointestinal: Positive bowel sounds, soft, mildly tender to RLQ/LLQ, nondistended  Musculoskeletal: No bilateral ankle edema  Psychiatric: Appropriate affect, cooperative  Neurologic: Oriented x 3, BROWN, speech clear  Skin: No rashes    Results Reviewed:  LAB RESULTS:      Lab 24  0801 24  0806 24  0855 24  2107 24  1753 24  1500   WBC 6.17 9.97 13.74*  --   --  12.98*   HEMOGLOBIN 14.4 15.0 15.0  --   --  16.5   HEMATOCRIT 44.6 45.6 44.8  --   --  47.8   PLATELETS 155 166 159  --   --  215   NEUTROS ABS  --   --   --   --   --  10.33*   IMMATURE GRANS (ABS)  --   --   --   --   --  0.05   LYMPHS ABS  --   --   --   --   --  1.58   MONOS ABS  --   --   --   --   --  0.92*   EOS ABS  --   --   --   --   --  0.05   MCV 93.5 91.2 94.1  --   --  92.3   PROCALCITONIN  --   --   --   --   --  0.12   LACTATE  --   --   --  1.3 2.7* 2.3*         Lab 24  0801 24  0806 24  0855 24  1500   SODIUM 139 136  136 141   POTASSIUM 4.0 3.5 3.9 3.6   CHLORIDE 106 102 103 101   CO2 19.0* 24.0 24.0 26.0   ANION GAP 14.0 10.0 9.0 14.0   BUN 11 14 13 11   CREATININE 0.88 1.12 1.19 1.18   EGFR 93.7 71.6 66.5 67.2   GLUCOSE 129* 117* 156* 137*   CALCIUM 8.5* 8.3* 8.6 8.8         Lab 02/19/24  0801 02/18/24  0806 02/16/24  1500   TOTAL PROTEIN 6.4 6.3 7.8   ALBUMIN 3.3* 3.5 4.0   GLOBULIN 3.1 2.8 3.8   ALT (SGPT) 6 6 10   AST (SGOT) 11 7 12   BILIRUBIN 0.8 1.2 1.5*   ALK PHOS 61 68 77   LIPASE  --   --  60         Lab 02/19/24  0801   PROBNP 1,108.0*                 Brief Urine Lab Results  (Last result in the past 365 days)        Color   Clarity   Blood   Leuk Est   Nitrite   Protein   CREAT   Urine HCG        02/16/24 1429 Yellow   Clear   Moderate (2+)   Small (1+)   Negative   30 mg/dL (1+)                   Microbiology Results Abnormal       Procedure Component Value - Date/Time    Blood Culture - Blood, Hand, Right [[id number]]  (Normal) Collected: 02/18/24 0806    Lab Status: Preliminary result Specimen: Blood from Hand, Right Updated: 02/19/24 0900     Blood Culture No growth at 24 hours    Narrative:      Less than seven (7) mL's of blood was collected.  Insufficient quantity may yield false negative results.    Blood Culture - Blood, Arm, Right [[id number]]  (Normal) Collected: 02/16/24 1502    Lab Status: Preliminary result Specimen: Blood from Arm, Right Updated: 02/18/24 1515     Blood Culture No growth at 2 days    COVID PRE-OP / PRE-PROCEDURE SCREENING ORDER (NO ISOLATION) - Swab, Nasopharynx [[id number]]  (Normal) Collected: 02/16/24 1406    Lab Status: Final result Specimen: Swab from Nasopharynx Updated: 02/16/24 1500    Narrative:      The following orders were created for panel order COVID PRE-OP / PRE-PROCEDURE SCREENING ORDER (NO ISOLATION) - Swab, Nasopharynx.  Procedure                               Abnormality         Status                     ---------                               -----------          ------                     COVID-19 and FLU A/B PCR...[[id number]]  Normal              Final result                 Please view results for these tests on the individual orders.    COVID-19 and FLU A/B PCR, 1 HR TAT - Swab, Nasopharynx [[id number]]  (Normal) Collected: 02/16/24 1406    Lab Status: Final result Specimen: Swab from Nasopharynx Updated: 02/16/24 1500     COVID19 Not Detected     Influenza A PCR Not Detected     Influenza B PCR Not Detected    Narrative:      Fact sheet for providers: https://www.fda.gov/media/874997/download    Fact sheet for patients: https://www.fda.gov/media/580218/download    Test performed by PCR.            Adult Transthoracic Echo Complete w/ Color, Spectral and Contrast if Necessary Per Protocol    Result Date: 2/18/2024    Left ventricular systolic function is moderately decreased. Calculated left ventricular EF = 38.7% The left ventricular cavity is moderately dilated. Left ventricular wall thickness is consistent with mild concentric hypertrophy. All left ventricular wall segments contract normally. Left ventricular diastolic function is consistent with (grade I) impaired relaxation. No evidence of a left ventricular thrombus present.   There is mild calcification of the aortic valve. There is thickening of the aortic valve. Mild aortic valve regurgitation is present. Mild aortic valve stenosis is present.   There is moderate, bileaflet mitral valve thickening present.   There is moderate, bileaflet mitral valve thickening present. Mild mitral valve regurgitation is present. No significant mitral valve stenosis is present.   The tricuspid valve is structurally normal with no significant regurgitation or significant stenosis present.   Moderate dilation of the aortic root is present. Aortic root = 4.7 cm   No mobile vegetation seen on TTE today. If there is high clinical suspicion for endocarditis would consider SELWYN if no contraindications.      Results for orders placed  during the hospital encounter of 02/16/24    Adult Transthoracic Echo Complete w/ Color, Spectral and Contrast if Necessary Per Protocol    Interpretation Summary    Left ventricular systolic function is moderately decreased. Calculated left ventricular EF = 38.7% The left ventricular cavity is moderately dilated. Left ventricular wall thickness is consistent with mild concentric hypertrophy. All left ventricular wall segments contract normally. Left ventricular diastolic function is consistent with (grade I) impaired relaxation. No evidence of a left ventricular thrombus present.    There is mild calcification of the aortic valve. There is thickening of the aortic valve. Mild aortic valve regurgitation is present. Mild aortic valve stenosis is present.    There is moderate, bileaflet mitral valve thickening present.    There is moderate, bileaflet mitral valve thickening present. Mild mitral valve regurgitation is present. No significant mitral valve stenosis is present.    The tricuspid valve is structurally normal with no significant regurgitation or significant stenosis present.    Moderate dilation of the aortic root is present. Aortic root = 4.7 cm    No mobile vegetation seen on TTE today. If there is high clinical suspicion for endocarditis would consider SELWYN if no contraindications.      Current medications:  Scheduled Meds:amiodarone, 100 mg, Oral, Daily  amLODIPine, 10 mg, Oral, Nightly  aspirin, 81 mg, Oral, Daily  carvedilol, 25 mg, Oral, BID With Meals  divalproex, 1,000 mg, Oral, BID  furosemide, 20 mg, Oral, Daily  insulin lispro, 2-7 Units, Subcutaneous, 4x Daily AC & at Bedtime  levothyroxine, 88 mcg, Oral, Q AM  meropenem, 500 mg, Intravenous, Q6H  rivaroxaban, 15 mg, Oral, Daily With Dinner  sacubitril-valsartan, 1 tablet, Oral, BID  sodium chloride, 10 mL, Intravenous, Q12H  tamsulosin, 0.4 mg, Oral, Daily      Continuous Infusions:sodium chloride, 100 mL/hr, Last Rate: 100 mL/hr (02/18/24  2102)      PRN Meds:.  senna-docusate sodium **AND** polyethylene glycol **AND** bisacodyl **AND** bisacodyl    Calcium Replacement - Follow Nurse / BPA Driven Protocol    dextrose    dextrose    glucagon (human recombinant)    Magnesium Standard Dose Replacement - Follow Nurse / BPA Driven Protocol    nitroglycerin    ondansetron    Phosphorus Replacement - Follow Nurse / BPA Driven Protocol    Potassium Replacement - Follow Nurse / BPA Driven Protocol    sodium chloride    [COMPLETED] Insert Peripheral IV **AND** sodium chloride    sodium chloride    sodium chloride    Assessment & Plan   Assessment & Plan     Active Hospital Problems    Diagnosis  POA    **UTI (urinary tract infection) [N39.0]  Yes    Bacteremia due to Escherichia coli [R78.81, B96.20]  Yes    Paroxysmal atrial flutter [I48.92]  Yes    Fever and chills [R50.9]  Yes    ALEX (obstructive sleep apnea) [G47.33]  Yes    History of cerebrovascular accident [Z86.73]  Not Applicable    Diabetes mellitus without complication [E11.9]  Yes    Epilepsy [G40.909]  Yes    Essential hypertension [I10]  Yes      Resolved Hospital Problems   No resolved problems to display.        Brief Hospital Course to date:  Andre Agosto is a 69 y.o. male presented to [facility] ED for recurrent fever despite treatment for UTI.  Patient recently admitted to [facility] for ESBL E. coli UTI after having a recent prostate biopsy.  Patient was admitted 1/26 discharged 1/31 with a PICC line and ongoing Invanz antibiotic treatment through 2/12 at which time PICC was removed.  Patient has been on PO Bactrim since 2/12 but started developing urine frequency and fevers 2/15, spiking up to 102.5 prompting ED eval.  No hematuria, no abd pain or flank pain.    This patient's problems and plans were partially entered by my partner and updated as appropriate by me 02/19/24.    Recurrent ESBL UTI  ESBL E Coli Bacteremia  - IV meropenem (completed course of  Invanz 2/13/24)  - ID follows   - schedule Tylenol 1g TID x2 days for constitutional sx's  - urology evaluated- no plan for intervention while patient is acutely bacteremic  - Merrem 500mg IV Q6H per ID  - neurology consult for further guidance with seizzure medication management  - TTE negative for vegetation; EF 38%  - SELWYN pending  - defer PICC until after SELWYN  - repeat blood cultures from 2/18/24 pending      New dx prostate Ca  - s/p bx 1/25/24  - per Dr. Marquis's outpt planning     Seizure d/o  - cont home meds  - patient on Invanz during last admission and had a seizure  - Depakote trough level low at 22  - discussed Depakote dosing with neurology in setting of bacteremia and Merrem use. They recommend increasing his Depakote to 1000mg PO BID and checking a level  - Reduce him back down to home dose of 750mg PO BID once no longer on IV abx.  - antibiotics plus bacteremia both increase the patient's chances of seizure  - consult neurology for further guidance with seizure medication management     NIDDM  - on Ozempic outpt- hold  - low dose SSI      HTN  CAD s/p 1 cardiac stent  PAF  HFrEF  - new TTE with EF of 38%; no vegetations   - patient follows with cardiology, Dr. Roa, who performed heart cath in December with no stents placed  - last ECHO in December showed EF of 45% per patient  - SELWYN pending  - proBNP today 1100  - consult cardiology to follow  - continue amiodarone, Entresto, Xarelto, Amlodipine, Carvedilol, Lasix and Aspirin    ALEX  Hx CVA  -chronic/stable   - home meds as ordered    Expected Discharge Location and Transportation: home  Expected Discharge   Expected Discharge Date: 2/22/2024; Expected Discharge Time:      DVT prophylaxis:  Medical DVT prophylaxis orders are present.         AM-PAC 6 Clicks Score (PT): 19 (02/19/24 6405)    CODE STATUS:   Code Status and Medical Interventions:   Ordered at: 02/16/24 7979     Code Status (Patient has no pulse and is not breathing):    CPR  (Attempt to Resuscitate)     Medical Interventions (Patient has pulse or is breathing):    Full Support       Mara Bridges,   02/19/24

## 2024-02-19 NOTE — CONSULTS
[facility]  CARDIOLOGY CONSULTATION NOTE    Andre Agosto  : 1955  MRN:[id number]  Home Phone:[phone]    Date of Admission:2024  Date of Consultation: 24    PCP: Keven Bear MD    IDENTIFICATION: A 69 y.o. male resident of [place], [place]     Chief Complaint   Patient presents with    Fever     PROBLEM LIST:   Coronary artery disease:  Stress echo, Alexandra, 2014: Borderline abnormal, but low probability for significant focal obstructive CAD.  NSTEMI, 2015 (Reji): PCI of OM branch and circumflex (3.5 x 20 Synchrony). EF 50%.    LHC (emergent re-look), 2015: Patent stent to circumflex; LAD with 50-60% prox-mid stenosis.  Echo, 2015: EF 55-60%, no significant valve disease.  Nuclear stress, 2018: EF 40%. Exercise duration of 8:30, as expected. Stopped due to fatigue and SOA. THR of 134 achieved at 7:35. Hypertensive response to exercise. Occasional PVC's and aberrancy. No stress induced perfusion defect. Mild fixed mid and distal anteroapical hypoperfusion worse in the rest images.   [facility], 10/12/2018: Two-vessel CAD with multiple borderline lesions within the LAD and an occluded mid RCA.  Echo, 10/12/2018: EF 55%. Moderate dilation of the aortic root. Moderate dilation of the sinuses of Valsalva. Mild AS and AI. Aortic valve mean pressure gradient 10 mmHg.  Echocardiogram, 2020: EF: 55%, Left ventricular wall thickness is consistent with mild concentric hypertrophy. The left ventricular cavity is mildly dilated. Mild AR and mild aortic valve stenosis. Aortic valve mean pressure gradient is 11.5 mmHg.  VT/VF arrest, s/p St. Oscar ICD placement  at [facility]   Stress MPS at OSH 2023 with fixed defect moderate size in apical anterior, apical lateral and apical septal, EF 29%  [facility] with Dr. Mendoza 2023 for abnormal MPS: MVCAD with patent stents in circumflex and LAD, small vessel disease, EF 45% with global hypokinesis.   Paroxsymal  atrial fibrillation  Incidentally noted on EKG 8/28/2020   CHADSVASC = 3, started on Eliquis 5 mg twice daily, 8/28/2020  S/p SELWYN/ECV, 9/4/2020.   SELWYN,  09/04/2020: EF: 45%, Left ventricular systolic function is mildly decreased. Atrial fibrillation is noted throughout the study. Trace MR, mild TR.   Hypertension:  Renal artery duplex 01/28/2014: No evidence of renal artery stenosis.  Renal artery duplex 07/27/2018: No evidence of renal artery stenosis.  Hyperlipidemia.  Lower extremity edema  Nephrolithiasis   CVA 6/2016:  Carotid duplex 12/28/15: <50% stenosis bilaterally.  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      ALLERGIES:   Allergies   Allergen Reactions    Statins Myalgia and Other (See Comments)     HOME MEDICINES:   Current Outpatient Medications   Medication Instructions    amiodarone (PACERONE) 100 mg, Oral, Daily    amLODIPine (NORVASC) 10 mg, Oral, Nightly    carvedilol (COREG) 25 mg, Oral, 2 Times Daily With Meals    divalproex (DEPAKOTE) 750 mg, Oral, 2 Times Daily    Entresto  MG tablet 1 tablet, Oral, 2 Times Daily, Hold until seen by PCP    furosemide (LASIX) 40 mg, Oral, 2 Times Daily    levothyroxine (SYNTHROID, LEVOTHROID) 88 MCG tablet TAKE 1 TABLET DAILY    Ozempic (0.25 or 0.5 MG/DOSE) 0.5 mg, Subcutaneous, Every 7 Days    tamsulosin (FLOMAX) 0.4 mg, Oral, Daily    Xarelto 15 mg, Oral, Daily With Dinner     HPI: Mr. Agosto is a 70 y/o male with above noted history, who is seen in consultation for CHF, decreased EF on echo this admission. He is admitted with persistent E Coli UTI, had admission for urosepsis and hematuria in January following prostate biopsy with Dr. De La Cruz. ID and Urology are following. ID is recommending SELWYN to rule out endocarditis. He has known HFrEF and is followed by Dr. Mendoza at [facility]. Had abnormal stress MPS last fall, with subsequent cath  demonstrating patent stents in the LAD and LCx, small vessel disease, EF 45% by LV gram. Patient reports his EF has been around 40%, has a St. Oscar PPM/ICD placed for history of VF arrest 2 years ago. He denies any recent cardiac symptoms, and no ICD discharges. He denies any current chest pain or angina, no worsening dyspnea or heart failure symptoms. He wears a CPAP nightly.       ROS: All systems have been reviewed and are negative with the exception of those mentioned in the HPI and problem list above.    Surgical History:   Past Surgical History:   Procedure Laterality Date    CARDIAC CATHETERIZATION N/A 10/12/2018    Procedure: Left Heart Cath;  Surgeon: Yoselin Johnson MD;  Location:  [facility];  Service: Cardiology    CARDIAC CATHETERIZATION  03/2021    stent    CARDIAC CATHETERIZATION  07/2021    just checking the after pacemaker placed- Dr. Tim    CARDIAC DEFIBRILLATOR PLACEMENT  2021    COLONOSCOPY  10/2020    w/ Dr. Jacobs, hx colon polyps    CORONARY STENT PLACEMENT  2015    LAPAROSCOPIC GASTRIC BANDING  2008    s/p LAGB Realize 6/2008 by HOMERO.    PACEMAKER IMPLANTATION  07/25/2021    UMBILICAL HERNIA REPAIR  2008    incarcerated UHR w/ mesh by Dr. Velasquez    URETEROSCOPY LASER LITHOTRIPSY WITH STENT INSERTION Left 10/20/2021    Procedure: URETEROSCOPY LASER LITHOTRIPSY WITH STENT INSERTION;  Surgeon: Tonio De La Cruz MD;  Location: [facility];  Service: Urology;  Laterality: Left;    URETEROSCOPY LASER LITHOTRIPSY WITH STENT INSERTION Left 11/03/2021    Procedure: URETEROSCOPY LEFT, LEFT RETROGRADE PYELOGRAM, CYSTOSCOPY, LASER LITHOTRIPSY WITH STENT EXCHANGE;  Surgeon: Tonio De La Cruz MD;  Location: [facility] OR;  Service: Urology;  Laterality: Left;    URETEROSCOPY LASER LITHOTRIPSY WITH STENT INSERTION Left 10/06/2022    Procedure: URETEROSCOPY LASER LITHOTRIPSY WITH STENT INSERTION;  Surgeon: Tonio De La Cruz MD;  Location: [facility] OR;  Service: Urology;   "Laterality: Left;       Social History:   Social History     Socioeconomic History    Marital status:    Tobacco Use    Smoking status: Former     Packs/day: 0.50     Years: 15.00     Additional pack years: 0.00     Total pack years: 7.50     Types: Cigarettes     Start date: 1975     Quit date: 1985     Years since quittin.1    Smokeless tobacco: Never   Vaping Use    Vaping Use: Never used   Substance and Sexual Activity    Alcohol use: No    Drug use: No    Sexual activity: Defer       Family History:   Family History   Problem Relation Age of Onset    Stroke Mother     Hypertension Mother     COPD Father     Hypertension Father        Objective     /94 (BP Location: Left arm, Patient Position: Lying)   Pulse 69   Temp 97.9 °F (36.6 °C) (Oral)   Resp 16   Ht 193 cm (76\")   Wt (!) 138 kg (305 lb)   SpO2 96%   BMI 37.13 kg/m²   No intake or output data in the 24 hours ending 24 0855    PHYSICAL EXAM:  CONSTITUTIONAL: Obese, cooperative, in no acute distress  HEENT: Normocephalic, atraumatic, PERRLA, no JVD  CARDIOVASCULAR:  Regular rhythm and normal rate, no murmur, gallop, rub.   RESPIRATORY:Normal respiratory effort, no wheezing, rales or rhonchi  EXTREMITIES: No gross deformities, trace edema. 2+ DP and PT on right, 1-2+ on left  SKIN: Warm, dry. No bleeding, bruising or rash  NEUROLOGICAL: No focal deficits    Labs/Diagnostic Data  Results from last 7 days   Lab Units 24  0806 24  0855 24  1500   SODIUM mmol/L 136 136 141   POTASSIUM mmol/L 3.5 3.9 3.6   CHLORIDE mmol/L 102 103 101   CO2 mmol/L 24.0 24.0 26.0   BUN mg/dL 14 13 11   CREATININE mg/dL 1.12 1.19 1.18   GLUCOSE mg/dL 117* 156* 137*   CALCIUM mg/dL 8.3* 8.6 8.8         Results from last 7 days   Lab Units 24  0801 24  0806 24  0855   WBC 10*3/mm3 6.17 9.97 13.74*   HEMOGLOBIN g/dL 14.4 15.0 15.0   HEMATOCRIT % 44.6 45.6 44.8   PLATELETS 10*3/mm3 155 166 159       I " personally reviewed the patient's EKG/Telemetry data    Radiology Data:   [facility] 09/29/23:  Coronary angiogram:    Left main coronary artery:  The left main coronary artery is angiographically revealed mild calcification giving rise to LAD and circumflex.    Left anterior descending coronary artery:  The left anterior descending coronary artery is angiographically mild diffuse calcification throughout its course, the LAD is a smaller caliber vessel with diffuse moderate plaque buildup throughout its course with areas of 40 to 50% stenosis at the very distal end. There is negative remodeling noted.    Left circumflex coronary artery:  Left circumflex coronary artery is angiographically reveals a huge vessel, the circumflex coronary artery is the dominant vessel, has multiple mid stents which are all widely patent. The obtuse marginal branch is 1 and 2 have mild disease maximal stenosis less than 20%. The left PDA has mild disease of 30 to 40%.  Right coronary artery:  Right coronary artery is small vessel less than 1.5 mm, tortuous with diffuse severe disease of 80 to 90% not abnormal to any intervention.    Left Ventriculargram:    The left ventriculogram revealed ejection fraction 45% with global hypokinesis. No mitral regurg was noted.    Final impression:  1. Multivessel coronary artery disease with patent stent to the circumflex coronary artery in the LAD.  2. Ischemic cardiomyopathy  3. Small vessel disease  4. Microvascular disease    Recommendation:  The patient will continue aggressive risk factor modification and guideline directed medical therapy.     Results for orders placed during the hospital encounter of 02/16/24    Adult Transthoracic Echo Complete w/ Color, Spectral and Contrast if Necessary Per Protocol    Interpretation Summary    Left ventricular systolic function is moderately decreased. Calculated left ventricular EF = 38.7% The left ventricular cavity is moderately dilated. Left ventricular wall  thickness is consistent with mild concentric hypertrophy. All left ventricular wall segments contract normally. Left ventricular diastolic function is consistent with (grade I) impaired relaxation. No evidence of a left ventricular thrombus present.    There is mild calcification of the aortic valve. There is thickening of the aortic valve. Mild aortic valve regurgitation is present. Mild aortic valve stenosis is present.    There is moderate, bileaflet mitral valve thickening present.    There is moderate, bileaflet mitral valve thickening present. Mild mitral valve regurgitation is present. No significant mitral valve stenosis is present.    The tricuspid valve is structurally normal with no significant regurgitation or significant stenosis present.    Moderate dilation of the aortic root is present. Aortic root = 4.7 cm    No mobile vegetation seen on TTE today. If there is high clinical suspicion for endocarditis would consider SELWYN if no contraindications.      Current Medications:  amiodarone, 100 mg, Oral, Daily  amLODIPine, 10 mg, Oral, Nightly  carvedilol, 25 mg, Oral, BID With Meals  divalproex, 1,000 mg, Oral, BID  insulin lispro, 2-7 Units, Subcutaneous, 4x Daily AC & at Bedtime  levothyroxine, 88 mcg, Oral, Q AM  meropenem, 500 mg, Intravenous, Q6H  rivaroxaban, 15 mg, Oral, Daily With Dinner  sacubitril-valsartan, 1 tablet, Oral, BID  sodium chloride, 10 mL, Intravenous, Q12H  tamsulosin, 0.4 mg, Oral, Daily      sodium chloride, 100 mL/hr, Last Rate: 100 mL/hr (02/18/24 2102)        Assessment and Plan:     1. HFrEF  - Echo this admission with EF 39%, similar to prior known EF  - had LHC with Dr. Mendoza 09/2023 with known MVCAD with patent LCx and LAD stents, small vessel disease  - patient appears overall euvolemic, received IV fluids on admission. Recommend DC fluids and begin home PO Lasix at lower dose of 20mg daily  -continue Entresto,Coreg    2. Persistent E Coli bacteremia with UTI  - ID following  and recommending SELWYN to rule out endocarditis - NPO after midnight    3. CAD/ICM  - recent cath as noted above  - no angina  - resume low dose ECASA 81mg for CAD with history of stents   - s/p St. Oscar PPM/ICD placed in 2022 at [facility]     4. PAF  - on Xarelto 15mg with no recent hematuria     5. Morbid obesity,ALEX  - CPAP nightly    6. HLD  - patient intolerant to statins, LDL not at target  - recommend discussing PCSK-9 inhibitor with primary cardiologist after discharge     Scribed for Yoselin Johnson MD by Svitlana Patel PA-C. 2/19/2024  10:37 EST      Plan:  SELWYN tomorrow to evaluate possible cardiac source for recurrent persistent bacteremia  Restart Lasix at 20 mg daily and watch creatinine closely.  Will increase to 20 mg twice daily if tolerated.  He has unable to tolerate statins and cannot afford Praluent or Repatha.  We did discuss Zetia and Leqvio briefly.  This will be addressed as an outpatient with Dr. Reji Johnson MD personally performed the services described in this documentation as scribed by the above individual in my presence, and it is both accurate and complete.    Yoselin Johnson MD, FACC

## 2024-02-19 NOTE — PROGRESS NOTES
[facility] Progress Note    Admission Date: 2/16/2024    Andre Agosto  1955  [id number]    Date: 2/19/2024    Antibiotics:  Anti-Infectives (From admission, onward)      Ordered     Dose/Rate Route Frequency Start Stop    02/16/24 1838  meropenem (MERREM) 500 mg in sodium chloride 0.9 % 100 mL IVPB        Ordering Provider: Lor Marshall MD    500 mg  over 3 Hours Intravenous Every 6 Hours 02/17/24 0130 02/24/24 0129    02/16/24 1838  meropenem (MERREM) 500 mg in sodium chloride 0.9 % 100 mL IVPB        Ordering Provider: Lor Marshall MD    500 mg  over 30 Minutes Intravenous Once 02/16/24 1930 02/16/24 2045    02/16/24 1435  meropenem (MERREM) 1,000 mg in sodium chloride 0.9 % 100 mL IVPB        Ordering Provider: Luis Grissom MD    1,000 mg  over 30 Minutes Intravenous Once 02/16/24 1451 02/16/24 1620            Requesting Provider: Luis Grissom MD  Evaluating Physician: Lor Marshall MD     Reason for Consultation: Persistent ESBL E.coli UTI, fever, sepsis     History of present illness:    Patient is a 69 y.o. male, known to Dr. Da Boogie, with h/o persistent afib/Xarelto, AICD 2021, CAD, seizure disorder, CKD Stage II, CVA, T2DM, hypothyroidism, HLD, HTN, nephrolithiasis, obesity, and ALEX/BiPAP who was recently admitted to [facility] from 1/26 to 1/31 for sepsis related to ESBL E.coli bacteremia and UTI with hematuria after undergoing a prostate biopsy with Dr. De La Cruz around 1/25. Imaging revealed nonobstructing kidney stones.  He had a Crespo in place for about a week.  He was initially placed on Levaquin after the biopsy.  A PICC line was placed and he was treated with Invanz (with adjustment of seizure meds) until 2/12/24 with PICC line removal after dose on 2/12. He was placed on maintenance dose of Macrobid.  He states that he did have a couple of seizures while on Ertapenem.     He developed fever up to 102.5  with increased urinary frequency within the last day.  He contacted his PCP  who recommended he come to [facility] for evaluation by Infectious Diseases.  He denies sore throat, sinus congestion, cough, shortness of breath, nausea, vomiting, diarrhea. He state that his symptoms are identical to his last uti.  On arrival to ED, his Tmax was 99.9.  Labs were WBC 13,000 with 79% neutrophils.  A UA WBC was TNTC. Urine culture is pending. COVID-19/Flu PCR is negative.  Blood cultures have been ordered.  He was started on Merrem.  ID was asked to evaluate and manage his antibiotic therapy.     2/17/24 Tm 100.4 Resting on bipap at the time of my visit. Feels a little stronger. Blood cultures growing gnr 1/2 sets with pcr indicating ESBL Ecoli; urine growing gnr  CT scan abd shows bilateral nonobstructing kidney stones. Lactic acidosis resolved    2/18/24: Tmax 99.6.  Sitting in chair.  Feeling a little better.  Urine and blood culture with ESBL E.coli.    2/19/24 Afebrile.  Continues to feel better.  No new complaints.  He was evaluated by Dr. Kay of urology and will have follow-up with Dr. Dillon to discuss future treatment of his kidney stones, none of which are obstructing of his ureter. 2/18 blood culture negative to date       Review of Systems:  Constitutional-- + Fever, no chills, sweats.  Appetite improved, and no malaise. + fatigue.  HEENT-- No new vision, hearing or throat complaints.  No epistaxis or oral sores.  Denies odynophagia or dysphagia. No headache, photophobia or neck stiffness.  CV-- No chest pain, palpitation or syncope  Resp-- No SOB/cough/Hemoptysis  GI- No nausea, vomiting, or diarrhea.  No hematochezia, melena, or hematemesis. Denies jaundice or chronic liver disease.  -- No dysuria, hematuria, or flank pain.  Denies hesitancy, urgency or burning with urination.   Lymph- no swollen lymph nodes in neck/axilla or groin.   Heme- No active bruising or bleeding; no Hx of DVT or PE.  MS-- no swelling or pain in the bones or joints of arms/legs.  No new back pain.  Neuro-- No  acute focal weakness or numbness in the arms or legs.  + seizure d/o, with recent seizures while on ertapenem.  Skin--No rashes or lesions       PE:  Vital Signs  Temp  Min: 97.3 °F (36.3 °C)  Max: 98.1 °F (36.7 °C)  BP  Min: 131/76  Max: 158/90  Pulse  Min: 68  Max: 87  Resp  Min: 16  Max: 18  SpO2  Min: 91 %  Max: 95 %  GENERAL: Awake and alert, in no acute distress. Less acutely ill appearing.   HEENT: Normocephalic, atraumatic. Flushed  PERRL. EOMI. No conjunctival injection. No icterus. Oropharynx clear without evidence of thrush or exudate.    NECK: Supple without nuchal rigidity. No mass.  CHEST WALL  L SCL AICD site w/o erythema, tenderness or soft tissue swelling  HEART: RRR; No murmur, rubs, gallops.   LUNGS: Clear to auscultation bilaterally without wheezing, rales, rhonchi. Normal respiratory effort. Nonlabored.   ABDOMEN: Soft, nontender, nondistended. Positive bowel sounds. No rebound or guarding. NO mass or HSM. Obese.  EXT:  No cyanosis, clubbing or edema. No cord.  :  Without Crespo catheter.  MSK: No joint effusions or erythema  SKIN: Warm and dry without cutaneous eruptions on Inspection/palpation.    NEURO: Oriented to PPT.  Motor 5/5 strength  PSYCHIATRIC: Normal insight and judgment. Cooperative with PE       Laboratory Data    Results from last 7 days   Lab Units 02/18/24  0806 02/17/24  0855 02/16/24  1500   WBC 10*3/mm3 9.97 13.74* 12.98*   HEMOGLOBIN g/dL 15.0 15.0 16.5   HEMATOCRIT % 45.6 44.8 47.8   PLATELETS 10*3/mm3 166 159 215     Results from last 7 days   Lab Units 02/18/24  0806   SODIUM mmol/L 136   POTASSIUM mmol/L 3.5   CHLORIDE mmol/L 102   CO2 mmol/L 24.0   BUN mg/dL 14   CREATININE mg/dL 1.12   GLUCOSE mg/dL 117*   CALCIUM mg/dL 8.3*     Results from last 7 days   Lab Units 02/18/24  0806   ALK PHOS U/L 68   BILIRUBIN mg/dL 1.2   ALT (SGPT) U/L 6   AST (SGOT) U/L 7               Estimated Creatinine Clearance: 95.5 mL/min (by C-G formula based on SCr of 1.12  mg/dL).      Microbiology:  ESBL E coli blood    Radiology:  Imaging Results (Last 72 Hours)       Procedure Component Value Units Date/Time    CT Abdomen Pelvis Stone Protocol [[id number]] Collected: 02/17/24 0820     Updated: 02/17/24 0828    Narrative:      CT ABDOMEN PELVIS STONE PROTOCOL    Date of Exam: 2/17/2024 12:25 AM EST    Indication: Nephrolithiasis  recurring ESBL E.coli UTI, recent prostate bx.    Comparison: 1/28/2024    Technique: Axial CT images were obtained of the abdomen and pelvis without the administration of contrast. Reconstructed coronal and sagittal images were also obtained. Automated exposure control and iterative construction methods were used.      Findings:  Visualized lung bases are clear. There is small pericardial effusion, increased from prior exam. The liver, pancreas, and spleen are within normal limits. There are multiple stones within the gallbladder. No inflammatory change around the gallbladder. No   evidence of biliary tract obstruction.    Bilateral adrenal glands are within normal limits. There are multiple bilateral nonobstructing renal stones. There is no evidence of ureteral stone or hydronephrosis. No abdominal or retroperitoneal adenopathy. There is a lap band device around the   proximal stomach. The upper GI tract is otherwise unremarkable. Patient is status post ventral hernia repair. No evidence of residual recurrent hernia.    Urinary bladder is within normal limits. GI tract including the appendix is within normal limits. There is no pelvic or inguinal adenopathy. No free intraperitoneal fluid. No pelvic or inguinal adenopathy.    There are degenerative changes of the spine. No lytic or sclerotic bony lesion identified.      Impression:      Impression:    1. Multiple bilateral nonobstructing renal stones. No evidence of ureteral stone or hydronephrosis.  2. Cholelithiasis. No evidence of acute cholecystitis or biliary tract  obstruction.            Electronically Signed: Jose Matta MD    2/17/2024 8:25 AM EST    Workstation ID: [id number]    XR Chest 1 View [[id number]] Collected: 02/16/24 1501     Updated: 02/16/24 1505    Narrative:      XR CHEST 1 VW    Date of Exam: 2/16/2024 2:39 PM EST    Indication: fever    Comparison: 1/16/2024    Findings:  The heart size is enlarged but stable. The pulmonary vascular markings are normal. Left-sided transvenous pacemaker is in place with the leads in the right atrium and ventricle, unchanged from the last study. The lungs are expanded the chest wall and   they appear clear.      Impression:      Impression:    1. Stable cardiomegaly.  2. No acute process identified in the chest.      Electronically Signed: Nicolás Allred MD    2/16/2024 3:02 PM EST    Workstation ID: [id number]            I personally reviewed the radiographic studies        Impression:   - Recurrent pyuria with UTI  The short interval between recurrent symptoms suggests a persistent focus of infection- ? Renal abscess ? Infected kidney stones With the bacteremia, there is concern re: an endovascular infection  I.e.AICD infection, native valve endocarditiis etc  - Recurrent  ESBL E.coli bacteremia with UTI 1/26/24.  Treated for 2 weeks with Invanz, followed by macrobid maintenance dosing.  - Cardiomyopathy with AICD placement 2021  - Recent prostate biopsy 1/25/24/treated with Levaquin post-procedure and Crespo cath x 1 week.    - Nonobstructing nephrolithiasis  - Persistent atrial fibrillation/Xarelto/ppm  - Seizure disorder/meds adjusted while on ertapenem.Now on merrem and he may require continued adjustment  - Chronic kidney disease Stage II  - Essential hypertension  - Hypothyroidism  - Morbid obesity  BMI 37  - Obstructive sleep apnea/BiPAP        PLAN/RECOMMENDATIONS:   Thank you for asking us to see Andre Agosto, I recommend the following:  - Repeat blood culture in am to ensure that bacteremia is clearing-sent 2/18  -  Continue Merrem 500mg q 6  - Merrem can lower seizure thresholds, so may need to adjust his anticonvulsant medication accordingly.  Will defer to Hospitalist and Neurology for adjustment.    - I Recommend SELWYN.  I have discussed the rationale for the study with the patient and wife and he is agreeable.    - I will defer ordering PICC placement until after the SELWYN.     - if the same pathogens are isolated from urine and no abscess is identified it may be necessary to assume that the small kidney stones are infected and treat with an even more prolonged course of antibiotics     Discussed at length with patient and wife    Dr. Da Boogie will resume care tomorrow    Lor Marshall MD  2/19/2024

## 2024-02-19 NOTE — PLAN OF CARE
Goal Outcome Evaluation:   - VSS, A&Ox4, RA  - NPO at midnight  - SELWYN tomorrow  - Seizure medications changed  - Spouse at bedside

## 2024-02-19 NOTE — PLAN OF CARE
Goal Outcome Evaluation:  Plan of Care Reviewed With: patient        Progress: improving  Outcome Evaluation: Pt ambulated 450ft with CGA and UE support on IV pole. Pt demonstrated good balance and stability. Frequent cues for upright posture. Pt with periods of veering to the L, but able to self correct. Pt did not require any rest breaks. Limited by fatigue. Continue to recommend PT skilled care and D/C home with assistance and  PT services.      Anticipated Discharge Disposition (PT): home with assist, home with home health

## 2024-02-19 NOTE — CASE MANAGEMENT/SOCIAL WORK
Discharge Planning Assessment  [facility]     Patient Name: Andre Agosto  MRN: [id number]  Today's Date: 2/19/2024    Admit Date: 2/16/2024    Plan: IDP   Discharge Needs Assessment       Row Name 02/19/24 1143       Living Environment    People in Home spouse    Primary Care Provided by self    Provides Primary Care For no one    Family Caregiver if Needed spouse    Quality of Family Relationships helpful;involved    Able to Return to Prior Arrangements yes       Transition Planning    Patient/Family Anticipates Transition to home    Transportation Anticipated family or friend will provide       Discharge Needs Assessment    Equipment Currently Used at Home bipap    Concerns to be Addressed denies needs/concerns at this time                   Discharge Plan       Row Name 02/19/24 1144       Plan    Plan IDP    Plan Comments MSW spoke to Pt’s spouse via phone to complete IDP. Pt lives with spouse in [facility]. Pt’s spouse confirmed PCP is Dr. Bear. Pt has [employer] Medicare insurance coverage. Pt has BiPAP, no HH or home O2. Pt’s preferred pharmacy is [facility] in [place]. Pt’s spouse can transport when medically ready. Pt’s spouse denied needs or concerns. MSW available.    Final Discharge Disposition Code 01 - home or self-care                  Continued Care and Services - Admitted Since 2/16/2024    Coordination has not been started for this encounter.       Expected Discharge Date and Time       Expected Discharge Date Expected Discharge Time    Feb 22, 2024            Demographic Summary       Row Name 02/19/24 1143       General Information    Arrived From home    Referral Source admission list    Reason for Consult discharge planning    Preferred Language English                   Functional Status       Row Name 02/19/24 1143       Functional Status    Usual Activity Tolerance good    Current Activity Tolerance good       Functional Status, IADL    Medications independent    Meal Preparation  independent    Housekeeping independent    Laundry independent    Shopping independent                               GERARDO Klein

## 2024-02-19 NOTE — CONSULTS
[facility] Neurology  Consult Note    Patient Name: Andre Agosto  : 1955  MRN: [id number]  Primary Care Physician:  Keven Bear MD  Referring Physician: Andre Ray MD  Date of admission: 2024    Subjective     Reason for Consult/ Chief Complaint: Seizure medication adjustment    Andre Agosto is a 69 y.o. male with past medical history of HTN, epilepsy, T2DM, ALEX and A-fib presented to [facility] ED with complaint of fever despite recent UTI treatment.  Patient was seen at [facility] for ESBL E. coli UTI TI after a recent prostate biopsy.  There patient was admitted from  through ; a PICC line was placed for ongoing Invaz antibiotic treatment lasting through .  Patient was placed on p.o. Bactrim on . patient continued to spike fevers and on  had a breakthrough seizure.  On 2/15 patient spiked a temperature to approximately 102.5 prompting his ED visit.     Patient's last seizure prior to  was at his previous admission at [facility].  Patient is currently on Depakote 750 mg twice daily.  Patient's blood cultures continue to be positive for ESBL.  Patient was initiated on Merrem.  Since he the initiation of a Carbapenem antibiotic patient has subtherapeutic valproic acid levels.  patient will be on long-term Merrem for positive blood cultures.    Patient states that he has tried many antiseizure medications since his diagnosis of seizure disorder. Patient states that his seizures initiated after he acquired meningitis many years ago.  He states that his seizures are mostly focal and started in his right hand and extend up his arm and sometimes involving his eye.  He says they are brief lasting less than a minute.  He states he has breakthrough seizures frequently.  He states that on a rare occasion his seizures are tonic-clonic in nature.  His last tonic-clonic seizure was on .    Review Of Systems   Constitutional: well develop male    Cardiovascular: Negative for chest pain or palpitations.  Respiratory: Negative for shortness of breath or cough.  Gastrointestinal: Negative for nausea and vomiting.  Genitourinary: Negative for bladder incontinence.  Musculoskeletal: Negative for aches and pains in the muscles or joints.  Dermatological: Negative for skin breakdown.   Neurological: Positive for seizure activity    Personal History     Past Medical History:   Diagnosis Date    6th nerve palsy, right     right eye     Anxiety and depression     Atrial fibrillation, persistent 12/02/2020    on Xarelto    Coronary artery disease involving native coronary artery of native heart without angina pectoris 09/12/2018    follows w/ Dr. Mendoza    CRI (chronic renal insufficiency), stage 2 (mild)     CVA (cerebral vascular accident)     Diabetes mellitus     doesnt check sugar, type 2     Disease of thyroid gland     Double vision     resolved- right eye    Elevated cholesterol     Elevated prostate specific antigen (PSA) 11/08/2023    Focal seizure     of right arm     Heart attack     NSTEMI 2015, s/p stenting    History of Helicobacter pylori infection     in 2008, treated w/ PrevPak - 2021 EGD bx (+), PCP RX - PPI/Augmentin/Doxy/Flagyl (x 14 days) 1/22/21    HL (hearing loss)     (L) ear - child luevano measles    Hyperlipidemia     Hypertension     Kidney stone     Memory loss 2005    d/t meningitis    Meningitis 2005    unsure of bacterial or viral     Obesity     Sleep apnea treated with nocturnal BiPAP     compliant with  machine     Stroke     2017/2018    Ventricular tachycardia     3 different times    Wears glasses        Past Surgical History:   Procedure Laterality Date    CARDIAC CATHETERIZATION N/A 10/12/2018    Procedure: Left Heart Cath;  Surgeon: Yoselin Johnson MD;  Location[facility] CATH INVASIVE LOCATION;  Service: Cardiology    CARDIAC CATHETERIZATION  03/2021    stent    CARDIAC CATHETERIZATION  07/2021    just checking the after  pacemaker placed- Dr. Tim    CARDIAC DEFIBRILLATOR PLACEMENT  2021    COLONOSCOPY  10/2020    w/ Dr. Jacobs, hx colon polyps    CORONARY STENT PLACEMENT  2015    LAPAROSCOPIC GASTRIC BANDING  2008    s/p LAGB Realize 6/2008 by HOMERO.    PACEMAKER IMPLANTATION  07/25/2021    UMBILICAL HERNIA REPAIR  2008    incarcerated UHR w/ mesh by Dr. Velasquez    URETEROSCOPY LASER LITHOTRIPSY WITH STENT INSERTION Left 10/20/2021    Procedure: URETEROSCOPY LASER LITHOTRIPSY WITH STENT INSERTION;  Surgeon: Tnoio De La Cruz MD;  Location: [facility] OR;  Service: Urology;  Laterality: Left;    URETEROSCOPY LASER LITHOTRIPSY WITH STENT INSERTION Left 11/03/2021    Procedure: URETEROSCOPY LEFT, LEFT RETROGRADE PYELOGRAM, CYSTOSCOPY, LASER LITHOTRIPSY WITH STENT EXCHANGE;  Surgeon: Tonio De La Cruz MD;  Location: [facility] OR;  Service: Urology;  Laterality: Left;    URETEROSCOPY LASER LITHOTRIPSY WITH STENT INSERTION Left 10/06/2022    Procedure: URETEROSCOPY LASER LITHOTRIPSY WITH STENT INSERTION;  Surgeon: Tonio De La Cruz MD;  Location: [facility] OR;  Service: Urology;  Laterality: Left;       Family History: family history includes COPD in his father; Hypertension in his father and mother; Stroke in his mother. Otherwise pertinent FHx was reviewed and not pertinent to current issue.    Social History:  reports that he quit smoking about 38 years ago. His smoking use included cigarettes. He started smoking about 49 years ago. He has a 7.50 pack-year smoking history. He has never used smokeless tobacco. He reports that he does not drink alcohol and does not use drugs.    Home Medications:   Semaglutide(0.25 or 0.5MG/DOS), amLODIPine, amiodarone, brivaracetam, carvedilol, divalproex, furosemide, levothyroxine, rivaroxaban, sacubitril-valsartan, and tamsulosin    Current Medications:     Current Facility-Administered Medications:     amiodarone (PACERONE) tablet 100 mg, 100 mg, Oral, Daily, Catherine Mcdaniels MD, 100 mg at 02/19/24  0805    amLODIPine (NORVASC) tablet 10 mg, 10 mg, Oral, Nightly, Catherine Mcdaniels MD, 10 mg at 02/18/24 2100    aspirin EC tablet 81 mg, 81 mg, Oral, Daily, Svitlana Patel PA-C, 81 mg at 02/19/24 1142    sennosides-docusate (PERICOLACE) 8.6-50 MG per tablet 2 tablet, 2 tablet, Oral, BID PRN **AND** polyethylene glycol (MIRALAX) packet 17 g, 17 g, Oral, Daily PRN **AND** bisacodyl (DULCOLAX) EC tablet 5 mg, 5 mg, Oral, Daily PRN **AND** bisacodyl (DULCOLAX) suppository 10 mg, 10 mg, Rectal, Daily PRN, Catehrine Mcdaniels MD    brivaracetam (BRIVIACT) injection 200 mg, 200 mg, Intravenous, Once **FOLLOWED BY** brivaracetam (BRIVIACT) tablet 50 mg, 50 mg, Oral, BID, Perlita Moise K, APRN    Calcium Replacement - Follow Nurse / BPA Driven Protocol, , Does not apply, PRN, Catherine Mcdaniels MD    carvedilol (COREG) tablet 25 mg, 25 mg, Oral, BID With Meals, Catherine Mcdaniels MD, 25 mg at 02/19/24 0805    dextrose (D50W) (25 g/50 mL) IV injection 25 g, 25 g, Intravenous, Q15 Min PRN, Catherine Mcdaniels MD    dextrose (GLUTOSE) oral gel 15 g, 15 g, Oral, Q15 Min PRN, Catherine Mcdaniels MD    furosemide (LASIX) tablet 20 mg, 20 mg, Oral, Daily, Svitlana Patel PA-C, 20 mg at 02/19/24 1142    glucagon (GLUCAGEN) injection 1 mg, 1 mg, Intramuscular, Q15 Min PRN, Catherine Mcdaniels MD    Insulin Lispro (humaLOG) injection 2-7 Units, 2-7 Units, Subcutaneous, 4x Daily AC & at Bedtime, Catherine Mcdaniels MD, 2 Units at 02/18/24 2100    levothyroxine (SYNTHROID, LEVOTHROID) tablet 88 mcg, 88 mcg, Oral, Q AM, Catherine Mcdaniels MD, 88 mcg at 02/19/24 0644    Magnesium Standard Dose Replacement - Follow Nurse / BPA Driven Protocol, , Does not apply, PRN, Catherine Mcdaniels MD    meropenem (MERREM) 500 mg in sodium chloride 0.9 % 100 mL IVPB, 500 mg, Intravenous, Q6H, Lor Marshall MD, 500 mg at 02/19/24 1403    nitroglycerin (NITROSTAT) SL tablet 0.4 mg, 0.4 mg, Sublingual, Q5 Min PRN, Catherine Mcdaniels MD    ondansetron (ZOFRAN) injection 4 mg, 4 mg, Intravenous, Q6H  Arslan DOSS Holly, MD    Phosphorus Replacement - Follow Nurse / BPA Driven Protocol, , Does not apply, Arslan DOSS Holly, MD    Potassium Replacement - Follow Nurse / BPA Driven Protocol, , Does not apply, Arslan DOSS Holly, MD    rivaroxaban (XARELTO) tablet 15 mg, 15 mg, Oral, Daily With Dinner, Catherine Mcdaniels MD, 15 mg at 02/18/24 1704    sacubitril-valsartan (ENTRESTO)  MG tablet 1 tablet, 1 tablet, Oral, BID, Catherine Mcdaniels MD, 1 tablet at 02/19/24 0806    sodium chloride 0.9 % flush 10 mL, 10 mL, Intravenous, PRN, uLis Grissom MD    [COMPLETED] Insert Peripheral IV, , , Once **AND** sodium chloride 0.9 % flush 10 mL, 10 mL, Intravenous, PRN, Liya Bridges, APRN    sodium chloride 0.9 % flush 10 mL, 10 mL, Intravenous, Q12H, Catherine Mcdaniels MD, 10 mL at 02/18/24 2101    sodium chloride 0.9 % flush 10 mL, 10 mL, Intravenous, PRN, Catherine Mcdaniels MD    sodium chloride 0.9 % infusion 40 mL, 40 mL, Intravenous, PRN, Catherine Mcdaniels MD    tamsulosin (FLOMAX) 24 hr capsule 0.4 mg, 0.4 mg, Oral, Daily, Catherine Mcdaniels MD, 0.4 mg at 02/19/24 0805     Allergies:  Allergies   Allergen Reactions    Statins Myalgia and Other (See Comments)       Objective     Physical Exam  Vitals and nursing note reviewed.   Constitutional:       General: He is not in acute distress.     Appearance: He is not ill-appearing.   Eyes:      Extraocular Movements: Extraocular movements intact.      Pupils: Pupils are equal, round, and reactive to light.      Comments: No nystagmus or deviated gaze noted   Neurological:      Mental Status: He is alert and oriented to person, place, and time.      Cranial Nerves: Cranial nerves 2-12 are intact.      Sensory: Sensation is intact.      Motor: No weakness, tremor or seizure activity.      Comments:   Cranial Nerves   CN II: Pupils are equal, round, and reactive to light. Normal visual acuity and visual fields.    CN III IV VI: Extraocular movements are full without nystagmus.  CN V: Normal facial  "sensation and strength of muscles of mastication.  CN VII: Facial movements are symmetric. No weakness.  CN VIII:  Auditory acuity is normal.  CN IX & X:  Symmetric palatal movement.  CN XI: Sternocleidomastoid and trapezius are normal.  No weakness.  CN XII: The tongue is midline.  No atrophy or fasciculations.         Vitals:  Temp:  [97.3 °F (36.3 °C)-98.3 °F (36.8 °C)] 98.3 °F (36.8 °C)  Heart Rate:  [68-75] 69  Resp:  [16-18] 16  BP: (137-158)/(85-99) 141/85    Laboratory Results:   Lab Results   Component Value Date    GLUCOSE 129 (H) 02/19/2024    CALCIUM 8.5 (L) 02/19/2024     02/19/2024    K 4.0 02/19/2024    CO2 19.0 (L) 02/19/2024     02/19/2024    BUN 11 02/19/2024    CREATININE 0.88 02/19/2024    EGFRIFAFRI 57 (L) 12/05/2018    EGFRIFNONA 55 (L) 01/10/2022    BCR 12.5 02/19/2024    ANIONGAP 14.0 02/19/2024     Lab Results   Component Value Date    WBC 6.17 02/19/2024    HGB 14.4 02/19/2024    HCT 44.6 02/19/2024    MCV 93.5 02/19/2024     02/19/2024     Lab Results   Component Value Date    CHOL 251 (H) 01/24/2024    CHOL 229 (H) 12/09/2022    CHOL 211 (H) 09/09/2022     Lab Results   Component Value Date    HDL 41 01/24/2024    HDL 38 (L) 12/28/2023    HDL 37 (L) 11/29/2023     Lab Results   Component Value Date     (H) 01/24/2024     (H) 12/28/2023     (H) 11/29/2023     Lab Results   Component Value Date    TRIG 277 (H) 01/24/2024    TRIG 294 (H) 12/28/2023    TRIG 368 (H) 11/29/2023     Lab Results   Component Value Date    HGBA1C 6.30 (H) 01/24/2024     Lab Results   Component Value Date    INR 1.20 (H) 01/24/2024    INR 1.13 (H) 10/09/2020    INR 1.26 (H) 08/10/2020    PROTIME 15.3 (H) 01/24/2024    PROTIME 15.0 10/09/2020    PROTIME 15.5 (H) 08/10/2020     Lab Results   Component Value Date    CGPIXVJY04 615 01/24/2024     No results found for: \"FOLATE\"          Assessment / Plan     Brief Patient Summary:  Andre Agosto is a 69 y.o. male with past " medical history of HTN, epilepsy, T2DM, ALEX and A-fib presented to [facility] ED with complaint of fever despite recent UTI treatment.  Patient was seen at [facility] for ESBL E. coli UTI after a recent prostate biopsy.  There patient was admitted from 1/26 through 1/31; a PICC line was placed for ongoing Invaz antibiotic treatment lasting through 2/12.  Patient was placed on p.o. Bactrim on 2/12. patient continued to spike fevers and on 2/14 had a breakthrough seizure.     Plan:   Epilepsy  ESBL E. coli bacteremia  Recurrent ESBL UTI  Patient will need to be a different AEM due to the interaction with carbapenem antibiotics and Depakote.  Patient continues to have subtherapeutic levels.   Patient has failed many other AEM medications due to side effects.  He has failed Keppra due to lethargy and mental status change.  Vimpat is not a viable options patient's [place] interval is greater than 300.  Patient states she has failed Dilantin in the past due to altered mental status.  Will transition patient to Briviact 50 mg twice daily.  One-time IV loading dose of 200 mg.  Continue seizure precautions  Repeat blood cultures from 218 pending  ID is following; appreciate recommendations.  Patient currently on Merrem.  IV Valium 5 mg as needed for seizure-like activity.  General neurology will continue to follow    I have discussed the above with the patient, bedside RN, patient's wife, Dr. Bridges and Eduardo Shaw Pharm.D.  Time spent with patient: 80 minutes in face-to-face evaluation and management of the patient.       YESY Prieto

## 2024-02-19 NOTE — THERAPY TREATMENT NOTE
Patient Name: Andre Agosto  : 1955    MRN: [id number]                              Today's Date: 2024       Admit Date: 2024    Visit Dx:     ICD-10-CM ICD-9-CM   1. Urinary tract infection in male  N39.0 599.0   2. Sepsis, due to unspecified organism, unspecified whether acute organ dysfunction present  A41.9 038.9     995.91   3. Lactic acidosis  E87.20 276.2     Patient Active Problem List   Diagnosis    Essential hypertension    Epilepsy    CVA (cerebral vascular accident)    Hypokalemia    Diabetes mellitus without complication    BiPAP (biphasic positive airway pressure) dependence    Benign hypertension    Hyperlipidemia    Epilepsy    Obesity    Dyspepsia    Coronary arteriosclerosis    Abnormal stress test    TIA (transient ischemic attack)    Pneumonia of right lower lobe due to infectious organism    History of cerebrovascular accident    ALEX (obstructive sleep apnea)    SOB (shortness of breath) on exertion    Suspected COVID-19 virus infection    Fever and chills    Dyspnea    Fever and chills    Localized edema    Right leg pain    Atrial fibrillation    Memory loss    Blood glucose abnormal    Generalized anxiety disorder    History of Helicobacter pylori infection    Benign prostatic hyperplasia    Medication intolerance    Pseudoaneurysm of femoral artery following procedure    Sustained ventricular tachycardia    ABMD (anterior basement membrane dystrophy)    Myopia, bilateral    Chronic otitis media    Eye twitch    Hyperaldosteronism    Otitis externa of right ear    Regular astigmatism, right eye    Seizure-like activity    Nephrolithiasis    UTI (urinary tract infection)    Anxiety    Presbyopia    Otalgia    Arthralgia of right knee    Acute suppurative otitis media without spontaneous rupture of ear drum    Recurrent acute suppurative otitis media    Benign hypertensive heart disease with congestive heart failure    Eustachian tube disorder    Dre hematuria    Other  gram-negative sepsis    Other specified bacterial agents as the cause of diseases classified elsewhere    Otorrhea    Paroxysmal atrial flutter    Sensorineural hearing loss    Tobacco abuse counseling    Coronary atherosclerosis    Kidney stone    Other obesity due to excess calories    Otitis externa of right ear    Urinary tract infection, site not specified    Elevated prostate specific antigen (PSA)    Sepsis    Sepsis due to Escherichia coli without acute organ dysfunction    Bacteremia due to Escherichia coli     Past Medical History:   Diagnosis Date    6th nerve palsy, right     right eye     Anxiety and depression     Atrial fibrillation, persistent 12/02/2020    on Xarelto    Coronary artery disease involving native coronary artery of native heart without angina pectoris 09/12/2018    follows w/ Dr. Mendoza    CRI (chronic renal insufficiency), stage 2 (mild)     CVA (cerebral vascular accident)     Diabetes mellitus     doesnt check sugar, type 2     Disease of thyroid gland     Double vision     resolved- right eye    Elevated cholesterol     Elevated prostate specific antigen (PSA) 11/08/2023    Focal seizure     of right arm     Heart attack     NSTEMI 2015, s/p stenting    History of Helicobacter pylori infection     in 2008, treated w/ PrevPak - 2021 EGD bx (+), PCP RX - PPI/Augmentin/Doxy/Flagyl (x 14 days) 1/22/21    HL (hearing loss)     (L) ear - child luevano measles    Hyperlipidemia     Hypertension     Kidney stone     Memory loss 2005    d/t meningitis    Meningitis 2005    unsure of bacterial or viral     Obesity     Sleep apnea treated with nocturnal BiPAP     compliant with  machine     Stroke     2017/2018    Ventricular tachycardia     3 different times    Wears glasses      Past Surgical History:   Procedure Laterality Date    CARDIAC CATHETERIZATION N/A 10/12/2018    Procedure: Left Heart Cath;  Surgeon: Yoselin Johnson MD;  Location: [facility] CATH INVASIVE LOCATION;  Service:  Cardiology    CARDIAC CATHETERIZATION  03/2021    stent    CARDIAC CATHETERIZATION  07/2021    just checking the after pacemaker placed- Dr. Tim    CARDIAC DEFIBRILLATOR PLACEMENT  2021    COLONOSCOPY  10/2020    w/ Dr. Jacobs, hx colon polyps    CORONARY STENT PLACEMENT  2015    LAPAROSCOPIC GASTRIC BANDING  2008    s/p LAGB Realize 6/2008 by HOMERO.    PACEMAKER IMPLANTATION  07/25/2021    UMBILICAL HERNIA REPAIR  2008    incarcerated UHR w/ mesh by Dr. Velasquez    URETEROSCOPY LASER LITHOTRIPSY WITH STENT INSERTION Left 10/20/2021    Procedure: URETEROSCOPY LASER LITHOTRIPSY WITH STENT INSERTION;  Surgeon: Tonio De La Cruz MD;  Location: [facility];  Service: Urology;  Laterality: Left;    URETEROSCOPY LASER LITHOTRIPSY WITH STENT INSERTION Left 11/03/2021    Procedure: URETEROSCOPY LEFT, LEFT RETROGRADE PYELOGRAM, CYSTOSCOPY, LASER LITHOTRIPSY WITH STENT EXCHANGE;  Surgeon: Tonio De La Cruz MD;  Location: [facility];  Service: Urology;  Laterality: Left;    URETEROSCOPY LASER LITHOTRIPSY WITH STENT INSERTION Left 10/06/2022    Procedure: URETEROSCOPY LASER LITHOTRIPSY WITH STENT INSERTION;  Surgeon: Tonio De La Cruz MD;  Location: [facility];  Service: Urology;  Laterality: Left;      General Information       Row Name 02/19/24 1354          Physical Therapy Time and Intention    Document Type therapy note (daily note)  -KG     Mode of Treatment physical therapy  -KG       Row Name 02/19/24 1354          General Information    Existing Precautions/Restrictions fall  -KG       Row Name 02/19/24 1354          Cognition    Orientation Status (Cognition) oriented x 4  -KG       Row Name 02/19/24 1354          Safety Issues, Functional Mobility    Safety Issues Affecting Function (Mobility) awareness of need for assistance;insight into deficits/self-awareness;safety precaution awareness;safety precautions follow-through/compliance  -KG     Impairments Affecting Function (Mobility)  balance;coordination;endurance/activity tolerance;postural/trunk control;strength  -KG               User Key  (r) = Recorded By, (t) = Taken By, (c) = Cosigned By      Initials Name Provider Type    Dinora Edge PT Physical Therapist                   Mobility       Row Name 02/19/24 1354          Bed Mobility    Bed Mobility supine-sit  -KG     Supine-Sit [place] (Bed Mobility) supervision  -KG     Assistive Device (Bed Mobility) head of bed elevated  -KG     Comment, (Bed Mobility) Pt demonstrated appropriate sequencing.  -KG       Row Name 02/19/24 1354          Transfers    Comment, (Transfers) Pt demonstrated appropriate sequencing and technique.  -KG       Row Name 02/19/24 1354          Sit-Stand Transfer    Sit-Stand [place] (Transfers) contact guard;verbal cues  -KG       Row Name 02/19/24 1354          Gait/Stairs (Locomotion)    [place] Level (Gait) contact guard;verbal cues  -KG     Assistive Device (Gait) other (see comments)  UE on IV pole  -KG     Distance in Feet (Gait) 450  -KG     Deviations/Abnormal Patterns (Gait) ru decreased;stride length decreased  -KG     Bilateral Gait Deviations forward flexed posture  -KG     Comment, (Gait/Stairs) Pt demonstrated step through gait pattern with slow ru and decreased step length. Frequent cues for upright posture. Pt with periods of mild veering to the L, but able to self correct. Pt demonstrated good stability with no LOB. Limited by fatigue.  -KG               User Key  (r) = Recorded By, (t) = Taken By, (c) = Cosigned By      Initials Name Provider Type    Dinora Edge PT Physical Therapist                   Obj/Interventions       Row Name 02/19/24 1357          Balance    Balance Assessment sitting static balance;standing static balance;standing dynamic balance  -KG     Static Sitting Balance independent  -KG     Position, Sitting Balance unsupported;sitting in chair  -KG     Static Standing Balance  standby assist  -KG     Dynamic Standing Balance contact guard  -KG     Position/Device Used, Standing Balance supported  -KG               User Key  (r) = Recorded By, (t) = Taken By, (c) = Cosigned By      Initials Name Provider Type    Dinora Edge PT Physical Therapist                   Goals/Plan    No documentation.                  Clinical Impression       [facility] Name 02/19/24 1356          Pain    Pretreatment Pain Rating 0/10 - no pain  -KG     Posttreatment Pain Rating 0/10 - no pain  -KG       Row Name 02/19/24 1358          Plan of Care Review    Plan of Care Reviewed With patient  -KG     Progress improving  -KG     Outcome Evaluation Pt ambulated 450ft with CGA and UE support on IV pole. Pt demonstrated good balance and stability. Frequent cues for upright posture. Pt with periods of veering to the L, but able to self correct. Pt did not require any rest breaks. Limited by fatigue. Continue to recommend PT skilled care and D/C home with assistance and  PT services.  -KG       [facility] Name 02/19/24 1352          Vital Signs    Pre Systolic BP Rehab 146  -KG     Pre Treatment Diastolic BP 94  -KG     Pretreatment Heart Rate (beats/min) 69  -KG     Posttreatment Heart Rate (beats/min) 78  -KG     Pre SpO2 (%) 96  -KG     O2 Delivery Pre Treatment room air  -KG     Post SpO2 (%) 95  -KG     O2 Delivery Post Treatment room air  -KG     Pre Patient Position Supine  -KG     Intra Patient Position Standing  -KG     Post Patient Position Sitting  -KG       [facility] Name 02/19/24 7703          Positioning and Restraints    Pre-Treatment Position in bed  -KG     Post Treatment Position chair  -KG     In Chair notified nsg;reclined;call light within reach;encouraged to call for assist;with family/caregiver;legs elevated  -KG               User Key  (r) = Recorded By, (t) = Taken By, (c) = Cosigned By      Initials Name Provider Type    Dinora Edge, PT Physical Therapist                   Outcome  Measures       Row Name 02/19/24 1403 02/19/24 0805       How much help from another person do you currently need...    Turning from your back to your side while in flat bed without using bedrails? 4  -KG 4  -AT    Moving from lying on back to sitting on the side of a flat bed without bedrails? 3  -KG 3  -AT    Moving to and from a bed to a chair (including a wheelchair)? 3  -KG 3  -AT    Standing up from a chair using your arms (e.g., wheelchair, bedside chair)? 3  -KG 3  -AT    Climbing 3-5 steps with a railing? 3  -KG 3  -AT    To walk in hospital room? 3  -KG 3  -AT    AM-PAC 6 Clicks Score (PT) 19  -KG 19  -AT    Highest Level of Mobility Goal 6 --> Walk 10 steps or more  -KG 6 --> Walk 10 steps or more  -AT      Row Name 02/19/24 1403          Functional Assessment    Outcome Measure Options AM-PAC 6 Clicks Basic Mobility (PT)  -KG               User Key  (r) = Recorded By, (t) = Taken By, (c) = Cosigned By      Initials Name Provider Type    KG Dinora Jade, PT Physical Therapist    AT AdelsoMaryam RN Registered Nurse                                 Physical Therapy Education       Title: PT OT SLP Therapies (Done)       Topic: Physical Therapy (Done)       Point: Mobility training (Done)       Learning Progress Summary             Patient Acceptance, E, VU,NR by KG at 2/19/2024 1041    Acceptance, E, NR,VU by KG at 2/17/2024 1048                         Point: Home exercise program (Done)       Learning Progress Summary             Patient Acceptance, E, VU,NR by KG at 2/19/2024 1041                         Point: Body mechanics (Done)       Learning Progress Summary             Patient Acceptance, E, VU,NR by KG at 2/19/2024 1041    Acceptance, E, NR,VU by KG at 2/17/2024 1048                         Point: Precautions (Done)       Learning Progress Summary             Patient Acceptance, E, VU,NR by KG at 2/19/2024 1041    Acceptance, E, NR,VU by KG at 2/17/2024 1048                                          User Key       Initials Effective Dates Name Provider Type Discipline    KG 05/22/20 -  Dinora Jade, PT Physical Therapist PT                  PT Recommendation and Plan  Planned Therapy Interventions (PT): balance training, bed mobility training, gait training, stair training, strengthening, transfer training  Plan of Care Reviewed With: patient  Progress: improving  Outcome Evaluation: Pt ambulated 450ft with CGA and UE support on IV pole. Pt demonstrated good balance and stability. Frequent cues for upright posture. Pt with periods of veering to the L, but able to self correct. Pt did not require any rest breaks. Limited by fatigue. Continue to recommend PT skilled care and D/C home with assistance and  PT services.     Time Calculation:   PT Evaluation Complexity  History, PT Evaluation Complexity: 1-2 personal factors and/or comorbidities  Examination of Body Systems (PT Eval Complexity): total of 3 or more elements  Clinical Presentation (PT Evaluation Complexity): stable  Clinical Decision Making (PT Evaluation Complexity): low complexity  Overall Complexity (PT Evaluation Complexity): low complexity     PT Charges       Row Name 02/19/24 1041             Time Calculation    Start Time 1041  -KG      PT Received On 02/19/24  -KG      PT Goal Re-Cert Due Date 02/27/24  -KG         Time Calculation- PT    Total Timed Code Minutes- PT 23 minute(s)  -KG         Timed Charges    96933 - PT Therapeutic Activity Minutes 23  -KG         Total Minutes    Timed Charges Total Minutes 23  -KG       Total Minutes 23  -KG                User Key  (r) = Recorded By, (t) = Taken By, (c) = Cosigned By      Initials Name Provider Type    KG Dinora Jade, PT Physical Therapist                  Therapy Charges for Today       Code Description Service Date Service Provider Modifiers Qty    [id number]  PT THERAPEUTIC ACT EA 15 MIN 2/19/2024 Dinora Jade, PT GP 2            PT  G-Codes  Outcome Measure Options: AM-PAC 6 Clicks Basic Mobility (PT)  AM-PAC 6 Clicks Score (PT): 19  PT Discharge Summary  Anticipated Discharge Disposition (PT): home with assist, home with home health    Dena Jade, PT  2/19/2024

## 2024-02-20 ENCOUNTER — APPOINTMENT (OUTPATIENT)
Dept: CARDIOLOGY | Facility: HOSPITAL | Age: 69
End: 2024-02-20
Payer: MEDICARE

## 2024-02-20 LAB
ALBUMIN SERPL-MCNC: 3.3 G/DL (ref 3.5–5.2)
ALBUMIN/GLOB SERPL: 0.9 G/DL
ALP SERPL-CCNC: 60 U/L (ref 39–117)
ALT SERPL W P-5'-P-CCNC: 7 U/L (ref 1–41)
ANION GAP SERPL CALCULATED.3IONS-SCNC: 8 MMOL/L (ref 5–15)
AST SERPL-CCNC: 10 U/L (ref 1–40)
BILIRUB SERPL-MCNC: 0.5 MG/DL (ref 0–1.2)
BUN SERPL-MCNC: 10 MG/DL (ref 8–23)
BUN/CREAT SERPL: 10.8 (ref 7–25)
CALCIUM SPEC-SCNC: 8.6 MG/DL (ref 8.6–10.5)
CHLORIDE SERPL-SCNC: 105 MMOL/L (ref 98–107)
CO2 SERPL-SCNC: 25 MMOL/L (ref 22–29)
CREAT SERPL-MCNC: 0.93 MG/DL (ref 0.76–1.27)
DEPRECATED RDW RBC AUTO: 47.9 FL (ref 37–54)
EGFRCR SERPLBLD CKD-EPI 2021: 89.4 ML/MIN/1.73
ERYTHROCYTE [DISTWIDTH] IN BLOOD BY AUTOMATED COUNT: 14.5 % (ref 12.3–15.4)
GLOBULIN UR ELPH-MCNC: 3.5 GM/DL
GLUCOSE BLDC GLUCOMTR-MCNC: 104 MG/DL (ref 70–130)
GLUCOSE BLDC GLUCOMTR-MCNC: 117 MG/DL (ref 70–130)
GLUCOSE BLDC GLUCOMTR-MCNC: 148 MG/DL (ref 70–130)
GLUCOSE BLDC GLUCOMTR-MCNC: 241 MG/DL (ref 70–130)
GLUCOSE SERPL-MCNC: 132 MG/DL (ref 65–99)
HCT VFR BLD AUTO: 43.7 % (ref 37.5–51)
HGB BLD-MCNC: 14.7 G/DL (ref 13–17.7)
MCH RBC QN AUTO: 30.3 PG (ref 26.6–33)
MCHC RBC AUTO-ENTMCNC: 33.6 G/DL (ref 31.5–35.7)
MCV RBC AUTO: 90.1 FL (ref 79–97)
PLATELET # BLD AUTO: 202 10*3/MM3 (ref 140–450)
PMV BLD AUTO: 10.5 FL (ref 6–12)
POTASSIUM SERPL-SCNC: 3.4 MMOL/L (ref 3.5–5.2)
POTASSIUM SERPL-SCNC: 4 MMOL/L (ref 3.5–5.2)
PROT SERPL-MCNC: 6.8 G/DL (ref 6–8.5)
QT INTERVAL: 466 MS
QTC INTERVAL: 499 MS
RBC # BLD AUTO: 4.85 10*6/MM3 (ref 4.14–5.8)
SODIUM SERPL-SCNC: 138 MMOL/L (ref 136–145)
WBC NRBC COR # BLD AUTO: 5.53 10*3/MM3 (ref 3.4–10.8)

## 2024-02-20 PROCEDURE — 93312 ECHO TRANSESOPHAGEAL: CPT

## 2024-02-20 PROCEDURE — 99232 SBSQ HOSP IP/OBS MODERATE 35: CPT | Performed by: INTERNAL MEDICINE

## 2024-02-20 PROCEDURE — 80053 COMPREHEN METABOLIC PANEL: CPT | Performed by: HOSPITALIST

## 2024-02-20 PROCEDURE — 02HV33Z INSERTION OF INFUSION DEVICE INTO SUPERIOR VENA CAVA, PERCUTANEOUS APPROACH: ICD-10-PCS | Performed by: INTERNAL MEDICINE

## 2024-02-20 PROCEDURE — 93312 ECHO TRANSESOPHAGEAL: CPT | Performed by: INTERNAL MEDICINE

## 2024-02-20 PROCEDURE — 97110 THERAPEUTIC EXERCISES: CPT

## 2024-02-20 PROCEDURE — 25010000002 ERTAPENEM PER 500 MG: Performed by: INTERNAL MEDICINE

## 2024-02-20 PROCEDURE — 99233 SBSQ HOSP IP/OBS HIGH 50: CPT

## 2024-02-20 PROCEDURE — 82948 REAGENT STRIP/BLOOD GLUCOSE: CPT

## 2024-02-20 PROCEDURE — 80299 QUANTITATIVE ASSAY DRUG: CPT

## 2024-02-20 PROCEDURE — 85027 COMPLETE CBC AUTOMATED: CPT | Performed by: HOSPITALIST

## 2024-02-20 PROCEDURE — 25010000002 MEROPENEM PER 100 MG: Performed by: INTERNAL MEDICINE

## 2024-02-20 PROCEDURE — 93321 DOPPLER ECHO F-UP/LMTD STD: CPT

## 2024-02-20 PROCEDURE — 84132 ASSAY OF SERUM POTASSIUM: CPT | Performed by: INTERNAL MEDICINE

## 2024-02-20 PROCEDURE — C1751 CATH, INF, PER/CENT/MIDLINE: HCPCS

## 2024-02-20 PROCEDURE — 93325 DOPPLER ECHO COLOR FLOW MAPG: CPT

## 2024-02-20 PROCEDURE — C1894 INTRO/SHEATH, NON-LASER: HCPCS

## 2024-02-20 PROCEDURE — 63710000001 INSULIN LISPRO (HUMAN) PER 5 UNITS: Performed by: FAMILY MEDICINE

## 2024-02-20 PROCEDURE — 93321 DOPPLER ECHO F-UP/LMTD STD: CPT | Performed by: INTERNAL MEDICINE

## 2024-02-20 PROCEDURE — 25810000003 SODIUM CHLORIDE 0.9 % SOLUTION 250 ML FLEX CONT: Performed by: FAMILY MEDICINE

## 2024-02-20 PROCEDURE — 93325 DOPPLER ECHO COLOR FLOW MAPG: CPT | Performed by: INTERNAL MEDICINE

## 2024-02-20 PROCEDURE — 97116 GAIT TRAINING THERAPY: CPT

## 2024-02-20 RX ORDER — SODIUM CHLORIDE 0.9 % (FLUSH) 0.9 %
10 SYRINGE (ML) INJECTION EVERY 12 HOURS SCHEDULED
Status: DISCONTINUED | OUTPATIENT
Start: 2024-02-20 | End: 2024-02-21 | Stop reason: HOSPADM

## 2024-02-20 RX ORDER — FUROSEMIDE 20 MG/1
20 TABLET ORAL
Status: DISCONTINUED | OUTPATIENT
Start: 2024-02-20 | End: 2024-02-20

## 2024-02-20 RX ORDER — SPIRONOLACTONE 25 MG/1
50 TABLET ORAL DAILY
Status: DISCONTINUED | OUTPATIENT
Start: 2024-02-20 | End: 2024-02-21 | Stop reason: HOSPADM

## 2024-02-20 RX ORDER — POTASSIUM CHLORIDE 20 MEQ/1
40 TABLET, EXTENDED RELEASE ORAL EVERY 4 HOURS
Status: COMPLETED | OUTPATIENT
Start: 2024-02-20 | End: 2024-02-20

## 2024-02-20 RX ORDER — FUROSEMIDE 20 MG/1
20 TABLET ORAL 2 TIMES DAILY
Status: DISCONTINUED | OUTPATIENT
Start: 2024-02-20 | End: 2024-02-21 | Stop reason: HOSPADM

## 2024-02-20 RX ORDER — SODIUM CHLORIDE 0.9 % (FLUSH) 0.9 %
10 SYRINGE (ML) INJECTION AS NEEDED
Status: DISCONTINUED | OUTPATIENT
Start: 2024-02-20 | End: 2024-02-21 | Stop reason: HOSPADM

## 2024-02-20 RX ORDER — SACCHAROMYCES BOULARDII 250 MG
250 CAPSULE ORAL 2 TIMES DAILY
Status: DISCONTINUED | OUTPATIENT
Start: 2024-02-21 | End: 2024-02-21 | Stop reason: HOSPADM

## 2024-02-20 RX ADMIN — POTASSIUM CHLORIDE 40 MEQ: 1500 TABLET, EXTENDED RELEASE ORAL at 08:27

## 2024-02-20 RX ADMIN — CARVEDILOL 25 MG: 12.5 TABLET, FILM COATED ORAL at 08:26

## 2024-02-20 RX ADMIN — MEROPENEM 1000 MG: 1 INJECTION, POWDER, FOR SOLUTION INTRAVENOUS at 06:04

## 2024-02-20 RX ADMIN — AMLODIPINE BESYLATE 10 MG: 10 TABLET ORAL at 20:52

## 2024-02-20 RX ADMIN — INSULIN LISPRO 3 UNITS: 100 INJECTION, SOLUTION INTRAVENOUS; SUBCUTANEOUS at 16:45

## 2024-02-20 RX ADMIN — POTASSIUM CHLORIDE 40 MEQ: 1500 TABLET, EXTENDED RELEASE ORAL at 11:54

## 2024-02-20 RX ADMIN — FUROSEMIDE 20 MG: 20 TABLET ORAL at 08:26

## 2024-02-20 RX ADMIN — ERTAPENEM 1000 MG: 1 INJECTION INTRAMUSCULAR; INTRAVENOUS at 15:24

## 2024-02-20 RX ADMIN — SPIRONOLACTONE 50 MG: 25 TABLET ORAL at 08:27

## 2024-02-20 RX ADMIN — AMIODARONE HYDROCHLORIDE 100 MG: 200 TABLET ORAL at 08:26

## 2024-02-20 RX ADMIN — Medication 10 ML: at 08:27

## 2024-02-20 RX ADMIN — TAMSULOSIN HYDROCHLORIDE 0.4 MG: 0.4 CAPSULE ORAL at 08:27

## 2024-02-20 RX ADMIN — BRIVARACETAM 50 MG: 50 TABLET, FILM COATED ORAL at 20:52

## 2024-02-20 RX ADMIN — FUROSEMIDE 20 MG: 20 TABLET ORAL at 15:23

## 2024-02-20 RX ADMIN — SACUBITRIL AND VALSARTAN 1 TABLET: 97; 103 TABLET, FILM COATED ORAL at 08:26

## 2024-02-20 RX ADMIN — BRIVARACETAM 50 MG: 50 TABLET, FILM COATED ORAL at 08:27

## 2024-02-20 RX ADMIN — RIVAROXABAN 15 MG: 15 TABLET, FILM COATED ORAL at 17:45

## 2024-02-20 RX ADMIN — SODIUM CHLORIDE 40 ML: 9 INJECTION, SOLUTION INTRAVENOUS at 15:24

## 2024-02-20 RX ADMIN — CARVEDILOL 25 MG: 12.5 TABLET, FILM COATED ORAL at 17:44

## 2024-02-20 RX ADMIN — SACUBITRIL AND VALSARTAN 1 TABLET: 97; 103 TABLET, FILM COATED ORAL at 20:52

## 2024-02-20 RX ADMIN — ASPIRIN 81 MG: 81 TABLET, COATED ORAL at 08:26

## 2024-02-20 NOTE — THERAPY TREATMENT NOTE
Patient Name: Andre Agosto  : 1955    MRN: [id number]                              Today's Date: 2024       Admit Date: 2024    Visit Dx:     ICD-10-CM ICD-9-CM   1. Urinary tract infection in male  N39.0 599.0   2. Sepsis, due to unspecified organism, unspecified whether acute organ dysfunction present  A41.9 038.9     995.91   3. Lactic acidosis  E87.20 276.2     Patient Active Problem List   Diagnosis    Essential hypertension    Epilepsy    CVA (cerebral vascular accident)    Hypokalemia    Diabetes mellitus without complication    BiPAP (biphasic positive airway pressure) dependence    Benign hypertension    Hyperlipidemia    Epilepsy    Obesity    Dyspepsia    Coronary arteriosclerosis    Abnormal stress test    TIA (transient ischemic attack)    Pneumonia of right lower lobe due to infectious organism    History of cerebrovascular accident    ALEX (obstructive sleep apnea)    SOB (shortness of breath) on exertion    Suspected COVID-19 virus infection    Fever and chills    Dyspnea    Fever and chills    Localized edema    Right leg pain    Atrial fibrillation    Memory loss    Blood glucose abnormal    Generalized anxiety disorder    History of Helicobacter pylori infection    Benign prostatic hyperplasia    Medication intolerance    Pseudoaneurysm of femoral artery following procedure    Sustained ventricular tachycardia    ABMD (anterior basement membrane dystrophy)    Myopia, bilateral    Chronic otitis media    Eye twitch    Hyperaldosteronism    Otitis externa of right ear    Regular astigmatism, right eye    Seizure-like activity    Nephrolithiasis    UTI (urinary tract infection)    Anxiety    Presbyopia    Otalgia    Arthralgia of right knee    Acute suppurative otitis media without spontaneous rupture of ear drum    Recurrent acute suppurative otitis media    Benign hypertensive heart disease with congestive heart failure    Eustachian tube disorder    Dre hematuria    Other  gram-negative sepsis    Other specified bacterial agents as the cause of diseases classified elsewhere    Otorrhea    Paroxysmal atrial flutter    Sensorineural hearing loss    Tobacco abuse counseling    Coronary atherosclerosis    Kidney stone    Other obesity due to excess calories    Otitis externa of right ear    Urinary tract infection, site not specified    Elevated prostate specific antigen (PSA)    Sepsis    Sepsis due to Escherichia coli without acute organ dysfunction    Bacteremia due to Escherichia coli     Past Medical History:   Diagnosis Date    6th nerve palsy, right     right eye     Anxiety and depression     Atrial fibrillation, persistent 12/02/2020    on Xarelto    Coronary artery disease involving native coronary artery of native heart without angina pectoris 09/12/2018    follows w/ Dr. Mendoza    CRI (chronic renal insufficiency), stage 2 (mild)     CVA (cerebral vascular accident)     Diabetes mellitus     doesnt check sugar, type 2     Disease of thyroid gland     Double vision     resolved- right eye    Elevated cholesterol     Elevated prostate specific antigen (PSA) 11/08/2023    Focal seizure     of right arm     Heart attack     NSTEMI 2015, s/p stenting    History of Helicobacter pylori infection     in 2008, treated w/ PrevPak - 2021 EGD bx (+), PCP RX - PPI/Augmentin/Doxy/Flagyl (x 14 days) 1/22/21    HL (hearing loss)     (L) ear - child luevano measles    Hyperlipidemia     Hypertension     Kidney stone     Memory loss 2005    d/t meningitis    Meningitis 2005    unsure of bacterial or viral     Obesity     Sleep apnea treated with nocturnal BiPAP     compliant with  machine     Stroke     2017/2018    Ventricular tachycardia     3 different times    Wears glasses      Past Surgical History:   Procedure Laterality Date    CARDIAC CATHETERIZATION N/A 10/12/2018    Procedure: Left Heart Cath;  Surgeon: Yoselin Johnson MD;  Location: [facility] CATH INVASIVE LOCATION;  Service:  Cardiology    CARDIAC CATHETERIZATION  03/2021    stent    CARDIAC CATHETERIZATION  07/2021    just checking the after pacemaker placed- Dr. Tim    CARDIAC DEFIBRILLATOR PLACEMENT  2021    COLONOSCOPY  10/2020    w/ Dr. Jacobs, hx colon polyps    CORONARY STENT PLACEMENT  2015    LAPAROSCOPIC GASTRIC BANDING  2008    s/p LAGB Realize 6/2008 by HOMERO.    PACEMAKER IMPLANTATION  07/25/2021    UMBILICAL HERNIA REPAIR  2008    incarcerated UHR w/ mesh by Dr. Velasquez    URETEROSCOPY LASER LITHOTRIPSY WITH STENT INSERTION Left 10/20/2021    Procedure: URETEROSCOPY LASER LITHOTRIPSY WITH STENT INSERTION;  Surgeon: Tonio De La Cruz MD;  Location: [facility];  Service: Urology;  Laterality: Left;    URETEROSCOPY LASER LITHOTRIPSY WITH STENT INSERTION Left 11/03/2021    Procedure: URETEROSCOPY LEFT, LEFT RETROGRADE PYELOGRAM, CYSTOSCOPY, LASER LITHOTRIPSY WITH STENT EXCHANGE;  Surgeon: Tonio De La Cruz MD;  Location: [facility];  Service: Urology;  Laterality: Left;    URETEROSCOPY LASER LITHOTRIPSY WITH STENT INSERTION Left 10/06/2022    Procedure: URETEROSCOPY LASER LITHOTRIPSY WITH STENT INSERTION;  Surgeon: Tonio De La Cruz MD;  Location: [facility];  Service: Urology;  Laterality: Left;      General Information       Row Name 02/20/24 1119          Physical Therapy Time and Intention    Document Type therapy note (daily note)  -SS     Mode of Treatment physical therapy  -       Row Name 02/20/24 1119          General Information    Patient Profile Reviewed yes  -SS     Existing Precautions/Restrictions fall  -SS     Barriers to Rehab medically complex;previous functional deficit  -SS       Row Name 02/20/24 1119          Cognition    Orientation Status (Cognition) oriented x 4  -SS       Row Name 02/20/24 1119          Safety Issues, Functional Mobility    Safety Issues Affecting Function (Mobility) awareness of need for assistance;insight into deficits/self-awareness;safety precaution awareness;safety  precautions follow-through/compliance;sequencing abilities  -     Impairments Affecting Function (Mobility) balance;endurance/activity tolerance;postural/trunk control;strength;range of motion (ROM)  -               User Key  (r) = Recorded By, (t) = Taken By, (c) = Cosigned By      Initials Name Provider Type    SS Alyssa Shoemaker, PT Physical Therapist                   Mobility       Row Name 02/20/24 1120          Bed Mobility    Comment, (Bed Mobility) up in chair  -       Row Name 02/20/24 1120          Sit-Stand Transfer    Sit-Stand [place] (Transfers) verbal cues;standby assist  -SS     Assistive Device (Sit-Stand Transfers) other (see comments)  none  -SS     Comment, (Sit-Stand Transfer) VC for hand placement, LE set up, anterior shift of center of gravity, emphasis on lowering with eccentric control  -       Row Name 02/20/24 1120          Gait/Stairs (Locomotion)    [place] Level (Gait) contact guard;verbal cues  -     Assistive Device (Gait) other (see comments)  none  -SS     Distance in Feet (Gait) 450  -SS     Deviations/Abnormal Patterns (Gait) bilateral deviations;base of support, wide;ru decreased  -SS     Bilateral Gait Deviations forward flexed posture  -     [place] Level (Stairs) contact guard  -     Handrail Location (Stairs) both sides  -     Number of Steps (Stairs) 20  -SS     Ascending Technique (Stairs) step-over-step  -SS     Descending Technique (Stairs) step-to-step  -SS     Comment, (Gait/Stairs) Pt. ambulated with a step through gait patern w/occasional veering to the L. Pt. declined use of STC. VC for upright posture, forward gaze, maintaining middle of hallway. He performed stair navigation w/cueing for sequencing w/stronger leg leading in ascending and weaker leg leading in descending. Activity limited by fatigue.  -SS               User Key  (r) = Recorded By, (t) = Taken By, (c) = Cosigned By      Initials Name Provider Type    SS [place]  RORO Shah Physical Therapist                   Obj/Interventions       Row Name 02/20/24 1123          Motor Skills    Therapeutic Exercise hip;ankle;knee;other (see comments)  5x repeated sit to stand (2 trials)  -       Row Name 02/20/24 1123          Hip (Therapeutic Exercise)    Hip (Therapeutic Exercise) isometric exercises;strengthening exercise  -     Hip Isometrics (Therapeutic Exercise) bilateral;gluteal sets;10 repetitions  -     Hip Strengthening (Therapeutic Exercise) bilateral;aBduction;aDduction;heel slides;marching while seated;10 repetitions  -       Row Name 02/20/24 [facility]          Knee (Therapeutic Exercise)    Knee (Therapeutic Exercise) isometric exercises;strengthening exercise  -     Knee Isometrics (Therapeutic Exercise) bilateral;quad sets;10 repetitions  -     Knee Strengthening (Therapeutic Exercise) bilateral;SLR (straight leg raise);LAQ (long arc quad);10 repetitions  -       Row Name 02/20/24 1123          Ankle (Therapeutic Exercise)    Ankle (Therapeutic Exercise) AROM (active range of motion)  -     Ankle AROM (Therapeutic Exercise) bilateral;dorsiflexion;plantarflexion;10 repetitions  -       Row Name 02/20/24 [facility]          Balance    Balance Assessment sitting static balance;sitting dynamic balance;sit to stand dynamic balance;standing static balance;standing dynamic balance  -     Static Sitting Balance independent  -     Dynamic Sitting Balance supervision  -     Position, Sitting Balance unsupported;sitting in chair  -     Sit to Stand Dynamic Balance standby assist  -     Static Standing Balance standby assist  -     Dynamic Standing Balance contact guard  -     Position/Device Used, Standing Balance unsupported  -     Balance Interventions sitting;standing;sit to stand;supported;static;dynamic  -               User Key  (r) = Recorded By, (t) = Taken By, (c) = Cosigned By      Initials Name Provider Type     Alyssa Shoemaker PT Physical  Therapist                   Goals/Plan       [facility] Name 02/20/24 1129          Gait Training Goal 1 (PT)    Activity/Assistive Device (Gait Training Goal 1, PT) gait (walking locomotion)  -SS     [place] Level (Gait Training Goal 1, PT) independent  -SS     Distance (Gait Training Goal 1, PT) 800  -SS     Time Frame (Gait Training Goal 1, PT) long term goal (LTG);10 days  -     Progress/Outcome (Gait Training Goal 1, PT) goal partially met;goal revised this date  -               User Key  (r) = Recorded By, (t) = Taken By, (c) = Cosigned By      Initials Name Provider Type    SS Alyssa Shoemaker, PT Physical Therapist                   Clinical Impression       Row Name 02/20/24 1126          Pain    Pretreatment Pain Rating 0/10 - no pain  -     Posttreatment Pain Rating 0/10 - no pain  -     Pain Intervention(s) Repositioned;Ambulation/increased activity;Elevated  -     Additional Documentation Pain Scale: Numbers Pre/Post-Treatment (Group)  -[facility] Name 02/20/24 1126          Plan of Care Review    Plan of Care Reviewed With patient;family  -     Progress improving  -     Outcome Evaluation Pt. continues to present below baseline function w/generalized weakness and balance deficits affecting his ability to safely participate in functional mobility. He performed transfers, ambulated 450' and navigated 20 steps w/contact guard assist. Activity limited by fatigue. He tolerated ther-ex well. Continue IPPT POC to progress as tolerated.  -[facility] Name 02/20/24 1126          Therapy Assessment/Plan (PT)    Rehab Potential (PT) good, to achieve stated therapy goals  -     Criteria for Skilled Interventions Met (PT) yes;skilled treatment is necessary;meets criteria  -     Therapy Frequency (PT) daily  -       Row Name 02/20/24 1126          Vital Signs    Pre Systolic BP Rehab 157  -SS     Pre Treatment Diastolic BP 95  -SS     Pretreatment Heart Rate (beats/min) 69  -SS     Pre SpO2 (%) 90   -SS     O2 Delivery Pre Treatment room air  -     Pre Patient Position Sitting  -       Row Name 02/20/24 1126          Positioning and Restraints    Pre-Treatment Position in bed  -     Post Treatment Position chair  -SS     In Chair notified nsg;reclined;call light within reach;encouraged to call for assist;exit alarm on;with family/caregiver;legs elevated  -               User Key  (r) = Recorded By, (t) = Taken By, (c) = Cosigned By      Initials Name Provider Type     Alyssa Shoemaker PT Physical Therapist                   Outcome Measures       Row Name 02/20/24 1129 02/20/24 0826       How much help from another person do you currently need...    Turning from your back to your side while in flat bed without using bedrails? 4  -SS 4  -AT    Moving from lying on back to sitting on the side of a flat bed without bedrails? 3  -SS 3  -AT    Moving to and from a bed to a chair (including a wheelchair)? 3  -SS 3  -AT    Standing up from a chair using your arms (e.g., wheelchair, bedside chair)? 3  -SS 3  -AT    Climbing 3-5 steps with a railing? 3  -SS 3  -AT    To walk in hospital room? 3  -SS 3  -AT    AM-PAC 6 Clicks Score (PT) 19  - 19  -AT    Highest Level of Mobility Goal 6 --> Walk 10 steps or more  - 6 --> Walk 10 steps or more  -AT      Row Name 02/20/24 1129          Functional Assessment    Outcome Measure Options AM-PAC 6 Clicks Basic Mobility (PT)  -               User Key  (r) = Recorded By, (t) = Taken By, (c) = Cosigned By      Initials Name Provider Type    AT Maryam Darden RN Registered Nurse    Alyssa Quevedo PT Physical Therapist                                 Physical Therapy Education       Title: PT OT SLP Therapies (Done)       Topic: Physical Therapy (Done)       Point: Mobility training (Done)       Learning Progress Summary             Patient Melita, AZAR, MICHAEL,TAMI,NR by  at 2/20/2024 1129    Comment: Reviewed safety/technique w/transfers, ambulation, stair navigation,  HEP, PT POC, postural techniques, LE strengthening to facilitate transfers/balance    Acceptance, E, VU,NR by KG at 2/19/2024 1041    Acceptance, E, NR,VU by KG at 2/17/2024 1048   Family Eager, E, VU,DU,NR by SS at 2/20/2024 1129    Comment: Reviewed safety/technique w/transfers, ambulation, stair navigation, HEP, PT POC, postural techniques, LE strengthening to facilitate transfers/balance                         Point: Home exercise program (Done)       Learning Progress Summary             Patient Eager, E, VU,DU,NR by SS at 2/20/2024 1129    Comment: Reviewed safety/technique w/transfers, ambulation, stair navigation, HEP, PT POC, postural techniques, LE strengthening to facilitate transfers/balance    Acceptance, E, VU,NR by KG at 2/19/2024 1041   Family Eager, E, VU,DU,NR by SS at 2/20/2024 1129    Comment: Reviewed safety/technique w/transfers, ambulation, stair navigation, HEP, PT POC, postural techniques, LE strengthening to facilitate transfers/balance                         Point: Body mechanics (Done)       Learning Progress Summary             Patient Eager, E, VU,DU,NR by SS at 2/20/2024 1129    Comment: Reviewed safety/technique w/transfers, ambulation, stair navigation, HEP, PT POC, postural techniques, LE strengthening to facilitate transfers/balance    Acceptance, E, VU,NR by KG at 2/19/2024 1041    Acceptance, E, NR,VU by KG at 2/17/2024 1048   Family Eager, E, VU,DU,NR by SS at 2/20/2024 1129    Comment: Reviewed safety/technique w/transfers, ambulation, stair navigation, HEP, PT POC, postural techniques, LE strengthening to facilitate transfers/balance                         Point: Precautions (Done)       Learning Progress Summary             Patient Eager, E, VU,DU,NR by SS at 2/20/2024 1129    Comment: Reviewed safety/technique w/transfers, ambulation, stair navigation, HEP, PT POC, postural techniques, LE strengthening to facilitate transfers/balance    Acceptance, E, VU,NR by KG at  2/19/2024 1041    Acceptance, E, NR,VU by KG at 2/17/2024 1048   Family Eager, E, VU,DU,NR by  at 2/20/2024 1129    Comment: Reviewed safety/technique w/transfers, ambulation, stair navigation, HEP, PT POC, postural techniques, LE strengthening to facilitate transfers/balance                                         User Key       Initials Effective Dates Name Provider Type Discipline    KG 05/22/20 -  Dinora Jade PT Physical Therapist PT     06/01/21 -  Alyssa Shoemaker PT Physical Therapist PT                  PT Recommendation and Plan     Plan of Care Reviewed With: patient, family  Progress: improving  Outcome Evaluation: Pt. continues to present below baseline function w/generalized weakness and balance deficits affecting his ability to safely participate in functional mobility. He performed transfers, ambulated 450' and navigated 20 steps w/contact guard assist. Activity limited by fatigue. He tolerated ther-ex well. Continue IPPT POC to progress as tolerated.     Time Calculation:         PT Charges       Row Name 02/20/24 1130             Time Calculation    Start Time 1037  -SS      PT Received On 02/20/24  -SS         Timed Charges    19484 - PT Therapeutic Exercise Minutes 8  -SS      86422 - Gait Training Minutes  12  -SS      82026 - PT Therapeutic Activity Minutes 3  -SS         Total Minutes    Timed Charges Total Minutes 23  -SS       Total Minutes 23  -SS                User Key  (r) = Recorded By, (t) = Taken By, (c) = Cosigned By      Initials Name Provider Type     Alyssa Shoemaker, PT Physical Therapist                  Therapy Charges for Today       Code Description Service Date Service Provider Modifiers Qty    04223602070 HC PT THER PROC EA 15 MIN 2/20/2024 Alyssa Shoemaker, PT GP 1    39661846288 HC GAIT TRAINING EA 15 MIN 2/20/2024 Alyssa Shoemaker, PT GP 1            PT G-Codes  Outcome Measure Options: AM-PAC 6 Clicks Basic Mobility (PT)  AM-PAC 6 Clicks Score (PT): 19  PT  Discharge Summary  Anticipated Discharge Disposition (PT): home with assist, home with outpatient therapy services    Alyssa Shoemaker, PT  2/20/2024

## 2024-02-20 NOTE — PLAN OF CARE
Goal Outcome Evaluation:  Plan of Care Reviewed With: patient, family   - VSS, A&Ox4, RA  - ABX infusing  - PICC line inserted today  - SELWYN completed today     Progress: improving

## 2024-02-20 NOTE — PROGRESS NOTES
[facility] Medicine Services  PROGRESS NOTE    Patient Name: Andre Agosto  : 1955  MRN: [id number]    Date of Admission: 2024  Primary Care Physician: Keven Bear MD    Subjective   Subjective     CC: Follow-up fever, UTIs    HPI: No acute events overnight, patient rested well has no new complaints.      Objective   Objective     Vital Signs:   Temp:  [96.8 °F (36 °C)-98.4 °F (36.9 °C)] 96.9 °F (36.1 °C)  Heart Rate:  [69-79] 69  Resp:  [16-18] 18  BP: (141-162)/(80-97) 157/89     Physical Exam:  Constitutional: No acute distress, awake, alert  HENT: NCAT, mucous membranes moist  Respiratory: Clear to auscultation bilaterally, respiratory effort normal   Cardiovascular: RRR, no murmurs, rubs, or gallops  Gastrointestinal: Positive bowel sounds, soft, nontender, nondistended  Musculoskeletal: No bilateral ankle edema  Psychiatric: Appropriate affect, cooperative  Neurologic: Oriented x 3 nonfocal  Skin: No rashes      Results Reviewed:  LAB RESULTS:      Lab 24  0521 24  0801 24  0806 24  0855 24  2107 24  1753 24  1500   WBC 5.53 6.17 9.97 13.74*  --   --  12.98*   HEMOGLOBIN 14.7 14.4 15.0 15.0  --   --  16.5   HEMATOCRIT 43.7 44.6 45.6 44.8  --   --  47.8   PLATELETS 202 155 166 159  --   --  215   NEUTROS ABS  --   --   --   --   --   --  10.33*   IMMATURE GRANS (ABS)  --   --   --   --   --   --  0.05   LYMPHS ABS  --   --   --   --   --   --  1.58   MONOS ABS  --   --   --   --   --   --  0.92*   EOS ABS  --   --   --   --   --   --  0.05   MCV 90.1 93.5 91.2 94.1  --   --  92.3   PROCALCITONIN  --   --   --   --   --   --  0.12   LACTATE  --   --   --   --  1.3 2.7* 2.3*         Lab 24  0521 24  0801 24  0806 24  0855 24  1500   SODIUM 138 139 136 136 141   POTASSIUM 3.4* 4.0 3.5 3.9 3.6   CHLORIDE 105 106 102 103 101   CO2 25.0 19.0* 24.0 24.0 26.0   ANION GAP 8.0 14.0 10.0 9.0 14.0   BUN  10 11 14 13 11   CREATININE 0.93 0.88 1.12 1.19 1.18   EGFR 89.4 93.7 71.6 66.5 67.2   GLUCOSE 132* 129* 117* 156* 137*   CALCIUM 8.6 8.5* 8.3* 8.6 8.8         Lab 02/20/24  0521 02/19/24  0801 02/18/24  0806 02/16/24  1500   TOTAL PROTEIN 6.8 6.4 6.3 7.8   ALBUMIN 3.3* 3.3* 3.5 4.0   GLOBULIN 3.5 3.1 2.8 3.8   ALT (SGPT) 7 6 6 10   AST (SGOT) 10 11 7 12   BILIRUBIN 0.5 0.8 1.2 1.5*   ALK PHOS 60 61 68 77   LIPASE  --   --   --  60         Lab 02/19/24  0801   PROBNP 1,108.0*                 Brief Urine Lab Results  (Last result in the past 365 days)        Color   Clarity   Blood   Leuk Est   Nitrite   Protein   CREAT   Urine HCG        02/16/24 1429 Yellow   Clear   Moderate (2+)   Small (1+)   Negative   30 mg/dL (1+)                   Microbiology Results Abnormal       Procedure Component Value - Date/Time    Blood Culture - Blood, Arm, Right [[id number]]  (Normal) Collected: 02/16/24 1502    Lab Status: Preliminary result Specimen: Blood from Arm, Right Updated: 02/19/24 1515     Blood Culture No growth at 3 days    Blood Culture - Blood, Hand, Right [[id number]]  (Normal) Collected: 02/18/24 0806    Lab Status: Preliminary result Specimen: Blood from Hand, Right Updated: 02/19/24 0900     Blood Culture No growth at 24 hours    Narrative:      Less than seven (7) mL's of blood was collected.  Insufficient quantity may yield false negative results.    COVID PRE-OP / PRE-PROCEDURE SCREENING ORDER (NO ISOLATION) - Swab, Nasopharynx [[id number]]  (Normal) Collected: 02/16/24 1406    Lab Status: Final result Specimen: Swab from Nasopharynx Updated: 02/16/24 1500    Narrative:      The following orders were created for panel order COVID PRE-OP / PRE-PROCEDURE SCREENING ORDER (NO ISOLATION) - Swab, Nasopharynx.  Procedure                               Abnormality         Status                     ---------                               -----------         ------                     COVID-19 and FLU A/B  PCR...[[id number]]  Normal              Final result                 Please view results for these tests on the individual orders.    COVID-19 and FLU A/B PCR, 1 HR TAT - Swab, Nasopharynx [[id number]]  (Normal) Collected: 02/16/24 1406    Lab Status: Final result Specimen: Swab from Nasopharynx Updated: 02/16/24 1500     COVID19 Not Detected     Influenza A PCR Not Detected     Influenza B PCR Not Detected    Narrative:      Fact sheet for providers: https://www.fda.gov/media/752487/download    Fact sheet for patients: https://www.fda.gov/media/921889/download    Test performed by PCR.            Adult Transthoracic Echo Complete w/ Color, Spectral and Contrast if Necessary Per Protocol    Result Date: 2/18/2024    Left ventricular systolic function is moderately decreased. Calculated left ventricular EF = 38.7% The left ventricular cavity is moderately dilated. Left ventricular wall thickness is consistent with mild concentric hypertrophy. All left ventricular wall segments contract normally. Left ventricular diastolic function is consistent with (grade I) impaired relaxation. No evidence of a left ventricular thrombus present.   There is mild calcification of the aortic valve. There is thickening of the aortic valve. Mild aortic valve regurgitation is present. Mild aortic valve stenosis is present.   There is moderate, bileaflet mitral valve thickening present.   There is moderate, bileaflet mitral valve thickening present. Mild mitral valve regurgitation is present. No significant mitral valve stenosis is present.   The tricuspid valve is structurally normal with no significant regurgitation or significant stenosis present.   Moderate dilation of the aortic root is present. Aortic root = 4.7 cm   No mobile vegetation seen on TTE today. If there is high clinical suspicion for endocarditis would consider SELWYN if no contraindications.      Results for orders placed during the hospital encounter of 02/16/24    Adult  Transthoracic Echo Complete w/ Color, Spectral and Contrast if Necessary Per Protocol    Interpretation Summary    Left ventricular systolic function is moderately decreased. Calculated left ventricular EF = 38.7% The left ventricular cavity is moderately dilated. Left ventricular wall thickness is consistent with mild concentric hypertrophy. All left ventricular wall segments contract normally. Left ventricular diastolic function is consistent with (grade I) impaired relaxation. No evidence of a left ventricular thrombus present.    There is mild calcification of the aortic valve. There is thickening of the aortic valve. Mild aortic valve regurgitation is present. Mild aortic valve stenosis is present.    There is moderate, bileaflet mitral valve thickening present.    There is moderate, bileaflet mitral valve thickening present. Mild mitral valve regurgitation is present. No significant mitral valve stenosis is present.    The tricuspid valve is structurally normal with no significant regurgitation or significant stenosis present.    Moderate dilation of the aortic root is present. Aortic root = 4.7 cm    No mobile vegetation seen on TTE today. If there is high clinical suspicion for endocarditis would consider SELWYN if no contraindications.      Current medications:  Scheduled Meds:amiodarone, 100 mg, Oral, Daily  amLODIPine, 10 mg, Oral, Nightly  aspirin, 81 mg, Oral, Daily  brivaracetam, 50 mg, Oral, BID  carvedilol, 25 mg, Oral, BID With Meals  furosemide, 20 mg, Oral, Daily  insulin lispro, 2-7 Units, Subcutaneous, 4x Daily AC & at Bedtime  levothyroxine, 88 mcg, Oral, Q AM  meropenem, 1,000 mg, Intravenous, Q8H  rivaroxaban, 15 mg, Oral, Daily With Dinner  sacubitril-valsartan, 1 tablet, Oral, BID  sodium chloride, 10 mL, Intravenous, Q12H  tamsulosin, 0.4 mg, Oral, Daily      Continuous Infusions:   PRN Meds:.  senna-docusate sodium **AND** polyethylene glycol **AND** bisacodyl **AND** bisacodyl    Calcium  Replacement - Follow Nurse / BPA Driven Protocol    dextrose    dextrose    diazePAM    glucagon (human recombinant)    Magnesium Standard Dose Replacement - Follow Nurse / BPA Driven Protocol    nitroglycerin    ondansetron    Phosphorus Replacement - Follow Nurse / BPA Driven Protocol    Potassium Replacement - Follow Nurse / BPA Driven Protocol    [COMPLETED] Insert Peripheral IV **AND** sodium chloride    sodium chloride    Assessment & Plan   Assessment & Plan     Active Hospital Problems    Diagnosis  POA    **UTI (urinary tract infection) [N39.0]  Yes    Bacteremia due to Escherichia coli [R78.81, B96.20]  Yes    Paroxysmal atrial flutter [I48.92]  Yes    Fever and chills [R50.9]  Yes    ALEX (obstructive sleep apnea) [G47.33]  Yes    History of cerebrovascular accident [Z86.73]  Not Applicable    Diabetes mellitus without complication [E11.9]  Yes    Epilepsy [G40.909]  Yes    Essential hypertension [I10]  Yes      Resolved Hospital Problems   No resolved problems to display.        Brief Hospital Course to date:  Andre Agosto is a 69 y.o. male presented to [facility] ED for recurrent fever despite treatment for UTI.  Patient recently admitted to [facility] for ESBL E. coli UTI after having a recent prostate biopsy.  Patient was admitted 1/26 discharged 1/31 with a PICC line and ongoing Invanz antibiotic treatment through 2/12 at which time PICC was removed.  Patient has been on PO Bactrim since 2/12 but started developing urine frequency and fevers 2/15, spiking up to 102.5 prompting ED eval.  No hematuria, no abd pain or flank pain.     This patient's problems and plans were partially entered by my partner and updated as appropriate by me 02/20/24.      Recurrent ESBL UTI  ESBL E Coli Bacteremia  -ID following, IV meropenem (completed course of Invanz 2/13/24)  - schedule Tylenol 1g TID x2 days for constitutional sx's  - urology evaluated- no plan for intervention while  patient is acutely bacteremic  - neurology consult for further guidance with seizzure medication management  - TTE negative for vegetation; EF 38% f/u SELWYN  - defer PICC until after SELWYN  - repeat blood cultures from 2/18/24 no growth to date     New dx prostate Ca  - s/p bx 1/25/24  - per Dr. Marquis's outpt planning     Seizure d/o  -Neurology following for guidance on his AEDs  - patient on Invanz during last admission and had a seizure  - Depakote trough level low at 22  -Per neurology they recommend increasing his Depakote to 1000mg PO BID and following level, reduce him back down to home dose of 750mg PO BID once no longer on IV abx.  - antibiotics plus bacteremia both increase the patient's chances of seizure     NIDDM  - on Ozempic outpt- hold  - low dose SSI     CAD s/p 1 cardiac stent  HFrEF  - TTE with EF of 38%; no vegetations currently seems compensated  - patient follows with cardiology, Dr. Mendoza, who performed heart cath in December with no stents placed  -Plan for SELWYN today  - consult cardiology to follow, recommend to DC fluids and begin p.o. Lasix  - continue Entresto, Carvedilol, Lasix and Aspirin     Hypertension  -BP currently stable, continue Coreg, amlodipine    Paroxysmal atrial fibrillation  -Rate is currently controlled,   -continue amiodarone beta-blocker and Xarelto    ALEX  Hx CVA  -chronic/stable   -Continue home meds    All problems listed above are new to me today    Expected Discharge Location and Transportation: Home  Expected Discharge   Expected Discharge Date: 2/22/2024; Expected Discharge Time:      DVT prophylaxis:  Medical DVT prophylaxis orders are present.         AM-PAC 6 Clicks Score (PT): 19 (02/19/24 2000)    CODE STATUS:   Code Status and Medical Interventions:   Ordered at: 02/16/24 2134     Code Status (Patient has no pulse and is not breathing):    CPR (Attempt to Resuscitate)     Medical Interventions (Patient has pulse or is breathing):    Full Support       Kate  MD [place]  02/20/24

## 2024-02-20 NOTE — PLAN OF CARE
Goal Outcome Evaluation:  Plan of Care Reviewed With: patient, family        Progress: improving  Outcome Evaluation: Pt. continues to present below baseline function w/generalized weakness and balance deficits affecting his ability to safely participate in functional mobility. He performed transfers, ambulated 450' and navigated 20 steps w/contact guard assist. Activity limited by fatigue. He tolerated ther-ex well. Continue IPPT POC to progress as tolerated.      Anticipated Discharge Disposition (PT): home with assist, home with outpatient therapy services

## 2024-02-20 NOTE — PROCEDURES
SELWYN procedure note    Discussed risk and benefits of the procedure, desired to proceed.  Probe placed and removed without issue.  Some gastric content appreciated on probe but no blood.  Tolerated procedure well    SELWYN findings  LV: systolic function depressed  RV: size and function normal.  Device wires in RA and RV partially visualized.  In visualized portion no vegetation appreciated  Aortic valve: mild AI.  Valve is sclerotic.   Changes appear chronic  Mitral valve: trace MR.  No vegetation appreciated  Tricuspid vavle: no obvious vegetation, not overly well seen.  No TR  Pulmonic valve: no obvious vegetation, not overly well seen.  No PI  Left atrial appendage: no thrombus appreciated.  Normal velocities  Atrial septum: no PFO by color flow  Aorta: mild atherosclerosis in visualized portion of descending thoracic aorta

## 2024-02-20 NOTE — PLAN OF CARE
Goal Outcome Evaluation:  Plan of Care Reviewed With: patient        Progress: no change  Outcome Evaluation: Pt VSS. Denied any pain,discomfort or nausea. Pt is NPO as of midnight for SELWYN. Pt is on room air and periodicaly used home CPAP throughout the night.

## 2024-02-20 NOTE — PROGRESS NOTES
"[facility] Cardiology Daily Note       LOS: 4 days   Patient Care Team:  Keven Bear MD as PCP - General (Internal Medicine)  [employer] as Vendor (DME Services)    Chief Complaint: PAF/HFrEF/recurrent bacteremia    Subjective     Subjective: Feeling better after taking Lasix yesterday.  He usually takes his Lasix in the morning and then at night before he goes to bed but he is up all night.  95% on CPAP.  Blood pressures have been mildly elevated overnight.  Heart rates ranged between 69 and 77 bpm.    Review of Systems:   As above.    Medications:  amiodarone, 100 mg, Oral, Daily  amLODIPine, 10 mg, Oral, Nightly  aspirin, 81 mg, Oral, Daily  brivaracetam, 50 mg, Oral, BID  carvedilol, 25 mg, Oral, BID With Meals  furosemide, 20 mg, Oral, Daily  insulin lispro, 2-7 Units, Subcutaneous, 4x Daily AC & at Bedtime  levothyroxine, 88 mcg, Oral, Q AM  meropenem, 1,000 mg, Intravenous, Q8H  potassium chloride ER, 40 mEq, Oral, Q4H  rivaroxaban, 15 mg, Oral, Daily With Dinner  sacubitril-valsartan, 1 tablet, Oral, BID  sodium chloride, 10 mL, Intravenous, Q12H  tamsulosin, 0.4 mg, Oral, Daily        Objective     Vital Sign Min/Max for last 24 hours  Temp  Min: 96.8 °F (36 °C)  Max: 98.4 °F (36.9 °C)   BP  Min: 141/85  Max: 162/94   Pulse  Min: 69  Max: 79   Resp  Min: 16  Max: 18   SpO2  Min: 93 %  Max: 99 %   No data recorded   No data recorded    No intake or output data in the 24 hours ending 02/20/24 0814     Flowsheet Rows      Flowsheet Row First Filed Value   Admission Height 193 cm (76\") Documented at 02/16/2024 1354   Admission Weight 138 kg (305 lb) Documented at 02/16/2024 1354            Physical Exam:    General: Alert and oriented.   Cardiovascular: Heart has a nondisplaced focal PMI. Regular rate and rhythm without murmur, gallop or rub.  Lungs: Clear without rales or wheezes. Equal expansion is noted.   Abdomen: Soft, nontender.  Extremities: Show no edema.   Skin: warm and " "dry.     Results Review:    I reviewed the patient's new clinical results.  EKG:  Tele: A paced    Labs:    Results from last 7 days   Lab Units 02/20/24  0521 02/19/24  0801 02/18/24  0806   SODIUM mmol/L 138 139 136   POTASSIUM mmol/L 3.4* 4.0 3.5   CHLORIDE mmol/L 105 106 102   CO2 mmol/L 25.0 19.0* 24.0   BUN mg/dL 10 11 14   CREATININE mg/dL 0.93 0.88 1.12   CALCIUM mg/dL 8.6 8.5* 8.3*   BILIRUBIN mg/dL 0.5 0.8 1.2   ALK PHOS U/L 60 61 68   ALT (SGPT) U/L 7 6 6   AST (SGOT) U/L 10 11 7   GLUCOSE mg/dL 132* 129* 117*     Results from last 7 days   Lab Units 02/20/24 0521 02/19/24  0801 02/18/24  0806   WBC 10*3/mm3 5.53 6.17 9.97   HEMOGLOBIN g/dL 14.7 14.4 15.0   HEMATOCRIT % 43.7 44.6 45.6   PLATELETS 10*3/mm3 202 155 166     Lab Results   Component Value Date    TROPONINI <0.012 07/08/2019    TROPONINI 0.010 10/23/2018    TROPONINI <0.012 10/23/2018    TROPONINT 16 (H) 10/17/2023    TROPONINT <0.010 08/28/2020    TROPONINT <0.010 08/15/2020     Lab Results   Component Value Date    CHOL 251 (H) 01/24/2024    CHOL 229 (H) 12/09/2022    CHOL 211 (H) 09/09/2022     Lab Results   Component Value Date    TRIG 277 (H) 01/24/2024    TRIG 294 (H) 12/28/2023    TRIG 368 (H) 11/29/2023     Lab Results   Component Value Date    HDL 41 01/24/2024    HDL 38 (L) 12/28/2023    HDL 37 (L) 11/29/2023     No components found for: \"LDLCALC\"  Lab Results   Component Value Date    INR 1.20 (H) 01/24/2024    INR 1.13 (H) 10/09/2020    INR 1.26 (H) 08/10/2020    PROTIME 15.3 (H) 01/24/2024    PROTIME 15.0 10/09/2020    PROTIME 15.5 (H) 08/10/2020         Ejection Fraction:    Assessment   Assessment:    HFrEF  Multivessel CAD by cath 9/20/2023 with patent LAD and circumflex stents, small vessel disease.  Persistent E. coli bacteremia with UTI  CAD/ICM  Status post pacemaker ICD placement 2022 at [facility]  PAF  Obesity  ALEX  Hyperlipidemia    Plan:    SELWYN today with Dr. Ojeda to evaluate a cardiac source for recurrent " bacteremia.  Add spironolactone 50 mg daily.  Increase Lasix to 20 mg twice daily at 9 AM and 3 PM.  Continue Entresto and carvedilol for HFrEF  Continue Xarelto 15 mg for PAF  Continue aspirin for coronary artery disease  Unable to afford PCSK9 inhibitors however he is not sure that he has tried Zetia and we did discuss the possibility of Leqvio as an outpatient.    Yoselin Johnson MD  02/20/24  08:14 EST

## 2024-02-20 NOTE — PROGRESS NOTES
[facility] Neurology    Progress Note    Patient Name: Andre Agosto  : 1955  MRN: [id number]  Primary Care Physician:  Keven Bear MD  Date of admission: 2024    Subjective     Chief Complaint: Seizure    History of Present Illness   Patient was seen resting comfortably in chair.  Alert and oriented x 4.  No seizure-like activity.  Patient has tolerated scheduled dose of Briviact with no side effects.  Scheduled for SELWYN later on today.    Review of Systems   General: Negative for fever, nausea, or vomiting.   Neurological: Negative for headache, pain, or weakness.     Objective     Physical Exam  Vitals and nursing note reviewed.   Constitutional:       General: He is not in acute distress.     Appearance: He is not ill-appearing.   Eyes:      Extraocular Movements: Extraocular movements intact.      Pupils: Pupils are equal, round, and reactive to light.      Comments: No nystagmus or deviated gaze noted   Cardiovascular:      Rate and Rhythm: Normal rate.   Pulmonary:      Effort: Pulmonary effort is normal.   Neurological:      Mental Status: He is alert and oriented to person, place, and time.      Cranial Nerves: Cranial nerves 2-12 are intact.      Sensory: Sensation is intact.      Motor: No weakness or tremor.      Comments:     Cranial Nerves   CN II: Pupils are equal, round, and reactive to light. Normal visual acuity and visual fields.    CN III IV VI: Extraocular movements are full without nystagmus.  CN V: Normal facial sensation and strength of muscles of mastication.  CN VII: Facial movements are symmetric. No weakness.  CN VIII:  Auditory acuity is normal.  CN IX & X:  Symmetric palatal movement.  CN XI: Sternocleidomastoid and trapezius are normal.  No weakness.  CN XII: The tongue is midline.  No atrophy or fasciculations.            Vitals:   Temp:  [96.8 °F (36 °C)-98.4 °F (36.9 °C)] 96.9 °F (36.1 °C)  Heart Rate:  [69-79] 69  Resp:  [16-18] 18  BP: (141-162)/(80-97)  157/89    Current Medications    Current Facility-Administered Medications:     amiodarone (PACERONE) tablet 100 mg, 100 mg, Oral, Daily, Catherine Mcdaniels MD, 100 mg at 02/19/24 0805    amLODIPine (NORVASC) tablet 10 mg, 10 mg, Oral, Nightly, Catherine Mcdaniels MD, 10 mg at 02/19/24 2130    aspirin EC tablet 81 mg, 81 mg, Oral, Daily, Svitlana Patel PA-C, 81 mg at 02/19/24 1142    sennosides-docusate (PERICOLACE) 8.6-50 MG per tablet 2 tablet, 2 tablet, Oral, BID PRN **AND** polyethylene glycol (MIRALAX) packet 17 g, 17 g, Oral, Daily PRN **AND** bisacodyl (DULCOLAX) EC tablet 5 mg, 5 mg, Oral, Daily PRN **AND** bisacodyl (DULCOLAX) suppository 10 mg, 10 mg, Rectal, Daily PRN, Catherine Mcdaniels MD    [COMPLETED] brivaracetam (BRIVIACT) injection 200 mg, 200 mg, Intravenous, Once, 200 mg at 02/19/24 1501 **FOLLOWED BY** brivaracetam (BRIVIACT) tablet 50 mg, 50 mg, Oral, BID, Perlita Moise, APRN, 50 mg at 02/19/24 2130    Calcium Replacement - Follow Nurse / BPA Driven Protocol, , Does not apply, PRN, Catherine Mcdaniels MD    carvedilol (COREG) tablet 25 mg, 25 mg, Oral, BID With Meals, Catherine Mcdaniels MD, 25 mg at 02/19/24 1749    dextrose (D50W) (25 g/50 mL) IV injection 25 g, 25 g, Intravenous, Q15 Min PRN, Catherine Mcdaniels MD    dextrose (GLUTOSE) oral gel 15 g, 15 g, Oral, Q15 Min PRN, Catherine Mcdaniels MD    diazePAM (VALIUM) injection 5 mg, 5 mg, Intravenous, Q4H PRN, Micheal Schrader DO    furosemide (LASIX) tablet 20 mg, 20 mg, Oral, Daily, Svitlana Patel PA-C, 20 mg at 02/19/24 1142    glucagon (GLUCAGEN) injection 1 mg, 1 mg, Intramuscular, Q15 Min PRN, Catherine Mcdaniels MD    Insulin Lispro (humaLOG) injection 2-7 Units, 2-7 Units, Subcutaneous, 4x Daily AC & at Bedtime, Catherine Mcdaniels MD, 2 Units at 02/19/24 2130    levothyroxine (SYNTHROID, LEVOTHROID) tablet 88 mcg, 88 mcg, Oral, Q AM, Catherine Mcdaniels MD, 88 mcg at 02/19/24 0644    Magnesium Standard Dose Replacement - Follow Nurse / BPA Driven Protocol, , Does not apply,  PRN, Catherine Mcdaniels MD    meropenem (MERREM) 1,000 mg in sodium chloride 0.9 % 100 mL IVPB, 1,000 mg, Intravenous, Q8H, Lor Marshall MD, 1,000 mg at 02/20/24 0604    nitroglycerin (NITROSTAT) SL tablet 0.4 mg, 0.4 mg, Sublingual, Q5 Min PRN, Catherine Mcdaniels MD    ondansetron (ZOFRAN) injection 4 mg, 4 mg, Intravenous, Q6H PRN, Catherine Mcdaniels MD    Phosphorus Replacement - Follow Nurse / BPA Driven Protocol, , Does not apply, PRNArslan Holly, MD    potassium chloride (K-DUR,KLOR-CON) CR tablet 40 mEq, 40 mEq, Oral, Q4H, Kate Fraga MD    Potassium Replacement - Follow Nurse / BPA Driven Protocol, , Does not apply, PRArslan ANDREA Holly, MD    rivaroxaban (XARELTO) tablet 15 mg, 15 mg, Oral, Daily With Dinner, Catherine Mcdaniels MD, 15 mg at 02/19/24 1749    sacubitril-valsartan (ENTRESTO)  MG tablet 1 tablet, 1 tablet, Oral, BID, Catherine Mcdaniels MD, 1 tablet at 02/19/24 2141    [COMPLETED] Insert Peripheral IV, , , Once **AND** sodium chloride 0.9 % flush 10 mL, 10 mL, Intravenous, PRN, Liya Bridges, APRN    sodium chloride 0.9 % flush 10 mL, 10 mL, Intravenous, Q12H, Catherine Mcdaniels MD, 10 mL at 02/19/24 2139    sodium chloride 0.9 % infusion 40 mL, 40 mL, Intravenous, PRN, Catherine Mcdaniels MD    tamsulosin (FLOMAX) 24 hr capsule 0.4 mg, 0.4 mg, Oral, Daily, Catherine Mcdaniels MD, 0.4 mg at 02/19/24 0805    Laboratory Results:   Lab Results   Component Value Date    GLUCOSE 132 (H) 02/20/2024    CALCIUM 8.6 02/20/2024     02/20/2024    K 3.4 (L) 02/20/2024    CO2 25.0 02/20/2024     02/20/2024    BUN 10 02/20/2024    CREATININE 0.93 02/20/2024    EGFRIFAFRI 57 (L) 12/05/2018    EGFRIFNONA 55 (L) 01/10/2022    BCR 10.8 02/20/2024    ANIONGAP 8.0 02/20/2024     Lab Results   Component Value Date    WBC 5.53 02/20/2024    HGB 14.7 02/20/2024    HCT 43.7 02/20/2024    MCV 90.1 02/20/2024     02/20/2024     Lab Results   Component Value Date    CHOL 251 (H) 01/24/2024    CHOL 229 (H) 12/09/2022    CHOL 211 (H)  "09/09/2022     Lab Results   Component Value Date    HDL 41 01/24/2024    HDL 38 (L) 12/28/2023    HDL 37 (L) 11/29/2023     Lab Results   Component Value Date     (H) 01/24/2024     (H) 12/28/2023     (H) 11/29/2023     Lab Results   Component Value Date    TRIG 277 (H) 01/24/2024    TRIG 294 (H) 12/28/2023    TRIG 368 (H) 11/29/2023     Lab Results   Component Value Date    HGBA1C 6.30 (H) 01/24/2024     Lab Results   Component Value Date    INR 1.20 (H) 01/24/2024    INR 1.13 (H) 10/09/2020    INR 1.26 (H) 08/10/2020    PROTIME 15.3 (H) 01/24/2024    PROTIME 15.0 10/09/2020    PROTIME 15.5 (H) 08/10/2020     No results found for: \"FOLATE\"  Lab Results   Component Value Date    [id number] 615 01/24/2024             Assessment / Plan   Brief Patient Summary:  Andre Agosto is a 69 y.o. male with past medical history of HTN, epilepsy, T2DM, ALEX and A-fib presented to [facility] ED with complaint of fever despite recent UTI treatment.  Patient was seen at [facility] for ESBL E. coli UTI after a recent prostate biopsy.  There patient was admitted from 1/26 through 1/31; a PICC line was placed for ongoing Invaz antibiotic treatment lasting through 2/12.  Patient was placed on p.o. Bactrim on 2/12. patient continued to spike fevers and on 2/14 had a breakthrough seizure.      Plan:   Epilepsy/partial seizures  ESBL E. coli bacteremia  Recurrent ESBL UTI  Patient has failed many other AEM medications due to side effects.  He has failed Keppra, Dilantin, phenobarbital due to lethargy and mental status change.  Vimpat is not a viable options patient's AR interval is greater than 300.    Will transition patient to Briviact 50 mg twice daily.  One-time IV loading dose of 200 mg.  Patient has tolerated well with no complaint of side effects or seizure-like activity.  Briviact level pending  Continue seizure precautions  Repeat blood cultures from 2/18 pending  ID is following; appreciate " recommendations.  Patient currently on Merrem.   IV Valium 5 mg as needed for seizure-like activity.  Patient to follow-up with Dr. Victoria at first available appointment  General neurology will continue to follow      I have discussed the above with the patient, bedside RN and Dr. Fraga  Time spent with patient: 50 minutes in face-to-face evaluation and management of the patient.    Copied text in this note has been reviewed and is accurate as of 02/20/24.     YESY Prieto

## 2024-02-21 ENCOUNTER — TELEPHONE (OUTPATIENT)
Dept: NEUROLOGY | Facility: CLINIC | Age: 69
End: 2024-02-21
Payer: MEDICARE

## 2024-02-21 ENCOUNTER — READMISSION MANAGEMENT (OUTPATIENT)
Dept: CALL CENTER | Facility: HOSPITAL | Age: 69
End: 2024-02-21
Payer: MEDICARE

## 2024-02-21 VITALS
WEIGHT: 305 LBS | BODY MASS INDEX: 37.14 KG/M2 | HEART RATE: 69 BPM | TEMPERATURE: 98 F | DIASTOLIC BLOOD PRESSURE: 84 MMHG | SYSTOLIC BLOOD PRESSURE: 142 MMHG | RESPIRATION RATE: 18 BRPM | HEIGHT: 76 IN | OXYGEN SATURATION: 95 %

## 2024-02-21 LAB
ANION GAP SERPL CALCULATED.3IONS-SCNC: 10 MMOL/L (ref 5–15)
BACTERIA SPEC AEROBE CULT: NORMAL
BASOPHILS # BLD AUTO: 0.04 10*3/MM3 (ref 0–0.2)
BASOPHILS NFR BLD AUTO: 0.7 % (ref 0–1.5)
BUN SERPL-MCNC: 14 MG/DL (ref 8–23)
BUN/CREAT SERPL: 14.4 (ref 7–25)
CALCIUM SPEC-SCNC: 9.2 MG/DL (ref 8.6–10.5)
CHLORIDE SERPL-SCNC: 103 MMOL/L (ref 98–107)
CO2 SERPL-SCNC: 25 MMOL/L (ref 22–29)
CREAT SERPL-MCNC: 0.97 MG/DL (ref 0.76–1.27)
CRP SERPL-MCNC: 3.88 MG/DL (ref 0–0.5)
DEPRECATED RDW RBC AUTO: 49 FL (ref 37–54)
EGFRCR SERPLBLD CKD-EPI 2021: 85 ML/MIN/1.73
EOSINOPHIL # BLD AUTO: 0.16 10*3/MM3 (ref 0–0.4)
EOSINOPHIL NFR BLD AUTO: 2.9 % (ref 0.3–6.2)
ERYTHROCYTE [DISTWIDTH] IN BLOOD BY AUTOMATED COUNT: 14.6 % (ref 12.3–15.4)
GLUCOSE BLDC GLUCOMTR-MCNC: 121 MG/DL (ref 70–130)
GLUCOSE BLDC GLUCOMTR-MCNC: 157 MG/DL (ref 70–130)
GLUCOSE SERPL-MCNC: 166 MG/DL (ref 65–99)
HCT VFR BLD AUTO: 44.7 % (ref 37.5–51)
HGB BLD-MCNC: 14.8 G/DL (ref 13–17.7)
IMM GRANULOCYTES # BLD AUTO: 0.03 10*3/MM3 (ref 0–0.05)
IMM GRANULOCYTES NFR BLD AUTO: 0.5 % (ref 0–0.5)
LYMPHOCYTES # BLD AUTO: 1.84 10*3/MM3 (ref 0.7–3.1)
LYMPHOCYTES NFR BLD AUTO: 32.9 % (ref 19.6–45.3)
MCH RBC QN AUTO: 30 PG (ref 26.6–33)
MCHC RBC AUTO-ENTMCNC: 33.1 G/DL (ref 31.5–35.7)
MCV RBC AUTO: 90.7 FL (ref 79–97)
MONOCYTES # BLD AUTO: 0.5 10*3/MM3 (ref 0.1–0.9)
MONOCYTES NFR BLD AUTO: 8.9 % (ref 5–12)
NEUTROPHILS NFR BLD AUTO: 3.03 10*3/MM3 (ref 1.7–7)
NEUTROPHILS NFR BLD AUTO: 54.1 % (ref 42.7–76)
NRBC BLD AUTO-RTO: 0 /100 WBC (ref 0–0.2)
PLATELET # BLD AUTO: 206 10*3/MM3 (ref 140–450)
PMV BLD AUTO: 10.3 FL (ref 6–12)
POTASSIUM SERPL-SCNC: 4.1 MMOL/L (ref 3.5–5.2)
RBC # BLD AUTO: 4.93 10*6/MM3 (ref 4.14–5.8)
SODIUM SERPL-SCNC: 138 MMOL/L (ref 136–145)
WBC NRBC COR # BLD AUTO: 5.6 10*3/MM3 (ref 3.4–10.8)

## 2024-02-21 PROCEDURE — 80048 BASIC METABOLIC PNL TOTAL CA: CPT | Performed by: INTERNAL MEDICINE

## 2024-02-21 PROCEDURE — 25010000002 ERTAPENEM PER 500 MG: Performed by: INTERNAL MEDICINE

## 2024-02-21 PROCEDURE — 99233 SBSQ HOSP IP/OBS HIGH 50: CPT

## 2024-02-21 PROCEDURE — 99232 SBSQ HOSP IP/OBS MODERATE 35: CPT | Performed by: INTERNAL MEDICINE

## 2024-02-21 PROCEDURE — 86140 C-REACTIVE PROTEIN: CPT | Performed by: INTERNAL MEDICINE

## 2024-02-21 PROCEDURE — 85025 COMPLETE CBC W/AUTO DIFF WBC: CPT | Performed by: INTERNAL MEDICINE

## 2024-02-21 PROCEDURE — 63710000001 INSULIN LISPRO (HUMAN) PER 5 UNITS: Performed by: FAMILY MEDICINE

## 2024-02-21 PROCEDURE — 99239 HOSP IP/OBS DSCHRG MGMT >30: CPT | Performed by: INTERNAL MEDICINE

## 2024-02-21 PROCEDURE — 82948 REAGENT STRIP/BLOOD GLUCOSE: CPT

## 2024-02-21 RX ORDER — SACUBITRIL AND VALSARTAN 97; 103 MG/1; MG/1
1 TABLET, FILM COATED ORAL 2 TIMES DAILY
Start: 2024-02-21

## 2024-02-21 RX ORDER — ASPIRIN 81 MG/1
81 TABLET ORAL DAILY
Qty: 30 TABLET | Refills: 0 | Status: SHIPPED | OUTPATIENT
Start: 2024-02-21 | End: 2024-03-22

## 2024-02-21 RX ORDER — SPIRONOLACTONE 50 MG/1
50 TABLET, FILM COATED ORAL DAILY
Qty: 30 TABLET | Refills: 0 | Status: SHIPPED | OUTPATIENT
Start: 2024-02-21 | End: 2024-02-21 | Stop reason: SDUPTHER

## 2024-02-21 RX ORDER — AMIODARONE HYDROCHLORIDE 100 MG/1
100 TABLET ORAL DAILY
Start: 2024-02-21

## 2024-02-21 RX ORDER — RIVAROXABAN 15 MG/1
TABLET, FILM COATED ORAL
Qty: 90 TABLET | Refills: 3 | OUTPATIENT
Start: 2024-02-21

## 2024-02-21 RX ORDER — RIVAROXABAN 15 MG/1
15 TABLET, FILM COATED ORAL
Qty: 42 TABLET | Refills: 0 | Status: SHIPPED | OUTPATIENT
Start: 2024-02-21

## 2024-02-21 RX ORDER — TAMSULOSIN HYDROCHLORIDE 0.4 MG/1
1 CAPSULE ORAL DAILY
Qty: 30 CAPSULE | Refills: 5 | Status: SHIPPED | OUTPATIENT
Start: 2024-02-21

## 2024-02-21 RX ORDER — SEMAGLUTIDE 1.34 MG/ML
0.5 INJECTION, SOLUTION SUBCUTANEOUS
Qty: 1.5 ML | Refills: 3 | Status: SHIPPED | OUTPATIENT
Start: 2024-02-21 | End: 2024-02-23

## 2024-02-21 RX ORDER — ASPIRIN 81 MG/1
81 TABLET ORAL DAILY
Qty: 30 TABLET | Refills: 0 | Status: SHIPPED | OUTPATIENT
Start: 2024-02-21 | End: 2024-02-21 | Stop reason: SDUPTHER

## 2024-02-21 RX ORDER — SODIUM CHLORIDE 9 MG/ML
20 INJECTION, SOLUTION INTRAVENOUS ONCE
Status: CANCELLED | OUTPATIENT
Start: 2024-02-22

## 2024-02-21 RX ORDER — FUROSEMIDE 20 MG/1
20 TABLET ORAL 2 TIMES DAILY
Qty: 60 TABLET | Refills: 0 | Status: SHIPPED | OUTPATIENT
Start: 2024-02-21 | End: 2024-03-22

## 2024-02-21 RX ORDER — CARVEDILOL 25 MG/1
25 TABLET ORAL 2 TIMES DAILY WITH MEALS
Qty: 180 TABLET | Refills: 3 | Status: SHIPPED | OUTPATIENT
Start: 2024-02-21

## 2024-02-21 RX ORDER — FUROSEMIDE 20 MG/1
20 TABLET ORAL 2 TIMES DAILY
Qty: 60 TABLET | Refills: 0 | Status: SHIPPED | OUTPATIENT
Start: 2024-02-21 | End: 2024-02-21 | Stop reason: SDUPTHER

## 2024-02-21 RX ORDER — LEVOTHYROXINE SODIUM 88 UG/1
88 TABLET ORAL DAILY
Qty: 90 TABLET | Refills: 3 | Status: SHIPPED | OUTPATIENT
Start: 2024-02-21

## 2024-02-21 RX ORDER — SPIRONOLACTONE 50 MG/1
50 TABLET, FILM COATED ORAL DAILY
Qty: 30 TABLET | Refills: 0 | Status: SHIPPED | OUTPATIENT
Start: 2024-02-21 | End: 2024-02-23 | Stop reason: SDUPTHER

## 2024-02-21 RX ADMIN — Medication 10 ML: at 08:19

## 2024-02-21 RX ADMIN — BRIVARACETAM 50 MG: 50 TABLET, FILM COATED ORAL at 08:18

## 2024-02-21 RX ADMIN — AMIODARONE HYDROCHLORIDE 100 MG: 200 TABLET ORAL at 08:18

## 2024-02-21 RX ADMIN — SPIRONOLACTONE 50 MG: 25 TABLET ORAL at 08:18

## 2024-02-21 RX ADMIN — SACUBITRIL AND VALSARTAN 1 TABLET: 97; 103 TABLET, FILM COATED ORAL at 08:18

## 2024-02-21 RX ADMIN — ERTAPENEM 1000 MG: 1 INJECTION INTRAMUSCULAR; INTRAVENOUS at 11:04

## 2024-02-21 RX ADMIN — INSULIN LISPRO 2 UNITS: 100 INJECTION, SOLUTION INTRAVENOUS; SUBCUTANEOUS at 11:35

## 2024-02-21 RX ADMIN — TAMSULOSIN HYDROCHLORIDE 0.4 MG: 0.4 CAPSULE ORAL at 08:19

## 2024-02-21 RX ADMIN — Medication 250 MG: at 08:19

## 2024-02-21 RX ADMIN — CARVEDILOL 25 MG: 12.5 TABLET, FILM COATED ORAL at 08:18

## 2024-02-21 RX ADMIN — ASPIRIN 81 MG: 81 TABLET, COATED ORAL at 08:18

## 2024-02-21 RX ADMIN — FUROSEMIDE 20 MG: 20 TABLET ORAL at 08:18

## 2024-02-21 RX ADMIN — LEVOTHYROXINE SODIUM 88 MCG: 0.09 TABLET ORAL at 06:14

## 2024-02-21 NOTE — PROGRESS NOTES
[facility] Neurology    Progress Note    Patient Name: Andre Agosto  : 1955  MRN: [id number]  Primary Care Physician:  Keven Bear MD  Date of admission: 2024    Subjective     Chief Complaint: Seizures    History of Present Illness   Patient continues at his baseline.  He reports no seizure activity.  No acute events overnight.    Review of Systems   General: Negative for fever, nausea, or vomiting.   Neurological: Negative for headache, pain, or weakness.     Objective     Physical Exam  Vitals and nursing note reviewed.   Constitutional:       General: He is not in acute distress.     Appearance: He is not ill-appearing.   Eyes:      Extraocular Movements: Extraocular movements intact.      Pupils: Pupils are equal, round, and reactive to light.      Comments: No nystagmus or deviated gaze noted   Neurological:      Mental Status: He is alert and oriented to person, place, and time.      Cranial Nerves: Cranial nerves 2-12 are intact.      Sensory: Sensation is intact.      Motor: Motor function is intact.      Coordination: Coordination is intact.      Comments:     Cranial Nerves   CN II: Pupils are equal, round, and reactive to light. Normal visual acuity and visual fields.    CN III IV VI: Extraocular movements are full without nystagmus.  CN V: Normal facial sensation and strength of muscles of mastication.  CN VII: Facial movements are symmetric. No weakness.  CN VIII:  Auditory acuity is normal.  CN IX & X:  Symmetric palatal movement.  CN XI: Sternocleidomastoid and trapezius are normal.  No weakness.  CN XII: The tongue is midline.  No atrophy or fasciculations.            Vitals:   Temp:  [97.3 °F (36.3 °C)-98.3 °F (36.8 °C)] 98.3 °F (36.8 °C)  Heart Rate:  [68-79] 69  Resp:  [15-18] 16  BP: (130-165)/() 151/95    Current Medications    Current Facility-Administered Medications:     amiodarone (PACERONE) tablet 100 mg, 100 mg, Oral, Daily, Catherine Mcdaniels MD, 100 mg at  02/21/24 0818    amLODIPine (NORVASC) tablet 10 mg, 10 mg, Oral, Nightly, Catherine Mcdaniels MD, 10 mg at 02/20/24 2052    aspirin EC tablet 81 mg, 81 mg, Oral, Daily, Svitlana Patel PA-C, 81 mg at 02/21/24 0818    sennosides-docusate (PERICOLACE) 8.6-50 MG per tablet 2 tablet, 2 tablet, Oral, BID PRN **AND** polyethylene glycol (MIRALAX) packet 17 g, 17 g, Oral, Daily PRN **AND** bisacodyl (DULCOLAX) EC tablet 5 mg, 5 mg, Oral, Daily PRN **AND** bisacodyl (DULCOLAX) suppository 10 mg, 10 mg, Rectal, Daily PRN, Catherine Mcdaniels MD    [COMPLETED] brivaracetam (BRIVIACT) injection 200 mg, 200 mg, Intravenous, Once, 200 mg at 02/19/24 1501 **FOLLOWED BY** brivaracetam (BRIVIACT) tablet 50 mg, 50 mg, Oral, BID, Perlita Moise K, APRN, 50 mg at 02/21/24 0818    Calcium Replacement - Follow Nurse / BPA Driven Protocol, , Does not apply, PRN, Catherine Mcdaniels MD    carvedilol (COREG) tablet 25 mg, 25 mg, Oral, BID With Meals, Catherine Mcdaniels MD, 25 mg at 02/21/24 0818    dextrose (D50W) (25 g/50 mL) IV injection 25 g, 25 g, Intravenous, Q15 Min PRN, Catherine Mcdaniels MD    dextrose (GLUTOSE) oral gel 15 g, 15 g, Oral, Q15 Min PRN, Catherine Mcdaniels MD    diazePAM (VALIUM) injection 5 mg, 5 mg, Intravenous, Q4H PRN, Micheal Schrader DO    ertapenem (INVanz) 1,000 mg in sodium chloride 0.9 % 100 mL IVPB, 1,000 mg, Intravenous, Q24H, Da Boogie MD, Last Rate: 200 mL/hr at 02/20/24 1524, 1,000 mg at 02/20/24 1524    furosemide (LASIX) tablet 20 mg, 20 mg, Oral, BID, Yoselin Johnson MD, 20 mg at 02/21/24 0818    glucagon (GLUCAGEN) injection 1 mg, 1 mg, Intramuscular, Q15 Min PRN, Catherine Mcdaniels MD    Insulin Lispro (humaLOG) injection 2-7 Units, 2-7 Units, Subcutaneous, 4x Daily AC & at Bedtime, Catherine Mcdaniels MD, 3 Units at 02/20/24 1645    levothyroxine (SYNTHROID, LEVOTHROID) tablet 88 mcg, 88 mcg, Oral, Q AM, Catherine Mcdaniels MD, 88 mcg at 02/21/24 0614    Magnesium Standard Dose Replacement - Follow Nurse / BPA Driven Protocol, , Does  not apply, Arslan DOSS Holly, MD    nitroglycerin (NITROSTAT) SL tablet 0.4 mg, 0.4 mg, Sublingual, Q5 Min PRN, Catherine Mcdaniels MD    ondansetron (ZOFRAN) injection 4 mg, 4 mg, Intravenous, Q6H PRN, Catherine Mcdaniels MD    Phosphorus Replacement - Follow Nurse / BPA Driven Protocol, , Does not apply, PRNArslan Holly, MD    Potassium Replacement - Follow Nurse / BPA Driven Protocol, , Does not apply, PRArslan ANDREA Holly, MD    rivaroxaban (XARELTO) tablet 15 mg, 15 mg, Oral, Daily With Dinner, Catherine Mcdaniels MD, 15 mg at 02/20/24 1745    saccharomyces boulardii (FLORASTOR) capsule 250 mg, 250 mg, Oral, BID, Da Boogie MD, 250 mg at 02/21/24 0819    sacubitril-valsartan (ENTRESTO)  MG tablet 1 tablet, 1 tablet, Oral, BID, Catherine Mcdaniels MD, 1 tablet at 02/21/24 0818    [COMPLETED] Insert Peripheral IV, , , Once **AND** sodium chloride 0.9 % flush 10 mL, 10 mL, Intravenous, PRN, Liya Bridges, YESY    sodium chloride 0.9 % flush 10 mL, 10 mL, Intravenous, Q12H, Catherine Mcdaniels MD, 10 mL at 02/21/24 0819    sodium chloride 0.9 % flush 10 mL, 10 mL, Intravenous, Q12H, Da Boogie MD, 10 mL at 02/21/24 0819    sodium chloride 0.9 % flush 10 mL, 10 mL, Intravenous, PRN, Da Boogie MD    sodium chloride 0.9 % infusion 40 mL, 40 mL, Intravenous, PRN, Catherine Mcdaniels MD, 40 mL at 02/20/24 1524    spironolactone (ALDACTONE) tablet 50 mg, 50 mg, Oral, Daily, Yoselin Johnson MD, 50 mg at 02/21/24 0818    tamsulosin (FLOMAX) 24 hr capsule 0.4 mg, 0.4 mg, Oral, Daily, Catherine Mcdaniels MD, 0.4 mg at 02/21/24 0819    Laboratory Results:   Lab Results   Component Value Date    GLUCOSE 166 (H) 02/21/2024    CALCIUM 9.2 02/21/2024     02/21/2024    K 4.1 02/21/2024    CO2 25.0 02/21/2024     02/21/2024    BUN 14 02/21/2024    CREATININE 0.97 02/21/2024    EGFRIFAFRI 57 (L) 12/05/2018    EGFRIFNONA 55 (L) 01/10/2022    BCR 14.4 02/21/2024    ANIONGAP 10.0 02/21/2024     Lab Results   Component Value Date    WBC 5.60  "02/21/2024    HGB 14.8 02/21/2024    HCT 44.7 02/21/2024    MCV 90.7 02/21/2024     02/21/2024     Lab Results   Component Value Date    CHOL 251 (H) 01/24/2024    CHOL 229 (H) 12/09/2022    CHOL 211 (H) 09/09/2022     Lab Results   Component Value Date    HDL 41 01/24/2024    HDL 38 (L) 12/28/2023    HDL 37 (L) 11/29/2023     Lab Results   Component Value Date     (H) 01/24/2024     (H) 12/28/2023     (H) 11/29/2023     Lab Results   Component Value Date    TRIG 277 (H) 01/24/2024    TRIG 294 (H) 12/28/2023    TRIG 368 (H) 11/29/2023     Lab Results   Component Value Date    HGBA1C 6.30 (H) 01/24/2024     Lab Results   Component Value Date    INR 1.20 (H) 01/24/2024    INR 1.13 (H) 10/09/2020    INR 1.26 (H) 08/10/2020    PROTIME 15.3 (H) 01/24/2024    PROTIME 15.0 10/09/2020    PROTIME 15.5 (H) 08/10/2020     No results found for: \"FOLATE\"  Lab Results   Component Value Date    [id number] 615 01/24/2024             Assessment / Plan   Brief Patient Summary:  Andre Agosto is a 69 y.o. male with past medical history of HTN, epilepsy, T2DM, ALEX and A-fib presented to [facility] ED with complaint of fever despite recent UTI treatment.  Patient was seen at [facility] for ESBL E. coli UTI after a recent prostate biopsy.  There patient was admitted from 1/26 through 1/31; a PICC line was placed for ongoing Invaz antibiotic treatment lasting through 2/12.  Patient was placed on p.o. Bactrim on 2/12. patient continued to spike fevers and on 2/14 had a breakthrough seizure.      Plan:   Epilepsy/partial seizures  ESBL E. coli bacteremia  Recurrent ESBL UTI  Patient has failed many other AEM medications due to side effects.  He has failed Keppra, Dilantin, phenobarbital due to lethargy and mental status change.  Vimpat is not a viable options patient's NM interval is greater than 300.  He is unable to be on Depakote due to the need for long term Carbapenem Therapy   Transitioned to " Briviact 50 mg twice daily.  Patient has tolerated well with no complaint of side effects or seizure-like activity.  Briviact level pending  Continue seizure precautions  SELWYN negative for any vegetation  Repeat blood cultures from 2/18 pending  ID is following; appreciate recommendations.  Patient currently on Merrem.   IV Valium 5 mg as needed for seizure-like activity.  Patient to follow-up with Dr. Victoria at first available appointment; patient has requested to be seen by a epileptologist.  Patient was instructed not to drive or operate motor vehicle for 90 days post last seizure-like event.  He was also instructed on safe seizure practices along with medication compliance.  Patient has a no seizure-like activity, okay to discharge once deemed medically appropriate by the hospitalist.      I have discussed the above with the patient, bedside RN and Dr. Fraga  Time spent with patient: 50 minutes in face-to-face evaluation and management of the patient.  Copied text in this note has been reviewed and is accurate as of 02/21/24.       YESY Prieto

## 2024-02-21 NOTE — PLAN OF CARE
Goal Outcome Evaluation:  Plan of Care Reviewed With: patient         VSS on RA. A&Ox4. CPAP at night. No complaints of pain/nausea. Will continue plan of care.

## 2024-02-21 NOTE — PROGRESS NOTES
"[facility] Cardiology Daily Note       LOS: 5 days   Patient Care Team:  Keven Bear MD as PCP - General (Internal Medicine)  [employer] as Vendor ([facility])    Chief Complaint: PAF/HFrEF/recurrent bacteremia    Subjective     Subjective: Feeling better on twice daily Lasix.    95% on CPAP.  Blood pressures have been mildly elevated overnight.  Heart rates ranged between 69 and 71 bpm.    Review of Systems:   As above.    Medications:  amiodarone, 100 mg, Oral, Daily  amLODIPine, 10 mg, Oral, Nightly  aspirin, 81 mg, Oral, Daily  brivaracetam, 50 mg, Oral, BID  carvedilol, 25 mg, Oral, BID With Meals  ertapenem, 1,000 mg, Intravenous, Q24H  furosemide, 20 mg, Oral, BID  insulin lispro, 2-7 Units, Subcutaneous, 4x Daily AC & at Bedtime  levothyroxine, 88 mcg, Oral, Q AM  rivaroxaban, 15 mg, Oral, Daily With Dinner  saccharomyces boulardii, 250 mg, Oral, BID  sacubitril-valsartan, 1 tablet, Oral, BID  sodium chloride, 10 mL, Intravenous, Q12H  sodium chloride, 10 mL, Intravenous, Q12H  spironolactone, 50 mg, Oral, Daily  tamsulosin, 0.4 mg, Oral, Daily        Objective     Vital Sign Min/Max for last 24 hours  Temp  Min: 97.3 °F (36.3 °C)  Max: 98.4 °F (36.9 °C)   BP  Min: 130/104  Max: 165/100   Pulse  Min: 68  Max: 79   Resp  Min: 15  Max: 18   SpO2  Min: 91 %  Max: 100 %   No data recorded   No data recorded    No intake or output data in the 24 hours ending 02/21/24 0639     Flowsheet Rows      Flowsheet Row First Filed Value   Admission Height 193 cm (76\") Documented at 02/16/2024 1354   Admission Weight 138 kg (305 lb) Documented at 02/16/2024 1354            Physical Exam:    General: Alert and oriented.   Cardiovascular: Heart has a nondisplaced focal PMI. Regular rate and rhythm without murmur, gallop or rub.  Lungs: Clear without rales or wheezes. Equal expansion is noted.   Abdomen: Soft, nontender.  Extremities: Show trace edema.   Skin: warm and dry.     Results Review:    I " "reviewed the patient's new clinical results.  EKG:  Tele: A paced    Labs:    Results from last 7 days   Lab Units 02/21/24  0501 02/20/24  1655 02/20/24  0521 02/19/24  0801 02/18/24  0806   SODIUM mmol/L 138  --  138 139 136   POTASSIUM mmol/L 4.1 4.0 3.4* 4.0 3.5   CHLORIDE mmol/L 103  --  105 106 102   CO2 mmol/L 25.0  --  25.0 19.0* 24.0   BUN mg/dL 14  --  10 11 14   CREATININE mg/dL 0.97  --  0.93 0.88 1.12   CALCIUM mg/dL 9.2  --  8.6 8.5* 8.3*   BILIRUBIN mg/dL  --   --  0.5 0.8 1.2   ALK PHOS U/L  --   --  60 61 68   ALT (SGPT) U/L  --   --  7 6 6   AST (SGOT) U/L  --   --  10 11 7   GLUCOSE mg/dL 166*  --  132* 129* 117*     Results from last 7 days   Lab Units 02/21/24  0501 02/20/24  0521 02/19/24  0801   WBC 10*3/mm3 5.60 5.53 6.17   HEMOGLOBIN g/dL 14.8 14.7 14.4   HEMATOCRIT % 44.7 43.7 44.6   PLATELETS 10*3/mm3 206 202 155     Lab Results   Component Value Date    TROPONINI <0.012 07/08/2019    TROPONINI 0.010 10/23/2018    TROPONINI <0.012 10/23/2018    TROPONINT 16 (H) 10/17/2023    TROPONINT <0.010 08/28/2020    TROPONINT <0.010 08/15/2020     Lab Results   Component Value Date    CHOL 251 (H) 01/24/2024    CHOL 229 (H) 12/09/2022    CHOL 211 (H) 09/09/2022     Lab Results   Component Value Date    TRIG 277 (H) 01/24/2024    TRIG 294 (H) 12/28/2023    TRIG 368 (H) 11/29/2023     Lab Results   Component Value Date    HDL 41 01/24/2024    HDL 38 (L) 12/28/2023    HDL 37 (L) 11/29/2023     No components found for: \"LDLCALC\"  Lab Results   Component Value Date    INR 1.20 (H) 01/24/2024    INR 1.13 (H) 10/09/2020    INR 1.26 (H) 08/10/2020    PROTIME 15.3 (H) 01/24/2024    PROTIME 15.0 10/09/2020    PROTIME 15.5 (H) 08/10/2020         Ejection Fraction:    Assessment   Assessment:    HFrEF  Multivessel CAD by cath 9/20/2023 with patent LAD and circumflex stents, small vessel disease.  Persistent E. coli bacteremia with UTI  CAD/ICM  Status post pacemaker ICD placement 2022 at " [facility]  PAF  Obesity  ALEX  Hyperlipidemia    Plan:    Continue spironolactone 50 mg daily.  Continue Lasix 20 mg twice daily at 9 AM and 3 PM.  Continue Entresto and carvedilol for HFrEF  Continue Xarelto 15 mg for PAF  Continue aspirin for coronary artery disease  Unable to afford PCSK9 inhibitors however he is not sure that he has tried Zetia and we did discuss the possibility of Leqvio as an outpatient.  The patient will follow-up with Dr. Mendoza as an outpatient.  Discharge anytime is okay with me.    Yoselin Johnson MD  02/21/24  06:39 EST

## 2024-02-21 NOTE — PROGRESS NOTES
"[facility] Progress Note    Date of Admission: 2024      Antibiotics:Invanz      CC:   Chief Complaint   Patient presents with    Fever       S: No f/c/s. No n/v/d. Hemodynamically stable.    O:  /93 (BP Location: Right arm, Patient Position: Lying)   Pulse 69   Temp 97.7 °F (36.5 °C) (Oral)   Resp 18   Ht 193 cm (76\")   Wt (!) 138 kg (305 lb)   SpO2 95%   BMI 37.13 kg/m²   Temp (24hrs), Av.6 °F (36.4 °C), Min:96.8 °F (36 °C), Max:98.4 °F (36.9 °C)      PE:   GEN: Awake and alert, in no acute distress.   HEENT: NCAT.  EOMI. No conjunctival injection. No icterus. Oropharynx clear without evidence of thrush or exudate.   NECK: Supple without nuchal rigidity  HEART: RRR; No murmur, rubs, gallops.   LUNGS: Clear to auscultation bilaterally without wheezing, rales, rhonchi. Normal respiratory effort.  ABDOMEN: Soft, nontender, nondistended. Positive bowel sounds. No rebound or guarding.   EXT:  No cyanosis, clubbing or edema  : Normal appearing genitalia without Crespo catheter.  MSK: FROM without joint effusions noted    SKIN: Warm and dry without cutaneous eruptions.    NEURO: Oriented to PPT. No focal deficits.     Laboratory Data    Results from last 7 days   Lab Units 24  0521 24  0801 24  0806   WBC 10*3/mm3 5.53 6.17 9.97   HEMOGLOBIN g/dL 14.7 14.4 15.0   HEMATOCRIT % 43.7 44.6 45.6   PLATELETS 10*3/mm3 202 155 166     Results from last 7 days   Lab Units 24  1655 24  0521   SODIUM mmol/L  --  138   POTASSIUM mmol/L 4.0 3.4*   CHLORIDE mmol/L  --  105   CO2 mmol/L  --  25.0   BUN mg/dL  --  10   CREATININE mg/dL  --  0.93   GLUCOSE mg/dL  --  132*   CALCIUM mg/dL  --  8.6     Results from last 7 days   Lab Units 24  0521   ALK PHOS U/L 60   BILIRUBIN mg/dL 0.5   ALT (SGPT) U/L 7   AST (SGOT) U/L 10               Estimated Creatinine Clearance: 115.1 mL/min (by C-G formula based on SCr of 0.93 mg/dL).      Microbiology:  Blood cx 1/3 with esbl e " coli    Radiology:  Imaging Results (Last 24 Hours)       ** No results found for the last 24 hours. **            PROBLEM LIST:     E coli bacteremia and Uti  Suspect ongoing prostatitis  H/o nephrolithiasis  H/o seizures  H/o AICD  Fevers chills  Low grade prostate cancer    ASSESSMENT:  69 yo with recent prostate biopsy and developed esbl e coli bacteremia and prostatitis and treated over 2 weeks with iv invanz daily with improvement then switched to maitenance macrobid and had recurrent fevers/chills/rigors and esbl ecoli  bacteremia returned improved with meropenem and getting debbie and picc today    PLAN:  D/c meropenem  Invanz 1 gram daily plan on 5 more weeks of therapy upon dc  F/u debbie results  Labs in am  Plan for d/c in am tomorrow and set up outpt iv abx previously set up at [facility] and f/u with me weekly with weekly labs    I spent >35 min on case today with more than %50 time in d/c planning and d/w pt and family for iv abx upon d/c picc line to be placed.       Da Boogie MD  2/20/2024

## 2024-02-21 NOTE — DISCHARGE SUMMARY
[facility] Hospital Medicine Services  DISCHARGE SUMMARY    Patient Name: Andre Agosto  : 1955  MRN: [id number]    Date of Admission: 2024  2:27 PM  Date of Discharge:  2024  Primary Care Physician: Keven Bear MD    Consults       Date and Time Order Name Status Description    2024  8:52 AM Inpatient Neurology Consult General Completed     2024  8:12 AM Inpatient Cardiology Consult Completed     2024  9:27 AM Inpatient Urology Consult      2024  2:42 PM Inpatient Infectious Diseases Consult Completed     2024 11:45 AM Inpatient Nephrology Consult Completed     2024  5:35 AM Inpatient Urology Consult Completed             Hospital Course       Active Hospital Problems    Diagnosis  POA   • **UTI (urinary tract infection) [N39.0]  Yes   • Bacteremia due to Escherichia coli [R78.81, B96.20]  Yes   • Paroxysmal atrial flutter [I48.92]  Yes   • Fever and chills [R50.9]  Yes   • ALEX (obstructive sleep apnea) [G47.33]  Yes   • History of cerebrovascular accident [Z86.73]  Not Applicable   • Diabetes mellitus without complication [E11.9]  Yes   • Epilepsy [G40.909]  Yes   • Essential hypertension [I10]  Yes      Resolved Hospital Problems   No resolved problems to display.          Hospital Course:  Andre Agosto is a 69 y.o. male presented to [facility] ED for recurrent fever despite treatment for UTI.  Patient recently admitted to [facility] for ESBL E. coli UTI after having a recent prostate biopsy.  Patient was admitted  discharged  with a PICC line and ongoing Invanz antibiotic treatment through  at which time PICC was removed.  Patient has been on PO Bactrim since  but started developing urine frequency and fevers 2/15, spiking up to 102.5 prompting ED eval.  No hematuria, no abd pain or flank pain.      Recurrent ESBL UTI  ESBL E Coli Bacteremia  - ID following, he is s/p Invanz for which she had  completed a course on 2/13/2024, was switched to meropenem, however this has now been switched to Invanz again with plans for prolonged course  ~5 weeks per ID (outpatient infusion at [facility] has been arranged)  - urology evaluated- no plan for intervention while patient is acutely bacteremic  - neurology consult for further guidance with seizure medication management, see below  - TTE and SELWYN are both negative for vegetation.  - PICC line is in place  - repeat blood cultures from 2/18/24 no growth to date  -Patient will follow-up with Dr. Da Boogie weekly with labs     New dx prostate Ca  - s/p bx 1/25/24  - per Dr. Marquis's outpt planning     Seizure d/o  -Neurology followed for guidance on his AEDs, patient was on Invanz during last admission and had a seizure.  He is currently on meropenem that can also lower his seizure threshold  - Per neurology patient previously on Depakote with subtherapeutic levels, last check was 22.4   -He has now been transitioned Briviact 50 mg twice daily (neurology working on paperwork for authorization), s/p one-time IV loading dose of200 mg, f/u Briviact level   -IV Valium 5 mg as needed for seizure activity  -He will follow-up with Dr. Victoria at first fillable appointment     NID  -Continue home regimen, patient previously on Ozempic     CAD s/p 1 cardiac stent  HFrEF  - TTE with EF of 38%; no vegetations currently seems compensated  - patient follows with cardiology, Dr. Mendoza, who performed heart cath in December with no stents placed  -Cardiology consulted, adjusting meds currently on Coreg, aspirin, Entresto, Lasix increased to 20 mg twice daily, spironolactone added   -Unable to afford the PCSK9 inhibitors, unsure if he has tried Zetia, plan to discuss possibility of Leqvio as outpatient at his follow-up with Dr. Johnson     Hypertension  -BP currently stable, continue Coreg, amlodipine     Paroxysmal atrial fibrillation  -Rate is currently controlled,    -continue amiodarone beta-blocker and Xarelto     ALEX  Hx CVA  -chronic/stable   -Continue home meds      Discharge Follow Up Recommendations for outpatient labs/diagnostics:  Follow-up with PCP in 1 week  Follow-up with ID, Dr. Da Boogie as scheduled  Follow-up with Dr. Victoria, in 1 month  Follow-up with cardiology, Dr. Mendoza    Day of Discharge     HPI: No acute events overnight, patient rested well, has no new complaints, ready to go home    Review of Systems  Gen- No fevers, chills  CV- No chest pain, palpitations  Resp- No cough, dyspnea  GI- No N/V/D, abd pain     Vital Signs:   Temp:  [97.3 °F (36.3 °C)-98.3 °F (36.8 °C)] 98 °F (36.7 °C)  Heart Rate:  [68-79] 69  Resp:  [15-18] 18  BP: (130-165)/() 142/84      Physical Exam:  Constitutional: Elderly male, no acute distress, awake, alert  HENT: NCAT, mucous membranes moist  Respiratory: Clear to auscultation bilaterally, respiratory effort normal   Cardiovascular: RRR, no murmurs, rubs, or gallops  Gastrointestinal: Obese positive bowel sounds, soft, nontender, nondistended  Musculoskeletal: No bilateral ankle edema  Psychiatric: Appropriate affect, cooperative  Neurologic: Oriented x 3, nonfocal  Skin: No rashes    Pertinent  and/or Most Recent Results     LAB RESULTS:      Lab 02/21/24  0501 02/20/24  0521 02/19/24  0801 02/18/24  0806 02/17/24  0855 02/16/24  2107 02/16/24  1753 02/16/24  1500   WBC 5.60 5.53 6.17 9.97 13.74*  --   --  12.98*   HEMOGLOBIN 14.8 14.7 14.4 15.0 15.0  --   --  16.5   HEMATOCRIT 44.7 43.7 44.6 45.6 44.8  --   --  47.8   PLATELETS 206 202 155 166 159  --   --  215   NEUTROS ABS 3.03  --   --   --   --   --   --  10.33*   IMMATURE GRANS (ABS) 0.03  --   --   --   --   --   --  0.05   LYMPHS ABS 1.84  --   --   --   --   --   --  1.58   MONOS ABS 0.50  --   --   --   --   --   --  0.92*   EOS ABS 0.16  --   --   --   --   --   --  0.05   MCV 90.7 90.1 93.5 91.2 94.1  --   --  92.3   CRP 3.88*  --   --   --   --   --   --    --    PROCALCITONIN  --   --   --   --   --   --   --  0.12   LACTATE  --   --   --   --   --  1.3 2.7* 2.3*         Lab 02/21/24  0501 02/20/24  1655 02/20/24  0521 02/19/24  0801 02/18/24  0806 02/17/24  0855   SODIUM 138  --  138 139 136 136   POTASSIUM 4.1 4.0 3.4* 4.0 3.5 3.9   CHLORIDE 103  --  105 106 102 103   CO2 25.0  --  25.0 19.0* 24.0 24.0   ANION GAP 10.0  --  8.0 14.0 10.0 9.0   BUN 14  --  10 11 14 13   CREATININE 0.97  --  0.93 0.88 1.12 1.19   EGFR 85.0  --  89.4 93.7 71.6 66.5   GLUCOSE 166*  --  132* 129* 117* 156*   CALCIUM 9.2  --  8.6 8.5* 8.3* 8.6         Lab 02/20/24  0521 02/19/24  0801 02/18/24  0806 02/16/24  1500   TOTAL PROTEIN 6.8 6.4 6.3 7.8   ALBUMIN 3.3* 3.3* 3.5 4.0   GLOBULIN 3.5 3.1 2.8 3.8   ALT (SGPT) 7 6 6 10   AST (SGOT) 10 11 7 12   BILIRUBIN 0.5 0.8 1.2 1.5*   ALK PHOS 60 61 68 77   LIPASE  --   --   --  60         Lab 02/19/24  0801   PROBNP 1,108.0*                 Brief Urine Lab Results  (Last result in the past 365 days)        Color   Clarity   Blood   Leuk Est   Nitrite   Protein   CREAT   Urine HCG        02/16/24 1429 Yellow   Clear   Moderate (2+)   Small (1+)   Negative   30 mg/dL (1+)                 Microbiology Results (last 10 days)       Procedure Component Value - Date/Time    Blood Culture - Blood, Hand, Right [[id number]]  (Normal) Collected: 02/18/24 0806    Lab Status: Preliminary result Specimen: Blood from Hand, Right Updated: 02/21/24 0901     Blood Culture No growth at 3 days    Narrative:      Less than seven (7) mL's of blood was collected.  Insufficient quantity may yield false negative results.    Blood Culture - Blood, Arm, Left [[id number]]  (Abnormal)  (Susceptibility) Collected: 02/16/24 1533    Lab Status: Final result Specimen: Blood from Arm, Left Updated: 02/19/24 0615     Blood Culture Escherichia coli ESBL     Comment:   Consider infectious disease consult.  Susceptibility results may not correlate to clinical outcomes.  For  ESBL-producing infections in the blood, a carbapenem is recommended as first-line therapy for optimal clinical outcomes.        Isolated from Anaerobic Bottle     Gram Stain Anaerobic Bottle Gram negative bacilli    Susceptibility        Escherichia coli ESBL      KIMBERLY      Ertapenem Susceptible      Levofloxacin Resistant      Meropenem Susceptible      Trimethoprim + Sulfamethoxazole Resistant                       Susceptibility Comments       Escherichia coli ESBL    Cefazolin sensitivity will not be reported for Enterobacteriaceae in non-urine isolates. If cefazolin is preferred, please call the microbiology lab to request an E-test.  With the exception of urinary-sourced infections, aminoglycosides should not be used as monotherapy.               Blood Culture ID, PCR - Blood, Arm, Left [[id number]]  (Abnormal) Collected: 02/16/24 1533    Lab Status: Final result Specimen: Blood from Arm, Left Updated: 02/17/24 1130     BCID, PCR Escherichia coli. Identification by BCID2 PCR.     BCID, PCR 2 CTX-M (ESBL) detected. Identification by BCID2 PCR     BOTTLE TYPE Anaerobic Bottle    Blood Culture - Blood, Arm, Right [[id number]]  (Normal) Collected: 02/16/24 1502    Lab Status: Preliminary result Specimen: Blood from Arm, Right Updated: 02/20/24 1515     Blood Culture No growth at 4 days    Urine Culture - Urine, Urine, Clean Catch [[id number]]  (Abnormal)  (Susceptibility) Collected: 02/16/24 1429    Lab Status: Final result Specimen: Urine, Clean Catch Updated: 02/18/24 0934     Urine Culture >100,000 CFU/mL Escherichia coli ESBL     Comment:   Consider infectious disease consult.  Susceptibility results may not correlate to clinical outcomes.       Narrative:      Colonization of the urinary tract without infection is common. Treatment is discouraged unless the patient is symptomatic, pregnant, or undergoing an invasive urologic procedure.  Recent outcomes data supports the use of pip/tazo in the treatment of  susceptible ESBL infections for uncomplicated UTI. Consider use of pip/tazo as a carbapenem-sparing regimen in applicable patients.    Susceptibility        Escherichia coli ESBL      KIMBERLY      Ertapenem Susceptible      Gentamicin Susceptible      Levofloxacin Resistant      Meropenem Susceptible      Nitrofurantoin Susceptible      Piperacillin + Tazobactam Susceptible      Trimethoprim + Sulfamethoxazole Resistant                           COVID PRE-OP / PRE-PROCEDURE SCREENING ORDER (NO ISOLATION) - Swab, Nasopharynx [[id number]]  (Normal) Collected: 02/16/24 1406    Lab Status: Final result Specimen: Swab from Nasopharynx Updated: 02/16/24 1500    Narrative:      The following orders were created for panel order COVID PRE-OP / PRE-PROCEDURE SCREENING ORDER (NO ISOLATION) - Swab, Nasopharynx.  Procedure                               Abnormality         Status                     ---------                               -----------         ------                     COVID-19 and FLU A/B PCR...[[id number]]  Normal              Final result                 Please view results for these tests on the individual orders.    COVID-19 and FLU A/B PCR, 1 HR TAT - Swab, Nasopharynx [[id number]]  (Normal) Collected: 02/16/24 1406    Lab Status: Final result Specimen: Swab from Nasopharynx Updated: 02/16/24 1500     COVID19 Not Detected     Influenza A PCR Not Detected     Influenza B PCR Not Detected    Narrative:      Fact sheet for providers: https://www.fda.gov/media/979856/download    Fact sheet for patients: https://www.fda.gov/media/048934/download    Test performed by PCR.            Adult Transesophageal Echo (ESLWYN) W/ Cont if Necessary Per Protocol    Result Date: 2/20/2024  SELWYN findings LV: systolic function depressed RV: size and function normal.  Device wires in RA and RV partially visualized.  In visualized portion no vegetation appreciated Aortic valve: mild AI.  Valve is sclerotic.   Changes appear chronic Mitral  valve: trace MR.  No vegetation appreciated Tricuspid vavle: no obvious vegetation, not overly well seen.  No TR Pulmonic valve: no obvious vegetation, not overly well seen.  No PI Left atrial appendage: no thrombus appreciated.  Normal velocities Atrial septum: no PFO by color flow Aorta: mild atherosclerosis in visualized portion of descending thoracic aorta     Adult Transthoracic Echo Complete w/ Color, Spectral and Contrast if Necessary Per Protocol    Result Date: 2/18/2024  •  Left ventricular systolic function is moderately decreased. Calculated left ventricular EF = 38.7% The left ventricular cavity is moderately dilated. Left ventricular wall thickness is consistent with mild concentric hypertrophy. All left ventricular wall segments contract normally. Left ventricular diastolic function is consistent with (grade I) impaired relaxation. No evidence of a left ventricular thrombus present. •  There is mild calcification of the aortic valve. There is thickening of the aortic valve. Mild aortic valve regurgitation is present. Mild aortic valve stenosis is present. •  There is moderate, bileaflet mitral valve thickening present. •  There is moderate, bileaflet mitral valve thickening present. Mild mitral valve regurgitation is present. No significant mitral valve stenosis is present. •  The tricuspid valve is structurally normal with no significant regurgitation or significant stenosis present. •  Moderate dilation of the aortic root is present. Aortic root = 4.7 cm •  No mobile vegetation seen on TTE today. If there is high clinical suspicion for endocarditis would consider SELWYN if no contraindications.     CT Abdomen Pelvis Stone Protocol    Result Date: 2/17/2024  CT ABDOMEN PELVIS STONE PROTOCOL Date of Exam: 2/17/2024 12:25 AM EST Indication: Nephrolithiasis recurring ESBL E.coli UTI, recent prostate bx. Comparison: 1/28/2024 Technique: Axial CT images were obtained of the abdomen and pelvis without the  administration of contrast. Reconstructed coronal and sagittal images were also obtained. Automated exposure control and iterative construction methods were used. Findings: Visualized lung bases are clear. There is small pericardial effusion, increased from prior exam. The liver, pancreas, and spleen are within normal limits. There are multiple stones within the gallbladder. No inflammatory change around the gallbladder. No  evidence of biliary tract obstruction. Bilateral adrenal glands are within normal limits. There are multiple bilateral nonobstructing renal stones. There is no evidence of ureteral stone or hydronephrosis. No abdominal or retroperitoneal adenopathy. There is a lap band device around the proximal stomach. The upper GI tract is otherwise unremarkable. Patient is status post ventral hernia repair. No evidence of residual recurrent hernia. Urinary bladder is within normal limits. GI tract including the appendix is within normal limits. There is no pelvic or inguinal adenopathy. No free intraperitoneal fluid. No pelvic or inguinal adenopathy. There are degenerative changes of the spine. No lytic or sclerotic bony lesion identified.     Impression: 1. Multiple bilateral nonobstructing renal stones. No evidence of ureteral stone or hydronephrosis. 2. Cholelithiasis. No evidence of acute cholecystitis or biliary tract obstruction. Electronically Signed: Jose Matta MD  2/17/2024 8:25 AM EST  Workstation ID: [id number]    XR Chest 1 View    Result Date: 2/16/2024  XR CHEST 1 VW Date of Exam: 2/16/2024 2:39 PM EST Indication: fever Comparison: 1/16/2024 Findings: The heart size is enlarged but stable. The pulmonary vascular markings are normal. Left-sided transvenous pacemaker is in place with the leads in the right atrium and ventricle, unchanged from the last study. The lungs are expanded the chest wall and they appear clear.     Impression: 1. Stable cardiomegaly. 2. No acute process identified in the  chest. Electronically Signed: Nicolás Allred MD  2/16/2024 3:02 PM EST  Workstation ID: [id number]     Results for orders placed during the hospital encounter of 08/04/20    Duplex Venous Lower Extremity - Right CAR    Interpretation Summary  · No evidence of deep or superficial venous thrombosis in the right lower extremity.      Results for orders placed during the hospital encounter of 08/04/20    Duplex Venous Lower Extremity - Right CAR    Interpretation Summary  · No evidence of deep or superficial venous thrombosis in the right lower extremity.      Results for orders placed during the hospital encounter of 02/16/24    Adult Transesophageal Echo (SELWYN) W/ Cont if Necessary Per Protocol    Interpretation Summary  SELWYN findings  LV: systolic function depressed  RV: size and function normal.  Device wires in RA and RV partially visualized.  In visualized portion no vegetation appreciated  Aortic valve: mild AI.  Valve is sclerotic.   Changes appear chronic  Mitral valve: trace MR.  No vegetation appreciated  Tricuspid vavle: no obvious vegetation, not overly well seen.  No TR  Pulmonic valve: no obvious vegetation, not overly well seen.  No PI  Left atrial appendage: no thrombus appreciated.  Normal velocities  Atrial septum: no PFO by color flow  Aorta: mild atherosclerosis in visualized portion of descending thoracic aorta      Plan for Follow-up of Pending Labs/Results: ID  Pending Labs       Order Current Status    Brivaracetam, Serum/Plasma In process    Blood Culture - Blood, Arm, Right Preliminary result    Blood Culture - Blood, Hand, Right Preliminary result          Discharge Details        Discharge Medications        New Medications        Instructions Start Date   aspirin 81 MG EC tablet   81 mg, Oral, Daily      Briviact 50 MG tablet  Generic drug: brivaracetam   50 mg, Oral, 2 Times Daily      ertapenem 1,000 mg in sodium chloride 0.9 % 100 mL IVPB   1,000 mg, Intravenous, Every 24 Hours       spironolactone 50 MG tablet  Commonly known as: ALDACTONE   50 mg, Oral, Daily             Changes to Medications        Instructions Start Date   furosemide 20 MG tablet  Commonly known as: LASIX  What changed:   medication strength  how much to take   20 mg, Oral, 2 Times Daily             Continue These Medications        Instructions Start Date   amiodarone 100 MG tablet  Commonly known as: PACERONE   100 mg, Oral, Daily      amLODIPine 10 MG tablet  Commonly known as: NORVASC   10 mg, Oral, Nightly      carvedilol 25 MG tablet  Commonly known as: Coreg   25 mg, Oral, 2 Times Daily With Meals      Entresto  MG tablet  Generic drug: sacubitril-valsartan   1 tablet, Oral, 2 Times Daily, Hold until seen by PCP      levothyroxine 88 MCG tablet  Commonly known as: SYNTHROID, LEVOTHROID   88 mcg, Oral, Daily      Ozempic (0.25 or 0.5 MG/DOSE) 2 MG/1.5ML solution pen-injector  Generic drug: Semaglutide(0.25 or 0.5MG/DOS)   0.5 mg, Subcutaneous, Every 7 Days      tamsulosin 0.4 MG capsule 24 hr capsule  Commonly known as: FLOMAX   0.4 mg, Oral, Daily      Xarelto 15 MG tablet  Generic drug: rivaroxaban   15 mg, Oral, Daily With Dinner             Stop These Medications      divalproex 250 MG DR tablet  Commonly known as: DEPAKOTE              Allergies   Allergen Reactions   • Statins Myalgia and Other (See Comments)     Discharge Disposition:home  [place] [facility] [place]:  Hospital:  Diet Order   Procedures   • Diet: Regular/House Diet, Diabetic Diets; Consistent Carbohydrate; Texture: Regular Texture (IDDSI 7); Fluid Consistency: Thin (IDDSI 0)        Activity:as tolerated      Restrictions or Other Recommendations:  Per neurology: Patient was instructed not to drive or operate motor vehicle for 90 days post last seizure-like event.  He was also instructed on safe seizure practices along with medication compliance.       CODE STATUS:    Code Status and Medical Interventions:   Ordered at: 02/16/24 7011      Code Status (Patient has no pulse and is not breathing):    CPR (Attempt to Resuscitate)     Medical Interventions (Patient has pulse or is breathing):    Full Support       Future Appointments   Date Time Provider Department Center   2/22/2024 12:00 PM  4 - CHAIR 1  [facility]  [facility]   4/23/2024  1:40 PM Grisel Byers APRN [facility] [place] (   5/13/2024 11:30 AM Janet Garcia APRN [facility]   2/11/2025  1:30 PM Janet Garcia APRN [facility]       Kate Fraga MD  02/21/24      Time Spent on Discharge:  I spent  40  minutes on this discharge activity which included: face-to-face encounter with the patient, reviewing the data in the system, coordination of the care with the nursing staff as well as consultants, documentation, and entering orders.

## 2024-02-21 NOTE — CASE MANAGEMENT/SOCIAL WORK
Continued Stay Note  [facility]     Patient Name: Andre Agosto  MRN: [id number]  Today's Date: 2/21/2024    Admit Date: 2/16/2024    Plan: Home   Discharge Plan       Row Name 02/21/24 1325       Plan    Plan Home    Plan Comments Pt. to discharge today. MSW met with pt. and spouse at bedside. Pt.'s spouse reports that [employer] needs a letter on letterhead stating that pt. is unable to travel to get a refund. Pt. is not able to travel for the next 90 days. MSW typed up letter and RN signed. MSW provided pt. with a signed copy of letter and put a copy in chartlet. PT recommended home with  outpatient PT. MSW provided pt. with hardcopy of order to take to an outpatient PT facility of his choice. [facility] has an appointment for 2/22/24 at 12:00pm. RN at [facility] reports she has what she needs in Epic. MSW updated RN. MSW is available.    Final Discharge Disposition Code 01 - home or self-care                   Discharge Codes    No documentation.                 Expected Discharge Date and Time       Expected Discharge Date Expected Discharge Time    Feb 21, 2024               GERARDO Fraser

## 2024-02-21 NOTE — OUTREACH NOTE
Prep Survey      Flowsheet Row Responses   [facility] patient discharged from? [place]   Is LACE score < 7 ? No   Eligibility [facility]   Date of Admission 02/16/24   Date of Discharge 02/21/24   Discharge Disposition Home-Health Care [facility]   Discharge diagnosis UTI (urinary tract infection)   Does the patient have one of the following disease processes/diagnoses(primary or secondary)? Other   Does the patient have Home health ordered? No   Is there a DME ordered? No   Prep survey completed? Yes            Nidia CARTER - Registered Nurse

## 2024-02-21 NOTE — TELEPHONE ENCOUNTER
Caller: CARL    Relationship to patient:     Best call back number: [phone]     New or established patient?  [] New  [x] Established    Date of discharge: 2/21/24    Facility discharged from:      Diagnosis/Symptoms: EPILEPSY    Length of stay (If applicable): 5    Specialty Only: Did you see a [facility] health provider?    [] Yes  [x] No  If so, who? N/A    CUONG W/  TELEPHONED TO REQUEST 1 MONTH HOSP F/U W/ DR BUSTOS FOR DX:EPILEPSY.  ADVISED PATIENT IS ESTABLISHED W/ MIRTA GILLETTE ALREADY. SHE STATES PATIENT HAS TO BE SEEN BY DR. BUSTOS.    PLEASE REVIEW & CALL PATIENT TO ADVISE- THANK YOU

## 2024-02-22 ENCOUNTER — TRANSITIONAL CARE MANAGEMENT TELEPHONE ENCOUNTER (OUTPATIENT)
Dept: CALL CENTER | Facility: HOSPITAL | Age: 69
End: 2024-02-22
Payer: MEDICARE

## 2024-02-22 ENCOUNTER — HOSPITAL ENCOUNTER (OUTPATIENT)
Dept: INFUSION THERAPY | Facility: HOSPITAL | Age: 69
Discharge: HOME | End: 2024-02-22
Admitting: INTERNAL MEDICINE
Payer: MEDICARE

## 2024-02-22 VITALS
RESPIRATION RATE: 18 BRPM | TEMPERATURE: 98.1 F | HEART RATE: 71 BPM | DIASTOLIC BLOOD PRESSURE: 86 MMHG | SYSTOLIC BLOOD PRESSURE: 153 MMHG | OXYGEN SATURATION: 98 %

## 2024-02-22 DIAGNOSIS — N39.0 URINARY TRACT INFECTION WITHOUT HEMATURIA, SITE UNSPECIFIED: Primary | ICD-10-CM

## 2024-02-22 PROCEDURE — 25010000002 ERTAPENEM PER 500 MG: Performed by: INTERNAL MEDICINE

## 2024-02-22 PROCEDURE — 25810000003 SODIUM CHLORIDE 0.9 % SOLUTION: Performed by: INTERNAL MEDICINE

## 2024-02-22 PROCEDURE — 96365 THER/PROPH/DIAG IV INF INIT: CPT

## 2024-02-22 RX ORDER — LOVASTATIN 40 MG/1
TABLET ORAL
Qty: 90 TABLET | Refills: 3 | Status: SHIPPED | OUTPATIENT
Start: 2024-02-22 | End: 2024-02-22 | Stop reason: SDUPTHER

## 2024-02-22 RX ORDER — SODIUM CHLORIDE 9 MG/ML
20 INJECTION, SOLUTION INTRAVENOUS ONCE
Status: COMPLETED | OUTPATIENT
Start: 2024-02-22 | End: 2024-02-22

## 2024-02-22 RX ORDER — SODIUM CHLORIDE 9 MG/ML
20 INJECTION, SOLUTION INTRAVENOUS ONCE
Status: CANCELLED | OUTPATIENT
Start: 2024-02-23

## 2024-02-22 RX ORDER — LOVASTATIN 40 MG/1
40 TABLET ORAL
Qty: 90 TABLET | Refills: 3 | Status: SHIPPED | OUTPATIENT
Start: 2024-02-22

## 2024-02-22 RX ADMIN — ERTAPENEM 1000 MG: 1 INJECTION INTRAMUSCULAR; INTRAVENOUS at 12:36

## 2024-02-22 RX ADMIN — SODIUM CHLORIDE 20 ML/HR: 9 INJECTION, SOLUTION INTRAVENOUS at 12:36

## 2024-02-22 NOTE — OUTREACH NOTE
Call Center TCM Note      Flowsheet Row Responses   [facility] patient discharged from? [place]   Does the patient have one of the following disease processes/diagnoses(primary or secondary)? Other   TCM attempt successful? Yes   Call start time 1108   Call end time 1116   Discharge diagnosis UTI (urinary tract infection)   Person spoke with today (if not patient) and relationship patient   Meds reviewed with patient/caregiver? Yes   Is the patient having any side effects they believe may be caused by any medication additions or changes? No   Does the patient have all medications ordered at discharge? Yes   Is the patient taking all medications as directed (includes completed medication regime)? Yes   Comments ID 2/29/24 at 10:15 AM,  Urology 4/23/24 at 1:40 PM,  Neurology 5/13/24 at 11:30 AM   Does the patient have an appointment with their PCP within 7-14 days of discharge? Yes  [2/23/2024 at 2:00 PM]   Psychosocial issues? No   Did the patient receive a copy of their discharge instructions? Yes   Nursing interventions Reviewed instructions with patient   What is the patient's perception of their health status since discharge? Improving   Is the patient/caregiver able to teach back signs and symptoms related to disease process for when to call PCP? Yes   Is the patient/caregiver able to teach back signs and symptoms related to disease process for when to call 911? Yes   Is the patient/caregiver able to teach back the hierarchy of who to call/visit for symptoms/problems? PCP, Specialist, Home health nurse, Urgent Care, ED, 911 Yes   If the patient is a current smoker, are they able to teach back resources for cessation? Not a smoker   TCM call completed? Yes   Wrap up additional comments Patient states he is doing well, and receiving IV Invanz infusions for recurrent ESBL UTI. No medication issues. Pt verified PCP/cardiology fu appts.   Call end time 1116   Would this patient benefit from a Referral to Amb  Social Work? No   Is the patient interested in additional calls from an ambulatory ? No            Melina Fraser RN    2/22/2024, 11:27 EST

## 2024-02-22 NOTE — PROGRESS NOTES
"[facility] Progress Note    Date of Admission: 2024      Antibiotics: invanz      CC:   Chief Complaint   Patient presents with    Fever       S: No f/c/s. No n/v/d. Hemodynamically stable.Feeling better overall, picc placed, SELWYN neg    O:  /84 (BP Location: Left arm, Patient Position: Lying)   Pulse 69   Temp 98 °F (36.7 °C) (Oral)   Resp 18   Ht 193 cm (76\")   Wt (!) 138 kg (305 lb)   SpO2 95%   BMI 37.13 kg/m²   Temp (24hrs), Av °F (36.7 °C), Min:97.3 °F (36.3 °C), Max:98.3 °F (36.8 °C)      PE:   GEN: Awake and alert, in no acute distress.   HEENT: NCAT.  EOMI. No conjunctival injection. No icterus. Oropharynx clear without evidence of thrush or exudate.   NECK: Supple without nuchal rigidity  HEART: RRR; No murmur, rubs, gallops.   LUNGS: Clear to auscultation bilaterally without wheezing, rales, rhonchi. Normal respiratory effort.  ABDOMEN: Soft, nontender, nondistended. Positive bowel sounds. No rebound or guarding.   EXT:  No cyanosis, clubbing or edema  : Normal appearing genitalia without Crespo catheter.  MSK: FROM without joint effusions noted    SKIN: Warm and dry without cutaneous eruptions.    NEURO: Oriented to PPT. No focal deficits.     Laboratory Data    Results from last 7 days   Lab Units 24  0501 24  0521 24  0801   WBC 10*3/mm3 5.60 5.53 6.17   HEMOGLOBIN g/dL 14.8 14.7 14.4   HEMATOCRIT % 44.7 43.7 44.6   PLATELETS 10*3/mm3 206 202 155     Results from last 7 days   Lab Units 24  0501   SODIUM mmol/L 138   POTASSIUM mmol/L 4.1   CHLORIDE mmol/L 103   CO2 mmol/L 25.0   BUN mg/dL 14   CREATININE mg/dL 0.97   GLUCOSE mg/dL 166*   CALCIUM mg/dL 9.2     Results from last 7 days   Lab Units 24  0521   ALK PHOS U/L 60   BILIRUBIN mg/dL 0.5   ALT (SGPT) U/L 7   AST (SGOT) U/L 10         Results from last 7 days   Lab Units 24  0501   CRP mg/dL 3.88*       Estimated Creatinine Clearance: 110.3 mL/min (by C-G formula based on SCr of 0.97 " mg/dL).      Microbiology:  Blood cx 1/3 with esbl e coli    Radiology:  Imaging Results (Last 24 Hours)       ** No results found for the last 24 hours. **            PROBLEM LIST:     E coli bacteremia and Uti  Suspect ongoing prostatitis  H/o nephrolithiasis  H/o seizures  H/o AICD  Fevers chills  Low grade prostate cancer    ASSESSMENT:  71 yo with recent prostate biopsy and developed esbl e coli bacteremia and prostatitis and treated over 2 weeks with iv invanz daily with improvement then switched to maitenance macrobid and had recurrent fevers/chills/rigors and esbl ecoli  bacteremia returned improved with meropenem and debbie neg and picc placed    Improving on iv invanz and continue plans for d/c today    PLAN:  Invanz 1 gram daily plan on 5 more weeks of therapy upon dc  Plan for d/c today and set up outpt iv abx previously set up at [facility] and f/u with me weekly with weekly labs    I spent >35 min on case today with more than %50 time in d/c planning and d/w pt and family for iv abx upon d/c picc line to be placed.       Da Boogie MD  2/21/2024

## 2024-02-22 NOTE — TELEPHONE ENCOUNTER
Rx Refill Note  Requested Prescriptions     Signed Prescriptions Disp Refills    lovastatin (MEVACOR) 40 MG tablet 90 tablet 3     Sig: TAKE 1 TABLET DAILY WITH DINNER     Authorizing Provider: SCAR ELLIS     Ordering User: DENNISE MARTINEZ     Refused Prescriptions Disp Refills    Xarelto 15 MG tablet [Pharmacy Med Name: XARELTO TABS 15MG] 90 tablet 3     Sig: TAKE 1 TABLET DAILY     Refused By: ERNESTINA VIDALES     Reason for Refusal: Duplicate renewal request      Last office visit with prescribing clinician: 2/9/2022   Last telemedicine visit with prescribing clinician: Visit date not found   Next office visit with prescribing clinician: 3/30/2022                         Would you like a call back once the refill request has been completed: [] Yes [] No    If the office needs to give you a call back, can they leave a voicemail: [] Yes [] No    Dennise Martinez MA  02/22/24, 15:58 EST

## 2024-02-23 ENCOUNTER — HOSPITAL ENCOUNTER (OUTPATIENT)
Dept: INFUSION THERAPY | Facility: HOSPITAL | Age: 69
Discharge: HOME | End: 2024-02-23
Payer: MEDICARE

## 2024-02-23 ENCOUNTER — OFFICE VISIT (OUTPATIENT)
Dept: INTERNAL MEDICINE | Facility: CLINIC | Age: 69
End: 2024-02-23
Payer: MEDICARE

## 2024-02-23 VITALS
BODY MASS INDEX: 36.17 KG/M2 | HEIGHT: 76 IN | WEIGHT: 297 LBS | SYSTOLIC BLOOD PRESSURE: 130 MMHG | DIASTOLIC BLOOD PRESSURE: 80 MMHG | OXYGEN SATURATION: 94 % | HEART RATE: 80 BPM | RESPIRATION RATE: 16 BRPM | TEMPERATURE: 97.5 F

## 2024-02-23 VITALS
SYSTOLIC BLOOD PRESSURE: 129 MMHG | HEART RATE: 70 BPM | DIASTOLIC BLOOD PRESSURE: 83 MMHG | BODY MASS INDEX: 37.14 KG/M2 | OXYGEN SATURATION: 95 % | RESPIRATION RATE: 18 BRPM | WEIGHT: 305 LBS | HEIGHT: 76 IN | TEMPERATURE: 98 F

## 2024-02-23 DIAGNOSIS — R78.81 BACTEREMIA DUE TO ESCHERICHIA COLI: ICD-10-CM

## 2024-02-23 DIAGNOSIS — N30.00 ACUTE CYSTITIS WITHOUT HEMATURIA: Primary | ICD-10-CM

## 2024-02-23 DIAGNOSIS — B96.20 BACTEREMIA DUE TO ESCHERICHIA COLI: ICD-10-CM

## 2024-02-23 DIAGNOSIS — N39.0 URINARY TRACT INFECTION WITHOUT HEMATURIA, SITE UNSPECIFIED: ICD-10-CM

## 2024-02-23 DIAGNOSIS — N20.0 NEPHROLITHIASIS: ICD-10-CM

## 2024-02-23 DIAGNOSIS — A41.51 SEPSIS DUE TO ESCHERICHIA COLI WITHOUT ACUTE ORGAN DYSFUNCTION: ICD-10-CM

## 2024-02-23 DIAGNOSIS — N10 ACUTE PYELONEPHRITIS: Primary | ICD-10-CM

## 2024-02-23 DIAGNOSIS — E11.65 TYPE 2 DIABETES MELLITUS WITH HYPERGLYCEMIA, UNSPECIFIED WHETHER LONG TERM INSULIN USE: ICD-10-CM

## 2024-02-23 DIAGNOSIS — R97.20 ELEVATED PROSTATE SPECIFIC ANTIGEN (PSA): ICD-10-CM

## 2024-02-23 LAB — BACTERIA SPEC AEROBE CULT: NORMAL

## 2024-02-23 PROCEDURE — 96365 THER/PROPH/DIAG IV INF INIT: CPT

## 2024-02-23 PROCEDURE — 96366 THER/PROPH/DIAG IV INF ADDON: CPT

## 2024-02-23 PROCEDURE — 25010000002 ERTAPENEM PER 500 MG: Performed by: INTERNAL MEDICINE

## 2024-02-23 PROCEDURE — 25810000003 SODIUM CHLORIDE 0.9 % SOLUTION: Performed by: INTERNAL MEDICINE

## 2024-02-23 RX ORDER — SPIRONOLACTONE 50 MG/1
50 TABLET, FILM COATED ORAL DAILY
Qty: 90 TABLET | Refills: 3 | Status: SHIPPED | OUTPATIENT
Start: 2024-02-23 | End: 2024-03-24

## 2024-02-23 RX ORDER — SODIUM CHLORIDE 9 MG/ML
20 INJECTION, SOLUTION INTRAVENOUS ONCE
Status: CANCELLED | OUTPATIENT
Start: 2024-02-24

## 2024-02-23 RX ORDER — SODIUM CHLORIDE 9 MG/ML
20 INJECTION, SOLUTION INTRAVENOUS ONCE
Status: COMPLETED | OUTPATIENT
Start: 2024-02-23 | End: 2024-02-23

## 2024-02-23 RX ADMIN — SODIUM CHLORIDE 200 ML/HR: 9 INJECTION, SOLUTION INTRAVENOUS at 11:20

## 2024-02-23 RX ADMIN — ERTAPENEM 1000 MG: 1 INJECTION INTRAMUSCULAR; INTRAVENOUS at 10:49

## 2024-02-23 NOTE — TELEPHONE ENCOUNTER
Called Bernardo back and notified YESY Miles got right back with us and he states yes! THe [employer] assistance program is what he was talking about and he does have the voucher so will be good for 30 days.  Also provided him with their phone # per YESY Miles and he states appreciation for the update and as he is currently in the chair for infusion right now so I am messaging him the [employer] Ph# as requested and he will see us on March 15th (knows our address/location) Thanks!

## 2024-02-23 NOTE — TELEPHONE ENCOUNTER
Called Bernardo and got him a HOSP FUP appt on March 15th @ 11:30am with     As well,  Bernardo states he saw YESY Miles in the hospital and she was going to work out a deal where he got his medicines for cheaper maybe just for the 30 days until he is established with us but he has not heard anything back yet and would appreciate if we got a message to her or followed up on it as a reminder. He states these meds cost thousands of dollars.     Notified can get a message to her. Thanks!

## 2024-02-23 NOTE — PROGRESS NOTES
Subjective     Patient ID: Andre Agosto is a 69 y.o. male. Patient is here for management of multiple medical problems.     Chief Complaint   Patient presents with    Hospital Follow Up Visit     ESBL E. Coli infection     History of Present Illness     Hospital f/u.      UTI. Renal stones.  Urology feels it is prostate.   I/D feels nidis in the stone.   On ivabx x 5 weeks.    Cholest up. Dr Ramirez working on Repatha.               The following portions of the patient's history were reviewed and updated as appropriate: allergies, current medications, past family history, past medical history, past social history, past surgical history and problem list.    Review of Systems    Current Outpatient Medications:     amiodarone (PACERONE) 100 MG tablet, Take 1 tablet by mouth Daily., Disp: , Rfl:     amLODIPine (NORVASC) 10 MG tablet, Take 1 tablet by mouth Every Night., Disp: , Rfl:     aspirin 81 MG EC tablet, Take 1 tablet by mouth Daily for 30 days., Disp: 30 tablet, Rfl: 0    brivaracetam (Briviact) 50 MG tablet, Take 1 tablet by mouth 2 (Two) Times a Day., Disp: 60 tablet, Rfl: 0    carvedilol (Coreg) 25 MG tablet, Take 1 tablet by mouth 2 (Two) Times a Day With Meals., Disp: 180 tablet, Rfl: 3    Entresto  MG tablet, Take 1 tablet by mouth 2 (Two) Times a Day. Hold until seen by PCP, Disp: , Rfl:     ertapenem 1,000 mg in sodium chloride 0.9 % 100 mL IVPB, Infuse 1,000 mg into a venous catheter Daily for 36 days. Indications: Bacteria in the Blood, Disp: , Rfl:     furosemide (LASIX) 20 MG tablet, Take 1 tablet by mouth 2 (Two) Times a Day for 30 days., Disp: 60 tablet, Rfl: 0    levothyroxine (SYNTHROID, LEVOTHROID) 88 MCG tablet, Take 1 tablet by mouth Daily., Disp: 90 tablet, Rfl: 3    lovastatin (MEVACOR) 40 MG tablet, Take 1 tablet by mouth Daily With Dinner., Disp: 90 tablet, Rfl: 3    spironolactone (ALDACTONE) 50 MG tablet, Take 1 tablet by mouth Daily for 30 days., Disp: 90 tablet, Rfl: 3     "tamsulosin (FLOMAX) 0.4 MG capsule 24 hr capsule, Take 1 capsule by mouth Daily., Disp: 30 capsule, Rfl: 5    Xarelto 15 MG tablet, Take 1 tablet by mouth Daily With Dinner., Disp: 42 tablet, Rfl: 0  No current facility-administered medications for this visit.    Objective      Blood pressure 130/80, pulse 80, temperature 97.5 °F (36.4 °C), resp. rate 16, height 193 cm (76\"), weight 135 kg (297 lb), SpO2 94%.            Physical Exam     General Appearance:    Alert, cooperative, no distress, appears stated age   Head:    Normocephalic, without obvious abnormality, atraumatic   Eyes:    PERRL, conjunctiva/corneas clear, EOM's intact   Ears:    Normal TM's and external ear canals, both ears   Nose:   Nares normal, septum midline, mucosa normal, no drainage   or sinus tenderness   Throat:   Lips, mucosa, and tongue normal; teeth and gums normal   Neck:   Supple, symmetrical, trachea midline, no adenopathy;        thyroid:  No enlargement/tenderness/nodules; no carotid    bruit or JVD   Back:     Symmetric, no curvature, ROM normal, no CVA tenderness   Lungs:     Clear to auscultation bilaterally, respirations unlabored   Chest wall:    No tenderness or deformity   Heart:    Regular rate and rhythm, S1 and S2 normal, no murmur,        rub or gallop   Abdomen:     Soft, non-tender, bowel sounds active all four quadrants,     no masses, no organomegaly   Extremities:   Extremities normal, atraumatic, no cyanosis or edema   Pulses:   2+ and symmetric all extremities   Skin:   Skin color, texture, turgor normal, no rashes or lesions   Lymph nodes:   Cervical, supraclavicular, and axillary nodes normal   Neurologic:   CNII-XII intact. Normal strength, sensation and reflexes       throughout      Results for orders placed or performed during the hospital encounter of 02/16/24   Blood Culture - Blood, Arm, Right    Specimen: Arm, Right; Blood   Result Value Ref Range    Blood Culture No growth at 5 days    Blood Culture - " Blood, Arm, Left    Specimen: Arm, Left; Blood   Result Value Ref Range    Blood Culture Escherichia coli ESBL (C)     Isolated from Anaerobic Bottle     Gram Stain Anaerobic Bottle Gram negative bacilli (C)        Susceptibility    Escherichia coli ESBL - KIMBERLY*     Ertapenem  Susceptible ug/ml     Levofloxacin  Resistant ug/ml     Meropenem  Susceptible ug/ml     Trimethoprim + Sulfamethoxazole  Resistant ug/ml     * Cefazolin sensitivity will not be reported for Enterobacteriaceae in non-urine isolates. If cefazolin is preferred, please call the microbiology lab to request an E-test.  With the exception of urinary-sourced infections, aminoglycosides should not be used as monotherapy.   COVID-19 and FLU A/B PCR, 1 HR TAT - Swab, Nasopharynx    Specimen: Nasopharynx; Swab   Result Value Ref Range    COVID19 Not Detected Not Detected - Ref. Range    Influenza A PCR Not Detected Not Detected    Influenza B PCR Not Detected Not Detected   Urine Culture - Urine, Urine, Clean Catch    Specimen: Urine, Clean Catch   Result Value Ref Range    Urine Culture >100,000 CFU/mL Escherichia coli ESBL (A)        Susceptibility    Escherichia coli ESBL - KIMBERLY     Ertapenem  Susceptible ug/ml     Gentamicin  Susceptible ug/ml     Levofloxacin  Resistant ug/ml     Meropenem  Susceptible ug/ml     Nitrofurantoin  Susceptible ug/ml     Piperacillin + Tazobactam  Susceptible ug/ml     Trimethoprim + Sulfamethoxazole  Resistant ug/ml   Blood Culture ID, PCR - Blood, Arm, Left    Specimen: Arm, Left; Blood   Result Value Ref Range    BCID, PCR Escherichia coli. Identification by BCID2 PCR. (A) Negative by BCID PCR. Culture to Follow.    BCID, PCR 2 CTX-M (ESBL) detected. Identification by BCID2 PCR (C) Negative by BCID PCR. Culture to Follow.    BOTTLE TYPE Anaerobic Bottle    Blood Culture - Blood, Hand, Right    Specimen: Hand, Right; Blood   Result Value Ref Range    Blood Culture No growth at 5 days    Comprehensive Metabolic Panel     Specimen: Blood   Result Value Ref Range    Glucose 137 (H) 65 - 99 mg/dL    BUN 11 8 - 23 mg/dL    Creatinine 1.18 0.76 - 1.27 mg/dL    Sodium 141 136 - 145 mmol/L    Potassium 3.6 3.5 - 5.2 mmol/L    Chloride 101 98 - 107 mmol/L    CO2 26.0 22.0 - 29.0 mmol/L    Calcium 8.8 8.6 - 10.5 mg/dL    Total Protein 7.8 6.0 - 8.5 g/dL    Albumin 4.0 3.5 - 5.2 g/dL    ALT (SGPT) 10 1 - 41 U/L    AST (SGOT) 12 1 - 40 U/L    Alkaline Phosphatase 77 39 - 117 U/L    Total Bilirubin 1.5 (H) 0.0 - 1.2 mg/dL    Globulin 3.8 gm/dL    A/G Ratio 1.1 g/dL    BUN/Creatinine Ratio 9.3 7.0 - 25.0    Anion Gap 14.0 5.0 - 15.0 mmol/L    eGFR 67.2 >60.0 mL/min/1.73   Urinalysis With Culture If Indicated - Urine, Clean Catch    Specimen: Urine, Clean Catch   Result Value Ref Range    Color, UA Yellow Yellow, Straw    Appearance, UA Clear Clear    pH, UA 6.5 5.0 - 8.0    Specific Gravity, UA 1.015 1.001 - 1.030    Glucose, UA >=1000 mg/dL (3+) (A) Negative    Ketones, UA Negative Negative    Bilirubin, UA Negative Negative    Blood, UA Moderate (2+) (A) Negative    Protein, UA 30 mg/dL (1+) (A) Negative    Leuk Esterase, UA Small (1+) (A) Negative    Nitrite, UA Negative Negative    Urobilinogen, UA 0.2 E.U./dL 0.2 - 1.0 E.U./dL   CBC Auto Differential    Specimen: Blood   Result Value Ref Range    WBC 12.98 (H) 3.40 - 10.80 10*3/mm3    RBC 5.18 4.14 - 5.80 10*6/mm3    Hemoglobin 16.5 13.0 - 17.7 g/dL    Hematocrit 47.8 37.5 - 51.0 %    MCV 92.3 79.0 - 97.0 fL    MCH 31.9 26.6 - 33.0 pg    MCHC 34.5 31.5 - 35.7 g/dL    RDW 14.6 12.3 - 15.4 %    RDW-SD 49.2 37.0 - 54.0 fl    MPV 10.6 6.0 - 12.0 fL    Platelets 215 140 - 450 10*3/mm3    Neutrophil % 79.5 (H) 42.7 - 76.0 %    Lymphocyte % 12.2 (L) 19.6 - 45.3 %    Monocyte % 7.1 5.0 - 12.0 %    Eosinophil % 0.4 0.3 - 6.2 %    Basophil % 0.4 0.0 - 1.5 %    Immature Grans % 0.4 0.0 - 0.5 %    Neutrophils, Absolute 10.33 (H) 1.70 - 7.00 10*3/mm3    Lymphocytes, Absolute 1.58 0.70 - 3.10 10*3/mm3     Monocytes, Absolute 0.92 (H) 0.10 - 0.90 10*3/mm3    Eosinophils, Absolute 0.05 0.00 - 0.40 10*3/mm3    Basophils, Absolute 0.05 0.00 - 0.20 10*3/mm3    Immature Grans, Absolute 0.05 0.00 - 0.05 10*3/mm3    nRBC 0.0 0.0 - 0.2 /100 WBC   Lipase    Specimen: Blood   Result Value Ref Range    Lipase 60 13 - 60 U/L   Lactic Acid, Plasma    Specimen: Blood   Result Value Ref Range    Lactate 2.3 (C) 0.5 - 2.0 mmol/L   Procalcitonin    Specimen: Blood   Result Value Ref Range    Procalcitonin 0.12 0.00 - 0.25 ng/mL   Urinalysis, Microscopic Only - Urine, Clean Catch    Specimen: Urine, Clean Catch   Result Value Ref Range    RBC, UA 11-20 (A) None Seen, 0-2 /HPF    WBC, UA Too Numerous to Count (A) None Seen, 0-2 /HPF    Bacteria, UA None Seen None Seen, Trace /HPF    Squamous Epithelial Cells, UA None Seen None Seen, 0-2 /HPF    Hyaline Casts, UA None Seen 0 - 6 /LPF    Methodology Automated Microscopy    STAT Lactic Acid, Reflex    Specimen: Blood   Result Value Ref Range    Lactate 2.7 (C) 0.5 - 2.0 mmol/L   STAT Lactic Acid, Reflex    Specimen: Blood   Result Value Ref Range    Lactate 1.3 0.5 - 2.0 mmol/L   Basic Metabolic Panel    Specimen: Blood   Result Value Ref Range    Glucose 156 (H) 65 - 99 mg/dL    BUN 13 8 - 23 mg/dL    Creatinine 1.19 0.76 - 1.27 mg/dL    Sodium 136 136 - 145 mmol/L    Potassium 3.9 3.5 - 5.2 mmol/L    Chloride 103 98 - 107 mmol/L    CO2 24.0 22.0 - 29.0 mmol/L    Calcium 8.6 8.6 - 10.5 mg/dL    BUN/Creatinine Ratio 10.9 7.0 - 25.0    Anion Gap 9.0 5.0 - 15.0 mmol/L    eGFR 66.5 >60.0 mL/min/1.73   CBC (No Diff)    Specimen: Blood   Result Value Ref Range    WBC 13.74 (H) 3.40 - 10.80 10*3/mm3    RBC 4.76 4.14 - 5.80 10*6/mm3    Hemoglobin 15.0 13.0 - 17.7 g/dL    Hematocrit 44.8 37.5 - 51.0 %    MCV 94.1 79.0 - 97.0 fL    MCH 31.5 26.6 - 33.0 pg    MCHC 33.5 31.5 - 35.7 g/dL    RDW 14.9 12.3 - 15.4 %    RDW-SD 51.8 37.0 - 54.0 fl    MPV 10.6 6.0 - 12.0 fL    Platelets 159 140 - 450  10*3/mm3   CBC (No Diff)    Specimen: Blood   Result Value Ref Range    WBC 9.97 3.40 - 10.80 10*3/mm3    RBC 5.00 4.14 - 5.80 10*6/mm3    Hemoglobin 15.0 13.0 - 17.7 g/dL    Hematocrit 45.6 37.5 - 51.0 %    MCV 91.2 79.0 - 97.0 fL    MCH 30.0 26.6 - 33.0 pg    MCHC 32.9 31.5 - 35.7 g/dL    RDW 14.5 12.3 - 15.4 %    RDW-SD 48.9 37.0 - 54.0 fl    MPV 10.8 6.0 - 12.0 fL    Platelets 166 140 - 450 10*3/mm3   Comprehensive Metabolic Panel    Specimen: Blood   Result Value Ref Range    Glucose 117 (H) 65 - 99 mg/dL    BUN 14 8 - 23 mg/dL    Creatinine 1.12 0.76 - 1.27 mg/dL    Sodium 136 136 - 145 mmol/L    Potassium 3.5 3.5 - 5.2 mmol/L    Chloride 102 98 - 107 mmol/L    CO2 24.0 22.0 - 29.0 mmol/L    Calcium 8.3 (L) 8.6 - 10.5 mg/dL    Total Protein 6.3 6.0 - 8.5 g/dL    Albumin 3.5 3.5 - 5.2 g/dL    ALT (SGPT) 6 1 - 41 U/L    AST (SGOT) 7 1 - 40 U/L    Alkaline Phosphatase 68 39 - 117 U/L    Total Bilirubin 1.2 0.0 - 1.2 mg/dL    Globulin 2.8 gm/dL    A/G Ratio 1.3 g/dL    BUN/Creatinine Ratio 12.5 7.0 - 25.0    Anion Gap 10.0 5.0 - 15.0 mmol/L    eGFR 71.6 >60.0 mL/min/1.73   Valproic Acid Level, Total    Specimen: Blood   Result Value Ref Range    Valproic Acid 22.4 (L) 50.0 - 125.0 mcg/mL   CBC (No Diff)    Specimen: Blood   Result Value Ref Range    WBC 6.17 3.40 - 10.80 10*3/mm3    RBC 4.77 4.14 - 5.80 10*6/mm3    Hemoglobin 14.4 13.0 - 17.7 g/dL    Hematocrit 44.6 37.5 - 51.0 %    MCV 93.5 79.0 - 97.0 fL    MCH 30.2 26.6 - 33.0 pg    MCHC 32.3 31.5 - 35.7 g/dL    RDW 14.7 12.3 - 15.4 %    RDW-SD 51.2 37.0 - 54.0 fl    MPV 10.7 6.0 - 12.0 fL    Platelets 155 140 - 450 10*3/mm3   Comprehensive Metabolic Panel    Specimen: Blood   Result Value Ref Range    Glucose 129 (H) 65 - 99 mg/dL    BUN 11 8 - 23 mg/dL    Creatinine 0.88 0.76 - 1.27 mg/dL    Sodium 139 136 - 145 mmol/L    Potassium 4.0 3.5 - 5.2 mmol/L    Chloride 106 98 - 107 mmol/L    CO2 19.0 (L) 22.0 - 29.0 mmol/L    Calcium 8.5 (L) 8.6 - 10.5 mg/dL     Total Protein 6.4 6.0 - 8.5 g/dL    Albumin 3.3 (L) 3.5 - 5.2 g/dL    ALT (SGPT) 6 1 - 41 U/L    AST (SGOT) 11 1 - 40 U/L    Alkaline Phosphatase 61 39 - 117 U/L    Total Bilirubin 0.8 0.0 - 1.2 mg/dL    Globulin 3.1 gm/dL    A/G Ratio 1.1 g/dL    BUN/Creatinine Ratio 12.5 7.0 - 25.0    Anion Gap 14.0 5.0 - 15.0 mmol/L    eGFR 93.7 >60.0 mL/min/1.73   BNP    Specimen: Blood   Result Value Ref Range    proBNP 1,108.0 (H) 0.0 - 900.0 pg/mL   CBC (No Diff)    Specimen: Blood   Result Value Ref Range    WBC 5.53 3.40 - 10.80 10*3/mm3    RBC 4.85 4.14 - 5.80 10*6/mm3    Hemoglobin 14.7 13.0 - 17.7 g/dL    Hematocrit 43.7 37.5 - 51.0 %    MCV 90.1 79.0 - 97.0 fL    MCH 30.3 26.6 - 33.0 pg    MCHC 33.6 31.5 - 35.7 g/dL    RDW 14.5 12.3 - 15.4 %    RDW-SD 47.9 37.0 - 54.0 fl    MPV 10.5 6.0 - 12.0 fL    Platelets 202 140 - 450 10*3/mm3   Comprehensive Metabolic Panel    Specimen: Blood   Result Value Ref Range    Glucose 132 (H) 65 - 99 mg/dL    BUN 10 8 - 23 mg/dL    Creatinine 0.93 0.76 - 1.27 mg/dL    Sodium 138 136 - 145 mmol/L    Potassium 3.4 (L) 3.5 - 5.2 mmol/L    Chloride 105 98 - 107 mmol/L    CO2 25.0 22.0 - 29.0 mmol/L    Calcium 8.6 8.6 - 10.5 mg/dL    Total Protein 6.8 6.0 - 8.5 g/dL    Albumin 3.3 (L) 3.5 - 5.2 g/dL    ALT (SGPT) 7 1 - 41 U/L    AST (SGOT) 10 1 - 40 U/L    Alkaline Phosphatase 60 39 - 117 U/L    Total Bilirubin 0.5 0.0 - 1.2 mg/dL    Globulin 3.5 gm/dL    A/G Ratio 0.9 g/dL    BUN/Creatinine Ratio 10.8 7.0 - 25.0    Anion Gap 8.0 5.0 - 15.0 mmol/L    eGFR 89.4 >60.0 mL/min/1.73   Potassium    Specimen: Blood   Result Value Ref Range    Potassium 4.0 3.5 - 5.2 mmol/L   Basic Metabolic Panel    Specimen: Blood   Result Value Ref Range    Glucose 166 (H) 65 - 99 mg/dL    BUN 14 8 - 23 mg/dL    Creatinine 0.97 0.76 - 1.27 mg/dL    Sodium 138 136 - 145 mmol/L    Potassium 4.1 3.5 - 5.2 mmol/L    Chloride 103 98 - 107 mmol/L    CO2 25.0 22.0 - 29.0 mmol/L    Calcium 9.2 8.6 - 10.5 mg/dL     BUN/Creatinine Ratio 14.4 7.0 - 25.0    Anion Gap 10.0 5.0 - 15.0 mmol/L    eGFR 85.0 >60.0 mL/min/1.73   C-reactive Protein    Specimen: Blood   Result Value Ref Range    C-Reactive Protein 3.88 (H) 0.00 - 0.50 mg/dL   CBC Auto Differential    Specimen: Blood   Result Value Ref Range    WBC 5.60 3.40 - 10.80 10*3/mm3    RBC 4.93 4.14 - 5.80 10*6/mm3    Hemoglobin 14.8 13.0 - 17.7 g/dL    Hematocrit 44.7 37.5 - 51.0 %    MCV 90.7 79.0 - 97.0 fL    MCH 30.0 26.6 - 33.0 pg    MCHC 33.1 31.5 - 35.7 g/dL    RDW 14.6 12.3 - 15.4 %    RDW-SD 49.0 37.0 - 54.0 fl    MPV 10.3 6.0 - 12.0 fL    Platelets 206 140 - 450 10*3/mm3    Neutrophil % 54.1 42.7 - 76.0 %    Lymphocyte % 32.9 19.6 - 45.3 %    Monocyte % 8.9 5.0 - 12.0 %    Eosinophil % 2.9 0.3 - 6.2 %    Basophil % 0.7 0.0 - 1.5 %    Immature Grans % 0.5 0.0 - 0.5 %    Neutrophils, Absolute 3.03 1.70 - 7.00 10*3/mm3    Lymphocytes, Absolute 1.84 0.70 - 3.10 10*3/mm3    Monocytes, Absolute 0.50 0.10 - 0.90 10*3/mm3    Eosinophils, Absolute 0.16 0.00 - 0.40 10*3/mm3    Basophils, Absolute 0.04 0.00 - 0.20 10*3/mm3    Immature Grans, Absolute 0.03 0.00 - 0.05 10*3/mm3    nRBC 0.0 0.0 - 0.2 /100 WBC   POC Glucose Once    Specimen: Blood   Result Value Ref Range    Glucose 131 (H) 70 - 130 mg/dL   POC Glucose Once    Specimen: Blood   Result Value Ref Range    Glucose 189 (H) 70 - 130 mg/dL   POC Glucose Once    Specimen: Blood   Result Value Ref Range    Glucose 164 (H) 70 - 130 mg/dL   POC Glucose Once    Specimen: Blood   Result Value Ref Range    Glucose 191 (H) 70 - 130 mg/dL   POC Glucose Once    Specimen: Blood   Result Value Ref Range    Glucose 118 70 - 130 mg/dL   POC Glucose Once    Specimen: Blood   Result Value Ref Range    Glucose 182 (H) 70 - 130 mg/dL   POC Glucose Once    Specimen: Blood   Result Value Ref Range    Glucose 148 (H) 70 - 130 mg/dL   POC Glucose Once    Specimen: Blood   Result Value Ref Range    Glucose 174 (H) 70 - 130 mg/dL   POC Glucose Once     Specimen: Blood   Result Value Ref Range    Glucose 127 70 - 130 mg/dL   POC Glucose Once    Specimen: Blood   Result Value Ref Range    Glucose 117 70 - 130 mg/dL   POC Glucose Once    Specimen: Blood   Result Value Ref Range    Glucose 124 70 - 130 mg/dL   POC Glucose Once    Specimen: Blood   Result Value Ref Range    Glucose 194 (H) 70 - 130 mg/dL   POC Glucose Once    Specimen: Blood   Result Value Ref Range    Glucose 104 70 - 130 mg/dL   POC Glucose Once    Specimen: Blood   Result Value Ref Range    Glucose 117 70 - 130 mg/dL   POC Glucose Once    Specimen: Blood   Result Value Ref Range    Glucose 241 (H) 70 - 130 mg/dL   POC Glucose Once    Specimen: Blood   Result Value Ref Range    Glucose 148 (H) 70 - 130 mg/dL   POC Glucose Once    Specimen: Blood   Result Value Ref Range    Glucose 121 70 - 130 mg/dL   POC Glucose Once    Specimen: Blood   Result Value Ref Range    Glucose 157 (H) 70 - 130 mg/dL   ECG 12 Lead Dyspnea   Result Value Ref Range    QT Interval 466 ms    QTC Interval 499 ms   Adult Transthoracic Echo Complete w/ Color, Spectral and Contrast if Necessary Per Protocol   Result Value Ref Range    EF(MOD-bp) 38.7 %    LVIDd 5.6 cm    LVIDs 4.3 cm    IVSd 1.20 cm    LVPWd 1.10 cm    FS 23.2 %    IVS/LVPW 1.09 cm    ESV(cubed) 79.5 ml    EDV(cubed) 175.6 ml    LV mass(C)d 264.7 grams    LVOT area 3.8 cm2    LVOT diam 2.20 cm    EDV(MOD-sp2) 226.0 ml    EDV(MOD-sp4) 298.0 ml    ESV(MOD-sp2) 123.0 ml    ESV(MOD-sp4) 191.0 ml    SV(MOD-sp2) 103.0 ml    SV(MOD-sp4) 107.0 ml    EF(MOD-sp2) 45.6 %    EF(MOD-sp4) 35.9 %    MV E max irvin 62.9 cm/sec    MV A max irvin 84.4 cm/sec    MV E/A 0.75     LA ESV Index (BP) 33.7 ml/m2    Med Peak E' Irvin 5.8 cm/sec    Lat Peak E' Irvin 5.6 cm/sec    Avg E/e' ratio 11.04     SV(LVOT) 72.6 ml    RV Base 4.4 cm    RV Mid 3.4 cm    RV Length 8.3 cm    TAPSE (>1.6) 2.7 cm    RV S' 16.9 cm/sec    LA dimension (2D)  3.9 cm    LV V1 max 102.0 cm/sec    LV V1 max PG 4.2  mmHg    LV V1 mean PG 2.00 mmHg    LV V1 VTI 19.1 cm    Ao pk kenrick 258.4 cm/sec    Ao max PG 26.8 mmHg    Ao mean PG 16.4 mmHg    Ao V2 VTI 56.7 cm    DAYA(I,D) 1.28 cm2    AI P1/2t 632.8 msec    MV max PG 5.8 mmHg    MV mean PG 3.0 mmHg    MV V2 VTI 34.5 cm    MV P1/2t 84.2 msec    MVA(P1/2t) 2.6 cm2    MVA(VTI) 2.10 cm2    MV dec slope 379.0 cm/sec2    PA acc time 0.13 sec    Ao root diam 4.7 cm   Green Top (Gel)   Result Value Ref Range    Extra Tube Hold for add-ons.    Lavender Top   Result Value Ref Range    Extra Tube hold for add-on    Gold Top - SST   Result Value Ref Range    Extra Tube Hold for add-ons.    Gray Top   Result Value Ref Range    Extra Tube Hold for add-ons.    Light Blue Top   Result Value Ref Range    Extra Tube Hold for add-ons.      *Note: Due to a large number of results and/or encounters for the requested time period, some results have not been displayed. A complete set of results can be found in Results Review.         Assessment & Plan     Pt doing better. Never had a chance to try the monoril.    Now in with Dr Amos for Sz. Had changes.       Pt on lovastin     Needs  repatha. Dr Ramirez to work on this. Ins not agreeing.          Diagnoses and all orders for this visit:    1. Acute pyelonephritis (Primary)  -     Ambulatory Referral to Nutrition Services  -     TSH  -     T4, Free  -     Comprehensive Metabolic Panel  -     Vitamin B12  -     CBC & Differential  -     Hemoglobin A1c    2. Nephrolithiasis  -     Ambulatory Referral to Nutrition Services  -     TSH  -     T4, Free  -     Comprehensive Metabolic Panel  -     Vitamin B12  -     CBC & Differential  -     Hemoglobin A1c    3. Type 2 diabetes mellitus with hyperglycemia, unspecified whether long term insulin use  -     Ambulatory Referral to Nutrition Services  -     TSH  -     T4, Free  -     Comprehensive Metabolic Panel  -     Vitamin B12  -     CBC & Differential  -     Hemoglobin A1c    Other orders  -     spironolactone  (ALDACTONE) 50 MG tablet; Take 1 tablet by mouth Daily for 30 days.  Dispense: 90 tablet; Refill: 3      No follow-ups on file.          There are no Patient Instructions on file for this visit.     Keven Bear MD    Assessment & Plan

## 2024-02-23 NOTE — ADDENDUM NOTE
Encounter addended by: Rosy Otero, RN on: 2/23/2024 12:14 PM   Actions taken: Specialty comments modified, Visit diagnoses modified, Order list changed, Diagnosis association updated

## 2024-02-23 NOTE — TELEPHONE ENCOUNTER
Previous [facility] patient needing HOSP FUP for Seizures and ED docs requesting he see .

## 2024-02-23 NOTE — ADDENDUM NOTE
Encounter addended by: Rosy Otero, RN on: 2/23/2024 11:54 AM   Actions taken: Vitals modified, Order Reconciliation Section accessed

## 2024-02-24 ENCOUNTER — HOSPITAL ENCOUNTER (OUTPATIENT)
Dept: INFUSION THERAPY | Facility: HOSPITAL | Age: 69
Discharge: HOME | End: 2024-02-24
Payer: MEDICARE

## 2024-02-24 VITALS — SYSTOLIC BLOOD PRESSURE: 146 MMHG | DIASTOLIC BLOOD PRESSURE: 88 MMHG | TEMPERATURE: 97.1 F | HEART RATE: 71 BPM

## 2024-02-24 DIAGNOSIS — N39.0 URINARY TRACT INFECTION WITHOUT HEMATURIA, SITE UNSPECIFIED: Primary | ICD-10-CM

## 2024-02-24 LAB — BRIVARACETAM SERPL-MCNC: 1.66 UG/ML (ref 0.2–2)

## 2024-02-24 PROCEDURE — 25010000002 ERTAPENEM PER 500 MG: Performed by: INTERNAL MEDICINE

## 2024-02-24 PROCEDURE — 96365 THER/PROPH/DIAG IV INF INIT: CPT

## 2024-02-24 RX ORDER — SODIUM CHLORIDE 9 MG/ML
20 INJECTION, SOLUTION INTRAVENOUS ONCE
Status: CANCELLED | OUTPATIENT
Start: 2024-02-25

## 2024-02-24 RX ORDER — SODIUM CHLORIDE 9 MG/ML
20 INJECTION, SOLUTION INTRAVENOUS ONCE
Status: DISCONTINUED | OUTPATIENT
Start: 2024-02-24 | End: 2024-02-26 | Stop reason: HOSPADM

## 2024-02-24 RX ADMIN — ERTAPENEM 1000 MG: 1 INJECTION INTRAMUSCULAR; INTRAVENOUS at 09:00

## 2024-02-25 ENCOUNTER — HOSPITAL ENCOUNTER (OUTPATIENT)
Dept: INFUSION THERAPY | Facility: HOSPITAL | Age: 69
End: 2024-02-25
Payer: MEDICARE

## 2024-02-25 VITALS
DIASTOLIC BLOOD PRESSURE: 85 MMHG | TEMPERATURE: 98 F | RESPIRATION RATE: 16 BRPM | SYSTOLIC BLOOD PRESSURE: 140 MMHG | HEART RATE: 69 BPM

## 2024-02-25 DIAGNOSIS — N39.0 URINARY TRACT INFECTION WITHOUT HEMATURIA, SITE UNSPECIFIED: Primary | ICD-10-CM

## 2024-02-25 PROCEDURE — 25010000002 ERTAPENEM PER 500 MG: Performed by: INTERNAL MEDICINE

## 2024-02-25 PROCEDURE — 96365 THER/PROPH/DIAG IV INF INIT: CPT

## 2024-02-25 RX ORDER — SODIUM CHLORIDE 9 MG/ML
20 INJECTION, SOLUTION INTRAVENOUS ONCE
Status: ACTIVE | OUTPATIENT
Start: 2024-02-25

## 2024-02-25 RX ORDER — SODIUM CHLORIDE 9 MG/ML
20 INJECTION, SOLUTION INTRAVENOUS ONCE
Status: CANCELLED | OUTPATIENT
Start: 2024-02-26

## 2024-02-25 RX ADMIN — ERTAPENEM 1000 MG: 1 INJECTION INTRAMUSCULAR; INTRAVENOUS at 08:52

## 2024-02-26 ENCOUNTER — HOSPITAL ENCOUNTER (OUTPATIENT)
Dept: INFUSION THERAPY | Facility: HOSPITAL | Age: 69
Discharge: HOME | End: 2024-02-26
Admitting: INTERNAL MEDICINE
Payer: MEDICARE

## 2024-02-26 VITALS
TEMPERATURE: 97.5 F | RESPIRATION RATE: 18 BRPM | HEART RATE: 69 BPM | DIASTOLIC BLOOD PRESSURE: 79 MMHG | SYSTOLIC BLOOD PRESSURE: 118 MMHG | OXYGEN SATURATION: 96 %

## 2024-02-26 DIAGNOSIS — N39.0 URINARY TRACT INFECTION WITHOUT HEMATURIA, SITE UNSPECIFIED: Primary | ICD-10-CM

## 2024-02-26 PROCEDURE — 25810000003 SODIUM CHLORIDE 0.9 % SOLUTION: Performed by: INTERNAL MEDICINE

## 2024-02-26 PROCEDURE — 25010000002 ERTAPENEM PER 500 MG: Performed by: INTERNAL MEDICINE

## 2024-02-26 PROCEDURE — 96365 THER/PROPH/DIAG IV INF INIT: CPT

## 2024-02-26 RX ORDER — SODIUM CHLORIDE 9 MG/ML
20 INJECTION, SOLUTION INTRAVENOUS ONCE
Status: CANCELLED | OUTPATIENT
Start: 2024-02-27

## 2024-02-26 RX ORDER — SODIUM CHLORIDE 9 MG/ML
20 INJECTION, SOLUTION INTRAVENOUS ONCE
Status: COMPLETED | OUTPATIENT
Start: 2024-02-26 | End: 2024-02-26

## 2024-02-26 RX ADMIN — SODIUM CHLORIDE 20 ML/HR: 9 INJECTION, SOLUTION INTRAVENOUS at 10:12

## 2024-02-26 RX ADMIN — ERTAPENEM 1000 MG: 1 INJECTION INTRAMUSCULAR; INTRAVENOUS at 10:11

## 2024-02-27 ENCOUNTER — HOSPITAL ENCOUNTER (OUTPATIENT)
Dept: INFUSION THERAPY | Facility: HOSPITAL | Age: 69
Discharge: HOME | End: 2024-02-27
Admitting: INTERNAL MEDICINE
Payer: MEDICARE

## 2024-02-27 VITALS
OXYGEN SATURATION: 97 % | TEMPERATURE: 98 F | RESPIRATION RATE: 18 BRPM | HEART RATE: 68 BPM | SYSTOLIC BLOOD PRESSURE: 127 MMHG | DIASTOLIC BLOOD PRESSURE: 66 MMHG

## 2024-02-27 DIAGNOSIS — N39.0 URINARY TRACT INFECTION WITHOUT HEMATURIA, SITE UNSPECIFIED: Primary | ICD-10-CM

## 2024-02-27 PROCEDURE — 96365 THER/PROPH/DIAG IV INF INIT: CPT

## 2024-02-27 PROCEDURE — 25010000002 ERTAPENEM PER 500 MG: Performed by: INTERNAL MEDICINE

## 2024-02-27 PROCEDURE — 25810000003 SODIUM CHLORIDE 0.9 % SOLUTION: Performed by: INTERNAL MEDICINE

## 2024-02-27 RX ORDER — SODIUM CHLORIDE 9 MG/ML
20 INJECTION, SOLUTION INTRAVENOUS ONCE
Status: CANCELLED | OUTPATIENT
Start: 2024-02-28

## 2024-02-27 RX ORDER — SODIUM CHLORIDE 9 MG/ML
20 INJECTION, SOLUTION INTRAVENOUS ONCE
Status: COMPLETED | OUTPATIENT
Start: 2024-02-27 | End: 2024-02-27

## 2024-02-27 RX ADMIN — ERTAPENEM 1000 MG: 1 INJECTION INTRAMUSCULAR; INTRAVENOUS at 10:22

## 2024-02-27 RX ADMIN — SODIUM CHLORIDE 20 ML/HR: 9 INJECTION, SOLUTION INTRAVENOUS at 10:22

## 2024-02-28 ENCOUNTER — HOSPITAL ENCOUNTER (OUTPATIENT)
Dept: INFUSION THERAPY | Facility: HOSPITAL | Age: 69
Discharge: HOME | End: 2024-02-28
Admitting: INTERNAL MEDICINE
Payer: MEDICARE

## 2024-02-28 VITALS — DIASTOLIC BLOOD PRESSURE: 78 MMHG | HEART RATE: 87 BPM | TEMPERATURE: 97.7 F | SYSTOLIC BLOOD PRESSURE: 127 MMHG

## 2024-02-28 DIAGNOSIS — C61 PROSTATE CANCER: ICD-10-CM

## 2024-02-28 DIAGNOSIS — A41.51 SEPSIS DUE TO ESCHERICHIA COLI WITHOUT ACUTE ORGAN DYSFUNCTION: ICD-10-CM

## 2024-02-28 DIAGNOSIS — N39.0 URINARY TRACT INFECTION WITHOUT HEMATURIA, SITE UNSPECIFIED: Primary | ICD-10-CM

## 2024-02-28 DIAGNOSIS — R97.20 ELEVATED PROSTATE SPECIFIC ANTIGEN (PSA): ICD-10-CM

## 2024-02-28 LAB
ALBUMIN SERPL-MCNC: 4 G/DL (ref 3.5–5.2)
ALBUMIN/GLOB SERPL: 1.1 G/DL
ALP SERPL-CCNC: 77 U/L (ref 39–117)
ALT SERPL W P-5'-P-CCNC: 12 U/L (ref 1–41)
ANION GAP SERPL CALCULATED.3IONS-SCNC: 10.7 MMOL/L (ref 5–15)
AST SERPL-CCNC: 14 U/L (ref 1–40)
BASOPHILS # BLD AUTO: 0.05 10*3/MM3 (ref 0–0.2)
BASOPHILS NFR BLD AUTO: 0.6 % (ref 0–1.5)
BILIRUB SERPL-MCNC: 0.6 MG/DL (ref 0–1.2)
BUN SERPL-MCNC: 12 MG/DL (ref 8–23)
BUN/CREAT SERPL: 8.9 (ref 7–25)
CALCIUM SPEC-SCNC: 9.1 MG/DL (ref 8.6–10.5)
CHLORIDE SERPL-SCNC: 101 MMOL/L (ref 98–107)
CO2 SERPL-SCNC: 27.3 MMOL/L (ref 22–29)
CREAT SERPL-MCNC: 1.35 MG/DL (ref 0.76–1.27)
CRP SERPL-MCNC: 1.92 MG/DL (ref 0–0.5)
DEPRECATED RDW RBC AUTO: 45.1 FL (ref 37–54)
EGFRCR SERPLBLD CKD-EPI 2021: 57.2 ML/MIN/1.73
EOSINOPHIL # BLD AUTO: 0.2 10*3/MM3 (ref 0–0.4)
EOSINOPHIL NFR BLD AUTO: 2.5 % (ref 0.3–6.2)
ERYTHROCYTE [DISTWIDTH] IN BLOOD BY AUTOMATED COUNT: 14.1 % (ref 12.3–15.4)
ERYTHROCYTE [SEDIMENTATION RATE] IN BLOOD: 17 MM/HR (ref 0–20)
GLOBULIN UR ELPH-MCNC: 3.8 GM/DL
GLUCOSE SERPL-MCNC: 131 MG/DL (ref 65–99)
HCT VFR BLD AUTO: 46.8 % (ref 37.5–51)
HGB BLD-MCNC: 16.2 G/DL (ref 13–17.7)
IMM GRANULOCYTES # BLD AUTO: 0.04 10*3/MM3 (ref 0–0.05)
IMM GRANULOCYTES NFR BLD AUTO: 0.5 % (ref 0–0.5)
LYMPHOCYTES # BLD AUTO: 2.41 10*3/MM3 (ref 0.7–3.1)
LYMPHOCYTES NFR BLD AUTO: 30.2 % (ref 19.6–45.3)
MCH RBC QN AUTO: 30.6 PG (ref 26.6–33)
MCHC RBC AUTO-ENTMCNC: 34.6 G/DL (ref 31.5–35.7)
MCV RBC AUTO: 88.5 FL (ref 79–97)
MONOCYTES # BLD AUTO: 0.39 10*3/MM3 (ref 0.1–0.9)
MONOCYTES NFR BLD AUTO: 4.9 % (ref 5–12)
NEUTROPHILS NFR BLD AUTO: 4.89 10*3/MM3 (ref 1.7–7)
NEUTROPHILS NFR BLD AUTO: 61.3 % (ref 42.7–76)
NRBC BLD AUTO-RTO: 0 /100 WBC (ref 0–0.2)
PLATELET # BLD AUTO: 214 10*3/MM3 (ref 140–450)
PMV BLD AUTO: 10.2 FL (ref 6–12)
POTASSIUM SERPL-SCNC: 4 MMOL/L (ref 3.5–5.2)
PROT SERPL-MCNC: 7.8 G/DL (ref 6–8.5)
PSA SERPL-MCNC: 4.89 NG/ML (ref 0–4)
RBC # BLD AUTO: 5.29 10*6/MM3 (ref 4.14–5.8)
SODIUM SERPL-SCNC: 139 MMOL/L (ref 136–145)
T4 FREE SERPL-MCNC: 1.37 NG/DL (ref 0.93–1.7)
TSH SERPL DL<=0.05 MIU/L-ACNC: 2.64 UIU/ML (ref 0.27–4.2)
VIT B12 BLD-MCNC: 483 PG/ML (ref 211–946)
WBC NRBC COR # BLD AUTO: 7.98 10*3/MM3 (ref 3.4–10.8)

## 2024-02-28 PROCEDURE — 80053 COMPREHEN METABOLIC PANEL: CPT | Performed by: INTERNAL MEDICINE

## 2024-02-28 PROCEDURE — 84153 ASSAY OF PSA TOTAL: CPT | Performed by: UROLOGY

## 2024-02-28 PROCEDURE — 84443 ASSAY THYROID STIM HORMONE: CPT | Performed by: INTERNAL MEDICINE

## 2024-02-28 PROCEDURE — 96365 THER/PROPH/DIAG IV INF INIT: CPT

## 2024-02-28 PROCEDURE — 25810000003 SODIUM CHLORIDE 0.9 % SOLUTION: Performed by: INTERNAL MEDICINE

## 2024-02-28 PROCEDURE — 82607 VITAMIN B-12: CPT | Performed by: INTERNAL MEDICINE

## 2024-02-28 PROCEDURE — 85025 COMPLETE CBC W/AUTO DIFF WBC: CPT | Performed by: INTERNAL MEDICINE

## 2024-02-28 PROCEDURE — 85652 RBC SED RATE AUTOMATED: CPT | Performed by: INTERNAL MEDICINE

## 2024-02-28 PROCEDURE — 86140 C-REACTIVE PROTEIN: CPT | Performed by: INTERNAL MEDICINE

## 2024-02-28 PROCEDURE — 25010000002 ERTAPENEM PER 500 MG: Performed by: INTERNAL MEDICINE

## 2024-02-28 PROCEDURE — 84439 ASSAY OF FREE THYROXINE: CPT | Performed by: INTERNAL MEDICINE

## 2024-02-28 RX ORDER — SODIUM CHLORIDE 9 MG/ML
20 INJECTION, SOLUTION INTRAVENOUS ONCE
Status: COMPLETED | OUTPATIENT
Start: 2024-02-28 | End: 2024-02-28

## 2024-02-28 RX ORDER — SODIUM CHLORIDE 9 MG/ML
20 INJECTION, SOLUTION INTRAVENOUS ONCE
Status: CANCELLED | OUTPATIENT
Start: 2024-02-29

## 2024-02-28 RX ADMIN — SODIUM CHLORIDE 20 ML/HR: 9 INJECTION, SOLUTION INTRAVENOUS at 11:21

## 2024-02-28 RX ADMIN — ERTAPENEM 1000 MG: 1 INJECTION INTRAMUSCULAR; INTRAVENOUS at 10:50

## 2024-03-01 ENCOUNTER — TELEPHONE (OUTPATIENT)
Dept: NEUROLOGY | Facility: CLINIC | Age: 69
End: 2024-03-01
Payer: MEDICARE

## 2024-03-01 ENCOUNTER — READMISSION MANAGEMENT (OUTPATIENT)
Dept: CALL CENTER | Facility: HOSPITAL | Age: 69
End: 2024-03-01
Payer: MEDICARE

## 2024-03-01 ENCOUNTER — HOSPITAL ENCOUNTER (OUTPATIENT)
Dept: INFUSION THERAPY | Facility: HOSPITAL | Age: 69
Discharge: HOME | End: 2024-03-01
Payer: MEDICARE

## 2024-03-01 VITALS
HEART RATE: 69 BPM | SYSTOLIC BLOOD PRESSURE: 135 MMHG | DIASTOLIC BLOOD PRESSURE: 82 MMHG | TEMPERATURE: 98 F | RESPIRATION RATE: 18 BRPM | OXYGEN SATURATION: 96 %

## 2024-03-01 DIAGNOSIS — N39.0 URINARY TRACT INFECTION WITHOUT HEMATURIA, SITE UNSPECIFIED: Primary | ICD-10-CM

## 2024-03-01 PROCEDURE — 96366 THER/PROPH/DIAG IV INF ADDON: CPT

## 2024-03-01 PROCEDURE — 25810000003 SODIUM CHLORIDE 0.9 % SOLUTION: Performed by: INTERNAL MEDICINE

## 2024-03-01 PROCEDURE — 96365 THER/PROPH/DIAG IV INF INIT: CPT

## 2024-03-01 PROCEDURE — 25010000002 ERTAPENEM PER 500 MG: Performed by: INTERNAL MEDICINE

## 2024-03-01 RX ORDER — SODIUM CHLORIDE 9 MG/ML
20 INJECTION, SOLUTION INTRAVENOUS ONCE
Status: COMPLETED | OUTPATIENT
Start: 2024-03-01 | End: 2024-03-01

## 2024-03-01 RX ORDER — SODIUM CHLORIDE 9 MG/ML
20 INJECTION, SOLUTION INTRAVENOUS ONCE
Status: CANCELLED | OUTPATIENT
Start: 2024-03-02

## 2024-03-01 RX ADMIN — SODIUM CHLORIDE 200 ML/HR: 9 INJECTION, SOLUTION INTRAVENOUS at 10:02

## 2024-03-01 RX ADMIN — ERTAPENEM 1000 MG: 1 INJECTION INTRAMUSCULAR; INTRAVENOUS at 09:26

## 2024-03-01 NOTE — TELEPHONE ENCOUNTER
Caller: YASMIN    Relationship: W/ [employer] [facility]    Best call back number: [phone]    What was the call regarding: YASMIN STATES THEY RECEIVED AN APPLICATION FOR THE [email or web address] FINANCIAL ASSISTANCE PROGRAM (WITH DR. MARIE'S NAME ON THE APPLICATION; [facility]). THEY ARE MISSING AN RX FOR THE MEDICATION AND SENT REQUEST FOR RX TO THE OFFICE YESTERDAY. THEY ARE ASKING RX BE FAXED TO [phone].    PT IS AN ESTABLISHED PATIENT OF [facility]; SHE HAS A HOSPITAL F/U VISIT W/ DR. BUSTOS ON 3/15/24. UNSURE WHO IS MANAGING RX AT THIS TIME.    Do you require a callback: IF NEEDED.    PLEASE REVIEW AND ADVISE.

## 2024-03-01 NOTE — OUTREACH NOTE
Medical Week 2 Survey      Flowsheet Row Responses   [facility] patient discharged from? [facility]   Does the patient have one of the following disease processes/diagnoses(primary or secondary)? Other   Week 2 attempt successful? Yes   Call start time 1722   Discharge diagnosis UTI (urinary tract infection)   Call end time 1724   Meds reviewed with patient/caregiver? Yes   Is the patient having any side effects they believe may be caused by any medication additions or changes? No   Does the patient have all medications ordered at discharge? Yes   Is the patient taking all medications as directed (includes completed medication regime)? Yes   Does the patient have a primary care provider?  Yes   Does the patient have an appointment with their PCP within 7 days of discharge? Yes   Has the patient kept scheduled appointments due by today? Yes   Psychosocial issues? No   Did the patient receive a copy of their discharge instructions? Yes   Nursing interventions Reviewed instructions with patient   What is the patient's perception of their health status since discharge? Improving   Is the patient/caregiver able to teach back signs and symptoms related to disease process for when to call PCP? Yes   Is the patient/caregiver able to teach back signs and symptoms related to disease process for when to call 911? Yes   Is the patient/caregiver able to teach back the hierarchy of who to call/visit for symptoms/problems? PCP, Specialist, Home health nurse, Urgent Care, ED, 911 Yes   If the patient is a current smoker, are they able to teach back resources for cessation? Not a smoker   Week 2 Call Completed? Yes   Is the patient interested in additional calls from an ambulatory ? No   Would this patient benefit from a Referral to Amb Social Work? No   Wrap up additional comments doing well has seen his doctors   Call end time 1724            ODALYS CORDERO - Registered Nurse

## 2024-03-02 ENCOUNTER — HOSPITAL ENCOUNTER (OUTPATIENT)
Dept: INFUSION THERAPY | Facility: HOSPITAL | Age: 69
Discharge: HOME | End: 2024-03-02
Payer: MEDICARE

## 2024-03-02 VITALS
RESPIRATION RATE: 18 BRPM | SYSTOLIC BLOOD PRESSURE: 139 MMHG | TEMPERATURE: 97.9 F | OXYGEN SATURATION: 96 % | HEART RATE: 73 BPM | DIASTOLIC BLOOD PRESSURE: 96 MMHG

## 2024-03-02 DIAGNOSIS — N39.0 URINARY TRACT INFECTION WITHOUT HEMATURIA, SITE UNSPECIFIED: Primary | ICD-10-CM

## 2024-03-02 PROCEDURE — 25010000002 ERTAPENEM PER 500 MG: Performed by: INTERNAL MEDICINE

## 2024-03-02 PROCEDURE — 96365 THER/PROPH/DIAG IV INF INIT: CPT

## 2024-03-02 RX ORDER — SODIUM CHLORIDE 9 MG/ML
20 INJECTION, SOLUTION INTRAVENOUS ONCE
Status: CANCELLED | OUTPATIENT
Start: 2024-03-03

## 2024-03-02 RX ORDER — SODIUM CHLORIDE 9 MG/ML
20 INJECTION, SOLUTION INTRAVENOUS ONCE
Status: DISCONTINUED | OUTPATIENT
Start: 2024-03-02 | End: 2024-03-04 | Stop reason: HOSPADM

## 2024-03-02 RX ADMIN — ERTAPENEM 1000 MG: 1 INJECTION INTRAMUSCULAR; INTRAVENOUS at 09:08

## 2024-03-03 ENCOUNTER — HOSPITAL ENCOUNTER (OUTPATIENT)
Dept: INFUSION THERAPY | Facility: HOSPITAL | Age: 69
End: 2024-03-03
Payer: MEDICARE

## 2024-03-03 VITALS
HEART RATE: 89 BPM | TEMPERATURE: 97.9 F | DIASTOLIC BLOOD PRESSURE: 87 MMHG | OXYGEN SATURATION: 99 % | SYSTOLIC BLOOD PRESSURE: 137 MMHG | RESPIRATION RATE: 18 BRPM

## 2024-03-03 DIAGNOSIS — N39.0 URINARY TRACT INFECTION WITHOUT HEMATURIA, SITE UNSPECIFIED: Primary | ICD-10-CM

## 2024-03-03 PROCEDURE — 25010000002 ERTAPENEM PER 500 MG: Performed by: INTERNAL MEDICINE

## 2024-03-03 PROCEDURE — 96365 THER/PROPH/DIAG IV INF INIT: CPT

## 2024-03-03 RX ORDER — SODIUM CHLORIDE 9 MG/ML
20 INJECTION, SOLUTION INTRAVENOUS ONCE
Status: ACTIVE | OUTPATIENT
Start: 2024-03-03

## 2024-03-03 RX ORDER — SODIUM CHLORIDE 9 MG/ML
20 INJECTION, SOLUTION INTRAVENOUS ONCE
Status: CANCELLED | OUTPATIENT
Start: 2024-03-04

## 2024-03-03 RX ADMIN — ERTAPENEM 1000 MG: 1 INJECTION INTRAMUSCULAR; INTRAVENOUS at 09:03

## 2024-03-04 ENCOUNTER — HOSPITAL ENCOUNTER (OUTPATIENT)
Dept: INFUSION THERAPY | Facility: HOSPITAL | Age: 69
Discharge: HOME | End: 2024-03-04
Admitting: INTERNAL MEDICINE
Payer: MEDICARE

## 2024-03-04 VITALS
HEART RATE: 48 BPM | OXYGEN SATURATION: 96 % | DIASTOLIC BLOOD PRESSURE: 69 MMHG | RESPIRATION RATE: 18 BRPM | SYSTOLIC BLOOD PRESSURE: 127 MMHG | TEMPERATURE: 97 F

## 2024-03-04 DIAGNOSIS — N39.0 URINARY TRACT INFECTION WITHOUT HEMATURIA, SITE UNSPECIFIED: Primary | ICD-10-CM

## 2024-03-04 PROCEDURE — 96365 THER/PROPH/DIAG IV INF INIT: CPT

## 2024-03-04 PROCEDURE — 25010000002 ERTAPENEM PER 500 MG: Performed by: INTERNAL MEDICINE

## 2024-03-04 RX ORDER — SODIUM CHLORIDE 9 MG/ML
20 INJECTION, SOLUTION INTRAVENOUS ONCE
Status: CANCELLED | OUTPATIENT
Start: 2024-03-05

## 2024-03-04 RX ORDER — SODIUM CHLORIDE 9 MG/ML
20 INJECTION, SOLUTION INTRAVENOUS ONCE
Status: DISCONTINUED | OUTPATIENT
Start: 2024-03-04 | End: 2024-03-06 | Stop reason: HOSPADM

## 2024-03-04 RX ADMIN — ERTAPENEM 1000 MG: 1 INJECTION INTRAMUSCULAR; INTRAVENOUS at 09:28

## 2024-03-04 NOTE — TELEPHONE ENCOUNTER
"  Caller: Andre Agosto \"Bernardo\"    Relationship: Self    Best call back number: [phone]    What is the best time to reach you: ANY    Who are you requesting to speak with (clinical staff, provider,  specific staff member): ANY    What was the call regarding: CALLED REGARDING BRIVIACT - NEEDS SCRIPT SENT TO [facility] SO THEY CAN PROCESS HIS ACCOUNT. PLEASE ADVISE, THANK YOU.  "

## 2024-03-04 NOTE — TELEPHONE ENCOUNTER
I have never seen patient for his seizures. I believe I sent him to Dr. Victoria for those. It looks like a Dr. Akbar prescribed Briviact for him. He will need to fill that assistance form out for him.

## 2024-03-05 ENCOUNTER — HOSPITAL ENCOUNTER (OUTPATIENT)
Dept: INFUSION THERAPY | Facility: HOSPITAL | Age: 69
Discharge: HOME | End: 2024-03-05
Admitting: INTERNAL MEDICINE
Payer: MEDICARE

## 2024-03-05 ENCOUNTER — TELEPHONE (OUTPATIENT)
Dept: NEUROLOGY | Facility: CLINIC | Age: 69
End: 2024-03-05
Payer: MEDICARE

## 2024-03-05 VITALS
SYSTOLIC BLOOD PRESSURE: 128 MMHG | RESPIRATION RATE: 18 BRPM | TEMPERATURE: 97 F | DIASTOLIC BLOOD PRESSURE: 85 MMHG | HEART RATE: 59 BPM

## 2024-03-05 DIAGNOSIS — N39.0 URINARY TRACT INFECTION WITHOUT HEMATURIA, SITE UNSPECIFIED: Primary | ICD-10-CM

## 2024-03-05 PROCEDURE — 25810000003 SODIUM CHLORIDE 0.9 % SOLUTION: Performed by: INTERNAL MEDICINE

## 2024-03-05 PROCEDURE — 25010000002 ERTAPENEM PER 500 MG: Performed by: INTERNAL MEDICINE

## 2024-03-05 PROCEDURE — 96365 THER/PROPH/DIAG IV INF INIT: CPT

## 2024-03-05 RX ORDER — SODIUM CHLORIDE 9 MG/ML
20 INJECTION, SOLUTION INTRAVENOUS ONCE
Status: CANCELLED | OUTPATIENT
Start: 2024-03-06

## 2024-03-05 RX ORDER — SODIUM CHLORIDE 9 MG/ML
20 INJECTION, SOLUTION INTRAVENOUS ONCE
Status: COMPLETED | OUTPATIENT
Start: 2024-03-05 | End: 2024-03-05

## 2024-03-05 RX ADMIN — SODIUM CHLORIDE 20 ML/HR: 9 INJECTION, SOLUTION INTRAVENOUS at 09:32

## 2024-03-05 RX ADMIN — ERTAPENEM 1000 MG: 1 INJECTION INTRAMUSCULAR; INTRAVENOUS at 09:32

## 2024-03-05 NOTE — TELEPHONE ENCOUNTER
Provider: JULI    Caller: PATIENT    Relationship to Patient: SELF    Phone Number: [phone]    Reason for Call: PT NEEDS A SCRIPT SENT IN TO [facility] FOR BRIVIACT.  HE WILL RUN OUT OF MEDICATION FRIDAY.  [facility] IS THE PROGRAM THAT IS GOING TO COVER THIS MEDICATION FOR HIM TO GET IT FREE OR AT A REDUCED COST, BUT THEY NEED THE ACTUAL SCRIPT SENT TO THEM.  PT STATES [facility] HAS SENT ALL THE INFO TO THE OFFICE. PT STATES HE WAS GIVEN THIS MEDICATION BY YAEL MARTINEZ IN [facility] AND SET HIM UP IN THIS PROGRAM.     PLEASE CALL PT TO ADVISE     THANK YOU

## 2024-03-06 ENCOUNTER — HOSPITAL ENCOUNTER (OUTPATIENT)
Dept: INFUSION THERAPY | Facility: HOSPITAL | Age: 69
Discharge: HOME | End: 2024-03-06
Admitting: INTERNAL MEDICINE
Payer: MEDICARE

## 2024-03-06 VITALS — HEART RATE: 70 BPM | DIASTOLIC BLOOD PRESSURE: 70 MMHG | SYSTOLIC BLOOD PRESSURE: 123 MMHG | RESPIRATION RATE: 18 BRPM

## 2024-03-06 DIAGNOSIS — R41.3 MEMORY LOSS: ICD-10-CM

## 2024-03-06 DIAGNOSIS — N20.0 KIDNEY STONE: ICD-10-CM

## 2024-03-06 DIAGNOSIS — R06.02 SOB (SHORTNESS OF BREATH) ON EXERTION: ICD-10-CM

## 2024-03-06 DIAGNOSIS — H52.221 REGULAR ASTIGMATISM, RIGHT EYE: ICD-10-CM

## 2024-03-06 DIAGNOSIS — J18.9 PNEUMONIA OF RIGHT LOWER LOBE DUE TO INFECTIOUS ORGANISM: ICD-10-CM

## 2024-03-06 DIAGNOSIS — R97.20 ELEVATED PROSTATE SPECIFIC ANTIGEN (PSA): ICD-10-CM

## 2024-03-06 DIAGNOSIS — G24.5 EYE TWITCH: ICD-10-CM

## 2024-03-06 DIAGNOSIS — I25.10 CORONARY ARTERIOSCLEROSIS: ICD-10-CM

## 2024-03-06 DIAGNOSIS — G47.33 OSA (OBSTRUCTIVE SLEEP APNEA): ICD-10-CM

## 2024-03-06 DIAGNOSIS — F41.1 GENERALIZED ANXIETY DISORDER: ICD-10-CM

## 2024-03-06 DIAGNOSIS — H52.13 MYOPIA, BILATERAL: ICD-10-CM

## 2024-03-06 DIAGNOSIS — R73.09 BLOOD GLUCOSE ABNORMAL: ICD-10-CM

## 2024-03-06 DIAGNOSIS — Z71.6 TOBACCO ABUSE COUNSELING: ICD-10-CM

## 2024-03-06 DIAGNOSIS — A41.51 SEPSIS DUE TO ESCHERICHIA COLI, UNSPECIFIED WHETHER ACUTE ORGAN DYSFUNCTION PRESENT: ICD-10-CM

## 2024-03-06 DIAGNOSIS — E66.09 OTHER OBESITY DUE TO EXCESS CALORIES: ICD-10-CM

## 2024-03-06 DIAGNOSIS — Z86.19 HISTORY OF HELICOBACTER PYLORI INFECTION: ICD-10-CM

## 2024-03-06 DIAGNOSIS — I10 ESSENTIAL HYPERTENSION: ICD-10-CM

## 2024-03-06 DIAGNOSIS — M79.604 RIGHT LEG PAIN: ICD-10-CM

## 2024-03-06 DIAGNOSIS — R50.9 FEVER AND CHILLS: ICD-10-CM

## 2024-03-06 DIAGNOSIS — A41.51 SEPSIS DUE TO ESCHERICHIA COLI WITHOUT ACUTE ORGAN DYSFUNCTION: ICD-10-CM

## 2024-03-06 DIAGNOSIS — T81.718A PSEUDOANEURYSM OF FEMORAL ARTERY FOLLOWING PROCEDURE: ICD-10-CM

## 2024-03-06 DIAGNOSIS — R10.13 DYSPEPSIA: ICD-10-CM

## 2024-03-06 DIAGNOSIS — Z86.73 HISTORY OF CEREBROVASCULAR ACCIDENT: ICD-10-CM

## 2024-03-06 DIAGNOSIS — I48.92 PAROXYSMAL ATRIAL FLUTTER: ICD-10-CM

## 2024-03-06 DIAGNOSIS — R31.0 FRANK HEMATURIA: ICD-10-CM

## 2024-03-06 DIAGNOSIS — Z78.9 MEDICATION INTOLERANCE: ICD-10-CM

## 2024-03-06 DIAGNOSIS — N39.0 URINARY TRACT INFECTION WITHOUT HEMATURIA, SITE UNSPECIFIED: Primary | ICD-10-CM

## 2024-03-06 DIAGNOSIS — Z99.89 BIPAP (BIPHASIC POSITIVE AIRWAY PRESSURE) DEPENDENCE: ICD-10-CM

## 2024-03-06 DIAGNOSIS — E87.6 HYPOKALEMIA: ICD-10-CM

## 2024-03-06 DIAGNOSIS — I72.4 PSEUDOANEURYSM OF FEMORAL ARTERY FOLLOWING PROCEDURE: ICD-10-CM

## 2024-03-06 DIAGNOSIS — Z20.822 SUSPECTED COVID-19 VIRUS INFECTION: ICD-10-CM

## 2024-03-06 DIAGNOSIS — G45.9 TIA (TRANSIENT ISCHEMIC ATTACK): ICD-10-CM

## 2024-03-06 DIAGNOSIS — M25.561 ARTHRALGIA OF RIGHT KNEE: ICD-10-CM

## 2024-03-06 DIAGNOSIS — I10 BENIGN HYPERTENSION: ICD-10-CM

## 2024-03-06 DIAGNOSIS — I47.20 SUSTAINED VENTRICULAR TACHYCARDIA: ICD-10-CM

## 2024-03-06 DIAGNOSIS — H52.4 PRESBYOPIA: ICD-10-CM

## 2024-03-06 DIAGNOSIS — E11.9 DIABETES MELLITUS WITHOUT COMPLICATION: ICD-10-CM

## 2024-03-06 DIAGNOSIS — N20.0 NEPHROLITHIASIS: ICD-10-CM

## 2024-03-06 DIAGNOSIS — A41.59 OTHER GRAM-NEGATIVE SEPSIS: ICD-10-CM

## 2024-03-06 DIAGNOSIS — B96.20 BACTEREMIA DUE TO ESCHERICHIA COLI: ICD-10-CM

## 2024-03-06 DIAGNOSIS — F41.9 ANXIETY: ICD-10-CM

## 2024-03-06 DIAGNOSIS — E26.9 HYPERALDOSTERONISM: ICD-10-CM

## 2024-03-06 DIAGNOSIS — B96.89 OTHER SPECIFIED BACTERIAL AGENTS AS THE CAUSE OF DISEASES CLASSIFIED ELSEWHERE: ICD-10-CM

## 2024-03-06 DIAGNOSIS — I11.0 BENIGN HYPERTENSIVE HEART DISEASE WITH CONGESTIVE HEART FAILURE: ICD-10-CM

## 2024-03-06 DIAGNOSIS — R94.39 ABNORMAL STRESS TEST: ICD-10-CM

## 2024-03-06 DIAGNOSIS — R56.9 SEIZURE-LIKE ACTIVITY: ICD-10-CM

## 2024-03-06 DIAGNOSIS — R60.0 LOCALIZED EDEMA: ICD-10-CM

## 2024-03-06 DIAGNOSIS — R78.81 BACTEREMIA DUE TO ESCHERICHIA COLI: ICD-10-CM

## 2024-03-06 LAB
ALBUMIN SERPL-MCNC: 3.9 G/DL (ref 3.5–5.2)
ALBUMIN/GLOB SERPL: 1.1 G/DL
ALP SERPL-CCNC: 68 U/L (ref 39–117)
ALT SERPL W P-5'-P-CCNC: 12 U/L (ref 1–41)
ANION GAP SERPL CALCULATED.3IONS-SCNC: 12.3 MMOL/L (ref 5–15)
AST SERPL-CCNC: 12 U/L (ref 1–40)
BASOPHILS # BLD AUTO: 0.07 10*3/MM3 (ref 0–0.2)
BASOPHILS NFR BLD AUTO: 1 % (ref 0–1.5)
BILIRUB SERPL-MCNC: 0.9 MG/DL (ref 0–1.2)
BUN SERPL-MCNC: 12 MG/DL (ref 8–23)
BUN/CREAT SERPL: 8.6 (ref 7–25)
CALCIUM SPEC-SCNC: 8.9 MG/DL (ref 8.6–10.5)
CHLORIDE SERPL-SCNC: 100 MMOL/L (ref 98–107)
CO2 SERPL-SCNC: 27.7 MMOL/L (ref 22–29)
CREAT SERPL-MCNC: 1.39 MG/DL (ref 0.76–1.27)
CRP SERPL-MCNC: 0.95 MG/DL (ref 0–0.5)
DEPRECATED RDW RBC AUTO: 46.5 FL (ref 37–54)
EGFRCR SERPLBLD CKD-EPI 2021: 55.2 ML/MIN/1.73
EOSINOPHIL # BLD AUTO: 0.18 10*3/MM3 (ref 0–0.4)
EOSINOPHIL NFR BLD AUTO: 2.6 % (ref 0.3–6.2)
ERYTHROCYTE [DISTWIDTH] IN BLOOD BY AUTOMATED COUNT: 14.3 % (ref 12.3–15.4)
ERYTHROCYTE [SEDIMENTATION RATE] IN BLOOD: 8 MM/HR (ref 0–20)
GLOBULIN UR ELPH-MCNC: 3.5 GM/DL
GLUCOSE SERPL-MCNC: 161 MG/DL (ref 65–99)
HBA1C MFR BLD: 6.5 % (ref 4.8–5.6)
HCT VFR BLD AUTO: 45.5 % (ref 37.5–51)
HGB BLD-MCNC: 15.6 G/DL (ref 13–17.7)
IMM GRANULOCYTES # BLD AUTO: 0.02 10*3/MM3 (ref 0–0.05)
IMM GRANULOCYTES NFR BLD AUTO: 0.3 % (ref 0–0.5)
LYMPHOCYTES # BLD AUTO: 2.04 10*3/MM3 (ref 0.7–3.1)
LYMPHOCYTES NFR BLD AUTO: 29.8 % (ref 19.6–45.3)
MCH RBC QN AUTO: 30.6 PG (ref 26.6–33)
MCHC RBC AUTO-ENTMCNC: 34.3 G/DL (ref 31.5–35.7)
MCV RBC AUTO: 89.4 FL (ref 79–97)
MONOCYTES # BLD AUTO: 0.37 10*3/MM3 (ref 0.1–0.9)
MONOCYTES NFR BLD AUTO: 5.4 % (ref 5–12)
NEUTROPHILS NFR BLD AUTO: 4.16 10*3/MM3 (ref 1.7–7)
NEUTROPHILS NFR BLD AUTO: 60.9 % (ref 42.7–76)
NRBC BLD AUTO-RTO: 0 /100 WBC (ref 0–0.2)
PLATELET # BLD AUTO: 208 10*3/MM3 (ref 140–450)
PMV BLD AUTO: 10.8 FL (ref 6–12)
POTASSIUM SERPL-SCNC: 3.8 MMOL/L (ref 3.5–5.2)
PROT SERPL-MCNC: 7.4 G/DL (ref 6–8.5)
RBC # BLD AUTO: 5.09 10*6/MM3 (ref 4.14–5.8)
SODIUM SERPL-SCNC: 140 MMOL/L (ref 136–145)
WBC NRBC COR # BLD AUTO: 6.84 10*3/MM3 (ref 3.4–10.8)

## 2024-03-06 PROCEDURE — 86140 C-REACTIVE PROTEIN: CPT | Performed by: INTERNAL MEDICINE

## 2024-03-06 PROCEDURE — 25010000002 ERTAPENEM PER 500 MG: Performed by: INTERNAL MEDICINE

## 2024-03-06 PROCEDURE — 25810000003 SODIUM CHLORIDE 0.9 % SOLUTION: Performed by: INTERNAL MEDICINE

## 2024-03-06 PROCEDURE — 80053 COMPREHEN METABOLIC PANEL: CPT | Performed by: INTERNAL MEDICINE

## 2024-03-06 PROCEDURE — 83036 HEMOGLOBIN GLYCOSYLATED A1C: CPT | Performed by: INTERNAL MEDICINE

## 2024-03-06 PROCEDURE — 85652 RBC SED RATE AUTOMATED: CPT | Performed by: INTERNAL MEDICINE

## 2024-03-06 PROCEDURE — 96365 THER/PROPH/DIAG IV INF INIT: CPT

## 2024-03-06 PROCEDURE — 85025 COMPLETE CBC W/AUTO DIFF WBC: CPT | Performed by: INTERNAL MEDICINE

## 2024-03-06 RX ORDER — SODIUM CHLORIDE 9 MG/ML
20 INJECTION, SOLUTION INTRAVENOUS ONCE
Status: CANCELLED | OUTPATIENT
Start: 2024-03-07

## 2024-03-06 RX ORDER — SODIUM CHLORIDE 9 MG/ML
20 INJECTION, SOLUTION INTRAVENOUS ONCE
Status: COMPLETED | OUTPATIENT
Start: 2024-03-06 | End: 2024-03-06

## 2024-03-06 RX ADMIN — SODIUM CHLORIDE 20 ML/HR: 9 INJECTION, SOLUTION INTRAVENOUS at 08:59

## 2024-03-06 RX ADMIN — ERTAPENEM 1000 MG: 1 INJECTION INTRAMUSCULAR; INTRAVENOUS at 08:59

## 2024-03-07 DIAGNOSIS — N41.0 PROSTATITIS, ACUTE: Primary | ICD-10-CM

## 2024-03-07 DIAGNOSIS — G40.909 SEIZURE DISORDER: ICD-10-CM

## 2024-03-07 NOTE — TELEPHONE ENCOUNTER
CHECKING TO STATUS OF THE SCRIPT NEEDS TO BE SENT [phone] TO BE ENROLLED IN THE SAVINGS PLAN FOR BRIVIACT.

## 2024-03-08 ENCOUNTER — HOSPITAL ENCOUNTER (OUTPATIENT)
Dept: INFUSION THERAPY | Facility: HOSPITAL | Age: 69
Discharge: HOME | End: 2024-03-08
Payer: MEDICARE

## 2024-03-08 VITALS — TEMPERATURE: 97.9 F | SYSTOLIC BLOOD PRESSURE: 121 MMHG | HEART RATE: 69 BPM | DIASTOLIC BLOOD PRESSURE: 76 MMHG

## 2024-03-08 DIAGNOSIS — N39.0 URINARY TRACT INFECTION WITHOUT HEMATURIA, SITE UNSPECIFIED: Primary | ICD-10-CM

## 2024-03-08 PROCEDURE — 25010000002 ERTAPENEM PER 500 MG: Performed by: INTERNAL MEDICINE

## 2024-03-08 PROCEDURE — 96365 THER/PROPH/DIAG IV INF INIT: CPT

## 2024-03-08 RX ORDER — SODIUM CHLORIDE 9 MG/ML
20 INJECTION, SOLUTION INTRAVENOUS ONCE
Status: CANCELLED | OUTPATIENT
Start: 2024-03-09

## 2024-03-08 RX ORDER — SODIUM CHLORIDE 9 MG/ML
20 INJECTION, SOLUTION INTRAVENOUS ONCE
Status: DISCONTINUED | OUTPATIENT
Start: 2024-03-08 | End: 2024-03-10 | Stop reason: HOSPADM

## 2024-03-08 RX ADMIN — ERTAPENEM 1000 MG: 1 INJECTION INTRAMUSCULAR; INTRAVENOUS at 09:31

## 2024-03-09 ENCOUNTER — HOSPITAL ENCOUNTER (OUTPATIENT)
Dept: INFUSION THERAPY | Facility: HOSPITAL | Age: 69
Discharge: HOME | End: 2024-03-09
Payer: MEDICARE

## 2024-03-09 DIAGNOSIS — N39.0 URINARY TRACT INFECTION WITHOUT HEMATURIA, SITE UNSPECIFIED: Primary | ICD-10-CM

## 2024-03-09 PROCEDURE — 96365 THER/PROPH/DIAG IV INF INIT: CPT

## 2024-03-09 PROCEDURE — 25010000002 ERTAPENEM PER 500 MG: Performed by: INTERNAL MEDICINE

## 2024-03-09 RX ORDER — SODIUM CHLORIDE 9 MG/ML
20 INJECTION, SOLUTION INTRAVENOUS ONCE
Status: DISCONTINUED | OUTPATIENT
Start: 2024-03-09 | End: 2024-03-11 | Stop reason: HOSPADM

## 2024-03-09 RX ORDER — SODIUM CHLORIDE 9 MG/ML
20 INJECTION, SOLUTION INTRAVENOUS ONCE
Status: CANCELLED | OUTPATIENT
Start: 2024-03-10

## 2024-03-09 RX ADMIN — ERTAPENEM 1000 MG: 1 INJECTION INTRAMUSCULAR; INTRAVENOUS at 09:08

## 2024-03-10 ENCOUNTER — HOSPITAL ENCOUNTER (OUTPATIENT)
Dept: INFUSION THERAPY | Facility: HOSPITAL | Age: 69
End: 2024-03-10
Payer: MEDICARE

## 2024-03-10 DIAGNOSIS — N39.0 URINARY TRACT INFECTION WITHOUT HEMATURIA, SITE UNSPECIFIED: Primary | ICD-10-CM

## 2024-03-10 PROCEDURE — 25010000002 ERTAPENEM PER 500 MG: Performed by: INTERNAL MEDICINE

## 2024-03-10 PROCEDURE — 96365 THER/PROPH/DIAG IV INF INIT: CPT

## 2024-03-10 RX ORDER — SODIUM CHLORIDE 9 MG/ML
20 INJECTION, SOLUTION INTRAVENOUS ONCE
Status: ACTIVE | OUTPATIENT
Start: 2024-03-10

## 2024-03-10 RX ORDER — SODIUM CHLORIDE 9 MG/ML
20 INJECTION, SOLUTION INTRAVENOUS ONCE
Status: CANCELLED | OUTPATIENT
Start: 2024-03-11

## 2024-03-10 RX ADMIN — ERTAPENEM 1000 MG: 1 INJECTION INTRAMUSCULAR; INTRAVENOUS at 09:04

## 2024-03-11 ENCOUNTER — OFFICE VISIT (OUTPATIENT)
Dept: INTERNAL MEDICINE | Facility: CLINIC | Age: 69
End: 2024-03-11
Payer: MEDICARE

## 2024-03-11 ENCOUNTER — HOSPITAL ENCOUNTER (OUTPATIENT)
Dept: INFUSION THERAPY | Facility: HOSPITAL | Age: 69
Discharge: HOME | End: 2024-03-11
Admitting: INTERNAL MEDICINE
Payer: MEDICARE

## 2024-03-11 VITALS
HEART RATE: 67 BPM | SYSTOLIC BLOOD PRESSURE: 138 MMHG | OXYGEN SATURATION: 98 % | TEMPERATURE: 97.9 F | HEIGHT: 76 IN | DIASTOLIC BLOOD PRESSURE: 86 MMHG | BODY MASS INDEX: 38.36 KG/M2 | RESPIRATION RATE: 16 BRPM | WEIGHT: 315 LBS

## 2024-03-11 VITALS
SYSTOLIC BLOOD PRESSURE: 140 MMHG | HEART RATE: 64 BPM | RESPIRATION RATE: 18 BRPM | TEMPERATURE: 97.7 F | OXYGEN SATURATION: 94 % | DIASTOLIC BLOOD PRESSURE: 77 MMHG

## 2024-03-11 DIAGNOSIS — I10 HYPERTENSION, UNSPECIFIED TYPE: ICD-10-CM

## 2024-03-11 DIAGNOSIS — N39.0 URINARY TRACT INFECTION WITHOUT HEMATURIA, SITE UNSPECIFIED: Primary | ICD-10-CM

## 2024-03-11 DIAGNOSIS — N18.2 CHRONIC RENAL IMPAIRMENT, STAGE 2 (MILD): ICD-10-CM

## 2024-03-11 DIAGNOSIS — E11.9 TYPE 2 DIABETES MELLITUS WITHOUT COMPLICATION, WITHOUT LONG-TERM CURRENT USE OF INSULIN: Primary | ICD-10-CM

## 2024-03-11 DIAGNOSIS — N20.0 NEPHROLITHIASIS: ICD-10-CM

## 2024-03-11 PROCEDURE — 25010000002 ERTAPENEM PER 500 MG: Performed by: INTERNAL MEDICINE

## 2024-03-11 PROCEDURE — 25810000003 SODIUM CHLORIDE 0.9 % SOLUTION: Performed by: INTERNAL MEDICINE

## 2024-03-11 PROCEDURE — 96365 THER/PROPH/DIAG IV INF INIT: CPT

## 2024-03-11 PROCEDURE — 96366 THER/PROPH/DIAG IV INF ADDON: CPT

## 2024-03-11 RX ORDER — SODIUM CHLORIDE 9 MG/ML
20 INJECTION, SOLUTION INTRAVENOUS ONCE
Status: COMPLETED | OUTPATIENT
Start: 2024-03-11 | End: 2024-03-11

## 2024-03-11 RX ORDER — SODIUM CHLORIDE 9 MG/ML
20 INJECTION, SOLUTION INTRAVENOUS ONCE
Status: CANCELLED | OUTPATIENT
Start: 2024-03-12

## 2024-03-11 RX ADMIN — SODIUM CHLORIDE 100 ML/HR: 9 INJECTION, SOLUTION INTRAVENOUS at 12:46

## 2024-03-11 RX ADMIN — ERTAPENEM 1000 MG: 1 INJECTION INTRAMUSCULAR; INTRAVENOUS at 12:15

## 2024-03-11 NOTE — TELEPHONE ENCOUNTER
Andre Agosto  Telephone Information:   Mobile [phone]     CALLED CHECKING ON STATUS OF SAVINGS PLAN FOR BRIVIACT. PLEASE REVIEW AND ADVISE

## 2024-03-12 ENCOUNTER — HOSPITAL ENCOUNTER (OUTPATIENT)
Dept: INFUSION THERAPY | Facility: HOSPITAL | Age: 69
Discharge: HOME | End: 2024-03-12
Admitting: INTERNAL MEDICINE
Payer: MEDICARE

## 2024-03-12 ENCOUNTER — OFFICE VISIT (OUTPATIENT)
Dept: NEUROLOGY | Facility: CLINIC | Age: 69
End: 2024-03-12
Payer: MEDICARE

## 2024-03-12 VITALS
HEART RATE: 76 BPM | HEIGHT: 76 IN | DIASTOLIC BLOOD PRESSURE: 88 MMHG | WEIGHT: 313 LBS | BODY MASS INDEX: 38.11 KG/M2 | OXYGEN SATURATION: 97 % | SYSTOLIC BLOOD PRESSURE: 132 MMHG

## 2024-03-12 VITALS
HEART RATE: 68 BPM | RESPIRATION RATE: 18 BRPM | DIASTOLIC BLOOD PRESSURE: 68 MMHG | WEIGHT: 313 LBS | SYSTOLIC BLOOD PRESSURE: 138 MMHG | BODY MASS INDEX: 38.11 KG/M2 | OXYGEN SATURATION: 93 % | TEMPERATURE: 98.1 F | HEIGHT: 76 IN

## 2024-03-12 DIAGNOSIS — G40.109 LOCALIZATION-RELATED SYMPTOMATIC EPILEPSY AND EPILEPTIC SYNDROMES WITH SIMPLE PARTIAL SEIZURES, NOT INTRACTABLE, WITHOUT STATUS EPILEPTICUS: Primary | ICD-10-CM

## 2024-03-12 DIAGNOSIS — N39.0 URINARY TRACT INFECTION WITHOUT HEMATURIA, SITE UNSPECIFIED: Primary | ICD-10-CM

## 2024-03-12 PROCEDURE — 25810000003 SODIUM CHLORIDE 0.9 % SOLUTION: Performed by: INTERNAL MEDICINE

## 2024-03-12 PROCEDURE — 25010000002 ERTAPENEM PER 500 MG: Performed by: INTERNAL MEDICINE

## 2024-03-12 PROCEDURE — 96366 THER/PROPH/DIAG IV INF ADDON: CPT

## 2024-03-12 PROCEDURE — 96365 THER/PROPH/DIAG IV INF INIT: CPT

## 2024-03-12 RX ORDER — SODIUM CHLORIDE 9 MG/ML
20 INJECTION, SOLUTION INTRAVENOUS ONCE
Status: COMPLETED | OUTPATIENT
Start: 2024-03-12 | End: 2024-03-12

## 2024-03-12 RX ORDER — SODIUM CHLORIDE 9 MG/ML
20 INJECTION, SOLUTION INTRAVENOUS ONCE
Status: CANCELLED | OUTPATIENT
Start: 2024-03-13

## 2024-03-12 RX ADMIN — SODIUM CHLORIDE 100 ML/HR: 9 INJECTION, SOLUTION INTRAVENOUS at 14:24

## 2024-03-12 RX ADMIN — ERTAPENEM 1000 MG: 1 INJECTION INTRAMUSCULAR; INTRAVENOUS at 13:52

## 2024-03-12 NOTE — PROGRESS NOTES
"Subjective:    CC: Andre Agosto is seen today in consultation at the [facility] in [place] for Seizures       HPI:  69-year-old male with a history of atrial fibrillation, anxiety, stroke and multiple normal (his left side, CAD/MI, ALEX on BiPAP, CHF status post pacemaker, A-fib, hypertension, hyperlipidemia, diabetes mellitus type 2, recent diagnosis of prostate cancer presents with seizures.  As per patient he had meningitis in 2012 (after he returned from a basketball game).  Soon after that he started to have episodes of right arm jerking with mild confusion (but no zoning out).  Had extensive testing including extended EEGs at [facility] that were unremarkable.  Was told that he could have deep focal seizures.  Over 5 years ago he stopped having these episodes and now has spells with sensory symptoms where he gets a stinging sensation that travels from his fingers up to his right arm and shoulder without any loss of awareness or loss of consciousness.  At times he feels the episode come on as he can get \"slightly cranky \" prior to it.  In the past he has tried several different medications including Keppra that made him lethargic, Dilantin and Tegretol.  Was placed on Depakote several years ago that he tolerated well and had been taking up until last month when he was admitted to the hospital for high fevers secondary to ESBL E. coli UTI status post prostate biopsy for his cancer.  He had a seizure while in the hospital and his Depakote levels were persistently low which was attributed to his antibiotics (Invanz).  Therefore he was switched to Briviact 50 mg twice daily that he is tolerating well however his co-pay is extremely high.  He has continued to be on Invanz infusions.  His last MRI brain in 2020 showed a right lacunar chronic infarct as well as chronic ischemic changes but no acute abnormalities.  He has also had several vessel imaging studies of the head and neck that have showed " scattered plaques with no significant stenosis.  His last A1c was 6.5 and GFR was 55.  Of note-I reviewed his previous neurology notes    The following portions of the patient's history were reviewed today and updated as of 03/12/2024  : allergies, current medications, past family history, past medical history, past social history, past surgical history, and problem list  These document will be scanned to patient's chart.      Current Outpatient Medications:     amiodarone (PACERONE) 100 MG tablet, Take 1 tablet by mouth Daily., Disp: , Rfl:     amLODIPine (NORVASC) 10 MG tablet, Take 1 tablet by mouth Every Night., Disp: , Rfl:     aspirin 81 MG EC tablet, Take 1 tablet by mouth Daily for 30 days., Disp: 30 tablet, Rfl: 0    carvedilol (Coreg) 25 MG tablet, Take 1 tablet by mouth 2 (Two) Times a Day With Meals., Disp: 180 tablet, Rfl: 3    Entresto  MG tablet, Take 1 tablet by mouth 2 (Two) Times a Day. Hold until seen by PCP, Disp: , Rfl:     ertapenem 1,000 mg in sodium chloride 0.9 % 100 mL IVPB, Infuse 1,000 mg into a venous catheter Daily for 36 days. Indications: Bacteria in the Blood, Disp: , Rfl:     furosemide (LASIX) 20 MG tablet, Take 1 tablet by mouth 2 (Two) Times a Day for 30 days., Disp: 60 tablet, Rfl: 0    levothyroxine (SYNTHROID, LEVOTHROID) 88 MCG tablet, Take 1 tablet by mouth Daily., Disp: 90 tablet, Rfl: 3    lovastatin (MEVACOR) 40 MG tablet, Take 1 tablet by mouth Daily With Dinner., Disp: 90 tablet, Rfl: 3    spironolactone (ALDACTONE) 50 MG tablet, Take 1 tablet by mouth Daily for 30 days., Disp: 90 tablet, Rfl: 3    tamsulosin (FLOMAX) 0.4 MG capsule 24 hr capsule, Take 1 capsule by mouth Daily., Disp: 30 capsule, Rfl: 5    Xarelto 15 MG tablet, Take 1 tablet by mouth Daily With Dinner., Disp: 42 tablet, Rfl: 0    brivaracetam (Briviact) 75 MG tablet, Take 1 tablet by mouth 2 (Two) Times a Day., Disp: 60 tablet, Rfl: 5  No current facility-administered medications for this visit.    Past Medical History:   Diagnosis Date    6th nerve palsy, right     right eye     Anxiety and depression     Atrial fibrillation, persistent 12/02/2020    on Xarelto    Bell palsy 7/5/2018    6th nerve palsy    Coronary artery disease involving native coronary artery of native heart without angina pectoris 09/12/2018    follows w/ Dr. Mendoza    CRI (chronic renal insufficiency), stage 2 (mild)     CVA (cerebral vascular accident)     Diabetes mellitus     doesnt check sugar, type 2     Disease of thyroid gland     Double vision     resolved- right eye    Elevated cholesterol     Elevated prostate specific antigen (PSA) 11/08/2023    Focal seizure     of right arm     Heart attack     NSTEMI 2015, s/p stenting    History of Helicobacter pylori infection     in 2008, treated w/ PrevPak - 2021 EGD bx (+), PCP RX - PPI/Augmentin/Doxy/Flagyl (x 14 days) 1/22/21    HL (hearing loss)     (L) ear - child luevano measles    Hyperlipidemia     Hypertension     Kidney stone     Memory loss 2005    d/t meningitis    Meningitis 2005    unsure of bacterial or viral     Obesity     Shingles 9/2018    On right arm    Sleep apnea treated with nocturnal BiPAP     compliant with  machine     Stroke     2017/2018    Ventricular tachycardia     3 different times    Wears glasses       Past Surgical History:   Procedure Laterality Date    CARDIAC CATHETERIZATION N/A 10/12/2018    Procedure: Left Heart Cath;  Surgeon: Yoselin Johnson MD;  Location: [facility] CATH INVASIVE LOCATION;  Service: Cardiology    CARDIAC CATHETERIZATION  03/2021    stent    CARDIAC CATHETERIZATION  07/2021    just checking the after pacemaker placed- Dr. Tim    CARDIAC DEFIBRILLATOR PLACEMENT  2021    CAROTID ENDARTERECTOMY      CAROTID STENT  12/2025    COLONOSCOPY  10/2020    w/ Dr. Jacobs, hx colon polyps    CORONARY STENT PLACEMENT  2015    LAPAROSCOPIC GASTRIC BANDING  2008    s/p LAGB Realize 6/2008 by [facility].    PACEMAKER IMPLANTATION  07/25/2021     "UMBILICAL HERNIA REPAIR  2008    incarcerated UHR w/ mesh by Dr. Velasquez    URETEROSCOPY LASER LITHOTRIPSY WITH STENT INSERTION Left 10/20/2021    Procedure: URETEROSCOPY LASER LITHOTRIPSY WITH STENT INSERTION;  Surgeon: Tonio De La Cruz MD;  Location: [facility];  Service: Urology;  Laterality: Left;    URETEROSCOPY LASER LITHOTRIPSY WITH STENT INSERTION Left 2021    Procedure: URETEROSCOPY LEFT, LEFT RETROGRADE PYELOGRAM, CYSTOSCOPY, LASER LITHOTRIPSY WITH STENT EXCHANGE;  Surgeon: Tonio De La Cruz MD;  Location: [facility] OR;  Service: Urology;  Laterality: Left;    URETEROSCOPY LASER LITHOTRIPSY WITH STENT INSERTION Left 10/06/2022    Procedure: URETEROSCOPY LASER LITHOTRIPSY WITH STENT INSERTION;  Surgeon: Tonio De La Cruz MD;  Location: [facility];  Service: Urology;  Laterality: Left;      Family History   Problem Relation Age of Onset    Stroke Mother     Hypertension Mother     COPD Father     Hypertension Father       Social History     Socioeconomic History    Marital status:    Tobacco Use    Smoking status: Former     Current packs/day: 0.00     Average packs/day: 0.5 packs/day for 15.0 years (7.5 ttl pk-yrs)     Types: Cigarettes     Start date: 1975     Quit date: 1985     Years since quittin.2    Smokeless tobacco: Never   Vaping Use    Vaping status: Never Used   Substance and Sexual Activity    Alcohol use: No    Drug use: No    Sexual activity: Not Currently     Review of Systems   Neurological:  Positive for seizures.   All other systems reviewed and are negative.    Objective:    /88   Pulse 76   Ht 193 cm (76\")   Wt (!) 142 kg (313 lb)   SpO2 97%   BMI 38.10 kg/m²     Neurology Exam:    General apperance: Obese    Mental status: Alert, awake and oriented to time place and person.    Recent and Remote memory: Intact.    Attention span and Concentration: Normal.     Language and Speech: Intact- No dysarthria.    Fluency, Naming , Repitition and " Comprehension:  Intact    Cranial Nerves:   CN II: Visual fields are full. Intact. Fundi - Normal, No papillederma, Pupils - DENVRE  CN III, IV and VI: Extraocular movements are intact. Normal saccades.   CN V: Facial sensation is intact.   CN VII: Muscles of facial expression reveal no asymmetry. Intact.   CN VIII: Hearing is intact. Whispered voice intact.   CN IX and X: Palate elevates symmetrically. Intact  CN XI: Shoulder shrug is intact.   CN XII: Tongue is midline without evidence of atrophy or fasciculation.     Ophthalmoscopic exam of optic disc-normal    Motor:  Right UE muscle strength 5/5. Normal tone.     Left UE muscle strength 5/5. Normal tone.      Right LE muscle strength5/5. Normal tone.     Left LE muscle strength 5/5. Normal tone.      Sensory: Normal light touch, vibration and pinprick sensation bilaterally.    DTRs: 2+ bilaterally in upper and lower extremities.    Babinski: Negative bilaterally.    Co-ordination: Normal finger-to-nose, heel to shin B/L.    Rhomberg: Negative.    Gait: Normal.    Cardiovascular: Regular rate and rhythm without murmur, gallop or rub.    Assessment and Plan:  1. Localization-related symptomatic epilepsy and epileptic syndromes with simple partial seizures, not intractable, without status epilepticus  He most likely has left focal parietal seizures involving the somatosensory cortex secondary to meningitis.  Last episode was 1 month ago  I have told him to increase his Briviact to 75 mg twice daily.  I will send a prescription to his pharmacy and also 1 to [facility] for patient assistance as his co-pay is extremely high  Counseled on seizure precautions    - brivaracetam (Briviact) 75 MG tablet; Take 1 tablet by mouth 2 (Two) Times a Day.  Dispense: 60 tablet; Refill: 5       Return in about 3 months (around 6/12/2024).     I spent over 45 minutes with the patient face to face out of which over 50% (30 minutes) was spent in management, instructions and education.      Alyssa Victoria MD

## 2024-03-12 NOTE — TELEPHONE ENCOUNTER
Received and indexed medical request from [facility] prescriptions 3/7/24

## 2024-03-12 NOTE — CODE DOCUMENTATION
PICC line dressing changed per sterile technique.  Site clean, dry, intact without redness, edema, or drainage.  Patient tolerated well.

## 2024-03-13 ENCOUNTER — TELEPHONE (OUTPATIENT)
Dept: NEUROLOGY | Facility: CLINIC | Age: 69
End: 2024-03-13

## 2024-03-13 ENCOUNTER — HOSPITAL ENCOUNTER (OUTPATIENT)
Dept: INFUSION THERAPY | Facility: HOSPITAL | Age: 69
Discharge: HOME | End: 2024-03-13
Admitting: INTERNAL MEDICINE
Payer: MEDICARE

## 2024-03-13 VITALS
SYSTOLIC BLOOD PRESSURE: 128 MMHG | HEART RATE: 68 BPM | TEMPERATURE: 97.7 F | DIASTOLIC BLOOD PRESSURE: 83 MMHG | RESPIRATION RATE: 18 BRPM

## 2024-03-13 DIAGNOSIS — N39.0 URINARY TRACT INFECTION WITHOUT HEMATURIA, SITE UNSPECIFIED: Primary | ICD-10-CM

## 2024-03-13 DIAGNOSIS — G40.909 SEIZURE DISORDER: ICD-10-CM

## 2024-03-13 DIAGNOSIS — N41.0 PROSTATITIS, ACUTE: ICD-10-CM

## 2024-03-13 LAB
ALBUMIN SERPL-MCNC: 4.2 G/DL (ref 3.5–5.2)
ALBUMIN/GLOB SERPL: 1.2 G/DL
ALP SERPL-CCNC: 69 U/L (ref 39–117)
ALT SERPL W P-5'-P-CCNC: 15 U/L (ref 1–41)
ANION GAP SERPL CALCULATED.3IONS-SCNC: 9.9 MMOL/L (ref 5–15)
AST SERPL-CCNC: 14 U/L (ref 1–40)
BASOPHILS # BLD AUTO: 0.04 10*3/MM3 (ref 0–0.2)
BASOPHILS NFR BLD AUTO: 0.6 % (ref 0–1.5)
BILIRUB SERPL-MCNC: 0.9 MG/DL (ref 0–1.2)
BUN SERPL-MCNC: 16 MG/DL (ref 8–23)
BUN/CREAT SERPL: 11.5 (ref 7–25)
CALCIUM SPEC-SCNC: 9.3 MG/DL (ref 8.6–10.5)
CHLORIDE SERPL-SCNC: 99 MMOL/L (ref 98–107)
CO2 SERPL-SCNC: 28.1 MMOL/L (ref 22–29)
CREAT SERPL-MCNC: 1.39 MG/DL (ref 0.76–1.27)
CRP SERPL-MCNC: 1.21 MG/DL (ref 0–0.5)
DEPRECATED RDW RBC AUTO: 45.6 FL (ref 37–54)
EGFRCR SERPLBLD CKD-EPI 2021: 55.2 ML/MIN/1.73
EOSINOPHIL # BLD AUTO: 0.19 10*3/MM3 (ref 0–0.4)
EOSINOPHIL NFR BLD AUTO: 3.1 % (ref 0.3–6.2)
ERYTHROCYTE [DISTWIDTH] IN BLOOD BY AUTOMATED COUNT: 14 % (ref 12.3–15.4)
ERYTHROCYTE [SEDIMENTATION RATE] IN BLOOD: 10 MM/HR (ref 0–20)
GLOBULIN UR ELPH-MCNC: 3.5 GM/DL
GLUCOSE SERPL-MCNC: 151 MG/DL (ref 65–99)
HCT VFR BLD AUTO: 45.5 % (ref 37.5–51)
HGB BLD-MCNC: 15.8 G/DL (ref 13–17.7)
IMM GRANULOCYTES # BLD AUTO: 0.02 10*3/MM3 (ref 0–0.05)
IMM GRANULOCYTES NFR BLD AUTO: 0.3 % (ref 0–0.5)
LYMPHOCYTES # BLD AUTO: 1.84 10*3/MM3 (ref 0.7–3.1)
LYMPHOCYTES NFR BLD AUTO: 29.8 % (ref 19.6–45.3)
MCH RBC QN AUTO: 30.7 PG (ref 26.6–33)
MCHC RBC AUTO-ENTMCNC: 34.7 G/DL (ref 31.5–35.7)
MCV RBC AUTO: 88.5 FL (ref 79–97)
MONOCYTES # BLD AUTO: 0.46 10*3/MM3 (ref 0.1–0.9)
MONOCYTES NFR BLD AUTO: 7.5 % (ref 5–12)
NEUTROPHILS NFR BLD AUTO: 3.62 10*3/MM3 (ref 1.7–7)
NEUTROPHILS NFR BLD AUTO: 58.7 % (ref 42.7–76)
NRBC BLD AUTO-RTO: 0 /100 WBC (ref 0–0.2)
PLATELET # BLD AUTO: 176 10*3/MM3 (ref 140–450)
PMV BLD AUTO: 10.8 FL (ref 6–12)
POTASSIUM SERPL-SCNC: 3.7 MMOL/L (ref 3.5–5.2)
PROT SERPL-MCNC: 7.7 G/DL (ref 6–8.5)
RBC # BLD AUTO: 5.14 10*6/MM3 (ref 4.14–5.8)
SODIUM SERPL-SCNC: 137 MMOL/L (ref 136–145)
WBC NRBC COR # BLD AUTO: 6.17 10*3/MM3 (ref 3.4–10.8)

## 2024-03-13 PROCEDURE — 80299 QUANTITATIVE ASSAY DRUG: CPT | Performed by: INTERNAL MEDICINE

## 2024-03-13 PROCEDURE — 86140 C-REACTIVE PROTEIN: CPT | Performed by: INTERNAL MEDICINE

## 2024-03-13 PROCEDURE — 80053 COMPREHEN METABOLIC PANEL: CPT | Performed by: INTERNAL MEDICINE

## 2024-03-13 PROCEDURE — 96365 THER/PROPH/DIAG IV INF INIT: CPT

## 2024-03-13 PROCEDURE — 25810000003 SODIUM CHLORIDE 0.9 % SOLUTION: Performed by: INTERNAL MEDICINE

## 2024-03-13 PROCEDURE — 36592 COLLECT BLOOD FROM PICC: CPT

## 2024-03-13 PROCEDURE — 85025 COMPLETE CBC W/AUTO DIFF WBC: CPT | Performed by: INTERNAL MEDICINE

## 2024-03-13 PROCEDURE — 85652 RBC SED RATE AUTOMATED: CPT | Performed by: INTERNAL MEDICINE

## 2024-03-13 PROCEDURE — 25010000002 ERTAPENEM PER 500 MG: Performed by: INTERNAL MEDICINE

## 2024-03-13 RX ORDER — SODIUM CHLORIDE 9 MG/ML
20 INJECTION, SOLUTION INTRAVENOUS ONCE
Status: CANCELLED | OUTPATIENT
Start: 2024-03-14

## 2024-03-13 RX ORDER — SODIUM CHLORIDE 9 MG/ML
20 INJECTION, SOLUTION INTRAVENOUS ONCE
Status: COMPLETED | OUTPATIENT
Start: 2024-03-13 | End: 2024-03-13

## 2024-03-13 RX ADMIN — ERTAPENEM 1000 MG: 1 INJECTION INTRAMUSCULAR; INTRAVENOUS at 09:43

## 2024-03-13 RX ADMIN — SODIUM CHLORIDE 20 ML/HR: 9 INJECTION, SOLUTION INTRAVENOUS at 09:43

## 2024-03-13 NOTE — TELEPHONE ENCOUNTER
"Caller: Andre Agosto \"Bernardo\"    Relationship: Self    Best call back number: [phone]    What was the call regarding: PT CALLED REQUESTING TO SPEAK WITH CLYDE TO GET A STATUS UPDATE REGARDING UCB FORMS HE DROPPED OFF DURING HIS APPT W/ DR. BUSTOS YESTERDAY.    Do you require a callback: YES, PLEASE.    PLEASE REVIEW AND ADVISE.  "

## 2024-03-14 DIAGNOSIS — N41.0 PROSTATITIS, ACUTE: Primary | ICD-10-CM

## 2024-03-15 ENCOUNTER — HOSPITAL ENCOUNTER (OUTPATIENT)
Dept: INFUSION THERAPY | Facility: HOSPITAL | Age: 69
Discharge: HOME | End: 2024-03-15
Payer: MEDICARE

## 2024-03-15 ENCOUNTER — TELEPHONE (OUTPATIENT)
Dept: INTERNAL MEDICINE | Facility: CLINIC | Age: 69
End: 2024-03-15
Payer: MEDICARE

## 2024-03-15 ENCOUNTER — TELEPHONE (OUTPATIENT)
Dept: UROLOGY | Facility: CLINIC | Age: 69
End: 2024-03-15

## 2024-03-15 VITALS
SYSTOLIC BLOOD PRESSURE: 134 MMHG | HEART RATE: 69 BPM | TEMPERATURE: 97.3 F | DIASTOLIC BLOOD PRESSURE: 82 MMHG | OXYGEN SATURATION: 95 % | RESPIRATION RATE: 18 BRPM

## 2024-03-15 DIAGNOSIS — E11.65 TYPE 2 DIABETES MELLITUS WITH HYPERGLYCEMIA, WITH LONG-TERM CURRENT USE OF INSULIN: Primary | ICD-10-CM

## 2024-03-15 DIAGNOSIS — N39.0 URINARY TRACT INFECTION WITHOUT HEMATURIA, SITE UNSPECIFIED: Primary | ICD-10-CM

## 2024-03-15 DIAGNOSIS — Z79.4 TYPE 2 DIABETES MELLITUS WITH HYPERGLYCEMIA, WITH LONG-TERM CURRENT USE OF INSULIN: Primary | ICD-10-CM

## 2024-03-15 DIAGNOSIS — N20.0 NEPHROLITHIASIS: ICD-10-CM

## 2024-03-15 PROCEDURE — 25810000003 SODIUM CHLORIDE 0.9 % SOLUTION: Performed by: INTERNAL MEDICINE

## 2024-03-15 PROCEDURE — 96365 THER/PROPH/DIAG IV INF INIT: CPT

## 2024-03-15 PROCEDURE — 25010000002 ERTAPENEM PER 500 MG: Performed by: INTERNAL MEDICINE

## 2024-03-15 RX ORDER — SODIUM CHLORIDE 9 MG/ML
20 INJECTION, SOLUTION INTRAVENOUS ONCE
Status: COMPLETED | OUTPATIENT
Start: 2024-03-15 | End: 2024-03-15

## 2024-03-15 RX ORDER — SODIUM CHLORIDE 9 MG/ML
20 INJECTION, SOLUTION INTRAVENOUS ONCE
Status: CANCELLED | OUTPATIENT
Start: 2024-03-16

## 2024-03-15 RX ADMIN — SODIUM CHLORIDE 20 ML/HR: 9 INJECTION, SOLUTION INTRAVENOUS at 10:28

## 2024-03-15 RX ADMIN — ERTAPENEM 1000 MG: 1 INJECTION INTRAMUSCULAR; INTRAVENOUS at 10:29

## 2024-03-15 NOTE — TELEPHONE ENCOUNTER
Provider: JULIA WILSON    Caller: LEOPOLDO HALL    Relationship to Patient: SELF    Reason for Call: WANTING TO TRANSFER CARE TO JULIA SANDS, SPOKE WITH MARLENY IN THE OFFICE NEW REFERRAL REQUIRED, PATIENT IS GETTING THIS. IN [facility] 3 WEEKS AGO WITH KIDNEY STONES, WAS TOLD THEY NEED TO COME OUT. PATIENT JUST WANTED JULIA AWARE OF THE SITUATION. WHILE WORKING ON THE REFERRAL     When was the patient last [facility]

## 2024-03-15 NOTE — TELEPHONE ENCOUNTER
Patient also sent this [employer] message    I need a referral to the Beti [facility] lady so my insurance will pay for my nutrition help     Pended this referral as wel

## 2024-03-15 NOTE — TELEPHONE ENCOUNTER
Patient sent this through [employer] messaging:      I need a referral to dr Lois Reyna for kidney stones from dr johns. Please fax to [phone]. Needs to be urgent for me to get an appointment within reasonable time. Infectious disease wants urology on board as infusions quit in two weeks    Referral is pended as urgent

## 2024-03-16 ENCOUNTER — HOSPITAL ENCOUNTER (OUTPATIENT)
Dept: INFUSION THERAPY | Facility: HOSPITAL | Age: 69
Discharge: HOME | End: 2024-03-16
Payer: MEDICARE

## 2024-03-16 DIAGNOSIS — N39.0 URINARY TRACT INFECTION WITHOUT HEMATURIA, SITE UNSPECIFIED: Primary | ICD-10-CM

## 2024-03-16 LAB — BRIVARACETAM SERPL-MCNC: 1.42 UG/ML (ref 0.2–2)

## 2024-03-16 PROCEDURE — 25010000002 ERTAPENEM PER 500 MG: Performed by: INTERNAL MEDICINE

## 2024-03-16 PROCEDURE — 96365 THER/PROPH/DIAG IV INF INIT: CPT

## 2024-03-16 RX ORDER — SODIUM CHLORIDE 9 MG/ML
20 INJECTION, SOLUTION INTRAVENOUS ONCE
Status: DISCONTINUED | OUTPATIENT
Start: 2024-03-16 | End: 2024-03-18 | Stop reason: HOSPADM

## 2024-03-16 RX ORDER — SODIUM CHLORIDE 9 MG/ML
20 INJECTION, SOLUTION INTRAVENOUS ONCE
Status: CANCELLED | OUTPATIENT
Start: 2024-03-17

## 2024-03-16 RX ADMIN — ERTAPENEM 1000 MG: 1 INJECTION INTRAMUSCULAR; INTRAVENOUS at 09:24

## 2024-03-17 ENCOUNTER — HOSPITAL ENCOUNTER (OUTPATIENT)
Dept: INFUSION THERAPY | Facility: HOSPITAL | Age: 69
End: 2024-03-17
Payer: MEDICARE

## 2024-03-17 DIAGNOSIS — N39.0 URINARY TRACT INFECTION WITHOUT HEMATURIA, SITE UNSPECIFIED: Primary | ICD-10-CM

## 2024-03-17 PROCEDURE — 25010000002 ERTAPENEM PER 500 MG: Performed by: INTERNAL MEDICINE

## 2024-03-17 PROCEDURE — 96365 THER/PROPH/DIAG IV INF INIT: CPT

## 2024-03-17 RX ORDER — SODIUM CHLORIDE 9 MG/ML
20 INJECTION, SOLUTION INTRAVENOUS ONCE
Status: CANCELLED | OUTPATIENT
Start: 2024-03-18

## 2024-03-17 RX ORDER — SODIUM CHLORIDE 9 MG/ML
20 INJECTION, SOLUTION INTRAVENOUS ONCE
Status: ACTIVE | OUTPATIENT
Start: 2024-03-17

## 2024-03-17 RX ADMIN — ERTAPENEM 1000 MG: 1 INJECTION INTRAMUSCULAR; INTRAVENOUS at 09:02

## 2024-03-18 ENCOUNTER — HOSPITAL ENCOUNTER (OUTPATIENT)
Dept: INFUSION THERAPY | Facility: HOSPITAL | Age: 69
Discharge: HOME | End: 2024-03-18
Admitting: INTERNAL MEDICINE
Payer: MEDICARE

## 2024-03-18 VITALS
TEMPERATURE: 97.5 F | RESPIRATION RATE: 18 BRPM | SYSTOLIC BLOOD PRESSURE: 125 MMHG | DIASTOLIC BLOOD PRESSURE: 64 MMHG | HEART RATE: 63 BPM

## 2024-03-18 DIAGNOSIS — N39.0 URINARY TRACT INFECTION WITHOUT HEMATURIA, SITE UNSPECIFIED: Primary | ICD-10-CM

## 2024-03-18 PROCEDURE — 25010000002 ERTAPENEM PER 500 MG: Performed by: INTERNAL MEDICINE

## 2024-03-18 PROCEDURE — 25810000003 SODIUM CHLORIDE 0.9 % SOLUTION: Performed by: INTERNAL MEDICINE

## 2024-03-18 PROCEDURE — 96365 THER/PROPH/DIAG IV INF INIT: CPT

## 2024-03-18 RX ORDER — SODIUM CHLORIDE 9 MG/ML
20 INJECTION, SOLUTION INTRAVENOUS ONCE
Status: CANCELLED | OUTPATIENT
Start: 2024-03-19

## 2024-03-18 RX ORDER — SODIUM CHLORIDE 9 MG/ML
20 INJECTION, SOLUTION INTRAVENOUS ONCE
Status: COMPLETED | OUTPATIENT
Start: 2024-03-18 | End: 2024-03-18

## 2024-03-18 RX ADMIN — SODIUM CHLORIDE 20 ML/HR: 9 INJECTION, SOLUTION INTRAVENOUS at 11:28

## 2024-03-18 RX ADMIN — ERTAPENEM 1000 MG: 1 INJECTION INTRAMUSCULAR; INTRAVENOUS at 11:28

## 2024-03-18 NOTE — TELEPHONE ENCOUNTER
Sent patient a [employer] message informing, since the initial message was sent through [employer].

## 2024-03-19 ENCOUNTER — HOSPITAL ENCOUNTER (OUTPATIENT)
Dept: INFUSION THERAPY | Facility: HOSPITAL | Age: 69
Discharge: HOME | End: 2024-03-19
Admitting: INTERNAL MEDICINE
Payer: MEDICARE

## 2024-03-19 VITALS
HEART RATE: 71 BPM | TEMPERATURE: 97.2 F | SYSTOLIC BLOOD PRESSURE: 151 MMHG | DIASTOLIC BLOOD PRESSURE: 71 MMHG | RESPIRATION RATE: 16 BRPM

## 2024-03-19 DIAGNOSIS — E03.9 ACQUIRED HYPOTHYROIDISM: ICD-10-CM

## 2024-03-19 DIAGNOSIS — I10 ESSENTIAL HYPERTENSION: ICD-10-CM

## 2024-03-19 DIAGNOSIS — N41.0 PROSTATITIS, ACUTE: ICD-10-CM

## 2024-03-19 DIAGNOSIS — B96.20 BACTEREMIA DUE TO ESCHERICHIA COLI: Primary | ICD-10-CM

## 2024-03-19 DIAGNOSIS — R78.81 BACTEREMIA DUE TO ESCHERICHIA COLI: Primary | ICD-10-CM

## 2024-03-19 DIAGNOSIS — N39.0 URINARY TRACT INFECTION WITHOUT HEMATURIA, SITE UNSPECIFIED: ICD-10-CM

## 2024-03-19 PROCEDURE — 25010000002 ERTAPENEM PER 500 MG: Performed by: INTERNAL MEDICINE

## 2024-03-19 PROCEDURE — 96365 THER/PROPH/DIAG IV INF INIT: CPT

## 2024-03-19 RX ORDER — SODIUM CHLORIDE 9 MG/ML
20 INJECTION, SOLUTION INTRAVENOUS ONCE
Status: DISCONTINUED | OUTPATIENT
Start: 2024-03-19 | End: 2024-03-21 | Stop reason: HOSPADM

## 2024-03-19 RX ORDER — LEVOTHYROXINE SODIUM 88 UG/1
88 TABLET ORAL DAILY
Qty: 90 TABLET | Refills: 3 | Status: SHIPPED | OUTPATIENT
Start: 2024-03-19

## 2024-03-19 RX ORDER — SODIUM CHLORIDE 9 MG/ML
20 INJECTION, SOLUTION INTRAVENOUS ONCE
Status: CANCELLED | OUTPATIENT
Start: 2024-03-20

## 2024-03-19 RX ADMIN — ERTAPENEM 1000 MG: 1 INJECTION INTRAMUSCULAR; INTRAVENOUS at 11:01

## 2024-03-19 NOTE — TELEPHONE ENCOUNTER
Rx Refill Note  Requested Prescriptions     Pending Prescriptions Disp Refills    levothyroxine (SYNTHROID, LEVOTHROID) 88 MCG tablet [Pharmacy Med Name: L-THYROXINE (SYNTHROID) TABS 88MCG] 90 tablet 3     Sig: TAKE 1 TABLET DAILY      Last office visit with prescribing clinician: 3/11/2024   Last telemedicine visit with prescribing clinician: Visit date not found   Next office visit with prescribing clinician: Visit date not found                         Would you like a call back once the refill request has been completed: [] Yes [] No    If the office needs to give you a call back, can they leave a voicemail: [] Yes [] No    Romi Hugo MA  03/19/24, 08:37 EDT

## 2024-03-20 ENCOUNTER — OFFICE VISIT (OUTPATIENT)
Dept: UROLOGY | Facility: CLINIC | Age: 69
End: 2024-03-20
Payer: MEDICARE

## 2024-03-20 ENCOUNTER — HOSPITAL ENCOUNTER (OUTPATIENT)
Dept: INFUSION THERAPY | Facility: HOSPITAL | Age: 69
Discharge: HOME | End: 2024-03-20
Admitting: INTERNAL MEDICINE
Payer: MEDICARE

## 2024-03-20 VITALS
DIASTOLIC BLOOD PRESSURE: 84 MMHG | OXYGEN SATURATION: 96 % | SYSTOLIC BLOOD PRESSURE: 141 MMHG | TEMPERATURE: 98 F | RESPIRATION RATE: 18 BRPM | HEART RATE: 68 BPM

## 2024-03-20 VITALS
SYSTOLIC BLOOD PRESSURE: 116 MMHG | BODY MASS INDEX: 38.36 KG/M2 | OXYGEN SATURATION: 95 % | WEIGHT: 315 LBS | DIASTOLIC BLOOD PRESSURE: 64 MMHG | HEART RATE: 68 BPM | HEIGHT: 76 IN

## 2024-03-20 DIAGNOSIS — B96.20 BACTEREMIA DUE TO ESCHERICHIA COLI: ICD-10-CM

## 2024-03-20 DIAGNOSIS — N39.0 URINARY TRACT INFECTION WITHOUT HEMATURIA, SITE UNSPECIFIED: Primary | ICD-10-CM

## 2024-03-20 DIAGNOSIS — N30.01 ACUTE CYSTITIS WITH HEMATURIA: ICD-10-CM

## 2024-03-20 DIAGNOSIS — A41.51 SEPSIS DUE TO ESCHERICHIA COLI WITHOUT ACUTE ORGAN DYSFUNCTION: ICD-10-CM

## 2024-03-20 DIAGNOSIS — R97.20 ELEVATED PSA: Primary | ICD-10-CM

## 2024-03-20 DIAGNOSIS — R78.81 BACTEREMIA DUE TO ESCHERICHIA COLI: ICD-10-CM

## 2024-03-20 DIAGNOSIS — C61 PROSTATE CANCER: ICD-10-CM

## 2024-03-20 LAB
ALBUMIN SERPL-MCNC: 4.1 G/DL (ref 3.5–5.2)
ALBUMIN/GLOB SERPL: 1.2 G/DL
ALP SERPL-CCNC: 75 U/L (ref 39–117)
ALT SERPL W P-5'-P-CCNC: 15 U/L (ref 1–41)
ANION GAP SERPL CALCULATED.3IONS-SCNC: 10.8 MMOL/L (ref 5–15)
AST SERPL-CCNC: 12 U/L (ref 1–40)
BASOPHILS # BLD AUTO: 0.06 10*3/MM3 (ref 0–0.2)
BASOPHILS NFR BLD AUTO: 1 % (ref 0–1.5)
BILIRUB BLD-MCNC: NEGATIVE MG/DL
BILIRUB SERPL-MCNC: 0.8 MG/DL (ref 0–1.2)
BUN SERPL-MCNC: 12 MG/DL (ref 8–23)
BUN/CREAT SERPL: 9.4 (ref 7–25)
CALCIUM SPEC-SCNC: 9.1 MG/DL (ref 8.6–10.5)
CHLORIDE SERPL-SCNC: 98 MMOL/L (ref 98–107)
CLARITY, POC: CLEAR
CO2 SERPL-SCNC: 26.2 MMOL/L (ref 22–29)
COLOR UR: YELLOW
CREAT SERPL-MCNC: 1.27 MG/DL (ref 0.76–1.27)
CRP SERPL-MCNC: 1.6 MG/DL (ref 0–0.5)
DEPRECATED RDW RBC AUTO: 45.5 FL (ref 37–54)
EGFRCR SERPLBLD CKD-EPI 2021: 61.5 ML/MIN/1.73
EOSINOPHIL # BLD AUTO: 0.25 10*3/MM3 (ref 0–0.4)
EOSINOPHIL NFR BLD AUTO: 4.3 % (ref 0.3–6.2)
ERYTHROCYTE [DISTWIDTH] IN BLOOD BY AUTOMATED COUNT: 13.9 % (ref 12.3–15.4)
ERYTHROCYTE [SEDIMENTATION RATE] IN BLOOD: 10 MM/HR (ref 0–20)
EXPIRATION DATE: ABNORMAL
GLOBULIN UR ELPH-MCNC: 3.5 GM/DL
GLUCOSE SERPL-MCNC: 160 MG/DL (ref 65–99)
GLUCOSE UR STRIP-MCNC: NEGATIVE MG/DL
HCT VFR BLD AUTO: 45.9 % (ref 37.5–51)
HGB BLD-MCNC: 15.9 G/DL (ref 13–17.7)
IMM GRANULOCYTES # BLD AUTO: 0.02 10*3/MM3 (ref 0–0.05)
IMM GRANULOCYTES NFR BLD AUTO: 0.3 % (ref 0–0.5)
KETONES UR QL: NEGATIVE
LEUKOCYTE EST, POC: NEGATIVE
LYMPHOCYTES # BLD AUTO: 1.94 10*3/MM3 (ref 0.7–3.1)
LYMPHOCYTES NFR BLD AUTO: 33 % (ref 19.6–45.3)
Lab: ABNORMAL
MCH RBC QN AUTO: 30.7 PG (ref 26.6–33)
MCHC RBC AUTO-ENTMCNC: 34.6 G/DL (ref 31.5–35.7)
MCV RBC AUTO: 88.6 FL (ref 79–97)
MONOCYTES # BLD AUTO: 0.37 10*3/MM3 (ref 0.1–0.9)
MONOCYTES NFR BLD AUTO: 6.3 % (ref 5–12)
NEUTROPHILS NFR BLD AUTO: 3.24 10*3/MM3 (ref 1.7–7)
NEUTROPHILS NFR BLD AUTO: 55.1 % (ref 42.7–76)
NITRITE UR-MCNC: NEGATIVE MG/ML
NRBC BLD AUTO-RTO: 0 /100 WBC (ref 0–0.2)
PH UR: 6 [PH] (ref 5–8)
PLATELET # BLD AUTO: 174 10*3/MM3 (ref 140–450)
PMV BLD AUTO: 10.7 FL (ref 6–12)
POTASSIUM SERPL-SCNC: 3.7 MMOL/L (ref 3.5–5.2)
PROT SERPL-MCNC: 7.6 G/DL (ref 6–8.5)
PROT UR STRIP-MCNC: ABNORMAL MG/DL
RBC # BLD AUTO: 5.18 10*6/MM3 (ref 4.14–5.8)
RBC # UR STRIP: ABNORMAL /UL
SODIUM SERPL-SCNC: 135 MMOL/L (ref 136–145)
SP GR UR: 1.01 (ref 1–1.03)
UROBILINOGEN UR QL: NORMAL
WBC NRBC COR # BLD AUTO: 5.88 10*3/MM3 (ref 3.4–10.8)

## 2024-03-20 PROCEDURE — 96365 THER/PROPH/DIAG IV INF INIT: CPT

## 2024-03-20 PROCEDURE — 25810000003 SODIUM CHLORIDE 0.9 % SOLUTION: Performed by: INTERNAL MEDICINE

## 2024-03-20 PROCEDURE — 96366 THER/PROPH/DIAG IV INF ADDON: CPT

## 2024-03-20 PROCEDURE — 85025 COMPLETE CBC W/AUTO DIFF WBC: CPT | Performed by: INTERNAL MEDICINE

## 2024-03-20 PROCEDURE — 87086 URINE CULTURE/COLONY COUNT: CPT | Performed by: PHYSICIAN ASSISTANT

## 2024-03-20 PROCEDURE — 85652 RBC SED RATE AUTOMATED: CPT | Performed by: INTERNAL MEDICINE

## 2024-03-20 PROCEDURE — 86140 C-REACTIVE PROTEIN: CPT | Performed by: INTERNAL MEDICINE

## 2024-03-20 PROCEDURE — 80053 COMPREHEN METABOLIC PANEL: CPT | Performed by: INTERNAL MEDICINE

## 2024-03-20 PROCEDURE — 25010000002 ERTAPENEM PER 500 MG: Performed by: INTERNAL MEDICINE

## 2024-03-20 RX ORDER — SODIUM CHLORIDE 9 MG/ML
20 INJECTION, SOLUTION INTRAVENOUS ONCE
Status: COMPLETED | OUTPATIENT
Start: 2024-03-20 | End: 2024-03-20

## 2024-03-20 RX ORDER — SODIUM CHLORIDE 9 MG/ML
20 INJECTION, SOLUTION INTRAVENOUS ONCE
Status: CANCELLED | OUTPATIENT
Start: 2024-03-21

## 2024-03-20 RX ADMIN — SODIUM CHLORIDE 100 ML/HR: 9 INJECTION, SOLUTION INTRAVENOUS at 10:15

## 2024-03-20 RX ADMIN — ERTAPENEM 1000 MG: 1 INJECTION INTRAMUSCULAR; INTRAVENOUS at 10:15

## 2024-03-20 NOTE — CODE DOCUMENTATION
PICC line accessed, flushed, and 10mL blood to waste prior to drawing lab specimens today; blood specimens taken to inpatient lab for processing.  Patient tolerated well.

## 2024-03-20 NOTE — PROGRESS NOTES
Established Male Office Visit        Chief Complaint   Patient presents with    Prostate Cancer        HPI  Mr. Agosto is a 69 y.o. male with history of nephrolithiasis s/p left URS/LL 10/2022, prostate CA (very low risk disease, 1 core grade group 1 Ron 6 prostate cancer), biopsy 01/25/24.  Also has history of DM2, CAD with stent x1, afib on Xarelto, CVA. His wife states that cardiology has told him his pacemaker is pacing him about 86% of the time.    He developed post TRUS bx sepsis with ESBL E. coli bacteremia, hospitalized 01/26-01/31/2024. PICC line was placed and patient discharged on Ertapenem with ID. Completed infusion treatment through 02/12, transitioned to oral Macrobid and PICC line was removed. He was hospitalized again for sepsis 02/16/2024, again growing ESBL E Coli. PICC line replaced and Ertapenem restarted.    At this visit, completing week 5 of 6 of Invanz with [facility], Dr. Boogie.     Notably, patient stopped Farxiga in January with concern it could be contributing to his  infection.    PVR 6cc today    Prostate 67cc on TRUS, 01/25/2024          IPSS Questionnaire (AUA-7):  Over the past month…    1)  Incomplete Emptying  How often have you had a sensation of not emptying your bladder?  4 - More than half the time   2)  Frequency  How often have you had to urinate less than every two hours? 5 - Almost always   3)  Intermittency  How often have you found you stopped and started again several times when you urinated?  1 - Less than 1 time in 5   4) Urgency  How often have you found it difficult to postpone urination?  4 - More than half the time   5) Weak Stream  How often have you had a weak urinary stream?  0 - Not at all   6) Straining  How often have you had to push or strain to begin urination?  0 - Not at all   7) Nocturia  How many times did you typically get up at night to urinate?  3 - 3 times   Total Score:  17       Quality of life due to urinary symptoms:  If you were to spend  the rest of your life with your urinary condition the way it is now, how would you feel about that? 3-Mixed   Urine Leakage (Incontinence) 0-No Leakage         Past Medical History:   Diagnosis Date    6th nerve palsy, right     right eye     Anxiety and depression     Atrial fibrillation, persistent 12/02/2020    on Xarelto    Bell palsy 7/5/2018    6th nerve palsy    Coronary artery disease involving native coronary artery of native heart without angina pectoris 09/12/2018    follows w/ Dr. Mendoza    CRI (chronic renal insufficiency), stage 2 (mild)     CVA (cerebral vascular accident)     Diabetes mellitus     doesnt check sugar, type 2     Disease of thyroid gland     Double vision     resolved- right eye    Elevated cholesterol     Elevated prostate specific antigen (PSA) 11/08/2023    Focal seizure     of right arm     Heart attack     NSTEMI 2015, s/p stenting    History of Helicobacter pylori infection     in 2008, treated w/ PrevPak - 2021 EGD bx (+), PCP RX - PPI/Augmentin/Doxy/Flagyl (x 14 days) 1/22/21    HL (hearing loss)     (L) ear - child luevano measles    Hyperlipidemia     Hypertension     Kidney stone     Memory loss 2005    d/t meningitis    Meningitis 2005    unsure of bacterial or viral     Obesity     Prostate cancer 01/2024    [place] 3+3=6    Shingles 9/2018    On right arm    Sleep apnea treated with nocturnal BiPAP     compliant with  machine     Stroke     2017/2018    Ventricular tachycardia     3 different times    Wears glasses        Past Surgical History:   Procedure Laterality Date    CARDIAC CATHETERIZATION N/A 10/12/2018    Procedure: Left Heart Cath;  Surgeon: Yoselin Johnson MD;  Location: [facility] CATH INVASIVE LOCATION;  Service: Cardiology    CARDIAC CATHETERIZATION  03/2021    stent    CARDIAC CATHETERIZATION  07/2021    just checking the after pacemaker placed- Dr. Tim    CARDIAC DEFIBRILLATOR PLACEMENT  2021    CAROTID ENDARTERECTOMY      CAROTID STENT  12/2025     COLONOSCOPY  10/2020    w/ Dr. Jacobs, hx colon polyps    CORONARY STENT PLACEMENT  2015    LAPAROSCOPIC GASTRIC BANDING  2008    s/p LAGB Realize 6/2008 by [facility].    PACEMAKER IMPLANTATION  07/25/2021    UMBILICAL HERNIA REPAIR  2008    incarcerated UHR w/ mesh by Dr. Velasquez    URETEROSCOPY LASER LITHOTRIPSY WITH STENT INSERTION Left 10/20/2021    Procedure: URETEROSCOPY LASER LITHOTRIPSY WITH STENT INSERTION;  Surgeon: Tonio De La Cruz MD;  Location: [facility] OR;  Service: Urology;  Laterality: Left;    URETEROSCOPY LASER LITHOTRIPSY WITH STENT INSERTION Left 11/03/2021    Procedure: URETEROSCOPY LEFT, LEFT RETROGRADE PYELOGRAM, CYSTOSCOPY, LASER LITHOTRIPSY WITH STENT EXCHANGE;  Surgeon: Tonio De La Cruz MD;  Location:  [facility] OR;  Service: Urology;  Laterality: Left;    URETEROSCOPY LASER LITHOTRIPSY WITH STENT INSERTION Left 10/06/2022    Procedure: URETEROSCOPY LASER LITHOTRIPSY WITH STENT INSERTION;  Surgeon: Tonio De La Cruz MD;  Location: [facility] OR;  Service: Urology;  Laterality: Left;         Current Outpatient Medications:     amiodarone (PACERONE) 100 MG tablet, Take 1 tablet by mouth Daily., Disp: , Rfl:     amLODIPine (NORVASC) 10 MG tablet, Take 1 tablet by mouth Every Night., Disp: , Rfl:     aspirin 81 MG EC tablet, Take 1 tablet by mouth Daily for 30 days., Disp: 30 tablet, Rfl: 0    brivaracetam (Briviact) 75 MG tablet, Take 1 tablet by mouth 2 (Two) Times a Day., Disp: 60 tablet, Rfl: 5    carvedilol (Coreg) 25 MG tablet, Take 1 tablet by mouth 2 (Two) Times a Day With Meals., Disp: 180 tablet, Rfl: 3    Entresto  MG tablet, Take 1 tablet by mouth 2 (Two) Times a Day. Hold until seen by PCP, Disp: , Rfl:     ertapenem 1,000 mg in sodium chloride 0.9 % 100 mL IVPB, Infuse 1,000 mg into a venous catheter Daily for 36 days. Indications: Bacteria in the Blood, Disp: , Rfl:     furosemide (LASIX) 20 MG tablet, Take 1 tablet by mouth 2 (Two) Times a Day for 30 days., Disp: 60 tablet,  "Rfl: 0    levothyroxine (SYNTHROID, LEVOTHROID) 88 MCG tablet, TAKE 1 TABLET DAILY, Disp: 90 tablet, Rfl: 3    lovastatin (MEVACOR) 40 MG tablet, Take 1 tablet by mouth Daily With Dinner., Disp: 90 tablet, Rfl: 3    spironolactone (ALDACTONE) 50 MG tablet, Take 1 tablet by mouth Daily for 30 days., Disp: 90 tablet, Rfl: 3    tamsulosin (FLOMAX) 0.4 MG capsule 24 hr capsule, Take 1 capsule by mouth Daily., Disp: 30 capsule, Rfl: 5    Xarelto 15 MG tablet, Take 1 tablet by mouth Daily With Dinner., Disp: 42 tablet, Rfl: 0  No current facility-administered medications for this visit.    Facility-Administered Medications Ordered in Other Visits:     sodium chloride 0.9 % infusion, 20 mL/hr, Intravenous, Once, Da Boogie MD     Physical Exam  Visit Vitals  /64   Pulse 68   Ht 193 cm (76\")   Wt (!) 147 kg (323 lb 6.4 oz)   SpO2 95%   BMI 39.37 kg/m²       Labs  Brief Urine Lab Results  (Last result in the past 365 days)        Color   Clarity   Blood   Leuk Est   Nitrite   Protein   CREAT   Urine HCG        03/20/24 1322 Yellow   Clear   1+   Negative   Negative   1+                   Lab Results   Component Value Date    GLUCOSE 160 (H) 03/20/2024    CALCIUM 9.1 03/20/2024     (L) 03/20/2024    K 3.7 03/20/2024    CO2 26.2 03/20/2024    CL 98 03/20/2024    BUN 12 03/20/2024    CREATININE 1.27 03/20/2024    EGFRIFAFRI 57 (L) 12/05/2018    EGFRIFNONA 55 (L) 01/10/2022    BCR 9.4 03/20/2024    ANIONGAP 10.8 03/20/2024       Lab Results   Component Value Date    WBC 5.88 03/20/2024    HGB 15.9 03/20/2024    HCT 45.9 03/20/2024    MCV 88.6 03/20/2024     03/20/2024       Urine Culture          1/26/2024    21:03 2/16/2024    14:29   Urine Culture   Urine Culture 50,000 CFU/mL Escherichia coli ESBL  >100,000 CFU/mL Escherichia coli ESBL         Radiographic Studies  CT Abdomen Pelvis Stone Protocol  Result Date: 2/17/2024  Impression: 1. Multiple bilateral nonobstructing renal stones. No evidence of " ureteral stone or hydronephrosis. 2. Cholelithiasis. No evidence of acute cholecystitis or biliary tract obstruction. Electronically Signed: Jose Matta MD  2/17/2024 8:25 AM EST  Workstation ID: [id number]      I have reviewed the above labs and imaging.     Assessment / Plan      Assessment/Plan:   Mr. Agosto is a 69 y.o. male with history of nephrolithiasis s/p left URS/LL 10/2022, prostate CA (very low risk disease, 1 core grade group 1 Ron 6 prostate cancer), biopsy 01/25/24.  Also has history of DM2, CAD with stent x1, afib on Xarelto, CVA. His wife states that cardiology has told him his pacemaker is pacing him about 86% of the time.    Previously established with Dr. De La Cruz in [place]. He presents to establish care in [place] after experiencing post prostate bx sepsis in January. Despite IV antibiotics and management of oral antibiotics with [facility], developed second episodes of sepsis, again with E Coli Bacteremia. He is on his second course of Invanz by PICC line, plan for 6 weeks totally.     He has known stone burden, particularly on the right side, largest stone approximately 8x13mm. Patient reports the Dr. Boogie recommends Urological consideration of stone management, favoring the stone as source of recurrent ESBL. Patient reports that Dr. Boogie further recommends the patient receive IV antibiotics pre and post any nephrolithiasis procedures, if planned.    The patient reports that Dr. Boogie anticipates completion of Ertapenem and PICC removal on March 29th. I recommend the patient discuss surgical stone management with Dr. Gaxiola after his appointment with Dr. Boogie.    Diagnoses and all orders for this visit:    1. Elevated PSA (Primary)  -     POC Urinalysis Dipstick, Automated    2. Prostate cancer  -     POC Urinalysis Dipstick, Automated    3. Acute cystitis with hematuria  -     Urine Culture - Urine, Urine, Clean Catch; Future         Follow Up:   Return in about 2 weeks (around 4/3/2024)  for Penticbettie.      Lois Reyna PA-C  [facility] [place]

## 2024-03-21 DIAGNOSIS — G40.109 LOCALIZATION-RELATED SYMPTOMATIC EPILEPSY AND EPILEPTIC SYNDROMES WITH SIMPLE PARTIAL SEIZURES, NOT INTRACTABLE, WITHOUT STATUS EPILEPTICUS: ICD-10-CM

## 2024-03-21 DIAGNOSIS — N41.0 ACUTE PROSTATITIS: Primary | ICD-10-CM

## 2024-03-22 ENCOUNTER — HOSPITAL ENCOUNTER (OUTPATIENT)
Dept: INFUSION THERAPY | Facility: HOSPITAL | Age: 69
Discharge: HOME | End: 2024-03-22
Payer: MEDICARE

## 2024-03-22 VITALS
OXYGEN SATURATION: 95 % | RESPIRATION RATE: 18 BRPM | SYSTOLIC BLOOD PRESSURE: 127 MMHG | HEART RATE: 66 BPM | TEMPERATURE: 97.4 F | DIASTOLIC BLOOD PRESSURE: 73 MMHG

## 2024-03-22 DIAGNOSIS — N39.0 URINARY TRACT INFECTION WITHOUT HEMATURIA, SITE UNSPECIFIED: Primary | ICD-10-CM

## 2024-03-22 LAB — BACTERIA SPEC AEROBE CULT: NO GROWTH

## 2024-03-22 PROCEDURE — 25010000002 ERTAPENEM PER 500 MG: Performed by: INTERNAL MEDICINE

## 2024-03-22 PROCEDURE — 25810000003 SODIUM CHLORIDE 0.9 % SOLUTION: Performed by: INTERNAL MEDICINE

## 2024-03-22 PROCEDURE — G0463 HOSPITAL OUTPT CLINIC VISIT: HCPCS

## 2024-03-22 PROCEDURE — 96365 THER/PROPH/DIAG IV INF INIT: CPT

## 2024-03-22 RX ORDER — SODIUM CHLORIDE 9 MG/ML
20 INJECTION, SOLUTION INTRAVENOUS ONCE
Status: COMPLETED | OUTPATIENT
Start: 2024-03-22 | End: 2024-03-22

## 2024-03-22 RX ORDER — SODIUM CHLORIDE 9 MG/ML
20 INJECTION, SOLUTION INTRAVENOUS ONCE
Status: CANCELLED | OUTPATIENT
Start: 2024-03-23

## 2024-03-22 RX ADMIN — SODIUM CHLORIDE 20 ML/HR: 9 INJECTION, SOLUTION INTRAVENOUS at 11:03

## 2024-03-22 RX ADMIN — ERTAPENEM 1000 MG: 1 INJECTION INTRAMUSCULAR; INTRAVENOUS at 10:58

## 2024-03-23 ENCOUNTER — HOSPITAL ENCOUNTER (OUTPATIENT)
Dept: INFUSION THERAPY | Facility: HOSPITAL | Age: 69
Discharge: HOME | End: 2024-03-23
Payer: MEDICARE

## 2024-03-23 DIAGNOSIS — N39.0 URINARY TRACT INFECTION WITHOUT HEMATURIA, SITE UNSPECIFIED: Primary | ICD-10-CM

## 2024-03-23 PROCEDURE — 96365 THER/PROPH/DIAG IV INF INIT: CPT

## 2024-03-23 PROCEDURE — 25010000002 ERTAPENEM PER 500 MG: Performed by: INTERNAL MEDICINE

## 2024-03-23 RX ORDER — SODIUM CHLORIDE 9 MG/ML
20 INJECTION, SOLUTION INTRAVENOUS ONCE
Status: CANCELLED | OUTPATIENT
Start: 2024-03-24

## 2024-03-23 RX ORDER — SODIUM CHLORIDE 9 MG/ML
20 INJECTION, SOLUTION INTRAVENOUS ONCE
Status: DISCONTINUED | OUTPATIENT
Start: 2024-03-23 | End: 2024-03-25 | Stop reason: HOSPADM

## 2024-03-23 RX ADMIN — ERTAPENEM 1000 MG: 1 INJECTION INTRAMUSCULAR; INTRAVENOUS at 09:17

## 2024-03-24 ENCOUNTER — HOSPITAL ENCOUNTER (OUTPATIENT)
Dept: INFUSION THERAPY | Facility: HOSPITAL | Age: 69
End: 2024-03-24
Payer: MEDICARE

## 2024-03-24 DIAGNOSIS — N39.0 URINARY TRACT INFECTION WITHOUT HEMATURIA, SITE UNSPECIFIED: Primary | ICD-10-CM

## 2024-03-24 PROCEDURE — 25010000002 ERTAPENEM PER 500 MG: Performed by: INTERNAL MEDICINE

## 2024-03-24 PROCEDURE — 96365 THER/PROPH/DIAG IV INF INIT: CPT

## 2024-03-24 RX ORDER — SODIUM CHLORIDE 9 MG/ML
20 INJECTION, SOLUTION INTRAVENOUS ONCE
Status: ACTIVE | OUTPATIENT
Start: 2024-03-24

## 2024-03-24 RX ORDER — SODIUM CHLORIDE 9 MG/ML
20 INJECTION, SOLUTION INTRAVENOUS ONCE
Status: CANCELLED | OUTPATIENT
Start: 2024-03-25

## 2024-03-24 RX ADMIN — ERTAPENEM 1000 MG: 1 INJECTION INTRAMUSCULAR; INTRAVENOUS at 09:10

## 2024-03-25 ENCOUNTER — HOSPITAL ENCOUNTER (OUTPATIENT)
Dept: INFUSION THERAPY | Facility: HOSPITAL | Age: 69
Discharge: HOME | End: 2024-03-25
Admitting: INTERNAL MEDICINE
Payer: MEDICARE

## 2024-03-25 VITALS
DIASTOLIC BLOOD PRESSURE: 87 MMHG | HEART RATE: 65 BPM | SYSTOLIC BLOOD PRESSURE: 146 MMHG | OXYGEN SATURATION: 96 % | TEMPERATURE: 97.8 F | RESPIRATION RATE: 18 BRPM

## 2024-03-25 DIAGNOSIS — N39.0 URINARY TRACT INFECTION WITHOUT HEMATURIA, SITE UNSPECIFIED: Primary | ICD-10-CM

## 2024-03-25 PROCEDURE — 96365 THER/PROPH/DIAG IV INF INIT: CPT

## 2024-03-25 PROCEDURE — 25010000002 ERTAPENEM PER 500 MG: Performed by: INTERNAL MEDICINE

## 2024-03-25 RX ORDER — SODIUM CHLORIDE 9 MG/ML
20 INJECTION, SOLUTION INTRAVENOUS ONCE
Status: CANCELLED | OUTPATIENT
Start: 2024-03-26

## 2024-03-25 RX ORDER — SODIUM CHLORIDE 9 MG/ML
20 INJECTION, SOLUTION INTRAVENOUS ONCE
Status: DISCONTINUED | OUTPATIENT
Start: 2024-03-25 | End: 2024-03-27 | Stop reason: HOSPADM

## 2024-03-25 RX ADMIN — ERTAPENEM 1000 MG: 1 INJECTION INTRAMUSCULAR; INTRAVENOUS at 09:55

## 2024-03-26 ENCOUNTER — HOSPITAL ENCOUNTER (OUTPATIENT)
Dept: INFUSION THERAPY | Facility: HOSPITAL | Age: 69
Discharge: HOME | End: 2024-03-26
Admitting: INTERNAL MEDICINE
Payer: MEDICARE

## 2024-03-26 VITALS
HEART RATE: 83 BPM | TEMPERATURE: 98 F | DIASTOLIC BLOOD PRESSURE: 73 MMHG | SYSTOLIC BLOOD PRESSURE: 118 MMHG | RESPIRATION RATE: 18 BRPM

## 2024-03-26 DIAGNOSIS — N39.0 URINARY TRACT INFECTION WITHOUT HEMATURIA, SITE UNSPECIFIED: Primary | ICD-10-CM

## 2024-03-26 PROCEDURE — 96365 THER/PROPH/DIAG IV INF INIT: CPT

## 2024-03-26 PROCEDURE — 25810000003 SODIUM CHLORIDE 0.9 % SOLUTION: Performed by: INTERNAL MEDICINE

## 2024-03-26 PROCEDURE — 25010000002 ERTAPENEM PER 500 MG: Performed by: INTERNAL MEDICINE

## 2024-03-26 RX ORDER — SODIUM CHLORIDE 9 MG/ML
20 INJECTION, SOLUTION INTRAVENOUS ONCE
Status: COMPLETED | OUTPATIENT
Start: 2024-03-26 | End: 2024-03-26

## 2024-03-26 RX ORDER — SODIUM CHLORIDE 9 MG/ML
20 INJECTION, SOLUTION INTRAVENOUS ONCE
Status: CANCELLED | OUTPATIENT
Start: 2024-03-27

## 2024-03-26 RX ADMIN — SODIUM CHLORIDE 20 ML/HR: 9 INJECTION, SOLUTION INTRAVENOUS at 10:39

## 2024-03-26 RX ADMIN — ERTAPENEM 1000 MG: 1 INJECTION INTRAMUSCULAR; INTRAVENOUS at 10:39

## 2024-03-27 ENCOUNTER — HOSPITAL ENCOUNTER (OUTPATIENT)
Dept: INFUSION THERAPY | Facility: HOSPITAL | Age: 69
Discharge: HOME | End: 2024-03-27
Payer: MEDICARE

## 2024-03-27 DIAGNOSIS — N41.0 ACUTE PROSTATITIS: ICD-10-CM

## 2024-03-27 DIAGNOSIS — N39.0 URINARY TRACT INFECTION WITHOUT HEMATURIA, SITE UNSPECIFIED: Primary | ICD-10-CM

## 2024-03-27 LAB
ALBUMIN SERPL-MCNC: 4.3 G/DL (ref 3.5–5.2)
ALBUMIN/GLOB SERPL: 1.2 G/DL
ALP SERPL-CCNC: 72 U/L (ref 39–117)
ALT SERPL W P-5'-P-CCNC: 18 U/L (ref 1–41)
ANION GAP SERPL CALCULATED.3IONS-SCNC: 14 MMOL/L (ref 5–15)
AST SERPL-CCNC: 17 U/L (ref 1–40)
BASOPHILS # BLD AUTO: 0.05 10*3/MM3 (ref 0–0.2)
BASOPHILS NFR BLD AUTO: 0.7 % (ref 0–1.5)
BILIRUB CONJ SERPL-MCNC: 0.2 MG/DL (ref 0–0.3)
BILIRUB SERPL-MCNC: 0.8 MG/DL (ref 0–1.2)
BUN SERPL-MCNC: 15 MG/DL (ref 8–23)
BUN/CREAT SERPL: 10.6 (ref 7–25)
CALCIUM SPEC-SCNC: 9.2 MG/DL (ref 8.6–10.5)
CHLORIDE SERPL-SCNC: 97 MMOL/L (ref 98–107)
CHOLEST SERPL-MCNC: 292 MG/DL (ref 0–200)
CO2 SERPL-SCNC: 24 MMOL/L (ref 22–29)
CREAT SERPL-MCNC: 1.42 MG/DL (ref 0.76–1.27)
CRP SERPL-MCNC: 1.35 MG/DL (ref 0–0.5)
DEPRECATED RDW RBC AUTO: 43.8 FL (ref 37–54)
EGFRCR SERPLBLD CKD-EPI 2021: 53.8 ML/MIN/1.73
EOSINOPHIL # BLD AUTO: 0.25 10*3/MM3 (ref 0–0.4)
EOSINOPHIL NFR BLD AUTO: 3.7 % (ref 0.3–6.2)
ERYTHROCYTE [DISTWIDTH] IN BLOOD BY AUTOMATED COUNT: 13.6 % (ref 12.3–15.4)
ERYTHROCYTE [SEDIMENTATION RATE] IN BLOOD: 16 MM/HR (ref 0–20)
GLOBULIN UR ELPH-MCNC: 3.6 GM/DL
GLUCOSE SERPL-MCNC: 180 MG/DL (ref 65–99)
HCT VFR BLD AUTO: 45.7 % (ref 37.5–51)
HDLC SERPL-MCNC: 37 MG/DL (ref 40–60)
HGB BLD-MCNC: 16 G/DL (ref 13–17.7)
IMM GRANULOCYTES # BLD AUTO: 0.03 10*3/MM3 (ref 0–0.05)
IMM GRANULOCYTES NFR BLD AUTO: 0.4 % (ref 0–0.5)
LDLC SERPL CALC-MCNC: 178 MG/DL (ref 0–100)
LDLC/HDLC SERPL: 4.79 {RATIO}
LYMPHOCYTES # BLD AUTO: 2.04 10*3/MM3 (ref 0.7–3.1)
LYMPHOCYTES NFR BLD AUTO: 30.4 % (ref 19.6–45.3)
MCH RBC QN AUTO: 30.9 PG (ref 26.6–33)
MCHC RBC AUTO-ENTMCNC: 35 G/DL (ref 31.5–35.7)
MCV RBC AUTO: 88.2 FL (ref 79–97)
MONOCYTES # BLD AUTO: 0.37 10*3/MM3 (ref 0.1–0.9)
MONOCYTES NFR BLD AUTO: 5.5 % (ref 5–12)
NEUTROPHILS NFR BLD AUTO: 3.97 10*3/MM3 (ref 1.7–7)
NEUTROPHILS NFR BLD AUTO: 59.3 % (ref 42.7–76)
NRBC BLD AUTO-RTO: 0 /100 WBC (ref 0–0.2)
PLATELET # BLD AUTO: 200 10*3/MM3 (ref 140–450)
PMV BLD AUTO: 10.8 FL (ref 6–12)
POTASSIUM SERPL-SCNC: 4.3 MMOL/L (ref 3.5–5.2)
PROT SERPL-MCNC: 7.9 G/DL (ref 6–8.5)
RBC # BLD AUTO: 5.18 10*6/MM3 (ref 4.14–5.8)
SODIUM SERPL-SCNC: 135 MMOL/L (ref 136–145)
TRIGL SERPL-MCNC: 389 MG/DL (ref 0–150)
VLDLC SERPL-MCNC: 77 MG/DL (ref 5–40)
WBC NRBC COR # BLD AUTO: 6.71 10*3/MM3 (ref 3.4–10.8)

## 2024-03-27 PROCEDURE — 80061 LIPID PANEL: CPT | Performed by: INTERNAL MEDICINE

## 2024-03-27 PROCEDURE — 86140 C-REACTIVE PROTEIN: CPT | Performed by: INTERNAL MEDICINE

## 2024-03-27 PROCEDURE — 96365 THER/PROPH/DIAG IV INF INIT: CPT

## 2024-03-27 PROCEDURE — 82248 BILIRUBIN DIRECT: CPT | Performed by: INTERNAL MEDICINE

## 2024-03-27 PROCEDURE — 25010000002 ERTAPENEM PER 500 MG: Performed by: INTERNAL MEDICINE

## 2024-03-27 PROCEDURE — 85025 COMPLETE CBC W/AUTO DIFF WBC: CPT | Performed by: INTERNAL MEDICINE

## 2024-03-27 PROCEDURE — 80053 COMPREHEN METABOLIC PANEL: CPT | Performed by: INTERNAL MEDICINE

## 2024-03-27 PROCEDURE — 85652 RBC SED RATE AUTOMATED: CPT | Performed by: INTERNAL MEDICINE

## 2024-03-27 RX ORDER — SODIUM CHLORIDE 9 MG/ML
20 INJECTION, SOLUTION INTRAVENOUS ONCE
Status: DISCONTINUED | OUTPATIENT
Start: 2024-03-27 | End: 2024-03-29 | Stop reason: HOSPADM

## 2024-03-27 RX ORDER — SODIUM CHLORIDE 9 MG/ML
20 INJECTION, SOLUTION INTRAVENOUS ONCE
OUTPATIENT
Start: 2024-03-28

## 2024-03-27 RX ADMIN — ERTAPENEM 1000 MG: 1 INJECTION INTRAMUSCULAR; INTRAVENOUS at 10:28

## 2024-04-01 ENCOUNTER — OFFICE VISIT (OUTPATIENT)
Dept: UROLOGY | Facility: CLINIC | Age: 69
End: 2024-04-01
Payer: MEDICARE

## 2024-04-01 VITALS
BODY MASS INDEX: 38.36 KG/M2 | DIASTOLIC BLOOD PRESSURE: 70 MMHG | HEART RATE: 37 BPM | HEIGHT: 76 IN | OXYGEN SATURATION: 97 % | SYSTOLIC BLOOD PRESSURE: 138 MMHG | WEIGHT: 315 LBS

## 2024-04-01 DIAGNOSIS — N40.1 BPH WITH OBSTRUCTION/LOWER URINARY TRACT SYMPTOMS: ICD-10-CM

## 2024-04-01 DIAGNOSIS — N41.0 ACUTE PROSTATITIS: ICD-10-CM

## 2024-04-01 DIAGNOSIS — N39.0 RECURRENT UTI: ICD-10-CM

## 2024-04-01 DIAGNOSIS — R97.20 ELEVATED PSA: ICD-10-CM

## 2024-04-01 DIAGNOSIS — C61 PROSTATE CANCER: Primary | ICD-10-CM

## 2024-04-01 DIAGNOSIS — N20.0 NEPHROLITHIASIS: ICD-10-CM

## 2024-04-01 DIAGNOSIS — N13.8 BPH WITH OBSTRUCTION/LOWER URINARY TRACT SYMPTOMS: ICD-10-CM

## 2024-04-01 LAB
BILIRUB BLD-MCNC: NEGATIVE MG/DL
CLARITY, POC: CLEAR
COLOR UR: YELLOW
EXPIRATION DATE: ABNORMAL
GLUCOSE UR STRIP-MCNC: NEGATIVE MG/DL
KETONES UR QL: NEGATIVE
LEUKOCYTE EST, POC: NEGATIVE
Lab: ABNORMAL
NITRITE UR-MCNC: NEGATIVE MG/ML
PH UR: 7 [PH] (ref 5–8)
PROT UR STRIP-MCNC: NEGATIVE MG/DL
RBC # UR STRIP: ABNORMAL /UL
SP GR UR: 1.01 (ref 1–1.03)
UROBILINOGEN UR QL: NORMAL

## 2024-04-01 PROCEDURE — 1160F RVW MEDS BY RX/DR IN RCRD: CPT | Performed by: STUDENT IN AN ORGANIZED HEALTH CARE EDUCATION/TRAINING PROGRAM

## 2024-04-01 PROCEDURE — 3078F DIAST BP <80 MM HG: CPT | Performed by: STUDENT IN AN ORGANIZED HEALTH CARE EDUCATION/TRAINING PROGRAM

## 2024-04-01 PROCEDURE — 81003 URINALYSIS AUTO W/O SCOPE: CPT | Performed by: STUDENT IN AN ORGANIZED HEALTH CARE EDUCATION/TRAINING PROGRAM

## 2024-04-01 PROCEDURE — 1159F MED LIST DOCD IN RCRD: CPT | Performed by: STUDENT IN AN ORGANIZED HEALTH CARE EDUCATION/TRAINING PROGRAM

## 2024-04-01 PROCEDURE — 87086 URINE CULTURE/COLONY COUNT: CPT | Performed by: STUDENT IN AN ORGANIZED HEALTH CARE EDUCATION/TRAINING PROGRAM

## 2024-04-01 PROCEDURE — 99215 OFFICE O/P EST HI 40 MIN: CPT | Performed by: STUDENT IN AN ORGANIZED HEALTH CARE EDUCATION/TRAINING PROGRAM

## 2024-04-01 PROCEDURE — 3075F SYST BP GE 130 - 139MM HG: CPT | Performed by: STUDENT IN AN ORGANIZED HEALTH CARE EDUCATION/TRAINING PROGRAM

## 2024-04-01 RX ORDER — TAMSULOSIN HYDROCHLORIDE 0.4 MG/1
1 CAPSULE ORAL DAILY
Qty: 90 CAPSULE | Refills: 3 | Status: SHIPPED | OUTPATIENT
Start: 2024-04-01

## 2024-04-01 NOTE — PROGRESS NOTES
Follow Up Office Visit      Patient Name: Andre Agosto  : 1955   MRN: [id number]     Chief Complaint:    Chief Complaint   Patient presents with    Elevated PSA    Nephrolithiasis       Referring Provider: No ref. provider found    History of Present Illness: Andre Agosto is a 69 y.o. male who presents today for evaluation of numerous urologic issues.  He has an extensive urologic history and has previously followed with Dr. Tonio De La Cruz of [facility].  History of recurrent nephrolithiasis status post left ureteroscopy and lithotripsy, history of elevated PSA for which he underwent a prostate biopsy recently.  He desires to transition care to [facility].    His past medical history includes CAD status post MI, stent placement, pacemaker placement, on Xarelto, type 2 diabetes, hypertension, hyperlipidemia, obesity.    Lab Results   Component Value Date    PSA 4.890 (H) 2024    PSA 4.7 (H) 2023    PSA 5.370 (H) 10/12/2023    PSA 5.050 (H) 2023    PSA 4.110 (H) 2022    PSA 6.0 (H) 03/15/2022    PSA 6.120 (H) 03/15/2022     He has a history of gross hematuria and status post negative previous workup with cystoscopy in .  He was noted to have obstructive lateral lobe hyperplasia noted on cystoscopy.  The patient has a history of left-sided flank pain.  A CT scan performed in  demonstrated multiple nonobstructing bilateral stones.  He underwent left ureteroscopy and lithotripsy in 2021.  He underwent a second look procedure on the left in 2021.  In 2022, had concerns regarding UTI.  His urinalysis at the time demonstrated glucosuria and proteinuria but no obvious infection signs.  Apparently had recurrence of left-sided stones and underwent another left-sided ureteroscopy lithotripsy in 2022.  PSA was elevated and underwent 4K score with 19% chance of malignancy.    Followed up with a prostate biopsy in 2024.  This is a  14 core biopsy demonstrating 1 core of Ron 6 disease in 5% of tissue at the left apex, has been placed on active surveillance.    The patient developed post prostate biopsy sepsis in January and was admitted to the hospital.  He was placed on IV ertapenem.    He then developed recurrent infection and was admitted to [facility] in February, seen by Dr. Andre Ray of the [facility] urology group.  CT shows bilateral nonobstructing stones.  He has no urinary complaints and states that he was voiding well.  Cultures at the time grew ESBL E. coli.  Infectious disease put him back on IV antibiotics.    He then sought another opinion with a different urologist and saw Dr. Marquis of the [facility] urology group who recommended surveillance for his low risk prostate cancer.  They did not talk much about his kidney stones it sounds like per the patient.    He then saw JANETH Barboza in our office, history of type 2 diabetes on Farxiga in January which was discontinued secondary to feeling it could contribute to his UTIs.    Now the patient is here to establish care with me.  We reviewed his CT scan demonstrating what appears to be an intraparenchymal stone on the left and 2 sizable nonobstructing stones on the right.  He is asymptomatic from this.  IPSS 7 with a mostly satisfied quality of life.  Urinalysis clear except for trace blood.  Urine culture will be sent today.    Prostate appears moderately enlarged on CT.  He is on tamsulosin for presumed BPH with lower urinary tract symptoms and requests a refill today.    Of note, nursing indicates the patient has heart rate of 37 today.  He is entirely asymptomatic.  Denies dizziness or lightheadedness even with standing or ambulation.    I felt the patient's pulse, I believe this to be in the 50s.  He needs to contact his cardiologist to assess his pacemaker.  He states he is paced most of the time.    He states he underwent a recent cardiac  evaluation with no obvious pacemaker issue.    Subjective      Review of System: Review of Systems   Genitourinary: Negative.  Negative for decreased urine volume, difficulty urinating, dysuria, enuresis, flank pain, frequency, hematuria and urgency.      I have reviewed the ROS documented by my clinical staff, I have updated appropriately and I agree. Colin Gaxiola MD    I have reviewed and the following portions of the patient's history were updated as appropriate: past family history, past medical history, past social history, past surgical history and problem list.    Medications:     Current Outpatient Medications:     amiodarone (PACERONE) 100 MG tablet, Take 1 tablet by mouth Daily., Disp: , Rfl:     amLODIPine (NORVASC) 10 MG tablet, Take 1 tablet by mouth Every Night., Disp: , Rfl:     brivaracetam (Briviact) 75 MG tablet, Take 1 tablet by mouth 2 (Two) Times a Day., Disp: 180 tablet, Rfl: 3    carvedilol (Coreg) 25 MG tablet, Take 1 tablet by mouth 2 (Two) Times a Day With Meals., Disp: 180 tablet, Rfl: 3    Entresto  MG tablet, Take 1 tablet by mouth 2 (Two) Times a Day. Hold until seen by PCP, Disp: , Rfl:     levothyroxine (SYNTHROID, LEVOTHROID) 88 MCG tablet, TAKE 1 TABLET DAILY, Disp: 90 tablet, Rfl: 3    lovastatin (MEVACOR) 40 MG tablet, Take 1 tablet by mouth Daily With Dinner., Disp: 90 tablet, Rfl: 3    tamsulosin (FLOMAX) 0.4 MG capsule 24 hr capsule, Take 1 capsule by mouth Daily., Disp: 90 capsule, Rfl: 3    Xarelto 15 MG tablet, Take 1 tablet by mouth Daily With Dinner., Disp: 42 tablet, Rfl: 0    furosemide (LASIX) 20 MG tablet, Take 1 tablet by mouth 2 (Two) Times a Day for 30 days., Disp: 60 tablet, Rfl: 0    spironolactone (ALDACTONE) 50 MG tablet, Take 1 tablet by mouth Daily for 30 days., Disp: 90 tablet, Rfl: 3    Allergies:   Allergies   Allergen Reactions    5-Alpha Reductase Inhibitors Other (See Comments)     Myalgias    Amlodipine Swelling and Other (See Comments)      "Feet swell    Statins Myalgia and Other (See Comments)       IPSS Questionnaire (AUA-7):  Over the past month…    1)  Incomplete Emptying:       How often have you had a sensation of not emptying you had the sensation of not emptying your bladder completely after you finished urinating?  0 - Not at all   2)  Frequency:       How often have you had the urinate again less than two hours after you finished urinating?  1 - Less than 1 time in 5   3)  Intermittency:       How often have you found you stopped and started again several times when you urinated?   1 - Less than 1 time in 5   4) Urgency:      How often have you found it difficult to postpone urination?  3 - About half the time   5) Weak Stream:      How often have you had a weak urinary stream?  0 - Not at all   6) Straining:       How often have you had to push or strain to begin urination?  0 - Not at all   7) Nocturia:      How many times did you most typically get up to urinate from the time you went to bed at night until the time you got up in the morning?  2 - 2 times   Total Score:  7   The International Prostate Symptom Score (IPSS) is used to screen, diagnose, track symptoms of benign prostatic hyperplasia (BPH).   0-7 (Mild Symptoms) 8-19 (Moderate) 20-35 (Severe)   Quality of Life (QoL):  If you were to spend the rest of your life with your urinary condition just the way it is now, how would you feel about that? 2-Mostly Satisfied   Urine Leakage (Incontinence) 0-No Leakage         Objective     Physical Exam:   Vital Signs:   Vitals:    04/01/24 1411   BP: 138/70   Pulse: (!) 37   SpO2: 97%   Weight: (!) 146 kg (322 lb)   Height: 193 cm (76\")     Body mass index is 39.2 kg/m².     Physical Exam  Constitutional:       Appearance: Normal appearance.   HENT:      Head: Normocephalic and atraumatic.      Nose: Nose normal.   Eyes:      Extraocular Movements: Extraocular movements intact.      Conjunctiva/sclera: Conjunctivae normal.      Pupils: " Pupils are equal, round, and reactive to light.   Musculoskeletal:         General: Normal range of motion.      Cervical back: Normal range of motion and neck supple.   Skin:     General: Skin is warm and dry.      Findings: No lesion or rash.   Neurological:      General: No focal deficit present.      Mental Status: He is alert and oriented to person, place, and time. Mental status is at baseline.   Psychiatric:         Mood and Affect: Mood normal.         Behavior: Behavior normal.         Labs:   Brief Urine Lab Results  (Last result in the past 365 days)        Color   Clarity   Blood   Leuk Est   Nitrite   Protein   CREAT   Urine HCG        04/01/24 1414 Yellow   Clear   1+   Negative   Negative   Negative                   Urine Culture          1/26/2024    21:03 2/16/2024    14:29 3/20/2024    16:47   Urine Culture   Urine Culture 50,000 CFU/mL Escherichia coli ESBL  >100,000 CFU/mL Escherichia coli ESBL  No growth         Lab Results   Component Value Date    GLUCOSE 180 (H) 03/27/2024    CALCIUM 9.2 03/27/2024     (L) 03/27/2024    K 4.3 03/27/2024    CO2 24.0 03/27/2024    CL 97 (L) 03/27/2024    BUN 15 03/27/2024    CREATININE 1.42 (H) 03/27/2024    EGFRIFAFRI 57 (L) 12/05/2018    EGFRIFNONA 55 (L) 01/10/2022    BCR 10.6 03/27/2024    ANIONGAP 14.0 03/27/2024       Lab Results   Component Value Date    WBC 6.71 03/27/2024    HGB 16.0 03/27/2024    HCT 45.7 03/27/2024    MCV 88.2 03/27/2024     03/27/2024       Images:   CT Abdomen Pelvis Stone Protocol    Result Date: 2/17/2024  Impression: 1. Multiple bilateral nonobstructing renal stones. No evidence of ureteral stone or hydronephrosis. 2. Cholelithiasis. No evidence of acute cholecystitis or biliary tract obstruction. Electronically Signed: Jose Matta MD  2/17/2024 8:25 AM EST  Workstation ID: [id number]    XR Chest 1 View    Result Date: 2/16/2024  Impression: 1. Stable cardiomegaly. 2. No acute process identified in the chest.  Electronically Signed: Nicolás Allred MD  2/16/2024 3:02 PM EST  Workstation ID: [id number]    CT Abdomen Pelvis Without Contrast    Result Date: 1/28/2024   1. Bilateral nonobstructing renal calculi. No hydronephrosis.  2. Severe urinary bladder thickening with perivesical inflammatory changes suggestive of underlying cystitis. Malpositioned Crespo catheter with bulb in the prostatic urethra.   CTDI: 20.02 mGy DLP:1116.91 mGy.cm  This report was signed and finalized on 1/28/2024 11:00 AM by Torey Allen MD.      XR Chest 1 View    Result Date: 1/27/2024  No acute cardiopulmonary process.        This report was signed and finalized on 1/27/2024 7:47 AM by Torey Allen MD.      CT Abdomen Pelvis Without Contrast    Result Date: 1/26/2024  1.  Malpositioned Crespo catheter with balloon inflated in the prostate gland. Authenticated and Electronically Signed by Jose J Dupree MD on 01/26/2024 11:38:21 PM      Measures:   Tobacco:   Andre Agosto  reports that he quit smoking about 38 years ago. His smoking use included cigarettes. He started smoking about 49 years ago. He has a 7.5 pack-year smoking history. He has never used smokeless tobacco.     Assessment / Plan      Assessment/Plan:   69 y.o. male who presented today for follow up of numerous urologic concerns.  History of low-grade prostate cancer on surveillance.  History of bilateral nonobstructing stones.  History of 2 or 3 separate ureteroscopy procedures by Dr. De La Cruz in [place], for ongoing flank pain and nonobstructing stones.  He has 2 large nonobstructing stones on the right side which he is currently asymptomatic from.  He has had numerous admissions to the hospital for post prostate biopsy sepsis and recurrent infection 1 month removed from previous.  He probably has BPH but has minimal urinary symptoms other than nocturia.  He states his infectious disease doctor has recommended stone removal to reduce his risk of urinary tract infection.  The  patient states he is still feeling weak and is still receiving IV antibiotics from an infectious disease specialist.  I do not recommend prompt surgical intervention for his nonobstructing stones at this time, his recurrent ESBL E. coli infections were likely as a result of chronic prostatitis versus smoldering UTI from his post prostate biopsy sepsis.  Discussed options for managing his stones, this includes monitoring, ESWL versus ureteroscopy lithotripsy.  The patient is on Xarelto and I would prefer to avoid shockwave lithotripsy to reduce his risk of renal hematoma.    In the interim the patient sounds like he needs some more time to recover from his multiple and recurrent infections.  Infectious disease is currently managing antibiotics.    Discussed with patient despite tamsulosin he may have BPH causing intermittent incomplete bladder emptying concerns and recurrent UTI and for this reason he may benefit from a cystoscopic workup with TRUS prostate sizing and uroflow rate measurement.  This workup may indicate need for prostatic intervention like bladder outlet surgery, greenlight PVP versus aqua ablation, etc.    For the time being we will give him 3 months to recover, we will monitor for recurrent infections.  If he does develop more infections in the interim we will likely expedite treatment of his stone and workup for BPH.    From a prostate cancer standpoint he will likely require MRI for surveillance within 1 year of his biopsy.  Would avoid any urgent repeat biopsy given very low risk disease, we will likely try to back this up for the next 2 or 3 years.    Diagnoses and all orders for this visit:    1. Prostate cancer (Primary)  - repeat PSA in 6 months     2. Nephrolithiasis  -     POC Urinalysis Dipstick, Automated  -     Urine Culture - Urine, Urine, Clean Catch; Future  -     Urine Culture - Urine, Urine, Clean Catch    3. Recurrent UTI  -     Urine Culture - Urine, Urine, Clean Catch; Future  -      Urine Culture - Urine, Urine, Clean Catch    4. Elevated PSA  -     POC Urinalysis Dipstick, Automated    5. Acute prostatitis  -     Urinalysis With Microscopic - Urine, Clean Catch  -     Urine Culture - Urine, Urine, Clean Catch    6. BPH with obstruction/lower urinary tract symptoms       - tamsulosin (FLOMAX) 0.4 MG capsule 24 hr capsule; Take 1 capsule by mouth Daily.  Dispense: 90 capsule; Refill: 3         Follow Up:   Return in about 3 months (around 7/1/2024).    I spent approximately 60 minutes providing clinical care for this patient; including review of patient's chart and provider documentation, face to face time spent with patient in examination room (obtaining history, performing physical exam, discussing diagnosis and management options), placing orders, and completing patient documentation.     Colin Gaxiola MD  [facility] [place]

## 2024-04-03 LAB — BACTERIA SPEC AEROBE CULT: NO GROWTH

## 2024-04-11 LAB
ALBUMIN SERPL-MCNC: 4.6 G/DL (ref 3.5–5.2)
ALBUMIN/GLOB SERPL: 1.4 G/DL
ALP SERPL-CCNC: 74 U/L (ref 39–117)
ALT SERPL-CCNC: 16 U/L (ref 1–41)
AST SERPL-CCNC: 11 U/L (ref 1–40)
BASOPHILS # BLD AUTO: 0.06 10*3/MM3 (ref 0–0.2)
BASOPHILS NFR BLD AUTO: 0.8 % (ref 0–1.5)
BILIRUB SERPL-MCNC: 0.8 MG/DL (ref 0–1.2)
BUN SERPL-MCNC: 25 MG/DL (ref 8–23)
BUN/CREAT SERPL: 11 (ref 7–25)
CALCIUM SERPL-MCNC: 10.1 MG/DL (ref 8.6–10.5)
CHLORIDE SERPL-SCNC: 98 MMOL/L (ref 98–107)
CO2 SERPL-SCNC: 28.8 MMOL/L (ref 22–29)
CREAT SERPL-MCNC: 2.28 MG/DL (ref 0.76–1.27)
EGFRCR SERPLBLD CKD-EPI 2021: 30.5 ML/MIN/1.73
EOSINOPHIL # BLD AUTO: 0.17 10*3/MM3 (ref 0–0.4)
EOSINOPHIL NFR BLD AUTO: 2.2 % (ref 0.3–6.2)
ERYTHROCYTE [DISTWIDTH] IN BLOOD BY AUTOMATED COUNT: 14.3 % (ref 12.3–15.4)
GLOBULIN SER CALC-MCNC: 3.2 GM/DL
GLUCOSE SERPL-MCNC: 129 MG/DL (ref 65–99)
HBA1C MFR BLD: 7.5 % (ref 4.8–5.6)
HCT VFR BLD AUTO: 48.8 % (ref 37.5–51)
HGB BLD-MCNC: 16.6 G/DL (ref 13–17.7)
IMM GRANULOCYTES # BLD AUTO: 0.03 10*3/MM3 (ref 0–0.05)
IMM GRANULOCYTES NFR BLD AUTO: 0.4 % (ref 0–0.5)
LYMPHOCYTES # BLD AUTO: 2.49 10*3/MM3 (ref 0.7–3.1)
LYMPHOCYTES NFR BLD AUTO: 32.3 % (ref 19.6–45.3)
MCH RBC QN AUTO: 31 PG (ref 26.6–33)
MCHC RBC AUTO-ENTMCNC: 34 G/DL (ref 31.5–35.7)
MCV RBC AUTO: 91.2 FL (ref 79–97)
MONOCYTES # BLD AUTO: 0.44 10*3/MM3 (ref 0.1–0.9)
MONOCYTES NFR BLD AUTO: 5.7 % (ref 5–12)
NEUTROPHILS # BLD AUTO: 4.51 10*3/MM3 (ref 1.7–7)
NEUTROPHILS NFR BLD AUTO: 58.6 % (ref 42.7–76)
NRBC BLD AUTO-RTO: 0 /100 WBC (ref 0–0.2)
PLATELET # BLD AUTO: 212 10*3/MM3 (ref 140–450)
POTASSIUM SERPL-SCNC: 4.8 MMOL/L (ref 3.5–5.2)
PROT SERPL-MCNC: 7.8 G/DL (ref 6–8.5)
RBC # BLD AUTO: 5.35 10*6/MM3 (ref 4.14–5.8)
SODIUM SERPL-SCNC: 138 MMOL/L (ref 136–145)
T4 FREE SERPL-MCNC: 1.54 NG/DL (ref 0.93–1.7)
TSH SERPL DL<=0.005 MIU/L-ACNC: 2.14 UIU/ML (ref 0.27–4.2)
VIT B12 SERPL-MCNC: 404 PG/ML (ref 211–946)
WBC # BLD AUTO: 7.7 10*3/MM3 (ref 3.4–10.8)

## 2024-04-17 ENCOUNTER — OFFICE VISIT (OUTPATIENT)
Dept: INTERNAL MEDICINE | Facility: CLINIC | Age: 69
End: 2024-04-17
Payer: MEDICARE

## 2024-04-17 VITALS
DIASTOLIC BLOOD PRESSURE: 68 MMHG | WEIGHT: 315 LBS | BODY MASS INDEX: 38.36 KG/M2 | TEMPERATURE: 96.6 F | RESPIRATION RATE: 16 BRPM | HEIGHT: 76 IN | OXYGEN SATURATION: 98 % | HEART RATE: 82 BPM | SYSTOLIC BLOOD PRESSURE: 110 MMHG

## 2024-04-17 DIAGNOSIS — E11.29 TYPE 2 DIABETES MELLITUS WITH OTHER DIABETIC KIDNEY COMPLICATION, WITHOUT LONG-TERM CURRENT USE OF INSULIN: ICD-10-CM

## 2024-04-17 DIAGNOSIS — Z16.12 ESBL (EXTENDED SPECTRUM BETA-LACTAMASE) PRODUCING BACTERIA INFECTION: ICD-10-CM

## 2024-04-17 DIAGNOSIS — N18.9 ACUTE ON CHRONIC RENAL INSUFFICIENCY: Primary | ICD-10-CM

## 2024-04-17 DIAGNOSIS — A49.9 ESBL (EXTENDED SPECTRUM BETA-LACTAMASE) PRODUCING BACTERIA INFECTION: ICD-10-CM

## 2024-04-17 DIAGNOSIS — N20.0 NEPHROLITHIASIS: ICD-10-CM

## 2024-04-17 DIAGNOSIS — N28.9 ACUTE ON CHRONIC RENAL INSUFFICIENCY: Primary | ICD-10-CM

## 2024-04-17 PROCEDURE — 99214 OFFICE O/P EST MOD 30 MIN: CPT | Performed by: INTERNAL MEDICINE

## 2024-04-17 PROCEDURE — 3078F DIAST BP <80 MM HG: CPT | Performed by: INTERNAL MEDICINE

## 2024-04-17 PROCEDURE — 3074F SYST BP LT 130 MM HG: CPT | Performed by: INTERNAL MEDICINE

## 2024-04-17 PROCEDURE — 3051F HG A1C>EQUAL 7.0%<8.0%: CPT | Performed by: INTERNAL MEDICINE

## 2024-04-17 RX ORDER — ORAL SEMAGLUTIDE 3 MG/1
3 TABLET ORAL DAILY
Qty: 90 TABLET | Refills: 3 | Status: SHIPPED | OUTPATIENT
Start: 2024-04-17

## 2024-04-17 RX ORDER — SPIRONOLACTONE 50 MG/1
50 TABLET, FILM COATED ORAL DAILY
Qty: 90 TABLET | Refills: 3 | Status: SHIPPED | OUTPATIENT
Start: 2024-04-17 | End: 2025-04-12

## 2024-04-17 RX ORDER — DAPAGLIFLOZIN 10 MG/1
TABLET, FILM COATED ORAL
COMMUNITY
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
## 2024-04-18 DIAGNOSIS — N41.0 PROSTATITIS, ACUTE: Primary | ICD-10-CM

## 2024-04-23 RX ORDER — DULAGLUTIDE 0.75 MG/.5ML
0.75 INJECTION, SOLUTION SUBCUTANEOUS
Qty: 1.5 ML | Refills: 3 | Status: SHIPPED | OUTPATIENT
Start: 2024-04-23

## 2024-04-25 DIAGNOSIS — N17.9 ACUTE RENAL FAILURE SUPERIMPOSED ON CHRONIC KIDNEY DISEASE, UNSPECIFIED ACUTE RENAL FAILURE TYPE, UNSPECIFIED CKD STAGE: Primary | ICD-10-CM

## 2024-04-25 DIAGNOSIS — N18.9 ACUTE RENAL FAILURE SUPERIMPOSED ON CHRONIC KIDNEY DISEASE, UNSPECIFIED ACUTE RENAL FAILURE TYPE, UNSPECIFIED CKD STAGE: Primary | ICD-10-CM

## 2024-04-26 LAB
ALBUMIN SERPL-MCNC: 4.7 G/DL (ref 3.5–5.2)
ALBUMIN/GLOB SERPL: 1.5 G/DL
ALP SERPL-CCNC: 68 U/L (ref 39–117)
ALT SERPL-CCNC: 16 U/L (ref 1–41)
APPEARANCE UR: CLEAR
AST SERPL-CCNC: 12 U/L (ref 1–40)
BACTERIA #/AREA URNS HPF: NORMAL /HPF
BACTERIA UR CULT: NORMAL
BACTERIA UR CULT: NORMAL
BASOPHILS # BLD AUTO: 0.06 10*3/MM3 (ref 0–0.2)
BASOPHILS NFR BLD AUTO: 0.7 % (ref 0–1.5)
BILIRUB SERPL-MCNC: 0.7 MG/DL (ref 0–1.2)
BILIRUB UR QL STRIP: NEGATIVE
BUN SERPL-MCNC: 37 MG/DL (ref 8–23)
BUN/CREAT SERPL: 14.5 (ref 7–25)
CALCIUM SERPL-MCNC: 10.2 MG/DL (ref 8.6–10.5)
CASTS URNS MICRO: NORMAL
CHLORIDE SERPL-SCNC: 97 MMOL/L (ref 98–107)
CO2 SERPL-SCNC: 27.2 MMOL/L (ref 22–29)
COLOR UR: YELLOW
CREAT SERPL-MCNC: 2.55 MG/DL (ref 0.76–1.27)
EGFRCR SERPLBLD CKD-EPI 2021: 26.7 ML/MIN/1.73
EOSINOPHIL # BLD AUTO: 0.19 10*3/MM3 (ref 0–0.4)
EOSINOPHIL NFR BLD AUTO: 2.3 % (ref 0.3–6.2)
EPI CELLS #/AREA URNS HPF: NORMAL /HPF
ERYTHROCYTE [DISTWIDTH] IN BLOOD BY AUTOMATED COUNT: 14.4 % (ref 12.3–15.4)
GLOBULIN SER CALC-MCNC: 3.2 GM/DL
GLUCOSE SERPL-MCNC: 163 MG/DL (ref 65–99)
GLUCOSE UR QL STRIP: ABNORMAL
HCT VFR BLD AUTO: 47.6 % (ref 37.5–51)
HGB BLD-MCNC: 15.8 G/DL (ref 13–17.7)
HGB UR QL STRIP: NEGATIVE
IMM GRANULOCYTES # BLD AUTO: 0.02 10*3/MM3 (ref 0–0.05)
IMM GRANULOCYTES NFR BLD AUTO: 0.2 % (ref 0–0.5)
KETONES UR QL STRIP: NEGATIVE
LEUKOCYTE ESTERASE UR QL STRIP: NEGATIVE
LYMPHOCYTES # BLD AUTO: 2.36 10*3/MM3 (ref 0.7–3.1)
LYMPHOCYTES NFR BLD AUTO: 28.1 % (ref 19.6–45.3)
MCH RBC QN AUTO: 30.4 PG (ref 26.6–33)
MCHC RBC AUTO-ENTMCNC: 33.2 G/DL (ref 31.5–35.7)
MCV RBC AUTO: 91.7 FL (ref 79–97)
MONOCYTES # BLD AUTO: 0.43 10*3/MM3 (ref 0.1–0.9)
MONOCYTES NFR BLD AUTO: 5.1 % (ref 5–12)
NEUTROPHILS # BLD AUTO: 5.34 10*3/MM3 (ref 1.7–7)
NEUTROPHILS NFR BLD AUTO: 63.6 % (ref 42.7–76)
NITRITE UR QL STRIP: NEGATIVE
NRBC BLD AUTO-RTO: 0 /100 WBC (ref 0–0.2)
PH UR STRIP: 7 [PH] (ref 5–8)
PLATELET # BLD AUTO: 206 10*3/MM3 (ref 140–450)
POTASSIUM SERPL-SCNC: 5 MMOL/L (ref 3.5–5.2)
PROT SERPL-MCNC: 7.9 G/DL (ref 6–8.5)
PROT UR QL STRIP: NEGATIVE
RBC # BLD AUTO: 5.19 10*6/MM3 (ref 4.14–5.8)
RBC #/AREA URNS HPF: NORMAL /HPF
SODIUM SERPL-SCNC: 138 MMOL/L (ref 136–145)
SP GR UR STRIP: 1.01 (ref 1–1.03)
UROBILINOGEN UR STRIP-MCNC: ABNORMAL MG/DL
WBC # BLD AUTO: 8.4 10*3/MM3 (ref 3.4–10.8)
WBC #/AREA URNS HPF: NORMAL /HPF

## 2024-05-01 ENCOUNTER — TELEPHONE (OUTPATIENT)
Dept: NEUROLOGY | Facility: CLINIC | Age: 69
End: 2024-05-01
Payer: MEDICARE

## 2024-06-03 DIAGNOSIS — E11.65 TYPE 2 DIABETES MELLITUS WITH HYPERGLYCEMIA, WITH LONG-TERM CURRENT USE OF INSULIN: Primary | ICD-10-CM

## 2024-06-03 DIAGNOSIS — I10 HYPERTENSION, UNSPECIFIED TYPE: ICD-10-CM

## 2024-06-03 DIAGNOSIS — N18.31 STAGE 3A CHRONIC KIDNEY DISEASE: ICD-10-CM

## 2024-06-03 DIAGNOSIS — N18.9 ACUTE RENAL FAILURE SUPERIMPOSED ON CHRONIC KIDNEY DISEASE, UNSPECIFIED ACUTE RENAL FAILURE TYPE, UNSPECIFIED CKD STAGE: ICD-10-CM

## 2024-06-03 DIAGNOSIS — Z79.4 TYPE 2 DIABETES MELLITUS WITH HYPERGLYCEMIA, WITH LONG-TERM CURRENT USE OF INSULIN: Primary | ICD-10-CM

## 2024-06-03 DIAGNOSIS — N17.9 ACUTE RENAL FAILURE SUPERIMPOSED ON CHRONIC KIDNEY DISEASE, UNSPECIFIED ACUTE RENAL FAILURE TYPE, UNSPECIFIED CKD STAGE: ICD-10-CM

## 2024-06-03 DIAGNOSIS — N20.0 RENAL STONE: ICD-10-CM

## 2024-06-03 DIAGNOSIS — E79.0 HYPERURICEMIA: ICD-10-CM

## 2024-06-03 DIAGNOSIS — Z12.5 PROSTATE CANCER SCREENING: ICD-10-CM

## 2024-06-10 ENCOUNTER — LAB (OUTPATIENT)
Dept: LAB | Facility: HOSPITAL | Age: 69
End: 2024-06-10
Payer: MEDICARE

## 2024-06-10 DIAGNOSIS — N40.1 BPH WITH OBSTRUCTION/LOWER URINARY TRACT SYMPTOMS: ICD-10-CM

## 2024-06-10 DIAGNOSIS — C61 PROSTATE CANCER: ICD-10-CM

## 2024-06-10 DIAGNOSIS — N41.0 PROSTATITIS, ACUTE: ICD-10-CM

## 2024-06-10 DIAGNOSIS — N13.8 BPH WITH OBSTRUCTION/LOWER URINARY TRACT SYMPTOMS: ICD-10-CM

## 2024-06-10 DIAGNOSIS — N41.0 ACUTE PROSTATITIS: ICD-10-CM

## 2024-06-10 LAB
25(OH)D3 SERPL-MCNC: 24.6 NG/ML (ref 30–100)
ALBUMIN SERPL-MCNC: 4.3 G/DL (ref 3.5–5.2)
ALBUMIN UR-MCNC: 8.4 MG/DL
ALBUMIN/GLOB SERPL: 1.2 G/DL
ALP SERPL-CCNC: 73 U/L (ref 39–117)
ALT SERPL W P-5'-P-CCNC: 14 U/L (ref 1–41)
ANION GAP SERPL CALCULATED.3IONS-SCNC: 12 MMOL/L (ref 5–15)
AST SERPL-CCNC: 14 U/L (ref 1–40)
BACTERIA UR QL AUTO: NORMAL /HPF
BASOPHILS # BLD AUTO: 0.05 10*3/MM3 (ref 0–0.2)
BASOPHILS NFR BLD AUTO: 0.8 % (ref 0–1.5)
BILIRUB SERPL-MCNC: 0.8 MG/DL (ref 0–1.2)
BILIRUB UR QL STRIP: NEGATIVE
BUN SERPL-MCNC: 16 MG/DL (ref 8–23)
BUN/CREAT SERPL: 10 (ref 7–25)
CALCIUM SPEC-SCNC: 9.3 MG/DL (ref 8.6–10.5)
CHLORIDE SERPL-SCNC: 94 MMOL/L (ref 98–107)
CHOLEST SERPL-MCNC: 135 MG/DL (ref 0–200)
CLARITY UR: CLEAR
CO2 SERPL-SCNC: 29 MMOL/L (ref 22–29)
COLOR UR: YELLOW
CREAT SERPL-MCNC: 1.6 MG/DL (ref 0.76–1.27)
CRP SERPL-MCNC: 1.24 MG/DL (ref 0–0.5)
DEPRECATED RDW RBC AUTO: 47.4 FL (ref 37–54)
EGFRCR SERPLBLD CKD-EPI 2021: 46.6 ML/MIN/1.73
EOSINOPHIL # BLD AUTO: 0.17 10*3/MM3 (ref 0–0.4)
EOSINOPHIL NFR BLD AUTO: 2.6 % (ref 0.3–6.2)
ERYTHROCYTE [DISTWIDTH] IN BLOOD BY AUTOMATED COUNT: 13.7 % (ref 12.3–15.4)
ERYTHROCYTE [SEDIMENTATION RATE] IN BLOOD: 23 MM/HR (ref 0–20)
GLOBULIN UR ELPH-MCNC: 3.7 GM/DL
GLUCOSE SERPL-MCNC: 147 MG/DL (ref 65–99)
GLUCOSE UR STRIP-MCNC: ABNORMAL MG/DL
HBA1C MFR BLD: 6.1 % (ref 4.8–5.6)
HCT VFR BLD AUTO: 48 % (ref 37.5–51)
HDLC SERPL-MCNC: 37 MG/DL (ref 40–60)
HGB BLD-MCNC: 16.2 G/DL (ref 13–17.7)
HGB UR QL STRIP.AUTO: NEGATIVE
HYALINE CASTS UR QL AUTO: NORMAL /LPF
IMM GRANULOCYTES # BLD AUTO: 0.03 10*3/MM3 (ref 0–0.05)
IMM GRANULOCYTES NFR BLD AUTO: 0.5 % (ref 0–0.5)
KETONES UR QL STRIP: NEGATIVE
LDLC SERPL CALC-MCNC: 65 MG/DL (ref 0–100)
LDLC/HDLC SERPL: 1.57 {RATIO}
LEUKOCYTE ESTERASE UR QL STRIP.AUTO: NEGATIVE
LYMPHOCYTES # BLD AUTO: 1.65 10*3/MM3 (ref 0.7–3.1)
LYMPHOCYTES NFR BLD AUTO: 25.1 % (ref 19.6–45.3)
MCH RBC QN AUTO: 31.6 PG (ref 26.6–33)
MCHC RBC AUTO-ENTMCNC: 33.8 G/DL (ref 31.5–35.7)
MCV RBC AUTO: 93.6 FL (ref 79–97)
MONOCYTES # BLD AUTO: 0.4 10*3/MM3 (ref 0.1–0.9)
MONOCYTES NFR BLD AUTO: 6.1 % (ref 5–12)
NEUTROPHILS NFR BLD AUTO: 4.28 10*3/MM3 (ref 1.7–7)
NEUTROPHILS NFR BLD AUTO: 64.9 % (ref 42.7–76)
NITRITE UR QL STRIP: NEGATIVE
NRBC BLD AUTO-RTO: 0 /100 WBC (ref 0–0.2)
PH UR STRIP.AUTO: 7 [PH] (ref 5–8)
PLATELET # BLD AUTO: 186 10*3/MM3 (ref 140–450)
PMV BLD AUTO: 10 FL (ref 6–12)
POTASSIUM SERPL-SCNC: 3.6 MMOL/L (ref 3.5–5.2)
PROT SERPL-MCNC: 8 G/DL (ref 6–8.5)
PROT UR QL STRIP: ABNORMAL
PSA SERPL-MCNC: 4 NG/ML (ref 0–4)
RBC # BLD AUTO: 5.13 10*6/MM3 (ref 4.14–5.8)
RBC # UR STRIP: NORMAL /HPF
REF LAB TEST METHOD: NORMAL
SODIUM SERPL-SCNC: 135 MMOL/L (ref 136–145)
SP GR UR STRIP: 1.01 (ref 1–1.03)
SQUAMOUS #/AREA URNS HPF: NORMAL /HPF
T4 FREE SERPL-MCNC: 1.51 NG/DL (ref 0.92–1.68)
TRIGL SERPL-MCNC: 200 MG/DL (ref 0–150)
TSH SERPL DL<=0.05 MIU/L-ACNC: 3.21 UIU/ML (ref 0.27–4.2)
UROBILINOGEN UR QL STRIP: ABNORMAL
VIT B12 BLD-MCNC: 403 PG/ML (ref 211–946)
VLDLC SERPL-MCNC: 33 MG/DL (ref 5–40)
WBC # UR STRIP: NORMAL /HPF
WBC NRBC COR # BLD AUTO: 6.58 10*3/MM3 (ref 3.4–10.8)

## 2024-06-10 PROCEDURE — 85652 RBC SED RATE AUTOMATED: CPT

## 2024-06-10 PROCEDURE — 80053 COMPREHEN METABOLIC PANEL: CPT

## 2024-06-10 PROCEDURE — 85025 COMPLETE CBC W/AUTO DIFF WBC: CPT

## 2024-06-10 PROCEDURE — 82043 UR ALBUMIN QUANTITATIVE: CPT | Performed by: INTERNAL MEDICINE

## 2024-06-10 PROCEDURE — 87086 URINE CULTURE/COLONY COUNT: CPT

## 2024-06-10 PROCEDURE — 81001 URINALYSIS AUTO W/SCOPE: CPT

## 2024-06-10 PROCEDURE — G0103 PSA SCREENING: HCPCS | Performed by: INTERNAL MEDICINE

## 2024-06-10 PROCEDURE — 80061 LIPID PANEL: CPT | Performed by: INTERNAL MEDICINE

## 2024-06-10 PROCEDURE — 83036 HEMOGLOBIN GLYCOSYLATED A1C: CPT | Performed by: INTERNAL MEDICINE

## 2024-06-10 PROCEDURE — 84443 ASSAY THYROID STIM HORMONE: CPT | Performed by: INTERNAL MEDICINE

## 2024-06-10 PROCEDURE — 84153 ASSAY OF PSA TOTAL: CPT

## 2024-06-10 PROCEDURE — 82306 VITAMIN D 25 HYDROXY: CPT | Performed by: INTERNAL MEDICINE

## 2024-06-10 PROCEDURE — 82607 VITAMIN B-12: CPT | Performed by: INTERNAL MEDICINE

## 2024-06-10 PROCEDURE — 86140 C-REACTIVE PROTEIN: CPT

## 2024-06-10 PROCEDURE — 84439 ASSAY OF FREE THYROXINE: CPT | Performed by: INTERNAL MEDICINE

## 2024-06-10 PROCEDURE — 84154 ASSAY OF PSA FREE: CPT

## 2024-06-11 LAB
BACTERIA SPEC AEROBE CULT: NO GROWTH
PSA FREE MFR SERPL: 21.4 %
PSA FREE SERPL-MCNC: 0.79 NG/ML
PSA SERPL-MCNC: 3.7 NG/ML (ref 0–4)

## 2024-06-18 DIAGNOSIS — N18.31 STAGE 3A CHRONIC KIDNEY DISEASE: Primary | ICD-10-CM

## 2024-06-19 ENCOUNTER — OFFICE VISIT (OUTPATIENT)
Dept: NEUROLOGY | Facility: CLINIC | Age: 69
End: 2024-06-19
Payer: MEDICARE

## 2024-06-19 VITALS
HEART RATE: 76 BPM | DIASTOLIC BLOOD PRESSURE: 82 MMHG | HEIGHT: 76 IN | SYSTOLIC BLOOD PRESSURE: 124 MMHG | BODY MASS INDEX: 38.36 KG/M2 | OXYGEN SATURATION: 93 % | WEIGHT: 315 LBS

## 2024-06-19 DIAGNOSIS — G40.109 LOCALIZATION-RELATED SYMPTOMATIC EPILEPSY AND EPILEPTIC SYNDROMES WITH SIMPLE PARTIAL SEIZURES, NOT INTRACTABLE, WITHOUT STATUS EPILEPTICUS: Primary | ICD-10-CM

## 2024-06-19 PROCEDURE — 1159F MED LIST DOCD IN RCRD: CPT | Performed by: PSYCHIATRY & NEUROLOGY

## 2024-06-19 PROCEDURE — 99213 OFFICE O/P EST LOW 20 MIN: CPT | Performed by: PSYCHIATRY & NEUROLOGY

## 2024-06-19 PROCEDURE — 3079F DIAST BP 80-89 MM HG: CPT | Performed by: PSYCHIATRY & NEUROLOGY

## 2024-06-19 PROCEDURE — 1160F RVW MEDS BY RX/DR IN RCRD: CPT | Performed by: PSYCHIATRY & NEUROLOGY

## 2024-06-19 PROCEDURE — 3074F SYST BP LT 130 MM HG: CPT | Performed by: PSYCHIATRY & NEUROLOGY

## 2024-06-19 RX ORDER — FUROSEMIDE 40 MG/1
TABLET ORAL
COMMUNITY

## 2024-06-19 NOTE — PROGRESS NOTES
"Subjective:    CC: Andre Agosto is seen today for Localization-related symptomatic epilepsy and epileptic syn       Current visit he denies having any seizures since his last visit.  In fact his last seizure was in the hospital in February.  Has not had a grand mal seizure in over 10 years.  He did increase his dose of Briviact to 75 mg twice daily but does have occasional dizziness especially while playing golf however I explained to him that he is currently on several different antihypertensives as well as medications for cardiac failure that could also cause dizziness.  He is trying to keep himself well-hydrated.  Has also continued to see his nutritionist but has not lost any weight.  His PCP tried to get Ozempic and Mounjaro approved but unfortunately it was denied by his insurance.    Initial aiaif-20-tnqj-old male with a history of atrial fibrillation, anxiety, stroke and multiple normal (his left side, CAD/MI, ALEX on BiPAP, CHF status post pacemaker, A-fib, hypertension, hyperlipidemia, diabetes mellitus type 2, recent diagnosis of prostate cancer presents with seizures.  As per patient he had meningitis in 2012 (after he returned from a basketball game).  Soon after that he started to have episodes of right arm jerking with mild confusion (but no zoning out).  Had extensive testing including extended EEGs at [facility] that were unremarkable.  Was told that he could have deep focal seizures.  Over 5 years ago he stopped having these episodes and now has spells with sensory symptoms where he gets a stinging sensation that travels from his fingers up to his right arm and shoulder without any loss of awareness or loss of consciousness.  At times he feels the episode come on as he can get \"slightly cranky \" prior to it.  In the past he has tried several different medications including Keppra that made him lethargic, Dilantin and Tegretol.  Was placed on Depakote several years ago that he " tolerated well and had been taking up until last month when he was admitted to the hospital for high fevers secondary to ESBL E. coli UTI status post prostate biopsy for his cancer.  He had a seizure while in the hospital and his Depakote levels were persistently low which was attributed to his antibiotics (Invanz).  Therefore he was switched to Briviact 50 mg twice daily that he is tolerating well however his co-pay is extremely high.  He has continued to be on Invanz infusions.  His last MRI brain in 2020 showed a right lacunar chronic infarct as well as chronic ischemic changes but no acute abnormalities.  He has also had several vessel imaging studies of the head and neck that have showed scattered plaques with no significant stenosis.  His last A1c was 6.5 and GFR was 55.  Of note-I reviewed his previous neurology notes    The following portions of the patient's history were reviewed today and updated as of 03/12/2024  : allergies, current medications, past family history, past medical history, past social history, past surgical history, and problem list  These document will be scanned to patient's chart.      Current Outpatient Medications:     amiodarone (PACERONE) 100 MG tablet, Take 1 tablet by mouth Daily., Disp: , Rfl:     amLODIPine (NORVASC) 10 MG tablet, Take 1 tablet by mouth Every Night., Disp: , Rfl:     brivaracetam (Briviact) 75 MG tablet, Take 1 tablet by mouth 2 (Two) Times a Day., Disp: 180 tablet, Rfl: 3    carvedilol (Coreg) 25 MG tablet, Take 1 tablet by mouth 2 (Two) Times a Day With Meals., Disp: 180 tablet, Rfl: 3    dapagliflozin Propanediol (Farxiga) 10 MG tablet, Take  by mouth., Disp: , Rfl:     Entresto  MG tablet, Take 1 tablet by mouth 2 (Two) Times a Day. Hold until seen by PCP, Disp: , Rfl:     furosemide (Lasix) 40 MG tablet, LASIX 40 MG TABS, Disp: , Rfl:     levothyroxine (SYNTHROID, LEVOTHROID) 88 MCG tablet, TAKE 1 TABLET DAILY, Disp: 90 tablet, Rfl: 3    Semaglutide  (Rybelsus) 3 MG tablet, Take 1 tablet by mouth Daily., Disp: 90 tablet, Rfl: 3    tamsulosin (FLOMAX) 0.4 MG capsule 24 hr capsule, Take 1 capsule by mouth Daily., Disp: 90 capsule, Rfl: 3    Xarelto 15 MG tablet, Take 1 tablet by mouth Daily With Dinner., Disp: 42 tablet, Rfl: 0   Past Medical History:   Diagnosis Date    6th nerve palsy, right     right eye     Anxiety and depression     Atrial fibrillation, persistent 12/02/2020    on Xarelto    Bell palsy 7/5/2018    6th nerve palsy    Coronary artery disease involving native coronary artery of native heart without angina pectoris 09/12/2018    follows w/ Dr. Mendoza    CRI (chronic renal insufficiency), stage 2 (mild)     CVA (cerebral vascular accident)     Diabetes mellitus     doesnt check sugar, type 2     Disease of thyroid gland     Double vision     resolved- right eye    Elevated cholesterol     Elevated prostate specific antigen (PSA) 11/08/2023    Focal seizure     of right arm     Heart attack     NSTEMI 2015, s/p stenting    History of Helicobacter pylori infection     in 2008, treated w/ PrevPak - 2021 EGD bx (+), PCP RX - PPI/Augmentin/Doxy/Flagyl (x 14 days) 1/22/21    HL (hearing loss)     (L) ear - child luevano measles    Hyperlipidemia     Hypertension     Kidney stone     Memory loss 2005    d/t meningitis    Meningitis 2005    unsure of bacterial or viral     Obesity     Prostate cancer 01/2024    Ron 3+3=6    Shingles 9/2018    On right arm    Sleep apnea treated with nocturnal BiPAP     compliant with  machine     Stroke     2017/2018    Ventricular tachycardia     3 different times    Wears glasses       Past Surgical History:   Procedure Laterality Date    CARDIAC CATHETERIZATION N/A 10/12/2018    Procedure: Left Heart Cath;  Surgeon: Yoselin Johnson MD;  Location: [facility] CATH INVASIVE LOCATION;  Service: Cardiology    CARDIAC CATHETERIZATION  03/2021    stent    CARDIAC CATHETERIZATION  07/2021    just checking the after  pacemaker placed- Dr. Tim    CARDIAC DEFIBRILLATOR PLACEMENT      CAROTID ENDARTERECTOMY      CAROTID STENT  2025    COLONOSCOPY  10/2020    w/ Dr. Jacobs, hx colon polyps    CORONARY STENT PLACEMENT      LAPAROSCOPIC GASTRIC BANDING  2008    s/p LAGB Realize 2008 by [facility].    PACEMAKER IMPLANTATION  2021    UMBILICAL HERNIA REPAIR  2008    incarcerated UHR w/ mesh by Dr. Velasquez    URETEROSCOPY LASER LITHOTRIPSY WITH STENT INSERTION Left 10/20/2021    Procedure: URETEROSCOPY LASER LITHOTRIPSY WITH STENT INSERTION;  Surgeon: Tonio De La Cruz MD;  Location: [facility] OR;  Service: Urology;  Laterality: Left;    URETEROSCOPY LASER LITHOTRIPSY WITH STENT INSERTION Left 2021    Procedure: URETEROSCOPY LEFT, LEFT RETROGRADE PYELOGRAM, CYSTOSCOPY, LASER LITHOTRIPSY WITH STENT EXCHANGE;  Surgeon: Tonio De La Cruz MD;  Location: [facility] OR;  Service: Urology;  Laterality: Left;    URETEROSCOPY LASER LITHOTRIPSY WITH STENT INSERTION Left 10/06/2022    Procedure: URETEROSCOPY LASER LITHOTRIPSY WITH STENT INSERTION;  Surgeon: Tonio De La Cruz MD;  Location: [facility] OR;  Service: Urology;  Laterality: Left;      Family History   Problem Relation Age of Onset    Stroke Mother     Hypertension Mother     COPD Father     Hypertension Father       Social History     Socioeconomic History    Marital status:    Tobacco Use    Smoking status: Former     Current packs/day: 0.00     Average packs/day: 0.5 packs/day for 15.0 years (7.5 ttl pk-yrs)     Types: Cigarettes     Start date: 1975     Quit date: 1985     Years since quittin.4     Passive exposure: Past    Smokeless tobacco: Never   Vaping Use    Vaping status: Never Used   Substance and Sexual Activity    Alcohol use: No    Drug use: No    Sexual activity: Not Currently     Review of Systems   Neurological:  Positive for seizures.   All other systems reviewed and are negative.      Objective:    /82   Pulse 76   Ht  "193 cm (76\")   Wt (!) 145 kg (319 lb)   SpO2 93%   BMI 38.83 kg/m²     Neurology Exam:    General apperance: Obese    Mental status: Alert, awake and oriented to time place and person.    Recent and Remote memory: Intact.    Attention span and Concentration: Normal.     Language and Speech: Intact- No dysarthria.    Fluency, Naming , Repitition and Comprehension:  Intact    Cranial Nerves:   CN II: Visual fields are full. Intact. Fundi - Normal, No papillederma, Pupils - [place]  CN III, IV and VI: Extraocular movements are intact. Normal saccades.   CN V: Facial sensation is intact.   CN VII: Muscles of facial expression reveal no asymmetry. Intact.   CN VIII: Hearing is intact. Whispered voice intact.   CN IX and X: Palate elevates symmetrically. Intact  CN XI: Shoulder shrug is intact.   CN XII: Tongue is midline without evidence of atrophy or fasciculation.     Ophthalmoscopic exam of optic disc-normal    Motor:  Right UE muscle strength 5/5. Normal tone.     Left UE muscle strength 5/5. Normal tone.      Right LE muscle strength5/5. Normal tone.     Left LE muscle strength 5/5. Normal tone.      Sensory: Normal light touch, vibration and pinprick sensation bilaterally.    DTRs: 2+ bilaterally in upper and lower extremities.    Babinski: Negative bilaterally.    Co-ordination: Normal finger-to-nose, heel to shin B/L.    Rhomberg: Negative.    Gait: Normal.    Cardiovascular: Regular rate and rhythm without murmur, gallop or rub.    Assessment and Plan:  1. Localization-related symptomatic epilepsy and epileptic syndromes with simple partial seizures, not intractable, without status epilepticus  He most likely has left focal parietal seizures involving the somatosensory cortex secondary to meningitis.  Last episode was in February  I have told him to continue Briviact 75 mg twice daily that he gets to the Briviact patient assistance program  Counseled on seizure precautions.  He should also continue keeping " himself well-hydrated and wear moderate compression stockings to increase venous return specially with standing           Return in about 6 months (around 12/19/2024).         Alyssa Victoria MD

## 2024-06-24 ENCOUNTER — OFFICE VISIT (OUTPATIENT)
Dept: INTERNAL MEDICINE | Facility: CLINIC | Age: 69
End: 2024-06-24
Payer: MEDICARE

## 2024-06-24 VITALS
BODY MASS INDEX: 38.36 KG/M2 | SYSTOLIC BLOOD PRESSURE: 120 MMHG | HEART RATE: 76 BPM | WEIGHT: 315 LBS | RESPIRATION RATE: 16 BRPM | OXYGEN SATURATION: 96 % | TEMPERATURE: 96.9 F | HEIGHT: 76 IN | DIASTOLIC BLOOD PRESSURE: 78 MMHG

## 2024-06-24 DIAGNOSIS — Z86.19 HISTORY OF ESBL E. COLI INFECTION: ICD-10-CM

## 2024-06-24 DIAGNOSIS — E79.0 HYPERURICEMIA: ICD-10-CM

## 2024-06-24 DIAGNOSIS — E11.65 TYPE 2 DIABETES MELLITUS WITH HYPERGLYCEMIA, WITH LONG-TERM CURRENT USE OF INSULIN: Primary | ICD-10-CM

## 2024-06-24 DIAGNOSIS — N18.31 STAGE 3A CHRONIC KIDNEY DISEASE: ICD-10-CM

## 2024-06-24 DIAGNOSIS — Z79.4 TYPE 2 DIABETES MELLITUS WITH HYPERGLYCEMIA, WITH LONG-TERM CURRENT USE OF INSULIN: Primary | ICD-10-CM

## 2024-06-24 DIAGNOSIS — I10 ESSENTIAL HYPERTENSION: ICD-10-CM

## 2024-06-24 PROCEDURE — 3044F HG A1C LEVEL LT 7.0%: CPT | Performed by: INTERNAL MEDICINE

## 2024-06-24 PROCEDURE — 3074F SYST BP LT 130 MM HG: CPT | Performed by: INTERNAL MEDICINE

## 2024-06-24 PROCEDURE — 3078F DIAST BP <80 MM HG: CPT | Performed by: INTERNAL MEDICINE

## 2024-06-24 PROCEDURE — 1126F AMNT PAIN NOTED NONE PRSNT: CPT | Performed by: INTERNAL MEDICINE

## 2024-06-24 PROCEDURE — 99214 OFFICE O/P EST MOD 30 MIN: CPT | Performed by: INTERNAL MEDICINE

## 2024-06-24 RX ORDER — SEMAGLUTIDE 1.34 MG/ML
0.5 INJECTION, SOLUTION SUBCUTANEOUS
Qty: 1.5 ML | Refills: 3 | Status: SHIPPED | OUTPATIENT
Start: 2024-06-24

## 2024-06-24 NOTE — PROGRESS NOTES
"Subjective     Patient ID: Andre Agosto is a 69 y.o. male. Patient is here for management of multiple medical problems.     Chief Complaint   Patient presents with    Diabetes     History of Present Illness   DM    Cr still elevated.      Still with I/D      Cardiology seen last week.  Has f/u  EF 39%    Neurtionist. Still gaining wt.       The following portions of the patient's history were reviewed and updated as appropriate: allergies, current medications, past family history, past medical history, past social history, past surgical history and problem list.    Review of Systems    Current Outpatient Medications:     amiodarone (PACERONE) 100 MG tablet, Take 1 tablet by mouth Daily., Disp: , Rfl:     amLODIPine (NORVASC) 10 MG tablet, Take 1 tablet by mouth Every Night., Disp: , Rfl:     brivaracetam (Briviact) 75 MG tablet, Take 1 tablet by mouth 2 (Two) Times a Day., Disp: 180 tablet, Rfl: 3    carvedilol (Coreg) 25 MG tablet, Take 1 tablet by mouth 2 (Two) Times a Day With Meals., Disp: 180 tablet, Rfl: 3    dapagliflozin Propanediol (Farxiga) 10 MG tablet, Take  by mouth., Disp: , Rfl:     Entresto  MG tablet, Take 1 tablet by mouth 2 (Two) Times a Day. Hold until seen by PCP, Disp: , Rfl:     furosemide (Lasix) 40 MG tablet, LASIX 40 MG TABS, Disp: , Rfl:     levothyroxine (SYNTHROID, LEVOTHROID) 88 MCG tablet, TAKE 1 TABLET DAILY, Disp: 90 tablet, Rfl: 3    tamsulosin (FLOMAX) 0.4 MG capsule 24 hr capsule, Take 1 capsule by mouth Daily., Disp: 90 capsule, Rfl: 3    Xarelto 15 MG tablet, Take 1 tablet by mouth Daily With Dinner., Disp: 42 tablet, Rfl: 0    Semaglutide,0.25 or 0.5MG/DOS, (Ozempic, 0.25 or 0.5 MG/DOSE,) 2 MG/1.5ML solution pen-injector, Inject 0.5 mg under the skin into the appropriate area as directed Every 7 (Seven) Days., Disp: 1.5 mL, Rfl: 3    Objective      Blood pressure 120/78, pulse 76, temperature 96.9 °F (36.1 °C), resp. rate 16, height 193 cm (76\"), weight (!) 146 kg " (322 lb), SpO2 96%.            Physical Exam     General Appearance:    Alert, cooperative, no distress, appears stated age   Head:    Normocephalic, without obvious abnormality, atraumatic   Eyes:    PERRL, conjunctiva/corneas clear, EOM's intact   Ears:    Normal TM's and external ear canals, both ears   Nose:   Nares normal, septum midline, mucosa normal, no drainage   or sinus tenderness   Throat:   Lips, mucosa, and tongue normal; teeth and gums normal   Neck:   Supple, symmetrical, trachea midline, no adenopathy;        thyroid:  No enlargement/tenderness/nodules; no carotid    bruit or JVD   Back:     Symmetric, no curvature, ROM normal, no CVA tenderness   Lungs:     Clear to auscultation bilaterally, respirations unlabored   Chest wall:    No tenderness or deformity   Heart:    Regular rate and rhythm, S1 and S2 normal, no murmur,        rub or gallop   Abdomen:     Soft, non-tender, bowel sounds active all four quadrants,     no masses, no organomegaly   Extremities:   Extremities normal, atraumatic, no cyanosis or edema   Pulses:   2+ and symmetric all extremities   Skin:   Skin color, texture, turgor normal, no rashes or lesions   Lymph nodes:   Cervical, supraclavicular, and axillary nodes normal   Neurologic:   CNII-XII intact. Normal strength, sensation and reflexes       throughout      Results for orders placed or performed in visit on 06/10/24   Urine Culture - Urine, Urine, Clean Catch    Specimen: Urine, Clean Catch   Result Value Ref Range    Urine Culture No growth    PSA Total+% Free (Serial)    Specimen: Blood   Result Value Ref Range    PSA 3.7 0.0 - 4.0 ng/mL    PSA, Free 0.79 N/A ng/mL    PSA, Free % 21.4 %   Comprehensive Metabolic Panel    Specimen: Blood   Result Value Ref Range    Glucose 147 (H) 65 - 99 mg/dL    BUN 16 8 - 23 mg/dL    Creatinine 1.60 (H) 0.76 - 1.27 mg/dL    Sodium 135 (L) 136 - 145 mmol/L    Potassium 3.6 3.5 - 5.2 mmol/L    Chloride 94 (L) 98 - 107 mmol/L    CO2  29.0 22.0 - 29.0 mmol/L    Calcium 9.3 8.6 - 10.5 mg/dL    Total Protein 8.0 6.0 - 8.5 g/dL    Albumin 4.3 3.5 - 5.2 g/dL    ALT (SGPT) 14 1 - 41 U/L    AST (SGOT) 14 1 - 40 U/L    Alkaline Phosphatase 73 39 - 117 U/L    Total Bilirubin 0.8 0.0 - 1.2 mg/dL    Globulin 3.7 gm/dL    A/G Ratio 1.2 g/dL    BUN/Creatinine Ratio 10.0 7.0 - 25.0    Anion Gap 12.0 5.0 - 15.0 mmol/L    eGFR 46.6 (L) >60.0 mL/min/1.73   Sedimentation Rate    Specimen: Blood   Result Value Ref Range    Sed Rate 23 (H) 0 - 20 mm/hr   C-reactive Protein    Specimen: Blood   Result Value Ref Range    C-Reactive Protein 1.24 (H) 0.00 - 0.50 mg/dL   CBC Auto Differential    Specimen: Blood   Result Value Ref Range    WBC 6.58 3.40 - 10.80 10*3/mm3    RBC 5.13 4.14 - 5.80 10*6/mm3    Hemoglobin 16.2 13.0 - 17.7 g/dL    Hematocrit 48.0 37.5 - 51.0 %    MCV 93.6 79.0 - 97.0 fL    MCH 31.6 26.6 - 33.0 pg    MCHC 33.8 31.5 - 35.7 g/dL    RDW 13.7 12.3 - 15.4 %    RDW-SD 47.4 37.0 - 54.0 fl    MPV 10.0 6.0 - 12.0 fL    Platelets 186 140 - 450 10*3/mm3    Neutrophil % 64.9 42.7 - 76.0 %    Lymphocyte % 25.1 19.6 - 45.3 %    Monocyte % 6.1 5.0 - 12.0 %    Eosinophil % 2.6 0.3 - 6.2 %    Basophil % 0.8 0.0 - 1.5 %    Immature Grans % 0.5 0.0 - 0.5 %    Neutrophils, Absolute 4.28 1.70 - 7.00 10*3/mm3    Lymphocytes, Absolute 1.65 0.70 - 3.10 10*3/mm3    Monocytes, Absolute 0.40 0.10 - 0.90 10*3/mm3    Eosinophils, Absolute 0.17 0.00 - 0.40 10*3/mm3    Basophils, Absolute 0.05 0.00 - 0.20 10*3/mm3    Immature Grans, Absolute 0.03 0.00 - 0.05 10*3/mm3    nRBC 0.0 0.0 - 0.2 /100 WBC   Urinalysis without microscopic (no culture) - Urine, Clean Catch    Specimen: Urine, Clean Catch   Result Value Ref Range    Color, UA Yellow Yellow, Straw    Appearance, UA Clear Clear    pH, UA 7.0 5.0 - 8.0    Specific Gravity, UA 1.009 1.001 - 1.030    Glucose,  mg/dL (2+) (A) Negative    Ketones, UA Negative Negative    Bilirubin, UA Negative Negative    Blood, UA  Negative Negative    Protein, UA Trace (A) Negative    Leuk Esterase, UA Negative Negative    Nitrite, UA Negative Negative    Urobilinogen, UA 0.2 E.U./dL 0.2 - 1.0 E.U./dL   Urinalysis, Microscopic Only - Urine, Clean Catch    Specimen: Urine, Clean Catch   Result Value Ref Range    RBC, UA 0-2 None Seen, 0-2 /HPF    WBC, UA 0-2 None Seen, 0-2 /HPF    Bacteria, UA None Seen None Seen, Trace /HPF    Squamous Epithelial Cells, UA 0-2 None Seen, 0-2 /HPF    Hyaline Casts, UA None Seen 0 - 6 /LPF    Methodology Automated Microscopy      *Note: Due to a large number of results and/or encounters for the requested time period, some results have not been displayed. A complete set of results can be found in Results Review.         Assessment & Plan     Eating out. And begin eating. While watching tv.    Strawberry eating.     Using tea un sweat.  Occ partial sweat tea with it.     Wt up.      Insurance not covering rybelus. Failed Trulicity.   Ckd some improved.      Wants to go back on GLP-1.  Will likely make renal stones worse. Unless can stay hyrated.      Pt drinking Fair life: this is the ingredient list.  Has 2 different sugars in it. May be more.     filtered lowfat grade a milk, contains less than 1% of alkalized cocoa, oat fiber, natural food normal flavors, salt, lactase enzyme, monk fruit juice concentrate, acesulfame potassium, sucralose, stevia leaf extract, vitamin a (palmitate), vitamin d3            Diagnoses and all orders for this visit:    1. Type 2 diabetes mellitus with hyperglycemia, with long-term current use of insulin (Primary)    2. Stage 3a chronic kidney disease    3. Essential hypertension    4. History of ESBL E. coli infection    5. Hyperuricemia    Other orders  -     Semaglutide,0.25 or 0.5MG/DOS, (Ozempic, 0.25 or 0.5 MG/DOSE,) 2 MG/1.5ML solution pen-injector; Inject 0.5 mg under the skin into the appropriate area as directed Every 7 (Seven) Days.  Dispense: 1.5 mL; Refill: 3      Return  in about 4 weeks (around 7/22/2024).          There are no Patient Instructions on file for this visit.     Keven Bear MD    Assessment & Plan

## 2024-07-08 ENCOUNTER — OFFICE VISIT (OUTPATIENT)
Age: 69
End: 2024-07-08
Payer: MEDICARE

## 2024-07-08 DIAGNOSIS — N39.0 RECURRENT UTI: ICD-10-CM

## 2024-07-08 DIAGNOSIS — C61 PROSTATE CANCER: Primary | ICD-10-CM

## 2024-07-08 DIAGNOSIS — N20.0 NEPHROLITHIASIS: ICD-10-CM

## 2024-07-08 PROCEDURE — 1160F RVW MEDS BY RX/DR IN RCRD: CPT | Performed by: STUDENT IN AN ORGANIZED HEALTH CARE EDUCATION/TRAINING PROGRAM

## 2024-07-08 PROCEDURE — 99214 OFFICE O/P EST MOD 30 MIN: CPT | Performed by: STUDENT IN AN ORGANIZED HEALTH CARE EDUCATION/TRAINING PROGRAM

## 2024-07-08 PROCEDURE — 1159F MED LIST DOCD IN RCRD: CPT | Performed by: STUDENT IN AN ORGANIZED HEALTH CARE EDUCATION/TRAINING PROGRAM

## 2024-07-08 RX ORDER — TAMSULOSIN HYDROCHLORIDE 0.4 MG/1
1 CAPSULE ORAL DAILY
Qty: 90 CAPSULE | Refills: 3 | Status: CANCELLED | OUTPATIENT
Start: 2024-07-08

## 2024-07-08 NOTE — PROGRESS NOTES
"     Follow Up Office Visit      Patient Name: Andre Agosto  : 1955   MRN: [id number]     Chief Complaint:    Chief Complaint   Patient presents with    Prostate Cancer     Follow up       Referring Provider: No ref. provider found    History of Present Illness: Andre Agosto is a 69 y.o. male who presents today for follow up of complex urologic history.  History of bilateral nephrolithiasis status post previous treatment by Dr. De La Cruz in [place].  Status post prostate biopsy identifying low-volume low risk Ron 6 prostate cancer resulting in post prostate biopsy sepsis.  He establish care with me in 2024.     His past medical history includes CAD status post MI, stent placement, pacemaker placement, on Xarelto, type 2 diabetes, hypertension, hyperlipidemia, obesity.     Since he was last evaluated, he has not had any infection concerns.  He has continued to follow with infectious disease.  Reports his energy levels have begun to improve but he states he is \"still recovering\".    Patient's last urine culture on 6- was no growth.    Blood cultures from 2024 demonstrated ESBL E. coli.  Urine culture same    Has been on tamsulosin for urinary symptoms with some stable improvement, IPSS score 7 with a mostly dissatisfied quality of life, primary issue being nocturia and frequency.    Lab Results   Component Value Date    PSA 4.000 06/10/2024    PSA 3.7 06/10/2024    PSA 4.890 (H) 2024    PSA 4.7 (H) 2023    PSA 5.370 (H) 10/12/2023    PSA 5.050 (H) 2023     PSA stable at 4.0.    He has 2 dominant stones in the right kidney which are nonobstructive currently and are being monitored.          PROSTATE BIOPSY PATHOLOGY 24  DIAGNOSIS:  A.     PROSTATE, NEEDLE CORE BIOPSY, RIGHT APEX:  Benign prostate tissue  Negative for malignancy    B.     PROSTATE, NEEDLE CORE BIOPSY, RIGHT MID:  Benign prostate tissue  Negative for malignancy    C.     PROSTATE, NEEDLE CORE " BIOPSY, RIGHT BASE:  Benign prostate tissue  Negative for malignancy    D.     PROSTATE, NEEDLE CORE BIOPSY, LEFT APEX:  Prostatic acinar adenocarcinoma  Livermore's pattern 3+3, grade group 1  Carcinoma involves 1/2 cores, approximately 5% of total tissue volume  Negative for perineural invasion    E.     PROSTATE, NEEDLE CORE BIOPSY, LEFT MID:  Benign prostate tissue  Negative for malignancy    F.     PROSTATE, NEEDLE CORE BIOPSY, LEFT BASE:  Benign prostate tissue  Negative for malignancy    G.     PROSTATE, NEEDLE CORE BIOPSY, RIGHT TRANS ZONE:  Benign prostate tissue  Negative for malignancy    H.     PROSTATE, NEEDLE CORE BIOPSY, LEFT TRANS ZONE:  Benign prostate tissue  Negative for malignancy     Subjective      Review of System: Review of Systems   Constitutional:  Positive for fatigue.   Genitourinary:  Positive for frequency.      I have reviewed the ROS documented by my clinical staff, I have updated appropriately and I agree. Colin Gaxiola MD    I have reviewed and the following portions of the patient's history were updated as appropriate: past family history, past medical history, past social history, past surgical history and problem list.    Medications:     Current Outpatient Medications:     amiodarone (PACERONE) 100 MG tablet, Take 1 tablet by mouth Daily., Disp: , Rfl:     amLODIPine (NORVASC) 10 MG tablet, Take 1 tablet by mouth Every Night., Disp: , Rfl:     brivaracetam (Briviact) 75 MG tablet, Take 1 tablet by mouth 2 (Two) Times a Day., Disp: 180 tablet, Rfl: 3    carvedilol (Coreg) 25 MG tablet, Take 1 tablet by mouth 2 (Two) Times a Day With Meals., Disp: 180 tablet, Rfl: 3    dapagliflozin Propanediol (Farxiga) 10 MG tablet, Take  by mouth., Disp: , Rfl:     Entresto  MG tablet, Take 1 tablet by mouth 2 (Two) Times a Day. Hold until seen by PCP, Disp: , Rfl:     furosemide (Lasix) 40 MG tablet, LASIX 40 MG TABS, Disp: , Rfl:     levothyroxine (SYNTHROID, LEVOTHROID) 88 MCG  tablet, TAKE 1 TABLET DAILY, Disp: 90 tablet, Rfl: 3    Semaglutide,0.25 or 0.5MG/DOS, (Ozempic, 0.25 or 0.5 MG/DOSE,) 2 MG/1.5ML solution pen-injector, Inject 0.5 mg under the skin into the appropriate area as directed Every 7 (Seven) Days., Disp: 1.5 mL, Rfl: 3    tamsulosin (FLOMAX) 0.4 MG capsule 24 hr capsule, Take 1 capsule by mouth Daily., Disp: 90 capsule, Rfl: 3    Xarelto 15 MG tablet, Take 1 tablet by mouth Daily With Dinner., Disp: 42 tablet, Rfl: 0    Allergies:   Allergies   Allergen Reactions    5-Alpha Reductase Inhibitors Other (See Comments)     Myalgias    Amlodipine Swelling and Other (See Comments)     Feet swell    Statins Myalgia and Other (See Comments)       IPSS Questionnaire (AUA-7):  Over the past month…    1)  Incomplete Emptying:       How often have you had a sensation of not emptying you had the sensation of not emptying your bladder completely after you finished urinating?  0 - Not at all   2)  Frequency:       How often have you had the urinate again less than two hours after you finished urinating?  3 - About half the time   3)  Intermittency:       How often have you found you stopped and started again several times when you urinated?   0 - Not at all   4) Urgency:      How often have you found it difficult to postpone urination?  1 - Less than 1 time in 5   5) Weak Stream:      How often have you had a weak urinary stream?  0 - Not at all   6) Straining:       How often have you had to push or strain to begin urination?  0 - Not at all   7) Nocturia:      How many times did you most typically get up to urinate from the time you went to bed at night until the time you got up in the morning?  3 - 3 times   Total Score:  7   The International Prostate Symptom Score (IPSS) is used to screen, diagnose, track symptoms of benign prostatic hyperplasia (BPH).   0-7 (Mild Symptoms) 8-19 (Moderate) 20-35 (Severe)   Quality of Life (QoL):  If you were to spend the rest of your life with your  urinary condition just the way it is now, how would you feel about that? 4-Mostly Dissatisfied   Urine Leakage (Incontinence) 1-Mild (A few drops a day, no pad use)     Objective     Physical Exam:   Vital Signs: There were no vitals filed for this visit.  There is no height or weight on file to calculate BMI.     Physical Exam  Constitutional:       Appearance: Normal appearance. He is obese.   HENT:      Head: Normocephalic and atraumatic.      Nose: Nose normal.   Eyes:      Extraocular Movements: Extraocular movements intact.      Conjunctiva/sclera: Conjunctivae normal.      Pupils: Pupils are equal, round, and reactive to light.   Musculoskeletal:         General: Normal range of motion.      Cervical back: Normal range of motion and neck supple.   Skin:     General: Skin is warm and dry.      Findings: No lesion or rash.   Neurological:      General: No focal deficit present.      Mental Status: He is alert and oriented to person, place, and time. Mental status is at baseline.   Psychiatric:         Mood and Affect: Mood normal.         Behavior: Behavior normal.         Labs:   Brief Urine Lab Results  (Last result in the past 365 days)        Color   Clarity   Blood   Leuk Est   Nitrite   Protein   CREAT   Urine HCG        06/10/24 1003 Yellow   Clear   Negative   Negative   Negative   Trace                   Urine Culture          4/1/2024    16:01 4/24/2024    13:44 6/10/2024    10:03   Urine Culture   Urine Culture No growth  Final report  No growth         Lab Results   Component Value Date    GLUCOSE 147 (H) 06/10/2024    CALCIUM 9.3 06/10/2024     (L) 06/10/2024    K 3.6 06/10/2024    CO2 29.0 06/10/2024    CL 94 (L) 06/10/2024    BUN 16 06/10/2024    CREATININE 1.60 (H) 06/10/2024    EGFRIFAFRI 57 (L) 12/05/2018    EGFRIFNONA 55 (L) 01/10/2022    BCR 10.0 06/10/2024    ANIONGAP 12.0 06/10/2024       Lab Results   Component Value Date    WBC 6.58 06/10/2024    HGB 16.2 06/10/2024    HCT 48.0  06/10/2024    MCV 93.6 06/10/2024     06/10/2024       Images:   XR CHEST 1 VW    Result Date: 4/3/2024  No acute process. Images reviewed, interpreted, and dictated by Dr. Jerry Teran. Transcribed by Masood Cai PA-C.      Measures:   Tobacco:   Andre Agosto  reports that he quit smoking about 38 years ago. His smoking use included cigarettes. He started smoking about 49 years ago. He has a 7.5 pack-year smoking history. He has been exposed to tobacco smoke. He has never used smokeless tobacco.  Assessment / Plan      Assessment/Plan:   69 y.o. male who presented today for follow up of prostate cancer on surveillance, nephrolithiasis, recurrent urinary infection.  He has had sepsis episodes after kidney stone treatments and after prostate biopsy resulting in prolonged hospitalizations and recovery.  He follows with infectious disease.  He has residual right-sided stones which may serve as a bacterial reservoir and infectious disease and other physicians have recommended we treat these.  He was offered elective treatment in the form of ureteroscopy and lithotripsy.  The risks benefits and alternatives to ureteroscopy lithotripsy were discussed at length.  He states he is still recovering and would like to avoid surgical intervention at this time.  He is on Farxiga and with his history I recommend he discuss with his prescribing provider to discontinue this medication as this will significantly place him at risk of recurrent infections given the urinary environment high in glucose.  He already is at increased risk secondary to diabetes and multiple other medical concerns.  He will continue tamsulosin for BPH.  I will see him back in 3-4 months for additional discussion.    Diagnoses and all orders for this visit:    1. Prostate cancer (Primary)  -     PSA Diagnostic; Future    2. Nephrolithiasis    3. Recurrent UTI           Follow Up:   Return in about 17 weeks (around 11/4/2024) for Follow Up after  Labs.    I spent approximately 34 minutes providing clinical care for this patient; including review of patient's chart and provider documentation, face to face time spent with patient in examination room (obtaining history, performing physical exam, discussing diagnosis and management options), placing orders, and completing patient documentation.     Colin Gaxiola MD  [facility]

## 2024-07-17 ENCOUNTER — DOCUMENTATION (OUTPATIENT)
Dept: INTERNAL MEDICINE | Facility: CLINIC | Age: 69
End: 2024-07-17
Payer: MEDICARE

## 2024-07-24 DIAGNOSIS — N18.31 STAGE 3A CHRONIC KIDNEY DISEASE: ICD-10-CM

## 2024-07-24 DIAGNOSIS — Z79.4 TYPE 2 DIABETES MELLITUS WITH HYPERGLYCEMIA, WITH LONG-TERM CURRENT USE OF INSULIN: Primary | ICD-10-CM

## 2024-07-24 DIAGNOSIS — E11.65 TYPE 2 DIABETES MELLITUS WITH HYPERGLYCEMIA, WITH LONG-TERM CURRENT USE OF INSULIN: Primary | ICD-10-CM

## 2024-07-24 DIAGNOSIS — Z16.12 ESBL (EXTENDED SPECTRUM BETA-LACTAMASE) PRODUCING BACTERIA INFECTION: ICD-10-CM

## 2024-07-24 DIAGNOSIS — N18.9 ACUTE ON CHRONIC RENAL INSUFFICIENCY: ICD-10-CM

## 2024-07-24 DIAGNOSIS — N28.9 ACUTE ON CHRONIC RENAL INSUFFICIENCY: ICD-10-CM

## 2024-07-24 DIAGNOSIS — A49.9 ESBL (EXTENDED SPECTRUM BETA-LACTAMASE) PRODUCING BACTERIA INFECTION: ICD-10-CM

## 2024-07-26 LAB
ALBUMIN SERPL-MCNC: 4.4 G/DL (ref 3.5–5.2)
ALBUMIN/GLOB SERPL: 1.5 G/DL
ALP SERPL-CCNC: 73 U/L (ref 39–117)
ALT SERPL-CCNC: 15 U/L (ref 1–41)
AST SERPL-CCNC: 15 U/L (ref 1–40)
BASOPHILS # BLD AUTO: 0.06 10*3/MM3 (ref 0–0.2)
BASOPHILS NFR BLD AUTO: 0.8 % (ref 0–1.5)
BILIRUB SERPL-MCNC: 0.6 MG/DL (ref 0–1.2)
BUN SERPL-MCNC: 10 MG/DL (ref 8–23)
BUN/CREAT SERPL: 6.6 (ref 7–25)
CALCIUM SERPL-MCNC: 9.4 MG/DL (ref 8.6–10.5)
CHLORIDE SERPL-SCNC: 101 MMOL/L (ref 98–107)
CHOLEST SERPL-MCNC: 157 MG/DL (ref 0–200)
CO2 SERPL-SCNC: 28.8 MMOL/L (ref 22–29)
CREAT SERPL-MCNC: 1.52 MG/DL (ref 0.76–1.27)
EGFRCR SERPLBLD CKD-EPI 2021: 49.6 ML/MIN/1.73
EOSINOPHIL # BLD AUTO: 0.18 10*3/MM3 (ref 0–0.4)
EOSINOPHIL NFR BLD AUTO: 2.4 % (ref 0.3–6.2)
ERYTHROCYTE [DISTWIDTH] IN BLOOD BY AUTOMATED COUNT: 12.5 % (ref 12.3–15.4)
GLOBULIN SER CALC-MCNC: 2.9 GM/DL
GLUCOSE SERPL-MCNC: 144 MG/DL (ref 65–99)
HBA1C MFR BLD: 6.5 % (ref 4.8–5.6)
HCT VFR BLD AUTO: 50.9 % (ref 37.5–51)
HDLC SERPL-MCNC: 42 MG/DL (ref 40–60)
HGB BLD-MCNC: 16.6 G/DL (ref 13–17.7)
IMM GRANULOCYTES # BLD AUTO: 0.02 10*3/MM3 (ref 0–0.05)
IMM GRANULOCYTES NFR BLD AUTO: 0.3 % (ref 0–0.5)
LDLC SERPL CALC-MCNC: 73 MG/DL (ref 0–100)
LYMPHOCYTES # BLD AUTO: 1.92 10*3/MM3 (ref 0.7–3.1)
LYMPHOCYTES NFR BLD AUTO: 25.5 % (ref 19.6–45.3)
MCH RBC QN AUTO: 29.7 PG (ref 26.6–33)
MCHC RBC AUTO-ENTMCNC: 32.6 G/DL (ref 31.5–35.7)
MCV RBC AUTO: 91.1 FL (ref 79–97)
MONOCYTES # BLD AUTO: 0.45 10*3/MM3 (ref 0.1–0.9)
MONOCYTES NFR BLD AUTO: 6 % (ref 5–12)
NEUTROPHILS # BLD AUTO: 4.91 10*3/MM3 (ref 1.7–7)
NEUTROPHILS NFR BLD AUTO: 65 % (ref 42.7–76)
NRBC BLD AUTO-RTO: 0 /100 WBC (ref 0–0.2)
PLATELET # BLD AUTO: 198 10*3/MM3 (ref 140–450)
POTASSIUM SERPL-SCNC: 3.7 MMOL/L (ref 3.5–5.2)
PROT SERPL-MCNC: 7.3 G/DL (ref 6–8.5)
RBC # BLD AUTO: 5.59 10*6/MM3 (ref 4.14–5.8)
SODIUM SERPL-SCNC: 140 MMOL/L (ref 136–145)
TRIGL SERPL-MCNC: 259 MG/DL (ref 0–150)
TSH SERPL DL<=0.005 MIU/L-ACNC: 2.51 UIU/ML (ref 0.27–4.2)
VIT B12 SERPL-MCNC: 361 PG/ML (ref 211–946)
VLDLC SERPL CALC-MCNC: 42 MG/DL (ref 5–40)
WBC # BLD AUTO: 7.54 10*3/MM3 (ref 3.4–10.8)

## 2024-08-06 ENCOUNTER — TELEPHONE (OUTPATIENT)
Dept: INTERNAL MEDICINE | Facility: CLINIC | Age: 69
End: 2024-08-06
Payer: MEDICARE

## 2024-08-06 NOTE — TELEPHONE ENCOUNTER
Caller: Paula Agosto    Relationship to patient: Emergency Contact    Best call back number: [phone]    Date of positive COVID19 test:     8/5/24    COVID19 symptoms:     FEVER STARTED FRIDAY  SHORTNESS OF BREATH  OXYGEN BETWEEN 92/95  WEAK  COUGH    Additional information or concerns:     NOT SURE IF ITS TOO LATE TO GET MEDICATION    What is the patients preferred pharmacy:     [facility] [id number] [place] [id number] AT [facility] [phone] [facility] [phone] FX     PLEASE CALL PATIENT'S WIFE.  SHE HAS QUESTIONS, INCLUDING SHOULD SHE TAKE ANYTHING    WIFE HAS NO SYMPTOMS

## 2024-08-07 RX ORDER — GUAIFENESIN 600 MG/1
1200 TABLET, EXTENDED RELEASE ORAL 2 TIMES DAILY
Qty: 60 TABLET | Refills: 0 | Status: SHIPPED | OUTPATIENT
Start: 2024-08-07

## 2024-08-07 RX ORDER — DOXYCYCLINE HYCLATE 100 MG/1
100 CAPSULE ORAL 2 TIMES DAILY
Qty: 20 CAPSULE | Refills: 0 | Status: SHIPPED | OUTPATIENT
Start: 2024-08-07 | End: 2024-08-07 | Stop reason: SDUPTHER

## 2024-08-07 RX ORDER — DOXYCYCLINE HYCLATE 100 MG/1
100 CAPSULE ORAL 2 TIMES DAILY
Qty: 20 CAPSULE | Refills: 0 | Status: SHIPPED | OUTPATIENT
Start: 2024-08-07

## 2024-08-07 RX ORDER — DEXTROMETHORPHAN HYDROBROMIDE AND PROMETHAZINE HYDROCHLORIDE 15; 6.25 MG/5ML; MG/5ML
5 SYRUP ORAL 4 TIMES DAILY PRN
Qty: 240 ML | Refills: 0 | Status: SHIPPED | OUTPATIENT
Start: 2024-08-07

## 2024-08-15 ENCOUNTER — OFFICE VISIT (OUTPATIENT)
Dept: INTERNAL MEDICINE | Facility: CLINIC | Age: 69
End: 2024-08-15
Payer: MEDICARE

## 2024-08-15 ENCOUNTER — TELEPHONE (OUTPATIENT)
Dept: INTERNAL MEDICINE | Facility: CLINIC | Age: 69
End: 2024-08-15

## 2024-08-15 VITALS
BODY MASS INDEX: 38.36 KG/M2 | HEIGHT: 76 IN | SYSTOLIC BLOOD PRESSURE: 124 MMHG | RESPIRATION RATE: 16 BRPM | DIASTOLIC BLOOD PRESSURE: 78 MMHG | OXYGEN SATURATION: 96 % | HEART RATE: 75 BPM | WEIGHT: 315 LBS | TEMPERATURE: 96.9 F

## 2024-08-15 DIAGNOSIS — E11.9 TYPE 2 DIABETES MELLITUS WITHOUT COMPLICATION, WITHOUT LONG-TERM CURRENT USE OF INSULIN: Primary | ICD-10-CM

## 2024-08-15 DIAGNOSIS — I50.31 ACUTE DIASTOLIC CHF (CONGESTIVE HEART FAILURE): ICD-10-CM

## 2024-08-15 DIAGNOSIS — Z00.00 ROUTINE GENERAL MEDICAL EXAMINATION AT A HEALTH CARE FACILITY: ICD-10-CM

## 2024-08-15 DIAGNOSIS — N18.32 STAGE 3B CHRONIC KIDNEY DISEASE: ICD-10-CM

## 2024-08-15 RX ORDER — SEMAGLUTIDE 1.34 MG/ML
1 INJECTION, SOLUTION SUBCUTANEOUS
Qty: 1.5 ML | Refills: 3 | Status: SHIPPED | OUTPATIENT
Start: 2024-08-15 | End: 2024-08-15 | Stop reason: SDUPTHER

## 2024-08-15 RX ORDER — SEMAGLUTIDE 1.34 MG/ML
1 INJECTION, SOLUTION SUBCUTANEOUS
Qty: 1.5 ML | Refills: 3 | COMMUNITY
Start: 2024-08-15

## 2024-08-15 NOTE — PROGRESS NOTES
"Subjective     Patient ID: Andre Agosto is a 69 y.o. male. Patient is here for management of multiple medical problems.     Chief Complaint   Patient presents with    Medicare Wellness-subsequent   Dm    History of Present Illness   Dm  DM stable.       The following portions of the patient's history were reviewed and updated as appropriate: allergies, current medications, past family history, past medical history, past social history, past surgical history and problem list.    Review of Systems    Current Outpatient Medications:     amiodarone (PACERONE) 100 MG tablet, Take 1 tablet by mouth Daily., Disp: , Rfl:     amLODIPine (NORVASC) 10 MG tablet, Take 1 tablet by mouth Every Night., Disp: , Rfl:     brivaracetam (Briviact) 75 MG tablet, Take 1 tablet by mouth 2 (Two) Times a Day., Disp: 180 tablet, Rfl: 3    carvedilol (Coreg) 25 MG tablet, Take 1 tablet by mouth 2 (Two) Times a Day With Meals., Disp: 180 tablet, Rfl: 3    Entresto  MG tablet, Take 1 tablet by mouth 2 (Two) Times a Day. Hold until seen by PCP, Disp: , Rfl:     furosemide (Lasix) 40 MG tablet, LASIX 40 MG TABS, Disp: , Rfl:     levothyroxine (SYNTHROID, LEVOTHROID) 88 MCG tablet, TAKE 1 TABLET DAILY, Disp: 90 tablet, Rfl: 3    tamsulosin (FLOMAX) 0.4 MG capsule 24 hr capsule, Take 1 capsule by mouth Daily., Disp: 90 capsule, Rfl: 3    Xarelto 15 MG tablet, Take 1 tablet by mouth Daily With Dinner., Disp: 42 tablet, Rfl: 0    Semaglutide,0.25 or 0.5MG/DOS, (Ozempic, 0.25 or 0.5 MG/DOSE,) 2 MG/1.5ML solution pen-injector, Inject 1 mg under the skin into the appropriate area as directed Every 7 (Seven) Days., Disp: 1.5 mL, Rfl: 3    Objective      Blood pressure 124/78, pulse 75, temperature 96.9 °F (36.1 °C), resp. rate 16, height 193 cm (76\"), weight (!) 146 kg (322 lb), SpO2 96%.            Physical Exam     General Appearance:    Alert, cooperative, no distress, appears stated age   Head:    Normocephalic, without obvious abnormality, " atraumatic   Eyes:    PERRL, conjunctiva/corneas clear, EOM's intact   Ears:    Normal TM's and external ear canals, both ears   Nose:   Nares normal, septum midline, mucosa normal, no drainage   or sinus tenderness   Throat:   Lips, mucosa, and tongue normal; teeth and gums normal   Neck:   Supple, symmetrical, trachea midline, no adenopathy;        thyroid:  No enlargement/tenderness/nodules; no carotid    bruit or JVD   Back:     Symmetric, no curvature, ROM normal, no CVA tenderness   Lungs:     Clear to auscultation bilaterally, respirations unlabored   Chest wall:    No tenderness or deformity   Heart:    Regular rate and rhythm, S1 and S2 normal, no murmur,        rub or gallop   Abdomen:     Soft, non-tender, bowel sounds active all four quadrants,     no masses, no organomegaly   Extremities:   Extremities normal, atraumatic, no cyanosis or edema   Pulses:   2+ and symmetric all extremities   Skin:   Skin color, texture, turgor normal, no rashes or lesions   Lymph nodes:   Cervical, supraclavicular, and axillary nodes normal   Neurologic:   CNII-XII intact. Normal strength, sensation and reflexes       throughout      Results for orders placed or performed in visit on 07/24/24   TSH    Specimen: Blood   Result Value Ref Range    TSH 2.510 0.270 - 4.200 uIU/mL   Vitamin B12    Specimen: Blood   Result Value Ref Range    Vitamin B-12 361 211 - 946 pg/mL   Comprehensive Metabolic Panel    Specimen: Blood   Result Value Ref Range    Glucose 144 (H) 65 - 99 mg/dL    BUN 10 8 - 23 mg/dL    Creatinine 1.52 (H) 0.76 - 1.27 mg/dL    EGFR Result 49.6 (L) >60.0 mL/min/1.73    BUN/Creatinine Ratio 6.6 (L) 7.0 - 25.0    Sodium 140 136 - 145 mmol/L    Potassium 3.7 3.5 - 5.2 mmol/L    Chloride 101 98 - 107 mmol/L    Total CO2 28.8 22.0 - 29.0 mmol/L    Calcium 9.4 8.6 - 10.5 mg/dL    Total Protein 7.3 6.0 - 8.5 g/dL    Albumin 4.4 3.5 - 5.2 g/dL    Globulin 2.9 gm/dL    A/G Ratio 1.5 g/dL    Total Bilirubin 0.6 0.0 - 1.2  mg/dL    Alkaline Phosphatase 73 39 - 117 U/L    AST (SGOT) 15 1 - 40 U/L    ALT (SGPT) 15 1 - 41 U/L   Lipid Panel    Specimen: Blood   Result Value Ref Range    Total Cholesterol 157 0 - 200 mg/dL    Triglycerides 259 (H) 0 - 150 mg/dL    HDL Cholesterol 42 40 - 60 mg/dL    VLDL Cholesterol Jimmy 42 (H) 5 - 40 mg/dL    LDL Chol Calc (NIH) 73 0 - 100 mg/dL   Hemoglobin A1c    Specimen: Blood   Result Value Ref Range    Hemoglobin A1C 6.50 (H) 4.80 - 5.60 %   CBC & Differential    Specimen: Blood   Result Value Ref Range    WBC 7.54 3.40 - 10.80 10*3/mm3    RBC 5.59 4.14 - 5.80 10*6/mm3    Hemoglobin 16.6 13.0 - 17.7 g/dL    Hematocrit 50.9 37.5 - 51.0 %    MCV 91.1 79.0 - 97.0 fL    MCH 29.7 26.6 - 33.0 pg    MCHC 32.6 31.5 - 35.7 g/dL    RDW 12.5 12.3 - 15.4 %    Platelets 198 140 - 450 10*3/mm3    Neutrophil Rel % 65.0 42.7 - 76.0 %    Lymphocyte Rel % 25.5 19.6 - 45.3 %    Monocyte Rel % 6.0 5.0 - 12.0 %    Eosinophil Rel % 2.4 0.3 - 6.2 %    Basophil Rel % 0.8 0.0 - 1.5 %    Neutrophils Absolute 4.91 1.70 - 7.00 10*3/mm3    Lymphocytes Absolute 1.92 0.70 - 3.10 10*3/mm3    Monocytes Absolute 0.45 0.10 - 0.90 10*3/mm3    Eosinophils Absolute 0.18 0.00 - 0.40 10*3/mm3    Basophils Absolute 0.06 0.00 - 0.20 10*3/mm3    Immature Granulocyte Rel % 0.3 0.0 - 0.5 %    Immature Grans Absolute 0.02 0.00 - 0.05 10*3/mm3    nRBC 0.0 0.0 - 0.2 /100 WBC     *Note: Due to a large number of results and/or encounters for the requested time period, some results have not been displayed. A complete set of results can be found in Results Review.         Assessment & Plan     Vision issues and working on getting back in with [facility].    Diet changes discussed.       Renal stones. Pt hydrating well. Want to change to monjoro.. will do and stay hydrated.    Pt was on Trulicity didn't work.  Will increase ozemipic to 1mg      Diagnoses and all orders for this visit:    1. Type 2 diabetes mellitus without complication, without long-term  current use of insulin (Primary)  -     Comprehensive Metabolic Panel  -     CBC & Differential  -     Hemoglobin A1c    2. Routine general medical examination at a health care facility  -     Comprehensive Metabolic Panel  -     CBC & Differential  -     Hemoglobin A1c    3. Acute diastolic CHF (congestive heart failure)  -     Comprehensive Metabolic Panel  -     CBC & Differential  -     Hemoglobin A1c    4. Stage 3b chronic kidney disease    Other orders  -     Discontinue: Tirzepatide (MOUNJARO) 5 MG/0.5ML solution pen-injector pen; Inject 0.5 mL under the skin into the appropriate area as directed 1 (One) Time Per Week.  Dispense: 0.5 mL; Refill: 5  -     Discontinue: Semaglutide,0.25 or 0.5MG/DOS, (Ozempic, 0.25 or 0.5 MG/DOSE,) 2 MG/1.5ML solution pen-injector; Inject 1 mg under the skin into the appropriate area as directed Every 7 (Seven) Days.  Dispense: 1.5 mL; Refill: 3  -     Semaglutide,0.25 or 0.5MG/DOS, (Ozempic, 0.25 or 0.5 MG/DOSE,) 2 MG/1.5ML solution pen-injector; Inject 1 mg under the skin into the appropriate area as directed Every 7 (Seven) Days.  Dispense: 1.5 mL; Refill: 3      Return in about 6 weeks (around 9/26/2024).          There are no Patient Instructions on file for this visit.     Keven Bear MD    Assessment & Plan

## 2024-08-15 NOTE — PROGRESS NOTES
Subjective   The ABCs of the Annual Wellness Visit  Medicare Wellness Visit      Andre Agosto is a 69 y.o. patient who presents for a Medicare Wellness Visit.    The following portions of the patient's history were reviewed and   updated as appropriate: allergies, current medications, past family history, past medical history, past social history, past surgical history, and problem list.    Compared to one year ago, the patient's physical   health is the same.  Compared to one year ago, the patient's mental   health is the same.    Recent Hospitalizations:  This patient has had a [facility] admission record on file within the last 365 days.  Current Medical Providers:  Patient Care Team:  Keven Bear MD as PCP - General (Internal Medicine)  [facility] as Vendor (DME Services)    Outpatient Medications Prior to Visit   Medication Sig Dispense Refill    amiodarone (PACERONE) 100 MG tablet Take 1 tablet by mouth Daily.      amLODIPine (NORVASC) 10 MG tablet Take 1 tablet by mouth Every Night.      brivaracetam (Briviact) 75 MG tablet Take 1 tablet by mouth 2 (Two) Times a Day. 180 tablet 3    carvedilol (Coreg) 25 MG tablet Take 1 tablet by mouth 2 (Two) Times a Day With Meals. 180 tablet 3    Entresto  MG tablet Take 1 tablet by mouth 2 (Two) Times a Day. Hold until seen by PCP      furosemide (Lasix) 40 MG tablet LASIX 40 MG TABS      levothyroxine (SYNTHROID, LEVOTHROID) 88 MCG tablet TAKE 1 TABLET DAILY 90 tablet 3    Semaglutide,0.25 or 0.5MG/DOS, (Ozempic, 0.25 or 0.5 MG/DOSE,) 2 MG/1.5ML solution pen-injector Inject 0.5 mg under the skin into the appropriate area as directed Every 7 (Seven) Days. 1.5 mL 3    tamsulosin (FLOMAX) 0.4 MG capsule 24 hr capsule Take 1 capsule by mouth Daily. 90 capsule 3    Xarelto 15 MG tablet Take 1 tablet by mouth Daily With Dinner. 42 tablet 0    doxycycline (VIBRAMYCIN) 100 MG capsule Take 1 capsule by mouth 2 (Two) Times a Day. 20 capsule 0     guaiFENesin (Mucinex) 600 MG 12 hr tablet Take 2 tablets by mouth 2 (Two) Times a Day. 60 tablet 0    promethazine-dextromethorphan (PROMETHAZINE-DM) 6.25-15 MG/5ML syrup Take 5 mL by mouth 4 (Four) Times a Day As Needed for Cough. 240 mL 0     No facility-administered medications prior to visit.     No opioid medication identified on active medication list. I have reviewed chart for other potential  high risk medication/s and harmful drug interactions in the elderly.      Aspirin is not on active medication list.  Aspirin use is not indicated based on review of current medical condition/s. Risk of harm outweighs potential benefits.  .    Patient Active Problem List   Diagnosis    Essential hypertension    Epilepsy    CVA (cerebral vascular accident)    Hypokalemia    Diabetes mellitus without complication    BiPAP (biphasic positive airway pressure) dependence    Benign hypertension    Hyperlipidemia    Epilepsy    Obesity    Dyspepsia    Coronary arteriosclerosis    Abnormal stress test    TIA (transient ischemic attack)    Pneumonia of right lower lobe due to infectious organism    History of cerebrovascular accident    ALEX (obstructive sleep apnea)    SOB (shortness of breath) on exertion    Suspected COVID-19 virus infection    Fever and chills    Dyspnea    Fever and chills    Localized edema    Right leg pain    Atrial fibrillation    Memory loss    Blood glucose abnormal    Generalized anxiety disorder    History of Helicobacter pylori infection    Benign prostatic hyperplasia    Medication intolerance    Pseudoaneurysm of femoral artery following procedure    Sustained ventricular tachycardia    ABMD (anterior basement membrane dystrophy)    Myopia, bilateral    Chronic otitis media    Eye twitch    Hyperaldosteronism    Otitis externa of right ear    Regular astigmatism, right eye    Seizure-like activity    Nephrolithiasis    UTI (urinary tract infection)    Anxiety    Presbyopia    Otalgia    Arthralgia  "of right knee    Acute suppurative otitis media without spontaneous rupture of ear drum    Recurrent acute suppurative otitis media    Benign hypertensive heart disease with congestive heart failure    Eustachian tube disorder    Dre hematuria    Other gram-negative sepsis    Other specified bacterial agents as the cause of diseases classified elsewhere    Otorrhea    Paroxysmal atrial flutter    Sensorineural hearing loss    Tobacco abuse counseling    Coronary atherosclerosis    Kidney stone    Other obesity due to excess calories    Otitis externa of right ear    Urinary tract infection, site not specified    Elevated prostate specific antigen (PSA)    Sepsis    Sepsis due to Escherichia coli without acute organ dysfunction    Bacteremia due to Escherichia coli     Advance Care Planning Advance Directive is not on file.  ACP discussion was held with the patient during this visit. Patient does not have an advance directive, declines further assistance.            Objective   Vitals:    08/15/24 1014   BP: 124/78   Pulse: 75   Resp: 16   Temp: 96.9 °F (36.1 °C)   SpO2: 96%   Weight: (!) 146 kg (322 lb)   Height: 193 cm (76\")   PainSc: 0-No pain       Estimated body mass index is 39.2 kg/m² as calculated from the following:    Height as of this encounter: 193 cm (76\").    Weight as of this encounter: 146 kg (322 lb).            Does the patient have evidence of cognitive impairment? No  Lab Results   Component Value Date    CHLPL 157 07/25/2024    TRIG 259 (H) 07/25/2024    TRIG 200 (H) 06/10/2024    HDL 42 07/25/2024    HDL 37 (L) 06/10/2024    LDL 73 07/25/2024    VLDL 42 (H) 07/25/2024    HGBA1C 6.50 (H) 07/25/2024    HGBA1C 6.10 (H) 06/10/2024                                                                                                Health  Risk Assessment    Smoking Status:  Social History     Tobacco Use   Smoking Status Former    Current packs/day: 0.00    Average packs/day: 0.5 packs/day for 15.0 years " (7.5 ttl pk-yrs)    Types: Cigarettes    Start date: 1975    Quit date: 1985    Years since quittin.6    Passive exposure: Past   Smokeless Tobacco Never     Alcohol Consumption:  Social History     Substance and Sexual Activity   Alcohol Use No       Fall Risk Screen  STEADI Fall Risk Assessment was completed, and patient is at LOW risk for falls.Assessment completed on:8/15/2024    Depression Screenin/15/2024    10:19 AM   PHQ-2/PHQ-9 Depression Screening   Little Interest or Pleasure in Doing Things 0-->not at all   Feeling Down, Depressed or Hopeless 1-->several days   PHQ-9: Brief Depression Severity Measure Score 1     Health Habits and Functional and Cognitive Screenin/15/2024    10:17 AM   Functional & Cognitive Status   Do you have difficulty preparing food and eating? No   Do you have difficulty bathing yourself, getting dressed or grooming yourself? No   Do you have difficulty using the toilet? No   Do you have difficulty moving around from place to place? No   Do you have trouble with steps or getting out of a bed or a chair? No   Current Diet Well Balanced Diet   Dental Exam Up to date   Eye Exam Up to date   Exercise (times per week) 1 times per week   Current Exercises Include No Regular Exercise;Walking   Do you need help using the phone?  No   Are you deaf or do you have serious difficulty hearing?  No   Do you need help to go to places out of walking distance? No   Do you need help shopping? No   Do you need help preparing meals?  No   Do you need help with housework?  No   Do you need help with laundry? No   Do you need help taking your medications? No   Do you need help managing money? No   Do you ever drive or ride in a car without wearing a seat belt? No   Have you felt unusual stress, anger or loneliness in the last month? No   Who do you live with? Spouse   If you need help, do you have trouble finding someone available to you? No   Have you been bothered in  the last four weeks by sexual problems? No   Do you have difficulty concentrating, remembering or making decisions? No           Age-appropriate Screening Schedule:  Refer to the list below for future screening recommendations based on patient's age, sex and/or medical conditions. Orders for these recommended tests are listed in the plan section. The patient has been provided with a written plan.    Health Maintenance List  Health Maintenance   Topic Date Due    Pneumococcal Vaccine 65+ (2 of 2 - PCV) 11/03/2018    DIABETIC FOOT EXAM  12/05/2019    DIABETIC EYE EXAM  09/07/2022    ANNUAL WELLNESS VISIT  02/09/2024    INFLUENZA VACCINE  08/01/2024    COVID-19 Vaccine (5 - 2023-24 season) 08/17/2024 (Originally 9/1/2023)    HEMOGLOBIN A1C  01/25/2025    URINE MICROALBUMIN  06/10/2025    BMI FOLLOWUP  06/24/2025    LIPID PANEL  07/25/2025    TDAP/TD VACCINES (3 - Td or Tdap) 08/16/2025    COLORECTAL CANCER SCREENING  10/16/2030    AAA SCREEN (ONE-TIME)  Completed    ZOSTER VACCINE  Completed    HEPATITIS C SCREENING  Addressed                                                                                                                                                CMS Preventative Services Quick Reference  Risk Factors Identified During Encounter  Fall Risk-High or Moderate: Discussed Fall Prevention in the home and Information on Fall Prevention Shared in After Visit Summary    The above risks/problems have been discussed with the patient.  Pertinent information has been shared with the patient in the After Visit Summary.  An After Visit Summary and PPPS were made available to the patient.    Follow Up:   Next Medicare Wellness visit to be scheduled in 1 year.         Additional E&M Note during same encounter follows:  Patient has additional, significant, and separately identifiable condition(s)/problem(s) that require work above and beyond the Medicare Wellness Visit     Chief Complaint  Medicare  "Wellness-subsequent    Subjective   HPI  Bernardo is also being seen today for an annual adult preventative physical exam.                 Objective   Vital Signs:  /78   Pulse 75   Temp 96.9 °F (36.1 °C)   Resp 16   Ht 193 cm (76\")   Wt (!) 146 kg (322 lb)   SpO2 96%   BMI 39.20 kg/m²   Physical Exam    The following data was reviewed by: Keven Bear MD on 08/15/2024:        Assessment and Plan Additional age appropriate preventative wellness advice topics were discussed during today's preventative wellness exam(some topics already addressed during AWV portion of the note above):    Physical Activity: Advised cardiovascular activity 150 minutes per week as tolerated. (example brisk walk for 30 minutes, 5 days a week).     Nutrition: Discussed nutrition plan with patient. Information shared in after visit summary. Goal is for a well balanced diet to enhance overall health.              Type 2 diabetes mellitus without complication, without long-term current use of insulin  Diabetes is stable.   Continue current treatment regimen.  Diabetes will be reassessed in 6 months  No orders of the defined types were placed in this encounter.          I spent 20 minutes caring for Andre on this date of service. This time includes time spent by me in the following activities:preparing for the visit and reviewing tests  Follow Up   No follow-ups on file.  Patient was given instructions and counseling regarding his condition or for health maintenance advice. Please see specific information pulled into the AVS if appropriate.  "

## 2024-08-15 NOTE — TELEPHONE ENCOUNTER
Caller: WILMAR BOYKIN    Relationship: [facility]    Best call back number: [phone], SECURED LINE AND IT OKAY TO LEAVE A MESSAGE     What form or medical record are you requesting: PATIENT'S ALC, EGFR AND MICRO ALBUMIN.

## 2024-08-16 ENCOUNTER — TELEPHONE (OUTPATIENT)
Dept: INTERNAL MEDICINE | Facility: CLINIC | Age: 69
End: 2024-08-16
Payer: MEDICARE

## 2024-08-16 NOTE — TELEPHONE ENCOUNTER
Called [facility] back and informed the Monjaro had been discontinued and ozempic is the prescription that should be filled.

## 2024-08-16 NOTE — TELEPHONE ENCOUNTER
Pharmacy Name: [facility]    Reference Number (if applicable): [id number]    Pharmacy representative phone number: [phone]    What medication are you calling in regards to: MOUNJARO AND OZEMPIC    What question does the pharmacy have: REPRESENTATIVE STATES THAT THEY RECEIVED A PRESCRIPTION FOR BOTH MEDICATIONS AND NEEDS CLARIFICATION ON WHICH MEDICATION AND DOSAGE    Who is the provider that prescribed the medication: DR. ELLIS

## 2024-08-21 ENCOUNTER — TELEPHONE (OUTPATIENT)
Dept: INTERNAL MEDICINE | Facility: CLINIC | Age: 69
End: 2024-08-21
Payer: MEDICARE

## 2024-08-21 DIAGNOSIS — G40.109 LOCALIZATION-RELATED SYMPTOMATIC EPILEPSY AND EPILEPTIC SYNDROMES WITH SIMPLE PARTIAL SEIZURES, NOT INTRACTABLE, WITHOUT STATUS EPILEPTICUS: ICD-10-CM

## 2024-08-21 NOTE — TELEPHONE ENCOUNTER
[facility] Called in regard to patient's Ozempic, state that the Ozempic medication is not going to be approved by insurance. They states that the medication that is approved will be Trulicity, Byureon, and Metformin, informed them that he has had samples of the ozempic to get him through until his follow up appointment.

## 2024-08-22 RX ORDER — BRIVARACETAM 75 MG/1
75 TABLET, FILM COATED ORAL 2 TIMES DAILY
Qty: 180 TABLET | Refills: 0 | OUTPATIENT
Start: 2024-08-22

## 2024-08-22 NOTE — TELEPHONE ENCOUNTER
Rx Refill Note  Requested Prescriptions     Pending Prescriptions Disp Refills    Briviact 75 MG tablet [Pharmacy Med Name: BRIVIACT 75 MG TABLET] 180 tablet 0     Sig: Take 1 tablet by mouth twice a day      Last filled:3/21/24 90ds 3 refill  Last office visit with prescribing clinician: 6/19/2024      Next office visit with prescribing clinician: 12/19/2024     JOVAN RUSH  08/22/24, 11:05 EDT    Denied- patient was given 90ds with 3 refill on 3/21/24

## 2024-09-04 DIAGNOSIS — G40.109 LOCALIZATION-RELATED SYMPTOMATIC EPILEPSY AND EPILEPTIC SYNDROMES WITH SIMPLE PARTIAL SEIZURES, NOT INTRACTABLE, WITHOUT STATUS EPILEPTICUS: ICD-10-CM

## 2024-09-05 ENCOUNTER — READMISSION MANAGEMENT (OUTPATIENT)
Dept: CALL CENTER | Facility: HOSPITAL | Age: 69
End: 2024-09-05
Payer: MEDICARE

## 2024-09-06 ENCOUNTER — TRANSITIONAL CARE MANAGEMENT TELEPHONE ENCOUNTER (OUTPATIENT)
Dept: CALL CENTER | Facility: HOSPITAL | Age: 69
End: 2024-09-06
Payer: MEDICARE

## 2024-09-06 NOTE — OUTREACH NOTE
Call Center TCM Note      Flowsheet Row Responses   [facility] facility patient discharged from? Non-  [[facility]]   Does the patient have one of the following disease processes/diagnoses(primary or secondary)? Other   TCM attempt successful? No  [VR for Paula, wife and Lois dtr]   Unsuccessful attempts Attempt 1  [pt declnes at present time and requests call in about one hour]            Leanne Morales RN    9/6/2024, 15:19 EDT

## 2024-09-06 NOTE — OUTREACH NOTE
Prep Survey      Flowsheet Row Responses   [facility] patient discharged from? Non-BH   Is LACE score < 7 ? Non- Discharge   Eligibility [facility]   Date of Admission 09/02/24   Date of Discharge 09/05/24   Discharge diagnosis Diplopia (Primary Dx),  Visual disturbance,  History of stroke,  Prostate cancer (CMS/HCC),  Stage 3 chronic kidney disease, unspecified whether stage 3a or 3b CKD (CMS/HCC),  Seizure disorder (CMS/HCC),  Obesity (BMI 30-39.9),  Stroke-like symptoms,  PAF (paroxysmal atrial fibrillation) (CMS/[facility])   Does the patient have one of the following disease processes/diagnoses(primary or secondary)? Other   Does the patient have Home health ordered? No   Is there a DME ordered? No   Prep survey completed? Yes            OPAL KHAN - Registered Nurse

## 2024-09-06 NOTE — OUTREACH NOTE
Call Center TCM Note      Flowsheet Row Responses   [facility] patient discharged from? Non-BH   Does the patient have one of the following disease processes/diagnoses(primary or secondary)? Other   TCM attempt successful? No   Unsuccessful attempts Attempt 2            Melina Fraser RN    9/6/2024, 16:42 EDT

## 2024-09-07 ENCOUNTER — TRANSITIONAL CARE MANAGEMENT TELEPHONE ENCOUNTER (OUTPATIENT)
Dept: CALL CENTER | Facility: HOSPITAL | Age: 69
End: 2024-09-07
Payer: MEDICARE

## 2024-09-07 NOTE — OUTREACH NOTE
Call Center TCM Note      Flowsheet Row Responses   [facility] facility patient discharged from? Non-BH   Does the patient have one of the following disease processes/diagnoses(primary or secondary)? Other   TCM attempt successful? No   Unsuccessful attempts Attempt 3  [No answer.]            Sasha Ivy RN    9/7/2024, 08:28 EDT

## 2024-09-08 ENCOUNTER — PATIENT MESSAGE (OUTPATIENT)
Dept: INTERNAL MEDICINE | Facility: CLINIC | Age: 69
End: 2024-09-08
Payer: MEDICARE

## 2024-09-09 ENCOUNTER — TELEPHONE (OUTPATIENT)
Dept: NEUROLOGY | Facility: CLINIC | Age: 69
End: 2024-09-09

## 2024-09-09 DIAGNOSIS — G40.109 LOCALIZATION-RELATED SYMPTOMATIC EPILEPSY AND EPILEPTIC SYNDROMES WITH SIMPLE PARTIAL SEIZURES, NOT INTRACTABLE, WITHOUT STATUS EPILEPTICUS: ICD-10-CM

## 2024-09-09 NOTE — TELEPHONE ENCOUNTER
Caller: Andre Agosto      Best call back number:   Telephone Information:   Mobile [phone]       Which medication are you concerned about: Brivaracetam 75 mg Oral 2 Times Daily       What are your concerns: HE STATES THIS WAS SENT TO THE WRONG PHARMACY. HE STATES THE  IS WHO USUALLY SENDS THE MEDICATION. PT STATES HE HAS ABOUT A WEEKS WORTH OF MEDICATION.

## 2024-09-09 NOTE — TELEPHONE ENCOUNTER
"  Caller: Andre Agosto \"Bernardo\"    Relationship: Self    Best call back number: [phone] (home)       What was the call regarding: PT SAID THAT BRIVIACT NEEDS TO BE SENT TO [facility] VIA FAX  FAX: [phone] FOR THE RX TO BE REFILLED. HE SAID THAT IT CAN BE VERBALLY CALLED IN [phone] OPTION 2 ( TWO TIMES)    PLEASE REVIEW  THANK YOU   "

## 2024-09-10 ENCOUNTER — TELEPHONE (OUTPATIENT)
Dept: INTERNAL MEDICINE | Facility: CLINIC | Age: 69
End: 2024-09-10
Payer: MEDICARE

## 2024-09-10 DIAGNOSIS — N18.32 STAGE 3B CHRONIC KIDNEY DISEASE: Primary | ICD-10-CM

## 2024-09-10 DIAGNOSIS — Z79.4 TYPE 2 DIABETES MELLITUS WITH HYPERGLYCEMIA, WITH LONG-TERM CURRENT USE OF INSULIN: ICD-10-CM

## 2024-09-10 DIAGNOSIS — E11.65 TYPE 2 DIABETES MELLITUS WITH HYPERGLYCEMIA, WITH LONG-TERM CURRENT USE OF INSULIN: ICD-10-CM

## 2024-09-10 DIAGNOSIS — E79.0 HYPERURICEMIA: ICD-10-CM

## 2024-09-10 DIAGNOSIS — E03.9 ACQUIRED HYPOTHYROIDISM: ICD-10-CM

## 2024-09-10 NOTE — TELEPHONE ENCOUNTER
Patient is asking for the following lab orders to be added to his current orders.      Magnesium  Renal function panel  CBC w/o diff  Protein/creatinine ratio, urine  PTH, intact  Vit D 25 hydroxy  Uric acid  Urinalysis.      He states his nephrologist ordered them, but they can't do them downstairs, and the lady downstairs in the lab is the only one who can draw his labs w/o any issues.      Please advise. Phone number verified.

## 2024-09-11 ENCOUNTER — TELEPHONE (OUTPATIENT)
Dept: NEUROLOGY | Facility: CLINIC | Age: 69
End: 2024-09-11

## 2024-09-11 NOTE — TELEPHONE ENCOUNTER
"  Caller: Andre Agosto \"Bernardo\"    Relationship to patient: Self    Best call back number:   Telephone Information:   Mobile [phone]         New or established patient?  [] New  [x] Established    Date of discharge: 9-5-24    Facility discharged from:      Diagnosis/Symptoms: DOUBLE VISION    Specialty Only: Did you see a [facility] provider?    [] Yes  [x] No    PT STATES HE WAS ADVISED BY [facility] HE NEEDS A HOSP F/U WITH HIS NEUROLOGIST IN ONE WEEK FROM DISCHARGE. RECORDS IN [facility](CARE EVERYWHERE)  "

## 2024-09-12 ENCOUNTER — LAB (OUTPATIENT)
Dept: LAB | Facility: HOSPITAL | Age: 69
End: 2024-09-12
Payer: MEDICARE

## 2024-09-12 ENCOUNTER — TRANSCRIBE ORDERS (OUTPATIENT)
Dept: LAB | Facility: HOSPITAL | Age: 69
End: 2024-09-12
Payer: MEDICARE

## 2024-09-12 DIAGNOSIS — E78.00 PURE HYPERCHOLESTEROLEMIA: Primary | ICD-10-CM

## 2024-09-12 DIAGNOSIS — E78.00 PURE HYPERCHOLESTEROLEMIA: ICD-10-CM

## 2024-09-12 DIAGNOSIS — C61 PROSTATE CANCER: ICD-10-CM

## 2024-09-12 LAB
BILIRUB CONJ SERPL-MCNC: 0.2 MG/DL (ref 0–0.3)
HOLD SPECIMEN: NORMAL
PHOSPHATE SERPL-MCNC: 2.7 MG/DL (ref 2.5–4.5)

## 2024-09-12 PROCEDURE — 80061 LIPID PANEL: CPT

## 2024-09-12 PROCEDURE — 80053 COMPREHEN METABOLIC PANEL: CPT | Performed by: INTERNAL MEDICINE

## 2024-09-12 PROCEDURE — 83735 ASSAY OF MAGNESIUM: CPT | Performed by: INTERNAL MEDICINE

## 2024-09-12 PROCEDURE — 82043 UR ALBUMIN QUANTITATIVE: CPT | Performed by: INTERNAL MEDICINE

## 2024-09-12 PROCEDURE — 82306 VITAMIN D 25 HYDROXY: CPT | Performed by: INTERNAL MEDICINE

## 2024-09-12 PROCEDURE — 84100 ASSAY OF PHOSPHORUS: CPT | Performed by: INTERNAL MEDICINE

## 2024-09-12 PROCEDURE — 84550 ASSAY OF BLOOD/URIC ACID: CPT | Performed by: INTERNAL MEDICINE

## 2024-09-12 PROCEDURE — 85025 COMPLETE CBC W/AUTO DIFF WBC: CPT | Performed by: INTERNAL MEDICINE

## 2024-09-12 PROCEDURE — 84153 ASSAY OF PSA TOTAL: CPT

## 2024-09-12 PROCEDURE — 82248 BILIRUBIN DIRECT: CPT

## 2024-09-12 PROCEDURE — 36415 COLL VENOUS BLD VENIPUNCTURE: CPT | Performed by: INTERNAL MEDICINE

## 2024-09-12 PROCEDURE — 83970 ASSAY OF PARATHORMONE: CPT | Performed by: INTERNAL MEDICINE

## 2024-09-12 PROCEDURE — 81001 URINALYSIS AUTO W/SCOPE: CPT | Performed by: INTERNAL MEDICINE

## 2024-09-12 PROCEDURE — 82570 ASSAY OF URINE CREATININE: CPT | Performed by: INTERNAL MEDICINE

## 2024-09-12 PROCEDURE — 84156 ASSAY OF PROTEIN URINE: CPT | Performed by: INTERNAL MEDICINE

## 2024-09-12 PROCEDURE — 83036 HEMOGLOBIN GLYCOSYLATED A1C: CPT | Performed by: INTERNAL MEDICINE

## 2024-09-12 PROCEDURE — 83695 ASSAY OF LIPOPROTEIN(A): CPT

## 2024-09-13 ENCOUNTER — OFFICE VISIT (OUTPATIENT)
Dept: INTERNAL MEDICINE | Facility: CLINIC | Age: 69
End: 2024-09-13
Payer: MEDICARE

## 2024-09-13 VITALS
OXYGEN SATURATION: 95 % | SYSTOLIC BLOOD PRESSURE: 110 MMHG | BODY MASS INDEX: 38.24 KG/M2 | HEART RATE: 76 BPM | RESPIRATION RATE: 16 BRPM | WEIGHT: 314 LBS | HEIGHT: 76 IN | DIASTOLIC BLOOD PRESSURE: 80 MMHG | TEMPERATURE: 97.2 F

## 2024-09-13 DIAGNOSIS — E11.9 TYPE 2 DIABETES MELLITUS WITHOUT COMPLICATION, WITHOUT LONG-TERM CURRENT USE OF INSULIN: ICD-10-CM

## 2024-09-13 DIAGNOSIS — N18.31 STAGE 3A CHRONIC KIDNEY DISEASE: Primary | ICD-10-CM

## 2024-09-13 DIAGNOSIS — K04.7 DENTAL ABSCESS: ICD-10-CM

## 2024-09-13 DIAGNOSIS — J34.1 MAXILLARY SINUS CYST: ICD-10-CM

## 2024-09-13 LAB
25(OH)D3 SERPL-MCNC: 29.7 NG/ML (ref 30–100)
ALBUMIN SERPL-MCNC: 4.6 G/DL (ref 3.5–5.2)
ALBUMIN UR-MCNC: 11.1 MG/DL
ALBUMIN/GLOB SERPL: 1.4 G/DL
ALP SERPL-CCNC: 79 U/L (ref 39–117)
ALT SERPL W P-5'-P-CCNC: 18 U/L (ref 1–41)
ANION GAP SERPL CALCULATED.3IONS-SCNC: 13 MMOL/L (ref 5–15)
AST SERPL-CCNC: 17 U/L (ref 1–40)
BACTERIA UR QL AUTO: NORMAL /HPF
BASOPHILS # BLD AUTO: 0.05 10*3/MM3 (ref 0–0.2)
BASOPHILS NFR BLD AUTO: 0.6 % (ref 0–1.5)
BILIRUB SERPL-MCNC: 1 MG/DL (ref 0–1.2)
BILIRUB UR QL STRIP: NEGATIVE
BUN SERPL-MCNC: 12 MG/DL (ref 8–23)
BUN/CREAT SERPL: 8.6 (ref 7–25)
CALCIUM SPEC-SCNC: 9.8 MG/DL (ref 8.6–10.5)
CHLORIDE SERPL-SCNC: 98 MMOL/L (ref 98–107)
CHOLEST SERPL-MCNC: 150 MG/DL (ref 0–200)
CLARITY UR: CLEAR
CO2 SERPL-SCNC: 27 MMOL/L (ref 22–29)
COLOR UR: YELLOW
CREAT SERPL-MCNC: 1.39 MG/DL (ref 0.76–1.27)
CREAT UR-MCNC: 45 MG/DL
DEPRECATED RDW RBC AUTO: 45.5 FL (ref 37–54)
EGFRCR SERPLBLD CKD-EPI 2021: 55.2 ML/MIN/1.73
EOSINOPHIL # BLD AUTO: 0.13 10*3/MM3 (ref 0–0.4)
EOSINOPHIL NFR BLD AUTO: 1.6 % (ref 0.3–6.2)
ERYTHROCYTE [DISTWIDTH] IN BLOOD BY AUTOMATED COUNT: 14.3 % (ref 12.3–15.4)
GLOBULIN UR ELPH-MCNC: 3.3 GM/DL
GLUCOSE SERPL-MCNC: 109 MG/DL (ref 65–99)
GLUCOSE UR STRIP-MCNC: NEGATIVE MG/DL
HBA1C MFR BLD: 6.8 % (ref 4.8–5.6)
HCT VFR BLD AUTO: 50.2 % (ref 37.5–51)
HDLC SERPL-MCNC: 38 MG/DL (ref 40–60)
HGB BLD-MCNC: 17.2 G/DL (ref 13–17.7)
HGB UR QL STRIP.AUTO: ABNORMAL
HYALINE CASTS UR QL AUTO: NORMAL /LPF
IMM GRANULOCYTES # BLD AUTO: 0.03 10*3/MM3 (ref 0–0.05)
IMM GRANULOCYTES NFR BLD AUTO: 0.4 % (ref 0–0.5)
KETONES UR QL STRIP: NEGATIVE
LDLC SERPL CALC-MCNC: 71 MG/DL (ref 0–100)
LDLC/HDLC SERPL: 1.61 {RATIO}
LEUKOCYTE ESTERASE UR QL STRIP.AUTO: NEGATIVE
LYMPHOCYTES # BLD AUTO: 2.09 10*3/MM3 (ref 0.7–3.1)
LYMPHOCYTES NFR BLD AUTO: 25.1 % (ref 19.6–45.3)
MAGNESIUM SERPL-MCNC: 2.1 MG/DL (ref 1.6–2.4)
MCH RBC QN AUTO: 29.9 PG (ref 26.6–33)
MCHC RBC AUTO-ENTMCNC: 34.3 G/DL (ref 31.5–35.7)
MCV RBC AUTO: 87.2 FL (ref 79–97)
MONOCYTES # BLD AUTO: 0.45 10*3/MM3 (ref 0.1–0.9)
MONOCYTES NFR BLD AUTO: 5.4 % (ref 5–12)
NEUTROPHILS NFR BLD AUTO: 5.58 10*3/MM3 (ref 1.7–7)
NEUTROPHILS NFR BLD AUTO: 66.9 % (ref 42.7–76)
NITRITE UR QL STRIP: NEGATIVE
NRBC BLD AUTO-RTO: 0 /100 WBC (ref 0–0.2)
PH UR STRIP.AUTO: 7 [PH] (ref 5–8)
PLATELET # BLD AUTO: 205 10*3/MM3 (ref 140–450)
PMV BLD AUTO: 11.3 FL (ref 6–12)
POTASSIUM SERPL-SCNC: 3.6 MMOL/L (ref 3.5–5.2)
PROT ?TM UR-MCNC: 23.1 MG/DL
PROT SERPL-MCNC: 7.9 G/DL (ref 6–8.5)
PROT UR QL STRIP: ABNORMAL
PROT/CREAT UR: 513.3 MG/G CREA (ref 0–200)
PSA SERPL-MCNC: 4.01 NG/ML (ref 0–4)
PTH-INTACT SERPL-MCNC: 91.8 PG/ML (ref 15–65)
RBC # BLD AUTO: 5.76 10*6/MM3 (ref 4.14–5.8)
RBC # UR STRIP: NORMAL /HPF
REF LAB TEST METHOD: NORMAL
SODIUM SERPL-SCNC: 138 MMOL/L (ref 136–145)
SP GR UR STRIP: 1.01 (ref 1–1.03)
SQUAMOUS #/AREA URNS HPF: NORMAL /HPF
TRIGL SERPL-MCNC: 255 MG/DL (ref 0–150)
URATE SERPL-MCNC: 8.8 MG/DL (ref 3.4–7)
UROBILINOGEN UR QL STRIP: ABNORMAL
VLDLC SERPL-MCNC: 41 MG/DL (ref 5–40)
WBC # UR STRIP: NORMAL /HPF
WBC NRBC COR # BLD AUTO: 8.33 10*3/MM3 (ref 3.4–10.8)

## 2024-09-13 PROCEDURE — 3044F HG A1C LEVEL LT 7.0%: CPT | Performed by: INTERNAL MEDICINE

## 2024-09-13 PROCEDURE — 1126F AMNT PAIN NOTED NONE PRSNT: CPT | Performed by: INTERNAL MEDICINE

## 2024-09-13 PROCEDURE — 3079F DIAST BP 80-89 MM HG: CPT | Performed by: INTERNAL MEDICINE

## 2024-09-13 PROCEDURE — 99214 OFFICE O/P EST MOD 30 MIN: CPT | Performed by: INTERNAL MEDICINE

## 2024-09-13 PROCEDURE — 3074F SYST BP LT 130 MM HG: CPT | Performed by: INTERNAL MEDICINE

## 2024-09-13 RX ORDER — CEFDINIR 300 MG/1
300 CAPSULE ORAL 2 TIMES DAILY
Qty: 20 CAPSULE | Refills: 0 | Status: SHIPPED | OUTPATIENT
Start: 2024-09-13 | End: 2024-09-13 | Stop reason: SDUPTHER

## 2024-09-13 RX ORDER — CEFDINIR 300 MG/1
300 CAPSULE ORAL 2 TIMES DAILY
Qty: 20 CAPSULE | Refills: 0 | Status: SHIPPED | OUTPATIENT
Start: 2024-09-13 | End: 2024-09-23

## 2024-09-13 RX ORDER — SEMAGLUTIDE 1.34 MG/ML
1 INJECTION, SOLUTION SUBCUTANEOUS
Qty: 6 ML | Refills: 0 | COMMUNITY
Start: 2024-09-13

## 2024-09-16 LAB — LPA SERPL-SCNC: <8.4 NMOL/L

## 2024-09-23 ENCOUNTER — OFFICE VISIT (OUTPATIENT)
Dept: INTERNAL MEDICINE | Facility: CLINIC | Age: 69
End: 2024-09-23
Payer: MEDICARE

## 2024-09-23 VITALS
SYSTOLIC BLOOD PRESSURE: 112 MMHG | BODY MASS INDEX: 38.36 KG/M2 | HEART RATE: 98 BPM | TEMPERATURE: 96.2 F | OXYGEN SATURATION: 96 % | HEIGHT: 76 IN | DIASTOLIC BLOOD PRESSURE: 74 MMHG | RESPIRATION RATE: 16 BRPM | WEIGHT: 315 LBS

## 2024-09-23 DIAGNOSIS — I10 ESSENTIAL HYPERTENSION: Primary | ICD-10-CM

## 2024-09-23 DIAGNOSIS — E11.9 TYPE 2 DIABETES MELLITUS WITHOUT COMPLICATION, WITHOUT LONG-TERM CURRENT USE OF INSULIN: ICD-10-CM

## 2024-09-23 DIAGNOSIS — R42 DYSEQUILIBRIUM: ICD-10-CM

## 2024-09-23 DIAGNOSIS — E78.2 MIXED HYPERLIPIDEMIA: ICD-10-CM

## 2024-09-23 PROCEDURE — 99214 OFFICE O/P EST MOD 30 MIN: CPT | Performed by: INTERNAL MEDICINE

## 2024-09-23 PROCEDURE — 3074F SYST BP LT 130 MM HG: CPT | Performed by: INTERNAL MEDICINE

## 2024-09-23 PROCEDURE — 3078F DIAST BP <80 MM HG: CPT | Performed by: INTERNAL MEDICINE

## 2024-09-23 PROCEDURE — 1126F AMNT PAIN NOTED NONE PRSNT: CPT | Performed by: INTERNAL MEDICINE

## 2024-09-23 PROCEDURE — 3044F HG A1C LEVEL LT 7.0%: CPT | Performed by: INTERNAL MEDICINE

## 2024-09-23 RX ORDER — CHOLECALCIFEROL (VITAMIN D3) 25 MCG
1000 TABLET ORAL DAILY
COMMUNITY

## 2024-09-23 RX ORDER — ASPIRIN 81 MG/1
81 TABLET ORAL DAILY
COMMUNITY

## 2024-09-23 RX ORDER — PROCHLORPERAZINE 25 MG/1
SUPPOSITORY RECTAL
Qty: 1 EACH | Refills: 1 | Status: SHIPPED | OUTPATIENT
Start: 2024-09-23

## 2024-09-23 RX ORDER — ROSUVASTATIN CALCIUM 20 MG/1
20 TABLET, COATED ORAL NIGHTLY
COMMUNITY
Start: 2024-09-20

## 2024-09-23 RX ORDER — PROCHLORPERAZINE 25 MG/1
1 SUPPOSITORY RECTAL DAILY
Qty: 1 EACH | Refills: 1 | Status: SHIPPED | OUTPATIENT
Start: 2024-09-23

## 2024-10-07 NOTE — TELEPHONE ENCOUNTER
"  Caller: Andre Agosto \"Bernardo\"    Relationship: Self    Best call back number: [phone]    What is the best time to reach you: ANY    Who are you requesting to speak with (clinical staff, provider,  specific staff member): ANY    What was the call regarding: CALLED TO CHECK STATUS OF SCHEDULING. STATES HE HAD DEVELOPED DOUBLE VISION FROM BRIVIACT AND NEEDS TO BE SEEN ASAP. PLEASE REVIEW & ADVISE, THANK YOU.  "

## 2024-10-10 ENCOUNTER — OFFICE VISIT (OUTPATIENT)
Dept: NEUROLOGY | Facility: CLINIC | Age: 69
End: 2024-10-10
Payer: MEDICARE

## 2024-10-10 VITALS
HEIGHT: 76 IN | HEART RATE: 77 BPM | DIASTOLIC BLOOD PRESSURE: 94 MMHG | WEIGHT: 315 LBS | BODY MASS INDEX: 38.36 KG/M2 | OXYGEN SATURATION: 96 % | SYSTOLIC BLOOD PRESSURE: 142 MMHG

## 2024-10-10 DIAGNOSIS — G40.109 LOCALIZATION-RELATED SYMPTOMATIC EPILEPSY AND EPILEPTIC SYNDROMES WITH SIMPLE PARTIAL SEIZURES, NOT INTRACTABLE, WITHOUT STATUS EPILEPTICUS: Primary | ICD-10-CM

## 2024-10-10 DIAGNOSIS — H49.22 LEFT ABDUCENS NERVE PALSY: ICD-10-CM

## 2024-10-10 PROCEDURE — 3080F DIAST BP >= 90 MM HG: CPT | Performed by: PSYCHIATRY & NEUROLOGY

## 2024-10-10 PROCEDURE — 1159F MED LIST DOCD IN RCRD: CPT | Performed by: PSYCHIATRY & NEUROLOGY

## 2024-10-10 PROCEDURE — 3077F SYST BP >= 140 MM HG: CPT | Performed by: PSYCHIATRY & NEUROLOGY

## 2024-10-10 PROCEDURE — 99214 OFFICE O/P EST MOD 30 MIN: CPT | Performed by: PSYCHIATRY & NEUROLOGY

## 2024-10-10 PROCEDURE — 1160F RVW MEDS BY RX/DR IN RCRD: CPT | Performed by: PSYCHIATRY & NEUROLOGY

## 2024-10-10 NOTE — PROGRESS NOTES
Subjective:    CC: Andre Agosto is seen today for Localization-related symptomatic epilepsy and epileptic syn       Current visit-Current visit-patient denies having any seizures since his last visit with his last episode being in February.  He continues to be compliant with Briviact 75 mg twice daily.  He started to have sudden onset double vision in September with the images being side-to-side that went away on closing each eye.  He went to [facility] where he had CT angiogram of the head and neck that did not show any significant stenosis as well as a MRI brain that also failed to show any acute intracranial abnormalities.  He was continued on aspirin 81 mg daily along with Xarelto 15 mg daily and Crestor 20 mg daily.  His last A1c was 6.8, triglyceride was 255 and LDL was 71.  GFR was 55.  Since then he has also had difficulty moving his left eye outwards.  He has been seen by ophthalmology and fitted with prisms in the left eye.  Continues to have diplopia without the prisms.  As per  notes the patient had a previous stroke 2017 with residual right-sided deficits that have since resolved. The patient also reportedly had a stroke in 2019. Per chart review, at that time he was reported to have a 6th nerve palsy and diplopia. MRI in 2020 showed T2 FLAIR changes right mayra consistent with a prior stroke.   Of note-I reviewed neurology notes from September     he denies having any seizures since his last visit.  In fact his last seizure was in the hospital in February.  Has not had a grand mal seizure in over 10 years.  He did increase his dose of Briviact to 75 mg twice daily but does have occasional dizziness especially while playing golf however I explained to him that he is currently on several different antihypertensives as well as medications for cardiac failure that could also cause dizziness.  He is trying to keep himself well-hydrated.  Has also continued to see his nutritionist but has not lost any  "weight.  His PCP tried to get Ozempic and Mounjaro approved but unfortunately it was denied by his insurance.    Initial vqgcs-89-wh[age]-old male with a history of atrial fibrillation, anxiety, stroke and multiple normal (his left side, CAD/MI, ALEX on BiPAP, CHF status post pacemaker, A-fib, hypertension, hyperlipidemia, diabetes mellitus type 2, recent diagnosis of prostate cancer presents with seizures.  As per patient he had meningitis in 2012 (after he returned from a basketball game).  Soon after that he started to have episodes of right arm jerking with mild confusion (but no zoning out).  Had extensive testing including extended EEGs at [facility] that were unremarkable.  Was told that he could have deep focal seizures.  Over 5 years ago he stopped having these episodes and now has spells with sensory symptoms where he gets a stinging sensation that travels from his fingers up to his right arm and shoulder without any loss of awareness or loss of consciousness.  At times he feels the episode come on as he can get \"slightly cranky \" prior to it.  In the past he has tried several different medications including Keppra that made him lethargic, Dilantin and Tegretol.  Was placed on Depakote several years ago that he tolerated well and had been taking up until last month when he was admitted to the hospital for high fevers secondary to ESBL E. coli UTI status post prostate biopsy for his cancer.  He had a seizure while in the hospital and his Depakote levels were persistently low which was attributed to his antibiotics (Invanz).  Therefore he was switched to Briviact 50 mg twice daily that he is tolerating well however his co-pay is extremely high.  He has continued to be on Invanz infusions.  His last MRI brain in 2020 showed a right lacunar chronic infarct as well as chronic ischemic changes but no acute abnormalities.  He has also had several vessel imaging studies of the head and neck that have " showed scattered plaques with no significant stenosis.  His last A1c was 6.5 and GFR was 55.  Of note-I reviewed his previous neurology notes    The following portions of the patient's history were reviewed today and updated as of 03/12/2024  : allergies, current medications, past family history, past medical history, past social history, past surgical history, and problem list  These document will be scanned to patient's chart.      Current Outpatient Medications:     amiodarone (PACERONE) 100 MG tablet, Take 1 tablet by mouth Daily., Disp: , Rfl:     amLODIPine (NORVASC) 10 MG tablet, Take 1 tablet by mouth Every Night., Disp: , Rfl:     aspirin 81 MG EC tablet, Take 1 tablet by mouth Daily., Disp: , Rfl:     brivaracetam (Briviact) 75 MG tablet, Take 1 tablet by mouth 2 (Two) Times a Day., Disp: 180 tablet, Rfl: 1    carvedilol (Coreg) 25 MG tablet, Take 1 tablet by mouth 2 (Two) Times a Day With Meals., Disp: 180 tablet, Rfl: 3    Cholecalciferol 25 MCG (1000 UT) tablet, Take 1 tablet by mouth Daily., Disp: , Rfl:     Continuous Glucose Sensor (Dexcom G6 Sensor), Use Every 10 (Ten) Days., Disp: 1 each, Rfl: 1    Continuous Glucose Transmitter (Dexcom G6 Transmitter) misc, Use 1 Device Daily., Disp: 1 each, Rfl: 1    Entresto  MG tablet, Take 1 tablet by mouth 2 (Two) Times a Day. Hold until seen by PCP, Disp: , Rfl:     furosemide (Lasix) 40 MG tablet, LASIX 40 MG TABS, Disp: , Rfl:     levothyroxine (SYNTHROID, LEVOTHROID) 88 MCG tablet, TAKE 1 TABLET DAILY, Disp: 90 tablet, Rfl: 3    rosuvastatin (CRESTOR) 20 MG tablet, Take 1 tablet by mouth Every Night., Disp: , Rfl:     Semaglutide,0.25 or 0.5MG/DOS, (Ozempic, 0.25 or 0.5 MG/DOSE,) 2 MG/1.5ML solution pen-injector, Inject 1 mg under the skin into the appropriate area as directed Every 7 (Seven) Days., Disp: 6 mL, Rfl: 0    tamsulosin (FLOMAX) 0.4 MG capsule 24 hr capsule, Take 1 capsule by mouth Daily., Disp: 90 capsule, Rfl: 3    Xarelto 15 MG  tablet, Take 1 tablet by mouth Daily With Dinner., Disp: 42 tablet, Rfl: 0   Past Medical History:   Diagnosis Date    6th nerve palsy, right     right eye     Anxiety and depression     Atrial fibrillation, persistent 12/02/2020    on Xarelto    Bell palsy 7/5/2018    6th nerve palsy    Coronary artery disease involving native coronary artery of native heart without angina pectoris 09/12/2018    follows w/ Dr. Mendoza    CRI (chronic renal insufficiency), stage 2 (mild)     CVA (cerebral vascular accident)     Diabetes mellitus     doesnt check sugar, type 2     Disease of thyroid gland     Double vision     resolved- right eye    Elevated cholesterol     Elevated prostate specific antigen (PSA) 11/08/2023    Focal seizure     of right arm     Heart attack     NSTEMI 2015, s/p stenting    History of Helicobacter pylori infection     in 2008, treated w/ PrevPak - 2021 EGD bx (+), PCP RX - PPI/Augmentin/Doxy/Flagyl (x 14 days) 1/22/21    HL (hearing loss)     (L) ear - child luevano measles    Hyperlipidemia     Hypertension     Kidney stone     Memory loss 2005    d/t meningitis    Meningitis 2005    unsure of bacterial or viral     Obesity     Prostate cancer 01/2024    [place] 3+3=6    Shingles 9/2018    On right arm    Sleep apnea treated with nocturnal BiPAP     compliant with  machine     Stroke     2017/2018    Ventricular tachycardia     3 different times    Wears glasses       Past Surgical History:   Procedure Laterality Date    CARDIAC CATHETERIZATION N/A 10/12/2018    Procedure: Left Heart Cath;  Surgeon: Yoselin Johnson MD;  Location: [facility] CATH INVASIVE LOCATION;  Service: Cardiology    CARDIAC CATHETERIZATION  03/2021    stent    CARDIAC CATHETERIZATION  07/2021    just checking the after pacemaker placed- Dr. Tim    CARDIAC DEFIBRILLATOR PLACEMENT  2021    CAROTID ENDARTERECTOMY      CAROTID STENT  12/2025    COLONOSCOPY  10/2020    w/ Dr. Jacobs, hx colon polyps    CORONARY STENT PLACEMENT  2015  "   LAPAROSCOPIC GASTRIC BANDING  2008    s/p LAGB Realize 2008 by HOMERO.    PACEMAKER IMPLANTATION  2021    UMBILICAL HERNIA REPAIR  2008    incarcerated UHR w/ mesh by Dr. Velasquez    URETEROSCOPY LASER LITHOTRIPSY WITH STENT INSERTION Left 10/20/2021    Procedure: URETEROSCOPY LASER LITHOTRIPSY WITH STENT INSERTION;  Surgeon: Tonio De La Cruz MD;  Location: [facility] OR;  Service: Urology;  Laterality: Left;    URETEROSCOPY LASER LITHOTRIPSY WITH STENT INSERTION Left 2021    Procedure: URETEROSCOPY LEFT, LEFT RETROGRADE PYELOGRAM, CYSTOSCOPY, LASER LITHOTRIPSY WITH STENT EXCHANGE;  Surgeon: Tonio De La Cruz MD;  Location: [facility] OR;  Service: Urology;  Laterality: Left;    URETEROSCOPY LASER LITHOTRIPSY WITH STENT INSERTION Left 10/06/2022    Procedure: URETEROSCOPY LASER LITHOTRIPSY WITH STENT INSERTION;  Surgeon: Tonio De La Cruz MD;  Location: [facility] OR;  Service: Urology;  Laterality: Left;      Family History   Problem Relation Age of Onset    Stroke Mother     Hypertension Mother     COPD Father     Hypertension Father       Social History     Socioeconomic History    Marital status:    Tobacco Use    Smoking status: Former     Current packs/day: 0.00     Average packs/day: 0.5 packs/day for 15.0 years (7.5 ttl pk-yrs)     Types: Cigarettes     Start date: 1975     Quit date: 1985     Years since quittin.8     Passive exposure: Past    Smokeless tobacco: Never   Vaping Use    Vaping status: Never Used   Substance and Sexual Activity    Alcohol use: No    Drug use: No    Sexual activity: Not Currently     Review of Systems   Neurological:  Positive for seizures.   All other systems reviewed and are negative.      Objective:    /94   Pulse 77   Ht 193 cm (76\")   Wt (!) 145 kg (319 lb)   SpO2 96%   BMI 38.83 kg/m²     Neurology Exam:    General apperance: Obese    Mental status: Alert, awake and oriented to time place and person.    Recent and Remote " memory: Intact.    Attention span and Concentration: Normal.     Language and Speech: Intact- No dysarthria.    Fluency, Naming , Repitition and Comprehension:  Intact    Cranial Nerves:   CN II: Visual fields are full. Intact. Fundi - Normal, No papillederma, Pupils - [place]  CN III, IV and VI: Extraocular movements are impaired on the left.  He could not ABduct his left eye.  Also binocular diplopia noted while looking to either direction without prism  CN V: Facial sensation is intact.   CN VII: Muscles of facial expression reveal no asymmetry. Intact.   CN VIII: Hearing is intact. Whispered voice intact.   CN IX and X: Palate elevates symmetrically. Intact  CN XI: Shoulder shrug is intact.   CN XII: Tongue is midline without evidence of atrophy or fasciculation.     Ophthalmoscopic exam of optic disc-normal    Motor:  Right UE muscle strength 5/5. Normal tone.     Left UE muscle strength 5/5. Normal tone.      Right LE muscle strength5/5. Normal tone.     Left LE muscle strength 5/5. Normal tone.      Sensory: Normal light touch, vibration and pinprick sensation bilaterally.    DTRs: 2+ bilaterally in upper and lower extremities.    Babinski: Negative bilaterally.    Co-ordination: Normal finger-to-nose, heel to shin B/L.    Rhomberg: Negative.    Gait: Normal.    Cardiovascular: Regular rate and rhythm without murmur, gallop or rub.    Assessment and Plan:  1. 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  Last episode was in February of this year  I have told him to continue Briviact 75 mg twice daily that he gets to the Briviact patient assistance program  Counseled on seizure precautions.  He should also continue keeping himself well-hydrated and wear moderate compression stockings to increase venous  return specially with standing    2.  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  He previously had it on the right that spontaneously resolved in 2019  I have asked him to increase his aspirin to 325 mg daily that he will take in addition to Xarelto 15 mg daily  He should continue Crestor 20 mg daily.  Last triglyceride of 255 and LDL of 71  Goal blood pressure less than 130/90.  Last A1c was 6.8           Return in about 6 months (around 4/10/2025).     I spent 25 minutes out of it 20 minutes were spent face-to-face    Alyssa Victoria MD

## 2024-10-18 ENCOUNTER — OFFICE VISIT (OUTPATIENT)
Dept: INTERNAL MEDICINE | Facility: CLINIC | Age: 69
End: 2024-10-18
Payer: MEDICARE

## 2024-10-18 VITALS
WEIGHT: 315 LBS | BODY MASS INDEX: 38.36 KG/M2 | DIASTOLIC BLOOD PRESSURE: 74 MMHG | OXYGEN SATURATION: 95 % | HEART RATE: 78 BPM | HEIGHT: 76 IN | SYSTOLIC BLOOD PRESSURE: 100 MMHG | TEMPERATURE: 96 F

## 2024-10-18 DIAGNOSIS — E11.29 TYPE 2 DIABETES MELLITUS WITH OTHER DIABETIC KIDNEY COMPLICATION, WITHOUT LONG-TERM CURRENT USE OF INSULIN: ICD-10-CM

## 2024-10-18 DIAGNOSIS — H92.02 LEFT EAR PAIN: ICD-10-CM

## 2024-10-18 DIAGNOSIS — N18.2 CHRONIC RENAL IMPAIRMENT, STAGE 2 (MILD): ICD-10-CM

## 2024-10-18 DIAGNOSIS — Z12.5 PROSTATE CANCER SCREENING: Primary | ICD-10-CM

## 2024-10-18 PROCEDURE — 99214 OFFICE O/P EST MOD 30 MIN: CPT | Performed by: INTERNAL MEDICINE

## 2024-10-18 PROCEDURE — 1125F AMNT PAIN NOTED PAIN PRSNT: CPT | Performed by: INTERNAL MEDICINE

## 2024-10-18 PROCEDURE — 3078F DIAST BP <80 MM HG: CPT | Performed by: INTERNAL MEDICINE

## 2024-10-18 PROCEDURE — 3044F HG A1C LEVEL LT 7.0%: CPT | Performed by: INTERNAL MEDICINE

## 2024-10-18 PROCEDURE — 3074F SYST BP LT 130 MM HG: CPT | Performed by: INTERNAL MEDICINE

## 2024-10-18 PROCEDURE — G2211 COMPLEX E/M VISIT ADD ON: HCPCS | Performed by: INTERNAL MEDICINE

## 2024-10-18 RX ORDER — AMLODIPINE BESYLATE 5 MG/1
5 TABLET ORAL NIGHTLY
Qty: 90 TABLET | Refills: 3 | Status: SHIPPED | OUTPATIENT
Start: 2024-10-18

## 2024-10-18 NOTE — PROGRESS NOTES
Subjective     Patient ID: Andre Agosto is a 69 y.o. male. Patient is here for management of multiple medical problems.     Chief Complaint   Patient presents with    Hypertension    Diabetes    Diplopia     History of Present Illness     Dm    Pain in left temporal area. Eye watering and double vision.         The following portions of the patient's history were reviewed and updated as appropriate: allergies, current medications, past family history, past medical history, past social history, past surgical history and problem list.    Review of Systems    Current Outpatient Medications:     amiodarone (PACERONE) 100 MG tablet, Take 1 tablet by mouth Daily., Disp: , Rfl:     amLODIPine (NORVASC) 10 MG tablet, Take 1 tablet by mouth Every Night., Disp: , Rfl:     aspirin 81 MG EC tablet, Take 1 tablet by mouth Daily., Disp: , Rfl:     brivaracetam (Briviact) 75 MG tablet, Take 1 tablet by mouth 2 (Two) Times a Day., Disp: 180 tablet, Rfl: 1    carvedilol (Coreg) 25 MG tablet, Take 1 tablet by mouth 2 (Two) Times a Day With Meals., Disp: 180 tablet, Rfl: 3    Cholecalciferol 25 MCG (1000 UT) tablet, Take 1 tablet by mouth Daily., Disp: , Rfl:     Continuous Glucose Sensor (Dexcom G6 Sensor), Use Every 10 (Ten) Days., Disp: 1 each, Rfl: 1    Continuous Glucose Transmitter (Dexcom G6 Transmitter) misc, Use 1 Device Daily., Disp: 1 each, Rfl: 1    Entresto  MG tablet, Take 1 tablet by mouth 2 (Two) Times a Day. Hold until seen by PCP, Disp: , Rfl:     furosemide (Lasix) 40 MG tablet, LASIX 40 MG TABS, Disp: , Rfl:     levothyroxine (SYNTHROID, LEVOTHROID) 88 MCG tablet, TAKE 1 TABLET DAILY, Disp: 90 tablet, Rfl: 3    rosuvastatin (CRESTOR) 20 MG tablet, Take 1 tablet by mouth Every Night., Disp: , Rfl:     Semaglutide,0.25 or 0.5MG/DOS, (Ozempic, 0.25 or 0.5 MG/DOSE,) 2 MG/1.5ML solution pen-injector, Inject 1 mg under the skin into the appropriate area as directed Every 7 (Seven) Days., Disp: 6 mL, Rfl: 0     "tamsulosin (FLOMAX) 0.4 MG capsule 24 hr capsule, Take 1 capsule by mouth Daily., Disp: 90 capsule, Rfl: 3    Xarelto 15 MG tablet, Take 1 tablet by mouth Daily With Dinner., Disp: 42 tablet, Rfl: 0    Objective      Blood pressure 100/74, pulse 78, temperature 96 °F (35.6 °C), height 193 cm (76\"), weight (!) 144 kg (318 lb), SpO2 95%.            Physical Exam     General Appearance:    Alert, cooperative, no distress, appears stated age   Head:    Normocephalic, without obvious abnormality, atraumatic   Eyes:    PERRL, conjunctiva/corneas clear, EOM's intact   Ears:    Normal TM's and external ear canals, both ears   Nose:   Nares normal, septum midline, mucosa normal, no drainage   or sinus tenderness   Throat:   Lips, mucosa, and tongue normal; teeth and gums normal   Neck:   Supple, symmetrical, trachea midline, no adenopathy;        thyroid:  No enlargement/tenderness/nodules; no carotid    bruit or JVD   Back:     Symmetric, no curvature, ROM normal, no CVA tenderness   Lungs:     Clear to auscultation bilaterally, respirations unlabored   Chest wall:    No tenderness or deformity   Heart:    Regular rate and rhythm, S1 and S2 normal, no murmur,        rub or gallop   Abdomen:     Soft, non-tender, bowel sounds active all four quadrants,     no masses, no organomegaly   Extremities:   Extremities normal, atraumatic, no cyanosis or edema   Pulses:   2+ and symmetric all extremities   Skin:   Skin color, texture, turgor normal, no rashes or lesions   Lymph nodes:   Cervical, supraclavicular, and axillary nodes normal   Neurologic:   CNII-XII intact. Normal strength, sensation and reflexes       throughout      Results for orders placed or performed in visit on 09/12/24   Lipoprotein A (LPA)    Collection Time: 09/12/24  2:08 PM    Specimen: Blood   Result Value Ref Range    Lipoprotein (a) <8.4 <75.0 nmol/L   PSA Diagnostic    Collection Time: 09/12/24  2:08 PM    Specimen: Blood   Result Value Ref Range    PSA " 4.010 (H) 0.000 - 4.000 ng/mL   Lipid Panel    Collection Time: 09/12/24  2:08 PM    Specimen: Blood   Result Value Ref Range    Total Cholesterol 150 0 - 200 mg/dL    Triglycerides 255 (H) 0 - 150 mg/dL    HDL Cholesterol 38 (L) 40 - 60 mg/dL    LDL Cholesterol  71 0 - 100 mg/dL    VLDL Cholesterol 41 (H) 5 - 40 mg/dL    LDL/HDL Ratio 1.61    Bilirubin, Direct    Collection Time: 09/12/24  2:08 PM    Specimen: Blood   Result Value Ref Range    Bilirubin, Direct 0.2 0.0 - 0.3 mg/dL     *Note: Due to a large number of results and/or encounters for the requested time period, some results have not been displayed. A complete set of results can be found in Results Review.         Assessment & Plan     Sz activity. Focal. Pt wants to reduce Briviact. Pt doing this with the help of pharmacy.   Followed by Dr Victoria. She is not in the loop on this.         DM better.  Bs    Last ha1c 6.8.       Pain in sinus and ear. Going back to orthodontist.  Urology seen in Nov soon.       Bp low with some dizziness. May not be all briviact. Will decrease norvasc 10 -->5mg    Diagnoses and all orders for this visit:    1. Prostate cancer screening (Primary)  -     CBC & Differential  -     Vitamin B12  -     Comprehensive Metabolic Panel  -     TSH  -     T4, Free  -     Hemoglobin A1c  -     PSA Screen  -     Magnesium    2. Left ear pain  -     CBC & Differential  -     Vitamin B12  -     Comprehensive Metabolic Panel  -     TSH  -     T4, Free  -     Hemoglobin A1c  -     PSA Screen  -     Magnesium    3. Chronic renal impairment, stage 2 (mild)  -     CBC & Differential  -     Vitamin B12  -     Comprehensive Metabolic Panel  -     TSH  -     T4, Free  -     Hemoglobin A1c  -     PSA Screen  -     Magnesium    4. Type 2 diabetes mellitus with other diabetic kidney complication, without long-term current use of insulin  -     CBC & Differential  -     Vitamin B12  -     Comprehensive Metabolic Panel  -     TSH  -     T4, Free  -      Hemoglobin A1c  -     PSA Screen  -     Magnesium      Return in about 4 months (around 2/18/2025).          There are no Patient Instructions on file for this visit.     Keven Bear MD    Assessment & Plan       Answers submitted by the patient for this visit:  Other (Submitted on 10/17/2024)  Please describe your symptoms.: Double vision  Have you had these symptoms before?: Yes  How long have you been having these symptoms?: Greater than 2 weeks  Primary Reason for Visit (Submitted on 10/17/2024)  What is the primary reason for your visit?: Problem Not Listed

## 2024-11-04 ENCOUNTER — OFFICE VISIT (OUTPATIENT)
Age: 69
End: 2024-11-04
Payer: MEDICARE

## 2024-11-04 DIAGNOSIS — C61 PROSTATE CANCER MANAGED WITH ACTIVE SURVEILLANCE: ICD-10-CM

## 2024-11-04 DIAGNOSIS — N20.0 RIGHT NEPHROLITHIASIS: Primary | ICD-10-CM

## 2024-11-04 DIAGNOSIS — N40.1 BPH WITH OBSTRUCTION/LOWER URINARY TRACT SYMPTOMS: ICD-10-CM

## 2024-11-04 DIAGNOSIS — N13.8 BPH WITH OBSTRUCTION/LOWER URINARY TRACT SYMPTOMS: ICD-10-CM

## 2024-11-04 PROCEDURE — 99214 OFFICE O/P EST MOD 30 MIN: CPT | Performed by: STUDENT IN AN ORGANIZED HEALTH CARE EDUCATION/TRAINING PROGRAM

## 2024-11-04 PROCEDURE — 1159F MED LIST DOCD IN RCRD: CPT | Performed by: STUDENT IN AN ORGANIZED HEALTH CARE EDUCATION/TRAINING PROGRAM

## 2024-11-04 PROCEDURE — 1160F RVW MEDS BY RX/DR IN RCRD: CPT | Performed by: STUDENT IN AN ORGANIZED HEALTH CARE EDUCATION/TRAINING PROGRAM

## 2024-11-04 NOTE — PROGRESS NOTES
Follow Up Office Visit      Patient Name: Andre Agosto  : 1955   MRN: [id number]     Chief Complaint:    Chief Complaint   Patient presents with    Prostate Cancer       Referring Provider: Keven Bear MD    History of Present Illness: Andre Agosto is a 69 y.o. male who presents today for follow up of nephrolithiasis, low grade prostate cancer, BPH with LUTS.  Last seen in July.  Previous Dr. Jono singh.  Diagnosed with 1 core grade group 1 prostate cancer in 2024, at that point his PSA was 4.7 he developed post prostate biopsy sepsis after this.  Given his low risk prostate cancer was placed on active surveillance protocol and transferred his care to [facility].  He has a complex medical history including CAD, status post stent, pacemaker, Xarelto, type 2 diabetes, hypertension, hyperlipidemia, obesity.  Patient does have known residual right-sided kidney stones which we are monitoring.  He is elected to avoid surgical intervention given his history of recent infection and prolonged recovery.    PSA stable    Lab Results   Component Value Date    PSA 4.010 (H) 2024    PSA 4.000 06/10/2024    PSA 3.7 06/10/2024    PSA 4.890 (H) 2024    PSA 4.7 (H) 2023    PSA 5.370 (H) 10/12/2023     Denies flank pain or hematuria.  He has BPH managing with tamsulosin.  He has minimal symptoms at this time, IPSS score is 5.  Most bothersome complaint is nocturia 3-4 times a night, states this is stable over many years.        Subjective      Review of System: Review of Systems   Genitourinary:  Positive for frequency.      I have reviewed the ROS documented by my clinical staff, I have updated appropriately and I agree. Colin Gaxiola MD    I have reviewed and the following portions of the patient's history were updated as appropriate: past family history, past medical history, past social history, past surgical history and problem list.    Medications:     Current  Outpatient Medications:     amiodarone (PACERONE) 100 MG tablet, Take 1 tablet by mouth Daily., Disp: , Rfl:     amLODIPine (NORVASC) 5 MG tablet, Take 1 tablet by mouth Every Night., Disp: 90 tablet, Rfl: 3    aspirin 81 MG EC tablet, Take 1 tablet by mouth Daily., Disp: , Rfl:     brivaracetam (Briviact) 75 MG tablet, Take 1 tablet by mouth 2 (Two) Times a Day., Disp: 180 tablet, Rfl: 1    carvedilol (Coreg) 25 MG tablet, Take 1 tablet by mouth 2 (Two) Times a Day With Meals., Disp: 180 tablet, Rfl: 3    Cholecalciferol 25 MCG (1000 UT) tablet, Take 1 tablet by mouth Daily., Disp: , Rfl:     Continuous Glucose Sensor (Dexcom G6 Sensor), Use Every 10 (Ten) Days., Disp: 1 each, Rfl: 1    Continuous Glucose Transmitter (Dexcom G6 Transmitter) misc, Use 1 Device Daily., Disp: 1 each, Rfl: 1    Entresto  MG tablet, Take 1 tablet by mouth 2 (Two) Times a Day. Hold until seen by PCP, Disp: , Rfl:     furosemide (Lasix) 40 MG tablet, LASIX 40 MG TABS, Disp: , Rfl:     levothyroxine (SYNTHROID, LEVOTHROID) 88 MCG tablet, TAKE 1 TABLET DAILY, Disp: 90 tablet, Rfl: 3    rosuvastatin (CRESTOR) 20 MG tablet, Take 1 tablet by mouth Every Night., Disp: , Rfl:     Semaglutide,0.25 or 0.5MG/DOS, (Ozempic, 0.25 or 0.5 MG/DOSE,) 2 MG/1.5ML solution pen-injector, Inject 1 mg under the skin into the appropriate area as directed Every 7 (Seven) Days., Disp: 6 mL, Rfl: 0    tamsulosin (FLOMAX) 0.4 MG capsule 24 hr capsule, Take 1 capsule by mouth Daily., Disp: 90 capsule, Rfl: 3    Xarelto 15 MG tablet, Take 1 tablet by mouth Daily With Dinner., Disp: 42 tablet, Rfl: 0    Allergies:   Allergies   Allergen Reactions    5-Alpha Reductase Inhibitors Other (See Comments)     Myalgias    Amlodipine Swelling and Other (See Comments)     Feet swell    Statins Myalgia and Other (See Comments)       IPSS Questionnaire (AUA-7):  Over the past month…    1)  Incomplete Emptying:       How often have you had a sensation of not emptying you had  the sensation of not emptying your bladder completely after you finished urinating?  0 - Not at all   2)  Frequency:       How often have you had the urinate again less than two hours after you finished urinating?  2 - Less than half the time   3)  Intermittency:       How often have you found you stopped and started again several times when you urinated?   0 - Not at all   4) Urgency:      How often have you found it difficult to postpone urination?  0 - Not at all   5) Weak Stream:      How often have you had a weak urinary stream?  0 - Not at all   6) Straining:       How often have you had to push or strain to begin urination?  0 - Not at all   7) Nocturia:      How many times did you most typically get up to urinate from the time you went to bed at night until the time you got up in the morning?  3 - 3 times   Total Score:  5   The International Prostate Symptom Score (IPSS) is used to screen, diagnose, track symptoms of benign prostatic hyperplasia (BPH).   0-7 (Mild Symptoms) 8-19 (Moderate) 20-35 (Severe)   Quality of Life (QoL):  If you were to spend the rest of your life with your urinary condition just the way it is now, how would you feel about that? 2-Mostly Satisfied   Urine Leakage (Incontinence) 0-No Leakage       Objective     Physical Exam:   Vital Signs: There were no vitals filed for this visit.  There is no height or weight on file to calculate BMI.     Physical Exam  Constitutional:       Appearance: Normal appearance.   HENT:      Head: Normocephalic and atraumatic.      Nose: Nose normal.   Eyes:      Extraocular Movements: Extraocular movements intact.      Conjunctiva/sclera: Conjunctivae normal.      Pupils: Pupils are equal, round, and reactive to light.   Musculoskeletal:         General: Normal range of motion.      Cervical back: Normal range of motion and neck supple.   Skin:     General: Skin is warm and dry.      Findings: No lesion or rash.   Neurological:      General: No focal  deficit present.      Mental Status: He is alert and oriented to person, place, and time. Mental status is at baseline.   Psychiatric:         Mood and Affect: Mood normal.         Behavior: Behavior normal.         Labs:   Brief Urine Lab Results  (Last result in the past 365 days)        Color   Clarity   Blood   Leuk Est   Nitrite   Protein   CREAT   Urine HCG        09/12/24 1408             45.0         09/12/24 1408 Yellow   Clear   Small (1+)   Negative   Negative   30 mg/dL (1+)                   Urine Culture          4/1/2024    16:01 4/24/2024    13:44 6/10/2024    10:03   Urine Culture   Urine Culture No growth  Final report  No growth         Lab Results   Component Value Date    GLUCOSE 109 (H) 09/12/2024    CALCIUM 9.8 09/12/2024     09/12/2024    K 3.6 09/12/2024    CO2 27.0 09/12/2024    CL 98 09/12/2024    BUN 12 09/12/2024    CREATININE 1.39 (H) 09/12/2024    EGFRIFAFRI 57 (L) 12/05/2018    EGFRIFNONA 55 (L) 01/10/2022    BCR 8.6 09/12/2024    ANIONGAP 13.0 09/12/2024       Lab Results   Component Value Date    WBC 8.33 09/12/2024    HGB 17.2 09/12/2024    HCT 50.2 09/12/2024    MCV 87.2 09/12/2024     09/12/2024       Images:   MR Head w and wo IV Contrast    Result Date: 9/6/2024  No evidence of acute infarction. White matter signal which is nonspecific and may represent chronic small vessel ischemia and other etiologies, although there may be superimposed small chronic infarcts. Although nonspecific, some of this white matter signal is in a parasagittal distribution and some of the signal foci may represent small chronic watershed-type infarcts are not excluded. Chronic small infarcts of the mayra and right basal ganglia, redemonstrated. CRITICAL RESULT: No. COMMUNICATION: Per this written report. By electronically signing this report, I, the attending physician, attest that I have personally reviewed the images/data for the above examination(s) and agree with the final edited  report. Drafted by Nader Adkins MD on 9/6/2024 8:12 AM Final report signed by Nelly Persaud MD on 9/6/2024 10:12 AM    XR Chest 2 View    Result Date: 9/4/2024  Left retrocardiac ICD in place. Cardiac enlargement and vascular congestion. CRITICAL RESULT:   No. COMMUNICATION: Per this written report. Drafted by Lydia Alberto MD on 9/4/2024 8:04 AM Final report signed by Lydia Alberto MD on 9/4/2024 8:05 AM    CT Angiogram Neck    Result Date: 9/3/2024  1. No acute intracranial abnormality. 2. Mild arteriosclerotic disease. CRITICAL RESULT:   No. COMMUNICATION: Per this written report.. Drafted by Tonia Richards MD on 9/3/2024 12:30 AM Final report signed by Tonia Richards MD on 9/3/2024 12:47 AM    CT Angiogram Head    Result Date: 9/3/2024  1. No acute intracranial abnormality. 2. Mild arteriosclerotic disease. CRITICAL RESULT:   No. COMMUNICATION: Per this written report.. Drafted by Tonia Richards MD on 9/3/2024 12:30 AM Final report signed by Tonia Richards MD on 9/3/2024 12:47 AM    CT Head Without Contrast    Result Date: 9/2/2024  No acute intracranial abnormality. CRITICAL RESULT:   No. COMMUNICATION: Per this written report. Drafted by Keagan Guan MD on 9/2/2024 6:41 PM Final report signed by Keagan Guan MD on 9/2/2024 6:41 PM      Measures:   Tobacco:   Andre Agosto  reports that he quit smoking about 38 years ago. His smoking use included cigarettes. He started smoking about 49 years ago. He has a 7.5 pack-year smoking history. He has been exposed to tobacco smoke. He has never used smokeless tobacco.      Assessment / Plan      Assessment/Plan:   69 y.o. male who presented today for follow up of multiple urologic issues including large right-sided nephrolithiasis.  He also has a history of low-grade prostate cancer on surveillance.  He developed severe sepsis after his last biopsy and we have avoided a repeat biopsy given these issues.  Still following with infectious disease.   Has BPH managing with tamsulosin.  He has a 1+ centimeter stone in the right midpole and additional 7 mm stone in the lower pole.  He will be due for repeat imaging in February with a repeat CT scan to assess for stone growth etc.  We discussed elective treatment options for his kidney stones and he elects to defer surgery at this time.  From a prostate cancer standpoint he is on surveillance and we will likely plan for repeat MRI prostate at some point in the early spring, will likely avoid biopsy unless the PSA trends up.  Again he was found to have only 1 core, 5% of the core contained grade group 1 prostate cancer.  The PSA was likely elevated secondary to BPH and his low-grade disease was likely an incidental finding.  Discussed AUA guidelines for active surveillance for prostate cancer.  He reports understanding.  He will continue Flomax for BPH.  We discussed additional options if he has worsening nocturia or weak stream concerns.  He is not concerned about his urination at this time.  He desires to continue tamsulosin.    Diagnoses and all orders for this visit:    1. Right nephrolithiasis (Primary)  -     CT Abdomen Pelvis Stone Protocol; Future    2. BPH with obstruction/lower urinary tract symptoms  - continue Flomax  - defers further workup, discussed cysto/TRUS and surgical options for management    3. Prostate cancer managed with active surveillance  -     PSA Diagnostic; Future         Follow Up:   Return in about 3 months (around 2/11/2025).    I spent approximately 30 minutes providing clinical care for this patient; including review of patient's chart and provider documentation, face to face time spent with patient in examination room (obtaining history, performing physical exam, discussing diagnosis and management options), placing orders, and completing patient documentation.     Colin Gaxiola MD  [facility]

## 2024-11-06 ENCOUNTER — OFFICE VISIT (OUTPATIENT)
Dept: INTERNAL MEDICINE | Facility: CLINIC | Age: 69
End: 2024-11-06
Payer: MEDICARE

## 2024-11-06 VITALS
RESPIRATION RATE: 16 BRPM | SYSTOLIC BLOOD PRESSURE: 112 MMHG | TEMPERATURE: 97.2 F | DIASTOLIC BLOOD PRESSURE: 68 MMHG | HEIGHT: 76 IN | HEART RATE: 68 BPM | OXYGEN SATURATION: 94 % | WEIGHT: 314 LBS | BODY MASS INDEX: 38.24 KG/M2

## 2024-11-06 DIAGNOSIS — K59.03 DRUG-INDUCED CONSTIPATION: Primary | ICD-10-CM

## 2024-11-06 DIAGNOSIS — E11.22 TYPE 2 DIABETES MELLITUS WITH STAGE 3A CHRONIC KIDNEY DISEASE, WITHOUT LONG-TERM CURRENT USE OF INSULIN: ICD-10-CM

## 2024-11-06 DIAGNOSIS — I50.31 ACUTE DIASTOLIC CHF (CONGESTIVE HEART FAILURE): ICD-10-CM

## 2024-11-06 DIAGNOSIS — N18.31 TYPE 2 DIABETES MELLITUS WITH STAGE 3A CHRONIC KIDNEY DISEASE, WITHOUT LONG-TERM CURRENT USE OF INSULIN: ICD-10-CM

## 2024-11-06 PROCEDURE — 3044F HG A1C LEVEL LT 7.0%: CPT | Performed by: INTERNAL MEDICINE

## 2024-11-06 PROCEDURE — 99214 OFFICE O/P EST MOD 30 MIN: CPT | Performed by: INTERNAL MEDICINE

## 2024-11-06 PROCEDURE — 1126F AMNT PAIN NOTED NONE PRSNT: CPT | Performed by: INTERNAL MEDICINE

## 2024-11-06 PROCEDURE — 3074F SYST BP LT 130 MM HG: CPT | Performed by: INTERNAL MEDICINE

## 2024-11-06 PROCEDURE — G2211 COMPLEX E/M VISIT ADD ON: HCPCS | Performed by: INTERNAL MEDICINE

## 2024-11-06 PROCEDURE — 3078F DIAST BP <80 MM HG: CPT | Performed by: INTERNAL MEDICINE

## 2024-11-06 RX ORDER — SEMAGLUTIDE 1.34 MG/ML
1 INJECTION, SOLUTION SUBCUTANEOUS
Qty: 6 ML | Refills: 0 | COMMUNITY
Start: 2024-11-06

## 2024-11-06 RX ORDER — SACUBITRIL AND VALSARTAN 97; 103 MG/1; MG/1
1 TABLET, FILM COATED ORAL 2 TIMES DAILY
Qty: 180 TABLET | Refills: 3 | Status: SHIPPED | OUTPATIENT
Start: 2024-11-06

## 2024-11-06 NOTE — PROGRESS NOTES
Subjective     Patient ID: Andre Agosto is a 69 y.o. male. Patient is here for management of multiple medical problems.     Chief Complaint   Patient presents with    Hypertension     History of Present Illness   BS in am 125-204  On 0.5 ozempic.   Constipation.         Htn    Drinking 80-100oz  aday.         The following portions of the patient's history were reviewed and updated as appropriate: allergies, current medications, past family history, past medical history, past social history, past surgical history and problem list.    Review of Systems    Current Outpatient Medications:     amiodarone (PACERONE) 100 MG tablet, Take 1 tablet by mouth Daily., Disp: , Rfl:     aspirin 81 MG EC tablet, Take 1 tablet by mouth Daily., Disp: , Rfl:     brivaracetam (Briviact) 75 MG tablet, Take 1 tablet by mouth 2 (Two) Times a Day., Disp: 180 tablet, Rfl: 1    carvedilol (Coreg) 25 MG tablet, Take 1 tablet by mouth 2 (Two) Times a Day With Meals., Disp: 180 tablet, Rfl: 3    Cholecalciferol 25 MCG (1000 UT) tablet, Take 1 tablet by mouth Daily., Disp: , Rfl:     Continuous Glucose Sensor (Dexcom G6 Sensor), Use Every 10 (Ten) Days., Disp: 1 each, Rfl: 1    Continuous Glucose Transmitter (Dexcom G6 Transmitter) misc, Use 1 Device Daily., Disp: 1 each, Rfl: 1    Entresto  MG tablet, Take 1 tablet by mouth 2 (Two) Times a Day., Disp: 180 tablet, Rfl: 3    furosemide (Lasix) 40 MG tablet, LASIX 40 MG TABS, Disp: , Rfl:     levothyroxine (SYNTHROID, LEVOTHROID) 88 MCG tablet, TAKE 1 TABLET DAILY, Disp: 90 tablet, Rfl: 3    rosuvastatin (CRESTOR) 20 MG tablet, Take 1 tablet by mouth Every Night., Disp: , Rfl:     Semaglutide,0.25 or 0.5MG/DOS, (Ozempic, 0.25 or 0.5 MG/DOSE,) 2 MG/1.5ML solution pen-injector, Inject 1 mg under the skin into the appropriate area as directed Every 7 (Seven) Days., Disp: 6 mL, Rfl: 0    tamsulosin (FLOMAX) 0.4 MG capsule 24 hr capsule, Take 1 capsule by mouth Daily., Disp: 90 capsule, Rfl:  "3    Xarelto 15 MG tablet, Take 1 tablet by mouth Daily With Dinner., Disp: 42 tablet, Rfl: 0    Plecanatide 3 MG tablet, Take 1 tablet by mouth Daily., Disp: 90 tablet, Rfl: 3    Objective      Blood pressure 112/68, pulse 68, temperature 97.2 °F (36.2 °C), resp. rate 16, height 193 cm (76\"), weight (!) 142 kg (314 lb), SpO2 94%.            Physical Exam     General Appearance:    Alert, cooperative, no distress, appears stated age   Head:    Normocephalic, without obvious abnormality, atraumatic   Eyes:    PERRL, conjunctiva/corneas clear, EOM's intact   Ears:    Normal TM's and external ear canals, both ears   Nose:   Nares normal, septum midline, mucosa normal, no drainage   or sinus tenderness   Throat:   Lips, mucosa, and tongue normal; teeth and gums normal   Neck:   Supple, symmetrical, trachea midline, no adenopathy;        thyroid:  No enlargement/tenderness/nodules; no carotid    bruit or JVD   Back:     Symmetric, no curvature, ROM normal, no CVA tenderness   Lungs:     Clear to auscultation bilaterally, respirations unlabored   Chest wall:    No tenderness or deformity   Heart:    Regular rate and rhythm, S1 and S2 normal, no murmur,        rub or gallop   Abdomen:     Soft, non-tender, bowel sounds active all four quadrants,     no masses, no organomegaly   Extremities:   Extremities normal, atraumatic, no cyanosis or edema   Pulses:   2+ and symmetric all extremities   Skin:   Skin color, texture, turgor normal, no rashes or lesions   Lymph nodes:   Cervical, supraclavicular, and axillary nodes normal   Neurologic:   CNII-XII intact. Normal strength, sensation and reflexes       throughout      Results for orders placed or performed in visit on 09/12/24   Lipoprotein A (LPA)    Collection Time: 09/12/24  2:08 PM    Specimen: Blood   Result Value Ref Range    Lipoprotein (a) <8.4 <75.0 nmol/L   PSA Diagnostic    Collection Time: 09/12/24  2:08 PM    Specimen: Blood   Result Value Ref Range    PSA 4.010 " (H) 0.000 - 4.000 ng/mL   Lipid Panel    Collection Time: 09/12/24  2:08 PM    Specimen: Blood   Result Value Ref Range    Total Cholesterol 150 0 - 200 mg/dL    Triglycerides 255 (H) 0 - 150 mg/dL    HDL Cholesterol 38 (L) 40 - 60 mg/dL    LDL Cholesterol  71 0 - 100 mg/dL    VLDL Cholesterol 41 (H) 5 - 40 mg/dL    LDL/HDL Ratio 1.61    Bilirubin, Direct    Collection Time: 09/12/24  2:08 PM    Specimen: Blood   Result Value Ref Range    Bilirubin, Direct 0.2 0.0 - 0.3 mg/dL     *Note: Due to a large number of results and/or encounters for the requested time period, some results have not been displayed. A complete set of results can be found in Results Review.         Assessment & Plan   Wt not change.   Lasix down to one a day.  Was getting to dry.     Bp still low will stop norvasc may help with constipation and cut down on meds.         Diagnoses and all orders for this visit:    1. Drug-induced constipation (Primary)    2. Acute diastolic CHF (congestive heart failure)  -     Entresto  MG tablet; Take 1 tablet by mouth 2 (Two) Times a Day.  Dispense: 180 tablet; Refill: 3    3. Type 2 diabetes mellitus with stage 3a chronic kidney disease, without long-term current use of insulin    Other orders  -     Semaglutide,0.25 or 0.5MG/DOS, (Ozempic, 0.25 or 0.5 MG/DOSE,) 2 MG/1.5ML solution pen-injector; Inject 1 mg under the skin into the appropriate area as directed Every 7 (Seven) Days.  Dispense: 6 mL; Refill: 0  -     Plecanatide 3 MG tablet; Take 1 tablet by mouth Daily.  Dispense: 90 tablet; Refill: 3      Return in about 4 weeks (around 12/4/2024).          There are no Patient Instructions on file for this visit.     Keven Bear MD    Assessment & Plan

## 2024-11-25 ENCOUNTER — HOSPITAL ENCOUNTER (OUTPATIENT)
Dept: CT IMAGING | Facility: HOSPITAL | Age: 69
Discharge: HOME | End: 2024-11-25
Admitting: INTERNAL MEDICINE
Payer: MEDICARE

## 2024-11-25 ENCOUNTER — OFFICE VISIT (OUTPATIENT)
Dept: INTERNAL MEDICINE | Facility: CLINIC | Age: 69
End: 2024-11-25
Payer: MEDICARE

## 2024-11-25 VITALS
DIASTOLIC BLOOD PRESSURE: 84 MMHG | TEMPERATURE: 96.9 F | HEIGHT: 76 IN | BODY MASS INDEX: 38.36 KG/M2 | OXYGEN SATURATION: 97 % | HEART RATE: 75 BPM | SYSTOLIC BLOOD PRESSURE: 144 MMHG | WEIGHT: 315 LBS

## 2024-11-25 DIAGNOSIS — S09.90XA TRAUMATIC INJURY OF HEAD, INITIAL ENCOUNTER: ICD-10-CM

## 2024-11-25 DIAGNOSIS — S09.93XA FACIAL INJURY, INITIAL ENCOUNTER: ICD-10-CM
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
## 2024-11-25 DIAGNOSIS — Z87.828 HISTORY OF CERVICAL SPINE TRAUMA: ICD-10-CM

## 2024-11-25 DIAGNOSIS — R93.89 ABNORMAL CT SCAN: Primary | ICD-10-CM

## 2024-11-25 DIAGNOSIS — I48.11 LONGSTANDING PERSISTENT ATRIAL FIBRILLATION: Primary | ICD-10-CM

## 2024-11-25 DIAGNOSIS — I48.11 LONGSTANDING PERSISTENT ATRIAL FIBRILLATION: ICD-10-CM

## 2024-11-25 PROCEDURE — 3044F HG A1C LEVEL LT 7.0%: CPT | Performed by: INTERNAL MEDICINE

## 2024-11-25 PROCEDURE — 3077F SYST BP >= 140 MM HG: CPT | Performed by: INTERNAL MEDICINE

## 2024-11-25 PROCEDURE — 99214 OFFICE O/P EST MOD 30 MIN: CPT | Performed by: INTERNAL MEDICINE

## 2024-11-25 PROCEDURE — 70486 CT MAXILLOFACIAL W/O DYE: CPT

## 2024-11-25 PROCEDURE — 72125 CT NECK SPINE W/O DYE: CPT

## 2024-11-25 PROCEDURE — 1125F AMNT PAIN NOTED PAIN PRSNT: CPT | Performed by: INTERNAL MEDICINE

## 2024-11-25 PROCEDURE — 70450 CT HEAD/BRAIN W/O DYE: CPT

## 2024-11-25 PROCEDURE — 3079F DIAST BP 80-89 MM HG: CPT | Performed by: INTERNAL MEDICINE

## 2024-11-25 NOTE — PROGRESS NOTES
Subjective     Patient ID: Andre Agosto is a 69 y.o. male. Patient is here for management of multiple medical problems.     Chief Complaint   Patient presents with    Fall     Patient fell on [employer], head injury. 12 stitches, happened on 11/20/2024     History of Present Illness   Recent Cruise ship trip., pt fell. Multiple sutures.      Fell down stairs.     Hit head. And right side of face.           The following portions of the patient's history were reviewed and updated as appropriate: allergies, current medications, past family history, past medical history, past social history, past surgical history and problem list.    Review of Systems    Current Outpatient Medications:     amiodarone (PACERONE) 100 MG tablet, Take 1 tablet by mouth Daily., Disp: , Rfl:     aspirin 81 MG EC tablet, Take 1 tablet by mouth Daily., Disp: , Rfl:     brivaracetam (Briviact) 75 MG tablet, Take 1 tablet by mouth 2 (Two) Times a Day., Disp: 180 tablet, Rfl: 1    carvedilol (Coreg) 25 MG tablet, Take 1 tablet by mouth 2 (Two) Times a Day With Meals., Disp: 180 tablet, Rfl: 3    Cholecalciferol 25 MCG (1000 UT) tablet, Take 1 tablet by mouth Daily., Disp: , Rfl:     Continuous Glucose Sensor (Dexcom G6 Sensor), Use Every 10 (Ten) Days., Disp: 1 each, Rfl: 1    Continuous Glucose Transmitter (Dexcom G6 Transmitter) misc, Use 1 Device Daily., Disp: 1 each, Rfl: 1    Entresto  MG tablet, Take 1 tablet by mouth 2 (Two) Times a Day., Disp: 180 tablet, Rfl: 3    furosemide (Lasix) 40 MG tablet, LASIX 40 MG TABS, Disp: , Rfl:     levothyroxine (SYNTHROID, LEVOTHROID) 88 MCG tablet, TAKE 1 TABLET DAILY, Disp: 90 tablet, Rfl: 3    Plecanatide 3 MG tablet, Take 1 tablet by mouth Daily., Disp: 90 tablet, Rfl: 3    rosuvastatin (CRESTOR) 20 MG tablet, Take 1 tablet by mouth Every Night., Disp: , Rfl:     Semaglutide,0.25 or 0.5MG/DOS, (Ozempic, 0.25 or 0.5 MG/DOSE,) 2 MG/1.5ML solution pen-injector, Inject 1 mg under the skin into  "the appropriate area as directed Every 7 (Seven) Days., Disp: 6 mL, Rfl: 0    tamsulosin (FLOMAX) 0.4 MG capsule 24 hr capsule, Take 1 capsule by mouth Daily., Disp: 90 capsule, Rfl: 3    Xarelto 15 MG tablet, Take 1 tablet by mouth Daily With Dinner., Disp: 42 tablet, Rfl: 0    Objective      Blood pressure 144/84, pulse 75, temperature 96.9 °F (36.1 °C), height 193 cm (76\"), weight (!) 143 kg (315 lb), SpO2 97%.            Physical Exam     General Appearance:    Alert, cooperative, no distress, appears stated age   Head:    Normocephalic, without obvious abnormality, atraumatic   Eyes:    PERRL, conjunctiva/corneas clear, EOM's intact   Ears:    Normal TM's and external ear canals, both ears   Nose:   Nares normal, septum midline, mucosa normal, no drainage   or sinus tenderness   Throat:   Lips, mucosa, and tongue normal; teeth and gums normal   Neck:   Supple, symmetrical, trachea midline, no adenopathy;        thyroid:  No enlargement/tenderness/nodules; no carotid    bruit or JVD   Back:     Symmetric, no curvature, ROM normal, no CVA tenderness   Lungs:     Clear to auscultation bilaterally, respirations unlabored   Chest wall:    No tenderness or deformity   Heart:    Regular rate and rhythm, S1 and S2 normal, no murmur,        rub or gallop   Abdomen:     Soft, non-tender, bowel sounds active all four quadrants,     no masses, no organomegaly   Extremities:   Extremities normal, atraumatic, no cyanosis or edema   Pulses:   2+ and symmetric all extremities   Skin:   Skin color, texture, turgor normal, no rashes or lesions   Lymph nodes:   Cervical, supraclavicular, and axillary nodes normal   Neurologic:   CNII-XII intact. Normal strength, sensation and reflexes       throughout      Results for orders placed or performed in visit on 09/12/24   Lipoprotein A (LPA)    Collection Time: 09/12/24  2:08 PM    Specimen: Blood   Result Value Ref Range    Lipoprotein (a) <8.4 <75.0 nmol/L   PSA Diagnostic    " Collection Time: 09/12/24  2:08 PM    Specimen: Blood   Result Value Ref Range    PSA 4.010 (H) 0.000 - 4.000 ng/mL   Lipid Panel    Collection Time: 09/12/24  2:08 PM    Specimen: Blood   Result Value Ref Range    Total Cholesterol 150 0 - 200 mg/dL    Triglycerides 255 (H) 0 - 150 mg/dL    HDL Cholesterol 38 (L) 40 - 60 mg/dL    LDL Cholesterol  71 0 - 100 mg/dL    VLDL Cholesterol 41 (H) 5 - 40 mg/dL    LDL/HDL Ratio 1.61    Bilirubin, Direct    Collection Time: 09/12/24  2:08 PM    Specimen: Blood   Result Value Ref Range    Bilirubin, Direct 0.2 0.0 - 0.3 mg/dL     *Note: Due to a large number of results and/or encounters for the requested time period, some results have not been displayed. A complete set of results can be found in Results Review.         Assessment & Plan   Echimosisi face on right side.    Lac on head right partial area.     Sutures will remove on day 11.   Restart blood thinner.            Diagnoses and all orders for this visit:    1. Longstanding persistent atrial fibrillation (Primary)  -     Cancel: CT facial bones wo contrast; Future  -     Cancel: CT Head Without Contrast; Future  -     Cancel: CT cervical spine wo contrast; Future  -     CT cervical spine wo contrast; Future  -     CT facial bones wo contrast; Future  -     CT Head Without Contrast; Future    2. Fall, initial encounter  -     Cancel: CT facial bones wo contrast; Future  -     Cancel: CT Head Without Contrast; Future  -     Cancel: CT cervical spine wo contrast; Future  -     CT cervical spine wo contrast; Future  -     CT facial bones wo contrast; Future  -     CT Head Without Contrast; Future    3. Facial injury, initial encounter  -     Cancel: CT facial bones wo contrast; Future  -     Cancel: CT Head Without Contrast; Future  -     Cancel: CT cervical spine wo contrast; Future  -     CT cervical spine wo contrast; Future  -     CT facial bones wo contrast; Future  -     CT Head Without Contrast; Future    4.  Traumatic injury of head, initial encounter  -     Cancel: CT facial bones wo contrast; Future  -     Cancel: CT Head Without Contrast; Future  -     Cancel: CT cervical spine wo contrast; Future  -     CT cervical spine wo contrast; Future  -     CT facial bones wo contrast; Future  -     CT Head Without Contrast; Future    5. History of cervical spine trauma  -     Cancel: CT facial bones wo contrast; Future  -     Cancel: CT Head Without Contrast; Future  -     Cancel: CT cervical spine wo contrast; Future  -     CT cervical spine wo contrast; Future  -     CT facial bones wo contrast; Future  -     CT Head Without Contrast; Future      Return in about 8 days (around 12/3/2024).          There are no Patient Instructions on file for this visit.     Keven Bear MD    Assessment & Plan

## 2024-11-25 NOTE — PROGRESS NOTES
CT FACIAL BONES: Mild asymmetric right infraorbital/facial soft tissue contusion without displaced facial bone fracture. Negative for mandibular fracture or TMJ malalignment. 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 Near complete opacification of the left maxillary sinus with some intrinsic high density suggesting inspissated secretions versus fungal colonization. There is otherwise mild nonobstructive mucosal thickening in other compartments. Symmetric orbits   and extraocular muscles. Negative for retrobulbar hematoma.    CT cervical spine: Negative for displaced fracture or traumatic malalignment. Maintained atlantoaxial and craniocervical alignment. Negative for dens fracture. Multilevel degenerative disc disease, uncovertebral joint hypertrophy and facet arthropathy   without apparent high-grade central spinal canal stenosis. Varying degrees of neuroforaminal stenosis, moderate to severe on the left C3-C4, moderate left C4-C5 and moderate to severe right C6-C7. Minimal asymmetric soft tissue edema in the right   submandibular soft tissues and anterior aspect of the right neck without hematoma. Partially imaged lung apices show no pneumothorax. Carotid atherosclerotic disease.    IMPRESSION:  Impression:  1. Superficial soft tissue injury to the right parietal scalp, right face and right neck soft tissues including a small right scalp hematoma. Negative for acute intracranial hemorrhage or subjacent skull fracture.  2. Negative for displaced facial bone fracture. Left maxillary sinus disease.  3. Negative for displaced fracture or traumatic malalignment of the cervical spine. Multilevel cervical spondylosis without high-grade central spinal canal stenosis.  4. Other chronic/ancillary findings as above.        Electronically Signed: Arnel Barrett MD    11/25/2024 2:36 PM EST    Workstation ID: [id number]      Exam Ended: 11/25/24 14:16  EST    disease.    IMPRESSION:  Impression:  1. Superficial soft tissue injury to the right parietal scalp, right face and right neck soft tissues including a small right scalp hematoma. Negative for acute intracranial hemorrhage or subjacent skull fracture.  2. Negative for displaced facial bone fracture. Left maxillary sinus disease.  3. Negative for displaced fracture or traumatic malalignment of the cervical spine. Multilevel cervical spondylosis without high-grade central spinal canal stenosis.  4. Other chronic/ancillary findings as above.

## 2024-11-25 NOTE — PROGRESS NOTES
CT FACIAL BONES: Mild asymmetric right infraorbital/facial soft tissue contusion without displaced facial bone fracture. Negative for mandibular fracture or TMJ malalignment. 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 Near complete opacification of the left maxillary sinus with some intrinsic high density suggesting inspissated secretions versus fungal colonization. There is otherwise mild nonobstructive mucosal thickening in other compartments. Symmetric orbits   and extraocular muscles. Negative for retrobulbar hematoma.      Not sure about this. May need a ENT.  Will set up with ent.

## 2024-11-25 NOTE — PROGRESS NOTES
Findings:  CT head: Asymmetric enlargement and edema involving right temporalis muscle with small hyperdense areas consistent with soft tissue hematoma measuring up to 1.8 x 0.8 cm along the parietal scalp example image 25 series 2. Negative for acute intracranial   hemorrhage, large territory infarct, large mass lesion, midline shift or hydrocephalus. Mild parenchymal volume loss for age stable from 2019. Minimal periventricular hypodensity suggesting chronic microvascular ischemic change stable from 2019. Distal   vertebral artery and carotid atherosclerotic disease. No large mastoid effusion. Negative for calvarial fracture. Soft tissue scarring at left scalp vertex.    CT FACIAL BONES: Mild asymmetric right infraorbital/facial soft tissue contusion without displaced facial bone fracture. Negative for mandibular fracture or TMJ malalignment. 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 Near complete opacification of the left maxillary sinus with some intrinsic high density suggesting inspissated secretions versus fungal colonization. There is otherwise mild nonobstructive mucosal thickening in other compartments. Symmetric orbits   and extraocular muscles. Negative for retrobulbar hematoma.    CT cervical spine: Negative for displaced fracture or traumatic malalignment. Maintained atlantoaxial and craniocervical alignment. Negative for dens fracture. Multilevel degenerative disc disease, uncovertebral joint hypertrophy and facet arthropathy   without apparent high-grade central spinal canal stenosis. Varying degrees of neuroforaminal stenosis, moderate to severe on the left C3-C4, moderate left C4-C5 and moderate to severe right C6-C7. Minimal asymmetric soft tissue edema in the right   submandibular soft tissues and anterior aspect of the right neck without hematoma. Partially imaged lung apices show no pneumothorax. Carotid atherosclerotic  disease.    IMPRESSION:  Impression:  1. Superficial soft tissue injury to the right parietal scalp, right face and right neck soft tissues including a small right scalp hematoma. Negative for acute intracranial hemorrhage or subjacent skull fracture.  2. Negative for displaced facial bone fracture. Left maxillary sinus disease.  3. Negative for displaced fracture or traumatic malalignment of the cervical spine. Multilevel cervical spondylosis without high-grade central spinal canal stenosis.  4. Other chronic/ancillary findings as above.        Electronically Signed: Arnel Barrett MD    11/25/2024 2:36 PM EST    Workstation ID: [id number]      Exam Ended: 11/25/24 14:16 EST    May need to see a Neuro Surgeon for the stenosis.

## 2024-12-03 ENCOUNTER — OFFICE VISIT (OUTPATIENT)
Dept: INTERNAL MEDICINE | Facility: CLINIC | Age: 69
End: 2024-12-03
Payer: MEDICARE

## 2024-12-03 VITALS
HEART RATE: 78 BPM | TEMPERATURE: 97.3 F | DIASTOLIC BLOOD PRESSURE: 76 MMHG | WEIGHT: 315 LBS | SYSTOLIC BLOOD PRESSURE: 130 MMHG | HEIGHT: 76 IN | BODY MASS INDEX: 38.36 KG/M2 | OXYGEN SATURATION: 96 % | RESPIRATION RATE: 16 BRPM

## 2024-12-03 DIAGNOSIS — N18.32 STAGE 3B CHRONIC KIDNEY DISEASE: ICD-10-CM

## 2024-12-03 DIAGNOSIS — Z48.02 VISIT FOR SUTURE REMOVAL: ICD-10-CM
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
## 2024-12-03 DIAGNOSIS — Z12.5 ENCOUNTER FOR SCREENING FOR MALIGNANT NEOPLASM OF PROSTATE: ICD-10-CM

## 2024-12-03 DIAGNOSIS — N18.31 TYPE 2 DIABETES MELLITUS WITH STAGE 3A CHRONIC KIDNEY DISEASE, WITHOUT LONG-TERM CURRENT USE OF INSULIN: ICD-10-CM

## 2024-12-03 DIAGNOSIS — E11.22 TYPE 2 DIABETES MELLITUS WITH STAGE 3A CHRONIC KIDNEY DISEASE, WITHOUT LONG-TERM CURRENT USE OF INSULIN: ICD-10-CM

## 2024-12-03 NOTE — PROGRESS NOTES
Subjective     Patient ID: Anrde Agosto is a 69 y.o. male. Patient is here for management of multiple medical problems.     Chief Complaint   Patient presents with    Suture / Staple Removal    Fall     Patient saw Neuro Surgeon yesterday 12/02/2024 also states falls and eye issues are related to Brain stem and strokes.   Cardiologist and abbot have checked pace maker and states everything is normal.      History of Present Illness   No further falls.          The following portions of the patient's history were reviewed and updated as appropriate: allergies, current medications, past family history, past medical history, past social history, past surgical history and problem list.    Review of Systems    Current Outpatient Medications:     amiodarone (PACERONE) 100 MG tablet, Take 1 tablet by mouth Daily., Disp: , Rfl:     aspirin 81 MG EC tablet, Take 1 tablet by mouth Daily., Disp: , Rfl:     brivaracetam (Briviact) 75 MG tablet, Take 1 tablet by mouth 2 (Two) Times a Day., Disp: 180 tablet, Rfl: 1    carvedilol (Coreg) 25 MG tablet, Take 1 tablet by mouth 2 (Two) Times a Day With Meals., Disp: 180 tablet, Rfl: 3    Cholecalciferol 25 MCG (1000 UT) tablet, Take 1 tablet by mouth Daily., Disp: , Rfl:     Continuous Glucose Sensor (Dexcom G6 Sensor), Use Every 10 (Ten) Days., Disp: 1 each, Rfl: 1    Continuous Glucose Transmitter (Dexcom G6 Transmitter) misc, Use 1 Device Daily., Disp: 1 each, Rfl: 1    Entresto  MG tablet, Take 1 tablet by mouth 2 (Two) Times a Day., Disp: 180 tablet, Rfl: 3    furosemide (Lasix) 40 MG tablet, LASIX 40 MG TABS, Disp: , Rfl:     levothyroxine (SYNTHROID, LEVOTHROID) 88 MCG tablet, TAKE 1 TABLET DAILY, Disp: 90 tablet, Rfl: 3    Plecanatide 3 MG tablet, Take 1 tablet by mouth Daily., Disp: 90 tablet, Rfl: 3    rosuvastatin (CRESTOR) 20 MG tablet, Take 1 tablet by mouth Every Night., Disp: , Rfl:     Semaglutide,0.25 or 0.5MG/DOS, (Ozempic, 0.25 or 0.5 MG/DOSE,) 2 MG/1.5ML  "solution pen-injector, Inject 1 mg under the skin into the appropriate area as directed Every 7 (Seven) Days., Disp: 6 mL, Rfl: 0    tamsulosin (FLOMAX) 0.4 MG capsule 24 hr capsule, Take 1 capsule by mouth Daily., Disp: 90 capsule, Rfl: 3    Xarelto 15 MG tablet, Take 1 tablet by mouth Daily With Dinner., Disp: 42 tablet, Rfl: 0    Objective      Blood pressure 130/76, pulse 78, temperature 97.3 °F (36.3 °C), resp. rate 16, height 193 cm (76\"), weight (!) 144 kg (317 lb), SpO2 96%.            Physical Exam     General Appearance:    Alert, cooperative, no distress, appears stated age   Head:    Normocephalic, without obvious abnormality, atraumatic   Eyes:    PERRL, conjunctiva/corneas clear, EOM's intact   Ears:    Normal TM's and external ear canals, both ears   Nose:   Nares normal, septum midline, mucosa normal, no drainage   or sinus tenderness   Throat:   Lips, mucosa, and tongue normal; teeth and gums normal   Neck:   Supple, symmetrical, trachea midline, no adenopathy;        thyroid:  No enlargement/tenderness/nodules; no carotid    bruit or JVD   Back:     Symmetric, no curvature, ROM normal, no CVA tenderness   Lungs:     Clear to auscultation bilaterally, respirations unlabored   Chest wall:    No tenderness or deformity   Heart:    Regular rate and rhythm, S1 and S2 normal, no murmur,        rub or gallop   Abdomen:     Soft, non-tender, bowel sounds active all four quadrants,     no masses, no organomegaly   Extremities:   Extremities normal, atraumatic, no cyanosis or edema   Pulses:   2+ and symmetric all extremities   Skin:   Skin color, texture, turgor normal, no rashes or lesions   Lymph nodes:   Cervical, supraclavicular, and axillary nodes normal   Neurologic:   CNII-XII intact. Normal strength, sensation and reflexes       throughout      Results for orders placed or performed in visit on 09/12/24   Lipoprotein A (LPA)    Collection Time: 09/12/24  2:08 PM    Specimen: Blood   Result Value Ref " Range    Lipoprotein (a) <8.4 <75.0 nmol/L   PSA Diagnostic    Collection Time: 09/12/24  2:08 PM    Specimen: Blood   Result Value Ref Range    PSA 4.010 (H) 0.000 - 4.000 ng/mL   Lipid Panel    Collection Time: 09/12/24  2:08 PM    Specimen: Blood   Result Value Ref Range    Total Cholesterol 150 0 - 200 mg/dL    Triglycerides 255 (H) 0 - 150 mg/dL    HDL Cholesterol 38 (L) 40 - 60 mg/dL    LDL Cholesterol  71 0 - 100 mg/dL    VLDL Cholesterol 41 (H) 5 - 40 mg/dL    LDL/HDL Ratio 1.61    Bilirubin, Direct    Collection Time: 09/12/24  2:08 PM    Specimen: Blood   Result Value Ref Range    Bilirubin, Direct 0.2 0.0 - 0.3 mg/dL     *Note: Due to a large number of results and/or encounters for the requested time period, some results have not been displayed. A complete set of results can be found in Results Review.         Assessment & Plan       Suture in head removed today.  Pt tolerated well. Wound healing with out complications.            Diagnoses and all orders for this visit:    1. Fall, subsequent encounter (Primary)  -     PSA Screen  -     Lipid Panel  -     CBC & Differential  -     Vitamin B12  -     Comprehensive Metabolic Panel  -     TSH  -     Hemoglobin A1c  -     MicroAlbumin, Urine, Random - Urine, Clean Catch    2. Visit for suture removal  -     PSA Screen  -     Lipid Panel  -     CBC & Differential  -     Vitamin B12  -     Comprehensive Metabolic Panel  -     TSH  -     Hemoglobin A1c  -     MicroAlbumin, Urine, Random - Urine, Clean Catch    3. Type 2 diabetes mellitus with stage 3a chronic kidney disease, without long-term current use of insulin  -     PSA Screen  -     Lipid Panel  -     CBC & Differential  -     Vitamin B12  -     Comprehensive Metabolic Panel  -     TSH  -     Hemoglobin A1c  -     MicroAlbumin, Urine, Random - Urine, Clean Catch    4. Stage 3b chronic kidney disease  -     PSA Screen  -     Lipid Panel  -     CBC & Differential  -     Vitamin B12  -     Comprehensive  Metabolic Panel  -     TSH  -     Hemoglobin A1c  -     MicroAlbumin, Urine, Random - Urine, Clean Catch    5. Encounter for screening for malignant neoplasm of prostate  -     PSA Screen      No follow-ups on file.          There are no Patient Instructions on file for this visit.     Keven Bear MD    Assessment & Plan       Answers submitted by the patient for this visit:  Primary Reason for Visit (Submitted on 12/3/2024)  What is the primary reason for your visit?: Problem Not Listed

## 2024-12-04 ENCOUNTER — TELEPHONE (OUTPATIENT)
Dept: NEUROLOGY | Facility: CLINIC | Age: 69
End: 2024-12-04
Payer: MEDICARE

## 2024-12-04 NOTE — TELEPHONE ENCOUNTER
Left message for patient to return call in regards to briviact adverse event report that we received in the mail.

## 2024-12-16 ENCOUNTER — TELEPHONE (OUTPATIENT)
Dept: NEUROLOGY | Facility: CLINIC | Age: 69
End: 2024-12-16

## 2024-12-16 NOTE — TELEPHONE ENCOUNTER
"  Caller: Andre Agosto \"Bernardo\"    Relationship to patient: Self    Best call back number: [phone]    Chief complaint: ALEX    Type of visit: FOLLOW UP    Requested date: ASAP     If rescheduling, when is the original appointment: 2/11/25     Additional notes: PATIENT STATES HE IS HAVING TROUBLE WITH HIS CPAP, HE IS NOT GETTING ENOUGH AIR. SPOKE WITH HIS DME COMPANY & THEY ADVISED HIS MACHINE IS OLDER AND HE WOULD LIKELY NEED A NEW ONE. WAS ADVISED TO GET IN ASAP WITH PROVIDER. PLEASE REVIEW & ADVISE, THANK YOU.       "

## 2024-12-17 ENCOUNTER — OFFICE VISIT (OUTPATIENT)
Dept: INTERNAL MEDICINE | Facility: CLINIC | Age: 69
End: 2024-12-17
Payer: MEDICARE

## 2024-12-17 VITALS
TEMPERATURE: 97 F | OXYGEN SATURATION: 95 % | SYSTOLIC BLOOD PRESSURE: 138 MMHG | WEIGHT: 315 LBS | HEART RATE: 75 BPM | DIASTOLIC BLOOD PRESSURE: 88 MMHG | RESPIRATION RATE: 16 BRPM | HEIGHT: 76 IN | BODY MASS INDEX: 38.36 KG/M2

## 2024-12-17 DIAGNOSIS — G40.109 LOCALIZATION-RELATED SYMPTOMATIC EPILEPSY AND EPILEPTIC SYNDROMES WITH SIMPLE PARTIAL SEIZURES, NOT INTRACTABLE, WITHOUT STATUS EPILEPTICUS: ICD-10-CM

## 2024-12-17 DIAGNOSIS — E66.09 OTHER OBESITY DUE TO EXCESS CALORIES: ICD-10-CM

## 2024-12-17 DIAGNOSIS — E11.29 TYPE 2 DIABETES MELLITUS WITH OTHER DIABETIC KIDNEY COMPLICATION, WITHOUT LONG-TERM CURRENT USE OF INSULIN: Primary | ICD-10-CM

## 2024-12-17 LAB
ALBUMIN SERPL-MCNC: 4.2 G/DL (ref 3.5–5.2)
ALBUMIN/GLOB SERPL: 1.4 G/DL
ALP SERPL-CCNC: 66 U/L (ref 39–117)
ALT SERPL-CCNC: 11 U/L (ref 1–41)
AST SERPL-CCNC: 13 U/L (ref 1–40)
BASOPHILS # BLD AUTO: 0.07 10*3/MM3 (ref 0–0.2)
BASOPHILS NFR BLD AUTO: 1 % (ref 0–1.5)
BILIRUB SERPL-MCNC: 0.9 MG/DL (ref 0–1.2)
BUN SERPL-MCNC: 14 MG/DL (ref 8–23)
BUN/CREAT SERPL: 9 (ref 7–25)
CALCIUM SERPL-MCNC: 9.2 MG/DL (ref 8.6–10.5)
CHLORIDE SERPL-SCNC: 103 MMOL/L (ref 98–107)
CHOLEST SERPL-MCNC: 101 MG/DL (ref 0–200)
CO2 SERPL-SCNC: 29.3 MMOL/L (ref 22–29)
CREAT SERPL-MCNC: 1.55 MG/DL (ref 0.76–1.27)
EGFRCR SERPLBLD CKD-EPI 2021: 48.5 ML/MIN/1.73
EOSINOPHIL # BLD AUTO: 0.15 10*3/MM3 (ref 0–0.4)
EOSINOPHIL NFR BLD AUTO: 2.1 % (ref 0.3–6.2)
ERYTHROCYTE [DISTWIDTH] IN BLOOD BY AUTOMATED COUNT: 13.3 % (ref 12.3–15.4)
GLOBULIN SER CALC-MCNC: 3 GM/DL
GLUCOSE SERPL-MCNC: 115 MG/DL (ref 65–99)
HBA1C MFR BLD: 6 % (ref 4.8–5.6)
HCT VFR BLD AUTO: 48.8 % (ref 37.5–51)
HDLC SERPL-MCNC: 38 MG/DL (ref 40–60)
HGB BLD-MCNC: 16.4 G/DL (ref 13–17.7)
IMM GRANULOCYTES # BLD AUTO: 0.02 10*3/MM3 (ref 0–0.05)
IMM GRANULOCYTES NFR BLD AUTO: 0.3 % (ref 0–0.5)
LDLC SERPL CALC-MCNC: 36 MG/DL (ref 0–100)
LYMPHOCYTES # BLD AUTO: 1.81 10*3/MM3 (ref 0.7–3.1)
LYMPHOCYTES NFR BLD AUTO: 24.8 % (ref 19.6–45.3)
MCH RBC QN AUTO: 30.7 PG (ref 26.6–33)
MCHC RBC AUTO-ENTMCNC: 33.6 G/DL (ref 31.5–35.7)
MCV RBC AUTO: 91.2 FL (ref 79–97)
MICROALBUMIN UR-MCNC: 1930.1 UG/ML
MONOCYTES # BLD AUTO: 0.4 10*3/MM3 (ref 0.1–0.9)
MONOCYTES NFR BLD AUTO: 5.5 % (ref 5–12)
NEUTROPHILS # BLD AUTO: 4.85 10*3/MM3 (ref 1.7–7)
NEUTROPHILS NFR BLD AUTO: 66.3 % (ref 42.7–76)
NRBC BLD AUTO-RTO: 0 /100 WBC (ref 0–0.2)
PLATELET # BLD AUTO: 189 10*3/MM3 (ref 140–450)
POTASSIUM SERPL-SCNC: 3.8 MMOL/L (ref 3.5–5.2)
PROT SERPL-MCNC: 7.2 G/DL (ref 6–8.5)
PSA SERPL-MCNC: 4.33 NG/ML (ref 0–4)
RBC # BLD AUTO: 5.35 10*6/MM3 (ref 4.14–5.8)
SODIUM SERPL-SCNC: 143 MMOL/L (ref 136–145)
TRIGL SERPL-MCNC: 162 MG/DL (ref 0–150)
TSH SERPL DL<=0.005 MIU/L-ACNC: 1.82 UIU/ML (ref 0.27–4.2)
VIT B12 SERPL-MCNC: 341 PG/ML (ref 211–946)
VLDLC SERPL CALC-MCNC: 27 MG/DL (ref 5–40)
WBC # BLD AUTO: 7.3 10*3/MM3 (ref 3.4–10.8)

## 2024-12-17 PROCEDURE — G2211 COMPLEX E/M VISIT ADD ON: HCPCS | Performed by: INTERNAL MEDICINE

## 2024-12-17 PROCEDURE — 3044F HG A1C LEVEL LT 7.0%: CPT | Performed by: INTERNAL MEDICINE

## 2024-12-17 PROCEDURE — 1126F AMNT PAIN NOTED NONE PRSNT: CPT | Performed by: INTERNAL MEDICINE

## 2024-12-17 PROCEDURE — 99214 OFFICE O/P EST MOD 30 MIN: CPT | Performed by: INTERNAL MEDICINE

## 2024-12-17 PROCEDURE — 3075F SYST BP GE 130 - 139MM HG: CPT | Performed by: INTERNAL MEDICINE

## 2024-12-17 PROCEDURE — 3079F DIAST BP 80-89 MM HG: CPT | Performed by: INTERNAL MEDICINE

## 2024-12-17 RX ORDER — SEMAGLUTIDE 1.34 MG/ML
1 INJECTION, SOLUTION SUBCUTANEOUS
Qty: 9 ML | Refills: 0 | COMMUNITY
Start: 2024-12-17

## 2024-12-17 NOTE — PROGRESS NOTES
Subjective     Patient ID: Andre Agosto is a 69 y.o. male. Patient is here for management of multiple medical problems.     Chief Complaint   Patient presents with    Back Pain    Shortness of Breath     History of Present Illness   Back pain and soa.    Wt up a bit more. On our scale.        The following portions of the patient's history were reviewed and updated as appropriate: allergies, current medications, past family history, past medical history, past social history, past surgical history and problem list.    Review of Systems    Current Outpatient Medications:     amiodarone (PACERONE) 100 MG tablet, Take 1 tablet by mouth Daily., Disp: , Rfl:     aspirin 81 MG EC tablet, Take 1 tablet by mouth Daily., Disp: , Rfl:     brivaracetam (Briviact) 75 MG tablet, Take 1 tablet by mouth 2 (Two) Times a Day., Disp: 180 tablet, Rfl: 1    carvedilol (Coreg) 25 MG tablet, Take 1 tablet by mouth 2 (Two) Times a Day With Meals., Disp: 180 tablet, Rfl: 3    Cholecalciferol 25 MCG (1000 UT) tablet, Take 1 tablet by mouth Daily., Disp: , Rfl:     Continuous Glucose Sensor (Dexcom G6 Sensor), Use Every 10 (Ten) Days., Disp: 1 each, Rfl: 1    Continuous Glucose Transmitter (Dexcom G6 Transmitter) misc, Use 1 Device Daily., Disp: 1 each, Rfl: 1    Entresto  MG tablet, Take 1 tablet by mouth 2 (Two) Times a Day., Disp: 180 tablet, Rfl: 3    furosemide (Lasix) 40 MG tablet, LASIX 40 MG TABS, Disp: , Rfl:     levothyroxine (SYNTHROID, LEVOTHROID) 88 MCG tablet, TAKE 1 TABLET DAILY, Disp: 90 tablet, Rfl: 3    Plecanatide 3 MG tablet, Take 1 tablet by mouth Daily., Disp: 90 tablet, Rfl: 3    rosuvastatin (CRESTOR) 20 MG tablet, Take 1 tablet by mouth Every Night., Disp: , Rfl:     Semaglutide,0.25 or 0.5MG/DOS, (Ozempic, 0.25 or 0.5 MG/DOSE,) 2 MG/1.5ML solution pen-injector, Inject 1 mg under the skin into the appropriate area as directed Every 7 (Seven) Days., Disp: 9 mL, Rfl: 0    tamsulosin (FLOMAX) 0.4 MG capsule 24  "hr capsule, Take 1 capsule by mouth Daily., Disp: 90 capsule, Rfl: 3    Xarelto 15 MG tablet, Take 1 tablet by mouth Daily With Dinner., Disp: 42 tablet, Rfl: 0    Objective      Blood pressure 138/88, pulse 75, temperature 97 °F (36.1 °C), resp. rate 16, height 193 cm (76\"), weight (!) 144 kg (318 lb), SpO2 95%.            Physical Exam     General Appearance:    Alert, cooperative, no distress, appears stated age   Head:    Normocephalic, without obvious abnormality, atraumatic   Eyes:    PERRL, conjunctiva/corneas clear, EOM's intact   Ears:    Normal TM's and external ear canals, both ears   Nose:   Nares normal, septum midline, mucosa normal, no drainage   or sinus tenderness   Throat:   Lips, mucosa, and tongue normal; teeth and gums normal   Neck:   Supple, symmetrical, trachea midline, no adenopathy;        thyroid:  No enlargement/tenderness/nodules; no carotid    bruit or JVD   Back:     Symmetric, no curvature, ROM normal, no CVA tenderness   Lungs:     Clear to auscultation bilaterally, respirations unlabored   Chest wall:    No tenderness or deformity   Heart:    Regular rate and rhythm, S1 and S2 normal, no murmur,        rub or gallop   Abdomen:     Soft, non-tender, bowel sounds active all four quadrants,     no masses, no organomegaly   Extremities:   Extremities normal, atraumatic, no cyanosis or edema   Pulses:   2+ and symmetric all extremities   Skin:   Skin color, texture, turgor normal, no rashes or lesions   Lymph nodes:   Cervical, supraclavicular, and axillary nodes normal   Neurologic:   CNII-XII intact. Normal strength, sensation and reflexes       throughout      Results for orders placed or performed in visit on 12/03/24   PSA Screen    Collection Time: 12/16/24 10:39 AM    Specimen: Blood   Result Value Ref Range    PSA 4.330 (H) 0.000 - 4.000 ng/mL   Lipid Panel    Collection Time: 12/16/24 10:39 AM    Specimen: Blood   Result Value Ref Range    Total Cholesterol 101 0 - 200 mg/dL    " Triglycerides 162 (H) 0 - 150 mg/dL    HDL Cholesterol 38 (L) 40 - 60 mg/dL    VLDL Cholesterol Jimmy 27 5 - 40 mg/dL    LDL Chol Calc (NIH) 36 0 - 100 mg/dL   Vitamin B12    Collection Time: 12/16/24 10:39 AM    Specimen: Blood   Result Value Ref Range    Vitamin B-12 341 211 - 946 pg/mL   Comprehensive Metabolic Panel    Collection Time: 12/16/24 10:39 AM    Specimen: Blood   Result Value Ref Range    Glucose 115 (H) 65 - 99 mg/dL    BUN 14 8 - 23 mg/dL    Creatinine 1.55 (H) 0.76 - 1.27 mg/dL    EGFR Result 48.5 (L) >60.0 mL/min/1.73    BUN/Creatinine Ratio 9.0 7.0 - 25.0    Sodium 143 136 - 145 mmol/L    Potassium 3.8 3.5 - 5.2 mmol/L    Chloride 103 98 - 107 mmol/L    Total CO2 29.3 (H) 22.0 - 29.0 mmol/L    Calcium 9.2 8.6 - 10.5 mg/dL    Total Protein 7.2 6.0 - 8.5 g/dL    Albumin 4.2 3.5 - 5.2 g/dL    Globulin 3.0 gm/dL    A/G Ratio 1.4 g/dL    Total Bilirubin 0.9 0.0 - 1.2 mg/dL    Alkaline Phosphatase 66 39 - 117 U/L    AST (SGOT) 13 1 - 40 U/L    ALT (SGPT) 11 1 - 41 U/L   TSH    Collection Time: 12/16/24 10:39 AM    Specimen: Blood   Result Value Ref Range    TSH 1.820 0.270 - 4.200 uIU/mL   Hemoglobin A1c    Collection Time: 12/16/24 10:39 AM    Specimen: Blood   Result Value Ref Range    Hemoglobin A1C 6.00 (H) 4.80 - 5.60 %   MicroAlbumin, Urine, Random - Urine, Clean Catch    Collection Time: 12/16/24 10:39 AM    Specimen: Urine, Clean Catch   Result Value Ref Range    Microalbumin, Urine 1,930.1 Not Estab. ug/mL   CBC & Differential    Collection Time: 12/16/24 10:39 AM    Specimen: Blood   Result Value Ref Range    WBC 7.30 3.40 - 10.80 10*3/mm3    RBC 5.35 4.14 - 5.80 10*6/mm3    Hemoglobin 16.4 13.0 - 17.7 g/dL    Hematocrit 48.8 37.5 - 51.0 %    MCV 91.2 79.0 - 97.0 fL    MCH 30.7 26.6 - 33.0 pg    MCHC 33.6 31.5 - 35.7 g/dL    RDW 13.3 12.3 - 15.4 %    Platelets 189 140 - 450 10*3/mm3    Neutrophil Rel % 66.3 42.7 - 76.0 %    Lymphocyte Rel % 24.8 19.6 - 45.3 %    Monocyte Rel % 5.5 5.0 - 12.0 %     Eosinophil Rel % 2.1 0.3 - 6.2 %    Basophil Rel % 1.0 0.0 - 1.5 %    Neutrophils Absolute 4.85 1.70 - 7.00 10*3/mm3    Lymphocytes Absolute 1.81 0.70 - 3.10 10*3/mm3    Monocytes Absolute 0.40 0.10 - 0.90 10*3/mm3    Eosinophils Absolute 0.15 0.00 - 0.40 10*3/mm3    Basophils Absolute 0.07 0.00 - 0.20 10*3/mm3    Immature Granulocyte Rel % 0.3 0.0 - 0.5 %    Immature Grans Absolute 0.02 0.00 - 0.05 10*3/mm3    nRBC 0.0 0.0 - 0.2 /100 WBC     *Note: Due to a large number of results and/or encounters for the requested time period, some results have not been displayed. A complete set of results can be found in Results Review.         Assessment & Plan   On ozempic wt 315 -->310 on his scale.   Wants to do monjoro in Jan.     Last sweet tea in November.  Still with food craving at night    Diet changes not going well. Eats out a lot.               Diagnoses and all orders for this visit:    1. Type 2 diabetes mellitus with other diabetic kidney complication, without long-term current use of insulin (Primary)  -     Lipid Panel  -     CBC & Differential  -     Vitamin B12  -     Comprehensive Metabolic Panel  -     TSH  -     Hemoglobin A1c    2. Other obesity due to excess calories  -     Lipid Panel  -     CBC & Differential  -     Vitamin B12  -     Comprehensive Metabolic Panel  -     TSH  -     Hemoglobin A1c    Other orders  -     Semaglutide,0.25 or 0.5MG/DOS, (Ozempic, 0.25 or 0.5 MG/DOSE,) 2 MG/1.5ML solution pen-injector; Inject 1 mg under the skin into the appropriate area as directed Every 7 (Seven) Days.  Dispense: 9 mL; Refill: 0      Return in about 6 weeks (around 1/28/2025).          There are no Patient Instructions on file for this visit.     Keven Bear MD    Assessment & Plan

## 2024-12-17 NOTE — TELEPHONE ENCOUNTER
WHEN CALLING TO RESCHEDULE PATIENT, HE HAD REQUESTED WE SEND IN [facility] AND NEEDS TO GO THROUGH THE PROGRAM HE IS SIGNED UP FOR.

## 2024-12-23 ENCOUNTER — OFFICE VISIT (OUTPATIENT)
Dept: NEUROLOGY | Facility: CLINIC | Age: 69
End: 2024-12-23
Payer: MEDICARE

## 2024-12-23 VITALS
BODY MASS INDEX: 38.36 KG/M2 | HEART RATE: 91 BPM | WEIGHT: 315 LBS | HEIGHT: 76 IN | DIASTOLIC BLOOD PRESSURE: 88 MMHG | TEMPERATURE: 96.9 F | SYSTOLIC BLOOD PRESSURE: 138 MMHG | OXYGEN SATURATION: 96 % | RESPIRATION RATE: 18 BRPM

## 2024-12-23 DIAGNOSIS — G47.33 OBSTRUCTIVE SLEEP APNEA: Primary | ICD-10-CM

## 2024-12-23 PROCEDURE — 3075F SYST BP GE 130 - 139MM HG: CPT | Performed by: NURSE PRACTITIONER

## 2024-12-23 PROCEDURE — 3079F DIAST BP 80-89 MM HG: CPT | Performed by: NURSE PRACTITIONER

## 2024-12-23 PROCEDURE — 99213 OFFICE O/P EST LOW 20 MIN: CPT | Performed by: NURSE PRACTITIONER

## 2024-12-23 PROCEDURE — 1160F RVW MEDS BY RX/DR IN RCRD: CPT | Performed by: NURSE PRACTITIONER

## 2024-12-23 PROCEDURE — 1159F MED LIST DOCD IN RCRD: CPT | Performed by: NURSE PRACTITIONER

## 2024-12-23 RX ORDER — SPIRONOLACTONE 50 MG/1
50 TABLET, FILM COATED ORAL DAILY
COMMUNITY

## 2024-12-23 NOTE — PROGRESS NOTES
Follow up Sleep Patient Office Visit      Patient Name: Andre Agosto  : 1955   MRN: [id number]     Referring Physician: No ref. provider found    Chief Complaint:    Chief Complaint   Patient presents with    Follow-up     CPAP machine issues        History of Present Illness: Andre Agosto is a 69 y.o. male who is here today to follow up with ALEX.  Currently on Vauto Bipap 15/8cm, PS 4cm, compliance 100%, AHI 5/hour.  He says he is having difficulty with tolerating his pressures, at times.  He feels he isn't getting enough air at times.  He also has some issues with his mask fit and only getting headgear once every 6 months.  His AHI was 1/hour in 2024.   *DME company- [employer]  *Mask- Full face    Pertinent Medical History:   Current compliance report reviewed and has been scanned into the patient's medical record.    Subjective      Review of Systems:   Review of Systems    Medications:     Current Outpatient Medications:     amiodarone (PACERONE) 100 MG tablet, Take 1 tablet by mouth Daily., Disp: , Rfl:     aspirin 81 MG EC tablet, Take 1 tablet by mouth Daily., Disp: , Rfl:     brivaracetam (Briviact) 75 MG tablet, Take 1 tablet by mouth 2 (Two) Times a Day., Disp: 180 tablet, Rfl: 1    carvedilol (Coreg) 25 MG tablet, Take 1 tablet by mouth 2 (Two) Times a Day With Meals., Disp: 180 tablet, Rfl: 3    Entresto  MG tablet, Take 1 tablet by mouth 2 (Two) Times a Day., Disp: 180 tablet, Rfl: 3    furosemide (Lasix) 40 MG tablet, LASIX 40 MG TABS, Disp: , Rfl:     levothyroxine (SYNTHROID, LEVOTHROID) 88 MCG tablet, TAKE 1 TABLET DAILY, Disp: 90 tablet, Rfl: 3    rosuvastatin (CRESTOR) 20 MG tablet, Take 1 tablet by mouth Every Night., Disp: , Rfl:     Semaglutide,0.25 or 0.5MG/DOS, (Ozempic, 0.25 or 0.5 MG/DOSE,) 2 MG/1.5ML solution pen-injector, Inject 1 mg under the skin into the appropriate area as directed Every 7 (Seven) Days., Disp: 9 mL, Rfl: 0    spironolactone (ALDACTONE)  "50 MG tablet, Take 1 tablet by mouth Daily., Disp: , Rfl:     tamsulosin (FLOMAX) 0.4 MG capsule 24 hr capsule, Take 1 capsule by mouth Daily., Disp: 90 capsule, Rfl: 3    Xarelto 15 MG tablet, Take 1 tablet by mouth Daily With Dinner., Disp: 42 tablet, Rfl: 0    Allergies:   Allergies   Allergen Reactions    5-Alpha Reductase Inhibitors Other (See Comments)     Myalgias    Amlodipine Swelling and Other (See Comments)     Feet swell    Statins Myalgia and Other (See Comments)       Objective     Physical Exam:  Vital Signs:   Vitals:    12/23/24 1104   BP: 138/88   BP Location: Left arm   Patient Position: Sitting   Cuff Size: Adult   Pulse: 91   Resp: 18   Temp: 96.9 °F (36.1 °C)   TempSrc: Infrared   SpO2: 96%   Weight: (!) 143 kg (315 lb 6.4 oz)   Height: 193 cm (75.98\")   PainSc: 0-No pain     BMI: Body mass index is 38.41 kg/m².    Physical Exam  Vitals and nursing note reviewed.   Constitutional:       General: He is not in acute distress.     Appearance: Normal appearance. He is well-developed. He is obese. He is not diaphoretic.   HENT:      Head: Normocephalic and atraumatic.   Eyes:      Extraocular Movements: Extraocular movements intact.      Conjunctiva/sclera: Conjunctivae normal.   Pulmonary:      Effort: Pulmonary effort is normal. No respiratory distress.   Musculoskeletal:         General: Normal range of motion.   Skin:     General: Skin is warm and dry.   Neurological:      Mental Status: He is alert and oriented to person, place, and time.   Psychiatric:         Mood and Affect: Mood normal.         Behavior: Behavior normal.         Thought Content: Thought content normal.         Judgment: Judgment normal.         Assessment / Plan      Assessment/Plan:   Diagnoses and all orders for this visit:    1. Obstructive sleep apnea (Primary)    *Order to increase Max IPAP to 20cm sent to [facility]  Will see how he tolerates this. If he does not tolerate it well, will consider an in-lab " titration study.     Follow Up:   Return in about 6 months (around 6/23/2025) for F/U Obstructive Sleep Apnea.    *Order for PAP supplies sent to patient's DME company.     I have advised the patient the need to continue the use of CPAP.  Gold standard for treatment of sleep apnea includes weight loss, use of cpap and avoidance of alcohol.  Encouraged weight loss (if applicable) with a BMI goal of 24.  Untreated ALEX may increase the risk for development of hypertension, stroke, myocardial infarction, diabetes, cardiovascular disease, work-related issues and driving accidents. I have counseled and advised the patient to avoid driving or operating heavy/dangerous equipment if feeling drowsy.     YESY Maradiaga, FNP-C  [facility]

## 2025-01-14 ENCOUNTER — PATIENT MESSAGE (OUTPATIENT)
Dept: INTERNAL MEDICINE | Facility: CLINIC | Age: 70
End: 2025-01-14
Payer: MEDICARE

## 2025-01-15 ENCOUNTER — TELEPHONE (OUTPATIENT)
Dept: UROLOGY | Facility: CLINIC | Age: 70
End: 2025-01-15

## 2025-01-15 DIAGNOSIS — R31.1 BENIGN ESSENTIAL MICROSCOPIC HEMATURIA: Primary | ICD-10-CM

## 2025-01-15 NOTE — TELEPHONE ENCOUNTER
"       Hub staff attempted to follow warm transfer process and was unsuccessful     Caller: Andre Agosto [facility] \"Bernardo\"     Relationship to patient: SELF    Best call back number: [phone]     Patient is needing: PT IS SCHEDULED FOR A CT ON 2-3-25 AND HAS A FOLLOW UP APPT W/DR MAHONEY ON 2-10-25.    PT STATES HE STARTED BLEEDING YESTERDAY AND FEELS IT'S WORSENING AND HE NEEDS TO BE SEEN SOONER. THE BLOOD IS BROWNISH, IT STARTS IN THE MORNING BUT CLEARS UP BY AFTERNOON.    PLEASE GIVE PT A CALL BACK TO DISCUSS.         "

## 2025-01-16 ENCOUNTER — OFFICE VISIT (OUTPATIENT)
Dept: INTERNAL MEDICINE | Facility: CLINIC | Age: 70
End: 2025-01-16
Payer: MEDICARE

## 2025-01-16 ENCOUNTER — HOSPITAL ENCOUNTER (OUTPATIENT)
Dept: CT IMAGING | Facility: HOSPITAL | Age: 70
Discharge: HOME | End: 2025-01-16
Admitting: STUDENT IN AN ORGANIZED HEALTH CARE EDUCATION/TRAINING PROGRAM
Payer: MEDICARE

## 2025-01-16 VITALS
OXYGEN SATURATION: 96 % | DIASTOLIC BLOOD PRESSURE: 68 MMHG | HEART RATE: 79 BPM | TEMPERATURE: 96.1 F | HEIGHT: 76 IN | BODY MASS INDEX: 37.63 KG/M2 | SYSTOLIC BLOOD PRESSURE: 102 MMHG | WEIGHT: 309 LBS

## 2025-01-16 DIAGNOSIS — N39.0 URINARY TRACT INFECTION WITH HEMATURIA, SITE UNSPECIFIED: ICD-10-CM

## 2025-01-16 DIAGNOSIS — Z87.442 HISTORY OF KIDNEY STONES: ICD-10-CM

## 2025-01-16 DIAGNOSIS — R31.0 GROSS HEMATURIA: ICD-10-CM

## 2025-01-16 DIAGNOSIS — R31.0 GROSS HEMATURIA: Primary | ICD-10-CM

## 2025-01-16 DIAGNOSIS — N30.00 ACUTE CYSTITIS WITHOUT HEMATURIA: Primary | ICD-10-CM

## 2025-01-16 DIAGNOSIS — R31.9 URINARY TRACT INFECTION WITH HEMATURIA, SITE UNSPECIFIED: ICD-10-CM

## 2025-01-16 DIAGNOSIS — R31.1 BENIGN ESSENTIAL MICROSCOPIC HEMATURIA: Primary | ICD-10-CM

## 2025-01-16 DIAGNOSIS — R31.29 OTHER MICROSCOPIC HEMATURIA: ICD-10-CM

## 2025-01-16 DIAGNOSIS — N28.9 RENAL INSUFFICIENCY: ICD-10-CM

## 2025-01-16 LAB
ALBUMIN SERPL-MCNC: 4.3 G/DL (ref 3.5–5.2)
ALBUMIN/GLOB SERPL: 1.3 G/DL
ALP SERPL-CCNC: 66 U/L (ref 39–117)
ALT SERPL-CCNC: 10 U/L (ref 1–41)
APPEARANCE UR: ABNORMAL
AST SERPL-CCNC: 13 U/L (ref 1–40)
BACTERIA #/AREA URNS HPF: ABNORMAL /HPF
BASOPHILS # BLD AUTO: 0.04 10*3/MM3 (ref 0–0.2)
BASOPHILS NFR BLD AUTO: 0.6 % (ref 0–1.5)
BILIRUB SERPL-MCNC: 0.8 MG/DL (ref 0–1.2)
BILIRUB UR QL STRIP: NEGATIVE
BUN SERPL-MCNC: 26 MG/DL (ref 8–23)
BUN/CREAT SERPL: 12.4 (ref 7–25)
CALCIUM SERPL-MCNC: 9.6 MG/DL (ref 8.6–10.5)
CASTS URNS MICRO: ABNORMAL
CHLORIDE SERPL-SCNC: 102 MMOL/L (ref 98–107)
CHOLEST SERPL-MCNC: 182 MG/DL (ref 0–200)
CO2 SERPL-SCNC: 27.5 MMOL/L (ref 22–29)
COLOR UR: ABNORMAL
CREAT SERPL-MCNC: 2.1 MG/DL (ref 0.76–1.27)
EGFRCR SERPLBLD CKD-EPI 2021: 33.4 ML/MIN/1.73
EOSINOPHIL # BLD AUTO: 0.14 10*3/MM3 (ref 0–0.4)
EOSINOPHIL NFR BLD AUTO: 2 % (ref 0.3–6.2)
EPI CELLS #/AREA URNS HPF: ABNORMAL /HPF
ERYTHROCYTE [DISTWIDTH] IN BLOOD BY AUTOMATED COUNT: 13.2 % (ref 12.3–15.4)
GLOBULIN SER CALC-MCNC: 3.2 GM/DL
GLUCOSE SERPL-MCNC: 101 MG/DL (ref 65–99)
GLUCOSE UR QL STRIP: NEGATIVE
HBA1C MFR BLD: 6.5 % (ref 4.8–5.6)
HCT VFR BLD AUTO: 50.2 % (ref 37.5–51)
HDLC SERPL-MCNC: 37 MG/DL (ref 40–60)
HGB BLD-MCNC: 17 G/DL (ref 13–17.7)
HGB UR QL STRIP: ABNORMAL
IMM GRANULOCYTES # BLD AUTO: 0.01 10*3/MM3 (ref 0–0.05)
IMM GRANULOCYTES NFR BLD AUTO: 0.1 % (ref 0–0.5)
KETONES UR QL STRIP: NEGATIVE
LDLC SERPL CALC-MCNC: 112 MG/DL (ref 0–100)
LEUKOCYTE ESTERASE UR QL STRIP: ABNORMAL
LYMPHOCYTES # BLD AUTO: 1.76 10*3/MM3 (ref 0.7–3.1)
LYMPHOCYTES NFR BLD AUTO: 24.9 % (ref 19.6–45.3)
MCH RBC QN AUTO: 30.2 PG (ref 26.6–33)
MCHC RBC AUTO-ENTMCNC: 33.9 G/DL (ref 31.5–35.7)
MCV RBC AUTO: 89.2 FL (ref 79–97)
MONOCYTES # BLD AUTO: 0.38 10*3/MM3 (ref 0.1–0.9)
MONOCYTES NFR BLD AUTO: 5.4 % (ref 5–12)
NEUTROPHILS # BLD AUTO: 4.73 10*3/MM3 (ref 1.7–7)
NEUTROPHILS NFR BLD AUTO: 67 % (ref 42.7–76)
NITRITE UR QL STRIP: NEGATIVE
NRBC BLD AUTO-RTO: 0 /100 WBC (ref 0–0.2)
PH UR STRIP: 6.5 [PH] (ref 5–8)
PLATELET # BLD AUTO: 204 10*3/MM3 (ref 140–450)
POTASSIUM SERPL-SCNC: 5.4 MMOL/L (ref 3.5–5.2)
PROT SERPL-MCNC: 7.5 G/DL (ref 6–8.5)
PROT UR QL STRIP: ABNORMAL
RBC # BLD AUTO: 5.63 10*6/MM3 (ref 4.14–5.8)
RBC #/AREA URNS HPF: ABNORMAL /HPF
SODIUM SERPL-SCNC: 139 MMOL/L (ref 136–145)
SP GR UR STRIP: 1.01 (ref 1–1.03)
TRIGL SERPL-MCNC: 187 MG/DL (ref 0–150)
TSH SERPL DL<=0.005 MIU/L-ACNC: 2.45 UIU/ML (ref 0.27–4.2)
UROBILINOGEN UR STRIP-MCNC: ABNORMAL MG/DL
VIT B12 SERPL-MCNC: 303 PG/ML (ref 211–946)
VLDLC SERPL CALC-MCNC: 33 MG/DL (ref 5–40)
WBC # BLD AUTO: 7.06 10*3/MM3 (ref 3.4–10.8)
WBC #/AREA URNS HPF: ABNORMAL /HPF

## 2025-01-16 PROCEDURE — 3044F HG A1C LEVEL LT 7.0%: CPT | Performed by: INTERNAL MEDICINE

## 2025-01-16 PROCEDURE — 1126F AMNT PAIN NOTED NONE PRSNT: CPT | Performed by: INTERNAL MEDICINE

## 2025-01-16 PROCEDURE — 3078F DIAST BP <80 MM HG: CPT | Performed by: INTERNAL MEDICINE

## 2025-01-16 PROCEDURE — 3074F SYST BP LT 130 MM HG: CPT | Performed by: INTERNAL MEDICINE

## 2025-01-16 PROCEDURE — 74176 CT ABD & PELVIS W/O CONTRAST: CPT

## 2025-01-16 PROCEDURE — G2211 COMPLEX E/M VISIT ADD ON: HCPCS | Performed by: INTERNAL MEDICINE

## 2025-01-16 PROCEDURE — 99214 OFFICE O/P EST MOD 30 MIN: CPT | Performed by: INTERNAL MEDICINE

## 2025-01-16 RX ORDER — SEMAGLUTIDE 1.34 MG/ML
1 INJECTION, SOLUTION SUBCUTANEOUS
Qty: 9 ML | Refills: 0 | COMMUNITY
Start: 2025-01-16 | End: 2025-01-16

## 2025-01-16 RX ORDER — NITROFURANTOIN 25; 75 MG/1; MG/1
100 CAPSULE ORAL 2 TIMES DAILY
Qty: 14 CAPSULE | Refills: 0 | Status: SHIPPED | OUTPATIENT
Start: 2025-01-16

## 2025-01-16 NOTE — PROGRESS NOTES
Subjective     Patient ID: Andre Agosto is a 70 y.o. male. Patient is here for management of multiple medical problems.         History of Present Illness     Entresto was decrease and spironolactone increase.   Potasium is high. Lasix 40mg a day        The following portions of the patient's history were reviewed and updated as appropriate: allergies, current medications, past family history, past medical history, past social history, past surgical history and problem list.    Review of Systems    Current Outpatient Medications:     amiodarone (PACERONE) 100 MG tablet, Take 1 tablet by mouth Daily., Disp: , Rfl:     aspirin 81 MG EC tablet, Take 1 tablet by mouth Daily., Disp: , Rfl:     brivaracetam (Briviact) 75 MG tablet, Take 1 tablet by mouth 2 (Two) Times a Day., Disp: 180 tablet, Rfl: 1    carvedilol (Coreg) 25 MG tablet, Take 1 tablet by mouth 2 (Two) Times a Day With Meals., Disp: 180 tablet, Rfl: 3    Entresto  MG tablet, Take 1 tablet by mouth 2 (Two) Times a Day. (Patient taking differently: Take 0.5 tablets by mouth 2 (Two) Times a Day.), Disp: 180 tablet, Rfl: 3    furosemide (Lasix) 40 MG tablet, LASIX 40 MG TABS, Disp: , Rfl:     levothyroxine (SYNTHROID, LEVOTHROID) 88 MCG tablet, TAKE 1 TABLET DAILY, Disp: 90 tablet, Rfl: 3    nitrofurantoin, macrocrystal-monohydrate, (Macrobid) 100 MG capsule, Take 1 capsule by mouth 2 (Two) Times a Day., Disp: 14 capsule, Rfl: 0    rosuvastatin (CRESTOR) 20 MG tablet, Take 1 tablet by mouth Every Night., Disp: , Rfl:     spironolactone (ALDACTONE) 50 MG tablet, Take 1 tablet by mouth Daily., Disp: , Rfl:     tamsulosin (FLOMAX) 0.4 MG capsule 24 hr capsule, Take 1 capsule by mouth Daily., Disp: 90 capsule, Rfl: 3    Xarelto 15 MG tablet, Take 1 tablet by mouth Daily With Dinner., Disp: 42 tablet, Rfl: 0    Tirzepatide 2.5 MG/0.5ML solution auto-injector, Inject 0.5 mg under the skin into the appropriate area as directed 1 (One) Time Per Week., Disp:  "0.5 mL, Rfl: 5    Objective      Blood pressure 102/68, pulse 79, temperature 96.1 °F (35.6 °C), height 193 cm (75.98\"), weight (!) 140 kg (309 lb), SpO2 96%.            Physical Exam     General Appearance:    Alert, cooperative, no distress, appears stated age   Head:    Normocephalic, without obvious abnormality, atraumatic   Eyes:    PERRL, conjunctiva/corneas clear, EOM's intact   Ears:    Normal TM's and external ear canals, both ears   Nose:   Nares normal, septum midline, mucosa normal, no drainage   or sinus tenderness   Throat:   Lips, mucosa, and tongue normal; teeth and gums normal   Neck:   Supple, symmetrical, trachea midline, no adenopathy;        thyroid:  No enlargement/tenderness/nodules; no carotid    bruit or JVD   Back:     Symmetric, no curvature, ROM normal, no CVA tenderness   Lungs:     Clear to auscultation bilaterally, respirations unlabored   Chest wall:    No tenderness or deformity   Heart:    Regular rate and rhythm, S1 and S2 normal, no murmur,        rub or gallop   Abdomen:     Soft, non-tender, bowel sounds active all four quadrants,     no masses, no organomegaly   Extremities:   Extremities normal, atraumatic, no cyanosis or edema   Pulses:   2+ and symmetric all extremities   Skin:   Skin color, texture, turgor normal, no rashes or lesions   Lymph nodes:   Cervical, supraclavicular, and axillary nodes normal   Neurologic:   CNII-XII intact. Normal strength, sensation and reflexes       throughout      Results for orders placed or performed in visit on 01/15/25   Microscopic Examination -    Collection Time: 01/15/25  4:00 PM   Result Value Ref Range    WBC, UA 3-5 (A) /HPF    RBC, UA See below: (A) /HPF    Epithelial Cells (non renal) 0-2 /HPF    Cast Type Comment     Bacteria, UA Comment None Seen /HPF   Urinalysis With Microscopic - Urine, Clean Catch    Collection Time: 01/15/25  4:00 PM    Specimen: Urine, Clean Catch   Result Value Ref Range    Specific Gravity, UA 1.008 " 1.005 - 1.030    pH, UA 6.5 5.0 - 8.0    Color, UA CANCELED     Appearance, UA Cloudy (A) Clear    Leukocytes, UA Trace (A) Negative    Protein See below: (A) Negative    Glucose, UA Negative Negative    Ketones Negative Negative    Blood, UA See below: (A) Negative    Bilirubin, UA Negative Negative    Urobilinogen, UA Comment     Nitrite, UA Negative Negative     *Note: Due to a large number of results and/or encounters for the requested time period, some results have not been displayed. A complete set of results can be found in Results Review.         Assessment & Plan     Urin cult. Will start macrobid. Might have problems on marobid with renal failure.   Will deal with Symptoms if they occur.          Diagnoses and all orders for this visit:    1. Benign essential microscopic hematuria (Primary)  -     Urine Culture - Urine, Urine, Clean Catch; Future    2. Renal insufficiency    3. Other microscopic hematuria    4. Urinary tract infection with hematuria, site unspecified    Other orders  -     Discontinue: Semaglutide,0.25 or 0.5MG/DOS, (Ozempic, 0.25 or 0.5 MG/DOSE,) 2 MG/1.5ML solution pen-injector; Inject 1 mg under the skin into the appropriate area as directed Every 7 (Seven) Days.  Dispense: 9 mL; Refill: 0  -     Tirzepatide 2.5 MG/0.5ML solution auto-injector; Inject 0.5 mg under the skin into the appropriate area as directed 1 (One) Time Per Week.  Dispense: 0.5 mL; Refill: 5      No follow-ups on file.          There are no Patient Instructions on file for this visit.     Keven Bear MD    Assessment & Plan

## 2025-01-16 NOTE — PROGRESS NOTES
Current Discharge Medication List  
  
START taking these medications Dose & Instructions Dispensing Information Comments Morning Noon Evening Bedtime  
 ipratropium-albuterol  mcg/actuation inhaler Commonly known as:  Andrea Hernandez Your last dose was: As needed Dose:  1 Puff Take 1 Puff by inhalation every six (6) hours as needed for Wheezing or Shortness of Breath. Quantity:  1 Inhaler Refills:  2  
     
   
   
   
  
 predniSONE 10 mg tablet Commonly known as:  Homa Garrett Your last dose was:  Per MD instructions Take 4 tablets daily for 3 days then, take 3 tablets daily for 3 days then, take 2 tablets daily for 3 days then, take 1 tablet daily for 3 days then. Quantity:  30 Tab Refills:  0 CONTINUE these medications which have CHANGED Dose & Instructions Dispensing Information Comments Morning Noon Evening Bedtime  
 azithromycin 500 mg Tab Commonly known as:  Yobany Thomas What changed:  See the new instructions. Your last dose was: This AM  
   
 Dose:  500 mg Take 1 Tab by mouth daily for 2 days. Quantity:  2 Tab Refills:  0 HYDROcodone-homatropine 5-1.5 mg/5 mL syrup Commonly known as:  HYCODAN What changed:   
- when to take this 
- reasons to take this Your last dose was: As needed Dose:  5 mL Take 5 mL by mouth three (3) times daily as needed for up to 7 days. Max Daily Amount: 15 mL. Quantity:  120 mL Refills:  0 CONTINUE these medications which have NOT CHANGED Dose & Instructions Dispensing Information Comments Morning Noon Evening Bedtime  
 amLODIPine-benazepril 5-40 mg per capsule Commonly known as:  Lucrezia Medicus Your last dose was: This AM  
   
 TAKE 1 CAPSULE BY MOUTH EVERY DAY Quantity:  30 Cap Refills:  11 CALCIUM PHOSPHATE PO Your last dose was:  Per your home schedule I reviewed patient CT.  He has large stones in the right kidney but no obvious signs of hydronephrosis.  There are no bladder abnormalities.  No ureteral stones.  Difficult to explain his hematuria.  He dropped a urine culture off at his primary care's office today.  I sent in Macrobid x 1 week while awaiting urine culture results based on last positive culture susceptibility results.  Follow-up on urine culture.  I will discuss treating his right-sided kidney stones at his next follow-up.    Colin Gaxiola MD   Take  by mouth. Refills:  0  
     
   
   
   
  
 carisoprodol 350 mg tablet Commonly known as:  SOMA Your last dose was: As needed at bedtime Dose:  350 mg Take 1 Tab by mouth every eight (8) hours as needed for Muscle Spasm(s). Max Daily Amount: 1,050 mg.  
 Quantity:  60 Tab Refills:  0  
     
   
   
   
  
 DENAVIR 1 % topical cream  
Generic drug:  penciclovir Your last dose was: As needed APPLY TOPICALLY TO THE AFFECTED AREA EVERY 2 HOURS Quantity:  5 g Refills:  PRN  
     
   
   
   
  
 escitalopram oxalate 20 mg tablet Commonly known as:  Cinthya Narrow Your last dose was: This AM  
   
 TAKE 1 TABLET BY MOUTH EVERY DAY Quantity:  30 Tab Refills:  11  
     
   
   
   
  
 methocarbamol 750 mg tablet Commonly known as:  ROBAXIN Your last dose was: As needed TAKE 2 TABLETS BY MOUTH FOUR TIMES DAILY AS NEEDED Quantity:  250 Tab Refills:  1 NEURONTIN 800 mg tablet Generic drug:  gabapentin Your last dose was: This AM  
   
 Take  by mouth three (3) times daily. Refills:  0 PriLOSEC 20 mg capsule Generic drug:  omeprazole Your last dose was: This AM  
   
 Dose:  20 mg Take 20 mg by mouth daily. Patient instructed to take morning of surgery per anesthesia guidelines Refills:  0 PROLIA 60 mg/mL injection Generic drug:  denosumab Your last dose was:  Per your home schedule Dose:  60 mg  
60 mg by SubCUTAneous route. Refills:  0  
     
   
   
   
  
 promethazine 25 mg tablet Commonly known as:  PHENERGAN Your last dose was: As needed 1/2-1 po tid prn nausea Quantity:  30 Tab Refills:  0  
     
   
   
   
  
 temazepam 30 mg capsule Commonly known as:  RESTORIL Your last dose was: At bedtime TAKE 1 CAPSULE BY MOUTH EVERY DAY AT BEDTIME Quantity:  30 Cap Refills:  3 VITAMIN D3 1,000 unit tablet Generic drug:  cholecalciferol Your last dose was: Your home schedule Take  by mouth daily. Refills:  0 STOP taking these medications   
 ibuprofen 200 mg tablet Commonly known as:  MOTRIN Where to Get Your Medications Information on where to get these meds will be given to you by the nurse or doctor. ! Ask your nurse or doctor about these medications  
  azithromycin 500 mg Tab HYDROcodone-homatropine 5-1.5 mg/5 mL syrup  
 ipratropium-albuterol  mcg/actuation inhaler  
 predniSONE 10 mg tablet

## 2025-01-17 ENCOUNTER — HOSPITAL ENCOUNTER (OUTPATIENT)
Dept: CT IMAGING | Facility: HOSPITAL | Age: 70
Discharge: HOME | End: 2025-01-17
Payer: MEDICARE

## 2025-01-20 ENCOUNTER — PATIENT MESSAGE (OUTPATIENT)
Dept: INTERNAL MEDICINE | Facility: CLINIC | Age: 70
End: 2025-01-20
Payer: MEDICARE

## 2025-01-21 ENCOUNTER — APPOINTMENT (OUTPATIENT)
Dept: LAB | Facility: HOSPITAL | Age: 70
End: 2025-01-21
Payer: MEDICARE

## 2025-01-21 ENCOUNTER — TRANSCRIBE ORDERS (OUTPATIENT)
Dept: LAB | Facility: HOSPITAL | Age: 70
End: 2025-01-21
Payer: MEDICARE

## 2025-01-21 ENCOUNTER — LAB (OUTPATIENT)
Dept: LAB | Facility: HOSPITAL | Age: 70
End: 2025-01-21
Payer: MEDICARE

## 2025-01-21 DIAGNOSIS — N41.0 ACUTE PROSTATITIS: ICD-10-CM

## 2025-01-21 DIAGNOSIS — N41.0 ACUTE PROSTATITIS: Primary | ICD-10-CM

## 2025-01-21 LAB
BACTERIA UR QL AUTO: ABNORMAL /HPF
BILIRUB UR QL STRIP: NEGATIVE
CLARITY UR: CLEAR
COLOR UR: YELLOW
GLUCOSE UR STRIP-MCNC: NEGATIVE MG/DL
HGB UR QL STRIP.AUTO: ABNORMAL
HYALINE CASTS UR QL AUTO: ABNORMAL /LPF
KETONES UR QL STRIP: NEGATIVE
LEUKOCYTE ESTERASE UR QL STRIP.AUTO: NEGATIVE
NITRITE UR QL STRIP: NEGATIVE
PH UR STRIP.AUTO: 6 [PH] (ref 5–8)
PROT UR QL STRIP: NEGATIVE
RBC # UR STRIP: ABNORMAL /HPF
REF LAB TEST METHOD: ABNORMAL
SP GR UR STRIP: 1.01 (ref 1–1.03)
SQUAMOUS #/AREA URNS HPF: ABNORMAL /HPF
UROBILINOGEN UR QL STRIP: ABNORMAL
WBC # UR STRIP: ABNORMAL /HPF

## 2025-01-21 PROCEDURE — 87086 URINE CULTURE/COLONY COUNT: CPT

## 2025-01-21 PROCEDURE — 81001 URINALYSIS AUTO W/SCOPE: CPT

## 2025-01-23 ENCOUNTER — OFFICE VISIT (OUTPATIENT)
Dept: INTERNAL MEDICINE | Facility: CLINIC | Age: 70
End: 2025-01-23
Payer: MEDICARE

## 2025-01-23 ENCOUNTER — TELEPHONE (OUTPATIENT)
Dept: INTERNAL MEDICINE | Facility: CLINIC | Age: 70
End: 2025-01-23

## 2025-01-23 VITALS
OXYGEN SATURATION: 98 % | HEART RATE: 76 BPM | BODY MASS INDEX: 37.75 KG/M2 | TEMPERATURE: 97.1 F | WEIGHT: 310 LBS | RESPIRATION RATE: 16 BRPM | SYSTOLIC BLOOD PRESSURE: 104 MMHG | HEIGHT: 76 IN | DIASTOLIC BLOOD PRESSURE: 80 MMHG

## 2025-01-23 DIAGNOSIS — E11.29 TYPE 2 DIABETES MELLITUS WITH OTHER DIABETIC KIDNEY COMPLICATION, WITHOUT LONG-TERM CURRENT USE OF INSULIN: Primary | ICD-10-CM

## 2025-01-23 DIAGNOSIS — R31.29 OTHER MICROSCOPIC HEMATURIA: Primary | ICD-10-CM

## 2025-01-23 DIAGNOSIS — N10 ACUTE PYELONEPHRITIS: ICD-10-CM

## 2025-01-23 LAB — BACTERIA SPEC AEROBE CULT: NO GROWTH

## 2025-01-23 PROCEDURE — 3074F SYST BP LT 130 MM HG: CPT | Performed by: INTERNAL MEDICINE

## 2025-01-23 PROCEDURE — 3079F DIAST BP 80-89 MM HG: CPT | Performed by: INTERNAL MEDICINE

## 2025-01-23 PROCEDURE — 1126F AMNT PAIN NOTED NONE PRSNT: CPT | Performed by: INTERNAL MEDICINE

## 2025-01-23 PROCEDURE — 3044F HG A1C LEVEL LT 7.0%: CPT | Performed by: INTERNAL MEDICINE

## 2025-01-23 PROCEDURE — 99214 OFFICE O/P EST MOD 30 MIN: CPT | Performed by: INTERNAL MEDICINE

## 2025-01-23 NOTE — TELEPHONE ENCOUNTER
[facility] asked what medication is needing to be filled for Tirzepatide 2.5 mg/ mL weekly for 28 days Dx Type II DM.

## 2025-01-23 NOTE — PROGRESS NOTES
Subjective     Patient ID: Andre Agosto is a 70 y.o. male. Patient is here for management of multiple medical problems.     Chief Complaint   Patient presents with    Blood in Urine    Diabetes     History of Present Illness   Hematuria      Seen I/D Dr Boogie.   Uti  Had iv infusion.  Nephrolith.   Thought is that the stone is the nidus.    Urology.   Dr Gaxiola. Following.      The following portions of the patient's history were reviewed and updated as appropriate: allergies, current medications, past family history, past medical history, past social history, past surgical history and problem list.    Review of Systems    Current Outpatient Medications:     amiodarone (PACERONE) 100 MG tablet, Take 1 tablet by mouth Daily., Disp: , Rfl:     aspirin 81 MG EC tablet, Take 1 tablet by mouth Daily., Disp: , Rfl:     brivaracetam (Briviact) 75 MG tablet, Take 1 tablet by mouth 2 (Two) Times a Day., Disp: 180 tablet, Rfl: 1    carvedilol (Coreg) 25 MG tablet, Take 1 tablet by mouth 2 (Two) Times a Day With Meals., Disp: 180 tablet, Rfl: 3    Entresto  MG tablet, Take 1 tablet by mouth 2 (Two) Times a Day. (Patient taking differently: Take 0.5 tablets by mouth 2 (Two) Times a Day.), Disp: 180 tablet, Rfl: 3    furosemide (Lasix) 40 MG tablet, LASIX 40 MG TABS, Disp: , Rfl:     levothyroxine (SYNTHROID, LEVOTHROID) 88 MCG tablet, TAKE 1 TABLET DAILY, Disp: 90 tablet, Rfl: 3    nitrofurantoin, macrocrystal-monohydrate, (Macrobid) 100 MG capsule, Take 1 capsule by mouth 2 (Two) Times a Day., Disp: 14 capsule, Rfl: 0    rosuvastatin (CRESTOR) 20 MG tablet, Take 1 tablet by mouth Every Night., Disp: , Rfl:     spironolactone (ALDACTONE) 50 MG tablet, Take 1 tablet by mouth Daily., Disp: , Rfl:     tamsulosin (FLOMAX) 0.4 MG capsule 24 hr capsule, Take 1 capsule by mouth Daily., Disp: 90 capsule, Rfl: 3    Tirzepatide 2.5 MG/0.5ML solution auto-injector, Inject 2.5 mg under the skin into the appropriate area as  "directed 1 (One) Time Per Week., Disp: 2 mL, Rfl: 0    Xarelto 15 MG tablet, Take 1 tablet by mouth Daily With Dinner., Disp: 42 tablet, Rfl: 0    Objective      Blood pressure 104/80, pulse 76, temperature 97.1 °F (36.2 °C), resp. rate 16, height 193 cm (75.98\"), weight (!) 141 kg (310 lb), SpO2 98%.            Physical Exam     General Appearance:    Alert, cooperative, no distress, appears stated age   Head:    Normocephalic, without obvious abnormality, atraumatic   Eyes:    PERRL, conjunctiva/corneas clear, EOM's intact   Ears:    Normal TM's and external ear canals, both ears   Nose:   Nares normal, septum midline, mucosa normal, no drainage   or sinus tenderness   Throat:   Lips, mucosa, and tongue normal; teeth and gums normal   Neck:   Supple, symmetrical, trachea midline, no adenopathy;        thyroid:  No enlargement/tenderness/nodules; no carotid    bruit or JVD   Back:     Symmetric, no curvature, ROM normal, no CVA tenderness   Lungs:     Clear to auscultation bilaterally, respirations unlabored   Chest wall:    No tenderness or deformity   Heart:    Regular rate and rhythm, S1 and S2 normal, no murmur,        rub or gallop   Abdomen:     Soft, non-tender, bowel sounds active all four quadrants,     no masses, no organomegaly   Extremities:   Extremities normal, atraumatic, no cyanosis or edema   Pulses:   2+ and symmetric all extremities   Skin:   Skin color, texture, turgor normal, no rashes or lesions   Lymph nodes:   Cervical, supraclavicular, and axillary nodes normal   Neurologic:   CNII-XII intact. Normal strength, sensation and reflexes       throughout      Results for orders placed or performed in visit on 01/21/25   Urine Culture - Urine, Urine, Clean Catch    Collection Time: 01/21/25  4:17 PM    Specimen: Urine, Clean Catch   Result Value Ref Range    Urine Culture No growth    Urinalysis With Culture If Indicated - Urine, Clean Catch    Collection Time: 01/21/25  4:17 PM    Specimen: " Urine, Clean Catch   Result Value Ref Range    Color, UA Yellow Yellow, Straw    Appearance, UA Clear Clear    pH, UA 6.0 5.0 - 8.0    Specific Gravity, UA 1.006 1.001 - 1.030    Glucose, UA Negative Negative    Ketones, UA Negative Negative    Bilirubin, UA Negative Negative    Blood, UA Moderate (2+) (A) Negative    Protein, UA Negative Negative    Leuk Esterase, UA Negative Negative    Nitrite, UA Negative Negative    Urobilinogen, UA 0.2 E.U./dL 0.2 - 1.0 E.U./dL   Urinalysis, Microscopic Only - Urine, Clean Catch    Collection Time: 01/21/25  4:17 PM    Specimen: Urine, Clean Catch   Result Value Ref Range    RBC, UA 6-10 (A) None Seen, 0-2 /HPF    WBC, UA 0-2 None Seen, 0-2 /HPF    Bacteria, UA None Seen None Seen, Trace /HPF    Squamous Epithelial Cells, UA 0-2 None Seen, 0-2 /HPF    Hyaline Casts, UA None Seen 0 - 6 /LPF    Methodology Automated Microscopy      *Note: Due to a large number of results and/or encounters for the requested time period, some results have not been displayed. A complete set of results can be found in Results Review.         Assessment & Plan     I/D and urology working thru the stone issue.    Wt down. Not on Monjour yet.  Will start Saturday.    Eating out making him worse.          Diagnoses and all orders for this visit:    1. Other microscopic hematuria (Primary)    2. Acute pyelonephritis      Return in about 4 weeks (around 2/20/2025).          There are no Patient Instructions on file for this visit.     Keven Bear MD    Assessment & Plan

## 2025-01-31 ENCOUNTER — READMISSION MANAGEMENT (OUTPATIENT)
Dept: CALL CENTER | Facility: HOSPITAL | Age: 70
End: 2025-01-31
Payer: MEDICARE

## 2025-01-31 ENCOUNTER — OFFICE VISIT (OUTPATIENT)
Dept: INTERNAL MEDICINE | Facility: CLINIC | Age: 70
End: 2025-01-31
Payer: MEDICARE

## 2025-01-31 VITALS
WEIGHT: 304.4 LBS | BODY MASS INDEX: 37.07 KG/M2 | DIASTOLIC BLOOD PRESSURE: 70 MMHG | HEIGHT: 76 IN | TEMPERATURE: 96.1 F | HEART RATE: 76 BPM | SYSTOLIC BLOOD PRESSURE: 123 MMHG | OXYGEN SATURATION: 97 % | RESPIRATION RATE: 20 BRPM

## 2025-01-31 DIAGNOSIS — Z12.5 ENCOUNTER FOR SCREENING FOR MALIGNANT NEOPLASM OF PROSTATE: ICD-10-CM

## 2025-01-31 DIAGNOSIS — R06.09 DYSPNEA ON EXERTION: ICD-10-CM

## 2025-01-31 DIAGNOSIS — Z09 HOSPITAL DISCHARGE FOLLOW-UP: ICD-10-CM

## 2025-01-31 DIAGNOSIS — I50.21 ACUTE SYSTOLIC CHF (CONGESTIVE HEART FAILURE): Primary | ICD-10-CM

## 2025-01-31 RX ORDER — DOXYCYCLINE 100 MG/1
100 CAPSULE ORAL 2 TIMES DAILY
COMMUNITY
Start: 2025-01-30 | End: 2025-02-09

## 2025-01-31 RX ORDER — OXYCODONE HYDROCHLORIDE 5 MG/1
5 TABLET ORAL EVERY 4 HOURS PRN
COMMUNITY
Start: 2025-01-29 | End: 2025-02-01

## 2025-01-31 RX ORDER — SACCHAROMYCES BOULARDII 250 MG
250 CAPSULE ORAL DAILY
COMMUNITY
Start: 2025-01-30 | End: 2025-02-06

## 2025-01-31 NOTE — OUTREACH NOTE
Prep Survey      Flowsheet Row Responses   [facility] facility patient discharged from? Non-[facility]   Is LACE score < 7 ? Non- Discharge   Eligibility [facility]   Date of Admission 01/29/25   Date of Discharge 01/30/25   Discharge diagnosis Pericardial effusion (Primary Dx),  Cardiac tamponade,  Long QT interval,    Does the patient have one of the following disease processes/diagnoses(primary or secondary)? Other   Prep survey completed? Yes            Angela UNGER - Registered Nurse

## 2025-01-31 NOTE — PROGRESS NOTES
Subjective     Patient ID: Andre Agosto is a 70 y.o. male. Patient is here for management of multiple medical problems.     Chief Complaint   Patient presents with    Pericardial Effusion     [facility]  Date of Admission: 1/24/2025   Date of Discharge: 1/30/2025       History of Present Illness       Pericardia effusion. Messothelial cells. Dx   Mesothelial cells are a component of pericardial fluid, a fluid that surrounds the heart. Mesothelial cells come from the mesothelium, a membrane that lines the pericardium and other body cavities.   Function of mesothelial cells   Lubrication: Mesothelial cells produce a lubricating fluid that reduces friction between layers  Inflammation: Mesothelial cells play a role in inflammation  Tissue repair: Mesothelial cells help repair tissue  Coagulation: Mesothelial cells play a role in coagulation  Fibrinolysis: Mesothelial cells play a role in fibrinolysis  Antigen presentation: Mesothelial cells play a role in presenting antigens  Other components of pericardial fluid lymphocytes, glanulocytes, macrophages, eosinophils, and basophils.   Pericardial fluid production and drainage   Pericardial fluid is produced by plasma ultrafiltration and interstitial fluid from the myocardium  The fluid drains through the lymphatic capillary bed every 5-7 hours  [place]  Physiology of pericardial fluid production and drainage    [employer]  Mesothelial Cells - [employer], Laboratory Continuing Education  Mesothelium is the name given to the membrane that lines most body cavities and surrounds the internal organs. Cells that shed fro...    [facility]  Pericardium: structure and function in health and disease - PubMed  A very important role in all aspects of pericardial functions is played by mesothelial cells. The mesothelial cells form a monolay...    National Institutes of Health (NIH) (.gov)  Show all          The following portions of the patient's history were  reviewed and updated as appropriate: allergies, current medications, past family history, past medical history, past social history, past surgical history and problem list.    Review of Systems    Current Outpatient Medications:     amiodarone (PACERONE) 100 MG tablet, Take 1 tablet by mouth Daily., Disp: , Rfl:     aspirin 81 MG EC tablet, Take 1 tablet by mouth Daily., Disp: , Rfl:     brivaracetam (Briviact) 75 MG tablet, Take 1 tablet by mouth 2 (Two) Times a Day., Disp: 180 tablet, Rfl: 1    carvedilol (Coreg) 25 MG tablet, Take 1 tablet by mouth 2 (Two) Times a Day With Meals., Disp: 180 tablet, Rfl: 3    doxycycline (VIBRAMYCIN) 100 MG capsule, Take 1 capsule by mouth 2 (Two) Times a Day., Disp: , Rfl:     Entresto  MG tablet, Take 1 tablet by mouth 2 (Two) Times a Day. (Patient taking differently: Take 0.5 tablets by mouth 2 (Two) Times a Day.), Disp: 180 tablet, Rfl: 3    levothyroxine (SYNTHROID, LEVOTHROID) 88 MCG tablet, TAKE 1 TABLET DAILY, Disp: 90 tablet, Rfl: 3    oxyCODONE (ROXICODONE) 5 MG immediate release tablet, Take 1 tablet by mouth Every 4 (Four) Hours As Needed., Disp: , Rfl:     rosuvastatin (CRESTOR) 20 MG tablet, Take 1 tablet by mouth Every Night., Disp: , Rfl:     saccharomyces boulardii (FLORASTOR) 250 MG capsule, Take 1 capsule by mouth Daily., Disp: , Rfl:     spironolactone (ALDACTONE) 50 MG tablet, Take 1 tablet by mouth Daily., Disp: , Rfl:     tamsulosin (FLOMAX) 0.4 MG capsule 24 hr capsule, Take 1 capsule by mouth Daily., Disp: 90 capsule, Rfl: 3    Xarelto 15 MG tablet, Take 1 tablet by mouth Daily With Dinner., Disp: 42 tablet, Rfl: 0    furosemide (Lasix) 40 MG tablet, LASIX 40 MG TABS (Patient not taking: Reported on 1/31/2025), Disp: , Rfl:     Tirzepatide 2.5 MG/0.5ML solution auto-injector, Inject 2.5 mg under the skin into the appropriate area as directed 1 (One) Time Per Week. (Patient not taking: Reported on 1/31/2025), Disp: 2 mL, Rfl: 0    Objective      Blood  "pressure 123/70, pulse 76, temperature 96.1 °F (35.6 °C), temperature source Temporal, resp. rate 20, height 193 cm (76\"), weight (!) 138 kg (304 lb 6.4 oz), SpO2 97%.            Physical Exam     General Appearance:    Alert, cooperative, no distress, appears stated age   Head:    Normocephalic, without obvious abnormality, atraumatic   Eyes:    PERRL, conjunctiva/corneas clear, EOM's intact   Ears:    Normal TM's and external ear canals, both ears   Nose:   Nares normal, septum midline, mucosa normal, no drainage   or sinus tenderness   Throat:   Lips, mucosa, and tongue normal; teeth and gums normal   Neck:   Supple, symmetrical, trachea midline, no adenopathy;        thyroid:  No enlargement/tenderness/nodules; no carotid    bruit or JVD   Back:     Symmetric, no curvature, ROM normal, no CVA tenderness   Lungs:     Clear to auscultation bilaterally, respirations unlabored   Chest wall:    No tenderness or deformity   Heart:    Regular rate and rhythm, S1 and S2 normal, no murmur,        rub or gallop   Abdomen:     Soft, non-tender, bowel sounds active all four quadrants,     no masses, no organomegaly   Extremities:   Extremities normal, atraumatic, no cyanosis or edema   Pulses:   2+ and symmetric all extremities   Skin:   Skin color, texture, turgor normal, no rashes or lesions   Lymph nodes:   Cervical, supraclavicular, and axillary nodes normal   Neurologic:   CNII-XII intact. Normal strength, sensation and reflexes       throughout      Results for orders placed or performed in visit on 01/21/25   Urine Culture - Urine, Urine, Clean Catch    Collection Time: 01/21/25  4:17 PM    Specimen: Urine, Clean Catch   Result Value Ref Range    Urine Culture No growth    Urinalysis With Culture If Indicated - Urine, Clean Catch    Collection Time: 01/21/25  4:17 PM    Specimen: Urine, Clean Catch   Result Value Ref Range    Color, UA Yellow Yellow, Straw    Appearance, UA Clear Clear    pH, UA 6.0 5.0 - 8.0    " Specific Gravity, UA 1.006 1.001 - 1.030    Glucose, UA Negative Negative    Ketones, UA Negative Negative    Bilirubin, UA Negative Negative    Blood, UA Moderate (2+) (A) Negative    Protein, UA Negative Negative    Leuk Esterase, UA Negative Negative    Nitrite, UA Negative Negative    Urobilinogen, UA 0.2 E.U./dL 0.2 - 1.0 E.U./dL   Urinalysis, Microscopic Only - Urine, Clean Catch    Collection Time: 01/21/25  4:17 PM    Specimen: Urine, Clean Catch   Result Value Ref Range    RBC, UA 6-10 (A) None Seen, 0-2 /HPF    WBC, UA 0-2 None Seen, 0-2 /HPF    Bacteria, UA None Seen None Seen, Trace /HPF    Squamous Epithelial Cells, UA 0-2 None Seen, 0-2 /HPF    Hyaline Casts, UA None Seen 0 - 6 /LPF    Methodology Automated Microscopy      *Note: Due to a large number of results and/or encounters for the requested time period, some results have not been displayed. A complete set of results can be found in Results Review.         Assessment & Plan   Pre renal azotemia, elevated BNP.     S/p pericardial effusion drainage.   Wound oozing blood. No infection detected.  Not yet on xeralto.    Renal failure.   Off lasix for now.     Depression.  On amiodarone. Had to stop lexapro.  Would like to restart low dose. Pt on low dose amiodarone.    I would like Dr Jones will see today.    Pt with some orthopnea. Edema in legs.  Will look at cr with a bit volum overload.   Restart lasix.   Recheck labs on Monday.              Diagnoses and all orders for this visit:    1. Acute systolic CHF (congestive heart failure) (Primary)  -     Iron Profile  -     CBC & Differential  -     PSA Screen  -     BNP  -     BNP  -     Basic Metabolic Panel    2. Dyspnea on exertion  -     Iron Profile  -     CBC & Differential  -     PSA Screen  -     BNP  -     BNP  -     Basic Metabolic Panel    3. Encounter for screening for malignant neoplasm of prostate  -     PSA Screen  -     BNP  -     Basic Metabolic Panel    4. Hospital discharge  follow-up      No follow-ups on file.          There are no Patient Instructions on file for this visit.     Keven Bear MD    Assessment & Plan

## 2025-02-01 LAB
BASOPHILS # BLD AUTO: 0.1 X10E3/UL (ref 0–0.2)
BASOPHILS NFR BLD AUTO: 1 %
BNP SERPL-MCNC: 1203.6 PG/ML (ref 0–100)
EOSINOPHIL # BLD AUTO: 0.5 X10E3/UL (ref 0–0.4)
EOSINOPHIL NFR BLD AUTO: 6 %
ERYTHROCYTE [DISTWIDTH] IN BLOOD BY AUTOMATED COUNT: 13.8 % (ref 11.6–15.4)
HCT VFR BLD AUTO: 44.9 % (ref 37.5–51)
HGB BLD-MCNC: 14.8 G/DL (ref 13–17.7)
IMM GRANULOCYTES # BLD AUTO: 0.1 X10E3/UL (ref 0–0.1)
IMM GRANULOCYTES NFR BLD AUTO: 1 %
IRON SATN MFR SERPL: 23 % (ref 15–55)
IRON SERPL-MCNC: 59 UG/DL (ref 38–169)
LYMPHOCYTES # BLD AUTO: 1.3 X10E3/UL (ref 0.7–3.1)
LYMPHOCYTES NFR BLD AUTO: 17 %
MCH RBC QN AUTO: 30 PG (ref 26.6–33)
MCHC RBC AUTO-ENTMCNC: 33 G/DL (ref 31.5–35.7)
MCV RBC AUTO: 91 FL (ref 79–97)
MONOCYTES # BLD AUTO: 0.5 X10E3/UL (ref 0.1–0.9)
MONOCYTES NFR BLD AUTO: 7 %
NEUTROPHILS # BLD AUTO: 5.2 X10E3/UL (ref 1.4–7)
NEUTROPHILS NFR BLD AUTO: 68 %
PLATELET # BLD AUTO: 229 X10E3/UL (ref 150–450)
PSA SERPL-MCNC: 6 NG/ML (ref 0–4)
RBC # BLD AUTO: 4.94 X10E6/UL (ref 4.14–5.8)
TIBC SERPL-MCNC: 259 UG/DL (ref 250–450)
UIBC SERPL-MCNC: 200 UG/DL (ref 111–343)
WBC # BLD AUTO: 7.6 X10E3/UL (ref 3.4–10.8)

## 2025-02-03 ENCOUNTER — TRANSITIONAL CARE MANAGEMENT TELEPHONE ENCOUNTER (OUTPATIENT)
Dept: CALL CENTER | Facility: HOSPITAL | Age: 70
End: 2025-02-03
Payer: MEDICARE

## 2025-02-03 NOTE — OUTREACH NOTE
Call Center TCM Note      Flowsheet Row Responses   [facility] patient discharged from? [facility]  [[facility]]   Does the patient have one of the following disease processes/diagnoses(primary or secondary)? Other   TCM attempt successful? Yes  [Patient was seen in the PCP office on 1/31/2025 by PCP Dr. Bear. This fulfils the TCM requirement. No Phone call needed per unit protocol.]   Discharge diagnosis Pericardial effusion (Primary Dx),  Cardiac tamponade,  Long QT interval,    TCM call completed? Yes   Would this patient benefit from a Referral to Amb Social Work? No   Is the patient interested in additional calls from an ambulatory ? No            Jak Bah RN    2/3/2025, 11:15 EST

## 2025-02-04 ENCOUNTER — LAB (OUTPATIENT)
Facility: HOSPITAL | Age: 70
End: 2025-02-04
Payer: MEDICARE

## 2025-02-04 ENCOUNTER — TRANSCRIBE ORDERS (OUTPATIENT)
Facility: HOSPITAL | Age: 70
End: 2025-02-04
Payer: MEDICARE

## 2025-02-04 DIAGNOSIS — N18.30 MALIGNANT HYPERTENSIVE HEART AND CHRONIC KIDNEY DISEASE STAGE III: ICD-10-CM

## 2025-02-04 DIAGNOSIS — I48.11 LONGSTANDING PERSISTENT ATRIAL FIBRILLATION: ICD-10-CM

## 2025-02-04 DIAGNOSIS — N39.0 LOWER URINARY TRACT INFECTIOUS DISEASE: Primary | ICD-10-CM

## 2025-02-04 DIAGNOSIS — B96.29 NON-SHIGA TOXIN-PRODUCING E. COLI: ICD-10-CM

## 2025-02-04 DIAGNOSIS — I13.10 MALIGNANT HYPERTENSIVE HEART AND CHRONIC KIDNEY DISEASE STAGE III: ICD-10-CM

## 2025-02-04 DIAGNOSIS — C61 PROSTATE CANCER MANAGED WITH ACTIVE SURVEILLANCE: ICD-10-CM

## 2025-02-04 DIAGNOSIS — Z16.12 INFECTION DUE TO EXTENDED SPECTRUM BETA-LACTAMASE PRODUCING BACTERIA: ICD-10-CM

## 2025-02-04 DIAGNOSIS — A49.9 INFECTION DUE TO EXTENDED SPECTRUM BETA-LACTAMASE PRODUCING BACTERIA: ICD-10-CM

## 2025-02-04 DIAGNOSIS — I13.0 HYPERTENSIVE HEART AND RENAL DISEASE WITH CONGESTIVE HEART FAILURE: ICD-10-CM

## 2025-02-04 DIAGNOSIS — Z12.5 SPECIAL SCREENING FOR MALIGNANT NEOPLASM OF PROSTATE: Primary | ICD-10-CM

## 2025-02-04 DIAGNOSIS — N39.0 LOWER URINARY TRACT INFECTIOUS DISEASE: ICD-10-CM

## 2025-02-04 LAB
BACTERIA UR QL AUTO: ABNORMAL /HPF
BASOPHILS # BLD AUTO: 0.04 10*3/MM3 (ref 0–0.2)
BASOPHILS NFR BLD AUTO: 0.6 % (ref 0–1.5)
BILIRUB UR QL STRIP: NEGATIVE
CLARITY UR: CLEAR
COLOR UR: YELLOW
CRP SERPL-MCNC: 1.84 MG/DL (ref 0–0.5)
DEPRECATED RDW RBC AUTO: 47.2 FL (ref 37–54)
EOSINOPHIL # BLD AUTO: 0.45 10*3/MM3 (ref 0–0.4)
EOSINOPHIL NFR BLD AUTO: 6.3 % (ref 0.3–6.2)
ERYTHROCYTE [DISTWIDTH] IN BLOOD BY AUTOMATED COUNT: 14.2 % (ref 12.3–15.4)
ERYTHROCYTE [SEDIMENTATION RATE] IN BLOOD: 23 MM/HR (ref 0–20)
GLUCOSE UR STRIP-MCNC: NEGATIVE MG/DL
HBA1C MFR BLD: 6.3 % (ref 4.8–5.6)
HCT VFR BLD AUTO: 46.1 % (ref 37.5–51)
HGB BLD-MCNC: 14.8 G/DL (ref 13–17.7)
HGB UR QL STRIP.AUTO: ABNORMAL
HYALINE CASTS UR QL AUTO: ABNORMAL /LPF
IMM GRANULOCYTES # BLD AUTO: 0.02 10*3/MM3 (ref 0–0.05)
IMM GRANULOCYTES NFR BLD AUTO: 0.3 % (ref 0–0.5)
KETONES UR QL STRIP: NEGATIVE
LEUKOCYTE ESTERASE UR QL STRIP.AUTO: NEGATIVE
LYMPHOCYTES # BLD AUTO: 1.67 10*3/MM3 (ref 0.7–3.1)
LYMPHOCYTES NFR BLD AUTO: 23.5 % (ref 19.6–45.3)
MCH RBC QN AUTO: 28.6 PG (ref 26.6–33)
MCHC RBC AUTO-ENTMCNC: 32.1 G/DL (ref 31.5–35.7)
MCV RBC AUTO: 89.2 FL (ref 79–97)
MONOCYTES # BLD AUTO: 0.31 10*3/MM3 (ref 0.1–0.9)
MONOCYTES NFR BLD AUTO: 4.4 % (ref 5–12)
NEUTROPHILS NFR BLD AUTO: 4.62 10*3/MM3 (ref 1.7–7)
NEUTROPHILS NFR BLD AUTO: 64.9 % (ref 42.7–76)
NITRITE UR QL STRIP: NEGATIVE
NT-PROBNP SERPL-MCNC: 4977 PG/ML (ref 0–900)
PH UR STRIP.AUTO: 5.5 [PH] (ref 5–8)
PLATELET # BLD AUTO: 235 10*3/MM3 (ref 140–450)
PMV BLD AUTO: 10.1 FL (ref 6–12)
PROT UR QL STRIP: NEGATIVE
RBC # BLD AUTO: 5.17 10*6/MM3 (ref 4.14–5.8)
RBC # UR STRIP: ABNORMAL /HPF
REF LAB TEST METHOD: ABNORMAL
SP GR UR STRIP: 1.01 (ref 1–1.03)
SQUAMOUS #/AREA URNS HPF: ABNORMAL /HPF
UROBILINOGEN UR QL STRIP: ABNORMAL
WBC # UR STRIP: ABNORMAL /HPF
WBC NRBC COR # BLD AUTO: 7.11 10*3/MM3 (ref 3.4–10.8)

## 2025-02-04 PROCEDURE — 82607 VITAMIN B-12: CPT

## 2025-02-04 PROCEDURE — 80053 COMPREHEN METABOLIC PANEL: CPT

## 2025-02-04 PROCEDURE — 85652 RBC SED RATE AUTOMATED: CPT

## 2025-02-04 PROCEDURE — G0103 PSA SCREENING: HCPCS

## 2025-02-04 PROCEDURE — 84443 ASSAY THYROID STIM HORMONE: CPT

## 2025-02-04 PROCEDURE — 85025 COMPLETE CBC W/AUTO DIFF WBC: CPT

## 2025-02-04 PROCEDURE — 87086 URINE CULTURE/COLONY COUNT: CPT

## 2025-02-04 PROCEDURE — 84439 ASSAY OF FREE THYROXINE: CPT

## 2025-02-04 PROCEDURE — 83880 ASSAY OF NATRIURETIC PEPTIDE: CPT

## 2025-02-04 PROCEDURE — 86140 C-REACTIVE PROTEIN: CPT

## 2025-02-04 PROCEDURE — 83036 HEMOGLOBIN GLYCOSYLATED A1C: CPT

## 2025-02-04 PROCEDURE — 81001 URINALYSIS AUTO W/SCOPE: CPT

## 2025-02-04 PROCEDURE — 83735 ASSAY OF MAGNESIUM: CPT

## 2025-02-05 ENCOUNTER — TRANSCRIBE ORDERS (OUTPATIENT)
Dept: ADMINISTRATIVE | Facility: HOSPITAL | Age: 70
End: 2025-02-05
Payer: MEDICARE

## 2025-02-05 DIAGNOSIS — I31.4 PERICARDIAL TAMPONADE: Primary | ICD-10-CM

## 2025-02-05 LAB
ALBUMIN SERPL-MCNC: 3.8 G/DL (ref 3.5–5.2)
ALBUMIN/GLOB SERPL: 1.1 G/DL
ALP SERPL-CCNC: 130 U/L (ref 39–117)
ALT SERPL W P-5'-P-CCNC: 25 U/L (ref 1–41)
ANION GAP SERPL CALCULATED.3IONS-SCNC: 11.8 MMOL/L (ref 5–15)
AST SERPL-CCNC: 22 U/L (ref 1–40)
BILIRUB SERPL-MCNC: 0.6 MG/DL (ref 0–1.2)
BUN SERPL-MCNC: 35 MG/DL (ref 8–23)
BUN/CREAT SERPL: 17.8 (ref 7–25)
CALCIUM SPEC-SCNC: 9.2 MG/DL (ref 8.6–10.5)
CHLORIDE SERPL-SCNC: 107 MMOL/L (ref 98–107)
CO2 SERPL-SCNC: 23.2 MMOL/L (ref 22–29)
CREAT SERPL-MCNC: 1.97 MG/DL (ref 0.76–1.27)
EGFRCR SERPLBLD CKD-EPI 2021: 36.1 ML/MIN/1.73
GLOBULIN UR ELPH-MCNC: 3.6 GM/DL
GLUCOSE SERPL-MCNC: 111 MG/DL (ref 65–99)
MAGNESIUM SERPL-MCNC: 1.8 MG/DL (ref 1.6–2.4)
POTASSIUM SERPL-SCNC: 4.5 MMOL/L (ref 3.5–5.2)
PROT SERPL-MCNC: 7.4 G/DL (ref 6–8.5)
PSA SERPL-MCNC: 6.77 NG/ML (ref 0–4)
SODIUM SERPL-SCNC: 142 MMOL/L (ref 136–145)
T4 FREE SERPL-MCNC: 1.47 NG/DL (ref 0.92–1.68)
TSH SERPL DL<=0.05 MIU/L-ACNC: 1.59 UIU/ML (ref 0.27–4.2)
VIT B12 BLD-MCNC: 364 PG/ML (ref 211–946)

## 2025-02-05 NOTE — PROGRESS NOTES
Renal looks dry. CHF number up.  So clinically is all that matters. If breating ok and can lay flat with out shortness of breath, then you are stable.

## 2025-02-06 LAB — BACTERIA SPEC AEROBE CULT: NO GROWTH

## 2025-02-07 ENCOUNTER — HOSPITAL ENCOUNTER (EMERGENCY)
Facility: HOSPITAL | Age: 70
Discharge: HOME | End: 2025-02-07
Attending: EMERGENCY MEDICINE
Payer: MEDICARE

## 2025-02-07 ENCOUNTER — APPOINTMENT (OUTPATIENT)
Dept: GENERAL RADIOLOGY | Facility: HOSPITAL | Age: 70
End: 2025-02-07
Payer: MEDICARE

## 2025-02-07 ENCOUNTER — APPOINTMENT (OUTPATIENT)
Dept: CT IMAGING | Facility: HOSPITAL | Age: 70
End: 2025-02-07
Payer: MEDICARE

## 2025-02-07 VITALS
DIASTOLIC BLOOD PRESSURE: 112 MMHG | OXYGEN SATURATION: 96 % | BODY MASS INDEX: 36.29 KG/M2 | RESPIRATION RATE: 20 BRPM | WEIGHT: 298 LBS | SYSTOLIC BLOOD PRESSURE: 169 MMHG | HEIGHT: 76 IN | TEMPERATURE: 98.7 F | HEART RATE: 76 BPM

## 2025-02-07 DIAGNOSIS — R06.00 DYSPNEA, UNSPECIFIED TYPE: ICD-10-CM

## 2025-02-07 DIAGNOSIS — N18.9 CHRONIC KIDNEY DISEASE, UNSPECIFIED CKD STAGE: ICD-10-CM

## 2025-02-07 DIAGNOSIS — R10.11 RIGHT UPPER QUADRANT ABDOMINAL PAIN: Primary | ICD-10-CM

## 2025-02-07 DIAGNOSIS — K80.20 CALCULUS OF GALLBLADDER WITHOUT CHOLECYSTITIS WITHOUT OBSTRUCTION: ICD-10-CM

## 2025-02-07 LAB
ALBUMIN SERPL-MCNC: 3.7 G/DL (ref 3.5–5.2)
ALBUMIN/GLOB SERPL: 1 G/DL
ALP SERPL-CCNC: 93 U/L (ref 39–117)
ALT SERPL W P-5'-P-CCNC: 19 U/L (ref 1–41)
ANION GAP SERPL CALCULATED.3IONS-SCNC: 12 MMOL/L (ref 5–15)
AST SERPL-CCNC: 18 U/L (ref 1–40)
BACTERIA UR QL AUTO: NORMAL /HPF
BASOPHILS # BLD AUTO: 0.09 10*3/MM3 (ref 0–0.2)
BASOPHILS NFR BLD AUTO: 1 % (ref 0–1.5)
BILIRUB SERPL-MCNC: 0.6 MG/DL (ref 0–1.2)
BILIRUB UR QL STRIP: NEGATIVE
BUN SERPL-MCNC: 28 MG/DL (ref 8–23)
BUN/CREAT SERPL: 14.6 (ref 7–25)
CALCIUM SPEC-SCNC: 9.1 MG/DL (ref 8.6–10.5)
CHLORIDE SERPL-SCNC: 102 MMOL/L (ref 98–107)
CLARITY UR: CLEAR
CO2 SERPL-SCNC: 25 MMOL/L (ref 22–29)
COLOR UR: YELLOW
CREAT SERPL-MCNC: 1.92 MG/DL (ref 0.76–1.27)
D-LACTATE SERPL-SCNC: 1 MMOL/L (ref 0.5–2)
DEPRECATED RDW RBC AUTO: 46.9 FL (ref 37–54)
EGFRCR SERPLBLD CKD-EPI 2021: 37.2 ML/MIN/1.73
EOSINOPHIL # BLD AUTO: 0.4 10*3/MM3 (ref 0–0.4)
EOSINOPHIL NFR BLD AUTO: 4.6 % (ref 0.3–6.2)
ERYTHROCYTE [DISTWIDTH] IN BLOOD BY AUTOMATED COUNT: 14.2 % (ref 12.3–15.4)
FLUAV SUBTYP SPEC NAA+PROBE: NOT DETECTED
FLUBV RNA ISLT QL NAA+PROBE: NOT DETECTED
GEN 5 1HR TROPONIN T REFLEX: 23 NG/L
GLOBULIN UR ELPH-MCNC: 3.6 GM/DL
GLUCOSE SERPL-MCNC: 87 MG/DL (ref 65–99)
GLUCOSE UR STRIP-MCNC: NEGATIVE MG/DL
HCT VFR BLD AUTO: 45.7 % (ref 37.5–51)
HGB BLD-MCNC: 14.9 G/DL (ref 13–17.7)
HGB UR QL STRIP.AUTO: ABNORMAL
HOLD SPECIMEN: NORMAL
HYALINE CASTS UR QL AUTO: NORMAL /LPF
IMM GRANULOCYTES # BLD AUTO: 0.03 10*3/MM3 (ref 0–0.05)
IMM GRANULOCYTES NFR BLD AUTO: 0.3 % (ref 0–0.5)
KETONES UR QL STRIP: NEGATIVE
LEUKOCYTE ESTERASE UR QL STRIP.AUTO: NEGATIVE
LIPASE SERPL-CCNC: 103 U/L (ref 13–60)
LYMPHOCYTES # BLD AUTO: 2.73 10*3/MM3 (ref 0.7–3.1)
LYMPHOCYTES NFR BLD AUTO: 31.5 % (ref 19.6–45.3)
MCH RBC QN AUTO: 29.2 PG (ref 26.6–33)
MCHC RBC AUTO-ENTMCNC: 32.6 G/DL (ref 31.5–35.7)
MCV RBC AUTO: 89.6 FL (ref 79–97)
MONOCYTES # BLD AUTO: 0.45 10*3/MM3 (ref 0.1–0.9)
MONOCYTES NFR BLD AUTO: 5.2 % (ref 5–12)
NEUTROPHILS NFR BLD AUTO: 4.96 10*3/MM3 (ref 1.7–7)
NEUTROPHILS NFR BLD AUTO: 57.4 % (ref 42.7–76)
NITRITE UR QL STRIP: NEGATIVE
NRBC BLD AUTO-RTO: 0 /100 WBC (ref 0–0.2)
NT-PROBNP SERPL-MCNC: 3841 PG/ML (ref 0–900)
PH UR STRIP.AUTO: <=5 [PH] (ref 5–8)
PLATELET # BLD AUTO: 247 10*3/MM3 (ref 140–450)
PMV BLD AUTO: 10.8 FL (ref 6–12)
POTASSIUM SERPL-SCNC: 4.7 MMOL/L (ref 3.5–5.2)
PROT SERPL-MCNC: 7.3 G/DL (ref 6–8.5)
PROT UR QL STRIP: NEGATIVE
QT INTERVAL: 464 MS
QTC INTERVAL: 522 MS
RBC # BLD AUTO: 5.1 10*6/MM3 (ref 4.14–5.8)
RBC # UR STRIP: NORMAL /HPF
REF LAB TEST METHOD: NORMAL
SARS-COV-2 RNA RESP QL NAA+PROBE: NOT DETECTED
SODIUM SERPL-SCNC: 139 MMOL/L (ref 136–145)
SP GR UR STRIP: 1.01 (ref 1–1.03)
SQUAMOUS #/AREA URNS HPF: NORMAL /HPF
TROPONIN T % DELTA: -4
TROPONIN T NUMERIC DELTA: -1 NG/L
TROPONIN T SERPL HS-MCNC: 24 NG/L
UROBILINOGEN UR QL STRIP: ABNORMAL
WBC # UR STRIP: NORMAL /HPF
WBC NRBC COR # BLD AUTO: 8.66 10*3/MM3 (ref 3.4–10.8)
WHOLE BLOOD HOLD COAG: NORMAL
WHOLE BLOOD HOLD SPECIMEN: NORMAL

## 2025-02-07 PROCEDURE — 85025 COMPLETE CBC W/AUTO DIFF WBC: CPT | Performed by: EMERGENCY MEDICINE

## 2025-02-07 PROCEDURE — 96374 THER/PROPH/DIAG INJ IV PUSH: CPT

## 2025-02-07 PROCEDURE — 83690 ASSAY OF LIPASE: CPT | Performed by: EMERGENCY MEDICINE

## 2025-02-07 PROCEDURE — 93005 ELECTROCARDIOGRAM TRACING: CPT | Performed by: EMERGENCY MEDICINE

## 2025-02-07 PROCEDURE — 83880 ASSAY OF NATRIURETIC PEPTIDE: CPT | Performed by: EMERGENCY MEDICINE

## 2025-02-07 PROCEDURE — 87636 SARSCOV2 & INF A&B AMP PRB: CPT | Performed by: EMERGENCY MEDICINE

## 2025-02-07 PROCEDURE — 25010000002 FENTANYL CITRATE (PF) 50 MCG/ML SOLUTION: Performed by: EMERGENCY MEDICINE

## 2025-02-07 PROCEDURE — 71045 X-RAY EXAM CHEST 1 VIEW: CPT

## 2025-02-07 PROCEDURE — 74176 CT ABD & PELVIS W/O CONTRAST: CPT

## 2025-02-07 PROCEDURE — 99284 EMERGENCY DEPT VISIT MOD MDM: CPT

## 2025-02-07 PROCEDURE — 84484 ASSAY OF TROPONIN QUANT: CPT | Performed by: EMERGENCY MEDICINE

## 2025-02-07 PROCEDURE — 93005 ELECTROCARDIOGRAM TRACING: CPT

## 2025-02-07 PROCEDURE — 80053 COMPREHEN METABOLIC PANEL: CPT | Performed by: EMERGENCY MEDICINE

## 2025-02-07 PROCEDURE — 71250 CT THORAX DX C-: CPT

## 2025-02-07 PROCEDURE — 83605 ASSAY OF LACTIC ACID: CPT | Performed by: EMERGENCY MEDICINE

## 2025-02-07 PROCEDURE — 81001 URINALYSIS AUTO W/SCOPE: CPT | Performed by: EMERGENCY MEDICINE

## 2025-02-07 PROCEDURE — 36415 COLL VENOUS BLD VENIPUNCTURE: CPT

## 2025-02-07 PROCEDURE — 63710000001 ONDANSETRON ODT 4 MG TABLET DISPERSIBLE: Performed by: EMERGENCY MEDICINE

## 2025-02-07 RX ORDER — OXYCODONE HYDROCHLORIDE 5 MG/1
5 TABLET ORAL EVERY 4 HOURS PRN
Qty: 12 TABLET | Refills: 0 | Status: SHIPPED | OUTPATIENT
Start: 2025-02-07 | End: 2025-02-14

## 2025-02-07 RX ORDER — ONDANSETRON 4 MG/1
4 TABLET, ORALLY DISINTEGRATING ORAL ONCE
Status: COMPLETED | OUTPATIENT
Start: 2025-02-07 | End: 2025-02-07

## 2025-02-07 RX ORDER — ONDANSETRON 2 MG/ML
4 INJECTION INTRAMUSCULAR; INTRAVENOUS ONCE
Status: DISCONTINUED | OUTPATIENT
Start: 2025-02-07 | End: 2025-02-07

## 2025-02-07 RX ORDER — FENTANYL CITRATE 50 UG/ML
25 INJECTION, SOLUTION INTRAMUSCULAR; INTRAVENOUS ONCE
Status: COMPLETED | OUTPATIENT
Start: 2025-02-07 | End: 2025-02-07

## 2025-02-07 RX ORDER — ONDANSETRON 4 MG/1
4 TABLET, ORALLY DISINTEGRATING ORAL EVERY 6 HOURS PRN
Qty: 12 TABLET | Refills: 0 | Status: SHIPPED | OUTPATIENT
Start: 2025-02-07

## 2025-02-07 RX ORDER — SODIUM CHLORIDE 0.9 % (FLUSH) 0.9 %
10 SYRINGE (ML) INJECTION AS NEEDED
Status: DISCONTINUED | OUTPATIENT
Start: 2025-02-07 | End: 2025-02-07 | Stop reason: HOSPADM

## 2025-02-07 RX ORDER — OXYCODONE AND ACETAMINOPHEN 5; 325 MG/1; MG/1
1 TABLET ORAL ONCE
Status: COMPLETED | OUTPATIENT
Start: 2025-02-07 | End: 2025-02-07

## 2025-02-07 RX ADMIN — FENTANYL CITRATE 25 MCG: 50 INJECTION, SOLUTION INTRAMUSCULAR; INTRAVENOUS at 05:05

## 2025-02-07 RX ADMIN — OXYCODONE HYDROCHLORIDE AND ACETAMINOPHEN 1 TABLET: 5; 325 TABLET ORAL at 06:55

## 2025-02-07 RX ADMIN — ONDANSETRON 4 MG: 4 TABLET, ORALLY DISINTEGRATING ORAL at 05:05

## 2025-02-07 NOTE — ED PROVIDER NOTES
Subjective   History of Present Illness  70 year old male with history of heart failure, CKD, cholelithiasis, pericardial effusion status post pericardiocentesis recently for pericardial tamponade (cardiologist Dr. Mendoza), and sleep apnea on nocturnal BIPAP, presents to the emergency department accompanied by his wife with concerns about right upper quadrant abdominal pain for the past two days, worsening, with associated shortness of breath. He had gained some weight (checks daily weights), about 10 days ago, and had doubled his diuretic recently.       Review of Systems   Constitutional:  Negative for diaphoresis and fever.   HENT:  Negative for facial swelling and nosebleeds.    Eyes:  Negative for photophobia and discharge.   Respiratory:  Positive for shortness of breath. Negative for stridor.    Gastrointestinal:  Positive for abdominal pain.   Neurological:  Negative for facial asymmetry and speech difficulty.       Past Medical History:   Diagnosis Date    6th nerve palsy, right     right eye     Anxiety and depression     Aortic root dilation     Aortic stenosis     Atrial fibrillation, persistent 12/02/2020    on Xarelto    Bell palsy 7/5/2018    6th nerve palsy    Carotid artery disease     Chronic venous insufficiency     Coronary artery disease involving native coronary artery of native heart without angina pectoris 09/12/2018    follows w/ Dr. Mendoza    CRI (chronic renal insufficiency), stage 2 (mild)     CVA (cerebral vascular accident)     Diabetes mellitus     doesnt check sugar, type 2     Disease of thyroid gland     Double vision     resolved- right eye    Elevated cholesterol     Elevated prostate specific antigen (PSA) 11/08/2023    Focal seizure     of right arm     Heart attack     NSTEMI 2015, s/p stenting    History of Helicobacter pylori infection     in 2008, treated w/ PrevPak - 2021 EGD bx (+), PCP RX - PPI/Augmentin/Doxy/Flagyl (x 14 days) 1/22/21    HL (hearing loss)     (L) ear - child  luevano measles    Hyperlipidemia     Hypertension     Hypokalemia     Kidney stone     Leg cramps     Memory loss 2005    d/t meningitis    Meningitis 2005    unsure of bacterial or viral     Mitral regurgitation     Obesity     Pericardial effusion     Prostate cancer 01/2024    [place] 3+3=6    Seizures     Shingles 9/2018    On right arm    Sleep apnea treated with nocturnal BiPAP     compliant with  machine     Stroke     2017/2018    Tricuspid regurgitation     Ventricular tachycardia     3 different times    Wears glasses        Allergies   Allergen Reactions    Crestor [Rosuvastatin] Provider Review Needed    5-Alpha Reductase Inhibitors Other (See Comments)     Myalgias    Amlodipine Swelling and Other (See Comments)     Feet swell    Statins Myalgia and Other (See Comments)       Past Surgical History:   Procedure Laterality Date    CARDIAC CATHETERIZATION N/A 10/12/2018    Procedure: Left Heart Cath;  Surgeon: Yoselin Johnson MD;  Location: [facility] CATH INVASIVE LOCATION;  Service: Cardiology    CARDIAC CATHETERIZATION  03/2021    stent    CARDIAC CATHETERIZATION  07/2021    just checking the after pacemaker placed- Dr. Tim    CARDIAC CATHETERIZATION  12/01/2015    [facility]/Emergent relook coronary angiography. Patent stent to  LCX & CX. LAD has 50-60% disease.    CARDIAC CATHETERIZATION  12/01/2015    NSTEMI, LHC, PTCA of the OMB & CX, EF 50%    CARDIAC CATHETERIZATION  09/29/2023    [facility]. IOP: CAD, HX Stenting, SOA. EF 45%. Multi CAD,  Patent Stent to LAD, Cardiomyopathy.    CARDIAC DEFIBRILLATOR PLACEMENT  07/27/2021    [facility] ANA/ [facility] [employer] model [id number], serial #[id number]    CARDIOVERSION  06/22/2021    [facility] SELWYN/DCCV-Successful cardioversion with restoration of sinus  rhythm.    CAROTID ENDARTERECTOMY      CAROTID STENT  12/2025    COLONOSCOPY  10/2020    w/ Dr. Jacobs, hx colon polyps    CORONARY STENT PLACEMENT  2015    LAPAROSCOPIC GASTRIC BANDING  2008    s/p LAGB Realize 6/2008 by MASONO.     PACEMAKER IMPLANTATION  2021    UMBILICAL HERNIA REPAIR  2008    incarcerated UHR w/ mesh by Dr. Velasquez    URETEROSCOPY LASER LITHOTRIPSY WITH STENT INSERTION Left 10/20/2021    Procedure: URETEROSCOPY LASER LITHOTRIPSY WITH STENT INSERTION;  Surgeon: Tonio De La Cruz MD;  Location: [facility];  Service: Urology;  Laterality: Left;    URETEROSCOPY LASER LITHOTRIPSY WITH STENT INSERTION Left 2021    Procedure: URETEROSCOPY LEFT, LEFT RETROGRADE PYELOGRAM, CYSTOSCOPY, LASER LITHOTRIPSY WITH STENT EXCHANGE;  Surgeon: Tonio De La Cruz MD;  Location: [facility] OR;  Service: Urology;  Laterality: Left;    URETEROSCOPY LASER LITHOTRIPSY WITH STENT INSERTION Left 10/06/2022    Procedure: URETEROSCOPY LASER LITHOTRIPSY WITH STENT INSERTION;  Surgeon: Tonio De La Cruz MD;  Location: [facility];  Service: Urology;  Laterality: Left;       Family History   Problem Relation Age of Onset    Stroke Mother     Hypertension Mother     Sudden death Mother     COPD Father     Hypertension Father     Heart disease Father        Social History     Socioeconomic History    Marital status:    Tobacco Use    Smoking status: Former     Current packs/day: 0.00     Average packs/day: 0.5 packs/day for 15.0 years (7.5 ttl pk-yrs)     Types: Cigarettes     Start date: 1975     Quit date: 1985     Years since quittin.1     Passive exposure: Past    Smokeless tobacco: Never   Vaping Use    Vaping status: Never Used   Substance and Sexual Activity    Alcohol use: No    Drug use: No    Sexual activity: Not Currently           Objective   Physical Exam  Vitals and nursing note reviewed.   Constitutional:       General: He is not in acute distress.     Appearance: He is not diaphoretic.      Comments: BMI 36.   Eyes:      Comments: No photophobia or nystagmus.   Neck:      Trachea: No tracheal deviation.   Cardiovascular:      Rate and Rhythm: Normal rate and regular rhythm.      Heart sounds: No murmur  heard.     No friction rub. No gallop.      Comments: S1, S2, distant heart sounds, no murmur, rub, or gallop appreciated.  Pulmonary:      Effort: Pulmonary effort is normal. No tachypnea, accessory muscle usage or respiratory distress.      Breath sounds: No stridor. No decreased breath sounds, wheezing, rhonchi or rales.   Abdominal:      Palpations: Abdomen is soft.      Comments: Nondistended but protuberant, nontender throughout, no guarding or rebound. Epigastric wound clean/dry/intact.    Musculoskeletal:      Cervical back: Neck supple.      Comments: Trace edema bilateral lower extremities, symmetric.    Skin:     General: Skin is warm and dry.      Coloration: Skin is not pale.   Neurological:      Mental Status: He is alert.      Comments: Normal speech, no dysarthria.          Procedures           ED Course  ED Course as of 02/07/25 0707 Fri Feb 07, 2025   0555 COVID19: Not Detected [LD]   0555 Influenza A PCR: Not Detected [LD]   0555 Influenza B PCR: Not Detected [LD]   0555 Creatinine(!): 1.92  Similar to prior values [LD]   0555 Lipase(!): 103 [LD]   0555 HS Troponin T(!): 24 [LD]   0556 proBNP(!): 3,841.0  Better than 3 days ago [LD]   0556 WBC: 8.66 [LD]   0556 Hemoglobin: 14.9 [LD]   0556 Platelets: 247 [LD]   0556 Lactate: 1.0 [LD]   0556 Awaiting 2nd troponin result and CT abdomen/pelvis result. [LD]   0620 HS Troponin T(!): 23 [LD]   0620 Troponin T Numeric Delta: -1 [LD]   0620 Troponin T % Delta: -4 [LD]   0652 Results and plan discussed with the patient and his brother at the bedside. Patient had transient relief of pain with IV Fentanyl.  [LD]      ED Course User Index  [LD] Jaqueline Narayanan MD KASPER reviewed by Jaqueline Narayanan MD       Medical Decision Making  Problems Addressed:  Calculus of gallbladder without cholecystitis without obstruction: complicated acute illness or injury  Chronic kidney disease, unspecified CKD stage:  complicated acute illness or injury  Dyspnea, unspecified type: complicated acute illness or injury  Right upper quadrant abdominal pain: complicated acute illness or injury    Amount and/or Complexity of Data Reviewed  Independent Historian: spouse     Details: At bedside  External Data Reviewed: notes.     Details: 1/30/25 discharge summary reviewed.   Labs: ordered. Decision-making details documented in ED Course.  Radiology: ordered. Decision-making details documented in ED Course.  ECG/medicine tests: ordered and independent interpretation performed. Decision-making details documented in ED Course.     Details: EKG at 0400 shows paced rhythm at a rate of 76 beats per minute.    Risk  Prescription drug management.      Recent Results (from the past 24 hours)   ECG 12 Lead ED Triage Standing Order; SOA    Collection Time: 02/07/25  4:00 AM   Result Value Ref Range    QT Interval 464 ms    QTC Interval 522 ms   Comprehensive Metabolic Panel    Collection Time: 02/07/25  4:15 AM    Specimen: Blood   Result Value Ref Range    Glucose 87 65 - 99 mg/dL    BUN 28 (H) 8 - 23 mg/dL    Creatinine 1.92 (H) 0.76 - 1.27 mg/dL    Sodium 139 136 - 145 mmol/L    Potassium 4.7 3.5 - 5.2 mmol/L    Chloride 102 98 - 107 mmol/L    CO2 25.0 22.0 - 29.0 mmol/L    Calcium 9.1 8.6 - 10.5 mg/dL    Total Protein 7.3 6.0 - 8.5 g/dL    Albumin 3.7 3.5 - 5.2 g/dL    ALT (SGPT) 19 1 - 41 U/L    AST (SGOT) 18 1 - 40 U/L    Alkaline Phosphatase 93 39 - 117 U/L    Total Bilirubin 0.6 0.0 - 1.2 mg/dL    Globulin 3.6 gm/dL    A/G Ratio 1.0 g/dL    BUN/Creatinine Ratio 14.6 7.0 - 25.0    Anion Gap 12.0 5.0 - 15.0 mmol/L    eGFR 37.2 (L) >60.0 mL/min/1.73   BNP    Collection Time: 02/07/25  4:15 AM    Specimen: Blood   Result Value Ref Range    proBNP 3,841.0 (H) 0.0 - 900.0 pg/mL   High Sensitivity Troponin T    Collection Time: 02/07/25  4:15 AM    Specimen: Blood   Result Value Ref Range    HS Troponin T 24 (H) <22 ng/L   Green Top (Gel)     Collection Time: 02/07/25  4:15 AM   Result Value Ref Range    Extra Tube Hold for add-ons.    Lavender Top    Collection Time: 02/07/25  4:15 AM   Result Value Ref Range    Extra Tube hold for add-on    Gold Top - SST    Collection Time: 02/07/25  4:15 AM   Result Value Ref Range    Extra Tube Hold for add-ons.    Gray Top    Collection Time: 02/07/25  4:15 AM   Result Value Ref Range    Extra Tube Hold for add-ons.    Light Blue Top    Collection Time: 02/07/25  4:15 AM   Result Value Ref Range    Extra Tube Hold for add-ons.    CBC Auto Differential    Collection Time: 02/07/25  4:15 AM    Specimen: Blood   Result Value Ref Range    WBC 8.66 3.40 - 10.80 10*3/mm3    RBC 5.10 4.14 - 5.80 10*6/mm3    Hemoglobin 14.9 13.0 - 17.7 g/dL    Hematocrit 45.7 37.5 - 51.0 %    MCV 89.6 79.0 - 97.0 fL    MCH 29.2 26.6 - 33.0 pg    MCHC 32.6 31.5 - 35.7 g/dL    RDW 14.2 12.3 - 15.4 %    RDW-SD 46.9 37.0 - 54.0 fl    MPV 10.8 6.0 - 12.0 fL    Platelets 247 140 - 450 10*3/mm3    Neutrophil % 57.4 42.7 - 76.0 %    Lymphocyte % 31.5 19.6 - 45.3 %    Monocyte % 5.2 5.0 - 12.0 %    Eosinophil % 4.6 0.3 - 6.2 %    Basophil % 1.0 0.0 - 1.5 %    Immature Grans % 0.3 0.0 - 0.5 %    Neutrophils, Absolute 4.96 1.70 - 7.00 10*3/mm3    Lymphocytes, Absolute 2.73 0.70 - 3.10 10*3/mm3    Monocytes, Absolute 0.45 0.10 - 0.90 10*3/mm3    Eosinophils, Absolute 0.40 0.00 - 0.40 10*3/mm3    Basophils, Absolute 0.09 0.00 - 0.20 10*3/mm3    Immature Grans, Absolute 0.03 0.00 - 0.05 10*3/mm3    nRBC 0.0 0.0 - 0.2 /100 WBC   Lipase    Collection Time: 02/07/25  4:15 AM    Specimen: Blood   Result Value Ref Range    Lipase 103 (H) 13 - 60 U/L   Lactic Acid, Plasma    Collection Time: 02/07/25  4:15 AM    Specimen: Blood   Result Value Ref Range    Lactate 1.0 0.5 - 2.0 mmol/L   Urinalysis With Microscopic If Indicated (No Culture) - Urine, Clean Catch    Collection Time: 02/07/25  4:55 AM    Specimen: Urine, Clean Catch   Result Value Ref Range     Color, UA Yellow Yellow, Straw    Appearance, UA Clear Clear    pH, UA <=5.0 5.0 - 8.0    Specific Gravity, UA 1.011 1.001 - 1.030    Glucose, UA Negative Negative    Ketones, UA Negative Negative    Bilirubin, UA Negative Negative    Blood, UA Trace (A) Negative    Protein, UA Negative Negative    Leuk Esterase, UA Negative Negative    Nitrite, UA Negative Negative    Urobilinogen, UA 0.2 E.U./dL 0.2 - 1.0 E.U./dL   Urinalysis, Microscopic Only - Urine, Clean Catch    Collection Time: 02/07/25  4:55 AM    Specimen: Urine, Clean Catch   Result Value Ref Range    RBC, UA 0-2 None Seen, 0-2 /HPF    WBC, UA 0-2 None Seen, 0-2 /HPF    Bacteria, UA None Seen None Seen, Trace /HPF    Squamous Epithelial Cells, UA 0-2 None Seen, 0-2 /HPF    Hyaline Casts, UA 0-6 0 - 6 /LPF    Methodology Automated Microscopy    COVID-19 and FLU A/B PCR, 1 HR TAT - Swab, Nasopharynx    Collection Time: 02/07/25  4:56 AM    Specimen: Nasopharynx; Swab   Result Value Ref Range    COVID19 Not Detected Not Detected - Ref. Range    Influenza A PCR Not Detected Not Detected    Influenza B PCR Not Detected Not Detected   High Sensitivity Troponin T 1Hr    Collection Time: 02/07/25  5:39 AM    Specimen: Blood   Result Value Ref Range    HS Troponin T 23 (H) <22 ng/L    Troponin T Numeric Delta -1 ng/L    Troponin T % Delta -4 Abnormal if >/= 20%     Note: In addition to lab results from this visit, the labs listed above may include labs taken at another facility or during a different encounter within the last 24 hours. Please correlate lab times with ED admission and discharge times for further clarification of the services performed during this visit.    CT Abdomen Pelvis Without Contrast   Final Result   Impression:   1.No acute abdominal or pelvic abnormality.   2.Cholelithiasis without evidence of acute cholecystitis.   3.Bilateral nonobstructing kidney stones.   4.Prostatomegaly.   5.Small amount of gas in the urinary bladder could be from  recent catheterization or cystitis.                        Electronically Signed: Rony Orta MD     2/7/2025 5:55 AM EST     Workstation ID: [id number]      CT Chest Without Contrast Diagnostic   Final Result   Impression:   1.Small pericardial effusion, which appears intermediate/low density.   2.Small left-sided pleural effusion with left basilar atelectasis.   3.Trace right pleural effusion.   4.Mild cardiomegaly.   5.Severe coronary artery disease.   6.Small hiatal hernia with gastric band device in place.   7.Cholelithiasis without evidence of acute cholecystitis.   8.Bilateral nonobstructing kidney stones.                  Electronically Signed: Rony Orta MD     2/7/2025 5:51 AM EST     Workstation ID: [id number]      XR Chest 1 View   Final Result   Impression:   Stable cardiomegaly and left basilar scarring.               Electronically Signed: Rony Orta MD     2/7/2025 4:25 AM EST     Workstation ID: [id number]        Vitals:    02/07/25 0611 02/07/25 0616 02/07/25 0643 02/07/25 0658   BP:  (!) 136/102 (!) 158/109 (!) 169/112   BP Location:       Patient Position:       Pulse: 76 75 76 76   Resp:       Temp:       TempSrc:       SpO2: 96% 95% 96% 96%   Weight:       Height:         Medications   sodium chloride 0.9 % flush 10 mL (has no administration in time range)   fentaNYL citrate (PF) (SUBLIMAZE) injection 25 mcg (25 mcg Intravenous Given 2/7/25 0505)   ondansetron ODT (ZOFRAN-ODT) disintegrating tablet 4 mg (4 mg Translingual Given 2/7/25 0505)   oxyCODONE-acetaminophen (PERCOCET) 5-325 MG per tablet 1 tablet (1 tablet Oral Given 2/7/25 0655)     ECG/EMG Results (last 24 hours)       Procedure Component Value Units Date/Time    ECG 12 Lead ED Triage Standing Order; SOA [[id number]] Collected: 02/07/25 0400     Updated: 02/07/25 0400     QT Interval 464 ms      QTC Interval 522 ms     Narrative:      Test Reason : ED Triage Standing Order~  Blood Pressure :   */*   mmHG  Vent. Rate :  76 BPM      Atrial Rate :  76 BPM     P-R Int : 310 ms          QRS Dur : 188 ms      QT Int : 464 ms       P-R-T Axes :  51 -68 119 degrees    QTcB Int : 522 ms    Atrial-paced rhythm with prolonged AV conduction  Left axis deviation  Left bundle branch block  Abnormal ECG  When compared with ECG of 19-Feb-2024 09:41,  QRS axis shifted left  Nonspecific T wave abnormality no longer evident in Inferior leads    Referred By: EDMD           Confirmed By:           ECG 12 Lead ED Triage Standing Order; SOA   Preliminary Result   Test Reason : ED Triage Standing Order~   Blood Pressure :   */*   mmHG   Vent. Rate :  76 BPM     Atrial Rate :  76 BPM      P-R Int : 310 ms          QRS Dur : 188 ms       QT Int : 464 ms       P-R-T Axes :  51 -68 119 degrees     QTcB Int : 522 ms      Atrial-paced rhythm with prolonged AV conduction   Left axis deviation   Left bundle branch block   Abnormal ECG   When compared with ECG of 19-Feb-2024 09:41,   QRS axis shifted left   Nonspecific T wave abnormality no longer evident in Inferior leads      Referred By: EDMD           Confirmed By:               Final diagnoses:   Right upper quadrant abdominal pain   Dyspnea, unspecified type   Calculus of gallbladder without cholecystitis without obstruction   Chronic kidney disease, unspecified CKD stage       ED Disposition  ED Disposition       ED Disposition   Discharge    Condition   Stable    Comment   --               Keven Bear MD  [address]  [facility] [id number] [phone]    Schedule an appointment as soon as possible for a visit in 1 week  primary care provider    Yann Mendoza MD  [address]  [address] [facility] [address]  [phone]    Call today  your cardiologist, continue with the medication adjustments as directed by your cardiologist, recent tests seem to be improving.    Gus Meyer MD  [address]  [place] [id number]  [phone]    Schedule an appointment as  soon as possible for a visit in 1 week  This is a general surgeon or you can follow up with your general surgeon of choice to discuss gallbladder removal for gallstones and pain.         Medication List        New Prescriptions      ondansetron ODT 4 MG disintegrating tablet  Commonly known as: ZOFRAN-ODT  Place 1 tablet on the tongue Every 6 (Six) Hours As Needed for Nausea or Vomiting. As needed for nausea     oxyCODONE 5 MG immediate release tablet  Commonly known as: Roxicodone  Take 1 tablet by mouth Every 4 (Four) Hours As Needed for Severe Pain.            Changed      Entresto  MG tablet  Generic drug: sacubitril-valsartan  Take 1 tablet by mouth 2 (Two) Times a Day.  What changed: how much to take            Stop      saccharomyces boulardii 250 MG capsule  Commonly known as: FLORASTOR               Where to Get Your Medications        These medications were sent to [id number] - [place], [place] - [address] AT [facility] [address] - [phone] PH - [phone] FX  [address] [place] [address] [place] [address]      Phone: [phone]   ondansetron ODT 4 MG disintegrating tablet  oxyCODONE 5 MG immediate release tablet

## 2025-02-07 NOTE — DISCHARGE INSTRUCTIONS
Return to the ER if you develop fever over 101, intractable pain despite taking medication as prescribed, or intractable vomiting.

## 2025-02-10 ENCOUNTER — OFFICE VISIT (OUTPATIENT)
Age: 70
End: 2025-02-10
Payer: MEDICARE

## 2025-02-10 DIAGNOSIS — N20.0 RIGHT NEPHROLITHIASIS: ICD-10-CM

## 2025-02-10 DIAGNOSIS — C61 PROSTATE CANCER MANAGED WITH ACTIVE SURVEILLANCE: Primary | ICD-10-CM

## 2025-02-10 PROCEDURE — 1159F MED LIST DOCD IN RCRD: CPT | Performed by: STUDENT IN AN ORGANIZED HEALTH CARE EDUCATION/TRAINING PROGRAM

## 2025-02-10 PROCEDURE — 99213 OFFICE O/P EST LOW 20 MIN: CPT | Performed by: STUDENT IN AN ORGANIZED HEALTH CARE EDUCATION/TRAINING PROGRAM

## 2025-02-10 PROCEDURE — 1160F RVW MEDS BY RX/DR IN RCRD: CPT | Performed by: STUDENT IN AN ORGANIZED HEALTH CARE EDUCATION/TRAINING PROGRAM

## 2025-02-10 RX ORDER — BUMETANIDE 1 MG/1
1 TABLET ORAL 2 TIMES DAILY
COMMUNITY

## 2025-02-10 RX ORDER — COLCHICINE 0.6 MG/1
0.6 TABLET ORAL DAILY
COMMUNITY
End: 2025-02-14

## 2025-02-10 RX ORDER — SACCHAROMYCES BOULARDII 250 MG
250 CAPSULE ORAL 2 TIMES DAILY
COMMUNITY
End: 2025-02-17

## 2025-02-10 NOTE — PROGRESS NOTES
Follow Up Office Visit      Patient Name: Andre Agosto  : 1955   MRN: [id number]     Chief Complaint:    Chief Complaint   Patient presents with    Prostate Cancer    gross hematuria       Referring Provider: Keven Bear MD    History of Present Illness: Andre Agosto is a 70 y.o. male who presents today for follow up of complex urologic history.  Complex medical history including atrial flutter, pericardial effusion, CHF, hypertension, gallstones etc. he was last seen in November.  The patient is a previous patient of Dr. De La Cruz who transferred care to [place].  Diagnosed with very low-volume grade group 1 prostate cancer 2024.  The PSA at that time was around 4.7 and he patient was placed on active surveillance.      DIAGNOSIS:  A.     PROSTATE, NEEDLE CORE BIOPSY, RIGHT APEX:  Benign prostate tissue  Negative for malignancy    B.     PROSTATE, NEEDLE CORE BIOPSY, RIGHT MID:  Benign prostate tissue  Negative for malignancy    C.     PROSTATE, NEEDLE CORE BIOPSY, RIGHT BASE:  Benign prostate tissue  Negative for malignancy    D.     PROSTATE, NEEDLE CORE BIOPSY, LEFT APEX:  Prostatic acinar adenocarcinoma  [place]'s pattern 3+3, grade group 1  Carcinoma involves 1/2 cores, approximately 5% of total tissue volume  Negative for perineural invasion    E.     PROSTATE, NEEDLE CORE BIOPSY, LEFT MID:  Benign prostate tissue  Negative for malignancy    F.     PROSTATE, NEEDLE CORE BIOPSY, LEFT BASE:  Benign prostate tissue  Negative for malignancy    G.     PROSTATE, NEEDLE CORE BIOPSY, RIGHT TRANS ZONE:  Benign prostate tissue  Negative for malignancy    H.     PROSTATE, NEEDLE CORE BIOPSY, LEFT TRANS ZONE:  Benign prostate tissue  Negative for malignancy    He developed pos-prostate biopsy sepsis and had a very prolonged recovery limiting my ability to consider treating his right nephrolithiasis.     He has undergone left-sided kidney stone treatment back in 2022 with Dr De La Cruz.   He has a history of recurrent nephrolithiasis.      Patient's PSA is rising.  He has a very complex history and recently states he was admitted to the hospital for chest pain and was found to have a large pericardial effusion.  He underwent a pericardial window with his CT surgeon at [facility]. Records reviewed. Patient states 1 L of fluid was removed from his pericardium. He was admitted to the hospital for congestive heart failure and pericardial effusion on January 24 and was discharged on January 30.  He is on Xarelto and aspirin.  He has BPH managing with tamsulosin.     Patient was then seen in the emergency department on 2 - 7 - 25 at [facility].  His care has been split between multiple institutions.  Patient was complaining of right upper quadrant abdominal pain.  He underwent a recent CT scan to assess.  He has numerous gallstones.  He has a large collection of stones in the right lower pole and midpole of kidney.  The largest stone is roughly 1.2 cm.  The smaller stone is roughly 7 or 8 mm in size.  He states he has had numerous episodes of urinary tract infection but I do not have any record of positive cultures.  Urine culture on 2 - 4 - 25 was negative.  Urine culture on 1- was negative.  Urine culture on Nelly 10, 2024 was negative.     Given the patient's very prolonged recovery after his initial prostate biopsy which occurred on January 26, 2024, he has had no additional imaging or repeat biopsy.     Lab Results   Component Value Date    PSA 6.770 (H) 02/04/2025    PSA 6.0 (H) 01/31/2025    PSA 4.330 (H) 12/16/2024    PSA 4.010 (H) 09/12/2024    PSA 4.000 06/10/2024    PSA 3.7 06/10/2024           Subjective      Review of System: Review of Systems   Gastrointestinal:  Positive for abdominal pain.   Genitourinary:  Positive for difficulty urinating.      I have reviewed the ROS documented by my clinical staff, I have updated appropriately and I agree. Colin Gaxiola MD    I have  reviewed and the following portions of the patient's history were updated as appropriate: past family history, past medical history, past social history, past surgical history and problem list.    Medications:     Current Outpatient Medications:     amiodarone (PACERONE) 100 MG tablet, Take 1 tablet by mouth Daily., Disp: , Rfl:     aspirin 81 MG EC tablet, Take 1 tablet by mouth Daily., Disp: , Rfl:     brivaracetam (Briviact) 75 MG tablet, Take 1 tablet by mouth 2 (Two) Times a Day., Disp: 180 tablet, Rfl: 1    bumetanide (BUMEX) 1 MG tablet, Take 1 tablet by mouth 2 (Two) Times a Day. 2 tabs in AM and 1 tab in PM per patient, Disp: , Rfl:     carvedilol (Coreg) 25 MG tablet, Take 1 tablet by mouth 2 (Two) Times a Day With Meals. (Patient taking differently: Take 0.5 tablets by mouth 2 (Two) Times a Day With Meals.), Disp: 180 tablet, Rfl: 3    colchicine 0.6 MG tablet, Take 1 tablet by mouth Daily., Disp: , Rfl:     Entresto  MG tablet, Take 1 tablet by mouth 2 (Two) Times a Day. (Patient taking differently: Take 0.5 tablets by mouth 2 (Two) Times a Day.), Disp: 180 tablet, Rfl: 3    levothyroxine (SYNTHROID, LEVOTHROID) 88 MCG tablet, TAKE 1 TABLET DAILY, Disp: 90 tablet, Rfl: 3    ondansetron ODT (ZOFRAN-ODT) 4 MG disintegrating tablet, Place 1 tablet on the tongue Every 6 (Six) Hours As Needed for Nausea or Vomiting. As needed for nausea, Disp: 12 tablet, Rfl: 0    oxyCODONE (Roxicodone) 5 MG immediate release tablet, Take 1 tablet by mouth Every 4 (Four) Hours As Needed for Severe Pain., Disp: 12 tablet, Rfl: 0    rosuvastatin (CRESTOR) 20 MG tablet, Take 1 tablet by mouth Every Night., Disp: , Rfl:     saccharomyces boulardii (Florastor) 250 MG capsule, Take 1 capsule by mouth 2 (Two) Times a Day., Disp: , Rfl:     spironolactone (ALDACTONE) 50 MG tablet, Take 1 tablet by mouth Daily., Disp: , Rfl:     tamsulosin (FLOMAX) 0.4 MG capsule 24 hr capsule, Take 1 capsule by mouth Daily., Disp: 90 capsule,  Rfl: 3    Xarelto 15 MG tablet, Take 1 tablet by mouth Daily With Dinner., Disp: 42 tablet, Rfl: 0    Tirzepatide 2.5 MG/0.5ML solution auto-injector, Inject 2.5 mg under the skin into the appropriate area as directed 1 (One) Time Per Week. (Patient not taking: Reported on 2/10/2025), Disp: 2 mL, Rfl: 0    Allergies:   Allergies   Allergen Reactions    Crestor [Rosuvastatin] Provider Review Needed    5-Alpha Reductase Inhibitors Other (See Comments)     Myalgias    Amlodipine Swelling and Other (See Comments)     Feet swell    Statins Myalgia and Other (See Comments)       IPSS Questionnaire (AUA-7):  Over the past month…    1)  Incomplete Emptying:       How often have you had a sensation of not emptying you had the sensation of not emptying your bladder completely after you finished urinating?  0 - Not at all   2)  Frequency:       How often have you had the urinate again less than two hours after you finished urinating?  3 - About half the time   3)  Intermittency:       How often have you found you stopped and started again several times when you urinated?   2 - Less than half the time   4) Urgency:      How often have you found it difficult to postpone urination?  0 - Not at all   5) Weak Stream:      How often have you had a weak urinary stream?  1 - Less than 1 time in 5   6) Straining:       How often have you had to push or strain to begin urination?  0 - Not at all   7) Nocturia:      How many times did you most typically get up to urinate from the time you went to bed at night until the time you got up in the morning?  3 - 3 times   Total Score:  9   The International Prostate Symptom Score (IPSS) is used to screen, diagnose, track symptoms of benign prostatic hyperplasia (BPH).   0-7 (Mild Symptoms) 8-19 (Moderate) 20-35 (Severe)   Quality of Life (QoL):  If you were to spend the rest of your life with your urinary condition just the way it is now, how would you feel about that? 3-Mixed   Urine Leakage  (Incontinence) 0-No Leakage       Objective     Physical Exam:   Vital Signs: There were no vitals filed for this visit.  There is no height or weight on file to calculate BMI.     Physical Exam  Constitutional:       Appearance: Normal appearance.   HENT:      Head: Normocephalic and atraumatic.      Nose: Nose normal.   Eyes:      Extraocular Movements: Extraocular movements intact.      Conjunctiva/sclera: Conjunctivae normal.      Pupils: Pupils are equal, round, and reactive to light.   Musculoskeletal:         General: Normal range of motion.      Cervical back: Normal range of motion and neck supple.   Skin:     General: Skin is warm and dry.      Findings: No lesion or rash.   Neurological:      General: No focal deficit present.      Mental Status: He is alert and oriented to person, place, and time. Mental status is at baseline.   Psychiatric:         Mood and Affect: Mood normal.         Behavior: Behavior normal.         Labs:   Brief Urine Lab Results  (Last result in the past 365 days)        Color   Clarity   Blood   Leuk Est   Nitrite   Protein   CREAT   Urine HCG        02/07/25 0455 Yellow   Clear   Trace   Negative   Negative   Negative                   Urine Culture          6/10/2024    10:03 1/21/2025    16:17 2/4/2025    12:20   Urine Culture   Urine Culture No growth  No growth  No growth         Lab Results   Component Value Date    GLUCOSE 87 02/07/2025    CALCIUM 9.1 02/07/2025     02/07/2025    K 4.7 02/07/2025    CO2 25.0 02/07/2025     02/07/2025    BUN 28 (H) 02/07/2025    CREATININE 1.92 (H) 02/07/2025    EGFRIFAFRI 57 (L) 12/05/2018    EGFRIFNONA 55 (L) 01/10/2022    BCR 14.6 02/07/2025    ANIONGAP 12.0 02/07/2025       Lab Results   Component Value Date    WBC 8.66 02/07/2025    HGB 14.9 02/07/2025    HCT 45.7 02/07/2025    MCV 89.6 02/07/2025     02/07/2025       Images:   CT Abdomen Pelvis Without Contrast    Result Date: 2/7/2025  Impression: 1.No acute  abdominal or pelvic abnormality. 2.Cholelithiasis without evidence of acute cholecystitis. 3.Bilateral nonobstructing kidney stones. 4.Prostatomegaly. 5.Small amount of gas in the urinary bladder could be from recent catheterization or cystitis. Electronically Signed: Rony Orta MD  2/7/2025 5:55 AM EST  Workstation ID: [id number]    CT Chest Without Contrast Diagnostic    Result Date: 2/7/2025  Impression: 1.Small pericardial effusion, which appears intermediate/low density. 2.Small left-sided pleural effusion with left basilar atelectasis. 3.Trace right pleural effusion. 4.Mild cardiomegaly. 5.Severe coronary artery disease. 6.Small hiatal hernia with gastric band device in place. 7.Cholelithiasis without evidence of acute cholecystitis. 8.Bilateral nonobstructing kidney stones. Electronically Signed: Rony Orta MD  2/7/2025 5:51 AM EST  Workstation ID: [id number]    XR Chest 1 View    Result Date: 2/7/2025  Impression: Stable cardiomegaly and left basilar scarring. Electronically Signed: Rony Orta MD  2/7/2025 4:25 AM EST  Workstation ID: [id number]    XR Chest 1 View    Result Date: 1/29/2025  There has been no significant interval change . Continued follow up recommended. Images reviewed, interpreted, and dictated by Dr. Wing Ferguson. Transcribed by Rocio Roe PA-C.    XR Chest 1 View    Result Date: 1/28/2025  No significant change. Images reviewed, interpreted, and dictated by Dr. Wing Ferguson. Transcribed by Luis Enrique Rodriguez PA-C    XR Chest 1 View    Result Date: 1/26/2025  No acute process.    Ultrasound renal limited    Result Date: 1/26/2025  Bilateral nonobstructing renal calculi.    XR Chest 1 View    Result Date: 1/25/2025  No significant change since previous. Images reviewed, interpreted, and dictated by Dr. Gabrielle Cantor. Transcribed by Luis Enrique Rodriguez PA-C    XR Chest 1 View    Result Date: 1/24/2025  Left basilar chest tube or pericardial drain in place. No pneumothorax or pleural  effusion. Images reviewed, interpreted, and dictated by Dr. Kelechi Segovia. Transcribed by Ian Estrada.    CT Abdomen Pelvis Stone Protocol    Result Date: 1/16/2025  Bilateral nephrolithiasis similar to prior; no hydronephrosis seen.     CTDI: 20.07 mGy DLP:1131.38 mGy.cm  This report was signed and finalized on 1/16/2025 3:45 PM by Jane Kay MD.      CT Cervical Spine Without Contrast    Result Date: 11/25/2024  Impression: 1. Superficial soft tissue injury to the right parietal scalp, right face and right neck soft tissues including a small right scalp hematoma. Negative for acute intracranial hemorrhage or subjacent skull fracture. 2. Negative for displaced facial bone fracture. Left maxillary sinus disease. 3. Negative for displaced fracture or traumatic malalignment of the cervical spine. Multilevel cervical spondylosis without high-grade central spinal canal stenosis. 4. Other chronic/ancillary findings as above. Electronically Signed: Arnel Barrett MD  11/25/2024 2:36 PM EST  Workstation ID: [id number]    CT Facial Bones Without Contrast    Result Date: 11/25/2024  Impression: 1. Superficial soft tissue injury to the right parietal scalp, right face and right neck soft tissues including a small right scalp hematoma. Negative for acute intracranial hemorrhage or subjacent skull fracture. 2. Negative for displaced facial bone fracture. Left maxillary sinus disease. 3. Negative for displaced fracture or traumatic malalignment of the cervical spine. Multilevel cervical spondylosis without high-grade central spinal canal stenosis. 4. Other chronic/ancillary findings as above. Electronically Signed: Arnel Barrett MD  11/25/2024 2:36 PM EST  Workstation ID: [id number]    CT Head Without Contrast    Result Date: 11/25/2024  Impression: 1. Superficial soft tissue injury to the right parietal scalp, right face and right neck soft tissues including a small right scalp hematoma. Negative for acute intracranial hemorrhage  or subjacent skull fracture. 2. Negative for displaced facial bone fracture. Left maxillary sinus disease. 3. Negative for displaced fracture or traumatic malalignment of the cervical spine. Multilevel cervical spondylosis without high-grade central spinal canal stenosis. 4. Other chronic/ancillary findings as above. Electronically Signed: Arnel Barrett MD  11/25/2024 2:36 PM EST  Workstation ID: [id number]      Measures:   Tobacco:   Andre Agosto  reports that he quit smoking about 39 years ago. His smoking use included cigarettes. He started smoking about 50 years ago. He has a 7.5 pack-year smoking history. He has been exposed to tobacco smoke. He has never used smokeless tobacco.     Assessment / Plan      Assessment/Plan:   70 y.o. male who presented today for follow up of low risk prostate cancer on surveillance diagnosed by Dr. De La Cruz in [place].  Recurrent nephrolithiasis.  History of urosepsis.  Large collection of stones in the right renal pelvis.  Not a candidate for shockwave lithotripsy.  Discussed he would require ureteroscopy and lithotripsy.  He just had a pericardial window for pericardial effusion.  He is on blood thinners.  Needs to consider repeat imaging or biopsy given low risk prostate cancer on surveillance in the setting of rising PSA.  Discussed with patient after review of his records and ongoing + worsening medical complexity, I do not feel comfortable continuing to manage his care and I would recommend he consolidate his care to the Urology team at the [facility] given his high risk medical issues.  Further discussion regarding his nephrolithiasis management and prostate cancer surveillance can be completed at .     Diagnoses and all orders for this visit:    1. Prostate cancer managed with active surveillance (Primary)  -     Ambulatory Referral to Urology    2. Right nephrolithiasis  -     Ambulatory Referral to Urology           Follow Up:   Return for Transferring care to   .    I spent approximately 20 minutes providing clinical care for this patient; including review of patient's chart and provider documentation, face to face time spent with patient in examination room (obtaining history, performing physical exam, discussing diagnosis and management options), placing orders, and completing patient documentation.     Colin Gaxiola MD  [facility] [place]

## 2025-02-11 PROBLEM — Z98.890 STATUS POST PERICARDIOCENTESIS: Status: ACTIVE | Noted: 2025-02-11

## 2025-02-12 ENCOUNTER — OFFICE VISIT (OUTPATIENT)
Age: 70
End: 2025-02-12
Payer: MEDICARE

## 2025-02-12 VITALS
HEART RATE: 79 BPM | SYSTOLIC BLOOD PRESSURE: 138 MMHG | HEIGHT: 76 IN | WEIGHT: 306 LBS | BODY MASS INDEX: 37.26 KG/M2 | DIASTOLIC BLOOD PRESSURE: 79 MMHG

## 2025-02-12 DIAGNOSIS — I48.0 PAROXYSMAL ATRIAL FIBRILLATION: ICD-10-CM

## 2025-02-12 DIAGNOSIS — Z98.890 STATUS POST PERICARDIOCENTESIS: ICD-10-CM

## 2025-02-12 DIAGNOSIS — I50.1 LEFT HEART FAILURE WITH REDUCED LEFT VENTRICULAR FUNCTION: Primary | ICD-10-CM

## 2025-02-12 DIAGNOSIS — I10 BENIGN ESSENTIAL HYPERTENSION: ICD-10-CM

## 2025-02-12 RX ORDER — DAPAGLIFLOZIN 10 MG/1
10 TABLET, FILM COATED ORAL DAILY
Qty: 90 TABLET | Refills: 1 | Status: SHIPPED | OUTPATIENT
Start: 2025-02-12

## 2025-02-12 RX ORDER — SACUBITRIL AND VALSARTAN 24; 26 MG/1; MG/1
1 TABLET, FILM COATED ORAL 2 TIMES DAILY
COMMUNITY

## 2025-02-12 NOTE — ASSESSMENT & PLAN NOTE
The patient will continue Entresto, last visit was reduced to 49-51 milligram due to left heart failure and patient was having shortness of breath.  The patient called in after discussing with the daughter and the family we decided to increase the Bumex dose to 2 mg p.o. twice daily, the patient will continue to check blood pressure and weight, keeping the blood pressure above 100 and once weight drops back to the original weight the patient will cut back on the Bumex to once a day.  This has been discussed in detail with the family.  T.  His is a highly complex medical condition that needs constant observation.  We did multiple conversation with the family on the phone.  The dose of spironolactone was also reduced to 25 mg once a day last visit due to worry of dropping blood pressure with increasing diuresis.  Since patient is started on colchicine potassium and kidney function is closely monitored.  The patient had CKD stage III.  The patient fully understand with the family in the room about how to use bumetanide medication up in case to gain weight.  The patient will have close follow-up office potassium and other blood work.  Patient is also going to find a nephrologist for further checkup of his blood work.  Orders:    Basic Metabolic Panel; Future    CBC & Differential; Future    Lipid Panel; Future

## 2025-02-12 NOTE — ASSESSMENT & PLAN NOTE
The patient will continue on Xarelto 15 mg once a day.  Orders:    Basic Metabolic Panel; Future    CBC & Differential; Future    Lipid Panel; Future

## 2025-02-12 NOTE — ASSESSMENT & PLAN NOTE
The patient was started on colchicine 0.6 mg po twice a day.  Will repeat limited echo prior to next visit patient is going to get an echo at [facility] for..  Heart.    Orders:    Basic Metabolic Panel; Future    CBC & Differential; Future    Lipid Panel; Future

## 2025-02-12 NOTE — PROGRESS NOTES
Cardiology Follow-Up Note     Name: Andre Agosto  :   1955  PCP: Keven Bear MD  Date:   2025  Department: [facility] CARDIOLOGY  [address]  [facility] [address]  Fax [phone]  Phone [phone]    Chief Complaint   Patient presents with    Hyperlipidemia    Hypertension       Subjective     History of Present Illness  Andre Agosto is a 70 y.o. male who presents today for follow-up for labs and post pericardiocentesis, the patient has developed pericardial tamponade and underwent pericardiocentesis with pericardial window placement at [facility] on 2025.  The patient has complex medical history with hypertension and chronic kidney disease on colchicine started last visit due to pericardial effusion.  The patient has history of coronary artery disease status post stents with last heart catheterization revealing ejection fraction 45% with patent stents.  Last echocardiogram revealed ejection fraction 25%.  The patient also has atrial fibrillation and is on oral anticoagulant.  The etiology of pericardial effusion is unknown.      Current Outpatient Medications:     amiodarone (PACERONE) 100 MG tablet, Take 1 tablet by mouth Daily., Disp: , Rfl:     aspirin 81 MG EC tablet, Take 1 tablet by mouth Daily., Disp: , Rfl:     brivaracetam (Briviact) 75 MG tablet, Take 1 tablet by mouth 2 (Two) Times a Day., Disp: 180 tablet, Rfl: 1    bumetanide (BUMEX) 1 MG tablet, Take 1 tablet by mouth 2 (Two) Times a Day. 2 tabs in AM and 1 tab in PM per patient, Disp: , Rfl:     carvedilol (Coreg) 25 MG tablet, Take 1 tablet by mouth 2 (Two) Times a Day With Meals., Disp: 180 tablet, Rfl: 3    colchicine 0.6 MG tablet, Take 1 tablet by mouth Daily., Disp: , Rfl:     levothyroxine (SYNTHROID, LEVOTHROID) 88 MCG tablet, TAKE 1 TABLET DAILY, Disp: 90 tablet, Rfl: 3    ondansetron ODT (ZOFRAN-ODT) 4 MG disintegrating  "tablet, Place 1 tablet on the tongue Every 6 (Six) Hours As Needed for Nausea or Vomiting. As needed for nausea, Disp: 12 tablet, Rfl: 0    rosuvastatin (CRESTOR) 20 MG tablet, Take 1 tablet by mouth Every Night., Disp: , Rfl:     saccharomyces boulardii (Florastor) 250 MG capsule, Take 1 capsule by mouth 2 (Two) Times a Day., Disp: , Rfl:     sacubitril-valsartan (Entresto) 24-26 MG tablet, Take 1 tablet by mouth 2 (Two) Times a Day., Disp: , Rfl:     tamsulosin (FLOMAX) 0.4 MG capsule 24 hr capsule, Take 1 capsule by mouth Daily., Disp: 90 capsule, Rfl: 3    Xarelto 15 MG tablet, Take 1 tablet by mouth Daily With Dinner., Disp: 42 tablet, Rfl: 0    dapagliflozin Propanediol (Farxiga) 10 MG tablet, Take 10 mg by mouth Daily., Disp: 90 tablet, Rfl: 1    oxyCODONE (Roxicodone) 5 MG immediate release tablet, Take 1 tablet by mouth Every 4 (Four) Hours As Needed for Severe Pain. (Patient not taking: Reported on 2/12/2025), Disp: 12 tablet, Rfl: 0    Tirzepatide 2.5 MG/0.5ML solution auto-injector, Inject 2.5 mg under the skin into the appropriate area as directed 1 (One) Time Per Week. (Patient not taking: Reported on 1/31/2025), Disp: 2 mL, Rfl: 0    Objective     Vital Signs:  /79 (BP Location: Left arm, Patient Position: Sitting)   Pulse 79   Ht 193 cm (76\")   Wt (!) 139 kg (306 lb)   BMI 37.25 kg/m²   Estimated body mass index is 37.25 kg/m² as calculated from the following:    Height as of this encounter: 193 cm (76\").    Weight as of this encounter: 139 kg (306 lb).               Vitals reviewed.   Constitutional:       Appearance: Normal and healthy appearance.   Eyes:      Pupils: Pupils are equal, round, and reactive to light.   Pulmonary:      Effort: Pulmonary effort is normal.   Chest:      Chest wall: Not tender to palpatation.   Cardiovascular:      PMI at left midclavicular line. Normal rate. Regular rhythm.      No gallop.    Pulses:     Intact distal pulses.   Edema:     Peripheral edema " absent.   Skin:     General: Skin is warm.   Psychiatric:         Behavior: Behavior is cooperative.              Data Review:   Lab Results   Component Value Date    GLUCOSE 87 02/07/2025    BUN 28 (H) 02/07/2025    CREATININE 1.92 (H) 02/07/2025    EGFRIFNONA 55 (L) 01/10/2022    EGFRIFAFRI 57 (L) 12/05/2018    BCR 14.6 02/07/2025    K 4.7 02/07/2025    CO2 25.0 02/07/2025    CALCIUM 9.1 02/07/2025    ALBUMIN 3.7 02/07/2025    AST 18 02/07/2025    ALT 19 02/07/2025     Lab Results   Component Value Date    CHOL 150 09/12/2024    CHLPL 182 01/15/2025    TRIG 187 (H) 01/15/2025    HDL 37 (L) 01/15/2025     (H) 01/15/2025      Lab Results   Component Value Date    WBC 8.66 02/07/2025    RBC 5.10 02/07/2025    HGB 14.9 02/07/2025    HCT 45.7 02/07/2025    MCV 89.6 02/07/2025     02/07/2025     Lab Results   Component Value Date    TSH 1.590 02/04/2025     Lab Results   Component Value Date    HGBA1C 6.30 (H) 02/04/2025     Lab Results   Component Value Date    INR 1.23 (H) 01/24/2025    INR 1.26 (H) 01/24/2025    INR 1.4 (H) 09/03/2024    PROTIME 13.3 (H) 01/24/2025    PROTIME 13.6 (H) 01/24/2025    PROTIME 15.3 (H) 01/24/2024       Transthoracic echo dated 1/24/2025  EF 25 % with severe global hypokinesis.  Moderate left ventricular hypertrophy.  Dilated aortic root 4.5 cm.  Post pericardiocentesis and window no pericardial effusion noted.    Last heart catheterization dated 7/29/2021  Ejection fraction 25% CAD with patent stent    S/p ICD dated 7/27/2021 [facility] by Dr. Potter.  SELWYN DC cardioversion dated 6/21/2021.    Assessment and Plan     Assessment & Plan  Left heart failure with reduced left ventricular function  The patient will continue Entresto, last visit was reduced to 49-51 milligram due to left heart failure and patient was having shortness of breath.  The patient called in after discussing with the daughter and the family we decided to increase the Bumex dose to 2 mg p.o. twice daily, the  patient will continue to check blood pressure and weight, keeping the blood pressure above 100 and once weight drops back to the original weight the patient will cut back on the Bumex to once a day.  This has been discussed in detail with the family.  T.  His is a highly complex medical condition that needs constant observation.  We did multiple conversation with the family on the phone.  The dose of spironolactone was also reduced to 25 mg once a day last visit due to worry of dropping blood pressure with increasing diuresis.  Since patient is started on colchicine potassium and kidney function is closely monitored.  The patient had CKD stage III.  The patient fully understand with the family in the room about how to use bumetanide medication up in case to gain weight.  The patient will have close follow-up office potassium and other blood work.  Patient is also going to find a nephrologist for further checkup of his blood work.  Orders:    Basic Metabolic Panel; Future    CBC & Differential; Future    Lipid Panel; Future    Paroxysmal atrial fibrillation  The patient will continue on Xarelto 15 mg once a day.  Orders:    Basic Metabolic Panel; Future    CBC & Differential; Future    Lipid Panel; Future    Benign essential hypertension    The patient will continue Entresto and spironolactone.  The patient has CKD stage III.  The patient fully understand the risk to the kidneys with the colchicine.  Orders:    Basic Metabolic Panel; Future    CBC & Differential; Future    Lipid Panel; Future    Status post pericardiocentesis  The patient was started on colchicine 0.6 mg po twice a day.  Will repeat limited echo prior to next visit patient is going to get an echo at [facility] for..  Heart.    Orders:    Basic Metabolic Panel; Future    CBC & Differential; Future    Lipid Panel; Future      Advised to continue current cardiac medications. Please notify of any issues. Discussed with the patient  compliance with medical management and follow-up.     Follow Up  Return in about 8 weeks (around 4/9/2025).    Call if you have any significant symptoms or go to the [facility] if possible.     Yann Mendoza MD, FACC,[facility].  [facility]    Part of this note may be an electronic transcription/translation of spoken language to printed text using the Dragon Dictation System.

## 2025-02-12 NOTE — ASSESSMENT & PLAN NOTE
The patient will continue Entresto and spironolactone.  The patient has CKD stage III.  The patient fully understand the risk to the kidneys with the colchicine.  Orders:    Basic Metabolic Panel; Future    CBC & Differential; Future    Lipid Panel; Future

## 2025-02-14 ENCOUNTER — OFFICE VISIT (OUTPATIENT)
Dept: INTERNAL MEDICINE | Facility: CLINIC | Age: 70
End: 2025-02-14
Payer: MEDICARE

## 2025-02-14 VITALS
BODY MASS INDEX: 37.87 KG/M2 | SYSTOLIC BLOOD PRESSURE: 126 MMHG | RESPIRATION RATE: 16 BRPM | WEIGHT: 311 LBS | OXYGEN SATURATION: 97 % | HEIGHT: 76 IN | DIASTOLIC BLOOD PRESSURE: 82 MMHG | HEART RATE: 69 BPM | TEMPERATURE: 96.7 F

## 2025-02-14 DIAGNOSIS — R53.1 WEAKNESS: ICD-10-CM

## 2025-02-14 DIAGNOSIS — K80.20 GALLSTONES: ICD-10-CM

## 2025-02-14 DIAGNOSIS — N20.0 NEPHROLITHIASIS: Primary | ICD-10-CM

## 2025-02-14 DIAGNOSIS — E11.22 TYPE 2 DIABETES MELLITUS WITH STAGE 3A CHRONIC KIDNEY DISEASE, WITHOUT LONG-TERM CURRENT USE OF INSULIN: ICD-10-CM

## 2025-02-14 DIAGNOSIS — I11.0 BENIGN HYPERTENSIVE HEART DISEASE WITH CONGESTIVE HEART FAILURE: ICD-10-CM

## 2025-02-14 DIAGNOSIS — N18.31 TYPE 2 DIABETES MELLITUS WITH STAGE 3A CHRONIC KIDNEY DISEASE, WITHOUT LONG-TERM CURRENT USE OF INSULIN: ICD-10-CM

## 2025-02-14 DIAGNOSIS — R29.6 FALLING: ICD-10-CM

## 2025-02-14 PROCEDURE — 3044F HG A1C LEVEL LT 7.0%: CPT | Performed by: INTERNAL MEDICINE

## 2025-02-14 PROCEDURE — 1126F AMNT PAIN NOTED NONE PRSNT: CPT | Performed by: INTERNAL MEDICINE

## 2025-02-14 PROCEDURE — 3074F SYST BP LT 130 MM HG: CPT | Performed by: INTERNAL MEDICINE

## 2025-02-14 PROCEDURE — G2211 COMPLEX E/M VISIT ADD ON: HCPCS | Performed by: INTERNAL MEDICINE

## 2025-02-14 PROCEDURE — 3079F DIAST BP 80-89 MM HG: CPT | Performed by: INTERNAL MEDICINE

## 2025-02-14 PROCEDURE — 99214 OFFICE O/P EST MOD 30 MIN: CPT | Performed by: INTERNAL MEDICINE

## 2025-02-14 NOTE — PROGRESS NOTES
Subjective     Patient ID: Andre Agosto is a 70 y.o. male. Patient is here for management of multiple medical problems.     Chief Complaint   Patient presents with    Congestive Heart Failure       Pertinent negative symptoms include no abdominal pain, no anorexia, no joint pain, no change in stool, no chest pain, no chills, no congestion, no cough, no diaphoresis, no fatigue, no fever, no headaches, no joint swelling, no myalgias, no nausea, no neck pain, no numbness, no rash, no sore throat, no swollen glands, no dysuria, no vertigo, no visual change, no vomiting and no weakness.      Chf    Urology wants him to go to .         The following portions of the patient's history were reviewed and updated as appropriate: allergies, current medications, past family history, past medical history, past social history, past surgical history and problem list.    Review of Systems   Constitutional:  Negative for chills, diaphoresis, fatigue and fever.   HENT:  Negative for congestion and sore throat.    Respiratory:  Negative for cough.    Cardiovascular:  Negative for chest pain.   Gastrointestinal:  Negative for abdominal pain, anorexia, nausea and vomiting.   Genitourinary:  Negative for dysuria.   Musculoskeletal:  Negative for joint pain, myalgias and neck pain.   Skin:  Negative for rash.   Neurological:  Negative for vertigo, weakness, numbness and headaches.       Current Outpatient Medications:     amiodarone (PACERONE) 100 MG tablet, Take 1 tablet by mouth Daily., Disp: , Rfl:     aspirin 81 MG EC tablet, Take 1 tablet by mouth Daily., Disp: , Rfl:     brivaracetam (Briviact) 75 MG tablet, Take 1 tablet by mouth 2 (Two) Times a Day., Disp: 180 tablet, Rfl: 1    bumetanide (BUMEX) 1 MG tablet, Take 1 tablet by mouth 2 (Two) Times a Day. 2 tabs in AM and 1 tab in PM per patient, Disp: , Rfl:     carvedilol (Coreg) 25 MG tablet, Take 1 tablet by mouth 2 (Two) Times a Day With Meals. (Patient taking differently:  "Take 0.5 tablets by mouth 2 (Two) Times a Day With Meals.), Disp: 180 tablet, Rfl: 3    dapagliflozin Propanediol (Farxiga) 10 MG tablet, Take 10 mg by mouth Daily., Disp: 90 tablet, Rfl: 1    levothyroxine (SYNTHROID, LEVOTHROID) 88 MCG tablet, TAKE 1 TABLET DAILY, Disp: 90 tablet, Rfl: 3    ondansetron ODT (ZOFRAN-ODT) 4 MG disintegrating tablet, Place 1 tablet on the tongue Every 6 (Six) Hours As Needed for Nausea or Vomiting. As needed for nausea, Disp: 12 tablet, Rfl: 0    rosuvastatin (CRESTOR) 20 MG tablet, Take 1 tablet by mouth Every Night., Disp: , Rfl:     saccharomyces boulardii (Florastor) 250 MG capsule, Take 1 capsule by mouth 2 (Two) Times a Day., Disp: , Rfl:     sacubitril-valsartan (Entresto) 24-26 MG tablet, Take 1 tablet by mouth 2 (Two) Times a Day., Disp: , Rfl:     tamsulosin (FLOMAX) 0.4 MG capsule 24 hr capsule, Take 1 capsule by mouth Daily., Disp: 90 capsule, Rfl: 3    Tirzepatide 2.5 MG/0.5ML solution auto-injector, Inject 2.5 mg under the skin into the appropriate area as directed 1 (One) Time Per Week., Disp: 2 mL, Rfl: 0    Xarelto 15 MG tablet, Take 1 tablet by mouth Daily With Dinner., Disp: 42 tablet, Rfl: 0    Objective      Blood pressure 126/82, pulse 69, temperature 96.7 °F (35.9 °C), resp. rate 16, height 193 cm (76\"), weight (!) 141 kg (311 lb), SpO2 97%.            Physical Exam     General Appearance:    Alert, cooperative, no distress, appears stated age   Head:    Normocephalic, without obvious abnormality, atraumatic   Eyes:    PERRL, conjunctiva/corneas clear, EOM's intact   Ears:    Normal TM's and external ear canals, both ears   Nose:   Nares normal, septum midline, mucosa normal, no drainage   or sinus tenderness   Throat:   Lips, mucosa, and tongue normal; teeth and gums normal   Neck:   Supple, symmetrical, trachea midline, no adenopathy;        thyroid:  No enlargement/tenderness/nodules; no carotid    bruit or JVD   Back:     Symmetric, no curvature, ROM normal, " no CVA tenderness   Lungs:     Clear to auscultation bilaterally, respirations unlabored   Chest wall:    No tenderness or deformity   Heart:    Regular rate and rhythm, S1 and S2 normal, no murmur,        rub or gallop   Abdomen:     Soft, non-tender, bowel sounds active all four quadrants,     no masses, no organomegaly   Extremities:   Extremities normal, atraumatic, no cyanosis or edema   Pulses:   2+ and symmetric all extremities   Skin:   Skin color, texture, turgor normal, no rashes or lesions   Lymph nodes:   Cervical, supraclavicular, and axillary nodes normal   Neurologic:   CNII-XII intact. Normal strength, sensation and reflexes       throughout      Results for orders placed or performed during the hospital encounter of 02/07/25   ECG 12 Lead ED Triage Standing Order; SOA    Collection Time: 02/07/25  4:00 AM   Result Value Ref Range    QT Interval 464 ms    QTC Interval 522 ms   Comprehensive Metabolic Panel    Collection Time: 02/07/25  4:15 AM    Specimen: Blood   Result Value Ref Range    Glucose 87 65 - 99 mg/dL    BUN 28 (H) 8 - 23 mg/dL    Creatinine 1.92 (H) 0.76 - 1.27 mg/dL    Sodium 139 136 - 145 mmol/L    Potassium 4.7 3.5 - 5.2 mmol/L    Chloride 102 98 - 107 mmol/L    CO2 25.0 22.0 - 29.0 mmol/L    Calcium 9.1 8.6 - 10.5 mg/dL    Total Protein 7.3 6.0 - 8.5 g/dL    Albumin 3.7 3.5 - 5.2 g/dL    ALT (SGPT) 19 1 - 41 U/L    AST (SGOT) 18 1 - 40 U/L    Alkaline Phosphatase 93 39 - 117 U/L    Total Bilirubin 0.6 0.0 - 1.2 mg/dL    Globulin 3.6 gm/dL    A/G Ratio 1.0 g/dL    BUN/Creatinine Ratio 14.6 7.0 - 25.0    Anion Gap 12.0 5.0 - 15.0 mmol/L    eGFR 37.2 (L) >60.0 mL/min/1.73   BNP    Collection Time: 02/07/25  4:15 AM    Specimen: Blood   Result Value Ref Range    proBNP 3,841.0 (H) 0.0 - 900.0 pg/mL   High Sensitivity Troponin T    Collection Time: 02/07/25  4:15 AM    Specimen: Blood   Result Value Ref Range    HS Troponin T 24 (H) <22 ng/L   CBC Auto Differential    Collection Time:  02/07/25  4:15 AM    Specimen: Blood   Result Value Ref Range    WBC 8.66 3.40 - 10.80 10*3/mm3    RBC 5.10 4.14 - 5.80 10*6/mm3    Hemoglobin 14.9 13.0 - 17.7 g/dL    Hematocrit 45.7 37.5 - 51.0 %    MCV 89.6 79.0 - 97.0 fL    MCH 29.2 26.6 - 33.0 pg    MCHC 32.6 31.5 - 35.7 g/dL    RDW 14.2 12.3 - 15.4 %    RDW-SD 46.9 37.0 - 54.0 fl    MPV 10.8 6.0 - 12.0 fL    Platelets 247 140 - 450 10*3/mm3    Neutrophil % 57.4 42.7 - 76.0 %    Lymphocyte % 31.5 19.6 - 45.3 %    Monocyte % 5.2 5.0 - 12.0 %    Eosinophil % 4.6 0.3 - 6.2 %    Basophil % 1.0 0.0 - 1.5 %    Immature Grans % 0.3 0.0 - 0.5 %    Neutrophils, Absolute 4.96 1.70 - 7.00 10*3/mm3    Lymphocytes, Absolute 2.73 0.70 - 3.10 10*3/mm3    Monocytes, Absolute 0.45 0.10 - 0.90 10*3/mm3    Eosinophils, Absolute 0.40 0.00 - 0.40 10*3/mm3    Basophils, Absolute 0.09 0.00 - 0.20 10*3/mm3    Immature Grans, Absolute 0.03 0.00 - 0.05 10*3/mm3    nRBC 0.0 0.0 - 0.2 /100 WBC   Lipase    Collection Time: 02/07/25  4:15 AM    Specimen: Blood   Result Value Ref Range    Lipase 103 (H) 13 - 60 U/L   Lactic Acid, Plasma    Collection Time: 02/07/25  4:15 AM    Specimen: Blood   Result Value Ref Range    Lactate 1.0 0.5 - 2.0 mmol/L   Green Top (Gel)    Collection Time: 02/07/25  4:15 AM   Result Value Ref Range    Extra Tube Hold for add-ons.    Lavender Top    Collection Time: 02/07/25  4:15 AM   Result Value Ref Range    Extra Tube hold for add-on    Gold Top - SST    Collection Time: 02/07/25  4:15 AM   Result Value Ref Range    Extra Tube Hold for add-ons.    Gray Top    Collection Time: 02/07/25  4:15 AM   Result Value Ref Range    Extra Tube Hold for add-ons.    Light Blue Top    Collection Time: 02/07/25  4:15 AM   Result Value Ref Range    Extra Tube Hold for add-ons.    Urinalysis With Microscopic If Indicated (No Culture) - Urine, Clean Catch    Collection Time: 02/07/25  4:55 AM    Specimen: Urine, Clean Catch   Result Value Ref Range    Color, UA Yellow Yellow,  Straw    Appearance, UA Clear Clear    pH, UA <=5.0 5.0 - 8.0    Specific Gravity, UA 1.011 1.001 - 1.030    Glucose, UA Negative Negative    Ketones, UA Negative Negative    Bilirubin, UA Negative Negative    Blood, UA Trace (A) Negative    Protein, UA Negative Negative    Leuk Esterase, UA Negative Negative    Nitrite, UA Negative Negative    Urobilinogen, UA 0.2 E.U./dL 0.2 - 1.0 E.U./dL   Urinalysis, Microscopic Only - Urine, Clean Catch    Collection Time: 02/07/25  4:55 AM    Specimen: Urine, Clean Catch   Result Value Ref Range    RBC, UA 0-2 None Seen, 0-2 /HPF    WBC, UA 0-2 None Seen, 0-2 /HPF    Bacteria, UA None Seen None Seen, Trace /HPF    Squamous Epithelial Cells, UA 0-2 None Seen, 0-2 /HPF    Hyaline Casts, UA 0-6 0 - 6 /LPF    Methodology Automated Microscopy    COVID-19 and FLU A/B PCR, 1 HR TAT - Swab, Nasopharynx    Collection Time: 02/07/25  4:56 AM    Specimen: Nasopharynx; Swab   Result Value Ref Range    COVID19 Not Detected Not Detected - Ref. Range    Influenza A PCR Not Detected Not Detected    Influenza B PCR Not Detected Not Detected   High Sensitivity Troponin T 1Hr    Collection Time: 02/07/25  5:39 AM    Specimen: Blood   Result Value Ref Range    HS Troponin T 23 (H) <22 ng/L    Troponin T Numeric Delta -1 ng/L    Troponin T % Delta -4 Abnormal if >/= 20%     *Note: Due to a large number of results and/or encounters for the requested time period, some results have not been displayed. A complete set of results can be found in Results Review.         Assessment & Plan     Chf  I'm following  On new diuretic with Dr Mendoza.    I/D has him set up for echo.            Diagnoses and all orders for this visit:    1. Nephrolithiasis (Primary)  -     Ambulatory Referral to Nephrology  -     Ambulatory Referral to Urology  -     Basic Metabolic Panel  -     BNP    2. Type 2 diabetes mellitus with stage 3a chronic kidney disease, without long-term current use of insulin  -     Ambulatory  Referral to Nephrology  -     Ambulatory Referral to Urology  -     Basic Metabolic Panel  -     BNP    3. Benign hypertensive heart disease with congestive heart failure  -     Basic Metabolic Panel  -     BNP    4. Gallstones  -     Basic Metabolic Panel  -     BNP    5. Weakness  -     Ambulatory Referral to Physical Therapy for Evaluation & Treatment    6. Falling  -     Ambulatory Referral to Physical Therapy for Evaluation & Treatment      No follow-ups on file.          There are no Patient Instructions on file for this visit.     Keven Bear MD    Assessment & Plan

## 2025-02-17 ENCOUNTER — TRANSCRIBE ORDERS (OUTPATIENT)
Dept: LAB | Facility: HOSPITAL | Age: 70
End: 2025-02-17
Payer: MEDICARE

## 2025-02-17 ENCOUNTER — HOSPITAL ENCOUNTER (OUTPATIENT)
Dept: CARDIOLOGY | Facility: HOSPITAL | Age: 70
Discharge: HOME | End: 2025-02-17
Payer: MEDICARE

## 2025-02-17 ENCOUNTER — LAB (OUTPATIENT)
Dept: LAB | Facility: HOSPITAL | Age: 70
End: 2025-02-17
Payer: MEDICARE

## 2025-02-17 VITALS
BODY MASS INDEX: 37.87 KG/M2 | DIASTOLIC BLOOD PRESSURE: 97 MMHG | SYSTOLIC BLOOD PRESSURE: 152 MMHG | WEIGHT: 311 LBS | HEIGHT: 76 IN

## 2025-02-17 DIAGNOSIS — I31.4 PERICARDIAL TAMPONADE: ICD-10-CM

## 2025-02-17 DIAGNOSIS — Z12.5 SPECIAL SCREENING FOR MALIGNANT NEOPLASM OF PROSTATE: ICD-10-CM

## 2025-02-17 DIAGNOSIS — N18.31 CHRONIC KIDNEY DISEASE (CKD) STAGE G3A/A1, MODERATELY DECREASED GLOMERULAR FILTRATION RATE (GFR) BETWEEN 45-59 ML/MIN/1.73 SQUARE METER AND ALBUMINURIA CREATININE RATIO LESS THAN 30 MG/G (CMS/H*: Primary | ICD-10-CM

## 2025-02-17 DIAGNOSIS — I12.9 PARENCHYMAL RENAL HYPERTENSION, STAGE 1 THROUGH STAGE 4 OR UNSPECIFIED CHRONIC KIDNEY DISEASE: ICD-10-CM

## 2025-02-17 DIAGNOSIS — T83.511A INFECTION DUE TO URINARY INDWELLING CATHETER: Primary | ICD-10-CM

## 2025-02-17 DIAGNOSIS — N25.81 SECONDARY HYPERPARATHYROIDISM OF RENAL ORIGIN: ICD-10-CM

## 2025-02-17 DIAGNOSIS — N18.31 CHRONIC KIDNEY DISEASE (CKD) STAGE G3A/A1, MODERATELY DECREASED GLOMERULAR FILTRATION RATE (GFR) BETWEEN 45-59 ML/MIN/1.73 SQUARE METER AND ALBUMINURIA CREATININE RATIO LESS THAN 30 MG/G (CMS/H*: ICD-10-CM

## 2025-02-17 DIAGNOSIS — N39.0 LOWER URINARY TRACT INFECTIOUS DISEASE: ICD-10-CM

## 2025-02-17 DIAGNOSIS — I48.11 LONGSTANDING PERSISTENT ATRIAL FIBRILLATION: ICD-10-CM

## 2025-02-17 DIAGNOSIS — I13.0 HYPERTENSIVE HEART AND RENAL DISEASE WITH CONGESTIVE HEART FAILURE: ICD-10-CM

## 2025-02-17 DIAGNOSIS — Z16.12 INFECTION DUE TO EXTENDED SPECTRUM BETA-LACTAMASE PRODUCING BACTERIA: ICD-10-CM

## 2025-02-17 DIAGNOSIS — N39.0 INFECTION DUE TO URINARY INDWELLING CATHETER: Primary | ICD-10-CM

## 2025-02-17 DIAGNOSIS — E11.22 TYPE 2 DIABETES MELLITUS WITH ESRD (END-STAGE RENAL DISEASE): ICD-10-CM

## 2025-02-17 DIAGNOSIS — R80.9 PROTEINURIA, UNSPECIFIED TYPE: ICD-10-CM

## 2025-02-17 DIAGNOSIS — I13.10 MALIGNANT HYPERTENSIVE HEART AND CHRONIC KIDNEY DISEASE STAGE III: ICD-10-CM

## 2025-02-17 DIAGNOSIS — A49.9 INFECTION DUE TO EXTENDED SPECTRUM BETA-LACTAMASE PRODUCING BACTERIA: ICD-10-CM

## 2025-02-17 DIAGNOSIS — E55.9 AVITAMINOSIS D: ICD-10-CM

## 2025-02-17 DIAGNOSIS — N39.0 INFECTION DUE TO URINARY INDWELLING CATHETER: ICD-10-CM

## 2025-02-17 DIAGNOSIS — T83.511A INFECTION DUE TO URINARY INDWELLING CATHETER: ICD-10-CM

## 2025-02-17 DIAGNOSIS — N18.6 TYPE 2 DIABETES MELLITUS WITH ESRD (END-STAGE RENAL DISEASE): ICD-10-CM

## 2025-02-17 DIAGNOSIS — N41.0 ACUTE PROSTATITIS: ICD-10-CM

## 2025-02-17 DIAGNOSIS — N18.9 CHRONIC KIDNEY DISEASE, UNSPECIFIED CKD STAGE: ICD-10-CM

## 2025-02-17 DIAGNOSIS — N18.30 MALIGNANT HYPERTENSIVE HEART AND CHRONIC KIDNEY DISEASE STAGE III: ICD-10-CM

## 2025-02-17 DIAGNOSIS — C61 PROSTATE CANCER MANAGED WITH ACTIVE SURVEILLANCE: ICD-10-CM

## 2025-02-17 DIAGNOSIS — B96.29 NON-SHIGA TOXIN-PRODUCING E. COLI: ICD-10-CM

## 2025-02-17 LAB
25(OH)D3 SERPL-MCNC: 22.1 NG/ML (ref 30–100)
ALBUMIN SERPL-MCNC: 4.1 G/DL (ref 3.5–5.2)
ALBUMIN SERPL-MCNC: 4.2 G/DL (ref 3.5–5.2)
ALBUMIN/GLOB SERPL: 1.3 G/DL
ALP SERPL-CCNC: 68 U/L (ref 39–117)
ALT SERPL W P-5'-P-CCNC: 13 U/L (ref 1–41)
ANION GAP SERPL CALCULATED.3IONS-SCNC: 10 MMOL/L (ref 5–15)
ANION GAP SERPL CALCULATED.3IONS-SCNC: 11 MMOL/L (ref 5–15)
ASCENDING AORTA: 4 CM
AST SERPL-CCNC: 16 U/L (ref 1–40)
AV MEAN PRESS GRAD SYS DOP V1V2: 10.2 MMHG
AV VMAX SYS DOP: 211.6 CM/SEC
BASOPHILS # BLD AUTO: 0.06 10*3/MM3 (ref 0–0.2)
BASOPHILS NFR BLD AUTO: 0.8 % (ref 0–1.5)
BH CV ECHO MEAS - AI P1/2T: 595.3 MSEC
BH CV ECHO MEAS - AO MAX PG: 18 MMHG
BH CV ECHO MEAS - AO ROOT DIAM: 4.6 CM
BH CV ECHO MEAS - AO V2 VTI: 48 CM
BH CV ECHO MEAS - AVA(I,D): 1.57 CM2
BH CV ECHO MEAS - EDV(CUBED): 531.4 ML
BH CV ECHO MEAS - EDV(MOD-SP2): 263 ML
BH CV ECHO MEAS - EDV(MOD-SP4): 249 ML
BH CV ECHO MEAS - EF(MOD-SP2): 21.7 %
BH CV ECHO MEAS - EF(MOD-SP4): 20.5 %
BH CV ECHO MEAS - ESV(CUBED): 328.5 ML
BH CV ECHO MEAS - ESV(MOD-SP2): 206 ML
BH CV ECHO MEAS - ESV(MOD-SP4): 198 ML
BH CV ECHO MEAS - FS: 14.8 %
BH CV ECHO MEAS - IVS/LVPW: 1 CM
BH CV ECHO MEAS - IVSD: 1.2 CM
BH CV ECHO MEAS - LA DIMENSION: 5.4 CM
BH CV ECHO MEAS - LAT PEAK E' VEL: 6.2 CM/SEC
BH CV ECHO MEAS - LV DIASTOLIC VOL/BSA (35-75): 93.2 CM2
BH CV ECHO MEAS - LV MASS(C)D: 521.6 GRAMS
BH CV ECHO MEAS - LV MAX PG: 4 MMHG
BH CV ECHO MEAS - LV MEAN PG: 2.2 MMHG
BH CV ECHO MEAS - LV SYSTOLIC VOL/BSA (12-30): 74.1 CM2
BH CV ECHO MEAS - LV V1 MAX: 98.3 CM/SEC
BH CV ECHO MEAS - LV V1 VTI: 19.9 CM
BH CV ECHO MEAS - LVIDD: 8.1 CM
BH CV ECHO MEAS - LVIDS: 6.9 CM
BH CV ECHO MEAS - LVOT AREA: 3.8 CM2
BH CV ECHO MEAS - LVOT DIAM: 2.2 CM
BH CV ECHO MEAS - LVPWD: 1.2 CM
BH CV ECHO MEAS - MED PEAK E' VEL: 7.8 CM/SEC
BH CV ECHO MEAS - MV A MAX VEL: 82.3 CM/SEC
BH CV ECHO MEAS - MV DEC SLOPE: 767 CM/SEC2
BH CV ECHO MEAS - MV DEC TIME: 0.15 SEC
BH CV ECHO MEAS - MV E MAX VEL: 114 CM/SEC
BH CV ECHO MEAS - MV E/A: 1.39
BH CV ECHO MEAS - MV MAX PG: 4.9 MMHG
BH CV ECHO MEAS - MV MEAN PG: 3 MMHG
BH CV ECHO MEAS - MV P1/2T: 39.3 MSEC
BH CV ECHO MEAS - MV V2 VTI: 24 CM
BH CV ECHO MEAS - MVA(P1/2T): 5.6 CM2
BH CV ECHO MEAS - MVA(VTI): 3.1 CM2
BH CV ECHO MEAS - PA ACC TIME: 0.11 SEC
BH CV ECHO MEAS - PA V2 MAX: 69.4 CM/SEC
BH CV ECHO MEAS - RAP SYSTOLE: 3 MMHG
BH CV ECHO MEAS - RVSP: 36 MMHG
BH CV ECHO MEAS - SV(LVOT): 75.5 ML
BH CV ECHO MEAS - SV(MOD-SP2): 57 ML
BH CV ECHO MEAS - SV(MOD-SP4): 51 ML
BH CV ECHO MEAS - SVI(LVOT): 28.2 ML/M2
BH CV ECHO MEAS - SVI(MOD-SP2): 21.3 ML/M2
BH CV ECHO MEAS - SVI(MOD-SP4): 19.1 ML/M2
BH CV ECHO MEAS - TAPSE (>1.6): 1.58 CM
BH CV ECHO MEAS - TR MAX PG: 33.4 MMHG
BH CV ECHO MEAS - TR MAX VEL: 289 CM/SEC
BH CV ECHO MEASUREMENTS AVERAGE E/E' RATIO: 16.29
BH CV VAS BP LEFT ARM: NORMAL MMHG
BH CV XLRA - RV BASE: 4.2 CM
BH CV XLRA - RV LENGTH: 8.9 CM
BH CV XLRA - RV MID: 3.1 CM
BH CV XLRA - TDI S': 6.6 CM/SEC
BILIRUB SERPL-MCNC: 0.8 MG/DL (ref 0–1.2)
BUN SERPL-MCNC: 23 MG/DL (ref 8–23)
BUN SERPL-MCNC: 23 MG/DL (ref 8–23)
BUN/CREAT SERPL: 13.9 (ref 7–25)
BUN/CREAT SERPL: 14.1 (ref 7–25)
CALCIUM SPEC-SCNC: 9.2 MG/DL (ref 8.6–10.5)
CALCIUM SPEC-SCNC: 9.4 MG/DL (ref 8.6–10.5)
CHLORIDE SERPL-SCNC: 102 MMOL/L (ref 98–107)
CHLORIDE SERPL-SCNC: 103 MMOL/L (ref 98–107)
CO2 SERPL-SCNC: 31 MMOL/L (ref 22–29)
CO2 SERPL-SCNC: 31 MMOL/L (ref 22–29)
CREAT SERPL-MCNC: 1.63 MG/DL (ref 0.76–1.27)
CREAT SERPL-MCNC: 1.66 MG/DL (ref 0.76–1.27)
CREAT UR-MCNC: 26.4 MG/DL
CRP SERPL-MCNC: 1.58 MG/DL (ref 0–0.5)
DEPRECATED RDW RBC AUTO: 49.3 FL (ref 37–54)
EGFRCR SERPLBLD CKD-EPI 2021: 44.3 ML/MIN/1.73
EGFRCR SERPLBLD CKD-EPI 2021: 45.3 ML/MIN/1.73
EOSINOPHIL # BLD AUTO: 0.24 10*3/MM3 (ref 0–0.4)
EOSINOPHIL NFR BLD AUTO: 3.2 % (ref 0.3–6.2)
ERYTHROCYTE [DISTWIDTH] IN BLOOD BY AUTOMATED COUNT: 15.2 % (ref 12.3–15.4)
GLOBULIN UR ELPH-MCNC: 3.3 GM/DL
GLUCOSE SERPL-MCNC: 105 MG/DL (ref 65–99)
GLUCOSE SERPL-MCNC: 106 MG/DL (ref 65–99)
HCT VFR BLD AUTO: 44.7 % (ref 37.5–51)
HGB BLD-MCNC: 14.9 G/DL (ref 13–17.7)
IMM GRANULOCYTES # BLD AUTO: 0.01 10*3/MM3 (ref 0–0.05)
IMM GRANULOCYTES NFR BLD AUTO: 0.1 % (ref 0–0.5)
LEFT ATRIUM VOLUME INDEX: 63.7 ML/M2
LV EF BIPLANE MOD: 22.8 %
LYMPHOCYTES # BLD AUTO: 1.58 10*3/MM3 (ref 0.7–3.1)
LYMPHOCYTES NFR BLD AUTO: 21.1 % (ref 19.6–45.3)
MCH RBC QN AUTO: 29.7 PG (ref 26.6–33)
MCHC RBC AUTO-ENTMCNC: 33.3 G/DL (ref 31.5–35.7)
MCV RBC AUTO: 89.2 FL (ref 79–97)
MONOCYTES # BLD AUTO: 0.46 10*3/MM3 (ref 0.1–0.9)
MONOCYTES NFR BLD AUTO: 6.1 % (ref 5–12)
NEUTROPHILS NFR BLD AUTO: 5.14 10*3/MM3 (ref 1.7–7)
NEUTROPHILS NFR BLD AUTO: 68.7 % (ref 42.7–76)
NRBC BLD AUTO-RTO: 0 /100 WBC (ref 0–0.2)
NT-PROBNP SERPL-MCNC: 4457 PG/ML (ref 0–900)
PHOSPHATE SERPL-MCNC: 3.2 MG/DL (ref 2.5–4.5)
PLATELET # BLD AUTO: 172 10*3/MM3 (ref 140–450)
PMV BLD AUTO: 11.6 FL (ref 6–12)
POTASSIUM SERPL-SCNC: 3.9 MMOL/L (ref 3.5–5.2)
POTASSIUM SERPL-SCNC: 3.9 MMOL/L (ref 3.5–5.2)
PROT ?TM UR-MCNC: 17.8 MG/DL
PROT SERPL-MCNC: 7.5 G/DL (ref 6–8.5)
PROT/CREAT UR: 674.2 MG/G CREA (ref 0–200)
PSA SERPL-MCNC: 6.31 NG/ML (ref 0–4)
PTH-INTACT SERPL-MCNC: 169 PG/ML (ref 15–65)
RBC # BLD AUTO: 5.01 10*6/MM3 (ref 4.14–5.8)
SODIUM SERPL-SCNC: 144 MMOL/L (ref 136–145)
SODIUM SERPL-SCNC: 144 MMOL/L (ref 136–145)
URATE SERPL-MCNC: 9.5 MG/DL (ref 3.4–7)
WBC NRBC COR # BLD AUTO: 7.49 10*3/MM3 (ref 3.4–10.8)

## 2025-02-17 PROCEDURE — 83880 ASSAY OF NATRIURETIC PEPTIDE: CPT

## 2025-02-17 PROCEDURE — 86140 C-REACTIVE PROTEIN: CPT

## 2025-02-17 PROCEDURE — 80053 COMPREHEN METABOLIC PANEL: CPT

## 2025-02-17 PROCEDURE — 93306 TTE W/DOPPLER COMPLETE: CPT

## 2025-02-17 PROCEDURE — 84153 ASSAY OF PSA TOTAL: CPT

## 2025-02-17 PROCEDURE — 82570 ASSAY OF URINE CREATININE: CPT

## 2025-02-17 PROCEDURE — 82306 VITAMIN D 25 HYDROXY: CPT

## 2025-02-17 PROCEDURE — 83970 ASSAY OF PARATHORMONE: CPT

## 2025-02-17 PROCEDURE — 84156 ASSAY OF PROTEIN URINE: CPT

## 2025-02-17 PROCEDURE — 85025 COMPLETE CBC W/AUTO DIFF WBC: CPT

## 2025-02-17 PROCEDURE — 84550 ASSAY OF BLOOD/URIC ACID: CPT

## 2025-02-17 PROCEDURE — 84100 ASSAY OF PHOSPHORUS: CPT

## 2025-02-17 PROCEDURE — 36415 COLL VENOUS BLD VENIPUNCTURE: CPT

## 2025-02-17 PROCEDURE — 93306 TTE W/DOPPLER COMPLETE: CPT | Performed by: INTERNAL MEDICINE

## 2025-02-18 ENCOUNTER — PATIENT MESSAGE (OUTPATIENT)
Dept: INTERNAL MEDICINE | Facility: CLINIC | Age: 70
End: 2025-02-18
Payer: MEDICARE

## 2025-02-18 DIAGNOSIS — N18.31 TYPE 2 DIABETES MELLITUS WITH STAGE 3A CHRONIC KIDNEY DISEASE, WITHOUT LONG-TERM CURRENT USE OF INSULIN: Primary | ICD-10-CM

## 2025-02-18 DIAGNOSIS — E11.22 TYPE 2 DIABETES MELLITUS WITH STAGE 3A CHRONIC KIDNEY DISEASE, WITHOUT LONG-TERM CURRENT USE OF INSULIN: Primary | ICD-10-CM

## 2025-02-21 ENCOUNTER — OFFICE VISIT (OUTPATIENT)
Dept: INTERNAL MEDICINE | Facility: CLINIC | Age: 70
End: 2025-02-21
Payer: MEDICARE

## 2025-02-21 VITALS
SYSTOLIC BLOOD PRESSURE: 146 MMHG | OXYGEN SATURATION: 96 % | RESPIRATION RATE: 16 BRPM | HEART RATE: 83 BPM | BODY MASS INDEX: 37.87 KG/M2 | HEIGHT: 76 IN | TEMPERATURE: 97.2 F | WEIGHT: 311 LBS | DIASTOLIC BLOOD PRESSURE: 88 MMHG

## 2025-02-21 DIAGNOSIS — N18.31 TYPE 2 DIABETES MELLITUS WITH STAGE 3A CHRONIC KIDNEY DISEASE, WITHOUT LONG-TERM CURRENT USE OF INSULIN: ICD-10-CM

## 2025-02-21 DIAGNOSIS — I10 HYPERTENSION, UNSPECIFIED TYPE: ICD-10-CM

## 2025-02-21 DIAGNOSIS — E11.22 TYPE 2 DIABETES MELLITUS WITH STAGE 3A CHRONIC KIDNEY DISEASE, WITHOUT LONG-TERM CURRENT USE OF INSULIN: ICD-10-CM

## 2025-02-21 DIAGNOSIS — I50.21 ACUTE SYSTOLIC CHF (CONGESTIVE HEART FAILURE): Primary | ICD-10-CM

## 2025-02-21 RX ORDER — CARVEDILOL 12.5 MG/1
1 TABLET ORAL EVERY 12 HOURS SCHEDULED
COMMUNITY
Start: 2025-02-05 | End: 2025-02-21

## 2025-02-21 RX ORDER — COLCHICINE 0.6 MG/1
0.6 TABLET ORAL DAILY
COMMUNITY
Start: 2025-02-04 | End: 2026-02-17

## 2025-02-21 RX ORDER — CARVEDILOL 25 MG/1
25 TABLET ORAL 2 TIMES DAILY WITH MEALS
Qty: 180 TABLET | Refills: 3 | Status: SHIPPED | OUTPATIENT
Start: 2025-02-21

## 2025-02-21 NOTE — PROGRESS NOTES
Subjective     Patient ID: Andre Agosto is a 70 y.o. male. Patient is here for management of multiple medical problems.     Chief Complaint   Patient presents with    Nephrolithiasis    Congestive Heart Failure     History of Present Illness   Chf   Pt can feel if to dry     The following portions of the patient's history were reviewed and updated as appropriate: allergies, current medications, past family history, past medical history, past social history, past surgical history and problem list.    Review of Systems    Current Outpatient Medications:     amiodarone (PACERONE) 100 MG tablet, Take 1 tablet by mouth Daily., Disp: , Rfl:     aspirin 81 MG EC tablet, Take 1 tablet by mouth Daily., Disp: , Rfl:     brivaracetam (Briviact) 75 MG tablet, Take 1 tablet by mouth 2 (Two) Times a Day., Disp: 180 tablet, Rfl: 1    bumetanide (BUMEX) 1 MG tablet, Take 1 tablet by mouth 2 (Two) Times a Day. 2 tabs in AM and 1 tab in PM per patient, Disp: , Rfl:     carvedilol (COREG) 12.5 MG tablet, Take 1 tablet by mouth Every 12 (Twelve) Hours., Disp: , Rfl:     colchicine 0.6 MG tablet, Take 1 tablet by mouth Daily., Disp: , Rfl:     dapagliflozin Propanediol (Farxiga) 10 MG tablet, Take 10 mg by mouth Daily., Disp: 90 tablet, Rfl: 1    levothyroxine (SYNTHROID, LEVOTHROID) 88 MCG tablet, TAKE 1 TABLET DAILY, Disp: 90 tablet, Rfl: 3    ondansetron ODT (ZOFRAN-ODT) 4 MG disintegrating tablet, Place 1 tablet on the tongue Every 6 (Six) Hours As Needed for Nausea or Vomiting. As needed for nausea, Disp: 12 tablet, Rfl: 0    rosuvastatin (CRESTOR) 20 MG tablet, Take 1 tablet by mouth Every Night., Disp: , Rfl:     sacubitril-valsartan (Entresto) 24-26 MG tablet, Take 1 tablet by mouth 2 (Two) Times a Day., Disp: , Rfl:     tamsulosin (FLOMAX) 0.4 MG capsule 24 hr capsule, Take 1 capsule by mouth Daily., Disp: 90 capsule, Rfl: 3    Tirzepatide 2.5 MG/0.5ML solution auto-injector, Inject 2.5 mg under the skin into the  "appropriate area as directed 1 (One) Time Per Week., Disp: 2 mL, Rfl: 0    vitamin d 125 MCG (5000 UT) capsule, Take 1 capsule by mouth Daily., Disp: , Rfl:     Xarelto 15 MG tablet, Take 1 tablet by mouth Daily With Dinner., Disp: 42 tablet, Rfl: 0    Objective      Blood pressure 146/88, pulse 83, temperature 97.2 °F (36.2 °C), resp. rate 16, height 193 cm (75.98\"), weight (!) 141 kg (311 lb), SpO2 96%.            Physical Exam     General Appearance:    Alert, cooperative, no distress, appears stated age   Head:    Normocephalic, without obvious abnormality, atraumatic   Eyes:    PERRL, conjunctiva/corneas clear, EOM's intact   Ears:    Normal TM's and external ear canals, both ears   Nose:   Nares normal, septum midline, mucosa normal, no drainage   or sinus tenderness   Throat:   Lips, mucosa, and tongue normal; teeth and gums normal   Neck:   Supple, symmetrical, trachea midline, no adenopathy;        thyroid:  No enlargement/tenderness/nodules; no carotid    bruit or JVD   Back:     Symmetric, no curvature, ROM normal, no CVA tenderness   Lungs:     Clear to auscultation bilaterally, respirations unlabored   Chest wall:    No tenderness or deformity   Heart:    Regular rate and rhythm, S1 and S2 normal, no murmur,        rub or gallop   Abdomen:     Soft, non-tender, bowel sounds active all four quadrants,     no masses, no organomegaly   Extremities:   Extremities normal, atraumatic, no cyanosis or edema   Pulses:   2+ and symmetric all extremities   Skin:   Skin color, texture, turgor normal, no rashes or lesions   Lymph nodes:   Cervical, supraclavicular, and axillary nodes normal   Neurologic:   CNII-XII intact. Normal strength, sensation and reflexes       throughout      Results for orders placed or performed during the hospital encounter of 02/17/25   Adult Transthoracic Echo Complete W/ Cont if Necessary Per Protocol    Collection Time: 02/17/25 10:50 AM   Result Value Ref Range    EF(MOD-bp) 22.8 % "    LVIDd 8.1 cm    LVIDs 6.9 cm    IVSd 1.20 cm    LVPWd 1.20 cm    FS 14.8 %    IVS/LVPW 1.00 cm    ESV(cubed) 328.5 ml    LV Sys Vol (BSA corrected) 74.1 cm2    EDV(cubed) 531.4 ml    LV Stone Vol (BSA corrected) 93.2 cm2    LV mass(C)d 521.6 grams    LVOT area 3.8 cm2    LVOT diam 2.20 cm    EDV(MOD-sp2) 263.0 ml    EDV(MOD-sp4) 249.0 ml    ESV(MOD-sp2) 206.0 ml    ESV(MOD-sp4) 198.0 ml    SV(MOD-sp2) 57.0 ml    SV(MOD-sp4) 51.0 ml    SVi(MOD-SP2) 21.3 ml/m2    SVi(MOD-SP4) 19.1 ml/m2    SVi (LVOT) 28.2 ml/m2    EF(MOD-sp2) 21.7 %    EF(MOD-sp4) 20.5 %    MV E max irvin 114.0 cm/sec    MV A max irvin 82.3 cm/sec    MV dec time 0.15 sec    MV E/A 1.39     LA ESV Index (BP) 63.7 ml/m2    Med Peak E' Irvin 7.8 cm/sec    Lat Peak E' Irvin 6.2 cm/sec    TR max irvin 289.0 cm/sec    Avg E/e' ratio 16.29     SV(LVOT) 75.5 ml    RV Base 4.2 cm    RV Mid 3.1 cm    RV Length 8.9 cm    TAPSE (>1.6) 1.58 cm    RV S' 6.6 cm/sec    LA dimension (2D)  5.4 cm    LV V1 max 98.3 cm/sec    LV V1 max PG 4.0 mmHg    LV V1 mean PG 2.20 mmHg    LV V1 VTI 19.9 cm    Ao pk irvin 211.6 cm/sec    Ao max PG 18.0 mmHg    Ao mean PG 10.2 mmHg    Ao V2 VTI 48.0 cm    DAYA(I,D) 1.57 cm2    AI P1/2t 595.3 msec    MV max PG 4.9 mmHg    MV mean PG 3.0 mmHg    MV V2 VTI 24.0 cm    MV P1/2t 39.3 msec    MVA(P1/2t) 5.6 cm2    MVA(VTI) 3.1 cm2    MV dec slope 767.0 cm/sec2    TR max PG 33.4 mmHg    PA V2 max 69.4 cm/sec    PA acc time 0.11 sec    Ao root diam 4.6 cm    BH CV VAS BP LEFT /97 mmHg    RVSP(TR) 36 mmHg    RAP systole 3 mmHg    Ascending aorta 4.0 cm     *Note: Due to a large number of results and/or encounters for the requested time period, some results have not been displayed. A complete set of results can be found in Results Review.         Assessment & Plan     Ef 22% cad vs GLP-1 causing sarcopenia.   Or both. Not wt loss on mounjaro.     Eating out.     Would recommend avoiding GLP-1 for concern for sarcopenia.      Off soda.    Recent  decrease in corg 25 to 12.5. bp up.   HR up. Ef 22.   On diuretic and was decreased due to risk of to low bp. Will increase back to 25 and monitor for orthostatic hypotension.              There are no diagnoses linked to this encounter.  No follow-ups on file.          There are no Patient Instructions on file for this visit.     Keven Bear MD    Assessment & Plan

## 2025-02-25 ENCOUNTER — HOSPITAL ENCOUNTER (EMERGENCY)
Facility: HOSPITAL | Age: 70
Discharge: HOME | End: 2025-02-26
Attending: EMERGENCY MEDICINE | Admitting: EMERGENCY MEDICINE
Payer: MEDICARE

## 2025-02-25 DIAGNOSIS — R04.0 EPISTAXIS: Primary | ICD-10-CM

## 2025-02-25 PROCEDURE — 25010000002 COCAINE HCL 40 MG/ML SOLUTION: Performed by: EMERGENCY MEDICINE

## 2025-02-25 PROCEDURE — C9046 COCAINE HCL NASAL SOLUTION: HCPCS | Performed by: EMERGENCY MEDICINE

## 2025-02-25 PROCEDURE — 36415 COLL VENOUS BLD VENIPUNCTURE: CPT

## 2025-02-25 PROCEDURE — 99283 EMERGENCY DEPT VISIT LOW MDM: CPT

## 2025-02-25 RX ORDER — TRANEXAMIC ACID 100 MG/ML
500 INJECTION, SOLUTION INTRAVENOUS ONCE
Status: COMPLETED | OUTPATIENT
Start: 2025-02-25 | End: 2025-02-25

## 2025-02-25 RX ORDER — COCAINE HYDROCHLORIDE 40 MG/ML
SOLUTION NASAL ONCE
Status: COMPLETED | OUTPATIENT
Start: 2025-02-25 | End: 2025-02-25

## 2025-02-25 RX ORDER — LIDOCAINE HYDROCHLORIDE 20 MG/ML
JELLY TOPICAL ONCE
Status: COMPLETED | OUTPATIENT
Start: 2025-02-25 | End: 2025-02-25

## 2025-02-25 RX ADMIN — COCAINE HYDROCHLORIDE NASAL: 40 SOLUTION TOPICAL at 23:59

## 2025-02-25 RX ADMIN — LIDOCAINE HYDROCHLORIDE: 20 JELLY TOPICAL at 23:58

## 2025-02-25 RX ADMIN — TRANEXAMIC ACID 500 MG: 100 INJECTION, SOLUTION INTRAVENOUS at 23:58

## 2025-02-26 ENCOUNTER — PATIENT MESSAGE (OUTPATIENT)
Dept: INTERNAL MEDICINE | Facility: CLINIC | Age: 70
End: 2025-02-26
Payer: MEDICARE

## 2025-02-26 VITALS
HEIGHT: 76 IN | RESPIRATION RATE: 20 BRPM | SYSTOLIC BLOOD PRESSURE: 139 MMHG | TEMPERATURE: 98.9 F | BODY MASS INDEX: 37.02 KG/M2 | OXYGEN SATURATION: 94 % | DIASTOLIC BLOOD PRESSURE: 103 MMHG | HEART RATE: 76 BPM | WEIGHT: 304 LBS

## 2025-02-26 LAB
ALBUMIN SERPL-MCNC: 4.4 G/DL (ref 3.5–5.2)
ALBUMIN/GLOB SERPL: 1.6 G/DL
ALP SERPL-CCNC: 74 U/L (ref 39–117)
ALT SERPL W P-5'-P-CCNC: 16 U/L (ref 1–41)
ANION GAP SERPL CALCULATED.3IONS-SCNC: 15.3 MMOL/L (ref 5–15)
AST SERPL-CCNC: 23 U/L (ref 1–40)
BASOPHILS # BLD AUTO: 0.02 10*3/MM3 (ref 0–0.2)
BASOPHILS NFR BLD AUTO: 0.3 % (ref 0–1.5)
BILIRUB SERPL-MCNC: 0.6 MG/DL (ref 0–1.2)
BUN SERPL-MCNC: 22 MG/DL (ref 8–23)
BUN/CREAT SERPL: 12.9 (ref 7–25)
CALCIUM SPEC-SCNC: 9.2 MG/DL (ref 8.6–10.5)
CHLORIDE SERPL-SCNC: 101 MMOL/L (ref 98–107)
CO2 SERPL-SCNC: 23.7 MMOL/L (ref 22–29)
CREAT SERPL-MCNC: 1.7 MG/DL (ref 0.76–1.27)
DEPRECATED RDW RBC AUTO: 49.1 FL (ref 37–54)
EGFRCR SERPLBLD CKD-EPI 2021: 43.1 ML/MIN/1.73
EOSINOPHIL # BLD AUTO: 0.39 10*3/MM3 (ref 0–0.4)
EOSINOPHIL NFR BLD AUTO: 5.2 % (ref 0.3–6.2)
ERYTHROCYTE [DISTWIDTH] IN BLOOD BY AUTOMATED COUNT: 15.2 % (ref 12.3–15.4)
GLOBULIN UR ELPH-MCNC: 2.8 GM/DL
GLUCOSE SERPL-MCNC: 142 MG/DL (ref 65–99)
HCT VFR BLD AUTO: 43.4 % (ref 37.5–51)
HGB BLD-MCNC: 14.9 G/DL (ref 13–17.7)
IMM GRANULOCYTES # BLD AUTO: 0 10*3/MM3 (ref 0–0.05)
IMM GRANULOCYTES NFR BLD AUTO: 0 % (ref 0–0.5)
LYMPHOCYTES # BLD AUTO: 1.97 10*3/MM3 (ref 0.7–3.1)
LYMPHOCYTES NFR BLD AUTO: 26.2 % (ref 19.6–45.3)
MCH RBC QN AUTO: 30.1 PG (ref 26.6–33)
MCHC RBC AUTO-ENTMCNC: 34.3 G/DL (ref 31.5–35.7)
MCV RBC AUTO: 87.7 FL (ref 79–97)
MONOCYTES # BLD AUTO: 0.49 10*3/MM3 (ref 0.1–0.9)
MONOCYTES NFR BLD AUTO: 6.5 % (ref 5–12)
NEUTROPHILS NFR BLD AUTO: 4.65 10*3/MM3 (ref 1.7–7)
NEUTROPHILS NFR BLD AUTO: 61.8 % (ref 42.7–76)
PLATELET # BLD AUTO: 162 10*3/MM3 (ref 140–450)
PMV BLD AUTO: 11.3 FL (ref 6–12)
POTASSIUM SERPL-SCNC: 3.1 MMOL/L (ref 3.5–5.2)
PROT SERPL-MCNC: 7.2 G/DL (ref 6–8.5)
RBC # BLD AUTO: 4.95 10*6/MM3 (ref 4.14–5.8)
SODIUM SERPL-SCNC: 140 MMOL/L (ref 136–145)
WBC NRBC COR # BLD AUTO: 7.52 10*3/MM3 (ref 3.4–10.8)

## 2025-02-26 PROCEDURE — 80053 COMPREHEN METABOLIC PANEL: CPT | Performed by: PHYSICIAN ASSISTANT

## 2025-02-26 PROCEDURE — 85025 COMPLETE CBC W/AUTO DIFF WBC: CPT | Performed by: PHYSICIAN ASSISTANT

## 2025-02-26 NOTE — DISCHARGE INSTRUCTIONS
You will need to follow up with ENT in the next week for removal of rhinorocket, 4cc of air was put into the ballon in the rhinorocket.

## 2025-02-26 NOTE — FSED PROVIDER NOTE
Subjective   History of Present Illness  Patient is a 70-year-old male who presents emergency department with left-sided nosebleed.  Patient reports that he started having a nosebleed earlier this evening.  Patient reports he had another nosebleed last night, and only lasted 3 to 4 hours.  Patient reports that he is on Xarelto and aspirin.  Patient reports that he has recently had some issues with his heart concerning a pericardial effusion and found out his ejection fraction is in the 20s.  Patient states that this has been making him very anxious.  Patient states that he decided to come to the emergency room as the nosebleeding continued this evening.  Patient denies chest pain, shortness of breath, vomiting, abdominal pain, headache, blurry vision.  Patient significant past medical history of anxiety, A-fib, coronary artery disease, congestive heart failure, venous insufficiency, diabetes    History provided by:  Patient   used: No        Review of Systems   HENT:  Positive for nosebleeds.    All other systems reviewed and are negative.      Past Medical History:   Diagnosis Date    6th nerve palsy, right     right eye     Anxiety and depression     Aortic root dilation     Aortic stenosis     Atrial fibrillation, persistent 12/02/2020    on Xarelto    Bell palsy 7/5/2018    6th nerve palsy    Cancer     Carotid artery disease     CHF (congestive heart failure)     Cholelithiasis     Chronic venous insufficiency     Coronary artery disease involving native coronary artery of native heart without angina pectoris 09/12/2018    follows w/ Dr. Mendoza    CRI (chronic renal insufficiency), stage 2 (mild)     CVA (cerebral vascular accident)     Diabetes mellitus     doesnt check sugar, type 2     Disease of thyroid gland     Double vision     resolved- right eye    Elevated cholesterol     Elevated prostate specific antigen (PSA) 11/08/2023    Focal seizure     of right arm     Heart attack     NSTEMI  2015, s/p stenting    History of Helicobacter pylori infection     in 2008, treated w/ PrevPak - 2021 EGD bx (+), PCP RX - PPI/Augmentin/Doxy/Flagyl (x 14 days) 1/22/21    HL (hearing loss)     (L) ear - child luevano measles    Hyperlipidemia     Hypertension     Hypokalemia     Kidney stone     Leg cramps     Memory loss 2005    d/t meningitis    Meningitis 2005    unsure of bacterial or viral     Mitral regurgitation     Obesity     Pericardial effusion     Prostate cancer 01/2024    [place] 3+3=6    Seizures     Shingles 9/2018    On right arm    Sleep apnea treated with nocturnal BiPAP     compliant with  machine     Stroke     2017/2018    Tricuspid regurgitation     Ventricular tachycardia     3 different times    Wears glasses        Allergies   Allergen Reactions    5-Alpha Reductase Inhibitors Other (See Comments)     Myalgias    Amlodipine Swelling and Other (See Comments)     Feet swell    Crestor [Rosuvastatin] Other (See Comments)     Leg pain    Statins Myalgia and Other (See Comments)       Past Surgical History:   Procedure Laterality Date    CARDIAC CATHETERIZATION N/A 10/12/2018    Procedure: Left Heart Cath;  Surgeon: Yoselin Johnson MD;  Location: [facility] CATH INVASIVE LOCATION;  Service: Cardiology    CARDIAC CATHETERIZATION  03/2021    stent    CARDIAC CATHETERIZATION  07/2021    just checking the after pacemaker placed- Dr. Tim    CARDIAC CATHETERIZATION  12/01/2015    [facility] MQ/Emergent relook coronary angiography. Patent stent to  LCX & CX. LAD has 50-60% disease.    CARDIAC CATHETERIZATION  12/01/2015    NSTEMI, LHC, PTCA of the OMB & CX, EF 50%    CARDIAC CATHETERIZATION  09/29/2023    [facility] M[facility]. IOP: CAD, HX Stenting, SOA. EF 45%. Multi CAD,  Patent Stent to LAD, Cardiomyopathy.    CARDIAC DEFIBRILLATOR PLACEMENT  07/27/2021    [facility] ANA/ [facility] [employer] model [id number], serial #[id number]    CARDIOVERSION  06/22/2021    [facility] SELWYN/DCCV-Successful cardioversion with restoration of sinus  rhythm.     CAROTID ENDARTERECTOMY      CAROTID STENT  2025    COLONOSCOPY  10/2020    w/ Dr. Jacobs, hx colon polyps    CORONARY STENT PLACEMENT      LAPAROSCOPIC GASTRIC BANDING  2008    s/p LAGB Realize 2008 by MASONO.    PACEMAKER IMPLANTATION  2021    UMBILICAL HERNIA REPAIR  2008    incarcerated UHR w/ mesh by Dr. Velasquez    URETEROSCOPY LASER LITHOTRIPSY WITH STENT INSERTION Left 10/20/2021    Procedure: URETEROSCOPY LASER LITHOTRIPSY WITH STENT INSERTION;  Surgeon: Tonio De La Cruz MD;  Location: [facility] OR;  Service: Urology;  Laterality: Left;    URETEROSCOPY LASER LITHOTRIPSY WITH STENT INSERTION Left 2021    Procedure: URETEROSCOPY LEFT, LEFT RETROGRADE PYELOGRAM, CYSTOSCOPY, LASER LITHOTRIPSY WITH STENT EXCHANGE;  Surgeon: Tonio De La Cruz MD;  Location: [facility] OR;  Service: Urology;  Laterality: Left;    URETEROSCOPY LASER LITHOTRIPSY WITH STENT INSERTION Left 10/06/2022    Procedure: URETEROSCOPY LASER LITHOTRIPSY WITH STENT INSERTION;  Surgeon: Tonio De La Cruz MD;  Location: [facility] OR;  Service: Urology;  Laterality: Left;       Family History   Problem Relation Age of Onset    Stroke Mother     Hypertension Mother     Sudden death Mother     COPD Father     Hypertension Father     Heart disease Father        Social History     Socioeconomic History    Marital status:    Tobacco Use    Smoking status: Former     Current packs/day: 0.00     Average packs/day: 0.5 packs/day for 15.0 years (7.5 ttl pk-yrs)     Types: Cigarettes     Start date: 1975     Quit date: 1985     Years since quittin.1     Passive exposure: Past    Smokeless tobacco: Never   Vaping Use    Vaping status: Never Used   Substance and Sexual Activity    Alcohol use: No    Drug use: No    Sexual activity: Not Currently           Objective   Physical Exam  Constitutional:       Appearance: He is normal weight.   HENT:      Head: Normocephalic.      Nose:      Left Nostril: Epistaxis present. No  septal hematoma.      Comments: Patient has profuse bleeding from the left nare.     Mouth/Throat:      Mouth: Mucous membranes are moist.   Eyes:      Pupils: Pupils are equal, round, and reactive to light.   Cardiovascular:      Rate and Rhythm: Normal rate and regular rhythm.   Pulmonary:      Effort: Pulmonary effort is normal.      Breath sounds: Normal breath sounds.   Abdominal:      General: Abdomen is flat.      Palpations: Abdomen is soft.      Tenderness: There is no abdominal tenderness.   Musculoskeletal:         General: Normal range of motion.      Cervical back: Normal range of motion.   Skin:     General: Skin is warm.   Neurological:      General: No focal deficit present.      Mental Status: He is alert and oriented to person, place, and time. Mental status is at baseline.   Psychiatric:         Mood and Affect: Mood normal.         Thought Content: Thought content normal.         Epistaxis Management    Date/Time: 2/26/2025 12:46 AM    Performed by: Elisha Gloria PA-C  Authorized by: Jesus Mason DO    Consent:     Consent obtained:  Verbal    Consent given by:  Patient and spouse    Risks, benefits, and alternatives were discussed: yes      Risks discussed:  Bleeding, infection, nasal injury and pain    Alternatives discussed:  No treatment  Universal protocol:     Procedure explained and questions answered to patient or proxy's satisfaction: yes      Patient identity confirmed:  Verbally with patient  Anesthesia:     Anesthesia method:  Topical application    Topical anesthetic:  Lidocaine gel (cocaine and TXA applied to rhinorocket)  Procedure details:     Treatment site:  L anterior and L posterior    Treatment method:  Nasal balloon    Treatment complexity:  Extensive    Treatment episode: initial    Post-procedure details:     Assessment:  Bleeding stopped    Procedure completion:  Tolerated well, no immediate complications             ED Course                                            Medical Decision Making  Patient is a 70-year male who presents emergency department complaining of a nosebleed.  Differential diagnosis includes epistaxis, posterior bleed, anterior bleed.  On physical exam patient has profuse bleeding from the left nare.  Acute intervention included nasal packing as noted in procedure.  After procedure patient's bleeding stopped and patient has no bleeding down the posterior pharynx.  Patient requested labs on the emergency department and states he would like to be watched for a while prior to being discharged.  Patient already has sent to ENT in [place], [place]      Amount and/or Complexity of Data Reviewed  Labs: ordered. Decision-making details documented in ED Course.    Risk  Prescription drug management.        Final diagnoses:   Epistaxis       ED Disposition  ED Disposition       ED Disposition   Discharge    Condition   Stable    Comment   --               Josr Caballero MD  [address]  [facility] [address] [phone]    Schedule an appointment as soon as possible for a visit in 2 days  for epistaxis treatment    Ramana Flaherty MD  [address]  [facility] [id number] [phone]               Medication List      No changes were made to your prescriptions during this visit.

## 2025-02-27 ENCOUNTER — OFFICE VISIT (OUTPATIENT)
Dept: INTERNAL MEDICINE | Facility: CLINIC | Age: 70
End: 2025-02-27
Payer: MEDICARE

## 2025-02-27 VITALS
BODY MASS INDEX: 36.04 KG/M2 | OXYGEN SATURATION: 98 % | WEIGHT: 296 LBS | HEART RATE: 76 BPM | RESPIRATION RATE: 16 BRPM | TEMPERATURE: 97.6 F | HEIGHT: 76 IN | DIASTOLIC BLOOD PRESSURE: 82 MMHG | SYSTOLIC BLOOD PRESSURE: 138 MMHG

## 2025-02-27 DIAGNOSIS — R04.0 EPISTAXIS: Primary | ICD-10-CM

## 2025-02-27 DIAGNOSIS — G47.33 OSA TREATED WITH BIPAP: ICD-10-CM

## 2025-02-27 DIAGNOSIS — I50.21 ACUTE SYSTOLIC CHF (CONGESTIVE HEART FAILURE): ICD-10-CM

## 2025-02-27 RX ORDER — CEFDINIR 300 MG/1
CAPSULE ORAL
COMMUNITY
Start: 2025-02-26

## 2025-02-27 NOTE — PROGRESS NOTES
Subjective     Patient ID: Andre Agosto is a 70 y.o. male. Patient is here for management of multiple medical problems.     Chief Complaint   Patient presents with    Nose Bleed     Rhinorocket follow-up from ER.     History of Present Illness   Pt with rhino rocekt. Off cpap. Needs to be on cpap or will go into chf.       The following portions of the patient's history were reviewed and updated as appropriate: allergies, current medications, past family history, past medical history, past social history, past surgical history and problem list.    Review of Systems    Current Outpatient Medications:     amiodarone (PACERONE) 100 MG tablet, Take 1 tablet by mouth Daily., Disp: , Rfl:     aspirin 81 MG EC tablet, Take 1 tablet by mouth Daily., Disp: , Rfl:     brivaracetam (Briviact) 75 MG tablet, Take 1 tablet by mouth 2 (Two) Times a Day., Disp: 180 tablet, Rfl: 1    bumetanide (BUMEX) 1 MG tablet, Take 1 tablet by mouth 2 (Two) Times a Day. 2 tabs in AM and 1 tab in PM per patient, Disp: , Rfl:     carvedilol (Coreg) 25 MG tablet, Take 1 tablet by mouth 2 (Two) Times a Day With Meals., Disp: 180 tablet, Rfl: 3    cefdinir (OMNICEF) 300 MG capsule, , Disp: , Rfl:     colchicine 0.6 MG tablet, Take 1 tablet by mouth Daily., Disp: , Rfl:     dapagliflozin Propanediol (Farxiga) 10 MG tablet, Take 10 mg by mouth Daily., Disp: 90 tablet, Rfl: 1    levothyroxine (SYNTHROID, LEVOTHROID) 88 MCG tablet, TAKE 1 TABLET DAILY, Disp: 90 tablet, Rfl: 3    ondansetron ODT (ZOFRAN-ODT) 4 MG disintegrating tablet, Place 1 tablet on the tongue Every 6 (Six) Hours As Needed for Nausea or Vomiting. As needed for nausea, Disp: 12 tablet, Rfl: 0    rosuvastatin (CRESTOR) 20 MG tablet, Take 1 tablet by mouth Every Night., Disp: , Rfl:     sacubitril-valsartan (Entresto) 24-26 MG tablet, Take 1 tablet by mouth 2 (Two) Times a Day., Disp: , Rfl:     tamsulosin (FLOMAX) 0.4 MG capsule 24 hr capsule, Take 1 capsule by mouth Daily., Disp:  "90 capsule, Rfl: 3    Tirzepatide 2.5 MG/0.5ML solution auto-injector, Inject 2.5 mg under the skin into the appropriate area as directed 1 (One) Time Per Week., Disp: 2 mL, Rfl: 0    vitamin d 125 MCG (5000 UT) capsule, Take 1 capsule by mouth Daily., Disp: , Rfl:     Xarelto 15 MG tablet, Take 1 tablet by mouth Daily With Dinner., Disp: 42 tablet, Rfl: 0    Objective      Blood pressure 138/82, pulse 76, temperature 97.6 °F (36.4 °C), resp. rate 16, height 193 cm (76\"), weight 134 kg (296 lb), SpO2 98%.            Physical Exam     General Appearance:    Alert, cooperative, no distress, appears stated age   Head:    Normocephalic, without obvious abnormality, atraumatic   Eyes:    PERRL, conjunctiva/corneas clear, EOM's intact   Ears:    Normal TM's and external ear canals, both ears   Nose:   Nares normal, septum midline, mucosa normal, no drainage   or sinus tenderness   Throat:   Lips, mucosa, and tongue normal; teeth and gums normal   Neck:   Supple, symmetrical, trachea midline, no adenopathy;        thyroid:  No enlargement/tenderness/nodules; no carotid    bruit or JVD   Back:     Symmetric, no curvature, ROM normal, no CVA tenderness   Lungs:     Clear to auscultation bilaterally, respirations unlabored   Chest wall:    No tenderness or deformity   Heart:    Regular rate and rhythm, S1 and S2 normal, no murmur,        rub or gallop   Abdomen:     Soft, non-tender, bowel sounds active all four quadrants,     no masses, no organomegaly   Extremities:   Extremities normal, atraumatic, no cyanosis or edema   Pulses:   2+ and symmetric all extremities   Skin:   Skin color, texture, turgor normal, no rashes or lesions   Lymph nodes:   Cervical, supraclavicular, and axillary nodes normal   Neurologic:   CNII-XII intact. Normal strength, sensation and reflexes       throughout      Results for orders placed or performed during the hospital encounter of 02/25/25   CBC Auto Differential    Collection Time: 02/26/25 " 12:17 AM    Specimen: Blood   Result Value Ref Range    WBC 7.52 3.40 - 10.80 10*3/mm3    RBC 4.95 4.14 - 5.80 10*6/mm3    Hemoglobin 14.9 13.0 - 17.7 g/dL    Hematocrit 43.4 37.5 - 51.0 %    MCV 87.7 79.0 - 97.0 fL    MCH 30.1 26.6 - 33.0 pg    MCHC 34.3 31.5 - 35.7 g/dL    RDW 15.2 12.3 - 15.4 %    RDW-SD 49.1 37.0 - 54.0 fl    MPV 11.3 6.0 - 12.0 fL    Platelets 162 140 - 450 10*3/mm3    Neutrophil % 61.8 42.7 - 76.0 %    Lymphocyte % 26.2 19.6 - 45.3 %    Monocyte % 6.5 5.0 - 12.0 %    Eosinophil % 5.2 0.3 - 6.2 %    Basophil % 0.3 0.0 - 1.5 %    Immature Grans % 0.0 0.0 - 0.5 %    Neutrophils, Absolute 4.65 1.70 - 7.00 10*3/mm3    Lymphocytes, Absolute 1.97 0.70 - 3.10 10*3/mm3    Monocytes, Absolute 0.49 0.10 - 0.90 10*3/mm3    Eosinophils, Absolute 0.39 0.00 - 0.40 10*3/mm3    Basophils, Absolute 0.02 0.00 - 0.20 10*3/mm3    Immature Grans, Absolute 0.00 0.00 - 0.05 10*3/mm3   Comprehensive Metabolic Panel    Collection Time: 02/26/25 12:38 AM    Specimen: Blood   Result Value Ref Range    Glucose 142 (H) 65 - 99 mg/dL    BUN 22 8 - 23 mg/dL    Creatinine 1.70 (H) 0.76 - 1.27 mg/dL    Sodium 140 136 - 145 mmol/L    Potassium 3.1 (L) 3.5 - 5.2 mmol/L    Chloride 101 98 - 107 mmol/L    CO2 23.7 22.0 - 29.0 mmol/L    Calcium 9.2 8.6 - 10.5 mg/dL    Total Protein 7.2 6.0 - 8.5 g/dL    Albumin 4.4 3.5 - 5.2 g/dL    ALT (SGPT) 16 1 - 41 U/L    AST (SGOT) 23 1 - 40 U/L    Alkaline Phosphatase 74 39 - 117 U/L    Total Bilirubin 0.6 0.0 - 1.2 mg/dL    Globulin 2.8 gm/dL    A/G Ratio 1.6 g/dL    BUN/Creatinine Ratio 12.9 7.0 - 25.0    Anion Gap 15.3 (H) 5.0 - 15.0 mmol/L    eGFR 43.1 (L) >60.0 mL/min/1.73     *Note: Due to a large number of results and/or encounters for the requested time period, some results have not been displayed. A complete set of results can be found in Results Review.         Assessment & Plan   Pt is off xeralto.   Has rhino rocket in place. Unable to use pap.     Device has cocaine. Vital  stable.     Has apt with Dr JONES on Monday. Will see if can get in sooner.        Diagnoses and all orders for this visit:    1. Epistaxis (Primary)  -     Ambulatory Referral to ENT (Otolaryngology)    2. Acute systolic CHF (congestive heart failure)    3. ALEX treated with BiPAP      Return in about 1 week (around 3/6/2025).          There are no Patient Instructions on file for this visit.     Keven Bear MD    Assessment & Plan

## 2025-03-04 ENCOUNTER — PATIENT MESSAGE (OUTPATIENT)
Dept: NEUROLOGY | Facility: CLINIC | Age: 70
End: 2025-03-04
Payer: MEDICARE

## 2025-03-05 ENCOUNTER — TELEPHONE (OUTPATIENT)
Dept: INTERNAL MEDICINE | Facility: CLINIC | Age: 70
End: 2025-03-05
Payer: MEDICARE

## 2025-03-05 NOTE — TELEPHONE ENCOUNTER
Caller: INDIRA WITH [facility]    Relationship: Other    Best call back number: [phone]     What form or medical record are you requesting: IMAGING RELATED TO UROLOGY REFERRAL    Who is requesting this form or medical record from you: [facility]    How would you like to receive the form or medical records (pick-up, mail, fax): FAX  If fax, what is the fax number: [phone]    Timeframe paperwork needed: ASAP    Additional notes: PATIENT'S APPT IS SCHEDULED FOR NEXT WEEK AND THEY HAVE NOT RECEIVED ANY IMAGING

## 2025-03-06 ENCOUNTER — PATIENT MESSAGE (OUTPATIENT)
Dept: NEUROLOGY | Facility: CLINIC | Age: 70
End: 2025-03-06
Payer: MEDICARE

## 2025-03-06 ENCOUNTER — OFFICE VISIT (OUTPATIENT)
Dept: INTERNAL MEDICINE | Facility: CLINIC | Age: 70
End: 2025-03-06
Payer: MEDICARE

## 2025-03-06 VITALS
WEIGHT: 298 LBS | SYSTOLIC BLOOD PRESSURE: 120 MMHG | TEMPERATURE: 96.7 F | DIASTOLIC BLOOD PRESSURE: 86 MMHG | HEIGHT: 76 IN | BODY MASS INDEX: 36.29 KG/M2 | OXYGEN SATURATION: 97 % | HEART RATE: 78 BPM

## 2025-03-06 DIAGNOSIS — E11.29 TYPE 2 DIABETES MELLITUS WITH OTHER DIABETIC KIDNEY COMPLICATION, WITHOUT LONG-TERM CURRENT USE OF INSULIN: ICD-10-CM

## 2025-03-06 DIAGNOSIS — I50.21 ACUTE SYSTOLIC CHF (CONGESTIVE HEART FAILURE): ICD-10-CM

## 2025-03-06 DIAGNOSIS — G47.33 OSA TREATED WITH BIPAP: Primary | ICD-10-CM

## 2025-03-06 DIAGNOSIS — G40.109 LOCALIZATION-RELATED SYMPTOMATIC EPILEPSY AND EPILEPTIC SYNDROMES WITH SIMPLE PARTIAL SEIZURES, NOT INTRACTABLE, WITHOUT STATUS EPILEPTICUS: ICD-10-CM

## 2025-03-06 PROCEDURE — 3044F HG A1C LEVEL LT 7.0%: CPT | Performed by: INTERNAL MEDICINE

## 2025-03-06 PROCEDURE — 99214 OFFICE O/P EST MOD 30 MIN: CPT | Performed by: INTERNAL MEDICINE

## 2025-03-06 PROCEDURE — 3079F DIAST BP 80-89 MM HG: CPT | Performed by: INTERNAL MEDICINE

## 2025-03-06 PROCEDURE — 3074F SYST BP LT 130 MM HG: CPT | Performed by: INTERNAL MEDICINE

## 2025-03-06 PROCEDURE — 1126F AMNT PAIN NOTED NONE PRSNT: CPT | Performed by: INTERNAL MEDICINE

## 2025-03-06 NOTE — PROGRESS NOTES
Subjective     Patient ID: Andre Agosto is a 70 y.o. male. Patient is here for management of multiple medical problems.     Chief Complaint   Patient presents with    Sleep Apnea     History of Present Illness   [place]  On bipap.         Chf    Ckd    The following portions of the patient's history were reviewed and updated as appropriate: allergies, current medications, past family history, past medical history, past social history, past surgical history and problem list.    Review of Systems    Current Outpatient Medications:     amiodarone (PACERONE) 100 MG tablet, Take 1 tablet by mouth Daily., Disp: , Rfl:     aspirin 81 MG EC tablet, Take 1 tablet by mouth Daily., Disp: , Rfl:     brivaracetam (Briviact) 75 MG tablet, Take 1 tablet by mouth 2 (Two) Times a Day., Disp: 180 tablet, Rfl: 1    bumetanide (BUMEX) 1 MG tablet, Take 1 tablet by mouth 2 (Two) Times a Day. 2 tabs in AM and 1 tab in PM per patient, Disp: , Rfl:     carvedilol (Coreg) 25 MG tablet, Take 1 tablet by mouth 2 (Two) Times a Day With Meals., Disp: 180 tablet, Rfl: 3    colchicine 0.6 MG tablet, Take 1 tablet by mouth Daily., Disp: , Rfl:     dapagliflozin Propanediol (Farxiga) 10 MG tablet, Take 10 mg by mouth Daily., Disp: 90 tablet, Rfl: 1    levothyroxine (SYNTHROID, LEVOTHROID) 88 MCG tablet, TAKE 1 TABLET DAILY, Disp: 90 tablet, Rfl: 3    ondansetron ODT (ZOFRAN-ODT) 4 MG disintegrating tablet, Place 1 tablet on the tongue Every 6 (Six) Hours As Needed for Nausea or Vomiting. As needed for nausea, Disp: 12 tablet, Rfl: 0    rosuvastatin (CRESTOR) 20 MG tablet, Take 1 tablet by mouth Every Night., Disp: , Rfl:     sacubitril-valsartan (Entresto) 24-26 MG tablet, Take 1 tablet by mouth 2 (Two) Times a Day., Disp: , Rfl:     tamsulosin (FLOMAX) 0.4 MG capsule 24 hr capsule, Take 1 capsule by mouth Daily., Disp: 90 capsule, Rfl: 3    Tirzepatide 2.5 MG/0.5ML solution auto-injector, Inject 2.5 mg under the skin into the appropriate area as  "directed 1 (One) Time Per Week., Disp: 2 mL, Rfl: 0    vitamin d 125 MCG (5000 UT) capsule, Take 1 capsule by mouth Daily., Disp: , Rfl:     Xarelto 15 MG tablet, Take 1 tablet by mouth Daily With Dinner., Disp: 42 tablet, Rfl: 0    Objective      Blood pressure 120/86, pulse 78, temperature 96.7 °F (35.9 °C), height 193 cm (76\"), weight 135 kg (298 lb), SpO2 97%.            Physical Exam     General Appearance:    Alert, cooperative, no distress, appears stated age   Head:    Normocephalic, without obvious abnormality, atraumatic   Eyes:    PERRL, conjunctiva/corneas clear, EOM's intact   Ears:    Normal TM's and external ear canals, both ears   Nose:   Nares normal, septum midline, mucosa normal, no drainage   or sinus tenderness   Throat:   Lips, mucosa, and tongue normal; teeth and gums normal   Neck:   Supple, symmetrical, trachea midline, no adenopathy;        thyroid:  No enlargement/tenderness/nodules; no carotid    bruit or JVD   Back:     Symmetric, no curvature, ROM normal, no CVA tenderness   Lungs:     Clear to auscultation bilaterally, respirations unlabored   Chest wall:    No tenderness or deformity   Heart:    Regular rate and rhythm, S1 and S2 normal, no murmur,        rub or gallop   Abdomen:     Soft, non-tender, bowel sounds active all four quadrants,     no masses, no organomegaly   Extremities:   Extremities normal, atraumatic, no cyanosis or edema   Pulses:   2+ and symmetric all extremities   Skin:   Skin color, texture, turgor normal, no rashes or lesions   Lymph nodes:   Cervical, supraclavicular, and axillary nodes normal   Neurologic:   CNII-XII intact. Normal strength, sensation and reflexes       throughout      Results for orders placed or performed during the hospital encounter of 02/25/25   CBC Auto Differential    Collection Time: 02/26/25 12:17 AM    Specimen: Blood   Result Value Ref Range    WBC 7.52 3.40 - 10.80 10*3/mm3    RBC 4.95 4.14 - 5.80 10*6/mm3    Hemoglobin 14.9 13.0 " - 17.7 g/dL    Hematocrit 43.4 37.5 - 51.0 %    MCV 87.7 79.0 - 97.0 fL    MCH 30.1 26.6 - 33.0 pg    MCHC 34.3 31.5 - 35.7 g/dL    RDW 15.2 12.3 - 15.4 %    RDW-SD 49.1 37.0 - 54.0 fl    MPV 11.3 6.0 - 12.0 fL    Platelets 162 140 - 450 10*3/mm3    Neutrophil % 61.8 42.7 - 76.0 %    Lymphocyte % 26.2 19.6 - 45.3 %    Monocyte % 6.5 5.0 - 12.0 %    Eosinophil % 5.2 0.3 - 6.2 %    Basophil % 0.3 0.0 - 1.5 %    Immature Grans % 0.0 0.0 - 0.5 %    Neutrophils, Absolute 4.65 1.70 - 7.00 10*3/mm3    Lymphocytes, Absolute 1.97 0.70 - 3.10 10*3/mm3    Monocytes, Absolute 0.49 0.10 - 0.90 10*3/mm3    Eosinophils, Absolute 0.39 0.00 - 0.40 10*3/mm3    Basophils, Absolute 0.02 0.00 - 0.20 10*3/mm3    Immature Grans, Absolute 0.00 0.00 - 0.05 10*3/mm3   Comprehensive Metabolic Panel    Collection Time: 02/26/25 12:38 AM    Specimen: Blood   Result Value Ref Range    Glucose 142 (H) 65 - 99 mg/dL    BUN 22 8 - 23 mg/dL    Creatinine 1.70 (H) 0.76 - 1.27 mg/dL    Sodium 140 136 - 145 mmol/L    Potassium 3.1 (L) 3.5 - 5.2 mmol/L    Chloride 101 98 - 107 mmol/L    CO2 23.7 22.0 - 29.0 mmol/L    Calcium 9.2 8.6 - 10.5 mg/dL    Total Protein 7.2 6.0 - 8.5 g/dL    Albumin 4.4 3.5 - 5.2 g/dL    ALT (SGPT) 16 1 - 41 U/L    AST (SGOT) 23 1 - 40 U/L    Alkaline Phosphatase 74 39 - 117 U/L    Total Bilirubin 0.6 0.0 - 1.2 mg/dL    Globulin 2.8 gm/dL    A/G Ratio 1.6 g/dL    BUN/Creatinine Ratio 12.9 7.0 - 25.0    Anion Gap 15.3 (H) 5.0 - 15.0 mmol/L    eGFR 43.1 (L) >60.0 mL/min/1.73     *Note: Due to a large number of results and/or encounters for the requested time period, some results have not been displayed. A complete set of results can be found in Results Review.         Assessment & Plan       AHI 1.4.   Compliance now good following   Ipap  max21.6  Ipap min 6  Ps 4  TI Max 2  Ti min 0.3  Epap 4  Average 10-12 hours a day.    Wt getting better with diet.  Monjouro.    Had Endo urology apt soon.          Diagnoses and all orders  for this visit:    1. ALEX treated with BiPAP (Primary)  -     Basic metabolic panel  -     BNP    2. Acute systolic CHF (congestive heart failure)  -     Basic metabolic panel  -     BNP    3. Type 2 diabetes mellitus with other diabetic kidney complication, without long-term current use of insulin  -     Discontinue: Tirzepatide 2.5 MG/0.5ML solution auto-injector; Inject 2.5 mg under the skin into the appropriate area as directed 1 (One) Time Per Week.  Dispense: 2 mL; Refill: 0  -     Tirzepatide 2.5 MG/0.5ML solution auto-injector; Inject 2.5 mg under the skin into the appropriate area as directed 1 (One) Time Per Week.  Dispense: 2 mL; Refill: 0  -     Basic metabolic panel  -     BNP      No follow-ups on file.          There are no Patient Instructions on file for this visit.     Keven Bear MD    Assessment & Plan

## 2025-03-07 DIAGNOSIS — G40.109 LOCALIZATION-RELATED SYMPTOMATIC EPILEPSY AND EPILEPTIC SYNDROMES WITH SIMPLE PARTIAL SEIZURES, NOT INTRACTABLE, WITHOUT STATUS EPILEPTICUS: ICD-10-CM

## 2025-03-07 LAB
BNP SERPL-MCNC: 746 PG/ML (ref 0–100)
BUN SERPL-MCNC: 23 MG/DL (ref 8–27)
BUN/CREAT SERPL: 13 (ref 10–24)
CALCIUM SERPL-MCNC: 9.2 MG/DL (ref 8.6–10.2)
CHLORIDE SERPL-SCNC: 101 MMOL/L (ref 96–106)
CO2 SERPL-SCNC: 26 MMOL/L (ref 20–29)
CREAT SERPL-MCNC: 1.84 MG/DL (ref 0.76–1.27)
EGFRCR SERPLBLD CKD-EPI 2021: 39 ML/MIN/1.73
GLUCOSE SERPL-MCNC: 77 MG/DL (ref 70–99)
POTASSIUM SERPL-SCNC: 4 MMOL/L (ref 3.5–5.2)
SODIUM SERPL-SCNC: 143 MMOL/L (ref 134–144)

## 2025-03-07 RX ORDER — BRIVARACETAM 75 MG/1
75 TABLET, FILM COATED ORAL 2 TIMES DAILY
Qty: 180 TABLET | Refills: 0 | OUTPATIENT
Start: 2025-03-07

## 2025-03-10 DIAGNOSIS — E11.29 TYPE 2 DIABETES MELLITUS WITH OTHER DIABETIC KIDNEY COMPLICATION, WITHOUT LONG-TERM CURRENT USE OF INSULIN: ICD-10-CM

## 2025-03-11 DIAGNOSIS — C61 PROSTATE CANCER: Primary | ICD-10-CM

## 2025-03-11 DIAGNOSIS — R31.29 OTHER MICROSCOPIC HEMATURIA: ICD-10-CM

## 2025-03-12 ENCOUNTER — TELEPHONE (OUTPATIENT)
Dept: NEUROLOGY | Facility: CLINIC | Age: 70
End: 2025-03-12
Payer: MEDICARE

## 2025-03-12 NOTE — TELEPHONE ENCOUNTER
"Provider: JULI    Caller: Andre Agosto \"Bernardo\"    Relationship to Patient: Self    Phone Number: [phone]      Reason for Call:  PATIENT IS REQUESTING A CALL BACK TODAY. PATIENT STATED THAT [employer] SENT A FORM TO BE SIGNED FOR BRIVIACT, PATIENT IS ALMOST OUT OF MEDICATION.       PLEASE REVIEW    THANK YOU       "

## 2025-03-12 NOTE — TELEPHONE ENCOUNTER
RECEIVED THE FORMS FROM [facility] BY FAX TODAY. PLACED IN DR BUSTOS'S BASKET UP FRONT.

## 2025-03-13 ENCOUNTER — TELEPHONE (OUTPATIENT)
Dept: NEUROLOGY | Facility: CLINIC | Age: 70
End: 2025-03-13
Payer: MEDICARE

## 2025-03-13 NOTE — TELEPHONE ENCOUNTER
[facility] received a fax that requires your attention. The document has been indexed to the patient’s chart for your review.      Reason for sending: RECEIVED [employer] PATIENT ASSISTANCE PROGRAM APPLICATION FORM    Documents Description: PATIENT ASSISTANCE PROGRAM FORM_[facility]_03/11/25    Name of Sender: [facility] PATIENT ASSISTANCE PROGRAM    Date Indexed: [phone]    Notes (if needed):

## 2025-03-14 DIAGNOSIS — E03.9 ACQUIRED HYPOTHYROIDISM: ICD-10-CM

## 2025-03-14 DIAGNOSIS — I10 ESSENTIAL HYPERTENSION: ICD-10-CM

## 2025-03-14 RX ORDER — LEVOTHYROXINE SODIUM 88 UG/1
88 TABLET ORAL DAILY
Qty: 90 TABLET | Refills: 3 | Status: SHIPPED | OUTPATIENT
Start: 2025-03-14

## 2025-03-18 DIAGNOSIS — E11.29 TYPE 2 DIABETES MELLITUS WITH OTHER DIABETIC KIDNEY COMPLICATION, WITHOUT LONG-TERM CURRENT USE OF INSULIN: ICD-10-CM

## 2025-03-19 ENCOUNTER — PATIENT MESSAGE (OUTPATIENT)
Dept: INTERNAL MEDICINE | Facility: CLINIC | Age: 70
End: 2025-03-19
Payer: MEDICARE

## 2025-03-19 DIAGNOSIS — E11.29 TYPE 2 DIABETES MELLITUS WITH OTHER DIABETIC KIDNEY COMPLICATION, WITHOUT LONG-TERM CURRENT USE OF INSULIN: Primary | ICD-10-CM

## 2025-03-19 DIAGNOSIS — G40.109 LOCALIZATION-RELATED SYMPTOMATIC EPILEPSY AND EPILEPTIC SYNDROMES WITH SIMPLE PARTIAL SEIZURES, NOT INTRACTABLE, WITHOUT STATUS EPILEPTICUS: ICD-10-CM

## 2025-03-19 RX ORDER — TIRZEPATIDE 2.5 MG/.5ML
INJECTION, SOLUTION SUBCUTANEOUS
Qty: 2 ML | Refills: 12 | OUTPATIENT
Start: 2025-03-19

## 2025-03-31 RX ORDER — ROSUVASTATIN CALCIUM 20 MG/1
20 TABLET, COATED ORAL NIGHTLY
Qty: 90 TABLET | Refills: 1 | Status: SHIPPED | OUTPATIENT
Start: 2025-03-31

## 2025-03-31 NOTE — TELEPHONE ENCOUNTER
Rx Refill Note  Requested Prescriptions     Pending Prescriptions Disp Refills    rosuvastatin (CRESTOR) 20 MG tablet 90 tablet 1     Sig: Take 1 tablet by mouth Every Night.      Last office visit with prescribing clinician: 2/12/2025   Last telemedicine visit with prescribing clinician: Visit date not found   Next office visit with prescribing clinician: 4/11/2025                        Would you like a call back once the refill request has been completed: [] Yes [x] No [] N/A    If the office needs to give you a call back, can they leave a voicemail: [] Yes [x] No [] N/A    Pharmacy Info    Last Fill Date: NA  Rx Written Date: NA  Prescribed Qty: 90  Additional Details from Pharmacy: NA    PATIENT PASSED PROTOCOLS PER JIMI Donnelly MA  03/31/25, 10:11 EDT

## 2025-04-03 ENCOUNTER — OFFICE VISIT (OUTPATIENT)
Dept: INTERNAL MEDICINE | Facility: CLINIC | Age: 70
End: 2025-04-03
Payer: MEDICARE

## 2025-04-03 VITALS
HEIGHT: 76 IN | DIASTOLIC BLOOD PRESSURE: 80 MMHG | HEART RATE: 85 BPM | WEIGHT: 301 LBS | TEMPERATURE: 97.3 F | RESPIRATION RATE: 16 BRPM | BODY MASS INDEX: 36.65 KG/M2 | SYSTOLIC BLOOD PRESSURE: 128 MMHG | OXYGEN SATURATION: 92 %

## 2025-04-03 DIAGNOSIS — N28.9 RENAL INSUFFICIENCY: ICD-10-CM

## 2025-04-03 DIAGNOSIS — I50.21 ACUTE SYSTOLIC CHF (CONGESTIVE HEART FAILURE): Primary | ICD-10-CM

## 2025-04-03 DIAGNOSIS — E11.22 TYPE 2 DIABETES MELLITUS WITH STAGE 3A CHRONIC KIDNEY DISEASE, WITHOUT LONG-TERM CURRENT USE OF INSULIN: ICD-10-CM

## 2025-04-03 DIAGNOSIS — N18.31 TYPE 2 DIABETES MELLITUS WITH STAGE 3A CHRONIC KIDNEY DISEASE, WITHOUT LONG-TERM CURRENT USE OF INSULIN: ICD-10-CM

## 2025-04-03 DIAGNOSIS — E03.9 ACQUIRED HYPOTHYROIDISM: ICD-10-CM

## 2025-04-03 DIAGNOSIS — Z12.5 PROSTATE CANCER SCREENING: ICD-10-CM

## 2025-04-03 NOTE — PROGRESS NOTES
Subjective     Patient ID: Andre Agosto is a 70 y.o. male. Patient is here for management of multiple medical problems.     Chief Complaint   Patient presents with    Congestive Heart Failure     History of Present Illness   CHF    On monjuro 5.    Wt neurtral.              The following portions of the patient's history were reviewed and updated as appropriate: allergies, current medications, past family history, past medical history, past social history, past surgical history and problem list.    Review of Systems    Current Outpatient Medications:     amiodarone (PACERONE) 100 MG tablet, Take 1 tablet by mouth Daily., Disp: , Rfl:     aspirin 81 MG EC tablet, Take 1 tablet by mouth Daily., Disp: , Rfl:     brivaracetam (Briviact) 75 MG tablet, Take 1 tablet by mouth 2 (Two) Times a Day., Disp: 180 tablet, Rfl: 3    bumetanide (BUMEX) 1 MG tablet, Take 1 tablet by mouth 2 (Two) Times a Day. 2 tabs in AM and 1 tab in PM per patient, Disp: , Rfl:     carvedilol (Coreg) 25 MG tablet, Take 1 tablet by mouth 2 (Two) Times a Day With Meals., Disp: 180 tablet, Rfl: 3    colchicine 0.6 MG tablet, Take 1 tablet by mouth Daily., Disp: , Rfl:     dapagliflozin Propanediol (Farxiga) 10 MG tablet, Take 10 mg by mouth Daily., Disp: 90 tablet, Rfl: 1    levothyroxine (SYNTHROID, LEVOTHROID) 88 MCG tablet, TAKE 1 TABLET DAILY, Disp: 90 tablet, Rfl: 3    ondansetron ODT (ZOFRAN-ODT) 4 MG disintegrating tablet, Place 1 tablet on the tongue Every 6 (Six) Hours As Needed for Nausea or Vomiting. As needed for nausea, Disp: 12 tablet, Rfl: 0    rosuvastatin (CRESTOR) 20 MG tablet, Take 1 tablet by mouth Every Night., Disp: 90 tablet, Rfl: 1    sacubitril-valsartan (Entresto) 24-26 MG tablet, Take 1 tablet by mouth 2 (Two) Times a Day., Disp: , Rfl:     tamsulosin (FLOMAX) 0.4 MG capsule 24 hr capsule, Take 1 capsule by mouth Daily., Disp: 90 capsule, Rfl: 3    Tirzepatide 5 MG/0.5ML solution auto-injector, Inject 5 mg under the  "skin into the appropriate area as directed 1 (One) Time Per Week., Disp: 6 mL, Rfl: 0    vitamin d 125 MCG (5000 UT) capsule, Take 1 capsule by mouth Daily., Disp: , Rfl:     Xarelto 15 MG tablet, Take 1 tablet by mouth Daily With Dinner., Disp: 42 tablet, Rfl: 0    Objective      Blood pressure 128/80, pulse 85, temperature 97.3 °F (36.3 °C), resp. rate 16, height 193 cm (76\"), weight (!) 137 kg (301 lb), SpO2 92%.            Physical Exam     General Appearance:    Alert, cooperative, no distress, appears stated age   Head:    Normocephalic, without obvious abnormality, atraumatic   Eyes:    PERRL, conjunctiva/corneas clear, EOM's intact   Ears:    Normal TM's and external ear canals, both ears   Nose:   Nares normal, septum midline, mucosa normal, no drainage   or sinus tenderness   Throat:   Lips, mucosa, and tongue normal; teeth and gums normal   Neck:   Supple, symmetrical, trachea midline, no adenopathy;        thyroid:  No enlargement/tenderness/nodules; no carotid    bruit or JVD   Back:     Symmetric, no curvature, ROM normal, no CVA tenderness   Lungs:     Clear to auscultation bilaterally, respirations unlabored   Chest wall:    No tenderness or deformity   Heart:    Regular rate and rhythm, S1 and S2 normal, no murmur,        rub or gallop   Abdomen:     Soft, non-tender, bowel sounds active all four quadrants,     no masses, no organomegaly   Extremities:   Extremities normal, atraumatic, no cyanosis or edema   Pulses:   2+ and symmetric all extremities   Skin:   Skin color, texture, turgor normal, no rashes or lesions   Lymph nodes:   Cervical, supraclavicular, and axillary nodes normal   Neurologic:   CNII-XII intact. Normal strength, sensation and reflexes       throughout      Results for orders placed or performed in visit on 03/06/25   Basic metabolic panel    Collection Time: 03/06/25  3:29 PM    Specimen: Blood   Result Value Ref Range    Glucose 77 70 - 99 mg/dL    BUN 23 8 - 27 mg/dL    " Creatinine 1.84 (H) 0.76 - 1.27 mg/dL    EGFR Result 39 (L) >59 mL/min/1.73    BUN/Creatinine Ratio 13 10 - 24    Sodium 143 134 - 144 mmol/L    Potassium 4.0 3.5 - 5.2 mmol/L    Chloride 101 96 - 106 mmol/L    Total CO2 26 20 - 29 mmol/L    Calcium 9.2 8.6 - 10.2 mg/dL   BNP    Collection Time: 03/06/25  3:29 PM    Specimen: Blood   Result Value Ref Range    .0 (H) 0.0 - 100.0 pg/mL     *Note: Due to a large number of results and/or encounters for the requested time period, some results have not been displayed. A complete set of results can be found in Results Review.         Assessment & Plan       Diagnoses and all orders for this visit:    1. Acute systolic CHF (congestive heart failure) (Primary)  -     Comprehensive Metabolic Panel  -     C-reactive protein  -     CBC & Differential  -     BNP  -     PSA Screen    2. Renal insufficiency  -     Comprehensive Metabolic Panel  -     C-reactive protein  -     CBC & Differential  -     BNP  -     PSA Screen    3. Type 2 diabetes mellitus with stage 3a chronic kidney disease, without long-term current use of insulin  -     Comprehensive Metabolic Panel  -     C-reactive protein  -     CBC & Differential  -     BNP  -     PSA Screen    4. Acquired hypothyroidism  -     Comprehensive Metabolic Panel  -     C-reactive protein  -     CBC & Differential  -     BNP  -     PSA Screen    5. Prostate cancer screening  -     Comprehensive Metabolic Panel  -     C-reactive protein  -     CBC & Differential  -     BNP  -     PSA Screen      Return in about 2 weeks (around 4/17/2025).          There are no Patient Instructions on file for this visit.     Keven Bear MD    Assessment & Plan

## 2025-04-12 DIAGNOSIS — N20.0 NEPHROLITHIASIS: ICD-10-CM

## 2025-04-14 RX ORDER — TAMSULOSIN HYDROCHLORIDE 0.4 MG/1
1 CAPSULE ORAL DAILY
Qty: 90 CAPSULE | Refills: 3 | Status: SHIPPED | OUTPATIENT
Start: 2025-04-14

## 2025-04-16 LAB
ALBUMIN SERPL-MCNC: 4.5 G/DL (ref 3.9–4.9)
ALP SERPL-CCNC: 79 IU/L (ref 44–121)
ALT SERPL-CCNC: 17 IU/L (ref 0–44)
AST SERPL-CCNC: 18 IU/L (ref 0–40)
BASOPHILS # BLD AUTO: 0.1 X10E3/UL (ref 0–0.2)
BASOPHILS NFR BLD AUTO: 1 %
BILIRUB SERPL-MCNC: 1 MG/DL (ref 0–1.2)
BNP SERPL-MCNC: 801.2 PG/ML (ref 0–100)
BUN SERPL-MCNC: 19 MG/DL (ref 8–27)
BUN/CREAT SERPL: 10 (ref 10–24)
CALCIUM SERPL-MCNC: 9.6 MG/DL (ref 8.6–10.2)
CHLORIDE SERPL-SCNC: 99 MMOL/L (ref 96–106)
CO2 SERPL-SCNC: 25 MMOL/L (ref 20–29)
CREAT SERPL-MCNC: 1.83 MG/DL (ref 0.76–1.27)
CRP SERPL-MCNC: 4 MG/L (ref 0–10)
EGFRCR SERPLBLD CKD-EPI 2021: 39 ML/MIN/1.73
EOSINOPHIL # BLD AUTO: 0.2 X10E3/UL (ref 0–0.4)
EOSINOPHIL NFR BLD AUTO: 3 %
ERYTHROCYTE [DISTWIDTH] IN BLOOD BY AUTOMATED COUNT: 13.6 % (ref 11.6–15.4)
GLOBULIN SER CALC-MCNC: 3.1 G/DL (ref 1.5–4.5)
GLUCOSE SERPL-MCNC: 109 MG/DL (ref 70–99)
HCT VFR BLD AUTO: 47.2 % (ref 37.5–51)
HGB BLD-MCNC: 15.7 G/DL (ref 13–17.7)
IMM GRANULOCYTES # BLD AUTO: 0 X10E3/UL (ref 0–0.1)
IMM GRANULOCYTES NFR BLD AUTO: 0 %
LYMPHOCYTES # BLD AUTO: 1.7 X10E3/UL (ref 0.7–3.1)
LYMPHOCYTES NFR BLD AUTO: 24 %
MCH RBC QN AUTO: 29.4 PG (ref 26.6–33)
MCHC RBC AUTO-ENTMCNC: 33.3 G/DL (ref 31.5–35.7)
MCV RBC AUTO: 88 FL (ref 79–97)
MONOCYTES # BLD AUTO: 0.4 X10E3/UL (ref 0.1–0.9)
MONOCYTES NFR BLD AUTO: 6 %
NEUTROPHILS # BLD AUTO: 4.7 X10E3/UL (ref 1.4–7)
NEUTROPHILS NFR BLD AUTO: 66 %
PLATELET # BLD AUTO: 205 X10E3/UL (ref 150–450)
POTASSIUM SERPL-SCNC: 3.8 MMOL/L (ref 3.5–5.2)
PROT SERPL-MCNC: 7.6 G/DL (ref 6–8.5)
PSA SERPL-MCNC: 5.4 NG/ML (ref 0–4)
RBC # BLD AUTO: 5.34 X10E6/UL (ref 4.14–5.8)
SODIUM SERPL-SCNC: 140 MMOL/L (ref 134–144)
WBC # BLD AUTO: 7.2 X10E3/UL (ref 3.4–10.8)

## 2025-04-17 PROBLEM — I25.10 CORONARY ARTERY DISEASE INVOLVING NATIVE CORONARY ARTERY OF NATIVE HEART WITHOUT ANGINA PECTORIS: Status: ACTIVE | Noted: 2025-04-17

## 2025-04-17 PROBLEM — Z95.810 ICD (IMPLANTABLE CARDIOVERTER-DEFIBRILLATOR) IN PLACE: Status: ACTIVE | Noted: 2025-04-17

## 2025-04-17 PROBLEM — Z98.890 STATUS POST CREATION OF PERICARDIAL WINDOW: Status: ACTIVE | Noted: 2025-04-17

## 2025-04-18 ENCOUNTER — OFFICE VISIT (OUTPATIENT)
Age: 70
End: 2025-04-18
Payer: MEDICARE

## 2025-04-18 VITALS
WEIGHT: 299.1 LBS | SYSTOLIC BLOOD PRESSURE: 144 MMHG | BODY MASS INDEX: 36.42 KG/M2 | DIASTOLIC BLOOD PRESSURE: 82 MMHG | HEART RATE: 74 BPM | HEIGHT: 76 IN

## 2025-04-18 DIAGNOSIS — I50.1 LEFT HEART FAILURE WITH REDUCED LEFT VENTRICULAR FUNCTION: ICD-10-CM

## 2025-04-18 DIAGNOSIS — Z98.890 STATUS POST CREATION OF PERICARDIAL WINDOW: ICD-10-CM

## 2025-04-18 DIAGNOSIS — Z95.810 ICD (IMPLANTABLE CARDIOVERTER-DEFIBRILLATOR) IN PLACE: ICD-10-CM

## 2025-04-18 DIAGNOSIS — I48.0 PAROXYSMAL ATRIAL FIBRILLATION: ICD-10-CM

## 2025-04-18 DIAGNOSIS — I25.10 CORONARY ARTERY DISEASE INVOLVING NATIVE CORONARY ARTERY OF NATIVE HEART WITHOUT ANGINA PECTORIS: Primary | ICD-10-CM

## 2025-04-18 DIAGNOSIS — E78.00 PURE HYPERCHOLESTEROLEMIA: ICD-10-CM

## 2025-04-18 RX ORDER — SPIRONOLACTONE 25 MG/1
25 TABLET ORAL DAILY
Qty: 30 TABLET | Refills: 11 | Status: SHIPPED | OUTPATIENT
Start: 2025-04-18

## 2025-04-18 NOTE — ASSESSMENT & PLAN NOTE
The patient will continue carvedilol 25 mg twice a day, Farxiga 10 mg once a day and Entresto 24-26 mg twice a day.  The spironolactone will be added later depending upon patient's blood pressure. Patient Will start Spironolactone 25 mg daily. Will check BMP in 2 wks.  Orders:    Lipid Panel; Future    CBC & Differential; Future    Basic Metabolic Panel; Future    Lipid Panel; Future    CBC & Differential; Future    Basic Metabolic Panel; Future

## 2025-04-18 NOTE — ASSESSMENT & PLAN NOTE
The patient had pericardial tamponade on and has a window, in order to prevent recurrence of pericardial effusion patient was started on colchicine to reduce inflammation. Will DC colchicine.  Orders:    Lipid Panel; Future    CBC & Differential; Future    Basic Metabolic Panel; Future    Lipid Panel; Future    CBC & Differential; Future    Basic Metabolic Panel; Future

## 2025-04-18 NOTE — PROGRESS NOTES
Cardiology Follow-Up Note     Name: Andre Agosto  :   1955  PCP: Keven Bear MD  Date:   2025  Department: [facility] CARDIOLOGY  [address]  [facility] [address]  Fax [phone]  Phone [phone]    Chief Complaint   Patient presents with    Hypertension    Hyperlipidemia     Problem list:  Coronary artery disease  2015 Non-STEMI, left heart cath PTCA/stent of the OMB and circumflex EF 50%.  2015 emergency relook patent stent to the circumflex and OM.  LAD 50 to 60%.  2021 EF 25% CAD with patent stent to the circumflex and LAD.  2023 CAD history of stent shortness of air.  EF 45% multivessel CAD patent stent to the LAD cardiomyopathy.    2.  Ischemic cardiomyopathy   a.  2021 [facility]/Dr. Potter.  ICD [employer] model CD DR jeffery 500 Q, serial #[id number].  3.  Paroxysmal atrial fibrillation   a.  2021 DC cardioversion successful converting from A-fib to sinus rhythm.     4.  Hypertension benign essential with chronic kidney disease stage III.  5.  Hyperlipidemia  6.  Pericardial tamponade status post pericardial window   a.  2025 emergency pericardial window placement at [facility]    b.Transthoracic echo dated 2025 EF 25 % with severe global hypokinesis.  Moderate left ventricular hypertrophy.  Dilated aortic root 4.5 cm.  Post pericardiocentesis and window no pericardial effusion noted.   c.  2025 Ejection fraction 22.8% mild mitral regurgitation, mild tricuspid regurgitation RV systolic pressure 36 mmHg.  Ascending aorta measured 4.0 cm.  Trace pericardial effusion.Adult Transthoracic Echo Complete W/ Cont if Necessary Per Protocol (2025 10:50)     Subjective     History of Present Illness  Andre Agosto is a 70 y.o. male who presents today for follow-up for labs and post pericardiocentesis, the patient has developed pericardial tamponade and underwent  pericardiocentesis with pericardial window placement at [facility] on 1/24/2025.  The patient has complex medical history with hypertension and chronic kidney disease on colchicine started last visit due to pericardial effusion.  The patient has history of coronary artery disease status post stents with last heart catheterization revealing ejection fraction 45% with patent stents.  Last echocardiogram revealed ejection fraction 25%.  The patient also has atrial fibrillation and is on oral anticoagulant.  The etiology of pericardial effusion is unknown. Doing ok, No chest pain or shortness. Patient had nose bleed.       Current Outpatient Medications:     amiodarone (PACERONE) 100 MG tablet, Take 1 tablet by mouth Daily., Disp: , Rfl:     aspirin 81 MG EC tablet, Take 1 tablet by mouth Daily., Disp: , Rfl:     brivaracetam (Briviact) 75 MG tablet, Take 1 tablet by mouth 2 (Two) Times a Day., Disp: 180 tablet, Rfl: 3    bumetanide (BUMEX) 1 MG tablet, Take 1 tablet by mouth 2 (Two) Times a Day. 2 tabs in AM and 1 tab in PM per patient, Disp: , Rfl:     carvedilol (Coreg) 25 MG tablet, Take 1 tablet by mouth 2 (Two) Times a Day With Meals., Disp: 180 tablet, Rfl: 3    colchicine 0.6 MG tablet, Take 1 tablet by mouth Daily., Disp: , Rfl:     dapagliflozin Propanediol (Farxiga) 10 MG tablet, Take 10 mg by mouth Daily., Disp: 90 tablet, Rfl: 1    levothyroxine (SYNTHROID, LEVOTHROID) 88 MCG tablet, TAKE 1 TABLET DAILY, Disp: 90 tablet, Rfl: 3    rosuvastatin (CRESTOR) 20 MG tablet, Take 1 tablet by mouth Every Night., Disp: 90 tablet, Rfl: 1    sacubitril-valsartan (Entresto) 24-26 MG tablet, Take 1 tablet by mouth 2 (Two) Times a Day., Disp: , Rfl:     tamsulosin (FLOMAX) 0.4 MG capsule 24 hr capsule, TAKE 1 CAPSULE BY MOUTH DAILY, Disp: 90 capsule, Rfl: 3    Tirzepatide 5 MG/0.5ML solution auto-injector, Inject 5 mg under the skin into the appropriate area as directed 1 (One) Time Per Week., Disp: 6 mL,  "Rfl: 0    vitamin d 125 MCG (5000 UT) capsule, Take 1 capsule by mouth Daily., Disp: , Rfl:     Xarelto 15 MG tablet, Take 1 tablet by mouth Daily With Dinner., Disp: 42 tablet, Rfl: 0    ondansetron ODT (ZOFRAN-ODT) 4 MG disintegrating tablet, Place 1 tablet on the tongue Every 6 (Six) Hours As Needed for Nausea or Vomiting. As needed for nausea (Patient not taking: Reported on 4/18/2025), Disp: 12 tablet, Rfl: 0    Objective     Vital Signs:  /82 (BP Location: Left arm, Patient Position: Sitting)   Pulse 74   Ht 193 cm (76\")   Wt 136 kg (299 lb 1.6 oz)   BMI 36.41 kg/m²   Estimated body mass index is 36.41 kg/m² as calculated from the following:    Height as of this encounter: 193 cm (76\").    Weight as of this encounter: 136 kg (299 lb 1.6 oz).               Vitals reviewed.   Constitutional:       Appearance: Normal and healthy appearance.   Eyes:      Pupils: Pupils are equal, round, and reactive to light.   Pulmonary:      Effort: Pulmonary effort is normal.   Chest:      Chest wall: Not tender to palpatation.   Cardiovascular:      PMI at left midclavicular line. Normal rate. Regular rhythm.      No gallop.    Pulses:     Intact distal pulses.   Edema:     Peripheral edema absent.   Skin:     General: Skin is warm.   Psychiatric:         Behavior: Behavior is cooperative.              Data Review:   Lab Results   Component Value Date    GLUCOSE 109 (H) 04/15/2025    BUN 19 04/15/2025    CREATININE 1.83 (H) 04/15/2025    EGFRIFNONA 55 (L) 01/10/2022    EGFRIFAFRI 57 (L) 12/05/2018    BCR 10 04/15/2025    K 3.8 04/15/2025    CO2 25 04/15/2025    CALCIUM 9.6 04/15/2025    ALBUMIN 4.5 04/15/2025    AST 18 04/15/2025    ALT 17 04/15/2025     Lab Results   Component Value Date    CHOL 150 09/12/2024    CHLPL 182 01/15/2025    TRIG 187 (H) 01/15/2025    HDL 37 (L) 01/15/2025     (H) 01/15/2025      Lab Results   Component Value Date    WBC 7.2 04/15/2025    RBC 5.34 04/15/2025    HGB 15.7 04/15/2025 "    HCT 47.2 04/15/2025    MCV 88 04/15/2025     04/15/2025     Lab Results   Component Value Date    TSH 1.590 02/04/2025     Lab Results   Component Value Date    HGBA1C 6.30 (H) 02/04/2025     Lab Results   Component Value Date    INR 1.23 (H) 01/24/2025    INR 1.26 (H) 01/24/2025    INR 1.4 (H) 09/03/2024    PROTIME 13.3 (H) 01/24/2025    PROTIME 13.6 (H) 01/24/2025    PROTIME 15.3 (H) 01/24/2024           Assessment and Plan     Assessment & Plan  Coronary artery disease involving native coronary artery of native heart without angina pectoris  Coronary Artery Disease (OPTIONAL): Coronary artery disease is stable.  Continue current treatment regimen. Dietary sodium restriction. Weight loss.  Cardiac status will be reassessed in 3 months.  The patient is on Xarelto 15 mg once a day due to CKD stage III, aspirin 81 mg once a day to lower the risk of stroke and to continue antiplatelet therapy for CAD lowers risk of myocardial infarction. DC ASA.  Orders:    Lipid Panel; Future    CBC & Differential; Future    Basic Metabolic Panel; Future    Lipid Panel; Future    CBC & Differential; Future    Basic Metabolic Panel; Future    ICD (implantable cardioverter-defibrillator) in place   Will have regular ICD checkup and monitoring.  Orders:    Lipid Panel; Future    CBC & Differential; Future    Basic Metabolic Panel; Future    Lipid Panel; Future    CBC & Differential; Future    Basic Metabolic Panel; Future    Left heart failure with reduced left ventricular function  The patient will continue carvedilol 25 mg twice a day, Farxiga 10 mg once a day and Entresto 24-26 mg twice a day.  The spironolactone will be added later depending upon patient's blood pressure. Patient Will start Spironolactone 25 mg daily. Will check BMP in 2 wks.  Orders:    Lipid Panel; Future    CBC & Differential; Future    Basic Metabolic Panel; Future    Lipid Panel; Future    CBC & Differential; Future    Basic Metabolic Panel;  Future    Pure hypercholesterolemia   Lipid abnormalities are stable    Plan:  Continue same medication/s without change.      Discussed medication dosage, use, side effects, and goals of treatment in detail.    Counseled patient on lifestyle modifications to help control hyperlipidemia.   Cholesterol lowering dietary information shared with patient.  Advised patient to exercise for 150 minutes weekly. (30 minute brisk walk, 5 days a week for example)    Patient Treatment Goals:   LDL goal is less than 70    Followup in 3 months.  Continue rosuvastatin 20 mg once a day and cholesterol is monitored regularly to keep the LDL less than 55.  Paroxysmal atrial fibrillation  The patient will continue Xarelto 15 mg once a day that lowers the risk of CVA 90%.  Orders:    Lipid Panel; Future    CBC & Differential; Future    Basic Metabolic Panel; Future    Lipid Panel; Future    CBC & Differential; Future    Basic Metabolic Panel; Future    Status post creation of pericardial window  The patient had pericardial tamponade on and has a window, in order to prevent recurrence of pericardial effusion patient was started on colchicine to reduce inflammation. Will DC colchicine.  Orders:    Lipid Panel; Future    CBC & Differential; Future    Basic Metabolic Panel; Future    Lipid Panel; Future    CBC & Differential; Future    Basic Metabolic Panel; Future      Advised to continue current cardiac medications. Please notify of any issues. Discussed with the patient compliance with medical management and follow-up.     Follow Up  Return in about 3 months (around 7/18/2025).    Call if you have any significant symptoms or go to the [facility] if possible.     Yann Mendoza MD, FACC,[facility].  [facility]    Part of this note may be an electronic transcription/translation of spoken language to printed text using the Dragon Dictation System.

## 2025-04-18 NOTE — ASSESSMENT & PLAN NOTE
Lipid abnormalities are stable    Plan:  Continue same medication/s without change.      Discussed medication dosage, use, side effects, and goals of treatment in detail.    Counseled patient on lifestyle modifications to help control hyperlipidemia.   Cholesterol lowering dietary information shared with patient.  Advised patient to exercise for 150 minutes weekly. (30 minute brisk walk, 5 days a week for example)    Patient Treatment Goals:   LDL goal is less than 70    Followup in 3 months.  Continue rosuvastatin 20 mg once a day and cholesterol is monitored regularly to keep the LDL less than 55.

## 2025-04-18 NOTE — ASSESSMENT & PLAN NOTE
Coronary Artery Disease (OPTIONAL): Coronary artery disease is stable.  Continue current treatment regimen. Dietary sodium restriction. Weight loss.  Cardiac status will be reassessed in 3 months.  The patient is on Xarelto 15 mg once a day due to CKD stage III, aspirin 81 mg once a day to lower the risk of stroke and to continue antiplatelet therapy for CAD lowers risk of myocardial infarction. DC ASA.  Orders:    Lipid Panel; Future    CBC & Differential; Future    Basic Metabolic Panel; Future    Lipid Panel; Future    CBC & Differential; Future    Basic Metabolic Panel; Future

## 2025-04-18 NOTE — ASSESSMENT & PLAN NOTE
Will have regular ICD checkup and monitoring.  Orders:    Lipid Panel; Future    CBC & Differential; Future    Basic Metabolic Panel; Future    Lipid Panel; Future    CBC & Differential; Future    Basic Metabolic Panel; Future

## 2025-04-18 NOTE — ASSESSMENT & PLAN NOTE
The patient will continue Xarelto 15 mg once a day that lowers the risk of CVA 90%.  Orders:    Lipid Panel; Future    CBC & Differential; Future    Basic Metabolic Panel; Future    Lipid Panel; Future    CBC & Differential; Future    Basic Metabolic Panel; Future

## 2025-04-21 ENCOUNTER — OFFICE VISIT (OUTPATIENT)
Dept: INTERNAL MEDICINE | Facility: CLINIC | Age: 70
End: 2025-04-21
Payer: MEDICARE

## 2025-04-21 VITALS
RESPIRATION RATE: 16 BRPM | OXYGEN SATURATION: 95 % | TEMPERATURE: 97.7 F | HEIGHT: 76 IN | WEIGHT: 300 LBS | BODY MASS INDEX: 36.53 KG/M2 | HEART RATE: 75 BPM

## 2025-04-21 DIAGNOSIS — I50.21 ACUTE SYSTOLIC CHF (CONGESTIVE HEART FAILURE): ICD-10-CM

## 2025-04-21 DIAGNOSIS — N18.32 TYPE 2 DIABETES MELLITUS WITH STAGE 3B CHRONIC KIDNEY DISEASE, WITHOUT LONG-TERM CURRENT USE OF INSULIN: ICD-10-CM

## 2025-04-21 DIAGNOSIS — I31.39 PERICARDIAL EFFUSION: Primary | ICD-10-CM

## 2025-04-21 DIAGNOSIS — E11.22 TYPE 2 DIABETES MELLITUS WITH STAGE 3B CHRONIC KIDNEY DISEASE, WITHOUT LONG-TERM CURRENT USE OF INSULIN: ICD-10-CM

## 2025-04-21 PROCEDURE — 3044F HG A1C LEVEL LT 7.0%: CPT | Performed by: INTERNAL MEDICINE

## 2025-04-21 PROCEDURE — 99214 OFFICE O/P EST MOD 30 MIN: CPT | Performed by: INTERNAL MEDICINE

## 2025-04-21 PROCEDURE — G2211 COMPLEX E/M VISIT ADD ON: HCPCS | Performed by: INTERNAL MEDICINE

## 2025-04-21 PROCEDURE — 1126F AMNT PAIN NOTED NONE PRSNT: CPT | Performed by: INTERNAL MEDICINE

## 2025-04-21 NOTE — PROGRESS NOTES
Subjective     Patient ID: Andre Agosto is a 70 y.o. male. Patient is here for management of multiple medical problems.     No chief complaint on file.  Chf stable.      History of Present Illness   Chf      The following portions of the patient's history were reviewed and updated as appropriate: allergies, current medications, past family history, past medical history, past social history, past surgical history and problem list.    Review of Systems    Current Outpatient Medications:     amiodarone (PACERONE) 100 MG tablet, Take 1 tablet by mouth Daily., Disp: , Rfl:     aspirin 81 MG EC tablet, Take 1 tablet by mouth Daily., Disp: , Rfl:     brivaracetam (Briviact) 75 MG tablet, Take 1 tablet by mouth 2 (Two) Times a Day., Disp: 180 tablet, Rfl: 3    bumetanide (BUMEX) 1 MG tablet, Take 1 tablet by mouth 2 (Two) Times a Day. 2 tabs in AM and 1 tab in PM per patient, Disp: , Rfl:     carvedilol (Coreg) 25 MG tablet, Take 1 tablet by mouth 2 (Two) Times a Day With Meals., Disp: 180 tablet, Rfl: 3    dapagliflozin Propanediol (Farxiga) 10 MG tablet, Take 10 mg by mouth Daily., Disp: 90 tablet, Rfl: 1    levothyroxine (SYNTHROID, LEVOTHROID) 88 MCG tablet, TAKE 1 TABLET DAILY, Disp: 90 tablet, Rfl: 3    rosuvastatin (CRESTOR) 20 MG tablet, Take 1 tablet by mouth Every Night., Disp: 90 tablet, Rfl: 1    sacubitril-valsartan (Entresto) 24-26 MG tablet, Take 1 tablet by mouth 2 (Two) Times a Day., Disp: , Rfl:     spironolactone (ALDACTONE) 25 MG tablet, Take 1 tablet by mouth Daily., Disp: 30 tablet, Rfl: 11    tamsulosin (FLOMAX) 0.4 MG capsule 24 hr capsule, TAKE 1 CAPSULE BY MOUTH DAILY, Disp: 90 capsule, Rfl: 3    vitamin d 125 MCG (5000 UT) capsule, Take 1 capsule by mouth Daily., Disp: , Rfl:     Xarelto 15 MG tablet, Take 1 tablet by mouth Daily With Dinner., Disp: 42 tablet, Rfl: 0    Tirzepatide 7.5 MG/0.5ML solution auto-injector, Inject 7.5 mg under the skin into the appropriate area as directed 1 (One)  "Time Per Week., Disp: 0.5 mL, Rfl: 11    Objective      Pulse 75, temperature 97.7 °F (36.5 °C), resp. rate 16, height 193 cm (76\"), weight 136 kg (300 lb), SpO2 95%.            Physical Exam     General Appearance:    Alert, cooperative, no distress, appears stated age   Head:    Normocephalic, without obvious abnormality, atraumatic   Eyes:    PERRL, conjunctiva/corneas clear, EOM's intact   Ears:    Normal TM's and external ear canals, both ears   Nose:   Nares normal, septum midline, mucosa normal, no drainage   or sinus tenderness   Throat:   Lips, mucosa, and tongue normal; teeth and gums normal   Neck:   Supple, symmetrical, trachea midline, no adenopathy;        thyroid:  No enlargement/tenderness/nodules; no carotid    bruit or JVD   Back:     Symmetric, no curvature, ROM normal, no CVA tenderness   Lungs:     Clear to auscultation bilaterally, respirations unlabored   Chest wall:    No tenderness or deformity   Heart:    Regular rate and rhythm, S1 and S2 normal, no murmur,        rub or gallop   Abdomen:     Soft, non-tender, bowel sounds active all four quadrants,     no masses, no organomegaly   Extremities:   Extremities normal, atraumatic, no cyanosis or edema   Pulses:   2+ and symmetric all extremities   Skin:   Skin color, texture, turgor normal, no rashes or lesions   Lymph nodes:   Cervical, supraclavicular, and axillary nodes normal   Neurologic:   CNII-XII intact. Normal strength, sensation and reflexes       throughout      Results for orders placed or performed in visit on 04/03/25   Comprehensive Metabolic Panel    Collection Time: 04/15/25 11:53 AM    Specimen: Blood   Result Value Ref Range    Glucose 109 (H) 70 - 99 mg/dL    BUN 19 8 - 27 mg/dL    Creatinine 1.83 (H) 0.76 - 1.27 mg/dL    EGFR Result 39 (L) >59 mL/min/1.73    BUN/Creatinine Ratio 10 10 - 24    Sodium 140 134 - 144 mmol/L    Potassium 3.8 3.5 - 5.2 mmol/L    Chloride 99 96 - 106 mmol/L    Total CO2 25 20 - 29 mmol/L    " Calcium 9.6 8.6 - 10.2 mg/dL    Total Protein 7.6 6.0 - 8.5 g/dL    Albumin 4.5 3.9 - 4.9 g/dL    Globulin 3.1 1.5 - 4.5 g/dL    Total Bilirubin 1.0 0.0 - 1.2 mg/dL    Alkaline Phosphatase 79 44 - 121 IU/L    AST (SGOT) 18 0 - 40 IU/L    ALT (SGPT) 17 0 - 44 IU/L   C-reactive protein    Collection Time: 04/15/25 11:53 AM    Specimen: Blood   Result Value Ref Range    C-Reactive Protein 4 0 - 10 mg/L   BNP    Collection Time: 04/15/25 11:53 AM    Specimen: Blood   Result Value Ref Range    .2 (H) 0.0 - 100.0 pg/mL   PSA Screen    Collection Time: 04/15/25 11:53 AM    Specimen: Blood   Result Value Ref Range    PSA 5.4 (H) 0.0 - 4.0 ng/mL   CBC & Differential    Collection Time: 04/15/25 11:53 AM    Specimen: Blood   Result Value Ref Range    WBC 7.2 3.4 - 10.8 x10E3/uL    RBC 5.34 4.14 - 5.80 x10E6/uL    Hemoglobin 15.7 13.0 - 17.7 g/dL    Hematocrit 47.2 37.5 - 51.0 %    MCV 88 79 - 97 fL    MCH 29.4 26.6 - 33.0 pg    MCHC 33.3 31.5 - 35.7 g/dL    RDW 13.6 11.6 - 15.4 %    Platelets 205 150 - 450 x10E3/uL    Neutrophil Rel % 66 Not Estab. %    Lymphocyte Rel % 24 Not Estab. %    Monocyte Rel % 6 Not Estab. %    Eosinophil Rel % 3 Not Estab. %    Basophil Rel % 1 Not Estab. %    Neutrophils Absolute 4.7 1.4 - 7.0 x10E3/uL    Lymphocytes Absolute 1.7 0.7 - 3.1 x10E3/uL    Monocytes Absolute 0.4 0.1 - 0.9 x10E3/uL    Eosinophils Absolute 0.2 0.0 - 0.4 x10E3/uL    Basophils Absolute 0.1 0.0 - 0.2 x10E3/uL    Immature Granulocyte Rel % 0 Not Estab. %    Immature Grans Absolute 0.0 0.0 - 0.1 x10E3/uL     *Note: Due to a large number of results and/or encounters for the requested time period, some results have not been displayed. A complete set of results can be found in Results Review.         Assessment & Plan   Increase monjuro as tolerated at pt request.    Id ap tnext week.   Seen cardiolgoy.  Nephrology apt in am.     Labs in 2 week.     Quite heart sounds. Has pericardial window maybe. Will need echo to make  sure it is not come back.               Diagnoses and all orders for this visit:    1. Pericardial effusion (Primary)  -     Adult Transthoracic Echo Complete W/ Cont if Necessary Per Protocol    2. Acute systolic CHF (congestive heart failure)    3. Type 2 diabetes mellitus with stage 3b chronic kidney disease, without long-term current use of insulin  -     Tirzepatide 7.5 MG/0.5ML solution auto-injector; Inject 7.5 mg under the skin into the appropriate area as directed 1 (One) Time Per Week.  Dispense: 0.5 mL; Refill: 11      Return in about 4 weeks (around 5/19/2025).          There are no Patient Instructions on file for this visit.     Keven Bear MD    Assessment & Plan

## 2025-04-22 ENCOUNTER — PATIENT MESSAGE (OUTPATIENT)
Dept: INTERNAL MEDICINE | Facility: CLINIC | Age: 70
End: 2025-04-22
Payer: MEDICARE

## 2025-04-22 ENCOUNTER — HOSPITAL ENCOUNTER (OUTPATIENT)
Dept: CARDIOLOGY | Facility: HOSPITAL | Age: 70
Discharge: HOME | End: 2025-04-22
Admitting: INTERNAL MEDICINE
Payer: MEDICARE

## 2025-04-22 VITALS
SYSTOLIC BLOOD PRESSURE: 151 MMHG | WEIGHT: 299.83 LBS | DIASTOLIC BLOOD PRESSURE: 98 MMHG | BODY MASS INDEX: 36.51 KG/M2 | HEIGHT: 76 IN

## 2025-04-22 DIAGNOSIS — N18.32 TYPE 2 DIABETES MELLITUS WITH STAGE 3B CHRONIC KIDNEY DISEASE, WITHOUT LONG-TERM CURRENT USE OF INSULIN: ICD-10-CM

## 2025-04-22 DIAGNOSIS — E11.22 TYPE 2 DIABETES MELLITUS WITH STAGE 3B CHRONIC KIDNEY DISEASE, WITHOUT LONG-TERM CURRENT USE OF INSULIN: ICD-10-CM

## 2025-04-22 LAB
AORTIC DIMENSIONLESS INDEX: 0.61 (DI)
AV MEAN PRESS GRAD SYS DOP V1V2: 8 MMHG
AV VMAX SYS DOP: 193 CM/SEC
BH CV ECHO MEAS - AI P1/2T: 428 MSEC
BH CV ECHO MEAS - AO MAX PG: 14.9 MMHG
BH CV ECHO MEAS - AO ROOT DIAM: 3.7 CM
BH CV ECHO MEAS - AO V2 VTI: 37.5 CM
BH CV ECHO MEAS - AVA(I,D): 1.98 CM2
BH CV ECHO MEAS - EDV(CUBED): 338.6 ML
BH CV ECHO MEAS - EDV(MOD-SP2): 197 ML
BH CV ECHO MEAS - EDV(MOD-SP4): 183 ML
BH CV ECHO MEAS - EF(MOD-SP2): 24.4 %
BH CV ECHO MEAS - EF(MOD-SP4): 26.2 %
BH CV ECHO MEAS - ESV(CUBED): 241.2 ML
BH CV ECHO MEAS - ESV(MOD-SP2): 149 ML
BH CV ECHO MEAS - ESV(MOD-SP4): 135 ML
BH CV ECHO MEAS - FS: 10.7 %
BH CV ECHO MEAS - IVS/LVPW: 0.75 CM
BH CV ECHO MEAS - IVSD: 1.19 CM
BH CV ECHO MEAS - LA DIMENSION: 6.1 CM
BH CV ECHO MEAS - LAT PEAK E' VEL: 7.6 CM/SEC
BH CV ECHO MEAS - LV DIASTOLIC VOL/BSA (35-75): 69.5 CM2
BH CV ECHO MEAS - LV MASS(C)D: 487.7 GRAMS
BH CV ECHO MEAS - LV MAX PG: 6.2 MMHG
BH CV ECHO MEAS - LV MEAN PG: 3 MMHG
BH CV ECHO MEAS - LV SYSTOLIC VOL/BSA (12-30): 51.3 CM2
BH CV ECHO MEAS - LV V1 MAX: 124 CM/SEC
BH CV ECHO MEAS - LV V1 VTI: 22.9 CM
BH CV ECHO MEAS - LVIDD: 7 CM
BH CV ECHO MEAS - LVIDS: 6.2 CM
BH CV ECHO MEAS - LVOT AREA: 3.2 CM2
BH CV ECHO MEAS - LVOT DIAM: 2.03 CM
BH CV ECHO MEAS - LVPWD: 1.2 CM
BH CV ECHO MEAS - MED PEAK E' VEL: 6.6 CM/SEC
BH CV ECHO MEAS - MV A MAX VEL: 73.4 CM/SEC
BH CV ECHO MEAS - MV DEC SLOPE: 711 CM/SEC2
BH CV ECHO MEAS - MV DEC TIME: 0.13 SEC
BH CV ECHO MEAS - MV E MAX VEL: 91.2 CM/SEC
BH CV ECHO MEAS - MV E/A: 1.24
BH CV ECHO MEAS - MV MAX PG: 3.5 MMHG
BH CV ECHO MEAS - MV MEAN PG: 2 MMHG
BH CV ECHO MEAS - MV V2 VTI: 20.3 CM
BH CV ECHO MEAS - MVA(VTI): 3.7 CM2
BH CV ECHO MEAS - PA ACC TIME: 0.06 SEC
BH CV ECHO MEAS - PA V2 MAX: 64.4 CM/SEC
BH CV ECHO MEAS - RAP SYSTOLE: 8 MMHG
BH CV ECHO MEAS - RV MAX PG: 1.63 MMHG
BH CV ECHO MEAS - RV V1 MAX: 63.9 CM/SEC
BH CV ECHO MEAS - RV V1 VTI: 10.6 CM
BH CV ECHO MEAS - RVSP: 45.5 MMHG
BH CV ECHO MEAS - SV(LVOT): 74.1 ML
BH CV ECHO MEAS - SV(MOD-SP2): 48 ML
BH CV ECHO MEAS - SV(MOD-SP4): 48 ML
BH CV ECHO MEAS - SVI(LVOT): 28.2 ML/M2
BH CV ECHO MEAS - SVI(MOD-SP2): 18.2 ML/M2
BH CV ECHO MEAS - SVI(MOD-SP4): 18.2 ML/M2
BH CV ECHO MEAS - TAPSE (>1.6): 1.77 CM
BH CV ECHO MEAS - TR MAX PG: 37.5 MMHG
BH CV ECHO MEAS - TR MAX VEL: 306 CM/SEC
BH CV ECHO MEASUREMENTS AVERAGE E/E' RATIO: 12.85
BH CV XLRA - RV BASE: 3.5 CM
BH CV XLRA - RV LENGTH: 10 CM
BH CV XLRA - RV MID: 2.5 CM
BH CV XLRA - TDI S': 6.4 CM/SEC
LEFT ATRIUM VOLUME INDEX: 44.5 ML/M2
LV EF BIPLANE MOD: 22.9 %

## 2025-04-22 PROCEDURE — 93306 TTE W/DOPPLER COMPLETE: CPT | Performed by: INTERNAL MEDICINE

## 2025-04-22 PROCEDURE — 93306 TTE W/DOPPLER COMPLETE: CPT

## 2025-04-23 RX ORDER — SPIRONOLACTONE 25 MG/1
25 TABLET ORAL DAILY
Qty: 90 TABLET | Refills: 0 | Status: SHIPPED | OUTPATIENT
Start: 2025-04-23

## 2025-04-29 ENCOUNTER — OFFICE VISIT (OUTPATIENT)
Dept: NEUROLOGY | Facility: CLINIC | Age: 70
End: 2025-04-29
Payer: MEDICARE

## 2025-04-29 VITALS
WEIGHT: 295 LBS | BODY MASS INDEX: 35.92 KG/M2 | DIASTOLIC BLOOD PRESSURE: 84 MMHG | HEIGHT: 76 IN | OXYGEN SATURATION: 96 % | HEART RATE: 103 BPM | SYSTOLIC BLOOD PRESSURE: 138 MMHG

## 2025-04-29 DIAGNOSIS — G40.109 LOCALIZATION-RELATED SYMPTOMATIC EPILEPSY AND EPILEPTIC SYNDROMES WITH SIMPLE PARTIAL SEIZURES, NOT INTRACTABLE, WITHOUT STATUS EPILEPTICUS: ICD-10-CM

## 2025-04-29 DIAGNOSIS — H49.22 LEFT ABDUCENS NERVE PALSY: Primary | ICD-10-CM

## 2025-04-29 PROCEDURE — 1159F MED LIST DOCD IN RCRD: CPT | Performed by: PSYCHIATRY & NEUROLOGY

## 2025-04-29 PROCEDURE — 1160F RVW MEDS BY RX/DR IN RCRD: CPT | Performed by: PSYCHIATRY & NEUROLOGY

## 2025-04-29 PROCEDURE — 3075F SYST BP GE 130 - 139MM HG: CPT | Performed by: PSYCHIATRY & NEUROLOGY

## 2025-04-29 PROCEDURE — 3079F DIAST BP 80-89 MM HG: CPT | Performed by: PSYCHIATRY & NEUROLOGY

## 2025-04-29 PROCEDURE — 99214 OFFICE O/P EST MOD 30 MIN: CPT | Performed by: PSYCHIATRY & NEUROLOGY

## 2025-04-29 NOTE — PROGRESS NOTES
Subjective:    CC: Andre Agosto is seen today for Follow-up       Current visit-patient denies having any seizures since his last visit with his last episode being in February of last year.  He continues to be compliant with Briviact 75 mg twice daily.  His last GFR was 39.  CRP was normal and A1c was 6.3 as he has been started on Mounjaro.  He continues to take aspirin 81 mg and Xarelto 15 mg daily in addition to Crestor 20 mg daily.  He denies any new strokelike symptoms.  States he still has occasional diplopia while looking inward but his eye movements have now normalized.  He does not use a prism anymore.  He was also admitted to the hospital earlier this year with pericardial effusion for which she had a pericardial window.  Had an echocardiogram this month that showed an EF of 21 to 25%.    Last visit--patient denies having any seizures since his last visit with his last episode being in February.  He continues to be compliant with Briviact 75 mg twice daily.  He started to have sudden onset double vision in September with the images being side-to-side that went away on closing each eye.  He went to [facility] where he had CT angiogram of the head and neck that did not show any significant stenosis as well as a MRI brain that also failed to show any acute intracranial abnormalities.  He was continued on aspirin 81 mg daily along with Xarelto 15 mg daily and Crestor 20 mg daily.  His last A1c was 6.8, triglyceride was 255 and LDL was 71.  GFR was 55.  Since then he has also had difficulty moving his left eye outwards.  He has been seen by ophthalmology and fitted with prisms in the left eye.  Continues to have diplopia without the prisms.  As per  notes the patient had a previous stroke 2017 with residual right-sided deficits that have since resolved. The patient also reportedly had a stroke in 2019. Per chart review, at that time he was reported to have a 6th nerve palsy and diplopia. MRI in 2020  "showed T2 FLAIR changes right mayra consistent with a prior stroke.   Of note-I reviewed neurology notes from September     he denies having any seizures since his last visit.  In fact his last seizure was in the hospital in February.  Has not had a grand mal seizure in over 10 years.  He did increase his dose of Briviact to 75 mg twice daily but does have occasional dizziness especially while playing golf however I explained to him that he is currently on several different antihypertensives as well as medications for cardiac failure that could also cause dizziness.  He is trying to keep himself well-hydrated.  Has also continued to see his nutritionist but has not lost any weight.  His PCP tried to get Ozempic and Mounjaro approved but unfortunately it was denied by his insurance.    Initial hmsfo-58-wptd-old male with a history of atrial fibrillation, anxiety, stroke and multiple normal (his left side, CAD/MI, ALEX on BiPAP, CHF status post pacemaker, A-fib, hypertension, hyperlipidemia, diabetes mellitus type 2, recent diagnosis of prostate cancer presents with seizures.  As per patient he had meningitis in 2012 (after he returned from a basketball game).  Soon after that he started to have episodes of right arm jerking with mild confusion (but no zoning out).  Had extensive testing including extended EEGs at [facility] that were unremarkable.  Was told that he could have deep focal seizures.  Over 5 years ago he stopped having these episodes and now has spells with sensory symptoms where he gets a stinging sensation that travels from his fingers up to his right arm and shoulder without any loss of awareness or loss of consciousness.  At times he feels the episode come on as he can get \"slightly cranky \" prior to it.  In the past he has tried several different medications including Keppra that made him lethargic, Dilantin and Tegretol.  Was placed on Depakote several years ago that he tolerated well and " had been taking up until last month when he was admitted to the hospital for high fevers secondary to ESBL E. coli UTI status post prostate biopsy for his cancer.  He had a seizure while in the hospital and his Depakote levels were persistently low which was attributed to his antibiotics (Invanz).  Therefore he was switched to Briviact 50 mg twice daily that he is tolerating well however his co-pay is extremely high.  He has continued to be on Invanz infusions.  His last MRI brain in 2020 showed a right lacunar chronic infarct as well as chronic ischemic changes but no acute abnormalities.  He has also had several vessel imaging studies of the head and neck that have showed scattered plaques with no significant stenosis.  His last A1c was 6.5 and GFR was 55.  Of note-I reviewed his previous neurology notes    The following portions of the patient's history were reviewed today and updated as of 03/12/2024  : allergies, current medications, past family history, past medical history, past social history, past surgical history, and problem list  These document will be scanned to patient's chart.      Current Outpatient Medications:     amiodarone (PACERONE) 100 MG tablet, Take 1 tablet by mouth Daily., Disp: , Rfl:     aspirin 81 MG EC tablet, Take 1 tablet by mouth Daily., Disp: , Rfl:     brivaracetam (Briviact) 75 MG tablet, Take 1 tablet by mouth 2 (Two) Times a Day., Disp: 180 tablet, Rfl: 3    bumetanide (BUMEX) 1 MG tablet, Take 1 tablet by mouth 2 (Two) Times a Day. 2 tabs in AM and 1 tab in PM per patient, Disp: , Rfl:     carvedilol (Coreg) 25 MG tablet, Take 1 tablet by mouth 2 (Two) Times a Day With Meals., Disp: 180 tablet, Rfl: 3    dapagliflozin Propanediol (Farxiga) 10 MG tablet, Take 10 mg by mouth Daily., Disp: 90 tablet, Rfl: 1    levothyroxine (SYNTHROID, LEVOTHROID) 88 MCG tablet, TAKE 1 TABLET DAILY, Disp: 90 tablet, Rfl: 3    rosuvastatin (CRESTOR) 20 MG tablet, Take 1 tablet by mouth Every  Night., Disp: 90 tablet, Rfl: 1    sacubitril-valsartan (Entresto) 24-26 MG tablet, Take 1 tablet by mouth 2 (Two) Times a Day., Disp: , Rfl:     spironolactone (ALDACTONE) 25 MG tablet, Take 1 tablet by mouth Daily., Disp: 90 tablet, Rfl: 0    tamsulosin (FLOMAX) 0.4 MG capsule 24 hr capsule, TAKE 1 CAPSULE BY MOUTH DAILY, Disp: 90 capsule, Rfl: 3    Tirzepatide 7.5 MG/0.5ML solution auto-injector, Inject 7.5 mg under the skin into the appropriate area as directed 1 (One) Time Per Week., Disp: 6 mL, Rfl: 1    vitamin d 125 MCG (5000 UT) capsule, Take 1 capsule by mouth Daily., Disp: , Rfl:     Xarelto 15 MG tablet, Take 1 tablet by mouth Daily With Dinner., Disp: 42 tablet, Rfl: 0   Past Medical History:   Diagnosis Date    6th nerve palsy, right     right eye     Anxiety and depression     Aortic root dilation     Aortic stenosis     Atrial fibrillation, persistent 12/02/2020    on Xarelto    Bell palsy 7/5/2018    6th nerve palsy    Cancer     Carotid artery disease     CHF (congestive heart failure)     Cholelithiasis     Chronic venous insufficiency     Coronary artery disease involving native coronary artery of native heart without angina pectoris 09/12/2018    follows w/ Dr. Mendoza    CRI (chronic renal insufficiency), stage 2 (mild)     CVA (cerebral vascular accident)     Diabetes mellitus     doesnt check sugar, type 2     Disease of thyroid gland     Double vision     resolved- right eye    Elevated cholesterol     Elevated prostate specific antigen (PSA) 11/08/2023    Focal seizure     of right arm     Heart attack     NSTEMI 2015, s/p stenting    History of Helicobacter pylori infection     in 2008, treated w/ PrevPak - 2021 EGD bx (+), PCP RX - PPI/Augmentin/Doxy/Flagyl (x 14 days) 1/22/21    HL (hearing loss)     (L) ear - child luevano measles    Hyperlipidemia     Hypertension     Hypokalemia     Kidney stone     Leg cramps     Memory loss 2005    d/t meningitis    Meningitis 2005    unsure of bacterial  or viral     Mitral regurgitation     Obesity     Pericardial effusion     Prostate cancer 01/2024    Ron 3+3=6    Seizures     Right arm focal    Shingles 9/2018    On right arm    Sleep apnea treated with nocturnal BiPAP     compliant with  machine     Stroke     2017/2018    Tricuspid regurgitation     Ventricular tachycardia     3 different times    Wears glasses       Past Surgical History:   Procedure Laterality Date    CARDIAC CATHETERIZATION N/A 10/12/2018    Procedure: Left Heart Cath;  Surgeon: Yoselin Johnson MD;  Location: [facility] CATH INVASIVE LOCATION;  Service: Cardiology    CARDIAC CATHETERIZATION  03/2021    stent    CARDIAC CATHETERIZATION  07/2021    just checking the after pacemaker placed- Dr. Tim    CARDIAC CATHETERIZATION  12/01/2015    [facility] MQ/Emergent relook coronary angiography. Patent stent to  LCX & CX. LAD has 50-60% disease.    CARDIAC CATHETERIZATION  12/01/2015    NSTEMI, LHC, PTCA of the OMB & CX, EF 50%    CARDIAC CATHETERIZATION  09/29/2023    [facility] MQ. IOP: CAD, HX Stenting, SOA. EF 45%. Multi CAD,  Patent Stent to LAD, Cardiomyopathy.    CARDIAC DEFIBRILLATOR PLACEMENT  07/27/2021    [facility] [place] ROSS/ [employer] model [id number], serial #[id number]    CARDIOVERSION  06/22/2021    [facility] [place] SELWYN/DCCV-Successful cardioversion with restoration of sinus  rhythm.    CAROTID ENDARTERECTOMY      CAROTID STENT  12/2025    COLONOSCOPY  10/2020    w/ Dr. Jacobs, hx colon polyps    CORONARY STENT PLACEMENT  2015    KIDNEY STONE SURGERY      LAPAROSCOPIC GASTRIC BANDING  2008    s/p LAGB Realize 6/2008 by [facility].    PACEMAKER IMPLANTATION  07/25/2021    UMBILICAL HERNIA REPAIR  2008    incarcerated UHR w/ mesh by Dr. Velasquez    URETEROSCOPY LASER LITHOTRIPSY WITH STENT INSERTION Left 10/20/2021    Procedure: URETEROSCOPY LASER LITHOTRIPSY WITH STENT INSERTION;  Surgeon: Tonio De La Cruz MD;  Location: [facility] OR;  Service: Urology;  Laterality: Left;    URETEROSCOPY LASER LITHOTRIPSY WITH STENT  "INSERTION Left 2021    Procedure: URETEROSCOPY LEFT, LEFT RETROGRADE PYELOGRAM, CYSTOSCOPY, LASER LITHOTRIPSY WITH STENT EXCHANGE;  Surgeon: Tonio De La Cruz MD;  Location: [facility] OR;  Service: Urology;  Laterality: Left;    URETEROSCOPY LASER LITHOTRIPSY WITH STENT INSERTION Left 10/06/2022    Procedure: URETEROSCOPY LASER LITHOTRIPSY WITH STENT INSERTION;  Surgeon: Tonio De La Cruz MD;  Location: [facility] OR;  Service: Urology;  Laterality: Left;      Family History   Problem Relation Age of Onset    Stroke Mother     Hypertension Mother     Sudden death Mother     COPD Father     Hypertension Father     Heart disease Father       Social History     Socioeconomic History    Marital status:    Tobacco Use    Smoking status: Former     Current packs/day: 0.00     Average packs/day: 0.5 packs/day for 15.0 years (7.5 ttl pk-yrs)     Types: Cigarettes     Start date: 1975     Quit date: 1985     Years since quittin.3     Passive exposure: Past    Smokeless tobacco: Never   Vaping Use    Vaping status: Never Used   Substance and Sexual Activity    Alcohol use: No    Drug use: No    Sexual activity: Not Currently     Review of Systems   Neurological:  Positive for seizures.   All other systems reviewed and are negative.      Objective:    /84   Pulse 103   Ht 193 cm (75.98\")   Wt 134 kg (295 lb)   SpO2 96%   BMI 35.92 kg/m²     Neurology Exam:    General apperance: Obese    Mental status: Alert, awake and oriented to time place and person.    Recent and Remote memory: Intact.    Attention span and Concentration: Normal.     Language and Speech: Intact- No dysarthria.    Fluency, Naming , Repitition and Comprehension:  Intact    Cranial Nerves:   CN II: Visual fields are full. Intact. Fundi - Normal, No papillederma, Pupils - [place]  CN III, IV and VI: Extraocular movements are impaired on the left.  He could  ABduct his left eye today.    CN V: Facial sensation is intact. "   CN VII: Muscles of facial expression reveal no asymmetry. Intact.   CN VIII: Hearing is intact. Whispered voice intact.   CN IX and X: Palate elevates symmetrically. Intact  CN XI: Shoulder shrug is intact.   CN XII: Tongue is midline without evidence of atrophy or fasciculation.     Ophthalmoscopic exam of optic disc-normal    Motor:  Right UE muscle strength 5/5. Normal tone.     Left UE muscle strength 5/5. Normal tone.      Right LE muscle strength5/5. Normal tone.     Left LE muscle strength 5/5. Normal tone.      Sensory: Normal light touch, vibration and pinprick sensation bilaterally.    DTRs: 2+ bilaterally in upper and lower extremities.    Babinski: Negative bilaterally.    Co-ordination: Normal finger-to-nose, heel to shin B/L.    Rhomberg: Negative.    Gait: Normal.    Cardiovascular: Regular rate and rhythm without murmur, gallop or rub.    Assessment and Plan:  1. 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  Last episode was in February of this year  I have told him to continue Briviact 75 mg twice daily that he gets to the Briviact patient assistance program  Counseled on seizure precautions.  He should also continue keeping himself well-hydrated and wear moderate compression stockings to increase venous return specially with standing    2.  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  He previously had it on the right that spontaneously resolved in 2019  He should continue aspirin 81 mg and Xarelto 15 mg daily  He should continue Crestor 20 mg daily.  Last triglyceride of 255 and LDL of 71  Goal blood pressure less than 130/90.  Last A1c was 6.3  I have counseled him to walk daily (which is slightly difficult for him due to reduced ejection fraction)           Return  in about 6 months (around 10/29/2025).     I spent 25 minutes out of it 20 minutes were spent face-to-face    Alyssa Victoria MD

## 2025-05-13 ENCOUNTER — OFFICE VISIT (OUTPATIENT)
Dept: NEUROLOGY | Facility: CLINIC | Age: 70
End: 2025-05-13
Payer: MEDICARE

## 2025-05-13 VITALS
WEIGHT: 297 LBS | SYSTOLIC BLOOD PRESSURE: 121 MMHG | BODY MASS INDEX: 36.17 KG/M2 | DIASTOLIC BLOOD PRESSURE: 81 MMHG | HEART RATE: 76 BPM | OXYGEN SATURATION: 96 % | HEIGHT: 76 IN | TEMPERATURE: 98.4 F

## 2025-05-13 DIAGNOSIS — G47.33 OSA (OBSTRUCTIVE SLEEP APNEA): Primary | ICD-10-CM

## 2025-05-13 PROCEDURE — 1159F MED LIST DOCD IN RCRD: CPT | Performed by: NURSE PRACTITIONER

## 2025-05-13 PROCEDURE — 99213 OFFICE O/P EST LOW 20 MIN: CPT | Performed by: NURSE PRACTITIONER

## 2025-05-13 PROCEDURE — G2211 COMPLEX E/M VISIT ADD ON: HCPCS | Performed by: NURSE PRACTITIONER

## 2025-05-13 PROCEDURE — 1160F RVW MEDS BY RX/DR IN RCRD: CPT | Performed by: NURSE PRACTITIONER

## 2025-05-13 PROCEDURE — 3079F DIAST BP 80-89 MM HG: CPT | Performed by: NURSE PRACTITIONER

## 2025-05-13 PROCEDURE — 3074F SYST BP LT 130 MM HG: CPT | Performed by: NURSE PRACTITIONER

## 2025-05-13 NOTE — PROGRESS NOTES
Follow up Sleep Patient Office Visit      Patient Name: Andre Agosto  : 1955   MRN: [id number]     Referring Physician: No ref. provider found    Chief Complaint:    Chief Complaint   Patient presents with    Follow-up     Patient in office to follow up on alex.         History of Present Illness: Andre Agosto is a 70 y.o. male who is here today to follow up with ALEX.  Currently on vAuto Bipap max IPAP 21.6cm, min EPAP 6cm, AHI 0.6/hour, compliance 100%.  He is tolerating his pressures well.  He does have some leaking of his mask, at times.  He gets tired in the afternoon and takes a little nap on 3-4 days per week.  No excessive daytime sleepiness.      *DME company- [employer]  *Mask- full face  *Tubing- heated  *Current compliance report reviewed and has been scanned into the patient's medical record.    Subjective      Review of Systems:   Review of Systems    Medications:     Current Outpatient Medications:     amiodarone (PACERONE) 100 MG tablet, Take 1 tablet by mouth Daily., Disp: , Rfl:     aspirin 81 MG EC tablet, Take 1 tablet by mouth Daily., Disp: , Rfl:     brivaracetam (Briviact) 75 MG tablet, Take 1 tablet by mouth 2 (Two) Times a Day., Disp: 180 tablet, Rfl: 3    bumetanide (BUMEX) 1 MG tablet, Take 1 tablet by mouth 2 (Two) Times a Day. 2 tabs in AM and 1 tab in PM per patient, Disp: , Rfl:     carvedilol (Coreg) 25 MG tablet, Take 1 tablet by mouth 2 (Two) Times a Day With Meals., Disp: 180 tablet, Rfl: 3    dapagliflozin Propanediol (Farxiga) 10 MG tablet, Take 10 mg by mouth Daily., Disp: 90 tablet, Rfl: 1    levothyroxine (SYNTHROID, LEVOTHROID) 88 MCG tablet, TAKE 1 TABLET DAILY, Disp: 90 tablet, Rfl: 3    rosuvastatin (CRESTOR) 20 MG tablet, Take 1 tablet by mouth Every Night., Disp: 90 tablet, Rfl: 1    sacubitril-valsartan (Entresto) 24-26 MG tablet, Take 1 tablet by mouth 2 (Two) Times a Day., Disp: , Rfl:     spironolactone (ALDACTONE) 25 MG tablet, Take 1 tablet by mouth  "Daily., Disp: 90 tablet, Rfl: 0    tamsulosin (FLOMAX) 0.4 MG capsule 24 hr capsule, TAKE 1 CAPSULE BY MOUTH DAILY, Disp: 90 capsule, Rfl: 3    Tirzepatide 7.5 MG/0.5ML solution auto-injector, Inject 7.5 mg under the skin into the appropriate area as directed 1 (One) Time Per Week., Disp: 6 mL, Rfl: 1    vitamin d 125 MCG (5000 UT) capsule, Take 1 capsule by mouth Daily., Disp: , Rfl:     Xarelto 15 MG tablet, Take 1 tablet by mouth Daily With Dinner., Disp: 42 tablet, Rfl: 0    Allergies:   Allergies   Allergen Reactions    5-Alpha Reductase Inhibitors Other (See Comments)     Myalgias    Amlodipine Swelling and Other (See Comments)     Feet swell    Crestor [Rosuvastatin] Other (See Comments)     Leg pain    Statins Myalgia and Other (See Comments)       Objective     Physical Exam:  Vital Signs:   Vitals:    05/13/25 1351   BP: 121/81   BP Location: Left arm   Patient Position: Sitting   Cuff Size: Adult   Pulse: 76   Temp: 98.4 °F (36.9 °C)   SpO2: 96%   Weight: 135 kg (297 lb)   Height: 193 cm (75.98\")   PainSc: 0-No pain     BMI: Body mass index is 36.17 kg/m².    Physical Exam  Vitals and nursing note reviewed.   Constitutional:       General: He is not in acute distress.     Appearance: Normal appearance. He is well-developed. He is obese. He is not diaphoretic.   HENT:      Head: Normocephalic and atraumatic.   Eyes:      Extraocular Movements: Extraocular movements intact.      Conjunctiva/sclera: Conjunctivae normal.   Pulmonary:      Effort: Pulmonary effort is normal. No respiratory distress.   Musculoskeletal:         General: Normal range of motion.   Skin:     General: Skin is warm and dry.   Neurological:      Mental Status: He is alert and oriented to person, place, and time.   Psychiatric:         Mood and Affect: Mood normal.         Behavior: Behavior normal.         Thought Content: Thought content normal.         Judgment: Judgment normal.         Assessment / Plan      Assessment/Plan: "   Diagnoses and all orders for this visit:    1. ALEX (obstructive sleep apnea) (Primary)       Follow Up:   Return in about 1 year (around 5/13/2026) for F/U Obstructive Sleep Apnea.    *Order for PAP supplies sent to patient's DME company.     I have advised the patient the need to continue the use of CPAP.  Gold standard for treatment of sleep apnea includes weight loss, use of cpap and avoidance of alcohol.  Encouraged weight loss (if applicable) with a BMI goal of 24.  Untreated ALEX may increase the risk for development of hypertension, stroke, myocardial infarction, diabetes, cardiovascular disease, work-related issues and driving accidents. I have counseled and advised the patient to avoid driving or operating heavy/dangerous equipment if feeling drowsy.     YESY Maradiaga, FNP-C  [facility] Neurology and Sleep Medicine

## 2025-05-14 NOTE — ASSESSMENT & PLAN NOTE
The patient will continue carvedilol 25 mg twice a day, Farxiga 10 mg once a day and Entresto 24-26 mg twice a day.  The spironolactone will be added later depending upon patient's blood pressure. Patient Will start Spironolactone 25 mg daily. Will check BMP in 2 wks.    Orders:    Basic Metabolic Panel; Future

## 2025-05-14 NOTE — ASSESSMENT & PLAN NOTE
The patient will continue Xarelto 15 mg once a day that lowers the risk of CVA 90%.  The patient is on amiodarone 100 mg once a day understanding for in the side effects of this medication which is discussed with patient.I have discussed the risk factors with the patient about the amiodarone, the patient fully understand the side effects that includes photosensitivity, thyroid toxicity, lung toxicity, neurological effects, and visual side effects.  The patient was understand fully close follow-up, the dose may need to be tapered.  The patient understand interaction with other medications.  Orders:    Basic Metabolic Panel; Future

## 2025-05-14 NOTE — PROGRESS NOTES
Cardiology Follow-Up Note     Name: Andre Agosto  :   1955  PCP: Keven Bear MD  Date:   2025  Department: [facility] CARDIOLOGY  [address]  [facility] [address]  Fax [phone]  Phone [phone]    Chief Complaint   Patient presents with    Hypertension    Hyperlipidemia     Problem list:  Coronary artery disease  2015 Non-STEMI, left heart cath PTCA/stent of the OMB and circumflex EF 50%.  2015 emergency relook patent stent to the circumflex and OM.  LAD 50 to 60%.  2021 EF 25% CAD with patent stent to the circumflex and LAD.  2023 CAD history of stent shortness of air.  EF 45% multivessel CAD patent stent to the LAD cardiomyopathy.    2.  Ischemic cardiomyopathy   a.  2021 [facility]/Dr. Potter.  ICD [employer] model CD DR jeffery 500 Q, serial #[id number].  3.  Paroxysmal atrial fibrillation   a.  2021 DC cardioversion successful converting from A-fib to sinus rhythm.     4.  Hypertension benign essential with chronic kidney disease stage III.  5.  Hyperlipidemia  6.  Pericardial tamponade status post pericardial window   a.  2025 emergency pericardial window placement at [facility]    b.Transthoracic echo dated 2025 EF 25 % with severe global hypokinesis.  Moderate left ventricular hypertrophy.  Dilated aortic root 4.5 cm.  Post pericardiocentesis and window no pericardial effusion noted.   c.Adult Transthoracic Echo Complete W/ Cont if Necessary Per Protocol (2025 15:12)    d .  2025 Ejection fraction 22.8% mild mitral regurgitation, mild tricuspid regurgitation RV systolic pressure 36 mmHg.  Ascending aorta measured 4.0 cm.  Trace pericardial effusion.Adult Transthoracic Echo Complete W/ Cont if Necessary Per Protocol (2025 10:50)     Subjective     History of Present Illness  Andre Agosto is a 70 y.o. male who presents today for follow-up for  labs and post pericardiocentesis, the patient has developed pericardial tamponade and underwent pericardiocentesis with pericardial window placement at [facility] on 1/24/2025.  The patient has complex medical history with hypertension and chronic kidney disease on colchicine started last visit due to pericardial effusion.  The patient has history of coronary artery disease status post stents with last heart catheterization revealing ejection fraction 45% with patent stents.  Last echocardiogram revealed ejection fraction 25%.  The patient also has atrial fibrillation and is on oral anticoagulant.  The etiology of pericardial effusion is unknown. Doing ok, No chest pain or shortness. Patient had nose bleed.       Current Outpatient Medications:     amiodarone (PACERONE) 100 MG tablet, Take 1 tablet by mouth Daily., Disp: , Rfl:     aspirin 81 MG EC tablet, Take 1 tablet by mouth Daily., Disp: , Rfl:     brivaracetam (Briviact) 75 MG tablet, Take 1 tablet by mouth 2 (Two) Times a Day., Disp: 180 tablet, Rfl: 3    bumetanide (BUMEX) 1 MG tablet, Take 1 tablet by mouth 2 (Two) Times a Day. 2 tabs in AM and 1 tab in PM per patient, Disp: , Rfl:     carvedilol (Coreg) 25 MG tablet, Take 1 tablet by mouth 2 (Two) Times a Day With Meals., Disp: 180 tablet, Rfl: 3    dapagliflozin Propanediol (Farxiga) 10 MG tablet, Take 10 mg by mouth Daily., Disp: 90 tablet, Rfl: 1    levothyroxine (SYNTHROID, LEVOTHROID) 88 MCG tablet, TAKE 1 TABLET DAILY, Disp: 90 tablet, Rfl: 3    rosuvastatin (CRESTOR) 20 MG tablet, Take 1 tablet by mouth Every Night., Disp: 90 tablet, Rfl: 1    sacubitril-valsartan (Entresto) 24-26 MG tablet, Take 1 tablet by mouth 2 (Two) Times a Day., Disp: , Rfl:     tamsulosin (FLOMAX) 0.4 MG capsule 24 hr capsule, TAKE 1 CAPSULE BY MOUTH DAILY, Disp: 90 capsule, Rfl: 3    Tirzepatide 7.5 MG/0.5ML solution auto-injector, Inject 7.5 mg under the skin into the appropriate area as directed 1 (One)  "Time Per Week., Disp: 6 mL, Rfl: 1    vitamin d 125 MCG (5000 UT) capsule, Take 1 capsule by mouth Daily., Disp: , Rfl:     Xarelto 15 MG tablet, Take 1 tablet by mouth Daily With Dinner., Disp: 42 tablet, Rfl: 0  No current facility-administered medications for this visit.    Objective     Vital Signs:  /63 (BP Location: Left arm, Patient Position: Sitting, Cuff Size: Adult)   Pulse 86   Ht 193 cm (76\")   Wt 89.5 kg (197 lb 4.8 oz)   BMI 24.02 kg/m²   Estimated body mass index is 24.02 kg/m² as calculated from the following:    Height as of this encounter: 193 cm (76\").    Weight as of this encounter: 89.5 kg (197 lb 4.8 oz).               Vitals reviewed.   Constitutional:       Appearance: Normal and healthy appearance.   Eyes:      Pupils: Pupils are equal, round, and reactive to light.   Pulmonary:      Effort: Pulmonary effort is normal.   Chest:      Chest wall: Not tender to palpatation.   Cardiovascular:      PMI at left midclavicular line. Normal rate. Regular rhythm.      No gallop.    Pulses:     Intact distal pulses.   Edema:     Peripheral edema absent.   Skin:     General: Skin is warm.   Psychiatric:         Behavior: Behavior is cooperative.              Data Review:   Lab Results   Component Value Date    GLUCOSE 97 05/15/2025    BUN 32 (H) 05/15/2025    CREATININE 2.47 (H) 05/15/2025    EGFRIFNONA 55 (L) 01/10/2022    EGFRIFAFRI 57 (L) 12/05/2018    BCR 13 05/15/2025    K 5.3 (H) 05/15/2025    CO2 25 05/15/2025    CALCIUM 9.6 05/15/2025    ALBUMIN 4.5 04/15/2025    AST 18 04/15/2025    ALT 17 04/15/2025     Lab Results   Component Value Date    CHOL 150 09/12/2024    CHLPL 182 01/15/2025    TRIG 187 (H) 01/15/2025    HDL 37 (L) 01/15/2025     (H) 01/15/2025      Lab Results   Component Value Date    WBC 8.1 05/15/2025    RBC 5.70 05/15/2025    HGB 16.3 05/15/2025    HCT 51.5 (H) 05/15/2025    MCV 90 05/15/2025     05/15/2025     Lab Results   Component Value Date    TSH " 1.590 02/04/2025     Lab Results   Component Value Date    HGBA1C 6.30 (H) 02/04/2025     Lab Results   Component Value Date    INR 1.23 (H) 01/24/2025    INR 1.26 (H) 01/24/2025    INR 1.4 (H) 09/03/2024    PROTIME 13.3 (H) 01/24/2025    PROTIME 13.6 (H) 01/24/2025    PROTIME 15.3 (H) 01/24/2024           Assessment and Plan     Assessment & Plan  Acute renal failure with other specified pathological kidney lesion superimposed on stage 4 chronic kidney disease  Renal condition is worsening.  Continue current treatment regimen.  Weight loss.  Sodium restriction  Renal condition will be reassessed  2 wks. .  The patient was started on spironolactone last visit to complete the quad therapy, however spironolactone made the potassium go to 5.3 and drop the GFR 28.  That is consistent with stage IV kidney disease secondary to acute on chronic renal failure.  We will therefore discontinue spironolactone.  Repeat BMP in about 2 weeks.  Keep hydration.       Coronary artery disease of native artery of native heart with stable angina pectoris  Coronary Artery Disease (OPTIONAL): Coronary artery disease is stable.  Continue current treatment regimen. Dietary sodium restriction.  Cardiac status will be reassessed in 4 weeks.    Orders:    Basic Metabolic Panel; Future    Paroxysmal atrial fibrillation  The patient will continue Xarelto 15 mg once a day that lowers the risk of CVA 90%.  The patient is on amiodarone 100 mg once a day understanding for in the side effects of this medication which is discussed with patient.I have discussed the risk factors with the patient about the amiodarone, the patient fully understand the side effects that includes photosensitivity, thyroid toxicity, lung toxicity, neurological effects, and visual side effects.  The patient was understand fully close follow-up, the dose may need to be tapered.  The patient understand interaction with other medications.  Orders:    Basic Metabolic Panel;  Future     Left heart failure with reduced left ventricular function  The patient will continue carvedilol 25 mg twice a day, Farxiga 10 mg once a day and Entresto 24-26 mg twice a day.  The spironolactone will be added later depending upon patient's blood pressure. Patient Will start Spironolactone 25 mg daily. Will check BMP in 2 wks.    Orders:    Basic Metabolic Panel; Future     Essential hypertension  Hypertension is stable and controlled  Continue current treatment regimen.  Weight loss.  Ambulatory blood pressure monitoring.  Blood pressure will be reassessed in 3 months.  Continue Entresto, spironolactone and carvedilol.    Discussed with patient American College of cardiology and American Heart Association provide detailed guidelines for accurate blood pressure measurement.  Key steps for proper blood pressure measurement include:    Recommend being fully relaxed sitting in chair with feet on the floor and back supported for at least 5 minutes.  Avoid caffeine, exercise and smoking for at least 30 minutes before management.  Ensure empty bladder, remove clothing covering the location of the cuff placement using proper blood pressure equipment and the blood pressure cuff size should be corrected with bladder encircling 80% of the arm.  Repeat blood pressure if blood pressure is extremely high.  The ideal blood pressure is less than 120/80 on the average blood pressure should be less than 130/80.            Advised to continue current cardiac medications. Please notify of any issues. Discussed with the patient compliance with medical management and follow-up.     Follow Up  Return in about 2 weeks (around 5/30/2025).    Call if you have any significant symptoms or go to the [facility] Emergency room if possible.     Yann Mendoza MD, FACC,[facility].  [facility]    Part of this note may be an electronic transcription/translation of spoken language to printed text using the  [facility] Dictation System.

## 2025-05-14 NOTE — ASSESSMENT & PLAN NOTE
Hypertension is stable and controlled  Continue current treatment regimen.  Weight loss.  Ambulatory blood pressure monitoring.  Blood pressure will be reassessed in 3 months.  Continue Entresto, spironolactone and carvedilol.    Discussed with patient American College of cardiology and American Heart Association provide detailed guidelines for accurate blood pressure measurement.  Key steps for proper blood pressure measurement include:    Recommend being fully relaxed sitting in chair with feet on the floor and back supported for at least 5 minutes.  Avoid caffeine, exercise and smoking for at least 30 minutes before management.  Ensure empty bladder, remove clothing covering the location of the cuff placement using proper blood pressure equipment and the blood pressure cuff size should be corrected with bladder encircling 80% of the arm.  Repeat blood pressure if blood pressure is extremely high.  The ideal blood pressure is less than 120/80 on the average blood pressure should be less than 130/80.

## 2025-05-16 ENCOUNTER — OFFICE VISIT (OUTPATIENT)
Age: 70
End: 2025-05-16
Payer: MEDICARE

## 2025-05-16 VITALS
SYSTOLIC BLOOD PRESSURE: 118 MMHG | HEIGHT: 76 IN | DIASTOLIC BLOOD PRESSURE: 63 MMHG | HEART RATE: 86 BPM | WEIGHT: 197.3 LBS | BODY MASS INDEX: 24.02 KG/M2

## 2025-05-16 DIAGNOSIS — N18.4 ACUTE RENAL FAILURE WITH OTHER SPECIFIED PATHOLOGICAL KIDNEY LESION SUPERIMPOSED ON STAGE 4 CHRONIC KIDNEY DISEASE: Primary | ICD-10-CM

## 2025-05-16 DIAGNOSIS — I50.1 LEFT HEART FAILURE WITH REDUCED LEFT VENTRICULAR FUNCTION: ICD-10-CM

## 2025-05-16 DIAGNOSIS — I10 ESSENTIAL HYPERTENSION: ICD-10-CM

## 2025-05-16 DIAGNOSIS — N17.8 ACUTE RENAL FAILURE WITH OTHER SPECIFIED PATHOLOGICAL KIDNEY LESION SUPERIMPOSED ON STAGE 4 CHRONIC KIDNEY DISEASE: Primary | ICD-10-CM

## 2025-05-16 DIAGNOSIS — I48.0 PAROXYSMAL ATRIAL FIBRILLATION: ICD-10-CM

## 2025-05-16 DIAGNOSIS — I25.118 CORONARY ARTERY DISEASE OF NATIVE ARTERY OF NATIVE HEART WITH STABLE ANGINA PECTORIS: ICD-10-CM

## 2025-05-16 PROBLEM — N18.9 ACUTE ON CHRONIC RENAL FAILURE: Status: ACTIVE | Noted: 2025-05-16

## 2025-05-16 PROBLEM — N17.9 ACUTE ON CHRONIC RENAL FAILURE: Status: ACTIVE | Noted: 2025-05-16

## 2025-05-16 LAB
BASOPHILS # BLD AUTO: 0.1 X10E3/UL (ref 0–0.2)
BASOPHILS NFR BLD AUTO: 1 %
BNP SERPL-MCNC: 338.2 PG/ML (ref 0–100)
BUN SERPL-MCNC: 32 MG/DL (ref 8–27)
BUN/CREAT SERPL: 13 (ref 10–24)
CALCIUM SERPL-MCNC: 9.6 MG/DL (ref 8.6–10.2)
CHLORIDE SERPL-SCNC: 96 MMOL/L (ref 96–106)
CO2 SERPL-SCNC: 25 MMOL/L (ref 20–29)
CREAT SERPL-MCNC: 2.47 MG/DL (ref 0.76–1.27)
EGFRCR SERPLBLD CKD-EPI 2021: 28 ML/MIN/1.73
EOSINOPHIL # BLD AUTO: 0.1 X10E3/UL (ref 0–0.4)
EOSINOPHIL NFR BLD AUTO: 2 %
ERYTHROCYTE [DISTWIDTH] IN BLOOD BY AUTOMATED COUNT: 13.9 % (ref 11.6–15.4)
GLUCOSE SERPL-MCNC: 97 MG/DL (ref 70–99)
HCT VFR BLD AUTO: 51.5 % (ref 37.5–51)
HGB BLD-MCNC: 16.3 G/DL (ref 13–17.7)
IMM GRANULOCYTES # BLD AUTO: 0 X10E3/UL (ref 0–0.1)
IMM GRANULOCYTES NFR BLD AUTO: 0 %
LYMPHOCYTES # BLD AUTO: 1.8 X10E3/UL (ref 0.7–3.1)
LYMPHOCYTES NFR BLD AUTO: 22 %
MCH RBC QN AUTO: 28.6 PG (ref 26.6–33)
MCHC RBC AUTO-ENTMCNC: 31.7 G/DL (ref 31.5–35.7)
MCV RBC AUTO: 90 FL (ref 79–97)
MONOCYTES # BLD AUTO: 0.5 X10E3/UL (ref 0.1–0.9)
MONOCYTES NFR BLD AUTO: 6 %
NEUTROPHILS # BLD AUTO: 5.6 X10E3/UL (ref 1.4–7)
NEUTROPHILS NFR BLD AUTO: 69 %
PLATELET # BLD AUTO: 175 X10E3/UL (ref 150–450)
POTASSIUM SERPL-SCNC: 5.3 MMOL/L (ref 3.5–5.2)
RBC # BLD AUTO: 5.7 X10E6/UL (ref 4.14–5.8)
SODIUM SERPL-SCNC: 136 MMOL/L (ref 134–144)
WBC # BLD AUTO: 8.1 X10E3/UL (ref 3.4–10.8)

## 2025-05-16 NOTE — ASSESSMENT & PLAN NOTE
Renal condition is worsening.  Continue current treatment regimen.  Weight loss.  Sodium restriction  Renal condition will be reassessed 2 wks..  The patient was started on spironolactone last visit to complete the quad therapy, however spironolactone made the potassium go to 5.3 and drop the GFR 28.  That is consistent with stage IV kidney disease secondary to acute on chronic renal failure.  We will therefore discontinue spironolactone.  Repeat BMP in about 2 weeks.  Keep hydration.

## 2025-05-16 NOTE — ASSESSMENT & PLAN NOTE
Coronary Artery Disease (OPTIONAL): Coronary artery disease is stable.  Continue current treatment regimen. Dietary sodium restriction.  Cardiac status will be reassessed in 4 weeks.    Orders:    Basic Metabolic Panel; Future

## 2025-05-21 ENCOUNTER — OFFICE VISIT (OUTPATIENT)
Dept: INTERNAL MEDICINE | Facility: CLINIC | Age: 70
End: 2025-05-21
Payer: MEDICARE

## 2025-05-21 VITALS
TEMPERATURE: 97 F | OXYGEN SATURATION: 98 % | WEIGHT: 299 LBS | HEIGHT: 76 IN | RESPIRATION RATE: 15 BRPM | SYSTOLIC BLOOD PRESSURE: 110 MMHG | HEART RATE: 73 BPM | BODY MASS INDEX: 36.41 KG/M2 | DIASTOLIC BLOOD PRESSURE: 68 MMHG

## 2025-05-21 DIAGNOSIS — Z12.5 PROSTATE CANCER SCREENING: ICD-10-CM

## 2025-05-21 DIAGNOSIS — I50.21 ACUTE SYSTOLIC CHF (CONGESTIVE HEART FAILURE): ICD-10-CM

## 2025-05-21 DIAGNOSIS — N18.32 CHRONIC RENAL IMPAIRMENT, STAGE 3B: Primary | ICD-10-CM

## 2025-05-21 DIAGNOSIS — E11.22 TYPE 2 DIABETES MELLITUS WITH STAGE 3B CHRONIC KIDNEY DISEASE, WITHOUT LONG-TERM CURRENT USE OF INSULIN: ICD-10-CM

## 2025-05-21 DIAGNOSIS — E03.9 ACQUIRED HYPOTHYROIDISM: ICD-10-CM

## 2025-05-21 DIAGNOSIS — N18.32 TYPE 2 DIABETES MELLITUS WITH STAGE 3B CHRONIC KIDNEY DISEASE, WITHOUT LONG-TERM CURRENT USE OF INSULIN: ICD-10-CM

## 2025-05-21 NOTE — PROGRESS NOTES
Subjective     Patient ID: Andre Agosto is a 70 y.o. male. Patient is here for management of multiple medical problems.     Chief Complaint   Patient presents with    Pericardial Effusion     History of Present Illness   Renal insuff. Acute on chronic.   Was taken sprilolactone.      The following portions of the patient's history were reviewed and updated as appropriate: allergies, current medications, past family history, past medical history, past social history, past surgical history and problem list.    Review of Systems    Current Outpatient Medications:     amiodarone (PACERONE) 100 MG tablet, Take 1 tablet by mouth Daily., Disp: , Rfl:     aspirin 81 MG EC tablet, Take 1 tablet by mouth Daily., Disp: , Rfl:     brivaracetam (Briviact) 75 MG tablet, Take 1 tablet by mouth 2 (Two) Times a Day., Disp: 180 tablet, Rfl: 3    bumetanide (BUMEX) 1 MG tablet, Take 1 tablet by mouth 2 (Two) Times a Day. 2 tabs in AM and 1 tab in PM per patient, Disp: , Rfl:     carvedilol (Coreg) 25 MG tablet, Take 1 tablet by mouth 2 (Two) Times a Day With Meals., Disp: 180 tablet, Rfl: 3    dapagliflozin Propanediol (Farxiga) 10 MG tablet, Take 10 mg by mouth Daily., Disp: 90 tablet, Rfl: 1    levothyroxine (SYNTHROID, LEVOTHROID) 88 MCG tablet, TAKE 1 TABLET DAILY, Disp: 90 tablet, Rfl: 3    rosuvastatin (CRESTOR) 20 MG tablet, Take 1 tablet by mouth Every Night., Disp: 90 tablet, Rfl: 1    sacubitril-valsartan (Entresto) 24-26 MG tablet, Take 1 tablet by mouth 2 (Two) Times a Day., Disp: , Rfl:     tamsulosin (FLOMAX) 0.4 MG capsule 24 hr capsule, TAKE 1 CAPSULE BY MOUTH DAILY, Disp: 90 capsule, Rfl: 3    Tirzepatide 7.5 MG/0.5ML solution auto-injector, Inject 7.5 mg under the skin into the appropriate area as directed 1 (One) Time Per Week., Disp: 6 mL, Rfl: 1    vitamin d 125 MCG (5000 UT) capsule, Take 1 capsule by mouth Daily., Disp: , Rfl:     Xarelto 15 MG tablet, Take 1 tablet by mouth Daily With Dinner., Disp: 42  "tablet, Rfl: 0    Objective      Blood pressure 110/68, pulse 73, temperature 97 °F (36.1 °C), resp. rate 15, height 193 cm (76\"), weight 136 kg (299 lb), SpO2 98%.            Physical Exam     General Appearance:    Alert, cooperative, no distress, appears stated age   Head:    Normocephalic, without obvious abnormality, atraumatic   Eyes:    PERRL, conjunctiva/corneas clear, EOM's intact   Ears:    Normal TM's and external ear canals, both ears   Nose:   Nares normal, septum midline, mucosa normal, no drainage   or sinus tenderness   Throat:   Lips, mucosa, and tongue normal; teeth and gums normal   Neck:   Supple, symmetrical, trachea midline, no adenopathy;        thyroid:  No enlargement/tenderness/nodules; no carotid    bruit or JVD   Back:     Symmetric, no curvature, ROM normal, no CVA tenderness   Lungs:     Clear to auscultation bilaterally, respirations unlabored   Chest wall:    No tenderness or deformity   Heart:    Regular rate and rhythm, S1 and S2 normal, no murmur,        rub or gallop   Abdomen:     Soft, non-tender, bowel sounds active all four quadrants,     no masses, no organomegaly   Extremities:   Extremities normal, atraumatic, no cyanosis or edema   Pulses:   2+ and symmetric all extremities   Skin:   Skin color, texture, turgor normal, no rashes or lesions   Lymph nodes:   Cervical, supraclavicular, and axillary nodes normal   Neurologic:   CNII-XII intact. Normal strength, sensation and reflexes       throughout      Results for orders placed or performed in visit on 04/21/25   Adult Transthoracic Echo Complete W/ Cont if Necessary Per Protocol    Collection Time: 04/22/25  3:12 PM   Result Value Ref Range    EF(MOD-bp) 22.9 %    LVIDd 7.0 cm    LVIDs 6.2 cm    IVSd 1.19 cm    LVPWd 1.2 cm    FS 10.7 %    IVS/LVPW 0.75 cm    ESV(cubed) 241.2 ml    LV Sys Vol (BSA corrected) 51.3 cm2    EDV(cubed) 338.6 ml    LV Stone Vol (BSA corrected) 69.5 cm2    LV mass(C)d 487.7 grams    LVOT area 3.2 " cm2    LVOT diam 2.03 cm    EDV(MOD-sp2) 197.0 ml    EDV(MOD-sp4) 183.0 ml    ESV(MOD-sp2) 149.0 ml    ESV(MOD-sp4) 135.0 ml    SV(MOD-sp2) 48.0 ml    SV(MOD-sp4) 48.0 ml    SVi(MOD-SP2) 18.2 ml/m2    SVi(MOD-SP4) 18.2 ml/m2    SVi (LVOT) 28.2 ml/m2    EF(MOD-sp2) 24.4 %    EF(MOD-sp4) 26.2 %    MV E max irvin 91.2 cm/sec    MV A max irvin 73.4 cm/sec    MV dec time 0.13 sec    MV E/A 1.24     LA ESV Index (BP) 44.5 ml/m2    Med Peak E' Irvin 6.6 cm/sec    Lat Peak E' Irvin 7.6 cm/sec    TR max irvin 306.0 cm/sec    Avg E/e' ratio 12.85     SV(LVOT) 74.1 ml    RV Base 3.5 cm    RV Mid 2.5 cm    RV Length 10.0 cm    TAPSE (>1.6) 1.77 cm    RV S' 6.4 cm/sec    LA dimension (2D)  6.1 cm    LV V1 max 124.0 cm/sec    LV V1 max PG 6.2 mmHg    LV V1 mean PG 3.0 mmHg    LV V1 VTI 22.9 cm    Ao pk irvin 193.0 cm/sec    Ao max PG 14.9 mmHg    Ao mean PG 8.0 mmHg    Ao V2 VTI 37.5 cm    DAYA(I,D) 1.98 cm2    Dimensionless Index 0.61 (DI)    AI P1/2t 428.0 msec    MV max PG 3.5 mmHg    MV mean PG 2.00 mmHg    MV V2 VTI 20.3 cm    MVA(VTI) 3.7 cm2    MV dec slope 711.0 cm/sec2    TR max PG 37.5 mmHg    RVSP(TR) 45.5 mmHg    RAP systole 8.0 mmHg    RV V1 max PG 1.63 mmHg    RV V1 max 63.9 cm/sec    RV V1 VTI 10.6 cm    PA V2 max 64.4 cm/sec    PA acc time 0.06 sec    Ao root diam 3.7 cm     *Note: Due to a large number of results and/or encounters for the requested time period, some results have not been displayed. A complete set of results can be found in Results Review.         Assessment & Plan   Acute on chronic RF . Looks like rerenal azotemia.   Off sprionlactone.   Water retainng. On bid bumex. Will follow with labs. Clinically stable.          Diagnoses and all orders for this visit:    1. Chronic renal impairment, stage 3b (Primary)  -     Basic Metabolic Panel  -     CBC & Differential  -     Hemoglobin A1c  -     Microalbumin / Creatinine Urine Ratio - Urine, Clean Catch  -     BNP    2. Type 2 diabetes mellitus with stage 3b  chronic kidney disease, without long-term current use of insulin  -     Basic Metabolic Panel  -     CBC & Differential  -     Hemoglobin A1c  -     Microalbumin / Creatinine Urine Ratio - Urine, Clean Catch  -     BNP    3. Acute systolic CHF (congestive heart failure)  -     Basic Metabolic Panel  -     CBC & Differential  -     Hemoglobin A1c  -     Microalbumin / Creatinine Urine Ratio - Urine, Clean Catch  -     BNP    4. Prostate cancer screening  -     Basic Metabolic Panel  -     CBC & Differential  -     Hemoglobin A1c  -     Microalbumin / Creatinine Urine Ratio - Urine, Clean Catch  -     BNP    5. Acquired hypothyroidism  -     Basic Metabolic Panel  -     CBC & Differential  -     Hemoglobin A1c  -     Microalbumin / Creatinine Urine Ratio - Urine, Clean Catch  -     BNP      Return in about 2 weeks (around 6/4/2025).          There are no Patient Instructions on file for this visit.     Keven Bear MD    Assessment & Plan

## 2025-06-02 ENCOUNTER — LAB (OUTPATIENT)
Facility: HOSPITAL | Age: 70
End: 2025-06-02
Payer: MEDICARE

## 2025-06-02 ENCOUNTER — CLINICAL SUPPORT NO REQUIREMENTS (OUTPATIENT)
Age: 70
End: 2025-06-02
Payer: MEDICARE

## 2025-06-02 DIAGNOSIS — I25.10 CORONARY ARTERY DISEASE INVOLVING NATIVE CORONARY ARTERY OF NATIVE HEART WITHOUT ANGINA PECTORIS: ICD-10-CM

## 2025-06-02 DIAGNOSIS — E78.00 PURE HYPERCHOLESTEROLEMIA: ICD-10-CM

## 2025-06-02 DIAGNOSIS — I25.118 CORONARY ARTERY DISEASE OF NATIVE ARTERY OF NATIVE HEART WITH STABLE ANGINA PECTORIS: ICD-10-CM

## 2025-06-02 DIAGNOSIS — I50.1 LEFT HEART FAILURE WITH REDUCED LEFT VENTRICULAR FUNCTION: ICD-10-CM

## 2025-06-02 DIAGNOSIS — Z98.890 STATUS POST CREATION OF PERICARDIAL WINDOW: ICD-10-CM

## 2025-06-02 DIAGNOSIS — I10 ESSENTIAL HYPERTENSION: ICD-10-CM

## 2025-06-02 DIAGNOSIS — I48.0 PAROXYSMAL ATRIAL FIBRILLATION: ICD-10-CM

## 2025-06-02 DIAGNOSIS — Z95.810 ICD (IMPLANTABLE CARDIOVERTER-DEFIBRILLATOR) IN PLACE: ICD-10-CM

## 2025-06-02 LAB
BASOPHILS # BLD AUTO: 0.06 10*3/MM3 (ref 0–0.2)
BASOPHILS NFR BLD AUTO: 0.8 % (ref 0–1.5)
DEPRECATED RDW RBC AUTO: 43.8 FL (ref 37–54)
EOSINOPHIL # BLD AUTO: 0.16 10*3/MM3 (ref 0–0.4)
EOSINOPHIL NFR BLD AUTO: 2.1 % (ref 0.3–6.2)
ERYTHROCYTE [DISTWIDTH] IN BLOOD BY AUTOMATED COUNT: 14 % (ref 12.3–15.4)
HCT VFR BLD AUTO: 46.5 % (ref 37.5–51)
HGB BLD-MCNC: 15.5 G/DL (ref 13–17.7)
IMM GRANULOCYTES # BLD AUTO: 0.02 10*3/MM3 (ref 0–0.05)
IMM GRANULOCYTES NFR BLD AUTO: 0.3 % (ref 0–0.5)
LYMPHOCYTES # BLD AUTO: 1.58 10*3/MM3 (ref 0.7–3.1)
LYMPHOCYTES NFR BLD AUTO: 20.5 % (ref 19.6–45.3)
MCH RBC QN AUTO: 29.2 PG (ref 26.6–33)
MCHC RBC AUTO-ENTMCNC: 33.3 G/DL (ref 31.5–35.7)
MCV RBC AUTO: 87.7 FL (ref 79–97)
MONOCYTES # BLD AUTO: 0.45 10*3/MM3 (ref 0.1–0.9)
MONOCYTES NFR BLD AUTO: 5.8 % (ref 5–12)
NEUTROPHILS NFR BLD AUTO: 5.43 10*3/MM3 (ref 1.7–7)
NEUTROPHILS NFR BLD AUTO: 70.5 % (ref 42.7–76)
NRBC BLD AUTO-RTO: 0 /100 WBC (ref 0–0.2)
PLATELET # BLD AUTO: 182 10*3/MM3 (ref 140–450)
PMV BLD AUTO: 11.4 FL (ref 6–12)
RBC # BLD AUTO: 5.3 10*6/MM3 (ref 4.14–5.8)
WBC NRBC COR # BLD AUTO: 7.7 10*3/MM3 (ref 3.4–10.8)

## 2025-06-02 PROCEDURE — 36415 COLL VENOUS BLD VENIPUNCTURE: CPT

## 2025-06-02 PROCEDURE — 83880 ASSAY OF NATRIURETIC PEPTIDE: CPT | Performed by: INTERNAL MEDICINE

## 2025-06-02 PROCEDURE — 80061 LIPID PANEL: CPT

## 2025-06-02 PROCEDURE — 82570 ASSAY OF URINE CREATININE: CPT | Performed by: INTERNAL MEDICINE

## 2025-06-02 PROCEDURE — 85025 COMPLETE CBC W/AUTO DIFF WBC: CPT

## 2025-06-02 PROCEDURE — 80048 BASIC METABOLIC PNL TOTAL CA: CPT

## 2025-06-02 PROCEDURE — 82043 UR ALBUMIN QUANTITATIVE: CPT | Performed by: INTERNAL MEDICINE

## 2025-06-02 PROCEDURE — 83036 HEMOGLOBIN GLYCOSYLATED A1C: CPT

## 2025-06-03 LAB
ANION GAP SERPL CALCULATED.3IONS-SCNC: 13 MMOL/L (ref 5–15)
BUN SERPL-MCNC: 20 MG/DL (ref 8–23)
BUN/CREAT SERPL: 10.6 (ref 7–25)
CALCIUM SPEC-SCNC: 9.4 MG/DL (ref 8.6–10.5)
CHLORIDE SERPL-SCNC: 103 MMOL/L (ref 98–107)
CHOLEST SERPL-MCNC: 101 MG/DL (ref 0–200)
CO2 SERPL-SCNC: 24 MMOL/L (ref 22–29)
CREAT SERPL-MCNC: 1.88 MG/DL (ref 0.76–1.27)
EGFRCR SERPLBLD CKD-EPI 2021: 38.2 ML/MIN/1.73
GLUCOSE SERPL-MCNC: 94 MG/DL (ref 65–99)
HBA1C MFR BLD: 5.9 % (ref 4.8–5.6)
HDLC SERPL-MCNC: 40 MG/DL (ref 40–60)
LDLC SERPL CALC-MCNC: 36 MG/DL (ref 0–100)
LDLC/HDLC SERPL: 0.81 {RATIO}
POTASSIUM SERPL-SCNC: 4.3 MMOL/L (ref 3.5–5.2)
SODIUM SERPL-SCNC: 140 MMOL/L (ref 136–145)
TRIGL SERPL-MCNC: 144 MG/DL (ref 0–150)
VLDLC SERPL-MCNC: 25 MG/DL (ref 5–40)

## 2025-06-04 LAB
ALBUMIN/CREAT UR: 123 MG/G CREAT (ref 0–29)
BNP SERPL-MCNC: 909.5 PG/ML (ref 0–100)
CREAT UR-MCNC: 76 MG/DL
MICROALBUMIN UR-MCNC: 93.1 UG/ML

## 2025-06-05 ENCOUNTER — OFFICE VISIT (OUTPATIENT)
Dept: INTERNAL MEDICINE | Facility: CLINIC | Age: 70
End: 2025-06-05
Payer: MEDICARE

## 2025-06-05 VITALS
HEART RATE: 74 BPM | TEMPERATURE: 98 F | BODY MASS INDEX: 36.17 KG/M2 | HEIGHT: 76 IN | DIASTOLIC BLOOD PRESSURE: 80 MMHG | RESPIRATION RATE: 16 BRPM | SYSTOLIC BLOOD PRESSURE: 124 MMHG | WEIGHT: 297 LBS | OXYGEN SATURATION: 97 %

## 2025-06-05 DIAGNOSIS — G47.33 OSA TREATED WITH BIPAP: ICD-10-CM

## 2025-06-05 DIAGNOSIS — N18.32 TYPE 2 DIABETES MELLITUS WITH STAGE 3B CHRONIC KIDNEY DISEASE, WITHOUT LONG-TERM CURRENT USE OF INSULIN: Primary | ICD-10-CM

## 2025-06-05 DIAGNOSIS — I50.21 ACUTE SYSTOLIC CHF (CONGESTIVE HEART FAILURE): ICD-10-CM

## 2025-06-05 DIAGNOSIS — E11.22 TYPE 2 DIABETES MELLITUS WITH STAGE 3B CHRONIC KIDNEY DISEASE, WITHOUT LONG-TERM CURRENT USE OF INSULIN: Primary | ICD-10-CM

## 2025-06-05 RX ORDER — AMIODARONE HYDROCHLORIDE 100 MG/1
100 TABLET ORAL DAILY
Qty: 90 TABLET | Refills: 0
Start: 2025-06-05

## 2025-06-05 NOTE — PROGRESS NOTES
Subjective     Patient ID: Andre Agosto is a 70 y.o. male. Patient is here for management of multiple medical problems.     Chief Complaint   Patient presents with    Congestive Heart Failure    Chronic Kidney Disease     History of Present Illness   Ckd stable. On bumix bid.    Chf stable.    Ha1c improved.          The following portions of the patient's history were reviewed and updated as appropriate: allergies, current medications, past family history, past medical history, past social history, past surgical history and problem list.    Review of Systems    Current Outpatient Medications:     amiodarone (PACERONE) 100 MG tablet, Take 1 tablet by mouth Daily., Disp: 90 tablet, Rfl: 0    aspirin 81 MG EC tablet, Take 1 tablet by mouth Daily., Disp: , Rfl:     brivaracetam (Briviact) 75 MG tablet, Take 1 tablet by mouth 2 (Two) Times a Day., Disp: 180 tablet, Rfl: 3    bumetanide (BUMEX) 1 MG tablet, Take 1 tablet by mouth 2 (Two) Times a Day., Disp: , Rfl:     carvedilol (Coreg) 25 MG tablet, Take 1 tablet by mouth 2 (Two) Times a Day With Meals., Disp: 180 tablet, Rfl: 3    dapagliflozin Propanediol (Farxiga) 10 MG tablet, Take 10 mg by mouth Daily., Disp: 90 tablet, Rfl: 1    levothyroxine (SYNTHROID, LEVOTHROID) 88 MCG tablet, TAKE 1 TABLET DAILY, Disp: 90 tablet, Rfl: 3    rosuvastatin (CRESTOR) 20 MG tablet, Take 1 tablet by mouth Every Night., Disp: 90 tablet, Rfl: 1    sacubitril-valsartan (Entresto) 24-26 MG tablet, Take 1 tablet by mouth 2 (Two) Times a Day., Disp: , Rfl:     tamsulosin (FLOMAX) 0.4 MG capsule 24 hr capsule, TAKE 1 CAPSULE BY MOUTH DAILY, Disp: 90 capsule, Rfl: 3    Tirzepatide 7.5 MG/0.5ML solution auto-injector, Inject 7.5 mg under the skin into the appropriate area as directed 1 (One) Time Per Week., Disp: 6 mL, Rfl: 1    vitamin d 125 MCG (5000 UT) capsule, Take 1 capsule by mouth Daily., Disp: , Rfl:     Xarelto 15 MG tablet, Take 1 tablet by mouth Daily With Dinner., Disp: 42  "tablet, Rfl: 0    Tirzepatide 10 MG/0.5ML solution auto-injector, Inject 10 mg under the skin into the appropriate area as directed 1 (One) Time Per Week., Disp: 2 mL, Rfl: 3    Objective      Blood pressure 124/80, pulse 74, temperature 98 °F (36.7 °C), resp. rate 16, height 193 cm (76\"), weight 135 kg (297 lb), SpO2 97%.            Physical Exam     General Appearance:    Alert, cooperative, no distress, appears stated age   Head:    Normocephalic, without obvious abnormality, atraumatic   Eyes:    PERRL, conjunctiva/corneas clear, EOM's intact   Ears:    Normal TM's and external ear canals, both ears   Nose:   Nares normal, septum midline, mucosa normal, no drainage   or sinus tenderness   Throat:   Lips, mucosa, and tongue normal; teeth and gums normal   Neck:   Supple, symmetrical, trachea midline, no adenopathy;        thyroid:  No enlargement/tenderness/nodules; no carotid    bruit or JVD   Back:     Symmetric, no curvature, ROM normal, no CVA tenderness   Lungs:     Clear to auscultation bilaterally, respirations unlabored   Chest wall:    No tenderness or deformity   Heart:    Regular rate and rhythm, S1 and S2 normal, no murmur,        rub or gallop   Abdomen:     Soft, non-tender, bowel sounds active all four quadrants,     no masses, no organomegaly   Extremities:   Extremities normal, atraumatic, no cyanosis or edema   Pulses:   2+ and symmetric all extremities   Skin:   Skin color, texture, turgor normal, no rashes or lesions   Lymph nodes:   Cervical, supraclavicular, and axillary nodes normal   Neurologic:   CNII-XII intact. Normal strength, sensation and reflexes       throughout      Results for orders placed or performed in visit on 06/02/25   Lipid Panel    Collection Time: 06/02/25 10:44 AM    Specimen: Blood   Result Value Ref Range    Total Cholesterol 101 0 - 200 mg/dL    Triglycerides 144 0 - 150 mg/dL    HDL Cholesterol 40 40 - 60 mg/dL    LDL Cholesterol  36 0 - 100 mg/dL    VLDL " Cholesterol 25 5 - 40 mg/dL    LDL/HDL Ratio 0.81    Basic Metabolic Panel    Collection Time: 06/02/25 10:44 AM    Specimen: Blood   Result Value Ref Range    Glucose 94 65 - 99 mg/dL    BUN 20.0 8.0 - 23.0 mg/dL    Creatinine 1.88 (H) 0.76 - 1.27 mg/dL    Sodium 140 136 - 145 mmol/L    Potassium 4.3 3.5 - 5.2 mmol/L    Chloride 103 98 - 107 mmol/L    CO2 24.0 22.0 - 29.0 mmol/L    Calcium 9.4 8.6 - 10.5 mg/dL    BUN/Creatinine Ratio 10.6 7.0 - 25.0    Anion Gap 13.0 5.0 - 15.0 mmol/L    eGFR 38.2 (L) >60.0 mL/min/1.73   CBC Auto Differential    Collection Time: 06/02/25 10:44 AM    Specimen: Blood   Result Value Ref Range    WBC 7.70 3.40 - 10.80 10*3/mm3    RBC 5.30 4.14 - 5.80 10*6/mm3    Hemoglobin 15.5 13.0 - 17.7 g/dL    Hematocrit 46.5 37.5 - 51.0 %    MCV 87.7 79.0 - 97.0 fL    MCH 29.2 26.6 - 33.0 pg    MCHC 33.3 31.5 - 35.7 g/dL    RDW 14.0 12.3 - 15.4 %    RDW-SD 43.8 37.0 - 54.0 fl    MPV 11.4 6.0 - 12.0 fL    Platelets 182 140 - 450 10*3/mm3    Neutrophil % 70.5 42.7 - 76.0 %    Lymphocyte % 20.5 19.6 - 45.3 %    Monocyte % 5.8 5.0 - 12.0 %    Eosinophil % 2.1 0.3 - 6.2 %    Basophil % 0.8 0.0 - 1.5 %    Immature Grans % 0.3 0.0 - 0.5 %    Neutrophils, Absolute 5.43 1.70 - 7.00 10*3/mm3    Lymphocytes, Absolute 1.58 0.70 - 3.10 10*3/mm3    Monocytes, Absolute 0.45 0.10 - 0.90 10*3/mm3    Eosinophils, Absolute 0.16 0.00 - 0.40 10*3/mm3    Basophils, Absolute 0.06 0.00 - 0.20 10*3/mm3    Immature Grans, Absolute 0.02 0.00 - 0.05 10*3/mm3    nRBC 0.0 0.0 - 0.2 /100 WBC   Hemoglobin A1c    Collection Time: 06/02/25 10:44 AM    Specimen: Blood   Result Value Ref Range    Hemoglobin A1C 5.90 (H) 4.80 - 5.60 %     *Note: Due to a large number of results and/or encounters for the requested time period, some results have not been displayed. A complete set of results can be found in Results Review.         Assessment & Plan   Wt neurtra per pt.  Numbers better and doing a better job at managing CHF and DM.      Wants to increase monjouro.  Will increase to 10 mg.          Diagnoses and all orders for this visit:    1. Type 2 diabetes mellitus with stage 3b chronic kidney disease, without long-term current use of insulin (Primary)  -     CBC & Differential  -     Vitamin D,25-Hydroxy  -     Hemoglobin A1c  -     Comprehensive Metabolic Panel    2. Acute systolic CHF (congestive heart failure)  -     CBC & Differential  -     Vitamin D,25-Hydroxy  -     Hemoglobin A1c  -     Comprehensive Metabolic Panel    3. ALEX treated with BiPAP  -     CBC & Differential  -     Vitamin D,25-Hydroxy  -     Hemoglobin A1c  -     Comprehensive Metabolic Panel    Other orders  -     Tirzepatide 10 MG/0.5ML solution auto-injector; Inject 10 mg under the skin into the appropriate area as directed 1 (One) Time Per Week.  Dispense: 2 mL; Refill: 3      No follow-ups on file.          There are no Patient Instructions on file for this visit.     Keven Bear MD    Assessment & Plan

## 2025-06-05 NOTE — TELEPHONE ENCOUNTER
Rx Refill Note  Requested Prescriptions     Pending Prescriptions Disp Refills    amiodarone (PACERONE) 100 MG tablet 90 tablet 1     Sig: Take 1 tablet by mouth Daily.      Last office visit with prescribing clinician: 5/16/2025   Last telemedicine visit with prescribing clinician: Visit date not found   Next office visit with prescribing clinician: 6/17/2025                        Pharmacy Info    Last Fill Date:  Rx Written Date:   Prescribed Qty:   Additional Details from Pharmacy:    Patient passed protocols per linda.         Brittany Donnelly MA  06/05/25, 08:12 EDT

## 2025-06-06 DIAGNOSIS — N18.32 TYPE 2 DIABETES MELLITUS WITH STAGE 3B CHRONIC KIDNEY DISEASE, WITHOUT LONG-TERM CURRENT USE OF INSULIN: Primary | ICD-10-CM

## 2025-06-06 DIAGNOSIS — E11.22 TYPE 2 DIABETES MELLITUS WITH STAGE 3B CHRONIC KIDNEY DISEASE, WITHOUT LONG-TERM CURRENT USE OF INSULIN: Primary | ICD-10-CM

## 2025-06-17 ENCOUNTER — PATIENT ROUNDING (BHMG ONLY) (OUTPATIENT)
Age: 70
End: 2025-06-17

## 2025-06-17 ENCOUNTER — OFFICE VISIT (OUTPATIENT)
Age: 70
End: 2025-06-17
Payer: MEDICARE

## 2025-06-17 VITALS
SYSTOLIC BLOOD PRESSURE: 138 MMHG | HEART RATE: 77 BPM | WEIGHT: 299.9 LBS | DIASTOLIC BLOOD PRESSURE: 84 MMHG | HEIGHT: 76 IN | BODY MASS INDEX: 36.52 KG/M2

## 2025-06-17 DIAGNOSIS — Z95.810 ISCHEMIC CARDIOMYOPATHY WITH IMPLANTABLE CARDIOVERTER-DEFIBRILLATOR (ICD): ICD-10-CM

## 2025-06-17 DIAGNOSIS — I48.0 PAROXYSMAL ATRIAL FIBRILLATION: Primary | ICD-10-CM

## 2025-06-17 DIAGNOSIS — I25.5 ISCHEMIC CARDIOMYOPATHY WITH IMPLANTABLE CARDIOVERTER-DEFIBRILLATOR (ICD): ICD-10-CM

## 2025-06-17 DIAGNOSIS — I25.10 CORONARY ARTERY DISEASE INVOLVING NATIVE CORONARY ARTERY OF NATIVE HEART WITHOUT ANGINA PECTORIS: ICD-10-CM

## 2025-06-17 DIAGNOSIS — E78.5 HYPERLIPIDEMIA LDL GOAL <70: ICD-10-CM

## 2025-06-17 PROCEDURE — 1160F RVW MEDS BY RX/DR IN RCRD: CPT | Performed by: INTERNAL MEDICINE

## 2025-06-17 PROCEDURE — 3079F DIAST BP 80-89 MM HG: CPT | Performed by: INTERNAL MEDICINE

## 2025-06-17 PROCEDURE — 1159F MED LIST DOCD IN RCRD: CPT | Performed by: INTERNAL MEDICINE

## 2025-06-17 PROCEDURE — 3075F SYST BP GE 130 - 139MM HG: CPT | Performed by: INTERNAL MEDICINE

## 2025-06-17 PROCEDURE — 99214 OFFICE O/P EST MOD 30 MIN: CPT | Performed by: INTERNAL MEDICINE

## 2025-06-17 RX ORDER — SACUBITRIL AND VALSARTAN 24; 26 MG/1; MG/1
1 TABLET, FILM COATED ORAL 2 TIMES DAILY
Qty: 180 TABLET | Refills: 1 | Status: CANCELLED | OUTPATIENT
Start: 2025-06-17

## 2025-06-17 RX ORDER — CARVEDILOL 25 MG/1
25 TABLET ORAL 2 TIMES DAILY WITH MEALS
Qty: 180 TABLET | Refills: 1 | Status: SHIPPED | OUTPATIENT
Start: 2025-06-17

## 2025-06-17 RX ORDER — ROSUVASTATIN CALCIUM 20 MG/1
20 TABLET, COATED ORAL NIGHTLY
Qty: 90 TABLET | Refills: 1 | Status: SHIPPED | OUTPATIENT
Start: 2025-06-17

## 2025-06-17 RX ORDER — DAPAGLIFLOZIN 10 MG/1
10 TABLET, FILM COATED ORAL DAILY
Qty: 90 TABLET | Refills: 1 | Status: SHIPPED | OUTPATIENT
Start: 2025-06-17

## 2025-06-17 RX ORDER — BUMETANIDE 1 MG/1
1 TABLET ORAL 2 TIMES DAILY
Qty: 180 TABLET | Refills: 1 | Status: SHIPPED | OUTPATIENT
Start: 2025-06-17

## 2025-06-17 RX ORDER — SACUBITRIL AND VALSARTAN 49; 51 MG/1; MG/1
1 TABLET, FILM COATED ORAL 2 TIMES DAILY
Qty: 180 TABLET | Refills: 1 | Status: SHIPPED | OUTPATIENT
Start: 2025-06-17

## 2025-06-17 NOTE — PROGRESS NOTES
Cardiology Follow-Up Note     Name: Andre Agosto  :   1955  PCP: Keven Bear MD  Date:   2025  Department: [facility] CARDIOLOGY  [address]  [place] [id number]  Fax [phone]  Phone [phone]    Chief Complaint   Patient presents with    Hypertension    Hyperlipidemia     Problem list:  Coronary artery disease  2015 Non-STEMI, left heart cath PTCA/stent of the OMB and circumflex EF 50%.  2015 emergency relook patent stent to the circumflex and OM.  LAD 50 to 60%.  2021 EF 25% CAD with patent stent to the circumflex and LAD.  2023 CAD history of stent shortness of air.  EF 45% multivessel CAD patent stent to the LAD cardiomyopathy.    2.  Ischemic cardiomyopathy   a.  2021 [facility]/Dr. Potter.  ICD [facility] model CD DR jeffery 500 Q, serial #[id number].  3.  Paroxysmal atrial fibrillation   a.  2021 DC cardioversion successful converting from A-fib to sinus rhythm.     4.  Hypertension benign essential with chronic kidney disease stage III.  5.  Hyperlipidemia  6.  Pericardial tamponade status post pericardial window   a.  2025 emergency pericardial window placement at [facility]    b.Transthoracic echo dated 2025 EF 25 % with severe global hypokinesis.  Moderate left ventricular hypertrophy.  Dilated aortic root 4.5 cm.  Post pericardiocentesis and window no pericardial effusion noted.   c.  2025 Ejection fraction 22.8% mild mitral regurgitation, mild tricuspid regurgitation RV systolic pressure 36 mmHg.  Ascending aorta measured 4.0 cm.  Trace pericardial effusion.Adult Transthoracic Echo Complete W/ Cont if Necessary Per Protocol (2025 10:50)     Subjective     History of Present Illness  Andre Agosto is a 70 y.o. male who presents today for follow-up for labs and post pericardiocentesis, the patient has developed pericardial tamponade and underwent  pericardiocentesis with pericardial window placement at [facility] on 1/24/2025.  The patient has complex medical history with hypertension and chronic kidney disease on colchicine started last visit due to pericardial effusion.  The patient has history of coronary artery disease status post stents with last heart catheterization revealing ejection fraction 45% with patent stents.  Last echocardiogram revealed ejection fraction 25%.  The patient also has atrial fibrillation and is on oral anticoagulant.  The etiology of pericardial effusion is unknown. Doing ok, No chest pain or shortness. Patient had nose bleed.       Current Outpatient Medications:     amiodarone (PACERONE) 100 MG tablet, Take 1 tablet by mouth Daily., Disp: 90 tablet, Rfl: 0    aspirin 81 MG EC tablet, Take 1 tablet by mouth Daily., Disp: , Rfl:     brivaracetam (Briviact) 75 MG tablet, Take 1 tablet by mouth 2 (Two) Times a Day., Disp: 180 tablet, Rfl: 3    bumetanide (BUMEX) 1 MG tablet, Take 1 tablet by mouth 2 (Two) Times a Day., Disp: 180 tablet, Rfl: 1    carvedilol (Coreg) 25 MG tablet, Take 1 tablet by mouth 2 (Two) Times a Day With Meals., Disp: 180 tablet, Rfl: 1    dapagliflozin Propanediol (Farxiga) 10 MG tablet, Take 10 mg by mouth Daily., Disp: 90 tablet, Rfl: 1    levothyroxine (SYNTHROID, LEVOTHROID) 88 MCG tablet, TAKE 1 TABLET DAILY, Disp: 90 tablet, Rfl: 3    rivaroxaban (Xarelto) 15 MG tablet, Take 1 tablet by mouth Daily With Dinner., Disp: 90 tablet, Rfl: 1    rosuvastatin (CRESTOR) 20 MG tablet, Take 1 tablet by mouth Every Night., Disp: 90 tablet, Rfl: 1    tamsulosin (FLOMAX) 0.4 MG capsule 24 hr capsule, TAKE 1 CAPSULE BY MOUTH DAILY, Disp: 90 capsule, Rfl: 3    Tirzepatide 10 MG/0.5ML solution auto-injector, Inject 10 mg under the skin into the appropriate area as directed 1 (One) Time Per Week., Disp: 6 mL, Rfl: 3    vitamin d 125 MCG (5000 UT) capsule, Take 1 capsule by mouth Daily., Disp: , Rfl:      "sacubitril-valsartan (Entresto) 49-51 MG tablet, Take 1 tablet by mouth 2 (Two) Times a Day., Disp: 180 tablet, Rfl: 1    Objective     Vital Signs:  /84 (BP Location: Left arm, Patient Position: Sitting)   Pulse 77   Ht 193 cm (76\")   Wt 136 kg (299 lb 14.4 oz)   BMI 36.50 kg/m²   Estimated body mass index is 36.5 kg/m² as calculated from the following:    Height as of this encounter: 193 cm (76\").    Weight as of this encounter: 136 kg (299 lb 14.4 oz).               Vitals reviewed.   Constitutional:       Appearance: Normal and healthy appearance.   Eyes:      Pupils: Pupils are equal, round, and reactive to light.   Pulmonary:      Effort: Pulmonary effort is normal.   Chest:      Chest wall: Not tender to palpatation.   Cardiovascular:      PMI at left midclavicular line. Normal rate. Regular rhythm.      No gallop.    Pulses:     Intact distal pulses.   Edema:     Peripheral edema absent.   Skin:     General: Skin is warm.   Psychiatric:         Behavior: Behavior is cooperative.              Data Review:   Lab Results   Component Value Date    GLUCOSE 94 06/02/2025    BUN 20.0 06/02/2025    CREATININE 1.88 (H) 06/02/2025    EGFRIFNONA 55 (L) 01/10/2022    EGFRIFAFRI 57 (L) 12/05/2018    BCR 10.6 06/02/2025    K 4.3 06/02/2025    CO2 24.0 06/02/2025    CALCIUM 9.4 06/02/2025    ALBUMIN 4.5 04/15/2025    AST 18 04/15/2025    ALT 17 04/15/2025     Lab Results   Component Value Date    CHOL 101 06/02/2025    CHLPL 182 01/15/2025    TRIG 144 06/02/2025    HDL 40 06/02/2025    LDL 36 06/02/2025      Lab Results   Component Value Date    WBC 7.70 06/02/2025    RBC 5.30 06/02/2025    HGB 15.5 06/02/2025    HCT 46.5 06/02/2025    MCV 87.7 06/02/2025     06/02/2025     Lab Results   Component Value Date    TSH 1.590 02/04/2025     Lab Results   Component Value Date    HGBA1C 5.90 (H) 06/02/2025     Lab Results   Component Value Date    INR 1.23 (H) 01/24/2025    INR 1.26 (H) 01/24/2025    INR 1.4 (H) " 09/03/2024    PROTIME 13.3 (H) 01/24/2025    PROTIME 13.6 (H) 01/24/2025    PROTIME 15.3 (H) 01/24/2024           Assessment and Plan     Assessment & Plan  Paroxysmal atrial fibrillation  Continue Xarelto 15 mg once a day.  The patient is also on amiodarone 200 mg half a tablet once a day.  Understanding for the effect of the amiodarone and the side effects.  Reviewed labs with the patient LFTs and TSH is normal.  Will need yearly PFTs.  Orders:    CBC & Differential; Future    Basic Metabolic Panel; Future    Lipid Panel; Future    Coronary artery disease involving native coronary artery of native heart without angina pectoris  Coronary Artery Disease (OPTIONAL): Coronary artery disease is stable.  Continue current treatment regimen. Weight loss.  Cardiac status will be reassessed in 3 months.  The patient is on Xarelto 15 mg and aspirin 81 mg once a day  Orders:    CBC & Differential; Future    Basic Metabolic Panel; Future    Lipid Panel; Future    Ischemic cardiomyopathy with implantable cardioverter-defibrillator (ICD)  Continue carvedilol 25 mg twice a day, Entresto 24-26 mg twice a day, Farxiga 10 mg once a day.  Discussed with patient option of  spironolactone, patient has tried before and had side effects with potassium up and , renal function got worse..  Will repeat echocardiogram 6 months to a year depending on the symptoms.  The patient is also on bumetanide and adjust the dose according to the weight gain and weight loss 4± pounds.  Currently on bumetanide 1 mg twice a day.  Orders:    CBC & Differential; Future    Basic Metabolic Panel; Future    Lipid Panel; Future    Hyperlipidemia LDL goal <70   Lipid abnormalities are stable    Plan:  Continue same medication/s without change.      Discussed medication dosage, use, side effects, and goals of treatment in detail.    Counseled patient on lifestyle modifications to help control hyperlipidemia.   Advised patient to exercise for 150 minutes weekly.  (30 minute brisk walk, 5 days a week for example)    Patient Treatment Goals:   LDL goal is less than 70    Followup in 3 months.  Continue rosuvastatin 20 mg once a day  Orders:    CBC & Differential; Future    Basic Metabolic Panel; Future    Lipid Panel; Future      Advised to continue current cardiac medications. Please notify of any issues. Discussed with the patient compliance with medical management and follow-up.     Follow Up  Return in about 3 months (around 9/17/2025).    Call if you have any significant symptoms or go to the [facility] Emergency room if possible.     Yann Mendoza MD, FACC,[facility].  [place]    Part of this note may be an electronic transcription/translation of spoken language to printed text using the Dragon Dictation System.

## 2025-06-17 NOTE — ASSESSMENT & PLAN NOTE
Continue Xarelto 15 mg once a day.  The patient is also on amiodarone 200 mg half a tablet once a day.  Understanding for the effect of the amiodarone and the side effects.  Reviewed labs with the patient LFTs and TSH is normal.  Will need yearly PFTs.  Orders:    CBC & Differential; Future    Basic Metabolic Panel; Future    Lipid Panel; Future

## 2025-06-18 NOTE — TELEPHONE ENCOUNTER
Rx Refill Note  Requested Prescriptions     Signed Prescriptions Disp Refills    rivaroxaban (Xarelto) 15 MG tablet 90 tablet 1     Sig: Take 1 tablet by mouth Daily With Dinner.     Authorizing Provider: AMBROSIO HERNADEZ     Ordering User: CRISTA NIX      Last office visit with prescribing clinician: 6/17/2025   Last telemedicine visit with prescribing clinician: Visit date not found   Next office visit with prescribing clinician: 8/5/2025                        Pharmacy Info    Last Fill Date:  Rx Written Date:   Prescribed Qty:   Additional Details from Pharmacy:    Patient passed protocols per linda.         Crista Nix MA  06/18/25, 11:00 EDT

## 2025-06-18 NOTE — PROGRESS NOTES
My name is Mary Alice Orta, and I am the Practice Manager for [facility] [place].    I would like to thank you for being a loyal patient. If you do not mind, I would like to ask you some questions about your recent visit with us. Please feel free to reply if you wish to provide us with feedback on your visit with our practice.    First, could you tell me what went well with your recent visit?    Secondly, we are always looking for ways to make our patients' experiences even better. Do you have any recommendations on what we can do to improve your experience?    Finally, overall were you satisfied with your visit with us as a [facility] facility?    Over the next few days, you will be receiving a Patient Experience Survey. Please consider taking the survey, as it helps [facility] in improving their patient care.    Thank you for taking the time to answer our questions today.    I hope you have a good day.

## 2025-06-19 RX ORDER — AMIODARONE HYDROCHLORIDE 200 MG/1
200 TABLET ORAL DAILY
Qty: 90 TABLET | Refills: 0 | Status: SHIPPED | OUTPATIENT
Start: 2025-06-19

## 2025-06-19 RX ORDER — RIVAROXABAN 20 MG/1
20 TABLET, FILM COATED ORAL DAILY
Qty: 90 TABLET | Refills: 1 | Status: SHIPPED | OUTPATIENT
Start: 2025-06-19

## 2025-06-19 NOTE — TELEPHONE ENCOUNTER
PER FAX REQUEST:  Rx Refill Note  Requested Prescriptions     Pending Prescriptions Disp Refills    amiodarone (PACERONE) 200 MG tablet 90 tablet 1     Sig: Take 1 tablet by mouth Daily.      Last office visit with prescribing clinician: 6/17/2025   Last telemedicine visit with prescribing clinician: Visit date not found   Next office visit with prescribing clinician: 8/5/2025                        Pharmacy Info    Last Fill Date:  Rx Written Date:   Prescribed Qty:   Additional Details from Pharmacy:    Patient passed protocols per linda.         Gloria Tirado MA  06/19/25, 09:59 EDT

## 2025-06-19 NOTE — TELEPHONE ENCOUNTER
PER FAX REQUEST:  Rx Refill Note  Requested Prescriptions     Signed Prescriptions Disp Refills    Xarelto 20 MG tablet 90 tablet 1     Sig: Take 1 tablet by mouth Daily.     Authorizing Provider: AMBROSIO HERNADEZ     Ordering User: GLORIA REYES      Last office visit with prescribing clinician: 6/17/2025   Last telemedicine visit with prescribing clinician: Visit date not found   Next office visit with prescribing clinician: 8/5/2025                        Pharmacy Info    Last Fill Date:  Rx Written Date:   Prescribed Qty:   Additional Details from Pharmacy:    Patient passed protocols per [place].         Gloria Reyes MA  06/19/25, 09:40 EDT

## 2025-07-07 ENCOUNTER — OFFICE VISIT (OUTPATIENT)
Dept: INTERNAL MEDICINE | Facility: CLINIC | Age: 70
End: 2025-07-07
Payer: MEDICARE

## 2025-07-07 VITALS
WEIGHT: 295 LBS | TEMPERATURE: 97.6 F | SYSTOLIC BLOOD PRESSURE: 118 MMHG | HEART RATE: 79 BPM | BODY MASS INDEX: 35.92 KG/M2 | DIASTOLIC BLOOD PRESSURE: 80 MMHG | RESPIRATION RATE: 16 BRPM | HEIGHT: 76 IN | OXYGEN SATURATION: 97 %

## 2025-07-07 DIAGNOSIS — I50.41 ACUTE COMBINED SYSTOLIC (CONGESTIVE) AND DIASTOLIC (CONGESTIVE) HEART FAILURE: ICD-10-CM

## 2025-07-07 DIAGNOSIS — M10.342 ACUTE GOUT DUE TO RENAL IMPAIRMENT INVOLVING LEFT HAND: ICD-10-CM

## 2025-07-07 DIAGNOSIS — Z12.5 ENCOUNTER FOR SCREENING FOR MALIGNANT NEOPLASM OF PROSTATE: ICD-10-CM

## 2025-07-07 DIAGNOSIS — N20.0 NEPHROLITHIASIS: ICD-10-CM

## 2025-07-07 DIAGNOSIS — E11.22 TYPE 2 DIABETES MELLITUS WITH STAGE 3A CHRONIC KIDNEY DISEASE, WITHOUT LONG-TERM CURRENT USE OF INSULIN: Primary | ICD-10-CM

## 2025-07-07 DIAGNOSIS — N18.31 TYPE 2 DIABETES MELLITUS WITH STAGE 3A CHRONIC KIDNEY DISEASE, WITHOUT LONG-TERM CURRENT USE OF INSULIN: Primary | ICD-10-CM

## 2025-07-07 DIAGNOSIS — J06.9 UPPER RESPIRATORY TRACT INFECTION, UNSPECIFIED TYPE: ICD-10-CM

## 2025-07-07 PROCEDURE — 99214 OFFICE O/P EST MOD 30 MIN: CPT | Performed by: INTERNAL MEDICINE

## 2025-07-07 PROCEDURE — 3074F SYST BP LT 130 MM HG: CPT | Performed by: INTERNAL MEDICINE

## 2025-07-07 PROCEDURE — 3079F DIAST BP 80-89 MM HG: CPT | Performed by: INTERNAL MEDICINE

## 2025-07-07 PROCEDURE — 3044F HG A1C LEVEL LT 7.0%: CPT | Performed by: INTERNAL MEDICINE

## 2025-07-07 PROCEDURE — 1125F AMNT PAIN NOTED PAIN PRSNT: CPT | Performed by: INTERNAL MEDICINE

## 2025-07-07 PROCEDURE — G2211 COMPLEX E/M VISIT ADD ON: HCPCS | Performed by: INTERNAL MEDICINE

## 2025-07-07 RX ORDER — SACUBITRIL AND VALSARTAN 97; 103 MG/1; MG/1
1 TABLET, FILM COATED ORAL 2 TIMES DAILY
COMMUNITY

## 2025-07-07 RX ORDER — COLCHICINE 0.6 MG/1
0.6 TABLET ORAL DAILY
COMMUNITY

## 2025-07-07 NOTE — PROGRESS NOTES
Subjective     Patient ID: Andre Agosto is a 70 y.o. male. Patient is here for management of multiple medical problems.     Chief Complaint   Patient presents with    Diabetes    Diarrhea     History of Present Illness     Diabetes/.    Diarrhea.  Improving.   No ab pain.      Now with URi symptoms.      MRI prostate    Coming up.   Hx of prostate cancer.                The following portions of the patient's history were reviewed and updated as appropriate: allergies, current medications, past family history, past medical history, past social history, past surgical history and problem list.    Review of Systems    Current Outpatient Medications:     amiodarone (PACERONE) 200 MG tablet, Take 1 tablet by mouth Daily., Disp: 90 tablet, Rfl: 0    aspirin 81 MG EC tablet, Take 1 tablet by mouth Daily., Disp: , Rfl:     brivaracetam (Briviact) 75 MG tablet, Take 1 tablet by mouth 2 (Two) Times a Day., Disp: 180 tablet, Rfl: 3    bumetanide (BUMEX) 1 MG tablet, Take 1 tablet by mouth 2 (Two) Times a Day., Disp: 180 tablet, Rfl: 1    carvedilol (Coreg) 25 MG tablet, Take 1 tablet by mouth 2 (Two) Times a Day With Meals., Disp: 180 tablet, Rfl: 1    colchicine 0.6 MG tablet, Take 1 tablet by mouth Daily., Disp: , Rfl:     dapagliflozin Propanediol (Farxiga) 10 MG tablet, Take 10 mg by mouth Daily., Disp: 90 tablet, Rfl: 1    levothyroxine (SYNTHROID, LEVOTHROID) 88 MCG tablet, TAKE 1 TABLET DAILY, Disp: 90 tablet, Rfl: 3    rosuvastatin (CRESTOR) 20 MG tablet, Take 1 tablet by mouth Every Night., Disp: 90 tablet, Rfl: 1    sacubitril-valsartan (Entresto)  MG tablet, Take 1 tablet by mouth 2 (Two) Times a Day., Disp: , Rfl:     tamsulosin (FLOMAX) 0.4 MG capsule 24 hr capsule, TAKE 1 CAPSULE BY MOUTH DAILY, Disp: 90 capsule, Rfl: 3    Tirzepatide 10 MG/0.5ML solution auto-injector, Inject 10 mg under the skin into the appropriate area as directed 1 (One) Time Per Week., Disp: 6 mL, Rfl: 3    vitamin d 125 MCG (5000  "UT) capsule, Take 1 capsule by mouth Daily., Disp: , Rfl:     Xarelto 20 MG tablet, Take 1 tablet by mouth Daily., Disp: 90 tablet, Rfl: 1    Objective      Blood pressure 118/80, pulse 79, temperature 97.6 °F (36.4 °C), resp. rate 16, height 193 cm (76\"), weight 134 kg (295 lb), SpO2 97%.            Physical Exam     General Appearance:    Alert, cooperative, no distress, appears stated age   Head:    Normocephalic, without obvious abnormality, atraumatic   Eyes:    PERRL, conjunctiva/corneas clear, EOM's intact   Ears:    Normal TM's and external ear canals, both ears   Nose:   Nares normal, septum midline, mucosa normal, no drainage   or sinus tenderness   Throat:   Lips, mucosa, and tongue normal; teeth and gums normal   Neck:   Supple, symmetrical, trachea midline, no adenopathy;        thyroid:  No enlargement/tenderness/nodules; no carotid    bruit or JVD   Back:     Symmetric, no curvature, ROM normal, no CVA tenderness   Lungs:     Clear to auscultation bilaterally, respirations unlabored   Chest wall:    No tenderness or deformity   Heart:    Regular rate and rhythm, S1 and S2 normal, no murmur,        rub or gallop   Abdomen:     Soft, non-tender, bowel sounds active all four quadrants,     no masses, no organomegaly   Extremities:   Extremities normal, atraumatic, no cyanosis or edema   Pulses:   2+ and symmetric all extremities   Skin:   Skin color, texture, turgor normal, no rashes or lesions   Lymph nodes:   Cervical, supraclavicular, and axillary nodes normal   Neurologic:   CNII-XII intact. Normal strength, sensation and reflexes       throughout      Results for orders placed or performed in visit on 06/02/25   Lipid Panel    Collection Time: 06/02/25 10:44 AM    Specimen: Blood   Result Value Ref Range    Total Cholesterol 101 0 - 200 mg/dL    Triglycerides 144 0 - 150 mg/dL    HDL Cholesterol 40 40 - 60 mg/dL    LDL Cholesterol  36 0 - 100 mg/dL    VLDL Cholesterol 25 5 - 40 mg/dL    LDL/HDL " Ratio 0.81    Basic Metabolic Panel    Collection Time: 06/02/25 10:44 AM    Specimen: Blood   Result Value Ref Range    Glucose 94 65 - 99 mg/dL    BUN 20.0 8.0 - 23.0 mg/dL    Creatinine 1.88 (H) 0.76 - 1.27 mg/dL    Sodium 140 136 - 145 mmol/L    Potassium 4.3 3.5 - 5.2 mmol/L    Chloride 103 98 - 107 mmol/L    CO2 24.0 22.0 - 29.0 mmol/L    Calcium 9.4 8.6 - 10.5 mg/dL    BUN/Creatinine Ratio 10.6 7.0 - 25.0    Anion Gap 13.0 5.0 - 15.0 mmol/L    eGFR 38.2 (L) >60.0 mL/min/1.73   CBC Auto Differential    Collection Time: 06/02/25 10:44 AM    Specimen: Blood   Result Value Ref Range    WBC 7.70 3.40 - 10.80 10*3/mm3    RBC 5.30 4.14 - 5.80 10*6/mm3    Hemoglobin 15.5 13.0 - 17.7 g/dL    Hematocrit 46.5 37.5 - 51.0 %    MCV 87.7 79.0 - 97.0 fL    MCH 29.2 26.6 - 33.0 pg    MCHC 33.3 31.5 - 35.7 g/dL    RDW 14.0 12.3 - 15.4 %    RDW-SD 43.8 37.0 - 54.0 fl    MPV 11.4 6.0 - 12.0 fL    Platelets 182 140 - 450 10*3/mm3    Neutrophil % 70.5 42.7 - 76.0 %    Lymphocyte % 20.5 19.6 - 45.3 %    Monocyte % 5.8 5.0 - 12.0 %    Eosinophil % 2.1 0.3 - 6.2 %    Basophil % 0.8 0.0 - 1.5 %    Immature Grans % 0.3 0.0 - 0.5 %    Neutrophils, Absolute 5.43 1.70 - 7.00 10*3/mm3    Lymphocytes, Absolute 1.58 0.70 - 3.10 10*3/mm3    Monocytes, Absolute 0.45 0.10 - 0.90 10*3/mm3    Eosinophils, Absolute 0.16 0.00 - 0.40 10*3/mm3    Basophils, Absolute 0.06 0.00 - 0.20 10*3/mm3    Immature Grans, Absolute 0.02 0.00 - 0.05 10*3/mm3    nRBC 0.0 0.0 - 0.2 /100 WBC   Hemoglobin A1c    Collection Time: 06/02/25 10:44 AM    Specimen: Blood   Result Value Ref Range    Hemoglobin A1C 5.90 (H) 4.80 - 5.60 %     *Note: Due to a large number of results and/or encounters for the requested time period, some results have not been displayed. A complete set of results can be found in Results Review.         Assessment & Plan       Diagnoses and all orders for this visit:    1. Type 2 diabetes mellitus with stage 3a chronic kidney disease, without  long-term current use of insulin (Primary)  -     CBC & Differential  -     PSA Screen  -     Vitamin B12  -     Hemoglobin A1c  -     Microalbumin / Creatinine Urine Ratio - Urine, Clean Catch  -     TSH  -     Comprehensive Metabolic Panel  -     BNP  -     Uric Acid    2. Nephrolithiasis  -     CBC & Differential  -     PSA Screen  -     Vitamin B12  -     Hemoglobin A1c  -     Microalbumin / Creatinine Urine Ratio - Urine, Clean Catch  -     TSH  -     Comprehensive Metabolic Panel  -     BNP  -     Uric Acid    3. Upper respiratory tract infection, unspecified type  -     CBC & Differential  -     PSA Screen  -     Vitamin B12  -     Hemoglobin A1c  -     Microalbumin / Creatinine Urine Ratio - Urine, Clean Catch  -     TSH  -     Comprehensive Metabolic Panel  -     BNP  -     Uric Acid    4. Acute gout due to renal impairment involving left hand  -     CBC & Differential  -     PSA Screen  -     Vitamin B12  -     Hemoglobin A1c  -     Microalbumin / Creatinine Urine Ratio - Urine, Clean Catch  -     TSH  -     Comprehensive Metabolic Panel  -     BNP  -     Uric Acid    5. Encounter for screening for malignant neoplasm of prostate  -     PSA Screen    6. Acute combined systolic (congestive) and diastolic (congestive) heart failure  -     BNP      Return in about 6 weeks (around 8/18/2025) for Annual physical, Medicare Wellness, Next scheduled follow up, Recheck.          There are no Patient Instructions on file for this visit.     Keven Bear MD    Assessment & Plan

## 2025-07-09 RX ORDER — IPRATROPIUM BROMIDE 42 UG/1
2 SPRAY, METERED NASAL
Qty: 15 ML | Refills: 12 | Status: SHIPPED | OUTPATIENT
Start: 2025-07-09

## 2025-07-16 ENCOUNTER — TREATMENT (OUTPATIENT)
Dept: CARDIAC REHAB | Facility: HOSPITAL | Age: 70
End: 2025-07-16
Payer: MEDICARE

## 2025-07-16 DIAGNOSIS — I48.0 PAROXYSMAL ATRIAL FIBRILLATION: ICD-10-CM

## 2025-07-16 DIAGNOSIS — Z95.810 ISCHEMIC CARDIOMYOPATHY WITH IMPLANTABLE CARDIOVERTER-DEFIBRILLATOR (ICD): ICD-10-CM

## 2025-07-16 DIAGNOSIS — E78.5 HYPERLIPIDEMIA LDL GOAL <70: ICD-10-CM

## 2025-07-16 DIAGNOSIS — I50.22 CHRONIC SYSTOLIC HEART FAILURE: Primary | ICD-10-CM

## 2025-07-16 DIAGNOSIS — I25.10 CORONARY ARTERY DISEASE INVOLVING NATIVE CORONARY ARTERY OF NATIVE HEART WITHOUT ANGINA PECTORIS: ICD-10-CM

## 2025-07-16 DIAGNOSIS — I25.5 ISCHEMIC CARDIOMYOPATHY WITH IMPLANTABLE CARDIOVERTER-DEFIBRILLATOR (ICD): ICD-10-CM

## 2025-07-16 PROCEDURE — 93798 PHYS/QHP OP CAR RHAB W/ECG: CPT

## 2025-07-16 NOTE — PROGRESS NOTES
Pt was seen today in [facility] for a Phase II visit. Vital signs and session notes recorded in [employer] and will be scanned into Epic by HIM.

## 2025-07-18 ENCOUNTER — TELEPHONE (OUTPATIENT)
Age: 70
End: 2025-07-18
Payer: MEDICARE

## 2025-07-18 NOTE — TELEPHONE ENCOUNTER
There is another telephone encounter for this.  We are waiting for patient to call back to verify what dose he is on.

## 2025-07-18 NOTE — TELEPHONE ENCOUNTER
Medication requested (name and dose): ENTRESTO 24-26 MG 1 TAB 2X/DAY    Pharmacy where request should be sent: [facility]    Additional details provided by patient: N/A      Best call back number: [email or web address]    Does the patient have less than a 3 day supply:  [] Yes  [] No    Hailey English Rep  07/18/25, 07:57 EDT

## 2025-07-18 NOTE — TELEPHONE ENCOUNTER
Incoming Refill Request      Medication requested (name and dose): ENTRESTO 24-26MG TABLETS    Pharmacy where request should be sent: [facility] [address] [facility] [place]    Additional details provided by patient: NONE    Best call back number: [phone]     Does the patient have less than a 3 day supply:  [] Yes  [] No    Lara Sandifer  07/18/25, 15:28 EDT

## 2025-07-18 NOTE — TELEPHONE ENCOUNTER
Can we verify what dose of Entresto he is taking?  Last note it was decreased to 49-51 mg twice daily?

## 2025-07-21 ENCOUNTER — TELEPHONE (OUTPATIENT)
Age: 70
End: 2025-07-21
Payer: MEDICARE

## 2025-07-21 DIAGNOSIS — C61 PROSTATE CANCER: Primary | ICD-10-CM

## 2025-07-21 NOTE — TELEPHONE ENCOUNTER
Incoming Refill Request      Medication requested (name and dose): ENTRESTO 24-26MG TABLETS    Pharmacy where request should be sent: [facility] [place]    Additional details provided by patient: NONE    Best call back number: [phone]    Does the patient have less than a 3 day supply:  [] Yes  [] No    Lara Sandifer  07/21/25, 13:16 EDT

## 2025-07-21 NOTE — TELEPHONE ENCOUNTER
There are 2 other telephone encounters for this.  We are waiting for patient to call back to verify what dose he is on.

## 2025-07-22 ENCOUNTER — TREATMENT (OUTPATIENT)
Dept: CARDIAC REHAB | Facility: HOSPITAL | Age: 70
End: 2025-07-22
Payer: MEDICARE

## 2025-07-22 DIAGNOSIS — I25.5 ISCHEMIC CARDIOMYOPATHY WITH IMPLANTABLE CARDIOVERTER-DEFIBRILLATOR (ICD): ICD-10-CM

## 2025-07-22 DIAGNOSIS — Z95.810 ISCHEMIC CARDIOMYOPATHY WITH IMPLANTABLE CARDIOVERTER-DEFIBRILLATOR (ICD): ICD-10-CM

## 2025-07-22 DIAGNOSIS — I50.22 CHRONIC SYSTOLIC HEART FAILURE: Primary | ICD-10-CM

## 2025-07-22 PROCEDURE — 93798 PHYS/QHP OP CAR RHAB W/ECG: CPT

## 2025-07-22 NOTE — PROGRESS NOTES
Pt was seen today in [facility] for a Phase 2 visit.  Vital signs and session notes recorded in [employer] and will be scanned into Epic by HIM.

## 2025-07-24 ENCOUNTER — APPOINTMENT (OUTPATIENT)
Dept: CARDIAC REHAB | Facility: HOSPITAL | Age: 70
End: 2025-07-24
Payer: MEDICARE

## 2025-07-24 LAB
ALBUMIN SERPL-MCNC: 4.2 G/DL (ref 3.9–4.9)
ALBUMIN/CREAT UR: 209 MG/G CREAT (ref 0–29)
ALP SERPL-CCNC: 75 IU/L (ref 44–121)
ALT SERPL-CCNC: 19 IU/L (ref 0–44)
AST SERPL-CCNC: 22 IU/L (ref 0–40)
BASOPHILS # BLD AUTO: 0.1 X10E3/UL (ref 0–0.2)
BASOPHILS NFR BLD AUTO: 1 %
BILIRUB SERPL-MCNC: 1 MG/DL (ref 0–1.2)
BNP SERPL-MCNC: 812.8 PG/ML (ref 0–100)
BUN SERPL-MCNC: 18 MG/DL (ref 8–27)
BUN/CREAT SERPL: 9 (ref 10–24)
CALCIUM SERPL-MCNC: 9.4 MG/DL (ref 8.6–10.2)
CHLORIDE SERPL-SCNC: 98 MMOL/L (ref 96–106)
CO2 SERPL-SCNC: 24 MMOL/L (ref 20–29)
CREAT SERPL-MCNC: 2.1 MG/DL (ref 0.76–1.27)
CREAT UR-MCNC: 37.9 MG/DL
EGFRCR SERPLBLD CKD-EPI 2021: 33 ML/MIN/1.73
EOSINOPHIL # BLD AUTO: 0.1 X10E3/UL (ref 0–0.4)
EOSINOPHIL NFR BLD AUTO: 2 %
ERYTHROCYTE [DISTWIDTH] IN BLOOD BY AUTOMATED COUNT: 15.7 % (ref 11.6–15.4)
GLOBULIN SER CALC-MCNC: 2.8 G/DL (ref 1.5–4.5)
GLUCOSE SERPL-MCNC: 98 MG/DL (ref 70–99)
HBA1C MFR BLD: 5.8 % (ref 4.8–5.6)
HCT VFR BLD AUTO: 49.9 % (ref 37.5–51)
HGB BLD-MCNC: 15.9 G/DL (ref 13–17.7)
IMM GRANULOCYTES # BLD AUTO: 0 X10E3/UL (ref 0–0.1)
IMM GRANULOCYTES NFR BLD AUTO: 0 %
LYMPHOCYTES # BLD AUTO: 1.7 X10E3/UL (ref 0.7–3.1)
LYMPHOCYTES NFR BLD AUTO: 26 %
MCH RBC QN AUTO: 27.9 PG (ref 26.6–33)
MCHC RBC AUTO-ENTMCNC: 31.9 G/DL (ref 31.5–35.7)
MCV RBC AUTO: 88 FL (ref 79–97)
MICROALBUMIN UR-MCNC: 79.1 UG/ML
MONOCYTES # BLD AUTO: 0.4 X10E3/UL (ref 0.1–0.9)
MONOCYTES NFR BLD AUTO: 6 %
NEUTROPHILS # BLD AUTO: 4.2 X10E3/UL (ref 1.4–7)
NEUTROPHILS NFR BLD AUTO: 65 %
PLATELET # BLD AUTO: 182 X10E3/UL (ref 150–450)
POTASSIUM SERPL-SCNC: 4 MMOL/L (ref 3.5–5.2)
PROT SERPL-MCNC: 7 G/DL (ref 6–8.5)
PSA SERPL-MCNC: 5.3 NG/ML (ref 0–4)
RBC # BLD AUTO: 5.7 X10E6/UL (ref 4.14–5.8)
SODIUM SERPL-SCNC: 138 MMOL/L (ref 134–144)
TSH SERPL DL<=0.005 MIU/L-ACNC: 1.4 UIU/ML (ref 0.45–4.5)
URATE SERPL-MCNC: 8.8 MG/DL (ref 3.8–8.4)
VIT B12 SERPL-MCNC: 481 PG/ML (ref 232–1245)
WBC # BLD AUTO: 6.5 X10E3/UL (ref 3.4–10.8)

## 2025-07-25 ENCOUNTER — TREATMENT (OUTPATIENT)
Dept: CARDIAC REHAB | Facility: HOSPITAL | Age: 70
End: 2025-07-25
Payer: MEDICARE

## 2025-07-25 ENCOUNTER — PATIENT MESSAGE (OUTPATIENT)
Dept: INTERNAL MEDICINE | Facility: CLINIC | Age: 70
End: 2025-07-25
Payer: MEDICARE

## 2025-07-25 DIAGNOSIS — I50.22 CHRONIC SYSTOLIC HEART FAILURE: Primary | ICD-10-CM

## 2025-07-25 PROCEDURE — 93798 PHYS/QHP OP CAR RHAB W/ECG: CPT

## 2025-07-25 NOTE — PROGRESS NOTES
Pt was seen today in [facility] for a Phase II visit. Vital signs and session notes recorded in [employer] and will be scanned into Epic by HIM.

## 2025-07-29 ENCOUNTER — TREATMENT (OUTPATIENT)
Dept: CARDIAC REHAB | Facility: HOSPITAL | Age: 70
End: 2025-07-29
Payer: MEDICARE

## 2025-07-29 DIAGNOSIS — I50.22 CHRONIC SYSTOLIC HEART FAILURE: Primary | ICD-10-CM

## 2025-07-29 PROCEDURE — 93798 PHYS/QHP OP CAR RHAB W/ECG: CPT

## 2025-07-29 NOTE — PROGRESS NOTES
Pt was seen today in [facility] for a Phase II visit. Vital signs and session notes recorded in [employer] and will be scanned into Epic by HIM.

## 2025-07-31 ENCOUNTER — APPOINTMENT (OUTPATIENT)
Dept: CARDIAC REHAB | Facility: HOSPITAL | Age: 70
End: 2025-07-31
Payer: MEDICARE

## 2025-08-05 ENCOUNTER — OFFICE VISIT (OUTPATIENT)
Age: 70
End: 2025-08-05
Payer: MEDICARE

## 2025-08-05 VITALS
SYSTOLIC BLOOD PRESSURE: 122 MMHG | WEIGHT: 286.5 LBS | BODY MASS INDEX: 34.89 KG/M2 | HEIGHT: 76 IN | DIASTOLIC BLOOD PRESSURE: 81 MMHG | HEART RATE: 76 BPM

## 2025-08-05 DIAGNOSIS — I48.0 PAROXYSMAL ATRIAL FIBRILLATION: ICD-10-CM

## 2025-08-05 DIAGNOSIS — E78.5 HYPERLIPIDEMIA LDL GOAL <70: ICD-10-CM

## 2025-08-05 DIAGNOSIS — I25.10 CORONARY ARTERY DISEASE INVOLVING NATIVE CORONARY ARTERY OF NATIVE HEART WITHOUT ANGINA PECTORIS: Primary | ICD-10-CM

## 2025-08-05 DIAGNOSIS — I10 BENIGN ESSENTIAL HYPERTENSION: ICD-10-CM

## 2025-08-05 PROCEDURE — 1160F RVW MEDS BY RX/DR IN RCRD: CPT | Performed by: INTERNAL MEDICINE

## 2025-08-05 PROCEDURE — 1159F MED LIST DOCD IN RCRD: CPT | Performed by: INTERNAL MEDICINE

## 2025-08-05 PROCEDURE — 99214 OFFICE O/P EST MOD 30 MIN: CPT | Performed by: INTERNAL MEDICINE

## 2025-08-05 PROCEDURE — 3074F SYST BP LT 130 MM HG: CPT | Performed by: INTERNAL MEDICINE

## 2025-08-05 PROCEDURE — 3079F DIAST BP 80-89 MM HG: CPT | Performed by: INTERNAL MEDICINE

## 2025-08-13 ENCOUNTER — OFFICE VISIT (OUTPATIENT)
Dept: INTERNAL MEDICINE | Facility: CLINIC | Age: 70
End: 2025-08-13
Payer: MEDICARE

## 2025-08-13 ENCOUNTER — HOSPITAL ENCOUNTER (OUTPATIENT)
Dept: GENERAL RADIOLOGY | Facility: HOSPITAL | Age: 70
Discharge: HOME | End: 2025-08-13
Admitting: INTERNAL MEDICINE
Payer: MEDICARE

## 2025-08-13 VITALS
DIASTOLIC BLOOD PRESSURE: 76 MMHG | SYSTOLIC BLOOD PRESSURE: 110 MMHG | HEART RATE: 79 BPM | TEMPERATURE: 96.2 F | HEIGHT: 76 IN | WEIGHT: 280 LBS | BODY MASS INDEX: 34.1 KG/M2 | RESPIRATION RATE: 17 BRPM | OXYGEN SATURATION: 97 %

## 2025-08-13 DIAGNOSIS — R19.7 DIARRHEA OF PRESUMED INFECTIOUS ORIGIN: Primary | ICD-10-CM

## 2025-08-13 DIAGNOSIS — K56.609 INTESTINAL OBSTRUCTION, UNSPECIFIED CAUSE, UNSPECIFIED WHETHER PARTIAL OR COMPLETE: ICD-10-CM

## 2025-08-13 DIAGNOSIS — N18.32 TYPE 2 DIABETES MELLITUS WITH STAGE 3B CHRONIC KIDNEY DISEASE, WITHOUT LONG-TERM CURRENT USE OF INSULIN: ICD-10-CM

## 2025-08-13 DIAGNOSIS — E11.22 TYPE 2 DIABETES MELLITUS WITH STAGE 3B CHRONIC KIDNEY DISEASE, WITHOUT LONG-TERM CURRENT USE OF INSULIN: ICD-10-CM

## 2025-08-13 PROCEDURE — 74018 RADEX ABDOMEN 1 VIEW: CPT

## 2025-08-14 LAB
25(OH)D3+25(OH)D2 SERPL-MCNC: 59.5 NG/ML (ref 30–100)
ALBUMIN SERPL-MCNC: 4.2 G/DL (ref 3.9–4.9)
ALP SERPL-CCNC: 108 IU/L (ref 44–121)
ALT SERPL-CCNC: 32 IU/L (ref 0–44)
AST SERPL-CCNC: 33 IU/L (ref 0–40)
BASOPHILS # BLD AUTO: 0.1 X10E3/UL (ref 0–0.2)
BASOPHILS NFR BLD AUTO: 1 %
BILIRUB SERPL-MCNC: 1.3 MG/DL (ref 0–1.2)
BUN SERPL-MCNC: 16 MG/DL (ref 8–27)
BUN/CREAT SERPL: 8 (ref 10–24)
CALCIUM SERPL-MCNC: 9.2 MG/DL (ref 8.6–10.2)
CHLORIDE SERPL-SCNC: 100 MMOL/L (ref 96–106)
CO2 SERPL-SCNC: 24 MMOL/L (ref 20–29)
CREAT SERPL-MCNC: 2.11 MG/DL (ref 0.76–1.27)
EGFRCR SERPLBLD CKD-EPI 2021: 33 ML/MIN/1.73
EOSINOPHIL # BLD AUTO: 0.2 X10E3/UL (ref 0–0.4)
EOSINOPHIL NFR BLD AUTO: 3 %
ERYTHROCYTE [DISTWIDTH] IN BLOOD BY AUTOMATED COUNT: 15.6 % (ref 11.6–15.4)
GLOBULIN SER CALC-MCNC: 3 G/DL (ref 1.5–4.5)
GLUCOSE SERPL-MCNC: 95 MG/DL (ref 70–99)
HBA1C MFR BLD: 5.6 % (ref 4.8–5.6)
HCT VFR BLD AUTO: 49.6 % (ref 37.5–51)
HGB BLD-MCNC: 15.4 G/DL (ref 13–17.7)
IMM GRANULOCYTES # BLD AUTO: 0 X10E3/UL (ref 0–0.1)
IMM GRANULOCYTES NFR BLD AUTO: 0 %
LYMPHOCYTES # BLD AUTO: 1.7 X10E3/UL (ref 0.7–3.1)
LYMPHOCYTES NFR BLD AUTO: 29 %
MCH RBC QN AUTO: 27.5 PG (ref 26.6–33)
MCHC RBC AUTO-ENTMCNC: 31 G/DL (ref 31.5–35.7)
MCV RBC AUTO: 89 FL (ref 79–97)
MONOCYTES # BLD AUTO: 0.4 X10E3/UL (ref 0.1–0.9)
MONOCYTES NFR BLD AUTO: 7 %
NEUTROPHILS # BLD AUTO: 3.5 X10E3/UL (ref 1.4–7)
NEUTROPHILS NFR BLD AUTO: 60 %
PLATELET # BLD AUTO: 180 X10E3/UL (ref 150–450)
POTASSIUM SERPL-SCNC: 3.9 MMOL/L (ref 3.5–5.2)
PROT SERPL-MCNC: 7.2 G/DL (ref 6–8.5)
RBC # BLD AUTO: 5.59 X10E6/UL (ref 4.14–5.8)
SODIUM SERPL-SCNC: 142 MMOL/L (ref 134–144)
WBC # BLD AUTO: 5.8 X10E3/UL (ref 3.4–10.8)

## 2025-08-18 ENCOUNTER — OFFICE VISIT (OUTPATIENT)
Dept: INTERNAL MEDICINE | Facility: CLINIC | Age: 70
End: 2025-08-18
Payer: MEDICARE

## 2025-08-18 VITALS
WEIGHT: 287 LBS | HEART RATE: 83 BPM | DIASTOLIC BLOOD PRESSURE: 70 MMHG | SYSTOLIC BLOOD PRESSURE: 116 MMHG | RESPIRATION RATE: 15 BRPM | TEMPERATURE: 96.1 F | HEIGHT: 76 IN | OXYGEN SATURATION: 97 % | BODY MASS INDEX: 34.95 KG/M2

## 2025-08-18 DIAGNOSIS — K56.609 INTESTINAL OBSTRUCTION, UNSPECIFIED CAUSE, UNSPECIFIED WHETHER PARTIAL OR COMPLETE: ICD-10-CM

## 2025-08-18 DIAGNOSIS — R10.84 GENERALIZED ABDOMINAL PAIN: ICD-10-CM

## 2025-08-18 DIAGNOSIS — E80.6 HYPERBILIRUBINEMIA: ICD-10-CM

## 2025-08-18 DIAGNOSIS — Z00.00 ROUTINE GENERAL MEDICAL EXAMINATION AT A HEALTH CARE FACILITY: Primary | ICD-10-CM

## 2025-08-18 DIAGNOSIS — N18.32 STAGE 3B CHRONIC KIDNEY DISEASE: ICD-10-CM

## 2025-08-18 LAB
MC_CV_MDC_IDC_RATE_1: 150
MC_CV_MDC_IDC_RATE_1: 176
MC_CV_MDC_IDC_RATE_1: 240
MC_CV_MDC_IDC_SHOCK_MEASURED_IMPEDANCE: 61
MC_CV_MDC_IDC_THERAPIES: NORMAL
MC_CV_MDC_IDC_THERAPIES: NORMAL
MC_CV_MDC_IDC_ZONE_ID: 1
MC_CV_MDC_IDC_ZONE_ID: 2
MC_CV_MDC_IDC_ZONE_ID: 3
MDC_IDC_MSMT_BATTERY_REMAINING_LONGEVITY: 52 MO
MDC_IDC_MSMT_BATTERY_REMAINING_PERCENTAGE: 56 %
MDC_IDC_MSMT_BATTERY_RRT_TRIGGER: 2.62
MDC_IDC_MSMT_BATTERY_STATUS: NORMAL
MDC_IDC_MSMT_BATTERY_VOLTAGE: 2.93
MDC_IDC_MSMT_CAP_CHARGE_TIME: 8.9
MDC_IDC_MSMT_LEADCHNL_RA_IMPEDANCE_VALUE: 360
MDC_IDC_MSMT_LEADCHNL_RA_PACING_THRESHOLD_POLARITY: NORMAL
MDC_IDC_MSMT_LEADCHNL_RA_SENSING_INTR_AMPL: 3.1
MDC_IDC_MSMT_LEADCHNL_RV_IMPEDANCE_VALUE: 440
MDC_IDC_MSMT_LEADCHNL_RV_PACING_THRESHOLD_POLARITY: NORMAL
MDC_IDC_MSMT_LEADCHNL_RV_SENSING_INTR_AMPL: 12
MDC_IDC_PG_IMPLANT_DTM: NORMAL
MDC_IDC_PG_MFG: NORMAL
MDC_IDC_PG_MODEL: NORMAL
MDC_IDC_PG_SERIAL: NORMAL
MDC_IDC_PG_TYPE: NORMAL
MDC_IDC_SESS_DTM: NORMAL
MDC_IDC_SESS_TYPE: NORMAL
MDC_IDC_SET_BRADY_AT_MODE_SWITCH_RATE: 180
MDC_IDC_SET_BRADY_LOWRATE: 70
MDC_IDC_SET_BRADY_MAX_SENSOR_RATE: 120
MDC_IDC_SET_BRADY_MAX_TRACKING_RATE: 100
MDC_IDC_SET_BRADY_MODE: NORMAL
MDC_IDC_SET_BRADY_PAV_DELAY: 250
MDC_IDC_SET_BRADY_SAV_DELAY: 225
MDC_IDC_SET_LEADCHNL_RA_PACING_AMPLITUDE: 2
MDC_IDC_SET_LEADCHNL_RA_PACING_POLARITY: NORMAL
MDC_IDC_SET_LEADCHNL_RA_PACING_PULSEWIDTH: 0.5
MDC_IDC_SET_LEADCHNL_RA_SENSING_POLARITY: NORMAL
MDC_IDC_SET_LEADCHNL_RA_SENSING_SENSITIVITY: 0.3
MDC_IDC_SET_LEADCHNL_RV_PACING_AMPLITUDE: 2
MDC_IDC_SET_LEADCHNL_RV_PACING_POLARITY: NORMAL
MDC_IDC_SET_LEADCHNL_RV_PACING_PULSEWIDTH: 0.5
MDC_IDC_SET_LEADCHNL_RV_SENSING_POLARITY: NORMAL
MDC_IDC_SET_LEADCHNL_RV_SENSING_SENSITIVITY: 0.5
MDC_IDC_SET_ZONE_STATUS: NORMAL
MDC_IDC_SET_ZONE_TYPE: NORMAL
MDC_IDC_STAT_AT_BURDEN_PERCENT: 0
MDC_IDC_STAT_BRADY_RA_PERCENT_PACED: 99
MDC_IDC_STAT_BRADY_RV_PERCENT_PACED: 3
MDC_IDC_STAT_TACHYTHERAPY_ATP_DELIVERED_RECENT: 0
MDC_IDC_STAT_TACHYTHERAPY_SHOCKS_ABORTED_RECENT: 0
MDC_IDC_STAT_TACHYTHERAPY_SHOCKS_DELIVERED_RECENT: 0

## 2025-08-18 PROCEDURE — 3078F DIAST BP <80 MM HG: CPT | Performed by: INTERNAL MEDICINE

## 2025-08-18 PROCEDURE — 3074F SYST BP LT 130 MM HG: CPT | Performed by: INTERNAL MEDICINE

## 2025-08-18 PROCEDURE — 3044F HG A1C LEVEL LT 7.0%: CPT | Performed by: INTERNAL MEDICINE

## 2025-08-18 PROCEDURE — 99214 OFFICE O/P EST MOD 30 MIN: CPT | Performed by: INTERNAL MEDICINE

## 2025-08-18 PROCEDURE — G2211 COMPLEX E/M VISIT ADD ON: HCPCS | Performed by: INTERNAL MEDICINE

## 2025-08-18 PROCEDURE — 99397 PER PM REEVAL EST PAT 65+ YR: CPT | Performed by: INTERNAL MEDICINE

## 2025-08-18 PROCEDURE — 1125F AMNT PAIN NOTED PAIN PRSNT: CPT | Performed by: INTERNAL MEDICINE

## 2025-08-18 PROCEDURE — G0439 PPPS, SUBSEQ VISIT: HCPCS | Performed by: INTERNAL MEDICINE

## 2025-08-18 PROCEDURE — 1170F FXNL STATUS ASSESSED: CPT | Performed by: INTERNAL MEDICINE

## 2025-08-25 ENCOUNTER — TELEPHONE (OUTPATIENT)
Age: 70
End: 2025-08-25
Payer: MEDICARE

## 2025-08-25 ENCOUNTER — HOSPITAL ENCOUNTER (INPATIENT)
Facility: HOSPITAL | Age: 70
LOS: 4 days | Discharge: HOME | End: 2025-08-29
Attending: STUDENT IN AN ORGANIZED HEALTH CARE EDUCATION/TRAINING PROGRAM | Admitting: STUDENT IN AN ORGANIZED HEALTH CARE EDUCATION/TRAINING PROGRAM
Payer: MEDICARE

## 2025-08-25 ENCOUNTER — APPOINTMENT (OUTPATIENT)
Facility: HOSPITAL | Age: 70
End: 2025-08-25
Payer: MEDICARE

## 2025-08-25 DIAGNOSIS — J81.0 FLASH PULMONARY EDEMA: Primary | ICD-10-CM

## 2025-08-25 DIAGNOSIS — J96.21 ACUTE ON CHRONIC RESPIRATORY FAILURE WITH HYPOXIA: ICD-10-CM

## 2025-08-25 DIAGNOSIS — I50.9 ACUTE ON CHRONIC CONGESTIVE HEART FAILURE, UNSPECIFIED HEART FAILURE TYPE: ICD-10-CM

## 2025-08-25 DIAGNOSIS — I10 HYPERTENSION, UNSPECIFIED TYPE: ICD-10-CM

## 2025-08-25 LAB
ALBUMIN SERPL-MCNC: 4.1 G/DL (ref 3.5–5.2)
ALBUMIN/GLOB SERPL: 1.2 G/DL
ALP SERPL-CCNC: 92 U/L (ref 39–117)
ALT SERPL W P-5'-P-CCNC: 19 U/L (ref 1–41)
ANION GAP SERPL CALCULATED.3IONS-SCNC: 15.7 MMOL/L (ref 5–15)
ARTERIAL PATENCY WRIST A: ABNORMAL
AST SERPL-CCNC: 19 U/L (ref 1–40)
ATMOSPHERIC PRESS: ABNORMAL MM[HG]
B PARAPERT DNA SPEC QL NAA+PROBE: NOT DETECTED
B PERT DNA SPEC QL NAA+PROBE: NOT DETECTED
BASE EXCESS BLDA CALC-SCNC: 0.2 MMOL/L (ref 0–2)
BASOPHILS # BLD AUTO: 0.03 10*3/MM3 (ref 0–0.2)
BASOPHILS NFR BLD AUTO: 0.3 % (ref 0–1.5)
BDY SITE: ABNORMAL
BILIRUB SERPL-MCNC: 1 MG/DL (ref 0–1.2)
BODY TEMPERATURE: 37
BUN SERPL-MCNC: 22.2 MG/DL (ref 8–23)
BUN/CREAT SERPL: 12.3 (ref 7–25)
C PNEUM DNA NPH QL NAA+NON-PROBE: NOT DETECTED
CALCIUM SPEC-SCNC: 9.4 MG/DL (ref 8.6–10.5)
CHLORIDE SERPL-SCNC: 99 MMOL/L (ref 98–107)
CO2 BLDA-SCNC: 24.9 MMOL/L (ref 22–33)
CO2 SERPL-SCNC: 21.3 MMOL/L (ref 22–29)
COHGB MFR BLD: 0.9 % (ref 0–2)
CREAT SERPL-MCNC: 1.8 MG/DL (ref 0.76–1.27)
DEPRECATED RDW RBC AUTO: 54.8 FL (ref 37–54)
EGFRCR SERPLBLD CKD-EPI 2021: 40.2 ML/MIN/1.73
EOSINOPHIL # BLD AUTO: 0.12 10*3/MM3 (ref 0–0.4)
EOSINOPHIL NFR BLD AUTO: 1 % (ref 0.3–6.2)
EPAP: 12
ERYTHROCYTE [DISTWIDTH] IN BLOOD BY AUTOMATED COUNT: 17.5 % (ref 12.3–15.4)
FLUAV SUBTYP SPEC NAA+PROBE: NOT DETECTED
FLUBV RNA NPH QL NAA+NON-PROBE: NOT DETECTED
GEN 5 1HR TROPONIN T REFLEX: 34 NG/L
GLOBULIN UR ELPH-MCNC: 3.4 GM/DL
GLUCOSE BLDC GLUCOMTR-MCNC: 116 MG/DL (ref 70–130)
GLUCOSE BLDC GLUCOMTR-MCNC: 116 MG/DL (ref 70–130)
GLUCOSE BLDC GLUCOMTR-MCNC: 120 MG/DL (ref 70–130)
GLUCOSE SERPL-MCNC: 172 MG/DL (ref 65–99)
HADV DNA SPEC NAA+PROBE: NOT DETECTED
HCO3 BLDA-SCNC: 23.8 MMOL/L (ref 20–26)
HCOV 229E RNA SPEC QL NAA+PROBE: NOT DETECTED
HCOV HKU1 RNA SPEC QL NAA+PROBE: NOT DETECTED
HCOV NL63 RNA SPEC QL NAA+PROBE: NOT DETECTED
HCOV OC43 RNA SPEC QL NAA+PROBE: NOT DETECTED
HCT VFR BLD AUTO: 47.4 % (ref 37.5–51)
HCT VFR BLD CALC: 47.3 %
HGB BLD-MCNC: 15.7 G/DL (ref 13–17.7)
HGB BLDA-MCNC: 15.4 G/DL (ref 14–18)
HMPV RNA NPH QL NAA+NON-PROBE: NOT DETECTED
HOLD SPECIMEN: NORMAL
HPIV1 RNA ISLT QL NAA+PROBE: NOT DETECTED
HPIV2 RNA SPEC QL NAA+PROBE: NOT DETECTED
HPIV3 RNA NPH QL NAA+PROBE: NOT DETECTED
HPIV4 P GENE NPH QL NAA+PROBE: NOT DETECTED
IMM GRANULOCYTES # BLD AUTO: 0.01 10*3/MM3 (ref 0–0.05)
IMM GRANULOCYTES NFR BLD AUTO: 0.1 % (ref 0–0.5)
INHALED O2 CONCENTRATION: 35 %
IPAP: 20
LIPASE SERPL-CCNC: 62 U/L (ref 13–60)
LYMPHOCYTES # BLD AUTO: 1.87 10*3/MM3 (ref 0.7–3.1)
LYMPHOCYTES NFR BLD AUTO: 16.2 % (ref 19.6–45.3)
Lab: NORMAL
M PNEUMO IGG SER IA-ACNC: NOT DETECTED
MCH RBC QN AUTO: 28.6 PG (ref 26.6–33)
MCHC RBC AUTO-ENTMCNC: 33.1 G/DL (ref 31.5–35.7)
MCV RBC AUTO: 86.3 FL (ref 79–97)
METHGB BLD QL: 0.6 % (ref 0–1.5)
MODALITY: ABNORMAL
MONOCYTES # BLD AUTO: 0.56 10*3/MM3 (ref 0.1–0.9)
MONOCYTES NFR BLD AUTO: 4.8 % (ref 5–12)
NEUTROPHILS NFR BLD AUTO: 77.6 % (ref 42.7–76)
NEUTROPHILS NFR BLD AUTO: 8.97 10*3/MM3 (ref 1.7–7)
NT-PROBNP SERPL-MCNC: 6478 PG/ML (ref 0–900)
OXYHGB MFR BLDV: 94.9 % (ref 94–99)
PAW @ PEAK INSP FLOW SETTING VENT: 0 CMH2O
PCO2 BLDA: 34.9 MM HG (ref 35–45)
PCO2 TEMP ADJ BLD: 34.9 MM HG (ref 35–48)
PH BLDA: 7.44 PH UNITS (ref 7.35–7.45)
PH, TEMP CORRECTED: 7.44 PH UNITS
PLATELET # BLD AUTO: 193 10*3/MM3 (ref 140–450)
PMV BLD AUTO: 11.2 FL (ref 6–12)
PO2 BLDA: 79 MM HG (ref 83–108)
PO2 TEMP ADJ BLD: 79 MM HG (ref 83–108)
POTASSIUM SERPL-SCNC: 4.3 MMOL/L (ref 3.5–5.2)
PROT SERPL-MCNC: 7.5 G/DL (ref 6–8.5)
QT INTERVAL: 412 MS
QT INTERVAL: 440 MS
QTC INTERVAL: 523 MS
QTC INTERVAL: 526 MS
RBC # BLD AUTO: 5.49 10*6/MM3 (ref 4.14–5.8)
RHINOVIRUS RNA SPEC NAA+PROBE: NOT DETECTED
RSV RNA NPH QL NAA+NON-PROBE: NOT DETECTED
SARS-COV-2 RNA RESP QL NAA+PROBE: NOT DETECTED
SODIUM SERPL-SCNC: 136 MMOL/L (ref 136–145)
TOTAL RATE: 18 BREATHS/MINUTE
TROPONIN T % DELTA: 0
TROPONIN T NUMERIC DELTA: 0 NG/L
TROPONIN T SERPL HS-MCNC: 34 NG/L
WBC NRBC COR # BLD AUTO: 11.56 10*3/MM3 (ref 3.4–10.8)
WHOLE BLOOD HOLD COAG: NORMAL
WHOLE BLOOD HOLD SPECIMEN: NORMAL

## 2025-08-25 PROCEDURE — 71045 X-RAY EXAM CHEST 1 VIEW: CPT

## 2025-08-25 PROCEDURE — 82805 BLOOD GASES W/O2 SATURATION: CPT

## 2025-08-25 PROCEDURE — 99285 EMERGENCY DEPT VISIT HI MDM: CPT | Performed by: STUDENT IN AN ORGANIZED HEALTH CARE EDUCATION/TRAINING PROGRAM

## 2025-08-25 PROCEDURE — 80053 COMPREHEN METABOLIC PANEL: CPT | Performed by: STUDENT IN AN ORGANIZED HEALTH CARE EDUCATION/TRAINING PROGRAM

## 2025-08-25 PROCEDURE — 94799 UNLISTED PULMONARY SVC/PX: CPT

## 2025-08-25 PROCEDURE — C9399 UNCLASSIFIED DRUGS OR BIOLOG: HCPCS | Performed by: INTERNAL MEDICINE

## 2025-08-25 PROCEDURE — 83050 HGB METHEMOGLOBIN QUAN: CPT

## 2025-08-25 PROCEDURE — 82948 REAGENT STRIP/BLOOD GLUCOSE: CPT

## 2025-08-25 PROCEDURE — 0202U NFCT DS 22 TRGT SARS-COV-2: CPT | Performed by: STUDENT IN AN ORGANIZED HEALTH CARE EDUCATION/TRAINING PROGRAM

## 2025-08-25 PROCEDURE — 25010000002 BUMETANIDE PER 0.5 MG: Performed by: STUDENT IN AN ORGANIZED HEALTH CARE EDUCATION/TRAINING PROGRAM

## 2025-08-25 PROCEDURE — 82375 ASSAY CARBOXYHB QUANT: CPT

## 2025-08-25 PROCEDURE — 85018 HEMOGLOBIN: CPT

## 2025-08-25 PROCEDURE — 93005 ELECTROCARDIOGRAM TRACING: CPT

## 2025-08-25 PROCEDURE — 94660 CPAP INITIATION&MGMT: CPT

## 2025-08-25 PROCEDURE — 36600 WITHDRAWAL OF ARTERIAL BLOOD: CPT

## 2025-08-25 PROCEDURE — 85025 COMPLETE CBC W/AUTO DIFF WBC: CPT | Performed by: STUDENT IN AN ORGANIZED HEALTH CARE EDUCATION/TRAINING PROGRAM

## 2025-08-25 PROCEDURE — 83880 ASSAY OF NATRIURETIC PEPTIDE: CPT | Performed by: STUDENT IN AN ORGANIZED HEALTH CARE EDUCATION/TRAINING PROGRAM

## 2025-08-25 PROCEDURE — 25010000002 BUMETANIDE PER 0.5 MG: Performed by: INTERNAL MEDICINE

## 2025-08-25 PROCEDURE — 83690 ASSAY OF LIPASE: CPT | Performed by: STUDENT IN AN ORGANIZED HEALTH CARE EDUCATION/TRAINING PROGRAM

## 2025-08-25 PROCEDURE — 93005 ELECTROCARDIOGRAM TRACING: CPT | Performed by: STUDENT IN AN ORGANIZED HEALTH CARE EDUCATION/TRAINING PROGRAM

## 2025-08-25 PROCEDURE — 25010000002 NITROGLYCERIN 200 MCG/ML SOLUTION: Performed by: STUDENT IN AN ORGANIZED HEALTH CARE EDUCATION/TRAINING PROGRAM

## 2025-08-25 PROCEDURE — 99223 1ST HOSP IP/OBS HIGH 75: CPT | Performed by: INTERNAL MEDICINE

## 2025-08-25 PROCEDURE — 25010000002 BRIVARACETAM 50 MG/5ML SOLUTION: Performed by: INTERNAL MEDICINE

## 2025-08-25 PROCEDURE — 84484 ASSAY OF TROPONIN QUANT: CPT | Performed by: STUDENT IN AN ORGANIZED HEALTH CARE EDUCATION/TRAINING PROGRAM

## 2025-08-25 RX ORDER — ONDANSETRON 2 MG/ML
4 INJECTION INTRAMUSCULAR; INTRAVENOUS EVERY 6 HOURS PRN
Status: DISCONTINUED | OUTPATIENT
Start: 2025-08-25 | End: 2025-08-29 | Stop reason: HOSPADM

## 2025-08-25 RX ORDER — SACUBITRIL AND VALSARTAN 49; 51 MG/1; MG/1
1 TABLET ORAL 2 TIMES DAILY
Status: DISCONTINUED | OUTPATIENT
Start: 2025-08-25 | End: 2025-08-29 | Stop reason: HOSPADM

## 2025-08-25 RX ORDER — SODIUM CHLORIDE 0.9 % (FLUSH) 0.9 %
10 SYRINGE (ML) INJECTION AS NEEDED
Status: DISCONTINUED | OUTPATIENT
Start: 2025-08-25 | End: 2025-08-25

## 2025-08-25 RX ORDER — LEVOTHYROXINE SODIUM 88 UG/1
88 TABLET ORAL DAILY
Status: DISCONTINUED | OUTPATIENT
Start: 2025-08-25 | End: 2025-08-29 | Stop reason: HOSPADM

## 2025-08-25 RX ORDER — BISACODYL 5 MG/1
5 TABLET, DELAYED RELEASE ORAL DAILY PRN
Status: DISCONTINUED | OUTPATIENT
Start: 2025-08-25 | End: 2025-08-29 | Stop reason: HOSPADM

## 2025-08-25 RX ORDER — BUMETANIDE 0.25 MG/ML
2 INJECTION, SOLUTION INTRAMUSCULAR; INTRAVENOUS ONCE
Status: COMPLETED | OUTPATIENT
Start: 2025-08-25 | End: 2025-08-25

## 2025-08-25 RX ORDER — DEXTROSE MONOHYDRATE 25 G/50ML
25 INJECTION, SOLUTION INTRAVENOUS
Status: DISCONTINUED | OUTPATIENT
Start: 2025-08-25 | End: 2025-08-29 | Stop reason: HOSPADM

## 2025-08-25 RX ORDER — IBUPROFEN 600 MG/1
1 TABLET ORAL
Status: DISCONTINUED | OUTPATIENT
Start: 2025-08-25 | End: 2025-08-29 | Stop reason: HOSPADM

## 2025-08-25 RX ORDER — AMOXICILLIN 250 MG
2 CAPSULE ORAL 2 TIMES DAILY PRN
Status: DISCONTINUED | OUTPATIENT
Start: 2025-08-25 | End: 2025-08-29 | Stop reason: HOSPADM

## 2025-08-25 RX ORDER — ACETAMINOPHEN 650 MG/1
650 SUPPOSITORY RECTAL EVERY 4 HOURS PRN
Status: DISCONTINUED | OUTPATIENT
Start: 2025-08-25 | End: 2025-08-29 | Stop reason: HOSPADM

## 2025-08-25 RX ORDER — ASPIRIN 81 MG/1
324 TABLET, CHEWABLE ORAL ONCE
Status: DISCONTINUED | OUTPATIENT
Start: 2025-08-25 | End: 2025-08-25

## 2025-08-25 RX ORDER — POLYETHYLENE GLYCOL 3350 17 G/17G
17 POWDER, FOR SOLUTION ORAL DAILY PRN
Status: DISCONTINUED | OUTPATIENT
Start: 2025-08-25 | End: 2025-08-29 | Stop reason: HOSPADM

## 2025-08-25 RX ORDER — ROSUVASTATIN CALCIUM 20 MG/1
20 TABLET, COATED ORAL NIGHTLY
Status: DISCONTINUED | OUTPATIENT
Start: 2025-08-25 | End: 2025-08-25

## 2025-08-25 RX ORDER — ACETAMINOPHEN 500 MG
1000 TABLET ORAL ONCE
Status: COMPLETED | OUTPATIENT
Start: 2025-08-25 | End: 2025-08-25

## 2025-08-25 RX ORDER — AMIODARONE HYDROCHLORIDE 200 MG/1
100 TABLET ORAL DAILY
Status: DISCONTINUED | OUTPATIENT
Start: 2025-08-25 | End: 2025-08-29 | Stop reason: HOSPADM

## 2025-08-25 RX ORDER — COLCHICINE 0.6 MG/1
0.6 TABLET ORAL DAILY
Status: DISCONTINUED | OUTPATIENT
Start: 2025-08-25 | End: 2025-08-29 | Stop reason: HOSPADM

## 2025-08-25 RX ORDER — INSULIN LISPRO 100 [IU]/ML
2-7 INJECTION, SOLUTION INTRAVENOUS; SUBCUTANEOUS
Status: DISCONTINUED | OUTPATIENT
Start: 2025-08-25 | End: 2025-08-29 | Stop reason: HOSPADM

## 2025-08-25 RX ORDER — SODIUM CHLORIDE 9 MG/ML
40 INJECTION, SOLUTION INTRAVENOUS AS NEEDED
Status: DISCONTINUED | OUTPATIENT
Start: 2025-08-25 | End: 2025-08-29 | Stop reason: HOSPADM

## 2025-08-25 RX ORDER — SPIRONOLACTONE 25 MG/1
12.5 TABLET ORAL DAILY
Status: DISCONTINUED | OUTPATIENT
Start: 2025-08-25 | End: 2025-08-27

## 2025-08-25 RX ORDER — SODIUM CHLORIDE 0.9 % (FLUSH) 0.9 %
10 SYRINGE (ML) INJECTION EVERY 12 HOURS SCHEDULED
Status: DISCONTINUED | OUTPATIENT
Start: 2025-08-25 | End: 2025-08-29 | Stop reason: HOSPADM

## 2025-08-25 RX ORDER — BUMETANIDE 0.25 MG/ML
1 INJECTION, SOLUTION INTRAMUSCULAR; INTRAVENOUS EVERY 12 HOURS
Status: DISCONTINUED | OUTPATIENT
Start: 2025-08-25 | End: 2025-08-25

## 2025-08-25 RX ORDER — NITROGLYCERIN 20 MG/100ML
5-200 INJECTION INTRAVENOUS
Status: DISCONTINUED | OUTPATIENT
Start: 2025-08-25 | End: 2025-08-26

## 2025-08-25 RX ORDER — SODIUM CHLORIDE 0.9 % (FLUSH) 0.9 %
10 SYRINGE (ML) INJECTION AS NEEDED
Status: DISCONTINUED | OUTPATIENT
Start: 2025-08-25 | End: 2025-08-29 | Stop reason: HOSPADM

## 2025-08-25 RX ORDER — ACETAMINOPHEN 160 MG/5ML
650 SOLUTION ORAL EVERY 4 HOURS PRN
Status: DISCONTINUED | OUTPATIENT
Start: 2025-08-25 | End: 2025-08-29 | Stop reason: HOSPADM

## 2025-08-25 RX ORDER — ACETAMINOPHEN 325 MG/1
650 TABLET ORAL EVERY 4 HOURS PRN
Status: DISCONTINUED | OUTPATIENT
Start: 2025-08-25 | End: 2025-08-29 | Stop reason: HOSPADM

## 2025-08-25 RX ORDER — CARVEDILOL 12.5 MG/1
25 TABLET ORAL 2 TIMES DAILY WITH MEALS
Status: DISCONTINUED | OUTPATIENT
Start: 2025-08-25 | End: 2025-08-29 | Stop reason: HOSPADM

## 2025-08-25 RX ORDER — BISACODYL 10 MG
10 SUPPOSITORY, RECTAL RECTAL DAILY PRN
Status: DISCONTINUED | OUTPATIENT
Start: 2025-08-25 | End: 2025-08-29 | Stop reason: HOSPADM

## 2025-08-25 RX ORDER — TAMSULOSIN HYDROCHLORIDE 0.4 MG/1
0.4 CAPSULE ORAL DAILY
Status: DISCONTINUED | OUTPATIENT
Start: 2025-08-25 | End: 2025-08-29 | Stop reason: HOSPADM

## 2025-08-25 RX ORDER — NICOTINE POLACRILEX 4 MG
15 LOZENGE BUCCAL
Status: DISCONTINUED | OUTPATIENT
Start: 2025-08-25 | End: 2025-08-29 | Stop reason: HOSPADM

## 2025-08-25 RX ADMIN — Medication 10 ML: at 11:06

## 2025-08-25 RX ADMIN — SENNOSIDES, DOCUSATE SODIUM 2 TABLET: 50; 8.6 TABLET, FILM COATED ORAL at 12:49

## 2025-08-25 RX ADMIN — CARVEDILOL 25 MG: 12.5 TABLET, FILM COATED ORAL at 17:29

## 2025-08-25 RX ADMIN — AMIODARONE HYDROCHLORIDE 100 MG: 200 TABLET ORAL at 11:02

## 2025-08-25 RX ADMIN — SPIRONOLACTONE 12.5 MG: 25 TABLET ORAL at 17:29

## 2025-08-25 RX ADMIN — BUMETANIDE 1 MG/HR: 0.25 INJECTION INTRAMUSCULAR; INTRAVENOUS at 15:34

## 2025-08-25 RX ADMIN — COLCHICINE 0.6 MG: 0.6 TABLET ORAL at 12:49

## 2025-08-25 RX ADMIN — RIVAROXABAN 15 MG: 15 TABLET, FILM COATED ORAL at 11:02

## 2025-08-25 RX ADMIN — BUMETANIDE 1 MG: 0.25 INJECTION INTRAMUSCULAR; INTRAVENOUS at 11:01

## 2025-08-25 RX ADMIN — CARVEDILOL 25 MG: 12.5 TABLET, FILM COATED ORAL at 11:02

## 2025-08-25 RX ADMIN — LEVOTHYROXINE SODIUM 88 MCG: 88 TABLET ORAL at 11:01

## 2025-08-25 RX ADMIN — BRIVARACETAM 75 MG: 50 INJECTION, SUSPENSION INTRAVENOUS at 11:22

## 2025-08-25 RX ADMIN — SACUBITRIL AND VALSARTAN 1 TABLET: 49; 51 TABLET, FILM COATED ORAL at 20:36

## 2025-08-25 RX ADMIN — NITROGLYCERIN 5 MCG/MIN: 20 INJECTION INTRAVENOUS at 04:39

## 2025-08-25 RX ADMIN — BUMETANIDE 2 MG: 0.25 INJECTION INTRAMUSCULAR; INTRAVENOUS at 04:35

## 2025-08-25 RX ADMIN — TAMSULOSIN HYDROCHLORIDE 0.4 MG: 0.4 CAPSULE ORAL at 11:02

## 2025-08-25 RX ADMIN — Medication 10 ML: at 20:37

## 2025-08-25 RX ADMIN — ACETAMINOPHEN 1000 MG: 500 TABLET, FILM COATED ORAL at 08:09

## 2025-08-25 RX ADMIN — SACUBITRIL AND VALSARTAN 1 TABLET: 49; 51 TABLET, FILM COATED ORAL at 11:02

## 2025-08-26 ENCOUNTER — APPOINTMENT (OUTPATIENT)
Dept: CARDIOLOGY | Facility: HOSPITAL | Age: 70
End: 2025-08-26
Payer: MEDICARE

## 2025-08-26 LAB
ANION GAP SERPL CALCULATED.3IONS-SCNC: 9 MMOL/L (ref 5–15)
AORTIC DIMENSIONLESS INDEX: 0.45 (DI)
ASCENDING AORTA: 4.4 CM
AV MEAN PRESS GRAD SYS DOP V1V2: 8 MMHG
AV VMAX SYS DOP: 187 CM/SEC
BASOPHILS # BLD AUTO: 0.02 10*3/MM3 (ref 0–0.2)
BASOPHILS NFR BLD AUTO: 0.3 % (ref 0–1.5)
BH CV ECHO MEAS - AI P1/2T: 537.5 MSEC
BH CV ECHO MEAS - AO MAX PG: 14 MMHG
BH CV ECHO MEAS - AO ROOT AREA (BSA CORRECTED): 1.7 CM2
BH CV ECHO MEAS - AO ROOT DIAM: 4.4 CM
BH CV ECHO MEAS - AO V2 VTI: 37.5 CM
BH CV ECHO MEAS - AVA(I,D): 2.04 CM2
BH CV ECHO MEAS - EDV(CUBED): 216 ML
BH CV ECHO MEAS - EDV(MOD-SP2): 297 ML
BH CV ECHO MEAS - EDV(MOD-SP4): 343 ML
BH CV ECHO MEAS - EF(MOD-SP2): 23.6 %
BH CV ECHO MEAS - EF(MOD-SP4): 23 %
BH CV ECHO MEAS - ESV(CUBED): 97.3 ML
BH CV ECHO MEAS - ESV(MOD-SP2): 227 ML
BH CV ECHO MEAS - ESV(MOD-SP4): 264 ML
BH CV ECHO MEAS - FS: 23.3 %
BH CV ECHO MEAS - IVS/LVPW: 1 CM
BH CV ECHO MEAS - IVSD: 1.3 CM
BH CV ECHO MEAS - LA DIMENSION: 5.7 CM
BH CV ECHO MEAS - LAT PEAK E' VEL: 10.6 CM/SEC
BH CV ECHO MEAS - LV DIASTOLIC VOL/BSA (35-75): 133 CM2
BH CV ECHO MEAS - LV MASS(C)D: 350.1 GRAMS
BH CV ECHO MEAS - LV MAX PG: 2.8 MMHG
BH CV ECHO MEAS - LV MEAN PG: 2 MMHG
BH CV ECHO MEAS - LV SYSTOLIC VOL/BSA (12-30): 102.4 CM2
BH CV ECHO MEAS - LV V1 MAX: 83 CM/SEC
BH CV ECHO MEAS - LV V1 VTI: 16.9 CM
BH CV ECHO MEAS - LVIDD: 6 CM
BH CV ECHO MEAS - LVIDS: 4.6 CM
BH CV ECHO MEAS - LVOT AREA: 4.5 CM2
BH CV ECHO MEAS - LVOT DIAM: 2.4 CM
BH CV ECHO MEAS - LVPWD: 1.3 CM
BH CV ECHO MEAS - MED PEAK E' VEL: 6.3 CM/SEC
BH CV ECHO MEAS - MR MAX PG: 96.8 MMHG
BH CV ECHO MEAS - MR MAX VEL: 492 CM/SEC
BH CV ECHO MEAS - MV DEC SLOPE: 490 CM/SEC2
BH CV ECHO MEAS - MV DEC TIME: 0.21 SEC
BH CV ECHO MEAS - MV E MAX VEL: 104 CM/SEC
BH CV ECHO MEAS - MV MAX PG: 5.1 MMHG
BH CV ECHO MEAS - MV MEAN PG: 2.44 MMHG
BH CV ECHO MEAS - MV V2 VTI: 24.5 CM
BH CV ECHO MEAS - MVA(VTI): 3.1 CM2
BH CV ECHO MEAS - PA ACC TIME: 0.03 SEC
BH CV ECHO MEAS - RAP SYSTOLE: 15 MMHG
BH CV ECHO MEAS - RVSP: 58 MMHG
BH CV ECHO MEAS - SV(LVOT): 76.5 ML
BH CV ECHO MEAS - SV(MOD-SP2): 70 ML
BH CV ECHO MEAS - SV(MOD-SP4): 79 ML
BH CV ECHO MEAS - SVI(LVOT): 29.6 ML/M2
BH CV ECHO MEAS - SVI(MOD-SP2): 27.1 ML/M2
BH CV ECHO MEAS - SVI(MOD-SP4): 30.6 ML/M2
BH CV ECHO MEAS - TAPSE (>1.6): 1.33 CM
BH CV ECHO MEAS - TR MAX PG: 43 MMHG
BH CV ECHO MEAS - TR MAX VEL: 328 CM/SEC
BH CV ECHO MEASUREMENTS AVERAGE E/E' RATIO: 12.31
BH CV XLRA - RV BASE: 3.8 CM
BH CV XLRA - RV LENGTH: 9 CM
BH CV XLRA - RV MID: 2.3 CM
BH CV XLRA - TDI S': 9.4 CM/SEC
BUN SERPL-MCNC: 23 MG/DL (ref 8–23)
BUN/CREAT SERPL: 12.9 (ref 7–25)
CALCIUM SPEC-SCNC: 8.7 MG/DL (ref 8.6–10.5)
CHLORIDE SERPL-SCNC: 103 MMOL/L (ref 98–107)
CO2 SERPL-SCNC: 26 MMOL/L (ref 22–29)
CREAT SERPL-MCNC: 1.78 MG/DL (ref 0.76–1.27)
DEPRECATED RDW RBC AUTO: 56.9 FL (ref 37–54)
EGFRCR SERPLBLD CKD-EPI 2021: 40.8 ML/MIN/1.73
EOSINOPHIL # BLD AUTO: 0.06 10*3/MM3 (ref 0–0.4)
EOSINOPHIL NFR BLD AUTO: 0.8 % (ref 0.3–6.2)
ERYTHROCYTE [DISTWIDTH] IN BLOOD BY AUTOMATED COUNT: 17.4 % (ref 12.3–15.4)
GLUCOSE BLDC GLUCOMTR-MCNC: 107 MG/DL (ref 70–130)
GLUCOSE BLDC GLUCOMTR-MCNC: 111 MG/DL (ref 70–130)
GLUCOSE BLDC GLUCOMTR-MCNC: 118 MG/DL (ref 70–130)
GLUCOSE BLDC GLUCOMTR-MCNC: 142 MG/DL (ref 70–130)
GLUCOSE SERPL-MCNC: 111 MG/DL (ref 65–99)
HCT VFR BLD AUTO: 42.8 % (ref 37.5–51)
HGB BLD-MCNC: 13.5 G/DL (ref 13–17.7)
IMM GRANULOCYTES # BLD AUTO: 0.01 10*3/MM3 (ref 0–0.05)
IMM GRANULOCYTES NFR BLD AUTO: 0.1 % (ref 0–0.5)
IVRT: 130 MS
LEFT ATRIUM VOLUME INDEX: 49.6 ML/M2
LV EF BIPLANE MOD: 23.7 %
LYMPHOCYTES # BLD AUTO: 1.34 10*3/MM3 (ref 0.7–3.1)
LYMPHOCYTES NFR BLD AUTO: 19 % (ref 19.6–45.3)
Lab: ABNORMAL
Lab: NORMAL
Lab: NORMAL
MCH RBC QN AUTO: 28 PG (ref 26.6–33)
MCHC RBC AUTO-ENTMCNC: 31.5 G/DL (ref 31.5–35.7)
MCV RBC AUTO: 88.8 FL (ref 79–97)
MONOCYTES # BLD AUTO: 0.51 10*3/MM3 (ref 0.1–0.9)
MONOCYTES NFR BLD AUTO: 7.2 % (ref 5–12)
NEUTROPHILS NFR BLD AUTO: 5.12 10*3/MM3 (ref 1.7–7)
NEUTROPHILS NFR BLD AUTO: 72.6 % (ref 42.7–76)
NRBC BLD AUTO-RTO: 0 /100 WBC (ref 0–0.2)
NT-PROBNP SERPL-MCNC: 7095 PG/ML (ref 0–900)
PLATELET # BLD AUTO: 167 10*3/MM3 (ref 140–450)
PMV BLD AUTO: 11.8 FL (ref 6–12)
POTASSIUM SERPL-SCNC: 3.8 MMOL/L (ref 3.5–5.2)
RBC # BLD AUTO: 4.82 10*6/MM3 (ref 4.14–5.8)
SODIUM SERPL-SCNC: 138 MMOL/L (ref 136–145)
WBC NRBC COR # BLD AUTO: 7.06 10*3/MM3 (ref 3.4–10.8)

## 2025-08-26 PROCEDURE — 85025 COMPLETE CBC W/AUTO DIFF WBC: CPT | Performed by: INTERNAL MEDICINE

## 2025-08-26 PROCEDURE — 25010000002 BUMETANIDE PER 0.5 MG: Performed by: INTERNAL MEDICINE

## 2025-08-26 PROCEDURE — 80048 BASIC METABOLIC PNL TOTAL CA: CPT | Performed by: INTERNAL MEDICINE

## 2025-08-26 PROCEDURE — 97161 PT EVAL LOW COMPLEX 20 MIN: CPT

## 2025-08-26 PROCEDURE — 93306 TTE W/DOPPLER COMPLETE: CPT

## 2025-08-26 PROCEDURE — 83880 ASSAY OF NATRIURETIC PEPTIDE: CPT

## 2025-08-26 PROCEDURE — 99232 SBSQ HOSP IP/OBS MODERATE 35: CPT | Performed by: INTERNAL MEDICINE

## 2025-08-26 PROCEDURE — 25010000002 SULFUR HEXAFLUORIDE MICROSPH 60.7-25 MG RECONSTITUTED SUSPENSION: Performed by: INTERNAL MEDICINE

## 2025-08-26 PROCEDURE — 82948 REAGENT STRIP/BLOOD GLUCOSE: CPT

## 2025-08-26 PROCEDURE — 82948 REAGENT STRIP/BLOOD GLUCOSE: CPT | Performed by: INTERNAL MEDICINE

## 2025-08-26 PROCEDURE — 97165 OT EVAL LOW COMPLEX 30 MIN: CPT

## 2025-08-26 RX ORDER — BUMETANIDE 0.25 MG/ML
2 INJECTION, SOLUTION INTRAMUSCULAR; INTRAVENOUS EVERY 12 HOURS
Status: DISCONTINUED | OUTPATIENT
Start: 2025-08-26 | End: 2025-08-28

## 2025-08-26 RX ADMIN — TAMSULOSIN HYDROCHLORIDE 0.4 MG: 0.4 CAPSULE ORAL at 09:11

## 2025-08-26 RX ADMIN — CARVEDILOL 25 MG: 12.5 TABLET, FILM COATED ORAL at 17:37

## 2025-08-26 RX ADMIN — RIVAROXABAN 15 MG: 15 TABLET, FILM COATED ORAL at 09:11

## 2025-08-26 RX ADMIN — SACUBITRIL AND VALSARTAN 1 TABLET: 49; 51 TABLET, FILM COATED ORAL at 20:54

## 2025-08-26 RX ADMIN — CARVEDILOL 25 MG: 12.5 TABLET, FILM COATED ORAL at 09:11

## 2025-08-26 RX ADMIN — SULFUR HEXAFLUORIDE 3 ML: KIT at 09:05

## 2025-08-26 RX ADMIN — SACUBITRIL AND VALSARTAN 1 TABLET: 49; 51 TABLET, FILM COATED ORAL at 09:11

## 2025-08-26 RX ADMIN — Medication 10 ML: at 20:55

## 2025-08-26 RX ADMIN — Medication 10 ML: at 09:13

## 2025-08-26 RX ADMIN — Medication: at 20:55

## 2025-08-26 RX ADMIN — SPIRONOLACTONE 12.5 MG: 25 TABLET ORAL at 09:11

## 2025-08-26 RX ADMIN — AMIODARONE HYDROCHLORIDE 100 MG: 200 TABLET ORAL at 09:11

## 2025-08-26 RX ADMIN — BUMETANIDE 2 MG: 0.25 INJECTION INTRAMUSCULAR; INTRAVENOUS at 20:54

## 2025-08-26 RX ADMIN — BUMETANIDE 2 MG: 0.25 INJECTION INTRAMUSCULAR; INTRAVENOUS at 09:11

## 2025-08-26 RX ADMIN — LEVOTHYROXINE SODIUM 88 MCG: 88 TABLET ORAL at 09:11

## 2025-08-26 RX ADMIN — COLCHICINE 0.6 MG: 0.6 TABLET ORAL at 09:11

## 2025-08-27 LAB
ANION GAP SERPL CALCULATED.3IONS-SCNC: 9 MMOL/L (ref 5–15)
ATMOSPHERIC PRESS: ABNORMAL MM[HG]
BASE EXCESS BLDV CALC-SCNC: -3.1 MMOL/L (ref -2–2)
BDY SITE: ABNORMAL
BODY TEMPERATURE: 37
BUN SERPL-MCNC: 22.8 MG/DL (ref 8–23)
BUN/CREAT SERPL: 12.6 (ref 7–25)
CALCIUM SPEC-SCNC: 9 MG/DL (ref 8.6–10.5)
CHLORIDE SERPL-SCNC: 104 MMOL/L (ref 98–107)
CO2 BLDA-SCNC: 21.4 MMOL/L (ref 22–33)
CO2 SERPL-SCNC: 30 MMOL/L (ref 22–29)
COHGB MFR BLD: 1.3 %
CREAT SERPL-MCNC: 1.81 MG/DL (ref 0.76–1.27)
EGFRCR SERPLBLD CKD-EPI 2021: 40 ML/MIN/1.73
EPAP: 0
GLUCOSE BLDC GLUCOMTR-MCNC: 136 MG/DL (ref 70–130)
GLUCOSE BLDC GLUCOMTR-MCNC: 82 MG/DL (ref 70–130)
GLUCOSE BLDC GLUCOMTR-MCNC: 91 MG/DL (ref 70–130)
GLUCOSE BLDC GLUCOMTR-MCNC: 98 MG/DL (ref 70–130)
GLUCOSE SERPL-MCNC: 103 MG/DL (ref 65–99)
HCO3 BLDV-SCNC: 20.5 MMOL/L (ref 22–28)
HGB BLDA-MCNC: 12 G/DL (ref 13.5–17.5)
INHALED O2 CONCENTRATION: 21 %
IPAP: 0
Lab: NORMAL
MAGNESIUM SERPL-MCNC: 2.1 MG/DL (ref 1.6–2.4)
METHGB BLD QL: 0.5 %
MODALITY: ABNORMAL
NT-PROBNP SERPL-MCNC: 4947 PG/ML (ref 0–900)
OXYHGB MFR BLDV: 84.5 %
PAW @ PEAK INSP FLOW SETTING VENT: 0 CMH2O
PCO2 BLDV: 31.4 MM HG (ref 41–51)
PH BLDV: 7.42 PH UNITS (ref 7.31–7.41)
PO2 BLDV: 50.8 MM HG (ref 27–53)
POTASSIUM SERPL-SCNC: 3.4 MMOL/L (ref 3.5–5.2)
SODIUM SERPL-SCNC: 143 MMOL/L (ref 136–145)
TOTAL RATE: 0 BREATHS/MINUTE

## 2025-08-27 PROCEDURE — 83735 ASSAY OF MAGNESIUM: CPT | Performed by: HOSPITALIST

## 2025-08-27 PROCEDURE — 80048 BASIC METABOLIC PNL TOTAL CA: CPT | Performed by: INTERNAL MEDICINE

## 2025-08-27 PROCEDURE — 82948 REAGENT STRIP/BLOOD GLUCOSE: CPT

## 2025-08-27 PROCEDURE — 25010000002 BUMETANIDE PER 0.5 MG: Performed by: INTERNAL MEDICINE

## 2025-08-27 PROCEDURE — 82805 BLOOD GASES W/O2 SATURATION: CPT

## 2025-08-27 PROCEDURE — 83880 ASSAY OF NATRIURETIC PEPTIDE: CPT

## 2025-08-27 PROCEDURE — 82948 REAGENT STRIP/BLOOD GLUCOSE: CPT | Performed by: INTERNAL MEDICINE

## 2025-08-27 PROCEDURE — 97110 THERAPEUTIC EXERCISES: CPT

## 2025-08-27 RX ORDER — SPIRONOLACTONE 25 MG/1
25 TABLET ORAL DAILY
Status: DISCONTINUED | OUTPATIENT
Start: 2025-08-28 | End: 2025-08-29 | Stop reason: HOSPADM

## 2025-08-27 RX ADMIN — SACUBITRIL AND VALSARTAN 1 TABLET: 49; 51 TABLET, FILM COATED ORAL at 09:17

## 2025-08-27 RX ADMIN — TAMSULOSIN HYDROCHLORIDE 0.4 MG: 0.4 CAPSULE ORAL at 21:50

## 2025-08-27 RX ADMIN — SPIRONOLACTONE 12.5 MG: 25 TABLET ORAL at 09:18

## 2025-08-27 RX ADMIN — Medication: at 09:00

## 2025-08-27 RX ADMIN — COLCHICINE 0.6 MG: 0.6 TABLET ORAL at 09:18

## 2025-08-27 RX ADMIN — Medication 10 ML: at 21:50

## 2025-08-27 RX ADMIN — BUMETANIDE 2 MG: 0.25 INJECTION INTRAMUSCULAR; INTRAVENOUS at 21:50

## 2025-08-27 RX ADMIN — AMIODARONE HYDROCHLORIDE 100 MG: 200 TABLET ORAL at 09:17

## 2025-08-27 RX ADMIN — Medication 10 ML: at 09:23

## 2025-08-27 RX ADMIN — SACUBITRIL AND VALSARTAN 1 TABLET: 49; 51 TABLET, FILM COATED ORAL at 21:50

## 2025-08-27 RX ADMIN — RIVAROXABAN 15 MG: 15 TABLET, FILM COATED ORAL at 09:17

## 2025-08-27 RX ADMIN — BUMETANIDE 2 MG: 0.25 INJECTION INTRAMUSCULAR; INTRAVENOUS at 10:00

## 2025-08-27 RX ADMIN — Medication: at 21:51

## 2025-08-27 RX ADMIN — CARVEDILOL 25 MG: 12.5 TABLET, FILM COATED ORAL at 19:00

## 2025-08-27 RX ADMIN — LEVOTHYROXINE SODIUM 88 MCG: 88 TABLET ORAL at 09:18

## 2025-08-27 RX ADMIN — CARVEDILOL 25 MG: 12.5 TABLET, FILM COATED ORAL at 09:17

## 2025-08-28 ENCOUNTER — APPOINTMENT (OUTPATIENT)
Dept: GENERAL RADIOLOGY | Facility: HOSPITAL | Age: 70
End: 2025-08-28
Payer: MEDICARE

## 2025-08-28 LAB
ALBUMIN SERPL-MCNC: 3.8 G/DL (ref 3.5–5.2)
ALBUMIN/GLOB SERPL: 1.4 G/DL
ALP SERPL-CCNC: 71 U/L (ref 39–117)
ALT SERPL W P-5'-P-CCNC: 13 U/L (ref 1–41)
ANION GAP SERPL CALCULATED.3IONS-SCNC: 14 MMOL/L (ref 5–15)
AST SERPL-CCNC: 16 U/L (ref 1–40)
BILIRUB SERPL-MCNC: 1.1 MG/DL (ref 0–1.2)
BUN SERPL-MCNC: 22.3 MG/DL (ref 8–23)
BUN/CREAT SERPL: 12.2 (ref 7–25)
CALCIUM SPEC-SCNC: 8.8 MG/DL (ref 8.6–10.5)
CHLORIDE SERPL-SCNC: 103 MMOL/L (ref 98–107)
CO2 SERPL-SCNC: 24 MMOL/L (ref 22–29)
CREAT SERPL-MCNC: 1.83 MG/DL (ref 0.76–1.27)
EGFRCR SERPLBLD CKD-EPI 2021: 39.5 ML/MIN/1.73
GLOBULIN UR ELPH-MCNC: 2.7 GM/DL
GLUCOSE BLDC GLUCOMTR-MCNC: 126 MG/DL (ref 70–130)
GLUCOSE BLDC GLUCOMTR-MCNC: 129 MG/DL (ref 70–130)
GLUCOSE BLDC GLUCOMTR-MCNC: 133 MG/DL (ref 70–130)
GLUCOSE BLDC GLUCOMTR-MCNC: 94 MG/DL (ref 70–130)
GLUCOSE SERPL-MCNC: 91 MG/DL (ref 65–99)
Lab: ABNORMAL
NT-PROBNP SERPL-MCNC: 5134 PG/ML (ref 0–900)
POTASSIUM SERPL-SCNC: 3.7 MMOL/L (ref 3.5–5.2)
PROT SERPL-MCNC: 6.5 G/DL (ref 6–8.5)
SODIUM SERPL-SCNC: 141 MMOL/L (ref 136–145)

## 2025-08-28 PROCEDURE — 71045 X-RAY EXAM CHEST 1 VIEW: CPT

## 2025-08-28 PROCEDURE — 82948 REAGENT STRIP/BLOOD GLUCOSE: CPT

## 2025-08-28 PROCEDURE — 25010000002 BUMETANIDE PER 0.5 MG: Performed by: INTERNAL MEDICINE

## 2025-08-28 PROCEDURE — 83880 ASSAY OF NATRIURETIC PEPTIDE: CPT

## 2025-08-28 PROCEDURE — 25010000002 BUMETANIDE PER 0.5 MG

## 2025-08-28 PROCEDURE — 80053 COMPREHEN METABOLIC PANEL: CPT

## 2025-08-28 PROCEDURE — 82948 REAGENT STRIP/BLOOD GLUCOSE: CPT | Performed by: INTERNAL MEDICINE

## 2025-08-28 RX ORDER — BUMETANIDE 0.25 MG/ML
2 INJECTION, SOLUTION INTRAMUSCULAR; INTRAVENOUS 3 TIMES DAILY
Status: DISCONTINUED | OUTPATIENT
Start: 2025-08-28 | End: 2025-08-29 | Stop reason: HOSPADM

## 2025-08-28 RX ORDER — HYDROXYZINE HYDROCHLORIDE 25 MG/1
25 TABLET, FILM COATED ORAL 3 TIMES DAILY PRN
Status: DISCONTINUED | OUTPATIENT
Start: 2025-08-28 | End: 2025-08-29 | Stop reason: HOSPADM

## 2025-08-28 RX ADMIN — BUMETANIDE 2 MG: 0.25 INJECTION INTRAMUSCULAR; INTRAVENOUS at 13:00

## 2025-08-28 RX ADMIN — Medication 10 ML: at 08:14

## 2025-08-28 RX ADMIN — SACUBITRIL AND VALSARTAN 1 TABLET: 49; 51 TABLET, FILM COATED ORAL at 08:12

## 2025-08-28 RX ADMIN — Medication: at 22:18

## 2025-08-28 RX ADMIN — AMIODARONE HYDROCHLORIDE 100 MG: 200 TABLET ORAL at 08:13

## 2025-08-28 RX ADMIN — SACUBITRIL AND VALSARTAN 1 TABLET: 49; 51 TABLET, FILM COATED ORAL at 22:18

## 2025-08-28 RX ADMIN — CARVEDILOL 25 MG: 12.5 TABLET, FILM COATED ORAL at 08:12

## 2025-08-28 RX ADMIN — RIVAROXABAN 15 MG: 15 TABLET, FILM COATED ORAL at 08:12

## 2025-08-28 RX ADMIN — CARVEDILOL 25 MG: 12.5 TABLET, FILM COATED ORAL at 17:56

## 2025-08-28 RX ADMIN — Medication 10 ML: at 22:19

## 2025-08-28 RX ADMIN — TAMSULOSIN HYDROCHLORIDE 0.4 MG: 0.4 CAPSULE ORAL at 08:12

## 2025-08-28 RX ADMIN — BUMETANIDE 2 MG: 0.25 INJECTION INTRAMUSCULAR; INTRAVENOUS at 08:12

## 2025-08-28 RX ADMIN — Medication: at 08:24

## 2025-08-28 RX ADMIN — SPIRONOLACTONE 25 MG: 25 TABLET ORAL at 08:12

## 2025-08-28 RX ADMIN — BUMETANIDE 2 MG: 0.25 INJECTION INTRAMUSCULAR; INTRAVENOUS at 22:18

## 2025-08-28 RX ADMIN — COLCHICINE 0.6 MG: 0.6 TABLET ORAL at 08:12

## 2025-08-28 RX ADMIN — LEVOTHYROXINE SODIUM 88 MCG: 88 TABLET ORAL at 08:12

## 2025-08-29 ENCOUNTER — READMISSION MANAGEMENT (OUTPATIENT)
Dept: CALL CENTER | Facility: HOSPITAL | Age: 70
End: 2025-08-29
Payer: MEDICARE

## 2025-08-29 VITALS
RESPIRATION RATE: 16 BRPM | OXYGEN SATURATION: 96 % | DIASTOLIC BLOOD PRESSURE: 88 MMHG | BODY MASS INDEX: 33.44 KG/M2 | SYSTOLIC BLOOD PRESSURE: 137 MMHG | WEIGHT: 274.6 LBS | HEIGHT: 76 IN | HEART RATE: 76 BPM | TEMPERATURE: 97.9 F

## 2025-08-29 LAB
ANION GAP SERPL CALCULATED.3IONS-SCNC: 12 MMOL/L (ref 5–15)
BUN SERPL-MCNC: 23.3 MG/DL (ref 8–23)
BUN/CREAT SERPL: 13.6 (ref 7–25)
CALCIUM SPEC-SCNC: 9.3 MG/DL (ref 8.6–10.5)
CHLORIDE SERPL-SCNC: 101 MMOL/L (ref 98–107)
CO2 SERPL-SCNC: 27 MMOL/L (ref 22–29)
CREAT SERPL-MCNC: 1.71 MG/DL (ref 0.76–1.27)
EGFRCR SERPLBLD CKD-EPI 2021: 42.8 ML/MIN/1.73
GLUCOSE BLDC GLUCOMTR-MCNC: 138 MG/DL (ref 70–130)
GLUCOSE BLDC GLUCOMTR-MCNC: 94 MG/DL (ref 70–130)
GLUCOSE SERPL-MCNC: 90 MG/DL (ref 65–99)
NT-PROBNP SERPL-MCNC: 5303 PG/ML (ref 0–900)
POTASSIUM SERPL-SCNC: 3.7 MMOL/L (ref 3.5–5.2)
SODIUM SERPL-SCNC: 140 MMOL/L (ref 136–145)

## 2025-08-29 PROCEDURE — 82948 REAGENT STRIP/BLOOD GLUCOSE: CPT | Performed by: INTERNAL MEDICINE

## 2025-08-29 PROCEDURE — 83880 ASSAY OF NATRIURETIC PEPTIDE: CPT

## 2025-08-29 PROCEDURE — 25010000002 BUMETANIDE PER 0.5 MG

## 2025-08-29 PROCEDURE — 82948 REAGENT STRIP/BLOOD GLUCOSE: CPT

## 2025-08-29 PROCEDURE — 80048 BASIC METABOLIC PNL TOTAL CA: CPT | Performed by: HOSPITALIST

## 2025-08-29 RX ORDER — BUMETANIDE 1 MG/1
2 TABLET ORAL 2 TIMES DAILY
Qty: 180 TABLET | Refills: 1 | Status: SHIPPED | OUTPATIENT
Start: 2025-08-29

## 2025-08-29 RX ADMIN — BUMETANIDE 2 MG: 0.25 INJECTION INTRAMUSCULAR; INTRAVENOUS at 08:47

## 2025-08-29 RX ADMIN — Medication: at 10:14

## 2025-08-29 RX ADMIN — BUMETANIDE 2 MG: 0.25 INJECTION INTRAMUSCULAR; INTRAVENOUS at 15:30

## 2025-08-29 RX ADMIN — RIVAROXABAN 15 MG: 15 TABLET, FILM COATED ORAL at 08:48

## 2025-08-29 RX ADMIN — CARVEDILOL 25 MG: 12.5 TABLET, FILM COATED ORAL at 17:37

## 2025-08-29 RX ADMIN — TAMSULOSIN HYDROCHLORIDE 0.4 MG: 0.4 CAPSULE ORAL at 08:48

## 2025-08-29 RX ADMIN — COLCHICINE 0.6 MG: 0.6 TABLET ORAL at 08:48

## 2025-08-29 RX ADMIN — CARVEDILOL 25 MG: 12.5 TABLET, FILM COATED ORAL at 08:47

## 2025-08-29 RX ADMIN — Medication 10 ML: at 09:00

## 2025-08-29 RX ADMIN — LEVOTHYROXINE SODIUM 88 MCG: 88 TABLET ORAL at 08:47

## 2025-08-29 RX ADMIN — SACUBITRIL AND VALSARTAN 1 TABLET: 49; 51 TABLET, FILM COATED ORAL at 08:48

## 2025-08-29 RX ADMIN — SPIRONOLACTONE 25 MG: 25 TABLET ORAL at 08:49

## 2025-08-29 RX ADMIN — AMIODARONE HYDROCHLORIDE 100 MG: 200 TABLET ORAL at 08:48

## 2025-08-30 LAB
MC_CV_MDC_IDC_RATE_1: 150
MC_CV_MDC_IDC_RATE_1: 176
MC_CV_MDC_IDC_RATE_1: 240
MC_CV_MDC_IDC_SHOCK_MEASURED_IMPEDANCE: 61
MC_CV_MDC_IDC_THERAPIES: NORMAL
MC_CV_MDC_IDC_THERAPIES: NORMAL
MC_CV_MDC_IDC_ZONE_ID: 1
MC_CV_MDC_IDC_ZONE_ID: 2
MC_CV_MDC_IDC_ZONE_ID: 3
MDC_IDC_MSMT_BATTERY_REMAINING_LONGEVITY: 52 MO
MDC_IDC_MSMT_BATTERY_REMAINING_PERCENTAGE: 56 %
MDC_IDC_MSMT_BATTERY_RRT_TRIGGER: 2.62
MDC_IDC_MSMT_BATTERY_STATUS: NORMAL
MDC_IDC_MSMT_BATTERY_VOLTAGE: 2.93
MDC_IDC_MSMT_CAP_CHARGE_TIME: 8.9
MDC_IDC_MSMT_LEADCHNL_RA_IMPEDANCE_VALUE: 360
MDC_IDC_MSMT_LEADCHNL_RA_PACING_THRESHOLD_POLARITY: NORMAL
MDC_IDC_MSMT_LEADCHNL_RA_SENSING_INTR_AMPL: 3.1
MDC_IDC_MSMT_LEADCHNL_RV_IMPEDANCE_VALUE: 440
MDC_IDC_MSMT_LEADCHNL_RV_PACING_THRESHOLD_POLARITY: NORMAL
MDC_IDC_MSMT_LEADCHNL_RV_SENSING_INTR_AMPL: 12
MDC_IDC_PG_IMPLANT_DTM: NORMAL
MDC_IDC_PG_MFG: NORMAL
MDC_IDC_PG_MODEL: NORMAL
MDC_IDC_PG_SERIAL: NORMAL
MDC_IDC_PG_TYPE: NORMAL
MDC_IDC_SESS_DTM: NORMAL
MDC_IDC_SESS_TYPE: NORMAL
MDC_IDC_SET_BRADY_AT_MODE_SWITCH_RATE: 180
MDC_IDC_SET_BRADY_LOWRATE: 70
MDC_IDC_SET_BRADY_MAX_SENSOR_RATE: 120
MDC_IDC_SET_BRADY_MAX_TRACKING_RATE: 100
MDC_IDC_SET_BRADY_MODE: NORMAL
MDC_IDC_SET_BRADY_PAV_DELAY: 250
MDC_IDC_SET_BRADY_SAV_DELAY: 225
MDC_IDC_SET_LEADCHNL_RA_PACING_AMPLITUDE: 2
MDC_IDC_SET_LEADCHNL_RA_PACING_POLARITY: NORMAL
MDC_IDC_SET_LEADCHNL_RA_PACING_PULSEWIDTH: 0.5
MDC_IDC_SET_LEADCHNL_RA_SENSING_POLARITY: NORMAL
MDC_IDC_SET_LEADCHNL_RA_SENSING_SENSITIVITY: 0.3
MDC_IDC_SET_LEADCHNL_RV_PACING_AMPLITUDE: 2
MDC_IDC_SET_LEADCHNL_RV_PACING_POLARITY: NORMAL
MDC_IDC_SET_LEADCHNL_RV_PACING_PULSEWIDTH: 0.5
MDC_IDC_SET_LEADCHNL_RV_SENSING_POLARITY: NORMAL
MDC_IDC_SET_LEADCHNL_RV_SENSING_SENSITIVITY: 0.5
MDC_IDC_SET_ZONE_STATUS: NORMAL
MDC_IDC_SET_ZONE_TYPE: NORMAL
MDC_IDC_STAT_AT_BURDEN_PERCENT: 0
MDC_IDC_STAT_BRADY_RA_PERCENT_PACED: 99
MDC_IDC_STAT_BRADY_RV_PERCENT_PACED: 3
MDC_IDC_STAT_TACHYTHERAPY_ATP_DELIVERED_RECENT: 0
MDC_IDC_STAT_TACHYTHERAPY_SHOCKS_ABORTED_RECENT: 0
MDC_IDC_STAT_TACHYTHERAPY_SHOCKS_DELIVERED_RECENT: 0

## (undated) DEVICE — CATH DIAG EXPO .056 FL3.5 6F 100CM

## (undated) DEVICE — GLV SURG SENSICARE LT W/ALOE PF LF 7 STRL

## (undated) DEVICE — SOL IRR NACL 0.9PCT 3000ML

## (undated) DEVICE — SINGLE-USE DIGITAL FLEXIBLE URETEROSCOPE: Brand: LITHOVUE

## (undated) DEVICE — RICH CYSTO: Brand: MEDLINE INDUSTRIES, INC.

## (undated) DEVICE — ADAPT UROLOK

## (undated) DEVICE — NITINOL WIRE WITH HYDROPHILIC TIP: Brand: SENSOR

## (undated) DEVICE — GW AMPLTZ SUPRSTF PTFE JB .035 7X145CM

## (undated) DEVICE — URETERAL ACCESS SHEATH SET: Brand: NAVIGATOR HD

## (undated) DEVICE — CATHETER,URETHRAL,REDRUBBER,STRL,18FR: Brand: MEDLINE

## (undated) DEVICE — SUT SILK 2/0 SH 30IN K833H

## (undated) DEVICE — PK CATH CARD 10

## (undated) DEVICE — CATH URETRL AP 18F

## (undated) DEVICE — ADAPT/Y SCPE GATEWAY ADVNTGE

## (undated) DEVICE — DUAL LUMEN URETERAL CATHETER

## (undated) DEVICE — ST URO THERMEDX/FLUIDSMART IRR/FLD/WARM PNT/PRESS/300MMHG

## (undated) DEVICE — FIBR LASR FLEXIVAPULSEID/TRACTIP HOLMIUM BALL/TP 200MH 1P/U

## (undated) DEVICE — INTRO SHEATH PRELUDE IDEAL SPRNG COIL 021 6F 23X80CM

## (undated) DEVICE — CATH DIAG EXPO M/ PK 6FR FL4/FR4 PIG 3PK

## (undated) DEVICE — ST FLD IRR WARM

## (undated) DEVICE — SLV SCD CALF HEMOFORCE DVT THERP REPROC MD

## (undated) DEVICE — KT MANIFOLD CATHLAB CUST

## (undated) DEVICE — DEV COMP RAD PRELUDESYNC 24CM